# Patient Record
Sex: MALE | Race: WHITE | NOT HISPANIC OR LATINO | Employment: OTHER | URBAN - METROPOLITAN AREA
[De-identification: names, ages, dates, MRNs, and addresses within clinical notes are randomized per-mention and may not be internally consistent; named-entity substitution may affect disease eponyms.]

---

## 2017-06-22 ENCOUNTER — OFFICE VISIT (OUTPATIENT)
Dept: TRANSPLANT | Facility: CLINIC | Age: 67
End: 2017-06-22
Attending: INTERNAL MEDICINE
Payer: MEDICARE

## 2017-06-22 VITALS
SYSTOLIC BLOOD PRESSURE: 114 MMHG | OXYGEN SATURATION: 98 % | BODY MASS INDEX: 23.18 KG/M2 | HEIGHT: 66 IN | HEART RATE: 80 BPM | DIASTOLIC BLOOD PRESSURE: 72 MMHG | WEIGHT: 144.2 LBS | TEMPERATURE: 98.5 F | RESPIRATION RATE: 16 BRPM

## 2017-06-22 DIAGNOSIS — C82.99 NODULAR LYMPHOMA OF EXTRANODAL AND/OR SOLID ORGAN SITE (H): Primary | ICD-10-CM

## 2017-06-22 DIAGNOSIS — C82.80 OTHER TYPE OF FOLLICULAR LYMPHOMA: Primary | ICD-10-CM

## 2017-06-22 LAB
ALBUMIN SERPL-MCNC: 3.8 G/DL (ref 3.4–5)
ALP SERPL-CCNC: 70 U/L (ref 40–150)
ALT SERPL W P-5'-P-CCNC: 25 U/L (ref 0–70)
ANION GAP SERPL CALCULATED.3IONS-SCNC: 4 MMOL/L (ref 3–14)
AST SERPL W P-5'-P-CCNC: 20 U/L (ref 0–45)
BASOPHILS # BLD AUTO: 0 10E9/L (ref 0–0.2)
BASOPHILS NFR BLD AUTO: 0.3 %
BILIRUB SERPL-MCNC: 0.3 MG/DL (ref 0.2–1.3)
BUN SERPL-MCNC: 27 MG/DL (ref 7–30)
CALCIUM SERPL-MCNC: 8.7 MG/DL (ref 8.5–10.1)
CHLORIDE SERPL-SCNC: 106 MMOL/L (ref 94–109)
CO2 SERPL-SCNC: 30 MMOL/L (ref 20–32)
CREAT SERPL-MCNC: 0.82 MG/DL (ref 0.66–1.25)
DIFFERENTIAL METHOD BLD: ABNORMAL
EOSINOPHIL # BLD AUTO: 0.1 10E9/L (ref 0–0.7)
EOSINOPHIL NFR BLD AUTO: 1.9 %
ERYTHROCYTE [DISTWIDTH] IN BLOOD BY AUTOMATED COUNT: 12.3 % (ref 10–15)
GFR SERPL CREATININE-BSD FRML MDRD: ABNORMAL ML/MIN/1.7M2
GLUCOSE SERPL-MCNC: 93 MG/DL (ref 70–99)
HCT VFR BLD AUTO: 42.2 % (ref 40–53)
HGB BLD-MCNC: 13.5 G/DL (ref 13.3–17.7)
IMM GRANULOCYTES # BLD: 0 10E9/L (ref 0–0.4)
IMM GRANULOCYTES NFR BLD: 0.3 %
LDH SERPL L TO P-CCNC: 148 U/L (ref 85–227)
LYMPHOCYTES # BLD AUTO: 0.9 10E9/L (ref 0.8–5.3)
LYMPHOCYTES NFR BLD AUTO: 28.8 %
MCH RBC QN AUTO: 31.8 PG (ref 26.5–33)
MCHC RBC AUTO-ENTMCNC: 32 G/DL (ref 31.5–36.5)
MCV RBC AUTO: 99 FL (ref 78–100)
MONOCYTES # BLD AUTO: 0.4 10E9/L (ref 0–1.3)
MONOCYTES NFR BLD AUTO: 11.7 %
NEUTROPHILS # BLD AUTO: 1.8 10E9/L (ref 1.6–8.3)
NEUTROPHILS NFR BLD AUTO: 57 %
NRBC # BLD AUTO: 0 10*3/UL
NRBC BLD AUTO-RTO: 0 /100
PLATELET # BLD AUTO: 132 10E9/L (ref 150–450)
POTASSIUM SERPL-SCNC: 4.4 MMOL/L (ref 3.4–5.3)
PROT SERPL-MCNC: 6.7 G/DL (ref 6.8–8.8)
RBC # BLD AUTO: 4.25 10E12/L (ref 4.4–5.9)
SODIUM SERPL-SCNC: 140 MMOL/L (ref 133–144)
WBC # BLD AUTO: 3.1 10E9/L (ref 4–11)

## 2017-06-22 PROCEDURE — 85025 COMPLETE CBC W/AUTO DIFF WBC: CPT | Performed by: INTERNAL MEDICINE

## 2017-06-22 PROCEDURE — 36415 COLL VENOUS BLD VENIPUNCTURE: CPT | Performed by: INTERNAL MEDICINE

## 2017-06-22 PROCEDURE — 84165 PROTEIN E-PHORESIS SERUM: CPT | Performed by: INTERNAL MEDICINE

## 2017-06-22 PROCEDURE — 00000402 ZZHCL STATISTIC TOTAL PROTEIN: Performed by: INTERNAL MEDICINE

## 2017-06-22 PROCEDURE — 83615 LACTATE (LD) (LDH) ENZYME: CPT | Performed by: INTERNAL MEDICINE

## 2017-06-22 PROCEDURE — 99212 OFFICE O/P EST SF 10 MIN: CPT | Mod: ZF

## 2017-06-22 PROCEDURE — 80053 COMPREHEN METABOLIC PANEL: CPT | Performed by: INTERNAL MEDICINE

## 2017-06-22 RX ORDER — ERYTHROMYCIN 5 MG/G
OINTMENT OPHTHALMIC DAILY
COMMUNITY
Start: 2017-06-01 | End: 2017-06-22

## 2017-06-22 RX ORDER — ACYCLOVIR 400 MG/1
400 TABLET ORAL 2 TIMES DAILY
Qty: 60 TABLET | Refills: 3 | Status: SHIPPED | OUTPATIENT
Start: 2017-06-22 | End: 2018-07-26

## 2017-06-22 ASSESSMENT — PAIN SCALES - GENERAL: PAINLEVEL: NO PAIN (0)

## 2017-06-22 NOTE — LETTER
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  In the last year, he has for the most part been well.  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  Two issues have shown up.  He has not been known to have cold sores in the past but he has had recurrent irritated sores around his nose and sometimes with soreness in part of his face in the region.  There is no extensive or persistent rash and they have resolved.  He also had a rash on his right dorsum of his forearm/wrist which was red, not itchy but sore and developed vesicles which then resolved fully.  He did not have recurrent pain in the area.  Whether that was shingles or peculiarly a tiny patch of disseminated HSV is uncertain.      His other problem has been over the last several months episodic abdominal cramps and loose stools, sometimes 4-6 times a day.  It is not overtly associated with any specific foods and he is relatively cautious about his diet.  He does have whole milk and cereal in the morning and eats yogurt most days, but for some time, he has rarely gone more than 3 or 4 days without an episode like this.      The rest of his review of systems is unrevealing.      PHYSICAL EXAMINATION:     VITAL SIGNS:  His exam shows his weight stable over several years and his vital signs are acceptable.   LYMPH:  He " has 1 small soft right high anterior cervical/submandibular node which he says fluctuates in size from time to time, but no other cervical, supraclavicular or axillary nodes.  He has a tiny, 0.5 cm left femoral node and a small right inguinal node that, for the most part, have been for a long time without changing.  They are nontender.   HEENT:  Oropharynx is clear without mucosal lesions.   SKIN:  He has no active inflammatory skin rash, but he has many seborrheic keratoses and actinic, scaly patches scattered over his face.  In his right medial nostril, there is an irritated red spot that certainly could be an HSV cold sore.   LUNGS:  Clear without rales or wheezing.   CARDIOVASCULAR:  Heart tones are regular.   ABDOMEN:  Soft and nontender.  There are no masses or hepatosplenomegaly.   EXTREMITIES:  He has no peripheral edema.      LABORATORY:  Laboratory testing showed good blood counts and chemistries.  A protein electrophoresis shows a small stable monoclonal protein (0.2g/dl) which is unchanged in many years--since 2007.      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      His loose stools and cramps are not as clear.  We discussed that he could try to go 2 weeks without dairy products or try Lactaid (lactose-containing drops) to see if that made a difference in the frequency of his loose stools.  If it is persistent, he might need investigation for more significant GI problems, but we will start with these 2 simple measures and he will pursue that with his primary care physician if he chooses to.      There are no clinical signs of recurrent lymphoma and he knows to call if additional issues arise.  He will return in 1 year's time.      Elroy Holman MD   Professor of Medicine       Results for GILES DONNELL (MRN [id number]) as of 6/23/2017 16:33   Ref.  Range 6/2/2016 14:24 6/22/2017 14:51   Sodium Latest Ref Range: 133 - 144 mmol/L 139 140   Potassium Latest Ref Range: 3.4 - 5.3 mmol/L 4.4 4.4   Chloride Latest Ref Range: 94 - 109 mmol/L 106 106   Carbon Dioxide Latest Ref Range: 20 - 32 mmol/L 28 30   Urea Nitrogen Latest Ref Range: 7 - 30 mg/dL 26 27   Creatinine Latest Ref Range: 0.66 - 1.25 mg/dL 0.91 0.82   GFR Estimate Latest Ref Range: >60 mL/min/1.7m2 83 >90...   GFR Estimate If Black Latest Ref Range: >60 mL/min/1.7m2 >90... >90...   Calcium Latest Ref Range: 8.5 - 10.1 mg/dL 9.0 8.7   Anion Gap Latest Ref Range: 3 - 14 mmol/L 4 4   Albumin Latest Ref Range: 3.4 - 5.0 g/dL 3.8 3.8   Protein Total Latest Ref Range: 6.8 - 8.8 g/dL 6.6 (L) 6.7 (L)   Bilirubin Total Latest Ref Range: 0.2 - 1.3 mg/dL 0.4 0.3   Alkaline Phosphatase Latest Ref Range: 40 - 150 U/L 60 70   ALT Latest Ref Range: 0 - 70 U/L 24 25   AST Latest Ref Range: 0 - 45 U/L 23 20   Lactate Dehydrogenase Latest Ref Range: 85 - 227 U/L  148   Glucose Latest Ref Range: 70 - 99 mg/dL 83 93   WBC Latest Ref Range: 4.0 - 11.0 10e9/L 3.2 (L) 3.1 (L)   Hemoglobin Latest Ref Range: 13.3 - 17.7 g/dL 12.7 (L) 13.5   Hematocrit Latest Ref Range: 40.0 - 53.0 % 38.7 (L) 42.2   Platelet Count Latest Ref Range: 150 - 450 10e9/L 136 (L) 132 (L)   RBC Count Latest Ref Range: 4.4 - 5.9 10e12/L 3.94 (L) 4.25 (L)   MCV Latest Ref Range: 78 - 100 fl 98 99   MCH Latest Ref Range: 26.5 - 33.0 pg 32.2 31.8   MCHC Latest Ref Range: 31.5 - 36.5 g/dL 32.8 32.0   RDW Latest Ref Range: 10.0 - 15.0 % 12.3 12.3   Diff Method Unknown Automated Method Automated Method   % Neutrophils Latest Units: % 56.7 57.0   % Lymphocytes Latest Units: % 29.4 28.8   % Monocytes Latest Units: % 11.4 11.7   % Eosinophils Latest Units: % 1.9 1.9   % Basophils Latest Units: % 0.3 0.3   % Immature Granulocytes Latest Units: % 0.3 0.3   Nucleated RBCs Latest Ref Range: 0 /100 0 0   Absolute Neutrophil Latest Ref Range: 1.6 - 8.3 10e9/L 1.8 1.8    Absolute Lymphocytes Latest Ref Range: 0.8 - 5.3 10e9/L 0.9 0.9   Absolute Monocytes Latest Ref Range: 0.0 - 1.3 10e9/L 0.4 0.4   Absolute Eosinophils Latest Ref Range: 0.0 - 0.7 10e9/L 0.1 0.1   Absolute Basophils Latest Ref Range: 0.0 - 0.2 10e9/L 0.0 0.0   Abs Immature Granulocytes Latest Ref Range: 0 - 0.4 10e9/L 0.0 0.0   Absolute Nucleated RBC Unknown 0.0 0.0   Albumin Fraction Latest Ref Range: 3.7 - 5.1 g/dL 4.2 4.3   Alpha 1 Fraction Latest Ref Range: 0.2 - 0.4 g/dL 0.2 0.3   Alpha 2 Fraction Latest Ref Range: 0.5 - 0.9 g/dL 0.5 0.6   Beta Fraction Latest Ref Range: 0.6 - 1.0 g/dL 0.5 (L) 0.5 (L)   ELP Interpretation: Unknown Small monoclonal ... Small monoclonal ...   Gamma Fraction Latest Ref Range: 0.7 - 1.6 g/dL 0.8 0.8   Monoclonal Peak Latest Ref Range: 0.0 g/dL 0.2 (H) 0.2 (H)       Elroy Holman MD

## 2017-06-22 NOTE — NURSING NOTE
"Oncology Rooming Note    June 22, 2017 3:12 PM   Shahid Davis is a 66 year old male who presents for:    Chief Complaint   Patient presents with     RECHECK     Nodular lymphoma of extranodal and/or solid organ site      Initial Vitals: /72 (BP Location: Right arm, Patient Position: Chair, Cuff Size: Adult Regular)  Pulse 80  Temp 98.5  F (36.9  C) (Oral)  Resp 16  Ht 1.676 m (5' 5.98\")  Wt 65.4 kg (144 lb 3.2 oz)  SpO2 98%  BMI 23.29 kg/m2 Estimated body mass index is 23.29 kg/(m^2) as calculated from the following:    Height as of this encounter: 1.676 m (5' 5.98\").    Weight as of this encounter: 65.4 kg (144 lb 3.2 oz). Body surface area is 1.74 meters squared.  No Pain (0) Comment: Data Unavailable   No LMP for male patient.  Allergies reviewed: Yes  Medications reviewed: Yes    Medications: Medication refills not needed today.  Pharmacy name entered into [id number] [place] [address]    Clinical concerns:  No new concerns  Provider was notified.    7 minutes for nursing intake (face to face time)     Agnieszka Taylor MA              "

## 2017-06-22 NOTE — MR AVS SNAPSHOT
"              After Visit Summary   6/22/2017    Shahid Davis    MRN: [id number]           Patient Information     Date Of Birth          1950        Visit Information        Provider Department      6/22/2017 3:00 PM Elroy Holman MD [facility]        Today's Diagnoses     Other type of follicular lymphoma (H)    -  1          [facility] ([facility])  [address] [place] [id number]  Phone: [phone]  Clinic Hours:   Monday-Thursday:7am to 7pm   Friday: 7am to 5pm   Weekends and holidays:    8am to noon (in general)  If your fever is 100.5  or greater,   call the clinic.  After hours call the   hospital at [phone] or   [phone]. Ask for the BMT   fellow on-call            Follow-ups after your visit        Who to contact     If you have questions or need follow up information about today's clinic visit or your schedule please contact [facility] directly at [phone].  Normal or non-critical lab and imaging results will be communicated to you by Clipcopiahart, letter or phone within 4 business days after the clinic has received the results. If you do not hear from us within 7 days, please contact the clinic through InThrMat or phone. If you have a critical or abnormal lab result, we will notify you by phone as soon as possible.  Submit refill requests through ividence or call your pharmacy and they will forward the refill request to us. Please allow 3 business days for your refill to be completed.          Additional Information About Your Visit        Clipcopiahart Information     ividence lets you send messages to your doctor, view your test results, renew your prescriptions, schedule appointments and more. To sign up, go to [email or web address] . Click on \"Log in\" on the left side of the screen, which will take you to the Welcome page. Then click on \"Sign up Now\" on the right side of the page.     You will be asked " "to enter the access code listed below, as well as some personal information. Please follow the directions to create your username and password.     Your access code is: [email or web address]  Expires: 2017  6:31 AM     Your access code will  in 90 days. If you need help or a new code, please call your [place] clinic or [phone].        Care EveryWhere ID     This is your Care EveryWhere ID. This could be used by other organizations to access your [place] medical records  [id number]        Your Vitals Were     Pulse Temperature Respirations Height Pulse Oximetry BMI (Body Mass Index)    80 98.5  F (36.9  C) (Oral) 16 1.676 m (5' 5.98\") 98% 23.29 kg/m2       Blood Pressure from Last 3 Encounters:   17 114/72   16 106/70   06/04/15 108/63    Weight from Last 3 Encounters:   17 65.4 kg (144 lb 3.2 oz)   16 67 kg (147 lb 11.2 oz)   06/04/15 66.7 kg (147 lb 1.6 oz)              Today, you had the following     No orders found for display         Today's Medication Changes          These changes are accurate as of: 17 11:59 PM.  If you have any questions, ask your nurse or doctor.               Start taking these medicines.        Dose/Directions    acyclovir 400 MG tablet   Commonly known as:  ZOVIRAX   Used for:  Other type of follicular lymphoma (H)   Started by:  Elroy Holman MD        Dose:  400 mg   Take 1 tablet (400 mg) by mouth 2 times daily For 5 days or as directed   Quantity:  60 tablet   Refills:  3         Stop taking these medicines if you haven't already. Please contact your care team if you have questions.     erythromycin ophthalmic ointment   Commonly known as:  ROMYCIN   Stopped by:  Elroy Holman MD                Where to get your medicines      Some of these will need a paper prescription and others can be bought over the counter.  Ask your nurse if you have questions.     Bring a paper prescription for each of these medications     " acyclovir 400 MG tablet                Recent Review Flowsheet Data     BMT Recent Results Latest Ref Rng & Units 2/22/2012 8/16/2012 5/23/2013 5/22/2014 6/4/2015 6/2/2016 6/22/2017    WBC 4.0 - 11.0 10e9/L - 3.5(L) 4.1 3.8(L) 3.7(L) 3.2(L) 3.1(L)    Hemoglobin 13.3 - 17.7 g/dL - 13.6 13.7 13.2(L) 13.6 12.7(L) 13.5    Platelet Count 150 - 450 10e9/L - 157 166 173 149(L) 136(L) 132(L)    Neutrophils (Absolute) 1.6 - 8.3 10e9/L - 2.1 2.5 2.3 2.2 1.8 1.8    Sodium 133 - 144 mmol/L - 140 143 140 141 139 140    Potassium 3.4 - 5.3 mmol/L - 4.4 4.5 4.6 4.2 4.4 4.4    Chloride 94 - 109 mmol/L - 103 103 102 107 106 106    Glucose 70 - 99 mg/dL 94 82 76 84 74 83 93    Urea Nitrogen 7 - 30 mg/dL - 20 20 20 21 26 27    Creatinine 0.66 - 1.25 mg/dL - 0.84 0.83 0.88 0.90 0.91 0.82    Calcium (Total) 8.5 - 10.1 mg/dL - 9.0 9.4 9.4 8.8 9.0 8.7    Protein (Total) 6.8 - 8.8 g/dL - 7.1 7.3 6.9 7.0 6.6(L) 6.7(L)    Albumin 3.4 - 5.0 g/dL - 4.1 4.1 4.3 3.8 3.8 3.8    Alkaline Phosphatase 40 - 150 U/L - 57 60 58 75 60 70    AST 0 - 45 U/L - 27 30 24 26 23 20    ALT 0 - 70 U/L - 16 33 34 29 24 25    MCV 78 - 100 fl - 97 97 97 97 98 99               Primary Care Provider Office Phone # Fax #    Dax SURAJ Cardenas [phone]       [address]  [place] [address]        Equal Access to Services     NAS TONEY : Hadii renate oneill Sobree, wafridada luqadaha, qaybta kaalmada mayra, rina gotti. So [facility] [phone].    ATENCIÓN: Si habla español, tiene a meyer disposición servicios gratuitos de asistencia lingüística. Llame al [phone].    We comply with applicable federal civil rights laws and [place] laws. We do not discriminate on the basis of race, color, national origin, age, disability sex, sexual orientation or gender identity.            Thank you!     Thank you for choosing [facility]  for your care. Our goal is always to provide you with  excellent care. Hearing back from our patients is one way we can continue to improve our services. Please take a few minutes to complete the written survey that you may receive in the mail after your visit with us. Thank you!             Your Updated Medication List - Protect others around you: Learn how to safely use, store and throw away your medicines at www.disposemymeds.org.          This list is accurate as of: 6/22/17 11:59 PM.  Always use your most recent med list.                   Brand Name Dispense Instructions for use Diagnosis    acyclovir 400 MG tablet    ZOVIRAX    60 tablet    Take 1 tablet (400 mg) by mouth 2 times daily For 5 days or as directed    Other type of follicular lymphoma (H)       ascorbic acid 500 MG tablet    VITAMIN C     Take by mouth daily        MULTIVITAL PO           ZANTAC 150 MG tablet   Generic drug:  ranitidine      Take  by mouth 2 times daily. Once daily

## 2017-06-22 NOTE — PROGRESS NOTES
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  In the last year, he has for the most part been well.  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  Two issues have shown up.  He has not been known to have cold sores in the past but he has had recurrent irritated sores around his nose and sometimes with soreness in part of his face in the region.  There is no extensive or persistent rash and they have resolved.  He also had a rash on his right dorsum of his forearm/wrist which was red, not itchy but sore and developed vesicles which then resolved fully.  He did not have recurrent pain in the area.  Whether that was shingles or peculiarly a tiny patch of disseminated HSV is uncertain.      His other problem has been over the last several months episodic abdominal cramps and loose stools, sometimes 4-6 times a day.  It is not overtly associated with any specific foods and he is relatively cautious about his diet.  He does have whole milk and cereal in the morning and eats yogurt most days, but for some time, he has rarely gone more than 3 or 4 days without an episode like this.      The rest of his review of systems is unrevealing.      PHYSICAL EXAMINATION:     VITAL SIGNS:  His exam shows his weight stable over several years and his vital signs are acceptable.   LYMPH:  He has 1 small soft right high anterior cervical/submandibular node which he says fluctuates " in size from time to time, but no other cervical, supraclavicular or axillary nodes.  He has a tiny, 0.5 cm left femoral node and a small right inguinal node that, for the most part, have been for a long time without changing.  They are nontender.   HEENT:  Oropharynx is clear without mucosal lesions.   SKIN:  He has no active inflammatory skin rash, but he has many seborrheic keratoses and actinic, scaly patches scattered over his face.  In his right medial nostril, there is an irritated red spot that certainly could be an HSV cold sore.   LUNGS:  Clear without rales or wheezing.   CARDIOVASCULAR:  Heart tones are regular.   ABDOMEN:  Soft and nontender.  There are no masses or hepatosplenomegaly.   EXTREMITIES:  He has no peripheral edema.      LABORATORY:  Laboratory testing showed good blood counts and chemistries.  A protein electrophoresis shows a small stable monoclonal protein (0.2g/dl) which is unchanged in many years--since 2007.      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      His loose stools and cramps are not as clear.  We discussed that he could try to go 2 weeks without dairy products or try Lactaid (lactose-containing drops) to see if that made a difference in the frequency of his loose stools.  If it is persistent, he might need investigation for more significant GI problems, but we will start with these 2 simple measures and he will pursue that with his primary care physician if he chooses to.      There are no clinical signs of recurrent lymphoma and he knows to call if additional issues arise.  He will return in 1 year's time.      Elroy Holman MD   Professor of Medicine       Results for GILES DONNELL (MRN [id number]) as of 6/23/2017 16:33   Ref. Range 6/2/2016 14:24 6/22/2017 14:51   Sodium Latest Ref Range: 133 - 144 mmol/L 139 140    Potassium Latest Ref Range: 3.4 - 5.3 mmol/L 4.4 4.4   Chloride Latest Ref Range: 94 - 109 mmol/L 106 106   Carbon Dioxide Latest Ref Range: 20 - 32 mmol/L 28 30   Urea Nitrogen Latest Ref Range: 7 - 30 mg/dL 26 27   Creatinine Latest Ref Range: 0.66 - 1.25 mg/dL 0.91 0.82   GFR Estimate Latest Ref Range: >60 mL/min/1.7m2 83 >90...   GFR Estimate If Black Latest Ref Range: >60 mL/min/1.7m2 >90... >90...   Calcium Latest Ref Range: 8.5 - 10.1 mg/dL 9.0 8.7   Anion Gap Latest Ref Range: 3 - 14 mmol/L 4 4   Albumin Latest Ref Range: 3.4 - 5.0 g/dL 3.8 3.8   Protein Total Latest Ref Range: 6.8 - 8.8 g/dL 6.6 (L) 6.7 (L)   Bilirubin Total Latest Ref Range: 0.2 - 1.3 mg/dL 0.4 0.3   Alkaline Phosphatase Latest Ref Range: 40 - 150 U/L 60 70   ALT Latest Ref Range: 0 - 70 U/L 24 25   AST Latest Ref Range: 0 - 45 U/L 23 20   Lactate Dehydrogenase Latest Ref Range: 85 - 227 U/L  148   Glucose Latest Ref Range: 70 - 99 mg/dL 83 93   WBC Latest Ref Range: 4.0 - 11.0 10e9/L 3.2 (L) 3.1 (L)   Hemoglobin Latest Ref Range: 13.3 - 17.7 g/dL 12.7 (L) 13.5   Hematocrit Latest Ref Range: 40.0 - 53.0 % 38.7 (L) 42.2   Platelet Count Latest Ref Range: 150 - 450 10e9/L 136 (L) 132 (L)   RBC Count Latest Ref Range: 4.4 - 5.9 10e12/L 3.94 (L) 4.25 (L)   MCV Latest Ref Range: 78 - 100 fl 98 99   MCH Latest Ref Range: 26.5 - 33.0 pg 32.2 31.8   MCHC Latest Ref Range: 31.5 - 36.5 g/dL 32.8 32.0   RDW Latest Ref Range: 10.0 - 15.0 % 12.3 12.3   Diff Method Unknown Automated Method Automated Method   % Neutrophils Latest Units: % 56.7 57.0   % Lymphocytes Latest Units: % 29.4 28.8   % Monocytes Latest Units: % 11.4 11.7   % Eosinophils Latest Units: % 1.9 1.9   % Basophils Latest Units: % 0.3 0.3   % Immature Granulocytes Latest Units: % 0.3 0.3   Nucleated RBCs Latest Ref Range: 0 /100 0 0   Absolute Neutrophil Latest Ref Range: 1.6 - 8.3 10e9/L 1.8 1.8   Absolute Lymphocytes Latest Ref Range: 0.8 - 5.3 10e9/L 0.9 0.9   Absolute Monocytes  Latest Ref Range: 0.0 - 1.3 10e9/L 0.4 0.4   Absolute Eosinophils Latest Ref Range: 0.0 - 0.7 10e9/L 0.1 0.1   Absolute Basophils Latest Ref Range: 0.0 - 0.2 10e9/L 0.0 0.0   Abs Immature Granulocytes Latest Ref Range: 0 - 0.4 10e9/L 0.0 0.0   Absolute Nucleated RBC Unknown 0.0 0.0   Albumin Fraction Latest Ref Range: 3.7 - 5.1 g/dL 4.2 4.3   Alpha 1 Fraction Latest Ref Range: 0.2 - 0.4 g/dL 0.2 0.3   Alpha 2 Fraction Latest Ref Range: 0.5 - 0.9 g/dL 0.5 0.6   Beta Fraction Latest Ref Range: 0.6 - 1.0 g/dL 0.5 (L) 0.5 (L)   ELP Interpretation: Unknown Small monoclonal ... Small monoclonal ...   Gamma Fraction Latest Ref Range: 0.7 - 1.6 g/dL 0.8 0.8   Monoclonal Peak Latest Ref Range: 0.0 g/dL 0.2 (H) 0.2 (H)

## 2017-06-23 LAB
ALBUMIN SERPL ELPH-MCNC: 4.3 G/DL (ref 3.7–5.1)
ALPHA1 GLOB SERPL ELPH-MCNC: 0.3 G/DL (ref 0.2–0.4)
ALPHA2 GLOB SERPL ELPH-MCNC: 0.6 G/DL (ref 0.5–0.9)
B-GLOBULIN SERPL ELPH-MCNC: 0.5 G/DL (ref 0.6–1)
GAMMA GLOB SERPL ELPH-MCNC: 0.8 G/DL (ref 0.7–1.6)
M PROTEIN SERPL ELPH-MCNC: 0.2 G/DL
PROT PATTERN SERPL ELPH-IMP: ABNORMAL

## 2018-06-11 DIAGNOSIS — C82.99 NODULAR LYMPHOMA OF EXTRANODAL AND/OR SOLID ORGAN SITE (H): Primary | ICD-10-CM

## 2018-07-26 ENCOUNTER — OFFICE VISIT (OUTPATIENT)
Dept: TRANSPLANT | Facility: CLINIC | Age: 68
End: 2018-07-26
Attending: INTERNAL MEDICINE
Payer: MEDICARE

## 2018-07-26 ENCOUNTER — APPOINTMENT (OUTPATIENT)
Dept: LAB | Facility: CLINIC | Age: 68
End: 2018-07-26
Attending: INTERNAL MEDICINE
Payer: MEDICARE

## 2018-07-26 VITALS
TEMPERATURE: 98.2 F | BODY MASS INDEX: 22.97 KG/M2 | HEART RATE: 94 BPM | OXYGEN SATURATION: 97 % | SYSTOLIC BLOOD PRESSURE: 98 MMHG | RESPIRATION RATE: 16 BRPM | DIASTOLIC BLOOD PRESSURE: 65 MMHG | HEIGHT: 66 IN | WEIGHT: 142.9 LBS

## 2018-07-26 DIAGNOSIS — C82.99 NODULAR LYMPHOMA OF EXTRANODAL AND/OR SOLID ORGAN SITE (H): ICD-10-CM

## 2018-07-26 LAB
ALBUMIN SERPL-MCNC: 3.7 G/DL (ref 3.4–5)
ALP SERPL-CCNC: 70 U/L (ref 40–150)
ALT SERPL W P-5'-P-CCNC: 25 U/L (ref 0–70)
ANION GAP SERPL CALCULATED.3IONS-SCNC: 6 MMOL/L (ref 3–14)
AST SERPL W P-5'-P-CCNC: 21 U/L (ref 0–45)
BASOPHILS # BLD AUTO: 0 10E9/L (ref 0–0.2)
BASOPHILS NFR BLD AUTO: 0.3 %
BILIRUB SERPL-MCNC: 0.4 MG/DL (ref 0.2–1.3)
BUN SERPL-MCNC: 26 MG/DL (ref 7–30)
CALCIUM SERPL-MCNC: 9 MG/DL (ref 8.5–10.1)
CHLORIDE SERPL-SCNC: 107 MMOL/L (ref 94–109)
CO2 SERPL-SCNC: 27 MMOL/L (ref 20–32)
CREAT SERPL-MCNC: 0.81 MG/DL (ref 0.66–1.25)
DIFFERENTIAL METHOD BLD: ABNORMAL
EOSINOPHIL # BLD AUTO: 0 10E9/L (ref 0–0.7)
EOSINOPHIL NFR BLD AUTO: 1 %
ERYTHROCYTE [DISTWIDTH] IN BLOOD BY AUTOMATED COUNT: 12.4 % (ref 10–15)
GFR SERPL CREATININE-BSD FRML MDRD: >90 ML/MIN/1.7M2
GLUCOSE SERPL-MCNC: 95 MG/DL (ref 70–99)
HCT VFR BLD AUTO: 40.1 % (ref 40–53)
HGB BLD-MCNC: 13.5 G/DL (ref 13.3–17.7)
IMM GRANULOCYTES # BLD: 0 10E9/L (ref 0–0.4)
IMM GRANULOCYTES NFR BLD: 0.3 %
LYMPHOCYTES # BLD AUTO: 0.9 10E9/L (ref 0.8–5.3)
LYMPHOCYTES NFR BLD AUTO: 28.5 %
MCH RBC QN AUTO: 32.1 PG (ref 26.5–33)
MCHC RBC AUTO-ENTMCNC: 33.7 G/DL (ref 31.5–36.5)
MCV RBC AUTO: 96 FL (ref 78–100)
MONOCYTES # BLD AUTO: 0.3 10E9/L (ref 0–1.3)
MONOCYTES NFR BLD AUTO: 11.1 %
NEUTROPHILS # BLD AUTO: 1.8 10E9/L (ref 1.6–8.3)
NEUTROPHILS NFR BLD AUTO: 58.8 %
NRBC # BLD AUTO: 0 10*3/UL
NRBC BLD AUTO-RTO: 0 /100
PLATELET # BLD AUTO: 117 10E9/L (ref 150–450)
POTASSIUM SERPL-SCNC: 4.2 MMOL/L (ref 3.4–5.3)
PROT SERPL-MCNC: 6.8 G/DL (ref 6.8–8.8)
RBC # BLD AUTO: 4.2 10E12/L (ref 4.4–5.9)
SODIUM SERPL-SCNC: 140 MMOL/L (ref 133–144)
WBC # BLD AUTO: 3 10E9/L (ref 4–11)

## 2018-07-26 PROCEDURE — 80053 COMPREHEN METABOLIC PANEL: CPT | Performed by: INTERNAL MEDICINE

## 2018-07-26 PROCEDURE — 36415 COLL VENOUS BLD VENIPUNCTURE: CPT

## 2018-07-26 PROCEDURE — 85025 COMPLETE CBC W/AUTO DIFF WBC: CPT | Performed by: INTERNAL MEDICINE

## 2018-07-26 PROCEDURE — 84165 PROTEIN E-PHORESIS SERUM: CPT | Performed by: INTERNAL MEDICINE

## 2018-07-26 PROCEDURE — 00000402 ZZHCL STATISTIC TOTAL PROTEIN: Performed by: INTERNAL MEDICINE

## 2018-07-26 ASSESSMENT — PAIN SCALES - GENERAL: PAINLEVEL: NO PAIN (0)

## 2018-07-26 NOTE — NURSING NOTE
"Oncology Rooming Note    July 26, 2018 2:09 PM   Shahid Davis is a 67 year old male who presents for:    Chief Complaint   Patient presents with     Blood Draw     labs drawn from arm by RN     RECHECK     Return: Nodular lymphoma of extranodal and/or solid organ site      Initial Vitals: BP 98/65  Pulse 94  Temp 98.2  F (36.8  C)  Resp 16  Ht 1.676 m (5' 5.98\")  Wt 64.8 kg (142 lb 14.4 oz)  SpO2 97%  BMI 23.08 kg/m2 Estimated body mass index is 23.08 kg/(m^2) as calculated from the following:    Height as of this encounter: 1.676 m (5' 5.98\").    Weight as of this encounter: 64.8 kg (142 lb 14.4 oz). Body surface area is 1.74 meters squared.  No Pain (0) Comment: Data Unavailable   No LMP for male patient.  Allergies reviewed: Yes  Medications reviewed: Yes    Medications: Medication refills not needed today.  Pharmacy name entered into [employer] [id number] [place] [address]    Clinical concerns: N/A    5 minutes for nursing intake (face to face time)     Amanda Hernandez CMA              "

## 2018-07-26 NOTE — NURSING NOTE
Chief Complaint   Patient presents with     Blood Draw     labs drawn from arm by RN     Labs drawn from left arm AC.  Patient checked in for BMT provider visit.  Teresa Yañez RN

## 2018-07-26 NOTE — PROGRESS NOTES
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 In the last year he's been relatively well, but he had 3 episodes of sustained respiratory infection. A URI for about 2 weeks in January, then later in April- May, a few weeks of persisting bronchitis that was treated with antibiotics and then later with an inhaler and a short course of corticosteroids and finally another respiratory infection in June until early July that is cleared up about 2 weeks ago. They've not been associated with notable shortness of breath or pleuritic pain, but with persistent nonproductive cough.    He had an episode of sciatica with pain down his right leg some months ago when painting over his head, but that went away after several months and is not persisting.    He has lots of skin tags and seborrheic keratoses. He said previous actinic skin lesions and we discussed the importance of follow-up with an experienced dermatologist [facility] or elsewhere.    He's had no ongoing ENT, other respiratory,  symptoms, but he has ongoing heartburn and takes ranitidine on a regular basis, which for the most part controls his symptoms. He's not had bleeding, bruising, rash, or fevers.     His exam shows his weight has been steady for years and his vital signs are acceptable. He has lots of seborrheic keratoses, skin tags, hyperpigmented, scaly lesions including one crusted scaly lesion on his mid right cheek in his beard that I told him should get reviewed. He's had lesions on his temples and many cryo-cauterized in the past and scattered parts of his body. His oropharynx is clear without " mucosal lesions. His lungs are clear without rales or wheezing. His heart tones are regular with no gallop rhythm. His abdomen is soft and nontender without palpable mass, hepatosplenomegaly or masses. He has no peripheral edema or bone tenderness. He has no cervical, supraclavicular, axillary or inguinal lymphadenopathy.    Laboratory testing showed good blood counts and chemistries. Serum ELP is unchanged He  has had a long time small monoclonal protein which has not  progressed in over 10 years.    There is no clinical evidence of recurrence or progression of his previous lymphoma for many years and I'll continue to see him once yearly or anytime if he has new signs or symptoms.    I reinforced the need for him to have the formal whole body dermatologic checkup because of his prior actinic skin lesions and his numerous cutaneous abnormalities.    It's good to see how well he is doing.    Elroy Holman M.D.    Professor of Medicine    Results for MAJOR GILES (MRN [id number]) as of 7/29/2018 11:13   Ref. Range 7/26/2018 13:51   Sodium Latest Ref Range: 133 - 144 mmol/L 140   Potassium Latest Ref Range: 3.4 - 5.3 mmol/L 4.2   Chloride Latest Ref Range: 94 - 109 mmol/L 107   Carbon Dioxide Latest Ref Range: 20 - 32 mmol/L 27   Urea Nitrogen Latest Ref Range: 7 - 30 mg/dL 26   Creatinine Latest Ref Range: 0.66 - 1.25 mg/dL 0.81   GFR Estimate Latest Ref Range: >60 mL/min/1.7m2 >90   GFR Estimate If Black Latest Ref Range: >60 mL/min/1.7m2 >90   Calcium Latest Ref Range: 8.5 - 10.1 mg/dL 9.0   Anion Gap Latest Ref Range: 3 - 14 mmol/L 6   Albumin Latest Ref Range: 3.4 - 5.0 g/dL 3.7   Protein Total Latest Ref Range: 6.8 - 8.8 g/dL 6.8   Bilirubin Total Latest Ref Range: 0.2 - 1.3 mg/dL 0.4   Alkaline Phosphatase Latest Ref Range: 40 - 150 U/L 70   ALT Latest Ref Range: 0 - 70 U/L 25   AST Latest Ref Range: 0 - 45 U/L 21   Glucose Latest Ref Range: 70 - 99 mg/dL 95   WBC Latest Ref Range: 4.0 - 11.0 10e9/L 3.0  (L)   Hemoglobin Latest Ref Range: 13.3 - 17.7 g/dL 13.5   Hematocrit Latest Ref Range: 40.0 - 53.0 % 40.1   Platelet Count Latest Ref Range: 150 - 450 10e9/L 117 (L)   RBC Count Latest Ref Range: 4.4 - 5.9 10e12/L 4.20 (L)   MCV Latest Ref Range: 78 - 100 fl 96   MCH Latest Ref Range: 26.5 - 33.0 pg 32.1   MCHC Latest Ref Range: 31.5 - 36.5 g/dL 33.7   RDW Latest Ref Range: 10.0 - 15.0 % 12.4   Diff Method Unknown Automated Method   % Neutrophils Latest Units: % 58.8   % Lymphocytes Latest Units: % 28.5   % Monocytes Latest Units: % 11.1   % Eosinophils Latest Units: % 1.0   % Basophils Latest Units: % 0.3   % Immature Granulocytes Latest Units: % 0.3   Nucleated RBCs Latest Ref Range: 0 /100 0   Absolute Neutrophil Latest Ref Range: 1.6 - 8.3 10e9/L 1.8   Absolute Lymphocytes Latest Ref Range: 0.8 - 5.3 10e9/L 0.9   Absolute Monocytes Latest Ref Range: 0.0 - 1.3 10e9/L 0.3   Absolute Eosinophils Latest Ref Range: 0.0 - 0.7 10e9/L 0.0   Absolute Basophils Latest Ref Range: 0.0 - 0.2 10e9/L 0.0   Abs Immature Granulocytes Latest Ref Range: 0 - 0.4 10e9/L 0.0   Absolute Nucleated RBC Unknown 0.0   Albumin Fraction Latest Ref Range: 3.7 - 5.1 g/dL 4.4   Alpha 1 Fraction Latest Ref Range: 0.2 - 0.4 g/dL 0.3   Alpha 2 Fraction Latest Ref Range: 0.5 - 0.9 g/dL 0.6   Beta Fraction Latest Ref Range: 0.6 - 1.0 g/dL 0.5 (L)   ELP Interpretation: Unknown Small monoclonal ...   Gamma Fraction Latest Ref Range: 0.7 - 1.6 g/dL 0.8   Monoclonal Peak Latest Ref Range: 0.0 g/dL 0.2 (H)

## 2018-07-26 NOTE — LETTER
"7/26/2018       RE: Shahid Davis   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 In the last year he's been relatively well, but he had 3 episodes of sustained respiratory infection. A URI for about 2 weeks in January, then later in April- May, a few weeks of persisting bronchitis that was treated with antibiotics and then later with an inhaler and a short course of corticosteroids and finally another respiratory infection in June until early July that is cleared up about 2 weeks ago. They've not been associated with notable shortness of breath or pleuritic pain, but with persistent nonproductive cough.    He had an episode of sciatica with pain down his right leg some months ago when painting over his head, but that went away after several months and is not persisting.    He has lots of skin tags and seborrheic keratoses. He said previous actinic skin lesions and we discussed the importance of follow-up with an experienced dermatologist here at [facility] or elsewhere.    He's had no ongoing ENT, other respiratory,  symptoms, but he has ongoing heartburn and takes ranitidine on a regular basis, which for the most part controls his symptoms. He's not had bleeding, bruising, rash, or fevers.     His exam shows his weight has been steady for years and his vital signs are acceptable. He has lots of seborrheic keratoses, skin tags, hyperpigmented, scaly lesions including one crusted scaly lesion on his mid right cheek in his beard that I told him should get reviewed. He's had lesions on his temples and many " cryo-cauterized in the past and scattered parts of his body. His oropharynx is clear without mucosal lesions. His lungs are clear without rales or wheezing. His heart tones are regular with no gallop rhythm. His abdomen is soft and nontender without palpable mass, hepatosplenomegaly or masses. He has no peripheral edema or bone tenderness. He has no cervical, supraclavicular, axillary or inguinal lymphadenopathy.    Laboratory testing showed good blood counts and chemistries. Serum ELP is unchanged He  has had a long time small monoclonal protein which has not  progressed in over 10 years.    There is no clinical evidence of recurrence or progression of his previous lymphoma for many years and I'll continue to see him once yearly or anytime if he has new signs or symptoms.    I reinforced the need for him to have the formal whole body dermatologic checkup because of his prior actinic skin lesions and his numerous cutaneous abnormalities.    It's good to see how well he is doing.    Elroy Holman M.D.    Professor of Medicine    Results for MAJOR GILES (MRN [id number]) as of 7/29/2018 11:13   Ref. Range 7/26/2018 13:51   Sodium Latest Ref Range: 133 - 144 mmol/L 140   Potassium Latest Ref Range: 3.4 - 5.3 mmol/L 4.2   Chloride Latest Ref Range: 94 - 109 mmol/L 107   Carbon Dioxide Latest Ref Range: 20 - 32 mmol/L 27   Urea Nitrogen Latest Ref Range: 7 - 30 mg/dL 26   Creatinine Latest Ref Range: 0.66 - 1.25 mg/dL 0.81   GFR Estimate Latest Ref Range: >60 mL/min/1.7m2 >90   GFR Estimate If Black Latest Ref Range: >60 mL/min/1.7m2 >90   Calcium Latest Ref Range: 8.5 - 10.1 mg/dL 9.0   Anion Gap Latest Ref Range: 3 - 14 mmol/L 6   Albumin Latest Ref Range: 3.4 - 5.0 g/dL 3.7   Protein Total Latest Ref Range: 6.8 - 8.8 g/dL 6.8   Bilirubin Total Latest Ref Range: 0.2 - 1.3 mg/dL 0.4   Alkaline Phosphatase Latest Ref Range: 40 - 150 U/L 70   ALT Latest Ref Range: 0 - 70 U/L 25   AST Latest Ref Range: 0 - 45 U/L 21    Glucose Latest Ref Range: 70 - 99 mg/dL 95   WBC Latest Ref Range: 4.0 - 11.0 10e9/L 3.0 (L)   Hemoglobin Latest Ref Range: 13.3 - 17.7 g/dL 13.5   Hematocrit Latest Ref Range: 40.0 - 53.0 % 40.1   Platelet Count Latest Ref Range: 150 - 450 10e9/L 117 (L)   RBC Count Latest Ref Range: 4.4 - 5.9 10e12/L 4.20 (L)   MCV Latest Ref Range: 78 - 100 fl 96   MCH Latest Ref Range: 26.5 - 33.0 pg 32.1   MCHC Latest Ref Range: 31.5 - 36.5 g/dL 33.7   RDW Latest Ref Range: 10.0 - 15.0 % 12.4   Diff Method Unknown Automated Method   % Neutrophils Latest Units: % 58.8   % Lymphocytes Latest Units: % 28.5   % Monocytes Latest Units: % 11.1   % Eosinophils Latest Units: % 1.0   % Basophils Latest Units: % 0.3   % Immature Granulocytes Latest Units: % 0.3   Nucleated RBCs Latest Ref Range: 0 /100 0   Absolute Neutrophil Latest Ref Range: 1.6 - 8.3 10e9/L 1.8   Absolute Lymphocytes Latest Ref Range: 0.8 - 5.3 10e9/L 0.9   Absolute Monocytes Latest Ref Range: 0.0 - 1.3 10e9/L 0.3   Absolute Eosinophils Latest Ref Range: 0.0 - 0.7 10e9/L 0.0   Absolute Basophils Latest Ref Range: 0.0 - 0.2 10e9/L 0.0   Abs Immature Granulocytes Latest Ref Range: 0 - 0.4 10e9/L 0.0   Absolute Nucleated RBC Unknown 0.0   Albumin Fraction Latest Ref Range: 3.7 - 5.1 g/dL 4.4   Alpha 1 Fraction Latest Ref Range: 0.2 - 0.4 g/dL 0.3   Alpha 2 Fraction Latest Ref Range: 0.5 - 0.9 g/dL 0.6   Beta Fraction Latest Ref Range: 0.6 - 1.0 g/dL 0.5 (L)   ELP Interpretation: Unknown Small monoclonal ...   Gamma Fraction Latest Ref Range: 0.7 - 1.6 g/dL 0.8   Monoclonal Peak Latest Ref Range: 0.0 g/dL 0.2 (H)       BMT DOM

## 2018-07-26 NOTE — MR AVS SNAPSHOT
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  After hours call the   hospital at [phone] or   [phone]. Ask for the BMT   fellow on-call            Follow-ups after your visit        Who to contact     If you have questions or need follow up information about today's clinic visit or your schedule please contact [facility] M[facility]ANSPLANT directly at [phone].  Normal or non-critical lab and imaging results will be communicated to you by Retrophint, letter or phone within 4 business days after the clinic has received the results. If you do not hear from us within 7 days, please contact the clinic through Retrophint or phone. If you have a critical or abnormal lab result, we will notify you by phone as soon as possible.  Submit refill requests through [employer] or call your pharmacy and they will forward the refill request to us. Please allow 3 business days for your refill to be completed.          Additional Information About Your Visit        [employer]ing[employer]antrahart Information     [employer] lets you send messages to your doctor, view your test results, renew your prescriptions, schedule appointments and more. To sign up, go to [email or web address] . Click on \"Log in\" on the left side of the screen, which will take you to the Welcome page. Then click on \"Sign up Now\" on the right side of the page.     You will be asked " "to enter the access code listed below, as well as some personal information. Please follow the directions to create your username and password.     Your access code is: [email or web address]  Expires: 10/10/2018  6:30 AM     Your access code will  in 90 days. If you need help or a new code, please call your [facility] clinic or [phone].        Care EveryWhere ID     This is your [facility] EveryWhere ID. This could be used by other organizations to access your [facility] medical records  [id number]        Your Vitals Were     Pulse Temperature Respirations Height Pulse Oximetry BMI (Body Mass Index)    94 98.2  F (36.8  C) 16 1.676 m (5' 5.98\") 97% 23.08 kg/m2       Blood Pressure from Last 3 Encounters:   18 98/65   17 114/72   16 106/70    Weight from Last 3 Encounters:   18 64.8 kg (142 lb 14.4 oz)   17 65.4 kg (144 lb 3.2 oz)   16 67 kg (147 lb 11.2 oz)              We Performed the Following     CBC with platelets differential     Comprehensive metabolic panel     Protein electrophoresis          Today's Medication Changes          These changes are accurate as of 18 11:59 PM.  If you have any questions, ask your nurse or doctor.               Stop taking these medicines if you haven't already. Please contact your care team if you have questions.     acyclovir 400 MG tablet   Commonly known as:  ZOVIRAX                    Recent Review Flowsheet Data     BMT Recent Results Latest Ref Rng & Units 2012    WBC 4.0 - 11.0 10e9/L 3.5(L) 4.1 3.8(L) 3.7(L) 3.2(L) 3.1(L) 3.0(L)    Hemoglobin 13.3 - 17.7 g/dL 13.6 13.7 13.2(L) 13.6 12.7(L) 13.5 13.5    Platelet Count 150 - 450 10e9/L 157 166 173 149(L) 136(L) 132(L) 117(L)    Neutrophils (Absolute) 1.6 - 8.3 10e9/L 2.1 2.5 2.3 2.2 1.8 1.8 1.8    Sodium 133 - 144 mmol/L 140 143 140 141 139 140 140    Potassium 3.4 - 5.3 mmol/L 4.4 4.5 4.6 4.2 4.4 4.4 4.2    Chloride 94 - 109 " mmol/L 103 103 102 107 106 106 107    Glucose 70 - 99 mg/dL 82 76 84 74 83 93 95    Urea Nitrogen 7 - 30 mg/dL 20 20 20 21 26 27 26    Creatinine 0.66 - 1.25 mg/dL 0.84 0.83 0.88 0.90 0.91 0.82 0.81    Calcium (Total) 8.5 - 10.1 mg/dL 9.0 9.4 9.4 8.8 9.0 8.7 9.0    Protein (Total) 6.8 - 8.8 g/dL 7.1 7.3 6.9 7.0 6.6(L) 6.7(L) 6.8    Albumin 3.4 - 5.0 g/dL 4.1 4.1 4.3 3.8 3.8 3.8 3.7    Alkaline Phosphatase 40 - 150 U/L 57 60 58 75 60 70 70    AST 0 - 45 U/L 27 30 24 26 23 20 21    ALT 0 - 70 U/L 16 33 34 29 24 25 25    MCV 78 - 100 fl 97 97 97 97 98 99 96               Primary Care Provider Office Phone # Fax #    Dax RUELAS Ronald [phone]       [facility] [address] [facility] [address]        Equal Access to Services     NAS TONEY AH: Hadii aad ku hadasho Soomaali, waaxda luqadaha, qaybta kaalmada adeegyada, rina lundbergin haymoyn luis still larogers gotti. So [facility] [phone].    ATENCIÓN: Si habla español, tiene a meyer disposición servicios gratuitos de asistencia lingüística. [facility] [phone].    We comply with applicable federal civil rights laws and [place] laws. We do not discriminate on the basis of race, color, national origin, age, disability, sex, sexual orientation, or gender identity.            Thank you!     Thank you for choosing [facility] BLOOD [facility] MARROW TRANSPLANT  for your care. Our goal is always to provide you with excellent care. Hearing back from our patients is one way we can continue to improve our services. Please take a few minutes to complete the written survey that you may receive in the mail after your visit with us. Thank you!             Your Updated Medication List - Protect others around you: Learn how to safely use, store and throw away your medicines at www.disposemymeds.org.          This list is accurate as of 7/26/18 11:59 PM.  Always use your most recent med list.                   Brand Name Dispense Instructions for use Diagnosis    ascorbic acid 500  MG tablet    VITAMIN C     Take by mouth daily        MULTIVITAL PO           ZANTAC 150 MG tablet   Generic drug:  ranitidine      Take  by mouth 2 times daily. Once daily

## 2018-07-27 LAB
ALBUMIN SERPL ELPH-MCNC: 4.4 G/DL (ref 3.7–5.1)
ALPHA1 GLOB SERPL ELPH-MCNC: 0.3 G/DL (ref 0.2–0.4)
ALPHA2 GLOB SERPL ELPH-MCNC: 0.6 G/DL (ref 0.5–0.9)
B-GLOBULIN SERPL ELPH-MCNC: 0.5 G/DL (ref 0.6–1)
GAMMA GLOB SERPL ELPH-MCNC: 0.8 G/DL (ref 0.7–1.6)
M PROTEIN SERPL ELPH-MCNC: 0.2 G/DL
PROT PATTERN SERPL ELPH-IMP: ABNORMAL

## 2019-09-05 ENCOUNTER — OFFICE VISIT (OUTPATIENT)
Dept: TRANSPLANT | Facility: CLINIC | Age: 69
End: 2019-09-05
Attending: INTERNAL MEDICINE
Payer: MEDICARE

## 2019-09-05 VITALS
HEART RATE: 80 BPM | TEMPERATURE: 98.3 F | SYSTOLIC BLOOD PRESSURE: 105 MMHG | WEIGHT: 140 LBS | OXYGEN SATURATION: 100 % | RESPIRATION RATE: 16 BRPM | BODY MASS INDEX: 22.5 KG/M2 | DIASTOLIC BLOOD PRESSURE: 69 MMHG | HEIGHT: 66 IN

## 2019-09-05 DIAGNOSIS — C82.99 NODULAR LYMPHOMA OF EXTRANODAL AND/OR SOLID ORGAN SITE (H): Primary | ICD-10-CM

## 2019-09-05 DIAGNOSIS — D46.A REFRACTORY CYTOPENIA WITH MULTILINEAGE DYSPLASIA (H): ICD-10-CM

## 2019-09-05 DIAGNOSIS — D69.6 THROMBOCYTOPENIA (H): ICD-10-CM

## 2019-09-05 LAB
ALBUMIN SERPL-MCNC: 3.9 G/DL (ref 3.4–5)
ALP SERPL-CCNC: 75 U/L (ref 40–150)
ALT SERPL W P-5'-P-CCNC: 30 U/L (ref 0–70)
ANION GAP SERPL CALCULATED.3IONS-SCNC: 5 MMOL/L (ref 3–14)
AST SERPL W P-5'-P-CCNC: 23 U/L (ref 0–45)
BASOPHILS # BLD AUTO: 0 10E9/L (ref 0–0.2)
BASOPHILS NFR BLD AUTO: 0.3 %
BILIRUB SERPL-MCNC: 0.4 MG/DL (ref 0.2–1.3)
BUN SERPL-MCNC: 31 MG/DL (ref 7–30)
CALCIUM SERPL-MCNC: 9.1 MG/DL (ref 8.5–10.1)
CHLORIDE SERPL-SCNC: 106 MMOL/L (ref 94–109)
CO2 SERPL-SCNC: 28 MMOL/L (ref 20–32)
CREAT SERPL-MCNC: 0.82 MG/DL (ref 0.66–1.25)
DIFFERENTIAL METHOD BLD: ABNORMAL
EOSINOPHIL # BLD AUTO: 0 10E9/L (ref 0–0.7)
EOSINOPHIL NFR BLD AUTO: 0.6 %
ERYTHROCYTE [DISTWIDTH] IN BLOOD BY AUTOMATED COUNT: 12.4 % (ref 10–15)
GFR SERPL CREATININE-BSD FRML MDRD: 90 ML/MIN/{1.73_M2}
GLUCOSE SERPL-MCNC: 88 MG/DL (ref 70–99)
HCT VFR BLD AUTO: 39.1 % (ref 40–53)
HGB BLD-MCNC: 13.2 G/DL (ref 13.3–17.7)
IMM GRANULOCYTES # BLD: 0 10E9/L (ref 0–0.4)
IMM GRANULOCYTES NFR BLD: 0 %
LYMPHOCYTES # BLD AUTO: 0.8 10E9/L (ref 0.8–5.3)
LYMPHOCYTES NFR BLD AUTO: 24.7 %
MCH RBC QN AUTO: 32.6 PG (ref 26.5–33)
MCHC RBC AUTO-ENTMCNC: 33.8 G/DL (ref 31.5–36.5)
MCV RBC AUTO: 97 FL (ref 78–100)
MONOCYTES # BLD AUTO: 0.3 10E9/L (ref 0–1.3)
MONOCYTES NFR BLD AUTO: 9.9 %
NEUTROPHILS # BLD AUTO: 2 10E9/L (ref 1.6–8.3)
NEUTROPHILS NFR BLD AUTO: 64.5 %
NRBC # BLD AUTO: 0 10*3/UL
NRBC BLD AUTO-RTO: 0 /100
PLATELET # BLD AUTO: 95 10E9/L (ref 150–450)
POTASSIUM SERPL-SCNC: 4.5 MMOL/L (ref 3.4–5.3)
PROT SERPL-MCNC: 6.8 G/DL (ref 6.8–8.8)
RBC # BLD AUTO: 4.05 10E12/L (ref 4.4–5.9)
SODIUM SERPL-SCNC: 140 MMOL/L (ref 133–144)
WBC # BLD AUTO: 3.1 10E9/L (ref 4–11)

## 2019-09-05 PROCEDURE — 84165 PROTEIN E-PHORESIS SERUM: CPT

## 2019-09-05 PROCEDURE — 80053 COMPREHEN METABOLIC PANEL: CPT

## 2019-09-05 PROCEDURE — 86334 IMMUNOFIX E-PHORESIS SERUM: CPT

## 2019-09-05 PROCEDURE — 36415 COLL VENOUS BLD VENIPUNCTURE: CPT

## 2019-09-05 PROCEDURE — 82784 ASSAY IGA/IGD/IGG/IGM EACH: CPT

## 2019-09-05 PROCEDURE — G0463 HOSPITAL OUTPT CLINIC VISIT: HCPCS | Mod: ZF

## 2019-09-05 PROCEDURE — 00000402 ZZHCL STATISTIC TOTAL PROTEIN

## 2019-09-05 PROCEDURE — 82232 ASSAY OF BETA-2 PROTEIN: CPT

## 2019-09-05 PROCEDURE — 85025 COMPLETE CBC W/AUTO DIFF WBC: CPT

## 2019-09-05 RX ORDER — OMEGA-3 FATTY ACIDS/FISH OIL 300-1000MG
200 CAPSULE ORAL EVERY 4 HOURS PRN
COMMUNITY
End: 2021-11-18

## 2019-09-05 ASSESSMENT — MIFFLIN-ST. JEOR: SCORE: 1342.54

## 2019-09-05 ASSESSMENT — PAIN SCALES - GENERAL: PAINLEVEL: NO PAIN (0)

## 2019-09-05 NOTE — PROGRESS NOTES
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  In the last year he says he is been well.   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   He has chronic back pain but it has not changed and his appetite and energy are steady.  He has no blood in the stools, nocturia only once or so nightly and no new rash bleeding bruising or frequent infections.  He is noted noted no masses or external lymphadenopathy.  The rest of his review of systems is unrevealing.    His exam shows his weight is down 3-1/2 kg over the last 3 years and his vital signs are acceptable.  He has no cervical supraclavicular axillary or inguinal lymphadenopathy.  His oropharynx is clear without mucosal lesions or enlarged tonsils.  His lungs are clear.  His heart tones are very quiet but there is no gallop or murmur.  His abdomen is soft and nontender without palpable masses or hepatosplenomegaly.  He has no peripheral edema. He has many seborrheic karatoses and skin tags.    Laboratory testing shows further slow fall in his platelet count that has been slowly declining since 2015;  WBC slightly below normal but similar to the past 5 years; and normal Hgb.  Chemistries are fine with the same small (0.2g/dl) monoclonal protein he's had for many years.  In the past it had been IgG lambda while his lymphoma had been kappa.    Now small IgM kappa by immunofixation and Beta 2 microglobulin is high normal range.    He has no clinical signs of recurrent or " progressive lymphoma but the falling platelet count needs explanation.  We will do BM biopsy and CT PET scan to assess whether his prior lymphoma or its therapy are explanations for his thrombocytopenia.    NOTE: possible history of IV contrast reaction; not definite but  oral methylprednisolone 32 mg 12h and 2h prior to IV contrast with scan. Prescription sent to outside pharmacy.  Patient is aware.  Also:  We discussed his general health management.  While he is gotten influenza vaccine yearly he is never had, to his knowledge or to records here a pneumococcal vaccine or a shingles vaccine, both of which are recommended for him.    He is also not ever had colorectal cancer screening and though he has no family history, a colonoscopy would be indicated.  A PSA done in 2012 here was negative.    He will continue his periodic primary care evaluation with his outside physicians.  He knows to call if additional problems arise    Elroy Holman MD    Professor of medicine    Results for MAJOR GILES (MRN [id number]) as of 9/10/2019 11:51   Ref. Range 7/26/2018 13:51 9/5/2019 13:10   Sodium Latest Ref Range: 133 - 144 mmol/L 140 140   Potassium Latest Ref Range: 3.4 - 5.3 mmol/L 4.2 4.5   Chloride Latest Ref Range: 94 - 109 mmol/L 107 106   Carbon Dioxide Latest Ref Range: 20 - 32 mmol/L 27 28   Urea Nitrogen Latest Ref Range: 7 - 30 mg/dL 26 31 (H)   Creatinine Latest Ref Range: 0.66 - 1.25 mg/dL 0.81 0.82   GFR Estimate Latest Ref Range: >60 mL/min/1.73_m2 >90 90   GFR Estimate If Black Latest Ref Range: >60 mL/min/1.73_m2 >90 >90   Calcium Latest Ref Range: 8.5 - 10.1 mg/dL 9.0 9.1   Anion Gap Latest Ref Range: 3 - 14 mmol/L 6 5   Albumin Latest Ref Range: 3.4 - 5.0 g/dL 3.7 3.9   Protein Total Latest Ref Range: 6.8 - 8.8 g/dL 6.8 6.8   Bilirubin Total Latest Ref Range: 0.2 - 1.3 mg/dL 0.4 0.4   Alkaline Phosphatase Latest Ref Range: 40 - 150 U/L 70 75   ALT Latest Ref Range: 0 - 70 U/L 25 30   AST Latest Ref  Range: 0 - 45 U/L 21 23   Beta-2-Microglobulin Latest Ref Range: <2.3 mg/L  2.2   Glucose Latest Ref Range: 70 - 99 mg/dL 95 88   WBC Latest Ref Range: 4.0 - 11.0 10e9/L 3.0 (L) 3.1 (L)   Hemoglobin Latest Ref Range: 13.3 - 17.7 g/dL 13.5 13.2 (L)   Hematocrit Latest Ref Range: 40.0 - 53.0 % 40.1 39.1 (L)   Platelet Count Latest Ref Range: 150 - 450 10e9/L 117 (L) 95 (L)   RBC Count Latest Ref Range: 4.4 - 5.9 10e12/L 4.20 (L) 4.05 (L)   MCV Latest Ref Range: 78 - 100 fl 96 97   MCH Latest Ref Range: 26.5 - 33.0 pg 32.1 32.6   MCHC Latest Ref Range: 31.5 - 36.5 g/dL 33.7 33.8   RDW Latest Ref Range: 10.0 - 15.0 % 12.4 12.4   Diff Method Unknown Automated Method Automated Method   % Neutrophils Latest Units: % 58.8 64.5   % Lymphocytes Latest Units: % 28.5 24.7   % Monocytes Latest Units: % 11.1 9.9   % Eosinophils Latest Units: % 1.0 0.6   % Basophils Latest Units: % 0.3 0.3   % Immature Granulocytes Latest Units: % 0.3 0.0   Nucleated RBCs Latest Ref Range: 0 /100 0 0   Absolute Neutrophil Latest Ref Range: 1.6 - 8.3 10e9/L 1.8 2.0   Absolute Lymphocytes Latest Ref Range: 0.8 - 5.3 10e9/L 0.9 0.8   Absolute Monocytes Latest Ref Range: 0.0 - 1.3 10e9/L 0.3 0.3   Absolute Eosinophils Latest Ref Range: 0.0 - 0.7 10e9/L 0.0 0.0   Absolute Basophils Latest Ref Range: 0.0 - 0.2 10e9/L 0.0 0.0   Abs Immature Granulocytes Latest Ref Range: 0 - 0.4 10e9/L 0.0 0.0   Absolute Nucleated RBC Unknown 0.0 0.0   Albumin Fraction Latest Ref Range: 3.7 - 5.1 g/dL 4.4 4.3   Alpha 1 Fraction Latest Ref Range: 0.2 - 0.4 g/dL 0.3 0.2   Alpha 2 Fraction Latest Ref Range: 0.5 - 0.9 g/dL 0.6 0.6   Beta Fraction Latest Ref Range: 0.6 - 1.0 g/dL 0.5 (L) 0.5 (L)   ELP Interpretation: Unknown Small monoclonal ... Small monoclonal ...   Gamma Fraction Latest Ref Range: 0.7 - 1.6 g/dL 0.8 0.8   IGA Latest Ref Range: 70 - 380 mg/dL  54 (L)   IGG Latest Ref Range: 695 - 1,620 mg/dL  486 (L)   IGM Latest Ref Range: 60 - 265 mg/dL  483 (H)    Immunofixation ELP Unknown  (Note)   Monoclonal Peak Latest Ref Range: 0.0 g/dL 0.2 (H) 0.2 (H)       [facility]  [address] [place]  [address]

## 2019-09-05 NOTE — LETTER
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  In the last year he says he is been well.   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   He has chronic back pain but it has not changed and his appetite and energy are steady.  He has no blood in the stools, nocturia only once or so nightly and no new rash bleeding bruising or frequent infections.  He is noted noted no masses or external lymphadenopathy.  The rest of his review of systems is unrevealing.    His exam shows his weight is down 3-1/2 kg over the last 3 years and his vital signs are acceptable.  He has no cervical supraclavicular axillary or inguinal lymphadenopathy.  His oropharynx is clear without mucosal lesions or enlarged tonsils.  His lungs are clear.  His heart tones are very quiet but there is no gallop or murmur.  His abdomen is soft and nontender without palpable masses or hepatosplenomegaly.  He has no peripheral edema. He has many seborrheic karatoses and skin tags.    Laboratory testing shows further slow fall in his platelet count that has been slowly declining since 2015;  WBC slightly below normal but similar to the past 5 years; and normal Hgb.  Chemistries are fine with the same small (0.2g/dl) monoclonal protein he's had for many years.  In the past it had been IgG lambda while his lymphoma had been kappa.    Now small IgM kappa by immunofixation and Beta " 2 microglobulin is high normal range.    He has no clinical signs of recurrent or progressive lymphoma but the falling platelet count needs explanation.  We will do BM biopsy and CT PET scan to assess whether his prior lymphoma or its therapy are explanations for his thrombocytopenia.    NOTE: possible history of IV contrast reaction; not definite but  oral methylprednisolone 32 mg 12h and 2h prior to IV contrast with scan. Prescription sent to outside pharmacy.  Patient is aware.  Also:  We discussed his general health management.  While he is gotten influenza vaccine yearly he is never had, to his knowledge or to records here a pneumococcal vaccine or a shingles vaccine, both of which are recommended for him.    He is also not ever had colorectal cancer screening and though he has no family history, a colonoscopy would be indicated.  A PSA done in 2012 here was negative.    He will continue his periodic primary care evaluation with his outside physicians.  He knows to call if additional problems arise    Elroy Holman MD    Professor of medicine    Results for MAJOR GILES (MRN [id number]) as of 9/10/2019 11:51   Ref. Range 7/26/2018 13:51 9/5/2019 13:10   Sodium Latest Ref Range: 133 - 144 mmol/L 140 140   Potassium Latest Ref Range: 3.4 - 5.3 mmol/L 4.2 4.5   Chloride Latest Ref Range: 94 - 109 mmol/L 107 106   Carbon Dioxide Latest Ref Range: 20 - 32 mmol/L 27 28   Urea Nitrogen Latest Ref Range: 7 - 30 mg/dL 26 31 (H)   Creatinine Latest Ref Range: 0.66 - 1.25 mg/dL 0.81 0.82   GFR Estimate Latest Ref Range: >60 mL/min/1.73_m2 >90 90   GFR Estimate If Black Latest Ref Range: >60 mL/min/1.73_m2 >90 >90   Calcium Latest Ref Range: 8.5 - 10.1 mg/dL 9.0 9.1   Anion Gap Latest Ref Range: 3 - 14 mmol/L 6 5   Albumin Latest Ref Range: 3.4 - 5.0 g/dL 3.7 3.9   Protein Total Latest Ref Range: 6.8 - 8.8 g/dL 6.8 6.8   Bilirubin Total Latest Ref Range: 0.2 - 1.3 mg/dL 0.4 0.4   Alkaline Phosphatase Latest Ref  Range: 40 - 150 U/L 70 75   ALT Latest Ref Range: 0 - 70 U/L 25 30   AST Latest Ref Range: 0 - 45 U/L 21 23   Beta-2-Microglobulin Latest Ref Range: <2.3 mg/L  2.2   Glucose Latest Ref Range: 70 - 99 mg/dL 95 88   WBC Latest Ref Range: 4.0 - 11.0 10e9/L 3.0 (L) 3.1 (L)   Hemoglobin Latest Ref Range: 13.3 - 17.7 g/dL 13.5 13.2 (L)   Hematocrit Latest Ref Range: 40.0 - 53.0 % 40.1 39.1 (L)   Platelet Count Latest Ref Range: 150 - 450 10e9/L 117 (L) 95 (L)   RBC Count Latest Ref Range: 4.4 - 5.9 10e12/L 4.20 (L) 4.05 (L)   MCV Latest Ref Range: 78 - 100 fl 96 97   MCH Latest Ref Range: 26.5 - 33.0 pg 32.1 32.6   MCHC Latest Ref Range: 31.5 - 36.5 g/dL 33.7 33.8   RDW Latest Ref Range: 10.0 - 15.0 % 12.4 12.4   Diff Method Unknown Automated Method Automated Method   % Neutrophils Latest Units: % 58.8 64.5   % Lymphocytes Latest Units: % 28.5 24.7   % Monocytes Latest Units: % 11.1 9.9   % Eosinophils Latest Units: % 1.0 0.6   % Basophils Latest Units: % 0.3 0.3   % Immature Granulocytes Latest Units: % 0.3 0.0   Nucleated RBCs Latest Ref Range: 0 /100 0 0   Absolute Neutrophil Latest Ref Range: 1.6 - 8.3 10e9/L 1.8 2.0   Absolute Lymphocytes Latest Ref Range: 0.8 - 5.3 10e9/L 0.9 0.8   Absolute Monocytes Latest Ref Range: 0.0 - 1.3 10e9/L 0.3 0.3   Absolute Eosinophils Latest Ref Range: 0.0 - 0.7 10e9/L 0.0 0.0   Absolute Basophils Latest Ref Range: 0.0 - 0.2 10e9/L 0.0 0.0   Abs Immature Granulocytes Latest Ref Range: 0 - 0.4 10e9/L 0.0 0.0   Absolute Nucleated RBC Unknown 0.0 0.0   Albumin Fraction Latest Ref Range: 3.7 - 5.1 g/dL 4.4 4.3   Alpha 1 Fraction Latest Ref Range: 0.2 - 0.4 g/dL 0.3 0.2   Alpha 2 Fraction Latest Ref Range: 0.5 - 0.9 g/dL 0.6 0.6   Beta Fraction Latest Ref Range: 0.6 - 1.0 g/dL 0.5 (L) 0.5 (L)   ELP Interpretation: Unknown Small monoclonal ... Small monoclonal ...   Gamma Fraction Latest Ref Range: 0.7 - 1.6 g/dL 0.8 0.8   IGA Latest Ref Range: 70 - 380 mg/dL  54 (L)   IGG Latest Ref Range:  695 - 1,620 mg/dL  486 (L)   IGM Latest Ref Range: 60 - 265 mg/dL  483 (H)   Immunofixation ELP Unknown  (Note)   Monoclonal Peak Latest Ref Range: 0.0 g/dL 0.2 (H) 0.2 (H)       [facility]  [address] [place]  [address]

## 2019-09-05 NOTE — NURSING NOTE
"Oncology Rooming Note    September 5, 2019 1:29 PM   Shahid Davis is a 68 year old male who presents for:    Chief Complaint   Patient presents with     Oncology Clinic Visit     Return visit related to  NHL     Blood Draw     labs drawn via vpt by rn. vs taken. No lab orders, JIC green gel, two red gel, purple sent to lab     Initial Vitals: /69 (BP Location: Right arm, Patient Position: Sitting, Cuff Size: Adult Regular)   Pulse 80   Temp 98.3  F (36.8  C) (Oral)   Resp 16   Ht 1.676 m (5' 5.98\")   Wt 63.5 kg (140 lb)   SpO2 100%   BMI 22.61 kg/m   Estimated body mass index is 22.61 kg/m  as calculated from the following:    Height as of this encounter: 1.676 m (5' 5.98\").    Weight as of this encounter: 63.5 kg (140 lb). Body surface area is 1.72 meters squared.  No Pain (0) Comment: Data Unavailable   No LMP for male patient.  Allergies reviewed: Yes  Medications reviewed: Yes    Medications: Medication refills not needed today.  Pharmacy name entered into [facility]: [id number] [place] [address]    Clinical concerns: No new concerns. Provider was notified.      Reshma Gardner LPN            "

## 2019-09-05 NOTE — NURSING NOTE
Chief Complaint   Patient presents with     Oncology Clinic Visit     Return visit related to  NHL     Blood Draw     labs drawn via vpt by rn. vs taken. No lab orders, JIC green gel, two red gel, purple sent to lab     Blood drawn via vpt by RN in lab. VS taken. Pt checked into next appointment. No lab orders. JIC green gel, 2 red gel, purple sent to lab.   Shruthi Garcia RN

## 2019-09-06 LAB
ALBUMIN SERPL ELPH-MCNC: 4.3 G/DL (ref 3.7–5.1)
ALPHA1 GLOB SERPL ELPH-MCNC: 0.2 G/DL (ref 0.2–0.4)
ALPHA2 GLOB SERPL ELPH-MCNC: 0.6 G/DL (ref 0.5–0.9)
B-GLOBULIN SERPL ELPH-MCNC: 0.5 G/DL (ref 0.6–1)
GAMMA GLOB SERPL ELPH-MCNC: 0.8 G/DL (ref 0.7–1.6)
M PROTEIN SERPL ELPH-MCNC: 0.2 G/DL
PROT PATTERN SERPL ELPH-IMP: ABNORMAL

## 2019-09-09 LAB
B2 MICROGLOB SERPL-MCNC: 2.2 MG/L
IGA SERPL-MCNC: 54 MG/DL (ref 70–380)
IGG SERPL-MCNC: 486 MG/DL (ref 695–1620)
IGM SERPL-MCNC: 483 MG/DL (ref 60–265)
PROT PATTERN SERPL IFE-IMP: ABNORMAL

## 2019-09-10 RX ORDER — METHYLPREDNISOLONE 32 MG/1
TABLET ORAL
Qty: 2 TABLET | Refills: 1 | Status: SHIPPED | OUTPATIENT
Start: 2019-09-10 | End: 2019-09-20

## 2019-09-12 ENCOUNTER — OFFICE VISIT (OUTPATIENT)
Dept: ONCOLOGY | Facility: CLINIC | Age: 69
End: 2019-09-12
Attending: INTERNAL MEDICINE
Payer: MEDICARE

## 2019-09-12 VITALS
WEIGHT: 139.2 LBS | DIASTOLIC BLOOD PRESSURE: 78 MMHG | BODY MASS INDEX: 22.48 KG/M2 | OXYGEN SATURATION: 99 % | TEMPERATURE: 98.8 F | HEART RATE: 81 BPM | SYSTOLIC BLOOD PRESSURE: 118 MMHG | RESPIRATION RATE: 18 BRPM

## 2019-09-12 DIAGNOSIS — D46.A REFRACTORY CYTOPENIA WITH MULTILINEAGE DYSPLASIA (H): ICD-10-CM

## 2019-09-12 DIAGNOSIS — C82.99 NODULAR LYMPHOMA OF EXTRANODAL AND/OR SOLID ORGAN SITE (H): ICD-10-CM

## 2019-09-12 DIAGNOSIS — D69.6 THROMBOCYTOPENIA (H): ICD-10-CM

## 2019-09-12 LAB
ALBUMIN SERPL-MCNC: 3.8 G/DL (ref 3.4–5)
ALP SERPL-CCNC: 72 U/L (ref 40–150)
ALT SERPL W P-5'-P-CCNC: 30 U/L (ref 0–70)
ANION GAP SERPL CALCULATED.3IONS-SCNC: 8 MMOL/L (ref 3–14)
AST SERPL W P-5'-P-CCNC: 17 U/L (ref 0–45)
BASOPHILS # BLD AUTO: 0 10E9/L (ref 0–0.2)
BASOPHILS NFR BLD AUTO: 0.3 %
BILIRUB SERPL-MCNC: 0.3 MG/DL (ref 0.2–1.3)
BUN SERPL-MCNC: 25 MG/DL (ref 7–30)
CALCIUM SERPL-MCNC: 9.2 MG/DL (ref 8.5–10.1)
CHLORIDE SERPL-SCNC: 105 MMOL/L (ref 94–109)
CO2 SERPL-SCNC: 28 MMOL/L (ref 20–32)
CREAT SERPL-MCNC: 0.8 MG/DL (ref 0.66–1.25)
DIFFERENTIAL METHOD BLD: ABNORMAL
EOSINOPHIL # BLD AUTO: 0 10E9/L (ref 0–0.7)
EOSINOPHIL NFR BLD AUTO: 0.9 %
ERYTHROCYTE [DISTWIDTH] IN BLOOD BY AUTOMATED COUNT: 12.2 % (ref 10–15)
GFR SERPL CREATININE-BSD FRML MDRD: >90 ML/MIN/{1.73_M2}
GLUCOSE SERPL-MCNC: 84 MG/DL (ref 70–99)
HCT VFR BLD AUTO: 37.3 % (ref 40–53)
HGB BLD-MCNC: 12.7 G/DL (ref 13.3–17.7)
IMM GRANULOCYTES # BLD: 0 10E9/L (ref 0–0.4)
IMM GRANULOCYTES NFR BLD: 0.3 %
LYMPHOCYTES # BLD AUTO: 0.8 10E9/L (ref 0.8–5.3)
LYMPHOCYTES NFR BLD AUTO: 22.8 %
MCH RBC QN AUTO: 32.6 PG (ref 26.5–33)
MCHC RBC AUTO-ENTMCNC: 34 G/DL (ref 31.5–36.5)
MCV RBC AUTO: 96 FL (ref 78–100)
MONOCYTES # BLD AUTO: 0.4 10E9/L (ref 0–1.3)
MONOCYTES NFR BLD AUTO: 10.1 %
NEUTROPHILS # BLD AUTO: 2.3 10E9/L (ref 1.6–8.3)
NEUTROPHILS NFR BLD AUTO: 65.6 %
NRBC # BLD AUTO: 0 10*3/UL
NRBC BLD AUTO-RTO: 0 /100
PLATELET # BLD AUTO: 109 10E9/L (ref 150–450)
POTASSIUM SERPL-SCNC: 4.3 MMOL/L (ref 3.4–5.3)
PROT SERPL-MCNC: 6.8 G/DL (ref 6.8–8.8)
RBC # BLD AUTO: 3.9 10E12/L (ref 4.4–5.9)
RETICS # AUTO: 56.4 10E9/L (ref 25–95)
RETICS/RBC NFR AUTO: 1.4 % (ref 0.5–2)
SODIUM SERPL-SCNC: 141 MMOL/L (ref 133–144)
URATE SERPL-MCNC: 5.6 MG/DL (ref 3.5–7.2)
WBC # BLD AUTO: 3.5 10E9/L (ref 4–11)

## 2019-09-12 PROCEDURE — 88237 TISSUE CULTURE BONE MARROW: CPT | Performed by: INTERNAL MEDICINE

## 2019-09-12 PROCEDURE — 88184 FLOWCYTOMETRY/ TC 1 MARKER: CPT | Performed by: INTERNAL MEDICINE

## 2019-09-12 PROCEDURE — 88185 FLOWCYTOMETRY/TC ADD-ON: CPT | Performed by: INTERNAL MEDICINE

## 2019-09-12 PROCEDURE — 88280 CHROMOSOME KARYOTYPE STUDY: CPT | Performed by: INTERNAL MEDICINE

## 2019-09-12 PROCEDURE — 96374 THER/PROPH/DIAG INJ IV PUSH: CPT | Mod: 59

## 2019-09-12 PROCEDURE — 40000951 ZZHCL STATISTIC BONE MARROW INTERP TC 85097: Performed by: NURSE PRACTITIONER

## 2019-09-12 PROCEDURE — 88271 CYTOGENETICS DNA PROBE: CPT | Performed by: INTERNAL MEDICINE

## 2019-09-12 PROCEDURE — 40000795 ZZHCL STATISTIC DNA PROCESS AND HOLD: Performed by: INTERNAL MEDICINE

## 2019-09-12 PROCEDURE — 40001004 ZZHCL STATISTIC FLOW INT 9-15 ABY TC 88188: Performed by: INTERNAL MEDICINE

## 2019-09-12 PROCEDURE — 00000161 ZZHCL STATISTIC H-SPHEME PROCESS B/S: Performed by: NURSE PRACTITIONER

## 2019-09-12 PROCEDURE — 85025 COMPLETE CBC W/AUTO DIFF WBC: CPT | Performed by: INTERNAL MEDICINE

## 2019-09-12 PROCEDURE — 88161 CYTOPATH SMEAR OTHER SOURCE: CPT | Performed by: NURSE PRACTITIONER

## 2019-09-12 PROCEDURE — 36592 COLLECT BLOOD FROM PICC: CPT

## 2019-09-12 PROCEDURE — 88311 DECALCIFY TISSUE: CPT | Performed by: NURSE PRACTITIONER

## 2019-09-12 PROCEDURE — 38222 DX BONE MARROW BX & ASPIR: CPT | Mod: ZF | Performed by: NURSE PRACTITIONER

## 2019-09-12 PROCEDURE — 00000058 ZZHCL STATISTIC BONE MARROW ASP PERF TC 38220: Performed by: NURSE PRACTITIONER

## 2019-09-12 PROCEDURE — 88275 CYTOGENETICS 100-300: CPT | Performed by: INTERNAL MEDICINE

## 2019-09-12 PROCEDURE — 40000611 ZZHCL STATISTIC MORPHOLOGY W/INTERP HEMEPATH TC 85060: Performed by: NURSE PRACTITIONER

## 2019-09-12 PROCEDURE — 00000402 ZZHCL STATISTIC TOTAL PROTEIN: Performed by: INTERNAL MEDICINE

## 2019-09-12 PROCEDURE — 80053 COMPREHEN METABOLIC PANEL: CPT | Performed by: INTERNAL MEDICINE

## 2019-09-12 PROCEDURE — 84165 PROTEIN E-PHORESIS SERUM: CPT | Performed by: INTERNAL MEDICINE

## 2019-09-12 PROCEDURE — 88264 CHROMOSOME ANALYSIS 20-25: CPT | Performed by: INTERNAL MEDICINE

## 2019-09-12 PROCEDURE — 88341 IMHCHEM/IMCYTCHM EA ADD ANTB: CPT | Performed by: NURSE PRACTITIONER

## 2019-09-12 PROCEDURE — 40000424 ZZHCL STATISTIC BONE MARROW CORE PERF TC 38221: Performed by: NURSE PRACTITIONER

## 2019-09-12 PROCEDURE — 88305 TISSUE EXAM BY PATHOLOGIST: CPT | Performed by: NURSE PRACTITIONER

## 2019-09-12 PROCEDURE — 84550 ASSAY OF BLOOD/URIC ACID: CPT | Performed by: INTERNAL MEDICINE

## 2019-09-12 PROCEDURE — 85045 AUTOMATED RETICULOCYTE COUNT: CPT | Performed by: INTERNAL MEDICINE

## 2019-09-12 PROCEDURE — 88342 IMHCHEM/IMCYTCHM 1ST ANTB: CPT | Performed by: NURSE PRACTITIONER

## 2019-09-12 PROCEDURE — 25000128 H RX IP 250 OP 636: Mod: ZF | Performed by: NURSE PRACTITIONER

## 2019-09-12 RX ADMIN — MIDAZOLAM HYDROCHLORIDE 0.5 MG: 1 INJECTION, SOLUTION INTRAMUSCULAR; INTRAVENOUS at 13:19

## 2019-09-12 ASSESSMENT — PAIN SCALES - GENERAL
PAINLEVEL: NO PAIN (0)
PAINLEVEL: NO PAIN (0)

## 2019-09-12 NOTE — LETTER
9/12/2019       RE: Shahid Davis   [facility] [address]     Dear Colleague,    Thank you for referring your patient, Shahid Davis, to the [facility]. Please see a copy of my visit note below.    BMT ONC Adult Bone Marrow Biopsy Procedure Note  September 12, 2019  /78 (BP Location: Left arm, Patient Position: Supine, Cuff Size: Adult Regular)   Pulse 81   Temp 98.8  F (37.1  C) (Oral)   Resp 18   Wt 63.1 kg (139 lb 3.2 oz)   SpO2 99%   BMI 22.48 kg/m        Learning needs assessment complete within 12 months? Completed    DIAGNOSIS: Plasmacytoid lymphoma    PROCEDURE: Unilateral Bone Marrow Biopsy and Unilateral Aspirate    [place] [facility] [place]  Patient s identification was positively verified by verbal identification and invasive procedure safety checklist was completed. Informed consent was obtained. Following the administration of Midazolam (0.5 mg) as pre-medication, patient was placed in the prone position and prepped and draped in a sterile manner. Approximately 15 cc of 1% Lidocaine was used over the right posterior iliac spine. Following this a 3 mm incision was made. Trephine bone marrow core(s) was (were) obtained from the [facility]. Bone marrow aspirates were obtained from the [facility]. Aspirates were sent for morphology, immunophenotyping, cytogenetics, molecular diagnostics  and research studies. A total of approximately 20 ml of marrow was aspirated. Following this procedure a sterile dressing was applied to the bone marrow biopsy site(s). The patient was placed in the supine position to maintain pressure on the biopsy site. Post-procedure wound care instructions were given.     Complications: NO    Post-procedural pain assessment: 0 out of 10 on the numeric pain rating scale.     Interventions: NO    Procedure performed by: Cathy Carter CNP    Again, thank you for allowing me to participate in the care of your patient.      Sincerely,    Cathy KAN  FARSHAD Carter CNP

## 2019-09-12 NOTE — NURSING NOTE
Pt here for BMBx. VS taken. Labs drawn via PIV by RN in clinic. Pt requesting Versed for BMBx. Cathy notified.     BMT Teaching Flowsheet   Teaching Topic: post biopsy instructions    Person(s) involved in teaching: Patient  Motivation Level  Asks Questions: Yes  Eager to Learn: Yes  Cooperative: Yes  Receptive (willing/able to accept information): Yes    Patient demonstrates understanding of the following:   - Reason for the appointment, diagnosis and treatment plan: Yes  - Knowledge of proper use of medications and conditions for which they are ordered (with special attention to potential side effects or drug interactions): Yes  - Which situations necessitate calling provider and whom to contact: Yes    Teaching concerns addressed: reviewed activity restrictions if received premeds, potential for bleeding and actions to take if develops any of the issues below    Proper use and care of (medical equipment, care aids, etc.) Yes  Pain management techniques: Yes  Patient instructed on hand hygiene: Yes  How and/when to access community resources: Yes    Infection Control:  Patient demonstrates understanding of the following:   Surgical procedure site care taught NA  Signs and symptoms of infection taught Yes  Wound care taught Yes  Central venous catheter care taught NA    Instructional Materials Used/Given: verbal, print out of post biopsy instructions.     Time spent with patient: 15 minutes.    Specific Concerns: NA        0.5mg Versed administered prior to BMBx. Pt tolerated well.     Drug Administration Record    Drug Name: Versed   Dose: 0.5mg  Route Administered: PIV  NDC#: 9180-7598-20  Amount of waste (mL): 1.5  Reason for waste: Single Dose Vial    Gini Syed, NALININ, RN

## 2019-09-12 NOTE — NURSING NOTE
Patient supine for 30 minutes following biopsy. After 30 minutes, dressing clean, dry and intact. Vital signs stable. PIV removed by RN. Pt left ambulatory, steady. Girlfriend picking patient up in lobby.     Gini Syed, NALININ, RN

## 2019-09-12 NOTE — PROGRESS NOTES
BMT ONC Adult Bone Marrow Biopsy Procedure Note  September 12, 2019  /78 (BP Location: Left arm, Patient Position: Supine, Cuff Size: Adult Regular)   Pulse 81   Temp 98.8  F (37.1  C) (Oral)   Resp 18   Wt 63.1 kg (139 lb 3.2 oz)   SpO2 99%   BMI 22.48 kg/m       Learning needs assessment complete within 12 months? Completed    DIAGNOSIS: Plasmacytoid lymphoma    PROCEDURE: Unilateral Bone Marrow Biopsy and Unilateral Aspirate    LOCATION: [facility] [place] 2nd [facility]    Patient s identification was positively verified by verbal identification and invasive procedure safety checklist was completed. Informed consent was obtained. Following the administration of Midazolam (0.5 mg) as pre-medication, patient was placed in the prone position and prepped and draped in a sterile manner. Approximately 15 cc of 1% Lidocaine was used over the right posterior iliac spine. Following this a 3 mm incision was made. Trephine bone marrow core(s) was (were) obtained from the [facility]. Bone marrow aspirates were obtained from the [facility]. Aspirates were sent for morphology, immunophenotyping, cytogenetics, molecular diagnostics  and research studies. A total of approximately 20 ml of marrow was aspirated. Following this procedure a sterile dressing was applied to the bone marrow biopsy site(s). The patient was placed in the supine position to maintain pressure on the biopsy site. Post-procedure wound care instructions were given.     Complications: NO    Post-procedural pain assessment: 0 out of 10 on the numeric pain rating scale.     Interventions: NO    Procedure performed by: Cathy Carter CNP

## 2019-09-13 LAB
ALBUMIN SERPL ELPH-MCNC: 4.4 G/DL (ref 3.7–5.1)
ALPHA1 GLOB SERPL ELPH-MCNC: 0.3 G/DL (ref 0.2–0.4)
ALPHA2 GLOB SERPL ELPH-MCNC: 0.6 G/DL (ref 0.5–0.9)
B-GLOBULIN SERPL ELPH-MCNC: 0.5 G/DL (ref 0.6–1)
COPATH REPORT: NORMAL
GAMMA GLOB SERPL ELPH-MCNC: 0.8 G/DL (ref 0.7–1.6)
M PROTEIN SERPL ELPH-MCNC: 0.2 G/DL
PROT PATTERN SERPL ELPH-IMP: ABNORMAL

## 2019-09-16 LAB
COPATH REPORT: NORMAL
COPATH REPORT: NORMAL

## 2019-09-17 ENCOUNTER — HOSPITAL ENCOUNTER (OUTPATIENT)
Dept: PET IMAGING | Facility: CLINIC | Age: 69
Discharge: HOME | End: 2019-09-17
Attending: INTERNAL MEDICINE | Admitting: INTERNAL MEDICINE
Payer: MEDICARE

## 2019-09-17 DIAGNOSIS — C82.99 NODULAR LYMPHOMA OF EXTRANODAL AND/OR SOLID ORGAN SITE (H): ICD-10-CM

## 2019-09-17 PROCEDURE — 78816 PET IMAGE W/CT FULL BODY: CPT | Mod: PS

## 2019-09-17 PROCEDURE — A9552 F18 FDG: HCPCS | Performed by: INTERNAL MEDICINE

## 2019-09-17 PROCEDURE — 71260 CT THORAX DX C+: CPT

## 2019-09-17 PROCEDURE — 34300033 ZZH RX 343: Performed by: INTERNAL MEDICINE

## 2019-09-17 PROCEDURE — 25000128 H RX IP 250 OP 636: Performed by: INTERNAL MEDICINE

## 2019-09-17 RX ORDER — IOPAMIDOL 755 MG/ML
70-130 INJECTION, SOLUTION INTRAVASCULAR ONCE
Status: COMPLETED | OUTPATIENT
Start: 2019-09-17 | End: 2019-09-17

## 2019-09-17 RX ADMIN — FLUDEOXYGLUCOSE F-18 12.6 MCI.: 500 INJECTION, SOLUTION INTRAVENOUS at 10:15

## 2019-09-17 RX ADMIN — IOPAMIDOL 85 ML: 755 INJECTION, SOLUTION INTRAVENOUS at 11:05

## 2019-09-20 DIAGNOSIS — C82.99 NODULAR LYMPHOMA OF EXTRANODAL AND/OR SOLID ORGAN SITE (H): ICD-10-CM

## 2019-09-20 RX ORDER — METHYLPREDNISOLONE 32 MG/1
TABLET ORAL
Qty: 12 TABLET | Refills: 1 | Status: ON HOLD | OUTPATIENT
Start: 2019-09-20 | End: 2022-12-15

## 2019-09-20 NOTE — PROGRESS NOTES
Discussed BM and CT PET results with robsone by phone.  BM neg for lymphoma or myeloid abnormalities to explain his slowly falling plt count over several years.  CT PET shows new splenic lesions; hypermetabolic; plus some PET avid bone lesions (only some have associated CT abnormalities).Also some sclerotic bone lesions; not necessarily in any areas he has bone symptoms.  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    With the long interval since therapy, his prior indolent NHL and the lack of any symptoms, his slightly big spleen,  due in part to the intrasplenic nodules are likely the reason for his lower plts; but the tempo of change is both uncertain and likely very slow.    We also have no easy site for current tissue biopsy to confirm pathology.    Plan is to repeat CT scan in 2-3 monhts.  Discussed with radiology that CT alone is likely sufficient as a comparator.    Questions answered and orders placed.    NOTE:  about 6 hours after the CT, despite premed with medrol 12 h and 2h prior to scan, he developed feeling warm and red face/rash.  gone over several hours.  We will add 3rd dose of medrol post scan from now on.    JUAN Holman    Patient Name: MAJOR GILES   MR#: [id number]   Specimen #: [id number]   Collected: 9/12/2019   Received: 9/12/2019   Reported: 9/16/2019 17:20   Ordering Phy(s): LEE COLLINS   Additional Phy(s): DANIEL CASTANEDASDAJ   Copy to Cytogenetics     For improved result formatting, select 'View Enhanced Report Format' under    Linked Documents section.     TEST(S):   Unilateral Bone Marrow Biopsy/Aspiration     FINAL DIAGNOSIS:   Bone marrow, posterior iliac crest, right decalcified trephine biopsy and   touch imprint; right direct aspirate   smear and concentrated aspirate smear; and peripheral blood smear:     - Normocellular marrow for age (30-40%)  "with trilineage hematopoiesis   and no increase in plasma cells     - Reactive lymphoid aggregates     - Peripheral blood showing slight pancytopenia     - Please see comment     COMMENT:   There is no definitive morphologic evidence of involvement by lymphoma or   plasma cell neoplasm. Concurrent   flow cytometry ([id number]) was performed, and the B cells were overall   polytypic; plasma cells were polytypic.    This case was reviewed in part at our Hematopathology Faculty Consensus   conference at which Russell Lagos and   Jay were present in addition to myself.     I have personally reviewed all specimens and/or slides, including the   listed special stains, and used them   with my medical judgment to determine the final diagnosis.     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    By report, the patient also has IgG   lambda MGUS; however, his most recent in-house SPEP with immunofixation   showed a monoclonal IgM kappa   immunoglobulin. His last bone marrow biopsy ([id number], 1/5/2007) was   negative for lymphoma. This biopsy is   performed for workup of pancytopenia with worsening thrombocytopenia.     REPORT:   Procedure/Gross Description   Aspirate(s) and trephine(s) procured by ALEX Carter NP     Specimen sent for Special Studies:        Flow Cytometry (right aspirate)        Cytogenetics (right aspirate)        Molecular Diagnostics (right aspirate)     Biopsy aspiration site: Right posterior iliac crest                  (Reference Range)          Amount of aspirate              2.4      mL        Fat and P.V. cell layer           trace      %     (1 - 3)        Particles "                        trace      %        Myeloid-erythroid layer          1      %     (5 - 8)          Clot Section: no     Trephine biopsy site: unilateral     Designated right (A) posterior iliac crest is 1 cylinder of gritty tissue,    labeled with the patient's name and   hospital number, obtained with 11 gauge needle and having a length of 20   mm; entirely submitted in 1 cassette;   acetic zinc formalin fixed, decalcified, processed, and stained with   hematoxylin and eosin per laboratory   protocol.     PERIPHERAL BLOOD DATA:     CLINICAL LAB RESULTS:   Battery Order No. Lab Test Code Clinical Result Ref. Range Units Result   Date   Retic [id number]  Retic % 1.4 0.5-2.0 % 9/12/2019 13:11        Retic abs 56.4 25-95 10e9/L 9/12/2019 13:11     Hemogram/Diff/PLT [id number]  WBC Count L 3.5 4.0-11.0 10e9/L 9/12/2019   13:10        RBC Count L 3.90 4.4-5.9 10e12/L 9/12/2019 13:10        Hemoglobin L 12.7 13.3-17.7 g/dL 9/12/2019 13:10        Hematocrit L 37.3 40.0-53.0 % 9/12/2019 13:10        MCV 96  fl 9/12/2019 13:10        MCH 32.6 26.5-33.0 pg 9/12/2019 13:10        MCHC 34.0 31.5-36.5 g/dL 9/12/2019 13:10        RDW 12.2 10.0-15.0 % 9/12/2019 13:10        Platelet Count L 109 150-450 10e9/L 9/12/2019 13:10         SEE TEXT   9/12/2019 13:10        Text/Comments:   Automated Method        % Neutrophils 65.6  % 9/12/2019 13:10        % Lymphocytes 22.8  % 9/12/2019 13:10        % Monocytes 10.1  % 9/12/2019 13:10        % Eosinophils 0.9  % 9/12/2019 13:10        % Basophils 0.3  % 9/12/2019 13:10        % Immature Grans 0.3  % 9/12/2019 13:10        Nucleated RBCs 0 0 /100 9/12/2019 13:10        abs Neutrophils 2.3 1.6-8.3 10e9/L 9/12/2019 13:10        abs Lymphocytes 0.8 0.8-5.3 10e9/L 9/12/2019 13:10        abs Monocytes 0.4 0.0-1.3 10e9/L 9/12/2019 13:10        abs Eosinophils 0.0 0.0-0.7 10e9/L 9/12/2019 13:10        abs Basophils 0.0 0.0-0.2 10e9/L 9/12/2019 13:10        abs Imm Granulocytes 0.0  0-0.4 10e9/L 9/12/2019 13:10        abs NRBC 0.0   9/12/2019 13:10     MICROSCOPIC DESCRIPTION:   PERIPHERAL BLOOD:   The red cells appear normochromic. Poikilocytosis is minimal.   Polychromasia is not increased. Rouleaux   formation is not increased. The morphology of the platelets is normal.     Neutrophils show varying degrees of   granulation, and some cells appear slightly hypogranular late N.     Bone marrow aspirates and touch imprints of bone biopsy are reviewed.     BONE MARROW DIFFERENTIAL: 500 cells on touch imprint (fellow differential)    / 500 cells on direct aspirate   smears (technologist differential):     Percent  Cell (reference range)   1.2 / 0.0  Blasts (0 - 1)   0.4 / 0.0  Neutrophil promyelocytes (2 - 4)   51.4 / 67.6  Neutrophils and precursors (54 - 63)   25.6 / 3.8  Erythroid precursors (18 - 24)   5.0 / 4.2  Monocytes (1 - 1.5)   4.0 / 1.8  Eosinophils (1 - 3)   0.6 / 0.0  Basophils (0 - 1)   10.4 / 22.6  Lymphocytes (8 - 12)   1.2 / 0.0  Plasma cells  (0 - 1.5)     Aspirate smears are hemodilute. Neutrophil maturation is complete.   Erythroid maturation is complete.   Megakaryocytes are present and include very rare forms with hypolobate   nuclei. There is no definitive evidence   of dysplasia.     TREPHINE SECTIONS:   The marrow cellularity is 30-40%. The cellular composition reflects the   aspirate differential. Megakaryocytes   are present. There are 3 well circumscribed interstitial lymphoid   aggregates, moderate in size and composed of   small lymphocytes.     Immunohistochemistry:   Immunohistochemical stains are performed on the paraffin-embedded right   trephine biopsy.   Stain for  highlights slightly increased marrow plasma cells   (approximately 3% of marrow cellularity)   that appear kappa-skewed per stains for cytoplasmic kappa and lambda   immunoglobulin light chains. Stains for   CD3 and CD20 show that the lymphoid aggregates are composed of   predominantly CD3  positive T cells with   occasional CD20 positive B cells and very rare CD10 positive cells, most   consistent with reactive lymphoid   aggregates.     Note:  These immunohistochemical stains are deemed medically necessary.     Some of the antigens may also be   evaluated by flow cytometry.  Concurrent evaluation by   immunohistochemistry on clot and/or trephine sections   is indicated in this case in order to correlate immunophenotype with cell   morphology and determine extent of   involvement, spatial pattern, and focality of potential disease   distribution.     A resident/fellow in an [employer] accredited training program was involved in   the initial review, preparation,   and/or interpretation of this case. I, as the senior physician, attest   that I: (i) reviewed patient clinical   records if indicated; (ii) reviewed relevant lab test results; (iii)   examined the relevant preparation(s) for   the specimen(s); and (iv) agree with the report, diagnosis(es), and   interpretation as documented by the   resident/fellow and edited/confirmed by me.     Reporting fellow: Josephine Alberto MD     Combined Report of:    PET and CT on  9/17/2019 11:28 AM :     1. PET of the neck, chest, abdomen, and pelvis.  2. PET CT Fusion for Attenuation Correction and Anatomical  Localization:    3. Diagnostic CT scan of the chest, abdomen, and pelvis with  intravenous contrast for interpretation.  3. CT of the chest, abdomen and pelvis obtained for diagnostic  interpretation.  4. 3D MIP and PET-CT fused images were processed on an independent  workstation and archived to PACS and reviewed by a radiologist.     Technique:     1. PET: The patient received 12.6 mCi of F-18-FDG; the serum glucose  was 142 mg/dL prior to administration, body weight was 63.1 kg. Images  were evaluated in the axial, sagittal, and coronal planes as well as  the rotational whole body MIP. Images were acquired from the Vertex to  the Feet.     UPTAKE WAS MEASURED AT  69 MINUTES.      BACKGROUND:  Liver SUV max= 3.2,   Aorta Blood SUV Max: 2.6.      2. CT: Volumetric acquisition for clinical interpretation of the  chest, abdomen, and pelvis acquired at 3 mm sections after the  uneventful administration of intravenous contrast. The chest, abdomen,  and pelvis were evaluated at 5 mm sections in bone, soft tissue, and  lung windows. The patient received 85 cc. Of Isovue 370 intravenously  for the examination.       3. 3D MIP and PET-CT fused images were processed on an independent  workstation and archived to PACS and reviewed by a radiologist.     INDICATION: Neoplasm: lymphoma; old history of low grade lymphoma;   now slowly falling platelet count; Nodular lymphoma of extranodal  and/or solid organ site (H)     ADDITIONAL INFORMATION OBTAINED FROM EMR: none     COMPARISON: PET/CT 2/22/2012     FINDINGS:      HEAD/NECK:  There is no  suspicious FDG uptake in the neck. The paranasal sinuses  are clear. The mastoid air cells are clear. The mucosal pharyngeal  space, the , prevertebral and carotid spaces are within  normal limits. No masses, mass effect or pathologically enlarged lymph  nodes are evident. The thyroid gland is unremarkable in appearance.     CHEST:  There is no suspicious FDG uptake in the chest.      The heart is normal in size, without pericardial effusion. No  significant coronary artery calcifications. The thoracic vessels are  normal in caliber and configuration. No central pulmonary embolus.  Small sliding-type hiatal hernia, with mildly patulous esophagus. No  enlarged or FDG avid intrathoracic lymph nodes. Mild bilateral  gynecomastia. The central tracheobronchial tree is patent. No  pneumothorax or pleural effusion. No focal airspace consolidation.  Calcified granuloma scattered throughout the lungs bilaterally. No  suspicious pulmonary nodule is identified.     ABDOMEN AND PELVIS:  Heterogeneous enlargement of the spleen, with  multifocal,  well-circumscribed hypodense lesions, the largest of which is located  in the splenic hilum measuring 3.0 x 2.5 cm, with associated FDG  uptake, with max SUV of 5.9. Multiple additional hypodense lesions  within the spleen also demonstrate FDG activity. The spleen is  enlarged, measuring 14.0 cm in greatest dimension.     No suspicious liver lesion. The hepatic parenchyma is homogenous in  attenuation. Ill-defined hypoattenuation within the right lobe of the  liver along the falciform ligament, likely focal fatty deposition. No  calcified gallstones. No intra or extrahepatic biliary dilatation. The  pancreas and adrenal glands are unremarkable in appearance, without  evidence of mass lesion. Heterogeneous appearance of the spleen, as  detailed above. Symmetric nephrographic enhancement of the kidneys,  without hydronephrosis or focal mass lesion. No abnormally dilated or  thickened loops of small or large bowel. No intraperitoneal free fluid  or free air. No pneumatosis or portal venous gas. The major abdominal  vasculature is patent, without evidence of infrarenal abdominal aortic  aneurysm.     LOWER EXTREMITIES:   No abnormal masses or hypermetabolic lesions.     BONES:   Multiple foci of FDG avidity within the axial and appendicular  skeleton, for example 2 sclerotic lesions within the right humeral  head, which demonstrate FDG avid, max SUV of 5.8. Additional focus of  FDG activity within the vertebral body of T4, without associated  lesion on CT, with max SUV of 6.4. Additional focus of hypermetabolic  activity within the right pedicle of T6, with max SUV of 5.7. These  foci of hypermetabolism in the marrow do not show corresponding  abnormality on CT.  No additional FDG avid foci are noted within the  skeleton. Previously hypermetabolic sclerotic lesion within the right  iliac bone demonstrates stable appearance of sclerotic changes,  without significant FDG activity to suggest active lesion.  Stable  appearance of avascular necrosis of the left femoral head, without  evidence of femoral head collapse.     No acute osseous abnormality. Multilevel degenerative changes of the  thoracolumbar spine.                                                                      IMPRESSION: In this patient with remote history of low-grade  non-Hodgkin's lymphoma, now presenting with pancytopenia:  1. Heterogeneous enlargement of the spleen, with multiple hypodense  areas, which demonstrate FDG avidity, concerning for involvement by  lymphoma.  2. Multifocal FDG avidity within the axial and appendicular skeleton,  with FDG avid lesions in the right humeral head, T4 vertebral body,  and right T6 pedicle. The right humeral head lesions appear most  amenable to biopsy.  3. Stable sclerosis of the right ischium and iliac bone, without  significant FDG uptake, compatible with treated metastasis.  4. 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     I have personally reviewed the examination and initial interpretation  and I agree with the findings.     SHANELL HEARN MD

## 2019-10-04 LAB — COPATH REPORT: NORMAL

## 2019-10-07 LAB — COPATH REPORT: NORMAL

## 2019-11-21 ENCOUNTER — OFFICE VISIT (OUTPATIENT)
Dept: TRANSPLANT | Facility: CLINIC | Age: 69
End: 2019-11-21
Attending: INTERNAL MEDICINE
Payer: MEDICARE

## 2019-11-21 ENCOUNTER — ANCILLARY PROCEDURE (OUTPATIENT)
Dept: CT IMAGING | Facility: CLINIC | Age: 69
End: 2019-11-21
Attending: INTERNAL MEDICINE
Payer: MEDICARE

## 2019-11-21 VITALS
WEIGHT: 142.1 LBS | DIASTOLIC BLOOD PRESSURE: 76 MMHG | SYSTOLIC BLOOD PRESSURE: 126 MMHG | BODY MASS INDEX: 22.95 KG/M2 | TEMPERATURE: 98.4 F | OXYGEN SATURATION: 97 % | HEART RATE: 99 BPM

## 2019-11-21 DIAGNOSIS — C82.99 NODULAR LYMPHOMA OF EXTRANODAL AND/OR SOLID ORGAN SITE (H): ICD-10-CM

## 2019-11-21 DIAGNOSIS — C82.99 NODULAR LYMPHOMA OF EXTRANODAL AND/OR SOLID ORGAN SITE (H): Primary | ICD-10-CM

## 2019-11-21 LAB
ALBUMIN SERPL-MCNC: 4.1 G/DL (ref 3.4–5)
ALP SERPL-CCNC: 93 U/L (ref 40–150)
ALT SERPL W P-5'-P-CCNC: 34 U/L (ref 0–70)
ANION GAP SERPL CALCULATED.3IONS-SCNC: 4 MMOL/L (ref 3–14)
AST SERPL W P-5'-P-CCNC: 23 U/L (ref 0–45)
BASOPHILS # BLD AUTO: 0 10E9/L (ref 0–0.2)
BASOPHILS NFR BLD AUTO: 0.1 %
BILIRUB SERPL-MCNC: 0.4 MG/DL (ref 0.2–1.3)
BUN SERPL-MCNC: 26 MG/DL (ref 7–30)
CALCIUM SERPL-MCNC: 9.4 MG/DL (ref 8.5–10.1)
CHLORIDE SERPL-SCNC: 105 MMOL/L (ref 94–109)
CO2 SERPL-SCNC: 27 MMOL/L (ref 20–32)
CREAT BLD-MCNC: 1 MG/DL (ref 0.66–1.25)
CREAT SERPL-MCNC: 0.85 MG/DL (ref 0.66–1.25)
DIFFERENTIAL METHOD BLD: ABNORMAL
EOSINOPHIL # BLD AUTO: 0 10E9/L (ref 0–0.7)
EOSINOPHIL NFR BLD AUTO: 0 %
ERYTHROCYTE [DISTWIDTH] IN BLOOD BY AUTOMATED COUNT: 12.1 % (ref 10–15)
GFR SERPL CREATININE-BSD FRML MDRD: 74 ML/MIN/{1.73_M2}
GFR SERPL CREATININE-BSD FRML MDRD: 88 ML/MIN/{1.73_M2}
GLUCOSE SERPL-MCNC: 147 MG/DL (ref 70–99)
HCT VFR BLD AUTO: 42.2 % (ref 40–53)
HGB BLD-MCNC: 14.3 G/DL (ref 13.3–17.7)
IMM GRANULOCYTES # BLD: 0 10E9/L (ref 0–0.4)
IMM GRANULOCYTES NFR BLD: 0.2 %
LDH SERPL L TO P-CCNC: 164 U/L (ref 85–227)
LYMPHOCYTES # BLD AUTO: 0.5 10E9/L (ref 0.8–5.3)
LYMPHOCYTES NFR BLD AUTO: 4.9 %
MCH RBC QN AUTO: 32.4 PG (ref 26.5–33)
MCHC RBC AUTO-ENTMCNC: 33.9 G/DL (ref 31.5–36.5)
MCV RBC AUTO: 96 FL (ref 78–100)
MONOCYTES # BLD AUTO: 0.4 10E9/L (ref 0–1.3)
MONOCYTES NFR BLD AUTO: 4.5 %
NEUTROPHILS # BLD AUTO: 8.9 10E9/L (ref 1.6–8.3)
NEUTROPHILS NFR BLD AUTO: 90.3 %
NRBC # BLD AUTO: 0 10*3/UL
NRBC BLD AUTO-RTO: 0 /100
PLATELET # BLD AUTO: 130 10E9/L (ref 150–450)
POTASSIUM SERPL-SCNC: 4.3 MMOL/L (ref 3.4–5.3)
PROT SERPL-MCNC: 7.4 G/DL (ref 6.8–8.8)
RBC # BLD AUTO: 4.41 10E12/L (ref 4.4–5.9)
SODIUM SERPL-SCNC: 137 MMOL/L (ref 133–144)
WBC # BLD AUTO: 9.9 10E9/L (ref 4–11)

## 2019-11-21 PROCEDURE — 83615 LACTATE (LD) (LDH) ENZYME: CPT | Performed by: INTERNAL MEDICINE

## 2019-11-21 PROCEDURE — 00000402 ZZHCL STATISTIC TOTAL PROTEIN: Performed by: INTERNAL MEDICINE

## 2019-11-21 PROCEDURE — 80053 COMPREHEN METABOLIC PANEL: CPT | Performed by: INTERNAL MEDICINE

## 2019-11-21 PROCEDURE — 36415 COLL VENOUS BLD VENIPUNCTURE: CPT | Performed by: INTERNAL MEDICINE

## 2019-11-21 PROCEDURE — 84165 PROTEIN E-PHORESIS SERUM: CPT | Performed by: INTERNAL MEDICINE

## 2019-11-21 PROCEDURE — G0463 HOSPITAL OUTPT CLINIC VISIT: HCPCS | Mod: ZF

## 2019-11-21 PROCEDURE — 85025 COMPLETE CBC W/AUTO DIFF WBC: CPT | Performed by: INTERNAL MEDICINE

## 2019-11-21 PROCEDURE — 82565 ASSAY OF CREATININE: CPT | Mod: 91

## 2019-11-21 RX ORDER — OMEPRAZOLE 40 MG/1
40 CAPSULE, DELAYED RELEASE ORAL DAILY
Qty: 90 CAPSULE | Refills: 3 | Status: SHIPPED | OUTPATIENT
Start: 2019-11-21 | End: 2020-11-12

## 2019-11-21 RX ORDER — IOPAMIDOL 755 MG/ML
85 INJECTION, SOLUTION INTRAVASCULAR ONCE
Status: COMPLETED | OUTPATIENT
Start: 2019-11-21 | End: 2019-11-21

## 2019-11-21 RX ADMIN — IOPAMIDOL 85 ML: 755 INJECTION, SOLUTION INTRAVASCULAR at 11:50

## 2019-11-21 ASSESSMENT — PAIN SCALES - GENERAL: PAINLEVEL: NO PAIN (0)

## 2019-11-21 NOTE — LETTER
11/21/2019      RE: Shahid Davis   [facility] [id number]       /76   Pulse 99   Temp 98.4  F (36.9  C) (Oral)   Wt 64.5 kg (142 lb 1.6 oz)   SpO2 97%   BMI 22.95 kg/m     Wt Readings from Last 4 Encounters:   11/21/19 64.5 kg (142 lb 1.6 oz)   09/12/19 63.1 kg (139 lb 3.2 oz)   09/05/19 63.5 kg (140 lb)   07/26/18 64.8 kg (142 lb 14.4 oz)     Shahid returns to follow-up his indolent lymphoma; untreated for many years.  In the last 2 to 3 months he has had no fever chills rash bleeding or bruising.  He stopped ranitidine because it was pulled from the market and developed worsened heartburn but he restarted with Prilosec (omeprazole) and his symptoms have returned to baseline with only occasional, relatively minimal heartburn.  There is no change in that GI pattern.  He has his chronic aching in his bones, more in the right hip than the left but no other new systemic symptoms.  He notes no lymphadenopathy or new skin rash.    His physical exam shows no cervical supraclavicular axillary or inguinal lymphadenopathy.  His oropharynx is clear.  He has chronic seborrheic changes and some scaly patches over his face but nothing striking.  He has no focal bone tenderness.  His lungs are clear his heart tones are regular.  His abdomen is soft and nontender.  Today I possibly can feel his spleen 1-1/2 to 2 cm below the costal margin with deep inspiration but it is uncertain.  There is no dullness.  There is no perisplenic rub and no tenderness.  His liver is not enlarged and has no other abdominal masses.  There is no lower extremity edema.    Laboratory testing shows platelet count back to his long-term baseline of 130,000 and his hemoglobin and white count are normal.  CT scan reading shows his spleen slightly more enlarged than it was in September.  Protein electrophoresis is unchanged with 0.2 g/dl monoclonal protein.    He has had a slightly high IgM for many years which might or might  not have been associated with his lymphoma bulk.  Overall it seems like he still has indolent lymphoma progressing very slowly if at all and he is asymptomatic.  Since his clinical presentation is asymptomatic, his counts are stable and his bone marrow in September was negative, we  will continue close observation.    He is heading for warm weather in January so I will see him on his return in May with just a clinical evaluation and only do scanning if clinically indicated.  His questions were answered in full and he knows he can call with any questions.      He will continue on the omeprazole 40 mg daily without interruption.    Elroy Holman MD    Professor of medicine    Results for MAJOR GILES (MRN [id number]) as of 11/22/2019 16:24   Ref. Range 11/21/2019 11:26 11/21/2019 11:50 11/21/2019 12:00   Sodium Latest Ref Range: 133 - 144 mmol/L 137     Potassium Latest Ref Range: 3.4 - 5.3 mmol/L 4.3     Chloride Latest Ref Range: 94 - 109 mmol/L 105     Carbon Dioxide Latest Ref Range: 20 - 32 mmol/L 27     Urea Nitrogen Latest Ref Range: 7 - 30 mg/dL 26     Creatinine Latest Ref Range: 0.66 - 1.25 mg/dL 0.85 1.0    GFR Estimate Latest Ref Range: >60 mL/min/1.73_m2 88 74    GFR Estimate If Black Latest Ref Range: >60 mL/min/1.73_m2 >90 89    Calcium Latest Ref Range: 8.5 - 10.1 mg/dL 9.4     Anion Gap Latest Ref Range: 3 - 14 mmol/L 4     Albumin Latest Ref Range: 3.4 - 5.0 g/dL 4.1     Protein Total Latest Ref Range: 6.8 - 8.8 g/dL 7.4     Bilirubin Total Latest Ref Range: 0.2 - 1.3 mg/dL 0.4     Alkaline Phosphatase Latest Ref Range: 40 - 150 U/L 93     ALT Latest Ref Range: 0 - 70 U/L 34     AST Latest Ref Range: 0 - 45 U/L 23     Lactate Dehydrogenase Latest Ref Range: 85 - 227 U/L 164     Glucose Latest Ref Range: 70 - 99 mg/dL 147 (H)     WBC Latest Ref Range: 4.0 - 11.0 10e9/L 9.9     Hemoglobin Latest Ref Range: 13.3 - 17.7 g/dL 14.3     Hematocrit Latest Ref Range: 40.0 - 53.0 % 42.2     Platelet  Count Latest Ref Range: 150 - 450 10e9/L 130 (L)     RBC Count Latest Ref Range: 4.4 - 5.9 10e12/L 4.41     MCV Latest Ref Range: 78 - 100 fl 96     MCH Latest Ref Range: 26.5 - 33.0 pg 32.4     MCHC Latest Ref Range: 31.5 - 36.5 g/dL 33.9     RDW Latest Ref Range: 10.0 - 15.0 % 12.1     Diff Method Unknown Automated Method     % Neutrophils Latest Units: % 90.3     % Lymphocytes Latest Units: % 4.9     % Monocytes Latest Units: % 4.5     % Eosinophils Latest Units: % 0.0     % Basophils Latest Units: % 0.1     % Immature Granulocytes Latest Units: % 0.2     Nucleated RBCs Latest Ref Range: 0 /100 0     Absolute Neutrophil Latest Ref Range: 1.6 - 8.3 10e9/L 8.9 (H)     Absolute Lymphocytes Latest Ref Range: 0.8 - 5.3 10e9/L 0.5 (L)     Absolute Monocytes Latest Ref Range: 0.0 - 1.3 10e9/L 0.4     Absolute Eosinophils Latest Ref Range: 0.0 - 0.7 10e9/L 0.0     Absolute Basophils Latest Ref Range: 0.0 - 0.2 10e9/L 0.0     Abs Immature Granulocytes Latest Ref Range: 0 - 0.4 10e9/L 0.0     Absolute Nucleated RBC Unknown 0.0     Albumin Fraction Latest Ref Range: 3.7 - 5.1 g/dL 4.7     Alpha 1 Fraction Latest Ref Range: 0.2 - 0.4 g/dL 0.3     Alpha 2 Fraction Latest Ref Range: 0.5 - 0.9 g/dL 0.6     Beta Fraction Latest Ref Range: 0.6 - 1.0 g/dL 0.6     ELP Interpretation: Unknown Small monoclonal ...     Gamma Fraction Latest Ref Range: 0.7 - 1.6 g/dL 0.9     Monoclonal Peak Latest Ref Range: 0.0 g/dL 0.2 (H)     CT CHEST/ABDOMEN/PELVIS W CONTRAST Unknown   Rpt   Results for MAJOR GILES (MRN [id number]) as of 11/22/2019 16:24   Ref. Range 3/10/2005 12:25 9/8/2005 14:30 9/29/2005 13:49 4/6/2006 13:03 12/21/2006 13:51 1/5/2007 11:44 1/5/2007 13:00 2/22/2007 13:08 11/15/2007 12:47 6/12/2008 14:37 1/22/2009 13:17 1/28/2010 13:33 7/22/2010 13:23 1/20/2011 12:48 8/11/2011 13:36 2/16/2012 13:05 8/16/2012 12:55 5/23/2013 13:57 5/22/2014 13:15 6/4/2015 12:29 6/2/2016 14:24 6/22/2017 14:51 7/26/2018 13:51 9/5/2019 13:10  9/12/2019 13:00 9/12/2019 13:53 11/21/2019 11:26   Monoclonal Peak Latest Ref Range: 0.0 g/dL   <0.1 (H) <0.1 (H) 0.1 (H)   <0.1 (H) 0.1 (H) 0.1 (H) 0.1 (H) 0.2 (H) 0.2 (H) 0.2 (H) 0.2 (H) 0.2 (H) 0.2 (H) 0.2 (H) 0.2 (H) 0.2 (H) 0.2 (H) 0.2 (H) 0.2 (H) 0.2 (H) 0.2 (H)  0.2 (H)       CT CHEST/ABDOMEN/PELVIS WITH CONTRAST   11/21/2019 12:00 PM      HISTORY: Lymphoma follow-up.     TECHNIQUE: Isovue 370 85 mL IV were administered. After contrast  administration, volumetric helical sections were acquired from the  thoracic inlet to the ischial tuberosities. Coronal images were also  reconstructed. Radiation dose for this scan was reduced using  automated exposure control, adjustment of the mA and/or kV according  to patient size, or iterative reconstruction technique.     COMPARISON: PET/CT performed 9/17/2019.     FINDINGS:    Chest: There are scattered small calcified granulomas in both lungs.  Mild scattered scarring or atelectasis at both lung bases. The lungs  are otherwise clear. No pleural or pericardial effusions. No enlarged  lymph nodes are identified in the chest. Mild dilatation of the  esophagus has increased since the previous exam.      Abdomen and Pelvis: The stomach is mildly distended with gas and  fluid. Mild splenomegaly has increased slightly since the previous  exam. The spleen measures up to 15.1 cm in length (previously 13.6  cm). The liver, gallbladder, adrenal glands, pancreas, and kidneys are  unremarkable. No hydronephrosis. No bowel obstruction. No free fluid  in the pelvis. There is mild prostatic enlargement no enlarged lymph  nodes are identified in the abdomen or pelvis. Scattered sclerotic  lesions throughout the visualized bones are not significantly changed.  For example, the largest of these bony sclerotic lesions is in the  right iliac bone along the acetabulum (series 5 image 55), measuring  5.3 x 2.9 cm. 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                                                                        IMPRESSION:   1. Mild splenomegaly has increased slightly since the previous exam.  2. Mild dilatation of the esophagus and mild gastric distention are  new since the previous exam.  3. Scattered sclerotic lesions throughout the visualized bones are not  significantly changed, and are again suspicious for metastatic  disease.     STARLA AGRAWAL MD

## 2019-11-21 NOTE — NURSING NOTE
"Oncology Rooming Note    November 21, 2019 2:20 PM   Shahid Davis is a 69 year old male who presents for:    Chief Complaint   Patient presents with     Oncology Clinic Visit     Return: Nodular lymphoma of extranodal and/or solid organ site     Initial Vitals: There were no vitals taken for this visit. Estimated body mass index is 22.48 kg/m  as calculated from the following:    Height as of 9/5/19: 1.676 m (5' 5.98\").    Weight as of 9/12/19: 63.1 kg (139 lb 3.2 oz). There is no height or weight on file to calculate BSA.  Data Unavailable Comment: Data Unavailable   No LMP for male patient.  Allergies reviewed: Yes  Medications reviewed: Yes    Medications: Medication refills not needed today.  Pharmacy name entered into [facility]:    [id number] [place] [address] [id number] [address] AT [facility]    Clinical concerns: None       Candida Hurst CMA              "

## 2019-11-21 NOTE — PROGRESS NOTES
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  In the last 2 to 3 months he has had no fever chills rash bleeding or bruising.  He stopped ranitidine because it was pulled from the market and developed worsened heartburn but he restarted with Prilosec (omeprazole) and his symptoms have returned to baseline with only occasional, relatively minimal heartburn.  There is no change in that GI pattern.  He has his chronic aching in his bones, more in the right hip than the left but no other new systemic symptoms.  He notes no lymphadenopathy or new skin rash.    His physical exam shows no cervical supraclavicular axillary or inguinal lymphadenopathy.  His oropharynx is clear.  He has chronic seborrheic changes and some scaly patches over his face but nothing striking.  He has no focal bone tenderness.  His lungs are clear his heart tones are regular.  His abdomen is soft and nontender.  Today I possibly can feel his spleen 1-1/2 to 2 cm below the costal margin with deep inspiration but it is uncertain.  There is no dullness.  There is no perisplenic rub and no tenderness.  His liver is not enlarged and has no other abdominal masses.  There is no lower extremity edema.    Laboratory testing shows platelet count back to his long-term baseline of 130,000 and his hemoglobin and white count are normal.  CT scan reading shows his spleen slightly more enlarged than it was in September.  Protein electrophoresis is unchanged with 0.2 g/dl monoclonal protein.    He has had a slightly high IgM for many years which might or might not have been associated with his lymphoma bulk.  Overall it seems like he still has  indolent lymphoma progressing very slowly if at all and he is asymptomatic.  Since his clinical presentation is asymptomatic, his counts are stable and his bone marrow in September was negative, we  will continue close observation.    He is heading for warm weather in January so I will see him on his return in May with just a clinical evaluation and only do scanning if clinically indicated.  His questions were answered in full and he knows he can call with any questions.      He will continue on the omeprazole 40 mg daily without interruption.    Elroy Holman MD    Professor of medicine    Results for MAJOR GILES (MRN [id number]) as of 11/22/2019 16:24   Ref. Range 11/21/2019 11:26 11/21/2019 11:50 11/21/2019 12:00   Sodium Latest Ref Range: 133 - 144 mmol/L 137     Potassium Latest Ref Range: 3.4 - 5.3 mmol/L 4.3     Chloride Latest Ref Range: 94 - 109 mmol/L 105     Carbon Dioxide Latest Ref Range: 20 - 32 mmol/L 27     Urea Nitrogen Latest Ref Range: 7 - 30 mg/dL 26     Creatinine Latest Ref Range: 0.66 - 1.25 mg/dL 0.85 1.0    GFR Estimate Latest Ref Range: >60 mL/min/1.73_m2 88 74    GFR Estimate If Black Latest Ref Range: >60 mL/min/1.73_m2 >90 89    Calcium Latest Ref Range: 8.5 - 10.1 mg/dL 9.4     Anion Gap Latest Ref Range: 3 - 14 mmol/L 4     Albumin Latest Ref Range: 3.4 - 5.0 g/dL 4.1     Protein Total Latest Ref Range: 6.8 - 8.8 g/dL 7.4     Bilirubin Total Latest Ref Range: 0.2 - 1.3 mg/dL 0.4     Alkaline Phosphatase Latest Ref Range: 40 - 150 U/L 93     ALT Latest Ref Range: 0 - 70 U/L 34     AST Latest Ref Range: 0 - 45 U/L 23     Lactate Dehydrogenase Latest Ref Range: 85 - 227 U/L 164     Glucose Latest Ref Range: 70 - 99 mg/dL 147 (H)     WBC Latest Ref Range: 4.0 - 11.0 10e9/L 9.9     Hemoglobin Latest Ref Range: 13.3 - 17.7 g/dL 14.3     Hematocrit Latest Ref Range: 40.0 - 53.0 % 42.2     Platelet Count Latest Ref Range: 150 - 450 10e9/L 130 (L)     RBC Count Latest Ref Range: 4.4 -  5.9 10e12/L 4.41     MCV Latest Ref Range: 78 - 100 fl 96     MCH Latest Ref Range: 26.5 - 33.0 pg 32.4     MCHC Latest Ref Range: 31.5 - 36.5 g/dL 33.9     RDW Latest Ref Range: 10.0 - 15.0 % 12.1     Diff Method Unknown Automated Method     % Neutrophils Latest Units: % 90.3     % Lymphocytes Latest Units: % 4.9     % Monocytes Latest Units: % 4.5     % Eosinophils Latest Units: % 0.0     % Basophils Latest Units: % 0.1     % Immature Granulocytes Latest Units: % 0.2     Nucleated RBCs Latest Ref Range: 0 /100 0     Absolute Neutrophil Latest Ref Range: 1.6 - 8.3 10e9/L 8.9 (H)     Absolute Lymphocytes Latest Ref Range: 0.8 - 5.3 10e9/L 0.5 (L)     Absolute Monocytes Latest Ref Range: 0.0 - 1.3 10e9/L 0.4     Absolute Eosinophils Latest Ref Range: 0.0 - 0.7 10e9/L 0.0     Absolute Basophils Latest Ref Range: 0.0 - 0.2 10e9/L 0.0     Abs Immature Granulocytes Latest Ref Range: 0 - 0.4 10e9/L 0.0     Absolute Nucleated RBC Unknown 0.0     Albumin Fraction Latest Ref Range: 3.7 - 5.1 g/dL 4.7     Alpha 1 Fraction Latest Ref Range: 0.2 - 0.4 g/dL 0.3     Alpha 2 Fraction Latest Ref Range: 0.5 - 0.9 g/dL 0.6     Beta Fraction Latest Ref Range: 0.6 - 1.0 g/dL 0.6     ELP Interpretation: Unknown Small monoclonal ...     Gamma Fraction Latest Ref Range: 0.7 - 1.6 g/dL 0.9     Monoclonal Peak Latest Ref Range: 0.0 g/dL 0.2 (H)     CT CHEST/ABDOMEN/PELVIS W CONTRAST Unknown   Rpt   Results for MAJOR GILES (MRN [id number]) as of 11/22/2019 16:24   Ref. Range 3/10/2005 12:25 9/8/2005 14:30 9/29/2005 13:49 4/6/2006 13:03 12/21/2006 13:51 1/5/2007 11:44 1/5/2007 13:00 2/22/2007 13:08 11/15/2007 12:47 6/12/2008 14:37 1/22/2009 13:17 1/28/2010 13:33 7/22/2010 13:23 1/20/2011 12:48 8/11/2011 13:36 2/16/2012 13:05 8/16/2012 12:55 5/23/2013 13:57 5/22/2014 13:15 6/4/2015 12:29 6/2/2016 14:24 6/22/2017 14:51 7/26/2018 13:51 9/5/2019 13:10 9/12/2019 13:00 9/12/2019 13:53 11/21/2019 11:26   Monoclonal Peak Latest Ref Range: 0.0  g/dL   <0.1 (H) <0.1 (H) 0.1 (H)   <0.1 (H) 0.1 (H) 0.1 (H) 0.1 (H) 0.2 (H) 0.2 (H) 0.2 (H) 0.2 (H) 0.2 (H) 0.2 (H) 0.2 (H) 0.2 (H) 0.2 (H) 0.2 (H) 0.2 (H) 0.2 (H) 0.2 (H) 0.2 (H)  0.2 (H)       CT CHEST/ABDOMEN/PELVIS WITH CONTRAST   11/21/2019 12:00 PM      HISTORY: Lymphoma follow-up.     TECHNIQUE: Isovue 370 85 mL IV were administered. After contrast  administration, volumetric helical sections were acquired from the  thoracic inlet to the ischial tuberosities. Coronal images were also  reconstructed. Radiation dose for this scan was reduced using  automated exposure control, adjustment of the mA and/or kV according  to patient size, or iterative reconstruction technique.     COMPARISON: PET/CT performed 9/17/2019.     FINDINGS:    Chest: There are scattered small calcified granulomas in both lungs.  Mild scattered scarring or atelectasis at both lung bases. The lungs  are otherwise clear. No pleural or pericardial effusions. No enlarged  lymph nodes are identified in the chest. Mild dilatation of the  esophagus has increased since the previous exam.      Abdomen and Pelvis: The stomach is mildly distended with gas and  fluid. Mild splenomegaly has increased slightly since the previous  exam. The spleen measures up to 15.1 cm in length (previously 13.6  cm). The liver, gallbladder, adrenal glands, pancreas, and kidneys are  unremarkable. No hydronephrosis. No bowel obstruction. No free fluid  in the pelvis. There is mild prostatic enlargement no enlarged lymph  nodes are identified in the abdomen or pelvis. Scattered sclerotic  lesions throughout the visualized bones are not significantly changed.  For example, the largest of these bony sclerotic lesions is in the  right iliac bone along the acetabulum (series 5 image 55), measuring  5.3 x 2.9 cm. 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                                                                       IMPRESSION:   1. Mild  splenomegaly has increased slightly since the previous exam.  2. Mild dilatation of the esophagus and mild gastric distention are  new since the previous exam.  3. Scattered sclerotic lesions throughout the visualized bones are not  significantly changed, and are again suspicious for metastatic  disease.     STARLA AGRAWAL MD

## 2019-11-22 LAB
ALBUMIN SERPL ELPH-MCNC: 4.7 G/DL (ref 3.7–5.1)
ALPHA1 GLOB SERPL ELPH-MCNC: 0.3 G/DL (ref 0.2–0.4)
ALPHA2 GLOB SERPL ELPH-MCNC: 0.6 G/DL (ref 0.5–0.9)
B-GLOBULIN SERPL ELPH-MCNC: 0.6 G/DL (ref 0.6–1)
GAMMA GLOB SERPL ELPH-MCNC: 0.9 G/DL (ref 0.7–1.6)
M PROTEIN SERPL ELPH-MCNC: 0.2 G/DL
PROT PATTERN SERPL ELPH-IMP: ABNORMAL

## 2020-02-28 ENCOUNTER — TRANSFERRED RECORDS (OUTPATIENT)
Dept: HEALTH INFORMATION MANAGEMENT | Facility: CLINIC | Age: 70
End: 2020-02-28

## 2020-02-29 ENCOUNTER — TRANSFERRED RECORDS (OUTPATIENT)
Dept: HEALTH INFORMATION MANAGEMENT | Facility: CLINIC | Age: 70
End: 2020-02-29

## 2020-03-02 ENCOUNTER — TRANSFERRED RECORDS (OUTPATIENT)
Dept: HEALTH INFORMATION MANAGEMENT | Facility: CLINIC | Age: 70
End: 2020-03-02

## 2020-03-03 ENCOUNTER — TRANSFERRED RECORDS (OUTPATIENT)
Dept: HEALTH INFORMATION MANAGEMENT | Facility: CLINIC | Age: 70
End: 2020-03-03

## 2020-03-04 ENCOUNTER — TRANSFERRED RECORDS (OUTPATIENT)
Dept: HEALTH INFORMATION MANAGEMENT | Facility: CLINIC | Age: 70
End: 2020-03-04

## 2020-03-06 ENCOUNTER — TRANSFERRED RECORDS (OUTPATIENT)
Dept: HEALTH INFORMATION MANAGEMENT | Facility: CLINIC | Age: 70
End: 2020-03-06

## 2020-03-07 ENCOUNTER — TRANSFERRED RECORDS (OUTPATIENT)
Dept: HEALTH INFORMATION MANAGEMENT | Facility: CLINIC | Age: 70
End: 2020-03-07

## 2020-03-09 ENCOUNTER — TRANSFERRED RECORDS (OUTPATIENT)
Dept: HEALTH INFORMATION MANAGEMENT | Facility: CLINIC | Age: 70
End: 2020-03-09

## 2020-06-25 ENCOUNTER — OFFICE VISIT (OUTPATIENT)
Dept: TRANSPLANT | Facility: CLINIC | Age: 70
End: 2020-06-25
Attending: INTERNAL MEDICINE
Payer: MEDICARE

## 2020-06-25 VITALS
OXYGEN SATURATION: 99 % | HEART RATE: 84 BPM | BODY MASS INDEX: 22.96 KG/M2 | WEIGHT: 142.2 LBS | SYSTOLIC BLOOD PRESSURE: 124 MMHG | DIASTOLIC BLOOD PRESSURE: 86 MMHG | TEMPERATURE: 98.6 F | RESPIRATION RATE: 16 BRPM

## 2020-06-25 DIAGNOSIS — C82.99 NODULAR LYMPHOMA OF EXTRANODAL AND/OR SOLID ORGAN SITE (H): ICD-10-CM

## 2020-06-25 DIAGNOSIS — D69.6 THROMBOCYTOPENIA (H): ICD-10-CM

## 2020-06-25 DIAGNOSIS — C82.99 NODULAR LYMPHOMA OF EXTRANODAL AND/OR SOLID ORGAN SITE (H): Primary | ICD-10-CM

## 2020-06-25 LAB
ALBUMIN SERPL-MCNC: 3.9 G/DL (ref 3.4–5)
ALP SERPL-CCNC: 100 U/L (ref 40–150)
ALT SERPL W P-5'-P-CCNC: 36 U/L (ref 0–70)
ANION GAP SERPL CALCULATED.3IONS-SCNC: 4 MMOL/L (ref 3–14)
AST SERPL W P-5'-P-CCNC: 24 U/L (ref 0–45)
BASOPHILS # BLD AUTO: 0 10E9/L (ref 0–0.2)
BASOPHILS NFR BLD AUTO: 0.3 %
BILIRUB SERPL-MCNC: 0.4 MG/DL (ref 0.2–1.3)
BUN SERPL-MCNC: 30 MG/DL (ref 7–30)
CALCIUM SERPL-MCNC: 8.8 MG/DL (ref 8.5–10.1)
CHLORIDE SERPL-SCNC: 107 MMOL/L (ref 94–109)
CO2 SERPL-SCNC: 29 MMOL/L (ref 20–32)
CREAT SERPL-MCNC: 0.88 MG/DL (ref 0.66–1.25)
DIFFERENTIAL METHOD BLD: ABNORMAL
EOSINOPHIL # BLD AUTO: 0 10E9/L (ref 0–0.7)
EOSINOPHIL NFR BLD AUTO: 1.3 %
ERYTHROCYTE [DISTWIDTH] IN BLOOD BY AUTOMATED COUNT: 12.7 % (ref 10–15)
GFR SERPL CREATININE-BSD FRML MDRD: 87 ML/MIN/{1.73_M2}
GLUCOSE SERPL-MCNC: 68 MG/DL (ref 70–99)
HCT VFR BLD AUTO: 39.6 % (ref 40–53)
HGB BLD-MCNC: 12.9 G/DL (ref 13.3–17.7)
IMM GRANULOCYTES # BLD: 0 10E9/L (ref 0–0.4)
IMM GRANULOCYTES NFR BLD: 0.3 %
LDH SERPL L TO P-CCNC: 308 U/L (ref 85–227)
LYMPHOCYTES # BLD AUTO: 0.9 10E9/L (ref 0.8–5.3)
LYMPHOCYTES NFR BLD AUTO: 28.4 %
MCH RBC QN AUTO: 30.9 PG (ref 26.5–33)
MCHC RBC AUTO-ENTMCNC: 32.6 G/DL (ref 31.5–36.5)
MCV RBC AUTO: 95 FL (ref 78–100)
MONOCYTES # BLD AUTO: 0.4 10E9/L (ref 0–1.3)
MONOCYTES NFR BLD AUTO: 12.8 %
NEUTROPHILS # BLD AUTO: 1.8 10E9/L (ref 1.6–8.3)
NEUTROPHILS NFR BLD AUTO: 56.9 %
NRBC # BLD AUTO: 0 10*3/UL
NRBC BLD AUTO-RTO: 0 /100
PLATELET # BLD AUTO: 97 10E9/L (ref 150–450)
POTASSIUM SERPL-SCNC: 4.4 MMOL/L (ref 3.4–5.3)
PROT SERPL-MCNC: 6.9 G/DL (ref 6.8–8.8)
RBC # BLD AUTO: 4.18 10E12/L (ref 4.4–5.9)
SODIUM SERPL-SCNC: 141 MMOL/L (ref 133–144)
URATE SERPL-MCNC: 6.2 MG/DL (ref 3.5–7.2)
WBC # BLD AUTO: 3.1 10E9/L (ref 4–11)

## 2020-06-25 PROCEDURE — 85025 COMPLETE CBC W/AUTO DIFF WBC: CPT | Performed by: INTERNAL MEDICINE

## 2020-06-25 PROCEDURE — 00000402 ZZHCL STATISTIC TOTAL PROTEIN: Performed by: INTERNAL MEDICINE

## 2020-06-25 PROCEDURE — G0463 HOSPITAL OUTPT CLINIC VISIT: HCPCS

## 2020-06-25 PROCEDURE — 83615 LACTATE (LD) (LDH) ENZYME: CPT | Performed by: INTERNAL MEDICINE

## 2020-06-25 PROCEDURE — 80053 COMPREHEN METABOLIC PANEL: CPT | Performed by: INTERNAL MEDICINE

## 2020-06-25 PROCEDURE — 36415 COLL VENOUS BLD VENIPUNCTURE: CPT | Performed by: INTERNAL MEDICINE

## 2020-06-25 PROCEDURE — 84165 PROTEIN E-PHORESIS SERUM: CPT | Performed by: INTERNAL MEDICINE

## 2020-06-25 PROCEDURE — 84550 ASSAY OF BLOOD/URIC ACID: CPT | Performed by: INTERNAL MEDICINE

## 2020-06-25 ASSESSMENT — PAIN SCALES - GENERAL: PAINLEVEL: NO PAIN (0)

## 2020-06-25 NOTE — PROGRESS NOTES
/86   Pulse 84   Temp 98.6  F (37  C) (Oral)   Resp 16   Wt 64.5 kg (142 lb 3.2 oz)   SpO2 99%   BMI 22.96 kg/m    Wt Readings from Last 4 Encounters:   06/25/20 64.5 kg (142 lb 3.2 oz)   11/21/19 64.5 kg (142 lb 1.6 oz)   09/12/19 63.1 kg (139 lb 3.2 oz)   09/05/19 63.5 kg (140 lb)     Shahid returns for follow-up many years after the diagnosis of his lymphoplasmacytic lymphoma (originally benjamín, marrow and parspinal disease, with an IgMK) treated with paraspinal radiation and fludarabine.  He has been off therapy for a long time.  He had some drop attacks in February this year; and was diagnosed with a heart block, but no heart attack and a pacemaker was placed while he was in [place].  It is healing and though he can feel periodically the pacemaker is required to maintain his heart rate, he has had no fainting spells since.    He has had no recent fever chills or notable infection.  He has no rash bleeding or bruising. He has no new bone pain though he has chronic back aching.  His heartburn is controlled with omeprazole and there is no other interim findings on review of systems.    Last fall his spleen was slightly enlarged, and was palpable 1-1/2 to 2 cm below the costal margin in November, but he had no other systemic symptoms or mass lesions at that point.  A PET CT in September had shown mild splenomegaly, and bone lesions but no other findings.   No notable change was apparent on a followup CT in November, though his spleen grew a bit.    Today his exam shows his weight roughly stable from last fall and his vital signs are acceptable.  He has no cervical supraclavicular or axillary lymphadenopathy.  He has small, 1/2 to 1 cm nodes in both inguinal regions.  These have been present for a long time, and are now smaller than most visits in the past.  He has no skin rash or peripheral edema.  His lungs are clear; his heart tones are regular with an occasional extrasystole but they are quite rare.   His heart rate is clearly above his paced rate of 60.  His abdomen is soft and nontender.  The spleen tip is palpable probably at the costal margin or at most 1 cm down; and is nontender; there is no perisplenic rub.  He has no palpable hepatomegaly or other masses.    Laboratory testing showed good blood counts but his platelets are 97,000.  His chemistries are normal but his LDH is slightly above normal at 308 (normal up to 227).  A protein electrophoresis is fine with M protein 0.1 g/dl.  He has had a stable M protein of 0.2 g/dL IgM kappa for the last 10 years.    Overall there is no findings showing that he has progressive  or threatening lymphoma and no need for intervention at this point.    He has no cardiologist here in [place] so since he is having a pacemaker check remotely from the [facility] in [place] next week, if it is acceptable, then will arrange cardiology consultation here in November when I see him again.  If problems are apparent with his pacemaker,  wewill arrange for the cardiology evaluation to be sooner.    Overall is good to see he is doing well    Elroy Holman MD    Professor of medicine    Results for MAJOR GILES (MRN [id number]) as of 6/26/2020 16:03   Ref. Range 11/21/2019 11:26 11/21/2019 11:50 11/21/2019 12:00 6/25/2020 13:28   Sodium Latest Ref Range: 133 - 144 mmol/L 137   141   Potassium Latest Ref Range: 3.4 - 5.3 mmol/L 4.3   4.4   Chloride Latest Ref Range: 94 - 109 mmol/L 105   107   Carbon Dioxide Latest Ref Range: 20 - 32 mmol/L 27   29   Urea Nitrogen Latest Ref Range: 7 - 30 mg/dL 26   30   Creatinine Latest Ref Range: 0.66 - 1.25 mg/dL 0.85 1.0  0.88   GFR Estimate Latest Ref Range: >60 mL/min/1.73_m2 88 74  87   GFR Estimate If Black Latest Ref Range: >60 mL/min/1.73_m2 >90 89  >90   Calcium Latest Ref Range: 8.5 - 10.1 mg/dL 9.4   8.8   Anion Gap Latest Ref Range: 3 - 14 mmol/L 4   4   Albumin Latest Ref Range: 3.4 - 5.0 g/dL 4.1   3.9   Protein Total Latest Ref  Range: 6.8 - 8.8 g/dL 7.4   6.9   Bilirubin Total Latest Ref Range: 0.2 - 1.3 mg/dL 0.4   0.4   Alkaline Phosphatase Latest Ref Range: 40 - 150 U/L 93   100   ALT Latest Ref Range: 0 - 70 U/L 34   36   AST Latest Ref Range: 0 - 45 U/L 23   24   Lactate Dehydrogenase Latest Ref Range: 85 - 227 U/L 164   308 (H)   Uric Acid Latest Ref Range: 3.5 - 7.2 mg/dL    6.2   Glucose Latest Ref Range: 70 - 99 mg/dL 147 (H)   68 (L)   WBC Latest Ref Range: 4.0 - 11.0 10e9/L 9.9   3.1 (L)   Hemoglobin Latest Ref Range: 13.3 - 17.7 g/dL 14.3   12.9 (L)   Hematocrit Latest Ref Range: 40.0 - 53.0 % 42.2   39.6 (L)   Platelet Count Latest Ref Range: 150 - 450 10e9/L 130 (L)   97 (L)   RBC Count Latest Ref Range: 4.4 - 5.9 10e12/L 4.41   4.18 (L)   MCV Latest Ref Range: 78 - 100 fl 96   95   MCH Latest Ref Range: 26.5 - 33.0 pg 32.4   30.9   MCHC Latest Ref Range: 31.5 - 36.5 g/dL 33.9   32.6   RDW Latest Ref Range: 10.0 - 15.0 % 12.1   12.7   Diff Method Unknown Automated Method   Automated Method   % Neutrophils Latest Units: % 90.3   56.9   % Lymphocytes Latest Units: % 4.9   28.4   % Monocytes Latest Units: % 4.5   12.8   % Eosinophils Latest Units: % 0.0   1.3   % Basophils Latest Units: % 0.1   0.3   % Immature Granulocytes Latest Units: % 0.2   0.3   Nucleated RBCs Latest Ref Range: 0 /100 0   0   Absolute Neutrophil Latest Ref Range: 1.6 - 8.3 10e9/L 8.9 (H)   1.8   Absolute Lymphocytes Latest Ref Range: 0.8 - 5.3 10e9/L 0.5 (L)   0.9   Absolute Monocytes Latest Ref Range: 0.0 - 1.3 10e9/L 0.4   0.4   Absolute Eosinophils Latest Ref Range: 0.0 - 0.7 10e9/L 0.0   0.0   Absolute Basophils Latest Ref Range: 0.0 - 0.2 10e9/L 0.0   0.0   Abs Immature Granulocytes Latest Ref Range: 0 - 0.4 10e9/L 0.0   0.0   Absolute Nucleated RBC Unknown 0.0   0.0   Albumin Fraction Latest Ref Range: 3.7 - 5.1 g/dL 4.7   4.3   Alpha 1 Fraction Latest Ref Range: 0.2 - 0.4 g/dL 0.3   0.3   Alpha 2 Fraction Latest Ref Range: 0.5 - 0.9 g/dL 0.6   0.6    Beta Fraction Latest Ref Range: 0.6 - 1.0 g/dL 0.6   0.6   ELP Interpretation: Unknown Small monoclonal ...   Small monoclonal ...   Gamma Fraction Latest Ref Range: 0.7 - 1.6 g/dL 0.9   0.7   Monoclonal Peak Latest Ref Range: 0.0 g/dL 0.2 (H)   0.1 (H)   CT CHEST/ABDOMEN/PELVIS W CONTRAST Unknown   Rpt

## 2020-06-25 NOTE — LETTER
6/25/2020         RE: Shahid Davis   [facility] [id number]      /86   Pulse 84   Temp 98.6  F (37  C) (Oral)   Resp 16   Wt 64.5 kg (142 lb 3.2 oz)   SpO2 99%   BMI 22.96 kg/m    Wt Readings from Last 4 Encounters:   06/25/20 64.5 kg (142 lb 3.2 oz)   11/21/19 64.5 kg (142 lb 1.6 oz)   09/12/19 63.1 kg (139 lb 3.2 oz)   09/05/19 63.5 kg (140 lb)     Shahid returns for follow-up many years after the diagnosis of his lymphoplasmacytic lymphoma (originally benjamín, marrow and parspinal disease, with an IgMK) treated with paraspinal radiation and fludarabine.  He has been off therapy for a long time.  He had some drop attacks in February this year; and was diagnosed with a heart block, but no heart attack and a pacemaker was placed while he was in [place].  It is healing and though he can feel periodically the pacemaker is required to maintain his heart rate, he has had no fainting spells since.    He has had no recent fever chills or notable infection.  He has no rash bleeding or bruising. He has no new bone pain though he has chronic back aching.  His heartburn is controlled with omeprazole and there is no other interim findings on review of systems.    Last fall his spleen was slightly enlarged, and was palpable 1-1/2 to 2 cm below the costal margin in November, but he had no other systemic symptoms or mass lesions at that point.  A PET CT in September had shown mild splenomegaly, and bone lesions but no other findings.   No notable change was apparent on a followup CT in November, though his spleen grew a bit.    Today his exam shows his weight roughly stable from last fall and his vital signs are acceptable.  He has no cervical supraclavicular or axillary lymphadenopathy.  He has small, 1/2 to 1 cm nodes in both inguinal regions.  These have been present for a long time, and are now smaller than most visits in the past.  He has no skin rash or peripheral edema.  His lungs are  clear; his heart tones are regular with an occasional extrasystole but they are quite rare.  His heart rate is clearly above his paced rate of 60.  His abdomen is soft and nontender.  The spleen tip is palpable probably at the costal margin or at most 1 cm down; and is nontender; there is no perisplenic rub.  He has no palpable hepatomegaly or other masses.    Laboratory testing showed good blood counts but his platelets are 97,000.  His chemistries are normal but his LDH is slightly above normal at 308 (normal up to 227).  A protein electrophoresis is fine with M protein 0.1 g/dl.  He has had a stable M protein of 0.2 g/dL IgM kappa for the last 10 years.    Overall there is no findings showing that he has progressive  or threatening lymphoma and no need for intervention at this point.    He has no cardiologist here in [place] so since he is having a pacemaker check remotely from the [facility] in [place] next week, if it is acceptable, then will arrange cardiology consultation here in November when I see him again.  If problems are apparent with his pacemaker,  domingowill arrange for the cardiology evaluation to be sooner.    Overall is good to see he is doing well    Elroy Holman MD    Professor of medicine    Results for MAJOR GILES (MRN [id number]) as of 6/26/2020 16:03   Ref. Range 11/21/2019 11:26 11/21/2019 11:50 11/21/2019 12:00 6/25/2020 13:28   Sodium Latest Ref Range: 133 - 144 mmol/L 137   141   Potassium Latest Ref Range: 3.4 - 5.3 mmol/L 4.3   4.4   Chloride Latest Ref Range: 94 - 109 mmol/L 105   107   Carbon Dioxide Latest Ref Range: 20 - 32 mmol/L 27   29   Urea Nitrogen Latest Ref Range: 7 - 30 mg/dL 26   30   Creatinine Latest Ref Range: 0.66 - 1.25 mg/dL 0.85 1.0  0.88   GFR Estimate Latest Ref Range: >60 mL/min/1.73_m2 88 74  87   GFR Estimate If Black Latest Ref Range: >60 mL/min/1.73_m2 >90 89  >90   Calcium Latest Ref Range: 8.5 - 10.1 mg/dL 9.4   8.8   Anion Gap Latest Ref Range: 3 - 14  mmol/L 4   4   Albumin Latest Ref Range: 3.4 - 5.0 g/dL 4.1   3.9   Protein Total Latest Ref Range: 6.8 - 8.8 g/dL 7.4   6.9   Bilirubin Total Latest Ref Range: 0.2 - 1.3 mg/dL 0.4   0.4   Alkaline Phosphatase Latest Ref Range: 40 - 150 U/L 93   100   ALT Latest Ref Range: 0 - 70 U/L 34   36   AST Latest Ref Range: 0 - 45 U/L 23   24   Lactate Dehydrogenase Latest Ref Range: 85 - 227 U/L 164   308 (H)   Uric Acid Latest Ref Range: 3.5 - 7.2 mg/dL    6.2   Glucose Latest Ref Range: 70 - 99 mg/dL 147 (H)   68 (L)   WBC Latest Ref Range: 4.0 - 11.0 10e9/L 9.9   3.1 (L)   Hemoglobin Latest Ref Range: 13.3 - 17.7 g/dL 14.3   12.9 (L)   Hematocrit Latest Ref Range: 40.0 - 53.0 % 42.2   39.6 (L)   Platelet Count Latest Ref Range: 150 - 450 10e9/L 130 (L)   97 (L)   RBC Count Latest Ref Range: 4.4 - 5.9 10e12/L 4.41   4.18 (L)   MCV Latest Ref Range: 78 - 100 fl 96   95   MCH Latest Ref Range: 26.5 - 33.0 pg 32.4   30.9   MCHC Latest Ref Range: 31.5 - 36.5 g/dL 33.9   32.6   RDW Latest Ref Range: 10.0 - 15.0 % 12.1   12.7   Diff Method Unknown Automated Method   Automated Method   % Neutrophils Latest Units: % 90.3   56.9   % Lymphocytes Latest Units: % 4.9   28.4   % Monocytes Latest Units: % 4.5   12.8   % Eosinophils Latest Units: % 0.0   1.3   % Basophils Latest Units: % 0.1   0.3   % Immature Granulocytes Latest Units: % 0.2   0.3   Nucleated RBCs Latest Ref Range: 0 /100 0   0   Absolute Neutrophil Latest Ref Range: 1.6 - 8.3 10e9/L 8.9 (H)   1.8   Absolute Lymphocytes Latest Ref Range: 0.8 - 5.3 10e9/L 0.5 (L)   0.9   Absolute Monocytes Latest Ref Range: 0.0 - 1.3 10e9/L 0.4   0.4   Absolute Eosinophils Latest Ref Range: 0.0 - 0.7 10e9/L 0.0   0.0   Absolute Basophils Latest Ref Range: 0.0 - 0.2 10e9/L 0.0   0.0   Abs Immature Granulocytes Latest Ref Range: 0 - 0.4 10e9/L 0.0   0.0   Absolute Nucleated RBC Unknown 0.0   0.0   Albumin Fraction Latest Ref Range: 3.7 - 5.1 g/dL 4.7   4.3   Alpha 1 Fraction Latest Ref  Range: 0.2 - 0.4 g/dL 0.3   0.3   Alpha 2 Fraction Latest Ref Range: 0.5 - 0.9 g/dL 0.6   0.6   Beta Fraction Latest Ref Range: 0.6 - 1.0 g/dL 0.6   0.6   ELP Interpretation: Unknown Small monoclonal ...   Small monoclonal ...   Gamma Fraction Latest Ref Range: 0.7 - 1.6 g/dL 0.9   0.7   Monoclonal Peak Latest Ref Range: 0.0 g/dL 0.2 (H)   0.1 (H)   CT CHEST/ABDOMEN/PELVIS W CONTRAST Unknown   Rpt          Elroy Holman MD

## 2020-06-25 NOTE — LETTER
6/25/2020         RE: Shahid Davis   [facility] [id number]        Dear Colleague,    Thank you for referring your patient, Shahid Davis, to [facility]. Please see a copy of my visit note below.    /86   Pulse 84   Temp 98.6  F (37  C) (Oral)   Resp 16   Wt 64.5 kg (142 lb 3.2 oz)   SpO2 99%   BMI 22.96 kg/m    Wt Readings from Last 4 Encounters:   06/25/20 64.5 kg (142 lb 3.2 oz)   11/21/19 64.5 kg (142 lb 1.6 oz)   09/12/19 63.1 kg (139 lb 3.2 oz)   09/05/19 63.5 kg (140 lb)     Shahid returns for follow-up many years after the diagnosis of his lymphoplasmacytic lymphoma (originally benjamín, marrow and parspinal disease, with an IgMK) treated with paraspinal radiation and fludarabine.  He has been off therapy for a long time.  He had some drop attacks in February this year; and was diagnosed with a heart block, but no heart attack and a pacemaker was placed while he was in [place].  It is healing and though he can feel periodically the pacemaker is required to maintain his heart rate, he has had no fainting spells since.    He has had no recent fever chills or notable infection.  He has no rash bleeding or bruising. He has no new bone pain though he has chronic back aching.  His heartburn is controlled with omeprazole and there is no other interim findings on review of systems.    Last fall his spleen was slightly enlarged, and was palpable 1-1/2 to 2 cm below the costal margin in November, but he had no other systemic symptoms or mass lesions at that point.  A PET CT in September had shown mild splenomegaly, and bone lesions but no other findings.   No notable change was apparent on a followup CT in November, though his spleen grew a bit.    Today his exam shows his weight roughly stable from last fall and his vital signs are acceptable.  He has no cervical supraclavicular or axillary lymphadenopathy.  He has small, 1/2 to 1 cm nodes in both  inguinal regions.  These have been present for a long time, and are now smaller than most visits in the past.  He has no skin rash or peripheral edema.  His lungs are clear; his heart tones are regular with an occasional extrasystole but they are quite rare.  His heart rate is clearly above his paced rate of 60.  His abdomen is soft and nontender.  The spleen tip is palpable probably at the costal margin or at most 1 cm down; and is nontender; there is no perisplenic rub.  He has no palpable hepatomegaly or other masses.    Laboratory testing showed good blood counts but his platelets are 97,000.  His chemistries are normal but his LDH is slightly above normal at 308 (normal up to 227).  A protein electrophoresis is fine with M protein 0.1 g/dl.  He has had a stable M protein of 0.2 g/dL IgM kappa for the last 10 years.    Overall there is no findings showing that he has progressive  or threatening lymphoma and no need for intervention at this point.    He has no cardiologist here in [place] so since he is having a pacemaker check remotely from the [facility] in [place] next week, if it is acceptable, then will arrange cardiology consultation here in November when I see him again.  If problems are apparent with his pacemaker,  wewill arrange for the cardiology evaluation to be sooner.    Overall is good to see he is doing well    Elroy Holman MD    Professor of medicine    Results for MAJOR GILES (MRN [id number]) as of 6/26/2020 16:03   Ref. Range 11/21/2019 11:26 11/21/2019 11:50 11/21/2019 12:00 6/25/2020 13:28   Sodium Latest Ref Range: 133 - 144 mmol/L 137   141   Potassium Latest Ref Range: 3.4 - 5.3 mmol/L 4.3   4.4   Chloride Latest Ref Range: 94 - 109 mmol/L 105   107   Carbon Dioxide Latest Ref Range: 20 - 32 mmol/L 27   29   Urea Nitrogen Latest Ref Range: 7 - 30 mg/dL 26   30   Creatinine Latest Ref Range: 0.66 - 1.25 mg/dL 0.85 1.0  0.88   GFR Estimate Latest Ref Range: >60 mL/min/1.73_m2 88 74  87    GFR Estimate If Black Latest Ref Range: >60 mL/min/1.73_m2 >90 89  >90   Calcium Latest Ref Range: 8.5 - 10.1 mg/dL 9.4   8.8   Anion Gap Latest Ref Range: 3 - 14 mmol/L 4   4   Albumin Latest Ref Range: 3.4 - 5.0 g/dL 4.1   3.9   Protein Total Latest Ref Range: 6.8 - 8.8 g/dL 7.4   6.9   Bilirubin Total Latest Ref Range: 0.2 - 1.3 mg/dL 0.4   0.4   Alkaline Phosphatase Latest Ref Range: 40 - 150 U/L 93   100   ALT Latest Ref Range: 0 - 70 U/L 34   36   AST Latest Ref Range: 0 - 45 U/L 23   24   Lactate Dehydrogenase Latest Ref Range: 85 - 227 U/L 164   308 (H)   Uric Acid Latest Ref Range: 3.5 - 7.2 mg/dL    6.2   Glucose Latest Ref Range: 70 - 99 mg/dL 147 (H)   68 (L)   WBC Latest Ref Range: 4.0 - 11.0 10e9/L 9.9   3.1 (L)   Hemoglobin Latest Ref Range: 13.3 - 17.7 g/dL 14.3   12.9 (L)   Hematocrit Latest Ref Range: 40.0 - 53.0 % 42.2   39.6 (L)   Platelet Count Latest Ref Range: 150 - 450 10e9/L 130 (L)   97 (L)   RBC Count Latest Ref Range: 4.4 - 5.9 10e12/L 4.41   4.18 (L)   MCV Latest Ref Range: 78 - 100 fl 96   95   MCH Latest Ref Range: 26.5 - 33.0 pg 32.4   30.9   MCHC Latest Ref Range: 31.5 - 36.5 g/dL 33.9   32.6   RDW Latest Ref Range: 10.0 - 15.0 % 12.1   12.7   Diff Method Unknown Automated Method   Automated Method   % Neutrophils Latest Units: % 90.3   56.9   % Lymphocytes Latest Units: % 4.9   28.4   % Monocytes Latest Units: % 4.5   12.8   % Eosinophils Latest Units: % 0.0   1.3   % Basophils Latest Units: % 0.1   0.3   % Immature Granulocytes Latest Units: % 0.2   0.3   Nucleated RBCs Latest Ref Range: 0 /100 0   0   Absolute Neutrophil Latest Ref Range: 1.6 - 8.3 10e9/L 8.9 (H)   1.8   Absolute Lymphocytes Latest Ref Range: 0.8 - 5.3 10e9/L 0.5 (L)   0.9   Absolute Monocytes Latest Ref Range: 0.0 - 1.3 10e9/L 0.4   0.4   Absolute Eosinophils Latest Ref Range: 0.0 - 0.7 10e9/L 0.0   0.0   Absolute Basophils Latest Ref Range: 0.0 - 0.2 10e9/L 0.0   0.0   Abs Immature Granulocytes Latest Ref  Range: 0 - 0.4 10e9/L 0.0   0.0   Absolute Nucleated RBC Unknown 0.0   0.0   Albumin Fraction Latest Ref Range: 3.7 - 5.1 g/dL 4.7   4.3   Alpha 1 Fraction Latest Ref Range: 0.2 - 0.4 g/dL 0.3   0.3   Alpha 2 Fraction Latest Ref Range: 0.5 - 0.9 g/dL 0.6   0.6   Beta Fraction Latest Ref Range: 0.6 - 1.0 g/dL 0.6   0.6   ELP Interpretation: Unknown Small monoclonal ...   Small monoclonal ...   Gamma Fraction Latest Ref Range: 0.7 - 1.6 g/dL 0.9   0.7   Monoclonal Peak Latest Ref Range: 0.0 g/dL 0.2 (H)   0.1 (H)   CT CHEST/ABDOMEN/PELVIS W CONTRAST Unknown   Rpt        Again, thank you for allowing me to participate in the care of your patient.        Sincerely,        Elroy Holman MD

## 2020-06-25 NOTE — NURSING NOTE
"Oncology Rooming Note    June 25, 2020 1:51 PM   Shahid Davis is a 69 year old male who presents for:    Chief Complaint   Patient presents with     Oncology Clinic Visit     Pt is here for a rtn for Nodular Lymphoma      Initial Vitals: Blood Pressure 124/86   Pulse 84   Temperature 98.6  F (37  C) (Oral)   Respiration 16   Weight 64.5 kg (142 lb 3.2 oz)   Oxygen Saturation 99%   Body Mass Index 22.96 kg/m   Estimated body mass index is 22.96 kg/m  as calculated from the following:    Height as of 9/5/19: 1.676 m (5' 5.98\").    Weight as of this encounter: 64.5 kg (142 lb 3.2 oz). Body surface area is 1.73 meters squared.  No Pain (0) Comment: Data Unavailable   No LMP for male patient.  Allergies reviewed: Yes  Medications reviewed: Yes    Medications: Medication refills not needed today.  Pharmacy name entered into [employer]:    [id number] [place] [address] [id number] [address] AT [facility] [address]    Clinical concerns: none       Shannon Cannon MA            "

## 2020-06-26 LAB
ALBUMIN SERPL ELPH-MCNC: 4.3 G/DL (ref 3.7–5.1)
ALPHA1 GLOB SERPL ELPH-MCNC: 0.3 G/DL (ref 0.2–0.4)
ALPHA2 GLOB SERPL ELPH-MCNC: 0.6 G/DL (ref 0.5–0.9)
B-GLOBULIN SERPL ELPH-MCNC: 0.6 G/DL (ref 0.6–1)
GAMMA GLOB SERPL ELPH-MCNC: 0.7 G/DL (ref 0.7–1.6)
M PROTEIN SERPL ELPH-MCNC: 0.1 G/DL
PROT PATTERN SERPL ELPH-IMP: ABNORMAL

## 2020-08-20 NOTE — TELEPHONE ENCOUNTER
RECORDS RECEIVED FROM: Internal/External   DATE RECEIVED: 11-12   NOTES STATUS DETAILS   OFFICE NOTE from referring provider    Internal BMT   OFFICE NOTE from other cardiologist    N/A    DISCHARGE SUMMARY from hospital    N/A    DISCHARGE REPORT from the ER   N/A    OPERATIVE REPORT    N/A    MEDICATION LIST   Internal    LABS     BMP   N/A    CBC   Internal 6-25-20   CMP   Internal 6-25-20   Lipids   N/A    TSH   N/A    DIAGNOSTIC PROCEDURES     EKG   N/A    Monitor Reports   N/A    IMAGING (DISC & REPORT)      Echo   N/A    Stress Tests   N/A    Cath   N/A    MRI/MRA   N/A    CT/CTA   Internal 11-21-19

## 2020-09-11 ENCOUNTER — DOCUMENTATION ONLY (OUTPATIENT)
Dept: CARE COORDINATION | Facility: CLINIC | Age: 70
End: 2020-09-11

## 2020-11-12 ENCOUNTER — OFFICE VISIT (OUTPATIENT)
Dept: CARDIOLOGY | Facility: CLINIC | Age: 70
End: 2020-11-12
Attending: INTERNAL MEDICINE
Payer: MEDICARE

## 2020-11-12 ENCOUNTER — OFFICE VISIT (OUTPATIENT)
Dept: TRANSPLANT | Facility: CLINIC | Age: 70
End: 2020-11-12
Attending: INTERNAL MEDICINE
Payer: MEDICARE

## 2020-11-12 ENCOUNTER — APPOINTMENT (OUTPATIENT)
Dept: LAB | Facility: CLINIC | Age: 70
End: 2020-11-12
Attending: INTERNAL MEDICINE
Payer: MEDICARE

## 2020-11-12 ENCOUNTER — PRE VISIT (OUTPATIENT)
Dept: CARDIOLOGY | Facility: CLINIC | Age: 70
End: 2020-11-12

## 2020-11-12 VITALS
RESPIRATION RATE: 16 BRPM | DIASTOLIC BLOOD PRESSURE: 71 MMHG | TEMPERATURE: 97.9 F | OXYGEN SATURATION: 100 % | BODY MASS INDEX: 22.98 KG/M2 | WEIGHT: 142.3 LBS | SYSTOLIC BLOOD PRESSURE: 116 MMHG | HEART RATE: 72 BPM

## 2020-11-12 VITALS
DIASTOLIC BLOOD PRESSURE: 75 MMHG | HEIGHT: 67 IN | OXYGEN SATURATION: 99 % | WEIGHT: 142.3 LBS | HEART RATE: 87 BPM | SYSTOLIC BLOOD PRESSURE: 123 MMHG | BODY MASS INDEX: 22.34 KG/M2

## 2020-11-12 DIAGNOSIS — C82.99 NODULAR LYMPHOMA OF EXTRANODAL AND/OR SOLID ORGAN SITE (H): ICD-10-CM

## 2020-11-12 DIAGNOSIS — Z95.0 STATUS POST PLACEMENT OF CARDIAC PACEMAKER: ICD-10-CM

## 2020-11-12 DIAGNOSIS — I44.2 COMPLETE HEART BLOCK (H): Primary | ICD-10-CM

## 2020-11-12 DIAGNOSIS — D69.6 THROMBOCYTOPENIA (H): ICD-10-CM

## 2020-11-12 LAB
ALBUMIN SERPL-MCNC: 3.9 G/DL (ref 3.4–5)
ALP SERPL-CCNC: 102 U/L (ref 40–150)
ALT SERPL W P-5'-P-CCNC: 36 U/L (ref 0–70)
ANION GAP SERPL CALCULATED.3IONS-SCNC: 4 MMOL/L (ref 3–14)
AST SERPL W P-5'-P-CCNC: 25 U/L (ref 0–45)
BASOPHILS # BLD AUTO: 0 10E9/L (ref 0–0.2)
BASOPHILS NFR BLD AUTO: 0.3 %
BILIRUB SERPL-MCNC: 0.4 MG/DL (ref 0.2–1.3)
BUN SERPL-MCNC: 27 MG/DL (ref 7–30)
CALCIUM SERPL-MCNC: 9.2 MG/DL (ref 8.5–10.1)
CHLORIDE SERPL-SCNC: 107 MMOL/L (ref 94–109)
CO2 SERPL-SCNC: 27 MMOL/L (ref 20–32)
CREAT SERPL-MCNC: 0.88 MG/DL (ref 0.66–1.25)
DIFFERENTIAL METHOD BLD: ABNORMAL
EOSINOPHIL # BLD AUTO: 0 10E9/L (ref 0–0.7)
EOSINOPHIL NFR BLD AUTO: 0.6 %
ERYTHROCYTE [DISTWIDTH] IN BLOOD BY AUTOMATED COUNT: 12.7 % (ref 10–15)
GFR SERPL CREATININE-BSD FRML MDRD: 86 ML/MIN/{1.73_M2}
GLUCOSE SERPL-MCNC: 86 MG/DL (ref 70–99)
HCT VFR BLD AUTO: 41.2 % (ref 40–53)
HGB BLD-MCNC: 13.7 G/DL (ref 13.3–17.7)
IMM GRANULOCYTES # BLD: 0 10E9/L (ref 0–0.4)
IMM GRANULOCYTES NFR BLD: 0.3 %
LDH SERPL L TO P-CCNC: 246 U/L (ref 85–227)
LYMPHOCYTES # BLD AUTO: 0.9 10E9/L (ref 0.8–5.3)
LYMPHOCYTES NFR BLD AUTO: 25.2 %
MCH RBC QN AUTO: 31.2 PG (ref 26.5–33)
MCHC RBC AUTO-ENTMCNC: 33.3 G/DL (ref 31.5–36.5)
MCV RBC AUTO: 94 FL (ref 78–100)
MONOCYTES # BLD AUTO: 0.4 10E9/L (ref 0–1.3)
MONOCYTES NFR BLD AUTO: 10.9 %
NEUTROPHILS # BLD AUTO: 2.1 10E9/L (ref 1.6–8.3)
NEUTROPHILS NFR BLD AUTO: 62.7 %
NRBC # BLD AUTO: 0 10*3/UL
NRBC BLD AUTO-RTO: 0 /100
PLATELET # BLD AUTO: 104 10E9/L (ref 150–450)
POTASSIUM SERPL-SCNC: 4.6 MMOL/L (ref 3.4–5.3)
PROT SERPL-MCNC: 7.1 G/DL (ref 6.8–8.8)
RBC # BLD AUTO: 4.39 10E12/L (ref 4.4–5.9)
SODIUM SERPL-SCNC: 138 MMOL/L (ref 133–144)
WBC # BLD AUTO: 3.4 10E9/L (ref 4–11)

## 2020-11-12 PROCEDURE — 99214 OFFICE O/P EST MOD 30 MIN: CPT | Mod: GC | Performed by: INTERNAL MEDICINE

## 2020-11-12 PROCEDURE — 999N001036 HC STATISTIC TOTAL PROTEIN: Performed by: INTERNAL MEDICINE

## 2020-11-12 PROCEDURE — 80053 COMPREHEN METABOLIC PANEL: CPT | Performed by: INTERNAL MEDICINE

## 2020-11-12 PROCEDURE — 85025 COMPLETE CBC W/AUTO DIFF WBC: CPT | Performed by: INTERNAL MEDICINE

## 2020-11-12 PROCEDURE — 86334 IMMUNOFIX E-PHORESIS SERUM: CPT | Mod: 26 | Performed by: PATHOLOGY

## 2020-11-12 PROCEDURE — 84165 PROTEIN E-PHORESIS SERUM: CPT | Mod: TC | Performed by: INTERNAL MEDICINE

## 2020-11-12 PROCEDURE — 82784 ASSAY IGA/IGD/IGG/IGM EACH: CPT | Performed by: INTERNAL MEDICINE

## 2020-11-12 PROCEDURE — 36415 COLL VENOUS BLD VENIPUNCTURE: CPT

## 2020-11-12 PROCEDURE — 93005 ELECTROCARDIOGRAM TRACING: CPT

## 2020-11-12 PROCEDURE — 86334 IMMUNOFIX E-PHORESIS SERUM: CPT | Mod: TC | Performed by: INTERNAL MEDICINE

## 2020-11-12 PROCEDURE — 99204 OFFICE O/P NEW MOD 45 MIN: CPT | Mod: 25 | Performed by: INTERNAL MEDICINE

## 2020-11-12 PROCEDURE — 83615 LACTATE (LD) (LDH) ENZYME: CPT | Performed by: INTERNAL MEDICINE

## 2020-11-12 PROCEDURE — G0463 HOSPITAL OUTPT CLINIC VISIT: HCPCS | Mod: 25

## 2020-11-12 PROCEDURE — 84165 PROTEIN E-PHORESIS SERUM: CPT | Mod: 26 | Performed by: PATHOLOGY

## 2020-11-12 RX ORDER — OMEPRAZOLE 40 MG/1
40 CAPSULE, DELAYED RELEASE ORAL DAILY
Qty: 90 CAPSULE | Refills: 3 | Status: SHIPPED | OUTPATIENT
Start: 2020-11-12 | End: 2021-11-18

## 2020-11-12 ASSESSMENT — PAIN SCALES - GENERAL
PAINLEVEL: NO PAIN (0)
PAINLEVEL: NO PAIN (0)

## 2020-11-12 ASSESSMENT — MIFFLIN-ST. JEOR: SCORE: 1359.1

## 2020-11-12 NOTE — NURSING NOTE
Chief Complaint   Patient presents with     Blood Draw     Labs drawn via  by RN. VS taken     Labs drawn with vpt by rn.  Pt tolerated well.  VS taken.      Garcia Luther RN

## 2020-11-12 NOTE — PROGRESS NOTES
HPI:     I ha d the privilege to evaluate and examine Mr Shahid Davis, who is a 70 yr male patient, with a Hx  for card  70 year old man with history of Lymphoplasmacytic lymphoma.   He has been doing well on observation with his disease remaining quiescent for approximately the past 15 year.  In 2020, he developed fainting spells in February this year.   He was evaluated in [facility] [place] and they found to have complete heart block while in [place] ([facility]). [[employer] 2272 dual chamber permanent pacemaker Device type].  He developed a small L pneumothorax due to procedure, resolved after 3 days in hospital  Since his PPM was placed,  lightheadedness with exertion. Reports that he needs to stop and catch his breath, feels these episodes before they come. No nausea or vomiting. No associated chest pain. No blurry vision or other visual changes.      Lipids in 2020 (per documents from [facility] chart):  Total Cholesterol: 165 mg/dL  T mg/dL  HDL-C: 53 mg/dL  LDL-C: 101 mg/dL  TC:HDL-C Ratio: 3.1    PAST MEDICAL HISTORY:  Past Medical History:   Diagnosis Date     Lymphoma (H)      Squamous cell carcinoma        CURRENT MEDICATIONS:  Current Outpatient Medications   Medication Sig Dispense Refill     ascorbic acid (VITAMIN C) 500 MG tablet Take by mouth daily       ibuprofen (ADVIL/MOTRIN) 200 MG capsule Take 200 mg by mouth every 4 hours as needed for fever       methylPREDNISolone (MEDROL) 32 MG tablet take 1 tablet 12 hours and 2 hours prior to CT scan; 3rd dose 4 hours post scan. 12 tablet 1     Multiple Vitamins-Minerals (MULTIVITAL PO)        omeprazole (PRILOSEC) 40 MG DR capsule Take 1 capsule (40 mg) by mouth daily 90 capsule 3       PAST SURGICAL HISTORY:  Past Surgical History:   Procedure Laterality Date     MOHS MICROGRAPHIC PROCEDURE       NO HISTORY OF SURGERY         ALLERGIES     Allergies   Allergen Reactions     Ampicillin [Ampicillin  Sodium]      Bactrim [Sulfamethoxazole W/Trimethoprim]      Contrast Dye      CT contrast (IV) allergy - need oral Methylpred as premed for scans     Ampicillin Rash       FAMILY HISTORY:  Family History   Problem Relation Age of Onset     Cancer Mother         Blood     Cancer Father         Lung     Cancer Maternal Grandmother         Breast     Cancer Paternal Grandfather         Hodgkins       SOCIAL HISTORY:  Social History     Socioeconomic History     Marital status: Single     Spouse name: None     Number of children: None     Years of education: None     Highest education level: None   Occupational History     None   Social Needs     Financial resource strain: None     Food insecurity     Worry: None     Inability: None     Transportation needs     Medical: None     Non-medical: None   Tobacco Use     Smoking status: Former Smoker     Smokeless tobacco: Never Used     Tobacco comment: Quit at age 19   Substance and Sexual Activity     Alcohol use: Yes     Alcohol/week: 11.7 standard drinks     Types: 14 drink(s) per week     Drug use: None     Sexual activity: None   Lifestyle     Physical activity     Days per week: None     Minutes per session: None     Stress: None   Relationships     Social connections     Talks on phone: None     Gets together: None     Attends Evangelical service: None     Active member of club or organization: None     Attends meetings of clubs or organizations: None     Relationship status: None     Intimate partner violence     Fear of current or ex partner: None     Emotionally abused: None     Physically abused: None     Forced sexual activity: None   Other Topics Concern     Parent/sibling w/ CABG, MI or angioplasty before 65F 55M? Not Asked   Social History Narrative     None       ROS:   Constitutional: No fever, chills, or sweats. No weight gain/loss   ENT: No visual disturbance, ear ache, epistaxis, sore throat  Allergies/Immunologic: Negative.   Respiratory: No cough,  "hemoptysia  Cardiovascular: As per HPI  GI: No nausea, vomiting, hematemesis, melena, or hematochezia  : No urinary frequency, dysuria, or hematuria  Integument: Negative  Psychiatric: Negative  Neuro: Negative  Endocrinology: Negative   Musculoskeletal: Negative    EXAM:  /75 (BP Location: Right arm, Patient Position: Sitting, Cuff Size: Adult Regular)   Pulse 87   Ht 1.702 m (5' 7\")   Wt 64.5 kg (142 lb 4.8 oz)   SpO2 99%   BMI 22.29 kg/m    In general, the patient is a pleasant male in no apparent distress.    HEENT: NC/AT.  PERRLA.  EOMI.  Sclerae white, not injected.  Nares clear.  Pharynx without erythema or exudate.  Dentition intact.    Neck: No adenopathy.  No thyromegaly. Carotids +4/4 bilaterally without bruits.  No jugular venous distension.   Heart: RRR. Normal S1, S2 splits physiologically. No murmur, rub, click, or gallop. The PMI is in the 5th ICS in the midclavicular line. There is no heave.    Lungs: CTA.  No ronchi, wheezes, rales.  No dullness to percussion.   Abdomen: Soft, nontender, nondistended. No organomegaly.  No bruits.   Extremities: No clubbing, cyanosis, or edema.  The pulses are +4/4 at the radial, brachial, femoral, popliteal, DP, and PT sites bilaterally.  No bruits are noted.  Neurologic: Alert and oriented to person/place/time, normal speech, gait and affect  Skin: No petechiae, purpura or rash.    Labs:       Ref. Range 11/12/2020 13:46   Sodium Latest Ref Range: 133 - 144 mmol/L 138   Potassium Latest Ref Range: 3.4 - 5.3 mmol/L 4.6   Chloride Latest Ref Range: 94 - 109 mmol/L 107   Carbon Dioxide Latest Ref Range: 20 - 32 mmol/L 27   Urea Nitrogen Latest Ref Range: 7 - 30 mg/dL 27   Creatinine Latest Ref Range: 0.66 - 1.25 mg/dL 0.88   GFR Estimate Latest Ref Range: >60 mL/min/1.73_m2 86   GFR Estimate If Black Latest Ref Range: >60 mL/min/1.73_m2 >90   Calcium Latest Ref Range: 8.5 - 10.1 mg/dL 9.2   Anion Gap Latest Ref Range: 3 - 14 mmol/L 4   Albumin Latest Ref " Range: 3.4 - 5.0 g/dL 3.9   Protein Total Latest Ref Range: 6.8 - 8.8 g/dL 7.1   Bilirubin Total Latest Ref Range: 0.2 - 1.3 mg/dL 0.4   Alkaline Phosphatase Latest Ref Range: 40 - 150 U/L 102   ALT Latest Ref Range: 0 - 70 U/L 36   AST Latest Ref Range: 0 - 45 U/L 25   Lactate Dehydrogenase Latest Ref Range: 85 - 227 U/L 246 (H)   Glucose Latest Ref Range: 70 - 99 mg/dL 86   WBC Latest Ref Range: 4.0 - 11.0 10e9/L 3.4 (L)   Hemoglobin Latest Ref Range: 13.3 - 17.7 g/dL 13.7   Hematocrit Latest Ref Range: 40.0 - 53.0 % 41.2   Platelet Count Latest Ref Range: 150 - 450 10e9/L 104 (L)   RBC Count Latest Ref Range: 4.4 - 5.9 10e12/L 4.39 (L)   MCV Latest Ref Range: 78 - 100 fl 94   MCH Latest Ref Range: 26.5 - 33.0 pg 31.2   MCHC Latest Ref Range: 31.5 - 36.5 g/dL 33.3   RDW Latest Ref Range: 10.0 - 15.0 % 12.7   Diff Method Unknown Automated Method   % Neutrophils Latest Units: % 62.7   % Lymphocytes Latest Units: % 25.2   % Monocytes Latest Units: % 10.9   % Eosinophils Latest Units: % 0.6   % Basophils Latest Units: % 0.3   % Immature Granulocytes Latest Units: % 0.3   Nucleated RBCs Latest Ref Range: 0 /100 0   Absolute Neutrophil Latest Ref Range: 1.6 - 8.3 10e9/L 2.1   Absolute Lymphocytes Latest Ref Range: 0.8 - 5.3 10e9/L 0.9   Absolute Monocytes Latest Ref Range: 0.0 - 1.3 10e9/L 0.4   Absolute Eosinophils Latest Ref Range: 0.0 - 0.7 10e9/L 0.0   Absolute Basophils Latest Ref Range: 0.0 - 0.2 10e9/L 0.0   Abs Immature Granulocytes Latest Ref Range: 0 - 0.4 10e9/L 0.0   Absolute Nucleated RBC Unknown 0.0   Albumin Fraction Latest Ref Range: 3.7 - 5.1 g/dL 4.4   Alpha 1 Fraction Latest Ref Range: 0.2 - 0.4 g/dL 0.3   Alpha 2 Fraction Latest Ref Range: 0.5 - 0.9 g/dL 0.6   Beta Fraction Latest Ref Range: 0.6 - 1.0 g/dL 0.6   ELP Interpretation: Unknown Small monoclonal ...   Gamma Fraction Latest Ref Range: 0.7 - 1.6 g/dL 0.8   IGA Latest Ref Range: 84 - 499 mg/dL 63 (L)   IGG Latest Ref Range: 610 - 1,616 mg/dL  501 (L)   IGM Latest Ref Range: 35 - 242 mg/dL 509 (H)   Immunofixation ELP Unknown (Note)   Monoclonal Peak Latest Ref Range: 0.0 g/dL 0.2 (H)           Procedures:    EKG: paced rythm      Assessment and Plan:     We discussed the results with patient.  We re-emphasized the importance of a heart healthy iet and lifesyle.  Patient will be followed in [place] and [place] for his PM.  Because of his chemotherapy in the past for lymphoma and elevated LDL-chol, we ordered an echocardiogram and CAC score to evaluate CAD to personalize his CV preventive therapy.   We explained also that he needs regular PM control follow-up twice a year either in [place] where lives or [place] where he stays during the [place] season where they place his PM      Gilbert Boyle MD, MSc  Cardiovascular Disease Fellow  [facility]      Patient was evaluated  and examined with CV fellow. The history and physical findings are accurate as recorded. My additional findings, ihave been incorporated into the body of the note. All labs, imaging studies, ECG and telemetry data from [facility]  have been reviewed personally. The assessment and recommendations outlined reflect our joint decision making.    Elroy Fox MD, PhD  Professor of Medicine  Division of Cardiology            CC  Patient Care Team:  System, Provider Not In as PCP - General (Clinic)  Elroy Holman MD as MD (Internal Medicine)  Izzy Estevez, RN as Specialty Care Coordinator (Hematology & Oncology)  Elroy Holman MD as Assigned Cancer Care Provider  ELROY FOX

## 2020-11-12 NOTE — LETTER
11/12/2020         RE: Shahid Davis   [facility] [address]        Dear Colleague,    Thank you for referring your patient, Shahid Davis, to the [facility] BLOOD AND MARROW TRANSPLANT PROGRAM [place]. Please see a copy of my visit note below.    [facility]  Outpatient Progress Note  Last clinic visit: 6/25/20    Hem/onc History:     Mr. Davis is a 71 yo M with a hx of lymphoplasmacytic lymphoma (originally benjamín, marrow and parspinal disease, with an IgMK) treated with paraspinal radiation and fludarabine and has been off therapy since ~2005. Interestingly, his interval SPEPs over the years have found small IgG lambda monoclonal proteins (2), as well as the occasional small IgM kappa. Imaging and clinical exam have been notable for mild splenomegaly.    Interval history:     Mr. Davis presented unaccompanied having completed a lengthy visit with cardiology. He reports he'll need to return for follow up for additional testing to assess his pacemaker and to determine the etiology of his heart block.    He does reports some stable bloating at night. He also reports that his PPI has helped some of his GI symptoms and plans to continue using omeprazole.    Overall he has been doing well without concerns with respect to his lymphoma.    On ROS in addition to the above  Denies  fevers, chills, night sweats, dysphagia/odynophagia, change in weight, early satiety, swelling, rashes, lymphadenopathy    Comprehensive ROS otherwise negative.    Past Medical History:   Reviewed in EPIC    Past Medical History:   Diagnosis Date     Lymphoma (H)      Squamous cell carcinoma       Past Surgical History:   Reviewed in EPIC    Past Surgical History:   Procedure Laterality Date     MOHS MICROGRAPHIC PROCEDURE       NO HISTORY OF SURGERY        Medications:   Reviewed in [facility]    Current Outpatient Medications   Medication Sig     omeprazole (PRILOSEC) 40 MG DR capsule Take 1  capsule (40 mg) by mouth daily     ascorbic acid (VITAMIN C) 500 MG tablet Take by mouth daily     ibuprofen (ADVIL/MOTRIN) 200 MG capsule Take 200 mg by mouth every 4 hours as needed for fever     methylPREDNISolone (MEDROL) 32 MG tablet take 1 tablet 12 hours and 2 hours prior to CT scan; 3rd dose 4 hours post scan.     Multiple Vitamins-Minerals (MULTIVITAL PO)      No current facility-administered medications for this visit.       Physical Exam (Resident / Clinician):   Blood pressure 116/71, pulse 72, temperature 97.9  F (36.6  C), temperature source Oral, resp. rate 16, weight 64.5 kg (142 lb 4.8 oz), SpO2 100 %.    ECOG PS: 0-1  Constitutional: WDWN male in NAD, pleasant and appropriate  HEENT:  NC/AT, no icterus,  Skin: No jaundice nor ecchymoses  Lungs: CTAB, no w/r/r, nonlabored breathing  Cardiovascular: RRR, S1, S2, no m/r/g  GI/Abdomen: soft, nontender, nondistended, no hepatomegaly; spleen palpable just above the costal margin, descends approx 2-3 cm below costal margin at mid axillary line with inspiration  LN: no cervical, supraclavicular, axillary, nor inguinal lymphadenopathy  Neurologic: alert, answering questions appropriately, moving all extremities spontaneously  Psych: appropriate affect  Data:   Reviewed in EPIC and notable for:    Results for GILES DONNELL (MRN [id number]) as of 11/13/2020 03:06   Ref. Range 11/12/2020 13:46   Sodium Latest Ref Range: 133 - 144 mmol/L 138   Potassium Latest Ref Range: 3.4 - 5.3 mmol/L 4.6   Chloride Latest Ref Range: 94 - 109 mmol/L 107   Carbon Dioxide Latest Ref Range: 20 - 32 mmol/L 27   Urea Nitrogen Latest Ref Range: 7 - 30 mg/dL 27   Creatinine Latest Ref Range: 0.66 - 1.25 mg/dL 0.88   GFR Estimate Latest Ref Range: >60 mL/min/1.73_m2 86   GFR Estimate If Black Latest Ref Range: >60 mL/min/1.73_m2 >90   Calcium Latest Ref Range: 8.5 - 10.1 mg/dL 9.2   Anion Gap Latest Ref Range: 3 - 14 mmol/L 4   Albumin Latest Ref Range: 3.4 - 5.0 g/dL 3.9    Protein Total Latest Ref Range: 6.8 - 8.8 g/dL 7.1   Bilirubin Total Latest Ref Range: 0.2 - 1.3 mg/dL 0.4   Alkaline Phosphatase Latest Ref Range: 40 - 150 U/L 102   ALT Latest Ref Range: 0 - 70 U/L 36   AST Latest Ref Range: 0 - 45 U/L 25   Lactate Dehydrogenase Latest Ref Range: 85 - 227 U/L 246 (H)   Glucose Latest Ref Range: 70 - 99 mg/dL 86   WBC Latest Ref Range: 4.0 - 11.0 10e9/L 3.4 (L)   Hemoglobin Latest Ref Range: 13.3 - 17.7 g/dL 13.7   Hematocrit Latest Ref Range: 40.0 - 53.0 % 41.2   Platelet Count Latest Ref Range: 150 - 450 10e9/L 104 (L)   RBC Count Latest Ref Range: 4.4 - 5.9 10e12/L 4.39 (L)   MCV Latest Ref Range: 78 - 100 fl 94   MCH Latest Ref Range: 26.5 - 33.0 pg 31.2   MCHC Latest Ref Range: 31.5 - 36.5 g/dL 33.3   RDW Latest Ref Range: 10.0 - 15.0 % 12.7   Diff Method Unknown Automated Method   % Neutrophils Latest Units: % 62.7   % Lymphocytes Latest Units: % 25.2   % Monocytes Latest Units: % 10.9   % Eosinophils Latest Units: % 0.6   % Basophils Latest Units: % 0.3   % Immature Granulocytes Latest Units: % 0.3   Nucleated RBCs Latest Ref Range: 0 /100 0   Absolute Neutrophil Latest Ref Range: 1.6 - 8.3 10e9/L 2.1   Absolute Lymphocytes Latest Ref Range: 0.8 - 5.3 10e9/L 0.9   Absolute Monocytes Latest Ref Range: 0.0 - 1.3 10e9/L 0.4   Absolute Eosinophils Latest Ref Range: 0.0 - 0.7 10e9/L 0.0   Absolute Basophils Latest Ref Range: 0.0 - 0.2 10e9/L 0.0   Abs Immature Granulocytes Latest Ref Range: 0 - 0.4 10e9/L 0.0   Absolute Nucleated RBC Unknown 0.0     SPEP pending    Assessment and Plan:     # Lymphoplasmacytic lymphoma    He has been doing well on observation with his disease remaining quiescent for approximately the past 15 years. Today, he is asymptomatic, and counts reveal an ongoing minimal thrombocytopenia of unknown etiology, which had slowly drifted down from normal range since 2015, and has remained stable in the low 100's to upper 90's since ~2018. He also has an  overall minimal leukopenia but with a normal differential. Hgb has remained relatively normal. All these counts have remained stable over at least the past year.    We will continue observation with q6 month clinic visits with history and exam along with CBC, CMP and SPEP. Imaging will be pursued if clinically indicated (last CT c/a/p 11/21/2019).    Labs and treatment plan reviewed with patient. All questions answered.    In this 15 minutes visit, > 50% spent in counseling and coordinating care.    Patient discussed with Dr. Holman.    Angel Cormier MD ([place]), PhD   Hem/Onc Fellow    Patient has been seen and evaluated by me. I have reviewed today's vital signs, medications, labs and imaging results. I have discussed the plan with the team, edited and agree with the findings and plan in this note.      Modest splenomegaly continues but not changed much over 1 year.   Counts mildly low, but stable; No indication for new diagnostics or new therapy as hypersplenism is a likely contributor to his low counts.  Plans as above.    PS; late results  Note slowly rising total IgM over time but minimal change over the last year;    Also,  IgMK and possible small free Lambda light chain present on immunofixation; He's had oligoclonal lambda in the past.  This is of unclear significance but we'll followup at next visit; perhaps sooner than above noted 6 months.  Will discuss with patient by phone/  Elroy Holman MD    Results for MAJOR GILES (MRN [id number]) as of 11/16/2020 14:17   Ref. Range 11/12/2020 13:46   Albumin Fraction Latest Ref Range: 3.7 - 5.1 g/dL 4.4   Alpha 1 Fraction Latest Ref Range: 0.2 - 0.4 g/dL 0.3   Alpha 2 Fraction Latest Ref Range: 0.5 - 0.9 g/dL 0.6   Beta Fraction Latest Ref Range: 0.6 - 1.0 g/dL 0.6   ELP Interpretation: Unknown Small monoclonal ...   Gamma Fraction Latest Ref Range: 0.7 - 1.6 g/dL 0.8   IGA Latest Ref Range: 84 - 499 mg/dL 63 (L)   IGG Latest Ref Range: 610 -  1,616 mg/dL 501 (L)   IGM Latest Ref Range: 35 - 242 mg/dL 509 (H)   Immunofixation ELP Unknown (Note)   Monoclonal Peak Latest Ref Range: 0.0 g/dL 0.2 (H)     11/12/20 1346    Result status: Final   Resulting lab: [facility]   Value: (Note)   Comment: Monoclonal IgM immunoglobulin of kappa light chain type.   Possible very small monoclonal free immunoglobulin light chain of   lambda chain type.   Pathological significance requires clinical correlation.   BETH Wyatt M.D., Ph.D.([phone])        Results for MAJOR GILES (MRN [id number]) as of 11/16/2020 14:17   Ref. Range 3/10/2005 12:25 9/8/2005 14:30 9/29/2005 13:49 4/6/2006 13:03 12/21/2006 13:51 1/5/2007 11:44 1/5/2007 13:00 2/22/2007 13:08 11/15/2007 12:47 6/12/2008 14:37 1/22/2009 13:17 1/28/2010 13:33 7/22/2010 13:23 1/20/2011 12:48 8/11/2011 13:36 2/16/2012 13:05 8/16/2012 12:55 5/23/2013 13:57 5/22/2014 13:15 6/4/2015 12:29 6/2/2016 14:24 6/22/2017 14:51 7/26/2018 13:51 9/5/2019 13:10 9/12/2019 13:00 9/12/2019 13:53 11/21/2019 11:26 6/25/2020 13:28 11/12/2020 13:46   IGA Latest Ref Range: 84 - 499 mg/dL     104                   54 (L)     63 (L)   IGG Latest Ref Range: 610 - 1,616 mg/dL     985                   486 (L)     501 (L)   IGM Latest Ref Range: 35 - 242 mg/dL     395 (H)                   483 (H)     509 (H)       Again, thank you for allowing me to participate in the care of your patient.        Sincerely,        Elroy Holman MD

## 2020-11-12 NOTE — PATIENT INSTRUCTIONS
Patient Instructions:  It was a pleasure to see you in the cardiology clinic today.      If you have any questions, you can reach my nurse, Nadia PINEDA LPN, at [phone].  Press Option #1 for the [facility], and then press Option #4 for nursing.    We are encouraging the use of [employer] to communicate with your HealthCare Provider    Medication Changes: None.    Recommendations: A device check.    Studies Ordered:  - CT Coronary Calcium Scan  - Echocardiogram    The results from today include: None.    Please follow up: With Dr. Fox based on results of testing.    Sincerely,    Elroy Fox MD     If you have an urgent need after hours (8:00 am to 4:30 pm) please call [phone] and ask for the cardiology fellow on call.

## 2020-11-12 NOTE — LETTER
2020      RE: Shahid Davis   [facility] [address]       Dear Colleague,    Thank you for the opportunity to participate in the care of your patient, Shahid Davis, at the [facility] HEART [facility] at [facility]. Please see a copy of my visit note below.    HPI:     I ha d the privilege to evaluate and examine Mr Shahid Davis, who is a 70 yr male patient, with a Hx  for card  70 year old man with history of Lymphoplasmacytic lymphoma.   He has been doing well on observation with his disease remaining quiescent for approximately the past 15 year.  In 2020, he developed fainting spells in February this year.   He was evaluated in [facility] and they found to have complete heart block while in [place] ([facility]). [[employer] 2272 dual chamber permanent pacemaker Device type].  He developed a small L pneumothorax due to procedure, resolved after 3 days in hospital  Since his PPM was placed,  lightheadedness with exertion. Reports that he needs to stop and catch his breath, feels these episodes before they come. No nausea or vomiting. No associated chest pain. No blurry vision or other visual changes.      Lipids in 2020 ([facility]  Total Cholesterol: 165 mg/dL  T mg/dL  HDL-C: 53 mg/dL  LDL-C: 101 mg/dL  TC:HDL-C Ratio: 3.1    PAST MEDICAL HISTORY:  Past Medical History:   Diagnosis Date     Lymphoma (H)      Squamous cell carcinoma        CURRENT MEDICATIONS:  Current Outpatient Medications   Medication Sig Dispense Refill     ascorbic acid (VITAMIN C) 500 MG tablet Take by mouth daily       ibuprofen (ADVIL/MOTRIN) 200 MG capsule Take 200 mg by mouth every 4 hours as needed for fever       methylPREDNISolone (MEDROL) 32 MG tablet take 1 tablet 12 hours and 2 hours prior to CT scan; 3rd dose 4 hours post scan. 12 tablet 1     Multiple  Kami Tim presents today for   Chief Complaint   Patient presents with    Hypertension     3 month follow up              Depression Screening:  3 most recent PHQ Screens 5/13/2019   Little interest or pleasure in doing things Not at all   Feeling down, depressed, irritable, or hopeless Not at all   Total Score PHQ 2 0       Learning Assessment:  Learning Assessment 6/11/2014   PRIMARY LEARNER Patient   HIGHEST LEVEL OF EDUCATION - PRIMARY LEARNER  SOME COLLEGE   BARRIERS PRIMARY LEARNER Siobhan Smith 34 CAREGIVER No   PRIMARY LANGUAGE ENGLISH   LEARNER PREFERENCE PRIMARY DEMONSTRATION   ANSWERED BY patient   RELATIONSHIP SELF       Abuse Screening:  Abuse Screening Questionnaire 4/9/2019   Do you ever feel afraid of your partner? N   Are you in a relationship with someone who physically or mentally threatens you? N   Is it safe for you to go home? Y       Fall Risk  Fall Risk Assessment, last 12 mths 5/13/2019   Able to walk? Yes   Fall in past 12 months? No           Coordination of Care:  1. Have you been to the ER, urgent care clinic since your last visit? Hospitalized since your last visit? no    2. Have you seen or consulted any other health care providers outside of the [address] [place] [address] since your last visit? Include any pap smears or colon screening.  no Vitamins-Minerals (MULTIVITAL PO)        omeprazole (PRILOSEC) 40 MG DR capsule Take 1 capsule (40 mg) by mouth daily 90 capsule 3       PAST SURGICAL HISTORY:  Past Surgical History:   Procedure Laterality Date     MOHS MICROGRAPHIC PROCEDURE       NO HISTORY OF SURGERY         ALLERGIES     Allergies   Allergen Reactions     Ampicillin [Ampicillin Sodium]      Bactrim [Sulfamethoxazole W/Trimethoprim]      Contrast Dye      CT contrast (IV) allergy - need oral Methylpred as premed for scans     Ampicillin Rash       FAMILY HISTORY:  Family History   Problem Relation Age of Onset     Cancer Mother         Blood     Cancer Father         Lung     Cancer Maternal Grandmother         Breast     Cancer Paternal Grandfather         Hodgkins       SOCIAL HISTORY:  Social History     Socioeconomic History     Marital status: Single     Spouse name: None     Number of children: None     Years of education: None     Highest education level: None   Occupational History     None   Social Needs     Financial resource strain: None     Food insecurity     Worry: None     Inability: None     Transportation needs     Medical: None     Non-medical: None   Tobacco Use     Smoking status: Former Smoker     Smokeless tobacco: Never Used     Tobacco comment: Quit at age 19   Substance and Sexual Activity     Alcohol use: Yes     Alcohol/week: 11.7 standard drinks     Types: 14 drink(s) per week     Drug use: None     Sexual activity: None   Lifestyle     Physical activity     Days per week: None     Minutes per session: None     Stress: None   Relationships     Social connections     Talks on phone: None     Gets together: None     Attends [employer] service: None     Active member of club or organization: None     Attends meetings of clubs or organizations: None     Relationship status: None     Intimate partner violence     Fear of current or ex partner: None     Emotionally abused: None     Physically abused: None     Forced sexual  "activity: None   Other Topics Concern     Parent/sibling w/ CABG, MI or angioplasty before 65F 55M? Not Asked   Social History Narrative     None       ROS:   Constitutional: No fever, chills, or sweats. No weight gain/loss   ENT: No visual disturbance, ear ache, epistaxis, sore throat  Allergies/Immunologic: Negative.   Respiratory: No cough, hemoptysia  Cardiovascular: As per HPI  GI: No nausea, vomiting, hematemesis, melena, or hematochezia  : No urinary frequency, dysuria, or hematuria  Integument: Negative  Psychiatric: Negative  Neuro: Negative  Endocrinology: Negative   Musculoskeletal: Negative    EXAM:  /75 (BP Location: Right arm, Patient Position: Sitting, Cuff Size: Adult Regular)   Pulse 87   Ht 1.702 m (5' 7\")   Wt 64.5 kg (142 lb 4.8 oz)   SpO2 99%   BMI 22.29 kg/m    In general, the patient is a pleasant male in no apparent distress.    HEENT: NC/AT.  PERRLA.  EOMI.  Sclerae white, not injected.  Nares clear.  Pharynx without erythema or exudate.  Dentition intact.    Neck: No adenopathy.  No thyromegaly. Carotids +4/4 bilaterally without bruits.  No jugular venous distension.   Heart: RRR. Normal S1, S2 splits physiologically. No murmur, rub, click, or gallop. The PMI is in the 5th ICS in the midclavicular line. There is no heave.    Lungs: CTA.  No ronchi, wheezes, rales.  No dullness to percussion.   Abdomen: Soft, nontender, nondistended. No organomegaly.  No bruits.   Extremities: No clubbing, cyanosis, or edema.  The pulses are +4/4 at the radial, brachial, femoral, popliteal, DP, and PT sites bilaterally.  No bruits are noted.  Neurologic: Alert and oriented to person/place/time, normal speech, gait and affect  Skin: No petechiae, purpura or rash.    Labs:       Ref. Range 11/12/2020 13:46   Sodium Latest Ref Range: 133 - 144 mmol/L 138   Potassium Latest Ref Range: 3.4 - 5.3 mmol/L 4.6   Chloride Latest Ref Range: 94 - 109 mmol/L 107   Carbon Dioxide Latest Ref Range: 20 - 32 " mmol/L 27   Urea Nitrogen Latest Ref Range: 7 - 30 mg/dL 27   Creatinine Latest Ref Range: 0.66 - 1.25 mg/dL 0.88   GFR Estimate Latest Ref Range: >60 mL/min/1.73_m2 86   GFR Estimate If Black Latest Ref Range: >60 mL/min/1.73_m2 >90   Calcium Latest Ref Range: 8.5 - 10.1 mg/dL 9.2   Anion Gap Latest Ref Range: 3 - 14 mmol/L 4   Albumin Latest Ref Range: 3.4 - 5.0 g/dL 3.9   Protein Total Latest Ref Range: 6.8 - 8.8 g/dL 7.1   Bilirubin Total Latest Ref Range: 0.2 - 1.3 mg/dL 0.4   Alkaline Phosphatase Latest Ref Range: 40 - 150 U/L 102   ALT Latest Ref Range: 0 - 70 U/L 36   AST Latest Ref Range: 0 - 45 U/L 25   Lactate Dehydrogenase Latest Ref Range: 85 - 227 U/L 246 (H)   Glucose Latest Ref Range: 70 - 99 mg/dL 86   WBC Latest Ref Range: 4.0 - 11.0 10e9/L 3.4 (L)   Hemoglobin Latest Ref Range: 13.3 - 17.7 g/dL 13.7   Hematocrit Latest Ref Range: 40.0 - 53.0 % 41.2   Platelet Count Latest Ref Range: 150 - 450 10e9/L 104 (L)   RBC Count Latest Ref Range: 4.4 - 5.9 10e12/L 4.39 (L)   MCV Latest Ref Range: 78 - 100 fl 94   MCH Latest Ref Range: 26.5 - 33.0 pg 31.2   MCHC Latest Ref Range: 31.5 - 36.5 g/dL 33.3   RDW Latest Ref Range: 10.0 - 15.0 % 12.7   Diff Method Unknown Automated Method   % Neutrophils Latest Units: % 62.7   % Lymphocytes Latest Units: % 25.2   % Monocytes Latest Units: % 10.9   % Eosinophils Latest Units: % 0.6   % Basophils Latest Units: % 0.3   % Immature Granulocytes Latest Units: % 0.3   Nucleated RBCs Latest Ref Range: 0 /100 0   Absolute Neutrophil Latest Ref Range: 1.6 - 8.3 10e9/L 2.1   Absolute Lymphocytes Latest Ref Range: 0.8 - 5.3 10e9/L 0.9   Absolute Monocytes Latest Ref Range: 0.0 - 1.3 10e9/L 0.4   Absolute Eosinophils Latest Ref Range: 0.0 - 0.7 10e9/L 0.0   Absolute Basophils Latest Ref Range: 0.0 - 0.2 10e9/L 0.0   Abs Immature Granulocytes Latest Ref Range: 0 - 0.4 10e9/L 0.0   Absolute Nucleated RBC Unknown 0.0   Albumin Fraction Latest Ref Range: 3.7 - 5.1 g/dL 4.4   Alpha  1 Fraction Latest Ref Range: 0.2 - 0.4 g/dL 0.3   Alpha 2 Fraction Latest Ref Range: 0.5 - 0.9 g/dL 0.6   Beta Fraction Latest Ref Range: 0.6 - 1.0 g/dL 0.6   ELP Interpretation: Unknown Small monoclonal ...   Gamma Fraction Latest Ref Range: 0.7 - 1.6 g/dL 0.8   IGA Latest Ref Range: 84 - 499 mg/dL 63 (L)   IGG Latest Ref Range: 610 - 1,616 mg/dL 501 (L)   IGM Latest Ref Range: 35 - 242 mg/dL 509 (H)   Immunofixation ELP Unknown (Note)   Monoclonal Peak Latest Ref Range: 0.0 g/dL 0.2 (H)           Procedures:    EKG: paced rythm      Assessment and Plan:     We discussed the results with patient.  We re-emphasized the importance of a heart healthy iet and lifesyle.  Patient will be followed in [place] and [place] for his PM.  Because of his chemotherapy in the past for lymphoma and elevated LDL-chol, we ordered an echocardiogram and CAC score to evaluate CAD to personalize his CV preventive therapy.   We explained also that he needs regular PM control follow-up twice a year either in [place] where lives or [place] where he stays during the winter season where they place his PM      Gilbert Boyle MD, MSc  Cardiovascular Disease Fellow  [facility]      Patient was evaluated  and examined with CV fellow. The history and physical findings are accurate as recorded. My additional findings, ihave been incorporated into the body of the note. All labs, imaging studies, ECG and telemetry data from [facility]  have been reviewed personally. The assessment and recommendations outlined reflect our joint decision making.    Elroy Fox MD, PhD  Professor of Medicine  Division of Cardiology            CC  Patient Care Team:  System, Provider Not In as PCP - General (Clinic)  Elroy Holman MD as MD (Internal Medicine)  Izzy Estevez, RN as Specialty Care Coordinator (Hematology & Oncology)  Elroy Holman MD as Assigned Cancer Care Provider  ELROY FOX      Please do not hesitate to  contact me if you have any questions/concerns.     Sincerely,     Elroy Fox MD

## 2020-11-12 NOTE — PROGRESS NOTES
[facility]  Outpatient Progress Note  Last clinic visit: 6/25/20    Hem/onc History:     Mr. Davis is a 71 yo M with a hx of lymphoplasmacytic lymphoma (originally benjamín, marrow and parspinal disease, with an IgMK) treated with paraspinal radiation and fludarabine and has been off therapy since ~2005. Interestingly, his interval SPEPs over the years have found small IgG lambda monoclonal proteins (2), as well as the occasional small IgM kappa. Imaging and clinical exam have been notable for mild splenomegaly.    Interval history:     Mr. Davis presented unaccompanied having completed a lengthy visit with cardiology. He reports he'll need to return for follow up for additional testing to assess his pacemaker and to determine the etiology of his heart block.    He does reports some stable bloating at night. He also reports that his PPI has helped some of his GI symptoms and plans to continue using omeprazole.    Overall he has been doing well without concerns with respect to his lymphoma.    On ROS in addition to the above  Denies  fevers, chills, night sweats, dysphagia/odynophagia, change in weight, early satiety, swelling, rashes, lymphadenopathy    Comprehensive ROS otherwise negative.    Past Medical History:   Reviewed in EPIC    Past Medical History:   Diagnosis Date     Lymphoma (H)      Squamous cell carcinoma       Past Surgical History:   Reviewed in EPIC    Past Surgical History:   Procedure Laterality Date     MOHS MICROGRAPHIC PROCEDURE       NO HISTORY OF SURGERY        Medications:   Reviewed in [facility]    Current Outpatient Medications   Medication Sig     omeprazole (PRILOSEC) 40 MG DR capsule Take 1 capsule (40 mg) by mouth daily     ascorbic acid (VITAMIN C) 500 MG tablet Take by mouth daily     ibuprofen (ADVIL/MOTRIN) 200 MG capsule Take 200 mg by mouth every 4 hours as needed for fever     methylPREDNISolone (MEDROL) 32 MG tablet take 1 tablet 12 hours and 2 hours prior to CT scan;  3rd dose 4 hours post scan.     Multiple Vitamins-Minerals (MULTIVITAL PO)      No current facility-administered medications for this visit.       Physical Exam (Resident / Clinician):   Blood pressure 116/71, pulse 72, temperature 97.9  F (36.6  C), temperature source Oral, resp. rate 16, weight 64.5 kg (142 lb 4.8 oz), SpO2 100 %.    ECOG PS: 0-1  Constitutional: WDWN male in NAD, pleasant and appropriate  HEENT:  NC/AT, no icterus,  Skin: No jaundice nor ecchymoses  Lungs: CTAB, no w/r/r, nonlabored breathing  Cardiovascular: RRR, S1, S2, no m/r/g  GI/Abdomen: soft, nontender, nondistended, no hepatomegaly; spleen palpable just above the costal margin, descends approx 2-3 cm below costal margin at mid axillary line with inspiration  LN: no cervical, supraclavicular, axillary, nor inguinal lymphadenopathy  Neurologic: alert, answering questions appropriately, moving all extremities spontaneously  Psych: appropriate affect  Data:   Reviewed in EPIC and notable for:    Results for MAJOR GILES (MRN [id number]) as of 11/13/2020 03:06   Ref. Range 11/12/2020 13:46   Sodium Latest Ref Range: 133 - 144 mmol/L 138   Potassium Latest Ref Range: 3.4 - 5.3 mmol/L 4.6   Chloride Latest Ref Range: 94 - 109 mmol/L 107   Carbon Dioxide Latest Ref Range: 20 - 32 mmol/L 27   Urea Nitrogen Latest Ref Range: 7 - 30 mg/dL 27   Creatinine Latest Ref Range: 0.66 - 1.25 mg/dL 0.88   GFR Estimate Latest Ref Range: >60 mL/min/1.73_m2 86   GFR Estimate If Black Latest Ref Range: >60 mL/min/1.73_m2 >90   Calcium Latest Ref Range: 8.5 - 10.1 mg/dL 9.2   Anion Gap Latest Ref Range: 3 - 14 mmol/L 4   Albumin Latest Ref Range: 3.4 - 5.0 g/dL 3.9   Protein Total Latest Ref Range: 6.8 - 8.8 g/dL 7.1   Bilirubin Total Latest Ref Range: 0.2 - 1.3 mg/dL 0.4   Alkaline Phosphatase Latest Ref Range: 40 - 150 U/L 102   ALT Latest Ref Range: 0 - 70 U/L 36   AST Latest Ref Range: 0 - 45 U/L 25   Lactate Dehydrogenase Latest Ref Range: 85 - 227 U/L  246 (H)   Glucose Latest Ref Range: 70 - 99 mg/dL 86   WBC Latest Ref Range: 4.0 - 11.0 10e9/L 3.4 (L)   Hemoglobin Latest Ref Range: 13.3 - 17.7 g/dL 13.7   Hematocrit Latest Ref Range: 40.0 - 53.0 % 41.2   Platelet Count Latest Ref Range: 150 - 450 10e9/L 104 (L)   RBC Count Latest Ref Range: 4.4 - 5.9 10e12/L 4.39 (L)   MCV Latest Ref Range: 78 - 100 fl 94   MCH Latest Ref Range: 26.5 - 33.0 pg 31.2   MCHC Latest Ref Range: 31.5 - 36.5 g/dL 33.3   RDW Latest Ref Range: 10.0 - 15.0 % 12.7   Diff Method Unknown Automated Method   % Neutrophils Latest Units: % 62.7   % Lymphocytes Latest Units: % 25.2   % Monocytes Latest Units: % 10.9   % Eosinophils Latest Units: % 0.6   % Basophils Latest Units: % 0.3   % Immature Granulocytes Latest Units: % 0.3   Nucleated RBCs Latest Ref Range: 0 /100 0   Absolute Neutrophil Latest Ref Range: 1.6 - 8.3 10e9/L 2.1   Absolute Lymphocytes Latest Ref Range: 0.8 - 5.3 10e9/L 0.9   Absolute Monocytes Latest Ref Range: 0.0 - 1.3 10e9/L 0.4   Absolute Eosinophils Latest Ref Range: 0.0 - 0.7 10e9/L 0.0   Absolute Basophils Latest Ref Range: 0.0 - 0.2 10e9/L 0.0   Abs Immature Granulocytes Latest Ref Range: 0 - 0.4 10e9/L 0.0   Absolute Nucleated RBC Unknown 0.0     SPEP pending    Assessment and Plan:     # Lymphoplasmacytic lymphoma    He has been doing well on observation with his disease remaining quiescent for approximately the past 15 years. Today, he is asymptomatic, and counts reveal an ongoing minimal thrombocytopenia of unknown etiology, which had slowly drifted down from normal range since 2015, and has remained stable in the low 100's to upper 90's since ~2018. He also has an overall minimal leukopenia but with a normal differential. Hgb has remained relatively normal. All these counts have remained stable over at least the past year.    We will continue observation with q6 month clinic visits with history and exam along with CBC, CMP and SPEP. Imaging will be pursued if  clinically indicated (last CT c/a/p 11/21/2019).    Labs and treatment plan reviewed with patient. All questions answered.    In this 15 minutes visit, > 50% spent in counseling and coordinating care.    Patient discussed with Dr. Holman.    Angel HwangScottyeulogio Cormier MD, PhD   Hem/Onc Fellow    Patient has been seen and evaluated by me. I have reviewed today's vital signs, medications, labs and imaging results. I have discussed the plan with the team, edited and agree with the findings and plan in this note.      Modest splenomegaly continues but not changed much over 1 year.   Counts mildly low, but stable; No indication for new diagnostics or new therapy as hypersplenism is a likely contributor to his low counts.  Plans as above.    PS; late results  Note slowly rising total IgM over time but minimal change over the last year;    Also,  IgMK and possible small free Lambda light chain present on immunofixation; He's had oligoclonal lambda in the past.  This is of unclear significance but we'll followup at next visit; perhaps sooner than above noted 6 months.  Will discuss with patient by phone/  Elroy Holman MD    Results for MAJOR GILES (MRN [id number]) as of 11/16/2020 14:17   Ref. Range 11/12/2020 13:46   Albumin Fraction Latest Ref Range: 3.7 - 5.1 g/dL 4.4   Alpha 1 Fraction Latest Ref Range: 0.2 - 0.4 g/dL 0.3   Alpha 2 Fraction Latest Ref Range: 0.5 - 0.9 g/dL 0.6   Beta Fraction Latest Ref Range: 0.6 - 1.0 g/dL 0.6   ELP Interpretation: Unknown Small monoclonal ...   Gamma Fraction Latest Ref Range: 0.7 - 1.6 g/dL 0.8   IGA Latest Ref Range: 84 - 499 mg/dL 63 (L)   IGG Latest Ref Range: 610 - 1,616 mg/dL 501 (L)   IGM Latest Ref Range: 35 - 242 mg/dL 509 (H)   Immunofixation ELP Unknown (Note)   Monoclonal Peak Latest Ref Range: 0.0 g/dL 0.2 (H)     11/12/20 1346    Result status: Final   Resulting lab: [facility]   Value: (Note)   Comment: Monoclonal IgM  immunoglobulin of kappa light chain type.   Possible very small monoclonal free immunoglobulin light chain of   lambda chain type.   Pathological significance requires clinical correlation.   BETH Wyatt M.D., Ph.D.([phone])        Results for MAJOR GILES (MRN [id number]) as of 11/16/2020 14:17   Ref. Range 3/10/2005 12:25 9/8/2005 14:30 9/29/2005 13:49 4/6/2006 13:03 12/21/2006 13:51 1/5/2007 11:44 1/5/2007 13:00 2/22/2007 13:08 11/15/2007 12:47 6/12/2008 14:37 1/22/2009 13:17 1/28/2010 13:33 7/22/2010 13:23 1/20/2011 12:48 8/11/2011 13:36 2/16/2012 13:05 8/16/2012 12:55 5/23/2013 13:57 5/22/2014 13:15 6/4/2015 12:29 6/2/2016 14:24 6/22/2017 14:51 7/26/2018 13:51 9/5/2019 13:10 9/12/2019 13:00 9/12/2019 13:53 11/21/2019 11:26 6/25/2020 13:28 11/12/2020 13:46   IGA Latest Ref Range: 84 - 499 mg/dL     104                   54 (L)     63 (L)   IGG Latest Ref Range: 610 - 1,616 mg/dL     985                   486 (L)     501 (L)   IGM Latest Ref Range: 35 - 242 mg/dL     395 (H)                   483 (H)     509 (H)

## 2020-11-12 NOTE — LETTER
11/12/2020         RE: Shahid Davis   [facility] [id number]      [facility]  Outpatient Progress Note  Last clinic visit: 6/25/20    Hem/onc History:     Mr. Davis is a 69 yo M with a hx of lymphoplasmacytic lymphoma (originally benjamín, marrow and parspinal disease, with an IgMK) treated with paraspinal radiation and fludarabine and has been off therapy since ~2005. Interestingly, his interval SPEPs over the years have found small IgG lambda monoclonal proteins (2), as well as the occasional small IgM kappa. Imaging and clinical exam have been notable for mild splenomegaly.    Interval history:     Mr. Davis presented unaccompanied having completed a lengthy visit with cardiology. He reports he'll need to return for follow up for additional testing to assess his pacemaker and to determine the etiology of his heart block.    He does reports some stable bloating at night. He also reports that his PPI has helped some of his GI symptoms and plans to continue using omeprazole.    Overall he has been doing well without concerns with respect to his lymphoma.    On ROS in addition to the above  Denies  fevers, chills, night sweats, dysphagia/odynophagia, change in weight, early satiety, swelling, rashes, lymphadenopathy    Comprehensive ROS otherwise negative.    Past Medical History:   Reviewed in EPIC    Past Medical History:   Diagnosis Date     Lymphoma (H)      Squamous cell carcinoma       Past Surgical History:   Reviewed in EPIC    Past Surgical History:   Procedure Laterality Date     MOHS MICROGRAPHIC PROCEDURE       NO HISTORY OF SURGERY        Medications:   Reviewed in [facility]    Current Outpatient Medications   Medication Sig     omeprazole (PRILOSEC) 40 MG DR capsule Take 1 capsule (40 mg) by mouth daily     ascorbic acid (VITAMIN C) 500 MG tablet Take by mouth daily     ibuprofen (ADVIL/MOTRIN) 200 MG capsule Take 200 mg by mouth every 4 hours as needed for fever      methylPREDNISolone (MEDROL) 32 MG tablet take 1 tablet 12 hours and 2 hours prior to CT scan; 3rd dose 4 hours post scan.     Multiple Vitamins-Minerals (MULTIVITAL PO)      No current facility-administered medications for this visit.       Physical Exam (Resident / Clinician):   Blood pressure 116/71, pulse 72, temperature 97.9  F (36.6  C), temperature source Oral, resp. rate 16, weight 64.5 kg (142 lb 4.8 oz), SpO2 100 %.    ECOG PS: 0-1  Constitutional: WDWN male in NAD, pleasant and appropriate  HEENT:  NC/AT, no icterus,  Skin: No jaundice nor ecchymoses  Lungs: CTAB, no w/r/r, nonlabored breathing  Cardiovascular: RRR, S1, S2, no m/r/g  GI/Abdomen: soft, nontender, nondistended, no hepatomegaly; spleen palpable just above the costal margin, descends approx 2-3 cm below costal margin at mid axillary line with inspiration  LN: no cervical, supraclavicular, axillary, nor inguinal lymphadenopathy  Neurologic: alert, answering questions appropriately, moving all extremities spontaneously  Psych: appropriate affect  Data:   Reviewed in EPIC and notable for:    Results for MAJOR GILES (MRN [id number]) as of 11/13/2020 03:06   Ref. Range 11/12/2020 13:46   Sodium Latest Ref Range: 133 - 144 mmol/L 138   Potassium Latest Ref Range: 3.4 - 5.3 mmol/L 4.6   Chloride Latest Ref Range: 94 - 109 mmol/L 107   Carbon Dioxide Latest Ref Range: 20 - 32 mmol/L 27   Urea Nitrogen Latest Ref Range: 7 - 30 mg/dL 27   Creatinine Latest Ref Range: 0.66 - 1.25 mg/dL 0.88   GFR Estimate Latest Ref Range: >60 mL/min/1.73_m2 86   GFR Estimate If Black Latest Ref Range: >60 mL/min/1.73_m2 >90   Calcium Latest Ref Range: 8.5 - 10.1 mg/dL 9.2   Anion Gap Latest Ref Range: 3 - 14 mmol/L 4   Albumin Latest Ref Range: 3.4 - 5.0 g/dL 3.9   Protein Total Latest Ref Range: 6.8 - 8.8 g/dL 7.1   Bilirubin Total Latest Ref Range: 0.2 - 1.3 mg/dL 0.4   Alkaline Phosphatase Latest Ref Range: 40 - 150 U/L 102   ALT Latest Ref Range: 0 - 70 U/L  36   AST Latest Ref Range: 0 - 45 U/L 25   Lactate Dehydrogenase Latest Ref Range: 85 - 227 U/L 246 (H)   Glucose Latest Ref Range: 70 - 99 mg/dL 86   WBC Latest Ref Range: 4.0 - 11.0 10e9/L 3.4 (L)   Hemoglobin Latest Ref Range: 13.3 - 17.7 g/dL 13.7   Hematocrit Latest Ref Range: 40.0 - 53.0 % 41.2   Platelet Count Latest Ref Range: 150 - 450 10e9/L 104 (L)   RBC Count Latest Ref Range: 4.4 - 5.9 10e12/L 4.39 (L)   MCV Latest Ref Range: 78 - 100 fl 94   MCH Latest Ref Range: 26.5 - 33.0 pg 31.2   MCHC Latest Ref Range: 31.5 - 36.5 g/dL 33.3   RDW Latest Ref Range: 10.0 - 15.0 % 12.7   Diff Method Unknown Automated Method   % Neutrophils Latest Units: % 62.7   % Lymphocytes Latest Units: % 25.2   % Monocytes Latest Units: % 10.9   % Eosinophils Latest Units: % 0.6   % Basophils Latest Units: % 0.3   % Immature Granulocytes Latest Units: % 0.3   Nucleated RBCs Latest Ref Range: 0 /100 0   Absolute Neutrophil Latest Ref Range: 1.6 - 8.3 10e9/L 2.1   Absolute Lymphocytes Latest Ref Range: 0.8 - 5.3 10e9/L 0.9   Absolute Monocytes Latest Ref Range: 0.0 - 1.3 10e9/L 0.4   Absolute Eosinophils Latest Ref Range: 0.0 - 0.7 10e9/L 0.0   Absolute Basophils Latest Ref Range: 0.0 - 0.2 10e9/L 0.0   Abs Immature Granulocytes Latest Ref Range: 0 - 0.4 10e9/L 0.0   Absolute Nucleated RBC Unknown 0.0     SPEP pending    Assessment and Plan:     # Lymphoplasmacytic lymphoma    He has been doing well on observation with his disease remaining quiescent for approximately the past 15 years. Today, he is asymptomatic, and counts reveal an ongoing minimal thrombocytopenia of unknown etiology, which had slowly drifted down from normal range since 2015, and has remained stable in the low 100's to upper 90's since ~2018. He also has an overall minimal leukopenia but with a normal differential. Hgb has remained relatively normal. All these counts have remained stable over at least the past year.    We will continue observation with q6 month  clinic visits with history and exam along with CBC, CMP and SPEP. Imaging will be pursued if clinically indicated (last CT c/a/p 11/21/2019).    Labs and treatment plan reviewed with patient. All questions answered.    In this 15 minutes visit, > 50% spent in counseling and coordinating care.    Patient discussed with Dr. Holman.    Angel Cormier MD ([place]), PhD   Hem/Onc Fellow    Patient has been seen and evaluated by me. I have reviewed today's vital signs, medications, labs and imaging results. I have discussed the plan with the team, edited and agree with the findings and plan in this note.      Modest splenomegaly continues but not changed much over 1 year.   Counts mildly low, but stable; No indication for new diagnostics or new therapy as hypersplenism is a likely contributor to his low counts.  Plans as above.    PS; late results  Note slowly rising total IgM over time but minimal change over the last year;    Also,  IgMK and possible small free Lambda light chain present on immunofixation; He's had oligoclonal lambda in the past.  This is of unclear significance but we'll followup at next visit; perhaps sooner than above noted 6 months.  Will discuss with patient by phone/  Elroy Holman MD    Results for MAJOR GILES (MRN [id number]) as of 11/16/2020 14:17   Ref. Range 11/12/2020 13:46   Albumin Fraction Latest Ref Range: 3.7 - 5.1 g/dL 4.4   Alpha 1 Fraction Latest Ref Range: 0.2 - 0.4 g/dL 0.3   Alpha 2 Fraction Latest Ref Range: 0.5 - 0.9 g/dL 0.6   Beta Fraction Latest Ref Range: 0.6 - 1.0 g/dL 0.6   ELP Interpretation: Unknown Small monoclonal ...   Gamma Fraction Latest Ref Range: 0.7 - 1.6 g/dL 0.8   IGA Latest Ref Range: 84 - 499 mg/dL 63 (L)   IGG Latest Ref Range: 610 - 1,616 mg/dL 501 (L)   IGM Latest Ref Range: 35 - 242 mg/dL 509 (H)   Immunofixation ELP Unknown (Note)   Monoclonal Peak Latest Ref Range: 0.0 g/dL 0.2 (H)     11/12/20 1346    Result status: Final   Resulting  lab: [facility]   Value: (Note)   Comment: Monoclonal IgM immunoglobulin of kappa light chain type.   Possible very small monoclonal free immunoglobulin light chain of   lambda chain type.   Pathological significance requires clinical correlation.   BETH Wyatt M.D., Ph.D.([phone])        Results for MAJOR GILES (MRN [id number]) as of 11/16/2020 14:17   Ref. Range 3/10/2005 12:25 9/8/2005 14:30 9/29/2005 13:49 4/6/2006 13:03 12/21/2006 13:51 1/5/2007 11:44 1/5/2007 13:00 2/22/2007 13:08 11/15/2007 12:47 6/12/2008 14:37 1/22/2009 13:17 1/28/2010 13:33 7/22/2010 13:23 1/20/2011 12:48 8/11/2011 13:36 2/16/2012 13:05 8/16/2012 12:55 5/23/2013 13:57 5/22/2014 13:15 6/4/2015 12:29 6/2/2016 14:24 6/22/2017 14:51 7/26/2018 13:51 9/5/2019 13:10 9/12/2019 13:00 9/12/2019 13:53 11/21/2019 11:26 6/25/2020 13:28 11/12/2020 13:46   IGA Latest Ref Range: 84 - 499 mg/dL     104                   54 (L)     63 (L)   IGG Latest Ref Range: 610 - 1,616 mg/dL     985                   486 (L)     501 (L)   IGM Latest Ref Range: 35 - 242 mg/dL     395 (H)                   483 (H)     509 (H)         Elroy Holman MD

## 2020-11-13 LAB
ALBUMIN SERPL ELPH-MCNC: 4.4 G/DL (ref 3.7–5.1)
ALPHA1 GLOB SERPL ELPH-MCNC: 0.3 G/DL (ref 0.2–0.4)
ALPHA2 GLOB SERPL ELPH-MCNC: 0.6 G/DL (ref 0.5–0.9)
B-GLOBULIN SERPL ELPH-MCNC: 0.6 G/DL (ref 0.6–1)
GAMMA GLOB SERPL ELPH-MCNC: 0.8 G/DL (ref 0.7–1.6)
INTERPRETATION ECG - MUSE: NORMAL
M PROTEIN SERPL ELPH-MCNC: 0.2 G/DL
PROT PATTERN SERPL ELPH-IMP: ABNORMAL

## 2020-11-16 LAB
IGA SERPL-MCNC: 63 MG/DL (ref 84–499)
IGG SERPL-MCNC: 501 MG/DL (ref 610–1616)
IGM SERPL-MCNC: 509 MG/DL (ref 35–242)
PROT PATTERN SERPL IFE-IMP: ABNORMAL

## 2020-11-26 DIAGNOSIS — C82.99 NODULAR LYMPHOMA OF EXTRANODAL AND/OR SOLID ORGAN SITE (H): ICD-10-CM

## 2020-11-27 RX ORDER — OMEPRAZOLE 40 MG/1
CAPSULE, DELAYED RELEASE ORAL
Qty: 90 CAPSULE | Refills: 3 | OUTPATIENT
Start: 2020-11-27

## 2021-05-13 ENCOUNTER — OFFICE VISIT (OUTPATIENT)
Dept: TRANSPLANT | Facility: CLINIC | Age: 71
End: 2021-05-13
Attending: INTERNAL MEDICINE
Payer: MEDICARE

## 2021-05-13 ENCOUNTER — APPOINTMENT (OUTPATIENT)
Dept: LAB | Facility: CLINIC | Age: 71
End: 2021-05-13
Attending: INTERNAL MEDICINE
Payer: MEDICARE

## 2021-05-13 VITALS
WEIGHT: 143.9 LBS | OXYGEN SATURATION: 99 % | SYSTOLIC BLOOD PRESSURE: 121 MMHG | DIASTOLIC BLOOD PRESSURE: 73 MMHG | RESPIRATION RATE: 16 BRPM | TEMPERATURE: 97.9 F | BODY MASS INDEX: 22.54 KG/M2 | HEART RATE: 80 BPM

## 2021-05-13 DIAGNOSIS — C82.99 NODULAR LYMPHOMA OF EXTRANODAL AND/OR SOLID ORGAN SITE (H): ICD-10-CM

## 2021-05-13 DIAGNOSIS — D69.6 THROMBOCYTOPENIA (H): ICD-10-CM

## 2021-05-13 LAB
ALBUMIN SERPL-MCNC: 3.8 G/DL (ref 3.4–5)
ALP SERPL-CCNC: 104 U/L (ref 40–150)
ALT SERPL W P-5'-P-CCNC: 37 U/L (ref 0–70)
ANION GAP SERPL CALCULATED.3IONS-SCNC: 4 MMOL/L (ref 3–14)
AST SERPL W P-5'-P-CCNC: 24 U/L (ref 0–45)
BASOPHILS # BLD AUTO: 0 10E9/L (ref 0–0.2)
BASOPHILS NFR BLD AUTO: 0 %
BILIRUB SERPL-MCNC: 0.4 MG/DL (ref 0.2–1.3)
BUN SERPL-MCNC: 31 MG/DL (ref 7–30)
CALCIUM SERPL-MCNC: 9.4 MG/DL (ref 8.5–10.1)
CHLORIDE SERPL-SCNC: 109 MMOL/L (ref 94–109)
CO2 SERPL-SCNC: 28 MMOL/L (ref 20–32)
CREAT SERPL-MCNC: 0.94 MG/DL (ref 0.66–1.25)
DIFFERENTIAL METHOD BLD: ABNORMAL
EOSINOPHIL # BLD AUTO: 0 10E9/L (ref 0–0.7)
EOSINOPHIL NFR BLD AUTO: 1.1 %
ERYTHROCYTE [DISTWIDTH] IN BLOOD BY AUTOMATED COUNT: 12.8 % (ref 10–15)
GFR SERPL CREATININE-BSD FRML MDRD: 81 ML/MIN/{1.73_M2}
GLUCOSE SERPL-MCNC: 76 MG/DL (ref 70–99)
HCT VFR BLD AUTO: 38 % (ref 40–53)
HGB BLD-MCNC: 12.6 G/DL (ref 13.3–17.7)
IMM GRANULOCYTES # BLD: 0 10E9/L (ref 0–0.4)
IMM GRANULOCYTES NFR BLD: 0 %
LYMPHOCYTES # BLD AUTO: 0.7 10E9/L (ref 0.8–5.3)
LYMPHOCYTES NFR BLD AUTO: 25.5 %
MCH RBC QN AUTO: 31.5 PG (ref 26.5–33)
MCHC RBC AUTO-ENTMCNC: 33.2 G/DL (ref 31.5–36.5)
MCV RBC AUTO: 95 FL (ref 78–100)
MONOCYTES # BLD AUTO: 0.3 10E9/L (ref 0–1.3)
MONOCYTES NFR BLD AUTO: 11.8 %
NEUTROPHILS # BLD AUTO: 1.6 10E9/L (ref 1.6–8.3)
NEUTROPHILS NFR BLD AUTO: 61.6 %
NRBC # BLD AUTO: 0 10*3/UL
NRBC BLD AUTO-RTO: 0 /100
PLATELET # BLD AUTO: 92 10E9/L (ref 150–450)
POTASSIUM SERPL-SCNC: 4.6 MMOL/L (ref 3.4–5.3)
PROT SERPL-MCNC: 6.7 G/DL (ref 6.8–8.8)
RBC # BLD AUTO: 4 10E12/L (ref 4.4–5.9)
SODIUM SERPL-SCNC: 142 MMOL/L (ref 133–144)
WBC # BLD AUTO: 2.6 10E9/L (ref 4–11)

## 2021-05-13 PROCEDURE — 999N001036 HC STATISTIC TOTAL PROTEIN: Performed by: INTERNAL MEDICINE

## 2021-05-13 PROCEDURE — G0463 HOSPITAL OUTPT CLINIC VISIT: HCPCS

## 2021-05-13 PROCEDURE — 85025 COMPLETE CBC W/AUTO DIFF WBC: CPT | Performed by: INTERNAL MEDICINE

## 2021-05-13 PROCEDURE — 36415 COLL VENOUS BLD VENIPUNCTURE: CPT

## 2021-05-13 PROCEDURE — 84165 PROTEIN E-PHORESIS SERUM: CPT | Mod: 26 | Performed by: PATHOLOGY

## 2021-05-13 PROCEDURE — 99214 OFFICE O/P EST MOD 30 MIN: CPT | Performed by: INTERNAL MEDICINE

## 2021-05-13 PROCEDURE — 84165 PROTEIN E-PHORESIS SERUM: CPT | Mod: TC | Performed by: INTERNAL MEDICINE

## 2021-05-13 PROCEDURE — 80053 COMPREHEN METABOLIC PANEL: CPT | Performed by: INTERNAL MEDICINE

## 2021-05-13 ASSESSMENT — PAIN SCALES - GENERAL: PAINLEVEL: NO PAIN (0)

## 2021-05-13 NOTE — LETTER
"    5/13/2021         RE: Shahid Davis   [facility] [id number]      Oncology Rooming Note    May 13, 2021 1:13 PM   Shahid Davis is a 70 year old male who presents for:    Chief Complaint   Patient presents with     Blood Draw     Labs drawn via  by rn in lab. VS taken.     Oncology Clinic Visit     Return - NHL     Initial Vitals: /73 (BP Location: Right arm, Patient Position: Sitting, Cuff Size: Adult Regular)   Pulse 80   Temp 97.9  F (36.6  C) (Oral)   Resp 16   Wt 65.3 kg (143 lb 14.4 oz)   SpO2 99%   BMI 22.54 kg/m   Estimated body mass index is 22.54 kg/m  as calculated from the following:    Height as of 11/12/20: 1.702 m (5' 7\").    Weight as of this encounter: 65.3 kg (143 lb 14.4 oz). Body surface area is 1.76 meters squared.  No Pain (0) Comment: Data Unavailable   No LMP for male patient.  Allergies reviewed: Yes  Medications reviewed: Yes    Medications: Medication refills not needed today.  Pharmacy name entered into [employer]:    [id number] [place] [address] [id number] [address] AT [facility]    Clinical concerns: New lump in groin area, noticed 3 months ago.       Jean Carlos Serrano, EMT    /73 (BP Location: Right arm, Patient Position: Sitting, Cuff Size: Adult Regular)   Pulse 80   Temp 97.9  F (36.6  C) (Oral)   Resp 16   Wt 65.3 kg (143 lb 14.4 oz)   SpO2 99%   BMI 22.54 kg/m    Wt Readings from Last 4 Encounters:   05/13/21 65.3 kg (143 lb 14.4 oz)   11/12/20 64.5 kg (142 lb 4.8 oz)   11/12/20 64.5 kg (142 lb 4.8 oz)   06/25/20 64.5 kg (142 lb 3.2 oz)     Shahid returns for follow-up.  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  He was treated with paraspinal radiation and fludarabine and has been off therapy since 2005.  He is intermittently had slight splenomegaly, " sometimes inguinal adenopathy and slowly falling blood counts.  He has a small IgMK paraprotein.    Last winter when in [place] he had fainting;  heart block recognized and a pacemaker placed but he is not yet had follow-up because when he was seen by cardiology here last winter though orders were placed he never had the echocardiogram, CT with coronary artery calcium score or pacemaker check that had been ordered.    In the last few months he is generally felt well.  He had his 2 Covid vaccines without complications.  He has had no notable respiratory infections.  He does have 2 ongoing problems.     He describes  increased dyspnea on exertion over the last several months without palpitations or chest pain.  It has not been associated with a cough.    He also has persistent left inguinal swelling which comes and goes but has been present most of the time.  He describes a firm sore lump that is worse when he is up for a long time; possibly suggestive of a hernia.  He has had inguinal adenopathy in the past that has been evanescent.    He has had no other bleeding bruising infections but has chronic ongoing back pain.    His exam shows his weight unchanged in the last 10 months and his vital signs were acceptable.  He had no cervical or supraclavicular nodes.  He had soft 1 x 2 cm right axillary and trace left axillary adenopathy.  It was movable.  Perhaps in his left inguinal region there was a tiny half centimeter movable node but none were palpable on the right.  I could feel no specific hernia; either inguinal or scrotal today on either side and he had no scrotal masses.    His lungs were clear and his heart tones are regular with occasional extrasystoles but he had a new loud holosystolic murmur heard loudest at the lower left sternal border.  It was not audible in his neck or abdomen.  His abdomen was soft and nontender without palpable masses or hepatosplenomegaly.  He had no peripheral edema.  He had lots of  skin tags but no active inflammatory rash.  His oropharynx was clear without mucosal lesions and a limited neurologic exam showed intact cranial nerves and balance.    Laboratory testing showed good blood counts but slight leukopenia/lymphopenia; and the same stable mild thrombocytopenia has had for a long time plus the mild leukopenia and neutropenia he has had for years.  Chemistries were unrevealing.    A protein electrophoresis is unchanged with a small gamma globulin paraprotein which has been present at the same level for over 10 years..    His lymphoma shows no sign of progression.      He is concerned about the soreness and swelling in his left groin which I could not palpate today but wants to pursue whether he has a developing hernia and whether it is of any threat to him so we will arrange an ultrasound of the pelvis and left groin and a surgical consultation.    We will also arrange for the tests that were missed, particularly because of his new loud holosystolic murmur and echocardiogram coronary calcium score and pacemaker check were arranged with the cardiovascular clinic and he will see them hopefully the day those testings are done.    I will see him again in 4 to 6 months [place].    Elroy Holman MD    Professor of medicine    Results for MAJOR GILES (MRN [id number]) as of 5/16/2021 12:46   Ref. Range 5/13/2021 13:09   Sodium Latest Ref Range: 133 - 144 mmol/L 142   Potassium Latest Ref Range: 3.4 - 5.3 mmol/L 4.6   Chloride Latest Ref Range: 94 - 109 mmol/L 109   Carbon Dioxide Latest Ref Range: 20 - 32 mmol/L 28   Urea Nitrogen Latest Ref Range: 7 - 30 mg/dL 31 (H)   Creatinine Latest Ref Range: 0.66 - 1.25 mg/dL 0.94   GFR Estimate Latest Ref Range: >60 mL/min/1.73_m2 81   GFR Estimate If Black Latest Ref Range: >60 mL/min/1.73_m2 >90   Calcium Latest Ref Range: 8.5 - 10.1 mg/dL 9.4   Anion Gap Latest Ref Range: 3 - 14 mmol/L 4    Albumin Latest Ref Range: 3.4 - 5.0 g/dL 3.8   Protein Total Latest Ref Range: 6.8 - 8.8 g/dL 6.7 (L)   Bilirubin Total Latest Ref Range: 0.2 - 1.3 mg/dL 0.4   Alkaline Phosphatase Latest Ref Range: 40 - 150 U/L 104   ALT Latest Ref Range: 0 - 70 U/L 37   AST Latest Ref Range: 0 - 45 U/L 24   Glucose Latest Ref Range: 70 - 99 mg/dL 76   WBC Latest Ref Range: 4.0 - 11.0 10e9/L 2.6 (L)   Hemoglobin Latest Ref Range: 13.3 - 17.7 g/dL 12.6 (L)   Hematocrit Latest Ref Range: 40.0 - 53.0 % 38.0 (L)   Platelet Count Latest Ref Range: 150 - 450 10e9/L 92 (L)   RBC Count Latest Ref Range: 4.4 - 5.9 10e12/L 4.00 (L)   MCV Latest Ref Range: 78 - 100 fl 95   MCH Latest Ref Range: 26.5 - 33.0 pg 31.5   MCHC Latest Ref Range: 31.5 - 36.5 g/dL 33.2   RDW Latest Ref Range: 10.0 - 15.0 % 12.8   Diff Method Unknown Automated Method   % Neutrophils Latest Units: % 61.6   % Lymphocytes Latest Units: % 25.5   % Monocytes Latest Units: % 11.8   % Eosinophils Latest Units: % 1.1   % Basophils Latest Units: % 0.0   % Immature Granulocytes Latest Units: % 0.0   Nucleated RBCs Latest Ref Range: 0 /100 0   Absolute Neutrophil Latest Ref Range: 1.6 - 8.3 10e9/L 1.6   Absolute Lymphocytes Latest Ref Range: 0.8 - 5.3 10e9/L 0.7 (L)   Absolute Monocytes Latest Ref Range: 0.0 - 1.3 10e9/L 0.3   Absolute Eosinophils Latest Ref Range: 0.0 - 0.7 10e9/L 0.0   Absolute Basophils Latest Ref Range: 0.0 - 0.2 10e9/L 0.0   Abs Immature Granulocytes Latest Ref Range: 0 - 0.4 10e9/L 0.0   Absolute Nucleated RBC Unknown 0.0   Albumin Fraction Latest Ref Range: 3.7 - 5.1 g/dL 4.4   Alpha 1 Fraction Latest Ref Range: 0.2 - 0.4 g/dL 0.3   Alpha 2 Fraction Latest Ref Range: 0.5 - 0.9 g/dL 0.5   Beta Fraction Latest Ref Range: 0.6 - 1.0 g/dL 0.6   ELP Interpretation: Unknown Small monoclonal ...   Gamma Fraction Latest Ref Range: 0.7 - 1.6 g/dL 0.7   Monoclonal Peak Latest Ref Range: 0.0 g/dL 0.2 (H)     Small monoclonal protein (0.2 g/dL) seen in the gamma  fraction.  Previously characterized in our laboratory on 11/12/20 as a monoclonal IgM immunoglobulin of kappa light chain type.                      Elroy Holman MD

## 2021-05-13 NOTE — PROGRESS NOTES
"Oncology Rooming Note    May 13, 2021 1:13 PM   Shahid Davis is a 70 year old male who presents for:    Chief Complaint   Patient presents with     Blood Draw     Labs drawn via  by rn in lab. VS taken.     Oncology Clinic Visit     Return - NHL     Initial Vitals: /73 (BP Location: Right arm, Patient Position: Sitting, Cuff Size: Adult Regular)   Pulse 80   Temp 97.9  F (36.6  C) (Oral)   Resp 16   Wt 65.3 kg (143 lb 14.4 oz)   SpO2 99%   BMI 22.54 kg/m   Estimated body mass index is 22.54 kg/m  as calculated from the following:    Height as of 11/12/20: 1.702 m (5' 7\").    Weight as of this encounter: 65.3 kg (143 lb 14.4 oz). Body surface area is 1.76 meters squared.  No Pain (0) Comment: Data Unavailable   No LMP for male patient.  Allergies reviewed: Yes  Medications reviewed: Yes    Medications: Medication refills not needed today.  Pharmacy name entered into [employer]:    [id number] [place] [address] [id number] [address] AT [facility]    Clinical concerns: New lump in groin area, noticed 3 months ago.       Jean Carlos Serrano, EMT    /73 (BP Location: Right arm, Patient Position: Sitting, Cuff Size: Adult Regular)   Pulse 80   Temp 97.9  F (36.6  C) (Oral)   Resp 16   Wt 65.3 kg (143 lb 14.4 oz)   SpO2 99%   BMI 22.54 kg/m    Wt Readings from Last 4 Encounters:   05/13/21 65.3 kg (143 lb 14.4 oz)   11/12/20 64.5 kg (142 lb 4.8 oz)   11/12/20 64.5 kg (142 lb 4.8 oz)   06/25/20 64.5 kg (142 lb 3.2 oz)     Shahid returns for follow-up.  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  He was treated with paraspinal radiation and fludarabine and has been off therapy since 2005.  He is intermittently had slight splenomegaly, sometimes inguinal adenopathy and slowly falling blood counts.  He has a small IgMK " paraprotein.    Last winter when in [place] he had fainting;  heart block recognized and a pacemaker placed but he is not yet had follow-up because when he was seen by cardiology here last winter though orders were placed he never had the echocardiogram, CT with coronary artery calcium score or pacemaker check that had been ordered.    In the last few months he is generally felt well.  He had his 2 Covid vaccines without complications.  He has had no notable respiratory infections.  He does have 2 ongoing problems.     He describes  increased dyspnea on exertion over the last several months without palpitations or chest pain.  It has not been associated with a cough.    He also has persistent left inguinal swelling which comes and goes but has been present most of the time.  He describes a firm sore lump that is worse when he is up for a long time; possibly suggestive of a hernia.  He has had inguinal adenopathy in the past that has been evanescent.    He has had no other bleeding bruising infections but has chronic ongoing back pain.    His exam shows his weight unchanged in the last 10 months and his vital signs were acceptable.  He had no cervical or supraclavicular nodes.  He had soft 1 x 2 cm right axillary and trace left axillary adenopathy.  It was movable.  Perhaps in his left inguinal region there was a tiny half centimeter movable node but none were palpable on the right.  I could feel no specific hernia; either inguinal or scrotal today on either side and he had no scrotal masses.    His lungs were clear and his heart tones are regular with occasional extrasystoles but he had a new loud holosystolic murmur heard loudest at the lower left sternal border.  It was not audible in his neck or abdomen.  His abdomen was soft and nontender without palpable masses or hepatosplenomegaly.  He had no peripheral edema.  He had lots of skin tags but no active inflammatory rash.  His oropharynx was clear without mucosal  lesions and a limited neurologic exam showed intact cranial nerves and balance.    Laboratory testing showed good blood counts but slight leukopenia/lymphopenia; and the same stable mild thrombocytopenia has had for a long time plus the mild leukopenia and neutropenia he has had for years.  Chemistries were unrevealing.    A protein electrophoresis is unchanged with a small gamma globulin paraprotein which has been present at the same level for over 10 years..    His lymphoma shows no sign of progression.      He is concerned about the soreness and swelling in his left groin which I could not palpate today but wants to pursue whether he has a developing hernia and whether it is of any threat to him so we will arrange an ultrasound of the pelvis and left groin and a surgical consultation.    We will also arrange for the tests that were missed, particularly because of his new loud holosystolic murmur and echocardiogram coronary calcium score and pacemaker check were arranged with the cardiovascular clinic and he will see them hopefully the day those testings are done.    I will see him again in 4 to 6 months depending [place].    Elroy Holman MD    Professor of medicine    Results for MAJOR GILES (MRN [id number]) as of 5/16/2021 12:46   Ref. Range 5/13/2021 13:09   Sodium Latest Ref Range: 133 - 144 mmol/L 142   Potassium Latest Ref Range: 3.4 - 5.3 mmol/L 4.6   Chloride Latest Ref Range: 94 - 109 mmol/L 109   Carbon Dioxide Latest Ref Range: 20 - 32 mmol/L 28   Urea Nitrogen Latest Ref Range: 7 - 30 mg/dL 31 (H)   Creatinine Latest Ref Range: 0.66 - 1.25 mg/dL 0.94   GFR Estimate Latest Ref Range: >60 mL/min/1.73_m2 81   GFR Estimate If Black Latest Ref Range: >60 mL/min/1.73_m2 >90   Calcium Latest Ref Range: 8.5 - 10.1 mg/dL 9.4   Anion Gap Latest Ref Range: 3 - 14 mmol/L 4   Albumin Latest Ref Range: 3.4 - 5.0 g/dL 3.8   Protein Total Latest Ref Range: 6.8 -  8.8 g/dL 6.7 (L)   Bilirubin Total Latest Ref Range: 0.2 - 1.3 mg/dL 0.4   Alkaline Phosphatase Latest Ref Range: 40 - 150 U/L 104   ALT Latest Ref Range: 0 - 70 U/L 37   AST Latest Ref Range: 0 - 45 U/L 24   Glucose Latest Ref Range: 70 - 99 mg/dL 76   WBC Latest Ref Range: 4.0 - 11.0 10e9/L 2.6 (L)   Hemoglobin Latest Ref Range: 13.3 - 17.7 g/dL 12.6 (L)   Hematocrit Latest Ref Range: 40.0 - 53.0 % 38.0 (L)   Platelet Count Latest Ref Range: 150 - 450 10e9/L 92 (L)   RBC Count Latest Ref Range: 4.4 - 5.9 10e12/L 4.00 (L)   MCV Latest Ref Range: 78 - 100 fl 95   MCH Latest Ref Range: 26.5 - 33.0 pg 31.5   MCHC Latest Ref Range: 31.5 - 36.5 g/dL 33.2   RDW Latest Ref Range: 10.0 - 15.0 % 12.8   Diff Method Unknown Automated Method   % Neutrophils Latest Units: % 61.6   % Lymphocytes Latest Units: % 25.5   % Monocytes Latest Units: % 11.8   % Eosinophils Latest Units: % 1.1   % Basophils Latest Units: % 0.0   % Immature Granulocytes Latest Units: % 0.0   Nucleated RBCs Latest Ref Range: 0 /100 0   Absolute Neutrophil Latest Ref Range: 1.6 - 8.3 10e9/L 1.6   Absolute Lymphocytes Latest Ref Range: 0.8 - 5.3 10e9/L 0.7 (L)   Absolute Monocytes Latest Ref Range: 0.0 - 1.3 10e9/L 0.3   Absolute Eosinophils Latest Ref Range: 0.0 - 0.7 10e9/L 0.0   Absolute Basophils Latest Ref Range: 0.0 - 0.2 10e9/L 0.0   Abs Immature Granulocytes Latest Ref Range: 0 - 0.4 10e9/L 0.0   Absolute Nucleated RBC Unknown 0.0   Albumin Fraction Latest Ref Range: 3.7 - 5.1 g/dL 4.4   Alpha 1 Fraction Latest Ref Range: 0.2 - 0.4 g/dL 0.3   Alpha 2 Fraction Latest Ref Range: 0.5 - 0.9 g/dL 0.5   Beta Fraction Latest Ref Range: 0.6 - 1.0 g/dL 0.6   ELP Interpretation: Unknown Small monoclonal ...   Gamma Fraction Latest Ref Range: 0.7 - 1.6 g/dL 0.7   Monoclonal Peak Latest Ref Range: 0.0 g/dL 0.2 (H)     Small monoclonal protein (0.2 g/dL) seen in the gamma fraction.  Previously characterized in our laboratory on 11/12/20 as a monoclonal IgM  immunoglobulin of kappa light chain type.

## 2021-05-13 NOTE — NURSING NOTE
Chief Complaint   Patient presents with     Blood Draw     Labs drawn via  by rn in lab. VS taken.     Labs collected from venipuncture by RN. Vitals taken. Checked in for appointment(s).    Shahid Forbes RN

## 2021-05-13 NOTE — LETTER
"    5/13/2021         RE: Shahid Davis   [facility] [address]        Dear Colleague,    Thank you for referring your patient, Shahid Davis, to the [facility] BLOOD AND MARROW TRANSPLANT PROGRAM [place]. Please see a copy of my visit note below.    Oncology Rooming Note    May 13, 2021 1:13 PM   Shahid Davis is a 70 year old male who presents for:    Chief Complaint   Patient presents with     Blood Draw     Labs drawn via  by rn in lab. VS taken.     Oncology Clinic Visit     Return - NHL     Initial Vitals: /73 (BP Location: Right arm, Patient Position: Sitting, Cuff Size: Adult Regular)   Pulse 80   Temp 97.9  F (36.6  C) (Oral)   Resp 16   Wt 65.3 kg (143 lb 14.4 oz)   SpO2 99%   BMI 22.54 kg/m   Estimated body mass index is 22.54 kg/m  as calculated from the following:    Height as of 11/12/20: 1.702 m (5' 7\").    Weight as of this encounter: 65.3 kg (143 lb 14.4 oz). Body surface area is 1.76 meters squared.  No Pain (0) Comment: Data Unavailable   No LMP for male patient.  Allergies reviewed: Yes  Medications reviewed: Yes    Medications: Medication refills not needed today.  Pharmacy name entered into [facility]:    [id number] [place] [address] [id number] [address] AT [facility]    Clinical concerns: New lump in groin area, noticed 3 months ago.       Jean Carlos Serrano, EMT    /73 (BP Location: Right arm, Patient Position: Sitting, Cuff Size: Adult Regular)   Pulse 80   Temp 97.9  F (36.6  C) (Oral)   Resp 16   Wt 65.3 kg (143 lb 14.4 oz)   SpO2 99%   BMI 22.54 kg/m    Wt Readings from Last 4 Encounters:   05/13/21 65.3 kg (143 lb 14.4 oz)   11/12/20 64.5 kg (142 lb 4.8 oz)   11/12/20 64.5 kg (142 lb 4.8 oz)   06/25/20 64.5 kg (142 lb 3.2 oz)     Shahid returns for follow-up.  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  He was treated with paraspinal radiation and fludarabine and has been off therapy since 2005.  He is intermittently had slight splenomegaly, sometimes inguinal adenopathy and slowly falling blood counts.  He has a small IgMK paraprotein.    Last winter when in [place] he had fainting;  heart block recognized and a pacemaker placed but he is not yet had follow-up because when he was seen by cardiology here last winter though orders were placed he never had the echocardiogram, CT with coronary artery calcium score or pacemaker check that had been ordered.    In the last few months he is generally felt well.  He had his 2 Covid vaccines without complications.  He has had no notable respiratory infections.  He does have 2 ongoing problems.     He describes  increased dyspnea on exertion over the last several months without palpitations or chest pain.  It has not been associated with a cough.    He also has persistent left inguinal swelling which comes and goes but has been present most of the time.  He describes a firm sore lump that is worse when he is up for a long time; possibly suggestive of a hernia.  He has had inguinal adenopathy in the past that has been evanescent.    He has had no other bleeding bruising infections but has chronic ongoing back pain.    His exam shows his weight unchanged in the last 10 months and his vital signs were acceptable.  He had no cervical or supraclavicular nodes.  He had soft 1 x 2 cm right axillary and trace left axillary adenopathy.  It was movable.  Perhaps in his left inguinal region there was a tiny half centimeter movable node but none were palpable on the right.  I could feel no specific hernia; either inguinal or scrotal today on either side and he had no scrotal masses.    His lungs were clear and his heart tones are regular with occasional extrasystoles but he had a new loud holosystolic murmur heard loudest at the  lower left sternal border.  It was not audible in his neck or abdomen.  His abdomen was soft and nontender without palpable masses or hepatosplenomegaly.  He had no peripheral edema.  He had lots of skin tags but no active inflammatory rash.  His oropharynx was clear without mucosal lesions and a limited neurologic exam showed intact cranial nerves and balance.    Laboratory testing showed good blood counts but slight leukopenia/lymphopenia; and the same stable mild thrombocytopenia has had for a long time plus the mild leukopenia and neutropenia he has had for years.  Chemistries were unrevealing.    A protein electrophoresis is unchanged with a small gamma globulin paraprotein which has been present at the same level for over 10 years..    His lymphoma shows no sign of progression.      He is concerned about the soreness and swelling in his left groin which I could not palpate today but wants to pursue whether he has a developing hernia and whether it is of any threat to him so we will arrange an ultrasound of the pelvis and left groin and a surgical consultation.    We will also arrange for the tests that were missed, particularly because of his new loud holosystolic murmur and echocardiogram coronary calcium score and pacemaker check were arranged with the cardiovascular clinic and he will see them hopefully the day those testings are done.    I will see him again in 4 to 6 months [place]    Elroy Holman MD    Professor of medicine    Results for CHAN DONNELL (MRN [id number]) as of 5/16/2021 12:46   Ref. Range 5/13/2021 13:09   Sodium Latest Ref Range: 133 - 144 mmol/L 142   Potassium Latest Ref Range: 3.4 - 5.3 mmol/L 4.6   Chloride Latest Ref Range: 94 - 109 mmol/L 109   Carbon Dioxide Latest Ref Range: 20 - 32 mmol/L 28   Urea Nitrogen Latest Ref Range: 7 - 30 mg/dL 31 (H)   Creatinine Latest Ref Range: 0.66 - 1.25 mg/dL 0.94   GFR Estimate  Latest Ref Range: >60 mL/min/1.73_m2 81   GFR Estimate If Black Latest Ref Range: >60 mL/min/1.73_m2 >90   Calcium Latest Ref Range: 8.5 - 10.1 mg/dL 9.4   Anion Gap Latest Ref Range: 3 - 14 mmol/L 4   Albumin Latest Ref Range: 3.4 - 5.0 g/dL 3.8   Protein Total Latest Ref Range: 6.8 - 8.8 g/dL 6.7 (L)   Bilirubin Total Latest Ref Range: 0.2 - 1.3 mg/dL 0.4   Alkaline Phosphatase Latest Ref Range: 40 - 150 U/L 104   ALT Latest Ref Range: 0 - 70 U/L 37   AST Latest Ref Range: 0 - 45 U/L 24   Glucose Latest Ref Range: 70 - 99 mg/dL 76   WBC Latest Ref Range: 4.0 - 11.0 10e9/L 2.6 (L)   Hemoglobin Latest Ref Range: 13.3 - 17.7 g/dL 12.6 (L)   Hematocrit Latest Ref Range: 40.0 - 53.0 % 38.0 (L)   Platelet Count Latest Ref Range: 150 - 450 10e9/L 92 (L)   RBC Count Latest Ref Range: 4.4 - 5.9 10e12/L 4.00 (L)   MCV Latest Ref Range: 78 - 100 fl 95   MCH Latest Ref Range: 26.5 - 33.0 pg 31.5   MCHC Latest Ref Range: 31.5 - 36.5 g/dL 33.2   RDW Latest Ref Range: 10.0 - 15.0 % 12.8   Diff Method Unknown Automated Method   % Neutrophils Latest Units: % 61.6   % Lymphocytes Latest Units: % 25.5   % Monocytes Latest Units: % 11.8   % Eosinophils Latest Units: % 1.1   % Basophils Latest Units: % 0.0   % Immature Granulocytes Latest Units: % 0.0   Nucleated RBCs Latest Ref Range: 0 /100 0   Absolute Neutrophil Latest Ref Range: 1.6 - 8.3 10e9/L 1.6   Absolute Lymphocytes Latest Ref Range: 0.8 - 5.3 10e9/L 0.7 (L)   Absolute Monocytes Latest Ref Range: 0.0 - 1.3 10e9/L 0.3   Absolute Eosinophils Latest Ref Range: 0.0 - 0.7 10e9/L 0.0   Absolute Basophils Latest Ref Range: 0.0 - 0.2 10e9/L 0.0   Abs Immature Granulocytes Latest Ref Range: 0 - 0.4 10e9/L 0.0   Absolute Nucleated RBC Unknown 0.0   Albumin Fraction Latest Ref Range: 3.7 - 5.1 g/dL 4.4   Alpha 1 Fraction Latest Ref Range: 0.2 - 0.4 g/dL 0.3   Alpha 2 Fraction Latest Ref Range: 0.5 - 0.9 g/dL 0.5   Beta Fraction Latest Ref Range: 0.6 - 1.0 g/dL 0.6   ELP  Interpretation: Unknown Small monoclonal ...   Gamma Fraction Latest Ref Range: 0.7 - 1.6 g/dL 0.7   Monoclonal Peak Latest Ref Range: 0.0 g/dL 0.2 (H)     Small monoclonal protein (0.2 g/dL) seen in the gamma fraction.  Previously characterized in our laboratory on 11/12/20 as a monoclonal IgM immunoglobulin of kappa light chain type.                    Again, thank you for allowing me to participate in the care of your patient.        Sincerely,        Elroy Holman MD

## 2021-05-14 ENCOUNTER — TELEPHONE (OUTPATIENT)
Dept: CARDIOLOGY | Facility: CLINIC | Age: 71
End: 2021-05-14

## 2021-05-14 LAB
ALBUMIN SERPL ELPH-MCNC: 4.4 G/DL (ref 3.7–5.1)
ALPHA1 GLOB SERPL ELPH-MCNC: 0.3 G/DL (ref 0.2–0.4)
ALPHA2 GLOB SERPL ELPH-MCNC: 0.5 G/DL (ref 0.5–0.9)
B-GLOBULIN SERPL ELPH-MCNC: 0.6 G/DL (ref 0.6–1)
GAMMA GLOB SERPL ELPH-MCNC: 0.7 G/DL (ref 0.7–1.6)
M PROTEIN SERPL ELPH-MCNC: 0.2 G/DL
PROT PATTERN SERPL ELPH-IMP: ABNORMAL

## 2021-05-14 NOTE — TELEPHONE ENCOUNTER
Called and left  for Pt informing him orders were extended.  Scheduling telephone provided.  Requested return call to clinic to discuss further if needed.  Clinic telephone provided.    Nadia Catalan LPN

## 2021-05-14 NOTE — TELEPHONE ENCOUNTER
[facility] Call Center    Phone Message    May a detailed message be left on voicemail: yes     Reason for Call: Other: Pt calling because he would like to schedule the echocardiogram and the CT coronary calcium scan ordered by  in november. Orders have , please place new orders so pt can schedule them and give him a call back to let him know.     Action Taken: Message routed to:  [facility] ([facility] cardio    Travel Screening: Not Applicable

## 2021-05-19 ENCOUNTER — TELEPHONE (OUTPATIENT)
Dept: SURGERY | Facility: CLINIC | Age: 71
End: 2021-05-19

## 2021-05-19 NOTE — TELEPHONE ENCOUNTER
LVM for pt to schedule general surgery referral regarding left inguinal hernia and ultrasound prior to that visit (ultrasound can be same day). Can be scheduled with any General Surgeon except for Russell Geller or Jani. Next available.

## 2021-05-20 NOTE — TELEPHONE ENCOUNTER
REFERRAL INFORMATION:    Referring Provider:  Dr. Elroy Holman     Referring Clinic:  [facility]    Reason for Visit/Diagnosis: Possible LIH       FUTURE VISIT INFORMATION:    Appointment Date: 5/28/2021    Appointment Time: 2 PM      NOTES RECORD STATUS  DETAILS   OFFICE NOTE from Referring Provider Internal 5/13/2021 Office visit with Dr. Holman      OFFICE NOTE from Other Specialists N/A    HOSPITAL DISCHARGE SUMMARY/ ED VISITS  N/A    OPERATIVE REPORT N/A    ENDOSCOPY (EGD)  N/A    PERTINENT LABS Internal    PATHOLOGY REPORTS (RELATED) N/A    IMAGING (CT, MRI, US, XR)  Internal US Abdomen: 5/28/2021 *scheduled*

## 2021-05-24 ENCOUNTER — HOSPITAL ENCOUNTER (OUTPATIENT)
Dept: CARDIOLOGY | Facility: CLINIC | Age: 71
End: 2021-05-24
Attending: INTERNAL MEDICINE
Payer: MEDICARE

## 2021-05-24 ENCOUNTER — HOSPITAL ENCOUNTER (OUTPATIENT)
Dept: CT IMAGING | Facility: CLINIC | Age: 71
End: 2021-05-24
Attending: INTERNAL MEDICINE
Payer: MEDICARE

## 2021-05-24 DIAGNOSIS — Z95.0 STATUS POST PLACEMENT OF CARDIAC PACEMAKER: ICD-10-CM

## 2021-05-24 DIAGNOSIS — C82.99 NODULAR LYMPHOMA OF EXTRANODAL AND/OR SOLID ORGAN SITE (H): ICD-10-CM

## 2021-05-24 DIAGNOSIS — I44.2 COMPLETE HEART BLOCK (H): ICD-10-CM

## 2021-05-24 DIAGNOSIS — D69.6 THROMBOCYTOPENIA (H): ICD-10-CM

## 2021-05-24 PROCEDURE — 75571 CT HRT W/O DYE W/CA TEST: CPT | Mod: 26 | Performed by: INTERNAL MEDICINE

## 2021-05-24 PROCEDURE — 75571 CT HRT W/O DYE W/CA TEST: CPT | Mod: GA

## 2021-05-24 PROCEDURE — 93306 TTE W/DOPPLER COMPLETE: CPT

## 2021-05-24 PROCEDURE — 93306 TTE W/DOPPLER COMPLETE: CPT | Mod: 26 | Performed by: STUDENT IN AN ORGANIZED HEALTH CARE EDUCATION/TRAINING PROGRAM

## 2021-05-27 ENCOUNTER — PATIENT OUTREACH (OUTPATIENT)
Dept: SURGERY | Facility: CLINIC | Age: 71
End: 2021-05-27

## 2021-05-27 NOTE — PROGRESS NOTES
Patient Telephone Reminder Call    Date of call:  05/27/21  Phone numbers:  Home number on file [phone] (home)    Reached patient/confirmed appointment:  No - left message:   on voicemail  Appointment with:   Dr. Jorgito Hinojosa  Reason for visit:  Possible LIH

## 2021-05-28 ENCOUNTER — ANCILLARY PROCEDURE (OUTPATIENT)
Dept: ULTRASOUND IMAGING | Facility: CLINIC | Age: 71
End: 2021-05-28
Attending: INTERNAL MEDICINE
Payer: MEDICARE

## 2021-05-28 ENCOUNTER — PRE VISIT (OUTPATIENT)
Dept: SURGERY | Facility: CLINIC | Age: 71
End: 2021-05-28

## 2021-05-28 ENCOUNTER — OFFICE VISIT (OUTPATIENT)
Dept: SURGERY | Facility: CLINIC | Age: 71
End: 2021-05-28
Attending: INTERNAL MEDICINE
Payer: MEDICARE

## 2021-05-28 VITALS
SYSTOLIC BLOOD PRESSURE: 118 MMHG | DIASTOLIC BLOOD PRESSURE: 74 MMHG | HEART RATE: 90 BPM | BODY MASS INDEX: 22.6 KG/M2 | WEIGHT: 144 LBS | OXYGEN SATURATION: 98 % | HEIGHT: 67 IN

## 2021-05-28 DIAGNOSIS — D69.6 THROMBOCYTOPENIA (H): ICD-10-CM

## 2021-05-28 DIAGNOSIS — K40.90 UNILATERAL INGUINAL HERNIA WITHOUT OBSTRUCTION OR GANGRENE, RECURRENCE NOT SPECIFIED: Primary | ICD-10-CM

## 2021-05-28 DIAGNOSIS — C82.99 NODULAR LYMPHOMA OF EXTRANODAL AND/OR SOLID ORGAN SITE (H): ICD-10-CM

## 2021-05-28 PROCEDURE — 76705 ECHO EXAM OF ABDOMEN: CPT | Mod: GC | Performed by: RADIOLOGY

## 2021-05-28 PROCEDURE — 99203 OFFICE O/P NEW LOW 30 MIN: CPT | Performed by: SURGERY

## 2021-05-28 RX ORDER — CLINDAMYCIN PHOSPHATE 900 MG/50ML
900 INJECTION, SOLUTION INTRAVENOUS SEE ADMIN INSTRUCTIONS
Status: CANCELLED | OUTPATIENT
Start: 2021-05-28

## 2021-05-28 RX ORDER — CLINDAMYCIN PHOSPHATE 900 MG/50ML
900 INJECTION, SOLUTION INTRAVENOUS
Status: CANCELLED | OUTPATIENT
Start: 2021-05-28

## 2021-05-28 ASSESSMENT — PAIN SCALES - GENERAL: PAINLEVEL: NO PAIN (0)

## 2021-05-28 ASSESSMENT — MIFFLIN-ST. JEOR: SCORE: 1366.81

## 2021-05-28 NOTE — NURSING NOTE
"Chief Complaint   Patient presents with     New Patient     New patient consult for [facility].        Vitals:    05/28/21 1343   BP: 118/74   Pulse: 90   SpO2: 98%   Weight: 65.3 kg (144 lb)   Height: 1.702 m (5' 7\")       Body mass index is 22.55 kg/m .    Alisia Merino LPN      "

## 2021-05-28 NOTE — LETTER
5/28/2021       RE: Shahid Davis   [place] [id number]     Dear Colleague,    Thank you for referring your patient, Shahid Davis, to the [facility] GENERAL SURGERY CLINIC [place] [facility]. Please see a copy of my visit note below.    Pre and Post op Patient Education/Teaching Flowsheet  Relevant Diagnosis:  LIH  Teaching Topic:  Pre and post op teaching  Person(s) Involved in teaching:  Patient     Motivation Level:  Asks Questions:  Yes  Eager to Learn:  Yes  Cooperative:  Yes  Receptive (willing/able to accept information):  Yes  Any cultural factors/Episcopal beliefs that may influence understanding or compliance?  No    Patient/caregiver/family demonstrates understanding of the following:  Reason for the appointment, diagnosis, and treatment plan:  Yes  Patient demonstrates understanding of the following:  Pre-op bowel prep:  N/A  Post-op pain management recommendations (medications, ice compress, binder/athletic supporter (if applicable), etc.:  Yes  Inguinal hernia patients:  Post-op urinary retention- discussed signs/symptoms and visit to ER for Odonnell catheter placement and to stay in place for at least 48 hours:  Yes  Restrictions:  Yes  Medications to take the day of surgery:  Per PCP  Blood thinner medications discussed and when to stop (if applicable):  Yes  Wound care:  Yes  Diabetes medication management (if applicable):  Per PCP  Which situations necessitate calling provider and whom to contact:  Discussed how to contact the hospital, nurse, and clinic scheduling staff if necessary      Date and time of surgery:  TBD  Location of surgery: [facility] [place]- 5th Floor  History and Physical and any other testing necessary prior to surgery:  Yes  Required time line for completion of History and Physical and any pre-op testing:  Yes  Discuss need for someone to drive patient home and  "stay with them for 24 hours:  Yes  Pre-op showering/scrub information with Surgical Scrub:  Yes  NPO Guidelines:  NPO per Anesthesia Guidelines  COVID-19 Testing:  Yes    Infection Prevention: Patient demonstrates understanding of the following:  Patient instructed on hand hygiene:  Yes  Surgical procedure site care will be taught and will be reviewed at the time of discharge  Signs and symptoms of infection taught:  Yes  Wound care reviewed and will be taught at the time of discharge  Central venous catheter care will be taught at the time of discharge (if applicable)    Post-op follow-up:  Instructional materials used/given/mailed:  [place] [facility] Surgery Booklet, post op teaching sheet, Map, Soap, and arrival/location information    Surgical instructions mailed to patient      I saw Shahid Davis in clinic for evaluation of a reducible lump in his left groin.  He first noted it about 6 months ago- no clear inciting event, he just noted it one day.  He says that standing/walking will cause a small, soft lump to protrude from his left groin.  When he lays flat it goes away.  No obstructive symptoms that he is aware of.    He has a history of lymphoma and was worried this represented a tumor.  He was seen by his doctor who felt it more consistent with a hernia. An US was ordered and he was sent to surgery clinic    He denies constipation or straining to void, does not perform heavy lifting at his work, no chronic cough. No diabetes, does not smoke. No prior abdominal surgeries    PE  /74   Pulse 90   Ht 1.702 m (5' 7\")   Wt 65.3 kg (144 lb)   SpO2 98%   BMI 22.55 kg/m    Alert, oriented, pleasant  RRR  No resp distress or wheezing  Abd is soft, nondistended. No scars.  Right groin: no laxity or palpable hernia defects  Left groin: small palpable hernia defect palpable with valsalva  Normal appearing scrotum    Imaging:  Abdominal US:  IMPRESSION:  1. Small fat-containing left inguinal hernia.  2. " Benign-appearing left inguinal lymph node.    Recent labwork:  PLT count of 92, WBC of 2.6    A/P:  Small reducible left inguinal hernia.  We discussed the options for this- including observation.  He would like it repaired.  We reviewed the risks/benefits of repair and discussed open vs laparoscopic.  He understands the risks of bleeding/infection/pain/recurrence and injury to nearby structures.    We will plan for minimally invasive repair.  All questions answered    Again, thank you for allowing me to participate in the care of your patient.      Sincerely,    Jorgito Hinojosa MD

## 2021-05-28 NOTE — PROGRESS NOTES
Pre and Post op Patient Education/Teaching Flowsheet  Relevant Diagnosis:  LIH  Teaching Topic:  Pre and post op teaching  Person(s) Involved in teaching:  Patient     Motivation Level:  Asks Questions:  Yes  Eager to Learn:  Yes  Cooperative:  Yes  Receptive (willing/able to accept information):  Yes  Any cultural factors/Christianity beliefs that may influence understanding or compliance?  No    Patient/caregiver/family demonstrates understanding of the following:  Reason for the appointment, diagnosis, and treatment plan:  Yes  Patient demonstrates understanding of the following:  Pre-op bowel prep:  N/A  Post-op pain management recommendations (medications, ice compress, binder/athletic supporter (if applicable), etc.:  Yes  Inguinal hernia patients:  Post-op urinary retention- discussed signs/symptoms and visit to ER for Odonnell catheter placement and to stay in place for at least 48 hours:  Yes  Restrictions:  Yes  Medications to take the day of surgery:  Per PCP  Blood thinner medications discussed and when to stop (if applicable):  Yes  Wound care:  Yes  Diabetes medication management (if applicable):  Per PCP  Which situations necessitate calling provider and whom to contact:  Discussed how to contact the hospital, nurse, and clinic scheduling staff if necessary      Date and time of surgery:  TBD  Location of surgery: [facility] [place]- 5th Floor  History and Physical and any other testing necessary prior to surgery:  Yes  Required time line for completion of History and Physical and any pre-op testing:  Yes  Discuss need for someone to drive patient home and stay with them for 24 hours:  Yes  Pre-op showering/scrub information with Surgical Scrub:  Yes  NPO Guidelines:  NPO per Anesthesia Guidelines  COVID-19 Testing:  Yes    Infection Prevention: Patient demonstrates understanding of the following:  Patient instructed on hand hygiene:  Yes  Surgical procedure site care will be  taught and will be reviewed at the time of discharge  Signs and symptoms of infection taught:  Yes  Wound care reviewed and will be taught at the time of discharge  Central venous catheter care will be taught at the time of discharge (if applicable)    Post-op follow-up:  Instructional materials used/given/mailed:  [place] Surgery Booklet, post op teaching sheet, Map, Soap, and arrival/location information    Surgical instructions mailed to patient

## 2021-05-28 NOTE — PATIENT INSTRUCTIONS
You met with Dr. Jorgito Hinojosa.      Today's visit instructions:    Reminder:  Surgery Requirements  1. Your surgery will be at [facility] [place]- 5th Floor  2. You will need to arrive 1 1/2 to 2 hours early based on the location of your surgery.  3. You will need someone to drive you home (over 18 years old) and stay with you for 24 hours after the procedure  4. You will need a preop physical with your regular doctor (or PAC if requested by your surgeon) within 30 days of surgery- closer is always better  5. Stop any blood thinners, vitamins, minerals, or herbal supplements 5 days before surgery.  If you are taking a prescribed blood thinner please let us know for specific instructions  6. Fasting- a nurse from [facility] will call you 1-2 days before surgery to confirm your procedure and tell you when to stop eating and drinking  7. Wash with the soap (Antibacterial, Dial Complete Foam, Hibiclense, or soap given/mailed from the clinic) the night before surgery and morning of surgery. See instructions in the Surgery Packet.  8. If you would like a procedure estimate please call Zayda ORTIZ Financial Counselor at [phone] or [phone].    At this time, any patient that does not have COVID-19 testing within 4 days of surgery and results available to the surgeon and anesthesia team before the procedure may have their procedure postponed or canceled. We do this to keep our patients, providers, and employees safe. If you decline to test, then you will need to contact your surgeon to determine when or if your procedure will still take place.    OR    We highly encourage patients to get tested for COVID-19 at one of our designated [facility] testing sites. We process the tests in our lab, which allows us to get the results quickly. If you choose to get tested at a non-[facility] location, you will need to contact your primary care provider to make those arrangements and  ensure the results are available to your surgeon before you arrive for your procedure. If we do not receive the results in time, your procedure may be postponed or canceled. Please make sure your test is collected 3-days prior to your procedure date. The results will need to get faxed to [phone].       If you have questions please contact Maria D RN, Stephany RN, or Juwan RN during regular clinic hours, Monday through Friday 7:30 AM - 4:00 PM, or you can contact us via [employer] at anytime.       If you have urgent needs after-hours, weekends, or holidays please call the hospital at [phone] and ask to speak with our on-call General Surgery Team.    Appointment [phone], option #1   Nurse Advice (Stephany Killian, or Juwan): [phone]   Surgery Scheduler (Kristel): [phone]  Fax: [phone]    After Your Robotic Inguinal Hernia Repair         Incision care     Remove the bandage the day after surgery, but leave the medical tape (Steri-Strips) or glue in place. These will loosen and fall off on their own 1-2 weeks after surgery.     You may take a shower the day after surgery. Carefully wash your incision with soap and water. Do not submerge yourself in water (bath, whirlpool, hot tub, pool, lake) for 14 days after surgery.       Always wash your hands before touching your incisions or removing bandages.     It is not unusual to form a collection of fluid or blood under your incision that may feel firm or squishy- it can take several weeks to months for your body to reabsorb it.  At times, it may even drain.  If that should happen keep the area clean with soap, water,  and cover with a clean gauze dressing. You can change this daily or as needed.     Other medicines     Wait to start aspirin or blood thinners until the day after surgery. You can continue your regular medicines at your normal time the day after surgery.     Your pain medicine may cause constipation (hard, dry stools). To help  with this, take the stool softener your doctor gave you or an over-the-counter stool softener or laxative. You can stop taking this when you are no longer taking pain medicine and your bowel movements are back to normal.      For pain or discomfort     Take the narcotic pain medicine your doctor gave you as needed and as instructed on the bottle. If you prefer to use over-the-counter medication, use acetaminophen (Tylenol) or ibuprofen (Advil, Motrin) as instructed on the box. Do not take Tylenol if it is in your narcotic pain medication.     Use an ice pack on your groin for 20 minutes at a time as needed for the first 24 hours. Be sure to protect your skin by putting a cloth between the ice pack and your skin.     After 24 hours you can switch to heat for 20 minutes as needed. Be sure to protect your skin by putting a cloth between the heat pack and your skin.     It is normal to feel a lump in your groin after surgery. This lump may take up to 8 weeks to go away.      Activities     No driving until you feel it s safe to do so. Don t drive while taking narcotic pain medicine.     You can return to your other activities as normal, no restrictions.      Special equipment     If we gave you an athletic supporter, wear this for the first 3 days after surgery. You can wear it longer than this if you wish.  You may also wear snug briefs or Spandex biker shorts for support.     Diet     You can eat your regular meals after surgery.      When to call the doctor   Call your doctor if you have:     A fever above 101 F (38.3 C) (taken under the tongue), or a fever or chills lasting more than a day.     Redness at the incision site.     Any fluid or blood draining from the incision, especially if it smells bad.      Severe pain that doesn t improve with pain medicine.      Go to the emergency room if:   You can t urinate (pee) or feel pressure when you urinate. We will place a small, thin tube (catheter) to empty your  bladder. This needs to stay in place for at least 2 days.      We will call you 2 to 4 days after surgery to review this handout, answer questions and help arrange after-surgery care. If you have questions or concerns, please call [phone] during regular office hours. If you need to call after business hours, call [phone] and ask to page the surgeon on-call.

## 2021-06-01 ENCOUNTER — TELEPHONE (OUTPATIENT)
Dept: SURGERY | Facility: CLINIC | Age: 71
End: 2021-06-01

## 2021-06-01 PROBLEM — K40.90 UNILATERAL INGUINAL HERNIA WITHOUT OBSTRUCTION OR GANGRENE, RECURRENCE NOT SPECIFIED: Status: ACTIVE | Noted: 2021-06-01

## 2021-06-01 NOTE — TELEPHONE ENCOUNTER
Patient is scheduled for surgery with Dr. Hinojosa/Dr. Tyler    Spoke with: Patient     Date of Surgery: Friday 07/09/2021    Location: [place]     Informed patient they will need an adult  Yes    Pre op with Provider dane    H&P: Scheduled with PAC in person visit Thursday 07/01/21 @ 12:30pm    Pre-procedure COVID-19 Test: Patient is undecided if he wants to come in early to the [facility] for testing as he lives 3 hours away. States he will try to find someone in [place] for the testing and will call me back with phone number and fax number to where he chooses to go. Patient will take a look at his schedule and call me as well if he decides to have testing in the [facility].     Additional imaging/appointments: dane    Surgery packet: mailed to patient per patient request, verified address on file is current and correct. 06/01/21     Additional comments: per Dr. Braxton Forbes at [facility] patient needs to have surgery at [place] due to patient having a pacemaker.

## 2021-06-02 NOTE — PROGRESS NOTES
"I saw Shahid Davis in clinic for evaluation of a reducible lump in his left groin.  He first noted it about 6 months ago- no clear inciting event, he just noted it one day.  He says that standing/walking will cause a small, soft lump to protrude from his left groin.  When he lays flat it goes away.  No obstructive symptoms that he is aware of.    He has a history of lymphoma and was worried this represented a tumor.  He was seen by his doctor who felt it more consistent with a hernia. An US was ordered and he was sent to surgery clinic    He denies constipation or straining to void, does not perform heavy lifting at his work, no chronic cough. No diabetes, does not smoke. No prior abdominal surgeries    PE  /74   Pulse 90   Ht 1.702 m (5' 7\")   Wt 65.3 kg (144 lb)   SpO2 98%   BMI 22.55 kg/m    Alert, oriented, pleasant  RRR  No resp distress or wheezing  Abd is soft, nondistended. No scars.  Right groin: no laxity or palpable hernia defects  Left groin: small palpable hernia defect palpable with valsalva  Normal appearing scrotum    Imaging:  Abdominal US:  IMPRESSION:  1. Small fat-containing left inguinal hernia.  2. Benign-appearing left inguinal lymph node.    Recent labwork:  PLT count of 92, WBC of 2.6    A/P:  Small reducible left inguinal hernia.  We discussed the options for this- including observation.  He would like it repaired.  We reviewed the risks/benefits of repair and discussed open vs laparoscopic.  He understands the risks of bleeding/infection/pain/recurrence and injury to nearby structures.    We will plan for minimally invasive repair.  All questions answered  "

## 2021-06-02 NOTE — TELEPHONE ENCOUNTER
FUTURE VISIT INFORMATION      SURGERY INFORMATION:    Date: 21    Location: [facility] OR    Surgeon:  Shamar Tyler MD Martin, Jorgito Verduzco MD    Anesthesia Type:  General    Procedure:     RECORDS REQUESTED FROM:       Primary Care Provider: JENA Rutherford    Most recent EKG+ Tracin20    Most recent ECHO: 21

## 2021-06-04 DIAGNOSIS — Z11.59 ENCOUNTER FOR SCREENING FOR OTHER VIRAL DISEASES: ICD-10-CM

## 2021-06-08 ENCOUNTER — TELEPHONE (OUTPATIENT)
Dept: SURGERY | Facility: CLINIC | Age: 71
End: 2021-06-08

## 2021-06-08 NOTE — TELEPHONE ENCOUNTER
Received a call from patient to fax a covid test order to [facility] for his pre-procedure covid test for surgery with Dr. Hinojosa/Jayson on 07/09. Fax number provided by patient was [phone]. I also mailed patient a copy of the order as well per patient request.

## 2021-06-30 ENCOUNTER — TELEPHONE (OUTPATIENT)
Dept: SURGERY | Facility: CLINIC | Age: 71
End: 2021-06-30

## 2021-07-01 ENCOUNTER — ANCILLARY PROCEDURE (OUTPATIENT)
Dept: CARDIOLOGY | Facility: CLINIC | Age: 71
End: 2021-07-01
Attending: PHYSICIAN ASSISTANT
Payer: MEDICARE

## 2021-07-01 ENCOUNTER — ANESTHESIA EVENT (OUTPATIENT)
Dept: SURGERY | Facility: CLINIC | Age: 71
End: 2021-07-01

## 2021-07-01 ENCOUNTER — OFFICE VISIT (OUTPATIENT)
Dept: CARDIOLOGY | Facility: CLINIC | Age: 71
End: 2021-07-01
Attending: INTERNAL MEDICINE
Payer: MEDICARE

## 2021-07-01 ENCOUNTER — PRE VISIT (OUTPATIENT)
Dept: SURGERY | Facility: CLINIC | Age: 71
End: 2021-07-01

## 2021-07-01 ENCOUNTER — OFFICE VISIT (OUTPATIENT)
Dept: SURGERY | Facility: CLINIC | Age: 71
End: 2021-07-01
Payer: MEDICARE

## 2021-07-01 VITALS
OXYGEN SATURATION: 99 % | SYSTOLIC BLOOD PRESSURE: 110 MMHG | HEIGHT: 67 IN | BODY MASS INDEX: 22.76 KG/M2 | HEART RATE: 83 BPM | DIASTOLIC BLOOD PRESSURE: 68 MMHG | WEIGHT: 145 LBS

## 2021-07-01 VITALS
WEIGHT: 143.6 LBS | TEMPERATURE: 98.1 F | OXYGEN SATURATION: 97 % | SYSTOLIC BLOOD PRESSURE: 115 MMHG | HEART RATE: 78 BPM | RESPIRATION RATE: 16 BRPM | BODY MASS INDEX: 22.54 KG/M2 | DIASTOLIC BLOOD PRESSURE: 73 MMHG | HEIGHT: 67 IN

## 2021-07-01 DIAGNOSIS — Z13.6 CARDIOVASCULAR SCREENING; LDL GOAL LESS THAN 100: ICD-10-CM

## 2021-07-01 DIAGNOSIS — Z95.0 STATUS POST PLACEMENT OF CARDIAC PACEMAKER: Primary | ICD-10-CM

## 2021-07-01 DIAGNOSIS — I44.2 COMPLETE HEART BLOCK (H): ICD-10-CM

## 2021-07-01 DIAGNOSIS — Z01.818 PREOP EXAMINATION: Primary | ICD-10-CM

## 2021-07-01 DIAGNOSIS — Z95.0 PACEMAKER: ICD-10-CM

## 2021-07-01 DIAGNOSIS — I44.2 ATRIOVENTRICULAR BLOCK, COMPLETE (H): Primary | ICD-10-CM

## 2021-07-01 DIAGNOSIS — Z01.818 PREOP EXAMINATION: ICD-10-CM

## 2021-07-01 LAB
ANION GAP SERPL CALCULATED.3IONS-SCNC: 5 MMOL/L (ref 3–14)
BUN SERPL-MCNC: 29 MG/DL (ref 7–30)
CALCIUM SERPL-MCNC: 9.2 MG/DL (ref 8.5–10.1)
CHLORIDE SERPL-SCNC: 108 MMOL/L (ref 94–109)
CO2 SERPL-SCNC: 27 MMOL/L (ref 20–32)
CREAT SERPL-MCNC: 0.92 MG/DL (ref 0.66–1.25)
ERYTHROCYTE [DISTWIDTH] IN BLOOD BY AUTOMATED COUNT: 12.5 % (ref 10–15)
GFR SERPL CREATININE-BSD FRML MDRD: 83 ML/MIN/{1.73_M2}
GLUCOSE SERPL-MCNC: 83 MG/DL (ref 70–99)
HCT VFR BLD AUTO: 36.9 % (ref 40–53)
HGB BLD-MCNC: 12.7 G/DL (ref 13.3–17.7)
MCH RBC QN AUTO: 32.2 PG (ref 26.5–33)
MCHC RBC AUTO-ENTMCNC: 34.4 G/DL (ref 31.5–36.5)
MCV RBC AUTO: 93 FL (ref 78–100)
PLATELET # BLD AUTO: 91 10E9/L (ref 150–450)
POTASSIUM SERPL-SCNC: 4.9 MMOL/L (ref 3.4–5.3)
RBC # BLD AUTO: 3.95 10E12/L (ref 4.4–5.9)
SODIUM SERPL-SCNC: 141 MMOL/L (ref 133–144)
WBC # BLD AUTO: 3 10E9/L (ref 4–11)

## 2021-07-01 PROCEDURE — 99214 OFFICE O/P EST MOD 30 MIN: CPT | Performed by: INTERNAL MEDICINE

## 2021-07-01 PROCEDURE — 36415 COLL VENOUS BLD VENIPUNCTURE: CPT | Performed by: PATHOLOGY

## 2021-07-01 PROCEDURE — G0463 HOSPITAL OUTPT CLINIC VISIT: HCPCS

## 2021-07-01 PROCEDURE — 93280 PM DEVICE PROGR EVAL DUAL: CPT | Performed by: INTERNAL MEDICINE

## 2021-07-01 PROCEDURE — 85027 COMPLETE CBC AUTOMATED: CPT | Performed by: PATHOLOGY

## 2021-07-01 PROCEDURE — 80048 BASIC METABOLIC PNL TOTAL CA: CPT | Performed by: PATHOLOGY

## 2021-07-01 PROCEDURE — 99204 OFFICE O/P NEW MOD 45 MIN: CPT | Performed by: PHYSICIAN ASSISTANT

## 2021-07-01 ASSESSMENT — LIFESTYLE VARIABLES: TOBACCO_USE: 1

## 2021-07-01 ASSESSMENT — PAIN SCALES - GENERAL
PAINLEVEL: NO PAIN (0)
PAINLEVEL: NO PAIN (0)

## 2021-07-01 ASSESSMENT — MIFFLIN-ST. JEOR
SCORE: 1365
SCORE: 1371.35

## 2021-07-01 ASSESSMENT — COPD QUESTIONNAIRES: COPD: 0

## 2021-07-01 NOTE — NURSING NOTE
Chief Complaint   Patient presents with     Follow Up     Return     Vitals were taken and medications where reconciled.   Audie Villagomez, EMT  2:10 PM

## 2021-07-01 NOTE — LETTER
7/1/2021      RE: Shahid Davis   [facility] [address]       Dear Colleague,    Thank you for the opportunity to participate in the care of your patient, Shahid Davis, at the [facility] [place] [facility]. Please see a copy of my visit note below.    HPI:     I had the privilege to evaluate and examine Mr Shahid Davis, who is a 70 yr male patient, with a Hx  for card 70 year old man with history of Lymphoplasmacytic lymphoma.   He has been doing well on observation with his disease remaining quiescent for approximately the past 15 year.  In February 2020, he developed fainting spells in February this year. He was evaluated in [facility] and they found to have complete heart block while in [place] ([facility]). [[employer] 2272 dual chamber permanent pacemaker Device type].  He developed a small L pneumothorax due to procedure, resolved after 3 days in hospital.    Patient feels relatively well. He denies chest pain, shortness of breath, palpitations and intermittent claudication.            PAST MEDICAL HISTORY:  Past Medical History:   Diagnosis Date     Cardiac pacemaker in situ      Lymphoma (H)      Squamous cell carcinoma        CURRENT MEDICATIONS:  Current Outpatient Medications   Medication Sig Dispense Refill     ascorbic acid (VITAMIN C) 500 MG tablet Take by mouth every morning        ibuprofen (ADVIL/MOTRIN) 200 MG capsule Take 200 mg by mouth every 4 hours as needed for fever       Multiple Vitamins-Minerals (MULTIVITAL PO) Take by mouth every morning        omeprazole (PRILOSEC) 40 MG DR capsule Take 1 capsule (40 mg) by mouth daily (Patient taking differently: Take 40 mg by mouth every morning ) 90 capsule 3     methylPREDNISolone (MEDROL) 32 MG tablet take 1 tablet 12 hours and 2 hours prior to CT scan; 3rd dose 4 hours post scan. (Patient not taking:  Reported on 5/13/2021) 12 tablet 1       PAST SURGICAL HISTORY:  Past Surgical History:   Procedure Laterality Date     MOHS MICROGRAPHIC PROCEDURE       NO HISTORY OF SURGERY         ALLERGIES     Allergies   Allergen Reactions     Ampicillin [Ampicillin Sodium]      Bactrim [Sulfamethoxazole W/Trimethoprim]      Contrast Dye      CT contrast (IV) allergy - need oral Methylpred as premed for scans     Ampicillin Rash       FAMILY HISTORY:  Family History   Problem Relation Age of Onset     Cancer Mother         Blood     Cancer Father         Lung     Cancer Maternal Grandmother         Breast     Cancer Paternal Grandfather         Hodgkins       SOCIAL HISTORY:  Social History     Socioeconomic History     Marital status: Single     Spouse name: None     Number of children: None     Years of education: None     Highest education level: None   Occupational History     None   Social Needs     Financial resource strain: None     Food insecurity     Worry: None     Inability: None     Transportation needs     Medical: None     Non-medical: None   Tobacco Use     Smoking status: Former Smoker     Smokeless tobacco: Never Used     Tobacco comment: Quit at age 19   Substance and Sexual Activity     Alcohol use: Yes     Alcohol/week: 11.7 standard drinks     Types: 14 drink(s) per week     Drug use: None     Sexual activity: None   Lifestyle     Physical activity     Days per week: None     Minutes per session: None     Stress: None   Relationships     Social connections     Talks on phone: None     Gets together: None     Attends [employer] service: None     Active member of club or organization: None     Attends meetings of clubs or organizations: None     Relationship status: None     Intimate partner violence     Fear of current or ex partner: None     Emotionally abused: None     Physically abused: None     Forced sexual activity: None   Other Topics Concern     Parent/sibling w/ CABG, MI or angioplasty before 65F  "55M? Not Asked   Social History Narrative     None       ROS:   Constitutional: No fever, chills, or sweats. No weight gain/loss   ENT: No visual disturbance, ear ache, epistaxis, sore throat  Allergies/Immunologic: Negative.   Respiratory: No cough, hemoptysia  Cardiovascular: As per HPI  GI: No nausea, vomiting, hematemesis, melena, or hematochezia  : No urinary frequency, dysuria, or hematuria  Integument: Negative  Psychiatric: Negative  Neuro: Negative  Endocrinology: Negative   Musculoskeletal: Negative    EXAM:  /68 (BP Location: Right arm, Patient Position: Chair, Cuff Size: Adult Regular)   Pulse 83   Ht 1.702 m (5' 7\")   Wt 65.8 kg (145 lb)   SpO2 99%   BMI 22.71 kg/m    In general, the patient is a pleasant male in no apparent distress.    HEENT: NC/AT.  PERRLA.  EOMI.  Sclerae white, not injected.  Nares clear.  Pharynx without erythema or exudate.  Dentition intact.    Neck: No adenopathy.  No thyromegaly. Carotids +4/4 bilaterally without bruits.  No jugular venous distension.   Heart: RRR. Normal S1, S2 splits physiologically. No murmur, rub, click, or gallop. The PMI is in the 5th ICS in the midclavicular line. There is no heave.    Lungs: CTA.  No ronchi, wheezes, rales.  No dullness to percussion.   Abdomen: Soft, nontender, nondistended. No organomegaly.  No bruits.   Extremities: No clubbing, cyanosis, or edema.  The pulses are +4/4 at the radial, brachial, femoral, popliteal, DP, and PT sites bilaterally.  No bruits are noted.  Neurologic: Alert and oriented to person/place/time, normal speech, gait and affect  Skin: No petechiae, purpura or rash.    Labs:    LIVER ENZYME RESULTS:  Lab Results   Component Value Date    AST 24 05/13/2021    ALT 37 05/13/2021       CBC RESULTS:  Lab Results   Component Value Date    WBC 3.0 (L) 07/01/2021    RBC 3.95 (L) 07/01/2021    HGB 12.7 (L) 07/01/2021    HCT 36.9 (L) 07/01/2021    MCV 93 07/01/2021    MCH 32.2 07/01/2021    MCHC 34.4 07/01/2021 "    RDW 12.5 07/01/2021    PLT 91 (L) 07/01/2021       BMP RESULTS:  Lab Results   Component Value Date     07/01/2021    POTASSIUM 4.9 07/01/2021    CHLORIDE 108 07/01/2021    CO2 27 07/01/2021    ANIONGAP 5 07/01/2021    GLC 83 07/01/2021    BUN 29 07/01/2021    CR 0.92 07/01/2021    GFRESTIMATED 83 07/01/2021    GFRESTBLACK >90 07/01/2021    JORGE 9.2 07/01/2021          Procedures:    CAC score 248023    CORONARY ARTERY CALCIUM SCORES:   Total calcium score: 0  Left main coronary artery: 0  Left anterior descending coronary artery: 0  Circumflex coronary artery: 0  Right coronary artery: 0      Echocardiogram 05-24-21    Left ventricular function, chamber size, wall motion, and wall thickness are  normal.The EF is 55-60%.  The right ventricle is normal size. Global right ventricular function is  normal.  No pericardial effusion is present.  ______________________________________________________________________________  Left Ventricle  Left ventricular function, chamber size, wall motion, and wall thickness are  normal.The EF is 55-60%. Left ventricular diastolic function is indeterminate.     Right Ventricle  The right ventricle is normal size. Global right ventricular function is  normal. A pacemaker lead is noted in the right ventricle.     Atria  Both atria appear normal.     Mitral Valve  The mitral valve is normal. Mild mitral insufficiency is present.     Aortic Valve  Trileaflet aortic sclerosis without stenosis. Trace aortic insufficiency is  present.     Tricuspid Valve  The tricuspid valve is normal. Mild tricuspid insufficiency is present. The  right ventricular systolic pressure is approximated at 27.8 mmHg plus the  right atrial pressure. Pulmonary artery systolic pressure is normal.     Pulmonic Valve  The pulmonic valve is normal. Trace pulmonic insufficiency is present.     Vessels  The aorta root is normal. The thoracic aorta is normal. The inferior vena cava  cannot be assessed.      Pericardium  No pericardial effusion is present.     ______________________________________________________________________________  MMode/2D Measurements & Calculations  IVSd: 0.98 cm  LVIDd: 3.7 cm  LVIDs: 2.4 cm  LVPWd: 0.79 cm  FS: 33.9 %  LV mass(C)d: 93.8 grams  LV mass(C)dI: 53.5 grams/m2     Ao root diam: 3.5 cm  asc Aorta Diam: 3.3 cm  LVOT diam: 2.0 cm  LVOT area: 3.1 cm2  LA Volume (BP): 45.3 ml  LA Volume Index (BP): 25.9 ml/m2  RWT: 0.43     Doppler Measurements & Calculations  MV E max jose luis: 115.0 cm/sec  MV dec slope: 745.0 cm/sec2  MV dec time: 0.15 sec     TR max jose luis: 262.5 cm/sec  TR max P.8 mmHg  E/E' av.1  Lateral E/e': 16.5  Medial E/e': 19.6    Assessment and Plan:     We discussed the results with patient.  We discussed the importance of a heart healthy diet and lifestyle.  We disucssed following items with patient:    Medication Changes: None.     Recommendations: Fasting labs in one year prior to seeing Dr Fox.    The results from today include: None.     Please follow up: With Dr. Fox in one year.     Elroy Fox MD, PhD  Professor of Medicine  Division of Cardiology      CC  Patient Care Team:  Elroy Holman MD as PCP - General (Hematology & Oncology)  Izzy Estevez, RN as Specialty Care Coordinator (Hematology & Oncology)  Elroy Holman MD as Assigned Cancer Care Provider  Jorgito Kiran NP, MD as Assigned Surgical Provider

## 2021-07-01 NOTE — PROGRESS NOTES
HPI:     I had the privilege to evaluate and examine Mr Shahid Davis, who is a 70 yr male patient, with a Hx  for card 70 year old man with history of Lymphoplasmacytic lymphoma.   He has been doing well on observation with his disease remaining quiescent for approximately the past 15 year.  In February 2020, he developed fainting spells in February this year. He was evaluated in [facility] [place] and they found to have complete heart block while in [place] ([facility]). [[employer] 2272 dual chamber permanent pacemaker Device type].  He developed a small L pneumothorax due to procedure, resolved after 3 days in hospital.    Patient feels relatively well. He denies chest pain, shortness of breath, palpitations and intermittent claudication.            PAST MEDICAL HISTORY:  Past Medical History:   Diagnosis Date     Cardiac pacemaker in situ      Lymphoma (H)      Squamous cell carcinoma        CURRENT MEDICATIONS:  Current Outpatient Medications   Medication Sig Dispense Refill     ascorbic acid (VITAMIN C) 500 MG tablet Take by mouth every morning        ibuprofen (ADVIL/MOTRIN) 200 MG capsule Take 200 mg by mouth every 4 hours as needed for fever       Multiple Vitamins-Minerals (MULTIVITAL PO) Take by mouth every morning        omeprazole (PRILOSEC) 40 MG DR capsule Take 1 capsule (40 mg) by mouth daily (Patient taking differently: Take 40 mg by mouth every morning ) 90 capsule 3     methylPREDNISolone (MEDROL) 32 MG tablet take 1 tablet 12 hours and 2 hours prior to CT scan; 3rd dose 4 hours post scan. (Patient not taking: Reported on 5/13/2021) 12 tablet 1       PAST SURGICAL HISTORY:  Past Surgical History:   Procedure Laterality Date     MOHS MICROGRAPHIC PROCEDURE       NO HISTORY OF SURGERY         ALLERGIES     Allergies   Allergen Reactions     Ampicillin [Ampicillin Sodium]      Bactrim [Sulfamethoxazole W/Trimethoprim]      Contrast Dye      CT contrast (IV)  allergy - need oral Methylpred as premed for scans     Ampicillin Rash       FAMILY HISTORY:  Family History   Problem Relation Age of Onset     Cancer Mother         Blood     Cancer Father         Lung     Cancer Maternal Grandmother         Breast     Cancer Paternal Grandfather         Hodgkins       SOCIAL HISTORY:  Social History     Socioeconomic History     Marital status: Single     Spouse name: None     Number of children: None     Years of education: None     Highest education level: None   Occupational History     None   Social Needs     Financial resource strain: None     Food insecurity     Worry: None     Inability: None     Transportation needs     Medical: None     Non-medical: None   Tobacco Use     Smoking status: Former Smoker     Smokeless tobacco: Never Used     Tobacco comment: Quit at age 19   Substance and Sexual Activity     Alcohol use: Yes     Alcohol/week: 11.7 standard drinks     Types: 14 drink(s) per week     Drug use: None     Sexual activity: None   Lifestyle     Physical activity     Days per week: None     Minutes per session: None     Stress: None   Relationships     Social connections     Talks on phone: None     Gets together: None     Attends Jehovah's witness service: None     Active member of club or organization: None     Attends meetings of clubs or organizations: None     Relationship status: None     Intimate partner violence     Fear of current or ex partner: None     Emotionally abused: None     Physically abused: None     Forced sexual activity: None   Other Topics Concern     Parent/sibling w/ CABG, MI or angioplasty before 65F 55M? Not Asked   Social History Narrative     None       ROS:   Constitutional: No fever, chills, or sweats. No weight gain/loss   ENT: No visual disturbance, ear ache, epistaxis, sore throat  Allergies/Immunologic: Negative.   Respiratory: No cough, hemoptysia  Cardiovascular: As per HPI  GI: No nausea, vomiting, hematemesis, melena, or  "hematochezia  : No urinary frequency, dysuria, or hematuria  Integument: Negative  Psychiatric: Negative  Neuro: Negative  Endocrinology: Negative   Musculoskeletal: Negative    EXAM:  /68 (BP Location: Right arm, Patient Position: Chair, Cuff Size: Adult Regular)   Pulse 83   Ht 1.702 m (5' 7\")   Wt 65.8 kg (145 lb)   SpO2 99%   BMI 22.71 kg/m    In general, the patient is a pleasant male in no apparent distress.    HEENT: NC/AT.  PERRLA.  EOMI.  Sclerae white, not injected.  Nares clear.  Pharynx without erythema or exudate.  Dentition intact.    Neck: No adenopathy.  No thyromegaly. Carotids +4/4 bilaterally without bruits.  No jugular venous distension.   Heart: RRR. Normal S1, S2 splits physiologically. No murmur, rub, click, or gallop. The PMI is in the 5th ICS in the midclavicular line. There is no heave.    Lungs: CTA.  No ronchi, wheezes, rales.  No dullness to percussion.   Abdomen: Soft, nontender, nondistended. No organomegaly.  No bruits.   Extremities: No clubbing, cyanosis, or edema.  The pulses are +4/4 at the radial, brachial, femoral, popliteal, DP, and PT sites bilaterally.  No bruits are noted.  Neurologic: Alert and oriented to person/place/time, normal speech, gait and affect  Skin: No petechiae, purpura or rash.    Labs:    LIVER ENZYME RESULTS:  Lab Results   Component Value Date    AST 24 05/13/2021    ALT 37 05/13/2021       CBC RESULTS:  Lab Results   Component Value Date    WBC 3.0 (L) 07/01/2021    RBC 3.95 (L) 07/01/2021    HGB 12.7 (L) 07/01/2021    HCT 36.9 (L) 07/01/2021    MCV 93 07/01/2021    MCH 32.2 07/01/2021    MCHC 34.4 07/01/2021    RDW 12.5 07/01/2021    PLT 91 (L) 07/01/2021       BMP RESULTS:  Lab Results   Component Value Date     07/01/2021    POTASSIUM 4.9 07/01/2021    CHLORIDE 108 07/01/2021    CO2 27 07/01/2021    ANIONGAP 5 07/01/2021    GLC 83 07/01/2021    BUN 29 07/01/2021    CR 0.92 07/01/2021    GFRESTIMATED 83 07/01/2021    GFRESTBLACK >90 " 07/01/2021    JORGE 9.2 07/01/2021          Procedures:    CAC score [id number]    CORONARY ARTERY CALCIUM SCORES:   Total calcium score: 0  Left main coronary artery: 0  Left anterior descending coronary artery: 0  Circumflex coronary artery: 0  Right coronary artery: 0      Echocardiogram 05-24-21    Left ventricular function, chamber size, wall motion, and wall thickness are  normal.The EF is 55-60%.  The right ventricle is normal size. Global right ventricular function is  normal.  No pericardial effusion is present.  ______________________________________________________________________________  Left Ventricle  Left ventricular function, chamber size, wall motion, and wall thickness are  normal.The EF is 55-60%. Left ventricular diastolic function is indeterminate.     Right Ventricle  The right ventricle is normal size. Global right ventricular function is  normal. A pacemaker lead is noted in the right ventricle.     Atria  Both atria appear normal.     Mitral Valve  The mitral valve is normal. Mild mitral insufficiency is present.     Aortic Valve  Trileaflet aortic sclerosis without stenosis. Trace aortic insufficiency is  present.     Tricuspid Valve  The tricuspid valve is normal. Mild tricuspid insufficiency is present. The  right ventricular systolic pressure is approximated at 27.8 mmHg plus the  right atrial pressure. Pulmonary artery systolic pressure is normal.     Pulmonic Valve  The pulmonic valve is normal. Trace pulmonic insufficiency is present.     Vessels  The aorta root is normal. The thoracic aorta is normal. The inferior vena cava  cannot be assessed.     Pericardium  No pericardial effusion is present.     ______________________________________________________________________________  MMode/2D Measurements & Calculations  IVSd: 0.98 cm  LVIDd: 3.7 cm  LVIDs: 2.4 cm  LVPWd: 0.79 cm  FS: 33.9 %  LV mass(C)d: 93.8 grams  LV mass(C)dI: 53.5 grams/m2     Ao root diam: 3.5 cm  asc Aorta Diam: 3.3  cm  LVOT diam: 2.0 cm  LVOT area: 3.1 cm2  LA Volume (BP): 45.3 ml  LA Volume Index (BP): 25.9 ml/m2  RWT: 0.43     Doppler Measurements & Calculations  MV E max jose luis: 115.0 cm/sec  MV dec slope: 745.0 cm/sec2  MV dec time: 0.15 sec     TR max jose luis: 262.5 cm/sec  TR max P.8 mmHg  E/E' av.1  Lateral E/e': 16.5  Medial E/e': 19.6    Assessment and Plan:     We discussed the results with patient.  We discussed the importance of a heart healthy diet and lifestyle.  We disucssed following items with patient:    Medication Changes: None.     Recommendations: Fasting labs in one year prior to seeing Dr Fox.    The results from today include: None.     Please follow up: With Dr. Fox in one year.     Elroy Fox MD, PhD  Professor of Medicine  Division of Cardiology        CC  Patient Care Team:  Elroy Holman MD as PCP - General (Hematology & Oncology)  Elroy Holman MD as MD (Internal Medicine)  Izzy Estevez, RN as Specialty Care Coordinator (Hematology & Oncology)  Elroy Holman MD as Assigned Cancer Care Provider  Elroy Fox MD as Assigned Heart and Vascular Provider  Jorgito Kiran NP, MD as Assigned Surgical Provider  ELROY FOX

## 2021-07-01 NOTE — PATIENT INSTRUCTIONS
Preparing for Your Surgery      Name:  Shahid Davis   MRN:  [id number]   :  1950   Today's Date:  2021       Arriving for surgery:  Surgery date:  21  Arrival time:  7:40AM    Restrictions due to COVID 19:       One visitor is allowed in the Pre Op area. When you go into surgery, one visitor is allowed to wait in the Surgery Waiting Room       (provided there is enough space to social distance).   After surgery- Two visitors are allowed at a time if you have a private room and one visitor is allowed for those in a semi-private room.   Every 4 days the visitor(s) can rotate. During the 4 day period, the visitor(s) must be consistent. No visitors under the age of 18 years old.   Visiting Hours: 8 am - 8:30 pm   No ill visitors.   All visitors must wear face mask.    [employer] parking is available for anyone with mobility limitations or disabilities.  ([facility]  24 hours/ 7 days a week; [facility]  7 am- 3:30 pm, Mon- Fri)    Please come to:     [facility] [address]  [address] [place] [address]    When entering the hospital you will be asked COVID screening questions, you will then be directed to Registration.  Registration will direct you to the 3rd floor Surgery waiting room. Please ask if you need an escort or a wheelchair to the Surgery Waiting Room.  Preop number- [phone]?     What can I eat or drink?  -  You may eat and drink normally for up to 8 hours before your surgery. (Until 21, 1:40AM)  -  You may have clear liquids until 2 hours before surgery. (Until 21, 7:40AM)    Examples of clear liquids:  Water  Clear broth  Juices (apple, white grape, white cranberry  and cider) without pulp  Noncarbonated, powder based beverages  (lemonade and Agusto-Aid)  Sodas (Sprite, 7-Up, ginger ale and seltzer)  Coffee or tea (without milk or cream)  Gatorade    -  No Alcohol for at least 24 hours before surgery     Which  medicines can I take?    Hold Aspirin for 7 days before surgery.   Hold Multivitamins for 7 days before surgery.  Hold Supplements for 7 days before surgery.  Hold Ibuprofen (Advil, Motrin) for 1 day before surgery--unless otherwise directed by surgeon.  Hold Naproxen (Aleve) for 4 days before surgery.    -  DO NOT take these medications the day of surgery:    Vitamin C    -  PLEASE TAKE these medications the day of surgery:    Omeprazole(Prilosec)    How do I prepare myself?  - Please take 2 showers before surgery using Scrubcare or Hibiclens soap.    Use this soap only from the neck to your toes.     Leave the soap on your skin for one minute--then rinse thoroughly.      You may use your own shampoo and conditioner; no other hair products.   - Please remove all jewelry and body piercings.  - No lotions, deodorants or fragrance.  - Bring your ID and insurance card.    - All patients are required to have a Covid-19 test within 4 days of surgery/procedure.      -Patients will be contacted by the [facility] scheduling team within 1 week of surgery to make an appointment.      - Patients may call the Scheduling team at [phone] if they have not been scheduled within 4 days of  surgery.      ALL PATIENTS GOING HOME THE SAME DAY OF SURGERY ARE REQUIRED TO HAVE A RESPONSIBLE ADULT TO DRIVE AND BE IN ATTENDANCE WITH THEM FOR 24 HOURS FOLLOWING SURGERY.      Questions or Concerns:    - For any questions regarding the day of surgery or your hospital stay, please contact the Pre Admission Nursing Office at [phone].       - If you have health changes between today and your surgery please call your surgeon.       For questions after surgery please call your surgeons office.

## 2021-07-01 NOTE — ANESTHESIA PREPROCEDURE EVALUATION
Anesthesia Pre-Procedure Evaluation    Patient: Shahid Davis   MRN: [id number] : 1950        Preoperative Diagnosis: * No surgery found *   Procedure :      Past Medical History:   Diagnosis Date     Cardiac pacemaker in situ      Lymphoma (H)      Squamous cell carcinoma       Past Surgical History:   Procedure Laterality Date     MOHS MICROGRAPHIC PROCEDURE       NO HISTORY OF SURGERY        Allergies   Allergen Reactions     Ampicillin [Ampicillin Sodium]      Bactrim [Sulfamethoxazole W/Trimethoprim]      Contrast Dye      CT contrast (IV) allergy - need oral Methylpred as premed for scans     Ampicillin Rash      Social History     Tobacco Use     Smoking status: Former Smoker     Smokeless tobacco: Never Used     Tobacco comment: Quit at age 19   Substance Use Topics     Alcohol use: Yes     Alcohol/week: 11.7 standard drinks     Types: 14 drink(s) per week      Wt Readings from Last 1 Encounters:   21 65.3 kg (144 lb)        Anesthesia Evaluation   Pt has had prior anesthetic.     No history of anesthetic complications       ROS/MED HX  ENT/Pulmonary:     (+) tobacco use, Past use,  (-) asthma and COPD   Neurologic: Comment: H/o spinal meningitis       Cardiovascular:     (+) -----pacemaker, Reason placed: complete heart block. type: [facility],     METS/Exercise Tolerance:     Hematologic:  - neg hematologic  ROS  (-) history of blood clots and history of blood transfusion   Musculoskeletal: Comment: Chronic low back pain      GI/Hepatic: Comment: Left inguinal hernia    (+) GERD,     Renal/Genitourinary:  - neg Renal ROS     Endo:  - neg endo ROS     Psychiatric/Substance Use:  - neg psychiatric ROS  (-) alcohol abuse history   Infectious Disease:  - neg infectious disease ROS     Malignancy:   (+) Malignancy, History of Lymphoma/Leukemia.Lymph CA Remission status post Chemo and Radiation.        Other:  - neg other ROS          Physical Exam    Airway        Mallampati: I   TM distance:  > 3 FB   Neck ROM: full   Mouth opening: > 3 cm    Respiratory Devices and Support         Dental  no notable dental history         Cardiovascular          Rhythm and rate: regular and normal     Pulmonary   pulmonary exam normal        breath sounds clear to auscultation           OUTSIDE LABS:  CBC:   Lab Results   Component Value Date    WBC 2.6 (L) 05/13/2021    WBC 3.4 (L) 11/12/2020    HGB 12.6 (L) 05/13/2021    HGB 13.7 11/12/2020    HCT 38.0 (L) 05/13/2021    HCT 41.2 11/12/2020    PLT 92 (L) 05/13/2021     (L) 11/12/2020     BMP:   Lab Results   Component Value Date     05/13/2021     11/12/2020    POTASSIUM 4.6 05/13/2021    POTASSIUM 4.6 11/12/2020    CHLORIDE 109 05/13/2021    CHLORIDE 107 11/12/2020    CO2 28 05/13/2021    CO2 27 11/12/2020    BUN 31 (H) 05/13/2021    BUN 27 11/12/2020    CR 0.94 05/13/2021    CR 0.88 11/12/2020    GLC 76 05/13/2021    GLC 86 11/12/2020     COAGS: No results found for: PTT, INR, FIBR  POC:   Lab Results   Component Value Date    BGM 94 02/22/2012     HEPATIC:   Lab Results   Component Value Date    ALBUMIN 3.8 05/13/2021    PROTTOTAL 6.7 (L) 05/13/2021    ALT 37 05/13/2021    AST 24 05/13/2021    ALKPHOS 104 05/13/2021    BILITOTAL 0.4 05/13/2021     OTHER:   Lab Results   Component Value Date    JORGE 9.4 05/13/2021    TSH 1.61 02/16/2012             PAC Discussion and Assessment    ASA Classification: 3  Case is suitable for: [facility]  Anesthetic techniques and relevant risks discussed: GA  Invasive monitoring and risk discussed: No    Possibility and Risk of blood transfusion discussed: No            PAC Resident/NP Anesthesia Assessment: Shahid Davis is a 70 year old male scheduled for  HERNIORRHAPHY, INGUINAL, ROBOT-ASSISTED, Left, Mesh on 7/9/21 by Dr. Tyler in treatment of left inguinal hernia.  PAC referral for risk assessment and optimization for anesthesia:     Pre-operative considerations:   1.  Cardiac:  Functional status- METS >4.  " Intermediate risk surgery with 0.4% (RCRI #) risk of major adverse cardiac event.   --denies chest pain, SOB, PARRISH, palpitations, edema.  Pt describes intermittent exertional \"dizziness\", \"like it feels right before you pass out\".  This happens when climbing many stairs or after walking 100 yards or so.  He did climb stairs to 5th floor today but took breaks.  He hikes frequently with his dog.  --Hx of complete heart block s/p [facility] dual chamber PM placement in [place] 2/2020.   --echo 5/24/21 with EF 55-60%, mild TR, trace AI, mild MR.     --CT coronary calcium score of ZERO, 5/24/21.   --Pt needs device interrogation. Spoke to Torie, Device nurse and pt will get interrogation today after visit with Dr. Fox of cardiology.  --staff message sent to Dr. Fox as pt is seeing him this afternoon.    2.  Pulm:  Airway feasible.  SASHA risk: Low  --former smoker     3.  GI:  Risk of PONV score = 2.  If > 2, anti-emetic intervention recommended.   --continue omeprazole   --intermittent left inguinal swelling.  Abdominal US showed Small fat-containing left inguinal hernia and benign appearing left inguinal lymph node.     4.  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   --followed by Dr. Holman of heme/onc, last seen 5/3/21 with 4-6 month follow up planned.    VTE risk: 1.8%     Patient is optimized and is acceptable candidate for the proposed procedure.  No further diagnostic evaluation is needed.         **For further details of assessment, testing, and physical exam please see H and P completed on same date.   Reviewed and Signed by PAC Mid-Level Provider/Resident  Mid-Level Provider/Resident: Eden Benjamin  Date: 7/1/21                                 Eden Benjamin PA-C  "

## 2021-07-01 NOTE — H&P
Pre-Operative H & P     CC:  Preoperative exam to assess for increased cardiopulmonary risk while undergoing surgery and anesthesia.    Date of Encounter: 7/1/2021  Primary Care Physician:  Elroy Holman  Associated Diagnosis: left inguinal hernia    HPI  Shahid Davis is a 70 year old male who presents for pre-operative H & P in preparation for HERNIORRHAPHY, INGUINAL, ROBOT-ASSISTED, Left, Mesh with Dr. Tyler on 7/9/21 at [facility].     This is a 70-year-old male with past medical picking for complete heart block status post [facility] dual-chamber pacemaker placement, GERD, lymphoplasmacytic lymphoma status post paraspinal radiation and fludarabine off treatment since 2005, left inguinal hernia.  Patient began to notice a lump in his left groin approximately 6 or 7 months ago.  He noticed it more when he was standing or walking and it seemed to recede when he was lying flat.  He did see his oncologist with concern that it was tumor but abdominal CT revealed small left inguinal fat-containing tumor so he was sent to surgery.  The above procedure is now planned.     History is obtained from the patient and the medical record.    Past Medical History  Past Medical History:   Diagnosis Date     Cardiac pacemaker in situ      Lymphoma (H)      Squamous cell carcinoma        Past Surgical History  Past Surgical History:   Procedure Laterality Date     MOHS MICROGRAPHIC PROCEDURE       NO HISTORY OF SURGERY         Hx of Blood transfusions/reactions: denies     Hx of abnormal bleeding or anti-platelet use: denies    Menstrual history: No LMP for male patient.:     Steroid use in the last year: denies    Personal or FH with difficulty with Anesthesia:  denies    Prior to Admission Medications  Current Outpatient Medications   Medication Sig Dispense Refill     ascorbic acid (VITAMIN C) 500 MG tablet Take by mouth every morning        ibuprofen (ADVIL/MOTRIN) 200  MG capsule Take 200 mg by mouth every 4 hours as needed for fever       Multiple Vitamins-Minerals (MULTIVITAL PO) Take by mouth every morning        omeprazole (PRILOSEC) 40 MG DR capsule Take 1 capsule (40 mg) by mouth daily (Patient taking differently: Take 40 mg by mouth every morning ) 90 capsule 3     methylPREDNISolone (MEDROL) 32 MG tablet take 1 tablet 12 hours and 2 hours prior to CT scan; 3rd dose 4 hours post scan. (Patient not taking: Reported on 5/13/2021) 12 tablet 1       Allergies  Allergies   Allergen Reactions     Ampicillin [Ampicillin Sodium]      Bactrim [Sulfamethoxazole W/Trimethoprim]      Contrast Dye      CT contrast (IV) allergy - need oral Methylpred as premed for scans     Ampicillin Rash       Social History  Social History     Socioeconomic History     Marital status: Single     Spouse name: Not on file     Number of children: Not on file     Years of education: Not on file     Highest education level: Not on file   Occupational History     Not on file   Social Needs     Financial resource strain: Not on file     Food insecurity     Worry: Not on file     Inability: Not on file     Transportation needs     Medical: Not on file     Non-medical: Not on file   Tobacco Use     Smoking status: Former Smoker     Smokeless tobacco: Never Used     Tobacco comment: Quit at age 19   Substance and Sexual Activity     Alcohol use: Yes     Alcohol/week: 11.7 standard drinks     Types: 14 drink(s) per week     Drug use: Not on file     Sexual activity: Not on file   Lifestyle     Physical activity     Days per week: Not on file     Minutes per session: Not on file     Stress: Not on file   Relationships     Social connections     Talks on phone: Not on file     Gets together: Not on file     Attends Gnosticism service: Not on file     Active member of club or organization: Not on file     Attends meetings of clubs or organizations: Not on file     Relationship status: Not on file     Intimate  "partner violence     Fear of current or ex partner: Not on file     Emotionally abused: Not on file     Physically abused: Not on file     Forced sexual activity: Not on file   Other Topics Concern     Parent/sibling w/ CABG, MI or angioplasty before 65F 55M? Not Asked   Social History Narrative     Not on file       Family History  Family History   Problem Relation Age of Onset     Cancer Mother         Blood     Cancer Father         Lung     Cancer Maternal Grandmother         Breast     Cancer Paternal Grandfather         Hodgkins           Anesthesia Evaluation   Pt has had prior anesthetic.     No history of anesthetic complications       ROS/MED HX  ENT/Pulmonary:     (+) tobacco use, Past use,  (-) asthma and COPD   Neurologic: Comment: H/o spinal meningitis 1975      Cardiovascular:     (+) -----pacemaker, Reason placed: complete heart block. type: [facility],     METS/Exercise Tolerance:     Hematologic:  - neg hematologic  ROS  (-) history of blood clots and history of blood transfusion   Musculoskeletal: Comment: Chronic low back pain      GI/Hepatic: Comment: Left inguinal hernia    (+) GERD,     Renal/Genitourinary:  - neg Renal ROS     Endo:  - neg endo ROS     Psychiatric/Substance Use:  - neg psychiatric ROS  (-) alcohol abuse history   Infectious Disease:  - neg infectious disease ROS     Malignancy:   (+) Malignancy, History of Lymphoma/Leukemia.Lymph CA Remission status post Chemo and Radiation.        Other:  - neg other ROS          The complete review of systems is negative other than noted in the HPI or here.   Temp: 98.1  F (36.7  C) Temp src: Oral BP: 115/73 Pulse: 78   Resp: 16 SpO2: 97 %         143 lbs 9.6 oz  5' 7\"   Body mass index is 22.49 kg/m .       Physical Exam  Constitutional: Awake, alert, cooperative, no apparent distress, and appears stated age.  Eyes: Pupils equal, round and reactive to light, extra ocular muscles intact, sclera clear, conjunctiva normal.  HENT: Normocephalic, " oral pharynx with moist mucus membranes, good dentition. No goiter appreciated.   Respiratory: Clear to auscultation bilaterally, no crackles or wheezing.  Cardiovascular: Regular rate and rhythm, normal S1 and S2, and no murmur noted.  Carotids +2, no bruits. No edema. Palpable pulses to radial  DP and PT arteries.   GI: Normal bowel sounds  Lymph/Hematologic: No cervical lymphadenopathy and no supraclavicular lymphadenopathy.  Genitourinary:  deferred  Skin: Warm and dry.    Musculoskeletal: Full ROM of neck. There is no redness, warmth, or swelling of the joints. Gross motor strength is normal.    Neurologic: Awake, alert, oriented to name, place and time. Cranial nerves II-XII are grossly intact. Gait is normal.   Neuropsychiatric: Calm, cooperative. Normal affect.     PRIOR LABS/DIAGNOSTIC STUDIES:  All labs and imaging personally reviewed    Component      Latest Ref Rng & Units 7/1/2021   WBC      4.0 - 11.0 10e9/L 3.0 (L)   RBC Count      4.4 - 5.9 10e12/L 3.95 (L)   Hemoglobin      13.3 - 17.7 g/dL 12.7 (L)   Hematocrit      40.0 - 53.0 % 36.9 (L)   MCV      78 - 100 fl 93   MCH      26.5 - 33.0 pg 32.2   MCHC      31.5 - 36.5 g/dL 34.4   RDW      10.0 - 15.0 % 12.5   Platelet Count      150 - 450 10e9/L 91 (L)     Component      Latest Ref Rng & Units 7/1/2021   Sodium      133 - 144 mmol/L 141   Potassium      3.4 - 5.3 mmol/L 4.9   Chloride      94 - 109 mmol/L 108   Carbon Dioxide      20 - 32 mmol/L 27   Anion Gap      3 - 14 mmol/L 5   Glucose      70 - 99 mg/dL 83   Urea Nitrogen      7 - 30 mg/dL 29   Creatinine      0.66 - 1.25 mg/dL 0.92   GFR Estimate      >60 mL/min/1.73:m2 83   GFR Estimate If Black      >60 mL/min/1.73:m2 >90   Calcium      8.5 - 10.1 mg/dL 9.2       Cardiac echo: 5/24/21  Interpretation Summary  Left ventricular function, chamber size, wall motion, and wall thickness are  normal.The EF is 55-60%.  The right ventricle is normal size. Global right ventricular function  "is  normal.  No pericardial effusion is present.    Procedure: CT CALCIUM SCREENING   Examination Date: 5/24/2021  IMPRESSIONS:  1.  No coronary calcifications.  2.  The total Agatston calcium score is 0 placing the patient in the  lowest percentile when compared to age and gender matched control  group.  3.  Please review Radiology report for incidental noncardiac findings  that will follow separately.         Outside records reviewed from: care everywhere    ASSESSMENT and PLAN  Shahid Davis is a 70 year old male scheduled for HERNIORRHAPHY, INGUINAL, ROBOT-ASSISTED, Left, Mesh on 7/9/21 by Dr. Tyler in treatment of left inguinal hernia.  PAC referral for risk assessment and optimization for anesthesia:     Pre-operative considerations:   1.  Cardiac:  Functional status- METS >4.  Intermediate risk surgery with 0.4% (RCRI #) risk of major adverse cardiac event.   --denies chest pain, SOB, PARRISH, palpitations, edema.  Pt describes intermittent exertional \"dizziness\", \"like it feels right before you pass out\".  This happens when climbing many stairs or after walking 100 yards or so.  He did climb stairs to 5th floor today but took breaks.  He hikes frequently with his dog.  --Hx of complete heart block s/p [facility] dual chamber PM placement in [place] 2/2020.   --echo 5/24/21 with EF 55-60%, mild TR, trace AI, mild MR.     --CT coronary calcium score of ZERO, 5/24/21.   --Pt needs device interrogation. Spoke to Torei, Device nurse and pt will get interrogation today after visit with Dr. Fox of cardiology.  --staff message sent to Dr. Fox as pt is seeing him this afternoon.    2.  Pulm:  Airway feasible.  SASHA risk: Low  --former smoker     3.  GI:  Risk of PONV score = 2.  If > 2, anti-emetic intervention recommended.   --continue omeprazole   --intermittent left inguinal swelling.  Abdominal US showed Small fat-containing left inguinal hernia and benign appearing left inguinal lymph node.     4.  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   --followed by Dr. Holman of heme/onc, last seen 5/3/21 with 4-6 month follow up planned.    VTE risk: 1.8%     Patient is optimized and is acceptable candidate for the proposed procedure.  No further diagnostic evaluation is needed.       Eden Benjamin PA-C  Preoperative Assessment Center  [facility]  Phone: [phone]  Fax: [phone]

## 2021-07-01 NOTE — PATIENT INSTRUCTIONS
Patient Instructions:  It was a pleasure to see you in the cardiology clinic today.      If you have any questions, you can reach my nurse, Nadia PINEDA LPN, at [phone].  Press Option #1 for the [facility], and then press Option #4 for nursing.    We are encouraging the use of [employer] to communicate with your HealthCare Provider    Medication Changes: None.    Recommendations: Fasting labs in one year prior to seeing Dr Fox.    Studies Ordered: Echocardiogram in one year prior to seeing Dr Fox.    The results from today include: None.    Please follow up: With Dr. Fox in one year.    Sincerely,    Elroy Fox MD     If you have an urgent need after hours (8:00 am to 4:30 pm) please call [phone] and ask for the cardiology fellow on call.

## 2021-07-01 NOTE — PATIENT INSTRUCTIONS
It was a pleasure to see you in clinic today. Please do not hesitate to call with any questions or concerns. You are scheduled for a remote Pacemaker transmission in 3 months. This will happen automatically in the night. We will call in 1-2 business days to discuss the results with you. We look forward to seeing you in clinic at your next device check in 6-12 months    Torie Godwin, RN  Electrophysiology Nurse Clinician  [facility]  During business hours call:  [phone]  After business hours please call: [phone]- select option #4 and ask for job code [id number].

## 2021-07-09 ENCOUNTER — ANESTHESIA (OUTPATIENT)
Dept: SURGERY | Facility: CLINIC | Age: 71
End: 2021-07-09
Payer: MEDICARE

## 2021-07-09 ENCOUNTER — ANESTHESIA EVENT (OUTPATIENT)
Dept: SURGERY | Facility: CLINIC | Age: 71
End: 2021-07-09
Payer: MEDICARE

## 2021-07-09 ENCOUNTER — HOSPITAL ENCOUNTER (OUTPATIENT)
Facility: CLINIC | Age: 71
Discharge: HOME | End: 2021-07-09
Attending: SURGERY | Admitting: SURGERY
Payer: MEDICARE

## 2021-07-09 VITALS
HEIGHT: 67 IN | SYSTOLIC BLOOD PRESSURE: 134 MMHG | OXYGEN SATURATION: 96 % | HEART RATE: 88 BPM | DIASTOLIC BLOOD PRESSURE: 76 MMHG | WEIGHT: 142.2 LBS | TEMPERATURE: 99.1 F | BODY MASS INDEX: 22.32 KG/M2 | RESPIRATION RATE: 18 BRPM

## 2021-07-09 DIAGNOSIS — K40.90 UNILATERAL INGUINAL HERNIA WITHOUT OBSTRUCTION OR GANGRENE, RECURRENCE NOT SPECIFIED: ICD-10-CM

## 2021-07-09 PROCEDURE — 250N000009 HC RX 250: Performed by: SURGERY

## 2021-07-09 PROCEDURE — C1781 MESH (IMPLANTABLE): HCPCS | Performed by: SURGERY

## 2021-07-09 PROCEDURE — 710N000012 HC RECOVERY PHASE 2, PER MINUTE: Performed by: SURGERY

## 2021-07-09 PROCEDURE — 250N000011 HC RX IP 250 OP 636: Performed by: SURGERY

## 2021-07-09 PROCEDURE — 250N000025 HC SEVOFLURANE, PER MIN: Performed by: SURGERY

## 2021-07-09 PROCEDURE — 710N000010 HC RECOVERY PHASE 1, LEVEL 2, PER MIN: Performed by: SURGERY

## 2021-07-09 PROCEDURE — 49650 LAP ING HERNIA REPAIR INIT: CPT | Performed by: SURGERY

## 2021-07-09 PROCEDURE — 360N000080 HC SURGERY LEVEL 7, PER MIN: Performed by: SURGERY

## 2021-07-09 PROCEDURE — 250N000011 HC RX IP 250 OP 636: Performed by: NURSE ANESTHETIST, CERTIFIED REGISTERED

## 2021-07-09 PROCEDURE — 250N000024 HC ISOFLURANE, PER MIN: Performed by: SURGERY

## 2021-07-09 PROCEDURE — 370N000017 HC ANESTHESIA TECHNICAL FEE, PER MIN: Performed by: SURGERY

## 2021-07-09 PROCEDURE — 272N000001 HC OR GENERAL SUPPLY STERILE: Performed by: SURGERY

## 2021-07-09 PROCEDURE — 250N000009 HC RX 250: Performed by: NURSE ANESTHETIST, CERTIFIED REGISTERED

## 2021-07-09 PROCEDURE — 999N000141 HC STATISTIC PRE-PROCEDURE NURSING ASSESSMENT: Performed by: SURGERY

## 2021-07-09 PROCEDURE — 258N000003 HC RX IP 258 OP 636: Performed by: NURSE ANESTHETIST, CERTIFIED REGISTERED

## 2021-07-09 DEVICE — MESH PROGRIP LAPAROSCOPIC 5.9X3.9" PARIETEX SELF-FIX LPG1510: Type: IMPLANTABLE DEVICE | Site: ABDOMEN | Status: FUNCTIONAL

## 2021-07-09 RX ORDER — HYDROMORPHONE HYDROCHLORIDE 1 MG/ML
.3-.5 INJECTION, SOLUTION INTRAMUSCULAR; INTRAVENOUS; SUBCUTANEOUS EVERY 10 MIN PRN
Status: DISCONTINUED | OUTPATIENT
Start: 2021-07-09 | End: 2021-07-09 | Stop reason: HOSPADM

## 2021-07-09 RX ORDER — OXYCODONE HYDROCHLORIDE 5 MG/1
5 TABLET ORAL EVERY 6 HOURS PRN
Qty: 12 TABLET | Refills: 0 | Status: SHIPPED | OUTPATIENT
Start: 2021-07-09 | End: 2021-07-12

## 2021-07-09 RX ORDER — NALOXONE HYDROCHLORIDE 0.4 MG/ML
0.4 INJECTION, SOLUTION INTRAMUSCULAR; INTRAVENOUS; SUBCUTANEOUS
Status: DISCONTINUED | OUTPATIENT
Start: 2021-07-09 | End: 2021-07-09 | Stop reason: HOSPADM

## 2021-07-09 RX ORDER — LIDOCAINE HYDROCHLORIDE 20 MG/ML
INJECTION, SOLUTION INFILTRATION; PERINEURAL PRN
Status: DISCONTINUED | OUTPATIENT
Start: 2021-07-09 | End: 2021-07-09

## 2021-07-09 RX ORDER — CLINDAMYCIN PHOSPHATE 900 MG/50ML
900 INJECTION, SOLUTION INTRAVENOUS SEE ADMIN INSTRUCTIONS
Status: DISCONTINUED | OUTPATIENT
Start: 2021-07-09 | End: 2021-07-09 | Stop reason: HOSPADM

## 2021-07-09 RX ORDER — ONDANSETRON 4 MG/1
4 TABLET, ORALLY DISINTEGRATING ORAL EVERY 30 MIN PRN
Status: DISCONTINUED | OUTPATIENT
Start: 2021-07-09 | End: 2021-07-09 | Stop reason: HOSPADM

## 2021-07-09 RX ORDER — MEPERIDINE HYDROCHLORIDE 25 MG/ML
12.5 INJECTION INTRAMUSCULAR; INTRAVENOUS; SUBCUTANEOUS
Status: DISCONTINUED | OUTPATIENT
Start: 2021-07-09 | End: 2021-07-09 | Stop reason: HOSPADM

## 2021-07-09 RX ORDER — PROPOFOL 10 MG/ML
INJECTION, EMULSION INTRAVENOUS PRN
Status: DISCONTINUED | OUTPATIENT
Start: 2021-07-09 | End: 2021-07-09

## 2021-07-09 RX ORDER — ONDANSETRON 2 MG/ML
4 INJECTION INTRAMUSCULAR; INTRAVENOUS EVERY 30 MIN PRN
Status: DISCONTINUED | OUTPATIENT
Start: 2021-07-09 | End: 2021-07-09 | Stop reason: HOSPADM

## 2021-07-09 RX ORDER — NALOXONE HYDROCHLORIDE 0.4 MG/ML
0.2 INJECTION, SOLUTION INTRAMUSCULAR; INTRAVENOUS; SUBCUTANEOUS
Status: DISCONTINUED | OUTPATIENT
Start: 2021-07-09 | End: 2021-07-09 | Stop reason: HOSPADM

## 2021-07-09 RX ORDER — ONDANSETRON 2 MG/ML
INJECTION INTRAMUSCULAR; INTRAVENOUS PRN
Status: DISCONTINUED | OUTPATIENT
Start: 2021-07-09 | End: 2021-07-09

## 2021-07-09 RX ORDER — FENTANYL CITRATE 50 UG/ML
INJECTION, SOLUTION INTRAMUSCULAR; INTRAVENOUS PRN
Status: DISCONTINUED | OUTPATIENT
Start: 2021-07-09 | End: 2021-07-09

## 2021-07-09 RX ORDER — SODIUM CHLORIDE, SODIUM LACTATE, POTASSIUM CHLORIDE, CALCIUM CHLORIDE 600; 310; 30; 20 MG/100ML; MG/100ML; MG/100ML; MG/100ML
INJECTION, SOLUTION INTRAVENOUS CONTINUOUS PRN
Status: DISCONTINUED | OUTPATIENT
Start: 2021-07-09 | End: 2021-07-09

## 2021-07-09 RX ORDER — FENTANYL CITRATE 50 UG/ML
25-50 INJECTION, SOLUTION INTRAMUSCULAR; INTRAVENOUS
Status: DISCONTINUED | OUTPATIENT
Start: 2021-07-09 | End: 2021-07-09 | Stop reason: HOSPADM

## 2021-07-09 RX ORDER — CLINDAMYCIN PHOSPHATE 900 MG/50ML
900 INJECTION, SOLUTION INTRAVENOUS
Status: COMPLETED | OUTPATIENT
Start: 2021-07-09 | End: 2021-07-09

## 2021-07-09 RX ORDER — DEXAMETHASONE SODIUM PHOSPHATE 4 MG/ML
INJECTION, SOLUTION INTRA-ARTICULAR; INTRALESIONAL; INTRAMUSCULAR; INTRAVENOUS; SOFT TISSUE PRN
Status: DISCONTINUED | OUTPATIENT
Start: 2021-07-09 | End: 2021-07-09

## 2021-07-09 RX ORDER — SODIUM CHLORIDE, SODIUM LACTATE, POTASSIUM CHLORIDE, CALCIUM CHLORIDE 600; 310; 30; 20 MG/100ML; MG/100ML; MG/100ML; MG/100ML
INJECTION, SOLUTION INTRAVENOUS CONTINUOUS
Status: DISCONTINUED | OUTPATIENT
Start: 2021-07-09 | End: 2021-07-09 | Stop reason: HOSPADM

## 2021-07-09 RX ORDER — BUPIVACAINE HYDROCHLORIDE 5 MG/ML
INJECTION, SOLUTION EPIDURAL; INTRACAUDAL PRN
Status: DISCONTINUED | OUTPATIENT
Start: 2021-07-09 | End: 2021-07-09 | Stop reason: HOSPADM

## 2021-07-09 RX ADMIN — DEXAMETHASONE SODIUM PHOSPHATE 4 MG: 4 INJECTION, SOLUTION INTRA-ARTICULAR; INTRALESIONAL; INTRAMUSCULAR; INTRAVENOUS; SOFT TISSUE at 12:53

## 2021-07-09 RX ADMIN — CLINDAMYCIN PHOSPHATE 900 MG: 900 INJECTION, SOLUTION INTRAVENOUS at 12:30

## 2021-07-09 RX ADMIN — LIDOCAINE HYDROCHLORIDE 60 MG: 20 INJECTION, SOLUTION INFILTRATION; PERINEURAL at 12:23

## 2021-07-09 RX ADMIN — PHENYLEPHRINE HYDROCHLORIDE 100 MCG: 10 INJECTION INTRAVENOUS at 12:46

## 2021-07-09 RX ADMIN — SODIUM CHLORIDE, POTASSIUM CHLORIDE, SODIUM LACTATE AND CALCIUM CHLORIDE: 600; 310; 30; 20 INJECTION, SOLUTION INTRAVENOUS at 12:15

## 2021-07-09 RX ADMIN — ONDANSETRON 4 MG: 2 INJECTION INTRAMUSCULAR; INTRAVENOUS at 13:45

## 2021-07-09 RX ADMIN — PROPOFOL 160 MG: 10 INJECTION, EMULSION INTRAVENOUS at 12:23

## 2021-07-09 RX ADMIN — ROCURONIUM BROMIDE 10 MG: 10 INJECTION INTRAVENOUS at 13:13

## 2021-07-09 RX ADMIN — ROCURONIUM BROMIDE 50 MG: 10 INJECTION INTRAVENOUS at 12:23

## 2021-07-09 RX ADMIN — FENTANYL CITRATE 100 MCG: 50 INJECTION, SOLUTION INTRAMUSCULAR; INTRAVENOUS at 12:23

## 2021-07-09 RX ADMIN — PHENYLEPHRINE HYDROCHLORIDE 100 MCG: 10 INJECTION INTRAVENOUS at 13:05

## 2021-07-09 RX ADMIN — FENTANYL CITRATE 50 MCG: 50 INJECTION, SOLUTION INTRAMUSCULAR; INTRAVENOUS at 13:38

## 2021-07-09 RX ADMIN — SUGAMMADEX 200 MG: 100 INJECTION, SOLUTION INTRAVENOUS at 13:45

## 2021-07-09 RX ADMIN — PHENYLEPHRINE HYDROCHLORIDE 100 MCG: 10 INJECTION INTRAVENOUS at 12:32

## 2021-07-09 RX ADMIN — FENTANYL CITRATE 50 MCG: 50 INJECTION, SOLUTION INTRAMUSCULAR; INTRAVENOUS at 13:47

## 2021-07-09 ASSESSMENT — COPD QUESTIONNAIRES: COPD: 0

## 2021-07-09 ASSESSMENT — MIFFLIN-ST. JEOR: SCORE: 1358.75

## 2021-07-09 ASSESSMENT — LIFESTYLE VARIABLES: TOBACCO_USE: 1

## 2021-07-09 NOTE — OR NURSING
Assessment complete..    no arthritis/no muscle cramps/no arthralgia/no joint swelling/no arm pain L/no arm pain R/no leg pain R/no muscle weakness/no myalgia/no stiffness/no neck pain/no leg pain L

## 2021-07-09 NOTE — ANESTHESIA PREPROCEDURE EVALUATION
Anesthesia Pre-Procedure Evaluation    Patient: Shahid Davis   MRN: [id number] : 1950        Preoperative Diagnosis: Unilateral inguinal hernia without obstruction or gangrene, recurrence not specified [K40.90]   Procedure : Procedure(s):  HERNIORRHAPHY, INGUINAL, ROBOT-ASSISTED, Left, Mesh     Past Medical History:   Diagnosis Date     Cardiac pacemaker in situ      Lymphoma (H)      Squamous cell carcinoma       Past Surgical History:   Procedure Laterality Date     MOHS MICROGRAPHIC PROCEDURE       NO HISTORY OF SURGERY        Allergies   Allergen Reactions     Ampicillin [Ampicillin Sodium]      Bactrim [Sulfamethoxazole W/Trimethoprim]      Contrast Dye      CT contrast (IV) allergy - need oral Methylpred as premed for scans     Ampicillin Rash      Social History     Tobacco Use     Smoking status: Former Smoker     Smokeless tobacco: Never Used     Tobacco comment: Quit at age 19   Substance Use Topics     Alcohol use: Yes     Alcohol/week: 11.7 standard drinks     Types: 14 drink(s) per week      Wt Readings from Last 1 Encounters:   21 64.5 kg (142 lb 3.2 oz)        Anesthesia Evaluation   Pt has had prior anesthetic.     No history of anesthetic complications       ROS/MED HX  ENT/Pulmonary:     (+) tobacco use, Past use,  (-) asthma and COPD   Neurologic: Comment: H/o spinal meningitis       Cardiovascular:     (+) -----pacemaker, Reason placed: complete heart block. type: [facility],     METS/Exercise Tolerance:     Hematologic:  - neg hematologic  ROS  (-) history of blood clots and history of blood transfusion   Musculoskeletal: Comment: Chronic low back pain      GI/Hepatic: Comment: Left inguinal hernia    (+) GERD,     Renal/Genitourinary:  - neg Renal ROS     Endo:  - neg endo ROS     Psychiatric/Substance Use:  - neg psychiatric ROS  (-) alcohol abuse history   Infectious Disease:  - neg infectious disease ROS     Malignancy:   (+) Malignancy, History of Lymphoma/Leukemia.Lymph  CA Remission status post Chemo and Radiation.        Other:  - neg other ROS          Physical Exam    Airway        Mallampati: II   TM distance: > 3 FB   Neck ROM: full   Mouth opening: > 3 cm    Respiratory Devices and Support         Dental  no notable dental history         Cardiovascular   cardiovascular exam normal       Rhythm and rate: regular and normal     Pulmonary   pulmonary exam normal        breath sounds clear to auscultation           OUTSIDE LABS:  CBC:   Lab Results   Component Value Date    WBC 3.0 (L) 07/01/2021    WBC 2.6 (L) 05/13/2021    HGB 12.7 (L) 07/01/2021    HGB 12.6 (L) 05/13/2021    HCT 36.9 (L) 07/01/2021    HCT 38.0 (L) 05/13/2021    PLT 91 (L) 07/01/2021    PLT 92 (L) 05/13/2021     BMP:   Lab Results   Component Value Date     07/01/2021     05/13/2021    POTASSIUM 4.9 07/01/2021    POTASSIUM 4.6 05/13/2021    CHLORIDE 108 07/01/2021    CHLORIDE 109 05/13/2021    CO2 27 07/01/2021    CO2 28 05/13/2021    BUN 29 07/01/2021    BUN 31 (H) 05/13/2021    CR 0.92 07/01/2021    CR 0.94 05/13/2021    GLC 83 07/01/2021    GLC 76 05/13/2021     COAGS: No results found for: PTT, INR, FIBR  POC:   Lab Results   Component Value Date    BGM 94 02/22/2012     HEPATIC:   Lab Results   Component Value Date    ALBUMIN 3.8 05/13/2021    PROTTOTAL 6.7 (L) 05/13/2021    ALT 37 05/13/2021    AST 24 05/13/2021    ALKPHOS 104 05/13/2021    BILITOTAL 0.4 05/13/2021     OTHER:   Lab Results   Component Value Date    JORGE 9.2 07/01/2021    TSH 1.61 02/16/2012       Anesthesia Plan    ASA Status:  3      Anesthesia Type: General.     - Airway: ETT   Induction: Intravenous.   Maintenance: Balanced.        Consents    Anesthesia Plan(s) and associated risks, benefits, and realistic alternatives discussed. Questions answered and patient/representative(s) expressed understanding.     - Discussed with:  Patient      - Extended Intubation/Ventilatory Support Discussed: No.      - Patient is DNR/DNI  Status: No    Use of blood products discussed: No .     Postoperative Care    Pain management: IV analgesics.   PONV prophylaxis: Ondansetron (or other 5HT-3), Dexamethasone or Solumedrol     Comments:                Willie Thompson DO

## 2021-07-09 NOTE — DISCHARGE INSTRUCTIONS
After Laparoscopic Hernia Repair  You had a procedure called laparoscopic hernia repair. A hernia is a defect in the tough tissue covering the musculature of the abdominal wall (fascia). During laparoscopic hernia surgery, a surgeon inserts a telescope attached to a camera as well as surgical instruments through tiny incisions in your abdomen. The surgeon repairs the hernia with a mesh, which patches the tear or weakness in the fascia.  Home care    You may have sharp pain that radiates to your shoulder. This is referred pain from the gases they used to inflate your belly. It's common and usually lasts a short time. You may also have numbness around the incision area.    Keep doing the coughing and deep breathing exercises that you learned in the hospital. These will help to prevent lung infection.    Prevent constipation so you don t strain when going to the bathroom. Eat fruits, vegetables, and whole grains. Drink 6 to 8 glasses of water a day, unless otherwise directed. Use a laxative or a mild stool softener if your healthcare provider says it s OK.    Wash your incision with mild soap and water. Pat it dry. Don t use oil, powder, or lotion on your incision.  Shower tomorrow is ok.   Activity    Ask others to help with chores and errands while you recover.    Don t lift anything heavier than 20 pounds for 3-4 weeks    Don t mow the lawn, use a vacuum , or do other strenuous activities until your healthcare provider says it's OK.    Climb stairs slowly and pause after every few steps.    Walk as often as you feel able.    Ask your healthcare provider when you can drive again. This may be when you stop taking pain medicine and can move comfortably from side to side. Don t drive if you are still taking opioid pain medicine.    When to call your healthcare provider   Call your healthcare provider right away if you have any of the following:    Pain, bleeding, redness, or fluid at the incision site that gets  worse    Fever of 100.4 F (38 C) or higher or as directed by your healthcare provider    Chills    Vomiting or nausea that doesn t go away    Inability to urinate    No bowel movement after 3 days    Swelling in abdomen or groin that gets worse    Pain that s not relieved by medicine  Mark last reviewed this educational content on 2019-2021 [employer]. All rights reserved. This information is not intended as a substitute for professional medical care. Always follow your healthcare professional's instructions.    [facility], [place]  Same-Day Surgery   Adult Discharge Orders & Instructions     For 24 hours after surgery    1. Get plenty of rest.  A responsible adult must stay with you for at least 24 hours after you leave the hospital.   2. Do not drive or use heavy equipment.  If you have weakness or tingling, don't drive or use heavy equipment until this feeling goes away.  3. Do not drink alcohol.  4. Avoid strenuous or risky activities.  Ask for help when climbing stairs.   5. You may feel lightheaded.  IF so, sit for a few minutes before standing.  Have someone help you get up.   6. If you have nausea (feel sick to your stomach): Drink only clear liquids such as apple juice, ginger ale, broth or 7-Up.  Rest may also help.  Be sure to drink enough fluids.  Move to a regular diet as you feel able.  7. You may have a slight fever. Call the doctor if your fever is over 100 F (37.7 C) (taken under the tongue) or lasts longer than 24 hours.  8. You may have a dry mouth, a sore throat, muscle aches or trouble sleeping.  These should go away after 24 hours.  9. Do not make important or legal decisions.   Call your doctor for any of the followin.  Signs of infection (fever, growing tenderness at the surgery site, a large amount of drainage or bleeding, severe pain, foul-smelling drainage, redness, swelling).    2. It has been over 8 to 10 hours since  surgery and you are still not able to urinate (pass water).    3.  Headache for over 24 hours.    To contact a doctor, call Dr. [facility] clinic @ [phone] or:        [phone] and ask for the resident on call for   General Surgery (answered 24 hours a day)      Emergency Department:    [facility]: [phone]       (TTY for hearing impaired: [phone])

## 2021-07-09 NOTE — OR NURSING
"PACU19 Ryan. ANEUDY, does pt need any orders in Pacu besides d/c order? thank you. Yadi PINEDA RN *[phone]          @1526 Device nurse paged \"PACU 19 Davis. please call regarding pacemaker post-op. thank you! *[phone]\"    @1534 Device Nurse called back. Pacemaker is good to go.           @1539 Dr. Hinojosa paged \"PACU#19 ANEUDY Davis would like to talk to a surgeon. Please come see pt. thank you! Yadi MARTINEZ, [phone] or  *[phone]\"    @1541 Dr. Hinojosa called back, spoke to pt & answer questions. No pacu orders besides discharge order  "

## 2021-07-09 NOTE — ANESTHESIA CARE TRANSFER NOTE
Patient: Shahid Davis    Procedure(s):  HERNIORRHAPHY, INGUINAL, ROBOT-ASSISTED, Left, Mesh    Diagnosis: Unilateral inguinal hernia without obstruction or gangrene, recurrence not specified [K40.90]  Diagnosis Additional Information: No value filed.    Anesthesia Type:   General     Note:    Oropharynx: oropharynx clear of all foreign objects  Level of Consciousness: awake  Oxygen Supplementation: face mask  Level of Supplemental Oxygen (L/min / FiO2): 4  Independent Airway: airway patency satisfactory and stable  Dentition: dentition unchanged  Vital Signs Stable: post-procedure vital signs reviewed and stable  Report to RN Given: handoff report given  Patient transferred to: PACU    Handoff Report: Identifed the Patient, Identified the Reponsible Provider, Reviewed the pertinent medical history, Discussed the surgical course, Reviewed Intra-OP anesthesia mangement and issues during anesthesia, Set expectations for post-procedure period and Allowed opportunity for questions and acknowledgement of understanding      Vitals: (Last set prior to Anesthesia Care Transfer)  CRNA VITALS  7/9/2021 1326 - 7/9/2021 1409      7/9/2021             Resp Rate (observed):  (!) 1        Electronically Signed By: FARSHAD LENZ CRNA  July 9, 2021  2:09 PM

## 2021-07-09 NOTE — BRIEF OP NOTE
[facility] Brief Operative Note    Pre-operative diagnosis: Unilateral inguinal hernia without obstruction or gangrene, recurrence not specified [K40.90]   Post-operative diagnosis same   Procedure: Procedure(s):  HERNIORRHAPHY, INGUINAL, ROBOT-ASSISTED, Left, Mesh   Surgeon(s): Surgeon(s) and Role:     * Shamar [place] MD - Primary     * Jorgito Hinojosa MD - Assisting   Estimated blood loss: 1 mL    Specimens: * No specimens in log *   Findings: Left indirect inguinal hernia with cord lipoma

## 2021-07-09 NOTE — ANESTHESIA POSTPROCEDURE EVALUATION
Patient: Shahid Davis    Procedure(s):  HERNIORRHAPHY, INGUINAL, ROBOT-ASSISTED, Left, Mesh    Diagnosis:Unilateral inguinal hernia without obstruction or gangrene, recurrence not specified [K40.90]  Diagnosis Additional Information: No value filed.    Anesthesia Type:  General    Note:  Disposition: Outpatient   Postop Pain Control: Uneventful            Sign Out: Well controlled pain   PONV: No   Neuro/Psych: Uneventful            Sign Out: Acceptable/Baseline neuro status   Airway/Respiratory: Uneventful            Sign Out: Acceptable/Baseline resp. status   CV/Hemodynamics: Uneventful            Sign Out: Acceptable CV status; No obvious hypovolemia; No obvious fluid overload   Other NRE: NONE   DID A NON-ROUTINE EVENT OCCUR? No           Last vitals:  Vitals:    07/09/21 1515 07/09/21 1530 07/09/21 1545   BP: (!) 119/100 128/87 126/75   Pulse: 87 87 87   Resp: 13 9 12   Temp:      SpO2: 97% 96% 94%       Last vitals prior to Anesthesia Care Transfer:  CRNA VITALS  7/9/2021 1326 - 7/9/2021 1426      7/9/2021             Resp Rate (observed):  (!) 1          Electronically Signed By: Willie Thompson DO  July 9, 2021  4:28 PM

## 2021-07-10 NOTE — OP NOTE
[facility] Operative Note    Pre-operative diagnosis: Unilateral inguinal hernia without obstruction or gangrene, recurrence not specified [K40.90]   Post-operative diagnosis same   Procedure: Procedure(s):  HERNIORRHAPHY, INGUINAL, ROBOT-ASSISTED, Left, Mesh   Surgeon(s): Surgeon(s) and Role:     * Jorgito Hinojosa MD, MD - Primary     * Shamar [place] MD  - Assisting   Estimated blood loss: 1 mL    Specimens: * No specimens in log *   Findings: Indirect left inguinal hernia with cord lipoma       Description of procedure:    ASSISTANT: Please note the assistant surgeon for this procedure was Dr. [facility]; the assistant was scrubbed because there was no qualified surgical resident available to assist.    Patient was brought to the OR and placed supine on the operating table.  He was sedated and intubated without issue.  ABX were given and his abdomen was prepped and draped in sterile fashion.  The table was flexed.  A time out was performed.    A transverse incision above the umbilicus was made and carried down to fascia with cautery. The right anterior rectus sheath was opened and the rectus pulled laterally.  A Veress needle was then placed and the abdomen insufflated.  A nacho robotic port was then placed at the same site and the laparoscope introduced.  Two additional robotic ports were placed in the right and left abdomen.  There were some adhesions along the right abdomen which were avoided during trocar placement.  We placed a veress in the midline 3 finger breadths above the pubis to guide our dissection.  We noted a left indirect inguinal hernia and no hernia on the right.    Using sharp and cautery dissection we made our peritoneal flap.  Starting at the site of our veress needle and working laterally we dissected the peritoneal lining off of the left anterior abdominal wall.  This was carried down both lateral and medial to the indirect inguinal hernia, exposing the pubic bone past the  midline.  After dissecting out lateral and medial to the hernia we were able to reduce the hernia using traction and careful cautery dissection.  The vas deferens was seen and protected.  A cord lipoma was reduced into the abdominal cavity.  After the hernia sac was re-peritonealized we placed our pre-trimmed [id number] progrip mesh over the defect.  It was noted to lay flat, covering over the midline and below the pubic bone.  We then closed our peritoneal flap with 2.0 locking suture.  The mesh was seen to lay flat at the end of the case.    Our ports were then removed and the abdomen desufflated.  We closed the anterior rectus sheath with 0 vicryl suture.  Skin was closed with 4.0 monocryl and steri strips applied.  Shahid Davis was then extubated and brought to the PACU in stable condition.    I performed the procedure

## 2021-07-12 ENCOUNTER — PATIENT OUTREACH (OUTPATIENT)
Dept: SURGERY | Facility: CLINIC | Age: 71
End: 2021-07-12

## 2021-07-12 NOTE — PROGRESS NOTES
RN Post-Op/Post-Discharge Care Coordination Note    Mr. Shahid Davis is a 70 year old male who underwent davinci left inguinal hernia repair on 7/9 with  Dr. Jorgito Hinojosa.  Spoke with Patient.    Support  Patient able to care for self independently     Health Status  Nausea/Vomiting: Patient reports a lack of appetite.  Eating/drinking: Patient is able to eat and drink without any complaints. He feels he isn't as hungry because he hasn't been as active. He is drinking plenty of fluid daily.  Bowel habits: Patient reports having a normal bowel movement.  Drains (MILY): N/A  Fevers/chills: Patient denies any fever or chills.  Incisions: Patient denies any signs and symptoms of infection..  Wound closure:  Steri-strips  Pain: patient states he has pain with moving. He only used 1 Oxycodone and has been taking Ibuprofen at bedtime to help him sleep. He is also using a heating pad to his abdomen prn.  New Medications:  Oxycodone    Activity/Restrictions  Return to normal activites as tolerated    Pathology reviewed with patient:  N/A    Forms/Letters  No    All of his questions were answered including reviewing restrictions, and wound care.  He will call this office if he has any further questions and/or concerns.      In lieu of a post-op clinic patient that patient would like to be contacted in approximately 7-10 days via telephone.    A copy of this note was routed to the primary surgeon.      Whom and When to Call  Patient acknowledges understanding of how to manage any medication changes and   when to seek medical care.     Patient advised that if after hour medical concerns arise to please call [phone] and choose option 4 to speak to the physician on call.

## 2021-07-14 LAB
MDC_IDC_LEAD_IMPLANT_DT: NORMAL
MDC_IDC_LEAD_IMPLANT_DT: NORMAL
MDC_IDC_LEAD_LOCATION: NORMAL
MDC_IDC_LEAD_LOCATION: NORMAL
MDC_IDC_LEAD_LOCATION_DETAIL_1: NORMAL
MDC_IDC_LEAD_LOCATION_DETAIL_1: NORMAL
MDC_IDC_LEAD_MFG: NORMAL
MDC_IDC_LEAD_MFG: NORMAL
MDC_IDC_LEAD_MODEL: NORMAL
MDC_IDC_LEAD_MODEL: NORMAL
MDC_IDC_LEAD_POLARITY_TYPE: NORMAL
MDC_IDC_LEAD_POLARITY_TYPE: NORMAL
MDC_IDC_LEAD_SERIAL: NORMAL
MDC_IDC_LEAD_SERIAL: NORMAL
MDC_IDC_MSMT_BATTERY_DTM: NORMAL
MDC_IDC_MSMT_BATTERY_REMAINING_LONGEVITY: 108 MO
MDC_IDC_MSMT_BATTERY_STATUS: NORMAL
MDC_IDC_MSMT_BATTERY_VOLTAGE: 3.01 V
MDC_IDC_MSMT_LEADCHNL_RA_IMPEDANCE_VALUE: 412.5 OHM
MDC_IDC_MSMT_LEADCHNL_RA_PACING_THRESHOLD_AMPLITUDE: 0.75 V
MDC_IDC_MSMT_LEADCHNL_RA_PACING_THRESHOLD_PULSEWIDTH: 0.4 MS
MDC_IDC_MSMT_LEADCHNL_RA_SENSING_INTR_AMPL: 3.6 MV
MDC_IDC_MSMT_LEADCHNL_RV_IMPEDANCE_VALUE: 475 OHM
MDC_IDC_MSMT_LEADCHNL_RV_PACING_THRESHOLD_AMPLITUDE: 0.88 V
MDC_IDC_MSMT_LEADCHNL_RV_PACING_THRESHOLD_PULSEWIDTH: 0.4 MS
MDC_IDC_MSMT_LEADCHNL_RV_SENSING_INTR_AMPL: 12 MV
MDC_IDC_PG_IMPLANT_DTM: NORMAL
MDC_IDC_PG_MFG: NORMAL
MDC_IDC_PG_MODEL: NORMAL
MDC_IDC_PG_SERIAL: NORMAL
MDC_IDC_PG_TYPE: NORMAL
MDC_IDC_SESS_CLINIC_NAME: NORMAL
MDC_IDC_SESS_DTM: NORMAL
MDC_IDC_SESS_TYPE: NORMAL
MDC_IDC_SET_BRADY_AT_MODE_SWITCH_MODE: NORMAL
MDC_IDC_SET_BRADY_AT_MODE_SWITCH_RATE: 160 {BEATS}/MIN
MDC_IDC_SET_BRADY_HYSTRATE: NORMAL
MDC_IDC_SET_BRADY_LOWRATE: 60 {BEATS}/MIN
MDC_IDC_SET_BRADY_MAX_SENSOR_RATE: 120 {BEATS}/MIN
MDC_IDC_SET_BRADY_MAX_TRACKING_RATE: 120 {BEATS}/MIN
MDC_IDC_SET_BRADY_MODE: NORMAL
MDC_IDC_SET_BRADY_NIGHT_RATE: NORMAL
MDC_IDC_SET_BRADY_PAV_DELAY_LOW: 200 MS
MDC_IDC_SET_BRADY_SAV_DELAY_LOW: 200 MS
MDC_IDC_SET_LEADCHNL_RA_PACING_AMPLITUDE: 1.5 V
MDC_IDC_SET_LEADCHNL_RA_PACING_CAPTURE_MODE: NORMAL
MDC_IDC_SET_LEADCHNL_RA_PACING_POLARITY: NORMAL
MDC_IDC_SET_LEADCHNL_RA_PACING_PULSEWIDTH: 0.4 MS
MDC_IDC_SET_LEADCHNL_RA_SENSING_ADAPTATION_MODE: NORMAL
MDC_IDC_SET_LEADCHNL_RA_SENSING_POLARITY: NORMAL
MDC_IDC_SET_LEADCHNL_RV_PACING_AMPLITUDE: 1.12
MDC_IDC_SET_LEADCHNL_RV_PACING_CAPTURE_MODE: NORMAL
MDC_IDC_SET_LEADCHNL_RV_PACING_POLARITY: NORMAL
MDC_IDC_SET_LEADCHNL_RV_PACING_PULSEWIDTH: 0.4 MS
MDC_IDC_SET_LEADCHNL_RV_SENSING_POLARITY: NORMAL
MDC_IDC_SET_LEADCHNL_RV_SENSING_SENSITIVITY: 2.5 MV
MDC_IDC_STAT_AT_MODE_SW_COUNT: 0
MDC_IDC_STAT_BRADY_DTM_END: NORMAL
MDC_IDC_STAT_BRADY_DTM_START: NORMAL
MDC_IDC_STAT_BRADY_RA_PERCENT_PACED: 1 %
MDC_IDC_STAT_BRADY_RV_PERCENT_PACED: 99.87 %

## 2021-07-20 ENCOUNTER — PATIENT OUTREACH (OUTPATIENT)
Dept: SURGERY | Facility: CLINIC | Age: 71
End: 2021-07-20

## 2021-07-20 NOTE — PROGRESS NOTES
Shahid Davis is a patient of Dr. Jorgito Hinojosa that recently underwent a surgical procedure.  The patient was contacted via telephone approximately 7-10 days ago for a status update and post-op teaching.  In lieu of a clinic visit, the patient requested to be contacted at a later date by an RN for an assessment.    Attempted to contact patient via telephone for a status update.  LM on VM to call office.

## 2021-07-20 NOTE — PROGRESS NOTES
Shahid Davis was contacted several days post procedure via telephone for a status update and elected to have a telephone follow -up call approximately 7-10 days after original contact in lieu of a clinic visit with Dr. Shamar [place].  He continues to do well and the steri-strips  Have not peeled off- he may remove on Sunday, if he desires.  The patients wounds are healed and the area is C/D/I.  He is afebrile, pain free (little soreness at the end of the day that resolves), and anthony po; the patient is slowly resuming his normal activity.   Discussed restrictions including N/A.    Pathology was reviewed with the patient: N/A    All of Shahid Davis question's were answered and  he would like to return to the clinic on a PRN basis.  The patient is aware that  he may schedule a post op appointment at any time.    A copy of this note was routed to the patients surgeon.

## 2021-09-29 ENCOUNTER — ANCILLARY PROCEDURE (OUTPATIENT)
Dept: CARDIOLOGY | Facility: CLINIC | Age: 71
End: 2021-09-29
Attending: INTERNAL MEDICINE
Payer: MEDICARE

## 2021-09-29 DIAGNOSIS — Z95.0 PACEMAKER: ICD-10-CM

## 2021-09-29 DIAGNOSIS — I44.2 ATRIOVENTRICULAR BLOCK, COMPLETE (H): ICD-10-CM

## 2021-09-29 DIAGNOSIS — Z01.818 PREOP EXAMINATION: ICD-10-CM

## 2021-09-29 PROCEDURE — 93296 REM INTERROG EVL PM/IDS: CPT

## 2021-09-29 PROCEDURE — 93294 REM INTERROG EVL PM/LDLS PM: CPT | Performed by: INTERNAL MEDICINE

## 2021-10-02 LAB
MDC_IDC_EPISODE_DTM: NORMAL
MDC_IDC_EPISODE_ID: NORMAL
MDC_IDC_EPISODE_TYPE: NORMAL
MDC_IDC_LEAD_IMPLANT_DT: NORMAL
MDC_IDC_LEAD_IMPLANT_DT: NORMAL
MDC_IDC_LEAD_LOCATION: NORMAL
MDC_IDC_LEAD_LOCATION: NORMAL
MDC_IDC_LEAD_LOCATION_DETAIL_1: NORMAL
MDC_IDC_LEAD_LOCATION_DETAIL_1: NORMAL
MDC_IDC_LEAD_MFG: NORMAL
MDC_IDC_LEAD_MFG: NORMAL
MDC_IDC_LEAD_MODEL: NORMAL
MDC_IDC_LEAD_MODEL: NORMAL
MDC_IDC_LEAD_POLARITY_TYPE: NORMAL
MDC_IDC_LEAD_POLARITY_TYPE: NORMAL
MDC_IDC_LEAD_SERIAL: NORMAL
MDC_IDC_LEAD_SERIAL: NORMAL
MDC_IDC_MSMT_BATTERY_DTM: NORMAL
MDC_IDC_MSMT_BATTERY_REMAINING_LONGEVITY: 112 MO
MDC_IDC_MSMT_BATTERY_REMAINING_PERCENTAGE: 95.5 %
MDC_IDC_MSMT_BATTERY_RRT_TRIGGER: NORMAL
MDC_IDC_MSMT_BATTERY_STATUS: NORMAL
MDC_IDC_MSMT_BATTERY_VOLTAGE: 2.99 V
MDC_IDC_MSMT_LEADCHNL_RA_IMPEDANCE_VALUE: 390 OHM
MDC_IDC_MSMT_LEADCHNL_RA_LEAD_CHANNEL_STATUS: NORMAL
MDC_IDC_MSMT_LEADCHNL_RA_PACING_THRESHOLD_AMPLITUDE: 0.75 V
MDC_IDC_MSMT_LEADCHNL_RA_PACING_THRESHOLD_PULSEWIDTH: 0.4 MS
MDC_IDC_MSMT_LEADCHNL_RA_SENSING_INTR_AMPL: 3.2 MV
MDC_IDC_MSMT_LEADCHNL_RV_IMPEDANCE_VALUE: 450 OHM
MDC_IDC_MSMT_LEADCHNL_RV_LEAD_CHANNEL_STATUS: NORMAL
MDC_IDC_MSMT_LEADCHNL_RV_PACING_THRESHOLD_AMPLITUDE: 1 V
MDC_IDC_MSMT_LEADCHNL_RV_PACING_THRESHOLD_PULSEWIDTH: 0.4 MS
MDC_IDC_MSMT_LEADCHNL_RV_SENSING_INTR_AMPL: 12 MV
MDC_IDC_PG_IMPLANT_DTM: NORMAL
MDC_IDC_PG_MFG: NORMAL
MDC_IDC_PG_MODEL: NORMAL
MDC_IDC_PG_SERIAL: NORMAL
MDC_IDC_PG_TYPE: NORMAL
MDC_IDC_SESS_CLINIC_NAME: NORMAL
MDC_IDC_SESS_DTM: NORMAL
MDC_IDC_SESS_REPROGRAMMED: NO
MDC_IDC_SESS_TYPE: NORMAL
MDC_IDC_SET_BRADY_AT_MODE_SWITCH_MODE: NORMAL
MDC_IDC_SET_BRADY_AT_MODE_SWITCH_RATE: 160 {BEATS}/MIN
MDC_IDC_SET_BRADY_LOWRATE: 60 {BEATS}/MIN
MDC_IDC_SET_BRADY_MAX_SENSOR_RATE: 120 {BEATS}/MIN
MDC_IDC_SET_BRADY_MAX_TRACKING_RATE: 120 {BEATS}/MIN
MDC_IDC_SET_BRADY_MODE: NORMAL
MDC_IDC_SET_BRADY_PAV_DELAY_LOW: 200 MS
MDC_IDC_SET_BRADY_SAV_DELAY_LOW: 200 MS
MDC_IDC_SET_LEADCHNL_RA_PACING_AMPLITUDE: 1.5 V
MDC_IDC_SET_LEADCHNL_RA_PACING_ANODE_ELECTRODE_1: NORMAL
MDC_IDC_SET_LEADCHNL_RA_PACING_ANODE_LOCATION_1: NORMAL
MDC_IDC_SET_LEADCHNL_RA_PACING_CAPTURE_MODE: NORMAL
MDC_IDC_SET_LEADCHNL_RA_PACING_CATHODE_ELECTRODE_1: NORMAL
MDC_IDC_SET_LEADCHNL_RA_PACING_CATHODE_LOCATION_1: NORMAL
MDC_IDC_SET_LEADCHNL_RA_PACING_POLARITY: NORMAL
MDC_IDC_SET_LEADCHNL_RA_PACING_PULSEWIDTH: 0.4 MS
MDC_IDC_SET_LEADCHNL_RA_SENSING_ADAPTATION_MODE: NORMAL
MDC_IDC_SET_LEADCHNL_RA_SENSING_ANODE_ELECTRODE_1: NORMAL
MDC_IDC_SET_LEADCHNL_RA_SENSING_ANODE_LOCATION_1: NORMAL
MDC_IDC_SET_LEADCHNL_RA_SENSING_CATHODE_ELECTRODE_1: NORMAL
MDC_IDC_SET_LEADCHNL_RA_SENSING_CATHODE_LOCATION_1: NORMAL
MDC_IDC_SET_LEADCHNL_RA_SENSING_POLARITY: NORMAL
MDC_IDC_SET_LEADCHNL_RA_SENSING_SENSITIVITY: 0.75 MV
MDC_IDC_SET_LEADCHNL_RV_PACING_AMPLITUDE: 1.25 V
MDC_IDC_SET_LEADCHNL_RV_PACING_ANODE_ELECTRODE_1: NORMAL
MDC_IDC_SET_LEADCHNL_RV_PACING_ANODE_LOCATION_1: NORMAL
MDC_IDC_SET_LEADCHNL_RV_PACING_CAPTURE_MODE: NORMAL
MDC_IDC_SET_LEADCHNL_RV_PACING_CATHODE_ELECTRODE_1: NORMAL
MDC_IDC_SET_LEADCHNL_RV_PACING_CATHODE_LOCATION_1: NORMAL
MDC_IDC_SET_LEADCHNL_RV_PACING_POLARITY: NORMAL
MDC_IDC_SET_LEADCHNL_RV_PACING_PULSEWIDTH: 0.4 MS
MDC_IDC_SET_LEADCHNL_RV_SENSING_ADAPTATION_MODE: NORMAL
MDC_IDC_SET_LEADCHNL_RV_SENSING_ANODE_ELECTRODE_1: NORMAL
MDC_IDC_SET_LEADCHNL_RV_SENSING_ANODE_LOCATION_1: NORMAL
MDC_IDC_SET_LEADCHNL_RV_SENSING_CATHODE_ELECTRODE_1: NORMAL
MDC_IDC_SET_LEADCHNL_RV_SENSING_CATHODE_LOCATION_1: NORMAL
MDC_IDC_SET_LEADCHNL_RV_SENSING_POLARITY: NORMAL
MDC_IDC_SET_LEADCHNL_RV_SENSING_SENSITIVITY: 2.5 MV
MDC_IDC_STAT_AT_BURDEN_PERCENT: 0 %
MDC_IDC_STAT_AT_DTM_END: NORMAL
MDC_IDC_STAT_AT_DTM_START: NORMAL
MDC_IDC_STAT_AT_MODE_SW_COUNT: 2
MDC_IDC_STAT_AT_MODE_SW_COUNT_PER_DAY: 0
MDC_IDC_STAT_AT_MODE_SW_MAX_DURATION: 4 S
MDC_IDC_STAT_AT_MODE_SW_PERCENT_TIME: 1 %
MDC_IDC_STAT_BRADY_AP_VP_PERCENT: 1.4 %
MDC_IDC_STAT_BRADY_AP_VS_PERCENT: 1 %
MDC_IDC_STAT_BRADY_AS_VP_PERCENT: 99 %
MDC_IDC_STAT_BRADY_AS_VS_PERCENT: 1 %
MDC_IDC_STAT_BRADY_DTM_END: NORMAL
MDC_IDC_STAT_BRADY_DTM_START: NORMAL
MDC_IDC_STAT_BRADY_RA_PERCENT_PACED: 1.4 %
MDC_IDC_STAT_BRADY_RV_PERCENT_PACED: 99 %
MDC_IDC_STAT_CRT_DTM_END: NORMAL
MDC_IDC_STAT_CRT_DTM_START: NORMAL
MDC_IDC_STAT_HEART_RATE_ATRIAL_MAX: 280 {BEATS}/MIN
MDC_IDC_STAT_HEART_RATE_ATRIAL_MEAN: 85 {BEATS}/MIN
MDC_IDC_STAT_HEART_RATE_ATRIAL_MIN: 50 {BEATS}/MIN
MDC_IDC_STAT_HEART_RATE_DTM_END: NORMAL
MDC_IDC_STAT_HEART_RATE_DTM_START: NORMAL
MDC_IDC_STAT_HEART_RATE_VENTRICULAR_MAX: 240 {BEATS}/MIN
MDC_IDC_STAT_HEART_RATE_VENTRICULAR_MEAN: 85 {BEATS}/MIN
MDC_IDC_STAT_HEART_RATE_VENTRICULAR_MIN: 40 {BEATS}/MIN

## 2021-10-27 ENCOUNTER — TELEPHONE (OUTPATIENT)
Dept: CARDIOLOGY | Facility: CLINIC | Age: 71
End: 2021-10-27

## 2021-10-27 NOTE — TELEPHONE ENCOUNTER
----- Message from Stephanie Solo sent at 10/27/2021  1:36 PM CDT -----  Regarding: Patient has questions regarding 9/29 Remote  Patient was camping for a about a month just got back, someone left a vm regarding transmission and has questions. He also mention that he plugged his monitor, is still showing as disconnected, I will check tomorrow to see if it changes. Please call [phone].

## 2021-10-27 NOTE — TELEPHONE ENCOUNTER
"Spoke with patient by phone.  He has just arrive home from traveling for an extended period of time. He has just plugged his home monitor back in.  He wanted to make sure that it was working. We will monitor to make sure that monitor connects to system. Patient states that all the lights lit up and \"moved\" like they usually do.    A remote transmission was sent on 9/29/21 for an alert for HVR episode. Voicemail message was left for patient.  Patient reports that he was asymptomatic with the event.    "

## 2021-11-18 ENCOUNTER — OFFICE VISIT (OUTPATIENT)
Dept: TRANSPLANT | Facility: CLINIC | Age: 71
End: 2021-11-18
Attending: INTERNAL MEDICINE
Payer: MEDICARE

## 2021-11-18 ENCOUNTER — APPOINTMENT (OUTPATIENT)
Dept: LAB | Facility: CLINIC | Age: 71
End: 2021-11-18
Attending: INTERNAL MEDICINE
Payer: MEDICARE

## 2021-11-18 VITALS
HEART RATE: 86 BPM | TEMPERATURE: 97.2 F | DIASTOLIC BLOOD PRESSURE: 80 MMHG | WEIGHT: 139.3 LBS | OXYGEN SATURATION: 99 % | SYSTOLIC BLOOD PRESSURE: 131 MMHG | BODY MASS INDEX: 21.81 KG/M2 | RESPIRATION RATE: 16 BRPM

## 2021-11-18 DIAGNOSIS — C82.99 NODULAR LYMPHOMA OF EXTRANODAL AND/OR SOLID ORGAN SITE (H): ICD-10-CM

## 2021-11-18 LAB
ALBUMIN SERPL-MCNC: 3.8 G/DL (ref 3.4–5)
ALP SERPL-CCNC: 135 U/L (ref 40–150)
ALT SERPL W P-5'-P-CCNC: 42 U/L (ref 0–70)
ANION GAP SERPL CALCULATED.3IONS-SCNC: 3 MMOL/L (ref 3–14)
AST SERPL W P-5'-P-CCNC: 37 U/L (ref 0–45)
BASOPHILS # BLD AUTO: 0 10E3/UL (ref 0–0.2)
BASOPHILS NFR BLD AUTO: 0 %
BILIRUB SERPL-MCNC: 1.2 MG/DL (ref 0.2–1.3)
BUN SERPL-MCNC: 25 MG/DL (ref 7–30)
CALCIUM SERPL-MCNC: 9.6 MG/DL (ref 8.5–10.1)
CHLORIDE BLD-SCNC: 106 MMOL/L (ref 94–109)
CO2 SERPL-SCNC: 28 MMOL/L (ref 20–32)
CREAT SERPL-MCNC: 0.9 MG/DL (ref 0.66–1.25)
EOSINOPHIL # BLD AUTO: 0 10E3/UL (ref 0–0.7)
EOSINOPHIL NFR BLD AUTO: 1 %
ERYTHROCYTE [DISTWIDTH] IN BLOOD BY AUTOMATED COUNT: 12.9 % (ref 10–15)
GFR SERPL CREATININE-BSD FRML MDRD: 85 ML/MIN/1.73M2
GLUCOSE BLD-MCNC: 95 MG/DL (ref 70–99)
HCT VFR BLD AUTO: 40.6 % (ref 40–53)
HGB BLD-MCNC: 13.2 G/DL (ref 13.3–17.7)
IMM GRANULOCYTES # BLD: 0 10E3/UL
IMM GRANULOCYTES NFR BLD: 0 %
LDH SERPL L TO P-CCNC: 333 U/L (ref 85–227)
LYMPHOCYTES # BLD AUTO: 0.8 10E3/UL (ref 0.8–5.3)
LYMPHOCYTES NFR BLD AUTO: 25 %
MCH RBC QN AUTO: 31.1 PG (ref 26.5–33)
MCHC RBC AUTO-ENTMCNC: 32.5 G/DL (ref 31.5–36.5)
MCV RBC AUTO: 96 FL (ref 78–100)
MONOCYTES # BLD AUTO: 0.3 10E3/UL (ref 0–1.3)
MONOCYTES NFR BLD AUTO: 8 %
NEUTROPHILS # BLD AUTO: 2.1 10E3/UL (ref 1.6–8.3)
NEUTROPHILS NFR BLD AUTO: 66 %
NRBC # BLD AUTO: 0 10E3/UL
NRBC BLD AUTO-RTO: 0 /100
PLATELET # BLD AUTO: 99 10E3/UL (ref 150–450)
POTASSIUM BLD-SCNC: 4.5 MMOL/L (ref 3.4–5.3)
PROT SERPL-MCNC: 7.3 G/DL (ref 6.8–8.8)
RBC # BLD AUTO: 4.24 10E6/UL (ref 4.4–5.9)
SODIUM SERPL-SCNC: 137 MMOL/L (ref 133–144)
TOTAL PROTEIN SERUM FOR ELP: 7 G/DL (ref 6.8–8.8)
WBC # BLD AUTO: 3.2 10E3/UL (ref 4–11)

## 2021-11-18 PROCEDURE — 85025 COMPLETE CBC W/AUTO DIFF WBC: CPT | Performed by: INTERNAL MEDICINE

## 2021-11-18 PROCEDURE — 36415 COLL VENOUS BLD VENIPUNCTURE: CPT | Performed by: INTERNAL MEDICINE

## 2021-11-18 PROCEDURE — 99214 OFFICE O/P EST MOD 30 MIN: CPT | Performed by: INTERNAL MEDICINE

## 2021-11-18 PROCEDURE — 83615 LACTATE (LD) (LDH) ENZYME: CPT | Performed by: INTERNAL MEDICINE

## 2021-11-18 PROCEDURE — G0463 HOSPITAL OUTPT CLINIC VISIT: HCPCS

## 2021-11-18 PROCEDURE — 84165 PROTEIN E-PHORESIS SERUM: CPT | Mod: TC | Performed by: PATHOLOGY

## 2021-11-18 PROCEDURE — 84155 ASSAY OF PROTEIN SERUM: CPT | Mod: 91 | Performed by: INTERNAL MEDICINE

## 2021-11-18 PROCEDURE — 80053 COMPREHEN METABOLIC PANEL: CPT | Performed by: INTERNAL MEDICINE

## 2021-11-18 RX ORDER — OMEPRAZOLE 40 MG/1
40 CAPSULE, DELAYED RELEASE ORAL DAILY
Qty: 90 CAPSULE | Refills: 4 | Status: SHIPPED | OUTPATIENT
Start: 2021-11-18 | End: 2022-12-31

## 2021-11-18 ASSESSMENT — PAIN SCALES - GENERAL: PAINLEVEL: NO PAIN (0)

## 2021-11-18 NOTE — NURSING NOTE
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    Weight as of this encounter: 63.2 kg (139 lb 4.8 oz). Body surface area is 1.73 meters squared.  No Pain (0) Comment: Data Unavailable   No LMP for male patient.  Allergies reviewed: Yes  Medications reviewed: Yes    Medications: Medication refills not needed today.  Pharmacy name entered into [employer]:    [id number] [place] [address] [id number] access hospital [address] AT [facility] & [place]    Clinical concerns: None       Mary Jo Singh LPN            "

## 2021-11-18 NOTE — LETTER
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  He has had no particular new problems in the last few months.  He had his left inguinal hernia repaired with mesh placement in July and has recovered well.  He has had no recent fever chills rash bruising or bleeding.  He follows with a dermatologist for a variety of actinic lesions that have been addressed but he needs some added follow-up for some still on his face.    He gets winded with notable exertion and his pacemaker rate which is now driving 99% of his heartbeats was increased to 90/min.  He has had no chest pain or palpitations associated but sometimes gets tired and a bit lightheaded.  He has not fainted or passed out.  He has no ongoing GI  respiratory or ENT symptoms.    His exam shows his weight down 3 pounds from July and his vital signs are acceptable.  He has a 1 cm movable soft left axillary and perhaps 1-1/2 to 2 cm right axillary also soft lymph node but no cervical supraclavicular or inguinal nodes.  His oropharynx is clear without mucosal lesions.  He has lots of seborrheic keratoses and actinic lesions in many places.  His lungs are clear without rales or wheezing.  His heart tones are regular.  His murmur is not audible anymore and he has no extrasystoles.  His abdomen is soft and nontender without palpable masses or hepatosplenomegaly.  There is no peripheral edema.    Laboratory testing showed stable blood counts with mild leukopenia and mild  thrombocytopenia but not much change in a long period of time.  Chemistries are unrevealing and his long time small monoclonal protein is unchanged at 0.1g/dl.--roughly unchanged in 14 years.  LDH is slightly elevated as it has been for 2 years.    He needs no interventions for his longstanding lymphoma and had no other questions about his other status.  He seems to be doing well untreated and I will see him again in 6 months time.    Elroy Holman MD    Professor of medicine    Results for MAJOR GILES (MRN [id number]) as of 11/19/2021 15:54   Ref. Range 11/18/2021 15:21   Sodium Latest Ref Range: 133 - 144 mmol/L 137   Potassium Latest Ref Range: 3.4 - 5.3 mmol/L 4.5   Chloride Latest Ref Range: 94 - 109 mmol/L 106   Carbon Dioxide Latest Ref Range: 20 - 32 mmol/L 28   Urea Nitrogen Latest Ref Range: 7 - 30 mg/dL 25   Creatinine Latest Ref Range: 0.66 - 1.25 mg/dL 0.90   GFR Estimate Latest Ref Range: >60 mL/min/1.73m2 85   Calcium Latest Ref Range: 8.5 - 10.1 mg/dL 9.6   Anion Gap Latest Ref Range: 3 - 14 mmol/L 3   Albumin Latest Ref Range: 3.4 - 5.0 g/dL 3.8   Protein Total Latest Ref Range: 6.8 - 8.8 g/dL 7.3   Bilirubin Total Latest Ref Range: 0.2 - 1.3 mg/dL 1.2   Alkaline Phosphatase Latest Ref Range: 40 - 150 U/L 135   ALT Latest Ref Range: 0 - 70 U/L 42   AST Latest Ref Range: 0 - 45 U/L 37   Lactate Dehydrogenase Latest Ref Range: 85 - 227 U/L 333 (H)   Glucose Latest Ref Range: 70 - 99 mg/dL 95   WBC Latest Ref Range: 4.0 - 11.0 10e3/uL 3.2 (L)   Hemoglobin Latest Ref Range: 13.3 - 17.7 g/dL 13.2 (L)   Hematocrit Latest Ref Range: 40.0 - 53.0 % 40.6   Platelet Count Latest Ref Range: 150 - 450 10e3/uL 99 (L)   RBC Count Latest Ref Range: 4.40 - 5.90 10e6/uL 4.24 (L)   MCV Latest Ref Range: 78 - 100 fL 96   MCH Latest Ref Range: 26.5 - 33.0 pg 31.1   MCHC Latest Ref Range: 31.5 - 36.5 g/dL 32.5   RDW Latest Ref Range: 10.0 - 15.0 % 12.9   % Neutrophils Latest Units: % 66   % Lymphocytes Latest  Units: % 25   % Monocytes Latest Units: % 8   % Eosinophils Latest Units: % 1   % Basophils Latest Units: % 0   Absolute Basophils Latest Ref Range: 0.0 - 0.2 10e3/uL 0.0   Absolute Eosinophils Latest Ref Range: 0.0 - 0.7 10e3/uL 0.0   Absolute Immature Granulocytes Latest Ref Range: <=0.0 10e3/uL 0.0   Absolute Lymphocytes Latest Ref Range: 0.8 - 5.3 10e3/uL 0.8   Absolute Monocytes Latest Ref Range: 0.0 - 1.3 10e3/uL 0.3   % Immature Granulocytes Latest Units: % 0   Absolute Neutrophils Latest Ref Range: 1.6 - 8.3 10e3/uL 2.1   Absolute NRBCs Latest Units: 10e3/uL 0.0   NRBCs per 100 WBC Latest Ref Range: <1 /100 0   Albumin Fraction Latest Ref Range: 3.7 - 5.1 g/dL 4.5   Alpha 1 Fraction Latest Ref Range: 0.2 - 0.4 g/dL 0.3   Alpha 2 Fraction Latest Ref Range: 0.5 - 0.9 g/dL 0.7   Beta Fraction Latest Ref Range: 0.6 - 1.0 g/dL 0.6   ELP Interpretation: Unknown Very small monocl...   Gamma Fraction Latest Ref Range: 0.7 - 1.6 g/dL 0.8   Monoclonal Peak Latest Ref Range: <=0.0 g/dL 0.1 (H)   Total Protein Serum for ELP Latest Ref Range: 6.8 - 8.8 g/dL 7.0         Elroy Holman MD

## 2021-11-18 NOTE — PROGRESS NOTES
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  He has had no particular new problems in the last few months.  He had his left inguinal hernia repaired with mesh placement in July and has recovered well.  He has had no recent fever chills rash bruising or bleeding.  He follows with a dermatologist for a variety of actinic lesions that have been addressed but he needs some added follow-up for some still on his face.    He gets winded with notable exertion and his pacemaker rate which is now driving 99% of his heartbeats was increased to 90/min.  He has had no chest pain or palpitations associated but sometimes gets tired and a bit lightheaded.  He has not fainted or passed out.  He has no ongoing GI  respiratory or ENT symptoms.    His exam shows his weight down 3 pounds from July and his vital signs are acceptable.  He has a 1 cm movable soft left axillary and perhaps 1-1/2 to 2 cm right axillary also soft lymph node but no cervical supraclavicular or inguinal nodes.  His oropharynx is clear without mucosal lesions.  He has lots of seborrheic keratoses and actinic lesions in many places.  His lungs are clear without rales or wheezing.  His heart tones are regular.  His murmur is not audible anymore and he has no extrasystoles.  His abdomen is soft and nontender without palpable masses or hepatosplenomegaly.  There is no peripheral edema.    Laboratory testing showed stable blood counts with mild leukopenia and mild thrombocytopenia but not much change in a long period of time.  Chemistries are unrevealing and  his long time small monoclonal protein is unchanged at 0.1g/dl.--roughly unchanged in 14 years.  LDH is slightly elevated as it has been for 2 years.    He needs no interventions for his longstanding lymphoma and had no other questions about his other status.  He seems to be doing well untreated and I will see him again in 6 months time.    Elroy Holman MD    Professor of medicine    Results for MAJOR GILES (MRN [id number]) as of 11/19/2021 15:54   Ref. Range 11/18/2021 15:21   Sodium Latest Ref Range: 133 - 144 mmol/L 137   Potassium Latest Ref Range: 3.4 - 5.3 mmol/L 4.5   Chloride Latest Ref Range: 94 - 109 mmol/L 106   Carbon Dioxide Latest Ref Range: 20 - 32 mmol/L 28   Urea Nitrogen Latest Ref Range: 7 - 30 mg/dL 25   Creatinine Latest Ref Range: 0.66 - 1.25 mg/dL 0.90   GFR Estimate Latest Ref Range: >60 mL/min/1.73m2 85   Calcium Latest Ref Range: 8.5 - 10.1 mg/dL 9.6   Anion Gap Latest Ref Range: 3 - 14 mmol/L 3   Albumin Latest Ref Range: 3.4 - 5.0 g/dL 3.8   Protein Total Latest Ref Range: 6.8 - 8.8 g/dL 7.3   Bilirubin Total Latest Ref Range: 0.2 - 1.3 mg/dL 1.2   Alkaline Phosphatase Latest Ref Range: 40 - 150 U/L 135   ALT Latest Ref Range: 0 - 70 U/L 42   AST Latest Ref Range: 0 - 45 U/L 37   Lactate Dehydrogenase Latest Ref Range: 85 - 227 U/L 333 (H)   Glucose Latest Ref Range: 70 - 99 mg/dL 95   WBC Latest Ref Range: 4.0 - 11.0 10e3/uL 3.2 (L)   Hemoglobin Latest Ref Range: 13.3 - 17.7 g/dL 13.2 (L)   Hematocrit Latest Ref Range: 40.0 - 53.0 % 40.6   Platelet Count Latest Ref Range: 150 - 450 10e3/uL 99 (L)   RBC Count Latest Ref Range: 4.40 - 5.90 10e6/uL 4.24 (L)   MCV Latest Ref Range: 78 - 100 fL 96   MCH Latest Ref Range: 26.5 - 33.0 pg 31.1   MCHC Latest Ref Range: 31.5 - 36.5 g/dL 32.5   RDW Latest Ref Range: 10.0 - 15.0 % 12.9   % Neutrophils Latest Units: % 66   % Lymphocytes Latest Units: % 25   % Monocytes Latest Units: % 8   % Eosinophils Latest Units: % 1   % Basophils Latest  Units: % 0   Absolute Basophils Latest Ref Range: 0.0 - 0.2 10e3/uL 0.0   Absolute Eosinophils Latest Ref Range: 0.0 - 0.7 10e3/uL 0.0   Absolute Immature Granulocytes Latest Ref Range: <=0.0 10e3/uL 0.0   Absolute Lymphocytes Latest Ref Range: 0.8 - 5.3 10e3/uL 0.8   Absolute Monocytes Latest Ref Range: 0.0 - 1.3 10e3/uL 0.3   % Immature Granulocytes Latest Units: % 0   Absolute Neutrophils Latest Ref Range: 1.6 - 8.3 10e3/uL 2.1   Absolute NRBCs Latest Units: 10e3/uL 0.0   NRBCs per 100 WBC Latest Ref Range: <1 /100 0   Albumin Fraction Latest Ref Range: 3.7 - 5.1 g/dL 4.5   Alpha 1 Fraction Latest Ref Range: 0.2 - 0.4 g/dL 0.3   Alpha 2 Fraction Latest Ref Range: 0.5 - 0.9 g/dL 0.7   Beta Fraction Latest Ref Range: 0.6 - 1.0 g/dL 0.6   ELP Interpretation: Unknown Very small monocl...   Gamma Fraction Latest Ref Range: 0.7 - 1.6 g/dL 0.8   Monoclonal Peak Latest Ref Range: <=0.0 g/dL 0.1 (H)   Total Protein Serum for ELP Latest Ref Range: 6.8 - 8.8 g/dL 7.0

## 2021-11-18 NOTE — LETTER
11/18/2021         RE: Shahid Davis   [facility] [address]        Dear Colleague,    Thank you for referring your patient, Shahid Davis, to the [facility] [place]. Please see a copy of my visit note below.    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  He has had no particular new problems in the last few months.  He had his left inguinal hernia repaired with mesh placement in July and has recovered well.  He has had no recent fever chills rash bruising or bleeding.  He follows with a dermatologist for a variety of actinic lesions that have been addressed but he needs some added follow-up for some still on his face.    He gets winded with notable exertion and his pacemaker rate which is now driving 99% of his heartbeats was increased to 90/min.  He has had no chest pain or palpitations associated but sometimes gets tired and a bit lightheaded.  He has not fainted or passed out.  He has no ongoing GI  respiratory or ENT symptoms.    His exam shows his weight down 3 pounds from July and his vital signs are acceptable.  He has a 1 cm movable soft left axillary and perhaps 1-1/2 to 2 cm right axillary also soft lymph node but no cervical supraclavicular or inguinal nodes.  His oropharynx is clear without mucosal lesions.  He has lots of seborrheic keratoses and actinic lesions in many places.  His lungs are clear without rales or wheezing.  His heart tones are regular.  His murmur is not audible anymore and he has no extrasystoles.   His abdomen is soft and nontender without palpable masses or hepatosplenomegaly.  There is no peripheral edema.    Laboratory testing showed stable blood counts with mild leukopenia and mild thrombocytopenia but not much change in a long period of time.  Chemistries are unrevealing and his long time small monoclonal protein is unchanged at 0.1g/dl.--roughly unchanged in 14 years.  LDH is slightly elevated as it has been for 2 years.    He needs no interventions for his longstanding lymphoma and had no other questions about his other status.  He seems to be doing well untreated and I will see him again in 6 months time.    Elroy Holman MD    Professor of medicine    Results for MAJOR GILES (MRN [id number]) as of 11/19/2021 15:54   Ref. Range 11/18/2021 15:21   Sodium Latest Ref Range: 133 - 144 mmol/L 137   Potassium Latest Ref Range: 3.4 - 5.3 mmol/L 4.5   Chloride Latest Ref Range: 94 - 109 mmol/L 106   Carbon Dioxide Latest Ref Range: 20 - 32 mmol/L 28   Urea Nitrogen Latest Ref Range: 7 - 30 mg/dL 25   Creatinine Latest Ref Range: 0.66 - 1.25 mg/dL 0.90   GFR Estimate Latest Ref Range: >60 mL/min/1.73m2 85   Calcium Latest Ref Range: 8.5 - 10.1 mg/dL 9.6   Anion Gap Latest Ref Range: 3 - 14 mmol/L 3   Albumin Latest Ref Range: 3.4 - 5.0 g/dL 3.8   Protein Total Latest Ref Range: 6.8 - 8.8 g/dL 7.3   Bilirubin Total Latest Ref Range: 0.2 - 1.3 mg/dL 1.2   Alkaline Phosphatase Latest Ref Range: 40 - 150 U/L 135   ALT Latest Ref Range: 0 - 70 U/L 42   AST Latest Ref Range: 0 - 45 U/L 37   Lactate Dehydrogenase Latest Ref Range: 85 - 227 U/L 333 (H)   Glucose Latest Ref Range: 70 - 99 mg/dL 95   WBC Latest Ref Range: 4.0 - 11.0 10e3/uL 3.2 (L)   Hemoglobin Latest Ref Range: 13.3 - 17.7 g/dL 13.2 (L)   Hematocrit Latest Ref Range: 40.0 - 53.0 % 40.6   Platelet Count Latest Ref Range: 150 - 450 10e3/uL 99 (L)   RBC Count Latest Ref Range: 4.40 - 5.90 10e6/uL 4.24 (L)   MCV Latest Ref Range: 78 - 100 fL 96   MCH  Latest Ref Range: 26.5 - 33.0 pg 31.1   MCHC Latest Ref Range: 31.5 - 36.5 g/dL 32.5   RDW Latest Ref Range: 10.0 - 15.0 % 12.9   % Neutrophils Latest Units: % 66   % Lymphocytes Latest Units: % 25   % Monocytes Latest Units: % 8   % Eosinophils Latest Units: % 1   % Basophils Latest Units: % 0   Absolute Basophils Latest Ref Range: 0.0 - 0.2 10e3/uL 0.0   Absolute Eosinophils Latest Ref Range: 0.0 - 0.7 10e3/uL 0.0   Absolute Immature Granulocytes Latest Ref Range: <=0.0 10e3/uL 0.0   Absolute Lymphocytes Latest Ref Range: 0.8 - 5.3 10e3/uL 0.8   Absolute Monocytes Latest Ref Range: 0.0 - 1.3 10e3/uL 0.3   % Immature Granulocytes Latest Units: % 0   Absolute Neutrophils Latest Ref Range: 1.6 - 8.3 10e3/uL 2.1   Absolute NRBCs Latest Units: 10e3/uL 0.0   NRBCs per 100 WBC Latest Ref Range: <1 /100 0   Albumin Fraction Latest Ref Range: 3.7 - 5.1 g/dL 4.5   Alpha 1 Fraction Latest Ref Range: 0.2 - 0.4 g/dL 0.3   Alpha 2 Fraction Latest Ref Range: 0.5 - 0.9 g/dL 0.7   Beta Fraction Latest Ref Range: 0.6 - 1.0 g/dL 0.6   ELP Interpretation: Unknown Very small monocl...   Gamma Fraction Latest Ref Range: 0.7 - 1.6 g/dL 0.8   Monoclonal Peak Latest Ref Range: <=0.0 g/dL 0.1 (H)   Total Protein Serum for ELP Latest Ref Range: 6.8 - 8.8 g/dL 7.0         Elroy Holman MD

## 2021-11-19 LAB
ALBUMIN SERPL ELPH-MCNC: 4.5 G/DL (ref 3.7–5.1)
ALPHA1 GLOB SERPL ELPH-MCNC: 0.3 G/DL (ref 0.2–0.4)
ALPHA2 GLOB SERPL ELPH-MCNC: 0.7 G/DL (ref 0.5–0.9)
B-GLOBULIN SERPL ELPH-MCNC: 0.6 G/DL (ref 0.6–1)
GAMMA GLOB SERPL ELPH-MCNC: 0.8 G/DL (ref 0.7–1.6)
M PROTEIN SERPL ELPH-MCNC: 0.1 G/DL
PROT PATTERN SERPL ELPH-IMP: ABNORMAL

## 2021-11-19 PROCEDURE — 84165 PROTEIN E-PHORESIS SERUM: CPT | Mod: 26 | Performed by: PATHOLOGY

## 2021-12-30 ENCOUNTER — ANCILLARY PROCEDURE (OUTPATIENT)
Dept: CARDIOLOGY | Facility: CLINIC | Age: 71
End: 2021-12-30
Attending: INTERNAL MEDICINE
Payer: MEDICARE

## 2021-12-30 DIAGNOSIS — Z01.818 PREOP EXAMINATION: ICD-10-CM

## 2021-12-30 DIAGNOSIS — Z95.0 PACEMAKER: ICD-10-CM

## 2021-12-30 DIAGNOSIS — I44.2 ATRIOVENTRICULAR BLOCK, COMPLETE (H): ICD-10-CM

## 2021-12-30 PROCEDURE — 93294 REM INTERROG EVL PM/LDLS PM: CPT | Performed by: INTERNAL MEDICINE

## 2021-12-30 PROCEDURE — 93296 REM INTERROG EVL PM/IDS: CPT

## 2022-01-03 LAB
MDC_IDC_LEAD_IMPLANT_DT: NORMAL
MDC_IDC_LEAD_IMPLANT_DT: NORMAL
MDC_IDC_LEAD_LOCATION: NORMAL
MDC_IDC_LEAD_LOCATION: NORMAL
MDC_IDC_LEAD_LOCATION_DETAIL_1: NORMAL
MDC_IDC_LEAD_LOCATION_DETAIL_1: NORMAL
MDC_IDC_LEAD_MFG: NORMAL
MDC_IDC_LEAD_MFG: NORMAL
MDC_IDC_LEAD_MODEL: NORMAL
MDC_IDC_LEAD_MODEL: NORMAL
MDC_IDC_LEAD_POLARITY_TYPE: NORMAL
MDC_IDC_LEAD_POLARITY_TYPE: NORMAL
MDC_IDC_LEAD_SERIAL: NORMAL
MDC_IDC_LEAD_SERIAL: NORMAL
MDC_IDC_MSMT_BATTERY_DTM: NORMAL
MDC_IDC_MSMT_BATTERY_REMAINING_LONGEVITY: 81 MO
MDC_IDC_MSMT_BATTERY_REMAINING_PERCENTAGE: 95.5 %
MDC_IDC_MSMT_BATTERY_RRT_TRIGGER: NORMAL
MDC_IDC_MSMT_BATTERY_STATUS: NORMAL
MDC_IDC_MSMT_BATTERY_VOLTAGE: 2.99 V
MDC_IDC_MSMT_LEADCHNL_RA_IMPEDANCE_VALUE: 380 OHM
MDC_IDC_MSMT_LEADCHNL_RA_LEAD_CHANNEL_STATUS: NORMAL
MDC_IDC_MSMT_LEADCHNL_RA_PACING_THRESHOLD_AMPLITUDE: 0.75 V
MDC_IDC_MSMT_LEADCHNL_RA_PACING_THRESHOLD_PULSEWIDTH: 0.4 MS
MDC_IDC_MSMT_LEADCHNL_RA_SENSING_INTR_AMPL: 3.3 MV
MDC_IDC_MSMT_LEADCHNL_RV_IMPEDANCE_VALUE: 450 OHM
MDC_IDC_MSMT_LEADCHNL_RV_LEAD_CHANNEL_STATUS: NORMAL
MDC_IDC_MSMT_LEADCHNL_RV_PACING_THRESHOLD_AMPLITUDE: 1 V
MDC_IDC_MSMT_LEADCHNL_RV_PACING_THRESHOLD_PULSEWIDTH: 0.4 MS
MDC_IDC_MSMT_LEADCHNL_RV_SENSING_INTR_AMPL: 12 MV
MDC_IDC_PG_IMPLANT_DTM: NORMAL
MDC_IDC_PG_MFG: NORMAL
MDC_IDC_PG_MODEL: NORMAL
MDC_IDC_PG_SERIAL: NORMAL
MDC_IDC_PG_TYPE: NORMAL
MDC_IDC_SESS_CLINIC_NAME: NORMAL
MDC_IDC_SESS_DTM: NORMAL
MDC_IDC_SESS_REPROGRAMMED: NO
MDC_IDC_SESS_TYPE: NORMAL
MDC_IDC_SET_BRADY_AT_MODE_SWITCH_MODE: NORMAL
MDC_IDC_SET_BRADY_AT_MODE_SWITCH_RATE: 160 {BEATS}/MIN
MDC_IDC_SET_BRADY_LOWRATE: 60 {BEATS}/MIN
MDC_IDC_SET_BRADY_MAX_SENSOR_RATE: 120 {BEATS}/MIN
MDC_IDC_SET_BRADY_MAX_TRACKING_RATE: 120 {BEATS}/MIN
MDC_IDC_SET_BRADY_MODE: NORMAL
MDC_IDC_SET_BRADY_PAV_DELAY_LOW: 200 MS
MDC_IDC_SET_BRADY_SAV_DELAY_LOW: 200 MS
MDC_IDC_SET_LEADCHNL_RA_PACING_AMPLITUDE: 1.5 V
MDC_IDC_SET_LEADCHNL_RA_PACING_ANODE_ELECTRODE_1: NORMAL
MDC_IDC_SET_LEADCHNL_RA_PACING_ANODE_LOCATION_1: NORMAL
MDC_IDC_SET_LEADCHNL_RA_PACING_CAPTURE_MODE: NORMAL
MDC_IDC_SET_LEADCHNL_RA_PACING_CATHODE_ELECTRODE_1: NORMAL
MDC_IDC_SET_LEADCHNL_RA_PACING_CATHODE_LOCATION_1: NORMAL
MDC_IDC_SET_LEADCHNL_RA_PACING_POLARITY: NORMAL
MDC_IDC_SET_LEADCHNL_RA_PACING_PULSEWIDTH: 0.4 MS
MDC_IDC_SET_LEADCHNL_RA_SENSING_ADAPTATION_MODE: NORMAL
MDC_IDC_SET_LEADCHNL_RA_SENSING_ANODE_ELECTRODE_1: NORMAL
MDC_IDC_SET_LEADCHNL_RA_SENSING_ANODE_LOCATION_1: NORMAL
MDC_IDC_SET_LEADCHNL_RA_SENSING_CATHODE_ELECTRODE_1: NORMAL
MDC_IDC_SET_LEADCHNL_RA_SENSING_CATHODE_LOCATION_1: NORMAL
MDC_IDC_SET_LEADCHNL_RA_SENSING_POLARITY: NORMAL
MDC_IDC_SET_LEADCHNL_RA_SENSING_SENSITIVITY: 0.75 MV
MDC_IDC_SET_LEADCHNL_RV_PACING_AMPLITUDE: 5 V
MDC_IDC_SET_LEADCHNL_RV_PACING_ANODE_ELECTRODE_1: NORMAL
MDC_IDC_SET_LEADCHNL_RV_PACING_ANODE_LOCATION_1: NORMAL
MDC_IDC_SET_LEADCHNL_RV_PACING_CAPTURE_MODE: NORMAL
MDC_IDC_SET_LEADCHNL_RV_PACING_CATHODE_ELECTRODE_1: NORMAL
MDC_IDC_SET_LEADCHNL_RV_PACING_CATHODE_LOCATION_1: NORMAL
MDC_IDC_SET_LEADCHNL_RV_PACING_POLARITY: NORMAL
MDC_IDC_SET_LEADCHNL_RV_PACING_PULSEWIDTH: 0.4 MS
MDC_IDC_SET_LEADCHNL_RV_SENSING_ADAPTATION_MODE: NORMAL
MDC_IDC_SET_LEADCHNL_RV_SENSING_ANODE_ELECTRODE_1: NORMAL
MDC_IDC_SET_LEADCHNL_RV_SENSING_ANODE_LOCATION_1: NORMAL
MDC_IDC_SET_LEADCHNL_RV_SENSING_CATHODE_ELECTRODE_1: NORMAL
MDC_IDC_SET_LEADCHNL_RV_SENSING_CATHODE_LOCATION_1: NORMAL
MDC_IDC_SET_LEADCHNL_RV_SENSING_POLARITY: NORMAL
MDC_IDC_SET_LEADCHNL_RV_SENSING_SENSITIVITY: 2.5 MV
MDC_IDC_STAT_AT_BURDEN_PERCENT: 0 %
MDC_IDC_STAT_AT_DTM_END: NORMAL
MDC_IDC_STAT_AT_DTM_START: NORMAL
MDC_IDC_STAT_AT_MODE_SW_COUNT: 4
MDC_IDC_STAT_AT_MODE_SW_COUNT_PER_DAY: 0
MDC_IDC_STAT_AT_MODE_SW_MAX_DURATION: 6 S
MDC_IDC_STAT_AT_MODE_SW_PERCENT_TIME: 1 %
MDC_IDC_STAT_BRADY_AP_VP_PERCENT: 1.4 %
MDC_IDC_STAT_BRADY_AP_VS_PERCENT: 1 %
MDC_IDC_STAT_BRADY_AS_VP_PERCENT: 99 %
MDC_IDC_STAT_BRADY_AS_VS_PERCENT: 1 %
MDC_IDC_STAT_BRADY_DTM_END: NORMAL
MDC_IDC_STAT_BRADY_DTM_START: NORMAL
MDC_IDC_STAT_BRADY_RA_PERCENT_PACED: 1.5 %
MDC_IDC_STAT_BRADY_RV_PERCENT_PACED: 99 %
MDC_IDC_STAT_CRT_DTM_END: NORMAL
MDC_IDC_STAT_CRT_DTM_START: NORMAL
MDC_IDC_STAT_HEART_RATE_ATRIAL_MAX: 280 {BEATS}/MIN
MDC_IDC_STAT_HEART_RATE_ATRIAL_MEAN: 84 {BEATS}/MIN
MDC_IDC_STAT_HEART_RATE_ATRIAL_MIN: 50 {BEATS}/MIN
MDC_IDC_STAT_HEART_RATE_DTM_END: NORMAL
MDC_IDC_STAT_HEART_RATE_DTM_START: NORMAL
MDC_IDC_STAT_HEART_RATE_VENTRICULAR_MAX: 230 {BEATS}/MIN
MDC_IDC_STAT_HEART_RATE_VENTRICULAR_MEAN: 85 {BEATS}/MIN
MDC_IDC_STAT_HEART_RATE_VENTRICULAR_MIN: 40 {BEATS}/MIN

## 2022-01-10 ENCOUNTER — ANCILLARY PROCEDURE (OUTPATIENT)
Dept: CARDIOLOGY | Facility: CLINIC | Age: 72
End: 2022-01-10
Attending: INTERNAL MEDICINE
Payer: MEDICARE

## 2022-01-10 DIAGNOSIS — I44.2 ATRIOVENTRICULAR BLOCK, COMPLETE (H): ICD-10-CM

## 2022-01-10 DIAGNOSIS — Z01.818 PREOP EXAMINATION: ICD-10-CM

## 2022-01-10 DIAGNOSIS — Z95.0 PACEMAKER: ICD-10-CM

## 2022-01-10 PROCEDURE — 93280 PM DEVICE PROGR EVAL DUAL: CPT | Performed by: INTERNAL MEDICINE

## 2022-01-10 NOTE — PATIENT INSTRUCTIONS
It was a pleasure to see you in clinic today.  Please do not hesitate to call with any questions or concerns.  You are scheduled for a remote transmission on 4/6/22.  We look forward to seeing you in clinic at your next device check in 6 months.    Charlee Nela, RN, MS, CCRN  Electrophysiology Nurse Clinician  [facility]    During Business Hours Please Call:  [phone]  After Hours Please Call:  [phone] - select option #[phone] and ask for job code [phone]

## 2022-01-18 LAB
MDC_IDC_LEAD_IMPLANT_DT: NORMAL
MDC_IDC_LEAD_IMPLANT_DT: NORMAL
MDC_IDC_LEAD_LOCATION: NORMAL
MDC_IDC_LEAD_LOCATION: NORMAL
MDC_IDC_LEAD_LOCATION_DETAIL_1: NORMAL
MDC_IDC_LEAD_LOCATION_DETAIL_1: NORMAL
MDC_IDC_LEAD_MFG: NORMAL
MDC_IDC_LEAD_MFG: NORMAL
MDC_IDC_LEAD_MODEL: NORMAL
MDC_IDC_LEAD_MODEL: NORMAL
MDC_IDC_LEAD_POLARITY_TYPE: NORMAL
MDC_IDC_LEAD_POLARITY_TYPE: NORMAL
MDC_IDC_LEAD_SERIAL: NORMAL
MDC_IDC_LEAD_SERIAL: NORMAL
MDC_IDC_MSMT_BATTERY_REMAINING_LONGEVITY: 90 MO
MDC_IDC_MSMT_BATTERY_STATUS: NORMAL
MDC_IDC_MSMT_BATTERY_VOLTAGE: 2.98 V
MDC_IDC_MSMT_LEADCHNL_RA_IMPEDANCE_VALUE: 400 OHM
MDC_IDC_MSMT_LEADCHNL_RA_PACING_THRESHOLD_AMPLITUDE: 0.75 V
MDC_IDC_MSMT_LEADCHNL_RA_PACING_THRESHOLD_AMPLITUDE: 0.75 V
MDC_IDC_MSMT_LEADCHNL_RA_PACING_THRESHOLD_PULSEWIDTH: 0.4 MS
MDC_IDC_MSMT_LEADCHNL_RA_PACING_THRESHOLD_PULSEWIDTH: 0.4 MS
MDC_IDC_MSMT_LEADCHNL_RA_SENSING_INTR_AMPL: 3.2 MV
MDC_IDC_MSMT_LEADCHNL_RV_IMPEDANCE_VALUE: 462.5 OHM
MDC_IDC_MSMT_LEADCHNL_RV_PACING_THRESHOLD_AMPLITUDE: 1 V
MDC_IDC_MSMT_LEADCHNL_RV_PACING_THRESHOLD_AMPLITUDE: 1 V
MDC_IDC_MSMT_LEADCHNL_RV_PACING_THRESHOLD_PULSEWIDTH: 0.4 MS
MDC_IDC_MSMT_LEADCHNL_RV_PACING_THRESHOLD_PULSEWIDTH: 0.4 MS
MDC_IDC_PG_IMPLANT_DTM: NORMAL
MDC_IDC_PG_MFG: NORMAL
MDC_IDC_PG_MODEL: NORMAL
MDC_IDC_PG_SERIAL: NORMAL
MDC_IDC_PG_TYPE: NORMAL
MDC_IDC_SESS_CLINIC_NAME: NORMAL
MDC_IDC_SESS_DTM: NORMAL
MDC_IDC_SESS_TYPE: NORMAL
MDC_IDC_SET_BRADY_AT_MODE_SWITCH_MODE: NORMAL
MDC_IDC_SET_BRADY_AT_MODE_SWITCH_RATE: 160 {BEATS}/MIN
MDC_IDC_SET_BRADY_HYSTRATE: NORMAL
MDC_IDC_SET_BRADY_LOWRATE: 60 {BEATS}/MIN
MDC_IDC_SET_BRADY_MAX_SENSOR_RATE: 120 {BEATS}/MIN
MDC_IDC_SET_BRADY_MAX_TRACKING_RATE: 120 {BEATS}/MIN
MDC_IDC_SET_BRADY_MODE: NORMAL
MDC_IDC_SET_BRADY_NIGHT_RATE: NORMAL
MDC_IDC_SET_BRADY_PAV_DELAY_LOW: 200 MS
MDC_IDC_SET_BRADY_SAV_DELAY_LOW: 200 MS
MDC_IDC_SET_LEADCHNL_RA_PACING_AMPLITUDE: 1.5 V
MDC_IDC_SET_LEADCHNL_RA_PACING_CAPTURE_MODE: NORMAL
MDC_IDC_SET_LEADCHNL_RA_PACING_POLARITY: NORMAL
MDC_IDC_SET_LEADCHNL_RA_PACING_PULSEWIDTH: 0.4 MS
MDC_IDC_SET_LEADCHNL_RA_SENSING_ADAPTATION_MODE: NORMAL
MDC_IDC_SET_LEADCHNL_RA_SENSING_POLARITY: NORMAL
MDC_IDC_SET_LEADCHNL_RV_PACING_AMPLITUDE: 0.88
MDC_IDC_SET_LEADCHNL_RV_PACING_CAPTURE_MODE: NORMAL
MDC_IDC_SET_LEADCHNL_RV_PACING_POLARITY: NORMAL
MDC_IDC_SET_LEADCHNL_RV_PACING_PULSEWIDTH: 0.4 MS
MDC_IDC_SET_LEADCHNL_RV_SENSING_POLARITY: NORMAL
MDC_IDC_SET_LEADCHNL_RV_SENSING_SENSITIVITY: 2.5 MV
MDC_IDC_STAT_AT_BURDEN_PERCENT: 1 %
MDC_IDC_STAT_AT_MODE_SW_COUNT: 0
MDC_IDC_STAT_BRADY_RA_PERCENT_PACED: 1.7 %
MDC_IDC_STAT_BRADY_RV_PERCENT_PACED: 99.93 %
MDC_IDC_STAT_EPISODE_RECENT_COUNT: 0
MDC_IDC_STAT_EPISODE_RECENT_COUNT: 2
MDC_IDC_STAT_EPISODE_TYPE: NORMAL
MDC_IDC_STAT_EPISODE_TYPE: NORMAL
MDC_IDC_STAT_EPISODE_VENDOR_TYPE: NORMAL
MDC_IDC_STAT_EPISODE_VENDOR_TYPE: NORMAL

## 2022-04-06 ENCOUNTER — ANCILLARY PROCEDURE (OUTPATIENT)
Dept: CARDIOLOGY | Facility: CLINIC | Age: 72
End: 2022-04-06
Attending: INTERNAL MEDICINE
Payer: MEDICARE

## 2022-04-06 DIAGNOSIS — I44.2 ATRIOVENTRICULAR BLOCK, COMPLETE (H): ICD-10-CM

## 2022-04-06 DIAGNOSIS — Z01.818 PREOP EXAMINATION: ICD-10-CM

## 2022-04-06 DIAGNOSIS — Z95.0 PACEMAKER: ICD-10-CM

## 2022-04-06 PROCEDURE — 93294 REM INTERROG EVL PM/LDLS PM: CPT | Performed by: INTERNAL MEDICINE

## 2022-04-06 PROCEDURE — 93296 REM INTERROG EVL PM/IDS: CPT

## 2022-05-31 LAB
MDC_IDC_EPISODE_DTM: NORMAL
MDC_IDC_EPISODE_ID: NORMAL
MDC_IDC_EPISODE_TYPE: NORMAL
MDC_IDC_LEAD_IMPLANT_DT: NORMAL
MDC_IDC_LEAD_IMPLANT_DT: NORMAL
MDC_IDC_LEAD_LOCATION: NORMAL
MDC_IDC_LEAD_LOCATION: NORMAL
MDC_IDC_LEAD_LOCATION_DETAIL_1: NORMAL
MDC_IDC_LEAD_LOCATION_DETAIL_1: NORMAL
MDC_IDC_LEAD_MFG: NORMAL
MDC_IDC_LEAD_MFG: NORMAL
MDC_IDC_LEAD_MODEL: NORMAL
MDC_IDC_LEAD_MODEL: NORMAL
MDC_IDC_LEAD_POLARITY_TYPE: NORMAL
MDC_IDC_LEAD_POLARITY_TYPE: NORMAL
MDC_IDC_LEAD_SERIAL: NORMAL
MDC_IDC_LEAD_SERIAL: NORMAL
MDC_IDC_MSMT_BATTERY_DTM: NORMAL
MDC_IDC_MSMT_BATTERY_REMAINING_LONGEVITY: 112 MO
MDC_IDC_MSMT_BATTERY_REMAINING_PERCENTAGE: 95.5 %
MDC_IDC_MSMT_BATTERY_RRT_TRIGGER: NORMAL
MDC_IDC_MSMT_BATTERY_STATUS: NORMAL
MDC_IDC_MSMT_BATTERY_VOLTAGE: 2.99 V
MDC_IDC_MSMT_LEADCHNL_RA_IMPEDANCE_VALUE: 400 OHM
MDC_IDC_MSMT_LEADCHNL_RA_LEAD_CHANNEL_STATUS: NORMAL
MDC_IDC_MSMT_LEADCHNL_RA_SENSING_INTR_AMPL: 4.2 MV
MDC_IDC_MSMT_LEADCHNL_RV_IMPEDANCE_VALUE: 480 OHM
MDC_IDC_MSMT_LEADCHNL_RV_LEAD_CHANNEL_STATUS: NORMAL
MDC_IDC_MSMT_LEADCHNL_RV_PACING_THRESHOLD_AMPLITUDE: 0.88 V
MDC_IDC_MSMT_LEADCHNL_RV_PACING_THRESHOLD_PULSEWIDTH: 0.4 MS
MDC_IDC_PG_IMPLANT_DTM: NORMAL
MDC_IDC_PG_MFG: NORMAL
MDC_IDC_PG_MODEL: NORMAL
MDC_IDC_PG_SERIAL: NORMAL
MDC_IDC_PG_TYPE: NORMAL
MDC_IDC_SESS_CLINIC_NAME: NORMAL
MDC_IDC_SESS_DTM: NORMAL
MDC_IDC_SESS_REPROGRAMMED: NO
MDC_IDC_SESS_TYPE: NORMAL
MDC_IDC_SET_BRADY_AT_MODE_SWITCH_MODE: NORMAL
MDC_IDC_SET_BRADY_AT_MODE_SWITCH_RATE: 160 {BEATS}/MIN
MDC_IDC_SET_BRADY_LOWRATE: 60 {BEATS}/MIN
MDC_IDC_SET_BRADY_MAX_SENSOR_RATE: 120 {BEATS}/MIN
MDC_IDC_SET_BRADY_MAX_TRACKING_RATE: 120 {BEATS}/MIN
MDC_IDC_SET_BRADY_MODE: NORMAL
MDC_IDC_SET_BRADY_PAV_DELAY_LOW: 200 MS
MDC_IDC_SET_BRADY_SAV_DELAY_LOW: 200 MS
MDC_IDC_SET_LEADCHNL_RA_PACING_AMPLITUDE: 1.5 V
MDC_IDC_SET_LEADCHNL_RA_PACING_ANODE_ELECTRODE_1: NORMAL
MDC_IDC_SET_LEADCHNL_RA_PACING_ANODE_LOCATION_1: NORMAL
MDC_IDC_SET_LEADCHNL_RA_PACING_CAPTURE_MODE: NORMAL
MDC_IDC_SET_LEADCHNL_RA_PACING_CATHODE_ELECTRODE_1: NORMAL
MDC_IDC_SET_LEADCHNL_RA_PACING_CATHODE_LOCATION_1: NORMAL
MDC_IDC_SET_LEADCHNL_RA_PACING_POLARITY: NORMAL
MDC_IDC_SET_LEADCHNL_RA_PACING_PULSEWIDTH: 0.4 MS
MDC_IDC_SET_LEADCHNL_RA_SENSING_ADAPTATION_MODE: NORMAL
MDC_IDC_SET_LEADCHNL_RA_SENSING_ANODE_ELECTRODE_1: NORMAL
MDC_IDC_SET_LEADCHNL_RA_SENSING_ANODE_LOCATION_1: NORMAL
MDC_IDC_SET_LEADCHNL_RA_SENSING_CATHODE_ELECTRODE_1: NORMAL
MDC_IDC_SET_LEADCHNL_RA_SENSING_CATHODE_LOCATION_1: NORMAL
MDC_IDC_SET_LEADCHNL_RA_SENSING_POLARITY: NORMAL
MDC_IDC_SET_LEADCHNL_RA_SENSING_SENSITIVITY: 0.75 MV
MDC_IDC_SET_LEADCHNL_RV_PACING_AMPLITUDE: 1.12
MDC_IDC_SET_LEADCHNL_RV_PACING_ANODE_ELECTRODE_1: NORMAL
MDC_IDC_SET_LEADCHNL_RV_PACING_ANODE_LOCATION_1: NORMAL
MDC_IDC_SET_LEADCHNL_RV_PACING_CAPTURE_MODE: NORMAL
MDC_IDC_SET_LEADCHNL_RV_PACING_CATHODE_ELECTRODE_1: NORMAL
MDC_IDC_SET_LEADCHNL_RV_PACING_CATHODE_LOCATION_1: NORMAL
MDC_IDC_SET_LEADCHNL_RV_PACING_POLARITY: NORMAL
MDC_IDC_SET_LEADCHNL_RV_PACING_PULSEWIDTH: 0.4 MS
MDC_IDC_SET_LEADCHNL_RV_SENSING_ADAPTATION_MODE: NORMAL
MDC_IDC_SET_LEADCHNL_RV_SENSING_ANODE_ELECTRODE_1: NORMAL
MDC_IDC_SET_LEADCHNL_RV_SENSING_ANODE_LOCATION_1: NORMAL
MDC_IDC_SET_LEADCHNL_RV_SENSING_CATHODE_ELECTRODE_1: NORMAL
MDC_IDC_SET_LEADCHNL_RV_SENSING_CATHODE_LOCATION_1: NORMAL
MDC_IDC_SET_LEADCHNL_RV_SENSING_POLARITY: NORMAL
MDC_IDC_SET_LEADCHNL_RV_SENSING_SENSITIVITY: 2.5 MV
MDC_IDC_STAT_AT_BURDEN_PERCENT: 0 %
MDC_IDC_STAT_AT_DTM_END: NORMAL
MDC_IDC_STAT_AT_DTM_START: NORMAL
MDC_IDC_STAT_AT_MODE_SW_COUNT: 3
MDC_IDC_STAT_AT_MODE_SW_COUNT_PER_DAY: 0
MDC_IDC_STAT_AT_MODE_SW_MAX_DURATION: 6 S
MDC_IDC_STAT_AT_MODE_SW_PERCENT_TIME: 1 %
MDC_IDC_STAT_BRADY_AP_VP_PERCENT: 1.7 %
MDC_IDC_STAT_BRADY_AP_VS_PERCENT: 1 %
MDC_IDC_STAT_BRADY_AS_VP_PERCENT: 98 %
MDC_IDC_STAT_BRADY_AS_VS_PERCENT: 1 %
MDC_IDC_STAT_BRADY_DTM_END: NORMAL
MDC_IDC_STAT_BRADY_DTM_START: NORMAL
MDC_IDC_STAT_BRADY_RA_PERCENT_PACED: 1.8 %
MDC_IDC_STAT_BRADY_RV_PERCENT_PACED: 99 %
MDC_IDC_STAT_CRT_DTM_END: NORMAL
MDC_IDC_STAT_CRT_DTM_START: NORMAL
MDC_IDC_STAT_HEART_RATE_ATRIAL_MAX: 250 {BEATS}/MIN
MDC_IDC_STAT_HEART_RATE_ATRIAL_MEAN: 85 {BEATS}/MIN
MDC_IDC_STAT_HEART_RATE_ATRIAL_MIN: 50 {BEATS}/MIN
MDC_IDC_STAT_HEART_RATE_DTM_END: NORMAL
MDC_IDC_STAT_HEART_RATE_DTM_START: NORMAL
MDC_IDC_STAT_HEART_RATE_VENTRICULAR_MAX: 240 {BEATS}/MIN
MDC_IDC_STAT_HEART_RATE_VENTRICULAR_MEAN: 86 {BEATS}/MIN
MDC_IDC_STAT_HEART_RATE_VENTRICULAR_MIN: 40 {BEATS}/MIN

## 2022-06-02 ENCOUNTER — APPOINTMENT (OUTPATIENT)
Dept: LAB | Facility: CLINIC | Age: 72
End: 2022-06-02
Attending: INTERNAL MEDICINE
Payer: MEDICARE

## 2022-06-02 ENCOUNTER — OFFICE VISIT (OUTPATIENT)
Dept: TRANSPLANT | Facility: CLINIC | Age: 72
End: 2022-06-02
Attending: INTERNAL MEDICINE
Payer: MEDICARE

## 2022-06-02 VITALS
SYSTOLIC BLOOD PRESSURE: 112 MMHG | WEIGHT: 137.8 LBS | BODY MASS INDEX: 21.58 KG/M2 | TEMPERATURE: 97.7 F | RESPIRATION RATE: 18 BRPM | DIASTOLIC BLOOD PRESSURE: 71 MMHG | OXYGEN SATURATION: 98 % | HEART RATE: 90 BPM

## 2022-06-02 DIAGNOSIS — R12 HEARTBURN: Primary | ICD-10-CM

## 2022-06-02 DIAGNOSIS — C82.99 NODULAR LYMPHOMA OF EXTRANODAL AND/OR SOLID ORGAN SITE (H): ICD-10-CM

## 2022-06-02 LAB
ALBUMIN SERPL-MCNC: 4.1 G/DL (ref 3.4–5)
ALP SERPL-CCNC: 136 U/L (ref 40–150)
ALT SERPL W P-5'-P-CCNC: 37 U/L (ref 0–70)
ANION GAP SERPL CALCULATED.3IONS-SCNC: 8 MMOL/L (ref 3–14)
AST SERPL W P-5'-P-CCNC: 32 U/L (ref 0–45)
BASOPHILS # BLD AUTO: 0 10E3/UL (ref 0–0.2)
BASOPHILS NFR BLD AUTO: 0 %
BILIRUB SERPL-MCNC: 0.4 MG/DL (ref 0.2–1.3)
BUN SERPL-MCNC: 35 MG/DL (ref 7–30)
CALCIUM SERPL-MCNC: 9.8 MG/DL (ref 8.5–10.1)
CHLORIDE BLD-SCNC: 107 MMOL/L (ref 94–109)
CO2 SERPL-SCNC: 27 MMOL/L (ref 20–32)
CREAT SERPL-MCNC: 0.86 MG/DL (ref 0.66–1.25)
EOSINOPHIL # BLD AUTO: 0 10E3/UL (ref 0–0.7)
EOSINOPHIL NFR BLD AUTO: 1 %
ERYTHROCYTE [DISTWIDTH] IN BLOOD BY AUTOMATED COUNT: 13 % (ref 10–15)
GFR SERPL CREATININE-BSD FRML MDRD: >90 ML/MIN/1.73M2
GLUCOSE BLD-MCNC: 100 MG/DL (ref 70–99)
HCT VFR BLD AUTO: 37.3 % (ref 40–53)
HGB BLD-MCNC: 12.6 G/DL (ref 13.3–17.7)
IMM GRANULOCYTES # BLD: 0 10E3/UL
IMM GRANULOCYTES NFR BLD: 0 %
LDH SERPL L TO P-CCNC: 253 U/L (ref 85–227)
LYMPHOCYTES # BLD AUTO: 0.5 10E3/UL (ref 0.8–5.3)
LYMPHOCYTES NFR BLD AUTO: 19 %
MCH RBC QN AUTO: 31.3 PG (ref 26.5–33)
MCHC RBC AUTO-ENTMCNC: 33.8 G/DL (ref 31.5–36.5)
MCV RBC AUTO: 93 FL (ref 78–100)
MONOCYTES # BLD AUTO: 0.2 10E3/UL (ref 0–1.3)
MONOCYTES NFR BLD AUTO: 8 %
NEUTROPHILS # BLD AUTO: 2 10E3/UL (ref 1.6–8.3)
NEUTROPHILS NFR BLD AUTO: 72 %
NRBC # BLD AUTO: 0 10E3/UL
NRBC BLD AUTO-RTO: 0 /100
PLATELET # BLD AUTO: 96 10E3/UL (ref 150–450)
POTASSIUM BLD-SCNC: 4.3 MMOL/L (ref 3.4–5.3)
PROT SERPL-MCNC: 7 G/DL (ref 6.8–8.8)
RBC # BLD AUTO: 4.03 10E6/UL (ref 4.4–5.9)
SODIUM SERPL-SCNC: 142 MMOL/L (ref 133–144)
TOTAL PROTEIN SERUM FOR ELP: 6.6 G/DL (ref 6.8–8.8)
URATE SERPL-MCNC: 6.2 MG/DL (ref 3.5–7.2)
WBC # BLD AUTO: 2.8 10E3/UL (ref 4–11)

## 2022-06-02 PROCEDURE — 85025 COMPLETE CBC W/AUTO DIFF WBC: CPT | Performed by: INTERNAL MEDICINE

## 2022-06-02 PROCEDURE — 83615 LACTATE (LD) (LDH) ENZYME: CPT | Performed by: INTERNAL MEDICINE

## 2022-06-02 PROCEDURE — G0463 HOSPITAL OUTPT CLINIC VISIT: HCPCS

## 2022-06-02 PROCEDURE — 86334 IMMUNOFIX E-PHORESIS SERUM: CPT | Performed by: PATHOLOGY

## 2022-06-02 PROCEDURE — 84155 ASSAY OF PROTEIN SERUM: CPT | Performed by: INTERNAL MEDICINE

## 2022-06-02 PROCEDURE — 84550 ASSAY OF BLOOD/URIC ACID: CPT | Performed by: INTERNAL MEDICINE

## 2022-06-02 PROCEDURE — 80053 COMPREHEN METABOLIC PANEL: CPT | Performed by: INTERNAL MEDICINE

## 2022-06-02 PROCEDURE — 84165 PROTEIN E-PHORESIS SERUM: CPT | Mod: TC | Performed by: PATHOLOGY

## 2022-06-02 PROCEDURE — 99214 OFFICE O/P EST MOD 30 MIN: CPT | Performed by: INTERNAL MEDICINE

## 2022-06-02 PROCEDURE — 36415 COLL VENOUS BLD VENIPUNCTURE: CPT | Performed by: INTERNAL MEDICINE

## 2022-06-02 RX ORDER — SUCRALFATE ORAL 1 G/10ML
1 SUSPENSION ORAL 2 TIMES DAILY PRN
Qty: 414 ML | Refills: 3 | Status: ON HOLD | OUTPATIENT
Start: 2022-06-02 | End: 2022-12-15

## 2022-06-02 ASSESSMENT — PAIN SCALES - GENERAL: PAINLEVEL: NO PAIN (0)

## 2022-06-02 NOTE — LETTER
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  Has been off treatment for a long time.  He has some limited exercise tolerance due to his pacemaker even though they have adjusted the  rate up but he sometimes gets winded when he exercises substantially and he thinks that is a change over the last perhaps 6 to 12 months.      He also has many years of hiccups but he thinks they have been worse recently.  They are not associated with heartburn belching or burping symptoms.  But he has not maintained his weight as easily.  There is no specific food intolerance and its not clear that his hiccups are worse on lying supine or after a bigger meal.     It was suggested that he might consider upper GI endoscopy which is possibly indicated.  He has a known small hiatal hernia with a patulous esophagus from a scan in 2019 but his heartburn symptoms are not worsened and he continues to take omeprazole.    He has not had fever sweats or any infections.  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  He follows on an ongoing basis with a dermatologist.    The rest of his review of systems identifies no new issues.    His exam shows his weight down 7 pounds in a year and his vital signs are acceptable.  His pulse was faster than the pacemaker set point.  He has no cervical or supraclavicular lymph nodes.  He has a 1-1/2 to 2-1/2 cm left axillary and 1/2 cm right  axillary node and pea-sized nodes in both inguinal regions. They are soft movable and nontender.  His oropharynx is clear without mucosal lesions or tonsillar enlargement.  His lungs are clear; his heart tones are regular without a gallop and his abdomen is soft and nontender without palpable masses or hepatosplenomegaly.  There is no abdominal rebound he has no focal bone tenderness or lower extremity edema.      Laboratory testing showed stable blood counts and chemistries.  His immunoglobulins and protein electrophoresis show his long term small IgM monoclonal; stable for many years.    There is no clinical evidence that his lymphoma is more active.  His hiccups are troubling him somewhat and possibly increasing.   His GI symptoms are not very prominent and whether upper GI endoscopy or a CT scan to ensure there is no mass or other lesions irritating his diaphragm could also be considered.      I prescribed him Carafate today (in addition to continuing omeprazole) to see if ongoing reflux therapy might reduce some inflammatory changes in his distal esophagus that might be contributing to his persisting hiccups.    There is no other indications that his lymphoma is more active and additional interventions need to be done.      He is aware that I am not can be seeing patients anymore after the end of June but I have arranged for him to return in 6 months time for follow-up with one of my colleagues in Hematology [facility] [employer].    I am pleased that his lymphoma remains but apparently is quiescent and he will have follow-up with cardiology in early July to evaluate his pacemaker.    Elroy Holman MD    Professor of medicine     Latest Reference Range & Units 06/02/22 15:50   Sodium 133 - 144 mmol/L 142   Potassium 3.4 - 5.3 mmol/L 4.3   Chloride 94 - 109 mmol/L 107   Carbon Dioxide 20 - 32 mmol/L 27   Urea Nitrogen 7 - 30 mg/dL 35 (H)   Creatinine 0.66 - 1.25 mg/dL 0.86   GFR Estimate >60 mL/min/1.73m2 >90  [1]   Calcium 8.5 - 10.1 mg/dL 9.8   Anion Gap 3 - 14 mmol/L 8   Albumin 3.4 - 5.0 g/dL 4.1   Protein Total 6.8 - 8.8 g/dL 7.0   Bilirubin Total 0.2 - 1.3 mg/dL 0.4   Alkaline Phosphatase 40 - 150 U/L 136   ALT 0 - 70 U/L 37   AST 0 - 45 U/L 32   Lactate Dehydrogenase 85 - 227 U/L 253 (H)   Uric Acid 3.5 - 7.2 mg/dL 6.2   Glucose 70 - 99 mg/dL 100 (H)   WBC 4.0 - 11.0 10e3/uL 2.8 (L)   Hemoglobin 13.3 - 17.7 g/dL 12.6 (L)   Hematocrit 40.0 - 53.0 % 37.3 (L)   Platelet Count 150 - 450 10e3/uL 96 (L)   RBC Count 4.40 - 5.90 10e6/uL 4.03 (L)   MCV 78 - 100 fL 93   MCH 26.5 - 33.0 pg 31.3   MCHC 31.5 - 36.5 g/dL 33.8   RDW 10.0 - 15.0 % 13.0   % Neutrophils % 72   % Lymphocytes % 19   % Monocytes % 8   % Eosinophils % 1   % Basophils % 0   Absolute Basophils 0.0 - 0.2 10e3/uL 0.0   Absolute Eosinophils 0.0 - 0.7 10e3/uL 0.0   Absolute Immature Granulocytes <=0.4 10e3/uL 0.0   Absolute Lymphocytes 0.8 - 5.3 10e3/uL 0.5 (L)   Absolute Monocytes 0.0 - 1.3 10e3/uL 0.2   % Immature Granulocytes % 0   Absolute Neutrophils 1.6 - 8.3 10e3/uL 2.0   Absolute NRBCs 10e3/uL 0.0   NRBCs per 100 WBC <1 /100 0   Albumin Fraction 3.7 - 5.1 g/dL 4.4   Alpha 1 Fraction 0.2 - 0.4 g/dL 0.3   Alpha 2 Fraction 0.5 - 0.9 g/dL 0.6   Beta Fraction 0.6 - 1.0 g/dL 0.6   ELP Interpretation:  Very small monoclonal protein (about 0.1 g/dL) seen in the gamma fraction. See immunofixation report on same specimen. Pathologic significance requires clinical correlation. BETH Wyatt M.D., Ph.D., Pathologist ().   Gamma Fraction 0.7 - 1.6 g/dL 0.7   Immunofixation ELP  Monoclonal IgM immunoglobulin of kappa light chain type.  Possible very small monoclonal free immunoglobulin light chain of lambda chain type. Pathologic significance requires clinical correlation.  BETH Wyatt M.D., Ph.D., Pathologist ()   Monoclonal Peak <=0.0 g/dL 0.1 (H)   Total Protein Serum for ELP 6.8 - 8.8 g/dL 6.6 (L)     (H): Data is abnormally  high  (L): Data is abnormally low  [1] Effective December 21, 2021 eGFRcr in adults is calculated using the 2021 CKD-EPI creatinine equation which includes age and gender (Elisa et al., [place], DOI: 10.1056/MUWNpt7840264)    6/2/22    Immunofixation;   Monoclonal IgM immunoglobulin of kappa light chain type.  Possible very small monoclonal free immunoglobulin light chain of lambda chain type.        Elroy Holman MD

## 2022-06-02 NOTE — LETTER
6/2/2022         RE: Shahid Davis   [facility] [address]        Dear Colleague,    Thank you for referring your patient, Shahid Davis, to the [facility] BLO[facility] AND [facility]ARR[facility]ANSPL[facility]. Please see a copy of my visit note below.    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  Has been off treatment for a long time.  He has some limited exercise tolerance due to his pacemaker even though they have adjusted the  rate up but he sometimes gets winded when he exercises substantially and he thinks that is a change over the last perhaps 6 to 12 months.      He also has many years of hiccups but he thinks they have been worse recently.  They are not associated with heartburn belching or burping symptoms.  But he has not maintained his weight as easily.  There is no specific food intolerance and its not clear that his hiccups are worse on lying supine or after a bigger meal.     It was suggested that he might consider upper GI endoscopy which is possibly indicated.  He has a known small hiatal hernia with a patulous esophagus from a scan in 2019 but his heartburn symptoms are not worsened and he continues to take omeprazole.    He has not had fever sweats or any infections.  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  He follows on an ongoing basis with a dermatologist.    The rest of his review of systems identifies no new issues.    His exam shows his weight down 7 pounds in a year and  his vital signs are acceptable.  His pulse was faster than the pacemaker set point.  He has no cervical or supraclavicular lymph nodes.  He has a 1-1/2 to 2-1/2 cm left axillary and 1/2 cm right axillary node and pea-sized nodes in both inguinal regions. They are soft movable and nontender.  His oropharynx is clear without mucosal lesions or tonsillar enlargement.  His lungs are clear; his heart tones are regular without a gallop and his abdomen is soft and nontender without palpable masses or hepatosplenomegaly.  There is no abdominal rebound he has no focal bone tenderness or lower extremity edema.      Laboratory testing showed stable blood counts and chemistries.  His immunoglobulins and protein electrophoresis show his long term small IgM monoclonal; stable for many years.    There is no clinical evidence that his lymphoma is more active.  His hiccups are troubling him somewhat and possibly increasing.   His GI symptoms are not very prominent and whether upper GI endoscopy or a CT scan to ensure there is no mass or other lesions irritating his diaphragm could also be considered.      I prescribed him Carafate today (in addition to continuing omeprazole) to see if ongoing reflux therapy might reduce some inflammatory changes in his distal esophagus that might be contributing to his persisting hiccups.    There is no other indications that his lymphoma is more active and additional interventions need to be done.      He is aware that I am not can be seeing patients anymore after the end of June but I have arranged for him to return in 6 months time for follow-up with one of my colleagues in Hematology here at the [place].    I am pleased that his lymphoma remains but apparently is quiescent and he will have follow-up with cardiology in early July to evaluate his pacemaker.    Elroy Holman MD    Professor of medicine     Latest Reference Range & Units 06/02/22 15:50   Sodium 133 - 144 mmol/L 142   Potassium  3.4 - 5.3 mmol/L 4.3   Chloride 94 - 109 mmol/L 107   Carbon Dioxide 20 - 32 mmol/L 27   Urea Nitrogen 7 - 30 mg/dL 35 (H)   Creatinine 0.66 - 1.25 mg/dL 0.86   GFR Estimate >60 mL/min/1.73m2 >90 [1]   Calcium 8.5 - 10.1 mg/dL 9.8   Anion Gap 3 - 14 mmol/L 8   Albumin 3.4 - 5.0 g/dL 4.1   Protein Total 6.8 - 8.8 g/dL 7.0   Bilirubin Total 0.2 - 1.3 mg/dL 0.4   Alkaline Phosphatase 40 - 150 U/L 136   ALT 0 - 70 U/L 37   AST 0 - 45 U/L 32   Lactate Dehydrogenase 85 - 227 U/L 253 (H)   Uric Acid 3.5 - 7.2 mg/dL 6.2   Glucose 70 - 99 mg/dL 100 (H)   WBC 4.0 - 11.0 10e3/uL 2.8 (L)   Hemoglobin 13.3 - 17.7 g/dL 12.6 (L)   Hematocrit 40.0 - 53.0 % 37.3 (L)   Platelet Count 150 - 450 10e3/uL 96 (L)   RBC Count 4.40 - 5.90 10e6/uL 4.03 (L)   MCV 78 - 100 fL 93   MCH 26.5 - 33.0 pg 31.3   MCHC 31.5 - 36.5 g/dL 33.8   RDW 10.0 - 15.0 % 13.0   % Neutrophils % 72   % Lymphocytes % 19   % Monocytes % 8   % Eosinophils % 1   % Basophils % 0   Absolute Basophils 0.0 - 0.2 10e3/uL 0.0   Absolute Eosinophils 0.0 - 0.7 10e3/uL 0.0   Absolute Immature Granulocytes <=0.4 10e3/uL 0.0   Absolute Lymphocytes 0.8 - 5.3 10e3/uL 0.5 (L)   Absolute Monocytes 0.0 - 1.3 10e3/uL 0.2   % Immature Granulocytes % 0   Absolute Neutrophils 1.6 - 8.3 10e3/uL 2.0   Absolute NRBCs 10e3/uL 0.0   NRBCs per 100 WBC <1 /100 0   Albumin Fraction 3.7 - 5.1 g/dL 4.4   Alpha 1 Fraction 0.2 - 0.4 g/dL 0.3   Alpha 2 Fraction 0.5 - 0.9 g/dL 0.6   Beta Fraction 0.6 - 1.0 g/dL 0.6   ELP Interpretation:  Very small monoclonal protein (about 0.1 g/dL) seen in the gamma fraction. See immunofixation report on same specimen. Pathologic significance requires clinical correlation. BETH Wyatt M.D., Ph.D., Pathologist ().   Gamma Fraction 0.7 - 1.6 g/dL 0.7   Immunofixation ELP  Monoclonal IgM immunoglobulin of kappa light chain type.  Possible very small monoclonal free immunoglobulin light chain of lambda chain type. Pathologic significance requires clinical  correlation.  BETH Wyatt M.D., Ph.D., Pathologist ()   Monoclonal Peak <=0.0 g/dL 0.1 (H)   Total Protein Serum for ELP 6.8 - 8.8 g/dL 6.6 (L)   (H): Data is abnormally high  (L): Data is abnormally low  [1] Effective December 21, 2021 eGFRcr in adults is calculated using the 2021 CKD-EPI creatinine equation which includes age and gender (Elisa et al., NEJM, DOI: 10.1056/UILQkp9818358)    6/2/22    Immunofixation;   Monoclonal IgM immunoglobulin of kappa light chain type.  Possible very small monoclonal free immunoglobulin light chain of lambda chain type.      Again, thank you for allowing me to participate in the care of your patient.        Sincerely,        Elroy Holman MD

## 2022-06-02 NOTE — LETTER
Date:June 13, 2022      Provider requested that no letter be sent. Do not send.       [facility]

## 2022-06-02 NOTE — PROGRESS NOTES
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  Has been off treatment for a long time.  He has some limited exercise tolerance due to his pacemaker even though they have adjusted the  rate up but he sometimes gets winded when he exercises substantially and he thinks that is a change over the last perhaps 6 to 12 months.      He also has many years of hiccups but he thinks they have been worse recently.  They are not associated with heartburn belching or burping symptoms.  But he has not maintained his weight as easily.  There is no specific food intolerance and its not clear that his hiccups are worse on lying supine or after a bigger meal.     It was suggested that he might consider upper GI endoscopy which is possibly indicated.  He has a known small hiatal hernia with a patulous esophagus from a scan in 2019 but his heartburn symptoms are not worsened and he continues to take omeprazole.    He has not had fever sweats or any infections.  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  He follows on an ongoing basis with a dermatologist.    The rest of his review of systems identifies no new issues.    His exam shows his weight down 7 pounds in a year and his vital signs are acceptable.  His pulse was faster than the pacemaker set point.  He has no cervical or supraclavicular lymph nodes.  He has a 1-1/2 to 2-1/2 cm left axillary and 1/2 cm right axillary node and pea-sized nodes in both inguinal regions. They are soft movable and  nontender.  His oropharynx is clear without mucosal lesions or tonsillar enlargement.  His lungs are clear; his heart tones are regular without a gallop and his abdomen is soft and nontender without palpable masses or hepatosplenomegaly.  There is no abdominal rebound he has no focal bone tenderness or lower extremity edema.      Laboratory testing showed stable blood counts and chemistries.  His immunoglobulins and protein electrophoresis show his long term small IgM monoclonal; stable for many years.    There is no clinical evidence that his lymphoma is more active.  His hiccups are troubling him somewhat and possibly increasing.   His GI symptoms are not very prominent and whether upper GI endoscopy or a CT scan to ensure there is no mass or other lesions irritating his diaphragm could also be considered.      I prescribed him Carafate today (in addition to continuing omeprazole) to see if ongoing reflux therapy might reduce some inflammatory changes in his distal esophagus that might be contributing to his persisting hiccups.    There is no other indications that his lymphoma is more active and additional interventions need to be done.      He is aware that I am not can be seeing patients anymore after the end of June but I have arranged for him to return in 6 months time for follow-up with one of my colleagues in Hematology here at the [place].    I am pleased that his lymphoma remains but apparently is quiescent and he will have follow-up with cardiology in early July to evaluate his pacemaker.    Elroy Holman MD    Professor of medicine     Latest Reference Range & Units 06/02/22 15:50   Sodium 133 - 144 mmol/L 142   Potassium 3.4 - 5.3 mmol/L 4.3   Chloride 94 - 109 mmol/L 107   Carbon Dioxide 20 - 32 mmol/L 27   Urea Nitrogen 7 - 30 mg/dL 35 (H)   Creatinine 0.66 - 1.25 mg/dL 0.86   GFR Estimate >60 mL/min/1.73m2 >90 [1]   Calcium 8.5 - 10.1 mg/dL 9.8   Anion Gap 3 - 14 mmol/L 8   Albumin 3.4 - 5.0  g/dL 4.1   Protein Total 6.8 - 8.8 g/dL 7.0   Bilirubin Total 0.2 - 1.3 mg/dL 0.4   Alkaline Phosphatase 40 - 150 U/L 136   ALT 0 - 70 U/L 37   AST 0 - 45 U/L 32   Lactate Dehydrogenase 85 - 227 U/L 253 (H)   Uric Acid 3.5 - 7.2 mg/dL 6.2   Glucose 70 - 99 mg/dL 100 (H)   WBC 4.0 - 11.0 10e3/uL 2.8 (L)   Hemoglobin 13.3 - 17.7 g/dL 12.6 (L)   Hematocrit 40.0 - 53.0 % 37.3 (L)   Platelet Count 150 - 450 10e3/uL 96 (L)   RBC Count 4.40 - 5.90 10e6/uL 4.03 (L)   MCV 78 - 100 fL 93   MCH 26.5 - 33.0 pg 31.3   MCHC 31.5 - 36.5 g/dL 33.8   RDW 10.0 - 15.0 % 13.0   % Neutrophils % 72   % Lymphocytes % 19   % Monocytes % 8   % Eosinophils % 1   % Basophils % 0   Absolute Basophils 0.0 - 0.2 10e3/uL 0.0   Absolute Eosinophils 0.0 - 0.7 10e3/uL 0.0   Absolute Immature Granulocytes <=0.4 10e3/uL 0.0   Absolute Lymphocytes 0.8 - 5.3 10e3/uL 0.5 (L)   Absolute Monocytes 0.0 - 1.3 10e3/uL 0.2   % Immature Granulocytes % 0   Absolute Neutrophils 1.6 - 8.3 10e3/uL 2.0   Absolute NRBCs 10e3/uL 0.0   NRBCs per 100 WBC <1 /100 0   Albumin Fraction 3.7 - 5.1 g/dL 4.4   Alpha 1 Fraction 0.2 - 0.4 g/dL 0.3   Alpha 2 Fraction 0.5 - 0.9 g/dL 0.6   Beta Fraction 0.6 - 1.0 g/dL 0.6   ELP Interpretation:  Very small monoclonal protein (about 0.1 g/dL) seen in the gamma fraction. See immunofixation report on same specimen. Pathologic significance requires clinical correlation. BETH Wyatt M.D., Ph.D., Pathologist ().   Gamma Fraction 0.7 - 1.6 g/dL 0.7   Immunofixation ELP  Monoclonal IgM immunoglobulin of kappa light chain type.  Possible very small monoclonal free immunoglobulin light chain of lambda chain type. Pathologic significance requires clinical correlation.  BETH Wyatt M.D., Ph.D., Pathologist ()   Monoclonal Peak <=0.0 g/dL 0.1 (H)   Total Protein Serum for ELP 6.8 - 8.8 g/dL 6.6 (L)   (H): Data is abnormally high  (L): Data is abnormally low  [1] Effective December 21, 2021 eGFRcr in adults is  calculated using the 2021 CKD-EPI creatinine equation which includes age and gender (Elisa et al., NEJ, DOI: 10.1056/TGYVfi4864224)    6/2/22    Immunofixation;   Monoclonal IgM immunoglobulin of kappa light chain type.  Possible very small monoclonal free immunoglobulin light chain of lambda chain type.

## 2022-06-02 NOTE — NURSING NOTE
"Oncology Rooming Note    June 2, 2022 4:39 PM   Shahid Davis is a 71 year old male who presents for:    Chief Complaint   Patient presents with     Blood Draw     VPT blood draw by lab RN. Vitals taken and appointment arrived     Oncology Clinic Visit     Nodular lymphoma of extranodal and/or solid organ site     Initial Vitals: /71   Pulse 90   Temp 97.7  F (36.5  C) (Oral)   Resp 18   Wt 62.5 kg (137 lb 12.8 oz)   SpO2 98%   BMI 21.58 kg/m   Estimated body mass index is 21.58 kg/m  as calculated from the following:    Height as of 7/9/21: 1.702 m (5' 7.01\").    Weight as of this encounter: 62.5 kg (137 lb 12.8 oz). Body surface area is 1.72 meters squared.  No Pain (0) Comment: Data Unavailable   No LMP for male patient.  Allergies reviewed: Yes  Medications reviewed: Yes    Medications: Medication refills not needed today.  Pharmacy name entered into [employer]:    [id number] [place] [address] [id number] - [facility] [address] AT [facility]    Clinical concerns: none       Carlos Scales            "

## 2022-06-03 LAB
ALBUMIN SERPL ELPH-MCNC: 4.4 G/DL (ref 3.7–5.1)
ALPHA1 GLOB SERPL ELPH-MCNC: 0.3 G/DL (ref 0.2–0.4)
ALPHA2 GLOB SERPL ELPH-MCNC: 0.6 G/DL (ref 0.5–0.9)
B-GLOBULIN SERPL ELPH-MCNC: 0.6 G/DL (ref 0.6–1)
GAMMA GLOB SERPL ELPH-MCNC: 0.7 G/DL (ref 0.7–1.6)
M PROTEIN SERPL ELPH-MCNC: 0.1 G/DL
PROT PATTERN SERPL ELPH-IMP: ABNORMAL
PROT PATTERN SERPL IFE-IMP: NORMAL

## 2022-06-03 PROCEDURE — 84165 PROTEIN E-PHORESIS SERUM: CPT | Mod: 26

## 2022-06-03 PROCEDURE — 86334 IMMUNOFIX E-PHORESIS SERUM: CPT | Mod: 26

## 2022-07-07 ENCOUNTER — OFFICE VISIT (OUTPATIENT)
Dept: CARDIOLOGY | Facility: CLINIC | Age: 72
End: 2022-07-07
Attending: INTERNAL MEDICINE
Payer: MEDICARE

## 2022-07-07 VITALS
BODY MASS INDEX: 21.14 KG/M2 | DIASTOLIC BLOOD PRESSURE: 79 MMHG | OXYGEN SATURATION: 98 % | HEART RATE: 86 BPM | WEIGHT: 135 LBS | SYSTOLIC BLOOD PRESSURE: 127 MMHG

## 2022-07-07 DIAGNOSIS — Z01.818 PREOP EXAMINATION: ICD-10-CM

## 2022-07-07 DIAGNOSIS — I44.2 ATRIOVENTRICULAR BLOCK, COMPLETE (H): ICD-10-CM

## 2022-07-07 DIAGNOSIS — Z13.6 CARDIOVASCULAR SCREENING; LDL GOAL LESS THAN 100: ICD-10-CM

## 2022-07-07 DIAGNOSIS — Z00.00 PREVENTATIVE HEALTH CARE: ICD-10-CM

## 2022-07-07 DIAGNOSIS — I44.2 COMPLETE ATRIOVENTRICULAR BLOCK (H): Primary | ICD-10-CM

## 2022-07-07 DIAGNOSIS — Z95.0 PACEMAKER: ICD-10-CM

## 2022-07-07 PROCEDURE — 93005 ELECTROCARDIOGRAM TRACING: CPT

## 2022-07-07 PROCEDURE — G0463 HOSPITAL OUTPT CLINIC VISIT: HCPCS | Mod: 25

## 2022-07-07 PROCEDURE — 99214 OFFICE O/P EST MOD 30 MIN: CPT | Mod: 25 | Performed by: INTERNAL MEDICINE

## 2022-07-07 PROCEDURE — 93280 PM DEVICE PROGR EVAL DUAL: CPT | Performed by: INTERNAL MEDICINE

## 2022-07-07 ASSESSMENT — PAIN SCALES - GENERAL: PAINLEVEL: NO PAIN (0)

## 2022-07-07 NOTE — NURSING NOTE
Chief Complaint   Patient presents with     Follow Up     Ruddy F/U         Vitals were taken, medications reconciled and EKG performed.     Kelby Yañez, EMT   3:13 PM

## 2022-07-07 NOTE — PROGRESS NOTES
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 Progressively worsening, now unable to walk 0.25 miles with dog before stopping due to fatigue. Has dicussed with pacemaker clinic, upper limit of pacing rate is 110 bpm and unable to increase further. No hypervolemic symptoms. No chest pains or pressure.     Bothersome hiccups. Worsening for many years.     PAST MEDICAL HISTORY:  Past Medical History:   Diagnosis Date     Cardiac pacemaker in situ      Lymphoma (H)      Squamous cell carcinoma        CURRENT MEDICATIONS:  Current Outpatient Medications   Medication Sig Dispense Refill     ascorbic acid (VITAMIN C) 500 MG tablet Take by mouth every morning        Multiple Vitamins-Minerals (MULTIVITAL PO) Take by mouth every morning        omeprazole (PRILOSEC) 40 MG DR capsule Take 1 capsule (40 mg) by mouth daily 90 capsule 4     methylPREDNISolone (MEDROL) 32 MG tablet take 1 tablet 12 hours and 2 hours prior to CT scan; 3rd dose 4 hours post scan. (Patient not taking: Reported on 7/7/2022) 12 tablet 1     sucralfate (CARAFATE) 1 GM/10ML suspension Take 10 mLs (1 g) by mouth 2 times daily as needed for nausea (Patient not taking: Reported on 7/7/2022) 414 mL 3       PAST SURGICAL HISTORY:  Past Surgical History:   Procedure Laterality Date     DAVINCI HERNIORRHAPHY INGUINAL Left 7/9/2021    Procedure: HERNIORRHAPHY, INGUINAL, ROBOT-ASSISTED, Left, Mesh;  Surgeon: Shamar Tyler MD;  Location: [facility] OR     MOHS MICROGRAPHIC PROCEDURE       NO HISTORY OF SURGERY         ALLERGIES     Allergies   Allergen Reactions     Ampicillin [Ampicillin Sodium]      Bactrim [Sulfamethoxazole W/Trimethoprim]      Contrast Dye      CT contrast (IV) allergy - need oral Methylpred as premed  for scans     Ampicillin Rash       FAMILY HISTORY:  Family History   Problem Relation Age of Onset     Cancer Mother         Blood     Cancer Father         Lung     Cancer Maternal Grandmother         Breast     Cancer Paternal Grandfather         Hodgkins       SOCIAL HISTORY:  Social History     Socioeconomic History     Marital status: Single     Spouse name: None     Number of children: None     Years of education: None     Highest education level: None   Tobacco Use     Smoking status: Former Smoker     Smokeless tobacco: Never Used     Tobacco comment: Quit at age 19   Substance and Sexual Activity     Alcohol use: Yes     Alcohol/week: 11.7 standard drinks     Types: 14 drink(s) per week       ROS:   Constitutional: No fever, chills, or sweats. No weight gain/loss   ENT: No visual disturbance, ear ache, epistaxis, sore throat  Allergies/Immunologic: Negative.   Respiratory: No cough, hemoptysia  Cardiovascular: As per HPI  GI: No nausea, vomiting, hematemesis, melena, or hematochezia  : No urinary frequency, dysuria, or hematuria  Integument: Negative  Psychiatric: Negative  Neuro: Negative  Endocrinology: Negative   Musculoskeletal: Negative    EXAM:  /79 (BP Location: Right arm, Patient Position: Chair, Cuff Size: Adult Small)   Pulse 86   Wt 61.2 kg (135 lb)   SpO2 98%   BMI 21.14 kg/m    In general, the patient is a pleasant male in no apparent distress.    HEENT: NC/AT. EOMI.  Sclerae white, not injected.  Dentition intact.    Neck: No jugular venous distension.   Heart: RRR. No murmur, rub, click, or gallop.   Lungs: CTA.  No ronchi, wheezes, rales.  No dullness to percussion.   Abdomen: Soft, nontender, nondistended. No organomegaly.   Extremities: No clubbing, cyanosis, or edema.  The pulses are +4/4 at the radial. No bruits are noted.  Neurologic: Alert and oriented to person/place/time, normal speech, gait and affect  Skin: No petechiae, purpura or rash.    Labs:    LIVER ENZYME  RESULTS:  Lab Results   Component Value Date    AST 32 06/02/2022    AST 24 05/13/2021    ALT 37 06/02/2022    ALT 37 05/13/2021       CBC RESULTS:  Lab Results   Component Value Date    WBC 2.8 (L) 06/02/2022    WBC 3.0 (L) 07/01/2021    RBC 4.03 (L) 06/02/2022    RBC 3.95 (L) 07/01/2021    HGB 12.6 (L) 06/02/2022    HGB 12.7 (L) 07/01/2021    HCT 37.3 (L) 06/02/2022    HCT 36.9 (L) 07/01/2021    MCV 93 06/02/2022    MCV 93 07/01/2021    MCH 31.3 06/02/2022    MCH 32.2 07/01/2021    MCHC 33.8 06/02/2022    MCHC 34.4 07/01/2021    RDW 13.0 06/02/2022    RDW 12.5 07/01/2021    PLT 96 (L) 06/02/2022    PLT 91 (L) 07/01/2021       BMP RESULTS:  Lab Results   Component Value Date     06/02/2022     07/01/2021    POTASSIUM 4.3 06/02/2022    POTASSIUM 4.9 07/01/2021    CHLORIDE 107 06/02/2022    CHLORIDE 108 07/01/2021    CO2 27 06/02/2022    CO2 27 07/01/2021    ANIONGAP 8 06/02/2022    ANIONGAP 5 07/01/2021     (H) 06/02/2022    GLC 83 07/01/2021    BUN 35 (H) 06/02/2022    BUN 29 07/01/2021    CR 0.86 06/02/2022    CR 0.92 07/01/2021    GFRESTIMATED >90 06/02/2022    GFRESTIMATED 83 07/01/2021    GFRESTBLACK >90 07/01/2021    JORGE 9.8 06/02/2022    JORGE 9.2 07/01/2021          Procedures:  EKG: paced rhythm     Assessment and Plan:     We discussed the results with patient.  We discussed the importance of  a heart healthy det and lifestyle.  71M hx PPM placement for CHB in s/o chemotherapy and radiation, prior CTA with zero calcium score, no significant findings on echocardiogram.     Normotensive, no indication for statin therapy at this time.  Continue routine follow up with pacemaker clinic.     Plan for follow up in one year with Dr Fox with echocardiogram and fasting labs      I saw and evaluated and examined patient. I discussed results with patient and therapy. I agree with CV fellow's note and I edited the note to make it a more comprehensive note.    Elroy Fox MD, PhD  Professor of  Medicine  Division of Cardiology    CC  Patient Care Team:  Daniel Gray MD as PCP - General (Hematology & Oncology)  Daniel Gray MD as MD (Internal Medicine)  Daniel Gray MD as Assigned Cancer Care Provider  Daniel Fox MD as Assigned Heart and Vascular Provider  Jana Castellano NP, RN as Specialty Care Coordinator (Cardiology)  DANIEL GRAY

## 2022-07-07 NOTE — LETTER
7/7/2022      RE: Shahid Davis   [facility] [address]       Dear Colleague,    Thank you for the opportunity to participate in the care of your patient, Shahid Davis, at the [facility] [place] [facility]. Please see a copy of my visit note below.    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 Progressively worsening, now unable to walk 0.25 miles with dog before stopping due to fatigue. Has dicussed with pacemaker clinic, upper limit of pacing rate is 110 bpm and unable to increase further. No hypervolemic symptoms. No chest pains or pressure.     Bothersome hiccups. Worsening for many years.     PAST MEDICAL HISTORY:  Past Medical History:   Diagnosis Date     Cardiac pacemaker in situ      Lymphoma (H)      Squamous cell carcinoma        CURRENT MEDICATIONS:  Current Outpatient Medications   Medication Sig Dispense Refill     ascorbic acid (VITAMIN C) 500 MG tablet Take by mouth every morning        Multiple Vitamins-Minerals (MULTIVITAL PO) Take by mouth every morning        omeprazole (PRILOSEC) 40 MG DR capsule Take 1 capsule (40 mg) by mouth daily 90 capsule 4     methylPREDNISolone (MEDROL) 32 MG tablet take 1 tablet 12 hours and 2 hours prior to CT scan; 3rd dose 4 hours post scan. (Patient not taking: Reported on 7/7/2022) 12 tablet 1     sucralfate (CARAFATE) 1 GM/10ML suspension Take 10 mLs (1 g) by mouth 2 times daily as needed for nausea (Patient not taking: Reported on 7/7/2022) 414 mL 3       PAST SURGICAL HISTORY:  Past Surgical History:   Procedure Laterality Date     DAVINCI HERNIORRHAPHY INGUINAL Left 7/9/2021    Procedure: HERNIORRHAPHY, INGUINAL,  ROBOT-ASSISTED, Left, Mesh;  Surgeon: Shamar Tyler MD;  Location: [facility] OR     MOHS MICROGRAPHIC PROCEDURE       NO HISTORY OF SURGERY         ALLERGIES     Allergies   Allergen Reactions     Ampicillin [Ampicillin Sodium]      Bactrim [Sulfamethoxazole W/Trimethoprim]      Contrast Dye      CT contrast (IV) allergy - need oral Methylpred as premed for scans     Ampicillin Rash       FAMILY HISTORY:  Family History   Problem Relation Age of Onset     Cancer Mother         Blood     Cancer Father         Lung     Cancer Maternal Grandmother         Breast     Cancer Paternal Grandfather         Hodgkins       SOCIAL HISTORY:  Social History     Socioeconomic History     Marital status: Single     Spouse name: None     Number of children: None     Years of education: None     Highest education level: None   Tobacco Use     Smoking status: Former Smoker     Smokeless tobacco: Never Used     Tobacco comment: Quit at age 19   Substance and Sexual Activity     Alcohol use: Yes     Alcohol/week: 11.7 standard drinks     Types: 14 drink(s) per week       ROS:   Constitutional: No fever, chills, or sweats. No weight gain/loss   ENT: No visual disturbance, ear ache, epistaxis, sore throat  Allergies/Immunologic: Negative.   Respiratory: No cough, hemoptysia  Cardiovascular: As per HPI  GI: No nausea, vomiting, hematemesis, melena, or hematochezia  : No urinary frequency, dysuria, or hematuria  Integument: Negative  Psychiatric: Negative  Neuro: Negative  Endocrinology: Negative   Musculoskeletal: Negative    EXAM:  /79 (BP Location: Right arm, Patient Position: Chair, Cuff Size: Adult Small)   Pulse 86   Wt 61.2 kg (135 lb)   SpO2 98%   BMI 21.14 kg/m    In general, the patient is a pleasant male in no apparent distress.    HEENT: NC/AT. EOMI.  Sclerae white, not injected.  Dentition intact.    Neck: No jugular venous distension.   Heart: RRR. No murmur, rub, click, or gallop.   Lungs: CTA.  No ronchi,  wheezes, rales.  No dullness to percussion.   Abdomen: Soft, nontender, nondistended. No organomegaly.   Extremities: No clubbing, cyanosis, or edema.  The pulses are +4/4 at the radial. No bruits are noted.  Neurologic: Alert and oriented to person/place/time, normal speech, gait and affect  Skin: No petechiae, purpura or rash.    Labs:    LIVER ENZYME RESULTS:  Lab Results   Component Value Date    AST 32 06/02/2022    AST 24 05/13/2021    ALT 37 06/02/2022    ALT 37 05/13/2021       CBC RESULTS:  Lab Results   Component Value Date    WBC 2.8 (L) 06/02/2022    WBC 3.0 (L) 07/01/2021    RBC 4.03 (L) 06/02/2022    RBC 3.95 (L) 07/01/2021    HGB 12.6 (L) 06/02/2022    HGB 12.7 (L) 07/01/2021    HCT 37.3 (L) 06/02/2022    HCT 36.9 (L) 07/01/2021    MCV 93 06/02/2022    MCV 93 07/01/2021    MCH 31.3 06/02/2022    MCH 32.2 07/01/2021    MCHC 33.8 06/02/2022    MCHC 34.4 07/01/2021    RDW 13.0 06/02/2022    RDW 12.5 07/01/2021    PLT 96 (L) 06/02/2022    PLT 91 (L) 07/01/2021       BMP RESULTS:  Lab Results   Component Value Date     06/02/2022     07/01/2021    POTASSIUM 4.3 06/02/2022    POTASSIUM 4.9 07/01/2021    CHLORIDE 107 06/02/2022    CHLORIDE 108 07/01/2021    CO2 27 06/02/2022    CO2 27 07/01/2021    ANIONGAP 8 06/02/2022    ANIONGAP 5 07/01/2021     (H) 06/02/2022    GLC 83 07/01/2021    BUN 35 (H) 06/02/2022    BUN 29 07/01/2021    CR 0.86 06/02/2022    CR 0.92 07/01/2021    GFRESTIMATED >90 06/02/2022    GFRESTIMATED 83 07/01/2021    GFRESTBLACK >90 07/01/2021    JORGE 9.8 06/02/2022    JORGE 9.2 07/01/2021          Procedures:  EKG: paced rhythm     Assessment and Plan:     We discussed the results with patient.  We discussed the importance of  a heart healthy det and lifestyle.  71M hx PPM placement for CHB in s/o chemotherapy and radiation, prior CTA with zero calcium score, no significant findings on echocardiogram.     Normotensive, no indication for statin therapy at this  time.  Continue routine follow up with pacemaker clinic.     Plan for follow up in one year with Dr Fox with echocardiogram and fasting labs      I saw and evaluated and examined patient. I discussed results with patient and therapy. I agree with CV fellow's note and I edited the note to make it a more comprehensive note.    Elroy Fox MD, PhD  Professor of Medicine  Division of Cardiology    CC  Patient Care Team:  Elroy Holman MD as PCP - General (Hematology & Oncology)  Jana Castellano NP, RN as Specialty Care Coordinator (Cardiology)

## 2022-07-07 NOTE — NURSING NOTE
July 7, 2022    Medication Changes: None      Follow Up: In 1 year with fasting lab work and an echocardiogram      Patient verbalized understanding of all health information given and agreed to call with further questions or concerns.      Jana Solis RN

## 2022-07-07 NOTE — PATIENT INSTRUCTIONS
It was a pleasure to see you in clinic today.  Please do not hesitate to call with any questions or concerns. You will be scheduled for a remote transmission every 3 months, which will take place automatically. Your next remote transmission is scheduled for 10/7/22.  We look forward to seeing you in clinic at your next device check in 6 months (with your next cardiology appointment).    NALINI EricN, RN  Electrophysiology Nurse Clinician  [facility]    During Business Hours Please Call:  [phone]  After Hours Please Call:  [phone] - select option #4 and ask for job code [phone]

## 2022-07-11 LAB
ATRIAL RATE - MUSE: 82 BPM
DIASTOLIC BLOOD PRESSURE - MUSE: NORMAL MMHG
INTERPRETATION ECG - MUSE: NORMAL
P AXIS - MUSE: 69 DEGREES
PR INTERVAL - MUSE: 228 MS
QRS DURATION - MUSE: 162 MS
QT - MUSE: 430 MS
QTC - MUSE: 502 MS
R AXIS - MUSE: -66 DEGREES
SYSTOLIC BLOOD PRESSURE - MUSE: NORMAL MMHG
T AXIS - MUSE: 78 DEGREES
VENTRICULAR RATE- MUSE: 82 BPM

## 2022-08-26 LAB
MDC_IDC_LEAD_IMPLANT_DT: NORMAL
MDC_IDC_LEAD_IMPLANT_DT: NORMAL
MDC_IDC_LEAD_LOCATION: NORMAL
MDC_IDC_LEAD_LOCATION: NORMAL
MDC_IDC_LEAD_LOCATION_DETAIL_1: NORMAL
MDC_IDC_LEAD_LOCATION_DETAIL_1: NORMAL
MDC_IDC_LEAD_MFG: NORMAL
MDC_IDC_LEAD_MFG: NORMAL
MDC_IDC_LEAD_MODEL: NORMAL
MDC_IDC_LEAD_MODEL: NORMAL
MDC_IDC_LEAD_POLARITY_TYPE: NORMAL
MDC_IDC_LEAD_POLARITY_TYPE: NORMAL
MDC_IDC_LEAD_SERIAL: NORMAL
MDC_IDC_LEAD_SERIAL: NORMAL
MDC_IDC_MSMT_BATTERY_DTM: NORMAL
MDC_IDC_MSMT_BATTERY_REMAINING_LONGEVITY: 84 MO
MDC_IDC_MSMT_BATTERY_STATUS: NORMAL
MDC_IDC_MSMT_BATTERY_VOLTAGE: 2.98 V
MDC_IDC_MSMT_LEADCHNL_RA_IMPEDANCE_VALUE: 380 OHM
MDC_IDC_MSMT_LEADCHNL_RA_PACING_THRESHOLD_AMPLITUDE: 0.5 V
MDC_IDC_MSMT_LEADCHNL_RA_PACING_THRESHOLD_PULSEWIDTH: 0.4 MS
MDC_IDC_MSMT_LEADCHNL_RA_SENSING_INTR_AMPL: 3.8 MV
MDC_IDC_MSMT_LEADCHNL_RV_IMPEDANCE_VALUE: 460 OHM
MDC_IDC_MSMT_LEADCHNL_RV_PACING_THRESHOLD_AMPLITUDE: 0.75 V
MDC_IDC_MSMT_LEADCHNL_RV_PACING_THRESHOLD_PULSEWIDTH: 0.4 MS
MDC_IDC_PG_IMPLANT_DTM: NORMAL
MDC_IDC_PG_MFG: NORMAL
MDC_IDC_PG_MODEL: NORMAL
MDC_IDC_PG_SERIAL: NORMAL
MDC_IDC_PG_TYPE: NORMAL
MDC_IDC_SESS_CLINIC_NAME: NORMAL
MDC_IDC_SESS_DTM: NORMAL
MDC_IDC_SESS_TYPE: NORMAL
MDC_IDC_SET_BRADY_AT_MODE_SWITCH_MODE: NORMAL
MDC_IDC_SET_BRADY_AT_MODE_SWITCH_RATE: 160 {BEATS}/MIN
MDC_IDC_SET_BRADY_HYSTRATE: NORMAL
MDC_IDC_SET_BRADY_LOWRATE: 60 {BEATS}/MIN
MDC_IDC_SET_BRADY_MAX_SENSOR_RATE: 120 {BEATS}/MIN
MDC_IDC_SET_BRADY_MAX_TRACKING_RATE: 120 {BEATS}/MIN
MDC_IDC_SET_BRADY_MODE: NORMAL
MDC_IDC_SET_BRADY_NIGHT_RATE: NORMAL
MDC_IDC_SET_BRADY_PAV_DELAY_LOW: 200 MS
MDC_IDC_SET_BRADY_SAV_DELAY_LOW: 200 MS
MDC_IDC_SET_LEADCHNL_RA_PACING_AMPLITUDE: 1.5 V
MDC_IDC_SET_LEADCHNL_RA_PACING_CAPTURE_MODE: NORMAL
MDC_IDC_SET_LEADCHNL_RA_PACING_POLARITY: NORMAL
MDC_IDC_SET_LEADCHNL_RA_PACING_PULSEWIDTH: 0.4 MS
MDC_IDC_SET_LEADCHNL_RA_SENSING_ADAPTATION_MODE: NORMAL
MDC_IDC_SET_LEADCHNL_RA_SENSING_POLARITY: NORMAL
MDC_IDC_SET_LEADCHNL_RV_PACING_AMPLITUDE: 2.5 V
MDC_IDC_SET_LEADCHNL_RV_PACING_CAPTURE_MODE: NORMAL
MDC_IDC_SET_LEADCHNL_RV_PACING_POLARITY: NORMAL
MDC_IDC_SET_LEADCHNL_RV_PACING_PULSEWIDTH: 0.4 MS
MDC_IDC_SET_LEADCHNL_RV_SENSING_POLARITY: NORMAL
MDC_IDC_SET_LEADCHNL_RV_SENSING_SENSITIVITY: 2.5 MV
MDC_IDC_STAT_AT_BURDEN_PERCENT: 0 %
MDC_IDC_STAT_AT_DTM_END: NORMAL
MDC_IDC_STAT_AT_DTM_START: NORMAL
MDC_IDC_STAT_AT_MODE_SW_COUNT: 7
MDC_IDC_STAT_BRADY_DTM_END: NORMAL
MDC_IDC_STAT_BRADY_DTM_START: NORMAL
MDC_IDC_STAT_BRADY_RA_PERCENT_PACED: 2.1 %
MDC_IDC_STAT_BRADY_RV_PERCENT_PACED: 99.91 %
MDC_IDC_STAT_EPISODE_RECENT_COUNT: 0
MDC_IDC_STAT_EPISODE_RECENT_COUNT_DTM_END: NORMAL
MDC_IDC_STAT_EPISODE_RECENT_COUNT_DTM_START: NORMAL
MDC_IDC_STAT_EPISODE_TYPE: NORMAL

## 2022-10-17 ENCOUNTER — ANCILLARY PROCEDURE (OUTPATIENT)
Dept: CARDIOLOGY | Facility: CLINIC | Age: 72
End: 2022-10-17
Attending: INTERNAL MEDICINE
Payer: MEDICARE

## 2022-10-17 DIAGNOSIS — I44.2 ATRIOVENTRICULAR BLOCK, COMPLETE (H): ICD-10-CM

## 2022-10-17 DIAGNOSIS — Z01.818 PREOP EXAMINATION: ICD-10-CM

## 2022-10-17 DIAGNOSIS — Z95.0 PACEMAKER: ICD-10-CM

## 2022-10-17 PROCEDURE — 93294 REM INTERROG EVL PM/LDLS PM: CPT | Performed by: INTERNAL MEDICINE

## 2022-10-17 PROCEDURE — 93296 REM INTERROG EVL PM/IDS: CPT

## 2022-10-20 LAB
MDC_IDC_LEAD_IMPLANT_DT: NORMAL
MDC_IDC_LEAD_IMPLANT_DT: NORMAL
MDC_IDC_LEAD_LOCATION: NORMAL
MDC_IDC_LEAD_LOCATION: NORMAL
MDC_IDC_LEAD_LOCATION_DETAIL_1: NORMAL
MDC_IDC_LEAD_LOCATION_DETAIL_1: NORMAL
MDC_IDC_LEAD_MFG: NORMAL
MDC_IDC_LEAD_MFG: NORMAL
MDC_IDC_LEAD_MODEL: NORMAL
MDC_IDC_LEAD_MODEL: NORMAL
MDC_IDC_LEAD_POLARITY_TYPE: NORMAL
MDC_IDC_LEAD_POLARITY_TYPE: NORMAL
MDC_IDC_LEAD_SERIAL: NORMAL
MDC_IDC_LEAD_SERIAL: NORMAL
MDC_IDC_MSMT_BATTERY_DTM: NORMAL
MDC_IDC_MSMT_BATTERY_REMAINING_LONGEVITY: 74 MO
MDC_IDC_MSMT_BATTERY_REMAINING_PERCENTAGE: 69 %
MDC_IDC_MSMT_BATTERY_RRT_TRIGGER: NORMAL
MDC_IDC_MSMT_BATTERY_STATUS: NORMAL
MDC_IDC_MSMT_BATTERY_VOLTAGE: 2.99 V
MDC_IDC_MSMT_LEADCHNL_RA_IMPEDANCE_VALUE: 400 OHM
MDC_IDC_MSMT_LEADCHNL_RA_LEAD_CHANNEL_STATUS: NORMAL
MDC_IDC_MSMT_LEADCHNL_RA_PACING_THRESHOLD_AMPLITUDE: 0.5 V
MDC_IDC_MSMT_LEADCHNL_RA_PACING_THRESHOLD_PULSEWIDTH: 0.4 MS
MDC_IDC_MSMT_LEADCHNL_RA_SENSING_INTR_AMPL: 4.2 MV
MDC_IDC_MSMT_LEADCHNL_RV_IMPEDANCE_VALUE: 450 OHM
MDC_IDC_MSMT_LEADCHNL_RV_LEAD_CHANNEL_STATUS: NORMAL
MDC_IDC_MSMT_LEADCHNL_RV_PACING_THRESHOLD_AMPLITUDE: 0.75 V
MDC_IDC_MSMT_LEADCHNL_RV_PACING_THRESHOLD_PULSEWIDTH: 0.4 MS
MDC_IDC_MSMT_LEADCHNL_RV_SENSING_INTR_AMPL: 10 MV
MDC_IDC_PG_IMPLANT_DTM: NORMAL
MDC_IDC_PG_MFG: NORMAL
MDC_IDC_PG_MODEL: NORMAL
MDC_IDC_PG_SERIAL: NORMAL
MDC_IDC_PG_TYPE: NORMAL
MDC_IDC_SESS_CLINIC_NAME: NORMAL
MDC_IDC_SESS_DTM: NORMAL
MDC_IDC_SESS_REPROGRAMMED: NO
MDC_IDC_SESS_TYPE: NORMAL
MDC_IDC_SET_BRADY_AT_MODE_SWITCH_MODE: NORMAL
MDC_IDC_SET_BRADY_AT_MODE_SWITCH_RATE: 160 {BEATS}/MIN
MDC_IDC_SET_BRADY_LOWRATE: 60 {BEATS}/MIN
MDC_IDC_SET_BRADY_MAX_SENSOR_RATE: 120 {BEATS}/MIN
MDC_IDC_SET_BRADY_MAX_TRACKING_RATE: 120 {BEATS}/MIN
MDC_IDC_SET_BRADY_MODE: NORMAL
MDC_IDC_SET_BRADY_PAV_DELAY_LOW: 200 MS
MDC_IDC_SET_BRADY_SAV_DELAY_LOW: 200 MS
MDC_IDC_SET_LEADCHNL_RA_PACING_AMPLITUDE: 1.5 V
MDC_IDC_SET_LEADCHNL_RA_PACING_ANODE_ELECTRODE_1: NORMAL
MDC_IDC_SET_LEADCHNL_RA_PACING_ANODE_LOCATION_1: NORMAL
MDC_IDC_SET_LEADCHNL_RA_PACING_CAPTURE_MODE: NORMAL
MDC_IDC_SET_LEADCHNL_RA_PACING_CATHODE_ELECTRODE_1: NORMAL
MDC_IDC_SET_LEADCHNL_RA_PACING_CATHODE_LOCATION_1: NORMAL
MDC_IDC_SET_LEADCHNL_RA_PACING_POLARITY: NORMAL
MDC_IDC_SET_LEADCHNL_RA_PACING_PULSEWIDTH: 0.4 MS
MDC_IDC_SET_LEADCHNL_RA_SENSING_ADAPTATION_MODE: NORMAL
MDC_IDC_SET_LEADCHNL_RA_SENSING_ANODE_ELECTRODE_1: NORMAL
MDC_IDC_SET_LEADCHNL_RA_SENSING_ANODE_LOCATION_1: NORMAL
MDC_IDC_SET_LEADCHNL_RA_SENSING_CATHODE_ELECTRODE_1: NORMAL
MDC_IDC_SET_LEADCHNL_RA_SENSING_CATHODE_LOCATION_1: NORMAL
MDC_IDC_SET_LEADCHNL_RA_SENSING_POLARITY: NORMAL
MDC_IDC_SET_LEADCHNL_RA_SENSING_SENSITIVITY: 0.75 MV
MDC_IDC_SET_LEADCHNL_RV_PACING_AMPLITUDE: 2.5 V
MDC_IDC_SET_LEADCHNL_RV_PACING_ANODE_ELECTRODE_1: NORMAL
MDC_IDC_SET_LEADCHNL_RV_PACING_ANODE_LOCATION_1: NORMAL
MDC_IDC_SET_LEADCHNL_RV_PACING_CAPTURE_MODE: NORMAL
MDC_IDC_SET_LEADCHNL_RV_PACING_CATHODE_ELECTRODE_1: NORMAL
MDC_IDC_SET_LEADCHNL_RV_PACING_CATHODE_LOCATION_1: NORMAL
MDC_IDC_SET_LEADCHNL_RV_PACING_POLARITY: NORMAL
MDC_IDC_SET_LEADCHNL_RV_PACING_PULSEWIDTH: 0.4 MS
MDC_IDC_SET_LEADCHNL_RV_SENSING_ADAPTATION_MODE: NORMAL
MDC_IDC_SET_LEADCHNL_RV_SENSING_ANODE_ELECTRODE_1: NORMAL
MDC_IDC_SET_LEADCHNL_RV_SENSING_ANODE_LOCATION_1: NORMAL
MDC_IDC_SET_LEADCHNL_RV_SENSING_CATHODE_ELECTRODE_1: NORMAL
MDC_IDC_SET_LEADCHNL_RV_SENSING_CATHODE_LOCATION_1: NORMAL
MDC_IDC_SET_LEADCHNL_RV_SENSING_POLARITY: NORMAL
MDC_IDC_SET_LEADCHNL_RV_SENSING_SENSITIVITY: 2.5 MV
MDC_IDC_STAT_AT_BURDEN_PERCENT: 0 %
MDC_IDC_STAT_AT_DTM_END: NORMAL
MDC_IDC_STAT_AT_DTM_START: NORMAL
MDC_IDC_STAT_AT_MODE_SW_COUNT: 7
MDC_IDC_STAT_AT_MODE_SW_COUNT_PER_DAY: 0
MDC_IDC_STAT_AT_MODE_SW_MAX_DURATION: 10 S
MDC_IDC_STAT_AT_MODE_SW_PERCENT_TIME: 1 %
MDC_IDC_STAT_BRADY_AP_VP_PERCENT: 1.5 %
MDC_IDC_STAT_BRADY_AP_VS_PERCENT: 1 %
MDC_IDC_STAT_BRADY_AS_VP_PERCENT: 98 %
MDC_IDC_STAT_BRADY_AS_VS_PERCENT: 1 %
MDC_IDC_STAT_BRADY_DTM_END: NORMAL
MDC_IDC_STAT_BRADY_DTM_START: NORMAL
MDC_IDC_STAT_BRADY_RA_PERCENT_PACED: 1.6 %
MDC_IDC_STAT_BRADY_RV_PERCENT_PACED: 99 %
MDC_IDC_STAT_CRT_DTM_END: NORMAL
MDC_IDC_STAT_CRT_DTM_START: NORMAL
MDC_IDC_STAT_EPISODE_RECENT_COUNT: 0
MDC_IDC_STAT_EPISODE_RECENT_COUNT_DTM_END: NORMAL
MDC_IDC_STAT_EPISODE_RECENT_COUNT_DTM_START: NORMAL
MDC_IDC_STAT_EPISODE_TYPE: NORMAL
MDC_IDC_STAT_EPISODE_VENDOR_TYPE: NORMAL
MDC_IDC_STAT_HEART_RATE_ATRIAL_MAX: 310 {BEATS}/MIN
MDC_IDC_STAT_HEART_RATE_ATRIAL_MEAN: 86 {BEATS}/MIN
MDC_IDC_STAT_HEART_RATE_ATRIAL_MIN: 40 {BEATS}/MIN
MDC_IDC_STAT_HEART_RATE_DTM_END: NORMAL
MDC_IDC_STAT_HEART_RATE_DTM_START: NORMAL
MDC_IDC_STAT_HEART_RATE_VENTRICULAR_MAX: 190 {BEATS}/MIN
MDC_IDC_STAT_HEART_RATE_VENTRICULAR_MEAN: 86 {BEATS}/MIN
MDC_IDC_STAT_HEART_RATE_VENTRICULAR_MIN: 40 {BEATS}/MIN

## 2022-11-22 ENCOUNTER — ANCILLARY PROCEDURE (OUTPATIENT)
Dept: CARDIOLOGY | Facility: CLINIC | Age: 72
End: 2022-11-22
Attending: INTERNAL MEDICINE
Payer: MEDICARE

## 2022-11-22 ENCOUNTER — TELEPHONE (OUTPATIENT)
Dept: ONCOLOGY | Facility: CLINIC | Age: 72
End: 2022-11-22

## 2022-11-22 DIAGNOSIS — Z95.0 PACEMAKER: ICD-10-CM

## 2022-11-22 PROCEDURE — 99207 CARDIAC DEVICE CHECK - REMOTE: CPT | Performed by: INTERNAL MEDICINE

## 2022-11-22 NOTE — TELEPHONE ENCOUNTER
[facility]: Cancer Care                                                                                          I called the patient to follow up on his call to the triage team.     He has been evaluated by cardiology and his pacemaker was investigate. There was no indication that the pain he experienced was cardiac related. He would like to see Dr. Ruelas sooner to see if this might be related to his lymphoma, although he admits that he thinks it is unlikely.    We will move up his appointment to next Friday at 2:15 with labs prior. I have also asked that he contact his PCP to set up an appointment for evaluation. He voiced understanding as will do so.    No other questions or concern at this time.    Signature:  Alton Lainez RN

## 2022-11-22 NOTE — TELEPHONE ENCOUNTER
"Oncology Nurse Triage    Situation:   Shahid (pt) reporting the following symptoms:  -pain episodes reoccuring    Background:   Treating Provider:   Was Dr. Holman, now reassigned with Dr. Domitila Ruelas    Date of last office visit: 6/2/2022 Dr. Holman    Recent Treatments: artis  Pt did follow up with cardiology/pacemaker team who advised to contact oncology team.     Assessment:     About 1 month ago and 3  Episodes last week    Pt had episode of real sharp pain in upper right should radiating down chest and right shoulder blade, and upper arm.   \"really really sharp, like a knife stabbing in shoulder\"  Happened in middle of the night  Rated 10/10  Pain lasted a few hours and eventually went away, then pt noticed a small lump to right/center of ches sticking out farther than the left side of chestt. \"Feels hard, about the size of a oblong shaped prune.   Denies rash    Has tried IBUprofen about 600mg every 4 to 6 hours on Sunday which seemed to help alleviate the pain a little but pain did not go away.       Pt is wondering if lump/pain episodes related to lymphoma?     Denies any active  fevers, chills, dizziness, chest pain, shortness of breath, cough, abdominal pain, nausea, vomiting, changes to bowel or bladder    Recommendations:     Routed high priority to Dr. Domitila Ruelas and care team.     This Writer spent over 20 minutes on triage call, education, follow-up coordination, and documentation.           "

## 2022-11-27 LAB
MDC_IDC_LEAD_IMPLANT_DT: NORMAL
MDC_IDC_LEAD_IMPLANT_DT: NORMAL
MDC_IDC_LEAD_LOCATION: NORMAL
MDC_IDC_LEAD_LOCATION: NORMAL
MDC_IDC_LEAD_LOCATION_DETAIL_1: NORMAL
MDC_IDC_LEAD_LOCATION_DETAIL_1: NORMAL
MDC_IDC_LEAD_MFG: NORMAL
MDC_IDC_LEAD_MFG: NORMAL
MDC_IDC_LEAD_MODEL: NORMAL
MDC_IDC_LEAD_MODEL: NORMAL
MDC_IDC_LEAD_POLARITY_TYPE: NORMAL
MDC_IDC_LEAD_POLARITY_TYPE: NORMAL
MDC_IDC_LEAD_SERIAL: NORMAL
MDC_IDC_LEAD_SERIAL: NORMAL
MDC_IDC_MSMT_BATTERY_DTM: NORMAL
MDC_IDC_MSMT_BATTERY_REMAINING_LONGEVITY: 73 MO
MDC_IDC_MSMT_BATTERY_REMAINING_PERCENTAGE: 68 %
MDC_IDC_MSMT_BATTERY_RRT_TRIGGER: NORMAL
MDC_IDC_MSMT_BATTERY_STATUS: NORMAL
MDC_IDC_MSMT_BATTERY_VOLTAGE: 2.99 V
MDC_IDC_MSMT_LEADCHNL_RA_IMPEDANCE_VALUE: 350 OHM
MDC_IDC_MSMT_LEADCHNL_RA_LEAD_CHANNEL_STATUS: NORMAL
MDC_IDC_MSMT_LEADCHNL_RA_PACING_THRESHOLD_AMPLITUDE: 0.5 V
MDC_IDC_MSMT_LEADCHNL_RA_PACING_THRESHOLD_PULSEWIDTH: 0.4 MS
MDC_IDC_MSMT_LEADCHNL_RA_SENSING_INTR_AMPL: 2.9 MV
MDC_IDC_MSMT_LEADCHNL_RV_IMPEDANCE_VALUE: 450 OHM
MDC_IDC_MSMT_LEADCHNL_RV_LEAD_CHANNEL_STATUS: NORMAL
MDC_IDC_MSMT_LEADCHNL_RV_PACING_THRESHOLD_AMPLITUDE: 0.75 V
MDC_IDC_MSMT_LEADCHNL_RV_PACING_THRESHOLD_PULSEWIDTH: 0.4 MS
MDC_IDC_MSMT_LEADCHNL_RV_SENSING_INTR_AMPL: 12 MV
MDC_IDC_PG_IMPLANT_DTM: NORMAL
MDC_IDC_PG_MFG: NORMAL
MDC_IDC_PG_MODEL: NORMAL
MDC_IDC_PG_SERIAL: NORMAL
MDC_IDC_PG_TYPE: NORMAL
MDC_IDC_SESS_CLINIC_NAME: NORMAL
MDC_IDC_SESS_DTM: NORMAL
MDC_IDC_SESS_REPROGRAMMED: NO
MDC_IDC_SESS_TYPE: NORMAL
MDC_IDC_SET_BRADY_AT_MODE_SWITCH_MODE: NORMAL
MDC_IDC_SET_BRADY_AT_MODE_SWITCH_RATE: 160 {BEATS}/MIN
MDC_IDC_SET_BRADY_LOWRATE: 60 {BEATS}/MIN
MDC_IDC_SET_BRADY_MAX_SENSOR_RATE: 120 {BEATS}/MIN
MDC_IDC_SET_BRADY_MAX_TRACKING_RATE: 120 {BEATS}/MIN
MDC_IDC_SET_BRADY_MODE: NORMAL
MDC_IDC_SET_BRADY_PAV_DELAY_LOW: 200 MS
MDC_IDC_SET_BRADY_SAV_DELAY_LOW: 200 MS
MDC_IDC_SET_LEADCHNL_RA_PACING_AMPLITUDE: 1.5 V
MDC_IDC_SET_LEADCHNL_RA_PACING_ANODE_ELECTRODE_1: NORMAL
MDC_IDC_SET_LEADCHNL_RA_PACING_ANODE_LOCATION_1: NORMAL
MDC_IDC_SET_LEADCHNL_RA_PACING_CAPTURE_MODE: NORMAL
MDC_IDC_SET_LEADCHNL_RA_PACING_CATHODE_ELECTRODE_1: NORMAL
MDC_IDC_SET_LEADCHNL_RA_PACING_CATHODE_LOCATION_1: NORMAL
MDC_IDC_SET_LEADCHNL_RA_PACING_POLARITY: NORMAL
MDC_IDC_SET_LEADCHNL_RA_PACING_PULSEWIDTH: 0.4 MS
MDC_IDC_SET_LEADCHNL_RA_SENSING_ADAPTATION_MODE: NORMAL
MDC_IDC_SET_LEADCHNL_RA_SENSING_ANODE_ELECTRODE_1: NORMAL
MDC_IDC_SET_LEADCHNL_RA_SENSING_ANODE_LOCATION_1: NORMAL
MDC_IDC_SET_LEADCHNL_RA_SENSING_CATHODE_ELECTRODE_1: NORMAL
MDC_IDC_SET_LEADCHNL_RA_SENSING_CATHODE_LOCATION_1: NORMAL
MDC_IDC_SET_LEADCHNL_RA_SENSING_POLARITY: NORMAL
MDC_IDC_SET_LEADCHNL_RA_SENSING_SENSITIVITY: 0.75 MV
MDC_IDC_SET_LEADCHNL_RV_PACING_AMPLITUDE: 2.5 V
MDC_IDC_SET_LEADCHNL_RV_PACING_ANODE_ELECTRODE_1: NORMAL
MDC_IDC_SET_LEADCHNL_RV_PACING_ANODE_LOCATION_1: NORMAL
MDC_IDC_SET_LEADCHNL_RV_PACING_CAPTURE_MODE: NORMAL
MDC_IDC_SET_LEADCHNL_RV_PACING_CATHODE_ELECTRODE_1: NORMAL
MDC_IDC_SET_LEADCHNL_RV_PACING_CATHODE_LOCATION_1: NORMAL
MDC_IDC_SET_LEADCHNL_RV_PACING_POLARITY: NORMAL
MDC_IDC_SET_LEADCHNL_RV_PACING_PULSEWIDTH: 0.4 MS
MDC_IDC_SET_LEADCHNL_RV_SENSING_ADAPTATION_MODE: NORMAL
MDC_IDC_SET_LEADCHNL_RV_SENSING_ANODE_ELECTRODE_1: NORMAL
MDC_IDC_SET_LEADCHNL_RV_SENSING_ANODE_LOCATION_1: NORMAL
MDC_IDC_SET_LEADCHNL_RV_SENSING_CATHODE_ELECTRODE_1: NORMAL
MDC_IDC_SET_LEADCHNL_RV_SENSING_CATHODE_LOCATION_1: NORMAL
MDC_IDC_SET_LEADCHNL_RV_SENSING_POLARITY: NORMAL
MDC_IDC_SET_LEADCHNL_RV_SENSING_SENSITIVITY: 2.5 MV
MDC_IDC_STAT_AT_BURDEN_PERCENT: 0 %
MDC_IDC_STAT_AT_DTM_END: NORMAL
MDC_IDC_STAT_AT_DTM_START: NORMAL
MDC_IDC_STAT_AT_MODE_SW_COUNT: 0
MDC_IDC_STAT_AT_MODE_SW_COUNT_PER_DAY: 0
MDC_IDC_STAT_AT_MODE_SW_PERCENT_TIME: 0 %
MDC_IDC_STAT_BRADY_AP_VP_PERCENT: 1 %
MDC_IDC_STAT_BRADY_AP_VS_PERCENT: 1 %
MDC_IDC_STAT_BRADY_AS_VP_PERCENT: 99 %
MDC_IDC_STAT_BRADY_AS_VS_PERCENT: 1 %
MDC_IDC_STAT_BRADY_DTM_END: NORMAL
MDC_IDC_STAT_BRADY_DTM_START: NORMAL
MDC_IDC_STAT_BRADY_RA_PERCENT_PACED: 1 %
MDC_IDC_STAT_BRADY_RV_PERCENT_PACED: 99 %
MDC_IDC_STAT_CRT_DTM_END: NORMAL
MDC_IDC_STAT_CRT_DTM_START: NORMAL
MDC_IDC_STAT_EPISODE_RECENT_COUNT: 0
MDC_IDC_STAT_EPISODE_RECENT_COUNT: 0
MDC_IDC_STAT_EPISODE_RECENT_COUNT_DTM_END: NORMAL
MDC_IDC_STAT_EPISODE_RECENT_COUNT_DTM_END: NORMAL
MDC_IDC_STAT_EPISODE_TYPE: NORMAL
MDC_IDC_STAT_EPISODE_TYPE: NORMAL
MDC_IDC_STAT_EPISODE_VENDOR_TYPE: NORMAL
MDC_IDC_STAT_EPISODE_VENDOR_TYPE: NORMAL
MDC_IDC_STAT_HEART_RATE_ATRIAL_MAX: 200 {BEATS}/MIN
MDC_IDC_STAT_HEART_RATE_ATRIAL_MEAN: 85 {BEATS}/MIN
MDC_IDC_STAT_HEART_RATE_ATRIAL_MIN: 60 {BEATS}/MIN
MDC_IDC_STAT_HEART_RATE_DTM_END: NORMAL
MDC_IDC_STAT_HEART_RATE_DTM_START: NORMAL
MDC_IDC_STAT_HEART_RATE_VENTRICULAR_MAX: 200 {BEATS}/MIN
MDC_IDC_STAT_HEART_RATE_VENTRICULAR_MEAN: 85 {BEATS}/MIN
MDC_IDC_STAT_HEART_RATE_VENTRICULAR_MIN: 50 {BEATS}/MIN

## 2022-12-02 ENCOUNTER — LAB (OUTPATIENT)
Dept: LAB | Facility: CLINIC | Age: 72
End: 2022-12-02
Attending: INTERNAL MEDICINE
Payer: MEDICARE

## 2022-12-02 ENCOUNTER — ONCOLOGY VISIT (OUTPATIENT)
Dept: ONCOLOGY | Facility: CLINIC | Age: 72
End: 2022-12-02
Attending: INTERNAL MEDICINE
Payer: MEDICARE

## 2022-12-02 ENCOUNTER — PATIENT OUTREACH (OUTPATIENT)
Dept: ONCOLOGY | Facility: CLINIC | Age: 72
End: 2022-12-02

## 2022-12-02 VITALS
HEART RATE: 85 BPM | BODY MASS INDEX: 22.6 KG/M2 | OXYGEN SATURATION: 96 % | SYSTOLIC BLOOD PRESSURE: 113 MMHG | WEIGHT: 144.3 LBS | RESPIRATION RATE: 16 BRPM | TEMPERATURE: 98.3 F | DIASTOLIC BLOOD PRESSURE: 64 MMHG

## 2022-12-02 VITALS
RESPIRATION RATE: 16 BRPM | DIASTOLIC BLOOD PRESSURE: 64 MMHG | OXYGEN SATURATION: 96 % | SYSTOLIC BLOOD PRESSURE: 113 MMHG | BODY MASS INDEX: 22.55 KG/M2 | WEIGHT: 144 LBS | HEART RATE: 85 BPM | TEMPERATURE: 98.3 F

## 2022-12-02 DIAGNOSIS — C82.99 NODULAR LYMPHOMA OF EXTRANODAL AND/OR SOLID ORGAN SITE (H): ICD-10-CM

## 2022-12-02 DIAGNOSIS — R12 HEARTBURN: ICD-10-CM

## 2022-12-02 DIAGNOSIS — Z91.041 CONTRAST MEDIA ALLERGY: ICD-10-CM

## 2022-12-02 DIAGNOSIS — M89.8X8 STERNAL MASS: ICD-10-CM

## 2022-12-02 DIAGNOSIS — Z00.00 PREVENTATIVE HEALTH CARE: ICD-10-CM

## 2022-12-02 DIAGNOSIS — C85.99 NON-HODGKIN LYMPHOMA, UNSPECIFIED, EXTRANODAL AND SOLID ORGAN SITES (H): ICD-10-CM

## 2022-12-02 DIAGNOSIS — Z13.6 CARDIOVASCULAR SCREENING; LDL GOAL LESS THAN 100: ICD-10-CM

## 2022-12-02 DIAGNOSIS — C83.00 MALIGNANT LYMPHOMA, LYMPHOPLASMACYTIC (H): Primary | ICD-10-CM

## 2022-12-02 LAB
ALBUMIN SERPL BCG-MCNC: 3.9 G/DL (ref 3.5–5.2)
ALP SERPL-CCNC: 156 U/L (ref 40–129)
ALT SERPL W P-5'-P-CCNC: 27 U/L (ref 10–50)
ANION GAP SERPL CALCULATED.3IONS-SCNC: 11 MMOL/L (ref 7–15)
AST SERPL W P-5'-P-CCNC: 38 U/L (ref 10–50)
BASOPHILS # BLD AUTO: 0 10E3/UL (ref 0–0.2)
BASOPHILS NFR BLD AUTO: 0 %
BILIRUB SERPL-MCNC: 0.3 MG/DL
BUN SERPL-MCNC: 26.1 MG/DL (ref 8–23)
CALCIUM SERPL-MCNC: 9.8 MG/DL (ref 8.8–10.2)
CHLORIDE SERPL-SCNC: 106 MMOL/L (ref 98–107)
CHOLEST SERPL-MCNC: 146 MG/DL
CREAT SERPL-MCNC: 0.92 MG/DL (ref 0.67–1.17)
DEPRECATED HCO3 PLAS-SCNC: 24 MMOL/L (ref 22–29)
EOSINOPHIL # BLD AUTO: 0 10E3/UL (ref 0–0.7)
EOSINOPHIL NFR BLD AUTO: 1 %
ERYTHROCYTE [DISTWIDTH] IN BLOOD BY AUTOMATED COUNT: 13.2 % (ref 10–15)
GFR SERPL CREATININE-BSD FRML MDRD: 88 ML/MIN/1.73M2
GLUCOSE SERPL-MCNC: 86 MG/DL (ref 70–99)
HCT VFR BLD AUTO: 33 % (ref 40–53)
HDLC SERPL-MCNC: 39 MG/DL
HGB BLD-MCNC: 11 G/DL (ref 13.3–17.7)
IMM GRANULOCYTES # BLD: 0 10E3/UL
IMM GRANULOCYTES NFR BLD: 0 %
LDH SERPL L TO P-CCNC: 318 U/L (ref 0–250)
LDLC SERPL CALC-MCNC: 88 MG/DL
LDLC SERPL DIRECT ASSAY-MCNC: 88 MG/DL
LYMPHOCYTES # BLD AUTO: 0.8 10E3/UL (ref 0.8–5.3)
LYMPHOCYTES NFR BLD AUTO: 29 %
MCH RBC QN AUTO: 31.2 PG (ref 26.5–33)
MCHC RBC AUTO-ENTMCNC: 33.3 G/DL (ref 31.5–36.5)
MCV RBC AUTO: 94 FL (ref 78–100)
MONOCYTES # BLD AUTO: 0.3 10E3/UL (ref 0–1.3)
MONOCYTES NFR BLD AUTO: 11 %
NEUTROPHILS # BLD AUTO: 1.5 10E3/UL (ref 1.6–8.3)
NEUTROPHILS NFR BLD AUTO: 59 %
NONHDLC SERPL-MCNC: 107 MG/DL
NRBC # BLD AUTO: 0 10E3/UL
NRBC BLD AUTO-RTO: 0 /100
PLATELET # BLD AUTO: 108 10E3/UL (ref 150–450)
POTASSIUM SERPL-SCNC: 4.8 MMOL/L (ref 3.4–5.3)
PROT SERPL-MCNC: 6.1 G/DL (ref 6.4–8.3)
RBC # BLD AUTO: 3.53 10E6/UL (ref 4.4–5.9)
SODIUM SERPL-SCNC: 141 MMOL/L (ref 136–145)
TOTAL PROTEIN SERUM FOR ELP: 5.7 G/DL (ref 6.4–8.3)
TRIGL SERPL-MCNC: 94 MG/DL
WBC # BLD AUTO: 2.6 10E3/UL (ref 4–11)

## 2022-12-02 PROCEDURE — 84155 ASSAY OF PROTEIN SERUM: CPT

## 2022-12-02 PROCEDURE — 83721 ASSAY OF BLOOD LIPOPROTEIN: CPT | Mod: 59

## 2022-12-02 PROCEDURE — 36415 COLL VENOUS BLD VENIPUNCTURE: CPT

## 2022-12-02 PROCEDURE — 80053 COMPREHEN METABOLIC PANEL: CPT

## 2022-12-02 PROCEDURE — 80061 LIPID PANEL: CPT

## 2022-12-02 PROCEDURE — 85025 COMPLETE CBC W/AUTO DIFF WBC: CPT

## 2022-12-02 PROCEDURE — G0463 HOSPITAL OUTPT CLINIC VISIT: HCPCS

## 2022-12-02 PROCEDURE — 99215 OFFICE O/P EST HI 40 MIN: CPT | Performed by: INTERNAL MEDICINE

## 2022-12-02 PROCEDURE — 83615 LACTATE (LD) (LDH) ENZYME: CPT

## 2022-12-02 PROCEDURE — 84165 PROTEIN E-PHORESIS SERUM: CPT | Mod: TC | Performed by: PATHOLOGY

## 2022-12-02 ASSESSMENT — PAIN SCALES - GENERAL
PAINLEVEL: SEVERE PAIN (6)
PAINLEVEL: SEVERE PAIN (6)

## 2022-12-02 NOTE — LETTER
"    12/2/2022         RE: Shahid Davis   [facility] [address]        Dear Colleague,    Thank you for referring your patient, Shahid Davis, to the [facility]. Please see a copy of my visit note below.     [facility]      FOLLOW-UP VISIT NOTE                       Dec 2, 2022  Subjective    REASON FOR VISIT: F/u low-grade lymphoma    ONCOLOGIC SUMMARY:  Diagnosis:    Low-grade kappa-restricted plasmacytoid B-cell lymphoma dx 3/2004, presenting with thoracic paraspinal mass. Bone marrow hypercellular with 70-80% involvement by small kappa-restricted lymphocytes which were positive for CD10, CD19, and CD20 and negative for CD5 and CD23.     Treatment history:  1. 2005: Fludarabine-based chemo x 6 cycles and XRT to spine (details unclear)  2. 2005 to present: Observation    INTERVAL HISTORY:  Shahid returns today for follow-up and to establish care with me following Dr. Holman's group home. He has history of a low-grade plasmacytoid B-cell lymphoma for which he has just been on surveillance since 2005. Over the last month, he has had more symptoms including two episodes of severe \"knife-like\" right chest pain radiating to shoulder, occasional right flank pain, and low-grade fevers/night sweats off and on. His pacemaker was interrogated and did not show any cardiac events at the time of his severe chest pain episodes. Additionally, he notes development of a firm mass in his right sternal/chest wall area over the last 3 weeks. Otherwise has chronic numbness in his toes and some fatigue but still able to do most activities and walk his dogs. No SOB, vomiting, abdominal pain, diarrhea, or bleeding.    Of note, he states that he usually goes down to [place] with his significant other for 3 months starting at the end of January.     I have reviewed and updated the following:  Past Medical History:   Diagnosis Date     Cardiac pacemaker in situ      Lymphoma " (H)      Squamous cell carcinoma         Allergies   Allergen Reactions     Ampicillin [Ampicillin Sodium]      Bactrim [Sulfamethoxazole W/Trimethoprim]      Contrast Dye      CT contrast (IV) allergy - need oral Methylpred as premed for scans     Ampicillin Rash       Current Outpatient Medications:      ascorbic acid (VITAMIN C) 500 MG tablet, Take by mouth every morning , Disp: , Rfl:      Multiple Vitamins-Minerals (MULTIVITAL PO), Take by mouth every morning , Disp: , Rfl:      omeprazole (PRILOSEC) 40 MG DR capsule, Take 1 capsule (40 mg) by mouth daily, Disp: 90 capsule, Rfl: 4     methylPREDNISolone (MEDROL) 32 MG tablet, take 1 tablet 12 hours and 2 hours prior to CT scan; 3rd dose 4 hours post scan. (Patient not taking: Reported on 7/7/2022), Disp: 12 tablet, Rfl: 1     sucralfate (CARAFATE) 1 GM/10ML suspension, Take 10 mLs (1 g) by mouth 2 times daily as needed for nausea (Patient not taking: Reported on 7/7/2022), Disp: 414 mL, Rfl: 3    REVIEW OF SYSTEMS:  12-point ROS reviewed and negative other than that mentioned in HPI.     Objective    VITAL SIGNS:  /64 (Patient Position: Sitting)   Pulse 85   Temp 98.3  F (36.8  C) (Oral)   Resp 16   Wt 65.3 kg (144 lb)   SpO2 96%   BMI 22.55 kg/m      ECOG PS: 1     PHYSICAL EXAM:  General: Awake, alert, in no acute distress. Oriented x 3.  HEENT: Normocephalic, atraumatic. No scleral icterus. Mucous membranes moist.  Lymph: No cervical, supraclavicular, or axillary lymphadenopathy appreciated.   Chest: Firm 2-3 cm mass protruding from right sternoclavicular area.   CV: Regular rate and rhythm. No murmurs, rubs, or gallops appreciated.   Resp: Good inspiratory effort, lungs clear to auscultation bilaterally.  GI: Abdomen soft, nontender, nondistended.   Ext: No peripheral edema, cyanosis, or clubbing.   Neuro: CN II-XII grossly intact. Moves all extremities spontaneously.   Skin: Many scattered SK's and skin tags.     LABS:  Lab Results   Component  Value Date    WBC 2.6 (L) 12/02/2022    HGB 11.0 (L) 12/02/2022    HCT 33.0 (L) 12/02/2022    MCV 94 12/02/2022     (L) 12/02/2022     Lab Results   Component Value Date     12/02/2022    POTASSIUM 4.8 12/02/2022    CR 0.92 12/02/2022    BUN 26.1 (H) 12/02/2022    CHLORIDE 106 12/02/2022    JORGE 9.8 12/02/2022    GLC 86 12/02/2022      Lab Results   Component Value Date    ALT 27 12/02/2022    AST 38 12/02/2022    ALKPHOS 156 (H) 12/02/2022    BILITOTAL 0.3 12/02/2022      Lab Results   Component Value Date     (H) 12/02/2022    URIC 6.2 06/02/2022 9/17/2019 PET-CT:                 IMPRESSION: In this patient with remote history of low-grade  non-Hodgkin's lymphoma, now presenting with pancytopenia:  1. Heterogeneous enlargement of the spleen, with multiple hypodense  areas, which demonstrate FDG avidity, concerning for involvement by  lymphoma.  2. Multifocal FDG avidity within the axial and appendicular skeleton,  with FDG avid lesions in the right humeral head, T4 vertebral body,  and right T6 pedicle. The right humeral head lesions appear most  amenable to biopsy.  3. Stable sclerosis of the right ischium and iliac bone, without  significant FDG uptake, compatible with treated metastasis.  4. 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    Assessment & Plan   1. History of plasmacytoid B-cell lymphoma   2. Right sternoclavicular mass    Pleasant 72 year old male with history of a low-grade kappa-restricted plasmacytoid B-cell lymphoma dating back to 2004, for which he received fludarabine-based chemo and radiation and has since just been on observation as he has largely been asymptomatic.     He now presents with a new 2-3 cm bony mass in the right sternoclavicular region, night sweats, and worsening cytopenias suspicious for disease progression. Unclear if the chest or flank pain are related, but possibly. His M-spike is stable at 0.2 and LDH has  increased slightly. Will plan for further evaluation with a PET-CT and biopsy of the sternal mass (or other FDG-avid area) for better disease characterization.     We briefly discussed that if biopsy continues to show just low-grade lymphoma, treatment options would include traditional chemoimmunotherapy (which he is not eager about) versus newer oral targeted therapies like BTK inhibitors. However, will discuss this in more detail once we have the biopsy results.     PLAN:  - PET[facility]CT at [facility] in [facility]  - Biopsy of sternal mass  - RTC to see me once biopsy results are back     Total of 60 minutes on patient visit, reviewing records, interpreting test results, placing orders, and documentation on the day of service.    Domitila Ruelas MD  Attending Physician, [facility] [place]    Chief Complaint   Patient presents with     Blood Draw     Vitals, blood draw VIA  by RN. Pt has checked in for the next appt.          Lynne Escoto MA     Sincerely,        Domitila Ruelas MD

## 2022-12-02 NOTE — PROGRESS NOTES
[facility]: Cancer Care                                                                                        Fax sent to [facility] in [place] for PET scan orders as pt requested to have PET scan completed there as it is closer to his home.  Confirmed fax went through at [phone] 12/2/22.    Fax number: [phone]  Main number: [phone]    RADHA Domingo, RN  RN Care Coordinator  [facility]

## 2022-12-02 NOTE — NURSING NOTE
"Oncology Rooming Note    December 2, 2022 2:16 PM   Shahid Davis is a 72 year old male who presents for:    Chief Complaint   Patient presents with     Blood Draw     Vitals, blood draw VIA  by RN. Pt has checked in for the next appt.      Oncology Clinic Visit     RTN for Lymphoma     Initial Vitals: Blood Pressure 113/64 (Patient Position: Sitting)   Pulse 85   Temperature 98.3  F (36.8  C) (Oral)   Respiration 16   Weight 65.3 kg (144 lb)   Oxygen Saturation 96%   Body Mass Index 22.55 kg/m   Estimated body mass index is 22.55 kg/m  as calculated from the following:    Height as of 7/9/21: 1.702 m (5' 7.01\").    Weight as of this encounter: 65.3 kg (144 lb). Body surface area is 1.76 meters squared.  Severe Pain (6) Comment: Data Unavailable   No LMP for male patient.  Allergies reviewed: Yes  Medications reviewed: Yes    Medications: Medication refills not needed today.  Pharmacy name entered into [employer]:    [id number] [place] [address] [id number] [address] AT [facility]    Clinical concerns: Pt is here for a rtn. He has a new lump in his upper left chest area. He said it's geting bigger.      Shannondane Cannon MA            "

## 2022-12-02 NOTE — PROGRESS NOTES
Chief Complaint   Patient presents with     Blood Draw     Vitals, blood draw VIA  by RN. Pt has checked in for the next appt.          Lynne Escoto MA

## 2022-12-02 NOTE — PROGRESS NOTES
" [facility]      FOLLOW-UP VISIT NOTE                       Dec 2, 2022  Subjective   REASON FOR VISIT: F/u low-grade lymphoma    ONCOLOGIC SUMMARY:  Diagnosis:    Low-grade kappa-restricted plasmacytoid B-cell lymphoma dx 3/2004, presenting with thoracic paraspinal mass. Bone marrow hypercellular with 70-80% involvement by small kappa-restricted lymphocytes which were positive for CD10, CD19, and CD20 and negative for CD5 and CD23.     Treatment history:  1. 2005: Fludarabine-based chemo x 6 cycles and XRT to spine (details unclear)  2. 2005 to present: Observation    INTERVAL HISTORY:  Shahid returns today for follow-up and to establish care with me following Dr. Holman's residential. He has history of a low-grade plasmacytoid B-cell lymphoma for which he has just been on surveillance since 2005. Over the last month, he has had more symptoms including two episodes of severe \"knife-like\" right chest pain radiating to shoulder, occasional right flank pain, and low-grade fevers/night sweats off and on. His pacemaker was interrogated and did not show any cardiac events at the time of his severe chest pain episodes. Additionally, he notes development of a firm mass in his right sternal/chest wall area over the last 3 weeks. Otherwise has chronic numbness in his toes and some fatigue but still able to do most activities and walk his dogs. No SOB, vomiting, abdominal pain, diarrhea, or bleeding.    Of note, he states that he usually goes down to [place] with his significant other for 3 months starting at the end of January.     I have reviewed and updated the following:  Past Medical History:   Diagnosis Date     Cardiac pacemaker in situ      Lymphoma (H)      Squamous cell carcinoma         Allergies   Allergen Reactions     Ampicillin [Ampicillin Sodium]      Bactrim [Sulfamethoxazole W/Trimethoprim]      Contrast Dye      CT contrast (IV) allergy - need oral Methylpred as premed for scans     Ampicillin " Rash       Current Outpatient Medications:      ascorbic acid (VITAMIN C) 500 MG tablet, Take by mouth every morning , Disp: , Rfl:      Multiple Vitamins-Minerals (MULTIVITAL PO), Take by mouth every morning , Disp: , Rfl:      omeprazole (PRILOSEC) 40 MG DR capsule, Take 1 capsule (40 mg) by mouth daily, Disp: 90 capsule, Rfl: 4     methylPREDNISolone (MEDROL) 32 MG tablet, take 1 tablet 12 hours and 2 hours prior to CT scan; 3rd dose 4 hours post scan. (Patient not taking: Reported on 7/7/2022), Disp: 12 tablet, Rfl: 1     sucralfate (CARAFATE) 1 GM/10ML suspension, Take 10 mLs (1 g) by mouth 2 times daily as needed for nausea (Patient not taking: Reported on 7/7/2022), Disp: 414 mL, Rfl: 3    REVIEW OF SYSTEMS:  12-point ROS reviewed and negative other than that mentioned in HPI.     Objective   VITAL SIGNS:  /64 (Patient Position: Sitting)   Pulse 85   Temp 98.3  F (36.8  C) (Oral)   Resp 16   Wt 65.3 kg (144 lb)   SpO2 96%   BMI 22.55 kg/m      ECOG PS: 1     PHYSICAL EXAM:  General: Awake, alert, in no acute distress. Oriented x 3.  HEENT: Normocephalic, atraumatic. No scleral icterus. Mucous membranes moist.  Lymph: No cervical, supraclavicular, or axillary lymphadenopathy appreciated.   Chest: Firm 2-3 cm mass protruding from right sternoclavicular area.   CV: Regular rate and rhythm. No murmurs, rubs, or gallops appreciated.   Resp: Good inspiratory effort, lungs clear to auscultation bilaterally.  GI: Abdomen soft, nontender, nondistended.   Ext: No peripheral edema, cyanosis, or clubbing.   Neuro: CN II-XII grossly intact. Moves all extremities spontaneously.   Skin: Many scattered SK's and skin tags.     LABS:  Lab Results   Component Value Date    WBC 2.6 (L) 12/02/2022    HGB 11.0 (L) 12/02/2022    HCT 33.0 (L) 12/02/2022    MCV 94 12/02/2022     (L) 12/02/2022     Lab Results   Component Value Date     12/02/2022    POTASSIUM 4.8 12/02/2022    CR 0.92 12/02/2022    BUN 26.1  (H) 12/02/2022    CHLORIDE 106 12/02/2022    JORGE 9.8 12/02/2022    GLC 86 12/02/2022      Lab Results   Component Value Date    ALT 27 12/02/2022    AST 38 12/02/2022    ALKPHOS 156 (H) 12/02/2022    BILITOTAL 0.3 12/02/2022      Lab Results   Component Value Date     (H) 12/02/2022    URIC 6.2 06/02/2022 9/17/2019 PET-CT:                 IMPRESSION: In this patient with remote history of low-grade  non-Hodgkin's lymphoma, now presenting with pancytopenia:  1. Heterogeneous enlargement of the spleen, with multiple hypodense  areas, which demonstrate FDG avidity, concerning for involvement by  lymphoma.  2. Multifocal FDG avidity within the axial and appendicular skeleton,  with FDG avid lesions in the right humeral head, T4 vertebral body,  and right T6 pedicle. The right humeral head lesions appear most  amenable to biopsy.  3. Stable sclerosis of the right ischium and iliac bone, without  significant FDG uptake, compatible with treated metastasis.  4. 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    Assessment & Plan   1. History of plasmacytoid B-cell lymphoma   2. Right sternoclavicular mass    Pleasant 72 year old male with history of a low-grade kappa-restricted plasmacytoid B-cell lymphoma dating back to 2004, for which he received fludarabine-based chemo and radiation and has since just been on observation as he has largely been asymptomatic.     He now presents with a new 2-3 cm bony mass in the right sternoclavicular region, night sweats, and worsening cytopenias suspicious for disease progression. Unclear if the chest or flank pain are related, but possibly. His M-spike is stable at 0.2 and LDH has increased slightly. Will plan for further evaluation with a PET-CT and biopsy of the sternal mass (or other FDG-avid area) for better disease characterization.     We briefly discussed that if biopsy continues to show just low-grade lymphoma, treatment options  would include traditional chemoimmunotherapy (which he is not eager about) versus newer oral targeted therapies like BTK inhibitors. However, will discuss this in more detail once we have the biopsy results.     PLAN:  - PET-CT at [facility] in [place] [facility]  - Biopsy of sternal mass  - RTC to see me once biopsy results are back     Total of 60 minutes on patient visit, reviewing records, interpreting test results, placing orders, and documentation on the day of service.    Domitila Ruelas MD  Attending Physician, [facility]

## 2022-12-05 LAB
ALBUMIN SERPL ELPH-MCNC: 3.6 G/DL (ref 3.7–5.1)
ALPHA1 GLOB SERPL ELPH-MCNC: 0.4 G/DL (ref 0.2–0.4)
ALPHA2 GLOB SERPL ELPH-MCNC: 0.6 G/DL (ref 0.5–0.9)
B-GLOBULIN SERPL ELPH-MCNC: 0.6 G/DL (ref 0.6–1)
GAMMA GLOB SERPL ELPH-MCNC: 0.5 G/DL (ref 0.7–1.6)
M PROTEIN SERPL ELPH-MCNC: 0.2 G/DL
PROT PATTERN SERPL ELPH-IMP: ABNORMAL

## 2022-12-05 PROCEDURE — 84165 PROTEIN E-PHORESIS SERUM: CPT | Mod: 26

## 2022-12-06 NOTE — PROGRESS NOTES
[facility]: Cancer Care                                                                                          Called [facility] to inquire the status of scheduling the PET scan. They did receive the referral but need a referral form completed. They faxed the form back to us yesterday at ~1p.    Signature:  Alton Lainez RN

## 2022-12-07 RX ORDER — METHYLPREDNISOLONE 32 MG/1
TABLET ORAL
Qty: 4 TABLET | Refills: 0 | Status: ON HOLD | OUTPATIENT
Start: 2022-12-07 | End: 2022-12-21

## 2022-12-08 ENCOUNTER — PATIENT OUTREACH (OUTPATIENT)
Dept: ONCOLOGY | Facility: CLINIC | Age: 72
End: 2022-12-08

## 2022-12-08 NOTE — PROGRESS NOTES
[facility]: Cancer Care                                                                                          [facility]/Voicemail    Clinical Data: Care Coordinator Outreach    Outreach attempted x 1.  Left message on patient's voicemail with call back information and requested return call.    Plan: Care Coordinator will do no further outreaches at this time.    Called to let the patient know the PET scan is scheduled for tomorrow in [place] at the [facility]. Reviewed fasting instructions. Also let him know the pre-meds have been sent to his preferred [facility] pharmacy. Encouraged him to call us back with any questions or concerns.     Signature:  Alton Lainez RN

## 2022-12-08 NOTE — PROGRESS NOTES
"Shahid (pt) called back,    Wondering if should still come in tomorrow for PET SCAN even with symptoms of cold?    Pt states,\"Took COVID negative on Monday 12/5/22,     1640 Per Care Team, okay to keep PET Scan with cold symptoms as long as pt feels up for it and no fever.     Pt states, does feel like keeping PET Scan, will try to drive up, does live [place] away so may also be dependent on weather. Pt is aware to call in morning if running into weather/traffic issues and needs to reschedule.       "

## 2022-12-09 ENCOUNTER — HOSPITAL ENCOUNTER (OUTPATIENT)
Dept: PET IMAGING | Facility: HOSPITAL | Age: 72
Discharge: HOME | End: 2022-12-09
Attending: INTERNAL MEDICINE
Payer: MEDICARE

## 2022-12-09 DIAGNOSIS — C83.00 MALIGNANT LYMPHOMA, LYMPHOPLASMACYTIC (H): ICD-10-CM

## 2022-12-09 DIAGNOSIS — C85.99 NON-HODGKIN LYMPHOMA, UNSPECIFIED, EXTRANODAL AND SOLID ORGAN SITES (H): ICD-10-CM

## 2022-12-09 DIAGNOSIS — C85.99 NON-HODGKIN LYMPHOMA, UNSPECIFIED, EXTRANODAL AND SOLID ORGAN SITES (H): Primary | ICD-10-CM

## 2022-12-09 LAB — GLUCOSE BLDC GLUCOMTR-MCNC: 125 MG/DL (ref 70–99)

## 2022-12-09 PROCEDURE — 250N000011 HC RX IP 250 OP 636: Performed by: INTERNAL MEDICINE

## 2022-12-09 PROCEDURE — 343N000001 HC RX 343: Performed by: INTERNAL MEDICINE

## 2022-12-09 PROCEDURE — G1010 CDSM STANSON: HCPCS | Mod: PS

## 2022-12-09 PROCEDURE — 70491 CT SOFT TISSUE NECK W/DYE: CPT

## 2022-12-09 PROCEDURE — 74177 CT ABD & PELVIS W/CONTRAST: CPT

## 2022-12-09 PROCEDURE — 82962 GLUCOSE BLOOD TEST: CPT

## 2022-12-09 PROCEDURE — A9552 F18 FDG: HCPCS | Performed by: INTERNAL MEDICINE

## 2022-12-09 RX ORDER — IOPAMIDOL 755 MG/ML
70 INJECTION, SOLUTION INTRAVASCULAR ONCE
Status: COMPLETED | OUTPATIENT
Start: 2022-12-09 | End: 2022-12-09

## 2022-12-09 RX ADMIN — IOPAMIDOL 70 ML: 755 INJECTION, SOLUTION INTRAVENOUS at 13:13

## 2022-12-09 RX ADMIN — FLUDEOXYGLUCOSE F-18 10.16 MCI.: 500 INJECTION, SOLUTION INTRAVENOUS at 12:04

## 2022-12-12 ENCOUNTER — PATIENT OUTREACH (OUTPATIENT)
Dept: ONCOLOGY | Facility: CLINIC | Age: 72
End: 2022-12-12

## 2022-12-12 DIAGNOSIS — C82.99 NODULAR LYMPHOMA OF EXTRANODAL AND/OR SOLID ORGAN SITE (H): Primary | ICD-10-CM

## 2022-12-12 NOTE — PROGRESS NOTES
[facility]: Cancer Care Short Note                                    Discussion with Patient:                                                      I received a call from the patient with concerns about the potential timing of his biopsy. He received a call from the IR team who told him they may not be able to get him in until the end of the month or beginning of next. He notes that his presumed cancer-related symptoms have been getting progressively worse and is concerned about waiting this long for a biopsy. At the time of the call, no biopsy has been scheduled and it appears the IR team is still triaging the request per Chart Review.    The patient does endorse shortness of breath with activity, dry cough and congestion which has been ongoing/worsening over the last 10-14 days. He has also noticed the mass on his chest increasing in size in the last 2-3 weeks. He thinks he has been having low-grade fevers but has not taken his temperature. He endorses night sweats and occasional chills. Denies chest pain, lower/upper extremity swelling, dyspnea at rest.    Assessment:                                                      Assessment completed with:: Patient    Constitutional  Fatigue: Fatigue relieved by rest    Respiratory  Cough: Moderate symptoms, medical intervention indicated OR limiting instrumental ADL  Dyspnea: Shortness of breath with minimal exertion OR limiting instrumental ADL    Patient Coping  Anxiety    No assessment indicated    Intervention/Education provided during outreach:                                                       I voiced understanding of his concerns about biopsy timing. I explained that we should wait to see when the biopsy is scheduled before we escalate and request expedited appointments. He voiced understanding.    I reviewed cause for being evaluated in the ER including worsening shortness of breath, persistent fevers, any new chest pain, or other worsening symptoms.  Dr. Ruelas did call last week to review PET scan but he was not available at the time. He did listen to her voicemail about the results and understands that the cancer has worsened. I reviewed that he does have bilateral pleura effusions which are likely the cause of his respiratory symptoms rather than an infectious etiology. He voiced understanding.    I will continue to watch for the biopsy to be scheduled and will consult with Dr. Ruelas to determine next steps. Patient agreed to be seeing at local ER if symptoms worsen to a point of being unmanagable at home. I will keep in touch with him as well. He does have the Triage RN number for our clinic as needed too.    ADDENDUM 12/13/22 12:33 PM  I called to check in on the patient. Clinically, he is about the same. Did have some sharp pain under his left rib cage this morning but this has subsided.  We discussed the plan for biopsy next week and that a plan of care would be decided based on those results. We briefly reviewed the logistics of receiving systemic therapy which he is familiar with. I explained that it might be good to have a local cancer provider to help coordinate care and possibly administer his therapy. We could co-manage his care since he lives so far away. He voiced much interest in this.    I placed a call to [facility] [[phone]] - I left a message on the voicemail of Sirisha Ramsey, RN Nurse Navigator to discuss a referral.     ADDENDUM 12/13/22 3:26 PM  Sirisha called back. They do not provide chemotherapy infusion services. Recommends [facility] or [facility].    Called [facility]: referral can be sent to [phone]. First available consult in early Jan 2023    Called [facility]: referral can be sent to [phone] ([facility]). Unsure of the next available appointment - depends on referral triage.    Patient is comfortable with referral to [facility] since he has been seen there in the  past.    ADDENDUM 12/21/22 10:34 AM  Finalized plan made by inpatient team yesterday after Heme Conference. Plan to proceed with R-CHOP.  Referral sent to [facility], per prior discussion with patient. Will wait to hear back.        Signature:  Alton Lainez DNP, RN, OCN  RN Care Coordinator  [facility]

## 2022-12-12 NOTE — LETTER
2022    TO: [facility] - Cancer Care  [place] - Hematology and Oncology  [address] [place] [address]  Phone: [phone]  Fax: [phone]     Dear [facility],    We are requesting an urgent referral for Shahid Davis [: [id number]] for high-grade, non-Hodgkin's Lymphoma. It is our intention to co-manage this patient in partnership with your consulting hematologist.     Included in this referral is patient demographic information, recent imaging, and recent clinical note. We are waiting on a final diagnosis but know he has high grade B-Cell Lymphoma and have initiated R-CHOP chemotherapy while an inpatient. As such, we request an appointment to establish care so to not delay treatment subsequent cycles of therapy.    Thank you for your assistance and attention to this request.      Sincerely,    Domitila Ruelas MD  Attending Hematologist  [facility]  [phone]    CC:  Alton Lainez DNP, RN, OCN  RN Care Coordinator  [facility]  [phone]

## 2022-12-12 NOTE — PROGRESS NOTES
Patient referred to interventional radiology by:     IR Referral [[id number]]    Electronically signed by: Domitila Ruelas MD on 12/09/22 3193     Order Questions    Question Answer   What is the reason for the IR order request? Biopsy   What type of biopsy is needed? Bone   Which bone needs a biopsy? Right sternal mass (very visible/palpable)   Patients clinical information/history? History of low-grade B-cell lymphoma in 2004 s/p chemoXRT, now with new bone lesions concerning for recurrence. Please send FRESH tissue for flow cytometry and cytogenetics/FISH as well as material for permanent sections/histology.   Is this biopsy procedure for research? No   Specimen orders should be placed per site protocol Acknowledge   Is there a specific location the exam needs to be scheduled at? No specific location required   Call Back # Pager [phone]   Is the patient on aspirin, Plavix or blood thinners? No     Associated Diagnoses    Non-Hodgkin lymphoma, unspecified, extranodal and solid organ sites (H) [C85.99]  - Primary         ===  History  Copied information:  [  1. History of plasmacytoid B-cell lymphoma   2. 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      He now presents with a new 2-3 cm bony mass in the right sternoclavicular region, night sweats, and worsening cytopenias suspicious for disease progression. Unclear if the chest or flank pain are related, but possibly. His M-spike is stable at 0.2 and LDH has increased slightly. Will plan for further evaluation with a PET-CT and biopsy of the sternal mass (or other FDG-avid area) for better disease characterization.      We briefly discussed that if biopsy continues to show just low-grade lymphoma, treatment options would include  "traditional chemoimmunotherapy (which he is not eager about) versus newer oral targeted therapies like BTK inhibitors. However, will discuss this in more detail once we have the biopsy results  ]    ===  See imaging   CT 12/9/22        ===    Review of Epic entered chart data shows the patient is on no anticoagulation      Complete Blood Count:  Lab Results   Component Value Date     12/02/2022    PLT 91 07/01/2021       Coagulation:  No results found for: INR    ===  PLAN  Referring provider:  Domitila Ruelas MD    Case request and imaging reviewed with IR Dr. Isi Green.    Approved for ultrasound guided biopsy of RIGHT sternoclavicular mass.    I will forward to procedure schedulers. Pre-procedure IR clinic visit is not necessary.    I have entered the following specimen orders in the correct Epic EMR status (Signed & Held) for a pending admission for procedure on behalf of the referring provider:  [id number] - Tissue, Gross and Microscopic Examination, \"Surgical Pathology\"    Additional specimen orders desired by the referring provider should be entered directly by the referring provider [ Please send FRESH tissue for flow cytometry and cytogenetics/FISH as well as material for permanent sections/histology. ]    "

## 2022-12-14 ENCOUNTER — APPOINTMENT (OUTPATIENT)
Dept: GENERAL RADIOLOGY | Facility: CLINIC | Age: 72
DRG: 840 | End: 2022-12-14
Attending: PHYSICIAN ASSISTANT
Payer: MEDICARE

## 2022-12-14 ENCOUNTER — DOCUMENTATION ONLY (OUTPATIENT)
Dept: ONCOLOGY | Facility: CLINIC | Age: 72
End: 2022-12-14

## 2022-12-14 ENCOUNTER — HOSPITAL ENCOUNTER (INPATIENT)
Facility: CLINIC | Age: 72
LOS: 8 days | Discharge: HOME | DRG: 840 | End: 2022-12-22
Attending: INTERNAL MEDICINE | Admitting: STUDENT IN AN ORGANIZED HEALTH CARE EDUCATION/TRAINING PROGRAM
Payer: MEDICARE

## 2022-12-14 DIAGNOSIS — Z11.52 ENCOUNTER FOR SCREENING LABORATORY TESTING FOR SEVERE ACUTE RESPIRATORY SYNDROME CORONAVIRUS 2 (SARS-COV-2): ICD-10-CM

## 2022-12-14 DIAGNOSIS — C82.99 NODULAR LYMPHOMA OF EXTRANODAL AND/OR SOLID ORGAN SITE (H): ICD-10-CM

## 2022-12-14 DIAGNOSIS — R06.09 DYSPNEA ON EXERTION: ICD-10-CM

## 2022-12-14 DIAGNOSIS — F51.02 ADJUSTMENT INSOMNIA: ICD-10-CM

## 2022-12-14 DIAGNOSIS — M89.8X8 STERNAL MASS: ICD-10-CM

## 2022-12-14 DIAGNOSIS — C83.32 DIFFUSE LARGE B-CELL LYMPHOMA OF INTRATHORACIC LYMPH NODES (H): Primary | ICD-10-CM

## 2022-12-14 DIAGNOSIS — Z85.72 HISTORY OF LYMPHOMA: ICD-10-CM

## 2022-12-14 DIAGNOSIS — C85.99 NON-HODGKIN LYMPHOMA, UNSPECIFIED, EXTRANODAL AND SOLID ORGAN SITES (H): Primary | ICD-10-CM

## 2022-12-14 PROBLEM — R06.02 SOB (SHORTNESS OF BREATH): Status: ACTIVE | Noted: 2022-12-14

## 2022-12-14 LAB
ABO/RH(D): NORMAL
ALBUMIN SERPL BCG-MCNC: 4.1 G/DL (ref 3.5–5.2)
ALBUMIN SERPL BCG-MCNC: 4.2 G/DL (ref 3.5–5.2)
ALBUMIN UR-MCNC: NEGATIVE MG/DL
ALP SERPL-CCNC: 153 U/L (ref 40–129)
ALP SERPL-CCNC: 153 U/L (ref 40–129)
ALT SERPL W P-5'-P-CCNC: 22 U/L (ref 10–50)
ALT SERPL W P-5'-P-CCNC: 28 U/L (ref 10–50)
ANION GAP SERPL CALCULATED.3IONS-SCNC: 14 MMOL/L (ref 7–15)
ANION GAP SERPL CALCULATED.3IONS-SCNC: 14 MMOL/L (ref 7–15)
ANTIBODY SCREEN: NEGATIVE
APPEARANCE UR: CLEAR
APTT PPP: 26 SECONDS (ref 22–38)
AST SERPL W P-5'-P-CCNC: 40 U/L (ref 10–50)
AST SERPL W P-5'-P-CCNC: 46 U/L (ref 10–50)
BASOPHILS # BLD AUTO: 0 10E3/UL (ref 0–0.2)
BASOPHILS # BLD AUTO: 0 10E3/UL (ref 0–0.2)
BASOPHILS NFR BLD AUTO: 0 %
BASOPHILS NFR BLD AUTO: 1 %
BILIRUB SERPL-MCNC: 0.4 MG/DL
BILIRUB SERPL-MCNC: 0.4 MG/DL
BILIRUB UR QL STRIP: NEGATIVE
BUN SERPL-MCNC: 27.1 MG/DL (ref 8–23)
BUN SERPL-MCNC: 27.4 MG/DL (ref 8–23)
BURR CELLS BLD QL SMEAR: SLIGHT
CALCIUM SERPL-MCNC: 9.8 MG/DL (ref 8.8–10.2)
CALCIUM SERPL-MCNC: 9.9 MG/DL (ref 8.8–10.2)
CHLORIDE SERPL-SCNC: 105 MMOL/L (ref 98–107)
CHLORIDE SERPL-SCNC: 105 MMOL/L (ref 98–107)
COLOR UR AUTO: NORMAL
CREAT SERPL-MCNC: 0.89 MG/DL (ref 0.67–1.17)
CREAT SERPL-MCNC: 0.95 MG/DL (ref 0.67–1.17)
DEPRECATED HCO3 PLAS-SCNC: 21 MMOL/L (ref 22–29)
DEPRECATED HCO3 PLAS-SCNC: 22 MMOL/L (ref 22–29)
EOSINOPHIL # BLD AUTO: 0 10E3/UL (ref 0–0.7)
EOSINOPHIL # BLD AUTO: 0 10E3/UL (ref 0–0.7)
EOSINOPHIL NFR BLD AUTO: 1 %
EOSINOPHIL NFR BLD AUTO: 1 %
ERYTHROCYTE [DISTWIDTH] IN BLOOD BY AUTOMATED COUNT: 13.4 % (ref 10–15)
ERYTHROCYTE [DISTWIDTH] IN BLOOD BY AUTOMATED COUNT: 13.4 % (ref 10–15)
FIBRINOGEN PPP-MCNC: 207 MG/DL (ref 170–490)
GFR SERPL CREATININE-BSD FRML MDRD: 85 ML/MIN/1.73M2
GFR SERPL CREATININE-BSD FRML MDRD: 90 ML/MIN/1.73M2
GLUCOSE SERPL-MCNC: 86 MG/DL (ref 70–99)
GLUCOSE SERPL-MCNC: 86 MG/DL (ref 70–99)
GLUCOSE UR STRIP-MCNC: NEGATIVE MG/DL
HCT VFR BLD AUTO: 35.5 % (ref 40–53)
HCT VFR BLD AUTO: 37.9 % (ref 40–53)
HGB BLD-MCNC: 11.7 G/DL (ref 13.3–17.7)
HGB BLD-MCNC: 12.3 G/DL (ref 13.3–17.7)
HGB UR QL STRIP: NEGATIVE
HOLD SPECIMEN: NORMAL
IMM GRANULOCYTES # BLD: 0 10E3/UL
IMM GRANULOCYTES # BLD: 0 10E3/UL
IMM GRANULOCYTES NFR BLD: 0 %
IMM GRANULOCYTES NFR BLD: 1 %
INR PPP: 1.02 (ref 0.85–1.15)
KETONES UR STRIP-MCNC: NEGATIVE MG/DL
LDH SERPL L TO P-CCNC: 375 U/L (ref 0–250)
LEUKOCYTE ESTERASE UR QL STRIP: NEGATIVE
LIPASE SERPL-CCNC: 43 U/L (ref 13–60)
LYMPHOCYTES # BLD AUTO: 0.7 10E3/UL (ref 0.8–5.3)
LYMPHOCYTES # BLD AUTO: 0.7 10E3/UL (ref 0.8–5.3)
LYMPHOCYTES NFR BLD AUTO: 22 %
LYMPHOCYTES NFR BLD AUTO: 27 %
MAGNESIUM SERPL-MCNC: 2.1 MG/DL (ref 1.7–2.3)
MAGNESIUM SERPL-MCNC: 2.2 MG/DL (ref 1.7–2.3)
MCH RBC QN AUTO: 30.6 PG (ref 26.5–33)
MCH RBC QN AUTO: 30.9 PG (ref 26.5–33)
MCHC RBC AUTO-ENTMCNC: 32.5 G/DL (ref 31.5–36.5)
MCHC RBC AUTO-ENTMCNC: 33 G/DL (ref 31.5–36.5)
MCV RBC AUTO: 94 FL (ref 78–100)
MCV RBC AUTO: 94 FL (ref 78–100)
MONOCYTES # BLD AUTO: 0.3 10E3/UL (ref 0–1.3)
MONOCYTES # BLD AUTO: 0.3 10E3/UL (ref 0–1.3)
MONOCYTES NFR BLD AUTO: 10 %
MONOCYTES NFR BLD AUTO: 10 %
NEUTROPHILS # BLD AUTO: 1.7 10E3/UL (ref 1.6–8.3)
NEUTROPHILS # BLD AUTO: 2.1 10E3/UL (ref 1.6–8.3)
NEUTROPHILS NFR BLD AUTO: 61 %
NEUTROPHILS NFR BLD AUTO: 66 %
NITRATE UR QL: NEGATIVE
NRBC # BLD AUTO: 0 10E3/UL
NRBC # BLD AUTO: 0 10E3/UL
NRBC BLD AUTO-RTO: 0 /100
NRBC BLD AUTO-RTO: 0 /100
PH UR STRIP: 5 [PH] (ref 5–7)
PHOSPHATE SERPL-MCNC: 2.9 MG/DL (ref 2.5–4.5)
PHOSPHATE SERPL-MCNC: 3.4 MG/DL (ref 2.5–4.5)
PLAT MORPH BLD: ABNORMAL
PLATELET # BLD AUTO: 120 10E3/UL (ref 150–450)
PLATELET # BLD AUTO: 61 10E3/UL (ref 150–450)
POTASSIUM SERPL-SCNC: 4.4 MMOL/L (ref 3.4–5.3)
POTASSIUM SERPL-SCNC: 4.7 MMOL/L (ref 3.4–5.3)
PROT SERPL-MCNC: 6.3 G/DL (ref 6.4–8.3)
PROT SERPL-MCNC: 6.5 G/DL (ref 6.4–8.3)
RBC # BLD AUTO: 3.79 10E6/UL (ref 4.4–5.9)
RBC # BLD AUTO: 4.02 10E6/UL (ref 4.4–5.9)
RBC MORPH BLD: ABNORMAL
SARS-COV-2 RNA RESP QL NAA+PROBE: NEGATIVE
SODIUM SERPL-SCNC: 140 MMOL/L (ref 136–145)
SODIUM SERPL-SCNC: 141 MMOL/L (ref 136–145)
SP GR UR STRIP: 1.01 (ref 1–1.03)
SPECIMEN EXPIRATION DATE: NORMAL
URATE SERPL-MCNC: 8.6 MG/DL (ref 3.4–7)
UROBILINOGEN UR STRIP-MCNC: NORMAL MG/DL
WBC # BLD AUTO: 2.7 10E3/UL (ref 4–11)
WBC # BLD AUTO: 3.2 10E3/UL (ref 4–11)

## 2022-12-14 PROCEDURE — C9803 HOPD COVID-19 SPEC COLLECT: HCPCS | Performed by: PHYSICIAN ASSISTANT

## 2022-12-14 PROCEDURE — 71046 X-RAY EXAM CHEST 2 VIEWS: CPT

## 2022-12-14 PROCEDURE — 85025 COMPLETE CBC W/AUTO DIFF WBC: CPT | Performed by: PHYSICIAN ASSISTANT

## 2022-12-14 PROCEDURE — 86706 HEP B SURFACE ANTIBODY: CPT | Performed by: HOSPITALIST

## 2022-12-14 PROCEDURE — 84550 ASSAY OF BLOOD/URIC ACID: CPT | Performed by: HOSPITALIST

## 2022-12-14 PROCEDURE — 80053 COMPREHEN METABOLIC PANEL: CPT | Performed by: PHYSICIAN ASSISTANT

## 2022-12-14 PROCEDURE — 99285 EMERGENCY DEPT VISIT HI MDM: CPT | Performed by: PHYSICIAN ASSISTANT

## 2022-12-14 PROCEDURE — 36415 COLL VENOUS BLD VENIPUNCTURE: CPT | Performed by: HOSPITALIST

## 2022-12-14 PROCEDURE — 87340 HEPATITIS B SURFACE AG IA: CPT | Performed by: HOSPITALIST

## 2022-12-14 PROCEDURE — 99223 1ST HOSP IP/OBS HIGH 75: CPT | Mod: GC | Performed by: INTERNAL MEDICINE

## 2022-12-14 PROCEDURE — 83735 ASSAY OF MAGNESIUM: CPT | Performed by: HOSPITALIST

## 2022-12-14 PROCEDURE — 86901 BLOOD TYPING SEROLOGIC RH(D): CPT | Performed by: INTERNAL MEDICINE

## 2022-12-14 PROCEDURE — 86850 RBC ANTIBODY SCREEN: CPT | Performed by: INTERNAL MEDICINE

## 2022-12-14 PROCEDURE — 81003 URINALYSIS AUTO W/O SCOPE: CPT | Performed by: INTERNAL MEDICINE

## 2022-12-14 PROCEDURE — 83615 LACTATE (LD) (LDH) ENZYME: CPT | Performed by: HOSPITALIST

## 2022-12-14 PROCEDURE — 85730 THROMBOPLASTIN TIME PARTIAL: CPT | Performed by: HOSPITALIST

## 2022-12-14 PROCEDURE — 120N000002 HC R&B MED SURG/OB UMMC

## 2022-12-14 PROCEDURE — 82232 ASSAY OF BETA-2 PROTEIN: CPT | Performed by: HOSPITALIST

## 2022-12-14 PROCEDURE — 83690 ASSAY OF LIPASE: CPT | Performed by: PHYSICIAN ASSISTANT

## 2022-12-14 PROCEDURE — 85610 PROTHROMBIN TIME: CPT | Performed by: HOSPITALIST

## 2022-12-14 PROCEDURE — 84100 ASSAY OF PHOSPHORUS: CPT | Performed by: HOSPITALIST

## 2022-12-14 PROCEDURE — 99285 EMERGENCY DEPT VISIT HI MDM: CPT | Mod: 25 | Performed by: PHYSICIAN ASSISTANT

## 2022-12-14 PROCEDURE — 71046 X-RAY EXAM CHEST 2 VIEWS: CPT | Mod: 26 | Performed by: RADIOLOGY

## 2022-12-14 PROCEDURE — 84450 TRANSFERASE (AST) (SGOT): CPT | Performed by: HOSPITALIST

## 2022-12-14 PROCEDURE — 36415 COLL VENOUS BLD VENIPUNCTURE: CPT | Performed by: PHYSICIAN ASSISTANT

## 2022-12-14 PROCEDURE — 86803 HEPATITIS C AB TEST: CPT | Performed by: HOSPITALIST

## 2022-12-14 PROCEDURE — 85384 FIBRINOGEN ACTIVITY: CPT | Performed by: HOSPITALIST

## 2022-12-14 PROCEDURE — 84155 ASSAY OF PROTEIN SERUM: CPT | Performed by: HOSPITALIST

## 2022-12-14 PROCEDURE — U0003 INFECTIOUS AGENT DETECTION BY NUCLEIC ACID (DNA OR RNA); SEVERE ACUTE RESPIRATORY SYNDROME CORONAVIRUS 2 (SARS-COV-2) (CORONAVIRUS DISEASE [COVID-19]), AMPLIFIED PROBE TECHNIQUE, MAKING USE OF HIGH THROUGHPUT TECHNOLOGIES AS DESCRIBED BY CMS-2020-01-R: HCPCS | Performed by: PHYSICIAN ASSISTANT

## 2022-12-14 PROCEDURE — 36415 COLL VENOUS BLD VENIPUNCTURE: CPT | Performed by: INTERNAL MEDICINE

## 2022-12-14 PROCEDURE — 85025 COMPLETE CBC W/AUTO DIFF WBC: CPT | Performed by: HOSPITALIST

## 2022-12-14 PROCEDURE — 81003 URINALYSIS AUTO W/O SCOPE: CPT | Performed by: PHYSICIAN ASSISTANT

## 2022-12-14 RX ORDER — ONDANSETRON 2 MG/ML
4 INJECTION INTRAMUSCULAR; INTRAVENOUS EVERY 6 HOURS PRN
Status: DISCONTINUED | OUTPATIENT
Start: 2022-12-14 | End: 2022-12-22 | Stop reason: HOSPADM

## 2022-12-14 RX ORDER — POLYETHYLENE GLYCOL 3350 17 G/17G
17 POWDER, FOR SOLUTION ORAL DAILY
Status: DISCONTINUED | OUTPATIENT
Start: 2022-12-14 | End: 2022-12-15

## 2022-12-14 RX ORDER — ACETAMINOPHEN 325 MG/1
650 TABLET ORAL EVERY 4 HOURS PRN
Status: DISCONTINUED | OUTPATIENT
Start: 2022-12-14 | End: 2022-12-22 | Stop reason: HOSPADM

## 2022-12-14 ASSESSMENT — ACTIVITIES OF DAILY LIVING (ADL)
ADLS_ACUITY_SCORE: 33
ADLS_ACUITY_SCORE: 35

## 2022-12-14 NOTE — PROGRESS NOTES
Called patient yesterday to review his PET results in more detail and check in on symptoms. His PET shows significant progression of disease with infiltrative tumor involving the right pleura, mediastinal and pericardial regions, right anterolateral chest wall (4.6 x 7.2 x 6.1 cm, SUVmax 12.2), adenopathy above and below the diaphragm, liver, spleen and multiple sites in the skeleton. Additionally has large right and small left pleural effusions likely related to pleural disease.     He originally was scheduled to undergo outpatient biopsy of right chest wall/sternal mass next week, but now has worsening SOB and cough to the point where he has to rest after 10-15 steps. There is concern for transformation to aggressive NHL given PET findings and rapid progression of symptoms. As such, I recommend that he be admitted to [facility] for further work-up and management. He may have to go through ED if there are no beds available for direct admission.    Recommendations for inpatient team:  - IR biopsy of right sternal mass as soon as possible. Please send for:    - RPMI sample for flow cytometry    - RPMI sample for FISH/cytogenetics    - A bunch of cores (5-6) for permanent sections   - IP consult for right thoracentesis. Please send fluid for flow cytometry and cytology.   - Echocardiogram.  - Likely can start steroids once biopsy is completed.   - Further treatment TBD based on biopsy results, may need to start first cycle inpatient as he is at high risk for TLS.    Domitila Ruelas MD  Attending Physician, [facility]

## 2022-12-14 NOTE — H&P
"Malignant hematology H&P note    Date of Service 12/14/2022  Admit Date 12/14/2022   Hospital Day: 1     History of Present Illness:  Shahid Davis is a 72 year old man with a history of low-grade jose-restricted plasmacytoid B-cell lymphoma diagnosed 3/2004 currently on surveillance since, here for CP, R flank pain, B symtpoms found to have worsening radiographic e/o concern for transformation of his lymphoma.     He was seen by Dr. Ruelas on 12/2 when pt c/o R sharp CP radiating to shoulder, occasional R  Flank pain, low grade fever and night sweats. He also c/o firm mass in his R sternal, chest wall. Plan was made to obtain PET-CT, which was obtained on 12/9. This is showing substantial progression of presumably extensive lymphomatous involvement, including infiltrative tumor involving the R pleura, mediastinal and pericardial regions, R anterolateral chest wall, adenopathy above and below the diaphragm, liver, spleen and multiple sites in the skeleton. Due to this finding, pt was referred to the ED for further w/up.     Today, Mr. Davis states that he has had occasional R sharp \"knife-like\" CP in R upper chest area, that radiated to his R neck and R shoulder for the past 2 wks. His sxs are a/w SOB, cough He also c/o intermittent L UQ abd pain for the past 2 days. ROS positive for feeling intermittently hot (Tmax 100), worsening night sweats to the point he has drenching clothes (he had this for yrs but has been only occasional before, now every night), some weight loss (142 -> 137 lbs). He also states that he has had a R upper mid chest mass that he has noted for the past several wks, which has been progressively growing.     Pt states he only takes vitamins for meds.     ONCOLOGIC SUMMARY: from Dr. Ruelas's note  Diagnosis:    Low-grade kappa-restricted plasmacytoid B-cell lymphoma dx 3/2004, presenting with thoracic paraspinal mass. Bone marrow hypercellular with 70-80% involvement by small kappa-restricted " "lymphocytes which were positive for CD10, CD19, and CD20 and negative for CD5 and CD23.      Treatment history:  1. 2005: Fludarabine-based chemo x 6 cycles and XRT to spine (details unclear)  2. 2005 to present: Observation      PMH: GERD, complete heart block s/p pacemaker in 2020  PSH: none related to hematology  FamHx: cancer in mother  SocHx: former smoker at young age  Meds reviewed in Epic.  Allergies reviewed in Epic  Comprehensive review of systems performed and otherwise negative unless mentioned above.    Physical Exam  /77   Pulse 91   Temp 98.2  F (36.8  C) (Oral)   Resp 17   Ht 1.702 m (5' 7\")   Wt 65.8 kg (145 lb)   SpO2 96%   BMI 22.71 kg/m       Constitutional: Awake, alert, cooperative, in NAD.  Eyes: PERRL  ENT: Normocephalic, without obvious abnormality  Respiratory: Non-labored breathing, but coughing at times.   Skin: No concerning lesions or rash on exposed areas.  Musculoskeletal: No edema chalo LEs.  Neurologic: Awake, alert & oriented    Exam was limited to due to no ED room availability in the ER.     Recent Labs   Lab 12/14/22  1524   WBC 3.2*   HGB 12.3*   *   MCV 94   RDW 13.4     Recent Labs   Lab 12/14/22  1524      POTASSIUM 4.4   CHLORIDE 105   CO2 22   BUN 27.4*   CR 0.95   JORGE 9.9     Recent Labs   Lab 12/14/22  1524   AST 40   ALT 22   ALKPHOS 153*   ALBUMIN 4.2   PROTTOTAL 6.5   BILITOTAL 0.4      No results for input(s): IGA, IGM, IGE, ELPM, ELPINT, IEP, KAP[place]FREELT, LAMBDAFREELT, KLR in the last 168 hours.    Invalid input(s): LGG   No results for input(s): RETICABSCT, RETP, IRON, IRONSAT, DALTON, FEB, B12, FOLIC, EPOE, LDH, HAPT in the last 168 hours.  No results for input(s): MORPH in the last 168 hours.  No results for input(s): HCVAB, HCABC, HBCAB, AUSAB, HIV in the last 168 hours.  No results for input(s): INR, PTT, FIBR in the last 168 hours.       PET/CT 12/9/2022  PET/CT FINDINGS: Since 09/17/2019 substantial interval progression of widespread FDG " avid malignancy with multiple new sites of involvement, presumably progression of underlying lymphoma. This includes a large infiltrative soft tissue chest wall mass   centered in the right upper parasternal region estimated at 4.6 x 7.2 x 6.1 cm and associated with marked uptake (SUVmax 12.2). Additionally, extensive large volume FDG avid infiltrative soft tissue has developed along the right anterior pleura,   infiltrating throughout much of the pericardium and extending along portions of the subcarinal mediastinum and right inferior pleura regions. Multiple separate sites of FDG avid adenopathy have developed including bilateral axillary regions above the   diaphragm, more so in the right axilla, and retroperitoneal left periaortic, aortocaval and central mesenteric regions below the diaphragm. A representative right axillary node measures 1.5 x 2.5 cm associated with moderate uptake (SUVmax 5.6). Marked   splenomegaly with development of some large heterogeneous FDG avid masses in the spleen consistent with lymphomatous involvement, such as one in the inferior spleen estimated at 5.8 x 5.8 cm associated with moderate focal uptake (SUVmax 6.0). A small   low-attenuation FDG avid mass has developed in the right hepatic lobe (SUVmax 5.3). Additionally, some pre-existing FDG avid skeletal lesions have progressed and multiple new skeletal lesions have developed. This includes increased size and uptake   involving proximal right humerus (SUVmax 6.6, previously 5.8) as well as lesions involving the proximal left humerus, right scapular glenoid, medial right clavicle, manubrium, numerous bilateral ribs, multiple sites in the spine and numerous sites in the   bony pelvis such as in the medial left iliac bone with marked uptake (SUVmax 7.8). A few areas of sclerosis in the bony pelvis are not FDG avid suggesting sites of treated inactive disease.     CT FINDINGS: Large right and small left pleural effusions. Left chest  cardiac pacemaker. Small esophageal hiatal hernia. Mild atherosclerotic calcifications. Diffuse hepatic steatosis. Marked prostate gland enlargement. Moderate scattered degenerative   changes in the spine.                                                                  IMPRESSION:  Substantial progression of widespread malignancy, presumably extensive lymphomatous involvement, including infiltrative tumor involving the right pleura, mediastinal and pericardial regions, right anterolateral chest wall, adenopathy above and below the   diaphragm, liver, spleen and multiple sites in the skeleton. 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 A mass in the right anterior chest wall is a good option for ultrasound-guided biopsy.    Assessment and Plan:  Shahid Davis is a 72 year old man with a history of low-grade jose-restricted plasmacytoid B-cell lymphoma diagnosed 3/2004 currently on surveillance since, here for CP, R flank pain, worsening B symtpoms found to have radiographic e/o concern for transformation of his low-grade lymphoma.     #H/o low-grade kappa restricted plasmacytoid B-cell lymphoma 3/2004   #Concern for lymphoma transformation  Pt has a h/o low-grade kappa restricted plasmacytoid B-cell lymphoma 3/2004 treated with x6 cycles of fludarabine based chemo and XRT to spine, then has been on surveillance since 2005.   This time, pt p/w progressing B symptoms, abd pain, SOB, found to have extensive lymphomatous involvement to the R pleura, media  mediastinal and pericardial regions, right anterolateral chest wall, adenopathy above and below the diaphragm, liver, spleen and multiple sites in the skeleton, concerning for transformation from his low-grade lymphoma. He also has pancytopenia which seems to have progressed slowly over the past years.    -IR consult for the anterior chest mass   -NPO at [facility]    -check TLS and DIC labs    -check beta- 2 microglobulin   -check SPEP, light  chains, immunoglobulins, hep B/C serologies  When the biopsy is obtained, the below testings to be sent (per Dr. Ruelas's note):               - RPMI sample for flow cytometry               - RPMI sample for FISH/cytogenetics               - A bunch of cores (5-6) for permanent sections     #SOB  #R large and L small pleural effusion   His recent PET/CT shows a large R and small L pleural effusion. Pt has SOB but SpO2 fine on room air, will prioritize his IR bx for now. Based on his PET, the widespread malignancy seems to be involving the pericardial area, will assess with TTE.    -consider R thora if he becomes hypoxic     -f/u CXR from today    -TTE     #H/o complete heart block s/p pacemaker placement 2020  No acute issue.     The above plan was discussed with Kristian Blakely, who agrees with the assessment and plan.     Thank you for allowing me to participate in the care of this patient. Please do not hesitate to contact me if there are any concerns or questions.     Go MD Tomas  Hem/onc fellow  Division of Hematology, Oncology, and Transplantation  [facility]

## 2022-12-14 NOTE — ED TRIAGE NOTES
Pt comes in as a referral from oncology for new right sided chest mass that has grown aggressively in the last two weeks. Patient is also now having left sided abdominal pain so oncology worried about spleen. History of lymphoma.      Triage Assessment     Row Name 12/14/22 [id number]       Triage Assessment (Adult)    Airway WDL WDL       Respiratory WDL    Respiratory WDL X;all;cough    Rhythm/Pattern, Respiratory shortness of breath    Cough Frequency frequent       Skin Circulation/Temperature WDL    Skin Circulation/Temperature WDL WDL       Cardiac WDL    Cardiac WDL WDL       Peripheral/Neurovascular WDL    Peripheral Neurovascular WDL WDL       Cognitive/Neuro/Behavioral WDL    Cognitive/Neuro/Behavioral WDL WDL

## 2022-12-14 NOTE — ED PROVIDER NOTES
"    [place] EMERGENCY DEPARTMENT ([facility])    12/14/22       ED PROVIDER NOTE       History     Chief Complaint   Patient presents with     Mass     Shortness of Breath     HPI  Shahid Davis is a 72 year old male with history of non-Hodgkins lymphoma, pacemaker placement  Who presents with worsening shortness of breath and cough, decreased exertional tolerance and findings concerning for transformation to aggressive NHL given PET findings and rapid progression of symptoms. Oncology contacted patient after he had PET scan done on 12/9/22 with substantial progression of widespread malignancy. He noted having increased shortness of breath to point where he can only tolerate walking 10-15 steps before stopping. He was told to come to the Emergency Department for admission.    Patient tells me his shortness of breath has been gradual over the last 1 month or so.  He states he has been having some subjective fevers at nighttime without any measured fevers at home.  He states as well that about 2 days ago he started developing some intermittent left upper quadrant pain which seems to be worse while lying flat.  He has had decreased appetite.  He has no other concerns.        ONCOLOGY PROGRESS NOTES 12/2/22  INTERVAL HISTORY:  Shahid returns today for follow-up and to establish care with me following Dr. Holman's FDC. He has history of a low-grade plasmacytoid B-cell lymphoma for which he has just been on surveillance since 2005. Over the last month, he has had more symptoms including two episodes of severe \"knife-like\" right chest pain radiating to shoulder, occasional right flank pain, and low-grade fevers/night sweats off and on. His pacemaker was interrogated and did not show any cardiac events at the time of his severe chest pain episodes. Additionally, he notes development of a firm mass in his right sternal/chest wall area over the last 3 weeks. Otherwise has chronic numbness in his toes and " some fatigue but still able to do most activities and walk his dogs. No SOB, vomiting, abdominal pain, diarrhea, or bleeding.    PET ONCOLOGY WHOLE BODY, CT SOFT TISSUE NECK W CONTRAST, CT CHEST/ABDOMEN/PELVIS W CONTRAST 12/9/2022   IMPRESSION:  Substantial progression of widespread malignancy, presumably extensive lymphomatous involvement, including infiltrative tumor involving the right pleura, mediastinal and pericardial regions, right anterolateral chest wall, adenopathy above and below the   diaphragm, liver, spleen and multiple sites in the skeleton. 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 A mass in the right anterior chest wall is a good option for ultrasound-guided biopsy.             Past Medical History  Past Medical History:   Diagnosis Date     Cardiac pacemaker in situ      Lymphoma (H)      Squamous cell carcinoma      Past Surgical History:   Procedure Laterality Date     DAVINCI HERNIORRHAPHY INGUINAL Left 7/9/2021    Procedure: HERNIORRHAPHY, INGUINAL, ROBOT-ASSISTED, Left, Mesh;  Surgeon: Shamar Tyler MD;  Location: [facility] OR     MOHS MICROGRAPHIC PROCEDURE       NO HISTORY OF SURGERY       ascorbic acid (VITAMIN C) 500 MG tablet  methylPREDNISolone (MEDROL) 32 MG tablet  methylPREDNISolone (MEDROL) 32 MG tablet  Multiple Vitamins-Minerals (MULTIVITAL PO)  omeprazole (PRILOSEC) 40 MG DR capsule  sucralfate (CARAFATE) 1 GM/10ML suspension      Allergies   Allergen Reactions     Ampicillin [Ampicillin Sodium]      Bactrim [Sulfamethoxazole W/Trimethoprim]      Contrast Dye      CT contrast (IV) allergy - need oral Methylpred as premed for scans     Ampicillin Rash     Family History  Family History   Problem Relation Age of Onset     Cancer Mother         Blood     Cancer Father         Lung     Cancer Maternal Grandmother         Breast     Cancer Paternal Grandfather         Hodgkins     Social History   Social History     Tobacco Use     Smoking status: Former  "    Smokeless tobacco: Never     Tobacco comments:     Quit at age 19   Substance Use Topics     Alcohol use: Yes     Alcohol/week: 11.7 standard drinks     Types: 14 drink(s) per week      Past medical history, past surgical history, medications, allergies, family history, and social history were reviewed with the patient. No additional pertinent items.       Review of Systems  A complete review of systems was performed with pertinent positives and negatives noted in the HPI, and all other systems negative.    Physical Exam   BP: 130/77  Pulse: 91  Temp: 98.2  F (36.8  C)  Resp: 17  Height: 170.2 cm (5' 7\")  Weight: 65.8 kg (145 lb)  SpO2: 96 %  Physical Exam    GENERAL APPEARANCE: The patient is well developed, well appearing, and in no acute distress.  HEAD:  Normocephalic and atraumatic.   EENT: Voice normal.  NECK: Trachea is midline.  LUNGS: Breath sounds are equal and clear bilaterally. No wheezes, rhonchi, or rales.  HEART: Regular rate and normal rhythm.    ABDOMEN: Soft, flat, and benign.  Patient has no focal tenderness to palpation of the left upper quadrant on exam, no other focal tenderness noted.  EXTREMITIES: No cyanosis, clubbing, or edema.  NEUROLOGIC: No focal sensory or motor deficits are noted.  PSYCHIATRIC: The patient is awake, alert.  Appropriate mood and affect.  SKIN: Warm, dry, and well perfused. Good turgor.  Examination of the chest wall reveals large sternal mass, measuring about 3 to 4 cm x 1 cm in size.  No open skin erythema or tenderness to palpation.      ED Course           Results for orders placed or performed during the hospital encounter of 12/14/22   Chest XR,  PA & LAT     Status: None (Preliminary result)    Impression    RESIDENT PRELIMINARY INTERPRETATION  IMPRESSION:  1. Large right and small left pleural effusions with hazy perihilar  and bibasilar opacities that likely represent atelectasis. This does  not appear significantly changed from 12/9/2022 given the " differences  in modalities.  2. Focal opacity in the right apical perihilar region corresponding to  known mass seen on comparison CT.   Comprehensive metabolic panel     Status: Abnormal   Result Value Ref Range    Sodium 141 136 - 145 mmol/L    Potassium 4.4 3.4 - 5.3 mmol/L    Chloride 105 98 - 107 mmol/L    Carbon Dioxide (CO2) 22 22 - 29 mmol/L    Anion Gap 14 7 - 15 mmol/L    Urea Nitrogen 27.4 (H) 8.0 - 23.0 mg/dL    Creatinine 0.95 0.67 - 1.17 mg/dL    Calcium 9.9 8.8 - 10.2 mg/dL    Glucose 86 70 - 99 mg/dL    Alkaline Phosphatase 153 (H) 40 - 129 U/L    AST 40 10 - 50 U/L    ALT 22 10 - 50 U/L    Protein Total 6.5 6.4 - 8.3 g/dL    Albumin 4.2 3.5 - 5.2 g/dL    Bilirubin Total 0.4 <=1.2 mg/dL    GFR Estimate 85 >60 mL/min/1.73m2   [place] Draw     Status: None    Narrative    The following orders were created for panel order [place] Draw.  Procedure                               Abnormality         Status                     ---------                               -----------         ------                     Extra Blue Top Tube[[id number]]                                                         Extra Red Top Tube[[id number]]                               Final result               Extra Green Top (Lithium...[[id number]]                      Final result               Extra Purple Top Tube[[id number]]                            Final result                 Please view results for these tests on the individual orders.   Asymptomatic COVID-19 Virus (Coronavirus) by PCR Nose     Status: Normal    Specimen: Nose; Swab   Result Value Ref Range    SARS CoV2 PCR Negative Negative    Narrative    Testing was performed using the Xpert Xpress SARS-CoV-2 Assay on the Cepheid Gene-Xpert Instrument Systems. Additional information about this Emergency Use Authorization (EUA) assay can be found via the Lab Guide. This test should be ordered for the detection of SARS-CoV-2 in individuals who meet SARS-CoV-2 clinical and/or  epidemiological criteria as well as from individuals without symptoms or other reasons to suspect COVID-19. Test performance for asymptomatic patients has only been established in anterior nasal swab specimens. This test is for in vitro diagnostic use under the FDA EUA for laboratories certified under CLIA to perform high complexity testing. This test has not been FDA cleared or approved. A negative result does not rule out the presence of PCR inhibitors in the specimen or target RNA concentration below the limit of detection for the assay. The possibility of a false negative should be considered if the patient's recent exposure or clinical presentation suggests COVID-19. This test was validated by [facility]. These Laboratories are certified under the Clinical Laboratory Improvement Amendments (CLIA) as qualified to perform high complexity testing.     UA reflex to Microscopic and Culture     Status: Normal    Specimen: Urine, Midstream   Result Value Ref Range    Color Urine Light Yellow Colorless, Straw, Light Yellow, Yellow    Appearance Urine Clear Clear    Glucose Urine Negative Negative mg/dL    Bilirubin Urine Negative Negative    Ketones Urine Negative Negative mg/dL    Specific Gravity Urine 1.014 1.003 - 1.035    Blood Urine Negative Negative    pH Urine 5.0 5.0 - 7.0    Protein Albumin Urine Negative Negative mg/dL    Urobilinogen Urine Normal Normal, 2.0 mg/dL    Nitrite Urine Negative Negative    Leukocyte Esterase Urine Negative Negative    Narrative    Microscopic not indicated   CBC with platelets and differential     Status: Abnormal   Result Value Ref Range    WBC Count 3.2 (L) 4.0 - 11.0 10e3/uL    RBC Count 4.02 (L) 4.40 - 5.90 10e6/uL    Hemoglobin 12.3 (L) 13.3 - 17.7 g/dL    Hematocrit 37.9 (L) 40.0 - 53.0 %    MCV 94 78 - 100 fL    MCH 30.6 26.5 - 33.0 pg    MCHC 32.5 31.5 - 36.5 g/dL    RDW 13.4 10.0 - 15.0 %    Platelet Count 120 (L) 150 - 450 10e3/uL    % Neutrophils  66 %    % Lymphocytes 22 %    % Monocytes 10 %    % Eosinophils 1 %    % Basophils 1 %    % Immature Granulocytes 0 %    NRBCs per 100 WBC 0 <1 /100    Absolute Neutrophils 2.1 1.6 - 8.3 10e3/uL    Absolute Lymphocytes 0.7 (L) 0.8 - 5.3 10e3/uL    Absolute Monocytes 0.3 0.0 - 1.3 10e3/uL    Absolute Eosinophils 0.0 0.0 - 0.7 10e3/uL    Absolute Basophils 0.0 0.0 - 0.2 10e3/uL    Absolute Immature Granulocytes 0.0 <=0.4 10e3/uL    Absolute NRBCs 0.0 10e3/uL   Extra Red Top Tube     Status: None   Result Value Ref Range    Hold Specimen JIC    Extra Green Top (Lithium Heparin) Tube     Status: None   Result Value Ref Range    Hold Specimen JIC    Extra Purple Top Tube     Status: None   Result Value Ref Range    Hold Specimen JIC    Lipase     Status: Normal   Result Value Ref Range    Lipase 43 13 - 60 U/L   Fibrinogen activity     Status: Normal   Result Value Ref Range    Fibrinogen Activity 207 170 - 490 mg/dL   INR     Status: Normal   Result Value Ref Range    INR 1.02 0.85 - 1.15   Partial thromboplastin time     Status: Normal   Result Value Ref Range    aPTT 26 22 - 38 Seconds   Comprehensive metabolic panel     Status: Abnormal   Result Value Ref Range    Sodium 140 136 - 145 mmol/L    Potassium 4.7 3.4 - 5.3 mmol/L    Chloride 105 98 - 107 mmol/L    Carbon Dioxide (CO2) 21 (L) 22 - 29 mmol/L    Anion Gap 14 7 - 15 mmol/L    Urea Nitrogen 27.1 (H) 8.0 - 23.0 mg/dL    Creatinine 0.89 0.67 - 1.17 mg/dL    Calcium 9.8 8.8 - 10.2 mg/dL    Glucose 86 70 - 99 mg/dL    Alkaline Phosphatase 153 (H) 40 - 129 U/L    AST 46 10 - 50 U/L    ALT 28 10 - 50 U/L    Protein Total 6.3 (L) 6.4 - 8.3 g/dL    Albumin 4.1 3.5 - 5.2 g/dL    Bilirubin Total 0.4 <=1.2 mg/dL    GFR Estimate 90 >60 mL/min/1.73m2   CBC with platelets and differential     Status: Abnormal (Preliminary result)   Result Value Ref Range    WBC Count 2.7 (L) 4.0 - 11.0 10e3/uL    RBC Count 3.79 (L) 4.40 - 5.90 10e6/uL    Hemoglobin 11.7 (L) 13.3 - 17.7 g/dL     Hematocrit 35.5 (L) 40.0 - 53.0 %    MCV 94 78 - 100 fL    MCH 30.9 26.5 - 33.0 pg    MCHC 33.0 31.5 - 36.5 g/dL    RDW 13.4 10.0 - 15.0 %    Platelet Count 61 (L) 150 - 450 10e3/uL   CBC with platelets differential     Status: Abnormal    Narrative    The following orders were created for panel order CBC with platelets differential.  Procedure                               Abnormality         Status                     ---------                               -----------         ------                     CBC with platelets and d...[[id number]]  Abnormal            Final result                 Please view results for these tests on the individual orders.   ABO/RH Type and Screen  *Canceled*     Status: None ()    Narrative    The following orders were created for panel order ABO/RH Type and Screen .  Procedure                               Abnormality         Status                     ---------                               -----------         ------                       Please view results for these tests on the individual orders.   CBC with platelets differential     Status: Abnormal (In process)    Narrative    The following orders were created for panel order CBC with platelets differential.  Procedure                               Abnormality         Status                     ---------                               -----------         ------                     CBC with platelets and d...[[id number]]  Abnormal            Preliminary result           Please view results for these tests on the individual orders.   CBC with platelets differential     Status: None ()    Narrative    The following orders were created for panel order CBC with platelets differential.  Procedure                               Abnormality         Status                     ---------                               -----------         ------                     CBC with platelets and d...[[id number]]                                                    Please view results for these tests on the individual orders.   ABO/RH Type and Screen  *Canceled*     Status: None ()    Narrative    The following orders were created for panel order ABO/RH Type and Screen .  Procedure                               Abnormality         Status                     ---------                               -----------         ------                     Adult Type and Screen[[id number]]                                                         Please view results for these tests on the individual orders.     Medications   No rectal suppositories if WBC less than 1000/microliters or platelets less than 50,000/ L (has no administration in time range)   acetaminophen (TYLENOL) tablet 650 mg (has no administration in time range)   ondansetron (ZOFRAN) injection 4 mg (has no administration in time range)   polyethylene glycol (MIRALAX) Packet 17 g (17 g Oral Not Given 12/14/22 1828)        Assessments & Plan (with Medical Decision Making)   This is a 72-year-old male with known history of lymphoma presenting with concerns for gradually increasing dyspnea on exertion, rapidly progressing sternal mass, and recommendations to be admitted for expedited evaluation of his lymphoma.  Patient has stable vital signs here and presentation to the emergency department.  He is without tachycardia tachypnea or hypoxia, oxygenating well on room air.  Basic labs were repeated including CBC, metabolic panel chest x-ray.  CBC with leukopenia 2.7 down from 3.2 on the 14th of the month.  Metabolic function shows no findings to suggest JUAN PABLO or electrolyte derangement.  X-ray of the chest returned showing large right pleural effusion along with a left pleural effusion, similar to recent PET scan.    With patient's specialty team recommending admission for expedited treatment, I did discuss this case with the malignant hematology team, as well as medicine, who will be taking the patient.    I have reviewed  the nursing notes. I have reviewed the findings, diagnosis, plan and need for follow up with the patient.    New Prescriptions    No medications on file       Final diagnoses:   Sternal mass   Dyspnea on exertion   History of lymphoma       --  Maggie Dipesh, PAC  [facility] EMERGENCY DEPARTMENT  12/14/2022

## 2022-12-15 ENCOUNTER — APPOINTMENT (OUTPATIENT)
Dept: CARDIOLOGY | Facility: CLINIC | Age: 72
DRG: 840 | End: 2022-12-15
Payer: MEDICARE

## 2022-12-15 ENCOUNTER — TRANSFERRED RECORDS (OUTPATIENT)
Dept: HEALTH INFORMATION MANAGEMENT | Facility: CLINIC | Age: 72
End: 2022-12-15

## 2022-12-15 ENCOUNTER — APPOINTMENT (OUTPATIENT)
Dept: INTERVENTIONAL RADIOLOGY/VASCULAR | Facility: CLINIC | Age: 72
DRG: 840 | End: 2022-12-15
Attending: INTERNAL MEDICINE
Payer: MEDICARE

## 2022-12-15 LAB
ALBUMIN SERPL BCG-MCNC: 3.6 G/DL (ref 3.5–5.2)
ALP SERPL-CCNC: 133 U/L (ref 40–129)
ALT SERPL W P-5'-P-CCNC: 21 U/L (ref 10–50)
ANION GAP SERPL CALCULATED.3IONS-SCNC: 10 MMOL/L (ref 7–15)
AST SERPL W P-5'-P-CCNC: 35 U/L (ref 10–50)
B2 MICROGLOB TUMOR MARKER SER-MCNC: 3.6 MG/L
BASOPHILS # BLD AUTO: 0 10E3/UL (ref 0–0.2)
BASOPHILS NFR BLD AUTO: 0 %
BILIRUB SERPL-MCNC: 0.5 MG/DL
BUN SERPL-MCNC: 24.3 MG/DL (ref 8–23)
CALCIUM SERPL-MCNC: 8.8 MG/DL (ref 8.8–10.2)
CHLORIDE SERPL-SCNC: 107 MMOL/L (ref 98–107)
CREAT SERPL-MCNC: 0.91 MG/DL (ref 0.67–1.17)
DEPRECATED HCO3 PLAS-SCNC: 24 MMOL/L (ref 22–29)
EOSINOPHIL # BLD AUTO: 0 10E3/UL (ref 0–0.7)
EOSINOPHIL NFR BLD AUTO: 1 %
ERYTHROCYTE [DISTWIDTH] IN BLOOD BY AUTOMATED COUNT: 13.5 % (ref 10–15)
GFR SERPL CREATININE-BSD FRML MDRD: 89 ML/MIN/1.73M2
GLUCOSE SERPL-MCNC: 88 MG/DL (ref 70–99)
HBV SURFACE AB SERPL IA-ACNC: 0.25 M[IU]/ML
HBV SURFACE AB SERPL IA-ACNC: NONREACTIVE M[IU]/ML
HBV SURFACE AG SERPL QL IA: NONREACTIVE
HCT VFR BLD AUTO: 33.9 % (ref 40–53)
HCV AB SERPL QL IA: NONREACTIVE
HGB BLD-MCNC: 11.1 G/DL (ref 13.3–17.7)
HOLD SPECIMEN: NORMAL
IGA SERPL-MCNC: 36 MG/DL (ref 84–499)
IGG SERPL-MCNC: 310 MG/DL (ref 610–1616)
IGM SERPL-MCNC: 302 MG/DL (ref 35–242)
IMM GRANULOCYTES # BLD: 0 10E3/UL
IMM GRANULOCYTES NFR BLD: 0 %
INR PPP: 1.08 (ref 0.85–1.15)
KAPPA LC FREE SER-MCNC: 1.69 MG/DL (ref 0.33–1.94)
KAPPA LC FREE/LAMBDA FREE SER NEPH: 1.64 {RATIO} (ref 0.26–1.65)
LACTATE SERPL-SCNC: 1.7 MMOL/L (ref 0.7–2)
LAMBDA LC FREE SERPL-MCNC: 1.03 MG/DL (ref 0.57–2.63)
LVEF ECHO: NORMAL
LYMPHOCYTES # BLD AUTO: 0.7 10E3/UL (ref 0.8–5.3)
LYMPHOCYTES NFR BLD AUTO: 26 %
MAGNESIUM SERPL-MCNC: 2 MG/DL (ref 1.7–2.3)
MCH RBC QN AUTO: 31 PG (ref 26.5–33)
MCHC RBC AUTO-ENTMCNC: 32.7 G/DL (ref 31.5–36.5)
MCV RBC AUTO: 95 FL (ref 78–100)
MONOCYTES # BLD AUTO: 0.3 10E3/UL (ref 0–1.3)
MONOCYTES NFR BLD AUTO: 13 %
NEUTROPHILS # BLD AUTO: 1.5 10E3/UL (ref 1.6–8.3)
NEUTROPHILS NFR BLD AUTO: 60 %
NRBC # BLD AUTO: 0 10E3/UL
NRBC BLD AUTO-RTO: 0 /100
PHOSPHATE SERPL-MCNC: 3.1 MG/DL (ref 2.5–4.5)
PLATELET # BLD AUTO: 99 10E3/UL (ref 150–450)
POTASSIUM SERPL-SCNC: 4.2 MMOL/L (ref 3.4–5.3)
PROT SERPL-MCNC: 5.4 G/DL (ref 6.4–8.3)
RBC # BLD AUTO: 3.58 10E6/UL (ref 4.4–5.9)
SODIUM SERPL-SCNC: 141 MMOL/L (ref 136–145)
TOTAL PROTEIN SERUM FOR ELP: 5.2 G/DL (ref 6.4–8.3)
URATE SERPL-MCNC: 9.1 MG/DL (ref 3.4–7)
WBC # BLD AUTO: 2.5 10E3/UL (ref 4–11)

## 2022-12-15 PROCEDURE — 85025 COMPLETE CBC W/AUTO DIFF WBC: CPT | Performed by: PHYSICIAN ASSISTANT

## 2022-12-15 PROCEDURE — 84100 ASSAY OF PHOSPHORUS: CPT | Performed by: PHYSICIAN ASSISTANT

## 2022-12-15 PROCEDURE — 84550 ASSAY OF BLOOD/URIC ACID: CPT | Performed by: PHYSICIAN ASSISTANT

## 2022-12-15 PROCEDURE — 84155 ASSAY OF PROTEIN SERUM: CPT | Performed by: HOSPITALIST

## 2022-12-15 PROCEDURE — 88264 CHROMOSOME ANALYSIS 20-25: CPT | Performed by: PHYSICIAN ASSISTANT

## 2022-12-15 PROCEDURE — 83605 ASSAY OF LACTIC ACID: CPT | Performed by: INTERNAL MEDICINE

## 2022-12-15 PROCEDURE — 250N000011 HC RX IP 250 OP 636: Performed by: STUDENT IN AN ORGANIZED HEALTH CARE EDUCATION/TRAINING PROGRAM

## 2022-12-15 PROCEDURE — 88185 FLOWCYTOMETRY/TC ADD-ON: CPT | Performed by: PHYSICIAN ASSISTANT

## 2022-12-15 PROCEDURE — 36415 COLL VENOUS BLD VENIPUNCTURE: CPT | Performed by: INTERNAL MEDICINE

## 2022-12-15 PROCEDURE — 0JB63ZX EXCISION OF CHEST SUBCUTANEOUS TISSUE AND FASCIA, PERCUTANEOUS APPROACH, DIAGNOSTIC: ICD-10-PCS | Performed by: PHYSICIAN ASSISTANT

## 2022-12-15 PROCEDURE — 83735 ASSAY OF MAGNESIUM: CPT | Performed by: PHYSICIAN ASSISTANT

## 2022-12-15 PROCEDURE — 88189 FLOWCYTOMETRY/READ 16 & >: CPT | Mod: GC | Performed by: STUDENT IN AN ORGANIZED HEALTH CARE EDUCATION/TRAINING PROGRAM

## 2022-12-15 PROCEDURE — 82784 ASSAY IGA/IGD/IGG/IGM EACH: CPT | Performed by: HOSPITALIST

## 2022-12-15 PROCEDURE — 88275 CYTOGENETICS 100-300: CPT | Performed by: PHYSICIAN ASSISTANT

## 2022-12-15 PROCEDURE — 99152 MOD SED SAME PHYS/QHP 5/>YRS: CPT

## 2022-12-15 PROCEDURE — 99152 MOD SED SAME PHYS/QHP 5/>YRS: CPT | Performed by: PHYSICIAN ASSISTANT

## 2022-12-15 PROCEDURE — 88275 CYTOGENETICS 100-300: CPT | Performed by: INTERNAL MEDICINE

## 2022-12-15 PROCEDURE — 93306 TTE W/DOPPLER COMPLETE: CPT

## 2022-12-15 PROCEDURE — 84165 PROTEIN E-PHORESIS SERUM: CPT | Mod: TC | Performed by: STUDENT IN AN ORGANIZED HEALTH CARE EDUCATION/TRAINING PROGRAM

## 2022-12-15 PROCEDURE — 36415 COLL VENOUS BLD VENIPUNCTURE: CPT | Performed by: HOSPITALIST

## 2022-12-15 PROCEDURE — 88184 FLOWCYTOMETRY/ TC 1 MARKER: CPT | Performed by: PHYSICIAN ASSISTANT

## 2022-12-15 PROCEDURE — 120N000002 HC R&B MED SURG/OB UMMC

## 2022-12-15 PROCEDURE — 250N000011 HC RX IP 250 OP 636: Performed by: PHYSICIAN ASSISTANT

## 2022-12-15 PROCEDURE — 272N000505 IR SOFT TISSUE BIOPSY

## 2022-12-15 PROCEDURE — 93306 TTE W/DOPPLER COMPLETE: CPT | Mod: 26 | Performed by: INTERNAL MEDICINE

## 2022-12-15 PROCEDURE — 99233 SBSQ HOSP IP/OBS HIGH 50: CPT | Mod: FS | Performed by: PHYSICIAN ASSISTANT

## 2022-12-15 PROCEDURE — 88365 INSITU HYBRIDIZATION (FISH): CPT | Mod: TC | Performed by: PHYSICIAN ASSISTANT

## 2022-12-15 PROCEDURE — 83521 IG LIGHT CHAINS FREE EACH: CPT | Performed by: HOSPITALIST

## 2022-12-15 PROCEDURE — 85610 PROTHROMBIN TIME: CPT | Performed by: HOSPITALIST

## 2022-12-15 PROCEDURE — 258N000003 HC RX IP 258 OP 636: Performed by: PHYSICIAN ASSISTANT

## 2022-12-15 PROCEDURE — 80053 COMPREHEN METABOLIC PANEL: CPT | Performed by: HOSPITALIST

## 2022-12-15 PROCEDURE — 250N000009 HC RX 250: Performed by: STUDENT IN AN ORGANIZED HEALTH CARE EDUCATION/TRAINING PROGRAM

## 2022-12-15 PROCEDURE — 76942 ECHO GUIDE FOR BIOPSY: CPT | Mod: 26 | Performed by: PHYSICIAN ASSISTANT

## 2022-12-15 PROCEDURE — 20206 BIOPSY MUSCLE PERQ NEEDLE: CPT | Mod: RT | Performed by: PHYSICIAN ASSISTANT

## 2022-12-15 RX ORDER — NALOXONE HYDROCHLORIDE 0.4 MG/ML
0.4 INJECTION, SOLUTION INTRAMUSCULAR; INTRAVENOUS; SUBCUTANEOUS
Status: DISCONTINUED | OUTPATIENT
Start: 2022-12-15 | End: 2022-12-15

## 2022-12-15 RX ORDER — NALOXONE HYDROCHLORIDE 0.4 MG/ML
0.2 INJECTION, SOLUTION INTRAMUSCULAR; INTRAVENOUS; SUBCUTANEOUS
Status: DISCONTINUED | OUTPATIENT
Start: 2022-12-15 | End: 2022-12-15

## 2022-12-15 RX ORDER — PANTOPRAZOLE SODIUM 40 MG/1
40 TABLET, DELAYED RELEASE ORAL
Status: DISCONTINUED | OUTPATIENT
Start: 2022-12-15 | End: 2022-12-15

## 2022-12-15 RX ORDER — SENNOSIDES 8.6 MG
8.6 TABLET ORAL 2 TIMES DAILY PRN
Status: DISCONTINUED | OUTPATIENT
Start: 2022-12-15 | End: 2022-12-22 | Stop reason: HOSPADM

## 2022-12-15 RX ORDER — POLYETHYLENE GLYCOL 3350 17 G/17G
17 POWDER, FOR SOLUTION ORAL DAILY PRN
Status: DISCONTINUED | OUTPATIENT
Start: 2022-12-15 | End: 2022-12-22 | Stop reason: HOSPADM

## 2022-12-15 RX ORDER — FLUMAZENIL 0.1 MG/ML
0.2 INJECTION, SOLUTION INTRAVENOUS
Status: DISCONTINUED | OUTPATIENT
Start: 2022-12-15 | End: 2022-12-15

## 2022-12-15 RX ORDER — FENTANYL CITRATE 50 UG/ML
25-50 INJECTION, SOLUTION INTRAMUSCULAR; INTRAVENOUS EVERY 5 MIN PRN
Status: DISCONTINUED | OUTPATIENT
Start: 2022-12-15 | End: 2022-12-15

## 2022-12-15 RX ORDER — ENOXAPARIN SODIUM 100 MG/ML
40 INJECTION SUBCUTANEOUS EVERY 24 HOURS
Status: DISCONTINUED | OUTPATIENT
Start: 2022-12-15 | End: 2022-12-22 | Stop reason: HOSPADM

## 2022-12-15 RX ORDER — ALLOPURINOL 300 MG/1
300 TABLET ORAL DAILY
Status: DISCONTINUED | OUTPATIENT
Start: 2022-12-15 | End: 2022-12-22 | Stop reason: HOSPADM

## 2022-12-15 RX ORDER — PANTOPRAZOLE SODIUM 40 MG/1
40 TABLET, DELAYED RELEASE ORAL
Status: DISCONTINUED | OUTPATIENT
Start: 2022-12-16 | End: 2022-12-15 | Stop reason: DRUGHIGH

## 2022-12-15 RX ADMIN — FENTANYL CITRATE 50 MCG: 50 INJECTION, SOLUTION INTRAMUSCULAR; INTRAVENOUS at 10:12

## 2022-12-15 RX ADMIN — LIDOCAINE HYDROCHLORIDE 2 ML: 10 INJECTION, SOLUTION EPIDURAL; INFILTRATION; INTRACAUDAL; PERINEURAL at 10:14

## 2022-12-15 RX ADMIN — SODIUM CHLORIDE 6 MG: 9 INJECTION, SOLUTION INTRAVENOUS at 11:30

## 2022-12-15 RX ADMIN — Medication 40 MG: at 20:23

## 2022-12-15 RX ADMIN — MIDAZOLAM 1 MG: 1 INJECTION INTRAMUSCULAR; INTRAVENOUS at 10:12

## 2022-12-15 ASSESSMENT — ACTIVITIES OF DAILY LIVING (ADL)
ADLS_ACUITY_SCORE: 20
DIFFICULTY_EATING/SWALLOWING: NO
EATING/SWALLOWING: OTHER (SEE COMMENTS)
EATING: 0-->INDEPENDENT
ADLS_ACUITY_SCORE: 20
ADLS_ACUITY_SCORE: 20
DOING_ERRANDS_INDEPENDENTLY_DIFFICULTY: YES
ADLS_ACUITY_SCORE: 20
TRANSFERRING: 0-->INDEPENDENT
ADLS_ACUITY_SCORE: 20
SWALLOWING: 0-->SWALLOWS FOODS/LIQUIDS WITHOUT DIFFICULTY (DEVELOPMENTALLY APPROPRIATE)
CHANGE_IN_FUNCTIONAL_STATUS_SINCE_ONSET_OF_CURRENT_ILLNESS/INJURY: YES
ADLS_ACUITY_SCORE: 20
ADLS_ACUITY_SCORE: 20
TOILETING_ISSUES: NO
ADLS_ACUITY_SCORE: 20
EATING: 0-->INDEPENDENT
ADLS_ACUITY_SCORE: 20
VISION_MANAGEMENT: GLASSES
DRESSING/BATHING_DIFFICULTY: NO
ADLS_ACUITY_SCORE: 20
WALKING_OR_CLIMBING_STAIRS_DIFFICULTY: YES
WALKING_OR_CLIMBING_STAIRS: OTHER (SEE COMMENTS)
ADLS_ACUITY_SCORE: 20
TRANSFERRING: 0-->INDEPENDENT
ADLS_ACUITY_SCORE: 20
SWALLOWING: 0-->SWALLOWS FOODS/LIQUIDS WITHOUT DIFFICULTY
FALL_HISTORY_WITHIN_LAST_SIX_MONTHS: NO
WEAR_GLASSES_OR_BLIND: YES
CONCENTRATING,_REMEMBERING_OR_MAKING_DECISIONS_DIFFICULTY: NO

## 2022-12-15 NOTE — PROGRESS NOTES
Neuro: A//O/4. Numbness to bilateral feet  Respiratory: SOB with activity. Occasional cough.    Cardiac: VSS.   GI/: at baseline   Diet: NPO with ice chips  Pain: Denies  IV Access: PIV SL  Labs: reviewed.   Activity: SBA overnight.        New changes this shift: Admitted overnight  Plan: potential biopsy of chest wall with IR today.

## 2022-12-15 NOTE — PROCEDURES
[facility]    Procedure: IR SOFT TISSUE BIOPSY    Date/Time: 12/15/2022 10:22 AM  Performed by: Fidel Valdovinos PA-C  Authorized by: Fidel Valdovinos PA-C   IR Fellow Physician:  Radiology Resident Physician: Murtaza Noe MD        UNIVERSAL PROTOCOL   Site Marked: No  Prior Images Obtained and Reviewed:  Yes  Required items: Required blood products, implants, devices and special equipment available    Patient identity confirmed:  Verbally with patient, arm band, provided demographic data and hospital-assigned identification number  Patient was reevaluated immediately before administering moderate or deep sedation or anesthesia  Confirmation Checklist:  Patient's identity using two indicators, relevant allergies, procedure was appropriate and matched the consent or emergent situation and correct equipment/implants were available  Time out: Immediately prior to the procedure a time out was called    Universal Protocol: the Joint Commission Universal Protocol was followed    Preparation: Patient was prepped and draped in usual sterile fashion       ANESTHESIA    Anesthesia: Local infiltration  Local Anesthetic:  Lidocaine 1% without epinephrine  Anesthetic Total (mL):  2      SEDATION  Patient Sedated: Yes    Sedation Type:  Moderate (conscious) sedation  Sedation:  Fentanyl and midazolam  Vital signs: Vital signs monitored during sedation    See dictated procedure note for full details.  Findings: 50 mcg and 1 mg, 10 minutes, tolerated well    Specimens: core needle biopsy specimens sent for pathological analysis (5 in formalin, 2 in RPMI, 1 in sterile cup)    Complications: None    Condition: Stable    Plan: 1 hour monitored recovery from sedation. Biopsy results pending.      PROCEDURE  Describe Procedure: Ultrasound-guided right sternal mass biopsy. 8 cores.  Patient Tolerance:  Patient tolerated the procedure well with no immediate complications  Length of  time physician/provider present for 1:1 monitoring during sedation: 10

## 2022-12-15 NOTE — CONSULTS
"    Interventional Radiology Consult Service Note    Patient is on IR schedule 12/15 for a US guided sternoclavicular soft tissue mass biopsy.   Labs WNL for procedure. COVID neg.    Orders for NPO have been entered.  Consent will be done prior to procedure.     [place] [phone] for estimated time of procedure.     Case discussed with Dr. Angeles from IR and Camelia Pereira, JOSE MARTIN. This is a 72-year-old man with past medical history significant for NHL lymphoma, GERD, complete heart block (status post dual-chamber pacemaker placement) who presents for right sternal mass biopsy in the setting of worsening shortness of breath and concern for lymphoma transformation. IR was previously consulted for biopsy on the outpatient side and this was approved. Pt then reportedly had worsening symptoms and was admitted for expedited work-up and treatment.    Dx labs per oncology.    Expected date of discharge: TBD    Vitals:   /66 (BP Location: Left arm)   Pulse 92   Temp 97.8  F (36.6  C) (Oral)   Resp 17   Ht 1.702 m (5' 7\")   Wt 62.7 kg (138 lb 3.7 oz)   SpO2 97%   BMI 21.65 kg/m      Pertinent Labs:     Lab Results   Component Value Date    WBC 2.5 (L) 12/15/2022    WBC 2.7 (L) 12/14/2022    WBC 3.2 (L) 12/14/2022    WBC 3.0 (L) 07/01/2021    WBC 2.6 (L) 05/13/2021    WBC 3.4 (L) 11/12/2020       Lab Results   Component Value Date    HGB 11.1 12/15/2022    HGB 11.7 12/14/2022    HGB 12.3 12/14/2022    HGB 12.7 07/01/2021    HGB 12.6 05/13/2021    HGB 13.7 11/12/2020       Lab Results   Component Value Date    PLT 99 12/15/2022    PLT 61 12/14/2022     12/14/2022    PLT 91 07/01/2021    PLT 92 05/13/2021     11/12/2020       Lab Results   Component Value Date    INR 1.08 12/15/2022    PTT 26 12/14/2022       Lab Results   Component Value Date    POTASSIUM 4.2 12/15/2022    POTASSIUM 4.3 06/02/2022    POTASSIUM 4.9 07/01/2021        FARSHAD Orr CNP  Interventional " Radiology  Pager: [phone]

## 2022-12-15 NOTE — PLAN OF CARE
"Goal Outcome Evaluation:         Vital Signs: /47 (BP Location: Left arm)   Pulse 90   Temp 98.2  F (36.8  C) (Oral)   Resp 18   Ht 1.702 m (5' 7\")   Wt 61.4 kg (135 lb 6.4 oz)   SpO2 97%   BMI 21.21 kg/m      Neuro: A&Ox4. Numbness to bilateral feet--baseline per pt.   Respiratory: RA, denies SOB today.  Cardiac: WDL, denies chest pain. Paced.   GI/: Voiding adequately, no BM today  Diet: Regular diet-tolerating, denies nausea   Pain: Denies  IV Access: PIV SL  Labs: WBC=2.5. triggered sepses, lactic acid=1.7, Vital signs stable.   Activity: SBA/indep--steady on his feet  New changes this shift: had biopsy by IR today. Right chest mass-site CDI  Plan: evaluation of pericardial effusion, possible thoracentesis  "

## 2022-12-15 NOTE — UTILIZATION REVIEW
Admission Status; Secondary Review Determination       Under the authority of the Utilization Management Committee, the utilization review process indicated a secondary review on the above patient. The review outcome is based on review of the medical records, discussions with staff, and applying clinical experience noted on the date of the review.     (x) Inpatient Status Appropriate - This patient's medical care is consistent with medical management for inpatient care and reasonable inpatient medical practice.     RATIONALE FOR DETERMINATION   72 year old man with a history of low-grade jose-restricted plasmacytoid B-cell lymphoma diagnosed 3/2004 currently on surveillance since, here for CP, R flank pain, B symtpoms found to have worsening radiographic e/o concern for transformation of his lymphoma.    Patient requires inpatient admission versus short stay observation or outpatient treatment for the following reasons: Very complex high risk work-up anticipated patient is symptomatic with right large pleural effusion that required thoracentesis, he will also need biopsy of chest wall mass and evaluation of pericardial effusion  This is a conditional review for a Medicare patient, at the time of this review patient is anticipated to require at least 1 more night of hospital care; however if plan changes and discharges before 2 midnights please send for post discharge review.        This document was produced using voice recognition software       The information on this document is developed by the utilization review team in order for the business office to ensure compliance. This only denotes the appropriateness of proper admission status and does not reflect the quality of care rendered.   The definitions of Inpatient Status and Observation Status used in making the determination above are those provided in the CMS Coverage Manual, Chapter 1 and Chapter 6, section 70.4.   Sincerely,   MICKIE TSAI MD    System Medical Director   Utilization Management   [facility].

## 2022-12-15 NOTE — PROGRESS NOTES
[facility]    Hematology / Oncology Progress Note    Patient: Shahid Davis  MRN: [id number]  Admission Date: 12/14/2022  Date of Service (when I saw the patient): 12/15/2022  Hospital Day # 1     Assessment & Plan   Shahid Davis is a 72 year old man with a history of complete heart block s/p pacemaker placement and low-grade kappa-restricted plasmacytoid B-cell lymphoma diagnosed 3/2004 and most recently on surveillance. He presented with chest pain, flank pain, and worsening B-symptoms and was found to have radiographic e/o concern for transformation of his low-grade lymphoma.     Today:   - IR biopsy of sternoclavicular soft tissue mass; flow, morph, FISH, cytogenetics in process.  - Rasburicase x1 for hyperuricemia; monitor TLS labs daily (can increase with treatment).  - Hold on lymphoma-directed therapy at this time - hopeful for preliminary results 12/16.  - Echo given concern for pericardial involvement of mass with no evidence of effusion or tamponade, EF 60-65%.     HEME  # H/o low-grade kappa restricted plasmacytoid B-cell lymphoma 3/2004   # Concern for transformation  # Hypogammaglobulinemia  Follows with Dr. Ruelas. 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 He presented progressing B symptoms, abdominal pain, and SOB. PET 12/9 showed extensive lymphomatous involvement to the R pleura w/avid effusions, mediastinal and pericardial regions, right anterolateral chest wall, adenopathy above and below the diaphragm, liver, spleen and multiple sites in the skeleton, concerning for transformation from his low-grade lymphoma. He also has pancytopenia which seems to have progressed slowly over the past years.   - S/p US-guided sternoclavicular soft tissue mass biopsy with IR 12/15; pathology, flow, cytogenetics pending  -  FLC WNL. M-spike on 12/2 was 0.2. Beta-2-microglobulin elevated at 3.6. IgA 36, IgG 310, IgM 302. SPEP pending.   - Viral serologies: HepB/C-  - Echo 12/15 with LVEF 60-65% with mild aortic insufficiency    # Pancytopenia  - Monitor CBC daily  - Transfuse if Hgb <7 and plt <10k    # TLS  # Risk for DIC  Uric acid peak 9.1 on 12/15. Cr, K, Ca, and phos all WNL.   - Monitor TLS/DIC labs daily  - Allopurinol 300 mg daily  - S/p rasburicase 12/15  - Avoiding IVF given malignant pleural effusions although could consider gentle IVF if needed.    CV/PULM  # SOB  # R large and L small pleural effusion   His recent PET/CT shows FDG-avid large R and small L pleural effusion. Pt has SOB but SpO2 WNL on room air.   - Consider thoracentesis if worsening respiratory status  - Echo as above    # H/o complete heart block s/p pacemaker placement 2020  - No acute inpatient needs     GI  # GERD  Related to history of radiation to chest.   - Sub PTA omeprazole for Protonix    Clinically Significant Risk Factors Present on Admission                # Thrombocytopenia: Lowest platelets = 61 in last 2 days, will monitor for bleeding              FEN  Diet: Regular Diet Adult   IVF: Bolus PRN (judiciously given pleural effusions)  Lytes: Replete per protocol    PPX  VTE: Lovenox  Bowel: Senna/MiraLax PRN  GI/PUD: None currently indicated    MISC  Code Status: Full Code   Lines/Drains: PIV  Dispo: Pending work up of transformed lymphoma and treatment plan  Follow Up: TBD    Patient was seen and plan of care was discussed with attending physician Dr. Blakely.    I spent 60 minutes in the care of this patient today, which included time necessary for review of interval events, obtaining history and physical exam, ordering medications/tests/procedures as medically indicated, review of pertinent medical literature, counseling of the patient, coordination of care, and documentation time. Over 50% of time was spent counseling the patient and/or  coordinating care.    Jacki Jefferson PA-C ([facility])  Hematology/Oncology  #[id number]    Interval History   Overnight, no acute events. Patient was transferred to floor from ED and states he is doing well. Grateful the biopsy was done today. Continues to endorse difficulties with PARRISH, although no O2 requirements at rest. Also endorses left-sided abdominal pain which is better positionally, although has been progressive over previous week. Bowels/bladder working well. Happy to eat after biopsy. Discussed plans to await additional testing and hopefully proceed with treatment in coming days. Questions answered at bedside.     Vital Signs with Ranges  Temp:  [96.7  F (35.9  C)-99  F (37.2  C)] 98.2  F (36.8  C)  Pulse:  [90-98] 90  Resp:  [14-23] 18  BP: (110-141)/(47-84) 110/47  SpO2:  [92 %-97 %] 97 %  I/O last 3 completed shifts:  In: 30 [P.O.:30]  Out: 120 [Urine:120]    Physical Exam   General: pleasant gentleman sitting up in bed, alert, NAD. Pleasant and conversational.  Skin: Palpable mass (~4cm) of anterior chest wall. No additional concerning lesions, rash, jaundice, cyanosis, erythema, or ecchymoses on exposed surfaces.   HEENT: NCAT. Anicteric sclera. Moist mucous membranes with no lesions, erythema, or thrush.   Respiratory: Non-labored breathing on room air, good air exchange, lungs clear to auscultation bilaterally.  Cardiovascular: RRR. No murmur or rub.   Gastrointestinal: Normoactive BS. Abdomen soft, ND, NT. No palpable masses.  Extremities: No LE edema.   Neurologic: A&O x 3, speech normal, no deficits grossly.    Medications     - MEDICATION INSTRUCTIONS -         allopurinol  300 mg Oral Daily     polyethylene glycol  17 g Oral Daily     Data   Results for orders placed or performed during the hospital encounter of 12/14/22 (from the past 24 hour(s))   ABO/RH Type and Screen  *Canceled*    Narrative    The following orders were created for panel order ABO/RH Type and Screen .  Procedure                                Abnormality         Status                     ---------                               -----------         ------                       Please view results for these tests on the individual orders.   CBC with platelets differential    Narrative    The following orders were created for panel order CBC with platelets differential.  Procedure                               Abnormality         Status                     ---------                               -----------         ------                     CBC with platelets and d...[[id number]]                                                   Please view results for these tests on the individual orders.   Chest XR,  PA & LAT    Narrative    EXAM: XR CHEST 2 VIEWS  12/14/2022 5:07 PM     HISTORY:  shortness of breath       COMPARISON:  CT 12/9/2022 and 5/24/2021    TECHNIQUE: PA and lateral views the chest    FINDINGS:   Left chest wall implantable cardiac defibrillator with leads in the  right atrium and right ventricle.    The trachea is midline. The cardiomediastinal silhouette is within  normal limits. The pulmonary vasculature is distinct. No appreciable  pneumothorax. Large right pleural effusion. Small left pleural  effusion. Hazy perihilar and bibasilar opacities. Focal opacity in the  right apical perihilar region corresponding to known mass seen on  comparison CT. No acute osseous abnormality.      Impression    IMPRESSION:  1. Large right and small left pleural effusions with hazy perihilar  and bibasilar opacities that likely represent atelectasis. This does  not appear significantly changed from 12/9/2022 given the differences  in modalities.  2. Focal opacity in the right apical perihilar region corresponding to  known mass seen on comparison CT.    I have personally reviewed the examination and initial interpretation  and I agree with the findings.    AUDREY JI MD         SYSTEM ID:  [id number]   Fibrinogen activity   Result Value Ref Range     Fibrinogen Activity 207 170 - 490 mg/dL   INR   Result Value Ref Range    INR 1.02 0.85 - 1.15   Beta 2 microglobulin   Result Value Ref Range    Beta-2-Microglobulin 3.6 (H) <2.3 mg/L   CBC with platelets differential    Narrative    The following orders were created for panel order CBC with platelets differential.  Procedure                               Abnormality         Status                     ---------                               -----------         ------                     CBC with platelets and d...[[id number]]  Abnormal            Final result               RBC and Platelet Morphology[[id number]]  Abnormal            Final result                 Please view results for these tests on the individual orders.   Comprehensive metabolic panel   Result Value Ref Range    Sodium 140 136 - 145 mmol/L    Potassium 4.7 3.4 - 5.3 mmol/L    Chloride 105 98 - 107 mmol/L    Carbon Dioxide (CO2) 21 (L) 22 - 29 mmol/L    Anion Gap 14 7 - 15 mmol/L    Urea Nitrogen 27.1 (H) 8.0 - 23.0 mg/dL    Creatinine 0.89 0.67 - 1.17 mg/dL    Calcium 9.8 8.8 - 10.2 mg/dL    Glucose 86 70 - 99 mg/dL    Alkaline Phosphatase 153 (H) 40 - 129 U/L    AST 46 10 - 50 U/L    ALT 28 10 - 50 U/L    Protein Total 6.3 (L) 6.4 - 8.3 g/dL    Albumin 4.1 3.5 - 5.2 g/dL    Bilirubin Total 0.4 <=1.2 mg/dL    GFR Estimate 90 >60 mL/min/1.73m2   CBC with platelets and differential   Result Value Ref Range    WBC Count 2.7 (L) 4.0 - 11.0 10e3/uL    RBC Count 3.79 (L) 4.40 - 5.90 10e6/uL    Hemoglobin 11.7 (L) 13.3 - 17.7 g/dL    Hematocrit 35.5 (L) 40.0 - 53.0 %    MCV 94 78 - 100 fL    MCH 30.9 26.5 - 33.0 pg    MCHC 33.0 31.5 - 36.5 g/dL    RDW 13.4 10.0 - 15.0 %    Platelet Count 61 (L) 150 - 450 10e3/uL    % Neutrophils 61 %    % Lymphocytes 27 %    % Monocytes 10 %    % Eosinophils 1 %    % Basophils 0 %    % Immature Granulocytes 1 %    NRBCs per 100 WBC 0 <1 /100    Absolute Neutrophils 1.7 1.6 - 8.3 10e3/uL    Absolute Lymphocytes 0.7 (L)  0.8 - 5.3 10e3/uL    Absolute Monocytes 0.3 0.0 - 1.3 10e3/uL    Absolute Eosinophils 0.0 0.0 - 0.7 10e3/uL    Absolute Basophils 0.0 0.0 - 0.2 10e3/uL    Absolute Immature Granulocytes 0.0 <=0.4 10e3/uL    Absolute NRBCs 0.0 10e3/uL   Uric acid   Result Value Ref Range    Uric Acid 8.6 (H) 3.4 - 7.0 mg/dL   RBC and Platelet Morphology   Result Value Ref Range    Platelet Assessment  Automated Count Confirmed. Platelet morphology is normal.     Automated Count Confirmed. Platelet morphology is normal.    Natalie Cells Slight (A) None Seen    RBC Morphology Confirmed RBC Indices    Partial thromboplastin time   Result Value Ref Range    aPTT 26 22 - 38 Seconds   Phosphorus   Result Value Ref Range    Phosphorus 2.9 2.5 - 4.5 mg/dL   Magnesium   Result Value Ref Range    Magnesium 2.1 1.7 - 2.3 mg/dL   ABO/Rh type and screen    Narrative    The following orders were created for panel order ABO/Rh type and screen.  Procedure                               Abnormality         Status                     ---------                               -----------         ------                     Adult Type and Screen[[id number]]                            Final result                 Please view results for these tests on the individual orders.   Adult Type and Screen   Result Value Ref Range    ABO/RH(D) A POS     Antibody Screen Negative Negative    SPECIMEN EXPIRATION DATE [id number]    Hepatitis B surface antigen   Result Value Ref Range    Hepatitis B Surface Antigen Nonreactive Nonreactive   Hepatitis B Surface Antibody   Result Value Ref Range    Hepatitis B Surface Antibody Instrument Value 0.25 <8.00 m[IU]/mL    Hepatitis B Surface Antibody Nonreactive    Hepatitis C antibody   Result Value Ref Range    Hepatitis C Antibody Nonreactive Nonreactive    Narrative    Assay performance characteristics have not been established for newborns, infants, and children.   Protein electrophoresis    Narrative    The following orders  were created for panel order Protein electrophoresis.  Procedure                               Abnormality         Status                     ---------                               -----------         ------                     Total Protein, Serum for...[[id number]]  Abnormal            Final result               Protein Electrophoresis,...[[id number]]                      In process                   Please view results for these tests on the individual orders.   INR   Result Value Ref Range    INR 1.08 0.85 - 1.15   Kappa and lambda light chain   Result Value Ref Range    Kappa Free Light Chains 1.69 0.33 - 1.94 mg/dL    Lambda Free Light Chains 1.03 0.57 - 2.63 mg/dL    Kappa /Lambda Ratio 1.64 0.26 - 1.65   Immunoglobulins A G and M   Result Value Ref Range    Immunoglobulin G 310 (L) 610 - 1,616 mg/dL    Immunoglobulin A 36 (L) 84 - 499 mg/dL    Immunoglobulin M 302 (H) 35 - 242 mg/dL   Comprehensive metabolic panel   Result Value Ref Range    Sodium 141 136 - 145 mmol/L    Potassium 4.2 3.4 - 5.3 mmol/L    Chloride 107 98 - 107 mmol/L    Carbon Dioxide (CO2) 24 22 - 29 mmol/L    Anion Gap 10 7 - 15 mmol/L    Urea Nitrogen 24.3 (H) 8.0 - 23.0 mg/dL    Creatinine 0.91 0.67 - 1.17 mg/dL    Calcium 8.8 8.8 - 10.2 mg/dL    Glucose 88 70 - 99 mg/dL    Alkaline Phosphatase 133 (H) 40 - 129 U/L    AST 35 10 - 50 U/L    ALT 21 10 - 50 U/L    Protein Total 5.4 (L) 6.4 - 8.3 g/dL    Albumin 3.6 3.5 - 5.2 g/dL    Bilirubin Total 0.5 <=1.2 mg/dL    GFR Estimate 89 >60 mL/min/1.73m2   Total Protein, Serum for ELP   Result Value Ref Range    Total Protein Serum for ELP 5.2 (L) 6.4 - 8.3 g/dL   Extra Tube    Narrative    The following orders were created for panel order Extra Tube.  Procedure                               Abnormality         Status                     ---------                               -----------         ------                     Extra Purple Top Tube[[id number]]                            Final result                  Please view results for these tests on the individual orders.   Extra Purple Top Tube   Result Value Ref Range    Hold Specimen JIC    CBC with Platelets & Differential    Narrative    The following orders were created for panel order CBC with Platelets & Differential.  Procedure                               Abnormality         Status                     ---------                               -----------         ------                     CBC with platelets and d...[[id number]]  Abnormal            Final result                 Please view results for these tests on the individual orders.   CBC with platelets and differential   Result Value Ref Range    WBC Count 2.5 (L) 4.0 - 11.0 10e3/uL    RBC Count 3.58 (L) 4.40 - 5.90 10e6/uL    Hemoglobin 11.1 (L) 13.3 - 17.7 g/dL    Hematocrit 33.9 (L) 40.0 - 53.0 %    MCV 95 78 - 100 fL    MCH 31.0 26.5 - 33.0 pg    MCHC 32.7 31.5 - 36.5 g/dL    RDW 13.5 10.0 - 15.0 %    Platelet Count 99 (L) 150 - 450 10e3/uL    % Neutrophils 60 %    % Lymphocytes 26 %    % Monocytes 13 %    % Eosinophils 1 %    % Basophils 0 %    % Immature Granulocytes 0 %    NRBCs per 100 WBC 0 <1 /100    Absolute Neutrophils 1.5 (L) 1.6 - 8.3 10e3/uL    Absolute Lymphocytes 0.7 (L) 0.8 - 5.3 10e3/uL    Absolute Monocytes 0.3 0.0 - 1.3 10e3/uL    Absolute Eosinophils 0.0 0.0 - 0.7 10e3/uL    Absolute Basophils 0.0 0.0 - 0.2 10e3/uL    Absolute Immature Granulocytes 0.0 <=0.4 10e3/uL    Absolute NRBCs 0.0 10e3/uL   Uric acid   Result Value Ref Range    Uric Acid 9.1 (H) 3.4 - 7.0 mg/dL   Magnesium   Result Value Ref Range    Magnesium 2.0 1.7 - 2.3 mg/dL   Phosphorus   Result Value Ref Range    Phosphorus 3.1 2.5 - 4.5 mg/dL   IR SOFT TISSUE BIOPSY    Narrative    Fidel Valdovinos PA-C     12/15/2022 10:24 AM  [facility]    Procedure: IR SOFT TISSUE BIOPSY    Date/Time: 12/15/2022 10:22 AM  Performed by: Fidel Valdovinos PA-C  Authorized  by: Fidel Valdovinos PA-C   IR Fellow Physician:  Radiology Resident Physician: Murtaza Noe MD        UNIVERSAL PROTOCOL   Site Marked: No  Prior Images Obtained and Reviewed:  Yes  Required items: Required blood products, implants, devices and special   equipment available    Patient identity confirmed:  Verbally with patient, arm band, provided   demographic data and hospital-assigned identification number  Patient was reevaluated immediately before administering moderate or deep   sedation or anesthesia  Confirmation Checklist:  Patient's identity using two indicators, relevant   allergies, procedure was appropriate and matched the consent or emergent   situation and correct equipment/implants were available  Time out: Immediately prior to the procedure a time out was called    Universal Protocol: the Joint Commission Universal Protocol was followed    Preparation: Patient was prepped and draped in usual sterile fashion       ANESTHESIA    Anesthesia: Local infiltration  Local Anesthetic:  Lidocaine 1% without epinephrine  Anesthetic Total (mL):  2      SEDATION  Patient Sedated: Yes    Sedation Type:  Moderate (conscious) sedation  Sedation:  Fentanyl and midazolam  Vital signs: Vital signs monitored during sedation    See dictated procedure note for full details.  Findings: 50 mcg and 1 mg, 10 minutes, tolerated well    Specimens: core needle biopsy specimens sent for pathological analysis (5   in formalin, 2 in RPMI, 1 in sterile cup)    Complications: None    Condition: Stable    Plan: 1 hour monitored recovery from sedation. Biopsy results pending.      PROCEDURE  Describe Procedure: Ultrasound-guided right sternal mass biopsy. 8 cores.  Patient Tolerance:  Patient tolerated the procedure well with no immediate   complications  Length of time physician/provider present for 1:1 monitoring during   sedation: 10   IR Soft Tissue Biopsy    Narrative    Diagnosis: Right sternal mass    Procedure:  TEMPORARY    Indication: History of non-Hodgkin lymphoma with new right  sternoclavicular soft tissue mass. Admitted with shortness of breath  and concern for lymphoma transformation.    : Liang Valdovinos PA-C    Assist: Murtaza Noe MD    Anesthesia: Moderate sedation with local anesthesia    Medications: 1. 1 mg midazolam IV  2. 50 mcg fentanyl IV    Nursing: Patient continuously monitored by trained, independent  observer (IR RN).    Sedation time: 10 minutes face-to-face    Specimen: 5 cores in formalin, 2 in RPMI, 1 in sterile cup    Description: Supine on the gurney. Obvious right sternal mass prepped  and draped and a timeout was performed. Local anesthesia was achieved.  Color shows minimal vascularity. 8 cores take from lateral to medial  approach with Empiribox biopsy device through a skin nick without  introducer. Pressure to achieve hemostasis and dressing applied       Impression    Impression: Ultrasound-guided right sternoclavicular soft tissue mass  biopsy. 8 cores.    Plan: 1 hour monitored recovery from sedation. Biopsy results pending.    EDWIN VALDOVINOS PA-C         SYSTEM ID:  [id number]   Lactic Acid STAT   Result Value Ref Range    Lactic Acid 1.7 0.7 - 2.0 mmol/L   Echo Complete   Result Value Ref Range    LVEF  60-65%     Narrative    [id number]  [id number]  [id number]  [id number]^FABIEN^DEMIAN     [facility],[place]  Echocardiography Laboratory  [address] [place] [id number]     Name: MAJOR GILES  MRN: [id number]  : 1950  Study Date: 12/15/2022 12:58 PM  Age: 72 yrs  Gender: Male  Patient Location: [facility]  Reason For Study: Chest Pressure  Ordering Physician: DEMIAN CONN  Performed By: Kelby Singh RDCS     BSA: 1.8 m2  Height: 67 in  Weight: 145 lb  HR: 92  BP: 130/77 mmHg  ______________________________________________________________________________  Procedure  Complete Portable Echo  Adult.  ______________________________________________________________________________  Interpretation Summary  Left ventricular size, wall motion and function are normal. The ejection  fraction is 60-65%.  Global right ventricular function is normal.  Mild aortic insufficiency is present.  Pulmonary artery systolic pressure is normal.  The inferior vena cava was normal in size with preserved respiratory  variability.  No pericardial effusion is present.  This study was compared with the study from 7/7/20 . No significant changes  noted.  ______________________________________________________________________________  Left Ventricle  Left ventricular size, wall motion and function are normal. The ejection  fraction is 60-65%. Abnormal septal motion consistent with pacemaker is  present.     Right Ventricle  The right ventricle is normal size. Global right ventricular function is  normal. A pacemaker lead is noted in the right ventricle.     Atria  Both atria appear normal.     Mitral Valve  The mitral valve is normal. Trace mitral insufficiency is present.     Aortic Valve  The aortic valve is tricuspid. Mild aortic insufficiency is present.     Tricuspid Valve  The tricuspid valve is normal. Mild tricuspid insufficiency is present. The  right ventricular systolic pressure is approximated at 28.9 mmHg plus the  right atrial pressure. Pulmonary artery systolic pressure is normal.     Pulmonic Valve  The pulmonic valve is normal. Trace pulmonic insufficiency is present.     Vessels  The aorta root is normal. The thoracic aorta is normal. The inferior vena cava  was normal in size with preserved respiratory variability. Sinuses of Valsalva  2.9 cm. Ascending aorta 3.2 cm.     Pericardium  No pericardial effusion is present.     Miscellaneous  A left pleural effusion is present.     Compared to Previous Study  This study was compared with the study from 7/7/20 . No significant changes  noted.     Attestation  I have  personally viewed the imaging and agree with the interpretation and  report as documented by the fellow, Destiny Singleton, and/or edited by me.  ______________________________________________________________________________  MMode/2D Measurements & Calculations  IVSd: 1.00 cm  LVIDd: 3.5 cm  LVIDs: 2.1 cm  LVPWd: 0.98 cm  FS: 40.3 %  LV mass(C)d: 101.5 grams  LV mass(C)dI: 57.5 grams/m2  Ao root diam: 2.9 cm  asc Aorta Diam: 3.2 cm  LVOT diam: 2.1 cm  LVOT area: 3.5 cm2  RWT: 0.56     Doppler Measurements & Calculations  TR max jose luis: 269.0 cm/sec  TR max P.9 mmHg     ______________________________________________________________________________  Report approved by: Kezia Cox 12/15/2022 02:18 PM

## 2022-12-15 NOTE — IR NOTE
Patient Name: Shahid Davis  Medical Record Number: [id number]  Today's Date: 12/15/2022    Procedure: image guided percutaneous right sternoclavicular soft tissue mass biopsy  Proceduralist: Hasmukh RAY, Aruna CASILLAS  Pathology present: No - 8 total cores obtained    Procedure Start: 1011  Procedure end: 1021  Sedation medications administered: 1 mg of versed, 50 mcg of fentanyl  Sedation time:  10 minutes (9096-4050)    Report given to: Sylvie CLARK RN  : NA      Other Notes: Pt arrived to IR room 06 from 6B. Consent reviewed. Pt denies any questions or concerns regarding procedure. Pt positioned supine and monitored per protocol. Pt tolerated procedure without any noted complications. Right chest site cleansed and dressed per protocol. Specimens sent per order. Pt transferred back to .

## 2022-12-15 NOTE — PRE-PROCEDURE
GENERAL PRE-PROCEDURE:   Procedure:  Soft tissue biopsy  Date/Time:  12/15/2022 10:01 AM    Verbal consent obtained?: Yes    Written consent obtained?: Yes    Risks and benefits: Risks, benefits and alternatives were discussed    Consent given by:  Patient  Patient states understanding of procedure being performed: Yes    Patient's understanding of procedure matches consent: Yes    Procedure consent matches procedure scheduled: Yes    Expected level of sedation:  Moderate  Appropriately NPO:  Yes  ASA Class:  2  Mallampati  :  Grade 1- soft palate, uvula, tonsillar pillars, and posterior pharyngeal wall visible  Lungs:  Lungs clear with good breath sounds bilaterally  Heart:  Normal heart sounds and rate  History & Physical reviewed:  History and physical reviewed and no updates needed  Statement of review:  I have reviewed the lab findings, diagnostic data, medications, and the plan for sedation

## 2022-12-15 NOTE — PHARMACY-ADMISSION MEDICATION HISTORY
Admission Medication History Completed by Pharmacy    See [facility] Admission Navigator for allergy information, preferred outpatient pharmacy, prior to admission medications and immunization status.     Medication History Sources:     Patient, sure scripts, care everywhere    Patient very reliable historian    Changes made to PTA medication list (reason):    Added: None    Deleted: duplicate methylprednisolone, sucralfate per patient request (was using for hiccups as a trial, did not work, wants taken off med list).    Changed: Updated daily quantity for MVI and Vit C.    Additional Information:    Omeprazole: uses for NV ppx d/t past radiation in his chest - omeprazole is a priority for him.    Uses methylprednisolone for procedures requiring contrast d/t contrast allergy.    Prior to Admission medications    Medication Sig Last Dose Taking? Auth Provider Long Term End Date   ascorbic acid (VITAMIN C) 500 MG tablet Take 500 mg by mouth every morning Past Week Yes Reported, Patient     methylPREDNISolone (MEDROL) 32 MG tablet Take 1 tablet 12 hours prior to and 1 tablet 2 hours prior to procedure with IV contrast.  Yes Domitila Ruelas MD     Multiple Vitamins-Minerals (MULTIVITAL PO) Take 1 tablet by mouth every morning 12/14/2022 at 0800 Yes Reported, Patient     omeprazole (PRILOSEC) 40 MG DR capsule Take 1 capsule (40 mg) by mouth daily 12/14/2022 at 0800 Yes Elroy Holman MD         Date completed: 12/15/22    Medication history completed by: Karuna Sotelo, HannahD

## 2022-12-16 LAB
ALBUMIN SERPL BCG-MCNC: 3.5 G/DL (ref 3.5–5.2)
ALBUMIN SERPL ELPH-MCNC: 3.3 G/DL (ref 3.7–5.1)
ALP SERPL-CCNC: 127 U/L (ref 40–129)
ALPHA1 GLOB SERPL ELPH-MCNC: 0.4 G/DL (ref 0.2–0.4)
ALPHA2 GLOB SERPL ELPH-MCNC: 0.6 G/DL (ref 0.5–0.9)
ALT SERPL W P-5'-P-CCNC: 14 U/L (ref 10–50)
ANION GAP SERPL CALCULATED.3IONS-SCNC: 12 MMOL/L (ref 7–15)
APTT PPP: 27 SECONDS (ref 22–38)
AST SERPL W P-5'-P-CCNC: 32 U/L (ref 10–50)
B-GLOBULIN SERPL ELPH-MCNC: 0.5 G/DL (ref 0.6–1)
BASOPHILS # BLD AUTO: 0 10E3/UL (ref 0–0.2)
BASOPHILS NFR BLD AUTO: 0 %
BILIRUB SERPL-MCNC: 0.5 MG/DL
BUN SERPL-MCNC: 27.8 MG/DL (ref 8–23)
CALCIUM SERPL-MCNC: 8.9 MG/DL (ref 8.8–10.2)
CHLORIDE SERPL-SCNC: 106 MMOL/L (ref 98–107)
CREAT SERPL-MCNC: 0.9 MG/DL (ref 0.67–1.17)
DEPRECATED HCO3 PLAS-SCNC: 23 MMOL/L (ref 22–29)
EOSINOPHIL # BLD AUTO: 0 10E3/UL (ref 0–0.7)
EOSINOPHIL NFR BLD AUTO: 1 %
ERYTHROCYTE [DISTWIDTH] IN BLOOD BY AUTOMATED COUNT: 13.4 % (ref 10–15)
FIBRINOGEN PPP-MCNC: 291 MG/DL (ref 170–490)
GAMMA GLOB SERPL ELPH-MCNC: 0.4 G/DL (ref 0.7–1.6)
GFR SERPL CREATININE-BSD FRML MDRD: 90 ML/MIN/1.73M2
GLUCOSE SERPL-MCNC: 86 MG/DL (ref 70–99)
HCT VFR BLD AUTO: 34.4 % (ref 40–53)
HGB BLD-MCNC: 11 G/DL (ref 13.3–17.7)
IMM GRANULOCYTES # BLD: 0 10E3/UL
IMM GRANULOCYTES NFR BLD: 1 %
INR PPP: 1.06 (ref 0.85–1.15)
LDH SERPL L TO P-CCNC: 327 U/L (ref 0–250)
LYMPHOCYTES # BLD AUTO: 0.8 10E3/UL (ref 0.8–5.3)
LYMPHOCYTES NFR BLD AUTO: 27 %
M PROTEIN SERPL ELPH-MCNC: 0.2 G/DL
MCH RBC QN AUTO: 30.4 PG (ref 26.5–33)
MCHC RBC AUTO-ENTMCNC: 32 G/DL (ref 31.5–36.5)
MCV RBC AUTO: 95 FL (ref 78–100)
MONOCYTES # BLD AUTO: 0.4 10E3/UL (ref 0–1.3)
MONOCYTES NFR BLD AUTO: 15 %
NEUTROPHILS # BLD AUTO: 1.6 10E3/UL (ref 1.6–8.3)
NEUTROPHILS NFR BLD AUTO: 56 %
NRBC # BLD AUTO: 0 10E3/UL
NRBC BLD AUTO-RTO: 0 /100
PATH REPORT.COMMENTS IMP SPEC: ABNORMAL
PATH REPORT.COMMENTS IMP SPEC: YES
PATH REPORT.FINAL DX SPEC: ABNORMAL
PATH REPORT.MICROSCOPIC SPEC OTHER STN: ABNORMAL
PATH REPORT.RELEVANT HX SPEC: ABNORMAL
PHOSPHATE SERPL-MCNC: 3 MG/DL (ref 2.5–4.5)
PLATELET # BLD AUTO: 91 10E3/UL (ref 150–450)
POTASSIUM SERPL-SCNC: 4.1 MMOL/L (ref 3.4–5.3)
PROT PATTERN SERPL ELPH-IMP: ABNORMAL
PROT SERPL-MCNC: 5.2 G/DL (ref 6.4–8.3)
RBC # BLD AUTO: 3.62 10E6/UL (ref 4.4–5.9)
SODIUM SERPL-SCNC: 141 MMOL/L (ref 136–145)
URATE SERPL-MCNC: 0.7 MG/DL (ref 3.4–7)
WBC # BLD AUTO: 2.8 10E3/UL (ref 4–11)

## 2022-12-16 PROCEDURE — 85025 COMPLETE CBC W/AUTO DIFF WBC: CPT | Performed by: HOSPITALIST

## 2022-12-16 PROCEDURE — 120N000002 HC R&B MED SURG/OB UMMC

## 2022-12-16 PROCEDURE — 85384 FIBRINOGEN ACTIVITY: CPT | Performed by: PHYSICIAN ASSISTANT

## 2022-12-16 PROCEDURE — 80053 COMPREHEN METABOLIC PANEL: CPT | Performed by: HOSPITALIST

## 2022-12-16 PROCEDURE — 250N000013 HC RX MED GY IP 250 OP 250 PS 637: Performed by: PHYSICIAN ASSISTANT

## 2022-12-16 PROCEDURE — 83615 LACTATE (LD) (LDH) ENZYME: CPT | Performed by: PHYSICIAN ASSISTANT

## 2022-12-16 PROCEDURE — 84100 ASSAY OF PHOSPHORUS: CPT | Performed by: PHYSICIAN ASSISTANT

## 2022-12-16 PROCEDURE — 99233 SBSQ HOSP IP/OBS HIGH 50: CPT | Mod: FS | Performed by: PHYSICIAN ASSISTANT

## 2022-12-16 PROCEDURE — 84165 PROTEIN E-PHORESIS SERUM: CPT | Mod: 26

## 2022-12-16 PROCEDURE — 85730 THROMBOPLASTIN TIME PARTIAL: CPT | Performed by: PHYSICIAN ASSISTANT

## 2022-12-16 PROCEDURE — 36415 COLL VENOUS BLD VENIPUNCTURE: CPT | Performed by: PHYSICIAN ASSISTANT

## 2022-12-16 PROCEDURE — 250N000011 HC RX IP 250 OP 636: Performed by: PHYSICIAN ASSISTANT

## 2022-12-16 PROCEDURE — 84550 ASSAY OF BLOOD/URIC ACID: CPT | Performed by: PHYSICIAN ASSISTANT

## 2022-12-16 PROCEDURE — 85610 PROTHROMBIN TIME: CPT | Performed by: PHYSICIAN ASSISTANT

## 2022-12-16 RX ORDER — OMEPRAZOLE 40 MG/1
40 CAPSULE, DELAYED RELEASE ORAL
Status: DISCONTINUED | OUTPATIENT
Start: 2022-12-17 | End: 2022-12-22 | Stop reason: HOSPADM

## 2022-12-16 RX ADMIN — ENOXAPARIN SODIUM 40 MG: 40 INJECTION SUBCUTANEOUS at 16:34

## 2022-12-16 RX ADMIN — ALLOPURINOL 300 MG: 300 TABLET ORAL at 11:14

## 2022-12-16 RX ADMIN — Medication 40 MG: at 07:51

## 2022-12-16 ASSESSMENT — ACTIVITIES OF DAILY LIVING (ADL)
ADLS_ACUITY_SCORE: 20

## 2022-12-16 NOTE — PROGRESS NOTES
Activity: Temp: 97.3  F (36.3  C) Temp src: Oral BP: 112/68 Pulse: 99   Resp: 18 SpO2: 91 % O2 Device: None (Room air)   Neuro: Alert & oriented x4, calls appropriately.   Cardiac: WDL, denies cardiac chest pain.   Respiratory: on RA, dyspnea on excretion.   GI/: Voiding spontaneous. +BS, passing flatus but no BM.   Activity: Independent. Ambulated outside the room.   Diet/Nausea: Regular diet. Denies nausea.  Pain: Denies   Skin: palpable mass on R side of the chest.  Dressing over it.   LDA: PIV SL.   Plan: Continue POC.

## 2022-12-16 NOTE — PROGRESS NOTES
[facility]    Hematology / Oncology Progress Note    Patient: Shahid Davis  MRN: [id number]  Admission Date: 12/14/2022  Date of Service (when I saw the patient): 12/16/2022  Hospital Day # 2     Assessment & Plan   Shahid Davis is a 72 year old man with a history of complete heart block s/p pacemaker placement and low-grade kappa-restricted plasmacytoid B-cell lymphoma diagnosed 3/2004 and most recently on surveillance. He presented with chest pain, flank pain, and worsening B-symptoms and was found to have radiographic e/o concern for transformation of his low-grade lymphoma.     Today:   - IR biopsy of sternoclavicular soft tissue mass (12/15); flow with 95% CD10 positive kappa-monotypic B cells. Morph, FISH, cytogenetics in process.  - S/p rasburicase on 12/15 - uric acid 0.7 today. Continue TLS labs daily.  - Echo given concern for pericardial involvement of mass with no evidence of effusion or tamponade, EF 60-65%.     HEME  # H/o low-grade kappa restricted plasmacytoid B-cell lymphoma 3/2004   # Concern for transformation  # Hypogammaglobulinemia  Follows with Dr. Ruelas. 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 He presented progressing B symptoms, abdominal pain, and SOB. PET 12/9 showed extensive lymphomatous involvement to the R pleura w/avid effusions, mediastinal and pericardial regions, right anterolateral chest wall, adenopathy above and below the diaphragm, liver, spleen and multiple sites in the skeleton, concerning for transformation from his low-grade lymphoma. He also has pancytopenia which seems to have progressed slowly over the past years.   - S/p US-guided sternoclavicular soft tissue mass biopsy with IR 12/15; pathology, cytogenetics pending. Flow with 95% CD10 positive kappa-monotypic B cells.   - FLC  WNL. M-spike on 12/2 was 0.2. Beta-2-microglobulin elevated at 3.6. IgA 36, IgG 310, IgM 302. SPEP pending.   - Viral serologies: HepB/C non-reactive  - Echo (12/15) with LVEF 60-65% with mild aortic insufficiency, no evidence of effusion or tamponade.    # Pancytopenia  - Monitor CBC daily  - Transfuse if Hgb <7 and plt <10k    # TLS  # Risk for DIC  On 12/15, uric acid peak 9.1. Cr, K, Ca, and phos all WNL. S/p rasburicase 12/15.   - Monitor TLS/DIC labs daily  - Allopurinol 300 mg daily  - Avoiding IVF given malignant pleural effusions although could consider gentle IVF if needed.    CV/PULM  # SOB  # R large and L small pleural effusion   His recent PET/CT shows FDG-avid large R and small L pleural effusion. Pt has SOB but SpO2 WNL on room air. Echo as above. Respiratory status stable, on room air as of 12/16.   - Consider thoracentesis if worsening respiratory status    # H/o complete heart block s/p pacemaker placement 2020  - No acute inpatient needs     GI  # GERD  Related to history of radiation to chest.   - Continue PTA omeprazole.    Clinically Significant Risk Factors                # Thrombocytopenia: Lowest platelets = 61 in last 2 days, will monitor for bleeding                FEN  Diet: Regular Diet Adult   IVF: Bolus PRN (judiciously given pleural effusions)  Lytes: Replete per protocol    PPX  VTE: Lovenox  Bowel: Senna/MiraLax PRN  GI/PUD: None currently indicated    MISC  Code Status: Full Code   Lines/Drains: PIV  Dispo: Pending work up of transformed lymphoma and treatment plan  Follow Up: TBD    Patient was seen and plan of care was discussed with attending physician Dr. Blakely.    I spent 60 minutes in the care of this patient today, which included time necessary for review of interval events, obtaining history and physical exam, ordering medications/tests/procedures as medically indicated, review of pertinent medical literature, counseling of the patient, coordination of care, and  documentation time. Over 50% of time was spent counseling the patient and/or coordinating care.    Jacki Jefferson PA-C ([facility])  Hematology/Oncology  #[id number]    Interval History   Overnight, no acute events. Patient seen ambulating around unit frequently today and states he is overall feeling well. Abdominal pain comes and goes, better today. Multiple questions regarding further treatment and he walks through his past journey with lymphoma. No shortness of breath today and ambulating well. Does continue to endorse an occasional cough, specifically with sitting up more. Eating/drinking well. Discussed biopsy did indeed result as a lymphoma, however we will know more when additional tests result. Questions answered at bedside.     Vital Signs with Ranges  Temp:  [97.3  F (36.3  C)-99.6  F (37.6  C)] 97.3  F (36.3  C)  Pulse:  [89-99] 92  Resp:  [16-18] 16  BP: (104-136)/(60-69) 127/69  SpO2:  [88 %-94 %] 94 %  I/O last 3 completed shifts:  In: 250 [P.O.:250]  Out: 300 [Urine:300]    Physical Exam   General: pleasant gentleman sitting up in bed, alert, NAD. Pleasant and conversational.  Skin: Palpable mass (~4cm) of anterior chest wall; covered in band aid after biopsy. No additional concerning lesions, rash, jaundice, cyanosis, erythema, or ecchymoses on exposed surfaces.   HEENT: NCAT. Anicteric sclera. Moist mucous membranes with no lesions, erythema, or thrush.   Respiratory: Non-labored breathing on room air, good air exchange, lungs clear to auscultation bilaterally.  Cardiovascular: RRR. No murmur or rub.   Gastrointestinal: Normoactive BS. Abdomen soft, ND, NT. No palpable masses.  Extremities: No LE edema.   Neurologic: A&O x 3, speech normal, no deficits grossly.    Medications     - MEDICATION INSTRUCTIONS -         allopurinol  300 mg Oral Daily     enoxaparin ANTICOAGULANT  40 mg Subcutaneous Q24H     [START ON 12/17/2022] omeprazole  40 mg Oral QAM AC     Data   Results for orders placed or performed  during the hospital encounter of 12/14/22 (from the past 24 hour(s))   CBC with platelets differential    Narrative    The following orders were created for panel order CBC with platelets differential.  Procedure                               Abnormality         Status                     ---------                               -----------         ------                     CBC with platelets and d...[[id number]]  Abnormal            Final result                 Please view results for these tests on the individual orders.   Comprehensive metabolic panel   Result Value Ref Range    Sodium 141 136 - 145 mmol/L    Potassium 4.1 3.4 - 5.3 mmol/L    Chloride 106 98 - 107 mmol/L    Carbon Dioxide (CO2) 23 22 - 29 mmol/L    Anion Gap 12 7 - 15 mmol/L    Urea Nitrogen 27.8 (H) 8.0 - 23.0 mg/dL    Creatinine 0.90 0.67 - 1.17 mg/dL    Calcium 8.9 8.8 - 10.2 mg/dL    Glucose 86 70 - 99 mg/dL    Alkaline Phosphatase 127 40 - 129 U/L    AST 32 10 - 50 U/L    ALT 14 10 - 50 U/L    Protein Total 5.2 (L) 6.4 - 8.3 g/dL    Albumin 3.5 3.5 - 5.2 g/dL    Bilirubin Total 0.5 <=1.2 mg/dL    GFR Estimate 90 >60 mL/min/1.73m2   Uric acid   Result Value Ref Range    Uric Acid 0.7 (L) 3.4 - 7.0 mg/dL   Phosphorus   Result Value Ref Range    Phosphorus 3.0 2.5 - 4.5 mg/dL   Lactate Dehydrogenase   Result Value Ref Range    Lactate Dehydrogenase 327 (H) 0 - 250 U/L   INR   Result Value Ref Range    INR 1.06 0.85 - 1.15   Partial thromboplastin time   Result Value Ref Range    aPTT 27 22 - 38 Seconds   Fibrinogen activity   Result Value Ref Range    Fibrinogen Activity 291 170 - 490 mg/dL   CBC with platelets and differential   Result Value Ref Range    WBC Count 2.8 (L) 4.0 - 11.0 10e3/uL    RBC Count 3.62 (L) 4.40 - 5.90 10e6/uL    Hemoglobin 11.0 (L) 13.3 - 17.7 g/dL    Hematocrit 34.4 (L) 40.0 - 53.0 %    MCV 95 78 - 100 fL    MCH 30.4 26.5 - 33.0 pg    MCHC 32.0 31.5 - 36.5 g/dL    RDW 13.4 10.0 - 15.0 %    Platelet Count 91 (L) 150 - 450  10e3/uL    % Neutrophils 56 %    % Lymphocytes 27 %    % Monocytes 15 %    % Eosinophils 1 %    % Basophils 0 %    % Immature Granulocytes 1 %    NRBCs per 100 WBC 0 <1 /100    Absolute Neutrophils 1.6 1.6 - 8.3 10e3/uL    Absolute Lymphocytes 0.8 0.8 - 5.3 10e3/uL    Absolute Monocytes 0.4 0.0 - 1.3 10e3/uL    Absolute Eosinophils 0.0 0.0 - 0.7 10e3/uL    Absolute Basophils 0.0 0.0 - 0.2 10e3/uL    Absolute Immature Granulocytes 0.0 <=0.4 10e3/uL    Absolute NRBCs 0.0 10e3/uL

## 2022-12-16 NOTE — PLAN OF CARE
"[id number]    /65 (BP Location: Left arm)   Pulse 89   Temp 98.6  F (37  C) (Oral)   Resp 16   Ht 1.702 m (5' 7\")   Wt 61.4 kg (135 lb 6.4 oz)   SpO2 93%   BMI 21.21 kg/m        Neuro: A&Ox4. Calm and cooperative   Respiratory: RA, unlabored   Cardiac: WDL, denies chest pain.   GI/: Voiding adequately, no BM today  Diet: Regular diet-tolerating, denies nausea   Pain: Denies  IV Access: PIV SL  Labs: reviewed   Activity: SBA/indep--steady on his feet  New changes this shift: right chest bx site CDI   Plan: continue POC     "

## 2022-12-16 NOTE — PLAN OF CARE
Vitals:    12/15/22 2214 12/15/22 2224 22 0306 22 0754   BP: 122/62 104/60 120/65 136/62   BP Location: Left arm Left arm Left arm Left arm   Patient Position:       Cuff Size:       Pulse: 98 91 89 91   Resp: 16 17 16 16   Temp: 99.6  F (37.6  C) 99.6  F (37.6  C) 98.6  F (37  C) 97.3  F (36.3  C)   TempSrc: Oral Oral Oral Oral   SpO2: (!) 89% (!) 88% 93% 92%   Weight:       Height:           Neuro: alert and oriented     VS:afebrile vitally stable, sating 92% on room air, non labor breathing, lungs clear.    B    Cardiac: 91    Pain/Nausea: denies any pain or nausea     Lines/Drains: PIV SL     Incision/Wound: right chest mass biopsy site dressing intact.    GI/: voiding adequate, audible BS     Diet/Appetite: tolerating regular diet     Activity: up independently ambulated,     Plan:  continue with plan of care.

## 2022-12-16 NOTE — PROGRESS NOTES
"CLINICAL NUTRITION SERVICES - ASSESSMENT NOTE     Nutrition Prescription    RECOMMENDATIONS FOR MDs/PROVIDERS TO ORDER:  None at present    Malnutrition Status:    Severe malnutrition in the context of acute illness on chronic illness    Recommendations already ordered by Registered Dietitian (RD):  - Pt declined ONS for now but aware they are available when needed in the future.     Future/Additional Recommendations:  - see for NFPE as able.   - drop off list of hospital supplement options and order supplements PRN per pt request.      REASON FOR ASSESSMENT  Shahid Davis is a/an 72 year old male assessed by the dietitian for Malnutrition Screening Tool 2-13 lb wt loss and decreased po intake.     Chart reviewed.  Per chart: history of low-grade jose-restricted plasmacytoid B-cell lymphoma diagnosed 3/2004 currently on surveillance since, here for CP, R flank pain, worsening B symtpoms found to have radiographic e/o concern for transformation of his low-grade lymphoma.     NUTRITION HISTORY  Pt notes decreased appetite for over a month--somewhat vague about timeline. Continued to eat meals as usual but smaller portions.     CURRENT NUTRITION ORDERS  Diet: Regular  Intake/Tolerance: per RN charting, ate 100% 3 meals recorded.  Pt states he's eating much better since coming to the hospital but knows it will likely decrease again with start of chemo. Knows prednisone will likely also help appetite now.    LABS  Labs reviewed.  BUN 42.7 trending up.  Cr stable. Electrolytes stable.    MEDICATIONS  Medications reviewed  predisone 100 mg started 12/17.     GI last BM 12/17.  No issues with n/v/d or constipation.     ANTHROPOMETRICS  Height: 170.2 cm (5' 7\")  Most Recent Weight: 61.4 kg (135 lb 6.4 oz)    IBW: 67.3 kg (91% IBW)  BMI: Normal BMI  Weight History: 9% wt loss in <1 month. 12/2 wt somewhat higher than prior weights. Suspect some difference with wt with clothes vs gown. Rapid wt loss with increased " metabolism  Wt Readings from Last 10 Encounters:   12/18/22 59.6 kg (131 lb 4.8 oz) standing   12/17/22 60.9 kg (134 lb 4.8 oz) standing   12/15/22 62.7 kg (138 lb 3.7 oz) standing   12/02/22 65.3 kg (144 lb)   12/02/22 65.5 kg (144 lb 4.8 oz)   07/07/22 61.2 kg (135 lb)   06/02/22 62.5 kg (137 lb 12.8 oz)   11/18/21 63.2 kg (139 lb 4.8 oz)   07/09/21 64.5 kg (142 lb 3.2 oz)   07/01/21 65.8 kg (145 lb)   07/01/21 65.1 kg (143 lb 9.6 oz)   05/28/21 65.3 kg (144 lb)     Dosing Weight: 60 kg (based on 12/18 wt)    ASSESSED NUTRITION NEEDS  Estimated Energy Needs: 1800 - 2100 kcals/day (30 - 35 kcals/kg )  Justification: Underweight  Estimated Protein Needs: 72-90 grams protein/day (1.2-1/5 grams of pro/kg)  Justification: repletion, hypermetabolism w Ca  Estimated Fluid Needs: ~1800 mL/day (1 mL/kcal)   Justification: Maintenance and Per provider pending fluid status    PHYSICAL FINDINGS  See malnutrition section below.  Unable to see pt.     MALNUTRITION  % Intake: </=75% for >/= 1 month (severe)  % Weight Loss: > 5% in 1 month (severe)  Subcutaneous Fat Loss: Unable to assess  Muscle Loss: Unable to assess  Fluid Accumulation/Edema: None noted  Malnutrition Diagnosis: Severe malnutrition in the context of acute illness on chronic illness    NUTRITION DIAGNOSIS  Unintended weight loss related to decreased appetite and increased metabolic rate w/ new Ca as evidenced by 9% wt loss in < 1 month, reported decreased po intake for > 1 month.      INTERVENTIONS  Implementation  Nutrition Education: Discussed ONS options available when po intake decreases again with start of chemo.     Goals  Patient to consume % of nutritionally adequate meal trays TID, or the equivalent with supplements/snacks.     Monitoring/Evaluation  Progress toward goals will be monitored and evaluated per protocol.    Lorenza Lion RD, LD   7B (M-F) Pager: [phone]  RD Weekend/Holiday Pager: [phone]

## 2022-12-16 NOTE — PHARMACY
"Paged provider Dr. Casper Gallardo that patient's home medication omeprazole is not in original container. Notified provider to call back if benefits of using do not outweigh risks. Able to identify medication using Micromedex and double checked by second pharmacist. Medication will be placed in an francisco javier bottle and labeled per the \"Use of patient supplied medications procedure.\"    Connie Menard, PharmD    "

## 2022-12-17 LAB
ABO/RH(D): NORMAL
ALBUMIN SERPL BCG-MCNC: 3.5 G/DL (ref 3.5–5.2)
ALP SERPL-CCNC: 129 U/L (ref 40–129)
ALT SERPL W P-5'-P-CCNC: 18 U/L (ref 10–50)
ANION GAP SERPL CALCULATED.3IONS-SCNC: 10 MMOL/L (ref 7–15)
ANTIBODY SCREEN: NEGATIVE
APTT PPP: 29 SECONDS (ref 22–38)
AST SERPL W P-5'-P-CCNC: 36 U/L (ref 10–50)
BASOPHILS # BLD AUTO: 0 10E3/UL (ref 0–0.2)
BASOPHILS NFR BLD AUTO: 0 %
BILIRUB SERPL-MCNC: 0.5 MG/DL
BUN SERPL-MCNC: 32.5 MG/DL (ref 8–23)
CALCIUM SERPL-MCNC: 9.4 MG/DL (ref 8.8–10.2)
CHLORIDE SERPL-SCNC: 107 MMOL/L (ref 98–107)
CREAT SERPL-MCNC: 0.92 MG/DL (ref 0.67–1.17)
DEPRECATED HCO3 PLAS-SCNC: 26 MMOL/L (ref 22–29)
EOSINOPHIL # BLD AUTO: 0 10E3/UL (ref 0–0.7)
EOSINOPHIL NFR BLD AUTO: 1 %
ERYTHROCYTE [DISTWIDTH] IN BLOOD BY AUTOMATED COUNT: 13.4 % (ref 10–15)
FIBRINOGEN PPP-MCNC: 333 MG/DL (ref 170–490)
GFR SERPL CREATININE-BSD FRML MDRD: 88 ML/MIN/1.73M2
GLUCOSE SERPL-MCNC: 88 MG/DL (ref 70–99)
HCT VFR BLD AUTO: 36.5 % (ref 40–53)
HGB BLD-MCNC: 11.7 G/DL (ref 13.3–17.7)
IMM GRANULOCYTES # BLD: 0 10E3/UL
IMM GRANULOCYTES NFR BLD: 0 %
INR PPP: 1.08 (ref 0.85–1.15)
LDH SERPL L TO P-CCNC: 351 U/L (ref 0–250)
LYMPHOCYTES # BLD AUTO: 0.8 10E3/UL (ref 0.8–5.3)
LYMPHOCYTES NFR BLD AUTO: 28 %
MCH RBC QN AUTO: 31 PG (ref 26.5–33)
MCHC RBC AUTO-ENTMCNC: 32.1 G/DL (ref 31.5–36.5)
MCV RBC AUTO: 97 FL (ref 78–100)
MONOCYTES # BLD AUTO: 0.4 10E3/UL (ref 0–1.3)
MONOCYTES NFR BLD AUTO: 14 %
NEUTROPHILS # BLD AUTO: 1.6 10E3/UL (ref 1.6–8.3)
NEUTROPHILS NFR BLD AUTO: 57 %
NRBC # BLD AUTO: 0 10E3/UL
NRBC BLD AUTO-RTO: 0 /100
PHOSPHATE SERPL-MCNC: 3.1 MG/DL (ref 2.5–4.5)
PLATELET # BLD AUTO: 104 10E3/UL (ref 150–450)
POTASSIUM SERPL-SCNC: 4.5 MMOL/L (ref 3.4–5.3)
PROT SERPL-MCNC: 5.5 G/DL (ref 6.4–8.3)
RBC # BLD AUTO: 3.77 10E6/UL (ref 4.4–5.9)
SODIUM SERPL-SCNC: 143 MMOL/L (ref 136–145)
SPECIMEN EXPIRATION DATE: NORMAL
URATE SERPL-MCNC: 1 MG/DL (ref 3.4–7)
WBC # BLD AUTO: 2.8 10E3/UL (ref 4–11)

## 2022-12-17 PROCEDURE — 85025 COMPLETE CBC W/AUTO DIFF WBC: CPT | Performed by: HOSPITALIST

## 2022-12-17 PROCEDURE — 36415 COLL VENOUS BLD VENIPUNCTURE: CPT | Performed by: HOSPITALIST

## 2022-12-17 PROCEDURE — 85384 FIBRINOGEN ACTIVITY: CPT | Performed by: PHYSICIAN ASSISTANT

## 2022-12-17 PROCEDURE — 86850 RBC ANTIBODY SCREEN: CPT | Performed by: HOSPITALIST

## 2022-12-17 PROCEDURE — 84100 ASSAY OF PHOSPHORUS: CPT | Performed by: PHYSICIAN ASSISTANT

## 2022-12-17 PROCEDURE — 86901 BLOOD TYPING SEROLOGIC RH(D): CPT | Performed by: HOSPITALIST

## 2022-12-17 PROCEDURE — 85730 THROMBOPLASTIN TIME PARTIAL: CPT | Performed by: PHYSICIAN ASSISTANT

## 2022-12-17 PROCEDURE — 84550 ASSAY OF BLOOD/URIC ACID: CPT | Performed by: PHYSICIAN ASSISTANT

## 2022-12-17 PROCEDURE — 120N000002 HC R&B MED SURG/OB UMMC

## 2022-12-17 PROCEDURE — 85610 PROTHROMBIN TIME: CPT | Performed by: PHYSICIAN ASSISTANT

## 2022-12-17 PROCEDURE — 80053 COMPREHEN METABOLIC PANEL: CPT | Performed by: HOSPITALIST

## 2022-12-17 PROCEDURE — 99233 SBSQ HOSP IP/OBS HIGH 50: CPT | Mod: FS | Performed by: PHYSICIAN ASSISTANT

## 2022-12-17 PROCEDURE — 250N000011 HC RX IP 250 OP 636: Performed by: PHYSICIAN ASSISTANT

## 2022-12-17 PROCEDURE — 250N000012 HC RX MED GY IP 250 OP 636 PS 637: Performed by: PHYSICIAN ASSISTANT

## 2022-12-17 PROCEDURE — 83615 LACTATE (LD) (LDH) ENZYME: CPT | Performed by: PHYSICIAN ASSISTANT

## 2022-12-17 PROCEDURE — 250N000013 HC RX MED GY IP 250 OP 250 PS 637: Performed by: PHYSICIAN ASSISTANT

## 2022-12-17 RX ORDER — LANOLIN ALCOHOL/MO/W.PET/CERES
3 CREAM (GRAM) TOPICAL
Status: DISCONTINUED | OUTPATIENT
Start: 2022-12-17 | End: 2022-12-18

## 2022-12-17 RX ORDER — PREDNISONE 50 MG/1
100 TABLET ORAL DAILY
Status: DISCONTINUED | OUTPATIENT
Start: 2022-12-17 | End: 2022-12-19

## 2022-12-17 RX ADMIN — ALLOPURINOL 300 MG: 300 TABLET ORAL at 08:41

## 2022-12-17 RX ADMIN — OMEPRAZOLE 40 MG: 40 CAPSULE, DELAYED RELEASE ORAL at 08:41

## 2022-12-17 RX ADMIN — ENOXAPARIN SODIUM 40 MG: 40 INJECTION SUBCUTANEOUS at 16:23

## 2022-12-17 RX ADMIN — PREDNISONE 100 MG: 50 TABLET ORAL at 10:12

## 2022-12-17 ASSESSMENT — ACTIVITIES OF DAILY LIVING (ADL)
ADLS_ACUITY_SCORE: 20

## 2022-12-17 NOTE — PROGRESS NOTES
[facility]    Hematology / Oncology Progress Note    Patient: Shahid Davis  MRN: [id number]  Admission Date: 12/14/2022  Date of Service (when I saw the patient): 12/17/2022  Hospital Day # 3     Assessment & Plan   Shahid Davis is a 72 year old man with a history of complete heart block s/p pacemaker placement and low-grade kappa-restricted plasmacytoid B-cell lymphoma diagnosed 3/2004 and most recently on surveillance. He presented with chest pain, flank pain, and worsening B-symptoms and was found to have radiographic e/o concern for transformation of his low-grade lymphoma.     Today:   - s/p IR biopsy of sternoclavicular soft tissue mass (12/15); flow with 95% CD10 positive kappa-monotypic B cells. Morph, FISH, cytogenetics in process.  - Start prednisone 100 mg daily (12/17) in anticipation of likely R-CHOP in coming days. Continue TLS labs daily.     HEME  # H/o low-grade kappa restricted plasmacytoid B-cell lymphoma 3/2004   # Concern for transformation  # Hypogammaglobulinemia  Follows with Dr. Ruelas. 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 He presented progressing B symptoms, abdominal pain, and SOB. PET 12/9 showed extensive lymphomatous involvement to the R pleura w/avid effusions, mediastinal and pericardial regions, right anterolateral chest wall, adenopathy above and below the diaphragm, liver, spleen and multiple sites in the skeleton, concerning for transformation from his low-grade lymphoma. He also has pancytopenia which seems to have progressed slowly over the past years. Of note, reports maternal history of waldenstrom's. Due to worsening shortness of breath and delays of biopsy, patient presented to ED on 12/14 and was subsequently admitted for expedited workup and treatment. Given highest SUV and  easily accessible sternoclavicular soft tissue mass, plan for biopsy of this lesion and okay to hold on BMBx given would not change treatment plan.   - S/p US-guided sternoclavicular soft tissue mass biopsy with IR 12/15; morphology, cytogenetics pending. Flow with 95% CD10 positive kappa-monotypic B cells.   - FLC WNL. M-spike on 12/2 was 0.2. Beta-2-microglobulin elevated at 3.6. IgA 36, IgG 310, IgM 302. SPEP pending.   - Viral serologies: HepB/C non-reactive  - Echo (12/15) with LVEF 60-65% with mild aortic insufficiency, no evidence of effusion or tamponade.  - Continue to hold on BMBx at this time as it would not  (known involvement on PET/CT), although if inadequate tissue from biopsy on 12/15 or need for further diagnostic tissue, could consider early week of 12/19.   - Started prednisone 100 mg daily (12/17) in anticipation of likely R-CHOP in coming days. Continue TLS labs daily.    # Pancytopenia  Likely secondary to prior treatment and lymphomatous involvement.  - Monitor CBC daily  - Transfuse if Hgb <7 and plt <10k    # TLS monitoring  On 12/15, uric acid peak 9.1. Cr, K, Ca, and phos all WNL. S/p rasburicase 12/15.   - Monitor TLS/DIC labs daily  - Allopurinol 300 mg daily  - Avoiding IVF given malignant pleural effusions although could consider gentle IVF if needed.    CV/PULM  # Shortness of breath  # R large and L small pleural effusion   PET/CT (12/9) with FDG-avid large R and small L pleural effusion. CXR on admission (12/14/22) with stable pleural effusions from 12/9 with likely atelectasis as well. O2 transiently down to 89% overnight but remains >90% on room air. Echo as above. Consideration given to thoracentesis, but in efforts to minimize procedures and with stable respiratory status, will continue to monitor.   - Consider thoracentesis if worsening respiratory status    # H/o complete heart block s/p pacemaker placement 2020  - No acute inpatient needs     GI  #  GERD  Related to history of radiation to chest.   - Continue PTA omeprazole.    Clinically Significant Risk Factors                # Thrombocytopenia: Lowest platelets = 91 in last 2 days, will monitor for bleeding                FEN  Diet: Regular Diet Adult   IVF: Bolus PRN (judiciously given pleural effusions)  Lytes: Replete per protocol    PPX  VTE: Lovenox  Bowel: Senna/MiraLax PRN  GI/PUD: Continue PTA omeprazole.    MISC  Code Status: Full Code   Lines/Drains: PIV  Dispo: Pending work up of transformed lymphoma and treatment plan  Follow Up: TBD    Patient was seen and plan of care was discussed with attending physician Dr. Blakely.    I spent 60 minutes in the care of this patient today, which included time necessary for review of interval events, obtaining history and physical exam, ordering medications/tests/procedures as medically indicated, review of pertinent medical literature, counseling of the patient, coordination of care, and documentation time. Over 50% of time was spent counseling the patient and/or coordinating care.    Jacki Jefferson PA-C ([facility])  Hematology/Oncology  #[id number]    Interval History   Overnight, no acute events. Patient seen ambulating around unit frequently today and ambulates with provider for entirety of exam. Denies any new or changing symptoms. Occasionally short of breath while walking and talking but recovers well. Grateful to have started on steroids today and hopeful for ongoing improvement as he notices his mass growing each day. Questions answered.     Vital Signs with Ranges  Temp:  [96  F (35.6  C)-97.4  F (36.3  C)] 96.6  F (35.9  C)  Pulse:  [80-97] 92  Resp:  [14-18] 16  BP: (109-134)/(59-72) 121/71  SpO2:  [89 %-95 %] 91 %  I/O last 3 completed shifts:  In: 440 [P.O.:440]  Out: -     Physical Exam   General: pleasant gentleman ambulating around unit. Alert, NAD. Pleasant and conversational.  Skin: Palpable mass (~5cm) of anterior chest wall; covered in dressing  after biopsy. No additional concerning lesions, rash, jaundice, cyanosis, erythema, or ecchymoses on exposed surfaces.   HEENT: NCAT. Anicteric sclera. Moist mucous membranes with no lesions, erythema, or thrush.   Respiratory: Non-labored breathing on room air, good air exchange, decreased breath sounds bilaterally (R>L)   Cardiovascular: RRR. No murmur or rub.   Gastrointestinal: Normoactive BS. Abdomen soft, ND, NT. No palpable masses.  Extremities: No LE edema.   Neurologic: A&O x 3, speech normal, no deficits grossly. Ambulating without difficulties    Medications     - MEDICATION INSTRUCTIONS -         allopurinol  300 mg Oral Daily     enoxaparin ANTICOAGULANT  40 mg Subcutaneous Q24H     omeprazole  40 mg Oral QAM AC     predniSONE  100 mg Oral Daily     Data   Results for orders placed or performed during the hospital encounter of 12/14/22 (from the past 24 hour(s))   CBC with platelets differential    Narrative    The following orders were created for panel order CBC with platelets differential.  Procedure                               Abnormality         Status                     ---------                               -----------         ------                     CBC with platelets and d...[[id number]]  Abnormal            Final result                 Please view results for these tests on the individual orders.   ABO/Rh type and screen    Narrative    The following orders were created for panel order ABO/Rh type and screen.  Procedure                               Abnormality         Status                     ---------                               -----------         ------                     Adult Type and Screen[[id number]]                            Final result                 Please view results for these tests on the individual orders.   Comprehensive metabolic panel   Result Value Ref Range    Sodium 143 136 - 145 mmol/L    Potassium 4.5 3.4 - 5.3 mmol/L    Chloride 107 98 - 107 mmol/L     Carbon Dioxide (CO2) 26 22 - 29 mmol/L    Anion Gap 10 7 - 15 mmol/L    Urea Nitrogen 32.5 (H) 8.0 - 23.0 mg/dL    Creatinine 0.92 0.67 - 1.17 mg/dL    Calcium 9.4 8.8 - 10.2 mg/dL    Glucose 88 70 - 99 mg/dL    Alkaline Phosphatase 129 40 - 129 U/L    AST 36 10 - 50 U/L    ALT 18 10 - 50 U/L    Protein Total 5.5 (L) 6.4 - 8.3 g/dL    Albumin 3.5 3.5 - 5.2 g/dL    Bilirubin Total 0.5 <=1.2 mg/dL    GFR Estimate 88 >60 mL/min/1.73m2   Uric acid   Result Value Ref Range    Uric Acid 1.0 (L) 3.4 - 7.0 mg/dL   Phosphorus   Result Value Ref Range    Phosphorus 3.1 2.5 - 4.5 mg/dL   Lactate Dehydrogenase   Result Value Ref Range    Lactate Dehydrogenase 351 (H) 0 - 250 U/L   INR   Result Value Ref Range    INR 1.08 0.85 - 1.15   Partial thromboplastin time   Result Value Ref Range    aPTT 29 22 - 38 Seconds   Fibrinogen activity   Result Value Ref Range    Fibrinogen Activity 333 170 - 490 mg/dL   CBC with platelets and differential   Result Value Ref Range    WBC Count 2.8 (L) 4.0 - 11.0 10e3/uL    RBC Count 3.77 (L) 4.40 - 5.90 10e6/uL    Hemoglobin 11.7 (L) 13.3 - 17.7 g/dL    Hematocrit 36.5 (L) 40.0 - 53.0 %    MCV 97 78 - 100 fL    MCH 31.0 26.5 - 33.0 pg    MCHC 32.1 31.5 - 36.5 g/dL    RDW 13.4 10.0 - 15.0 %    Platelet Count 104 (L) 150 - 450 10e3/uL    % Neutrophils 57 %    % Lymphocytes 28 %    % Monocytes 14 %    % Eosinophils 1 %    % Basophils 0 %    % Immature Granulocytes 0 %    NRBCs per 100 WBC 0 <1 /100    Absolute Neutrophils 1.6 1.6 - 8.3 10e3/uL    Absolute Lymphocytes 0.8 0.8 - 5.3 10e3/uL    Absolute Monocytes 0.4 0.0 - 1.3 10e3/uL    Absolute Eosinophils 0.0 0.0 - 0.7 10e3/uL    Absolute Basophils 0.0 0.0 - 0.2 10e3/uL    Absolute Immature Granulocytes 0.0 <=0.4 10e3/uL    Absolute NRBCs 0.0 10e3/uL   Adult Type and Screen   Result Value Ref Range    ABO/RH(D) A POS     Antibody Screen Negative Negative    SPECIMEN EXPIRATION DATE [id number]

## 2022-12-17 NOTE — PLAN OF CARE
Time: 2524-7714  Temp: (!) 96.3  F (35.7  C) Temp src: Oral BP: 109/64 Pulse: 90   Resp: 14 SpO2: (!) 89 % O2 Device: None (Room air) (patient refused supplemental oxygen)      Activity: Independent.  Diet: Regular.   Pain: Denies.   Neuro: Alert, oriented x4. CMS intact.   Cardiac: WDL.   Respiratory: Lung sounds diminished in lowers. Room air. Patient reports dyspnea at times. Room air, sating low around 90.   Skin: Right chest biopsy site.    GI/: Voiding spontaneously. LBM 12/16.  Lines/inf: PIV saline locked.

## 2022-12-17 NOTE — PLAN OF CARE
Vitals:    22 0100 22 0424 22 0734 22 0816   BP:  109/64 109/59 112/60   BP Location:  Left arm  Left arm   Patient Position:  Supine  Sitting   Cuff Size:    Adult Regular   Pulse:  90 87 80   Resp:  14 14 16   Temp:  (!) 96.3  F (35.7  C) (!) 96  F (35.6  C) 97  F (36.1  C)   TempSrc:  Oral Oral Oral   SpO2:  (!) 89% 92% 93%   Weight: 60.9 kg (134 lb 4.8 oz)      Height:           Neuro:  alert oriented and pleasant      VS: afebrile vitally stable, sating 93% on room air, non labor breathing.    B    Cardiac: 80    Pain/Nausea: denies any nausea, denies any pain     Lines/Drains: PIV SL    Incision/Wound: right  mass biopsy site dressing intact, palpable mass,     GI/: voiding adequate not saved,  +BS passing gas     Diet/Appetite: tolerating regular intake ,good appetite,    Activity: up independently ambulated     Plan:  pt started 100 mg steroid today. Awaiting on chemo plan,     Report given to 7D  nurse, pt transferred to 7D,

## 2022-12-18 LAB
ALBUMIN SERPL BCG-MCNC: 3.7 G/DL (ref 3.5–5.2)
ALP SERPL-CCNC: 127 U/L (ref 40–129)
ALT SERPL W P-5'-P-CCNC: 23 U/L (ref 10–50)
ANION GAP SERPL CALCULATED.3IONS-SCNC: 9 MMOL/L (ref 7–15)
APTT PPP: 27 SECONDS (ref 22–38)
AST SERPL W P-5'-P-CCNC: 35 U/L (ref 10–50)
BASOPHILS # BLD AUTO: 0 10E3/UL (ref 0–0.2)
BASOPHILS NFR BLD AUTO: 0 %
BILIRUB SERPL-MCNC: 0.3 MG/DL
BUN SERPL-MCNC: 42.7 MG/DL (ref 8–23)
CALCIUM SERPL-MCNC: 9.5 MG/DL (ref 8.8–10.2)
CHLORIDE SERPL-SCNC: 105 MMOL/L (ref 98–107)
CREAT SERPL-MCNC: 0.85 MG/DL (ref 0.67–1.17)
DEPRECATED HCO3 PLAS-SCNC: 23 MMOL/L (ref 22–29)
EOSINOPHIL # BLD AUTO: 0 10E3/UL (ref 0–0.7)
EOSINOPHIL NFR BLD AUTO: 0 %
ERYTHROCYTE [DISTWIDTH] IN BLOOD BY AUTOMATED COUNT: 13.3 % (ref 10–15)
FIBRINOGEN PPP-MCNC: 320 MG/DL (ref 170–490)
GFR SERPL CREATININE-BSD FRML MDRD: >90 ML/MIN/1.73M2
GLUCOSE SERPL-MCNC: 113 MG/DL (ref 70–99)
HCT VFR BLD AUTO: 35.6 % (ref 40–53)
HGB BLD-MCNC: 11.6 G/DL (ref 13.3–17.7)
IMM GRANULOCYTES # BLD: 0 10E3/UL
IMM GRANULOCYTES NFR BLD: 0 %
INR PPP: 1.06 (ref 0.85–1.15)
LDH SERPL L TO P-CCNC: 380 U/L (ref 0–250)
LYMPHOCYTES # BLD AUTO: 1 10E3/UL (ref 0.8–5.3)
LYMPHOCYTES NFR BLD AUTO: 18 %
MCH RBC QN AUTO: 30.6 PG (ref 26.5–33)
MCHC RBC AUTO-ENTMCNC: 32.6 G/DL (ref 31.5–36.5)
MCV RBC AUTO: 94 FL (ref 78–100)
MONOCYTES # BLD AUTO: 0.5 10E3/UL (ref 0–1.3)
MONOCYTES NFR BLD AUTO: 9 %
NEUTROPHILS # BLD AUTO: 4.1 10E3/UL (ref 1.6–8.3)
NEUTROPHILS NFR BLD AUTO: 73 %
NRBC # BLD AUTO: 0 10E3/UL
NRBC BLD AUTO-RTO: 0 /100
PHOSPHATE SERPL-MCNC: 3.6 MG/DL (ref 2.5–4.5)
PLATELET # BLD AUTO: 114 10E3/UL (ref 150–450)
POTASSIUM SERPL-SCNC: 4 MMOL/L (ref 3.4–5.3)
PROT SERPL-MCNC: 5.7 G/DL (ref 6.4–8.3)
RBC # BLD AUTO: 3.79 10E6/UL (ref 4.4–5.9)
SODIUM SERPL-SCNC: 137 MMOL/L (ref 136–145)
URATE SERPL-MCNC: 2.2 MG/DL (ref 3.4–7)
WBC # BLD AUTO: 5.6 10E3/UL (ref 4–11)

## 2022-12-18 PROCEDURE — 85730 THROMBOPLASTIN TIME PARTIAL: CPT | Performed by: PHYSICIAN ASSISTANT

## 2022-12-18 PROCEDURE — 80053 COMPREHEN METABOLIC PANEL: CPT | Performed by: HOSPITALIST

## 2022-12-18 PROCEDURE — 36415 COLL VENOUS BLD VENIPUNCTURE: CPT | Performed by: HOSPITALIST

## 2022-12-18 PROCEDURE — 84100 ASSAY OF PHOSPHORUS: CPT | Performed by: PHYSICIAN ASSISTANT

## 2022-12-18 PROCEDURE — 250N000012 HC RX MED GY IP 250 OP 636 PS 637: Performed by: PHYSICIAN ASSISTANT

## 2022-12-18 PROCEDURE — 120N000002 HC R&B MED SURG/OB UMMC

## 2022-12-18 PROCEDURE — 83615 LACTATE (LD) (LDH) ENZYME: CPT | Performed by: PHYSICIAN ASSISTANT

## 2022-12-18 PROCEDURE — 250N000013 HC RX MED GY IP 250 OP 250 PS 637: Performed by: PHYSICIAN ASSISTANT

## 2022-12-18 PROCEDURE — 85025 COMPLETE CBC W/AUTO DIFF WBC: CPT | Performed by: HOSPITALIST

## 2022-12-18 PROCEDURE — 250N000011 HC RX IP 250 OP 636: Performed by: PHYSICIAN ASSISTANT

## 2022-12-18 PROCEDURE — 84550 ASSAY OF BLOOD/URIC ACID: CPT | Performed by: PHYSICIAN ASSISTANT

## 2022-12-18 PROCEDURE — 85610 PROTHROMBIN TIME: CPT | Performed by: PHYSICIAN ASSISTANT

## 2022-12-18 PROCEDURE — 85384 FIBRINOGEN ACTIVITY: CPT | Performed by: PHYSICIAN ASSISTANT

## 2022-12-18 PROCEDURE — 250N000013 HC RX MED GY IP 250 OP 250 PS 637: Performed by: HOSPITALIST

## 2022-12-18 PROCEDURE — 99233 SBSQ HOSP IP/OBS HIGH 50: CPT | Mod: FS | Performed by: PHYSICIAN ASSISTANT

## 2022-12-18 RX ORDER — TRAZODONE HYDROCHLORIDE 50 MG/1
50 TABLET, FILM COATED ORAL AT BEDTIME
Status: DISCONTINUED | OUTPATIENT
Start: 2022-12-18 | End: 2022-12-19

## 2022-12-18 RX ADMIN — ALLOPURINOL 300 MG: 300 TABLET ORAL at 08:14

## 2022-12-18 RX ADMIN — Medication 3 MG: at 00:11

## 2022-12-18 RX ADMIN — ENOXAPARIN SODIUM 40 MG: 40 INJECTION SUBCUTANEOUS at 17:18

## 2022-12-18 RX ADMIN — TRAZODONE HYDROCHLORIDE 50 MG: 50 TABLET ORAL at 22:06

## 2022-12-18 RX ADMIN — PREDNISONE 100 MG: 50 TABLET ORAL at 08:14

## 2022-12-18 RX ADMIN — OMEPRAZOLE 40 MG: 40 CAPSULE, DELAYED RELEASE ORAL at 08:13

## 2022-12-18 ASSESSMENT — ACTIVITIES OF DAILY LIVING (ADL)
ADLS_ACUITY_SCORE: 20

## 2022-12-18 NOTE — PLAN OF CARE
"Goal Outcome Evaluation:    0700 - 1530:   /66 (BP Location: Left arm, Cuff Size: Adult Regular)   Pulse 64   Temp 97.8  F (36.6  C) (Oral)   Resp 16   Ht 1.702 m (5' 7\")   Wt 59.6 kg (131 lb 4.8 oz)   SpO2 94%   BMI 20.56 kg/m      A&O x 4, AVSS, denies pain ex mild tenderness at chest biopsy site, dressing c/d/i.  PIV on SL.  On trail for trazodone tonight for sleep since melatonin was not effective & please cluster care so pt can get sleep.  Up independent, ambulate in hallway multiple times, voiding spontaneously, last bm 12/17.  Waiting for cytogenetic results...  Continue with poc..                        "

## 2022-12-18 NOTE — PLAN OF CARE
"[id number]    /67 (BP Location: Left arm)   Pulse 107   Temp 97.6  F (36.4  C) (Oral)   Resp 16   Ht 1.702 m (5' 7\")   Wt 60.9 kg (134 lb 4.8 oz)   SpO2 91%   BMI 21.03 kg/m      Reason for admission: worsening B-cell lymphoma symptoms  Activity: UAL, ambulates frequently  Pain: Denies this shift  Neuro: A&Ox4, no neuro deficits  Cardiac: Pacemaker in place. Afebrile, denies internal chest pain, has pain where chest lump was biopsied. Denies dizziness.  Respiratory: Room air, dyspnea heard with exertion. Lung sounds diminished in lower lobes.  GI/: Voiding spontaneously, last BM 12/17. Denies nausea this shift.  Diet: Regular  Lines: PIV SL  Wounds: R chest lump biopsied, dressing CDI  Labs/imaging: Reviewed, see chart.TLS labs daily.      New changes this shift: Received PRN melatonin, sleeping between cares. Shahid is anxious about upcoming chemo. Had night sweats, but reports this is not new.     Plan: Possible R-CHOP in coming days, chemo plan pending      Continue to follow POC     "

## 2022-12-18 NOTE — PROGRESS NOTES
[facility]    Hematology / Oncology Progress Note    Patient: Shahid Davis  MRN: [id number]  Admission Date: 12/14/2022  Date of Service (when I saw the patient): 12/18/2022  Hospital Day # 4     Assessment & Plan   Shahid Davis is a 72 year old man with a history of complete heart block s/p pacemaker placement and low-grade kappa-restricted plasmacytoid B-cell lymphoma diagnosed 3/2004 and most recently on surveillance. He presented with chest pain, flank pain, and worsening B-symptoms and was found to have radiographic e/o concern for transformation of his low-grade lymphoma.     Today:   - Continue prednisone 100 mg daily (x12/17) in anticipation of likely R-CHOP in coming days pending final morphology. Continue TLS labs daily.  - s/p IR biopsy of sternoclavicular soft tissue mass (12/15); flow with 95% CD10 positive kappa-monotypic B cells. Preliminary morphology with evidence of high-grade lymphoma, hopeful for more formal report on 12/19. FISH, cytogenetics in process.    HEME  # H/o low-grade kappa restricted plasmacytoid B-cell lymphoma 3/2004   # Concern for transformation  # Hypogammaglobulinemia  Follows with Dr. Ruelas. 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 He presented progressing B symptoms, abdominal pain, and SOB. PET 12/9 showed extensive lymphomatous involvement to the R pleura w/avid effusions, mediastinal and pericardial regions, right anterolateral chest wall, adenopathy above and below the diaphragm, liver, spleen and multiple sites in the skeleton, concerning for transformation from his low-grade lymphoma. He also has pancytopenia which seems to have progressed slowly over the past years. Of note, reports maternal history of waldenstrom's. Due to worsening shortness of breath and delays of biopsy,  patient presented to ED on 12/14 and was subsequently admitted for expedited workup and treatment. Given highest SUV and easily accessible sternoclavicular soft tissue mass, plan for biopsy of this lesion and okay to hold on BMBx given would not change treatment plan.   - S/p US-guided sternoclavicular soft tissue mass biopsy with IR 12/15; morphology, cytogenetics pending. Flow with 95% CD10 positive kappa-monotypic B cells.   - FLC WNL. M-spike on 12/2 was 0.2. Beta-2-microglobulin elevated at 3.6. IgA 36, IgG 310, IgM 302. SPEP pending.   - Viral serologies: HepB/C non-reactive  - Echo (12/15) with LVEF 60-65% with mild aortic insufficiency, no evidence of effusion or tamponade.  - Continue to hold on BMBx at this time as it would not  (known involvement on PET/CT), although if inadequate tissue from biopsy on 12/15 or need for further diagnostic tissue, could consider early week of 12/19.   - Received prednisone 100 mg daily (12/17 - 12/18) with anticipation of likely R-CHOP in coming days. Continue TLS labs daily.    # Pancytopenia  Likely secondary to prior treatment and lymphomatous involvement.  - Monitor CBC daily  - Transfuse if Hgb <7 and plt <10k    # TLS monitoring  On 12/15, uric acid peak 9.1. Cr, K, Ca, and phos all WNL. S/p rasburicase 12/15 with resolution of hyperuricemia. TLS labs WNL as of 12/18.   - Monitor TLS/DIC labs daily  - Allopurinol 300 mg daily  - Avoiding IVF given malignant pleural effusions although could consider gentle IVF if needed.    CV/PULM  # Shortness of breath, improved  # R large and L small pleural effusion   PET/CT (12/9) with FDG-avid large R and small L pleural effusion. CXR on admission (12/14/22) with stable pleural effusions from 12/9 with likely atelectasis as well. O2 transiently down to 89% overnight but remains >90% on room air. Echo as above. Consideration given to thoracentesis, but in efforts to minimize procedures and with stable respiratory  status, will continue to monitor.   - Consider thoracentesis if worsening respiratory status    # H/o complete heart block s/p pacemaker placement 2020  - No acute inpatient needs     GI  # GERD  Related to history of radiation to chest.   - Continue PTA omeprazole.    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 Took uncertain medication for this in the past (~2004), however cannot remember. Melatonin ineffective on 12/17.  - Trial of trazodone 25 - 50 mg this evening.    Clinically Significant Risk Factors                # Thrombocytopenia: Lowest platelets = 104 in last 2 days, will monitor for bleeding           # Severe Malnutrition: based on nutrition assessment, PRESENT ON ADMISSION     FEN  Diet: Regular Diet Adult   IVF: Bolus PRN (judiciously given pleural effusions)  Lytes: Replete per protocol    PPX  VTE: Lovenox  Bowel: Senna/MiraLax PRN  GI/PUD: Continue PTA omeprazole.    MISC  Code Status: Full Code   Lines/Drains: PIV  Dispo: Pending work up of transformed lymphoma and treatment plan  Follow Up: Follows with Dr. Ruelas; will request pending final treatment plan.    Patient was seen and plan of care was discussed with attending physician Dr. Blakely.    I spent 60 minutes in the care of this patient today, which included time necessary for review of interval events, obtaining history and physical exam, ordering medications/tests/procedures as medically indicated, review of pertinent medical literature, counseling of the patient, coordination of care, and documentation time. Over 50% of time was spent counseling the patient and/or coordinating care.    Jacki Jefferson PA-C ([facility])  Hematology/Oncology  #[id number]    Interval History   Overnight, no acute events. Patient seen ambulating around unit frequently. He states he had difficulty sleeping last night after receiving steroids and notes that this happened back in 2004 with treatment. Spends a significant amount of time trying to recall  which medication was effective without success, however will trial trazodone this PM. Melatonin not effective. Denies any new or changing symptoms. Mass feels about the same. Did not notice a cough when he woke up this AM like he has prior. Anxious to proceed with chemotherapy but understanding about awaiting final biopsy. He denies nausea, abdominal pain, headaches, new or worsening symptoms. Questions answered.     Vital Signs with Ranges  Temp:  [96.4  F (35.8  C)-98.3  F (36.8  C)] 98.3  F (36.8  C)  Pulse:  [] 94  Resp:  [16] 16  BP: (109-149)/(59-72) 140/67  SpO2:  [91 %-95 %] 95 %  I/O last 3 completed shifts:  In: 2007 [P.O.:2007]  Out: 400 [Urine:400]    Physical Exam   General: pleasant gentleman ambulating around unit. Alert, NAD. Pleasant and conversational.  Skin: Large palpable mass (~4cm) of anterior chest wall; covered in dressing after biopsy. No additional concerning lesions, rash, jaundice, cyanosis, erythema, or ecchymoses on exposed surfaces.   HEENT: NCAT. Anicteric sclera. Moist mucous membranes with no lesions, erythema, or thrush.   Respiratory: Non-labored breathing on room air, good air exchange, decreased breath sounds bilaterally (R>L)   Cardiovascular: RRR. No murmur or rub.   Gastrointestinal: Normoactive BS. Abdomen soft, ND, NT. No palpable masses.  Extremities: No LE edema.   Neurologic: A&O x 3, speech normal, no deficits grossly. Ambulating without difficulties    Medications     - MEDICATION INSTRUCTIONS -         allopurinol  300 mg Oral Daily     enoxaparin ANTICOAGULANT  40 mg Subcutaneous Q24H     omeprazole  40 mg Oral QAM AC     predniSONE  100 mg Oral Daily     Data   Results for orders placed or performed during the hospital encounter of 12/14/22 (from the past 24 hour(s))   CBC with platelets differential    Narrative    The following orders were created for panel order CBC with platelets differential.  Procedure                               Abnormality          Status                     ---------                               -----------         ------                     CBC with platelets and d...[[id number]]  Abnormal            Final result                 Please view results for these tests on the individual orders.   Comprehensive metabolic panel   Result Value Ref Range    Sodium 137 136 - 145 mmol/L    Potassium 4.0 3.4 - 5.3 mmol/L    Chloride 105 98 - 107 mmol/L    Carbon Dioxide (CO2) 23 22 - 29 mmol/L    Anion Gap 9 7 - 15 mmol/L    Urea Nitrogen 42.7 (H) 8.0 - 23.0 mg/dL    Creatinine 0.85 0.67 - 1.17 mg/dL    Calcium 9.5 8.8 - 10.2 mg/dL    Glucose 113 (H) 70 - 99 mg/dL    Alkaline Phosphatase 127 40 - 129 U/L    AST 35 10 - 50 U/L    ALT 23 10 - 50 U/L    Protein Total 5.7 (L) 6.4 - 8.3 g/dL    Albumin 3.7 3.5 - 5.2 g/dL    Bilirubin Total 0.3 <=1.2 mg/dL    GFR Estimate >90 >60 mL/min/1.73m2   Uric acid   Result Value Ref Range    Uric Acid 2.2 (L) 3.4 - 7.0 mg/dL   Phosphorus   Result Value Ref Range    Phosphorus 3.6 2.5 - 4.5 mg/dL   Lactate Dehydrogenase   Result Value Ref Range    Lactate Dehydrogenase 380 (H) 0 - 250 U/L   INR   Result Value Ref Range    INR 1.06 0.85 - 1.15   Partial thromboplastin time   Result Value Ref Range    aPTT 27 22 - 38 Seconds   Fibrinogen activity   Result Value Ref Range    Fibrinogen Activity 320 170 - 490 mg/dL   CBC with platelets and differential   Result Value Ref Range    WBC Count 5.6 4.0 - 11.0 10e3/uL    RBC Count 3.79 (L) 4.40 - 5.90 10e6/uL    Hemoglobin 11.6 (L) 13.3 - 17.7 g/dL    Hematocrit 35.6 (L) 40.0 - 53.0 %    MCV 94 78 - 100 fL    MCH 30.6 26.5 - 33.0 pg    MCHC 32.6 31.5 - 36.5 g/dL    RDW 13.3 10.0 - 15.0 %    Platelet Count 114 (L) 150 - 450 10e3/uL    % Neutrophils 73 %    % Lymphocytes 18 %    % Monocytes 9 %    % Eosinophils 0 %    % Basophils 0 %    % Immature Granulocytes 0 %    NRBCs per 100 WBC 0 <1 /100    Absolute Neutrophils 4.1 1.6 - 8.3 10e3/uL    Absolute Lymphocytes 1.0 0.8 - 5.3  10e3/uL    Absolute Monocytes 0.5 0.0 - 1.3 10e3/uL    Absolute Eosinophils 0.0 0.0 - 0.7 10e3/uL    Absolute Basophils 0.0 0.0 - 0.2 10e3/uL    Absolute Immature Granulocytes 0.0 <=0.4 10e3/uL    Absolute NRBCs 0.0 10e3/uL

## 2022-12-19 ENCOUNTER — APPOINTMENT (OUTPATIENT)
Dept: GENERAL RADIOLOGY | Facility: CLINIC | Age: 72
DRG: 840 | End: 2022-12-19
Attending: PHYSICIAN ASSISTANT
Payer: MEDICARE

## 2022-12-19 LAB
ALBUMIN SERPL BCG-MCNC: 3.6 G/DL (ref 3.5–5.2)
ALP SERPL-CCNC: 112 U/L (ref 40–129)
ALT SERPL W P-5'-P-CCNC: 25 U/L (ref 10–50)
ANION GAP SERPL CALCULATED.3IONS-SCNC: 10 MMOL/L (ref 7–15)
APTT PPP: 25 SECONDS (ref 22–38)
AST SERPL W P-5'-P-CCNC: 38 U/L (ref 10–50)
BASOPHILS # BLD AUTO: 0 10E3/UL (ref 0–0.2)
BASOPHILS NFR BLD AUTO: 0 %
BILIRUB SERPL-MCNC: 0.2 MG/DL
BUN SERPL-MCNC: 46 MG/DL (ref 8–23)
CALCIUM SERPL-MCNC: 9.3 MG/DL (ref 8.8–10.2)
CHLORIDE SERPL-SCNC: 110 MMOL/L (ref 98–107)
CREAT SERPL-MCNC: 0.94 MG/DL (ref 0.67–1.17)
DEPRECATED HCO3 PLAS-SCNC: 24 MMOL/L (ref 22–29)
EOSINOPHIL # BLD AUTO: 0 10E3/UL (ref 0–0.7)
EOSINOPHIL NFR BLD AUTO: 0 %
ERYTHROCYTE [DISTWIDTH] IN BLOOD BY AUTOMATED COUNT: 13.5 % (ref 10–15)
FIBRINOGEN PPP-MCNC: 277 MG/DL (ref 170–490)
GFR SERPL CREATININE-BSD FRML MDRD: 86 ML/MIN/1.73M2
GLUCOSE SERPL-MCNC: 98 MG/DL (ref 70–99)
HCT VFR BLD AUTO: 32.8 % (ref 40–53)
HGB BLD-MCNC: 10.4 G/DL (ref 13.3–17.7)
HOLD SPECIMEN: NORMAL
IMM GRANULOCYTES # BLD: 0 10E3/UL
IMM GRANULOCYTES NFR BLD: 1 %
INR PPP: 1.07 (ref 0.85–1.15)
LDH SERPL L TO P-CCNC: 372 U/L (ref 0–250)
LYMPHOCYTES # BLD AUTO: 1.4 10E3/UL (ref 0.8–5.3)
LYMPHOCYTES NFR BLD AUTO: 24 %
MAGNESIUM SERPL-MCNC: 2.3 MG/DL (ref 1.7–2.3)
MCH RBC QN AUTO: 30.3 PG (ref 26.5–33)
MCHC RBC AUTO-ENTMCNC: 31.7 G/DL (ref 31.5–36.5)
MCV RBC AUTO: 96 FL (ref 78–100)
MONOCYTES # BLD AUTO: 0.3 10E3/UL (ref 0–1.3)
MONOCYTES NFR BLD AUTO: 6 %
NEUTROPHILS # BLD AUTO: 4.1 10E3/UL (ref 1.6–8.3)
NEUTROPHILS NFR BLD AUTO: 69 %
NRBC # BLD AUTO: 0 10E3/UL
NRBC BLD AUTO-RTO: 0 /100
PHOSPHATE SERPL-MCNC: 3.5 MG/DL (ref 2.5–4.5)
PLATELET # BLD AUTO: 105 10E3/UL (ref 150–450)
POTASSIUM SERPL-SCNC: 4.1 MMOL/L (ref 3.4–5.3)
PROT SERPL-MCNC: 5.4 G/DL (ref 6.4–8.3)
RBC # BLD AUTO: 3.43 10E6/UL (ref 4.4–5.9)
SODIUM SERPL-SCNC: 144 MMOL/L (ref 136–145)
URATE SERPL-MCNC: 2.7 MG/DL (ref 3.4–7)
WBC # BLD AUTO: 5.8 10E3/UL (ref 4–11)

## 2022-12-19 PROCEDURE — 80053 COMPREHEN METABOLIC PANEL: CPT | Performed by: HOSPITALIST

## 2022-12-19 PROCEDURE — 120N000002 HC R&B MED SURG/OB UMMC

## 2022-12-19 PROCEDURE — 94642 AEROSOL INHALATION TREATMENT: CPT

## 2022-12-19 PROCEDURE — 36415 COLL VENOUS BLD VENIPUNCTURE: CPT | Performed by: HOSPITALIST

## 2022-12-19 PROCEDURE — 250N000011 HC RX IP 250 OP 636: Performed by: PHYSICIAN ASSISTANT

## 2022-12-19 PROCEDURE — 85730 THROMBOPLASTIN TIME PARTIAL: CPT | Performed by: PHYSICIAN ASSISTANT

## 2022-12-19 PROCEDURE — 85004 AUTOMATED DIFF WBC COUNT: CPT | Performed by: HOSPITALIST

## 2022-12-19 PROCEDURE — 85610 PROTHROMBIN TIME: CPT | Performed by: PHYSICIAN ASSISTANT

## 2022-12-19 PROCEDURE — 250N000013 HC RX MED GY IP 250 OP 250 PS 637: Performed by: PHYSICIAN ASSISTANT

## 2022-12-19 PROCEDURE — 250N000012 HC RX MED GY IP 250 OP 636 PS 637: Performed by: PHYSICIAN ASSISTANT

## 2022-12-19 PROCEDURE — 85384 FIBRINOGEN ACTIVITY: CPT | Performed by: PHYSICIAN ASSISTANT

## 2022-12-19 PROCEDURE — 84100 ASSAY OF PHOSPHORUS: CPT | Performed by: PHYSICIAN ASSISTANT

## 2022-12-19 PROCEDURE — 99233 SBSQ HOSP IP/OBS HIGH 50: CPT | Mod: FS | Performed by: PHYSICIAN ASSISTANT

## 2022-12-19 PROCEDURE — 83735 ASSAY OF MAGNESIUM: CPT | Performed by: PHYSICIAN ASSISTANT

## 2022-12-19 PROCEDURE — 71046 X-RAY EXAM CHEST 2 VIEWS: CPT

## 2022-12-19 PROCEDURE — 36415 COLL VENOUS BLD VENIPUNCTURE: CPT | Performed by: PHYSICIAN ASSISTANT

## 2022-12-19 PROCEDURE — 94640 AIRWAY INHALATION TREATMENT: CPT

## 2022-12-19 PROCEDURE — 999N000157 HC STATISTIC RCP TIME EA 10 MIN

## 2022-12-19 PROCEDURE — 87529 HSV DNA AMP PROBE: CPT | Performed by: PHYSICIAN ASSISTANT

## 2022-12-19 PROCEDURE — 250N000009 HC RX 250: Performed by: PHYSICIAN ASSISTANT

## 2022-12-19 PROCEDURE — 83615 LACTATE (LD) (LDH) ENZYME: CPT | Performed by: PHYSICIAN ASSISTANT

## 2022-12-19 PROCEDURE — 84550 ASSAY OF BLOOD/URIC ACID: CPT | Performed by: PHYSICIAN ASSISTANT

## 2022-12-19 PROCEDURE — 71046 X-RAY EXAM CHEST 2 VIEWS: CPT | Mod: 26 | Performed by: RADIOLOGY

## 2022-12-19 RX ORDER — LORAZEPAM 0.5 MG/1
0.25 TABLET ORAL EVERY 4 HOURS PRN
Status: DISCONTINUED | OUTPATIENT
Start: 2022-12-19 | End: 2022-12-19

## 2022-12-19 RX ORDER — GUAIFENESIN 600 MG/1
600 TABLET, EXTENDED RELEASE ORAL 2 TIMES DAILY PRN
Status: DISCONTINUED | OUTPATIENT
Start: 2022-12-19 | End: 2022-12-22 | Stop reason: HOSPADM

## 2022-12-19 RX ORDER — BENZONATATE 100 MG/1
100 CAPSULE ORAL 3 TIMES DAILY PRN
Status: DISCONTINUED | OUTPATIENT
Start: 2022-12-19 | End: 2022-12-22 | Stop reason: HOSPADM

## 2022-12-19 RX ORDER — PENTAMIDINE ISETHIONATE 300 MG/300MG
300 INHALANT RESPIRATORY (INHALATION)
Status: COMPLETED | OUTPATIENT
Start: 2022-12-19 | End: 2022-12-19

## 2022-12-19 RX ORDER — PROCHLORPERAZINE 25 MG
12.5 SUPPOSITORY, RECTAL RECTAL EVERY 12 HOURS PRN
Status: DISCONTINUED | OUTPATIENT
Start: 2022-12-19 | End: 2022-12-22 | Stop reason: HOSPADM

## 2022-12-19 RX ORDER — PREDNISONE 50 MG/1
100 TABLET ORAL DAILY
Status: DISCONTINUED | OUTPATIENT
Start: 2022-12-19 | End: 2022-12-22 | Stop reason: HOSPADM

## 2022-12-19 RX ORDER — ACYCLOVIR 400 MG/1
400 TABLET ORAL 2 TIMES DAILY
Status: DISCONTINUED | OUTPATIENT
Start: 2022-12-19 | End: 2022-12-22 | Stop reason: HOSPADM

## 2022-12-19 RX ORDER — ALBUTEROL SULFATE 0.83 MG/ML
2.5 SOLUTION RESPIRATORY (INHALATION)
Status: COMPLETED | OUTPATIENT
Start: 2022-12-19 | End: 2022-12-19

## 2022-12-19 RX ORDER — CODEINE PHOSPHATE AND GUAIFENESIN 10; 100 MG/5ML; MG/5ML
5 SOLUTION ORAL EVERY 4 HOURS PRN
Status: DISCONTINUED | OUTPATIENT
Start: 2022-12-19 | End: 2022-12-22 | Stop reason: HOSPADM

## 2022-12-19 RX ORDER — PROCHLORPERAZINE MALEATE 5 MG
5 TABLET ORAL EVERY 6 HOURS PRN
Status: DISCONTINUED | OUTPATIENT
Start: 2022-12-19 | End: 2022-12-22 | Stop reason: HOSPADM

## 2022-12-19 RX ORDER — TEMAZEPAM 7.5 MG/1
7.5 CAPSULE ORAL
Status: DISCONTINUED | OUTPATIENT
Start: 2022-12-19 | End: 2022-12-22 | Stop reason: HOSPADM

## 2022-12-19 RX ORDER — FUROSEMIDE 20 MG
20 TABLET ORAL ONCE
Status: COMPLETED | OUTPATIENT
Start: 2022-12-19 | End: 2022-12-19

## 2022-12-19 RX ADMIN — ALLOPURINOL 300 MG: 300 TABLET ORAL at 08:46

## 2022-12-19 RX ADMIN — ALBUTEROL SULFATE 2.5 MG: 2.5 SOLUTION RESPIRATORY (INHALATION) at 14:33

## 2022-12-19 RX ADMIN — ACYCLOVIR 400 MG: 400 TABLET ORAL at 08:46

## 2022-12-19 RX ADMIN — OMEPRAZOLE 40 MG: 40 CAPSULE, DELAYED RELEASE ORAL at 08:47

## 2022-12-19 RX ADMIN — FUROSEMIDE 20 MG: 20 TABLET ORAL at 12:22

## 2022-12-19 RX ADMIN — PREDNISONE 100 MG: 50 TABLET ORAL at 10:42

## 2022-12-19 RX ADMIN — ENOXAPARIN SODIUM 40 MG: 40 INJECTION SUBCUTANEOUS at 16:46

## 2022-12-19 RX ADMIN — GUAIFENESIN 600 MG: 600 TABLET ORAL at 12:22

## 2022-12-19 RX ADMIN — ACYCLOVIR 400 MG: 400 TABLET ORAL at 21:45

## 2022-12-19 RX ADMIN — TEMAZEPAM 7.5 MG: 7.5 CAPSULE ORAL at 21:44

## 2022-12-19 RX ADMIN — BENZONATATE 100 MG: 100 CAPSULE ORAL at 21:45

## 2022-12-19 RX ADMIN — PENTAMIDINE ISETHIONATE 300 MG: 300 INHALANT RESPIRATORY (INHALATION) at 14:37

## 2022-12-19 ASSESSMENT — ACTIVITIES OF DAILY LIVING (ADL)
ADLS_ACUITY_SCORE: 20

## 2022-12-19 NOTE — PLAN OF CARE
"Goal Outcome Evaluation:    /72 (BP Location: Right arm)   Pulse 94   Temp 98  F (36.7  C) (Oral)   Resp 20   Ht 1.702 m (5' 7\")   Wt 59.6 kg (131 lb 6.4 oz)   SpO2 94%   BMI 20.58 kg/m      A&O x 4, AVSS, denies pain/nausea/sob on RA.   Gave one time lasix 20 mg for worsening cough/fluids in R lungs.   Pt was educated on using incentive spirometer & pt been doing at bedside.  Leida BAUGH ordered restoril for sleep & discontinued trazodone/melatonin & ativan.  UAL, voiding spontaneously, last bm 12/17.  Planning to start Rituxan possibly tomorrow.   Continue with poc...                        "

## 2022-12-19 NOTE — PROGRESS NOTES
[facility]    Hematology / Oncology Progress Note    Patient: Shahid Davis  MRN: [id number]  Admission Date: 12/14/2022  Date of Service (when I saw the patient): 12/19/2022  Hospital Day # 5     Assessment & Plan   Shahid Davis is a 72 year old man with a history of complete heart block s/p pacemaker placement and low-grade kappa-restricted plasmacytoid B-cell lymphoma diagnosed 3/2004 and most recently on surveillance. He presented with chest pain, flank pain, and worsening B-symptoms and was found to have radiographic e/o concern for transformation of his low-grade lymphoma.       TODAY:   - Continue prednisone 100 mg daily (x12/17) in anticipation of likely R-CHOP vs DA-R-EPOCH in coming days pending final morphology and FISH.  - Repeat CXR with ongoing cough showed persistent moderate R pleural effusion. PRN Tessalon and Robitussin available. Trial PO Lasix 20 mg once.       HEME  # H/o low-grade kappa restricted plasmacytoid B-cell lymphoma 3/2004   # Concern for transformation  # Hypogammaglobulinemia  Follows with Dr. Ruelas. 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 He presented progressing B symptoms, abdominal pain, and SOB. PET 12/9 showed extensive lymphomatous involvement to the R pleura w/avid effusions, mediastinal and pericardial regions, right anterolateral chest wall, adenopathy above and below the diaphragm, liver, spleen and multiple sites in the skeleton, concerning for transformation from his low-grade lymphoma. He also has pancytopenia which seems to have progressed slowly over the past years. Of note, reports maternal history of waldenstrom's. Due to worsening shortness of breath and delays of biopsy, patient presented to ED on 12/14 and was subsequently admitted for expedited workup and treatment.  Given highest SUV and easily accessible sternoclavicular soft tissue mass, plan for biopsy of this lesion and okay to hold on BMBx given would not change treatment plan.   - S/p US-guided sternoclavicular soft tissue mass biopsy with IR 12/15; morphology, cytogenetics pending. Flow with 95% CD10 positive kappa-monotypic B cells.   - FLC WNL. M-spike on 12/2 was 0.2. Beta-2-microglobulin elevated at 3.6. IgA 36, IgG 310, IgM 302. SPEP pending.   - Echo (12/15) with LVEF 60-65% with mild aortic insufficiency, no evidence of effusion or tamponade.  - Continue to hold on BMBx at this time as it would not  (known involvement on PET/CT), although if inadequate tissue from biopsy on 12/15 or need for further diagnostic tissue, could consider early week of 12/19.   - Cont prednisone 100 mg daily (12/17 - x) with anticipation of likely R-CHOP in coming days. Continue TLS labs daily.    # Pancytopenia  Likely secondary to prior treatment and lymphomatous involvement.  - Monitor CBC daily  - Transfuse if Hgb <7 and plt <10k    # TLS monitoring  On 12/15, uric acid peak 9.1. Cr, K, Ca, and phos all WNL. S/p rasburicase 12/15 with resolution of hyperuricemia. TLS labs WNL as of 12/18.   - Monitor TLS/DIC labs daily  - Allopurinol 300 mg daily  - Avoiding IVF given malignant pleural effusions although could consider gentle IVF if needed.    ID  - Viral serologies: HepB/C non-reactive. HSV pending.   - ppx  mg BID  - Pentamidine neb 12/19/22, will need monthly outpatient     CV/PULM  # Shortness of breath, improved  # R large and L small pleural effusion   PET/CT (12/9) with FDG-avid large R and small L pleural effusion. CXR on admission (12/14/22) with stable pleural effusions from 12/9 with likely atelectasis as well. O2 transiently down to 89% overnight but remains >90% on room air. Echo as above. Consideration given to thoracentesis, but in efforts to minimize procedures and with stable respiratory  status, will continue to monitor.   - Consider thoracentesis if worsening respiratory status.  - PO Lasix 20 mg once x12/19     # H/o complete heart block s/p pacemaker placement 2020  - No acute inpatient needs     GI  # GERD  Related to history of radiation to chest.   - Continue PTA omeprazole.    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 Took uncertain medication for this in the past (~2004), however cannot remember. Melatonin and Trazadone foung to be ineffective.   - Trial Restoril PRN     Clinically Significant Risk Factors                # Thrombocytopenia: Lowest platelets = 105 in last 2 days, will monitor for bleeding           # Severe Malnutrition: based on nutrition assessment      FEN  Diet: Regular Diet Adult   IVF: Bolus PRN (judiciously given pleural effusions)  Lytes: Replete per protocol    PPX  VTE: Lovenox  Bowel: Senna/MiraLax PRN  GI/PUD: Continue PTA omeprazole.    MISC  Code Status: Full Code   Lines/Drains: PIV  Dispo: Pending work up of transformed lymphoma and treatment plan  Follow Up: Follows with Dr. Ruelas; will request pending final treatment plan. Local twice weekly labs requested at [facility] (FAX sent to [phone])     Patient was seen and plan of care was discussed with attending physician Dr. Blakely.    I spent 60 minutes in the care of this patient today, which included time necessary for review of interval events, obtaining history and physical exam, ordering medications/tests/procedures as medically indicated, review of pertinent medical literature, counseling of the patient, coordination of care, and documentation time. Over 50% of time was spent counseling the patient and/or coordinating care.    Leida Barton PA-C ([facility])    Hematology/Oncology   Pager: [phone]     Interval History   Overnight, no acute events. Patient seen ambulating around unit frequently. He states he had difficulty sleeping last night after receiving steroids and  notes that this happened back in 2004 with treatment. Trazadone slightly helpful. Did not notice a cough when he woke up this AM like he has prior, non productive and no dyspnea. Encouraged to trial cough medications available. Anxious to proceed with chemotherapy but understanding about awaiting final biopsy. He denies nausea, abdominal pain, headaches, new or worsening symptoms. Questions answered.     Vital Signs with Ranges  Temp:  [97.8  F (36.6  C)-98.1  F (36.7  C)] 97.8  F (36.6  C)  Pulse:  [] 86  Resp:  [16-20] 20  BP: (111-126)/(65-72) 116/68  SpO2:  [91 %-94 %] 94 %  No intake/output data recorded.    Physical Exam   General: pleasant gentleman ambulating around unit. Alert, NAD. Pleasant and conversational.  Skin: Large palpable mass (~4cm) of anterior chest wall; covered in dressing after biopsy. No additional concerning lesions, rash, jaundice, cyanosis, erythema, or ecchymoses on exposed surfaces.   HEENT: NCAT. Anicteric sclera. Moist mucous membranes with no lesions, erythema, or thrush.   Respiratory: Non-labored breathing on room air, good air exchange, decreased breath sounds bilaterally (R>L)   Cardiovascular: RRR. No murmur or rub.   Gastrointestinal: Normoactive BS. Abdomen soft, ND, NT. No palpable masses.  Extremities: No LE edema.   Neurologic: A&O x 3, speech normal, no deficits grossly. Ambulating without difficulties    Medications     - MEDICATION INSTRUCTIONS -         acyclovir  400 mg Oral BID     albuterol  2.5 mg Nebulization Once    Followed by     pentamidine  300 mg Inhalation Once     allopurinol  300 mg Oral Daily     enoxaparin ANTICOAGULANT  40 mg Subcutaneous Q24H     omeprazole  40 mg Oral QAM AC     predniSONE  100 mg Oral Daily     traZODone  25 mg Oral At Bedtime    Or     traZODone  50 mg Oral At Bedtime     Data   Results for orders placed or performed during the hospital encounter of 12/14/22 (from the past 24 hour(s))   CBC with platelets differential     Narrative    The following orders were created for panel order CBC with platelets differential.  Procedure                               Abnormality         Status                     ---------                               -----------         ------                     CBC with platelets and d...[[id number]]  Abnormal            Final result                 Please view results for these tests on the individual orders.   Comprehensive metabolic panel   Result Value Ref Range    Sodium 144 136 - 145 mmol/L    Potassium 4.1 3.4 - 5.3 mmol/L    Chloride 110 (H) 98 - 107 mmol/L    Carbon Dioxide (CO2) 24 22 - 29 mmol/L    Anion Gap 10 7 - 15 mmol/L    Urea Nitrogen 46.0 (H) 8.0 - 23.0 mg/dL    Creatinine 0.94 0.67 - 1.17 mg/dL    Calcium 9.3 8.8 - 10.2 mg/dL    Glucose 98 70 - 99 mg/dL    Alkaline Phosphatase 112 40 - 129 U/L    AST 38 10 - 50 U/L    ALT 25 10 - 50 U/L    Protein Total 5.4 (L) 6.4 - 8.3 g/dL    Albumin 3.6 3.5 - 5.2 g/dL    Bilirubin Total 0.2 <=1.2 mg/dL    GFR Estimate 86 >60 mL/min/1.73m2   Uric acid   Result Value Ref Range    Uric Acid 2.7 (L) 3.4 - 7.0 mg/dL   Phosphorus   Result Value Ref Range    Phosphorus 3.5 2.5 - 4.5 mg/dL   Lactate Dehydrogenase   Result Value Ref Range    Lactate Dehydrogenase 372 (H) 0 - 250 U/L   INR   Result Value Ref Range    INR 1.07 0.85 - 1.15   Partial thromboplastin time   Result Value Ref Range    aPTT 25 22 - 38 Seconds   Fibrinogen activity   Result Value Ref Range    Fibrinogen Activity 277 170 - 490 mg/dL   CBC with platelets and differential   Result Value Ref Range    WBC Count 5.8 4.0 - 11.0 10e3/uL    RBC Count 3.43 (L) 4.40 - 5.90 10e6/uL    Hemoglobin 10.4 (L) 13.3 - 17.7 g/dL    Hematocrit 32.8 (L) 40.0 - 53.0 %    MCV 96 78 - 100 fL    MCH 30.3 26.5 - 33.0 pg    MCHC 31.7 31.5 - 36.5 g/dL    RDW 13.5 10.0 - 15.0 %    Platelet Count 105 (L) 150 - 450 10e3/uL    % Neutrophils 69 %    % Lymphocytes 24 %    % Monocytes 6 %    % Eosinophils 0 %    %  Basophils 0 %    % Immature Granulocytes 1 %    NRBCs per 100 WBC 0 <1 /100    Absolute Neutrophils 4.1 1.6 - 8.3 10e3/uL    Absolute Lymphocytes 1.4 0.8 - 5.3 10e3/uL    Absolute Monocytes 0.3 0.0 - 1.3 10e3/uL    Absolute Eosinophils 0.0 0.0 - 0.7 10e3/uL    Absolute Basophils 0.0 0.0 - 0.2 10e3/uL    Absolute Immature Granulocytes 0.0 <=0.4 10e3/uL    Absolute NRBCs 0.0 10e3/uL   Magnesium   Result Value Ref Range    Magnesium 2.3 1.7 - 2.3 mg/dL   Extra Tube    Narrative    The following orders were created for panel order Extra Tube.  Procedure                               Abnormality         Status                     ---------                               -----------         ------                     Extra Green Top (Lithium...[[id number]]                      Final result                 Please view results for these tests on the individual orders.   Extra Green Top (Lithium Heparin) Tube   Result Value Ref Range    Hold Specimen JIC    XR Chest 2 Views    Narrative    Chest 2 views    INDICATION: Cough, new lymphoma, history of effusions    COMPARISON: 12/14/2022    Findings: Blunting of the right costophrenic angle and overall right  lower lung prominent opacity again noted. Minimal blunting of left  costophrenic angle with hyperinflated left lung. Bipolar pacemaker  again from a left subclavian transvenous approach.      Impression    IMPRESSION: Continue right larger than left pleural effusions with  hyperinflated underlying appearance of the lungs. Pacemaker.    PRINCESS BOO MD         SYSTEM ID:  [id number]

## 2022-12-19 NOTE — PLAN OF CARE
"Goal Outcome Evaluation:  /66 (BP Location: Left arm, Cuff Size: Adult Regular)   Pulse 64   Temp 97.8  F (36.6  C) (Oral)   Resp 16   Ht 1.702 m (5' 7\")   Wt 59.6 kg (131 lb 4.8 oz)   SpO2 94%   BMI 20.56 kg/m      Alert and oriented x4. Calm and pleasant. Denies pain. Cough intermittently. Denies SOB or chest pain.  PIV SL. Reg diet good appitiet. Voiding adequately. No bm this shift. Continue with POC.                         "

## 2022-12-19 NOTE — PROGRESS NOTES
Labs and Transfusion orders:  Date: 2022    Patient: Shahid Davis  : 1950    [facility] [place], [place] -- Labs starting 22.     LABS:  [ x ] Check CBC with differential and CMP twice a week (fax labs to [facility] at [phone])  [ x ] Type and cross PRN    TRANSFUSION PARAMETERS:   [ x ] Please transfuse 2 (two) units PRBCs if Hgb is less than or equal to 8.  [ x ] Please transfuse 1 (one) unit platelets if plt count less than or equal to 10,000.   [ x ] If patient experiences transfusion reaction during transfusion:   [ x ] 25-50 mg benadryl IV x once PRN   [ x ] Tylenol 1000 mg PO x once PRN   [ x ] Hydrocortisone 100 mg IV x once PRN   [ x ] Zantac 150 mg IV x once PRN   [ x ] Notify provider covering infusion clinic per protocol      Please call the [facility] at [phone] for any questions, and ask to speak with the care coordinator for Dr Domitila Ruelas (patient's primary oncologist).    Thank you,         Leida Barton PA-C     [facility]  Department of Hematology/Oncology  [address] [place] [address]  Pager: [phone]

## 2022-12-19 NOTE — PLAN OF CARE
[id number]    VSS- RA, Patient complained not able to sleep at night. Another chest xray was done on RLL/ fluid present. Productive coughing, but libby SOB. Patient is independent. Patient complained of no pain. Voiding spontaneously no BM this shift. Patient good appetite. Continue to manage sleep and cough.

## 2022-12-19 NOTE — PLAN OF CARE
"[id number]    /65 (BP Location: Left arm)   Pulse 103   Temp 98.1  F (36.7  C) (Oral)   Resp 16   Ht 1.702 m (5' 7\")   Wt 59.6 kg (131 lb 4.8 oz)   SpO2 92%   BMI 20.56 kg/m      Reason for admission: worsening B-cell lymphoma symptoms  Activity: UAL, ambulates frequently  Pain: mild at biopsy site, otherwise denies  Neuro: A&Ox4, no neuro deficits  Cardiac: Pacemaker in place. Afebrile, denies internal chest pain, has mild pain where chest lump was biopsied, declines pain intervention. Denies dizziness.  Respiratory: Room air, dyspnea heard with exertion  GI/: Voiding spontaneously, last BM 12/17. Denies nausea this shift.  Diet: Regular  Lines: PIV SL  Wounds: R chest lump biopsied, dressing CDI  Labs/imaging: Reviewed, see chart.TLS labs daily      New changes this shift: Received scheduled 50mg Trazodone for sleep at bedtime, states this helped him fall asleep but did not keep him asleep for long. Cares clustered. Reports decrease in night sweats this morning. Expresses interest in trying Ensure/other dietitian recommendations, concerned about recent weight loss.     Plan: Chemo plan pending final biopsy results      Continue to follow POC   "

## 2022-12-20 LAB
ABO/RH(D): NORMAL
ALBUMIN SERPL BCG-MCNC: 3.8 G/DL (ref 3.5–5.2)
ALBUMIN SERPL BCG-MCNC: 3.8 G/DL (ref 3.5–5.2)
ALP SERPL-CCNC: 115 U/L (ref 40–129)
ALP SERPL-CCNC: 119 U/L (ref 40–129)
ALT SERPL W P-5'-P-CCNC: 25 U/L (ref 10–50)
ALT SERPL W P-5'-P-CCNC: 28 U/L (ref 10–50)
ANION GAP SERPL CALCULATED.3IONS-SCNC: 11 MMOL/L (ref 7–15)
ANION GAP SERPL CALCULATED.3IONS-SCNC: 12 MMOL/L (ref 7–15)
ANTIBODY SCREEN: NEGATIVE
APTT PPP: 25 SECONDS (ref 22–38)
AST SERPL W P-5'-P-CCNC: 31 U/L (ref 10–50)
AST SERPL W P-5'-P-CCNC: 34 U/L (ref 10–50)
BASOPHILS # BLD AUTO: 0 10E3/UL (ref 0–0.2)
BASOPHILS # BLD AUTO: 0 10E3/UL (ref 0–0.2)
BASOPHILS NFR BLD AUTO: 0 %
BASOPHILS NFR BLD AUTO: 0 %
BILIRUB SERPL-MCNC: 0.3 MG/DL
BILIRUB SERPL-MCNC: 0.3 MG/DL
BUN SERPL-MCNC: 44.2 MG/DL (ref 8–23)
BUN SERPL-MCNC: 45.4 MG/DL (ref 8–23)
CALCIUM SERPL-MCNC: 9.3 MG/DL (ref 8.8–10.2)
CALCIUM SERPL-MCNC: 9.4 MG/DL (ref 8.8–10.2)
CHLORIDE SERPL-SCNC: 107 MMOL/L (ref 98–107)
CHLORIDE SERPL-SCNC: 108 MMOL/L (ref 98–107)
CREAT SERPL-MCNC: 0.84 MG/DL (ref 0.67–1.17)
CREAT SERPL-MCNC: 0.86 MG/DL (ref 0.67–1.17)
DEPRECATED HCO3 PLAS-SCNC: 21 MMOL/L (ref 22–29)
DEPRECATED HCO3 PLAS-SCNC: 24 MMOL/L (ref 22–29)
EOSINOPHIL # BLD AUTO: 0 10E3/UL (ref 0–0.7)
EOSINOPHIL # BLD AUTO: 0 10E3/UL (ref 0–0.7)
EOSINOPHIL NFR BLD AUTO: 0 %
EOSINOPHIL NFR BLD AUTO: 0 %
ERYTHROCYTE [DISTWIDTH] IN BLOOD BY AUTOMATED COUNT: 13.3 % (ref 10–15)
ERYTHROCYTE [DISTWIDTH] IN BLOOD BY AUTOMATED COUNT: 13.3 % (ref 10–15)
FIBRINOGEN PPP-MCNC: 274 MG/DL (ref 170–490)
GFR SERPL CREATININE-BSD FRML MDRD: >90 ML/MIN/1.73M2
GFR SERPL CREATININE-BSD FRML MDRD: >90 ML/MIN/1.73M2
GLUCOSE SERPL-MCNC: 109 MG/DL (ref 70–99)
GLUCOSE SERPL-MCNC: 144 MG/DL (ref 70–99)
HCT VFR BLD AUTO: 34 % (ref 40–53)
HCT VFR BLD AUTO: 35.9 % (ref 40–53)
HGB BLD-MCNC: 11 G/DL (ref 13.3–17.7)
HGB BLD-MCNC: 11.6 G/DL (ref 13.3–17.7)
IMM GRANULOCYTES # BLD: 0 10E3/UL
IMM GRANULOCYTES # BLD: 0 10E3/UL
IMM GRANULOCYTES NFR BLD: 0 %
IMM GRANULOCYTES NFR BLD: 1 %
INR PPP: 1.04 (ref 0.85–1.15)
LDH SERPL L TO P-CCNC: 323 U/L (ref 0–250)
LDH SERPL L TO P-CCNC: 352 U/L (ref 0–250)
LYMPHOCYTES # BLD AUTO: 0.3 10E3/UL (ref 0.8–5.3)
LYMPHOCYTES # BLD AUTO: 1.1 10E3/UL (ref 0.8–5.3)
LYMPHOCYTES NFR BLD AUTO: 10 %
LYMPHOCYTES NFR BLD AUTO: 21 %
MCH RBC QN AUTO: 31 PG (ref 26.5–33)
MCH RBC QN AUTO: 31.1 PG (ref 26.5–33)
MCHC RBC AUTO-ENTMCNC: 32.3 G/DL (ref 31.5–36.5)
MCHC RBC AUTO-ENTMCNC: 32.4 G/DL (ref 31.5–36.5)
MCV RBC AUTO: 96 FL (ref 78–100)
MCV RBC AUTO: 96 FL (ref 78–100)
MONOCYTES # BLD AUTO: 0.1 10E3/UL (ref 0–1.3)
MONOCYTES # BLD AUTO: 0.3 10E3/UL (ref 0–1.3)
MONOCYTES NFR BLD AUTO: 2 %
MONOCYTES NFR BLD AUTO: 5 %
NEUTROPHILS # BLD AUTO: 2.8 10E3/UL (ref 1.6–8.3)
NEUTROPHILS # BLD AUTO: 3.8 10E3/UL (ref 1.6–8.3)
NEUTROPHILS NFR BLD AUTO: 74 %
NEUTROPHILS NFR BLD AUTO: 87 %
NRBC # BLD AUTO: 0 10E3/UL
NRBC # BLD AUTO: 0 10E3/UL
NRBC BLD AUTO-RTO: 0 /100
NRBC BLD AUTO-RTO: 0 /100
PHOSPHATE SERPL-MCNC: 3.6 MG/DL (ref 2.5–4.5)
PHOSPHATE SERPL-MCNC: 3.7 MG/DL (ref 2.5–4.5)
PLATELET # BLD AUTO: 112 10E3/UL (ref 150–450)
PLATELET # BLD AUTO: 116 10E3/UL (ref 150–450)
POTASSIUM SERPL-SCNC: 4 MMOL/L (ref 3.4–5.3)
POTASSIUM SERPL-SCNC: 4.3 MMOL/L (ref 3.4–5.3)
PROT SERPL-MCNC: 5.7 G/DL (ref 6.4–8.3)
PROT SERPL-MCNC: 5.9 G/DL (ref 6.4–8.3)
RBC # BLD AUTO: 3.54 10E6/UL (ref 4.4–5.9)
RBC # BLD AUTO: 3.74 10E6/UL (ref 4.4–5.9)
SODIUM SERPL-SCNC: 140 MMOL/L (ref 136–145)
SODIUM SERPL-SCNC: 143 MMOL/L (ref 136–145)
SPECIMEN EXPIRATION DATE: NORMAL
URATE SERPL-MCNC: 2.6 MG/DL (ref 3.4–7)
URATE SERPL-MCNC: 3 MG/DL (ref 3.4–7)
WBC # BLD AUTO: 3.2 10E3/UL (ref 4–11)
WBC # BLD AUTO: 5.2 10E3/UL (ref 4–11)

## 2022-12-20 PROCEDURE — 83615 LACTATE (LD) (LDH) ENZYME: CPT | Performed by: PHYSICIAN ASSISTANT

## 2022-12-20 PROCEDURE — 250N000012 HC RX MED GY IP 250 OP 636 PS 637: Performed by: PHYSICIAN ASSISTANT

## 2022-12-20 PROCEDURE — 85610 PROTHROMBIN TIME: CPT | Performed by: PHYSICIAN ASSISTANT

## 2022-12-20 PROCEDURE — 86901 BLOOD TYPING SEROLOGIC RH(D): CPT | Performed by: HOSPITALIST

## 2022-12-20 PROCEDURE — 250N000011 HC RX IP 250 OP 636: Performed by: PHYSICIAN ASSISTANT

## 2022-12-20 PROCEDURE — 120N000002 HC R&B MED SURG/OB UMMC

## 2022-12-20 PROCEDURE — 36415 COLL VENOUS BLD VENIPUNCTURE: CPT | Performed by: HOSPITALIST

## 2022-12-20 PROCEDURE — 258N000003 HC RX IP 258 OP 636: Performed by: INTERNAL MEDICINE

## 2022-12-20 PROCEDURE — 250N000013 HC RX MED GY IP 250 OP 250 PS 637: Performed by: INTERNAL MEDICINE

## 2022-12-20 PROCEDURE — 3E03305 INTRODUCTION OF OTHER ANTINEOPLASTIC INTO PERIPHERAL VEIN, PERCUTANEOUS APPROACH: ICD-10-PCS | Performed by: INTERNAL MEDICINE

## 2022-12-20 PROCEDURE — 88341 IMHCHEM/IMCYTCHM EA ADD ANTB: CPT | Mod: 26 | Performed by: PATHOLOGY

## 2022-12-20 PROCEDURE — 88342 IMHCHEM/IMCYTCHM 1ST ANTB: CPT | Mod: 26 | Performed by: PATHOLOGY

## 2022-12-20 PROCEDURE — 84100 ASSAY OF PHOSPHORUS: CPT | Performed by: PHYSICIAN ASSISTANT

## 2022-12-20 PROCEDURE — 80053 COMPREHEN METABOLIC PANEL: CPT | Performed by: HOSPITALIST

## 2022-12-20 PROCEDURE — 99233 SBSQ HOSP IP/OBS HIGH 50: CPT | Mod: FS | Performed by: PHYSICIAN ASSISTANT

## 2022-12-20 PROCEDURE — 88365 INSITU HYBRIDIZATION (FISH): CPT | Mod: 26 | Performed by: PATHOLOGY

## 2022-12-20 PROCEDURE — 85025 COMPLETE CBC W/AUTO DIFF WBC: CPT | Performed by: HOSPITALIST

## 2022-12-20 PROCEDURE — 84550 ASSAY OF BLOOD/URIC ACID: CPT | Performed by: INTERNAL MEDICINE

## 2022-12-20 PROCEDURE — 84450 TRANSFERASE (AST) (SGOT): CPT | Performed by: INTERNAL MEDICINE

## 2022-12-20 PROCEDURE — 250N000013 HC RX MED GY IP 250 OP 250 PS 637: Performed by: PHYSICIAN ASSISTANT

## 2022-12-20 PROCEDURE — 85730 THROMBOPLASTIN TIME PARTIAL: CPT | Performed by: PHYSICIAN ASSISTANT

## 2022-12-20 PROCEDURE — 88307 TISSUE EXAM BY PATHOLOGIST: CPT | Mod: 26 | Performed by: PATHOLOGY

## 2022-12-20 PROCEDURE — 88341 IMHCHEM/IMCYTCHM EA ADD ANTB: CPT | Performed by: PATHOLOGY

## 2022-12-20 PROCEDURE — 85025 COMPLETE CBC W/AUTO DIFF WBC: CPT | Performed by: INTERNAL MEDICINE

## 2022-12-20 PROCEDURE — 36415 COLL VENOUS BLD VENIPUNCTURE: CPT | Performed by: INTERNAL MEDICINE

## 2022-12-20 PROCEDURE — 83615 LACTATE (LD) (LDH) ENZYME: CPT | Performed by: INTERNAL MEDICINE

## 2022-12-20 PROCEDURE — 85384 FIBRINOGEN ACTIVITY: CPT | Performed by: PHYSICIAN ASSISTANT

## 2022-12-20 PROCEDURE — 250N000011 HC RX IP 250 OP 636: Performed by: INTERNAL MEDICINE

## 2022-12-20 PROCEDURE — 86850 RBC ANTIBODY SCREEN: CPT | Performed by: HOSPITALIST

## 2022-12-20 PROCEDURE — 84100 ASSAY OF PHOSPHORUS: CPT | Performed by: INTERNAL MEDICINE

## 2022-12-20 PROCEDURE — 84550 ASSAY OF BLOOD/URIC ACID: CPT | Performed by: PHYSICIAN ASSISTANT

## 2022-12-20 PROCEDURE — 84155 ASSAY OF PROTEIN SERUM: CPT | Performed by: INTERNAL MEDICINE

## 2022-12-20 PROCEDURE — 999N000128 HC STATISTIC PERIPHERAL IV START W/O US GUIDANCE

## 2022-12-20 PROCEDURE — 88360 TUMOR IMMUNOHISTOCHEM/MANUAL: CPT | Mod: 26 | Performed by: PATHOLOGY

## 2022-12-20 RX ORDER — EPINEPHRINE 1 MG/ML
0.3 INJECTION, SOLUTION, CONCENTRATE INTRAVENOUS EVERY 5 MIN PRN
Status: DISCONTINUED | OUTPATIENT
Start: 2022-12-20 | End: 2022-12-22 | Stop reason: HOSPADM

## 2022-12-20 RX ORDER — ACETAMINOPHEN 325 MG/1
650 TABLET ORAL ONCE
Status: COMPLETED | OUTPATIENT
Start: 2022-12-20 | End: 2022-12-20

## 2022-12-20 RX ORDER — ALLOPURINOL 300 MG/1
300 TABLET ORAL DAILY
Status: DISCONTINUED | OUTPATIENT
Start: 2022-12-20 | End: 2022-12-20

## 2022-12-20 RX ORDER — NALOXONE HYDROCHLORIDE 0.4 MG/ML
0.4 INJECTION, SOLUTION INTRAMUSCULAR; INTRAVENOUS; SUBCUTANEOUS
Status: DISCONTINUED | OUTPATIENT
Start: 2022-12-20 | End: 2022-12-22 | Stop reason: HOSPADM

## 2022-12-20 RX ORDER — PREDNISONE 50 MG/1
100 TABLET ORAL DAILY
Status: DISCONTINUED | OUTPATIENT
Start: 2022-12-20 | End: 2022-12-20

## 2022-12-20 RX ORDER — DIPHENHYDRAMINE HYDROCHLORIDE 50 MG/ML
50 INJECTION INTRAMUSCULAR; INTRAVENOUS
Status: DISCONTINUED | OUTPATIENT
Start: 2022-12-20 | End: 2022-12-22 | Stop reason: HOSPADM

## 2022-12-20 RX ORDER — DIPHENHYDRAMINE HCL 50 MG
50 CAPSULE ORAL ONCE
Status: COMPLETED | OUTPATIENT
Start: 2022-12-20 | End: 2022-12-20

## 2022-12-20 RX ORDER — LORAZEPAM 0.5 MG/1
.5-1 TABLET ORAL EVERY 6 HOURS PRN
Status: DISCONTINUED | OUTPATIENT
Start: 2022-12-20 | End: 2022-12-22 | Stop reason: HOSPADM

## 2022-12-20 RX ORDER — DEXTROSE MONOHYDRATE 50 MG/ML
10-20 INJECTION, SOLUTION INTRAVENOUS
Status: DISCONTINUED | OUTPATIENT
Start: 2022-12-21 | End: 2022-12-20

## 2022-12-20 RX ORDER — ONDANSETRON 8 MG/1
16 TABLET, FILM COATED ORAL ONCE
Status: COMPLETED | OUTPATIENT
Start: 2022-12-20 | End: 2022-12-20

## 2022-12-20 RX ORDER — NALOXONE HYDROCHLORIDE 0.4 MG/ML
0.2 INJECTION, SOLUTION INTRAMUSCULAR; INTRAVENOUS; SUBCUTANEOUS
Status: DISCONTINUED | OUTPATIENT
Start: 2022-12-20 | End: 2022-12-22 | Stop reason: HOSPADM

## 2022-12-20 RX ORDER — LORAZEPAM 2 MG/ML
.5-1 INJECTION INTRAMUSCULAR EVERY 6 HOURS PRN
Status: DISCONTINUED | OUTPATIENT
Start: 2022-12-20 | End: 2022-12-22 | Stop reason: HOSPADM

## 2022-12-20 RX ORDER — ALBUTEROL SULFATE 0.83 MG/ML
2.5 SOLUTION RESPIRATORY (INHALATION)
Status: DISCONTINUED | OUTPATIENT
Start: 2022-12-20 | End: 2022-12-22 | Stop reason: HOSPADM

## 2022-12-20 RX ORDER — ALBUTEROL SULFATE 90 UG/1
1-2 AEROSOL, METERED RESPIRATORY (INHALATION)
Status: DISCONTINUED | OUTPATIENT
Start: 2022-12-20 | End: 2022-12-22 | Stop reason: HOSPADM

## 2022-12-20 RX ORDER — PROCHLORPERAZINE MALEATE 5 MG
5-10 TABLET ORAL EVERY 6 HOURS PRN
Status: DISCONTINUED | OUTPATIENT
Start: 2022-12-20 | End: 2022-12-20

## 2022-12-20 RX ORDER — MEPERIDINE HYDROCHLORIDE 25 MG/ML
25 INJECTION INTRAMUSCULAR; INTRAVENOUS; SUBCUTANEOUS EVERY 30 MIN PRN
Status: DISCONTINUED | OUTPATIENT
Start: 2022-12-20 | End: 2022-12-22 | Stop reason: HOSPADM

## 2022-12-20 RX ORDER — ACETAMINOPHEN 325 MG/1
650 TABLET ORAL ONCE
Status: DISCONTINUED | OUTPATIENT
Start: 2022-12-20 | End: 2022-12-20

## 2022-12-20 RX ORDER — METHYLPREDNISOLONE SODIUM SUCCINATE 125 MG/2ML
125 INJECTION, POWDER, LYOPHILIZED, FOR SOLUTION INTRAMUSCULAR; INTRAVENOUS
Status: DISCONTINUED | OUTPATIENT
Start: 2022-12-20 | End: 2022-12-22 | Stop reason: HOSPADM

## 2022-12-20 RX ADMIN — ENOXAPARIN SODIUM 40 MG: 40 INJECTION SUBCUTANEOUS at 16:51

## 2022-12-20 RX ADMIN — GUAIFENESIN 600 MG: 600 TABLET ORAL at 06:26

## 2022-12-20 RX ADMIN — ACYCLOVIR 400 MG: 400 TABLET ORAL at 19:02

## 2022-12-20 RX ADMIN — FOSAPREPITANT 150 MG: 150 INJECTION, POWDER, LYOPHILIZED, FOR SOLUTION INTRAVENOUS at 21:55

## 2022-12-20 RX ADMIN — ALLOPURINOL 300 MG: 300 TABLET ORAL at 08:36

## 2022-12-20 RX ADMIN — SODIUM CHLORIDE 80 MG: 9 INJECTION, SOLUTION INTRAVENOUS at 22:39

## 2022-12-20 RX ADMIN — ACYCLOVIR 400 MG: 400 TABLET ORAL at 08:36

## 2022-12-20 RX ADMIN — DIPHENHYDRAMINE HYDROCHLORIDE 50 MG: 50 CAPSULE ORAL at 16:55

## 2022-12-20 RX ADMIN — ACETAMINOPHEN 650 MG: 325 TABLET, FILM COATED ORAL at 16:52

## 2022-12-20 RX ADMIN — RITUXIMAB-ABBS 600 MG: 10 INJECTION, SOLUTION INTRAVENOUS at 17:31

## 2022-12-20 RX ADMIN — OMEPRAZOLE 40 MG: 40 CAPSULE, DELAYED RELEASE ORAL at 08:37

## 2022-12-20 RX ADMIN — VINCRISTINE SULFATE 2 MG: 1 INJECTION, SOLUTION INTRAVENOUS at 23:29

## 2022-12-20 RX ADMIN — PREDNISONE 100 MG: 50 TABLET ORAL at 11:12

## 2022-12-20 RX ADMIN — ONDANSETRON HYDROCHLORIDE 16 MG: 8 TABLET, FILM COATED ORAL at 21:55

## 2022-12-20 ASSESSMENT — ACTIVITIES OF DAILY LIVING (ADL)
ADLS_ACUITY_SCORE: 20

## 2022-12-20 NOTE — PROGRESS NOTES
[facility]    Hematology / Oncology Progress Note    Patient: Shahid Davis  MRN: [id number]  Admission Date: 12/14/2022  Date of Service (when I saw the patient): 12/20/2022  Hospital Day # 6     Assessment & Plan   Shahid Davis is a 72 year old man with a history of complete heart block s/p pacemaker placement and low-grade kappa-restricted plasmacytoid B-cell lymphoma diagnosed 3/2004 and most recently on surveillance. He presented with chest pain, flank pain, and worsening B-symptoms and was found to have radiographic e/o concern for transformation of his low-grade lymphoma.       TODAY:   - Continue prednisone 100 mg daily (12/17-x) and start RCHOP today after discussion with heme conference.   - TLS Labs BID. Risk for Rituxan reaction given disease burden. See hypersensitivity reaction management as outlined in springboard.   - PRN Tessalon and Robitussin available for cough. Best supportive cares.       HEME  # H/o low-grade kappa restricted plasmacytoid B-cell lymphoma 3/2004   # Concern for transformation  # Hypogammaglobulinemia  Follows with Dr. Ruelas. 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 He presented progressing B symptoms, abdominal pain, and SOB. PET 12/9 showed extensive lymphomatous involvement to the R pleura w/avid effusions, mediastinal and pericardial regions, right anterolateral chest wall, adenopathy above and below the diaphragm, liver, spleen and multiple sites in the skeleton, concerning for transformation from his low-grade lymphoma. He also has pancytopenia which seems to have progressed slowly over the past years. Of note, reports maternal history of waldenstrom's. Due to worsening shortness of breath and delays of biopsy, patient presented to ED on 12/14 and was subsequently admitted for  expedited workup and treatment. Given highest SUV and easily accessible sternoclavicular soft tissue mass, plan for biopsy of this lesion and okay to hold on BMBx given would not change treatment plan.   - S/p US-guided sternoclavicular soft tissue mass biopsy with IR 12/15; morphology, cytogenetics pending. Flow with 95% CD10 positive kappa-monotypic B cells.   - FLC WNL. M-spike on 12/2 was 0.2. Beta-2-microglobulin elevated at 3.6. IgA 36, IgG 310, IgM 302. SPEP pending.   - Echo (12/15) with LVEF 60-65% with mild aortic insufficiency, no evidence of effusion or tamponade.  - Continue to hold on BMBx at this time as it would not  (known involvement on PET/CT), although if inadequate tissue from biopsy on 12/15 or need for further diagnostic tissue, could consider early week of 12/19.   - Started on prednisone 100 mg daily (12/17 - x)   - RCHOP ordered following discussion at weekly heme malignancy conference 12/20.     Treatment Plan: RCHOP (C1D1 = 12/20/22)   - Rituximab 375 mg/m2 D1  - Cytoxan 750 mg/m2 D1   - Vincristine 1.4 mg/m2 D1  - Doxorubicin 50 mg/m2 D1  - Prednisone 100 mg D1-5   - Neulasta requested outpatient at [facility] ~12/23     # Pancytopenia  Likely secondary to prior treatment and lymphomatous involvement.  - Monitor CBC daily  - Transfuse if Hgb <7 and plt <10k    # TLS monitoring  On 12/15, uric acid peak 9.1. Cr, K, Ca, and phos all WNL. S/p rasburicase 12/15 with resolution of hyperuricemia. TLS labs WNL as of 12/18.   - Monitor TLS/DIC labs daily  - Allopurinol 300 mg daily  - Avoiding IVF given malignant pleural effusions although could consider gentle IVF if needed.    ID  - Viral serologies: Hep B/C non-reactive. HSV - pending.   - ppx  mg BID  - Pentamidine neb given inpatient 12/19/22, next due outpatient 1/16/23.     CV/PULM  # Shortness of breath, improved  # R large and L small pleural effusion   PET/CT (12/9) with FDG-avid large R and small L pleural effusion.  CXR on admission (12/14/22) with stable pleural effusions from 12/9 with likely atelectasis as well. O2 transiently down to 89% overnight but remains >90% on room air. Echo as above. Consideration given to thoracentesis, but in efforts to minimize procedures and with stable respiratory status, will continue to monitor.   - Consider thoracentesis if worsening respiratory status.  - PO Lasix 20 mg once x12/19 with improvement     # H/o complete heart block s/p pacemaker placement 2020  - No acute inpatient needs     GI  # GERD  Related to history of radiation to chest.   - Continue PTA omeprazole.    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 Took uncertain medication for this in the past (~2004), however cannot remember. Melatonin and Trazadone foung to be ineffective.   - Trial Restoril PRN     Clinically Significant Risk Factors                # Thrombocytopenia: Lowest platelets = 105 in last 2 days, will monitor for bleeding           # Severe Malnutrition: based on nutrition assessment      FEN  Diet: Regular Diet Adult   IVF: Bolus PRN (judiciously given pleural effusions)  Lytes: Replete per protocol    PPX  VTE: Lovenox  Bowel: Senna/MiraLax PRN  GI/PUD: Continue PTA omeprazole.    MISC  Code Status: Full Code   Lines/Drains: PIV  Dispo: Pending work up of transformed lymphoma and treatment plan  Follow Up: Follows with Dr. Ruelas; will request pending final treatment plan. Local twice weekly labs requested at [facility] (FAX sent to [phone])     Patient was seen and plan of care was discussed with attending physician Dr. Blakely.    I spent 60 minutes in the care of this patient today, which included time necessary for review of interval events, obtaining history and physical exam, ordering medications/tests/procedures as medically indicated, review of pertinent medical literature, counseling of the patient, coordination of care, and documentation time. Over 50% of time was spent  counseling the patient and/or coordinating care.    Leida Barton PA-C ([place])    Hematology/Oncology   Pager: [phone]     Interval History   Overnight, no acute events. Patient seen ambulating around unit frequently. Improved sleep with Restoril. Cough better with Mucinex and Tessalon. Anxious to proceed with chemotherapy but understanding about awaiting final biopsy. Discussed plan to proceed with Rituxan today. He denies nausea, abdominal pain, headaches, new or worsening symptoms. Questions answered.     Vital Signs with Ranges  Temp:  [97.4  F (36.3  C)-98.2  F (36.8  C)] 97.9  F (36.6  C)  Pulse:  [86-99] 86  Resp:  [20] 20  BP: (114-130)/(72-74) 130/74  SpO2:  [94 %] 94 %  I/O last 3 completed shifts:  In: 1080 [P.O.:1080]  Out: 450 [Urine:450]    Physical Exam   General: pleasant gentleman ambulating around unit. Alert, NAD. Pleasant and conversational.  Skin: Large palpable mass (~4cm) of anterior chest wall; non tender and non erythematous. No additional concerning lesions, rash, jaundice, cyanosis, erythema, or ecchymoses on exposed surfaces.   HEENT: NCAT. Anicteric sclera. Moist mucous membranes with no lesions, erythema, or thrush.   Respiratory: Non-labored breathing on room air, good air exchange, decreased breath sounds bilaterally (R>L)   Cardiovascular: RRR. No murmur or rub.   Gastrointestinal: Normoactive BS. Abdomen soft, ND, NT. No palpable masses.  Extremities: No LE edema.   Neurologic: A&O x 3, speech normal, no deficits grossly. Ambulating without difficulties    Medications     - MEDICATION INSTRUCTIONS -         acetaminophen  650 mg Oral Once     acyclovir  400 mg Oral BID     allopurinol  300 mg Oral Daily     diphenhydrAMINE  50 mg Oral Once     enoxaparin ANTICOAGULANT  40 mg Subcutaneous Q24H     omeprazole  40 mg Oral QAM AC     predniSONE  100 mg Oral Daily     riTUXimab-abbs  375 mg/m2 (Treatment Plan Recorded) Intravenous Once     sodium chloride (PF)  3 mL  Intracatheter Q8H     Data   Results for orders placed or performed during the hospital encounter of 12/14/22 (from the past 24 hour(s))   CBC with platelets differential    Narrative    The following orders were created for panel order CBC with platelets differential.  Procedure                               Abnormality         Status                     ---------                               -----------         ------                     CBC with platelets and d...[[id number]]  Abnormal            Final result                 Please view results for these tests on the individual orders.   ABO/Rh type and screen    Narrative    The following orders were created for panel order ABO/Rh type and screen.  Procedure                               Abnormality         Status                     ---------                               -----------         ------                     Adult Type and Screen[[id number]]                            Final result                 Please view results for these tests on the individual orders.   Comprehensive metabolic panel   Result Value Ref Range    Sodium 143 136 - 145 mmol/L    Potassium 4.0 3.4 - 5.3 mmol/L    Chloride 108 (H) 98 - 107 mmol/L    Carbon Dioxide (CO2) 24 22 - 29 mmol/L    Anion Gap 11 7 - 15 mmol/L    Urea Nitrogen 44.2 (H) 8.0 - 23.0 mg/dL    Creatinine 0.86 0.67 - 1.17 mg/dL    Calcium 9.4 8.8 - 10.2 mg/dL    Glucose 109 (H) 70 - 99 mg/dL    Alkaline Phosphatase 115 40 - 129 U/L    AST 31 10 - 50 U/L    ALT 25 10 - 50 U/L    Protein Total 5.7 (L) 6.4 - 8.3 g/dL    Albumin 3.8 3.5 - 5.2 g/dL    Bilirubin Total 0.3 <=1.2 mg/dL    GFR Estimate >90 >60 mL/min/1.73m2   Uric acid   Result Value Ref Range    Uric Acid 3.0 (L) 3.4 - 7.0 mg/dL   Phosphorus   Result Value Ref Range    Phosphorus 3.7 2.5 - 4.5 mg/dL   Lactate Dehydrogenase   Result Value Ref Range    Lactate Dehydrogenase 323 (H) 0 - 250 U/L   INR   Result Value Ref Range    INR 1.04 0.85 - 1.15   Partial  thromboplastin time   Result Value Ref Range    aPTT 25 22 - 38 Seconds   Fibrinogen activity   Result Value Ref Range    Fibrinogen Activity 274 170 - 490 mg/dL   CBC with platelets and differential   Result Value Ref Range    WBC Count 5.2 4.0 - 11.0 10e3/uL    RBC Count 3.54 (L) 4.40 - 5.90 10e6/uL    Hemoglobin 11.0 (L) 13.3 - 17.7 g/dL    Hematocrit 34.0 (L) 40.0 - 53.0 %    MCV 96 78 - 100 fL    MCH 31.1 26.5 - 33.0 pg    MCHC 32.4 31.5 - 36.5 g/dL    RDW 13.3 10.0 - 15.0 %    Platelet Count 116 (L) 150 - 450 10e3/uL    % Neutrophils 74 %    % Lymphocytes 21 %    % Monocytes 5 %    % Eosinophils 0 %    % Basophils 0 %    % Immature Granulocytes 0 %    NRBCs per 100 WBC 0 <1 /100    Absolute Neutrophils 3.8 1.6 - 8.3 10e3/uL    Absolute Lymphocytes 1.1 0.8 - 5.3 10e3/uL    Absolute Monocytes 0.3 0.0 - 1.3 10e3/uL    Absolute Eosinophils 0.0 0.0 - 0.7 10e3/uL    Absolute Basophils 0.0 0.0 - 0.2 10e3/uL    Absolute Immature Granulocytes 0.0 <=0.4 10e3/uL    Absolute NRBCs 0.0 10e3/uL   Adult Type and Screen   Result Value Ref Range    ABO/RH(D) A POS     Antibody Screen Negative Negative    SPECIMEN EXPIRATION DATE [id number]

## 2022-12-20 NOTE — PLAN OF CARE
"[id number]    /74 (BP Location: Left arm, Cuff Size: Adult Regular)   Pulse 86   Temp 97.9  F (36.6  C) (Oral)   Resp 20   Ht 1.702 m (5' 7\")   Wt 59.6 kg (131 lb 6.4 oz)   SpO2 94%   BMI 20.58 kg/m       Reason for admission: worsening B-cell lymphoma symptoms  Activity: UAL, ambulates frequently  Pain: mild at biopsy site, otherwise denies  Neuro: A&Ox4, no neuro deficits  Cardiac: Pacemaker in place. Afebrile, denies internal chest pain, denies dizziness. Slightly hypertensive, within parameters  Respiratory: Room air, denies SOB. Has infrequent, nonproductive cough. Incentive spirometer at bedside.  GI/: Voiding spontaneously, last BM 12/17. Denies nausea this shift.  Diet: Regular  Lines: PIV SL  Wounds: R chest lump biopsied, dressing CDI  Labs/imaging: Reviewed, see chart.TLS labs daily      New changes this shift: Politely declined 2am vitals if asleep, pt has been struggling with insomnia. Room/patient safety check completed at that time, 6am VSS. States he slept just over 5 hours overnight. PRN Mucinex given x1 for cough.     Plan: Possibly starting Rituxan today 12/20      Continue to follow POC           "

## 2022-12-20 NOTE — PROGRESS NOTES
SPIRITUAL HEALTH SERVICES  [facility] ([place]) 7D  REFERRAL SOURCE: Length of stay     Introduced spiritual health services. Pt was pleasant, engaged in brief conversation, adding that he had no spiritual health needs at this time.     PLAN: No follow.     Rev. Shanell Lockhart MDiv, [facility]  Staff    Pager [phone]  * [facility] remains available 24/7 for emergent requests/referrals, either by having the switchboard page the on-call  or by entering an ASAP/STAT consult in Epic (this will also page the on-call ).*

## 2022-12-20 NOTE — PLAN OF CARE
Goal Outcome Evaluation:  Shahid reports he can tell there is fluid in his lungs because whne he lays down now he feels he has to cough.  LS diminished in bases.  BX site right chest CDI and open to air.  Pt said he feels like mass has decreased since he started taking prednisone.  Tessalon worked well for his cough last night.  Eating well, showered and UAL in room.  Plan to start Rituxan this afternoon and final path/ treatment to be determined via tumor conference this afternoon.  Teaching done and written material provided with Rituxan

## 2022-12-21 DIAGNOSIS — T45.1X5S ADVERSE EFFECT OF ANTINEOPLASTIC AND IMMUNOSUPPRESSIVE DRUGS, SEQUELA: ICD-10-CM

## 2022-12-21 LAB
ALBUMIN SERPL BCG-MCNC: 3.5 G/DL (ref 3.5–5.2)
ALP SERPL-CCNC: 103 U/L (ref 40–129)
ALT SERPL W P-5'-P-CCNC: 24 U/L (ref 10–50)
ANION GAP SERPL CALCULATED.3IONS-SCNC: 10 MMOL/L (ref 7–15)
ANION GAP SERPL CALCULATED.3IONS-SCNC: 16 MMOL/L (ref 7–15)
APTT PPP: 24 SECONDS (ref 22–38)
AST SERPL W P-5'-P-CCNC: 29 U/L (ref 10–50)
BASOPHILS # BLD AUTO: 0 10E3/UL (ref 0–0.2)
BASOPHILS NFR BLD AUTO: 0 %
BILIRUB SERPL-MCNC: 0.2 MG/DL
BUN SERPL-MCNC: 35 MG/DL (ref 8–23)
BUN SERPL-MCNC: 40.7 MG/DL (ref 8–23)
CALCIUM SERPL-MCNC: 8.7 MG/DL (ref 8.8–10.2)
CALCIUM SERPL-MCNC: 9.2 MG/DL (ref 8.8–10.2)
CHLORIDE SERPL-SCNC: 108 MMOL/L (ref 98–107)
CHLORIDE SERPL-SCNC: 110 MMOL/L (ref 98–107)
CREAT SERPL-MCNC: 0.8 MG/DL (ref 0.67–1.17)
CREAT SERPL-MCNC: 0.82 MG/DL (ref 0.67–1.17)
DEPRECATED HCO3 PLAS-SCNC: 17 MMOL/L (ref 22–29)
DEPRECATED HCO3 PLAS-SCNC: 22 MMOL/L (ref 22–29)
EOSINOPHIL # BLD AUTO: 0 10E3/UL (ref 0–0.7)
EOSINOPHIL NFR BLD AUTO: 0 %
ERYTHROCYTE [DISTWIDTH] IN BLOOD BY AUTOMATED COUNT: 13.4 % (ref 10–15)
FIBRINOGEN PPP-MCNC: 268 MG/DL (ref 170–490)
GFR SERPL CREATININE-BSD FRML MDRD: >90 ML/MIN/1.73M2
GFR SERPL CREATININE-BSD FRML MDRD: >90 ML/MIN/1.73M2
GLUCOSE SERPL-MCNC: 102 MG/DL (ref 70–99)
GLUCOSE SERPL-MCNC: 209 MG/DL (ref 70–99)
HCT VFR BLD AUTO: 32.3 % (ref 40–53)
HGB BLD-MCNC: 10.3 G/DL (ref 13.3–17.7)
HSV1 DNA SPEC QL NAA+PROBE: NEGATIVE
HSV2 DNA SPEC QL NAA+PROBE: NEGATIVE
IMM GRANULOCYTES # BLD: 0 10E3/UL
IMM GRANULOCYTES NFR BLD: 0 %
INR PPP: 1.09 (ref 0.85–1.15)
LDH SERPL L TO P-CCNC: 324 U/L (ref 0–250)
LYMPHOCYTES # BLD AUTO: 0.8 10E3/UL (ref 0.8–5.3)
LYMPHOCYTES NFR BLD AUTO: 16 %
MCH RBC QN AUTO: 30.5 PG (ref 26.5–33)
MCHC RBC AUTO-ENTMCNC: 31.9 G/DL (ref 31.5–36.5)
MCV RBC AUTO: 96 FL (ref 78–100)
MONOCYTES # BLD AUTO: 0.2 10E3/UL (ref 0–1.3)
MONOCYTES NFR BLD AUTO: 4 %
NEUTROPHILS # BLD AUTO: 3.6 10E3/UL (ref 1.6–8.3)
NEUTROPHILS NFR BLD AUTO: 80 %
NRBC # BLD AUTO: 0 10E3/UL
NRBC BLD AUTO-RTO: 0 /100
PHOSPHATE SERPL-MCNC: 3.4 MG/DL (ref 2.5–4.5)
PLATELET # BLD AUTO: 99 10E3/UL (ref 150–450)
POTASSIUM SERPL-SCNC: 4.2 MMOL/L (ref 3.4–5.3)
POTASSIUM SERPL-SCNC: 4.3 MMOL/L (ref 3.4–5.3)
PROT SERPL-MCNC: 5.2 G/DL (ref 6.4–8.3)
RBC # BLD AUTO: 3.38 10E6/UL (ref 4.4–5.9)
SODIUM SERPL-SCNC: 141 MMOL/L (ref 136–145)
SODIUM SERPL-SCNC: 142 MMOL/L (ref 136–145)
URATE SERPL-MCNC: 2.7 MG/DL (ref 3.4–7)
WBC # BLD AUTO: 4.6 10E3/UL (ref 4–11)

## 2022-12-21 PROCEDURE — 99233 SBSQ HOSP IP/OBS HIGH 50: CPT | Mod: AI | Performed by: PHYSICIAN ASSISTANT

## 2022-12-21 PROCEDURE — 250N000011 HC RX IP 250 OP 636: Performed by: PHYSICIAN ASSISTANT

## 2022-12-21 PROCEDURE — 84550 ASSAY OF BLOOD/URIC ACID: CPT | Performed by: PHYSICIAN ASSISTANT

## 2022-12-21 PROCEDURE — 258N000003 HC RX IP 258 OP 636: Performed by: INTERNAL MEDICINE

## 2022-12-21 PROCEDURE — 84100 ASSAY OF PHOSPHORUS: CPT | Performed by: PHYSICIAN ASSISTANT

## 2022-12-21 PROCEDURE — 85384 FIBRINOGEN ACTIVITY: CPT | Performed by: PHYSICIAN ASSISTANT

## 2022-12-21 PROCEDURE — 250N000012 HC RX MED GY IP 250 OP 636 PS 637: Performed by: PHYSICIAN ASSISTANT

## 2022-12-21 PROCEDURE — 83615 LACTATE (LD) (LDH) ENZYME: CPT | Performed by: PHYSICIAN ASSISTANT

## 2022-12-21 PROCEDURE — 120N000002 HC R&B MED SURG/OB UMMC

## 2022-12-21 PROCEDURE — 85730 THROMBOPLASTIN TIME PARTIAL: CPT | Performed by: PHYSICIAN ASSISTANT

## 2022-12-21 PROCEDURE — 250N000011 HC RX IP 250 OP 636: Performed by: INTERNAL MEDICINE

## 2022-12-21 PROCEDURE — 85025 COMPLETE CBC W/AUTO DIFF WBC: CPT | Performed by: HOSPITALIST

## 2022-12-21 PROCEDURE — 80053 COMPREHEN METABOLIC PANEL: CPT | Performed by: HOSPITALIST

## 2022-12-21 PROCEDURE — 36415 COLL VENOUS BLD VENIPUNCTURE: CPT | Performed by: HOSPITALIST

## 2022-12-21 PROCEDURE — 36415 COLL VENOUS BLD VENIPUNCTURE: CPT | Performed by: PHYSICIAN ASSISTANT

## 2022-12-21 PROCEDURE — 250N000013 HC RX MED GY IP 250 OP 250 PS 637: Performed by: PHYSICIAN ASSISTANT

## 2022-12-21 PROCEDURE — 85610 PROTHROMBIN TIME: CPT | Performed by: PHYSICIAN ASSISTANT

## 2022-12-21 RX ORDER — BENZONATATE 100 MG/1
100 CAPSULE ORAL 3 TIMES DAILY PRN
Qty: 30 CAPSULE | Refills: 0 | Status: SHIPPED | OUTPATIENT
Start: 2022-12-21 | End: 2022-12-22

## 2022-12-21 RX ORDER — TEMAZEPAM 7.5 MG/1
7.5 CAPSULE ORAL
Qty: 20 CAPSULE | Refills: 0 | Status: SHIPPED | OUTPATIENT
Start: 2022-12-21 | End: 2022-12-22

## 2022-12-21 RX ORDER — ACYCLOVIR 400 MG/1
400 TABLET ORAL 2 TIMES DAILY
Qty: 60 TABLET | Refills: 0 | Status: SHIPPED | OUTPATIENT
Start: 2022-12-21 | End: 2022-12-22

## 2022-12-21 RX ORDER — PREDNISONE 50 MG/1
100 TABLET ORAL DAILY
Qty: 4 TABLET | Refills: 0 | Status: SHIPPED | OUTPATIENT
Start: 2022-12-23 | End: 2022-12-25

## 2022-12-21 RX ORDER — ALLOPURINOL 300 MG/1
300 TABLET ORAL DAILY
Qty: 60 TABLET | Refills: 0 | Status: SHIPPED | OUTPATIENT
Start: 2022-12-22 | End: 2022-12-22

## 2022-12-21 RX ADMIN — BENZONATATE 100 MG: 100 CAPSULE ORAL at 21:48

## 2022-12-21 RX ADMIN — ACYCLOVIR 400 MG: 400 TABLET ORAL at 09:47

## 2022-12-21 RX ADMIN — ALLOPURINOL 300 MG: 300 TABLET ORAL at 09:47

## 2022-12-21 RX ADMIN — ACYCLOVIR 400 MG: 400 TABLET ORAL at 21:12

## 2022-12-21 RX ADMIN — OMEPRAZOLE 40 MG: 40 CAPSULE, DELAYED RELEASE ORAL at 09:47

## 2022-12-21 RX ADMIN — PREDNISONE 100 MG: 50 TABLET ORAL at 09:46

## 2022-12-21 RX ADMIN — BENZONATATE 100 MG: 100 CAPSULE ORAL at 01:19

## 2022-12-21 RX ADMIN — TEMAZEPAM 7.5 MG: 7.5 CAPSULE ORAL at 01:19

## 2022-12-21 RX ADMIN — TEMAZEPAM 7.5 MG: 7.5 CAPSULE ORAL at 21:49

## 2022-12-21 RX ADMIN — CYCLOPHOSPHAMIDE 1260 MG: 2 INJECTION, POWDER, FOR SOLUTION INTRAVENOUS; ORAL at 00:15

## 2022-12-21 RX ADMIN — ENOXAPARIN SODIUM 40 MG: 40 INJECTION SUBCUTANEOUS at 17:34

## 2022-12-21 ASSESSMENT — ACTIVITIES OF DAILY LIVING (ADL)
ADLS_ACUITY_SCORE: 20

## 2022-12-21 NOTE — PLAN OF CARE
Goal Outcome Evaluation:  Shahid is C1 D2 R-CHOP and doing well.  No s/s of TLS.  Denies nausea and eating ok.  He is very happy he tolerated the Rituxan well.  UAL and walking halls. Plan to discharge tomorrow then Neulasta in clinic on Fri - Pt is aware of this appt.

## 2022-12-21 NOTE — PLAN OF CARE
[id number]    VSS on RA, denies pain or nausea. Endorses PARRISH related to pleural effusions. Rituxan ramp up completed w/o complication. PA called to notify writer of plan to start R-CHOP this evening upon completion of Rituxan. Doxorubicin infused w/brisk blood return from newly placed PIV. Will get vincristine and cytoxan overnight. Emend and zofran given as pre meds, pt tolerated well. Voiding adequately, hoping to get a restful night's sleep.     [id number]    VSS on RA, denies pain or nausea. Vincristine and cytoxan infused via newly placed PIV w/brisk blood return noted. Given temazepam for sleep and tessalon for cough. Sleeping between cares.

## 2022-12-21 NOTE — PROGRESS NOTES
[facility]    Hematology / Oncology Progress Note    Patient: Shahid Davis  MRN: [id number]  Admission Date: 12/14/2022  Date of Service (when I saw the patient): 12/21/2022  Hospital Day # 7     Assessment & Plan   Shahid Davis is a 72 year old man with a history of complete heart block s/p pacemaker placement and low-grade kappa-restricted plasmacytoid B-cell lymphoma diagnosed 3/2004 and most recently on surveillance. He presented with chest pain, flank pain, and worsening B-symptoms and was found to have radiographic e/o concern for transformation of his low-grade lymphoma. Initiated on R-CHOP (L5L7=6212/20/22) which was well tolerated.       TODAY:   - Continue prednisone 100 mg daily through D5 (12/24)   - Tentative plan to discharge home tomorrow AM and stay with local friend during snow storm. Outpatient Neulasta scheduling requested in clinic 12/23. Self administration not covered per pharmacy liaison.   - PRN Tessalon and Robitussin available for cough. Best supportive cares.       HEME  # H/o low-grade kappa restricted plasmacytoid B-cell lymphoma 3/2004   # Concern for transformation  # Hypogammaglobulinemia  Follows with Dr. Ruelas. 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 He presented progressing B symptoms, abdominal pain, and SOB. PET 12/9 showed extensive lymphomatous involvement to the R pleura w/avid effusions, mediastinal and pericardial regions, right anterolateral chest wall, adenopathy above and below the diaphragm, liver, spleen and multiple sites in the skeleton, concerning for transformation from his low-grade lymphoma. He also has pancytopenia which seems to have progressed slowly over the past years. Of note, reports maternal history of waldenstrom's. Due to worsening shortness of breath and delays of  biopsy, patient presented to ED on 12/14 and was subsequently admitted for expedited workup and treatment. Given highest SUV and easily accessible sternoclavicular soft tissue mass, plan for biopsy of this lesion and okay to hold on BMBx given would not change treatment plan.   - S/p US-guided sternoclavicular soft tissue mass biopsy with IR 12/15; morphology, cytogenetics pending. Flow with 95% CD10 positive kappa-monotypic B cells.   - FLC WNL. M-spike on 12/2 was 0.2. Beta-2-microglobulin elevated at 3.6. IgA 36, IgG 310, IgM 302. SPEP pending.   - Echo (12/15) with LVEF 60-65% with mild aortic insufficiency, no evidence of effusion or tamponade.  - Continue to hold on BMBx at this time as it would not  (known involvement on PET/CT), although if inadequate tissue from biopsy on 12/15 or need for further diagnostic tissue, could consider early week of 12/19.   - Prednisone 100 mg daily (12/17 - 12/24)   - RCHOP ordered following discussion at weekly heme malignancy conference 12/20.     Treatment Plan: RCHOP (C1D1 = 12/20/22)   - Rituximab 375 mg/m2 D1  - Cytoxan 750 mg/m2 D1   - Vincristine 1.4 mg/m2 D1  - Doxorubicin 50 mg/m2 D1  - Prednisone 100 mg D1-5   - [place] [facility] [place]     # Pancytopenia  Likely secondary to prior treatment and lymphomatous involvement.  - Monitor CBC daily  - Transfuse if Hgb <7 and plt <10k    # TLS monitoring  On 12/15, uric acid peak 9.1. Cr, K, Ca, and phos all WNL. S/p rasburicase 12/15 with resolution of hyperuricemia. TLS labs WNL as of 12/18.   - Monitor TLS/DIC labs daily  - Allopurinol 300 mg daily  - Avoiding IVF given malignant pleural effusions although could consider gentle IVF if needed.    ID  - Viral serologies: Hep B/C non-reactive. HSV - pending.   - ppx  mg BID  - Pentamidine neb given inpatient 12/19/22, next due outpatient 1/16/23.     CV/PULM  # Shortness of breath, improved  # R large and L small pleural effusion    PET/CT (12/9) with FDG-avid large R and small L pleural effusion. CXR on admission (12/14/22) with stable pleural effusions from 12/9 with likely atelectasis as well. O2 transiently down to 89% overnight but remains >90% on room air. Echo as above. Consideration given to thoracentesis, but in efforts to minimize procedures and with stable respiratory status, will continue to monitor.   - Consider thoracentesis if worsening respiratory status.  - PO Lasix 20 mg once x12/19 with improvement     # H/o complete heart block s/p pacemaker placement 2020  - No acute inpatient needs     GI  # GERD  Related to history of radiation to chest.   - Continue PTA omeprazole.    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 Took uncertain medication for this in the past (~2004), however cannot remember. Melatonin and Trazadone foung to be ineffective.   - Trial Restoril PRN     Clinically Significant Risk Factors                # Thrombocytopenia: Lowest platelets = 99 in last 2 days, will monitor for bleeding           # Severe Malnutrition: based on nutrition assessment      FEN  Diet: Regular Diet Adult   IVF: Bolus PRN (judiciously given pleural effusions)  Lytes: Replete per protocol    PPX  VTE: Lovenox  Bowel: Senna/MiraLax PRN  GI/PUD: Continue PTA omeprazole.    MISC  Code Status: Full Code   Lines/Drains: PIV  Dispo: Pending work up of transformed lymphoma and treatment plan  Follow Up: Follows with Dr. Ruelas; will request pending final treatment plan. Local twice weekly labs requested at [facility] (FAX sent to [phone])     Patient was seen and plan of care was discussed with attending physician Dr. Ruelas.    I spent 60 minutes in the care of this patient today, which included time necessary for review of interval events, obtaining history and physical exam, ordering medications/tests/procedures as medically indicated, review of pertinent medical literature, counseling of the patient, coordination  of care, and documentation time. Over 50% of time was spent counseling the patient and/or coordinating care.    Leida Barton PA-C    Hematology/Oncology   Pager: [phone]     Interval History   Overnight, no acute events. Afebrile and HD stable. Denies side effects from RCHOP overnight but poor sleep as did not finish until late at night. Improved sleep with Restoril, requesting at discharge. Cough better with Mucinex and Tessalon. Discussed plan to proceed with Rituxan today. He denies nausea, abdominal pain, headaches, new or worsening symptoms. Questions answered.     Vital Signs with Ranges  Temp:  [97.5  F (36.4  C)-98  F (36.7  C)] 97.8  F (36.6  C)  Pulse:  [] 84  Resp:  [14-18] 18  BP: (104-132)/(62-77) 132/77  SpO2:  [93 %-97 %] 93 %  I/O last 3 completed shifts:  In: 1490 [P.O.:1480; I.V.:10]  Out: 1550 [Urine:1550]    Physical Exam   General: pleasant gentleman ambulating around unit. Alert, NAD. Pleasant and conversational.  Skin: Large palpable mass (~4cm) of anterior chest wall; non tender and non erythematous. No additional concerning lesions, rash, jaundice, cyanosis, erythema, or ecchymoses on exposed surfaces.   HEENT: NCAT. Anicteric sclera. Moist mucous membranes with no lesions, erythema, or thrush.   Respiratory: Non-labored breathing on room air, good air exchange, decreased breath sounds bilaterally (R>L)   Cardiovascular: RRR. No murmur or rub.   Gastrointestinal: Normoactive BS. Abdomen soft, ND, NT. No palpable masses.  Extremities: No LE edema.   Neurologic: A&O x 3, speech normal, no deficits grossly. Ambulating without difficulties    Medications     - MEDICATION INSTRUCTIONS -         acyclovir  400 mg Oral BID     allopurinol  300 mg Oral Daily     enoxaparin ANTICOAGULANT  40 mg Subcutaneous Q24H     omeprazole  40 mg Oral QAM AC     predniSONE  100 mg Oral Daily     sodium chloride (PF)  3 mL Intracatheter Q8H     Data   Results for orders placed or performed during the  hospital encounter of 12/14/22 (from the past 24 hour(s))   CBC with platelets differential    Narrative    The following orders were created for panel order CBC with platelets differential.  Procedure                               Abnormality         Status                     ---------                               -----------         ------                     CBC with platelets and d...[[id number]]  Abnormal            Final result                 Please view results for these tests on the individual orders.   Comprehensive metabolic panel   Result Value Ref Range    Sodium 140 136 - 145 mmol/L    Potassium 4.3 3.4 - 5.3 mmol/L    Chloride 107 98 - 107 mmol/L    Carbon Dioxide (CO2) 21 (L) 22 - 29 mmol/L    Anion Gap 12 7 - 15 mmol/L    Urea Nitrogen 45.4 (H) 8.0 - 23.0 mg/dL    Creatinine 0.84 0.67 - 1.17 mg/dL    Calcium 9.3 8.8 - 10.2 mg/dL    Glucose 144 (H) 70 - 99 mg/dL    Alkaline Phosphatase 119 40 - 129 U/L    AST 34 10 - 50 U/L    ALT 28 10 - 50 U/L    Protein Total 5.9 (L) 6.4 - 8.3 g/dL    Albumin 3.8 3.5 - 5.2 g/dL    Bilirubin Total 0.3 <=1.2 mg/dL    GFR Estimate >90 >60 mL/min/1.73m2   Phosphorus   Result Value Ref Range    Phosphorus 3.6 2.5 - 4.5 mg/dL   Uric acid   Result Value Ref Range    Uric Acid 2.6 (L) 3.4 - 7.0 mg/dL   Lactate Dehydrogenase   Result Value Ref Range    Lactate Dehydrogenase 352 (H) 0 - 250 U/L   CBC with platelets and differential   Result Value Ref Range    WBC Count 3.2 (L) 4.0 - 11.0 10e3/uL    RBC Count 3.74 (L) 4.40 - 5.90 10e6/uL    Hemoglobin 11.6 (L) 13.3 - 17.7 g/dL    Hematocrit 35.9 (L) 40.0 - 53.0 %    MCV 96 78 - 100 fL    MCH 31.0 26.5 - 33.0 pg    MCHC 32.3 31.5 - 36.5 g/dL    RDW 13.3 10.0 - 15.0 %    Platelet Count 112 (L) 150 - 450 10e3/uL    % Neutrophils 87 %    % Lymphocytes 10 %    % Monocytes 2 %    % Eosinophils 0 %    % Basophils 0 %    % Immature Granulocytes 1 %    NRBCs per 100 WBC 0 <1 /100    Absolute Neutrophils 2.8 1.6 - 8.3 10e3/uL     Absolute Lymphocytes 0.3 (L) 0.8 - 5.3 10e3/uL    Absolute Monocytes 0.1 0.0 - 1.3 10e3/uL    Absolute Eosinophils 0.0 0.0 - 0.7 10e3/uL    Absolute Basophils 0.0 0.0 - 0.2 10e3/uL    Absolute Immature Granulocytes 0.0 <=0.4 10e3/uL    Absolute NRBCs 0.0 10e3/uL   CBC with platelets differential    Narrative    The following orders were created for panel order CBC with platelets differential.  Procedure                               Abnormality         Status                     ---------                               -----------         ------                     CBC with platelets and d...[[id number]]  Abnormal            Final result                 Please view results for these tests on the individual orders.   Comprehensive metabolic panel   Result Value Ref Range    Sodium 142 136 - 145 mmol/L    Potassium 4.2 3.4 - 5.3 mmol/L    Chloride 110 (H) 98 - 107 mmol/L    Carbon Dioxide (CO2) 22 22 - 29 mmol/L    Anion Gap 10 7 - 15 mmol/L    Urea Nitrogen 40.7 (H) 8.0 - 23.0 mg/dL    Creatinine 0.82 0.67 - 1.17 mg/dL    Calcium 8.7 (L) 8.8 - 10.2 mg/dL    Glucose 102 (H) 70 - 99 mg/dL    Alkaline Phosphatase 103 40 - 129 U/L    AST 29 10 - 50 U/L    ALT 24 10 - 50 U/L    Protein Total 5.2 (L) 6.4 - 8.3 g/dL    Albumin 3.5 3.5 - 5.2 g/dL    Bilirubin Total 0.2 <=1.2 mg/dL    GFR Estimate >90 >60 mL/min/1.73m2   Uric acid   Result Value Ref Range    Uric Acid 2.7 (L) 3.4 - 7.0 mg/dL   Phosphorus   Result Value Ref Range    Phosphorus 3.4 2.5 - 4.5 mg/dL   Lactate Dehydrogenase   Result Value Ref Range    Lactate Dehydrogenase 324 (H) 0 - 250 U/L   INR   Result Value Ref Range    INR 1.09 0.85 - 1.15   Partial thromboplastin time   Result Value Ref Range    aPTT 24 22 - 38 Seconds   Fibrinogen activity   Result Value Ref Range    Fibrinogen Activity 268 170 - 490 mg/dL   CBC with platelets and differential   Result Value Ref Range    WBC Count 4.6 4.0 - 11.0 10e3/uL    RBC Count 3.38 (L) 4.40 - 5.90 10e6/uL     Hemoglobin 10.3 (L) 13.3 - 17.7 g/dL    Hematocrit 32.3 (L) 40.0 - 53.0 %    MCV 96 78 - 100 fL    MCH 30.5 26.5 - 33.0 pg    MCHC 31.9 31.5 - 36.5 g/dL    RDW 13.4 10.0 - 15.0 %    Platelet Count 99 (L) 150 - 450 10e3/uL    % Neutrophils 80 %    % Lymphocytes 16 %    % Monocytes 4 %    % Eosinophils 0 %    % Basophils 0 %    % Immature Granulocytes 0 %    NRBCs per 100 WBC 0 <1 /100    Absolute Neutrophils 3.6 1.6 - 8.3 10e3/uL    Absolute Lymphocytes 0.8 0.8 - 5.3 10e3/uL    Absolute Monocytes 0.2 0.0 - 1.3 10e3/uL    Absolute Eosinophils 0.0 0.0 - 0.7 10e3/uL    Absolute Basophils 0.0 0.0 - 0.2 10e3/uL    Absolute Immature Granulocytes 0.0 <=0.4 10e3/uL    Absolute NRBCs 0.0 10e3/uL

## 2022-12-22 VITALS
DIASTOLIC BLOOD PRESSURE: 72 MMHG | WEIGHT: 134.1 LBS | TEMPERATURE: 98.7 F | RESPIRATION RATE: 16 BRPM | SYSTOLIC BLOOD PRESSURE: 139 MMHG | OXYGEN SATURATION: 93 % | HEIGHT: 67 IN | BODY MASS INDEX: 21.05 KG/M2 | HEART RATE: 98 BPM

## 2022-12-22 LAB
ALBUMIN SERPL BCG-MCNC: 3.6 G/DL (ref 3.5–5.2)
ALP SERPL-CCNC: 109 U/L (ref 40–129)
ALT SERPL W P-5'-P-CCNC: 20 U/L (ref 10–50)
ANION GAP SERPL CALCULATED.3IONS-SCNC: 12 MMOL/L (ref 7–15)
APTT PPP: 23 SECONDS (ref 22–38)
AST SERPL W P-5'-P-CCNC: 30 U/L (ref 10–50)
BASOPHILS # BLD AUTO: 0 10E3/UL (ref 0–0.2)
BASOPHILS NFR BLD AUTO: 0 %
BILIRUB SERPL-MCNC: 0.3 MG/DL
BUN SERPL-MCNC: 33.8 MG/DL (ref 8–23)
CALCIUM SERPL-MCNC: 9.3 MG/DL (ref 8.8–10.2)
CHLORIDE SERPL-SCNC: 110 MMOL/L (ref 98–107)
CREAT SERPL-MCNC: 0.89 MG/DL (ref 0.67–1.17)
DEPRECATED HCO3 PLAS-SCNC: 22 MMOL/L (ref 22–29)
EOSINOPHIL # BLD AUTO: 0 10E3/UL (ref 0–0.7)
EOSINOPHIL NFR BLD AUTO: 0 %
ERYTHROCYTE [DISTWIDTH] IN BLOOD BY AUTOMATED COUNT: 13.2 % (ref 10–15)
FIBRINOGEN PPP-MCNC: 302 MG/DL (ref 170–490)
GFR SERPL CREATININE-BSD FRML MDRD: 90 ML/MIN/1.73M2
GLUCOSE SERPL-MCNC: 93 MG/DL (ref 70–99)
HCT VFR BLD AUTO: 35.4 % (ref 40–53)
HGB BLD-MCNC: 11.6 G/DL (ref 13.3–17.7)
IMM GRANULOCYTES # BLD: 0 10E3/UL
IMM GRANULOCYTES NFR BLD: 0 %
INR PPP: 1.07 (ref 0.85–1.15)
LDH SERPL L TO P-CCNC: 320 U/L (ref 0–250)
LYMPHOCYTES # BLD AUTO: 1.1 10E3/UL (ref 0.8–5.3)
LYMPHOCYTES NFR BLD AUTO: 24 %
MCH RBC QN AUTO: 31.2 PG (ref 26.5–33)
MCHC RBC AUTO-ENTMCNC: 32.8 G/DL (ref 31.5–36.5)
MCV RBC AUTO: 95 FL (ref 78–100)
MONOCYTES # BLD AUTO: 0.2 10E3/UL (ref 0–1.3)
MONOCYTES NFR BLD AUTO: 4 %
NEUTROPHILS # BLD AUTO: 3.3 10E3/UL (ref 1.6–8.3)
NEUTROPHILS NFR BLD AUTO: 72 %
NRBC # BLD AUTO: 0 10E3/UL
NRBC BLD AUTO-RTO: 0 /100
PHOSPHATE SERPL-MCNC: 3.2 MG/DL (ref 2.5–4.5)
PLATELET # BLD AUTO: 108 10E3/UL (ref 150–450)
POTASSIUM SERPL-SCNC: 4.4 MMOL/L (ref 3.4–5.3)
PROT SERPL-MCNC: 5.6 G/DL (ref 6.4–8.3)
RBC # BLD AUTO: 3.72 10E6/UL (ref 4.4–5.9)
SODIUM SERPL-SCNC: 144 MMOL/L (ref 136–145)
URATE SERPL-MCNC: 3.2 MG/DL (ref 3.4–7)
WBC # BLD AUTO: 4.6 10E3/UL (ref 4–11)

## 2022-12-22 PROCEDURE — 250N000013 HC RX MED GY IP 250 OP 250 PS 637: Performed by: PHYSICIAN ASSISTANT

## 2022-12-22 PROCEDURE — 250N000012 HC RX MED GY IP 250 OP 636 PS 637: Performed by: PHYSICIAN ASSISTANT

## 2022-12-22 PROCEDURE — 85025 COMPLETE CBC W/AUTO DIFF WBC: CPT | Performed by: HOSPITALIST

## 2022-12-22 PROCEDURE — 85610 PROTHROMBIN TIME: CPT | Performed by: PHYSICIAN ASSISTANT

## 2022-12-22 PROCEDURE — 85384 FIBRINOGEN ACTIVITY: CPT | Performed by: PHYSICIAN ASSISTANT

## 2022-12-22 PROCEDURE — 36415 COLL VENOUS BLD VENIPUNCTURE: CPT | Performed by: PHYSICIAN ASSISTANT

## 2022-12-22 PROCEDURE — 84100 ASSAY OF PHOSPHORUS: CPT | Performed by: PHYSICIAN ASSISTANT

## 2022-12-22 PROCEDURE — 84550 ASSAY OF BLOOD/URIC ACID: CPT | Performed by: PHYSICIAN ASSISTANT

## 2022-12-22 PROCEDURE — 80053 COMPREHEN METABOLIC PANEL: CPT | Performed by: HOSPITALIST

## 2022-12-22 PROCEDURE — 85730 THROMBOPLASTIN TIME PARTIAL: CPT | Performed by: PHYSICIAN ASSISTANT

## 2022-12-22 PROCEDURE — 83615 LACTATE (LD) (LDH) ENZYME: CPT | Performed by: PHYSICIAN ASSISTANT

## 2022-12-22 PROCEDURE — 99239 HOSP IP/OBS DSCHRG MGMT >30: CPT | Mod: FS | Performed by: PHYSICIAN ASSISTANT

## 2022-12-22 RX ORDER — ACYCLOVIR 400 MG/1
400 TABLET ORAL EVERY 12 HOURS
Qty: 120 TABLET | Refills: 3 | Status: SHIPPED | OUTPATIENT
Start: 2022-12-22 | End: 2023-02-23

## 2022-12-22 RX ORDER — LEVOFLOXACIN 250 MG/1
250 TABLET, FILM COATED ORAL DAILY PRN
Qty: 30 TABLET | Refills: 0 | Status: SHIPPED | OUTPATIENT
Start: 2022-12-22 | End: 2022-12-26

## 2022-12-22 RX ORDER — ONDANSETRON 8 MG/1
8 TABLET, FILM COATED ORAL EVERY 8 HOURS PRN
Qty: 60 TABLET | Refills: 0 | Status: SHIPPED | OUTPATIENT
Start: 2022-12-22 | End: 2022-12-27

## 2022-12-22 RX ORDER — ALLOPURINOL 300 MG/1
300 TABLET ORAL DAILY
Qty: 5 TABLET | Refills: 0 | Status: SHIPPED | OUTPATIENT
Start: 2022-12-22 | End: 2022-12-26

## 2022-12-22 RX ORDER — TEMAZEPAM 7.5 MG/1
7.5 CAPSULE ORAL
Qty: 20 CAPSULE | Refills: 0 | Status: SHIPPED | OUTPATIENT
Start: 2022-12-22 | End: 2022-12-22

## 2022-12-22 RX ORDER — TEMAZEPAM 7.5 MG/1
7.5 CAPSULE ORAL
Qty: 20 CAPSULE | Refills: 0 | Status: ON HOLD | OUTPATIENT
Start: 2022-12-22 | End: 2023-01-13

## 2022-12-22 RX ORDER — ACYCLOVIR 400 MG/1
400 TABLET ORAL 2 TIMES DAILY
Qty: 10 TABLET | Refills: 0 | Status: SHIPPED | OUTPATIENT
Start: 2022-12-22 | End: 2022-12-26

## 2022-12-22 RX ORDER — LEVOFLOXACIN 250 MG/1
250 TABLET, FILM COATED ORAL DAILY PRN
Qty: 30 TABLET | Refills: 0 | Status: SHIPPED | OUTPATIENT
Start: 2022-12-22 | End: 2022-12-27

## 2022-12-22 RX ORDER — FLUCONAZOLE 200 MG/1
200 TABLET ORAL DAILY
Qty: 30 TABLET | Refills: 0 | Status: SHIPPED | OUTPATIENT
Start: 2022-12-22 | End: 2022-12-26

## 2022-12-22 RX ORDER — FLUCONAZOLE 200 MG/1
200 TABLET ORAL DAILY PRN
Qty: 30 TABLET | Refills: 0 | Status: ON HOLD | OUTPATIENT
Start: 2022-12-22 | End: 2023-01-16

## 2022-12-22 RX ORDER — ALLOPURINOL 300 MG/1
300 TABLET ORAL DAILY
Qty: 60 TABLET | Refills: 3 | Status: SHIPPED | OUTPATIENT
Start: 2022-12-22 | End: 2023-04-28

## 2022-12-22 RX ORDER — ONDANSETRON 8 MG/1
8 TABLET, FILM COATED ORAL EVERY 8 HOURS PRN
Qty: 60 TABLET | Refills: 3 | Status: SHIPPED | OUTPATIENT
Start: 2022-12-22 | End: 2022-12-26

## 2022-12-22 RX ADMIN — PREDNISONE 100 MG: 50 TABLET ORAL at 08:23

## 2022-12-22 RX ADMIN — ACYCLOVIR 400 MG: 400 TABLET ORAL at 08:23

## 2022-12-22 RX ADMIN — OMEPRAZOLE 40 MG: 40 CAPSULE, DELAYED RELEASE ORAL at 08:24

## 2022-12-22 RX ADMIN — ALLOPURINOL 300 MG: 300 TABLET ORAL at 08:23

## 2022-12-22 ASSESSMENT — ACTIVITIES OF DAILY LIVING (ADL)
ADLS_ACUITY_SCORE: 20

## 2022-12-22 NOTE — DISCHARGE SUMMARY
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 He presented with chest pain, flank pain, and worsening B-symptoms and was found to have radiographic e/o concern for transformation of his low-grade lymphoma. Initiated on R-CHOP (H9A5=3612/20/22) which was well tolerated.      We reviewed signs & symptoms of concern that should prompt a call to clinic triage or a visit to the ED, and patient voiced understanding. All questions answered. On the day of discharge, patient was quite well-appearing, hemodynamically stable, and felt safe and comfortable with the plans for discharge and follow-up.       Follow up:    Neulasta injection at [facility] 12/23     Virtual JOSE MARTIN 12/26    IR consult for port placement     Outpatient Provider to follow:     Pending FISH to determine final diagnosis as High grade B cell lymphoma NOS vs Double or Triple HIT DLBCL. Pending results, may need LP evaluation.     Recommend IVIG outpatient if infectious concerns     Medication Highlights:     Started on Allopurinol, PRN Zofran, ACV, PRN Restoril, PRN Tessalon    Prednisone 100 mg 12/23-12/24     Start Levaquin and Fluconazole if ANC <1k     Hospital Course   Shahid Davis was admitted on  12/14/2022.  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 Ruelas. 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 He presented progressing B symptoms, abdominal pain, and SOB. PET 12/9 showed extensive lymphomatous involvement to the R pleura w/avid effusions, mediastinal and pericardial regions, right anterolateral chest wall, adenopathy above and below the diaphragm, liver, spleen and multiple sites in the skeleton, concerning for transformation from his low-grade lymphoma. He also has pancytopenia which seems to have progressed slowly over the past years. Of note, reports maternal history of waldenstrom's. Due to worsening shortness of breath and delays of biopsy, patient presented to ED on 12/14 and was subsequently admitted for expedited workup and treatment. Given highest SUV and easily accessible sternoclavicular soft tissue mass, plan for biopsy of this lesion and okay to hold on BMBx given would not change treatment plan.   - S/p US-guided sternoclavicular soft tissue mass biopsy with IR 12/15; morphology, cytogenetics pending. Flow with 95% CD10 positive kappa-monotypic B cells.   - FLC WNL. M-spike on 12/2 was 0.2. Beta-2-microglobulin elevated at 3.6. IgA 36, IgG 310, IgM 302  - Echo (12/15) with LVEF 60-65% with mild aortic insufficiency, no evidence of effusion or tamponade.  - Continue to hold on BMBx at this time as it would not  (known involvement on PET/CT), although if inadequate tissue from biopsy on 12/15 or need for further diagnostic tissue, could consider early week of 12/19.   - Prednisone 100 mg daily (12/17 - 12/24)   - RCHOP ordered following discussion at  weekly heme malignancy conference 12/20.      Treatment Plan: RCHOP (C1D1 = 12/20/22)   - Rituximab 375 mg/m2 D1  - Cytoxan 750 mg/m2 D1   - Vincristine 1.4 mg/m2 D1  - Doxorubicin 50 mg/m2 D1  - Prednisone 100 mg D1-5   - Neulasta requested outpatient at [facility] ~12/23      # Pancytopenia  Likely secondary to prior treatment and lymphomatous involvement.  - Monitor CBC daily  - Transfuse if Hgb <7 and plt <10k     # TLS monitoring  On 12/15, uric acid peak 9.1. Cr, K, Ca, and phos all WNL. S/p rasburicase 12/15 with resolution of hyperuricemia. TLS labs WNL as of 12/18.   - Monitor TLS/DIC labs daily  - Allopurinol 300 mg daily  - Avoiding IVF given malignant pleural effusions although could consider gentle IVF if needed.     ID  # Hypogammaglobulinemia   # PPx  - Viral serologies: Hep B/C non-reactive. HSV-   - ppx  mg BID. Discharged on Levaquin and Fluconazole to start when ANC <1k   - Pentamidine neb given inpatient 12/19/22, next due outpatient 1/16/23.   -  this admission. Consider IVIG outpatient.     CV/PULM  # Shortness of breath, improved  # R large and L small pleural effusion   PET/CT (12/9) with FDG-avid large R and small L pleural effusion. CXR on admission (12/14/22) with stable pleural effusions from 12/9 with likely atelectasis as well. O2 transiently down to 89% overnight but remains >90% on room air. Echo as above. Consideration given to thoracentesis, but in efforts to minimize procedures and with stable respiratory status, will continue to monitor.   - Consider thoracentesis if worsening respiratory status.  - PO Lasix 20 mg once x12/19 with improvement     # H/o complete heart block s/p pacemaker placement 2020  - No acute inpatient needs      GI  # GERD  Related to history of radiation to chest.   - Continue PTA omeprazole.     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 Took uncertain medication for this in the past (~2004), however cannot remember. Melatonin and  Trazadone foung to be ineffective.   - Trial Restoril PRN      Clinically Significant Risk Factors                # Thrombocytopenia: Lowest platelets = 99 in last 2 days, will monitor for bleeding          # Severe Malnutrition: based on nutrition assessment      FEN  Diet: Regular Diet Adult   IVF: Bolus PRN (judiciously given pleural effusions)  Lytes: Replete per protocol     PPX  VTE: Lovenox  Bowel: Senna/MiraLax PRN  GI/PUD: Continue PTA omeprazole.     MISC  Code Status: Full Code   Lines/Drains: PIV  Dispo: Pending work up of transformed lymphoma and treatment plan  Follow Up: Follows with Dr. Ruelas; will request pending final treatment plan. Local twice weekly labs requested at [facility] (FAX sent to [phone])      Patient was seen and plan of care was discussed with attending physician Dr. Ruelas.     I spent 60 minutes in the care of this patient today, which included time necessary for review of interval events, obtaining history and physical exam, ordering medications/tests/procedures as medically indicated, review of pertinent medical literature, counseling of the patient, coordination of care, and documentation time. Over 50% of time was spent counseling the patient and/or coordinating care.      Leida Barton PA-C    Hematology/Oncology   Pager: [phone]     Significant Results and Procedures   Results for orders placed or performed during the hospital encounter of 12/14/22   Chest XR,  PA & LAT    Narrative    EXAM: XR CHEST 2 VIEWS  12/14/2022 5:07 PM     HISTORY:  shortness of breath       COMPARISON:  CT 12/9/2022 and 5/24/2021    TECHNIQUE: PA and lateral views the chest    FINDINGS:   Left chest wall implantable cardiac defibrillator with leads in the  right atrium and right ventricle.    The trachea is midline. The cardiomediastinal silhouette is within  normal limits. The pulmonary vasculature is distinct. No appreciable  pneumothorax. Large right pleural effusion. Small left  pleural  effusion. Hazy perihilar and bibasilar opacities. Focal opacity in the  right apical perihilar region corresponding to known mass seen on  comparison CT. No acute osseous abnormality.      Impression    IMPRESSION:  1. Large right and small left pleural effusions with hazy perihilar  and bibasilar opacities that likely represent atelectasis. This does  not appear significantly changed from 12/9/2022 given the differences  in modalities.  2. Focal opacity in the right apical perihilar region corresponding to  known mass seen on comparison CT.    I have personally reviewed the examination and initial interpretation  and I agree with the findings.    AUDREY JI MD         SYSTEM ID:  [id number]   IR Soft Tissue Biopsy    Narrative    Diagnosis: Right sternal mass    Procedure: TEMPORARY    Indication: History of non-Hodgkin lymphoma with new right  sternoclavicular soft tissue mass. Admitted with shortness of breath  and concern for lymphoma transformation.    : Liang Valdovinos PA-C    Assist: Murtaza Noe MD    Anesthesia: Moderate sedation with local anesthesia    Medications: 1. 1 mg midazolam IV  2. 50 mcg fentanyl IV    Nursing: Patient continuously monitored by trained, independent  observer (IR RN).    Sedation time: 10 minutes face-to-face    Specimen: 5 cores in formalin, 2 in RPMI, 1 in sterile cup    Description: Supine on the gurney. Obvious right sternal mass prepped  and draped and a timeout was performed. Local anesthesia was achieved.  Color shows minimal vascularity. 8 cores take from lateral to medial  approach with [place] biopsy device through a skin nick without  introducer. Pressure to achieve hemostasis and dressing applied       Impression    Impression: Ultrasound-guided right sternoclavicular soft tissue mass  biopsy. 8 cores.    Plan: 1 hour monitored recovery from sedation. Biopsy results pending.    EDWIN VALDOVINOS PA-C         SYSTEM ID:  [id number]   XR Chest 2  Views    Narrative    Chest 2 views    INDICATION: Cough, new lymphoma, history of effusions    COMPARISON: 2022    Findings: Blunting of the right costophrenic angle and overall right  lower lung prominent opacity again noted. Minimal blunting of left  costophrenic angle with hyperinflated left lung. Bipolar pacemaker  again from a left subclavian transvenous approach.      Impression    IMPRESSION: Continue right larger than left pleural effusions with  hyperinflated underlying appearance of the lungs. Pacemaker.    PRINCESS BOO MD         SYSTEM ID:  [id number]   Echo Complete     Value    LVEF  60-65%    Narrative    [id number]  [id number]  [id number]  [id number]^FABIEN^DEMIAN     [facility]  Echocardiography Laboratory  [address] [place] [address]     Name: MAJOR GILES  MRN: [id number]  : 1950  Study Date: 12/15/2022 12:58 PM  Age: 72 yrs  Gender: Male  Patient Location: [facility]  Reason For Study: Chest Pressure  Ordering Physician: DEMIAN CONN  Performed By: Kelby Singh RDCS     BSA: 1.8 m2  Height: 67 in  Weight: 145 lb  HR: 92  BP: 130/77 mmHg  ______________________________________________________________________________  Procedure  Complete Portable Echo Adult.  ______________________________________________________________________________  Interpretation Summary  Left ventricular size, wall motion and function are normal. The ejection  fraction is 60-65%.  Global right ventricular function is normal.  Mild aortic insufficiency is present.  Pulmonary artery systolic pressure is normal.  The inferior vena cava was normal in size with preserved respiratory  variability.  No pericardial effusion is present.  This study was compared with the study from 20 . No significant changes  noted.  ______________________________________________________________________________  Left Ventricle  Left ventricular size, wall motion and function are normal. The  ejection  fraction is 60-65%. Abnormal septal motion consistent with pacemaker is  present.     Right Ventricle  The right ventricle is normal size. Global right ventricular function is  normal. A pacemaker lead is noted in the right ventricle.     Atria  Both atria appear normal.     Mitral Valve  The mitral valve is normal. Trace mitral insufficiency is present.     Aortic Valve  The aortic valve is tricuspid. Mild aortic insufficiency is present.     Tricuspid Valve  The tricuspid valve is normal. Mild tricuspid insufficiency is present. The  right ventricular systolic pressure is approximated at 28.9 mmHg plus the  right atrial pressure. Pulmonary artery systolic pressure is normal.     Pulmonic Valve  The pulmonic valve is normal. Trace pulmonic insufficiency is present.     Vessels  The aorta root is normal. The thoracic aorta is normal. The inferior vena cava  was normal in size with preserved respiratory variability. Sinuses of Valsalva  2.9 cm. Ascending aorta 3.2 cm.     Pericardium  No pericardial effusion is present.     Miscellaneous  A left pleural effusion is present.     Compared to Previous Study  This study was compared with the study from 20 . No significant changes  noted.     Attestation  I have personally viewed the imaging and agree with the interpretation and  report as documented by the fellow, Destiny Singleton, and/or edited by me.  ______________________________________________________________________________  MMode/2D Measurements & Calculations  IVSd: 1.00 cm  LVIDd: 3.5 cm  LVIDs: 2.1 cm  LVPWd: 0.98 cm  FS: 40.3 %  LV mass(C)d: 101.5 grams  LV mass(C)dI: 57.5 grams/m2  Ao root diam: 2.9 cm  asc Aorta Diam: 3.2 cm  LVOT diam: 2.1 cm  LVOT area: 3.5 cm2  RWT: 0.56     Doppler Measurements & Calculations  TR max jose luis: 269.0 cm/sec  TR max P.9 mmHg     ______________________________________________________________________________  Report approved by: Kezia Cox 12/15/2022  02:18 PM             Pending Results   These results will be followed up by JOSE MARTIN  Unresulted Labs Ordered in the Past 30 Days of this Admission     Date and Time Order Name Status Description    12/15/2022  7:52 AM CHROMOSOME ANALYSIS, MALIGNANT TISSUE With Professional Interpretation In process     12/15/2022  7:52 AM FISH With Professional Interpretation In process           Code Status   Full Code    Primary Care Physician   Elroy Holman    Physical Exam   Temp: 97.9  F (36.6  C) Temp src: Oral BP: 110/67 Pulse: 79   Resp: 16 SpO2: 95 % O2 Device: None (Room air)    Vitals:    12/20/22 1049 12/21/22 0958 12/22/22 0841   Weight: 59.9 kg (132 lb) 61 kg (134 lb 8 oz) 60.8 kg (134 lb 1.6 oz)     Vital Signs with Ranges  Temp:  [97.4  F (36.3  C)-97.9  F (36.6  C)] 97.9  F (36.6  C)  Pulse:  [79-97] 79  Resp:  [16-18] 16  BP: (110-137)/(67-79) 110/67  SpO2:  [93 %-95 %] 95 %  I/O last 3 completed shifts:  In: -   Out: 400 [Urine:400]    General: pleasant gentleman ambulating around unit. Alert, NAD. Pleasant and conversational.  Skin: Large palpable mass (~4cm) of anterior chest wall; non tender and non erythematous. No additional concerning lesions, rash, jaundice, cyanosis, erythema, or ecchymoses on exposed surfaces.   HEENT: NCAT. Anicteric sclera. Moist mucous membranes with no lesions, erythema, or thrush.   Respiratory: Non-labored breathing on room air, good air exchange, decreased breath sounds bilaterally (R>L)   Cardiovascular: RRR. No murmur or rub.   Gastrointestinal: Normoactive BS. Abdomen soft, ND, NT. No palpable masses.  Extremities: No LE edema.   Neurologic: A&O x 3, speech normal, no deficits grossly. Ambulating without difficulties     Time Spent on this Encounter   Leida RUELAS PA-C, personally saw the patient today and spent greater than 30 minutes discharging this patient.    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  Central Line          Will this be a management, placement or removal of a central line?  Placement          What type of Central Line is needed for your procedure?  Port          Patients clinical information/history?  new DLBCL on chemo needs port          Call Back #  [phone]          Is the patient on aspirin, Plavix or blood thinners?  No       Referral order converted to procedure order by IR AMAURI Joseph, 12/22/2022 at 7:44 AM.       Reason for your hospital stay    New lymphoma     Activity    Your activity upon discharge: activity as tolerated     When to contact your care team    [facility] cancer clinic triage line (open daily 8am-4pm) at [phone] for temp greater than or equal to 100.4, uncontrolled nausea/vomiting/diarrhea/constipation, unrelieved pain, bleeding not relieved with pressure, dizziness, chest pain, shortness of breath, loss of consciousness, and any new or concerning symptoms. If symptoms occur after triage hours please report to local Emergency Department for evaluation.     Discharge Instructions    - neulasta injection in clinic 12/23   - Virtual follow up Monday 12/26   - Next RCHOP chemo requested for 1/10/2023  - port placement requested outpatient, they will call you to schedule     Follow Up and recommended labs and tests    Follow up as scheduled below     Full Code     Diet    Follow this diet upon discharge: Orders Placed This Encounter      Regular Diet Adult     Check Out Appointment Request    - JOSE MARTIN and labs the week of  12/26     MODIFIED Check Out Appointment Request    - Neulasta injection on 12/23 or 12/24     MODIFIED Check Out Appointment Request    - JOSE MARTIN or Dr Ruelas, labs and RCHOP infusion 1/10/23     Discharge Medications   Current Discharge Medication List      START taking these medications    Details   acyclovir (ZOVIRAX) 400 MG tablet Take 1 tablet (400 mg) by mouth 2 times daily  Qty: 60 tablet, Refills: 0    Associated Diagnoses: 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)      allopurinol (ZYLOPRIM) 300 MG tablet Take 1 tablet (300 mg) by mouth daily  Qty: 60 tablet, Refills: 0    Associated Diagnoses: 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)      benzonatate (TESSALON) 100 MG capsule Take 1 capsule (100 mg) by mouth 3 times daily as needed for cough  Qty: 30 capsule, Refills: 0    Associated Diagnoses: 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)      predniSONE (DELTASONE) 50 MG tablet Take 2 tablets (100 mg) by mouth daily for 2 days  Qty: 4 tablet, Refills: 0    Associated Diagnoses: 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)      temazepam (RESTORIL) 7.5 MG capsule Take 1 capsule (7.5 mg) by mouth nightly as needed for sleep  Qty: 20 capsule, Refills: 0    Associated Diagnoses: 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)         CONTINUE these medications which have NOT CHANGED    Details   ascorbic acid (VITAMIN C) 500 MG tablet Take 500 mg by mouth every morning      Multiple Vitamins-Minerals (MULTIVITAL PO) Take 1 tablet by mouth every morning      omeprazole (PRILOSEC) 40 MG DR capsule Take 1 capsule (40 mg) by mouth daily  Qty: 90 capsule, Refills: 4    Associated Diagnoses: Nodular lymphoma of extranodal and/or solid organ site (H)         STOP taking these medications       methylPREDNISolone (MEDROL) 32 MG tablet Comments:   Reason for Stopping:             Allergies   Allergies   Allergen Reactions      Ampicillin [Ampicillin Sodium]      Bactrim [Sulfamethoxazole W/Trimethoprim]      Contrast Dye      CT contrast (IV) allergy - need oral Methylpred as premed for scans     Ampicillin Rash     Data   Most Recent 3 CBC's:Recent Labs   Lab Test 12/22/22  0707 12/21/22  0535 12/20/22  1856   WBC 4.6 4.6 3.2*   HGB 11.6* 10.3* 11.6*   MCV 95 96 96   * 99* 112*      Most Recent 3 BMP's:  Recent Labs   Lab Test 12/22/22  0707 12/21/22  2030 12/21/22  0535    141 142   POTASSIUM 4.4 4.3 4.2   CHLORIDE 110* 108* 110*   CO2 22 17* 22   BUN 33.8* 35.0* 40.7*   CR 0.89 0.80 0.82   ANIONGAP 12 16* 10   JORGE 9.3 9.2 8.7*   GLC 93 209* 102*     Most Recent 2 LFT's:  Recent Labs   Lab Test 12/22/22  0707 12/21/22  0535   AST 30 29   ALT 20 24   ALKPHOS 109 103   BILITOTAL 0.3 0.2     Most Recent INR's and Anticoagulation Dosing History:  Anticoagulation Dose History     Recent Dosing and Labs Latest Ref Rng & Units 12/16/2022 12/17/2022 12/18/2022 12/19/2022 12/20/2022 12/21/2022 12/22/2022    INR 0.85 - 1.15 1.06 1.08 1.06 1.07 1.04 1.09 1.07        Most Recent 3 Troponin's:No lab results found.  Most Recent Cholesterol Panel:  Recent Labs   Lab Test 12/02/22  1405   CHOL 146   LDL 88  88   HDL 39*   TRIG 94     Most Recent 6 Bacteria Isolates From Any Culture (See EPIC Reports for Culture Details):No lab results found.  Most Recent TSH, T4 and A1c Labs:No lab results found.

## 2022-12-22 NOTE — PLAN OF CARE
Goal Outcome Evaluation:  Pt is alert and oriented x 4, vitally stable on room air. No s/s of TLS.  Denies pain/nausea. Calm, pleasant, and cooperative with cares. Up independently in room. Sleeping in between  cares this shift.   Plan is to discharge today then get Neulasta in the clinic on Friday 12/23.

## 2022-12-22 NOTE — PROGRESS NOTES
Discharge  D: Orders for discharge and outpatient medications written.  I: Home medications and return to clinic schedule reviewed with patient. Discharge instructions and parameters for calling Health Care Provider reviewed. Patient left at 1100am  accompanied by self down to lobby and his friend picking up. PIV removed, picking up discharge med's down at discharge pharmacy, collected his deposit med's and belongings from security.   A: Patient/family verbalized understanding and was ready for discharge.   P: Patient instructed to  medications in Pharmacy. Follow up as scheduled per discharge note, phone call/video call 12/26.

## 2022-12-22 NOTE — PLAN OF CARE
Goal Outcome Evaluation: [id number]   C1 D2 R-CHOP and doing well.  No s/s of TLS.  Denies nausea, denies chest pain or SOB. Independent, walks in halls. Will continue with plan of care.

## 2022-12-22 NOTE — PROGRESS NOTES
"/70 (BP Location: Right arm)   Pulse 91   Temp 97.5  F (36.4  C) (Oral)   Resp 18   Ht 1.702 m (5' 7\")   Wt 61 kg (134 lb 8 oz)   SpO2 94%   BMI 21.07 kg/m      Vitally stable, waiting to be discharged     "

## 2022-12-22 NOTE — PLAN OF CARE
Goal Outcome Evaluation:    Nursing Focus: Discharge    D: Patient discharged to home at 1045. Patient and accompanied by friend.    I: Discharge prescriptions sent to discharge pharmacy to be filled. All discharge medications and instructions reviewed with patient. Patient instructed to call clinic triage nurse if experiences a fever >100.4, uncontrolled nausea, vomiting, diarrhea, or pain; or experiences any signs or symptoms of bleeding. Other phone numbers to call with questions or concerns after discharge reviewed. PIV's removed. Education completed.    A: Patient verbalized understanding of discharge medications and instructions. Patient will  medications at discharge pharmacy.     P: Patient to follow-up in clinic on Friday December 23 with cancer clinic.    .Lisa Boone RN on 12/22/2022 at 10:41 AM

## 2022-12-23 ENCOUNTER — INFUSION THERAPY VISIT (OUTPATIENT)
Dept: ONCOLOGY | Facility: CLINIC | Age: 72
End: 2022-12-23
Payer: MEDICARE

## 2022-12-23 VITALS
HEART RATE: 94 BPM | OXYGEN SATURATION: 96 % | TEMPERATURE: 97.7 F | DIASTOLIC BLOOD PRESSURE: 81 MMHG | SYSTOLIC BLOOD PRESSURE: 165 MMHG | RESPIRATION RATE: 18 BRPM

## 2022-12-23 DIAGNOSIS — T45.1X5S ADVERSE EFFECT OF ANTINEOPLASTIC AND IMMUNOSUPPRESSIVE DRUGS, SEQUELA: Primary | ICD-10-CM

## 2022-12-23 DIAGNOSIS — C82.99 NODULAR LYMPHOMA OF EXTRANODAL AND/OR SOLID ORGAN SITE (H): ICD-10-CM

## 2022-12-23 LAB — INTERPRETATION: NORMAL

## 2022-12-23 PROCEDURE — 96372 THER/PROPH/DIAG INJ SC/IM: CPT | Performed by: INTERNAL MEDICINE

## 2022-12-23 PROCEDURE — 250N000011 HC RX IP 250 OP 636: Performed by: INTERNAL MEDICINE

## 2022-12-23 PROCEDURE — 88368 INSITU HYBRIDIZATION MANUAL: CPT | Mod: 26 | Performed by: MEDICAL GENETICS

## 2022-12-23 PROCEDURE — 88369 M/PHMTRC ALYSISHQUANT/SEMIQ: CPT | Mod: 26 | Performed by: MEDICAL GENETICS

## 2022-12-23 RX ADMIN — PEGFILGRASTIM 6 MG: 6 INJECTION SUBCUTANEOUS at 09:46

## 2022-12-23 ASSESSMENT — PAIN SCALES - GENERAL: PAINLEVEL: NO PAIN (0)

## 2022-12-23 NOTE — PROGRESS NOTES
Infusion Nursing Note:  Shahid Davis presents today for neulasta.    Patient seen by provider today: No   present during visit today: Not Applicable.    Note:   Patient reports some nausea this morning relieved with prn medications. He hasn't been sleeping much with the prednisone. He denies any other concerns today.    Education on neulasta reviewed with patient, questions answered.    Intravenous Access:  No Intravenous access/labs at this visit.    Treatment Conditions:  Not Applicable.    Post Infusion Assessment:  Patient tolerated injection into left arm without incident.     Discharge Plan:   Patient declined prescription refills.  Discharge instructions reviewed with: Patient.  Patient and/or family verbalized understanding of discharge instructions and all questions answered.  AVS to patient via [employer].  Patient will return 12/26 for next provider appointment.   Patient discharged in stable condition accompanied by: self.  Departure Mode: Ambulatory.      Lydia Thomas RN

## 2022-12-23 NOTE — PATIENT INSTRUCTIONS
[facility] Triage and after hours / weekends / holidays:  [phone]    Please call the triage or after hours line if you experience a temperature greater than or equal to 100.5, shaking chills, have uncontrolled nausea, vomiting and/or diarrhea, dizziness, shortness of breath, chest pain, bleeding, unexplained bruising, or if you have any other new/concerning symptoms, questions or concerns.      If you are having any concerning symptoms or wish to speak to a provider before your next infusion visit, please call your care coordinator or triage to notify them so we can adequately serve you.     If you need a refill on a narcotic prescription or other medication, please call before your infusion appointment.

## 2022-12-25 NOTE — PROGRESS NOTES
Telephone call duration: 26 mins          [facility]  Date of visit: Dec 26, 2022      Reason for Visit: follow up diagnosis B cell lymphoma      Oncology HPI:   Follows with Dr. Ruelas. 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 He presented progressing B symptoms, abdominal pain, and SOB. PET 12/9 showed extensive lymphomatous involvement to the R pleura w/avid effusions, mediastinal and pericardial regions, right anterolateral chest wall, adenopathy above and below the diaphragm, liver, spleen and multiple sites in the skeleton, concerning for transformation from his low-grade lymphoma. He also has pancytopenia which seems to have progressed slowly over the past years. Of note, reports maternal history of waldenstrom's. Due to worsening shortness of breath and delays of biopsy, patient presented to ED on 12/14 and was subsequently admitted for expedited workup and treatment. Given highest SUV and easily accessible sternoclavicular soft tissue mass, plan for biopsy of this lesion and okay to hold on BMBx given would not change treatment plan.   - S/p US-guided sternoclavicular soft tissue mass biopsy with IR 12/15- Flow with 95% CD10 positive kappa-monotypic B cells. FISH confirming triple Hit (BCL6, BCL2, MYC rearrangements)  - FLC WNL. M-spike on 12/2 was 0.2. Beta-2-microglobulin elevated at 3.6. IgA 36, IgG 310, IgM 302  - Echo (12/15) with LVEF 60-65% with mild aortic insufficiency, no evidence of effusion or tamponade.  - Continue to hold on BMBx at this time as it would not  (known involvement on PET/CT), although if inadequate tissue from biopsy on 12/15 or need for further diagnostic tissue, could consider early week of 12/19.   - Prednisone 100 mg daily (12/17 - 12/24)   - RCHOP ordered following discussion at  weekly heme malignancy conference 12/20, dosed C1D1 = 12/20/22         Interval history:   Shahid is here today for post hospital discharge.   Wiped out since hospital stay  Stopped prednisone yesterday, slept last night for first night in a while  Partner has illness currently-- [place] near [place], [place]  Cough persisting for past 3 weeks-- caused by the fluid on his lungs. He is not coughing up plegm. No congestion or sinus pain/ pressure. No headaches or dizziness.   No fevers since getting home-- has not checked but hasn't felt like it  Eating pretty well, weight feels stable (lost weight initially in the hospital)  No nausea or stomach issues, bowels are moving regularly  ROS otherwise neg          Current Outpatient Medications   Medication Sig Dispense Refill     acyclovir (ZOVIRAX) 400 MG tablet Take 1 tablet (400 mg) by mouth 2 times daily for 5 days 10 tablet 0     acyclovir (ZOVIRAX) 400 MG tablet Take 1 tablet (400 mg) by mouth every 12 hours for 60 days 120 tablet 3     allopurinol (ZYLOPRIM) 300 MG tablet Take 1 tablet (300 mg) by mouth daily for 5 days 5 tablet 0     allopurinol (ZYLOPRIM) 300 MG tablet Take 1 tablet (300 mg) by mouth daily 60 tablet 3     ascorbic acid (VITAMIN C) 500 MG tablet Take 500 mg by mouth every morning       fluconazole (DIFLUCAN) 200 MG tablet Take 1 tablet (200 mg) by mouth daily for 30 days 30 tablet 0     fluconazole (DIFLUCAN) 200 MG tablet Take 1 tablet (200 mg) by mouth daily as needed 30 tablet 0     levofloxacin (LEVAQUIN) 250 MG tablet Take 1 tablet (250 mg) by mouth daily as needed 30 tablet 0     levofloxacin (LEVAQUIN) 250 MG tablet Take 1 tablet (250 mg) by mouth daily as needed 30 tablet 0     Multiple Vitamins-Minerals (MULTIVITAL PO) Take 1 tablet by mouth every morning       omeprazole (PRILOSEC) 40 MG DR capsule Take 1 capsule (40 mg) by mouth daily 90 capsule 4     ondansetron (ZOFRAN) 8 MG tablet Take 1 tablet (8 mg) by mouth  every 8 hours as needed for nausea 60 tablet 0     ondansetron (ZOFRAN) 8 MG tablet Take 1 tablet (8 mg) by mouth every 8 hours as needed for nausea 60 tablet 3     predniSONE (DELTASONE) 50 MG tablet Take 2 tablets (100 mg) by mouth daily for 2 days 4 tablet 0     temazepam (RESTORIL) 7.5 MG capsule Take 1 capsule (7.5 mg) by mouth nightly as needed for sleep 20 capsule 0       Allergies   Allergen Reactions     Ampicillin [Ampicillin Sodium]      Bactrim [Sulfamethoxazole W/Trimethoprim]      Contrast Dye      CT contrast (IV) allergy - need oral Methylpred as premed for scans     Ampicillin Rash         Voice is clear and strong. No audible stridor, wheezing, or respiratory distress. The remainder of PE was deferred due to PHE.           Labs:     I personally reviewed the following labs:    Most Recent 3 CBC's:Recent Labs   Lab Test 12/22/22  0707 12/21/22  0535 12/20/22  1856   WBC 4.6 4.6 3.2*   HGB 11.6* 10.3* 11.6*   MCV 95 96 96   * 99* 112*     Most Recent 3 BMP's:  Recent Labs   Lab Test 12/22/22  0707 12/21/22  2030 12/21/22  0535    141 142   POTASSIUM 4.4 4.3 4.2   CHLORIDE 110* 108* 110*   CO2 22 17* 22   BUN 33.8* 35.0* 40.7*   CR 0.89 0.80 0.82   ANIONGAP 12 16* 10   JORGE 9.3 9.2 8.7*   GLC 93 209* 102*     Most Recent 2 LFT's:  Recent Labs   Lab Test 12/22/22  0707 12/21/22  0535   AST 30 29   ALT 20 24   ALKPHOS 109 103   BILITOTAL 0.3 0.2           Imaging: n/a      Impression/plan:     # H/o 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 Ruelas. 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 He presented progressing B symptoms, abdominal pain, and SOB. PET 12/9 showed extensive lymphomatous involvement to the R pleura w/avid effusions, mediastinal and  pericardial regions, right anterolateral chest wall, adenopathy above and below the diaphragm, liver, spleen and multiple sites in the skeleton, concerning for transformation from his low-grade lymphoma. He also has pancytopenia which seems to have progressed slowly over the past years. Of note, reports maternal history of waldenstrom's. Due to worsening shortness of breath and delays of biopsy, patient presented to ED on 12/14 and was subsequently admitted for expedited workup and treatment. Given highest SUV and easily accessible sternoclavicular soft tissue mass, plan for biopsy of this lesion and okay to hold on BMBx given would not change treatment plan.   - S/p US-guided sternoclavicular soft tissue mass biopsy with IR 12/15; morphology, cytogenetics pending. Flow with 95% CD10 positive kappa-monotypic B cells.   - FLC WNL. M-spike on 12/2 was 0.2. Beta-2-microglobulin elevated at 3.6. IgA 36, IgG 310, IgM 302  - Echo (12/15) with LVEF 60-65% with mild aortic insufficiency, no evidence of effusion or tamponade.  - Continue to hold on BMBx at this time as it would not  (known involvement on PET/CT), although if inadequate tissue from biopsy on 12/15 or need for further diagnostic tissue, could consider early week of 12/19.   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  - Monitor CBC weekly for now-- will increase to twice a week if needed  - Transfuse if Hgb <7 and plt <10k     # PPx  -  mg BID  - Levaquin and Fluconazole to start when ANC <1k   -  Pentamidine neb given inpatient 12/19/22, next due outpatient 1/16/23.     # Hypogammaglobulinemia   -  this admission. Consider IVIG outpatient.      # Shortness of breath, improved  # Cough  # R large and L small pleural effusion   PET/CT (12/9) with FDG-avid large R and small L pleural effusion.   - Cough persists, however feels breathing overall improved    # GERD  Related to history of radiation to chest.   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 Took uncertain medication for this in the past (~2004), however cannot remember. Melatonin and Trazadone foung to be ineffective.   - Restoril on pred nights was helpful, however his senior insurance won't approve this given sleep aide in elderly. 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

## 2022-12-26 ENCOUNTER — VIRTUAL VISIT (OUTPATIENT)
Dept: ONCOLOGY | Facility: CLINIC | Age: 72
End: 2022-12-26
Attending: NURSE PRACTITIONER
Payer: MEDICARE

## 2022-12-26 ENCOUNTER — PATIENT OUTREACH (OUTPATIENT)
Dept: ONCOLOGY | Facility: CLINIC | Age: 72
End: 2022-12-26

## 2022-12-26 DIAGNOSIS — C83.32 DIFFUSE LARGE B-CELL LYMPHOMA OF INTRATHORACIC LYMPH NODES (H): Primary | ICD-10-CM

## 2022-12-26 DIAGNOSIS — F51.02 ADJUSTMENT INSOMNIA: ICD-10-CM

## 2022-12-26 PROCEDURE — 99215 OFFICE O/P EST HI 40 MIN: CPT | Mod: 95 | Performed by: NURSE PRACTITIONER

## 2022-12-26 RX ORDER — AMOXICILLIN 250 MG
1 CAPSULE ORAL 2 TIMES DAILY PRN
Qty: 60 TABLET | Refills: 4 | Status: SHIPPED | OUTPATIENT
Start: 2022-12-26

## 2022-12-26 RX ORDER — POLYETHYLENE GLYCOL 3350 17 G/17G
17 POWDER, FOR SOLUTION ORAL DAILY PRN
Qty: 510 G | Refills: 4 | Status: SHIPPED | OUTPATIENT
Start: 2022-12-26
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
## 2022-12-26 NOTE — LETTER
12/26/2022         RE: Shahid Davis   [facility] [id number]        Dear Colleague,    Thank you for referring your patient, Shahid Davis, to the [facility]. Please see a copy of my visit note below.    Telephone call duration: 26 mins          [facility]  Date of visit: Dec 26, 2022      Reason for Visit: follow up diagnosis B cell lymphoma      Oncology HPI:   Follows with Dr. Ruelas. 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 He presented progressing B symptoms, abdominal pain, and SOB. PET 12/9 showed extensive lymphomatous involvement to the R pleura w/avid effusions, mediastinal and pericardial regions, right anterolateral chest wall, adenopathy above and below the diaphragm, liver, spleen and multiple sites in the skeleton, concerning for transformation from his low-grade lymphoma. He also has pancytopenia which seems to have progressed slowly over the past years. Of note, reports maternal history of waldenstrom's. Due to worsening shortness of breath and delays of biopsy, patient presented to ED on 12/14 and was subsequently admitted for expedited workup and treatment. Given highest SUV and easily accessible sternoclavicular soft tissue mass, plan for biopsy of this lesion and okay to hold on BMBx given would not change treatment plan.   - S/p US-guided sternoclavicular soft tissue mass biopsy with IR 12/15- Flow with 95% CD10 positive kappa-monotypic B cells. FISH confirming triple Hit (BCL6, BCL2, MYC rearrangements)  - FLC WNL. M-spike on 12/2 was 0.2. Beta-2-microglobulin elevated at 3.6. IgA 36, IgG 310, IgM 302  - Echo (12/15) with LVEF 60-65% with mild aortic insufficiency, no evidence of effusion or tamponade.  - Continue to hold on BMBx at this time as it would not   (known involvement on PET/CT), although if inadequate tissue from biopsy on 12/15 or need for further diagnostic tissue, could consider early week of 12/19.   - Prednisone 100 mg daily (12/17 - 12/24)   - RCHOP ordered following discussion at weekly heme malignancy conference 12/20, dosed C1D1 = 12/20/22         Interval history:   Shahid is here today for post hospital discharge.   Wiped out since hospital stay  Stopped prednisone yesterday, slept last night for first night in a while  Partner has illness currently-- [place] the [place] near [place], [place]  Cough persisting for past 3 weeks-- caused by the fluid on his lungs. He is not coughing up plegm. No congestion or sinus pain/ pressure. No headaches or dizziness.   No fevers since getting home-- has not checked but hasn't felt like it  Eating pretty well, weight feels stable (lost weight initially in the hospital)  No nausea or stomach issues, bowels are moving regularly  ROS otherwise neg          Current Outpatient Medications   Medication Sig Dispense Refill     acyclovir (ZOVIRAX) 400 MG tablet Take 1 tablet (400 mg) by mouth 2 times daily for 5 days 10 tablet 0     acyclovir (ZOVIRAX) 400 MG tablet Take 1 tablet (400 mg) by mouth every 12 hours for 60 days 120 tablet 3     allopurinol (ZYLOPRIM) 300 MG tablet Take 1 tablet (300 mg) by mouth daily for 5 days 5 tablet 0     allopurinol (ZYLOPRIM) 300 MG tablet Take 1 tablet (300 mg) by mouth daily 60 tablet 3     ascorbic acid (VITAMIN C) 500 MG tablet Take 500 mg by mouth every morning       fluconazole (DIFLUCAN) 200 MG tablet Take 1 tablet (200 mg) by mouth daily for 30 days 30 tablet 0     fluconazole (DIFLUCAN) 200 MG tablet Take 1 tablet (200 mg) by mouth daily as needed 30 tablet 0     levofloxacin (LEVAQUIN) 250 MG tablet Take 1 tablet (250 mg) by mouth daily as needed 30 tablet 0     levofloxacin (LEVAQUIN) 250 MG tablet Take 1 tablet (250 mg) by mouth daily as needed  30 tablet 0     Multiple Vitamins-Minerals (MULTIVITAL PO) Take 1 tablet by mouth every morning       omeprazole (PRILOSEC) 40 MG DR capsule Take 1 capsule (40 mg) by mouth daily 90 capsule 4     ondansetron (ZOFRAN) 8 MG tablet Take 1 tablet (8 mg) by mouth every 8 hours as needed for nausea 60 tablet 0     ondansetron (ZOFRAN) 8 MG tablet Take 1 tablet (8 mg) by mouth every 8 hours as needed for nausea 60 tablet 3     predniSONE (DELTASONE) 50 MG tablet Take 2 tablets (100 mg) by mouth daily for 2 days 4 tablet 0     temazepam (RESTORIL) 7.5 MG capsule Take 1 capsule (7.5 mg) by mouth nightly as needed for sleep 20 capsule 0       Allergies   Allergen Reactions     Ampicillin [Ampicillin Sodium]      Bactrim [Sulfamethoxazole W/Trimethoprim]      Contrast Dye      CT contrast (IV) allergy - need oral Methylpred as premed for scans     Ampicillin Rash         Voice is clear and strong. No audible stridor, wheezing, or respiratory distress. The remainder of PE was deferred due to PHE.           Labs:     I personally reviewed the following labs:    Most Recent 3 CBC's:Recent Labs   Lab Test 12/22/22  0707 12/21/22  0535 12/20/22  1856   WBC 4.6 4.6 3.2*   HGB 11.6* 10.3* 11.6*   MCV 95 96 96   * 99* 112*     Most Recent 3 BMP's:  Recent Labs   Lab Test 12/22/22  0707 12/21/22  2030 12/21/22  0535    141 142   POTASSIUM 4.4 4.3 4.2   CHLORIDE 110* 108* 110*   CO2 22 17* 22   BUN 33.8* 35.0* 40.7*   CR 0.89 0.80 0.82   ANIONGAP 12 16* 10   JORGE 9.3 9.2 8.7*   GLC 93 209* 102*     Most Recent 2 LFT's:  Recent Labs   Lab Test 12/22/22  0707 12/21/22  0535   AST 30 29   ALT 20 24   ALKPHOS 109 103   BILITOTAL 0.3 0.2           Imaging: n/a      Impression/plan:     # H/o 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 Ruelas. 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 He presented progressing B symptoms, abdominal pain, and SOB. PET 12/9 showed extensive lymphomatous involvement to the R pleura w/avid effusions, mediastinal and pericardial regions, right anterolateral chest wall, adenopathy above and below the diaphragm, liver, spleen and multiple sites in the skeleton, concerning for transformation from his low-grade lymphoma. He also has pancytopenia which seems to have progressed slowly over the past years. Of note, reports maternal history of waldenstrom's. Due to worsening shortness of breath and delays of biopsy, patient presented to ED on 12/14 and was subsequently admitted for expedited workup and treatment. Given highest SUV and easily accessible sternoclavicular soft tissue mass, plan for biopsy of this lesion and okay to hold on BMBx given would not change treatment plan.   - S/p US-guided sternoclavicular soft tissue mass biopsy with IR 12/15; morphology, cytogenetics pending. Flow with 95% CD10 positive kappa-monotypic B cells.   - FLC WNL. M-spike on 12/2 was 0.2. Beta-2-microglobulin elevated at 3.6. IgA 36, IgG 310, IgM 302  - Echo (12/15) with LVEF 60-65% with mild aortic insufficiency, no evidence of effusion or tamponade.  - Continue to hold on BMBx at this time as it would not  (known involvement on PET/CT), although if inadequate tissue from biopsy on 12/15 or need for further diagnostic tissue, could consider early week of 12/19.   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  - Monitor CBC weekly for now-- will increase to twice a week if needed  - Transfuse if Hgb <7 and plt <10k     # PPx  -  mg BID  - Levaquin and Fluconazole to start when ANC <1k   - Pentamidine neb given inpatient 12/19/22, next due outpatient 1/16/23.     # Hypogammaglobulinemia   -  this admission. Consider IVIG outpatient.      # Shortness of breath, improved  # Cough  # R large and L small pleural effusion   PET/CT (12/9) with FDG-avid large R and small L pleural effusion.   - Cough persists, however feels breathing overall improved    # GERD  Related to history of radiation to chest.   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 Took uncertain medication for this in the past (~2004), however cannot remember. Melatonin and Trazadone foung to be ineffective.   - Restoril on pred nights was helpful, however his senior insurance won't approve this given sleep aide in elderly. 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

## 2022-12-27 ENCOUNTER — PATIENT OUTREACH (OUTPATIENT)
Dept: ONCOLOGY | Facility: CLINIC | Age: 72
End: 2022-12-27

## 2022-12-27 DIAGNOSIS — C83.32 DIFFUSE LARGE B-CELL LYMPHOMA OF INTRATHORACIC LYMPH NODES (H): Primary | ICD-10-CM

## 2022-12-27 DIAGNOSIS — C83.32 DIFFUSE LARGE B-CELL LYMPHOMA OF INTRATHORACIC LYMPH NODES (H): ICD-10-CM

## 2022-12-27 LAB — INTERPRETATION: NORMAL

## 2022-12-27 PROCEDURE — 88369 M/PHMTRC ALYSISHQUANT/SEMIQ: CPT | Mod: 26 | Performed by: MEDICAL GENETICS

## 2022-12-27 PROCEDURE — 88368 INSITU HYBRIDIZATION MANUAL: CPT | Mod: 26 | Performed by: MEDICAL GENETICS

## 2022-12-27 RX ORDER — LEVOFLOXACIN 250 MG/1
250 TABLET, FILM COATED ORAL DAILY PRN
Qty: 30 TABLET | Refills: 0 | Status: ON HOLD | OUTPATIENT
Start: 2022-12-27 | End: 2023-01-16

## 2022-12-27 RX ORDER — ONDANSETRON 8 MG/1
8 TABLET, FILM COATED ORAL EVERY 8 HOURS PRN
Qty: 60 TABLET | Refills: 0 | Status: ON HOLD | OUTPATIENT
Start: 2022-12-27 | End: 2023-01-16

## 2022-12-27 NOTE — PROGRESS NOTES
[facility]: Cancer Care                                                                                          Spoke with pt today after he left voice mail message requesting call back as he has questions about his current situation.  Discussed standard in clinic is for pt to see an JOSE MARTIN between visits with Dr. Ruelas which is what telephone visit with Melida was yesterday.    We discussed looking at scheduling him for possible out pt treatment for next cycle as easier to possibly adjust appt time rather than schedule at last minute.  Discussed Dr. Ruelas is considering a couple different treatment options given his biopsy results and she is planning to call pt to discuss situation later in the week.  At least one of the treatment options would require hospitalization.  Pt appreciated this information and stated he also wants to know more about prognosis with new information from biopsy.  We discussed pt should get labs today and that sometimes pt's only need labs once a week for time, instead of twice a week.  Stated lab results will help with determining frequency of lab checks.  Also briefly discussed continue his current medications as per instructions.    Pt appreciated conversation.  He is planning to go for labs yet today.    Signature:  Arcelia Talamantes RN, OCN

## 2022-12-27 NOTE — PROGRESS NOTES
Received message that there is confusion about pt's prescriptions but he did not find out until he had driven to pharmacy in [place], [place].    Spoke with Farzad, pharmacist.  She reported prescriber, MARIA G Barton cancelled levofloxacin and ondansetron prescriptions.  Pt picked up acyclovir, fluconazole, allopurinol, senna, Miralax.  Did not get levofloxacin or ondansetron.  Discussed issue with  Melida Hines, [facility]-BC.  She will review and sign prescriptions.    Called and spoke with Reshma as Shahid was in [facility].  Discussed prescriptions were sent to [facility].  She also stated that he was not able to get labs drawn today at [facility] in [place].  He was told they could not do infusion and so they would not draw labs or something to that effect.

## 2022-12-27 NOTE — TELEPHONE ENCOUNTER
Shahid (pt)  drove 1 hour to pharmacy and was told two prescriptions were cancelled by a Leida Barton PA-C        These two medications appear to be active in chart.    This writer sent high priority message to RNMINERVA bolivar and last provider to see pt Melida Hinse PA-C which was on 12/26/22.

## 2022-12-28 NOTE — PROGRESS NOTES
[facility]: Cancer Care                                                                                          I called the patient to follow up on the issues with the lab in [place]. It sounds like because the labs included transfusion parameters/orders they would not draw the labs. I will send new orders with just labs to be drawn. The patient will call around noon to ensure they have what they need. Results will be viewable in CareEverywhere.    Patient will call back with any issues.    ADDENDUM 8:59 AM  Standing lab orders (CBC with diff; CMP) for twice weekly collections sent to [facility] in [place], [place]        Signature:  Alton Lainez RN

## 2022-12-29 ENCOUNTER — PATIENT OUTREACH (OUTPATIENT)
Dept: ONCOLOGY | Facility: CLINIC | Age: 72
End: 2022-12-29

## 2022-12-29 ENCOUNTER — TELEPHONE (OUTPATIENT)
Dept: INTERVENTIONAL RADIOLOGY/VASCULAR | Facility: CLINIC | Age: 72
End: 2022-12-29

## 2022-12-29 NOTE — PROGRESS NOTES
[facility]/Voicemail    Clinical Data: Care Coordinator Outreach    Outreach attempted x 1.  Left message on patient's voicemail with call back information and requested return call.    Plan: Care Coordinator will try to reach patient again in 1-2 business days.    Called the patient to review results of labs from yesterday. Results are overall stable and to be expected - no particular follow up needed at this time. Reviewed with Dr. Ruelas.    Asked that he call back to answer questions and to determine the carly of lab draws for his weeks off of chemotherapy (M & Thu vs Tues & Fri, etc.)    Alton Lainez, CHANELLE, RN, OCN  RN Care Coordinator  [facility]

## 2022-12-31 ENCOUNTER — PATIENT OUTREACH (OUTPATIENT)
Dept: ONCOLOGY | Facility: CLINIC | Age: 72
End: 2022-12-31

## 2022-12-31 DIAGNOSIS — C82.99 NODULAR LYMPHOMA OF EXTRANODAL AND/OR SOLID ORGAN SITE (H): ICD-10-CM

## 2023-01-01 RX ORDER — OMEPRAZOLE 40 MG/1
40 CAPSULE, DELAYED RELEASE ORAL DAILY
Qty: 90 CAPSULE | Refills: 4 | Status: SHIPPED | OUTPATIENT
Start: 2023-01-01 | End: 2023-06-13

## 2023-01-01 NOTE — PROGRESS NOTES
[facility]                                                                                      Last date written and prescribing provider:  Elroy Holman  11/12/2021 for one year supply     Last office visit: 12./26/2022 Melida Hines  Next office visit:  1/10/2023 Melida Hines    Per last Office note: to continue omeprazole for GERD       Jenny James LPN  Hematology Care Coordination  [phone]

## 2023-01-04 NOTE — PROGRESS NOTES
Interventional Radiology - Pre-Procedure Note:  1/4/2023    Procedure Requested: Port placement  Requested by: Leida Barton MD    History and Physical Reviewed:  I have personally reviewed the patient's medical history and have updated the medical record as necessary.    Past Medical History:   Diagnosis Date     Cardiac pacemaker in situ      Cardiac pacemaker in situ     2020     Lymphoma (H)      Squamous cell carcinoma      Past Surgical History:   Procedure Laterality Date     DAVINCI HERNIORRHAPHY INGUINAL Left 07/09/2021    Procedure: HERNIORRHAPHY, INGUINAL, ROBOT-ASSISTED, Left, Mesh;  Surgeon: Shamar Tyler MD;  Location: [facility] OR     IR SOFT TISSUE BIOPSY  12/15/2022     MOHS MICROGRAPHIC PROCEDURE       NO HISTORY OF SURGERY       Brief HPI: Shahid Davis is a 72 year old male with CAD, complete heart block s/p pacemaker. New diagnosis of high-grade B-cell lymphoma mixed with lower-grade lymphoma; FISH for MYC/BCL2/BCL6. He presents today for port placement.      IMAGING:  Chest 2 views     INDICATION: Cough, new lymphoma, history of effusions     COMPARISON: 12/14/2022     Findings: Blunting of the right costophrenic angle and overall right  lower lung prominent opacity again noted. Minimal blunting of left  costophrenic angle with hyperinflated left lung. Bipolar pacemaker  again from a left subclavian transvenous approach.                                                                      IMPRESSION: Continue right larger than left pleural effusions with  hyperinflated underlying appearance of the lungs. Pacemaker.     PRINCESS BOO MD             NPO: yes  ANTICOAGULANTS: none  ANTIBIOTICS: Clindamycin 900mg IV    ALLERGIES  Allergies   Allergen Reactions     Ampicillin [Ampicillin Sodium]      Bactrim [Sulfamethoxazole W/Trimethoprim]      Contrast Dye      CT contrast (IV) allergy - need oral Methylpred as premed for scans     Ampicillin Rash         LABS:  INR   Date Value  Ref Range Status   12/22/2022 1.07 0.85 - 1.15 Final      Hemoglobin   Date Value Ref Range Status   01/05/2023 12.1 (L) 13.3 - 17.7 g/dL Preliminary   07/01/2021 12.7 (L) 13.3 - 17.7 g/dL Final   ]  Platelet Count   Date Value Ref Range Status   01/05/2023 111 (L) 150 - 450 10e3/uL Preliminary   07/01/2021 91 (L) 150 - 450 10e9/L Final     Creatinine   Date Value Ref Range Status   12/22/2022 0.89 0.67 - 1.17 mg/dL Final   07/01/2021 0.92 0.66 - 1.25 mg/dL Final     Potassium   Date Value Ref Range Status   12/22/2022 4.4 3.4 - 5.3 mmol/L Final   06/02/2022 4.3 3.4 - 5.3 mmol/L Final   07/01/2021 4.9 3.4 - 5.3 mmol/L Final         EXAM:  /68 (BP Location: Left arm)   Pulse 89   Temp 97.9  F (36.6  C) (Oral)   Resp 16   SpO2 97%   General:  Stable.  In no acute distress.    Neuro:  A&O x 3. Moves all extremities equally.  Resp:  Lungs clear to auscultation bilaterally. RA  Cardio:  S1S2 and reg, without murmur, clicks or rubs. Left side pacer in place.  Abdomen:  Soft, non-distended, non-tender, positive bowel sounds.  Skin:  Without excoriations, ecchymosis, erythema, lesions or open sores on upper chest. Left side Pacemaker in place.  MSK:  No gross motor weakness.  Sensation intact.  Proprioception intact.    Pre-Sedation Assessment:  Mallampati Airway Classification:  II - Faucial pillars and soft palate may be seen, but uvula is masked by the base of the tongue  Previous reaction to anesthesia/sedation:  No  Sedation plan based on assessment: Moderate (conscious) sedation  ASA Classification: Class 3 - SEVERE SYSTEMIC DISEASE, DEFINITE FUNCTIONAL LIMITATIONS.   Code Status: Full Code intra procedure, per discussion with patient.         ASSESSMENT/PLAN:   Shahid Davis is a 72 year old male with CAD, complete heart block s/p pacemaker. New diagnosis of high-grade B-cell lymphoma mixed with lower-grade lymphoma; FISH for MYC/BCL2/BCL6. Presents today for port placement.    He presents today for port  placement with sedation. We discussed right side placement.    Procedure, risks/benefits, details, alternatives, and sedation reviewed with patient and he verbalized understanding. All questions answered. OK to proceed with above radiology procedure.     FARSHAD CRAIG CNP  Interventional Radiology

## 2023-01-05 ENCOUNTER — LAB (OUTPATIENT)
Dept: INFUSION THERAPY | Facility: HOSPITAL | Age: 73
End: 2023-01-05
Attending: PHYSICIAN ASSISTANT
Payer: MEDICARE

## 2023-01-05 ENCOUNTER — HOSPITAL ENCOUNTER (OUTPATIENT)
Dept: INTERVENTIONAL RADIOLOGY/VASCULAR | Facility: HOSPITAL | Age: 73
Discharge: HOME | End: 2023-01-05
Attending: PHYSICIAN ASSISTANT
Payer: MEDICARE

## 2023-01-05 VITALS
DIASTOLIC BLOOD PRESSURE: 56 MMHG | SYSTOLIC BLOOD PRESSURE: 100 MMHG | OXYGEN SATURATION: 99 % | HEART RATE: 85 BPM | RESPIRATION RATE: 18 BRPM | TEMPERATURE: 97.9 F

## 2023-01-05 DIAGNOSIS — C83.32 DIFFUSE LARGE B-CELL LYMPHOMA OF INTRATHORACIC LYMPH NODES (H): ICD-10-CM

## 2023-01-05 LAB
ALBUMIN SERPL BCG-MCNC: 4.3 G/DL (ref 3.5–5.2)
ALP SERPL-CCNC: 126 U/L (ref 40–129)
ALT SERPL W P-5'-P-CCNC: 30 U/L (ref 10–50)
ANION GAP SERPL CALCULATED.3IONS-SCNC: 8 MMOL/L (ref 7–15)
AST SERPL W P-5'-P-CCNC: 34 U/L (ref 10–50)
BASOPHILS # BLD MANUAL: 0.1 10E3/UL (ref 0–0.2)
BASOPHILS NFR BLD MANUAL: 2 %
BILIRUB SERPL-MCNC: 0.2 MG/DL
BUN SERPL-MCNC: 22.6 MG/DL (ref 8–23)
CALCIUM SERPL-MCNC: 10 MG/DL (ref 8.8–10.2)
CHLORIDE SERPL-SCNC: 103 MMOL/L (ref 98–107)
CREAT SERPL-MCNC: 1.14 MG/DL (ref 0.67–1.17)
DEPRECATED HCO3 PLAS-SCNC: 30 MMOL/L (ref 22–29)
EOSINOPHIL # BLD MANUAL: 0.1 10E3/UL (ref 0–0.7)
EOSINOPHIL NFR BLD MANUAL: 1 %
ERYTHROCYTE [DISTWIDTH] IN BLOOD BY AUTOMATED COUNT: 13.5 % (ref 10–15)
GFR SERPL CREATININE-BSD FRML MDRD: 68 ML/MIN/1.73M2
GLUCOSE SERPL-MCNC: 100 MG/DL (ref 70–99)
HCT VFR BLD AUTO: 37.6 % (ref 40–53)
HGB BLD-MCNC: 12.1 G/DL (ref 13.3–17.7)
INR PPP: 0.94 (ref 0.85–1.15)
LYMPHOCYTES # BLD MANUAL: 1.4 10E3/UL (ref 0.8–5.3)
LYMPHOCYTES NFR BLD MANUAL: 22 %
MCH RBC QN AUTO: 31.3 PG (ref 26.5–33)
MCHC RBC AUTO-ENTMCNC: 32.2 G/DL (ref 31.5–36.5)
MCV RBC AUTO: 97 FL (ref 78–100)
MONOCYTES # BLD MANUAL: 0.4 10E3/UL (ref 0–1.3)
MONOCYTES NFR BLD MANUAL: 7 %
NEUTROPHILS # BLD MANUAL: 4.4 10E3/UL (ref 1.6–8.3)
NEUTROPHILS NFR BLD MANUAL: 68 %
PLAT MORPH BLD: NORMAL
PLATELET # BLD AUTO: 111 10E3/UL (ref 150–450)
POTASSIUM SERPL-SCNC: 4.7 MMOL/L (ref 3.4–5.3)
PROT SERPL-MCNC: 6.4 G/DL (ref 6.4–8.3)
RBC # BLD AUTO: 3.86 10E6/UL (ref 4.4–5.9)
RBC MORPH BLD: NORMAL
SODIUM SERPL-SCNC: 141 MMOL/L (ref 136–145)
WBC # BLD AUTO: 6.4 10E3/UL (ref 4–11)

## 2023-01-05 PROCEDURE — 272N000500 HC NEEDLE CR2

## 2023-01-05 PROCEDURE — 272N000602 HC WOUND GLUE CR1

## 2023-01-05 PROCEDURE — 80053 COMPREHEN METABOLIC PANEL: CPT

## 2023-01-05 PROCEDURE — 250N000009 HC RX 250: Performed by: RADIOLOGY

## 2023-01-05 PROCEDURE — 85610 PROTHROMBIN TIME: CPT | Performed by: NURSE PRACTITIONER

## 2023-01-05 PROCEDURE — 36415 COLL VENOUS BLD VENIPUNCTURE: CPT | Performed by: NURSE PRACTITIONER

## 2023-01-05 PROCEDURE — C1788 PORT, INDWELLING, IMP: HCPCS

## 2023-01-05 PROCEDURE — 36415 COLL VENOUS BLD VENIPUNCTURE: CPT

## 2023-01-05 PROCEDURE — 85007 BL SMEAR W/DIFF WBC COUNT: CPT

## 2023-01-05 PROCEDURE — 76937 US GUIDE VASCULAR ACCESS: CPT

## 2023-01-05 PROCEDURE — 250N000011 HC RX IP 250 OP 636: Performed by: NURSE PRACTITIONER

## 2023-01-05 PROCEDURE — 250N000011 HC RX IP 250 OP 636: Performed by: RADIOLOGY

## 2023-01-05 PROCEDURE — 85014 HEMATOCRIT: CPT

## 2023-01-05 PROCEDURE — 99152 MOD SED SAME PHYS/QHP 5/>YRS: CPT

## 2023-01-05 PROCEDURE — C1769 GUIDE WIRE: HCPCS

## 2023-01-05 RX ORDER — CLINDAMYCIN PHOSPHATE 900 MG/50ML
900 INJECTION, SOLUTION INTRAVENOUS ONCE
Status: COMPLETED | OUTPATIENT
Start: 2023-01-05 | End: 2023-01-05

## 2023-01-05 RX ORDER — FENTANYL CITRATE 50 UG/ML
25-50 INJECTION, SOLUTION INTRAMUSCULAR; INTRAVENOUS EVERY 5 MIN PRN
Status: DISCONTINUED | OUTPATIENT
Start: 2023-01-05 | End: 2023-01-06 | Stop reason: HOSPADM

## 2023-01-05 RX ORDER — SODIUM CHLORIDE 9 MG/ML
INJECTION, SOLUTION INTRAVENOUS CONTINUOUS
Status: DISCONTINUED | OUTPATIENT
Start: 2023-01-05 | End: 2023-01-06 | Stop reason: HOSPADM

## 2023-01-05 RX ORDER — HEPARIN SODIUM (PORCINE) LOCK FLUSH IV SOLN 100 UNIT/ML 100 UNIT/ML
5-10 SOLUTION INTRAVENOUS
Status: CANCELLED | OUTPATIENT
Start: 2023-01-05

## 2023-01-05 RX ORDER — HEPARIN SODIUM,PORCINE 10 UNIT/ML
5-10 VIAL (ML) INTRAVENOUS
Status: CANCELLED | OUTPATIENT
Start: 2023-01-05

## 2023-01-05 RX ORDER — NALOXONE HYDROCHLORIDE 0.4 MG/ML
0.2 INJECTION, SOLUTION INTRAMUSCULAR; INTRAVENOUS; SUBCUTANEOUS
Status: DISCONTINUED | OUTPATIENT
Start: 2023-01-05 | End: 2023-01-06 | Stop reason: HOSPADM

## 2023-01-05 RX ORDER — HEPARIN SODIUM,PORCINE 10 UNIT/ML
5-10 VIAL (ML) INTRAVENOUS EVERY 24 HOURS
Status: CANCELLED | OUTPATIENT
Start: 2023-01-05

## 2023-01-05 RX ORDER — FLUMAZENIL 0.1 MG/ML
0.2 INJECTION, SOLUTION INTRAVENOUS
Status: DISCONTINUED | OUTPATIENT
Start: 2023-01-05 | End: 2023-01-06 | Stop reason: HOSPADM

## 2023-01-05 RX ORDER — ACETAMINOPHEN 325 MG/1
650 TABLET ORAL
Status: CANCELLED | OUTPATIENT
Start: 2023-01-05

## 2023-01-05 RX ORDER — LIDOCAINE HYDROCHLORIDE AND EPINEPHRINE 10; 10 MG/ML; UG/ML
INJECTION, SOLUTION INFILTRATION; PERINEURAL PRN
Status: COMPLETED | OUTPATIENT
Start: 2023-01-05 | End: 2023-01-05

## 2023-01-05 RX ORDER — HEPARIN SODIUM (PORCINE) LOCK FLUSH IV SOLN 100 UNIT/ML 100 UNIT/ML
500 SOLUTION INTRAVENOUS ONCE
Status: COMPLETED | OUTPATIENT
Start: 2023-01-05 | End: 2023-01-05

## 2023-01-05 RX ORDER — NALOXONE HYDROCHLORIDE 0.4 MG/ML
0.4 INJECTION, SOLUTION INTRAMUSCULAR; INTRAVENOUS; SUBCUTANEOUS
Status: DISCONTINUED | OUTPATIENT
Start: 2023-01-05 | End: 2023-01-06 | Stop reason: HOSPADM

## 2023-01-05 RX ORDER — LIDOCAINE 40 MG/G
CREAM TOPICAL
Status: DISCONTINUED | OUTPATIENT
Start: 2023-01-05 | End: 2023-01-06 | Stop reason: HOSPADM

## 2023-01-05 RX ADMIN — CLINDAMYCIN IN 5 PERCENT DEXTROSE 900 MG: 18 INJECTION, SOLUTION INTRAVENOUS at 11:39

## 2023-01-05 RX ADMIN — Medication 500 UNITS: at 12:03

## 2023-01-05 RX ADMIN — MIDAZOLAM HYDROCHLORIDE 1 MG: 1 INJECTION, SOLUTION INTRAMUSCULAR; INTRAVENOUS at 11:43

## 2023-01-05 RX ADMIN — LIDOCAINE HYDROCHLORIDE AND EPINEPHRINE 5 ML: 10; 10 INJECTION, SOLUTION INFILTRATION; PERINEURAL at 11:57

## 2023-01-05 RX ADMIN — MIDAZOLAM HYDROCHLORIDE 0.5 MG: 1 INJECTION, SOLUTION INTRAMUSCULAR; INTRAVENOUS at 11:56

## 2023-01-05 RX ADMIN — FENTANYL CITRATE 50 MCG: 50 INJECTION, SOLUTION INTRAMUSCULAR; INTRAVENOUS at 11:48

## 2023-01-05 RX ADMIN — FENTANYL CITRATE 25 MCG: 50 INJECTION, SOLUTION INTRAMUSCULAR; INTRAVENOUS at 11:58

## 2023-01-05 NOTE — DISCHARGE INSTRUCTIONS
Port Placement Procedure Discharge Instructions:  You had a port placed. A port is a small medical device that is placed under the skin and is connected to a vein with a catheter (thin, flexible tube). Ports can be used to administer IV medications, fluids or blood products (including chemotherapy) or for blood lab draws. Please follow the below instructions:  Care Instructions:  - If you received sedation for your procedure, do not drive or operate heavy machinery for the rest of the day.  - You may shower beginning post procedure day #1.  Do not scrub site until well healed; pat dry.  - Avoid submerging the port site under water (tub baths, Jacuzzis, hot tubs and pools) for 10 days or until glue falls off.  - You may take over the counter pain medication for discomfort. Follow the package directions.  - Avoid heavy lifting (greater than 10 pounds) and strenuous activities for 3 days.   - If you experience significant bleeding at site, apply pressure with hands above the clavicle bone, sit upright and seek immediate medical assistance.    Call [facility] ([phone])*** if you experience the following:   - Uncontrolled bleeding from port site  - Fever (greater than 101 F (38.3C))  - Purulent (yellow/green/foul smelling) drainage from port insertion site.  - Increasing pain at port site  - Increasing redness at port site

## 2023-01-05 NOTE — PROGRESS NOTES
Pt discharged to home. Discharge criteria met.  Instructions reviewed and acknowledged.  Escorted to car via wheelchair. Significant other present.

## 2023-01-05 NOTE — IP AVS SNAPSHOT
[facility] Interventional Radiology  [address] [place] [id number]  Phone: [phone]  Fax: [phone]                                    After Visit Summary   1/5/2023    Shahid Davis   MRN: [id number]           After Visit Summary Signature Page    I have received my discharge instructions, and my questions have been answered. I have discussed any challenges I see with this plan with the nurse or doctor.    ..........................................................................................................................................  Patient/Patient Representative Signature      ..........................................................................................................................................  Patient Representative Print Name and Relationship to Patient    ..................................................               ................................................  Date                                   Time    ..........................................................................................................................................  Reviewed by Signature/Title    ...................................................              ..............................................  Date                                               Time          22EPIC Rev 08/18

## 2023-01-05 NOTE — PRE-PROCEDURE
GENERAL PRE-PROCEDURE:   Procedure:  Port placement with sedation  Date/Time:  1/5/2023 11:22 AM    Written consent obtained?: Yes    Risks and benefits: Risks, benefits and alternatives were discussed    Consent given by:  Patient  Patient states understanding of procedure being performed: Yes    Patient's understanding of procedure matches consent: Yes    Procedure consent matches procedure scheduled: Yes    Expected level of sedation:  Moderate  Appropriately NPO:  Yes  ASA Class:  3  Mallampati  :  Grade 2- soft palate, base of uvula, tonsillar pillars, and portion of posterior pharyngeal wall visible  Lungs:  Lungs clear with good breath sounds bilaterally  Heart:  Normal heart sounds and rate  History & Physical reviewed:  History and physical reviewed and no updates needed  Statement of review:  I have reviewed the lab findings, diagnostic data, medications, and the plan for sedation

## 2023-01-06 DIAGNOSIS — Z76.89 PREVENTION OF CHEMOTHERAPY-INDUCED NEUTROPENIA: ICD-10-CM

## 2023-01-06 DIAGNOSIS — C83.38 DIFFUSE LARGE B-CELL LYMPHOMA OF LYMPH NODES OF MULTIPLE REGIONS (H): Primary | ICD-10-CM

## 2023-01-06 DIAGNOSIS — Z11.52 ENCOUNTER FOR SCREENING LABORATORY TESTING FOR SEVERE ACUTE RESPIRATORY SYNDROME CORONAVIRUS 2 (SARS-COV-2): ICD-10-CM

## 2023-01-06 LAB
CULTURE HARVEST COMPLETE DATE: NORMAL
INTERPRETATION: NORMAL

## 2023-01-09 ENCOUNTER — PATIENT OUTREACH (OUTPATIENT)
Dept: ONCOLOGY | Facility: CLINIC | Age: 73
End: 2023-01-09

## 2023-01-10 ENCOUNTER — PATIENT OUTREACH (OUTPATIENT)
Dept: ONCOLOGY | Facility: CLINIC | Age: 73
End: 2023-01-10

## 2023-01-10 DIAGNOSIS — U07.1 INFECTION DUE TO 2019 NOVEL CORONAVIRUS: Primary | ICD-10-CM

## 2023-01-10 NOTE — PROGRESS NOTES
[facility]  Date of visit: Jan 11, 2023      Reason for Visit: follow up diagnosis B cell lymphoma      Oncology HPI:   Follows with Dr. uRelas. 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 He presented progressing B symptoms, abdominal pain, and SOB. PET 12/9 showed extensive lymphomatous involvement to the R pleura w/avid effusions, mediastinal and pericardial regions, right anterolateral chest wall, adenopathy above and below the diaphragm, liver, spleen and multiple sites in the skeleton, concerning for transformation from his low-grade lymphoma. He also has pancytopenia which seems to have progressed slowly over the past years. Of note, reports maternal history of waldenstrom's. Due to worsening shortness of breath and delays of biopsy, patient presented to ED on 12/14 and was subsequently admitted for expedited workup and treatment. Given highest SUV and easily accessible sternoclavicular soft tissue mass, plan for biopsy of this lesion and okay to hold on BMBx given would not change treatment plan.   - S/p US-guided sternoclavicular soft tissue mass biopsy with IR 12/15- Flow with 95% CD10 positive kappa-monotypic B cells. FISH confirming triple Hit (BCL6, BCL2, MYC rearrangements)  - FLC WNL. M-spike on 12/2 was 0.2. Beta-2-microglobulin elevated at 3.6. IgA 36, IgG 310, IgM 302  - Echo (12/15) with LVEF 60-65% with mild aortic insufficiency, no evidence of effusion or tamponade.  - Continue to hold on BMBx at this time as it would not  (known involvement on PET/CT), although if inadequate tissue from biopsy on 12/15 or need for further diagnostic tissue, could consider early week of 12/19.   - Prednisone 100 mg daily (12/17 - 12/24)   - RCHOP ordered following discussion at weekly heme malignancy conference 12/20, dosed  "C1D1 = 12/20/22   - FISH results show triple hit lymphoma - transitioning to DA-R-EPOCH for C2    Shahid is here today for hospital admission for DA-R-EPOCH    Interval history:   Was fatigued after R-CHOP, thought it was the chemo, but now he is wondering if it was COVID.  Girlfriend had a URI around that time, they never tested her for COVID.  He is feeling less fatigued now, \"getting more strength back every day.\"  No significant changes to cough, if anything it is improving from prior to his R-CHOP. SOB has resolved.  Slight constipation after R-CHOP, resolved with MiraLax and Senna as needed  States the previously visible tumor on his chest \"the size of a softball\" has completely disappeared, resolved within 5-7 days after R-CHOP.  Appetite is good, eating more than he ever has, trying to maintain/gain weight.  No fevers.  ROS otherwise neg          Current Outpatient Medications   Medication Sig Dispense Refill     acyclovir (ZOVIRAX) 400 MG tablet Take 1 tablet (400 mg) by mouth every 12 hours for 60 days 120 tablet 3     allopurinol (ZYLOPRIM) 300 MG tablet Take 1 tablet (300 mg) by mouth daily 60 tablet 3     ascorbic acid (VITAMIN C) 500 MG tablet Take 500 mg by mouth every morning       fluconazole (DIFLUCAN) 200 MG tablet Take 1 tablet (200 mg) by mouth daily as needed 30 tablet 0     levofloxacin (LEVAQUIN) 250 MG tablet Take 1 tablet (250 mg) by mouth daily as needed 30 tablet 0     Multiple Vitamins-Minerals (MULTIVITAL PO) Take 1 tablet by mouth every morning       omeprazole (PRILOSEC) 40 MG DR capsule Take 1 capsule (40 mg) by mouth daily 90 capsule 4     ondansetron (ZOFRAN) 8 MG tablet Take 1 tablet (8 mg) by mouth every 8 hours as needed for nausea 60 tablet 0     polyethylene glycol (MIRALAX) 17 GM/Dose powder Take 17 g by mouth daily as needed for constipation 510 g 4     senna-docusate (SENNA S) 8.6-50 MG tablet Take 1 tablet by mouth 2 times daily as needed for constipation 60 tablet 4     " temazepam (RESTORIL) 7.5 MG capsule Take 1 capsule (7.5 mg) by mouth nightly as needed for sleep 20 capsule 0       Allergies   Allergen Reactions     Ampicillin [Ampicillin Sodium]      Bactrim [Sulfamethoxazole W/Trimethoprim]      Contrast Dye      CT contrast (IV) allergy - need oral Methylpred as premed for scans     Ampicillin Rash         Physical Exam:  /71   Pulse 78   Temp 98.6  F (37  C) (Oral)   Resp 16   SpO2 96%   Gen: alert, pleasant and conversational, NAD  HEENT: NC/AT,EOMI w/ PERRL, anicteric sclera. OP clear. MMM.   Neck: Supple, no LAD  CV: normal S1,S2 with RRR no m/r/g  Resp: lungs CTA bilaterally with adequate air movement to bases. No wheezes or crackles  Abd: soft NTND no organomegaly or masses. BS normoactive.   Ext: warm and well perfused, no edema or cyanosis  Lymphatics: no palpable cervical, axillary, or inguinal LAD. No HSM  Skin: no concerning lesions or rashes  Neuro: A&Ox4, no lateralizing sx. Grossly nonfocal.  Psych: appropriate, reactive        Labs:     I personally reviewed the following labs:    Most Recent 3 CBC's:  Recent Labs   Lab Test 01/11/23  1057 01/05/23  1034 12/22/22  0707   WBC 5.5 6.4 4.6   HGB 11.8* 12.1* 11.6*   MCV 94 97 95    111* 108*     Most Recent 3 BMP's:  Recent Labs   Lab Test 01/05/23  1034 12/22/22  0707 12/21/22  2030    144 141   POTASSIUM 4.7 4.4 4.3   CHLORIDE 103 110* 108*   CO2 30* 22 17*   BUN 22.6 33.8* 35.0*   CR 1.14 0.89 0.80   ANIONGAP 8 12 16*   JORGE 10.0 9.3 9.2   * 93 209*     Most Recent 2 LFT's:  Recent Labs   Lab Test 01/05/23  1034 12/22/22  0707   AST 34 30   ALT 30 20   ALKPHOS 126 109   BILITOTAL 0.2 0.3           Imaging: n/a      Impression/plan:     # H/o 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 Ruelas. 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 He presented progressing B symptoms, abdominal pain, and SOB. PET 12/9 showed extensive lymphomatous involvement to the R pleura w/avid effusions, mediastinal and pericardial regions, right anterolateral chest wall, adenopathy above and below the diaphragm, liver, spleen and multiple sites in the skeleton, concerning for transformation from his low-grade lymphoma. He also has pancytopenia which seems to have progressed slowly over the past years. Of note, reports maternal history of waldenstrom's. Due to worsening shortness of breath and delays of biopsy, patient presented to ED on 12/14 and was subsequently admitted for expedited workup and treatment. Given highest SUV and easily accessible sternoclavicular soft tissue mass, plan for biopsy of this lesion and okay to hold on BMBx given would not change treatment plan.   - S/p US-guided sternoclavicular soft tissue mass biopsy with IR 12/15; morphology, cytogenetics pending. Flow with 95% CD10 positive kappa-monotypic B cells.   - FLC WNL. M-spike on 12/2 was 0.2. Beta-2-microglobulin elevated at 3.6. IgA 36, IgG 310, IgM 302  - Echo (12/15) with LVEF 60-65% with mild aortic insufficiency, no evidence of effusion or tamponade.  - Continue to hold on BMBx at this time as it would not  (known involvement on PET/CT), although if inadequate tissue from biopsy on 12/15 or need for further diagnostic tissue, could consider early week of 12/19.   - RCHOP ordered following discussion at weekly heme malignancy conference  - Initiated RCHOP C1D1 on 12/20/2022  - FISH results of triple HIT B-cell lymphoma  - Switching to R-EPOCH, admit for 1st cycle today (1/11/22)  - Allopurinol 300 mg daily - continue this given bulky disease  - Will arrange for D6 Neulasta at [facility] [place] in [place] as he has seen a provider there in the past.  Will ask RNCC to fax orders to  [phone] ATTN: [facility].  - Will repeat PET after this cycle (difficulty getting insurance coverage for CT, so PET only is fine, per Dr. Ruelas) and follow-up with Dr. Ruelas prior to next cycle.  - Patient requires methylpred prior to PET due to contrast dye allergy.  [facility]      # Pancytopenia  Likely secondary to prior treatment and lymphomatous involvement.  - Monitor CBC weekly twice weekly after R-EPOCH  - Transfuse if Hgb <7 and plt <10k     # PPx  -  mg BID  - Levaquin and Fluconazole to start when ANC <1k   - Pentamidine neb given inpatient 12/19/22, Please consider giving his next dose while inpatient as he does not stay local for outpatient follow-up    # Hypogammaglobulinemia   -  this admission. Considering IVIG in the outpatient setting, although this has not been done yet as he does not stay local in between cycles.  Will discuss logistics further with RNCC to help establish a plan.     # Shortness of breath, improved  # Cough  # R large and L small pleural effusion   PET/CT (12/9) with FDG-avid large R and small L pleural effusion.   - Cough is improving, breathing has significantly improved - no longer has PARRISH    # GERD  Related to history of radiation to chest.   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 Took uncertain medication for this in the past (~2004), however cannot remember. Melatonin and Trazadone foung to be ineffective.   - Restoril on pred nights was helpful, however his senior insurance won't approve this given sleep aide in elderly. Will consider alternate agent prior to next chemo--      # H/o complete heart block s/p pacemaker placement 2020    #COVID19 Infection  He tested positive on his pre-admission COVID test. He was very surprised, as he does not feel symptomatic.  Notes that his girlfriend had a URI around [place] and was never tested for COVID.  Will request cycle threshold on today's  swab. Okay to proceed with therapy considering he is clinically stable.    FARSHAD Proctor CNP  [facility] [id number] [place], [place] [id number] [phone]    36 minutes spent on the date of the encounter doing chart review, review of test results, patient visit and documentation

## 2023-01-10 NOTE — PROGRESS NOTES
[facility]: Cancer Care                                                                                          I received a call from the patient. He has been notified by [facility] that his COVID-19 PCR swab is positive.     The patient is surprised by this result. He does feel tired and has a cough but this has been ongoing since his recent diagnosis of DLBCL and has not gotten any worse. It is hard for him to distinguish what symptoms might be related to his lymphoma vs chemo vs COVID. However, the patient would prefer to proceed with treatment if possible. We do have him scheduled for a direct admission for chemotherapy tomorrow.     I have talked with Dr. Ruelas and she would like to have a cycle threshold study completed on the swab sample. I called and spoke to the [place] [facility] and they would have to send the sample out to [place] to complete this testing. An order for this was faxed to [facility] in [place] [[phone]].      Confirmed with inpatient team that we can proceed with chemotherapy tomorrow. Will just have to wait for isolation bed to become available.     I reviewed all of the above with the patient and JESSY Luciano.  Patient will report to appointments as scheduled. Has our contact number if needed.    Signature:  Alton Lainez, CHANELLE, RN, OCN  RN Care Coordinator  [facility]

## 2023-01-11 ENCOUNTER — LAB (OUTPATIENT)
Dept: LAB | Facility: CLINIC | Age: 73
DRG: 846 | End: 2023-01-11
Attending: PHYSICIAN ASSISTANT
Payer: MEDICARE

## 2023-01-11 ENCOUNTER — HOSPITAL ENCOUNTER (INPATIENT)
Facility: CLINIC | Age: 73
LOS: 5 days | Discharge: HOME | DRG: 846 | End: 2023-01-16
Attending: INTERNAL MEDICINE | Admitting: INTERNAL MEDICINE
Payer: MEDICARE

## 2023-01-11 ENCOUNTER — ONCOLOGY VISIT (OUTPATIENT)
Dept: ONCOLOGY | Facility: CLINIC | Age: 73
DRG: 846 | End: 2023-01-11
Attending: PHYSICIAN ASSISTANT
Payer: MEDICARE

## 2023-01-11 VITALS
TEMPERATURE: 98.6 F | RESPIRATION RATE: 16 BRPM | DIASTOLIC BLOOD PRESSURE: 71 MMHG | HEART RATE: 78 BPM | OXYGEN SATURATION: 96 % | SYSTOLIC BLOOD PRESSURE: 115 MMHG

## 2023-01-11 DIAGNOSIS — C83.38 DIFFUSE LARGE B-CELL LYMPHOMA OF LYMPH NODES OF MULTIPLE REGIONS (H): Primary | ICD-10-CM

## 2023-01-11 DIAGNOSIS — C83.32 DIFFUSE LARGE B-CELL LYMPHOMA OF INTRATHORACIC LYMPH NODES (H): ICD-10-CM

## 2023-01-11 DIAGNOSIS — T50.8X5D ALLERGIC REACTION TO CONTRAST MATERIAL, SUBSEQUENT ENCOUNTER: ICD-10-CM

## 2023-01-11 DIAGNOSIS — T45.1X5S ADVERSE EFFECT OF ANTINEOPLASTIC AND IMMUNOSUPPRESSIVE DRUGS, SEQUELA: ICD-10-CM

## 2023-01-11 DIAGNOSIS — F51.02 ADJUSTMENT INSOMNIA: ICD-10-CM

## 2023-01-11 DIAGNOSIS — Z76.89 PREVENTION OF CHEMOTHERAPY-INDUCED NEUTROPENIA: ICD-10-CM

## 2023-01-11 DIAGNOSIS — U07.1 INFECTION DUE TO 2019 NOVEL CORONAVIRUS: ICD-10-CM

## 2023-01-11 DIAGNOSIS — Z11.52 ENCOUNTER FOR SCREENING LABORATORY TESTING FOR SEVERE ACUTE RESPIRATORY SYNDROME CORONAVIRUS 2 (SARS-COV-2): ICD-10-CM

## 2023-01-11 PROBLEM — C83.30 DLBCL (DIFFUSE LARGE B CELL LYMPHOMA) (H): Status: ACTIVE | Noted: 2023-01-11

## 2023-01-11 LAB
ABO/RH(D): NORMAL
ALBUMIN SERPL BCG-MCNC: 4.3 G/DL (ref 3.5–5.2)
ALBUMIN SERPL BCG-MCNC: 4.3 G/DL (ref 3.5–5.2)
ALP SERPL-CCNC: 118 U/L (ref 40–129)
ALP SERPL-CCNC: 121 U/L (ref 40–129)
ALT SERPL W P-5'-P-CCNC: 33 U/L (ref 10–50)
ALT SERPL W P-5'-P-CCNC: 35 U/L (ref 10–50)
ANION GAP SERPL CALCULATED.3IONS-SCNC: 13 MMOL/L (ref 7–15)
ANION GAP SERPL CALCULATED.3IONS-SCNC: 9 MMOL/L (ref 7–15)
ANTIBODY SCREEN: NEGATIVE
AST SERPL W P-5'-P-CCNC: 33 U/L (ref 10–50)
AST SERPL W P-5'-P-CCNC: 35 U/L (ref 10–50)
BASOPHILS # BLD AUTO: 0 10E3/UL (ref 0–0.2)
BASOPHILS # BLD AUTO: 0.1 10E3/UL (ref 0–0.2)
BASOPHILS NFR BLD AUTO: 1 %
BASOPHILS NFR BLD AUTO: 1 %
BILIRUB SERPL-MCNC: 0.2 MG/DL
BILIRUB SERPL-MCNC: 0.2 MG/DL
BUN SERPL-MCNC: 24.1 MG/DL (ref 8–23)
BUN SERPL-MCNC: 25.2 MG/DL (ref 8–23)
CALCIUM SERPL-MCNC: 9.9 MG/DL (ref 8.8–10.2)
CALCIUM SERPL-MCNC: 9.9 MG/DL (ref 8.8–10.2)
CHLORIDE SERPL-SCNC: 102 MMOL/L (ref 98–107)
CHLORIDE SERPL-SCNC: 104 MMOL/L (ref 98–107)
CREAT SERPL-MCNC: 1.06 MG/DL (ref 0.67–1.17)
CREAT SERPL-MCNC: 1.1 MG/DL (ref 0.67–1.17)
CYCLE THRESHOLD (CT): 22.2
DEPRECATED HCO3 PLAS-SCNC: 25 MMOL/L (ref 22–29)
DEPRECATED HCO3 PLAS-SCNC: 28 MMOL/L (ref 22–29)
EOSINOPHIL # BLD AUTO: 0 10E3/UL (ref 0–0.7)
EOSINOPHIL # BLD AUTO: 0 10E3/UL (ref 0–0.7)
EOSINOPHIL NFR BLD AUTO: 0 %
EOSINOPHIL NFR BLD AUTO: 0 %
ERYTHROCYTE [DISTWIDTH] IN BLOOD BY AUTOMATED COUNT: 14.1 % (ref 10–15)
ERYTHROCYTE [DISTWIDTH] IN BLOOD BY AUTOMATED COUNT: 14.3 % (ref 10–15)
FIBRINOGEN PPP-MCNC: 329 MG/DL (ref 170–490)
GFR SERPL CREATININE-BSD FRML MDRD: 71 ML/MIN/1.73M2
GFR SERPL CREATININE-BSD FRML MDRD: 74 ML/MIN/1.73M2
GLUCOSE SERPL-MCNC: 94 MG/DL (ref 70–99)
GLUCOSE SERPL-MCNC: 97 MG/DL (ref 70–99)
HCT VFR BLD AUTO: 36 % (ref 40–53)
HCT VFR BLD AUTO: 37 % (ref 40–53)
HGB BLD-MCNC: 11.8 G/DL (ref 13.3–17.7)
HGB BLD-MCNC: 11.9 G/DL (ref 13.3–17.7)
HOLD SPECIMEN: NORMAL
IMM GRANULOCYTES # BLD: 0.1 10E3/UL
IMM GRANULOCYTES # BLD: 0.2 10E3/UL
IMM GRANULOCYTES NFR BLD: 2 %
IMM GRANULOCYTES NFR BLD: 3 %
INR PPP: 1.05 (ref 0.85–1.15)
LDH SERPL L TO P-CCNC: 293 U/L (ref 0–250)
LYMPHOCYTES # BLD AUTO: 1.1 10E3/UL (ref 0.8–5.3)
LYMPHOCYTES # BLD AUTO: 1.2 10E3/UL (ref 0.8–5.3)
LYMPHOCYTES NFR BLD AUTO: 20 %
LYMPHOCYTES NFR BLD AUTO: 22 %
MAGNESIUM SERPL-MCNC: 2.2 MG/DL (ref 1.7–2.3)
MCH RBC QN AUTO: 30.9 PG (ref 26.5–33)
MCH RBC QN AUTO: 31.4 PG (ref 26.5–33)
MCHC RBC AUTO-ENTMCNC: 32.2 G/DL (ref 31.5–36.5)
MCHC RBC AUTO-ENTMCNC: 32.8 G/DL (ref 31.5–36.5)
MCV RBC AUTO: 94 FL (ref 78–100)
MCV RBC AUTO: 98 FL (ref 78–100)
MONOCYTES # BLD AUTO: 0.5 10E3/UL (ref 0–1.3)
MONOCYTES # BLD AUTO: 0.5 10E3/UL (ref 0–1.3)
MONOCYTES NFR BLD AUTO: 9 %
MONOCYTES NFR BLD AUTO: 9 %
NEUTROPHILS # BLD AUTO: 3.7 10E3/UL (ref 1.6–8.3)
NEUTROPHILS # BLD AUTO: 3.8 10E3/UL (ref 1.6–8.3)
NEUTROPHILS NFR BLD AUTO: 66 %
NEUTROPHILS NFR BLD AUTO: 67 %
NRBC # BLD AUTO: 0 10E3/UL
NRBC # BLD AUTO: 0 10E3/UL
NRBC BLD AUTO-RTO: 0 /100
NRBC BLD AUTO-RTO: 0 /100
PHOSPHATE SERPL-MCNC: 3.6 MG/DL (ref 2.5–4.5)
PLATELET # BLD AUTO: 153 10E3/UL (ref 150–450)
PLATELET # BLD AUTO: 162 10E3/UL (ref 150–450)
POTASSIUM SERPL-SCNC: 4.6 MMOL/L (ref 3.4–5.3)
POTASSIUM SERPL-SCNC: 5.1 MMOL/L (ref 3.4–5.3)
PROT SERPL-MCNC: 6.3 G/DL (ref 6.4–8.3)
PROT SERPL-MCNC: 6.3 G/DL (ref 6.4–8.3)
RBC # BLD AUTO: 3.79 10E6/UL (ref 4.4–5.9)
RBC # BLD AUTO: 3.82 10E6/UL (ref 4.4–5.9)
SARS-COV-2 RNA RESP QL NAA+PROBE: POSITIVE
SODIUM SERPL-SCNC: 139 MMOL/L (ref 136–145)
SODIUM SERPL-SCNC: 142 MMOL/L (ref 136–145)
SPECIMEN EXPIRATION DATE: NORMAL
URATE SERPL-MCNC: 3.4 MG/DL (ref 3.4–7)
WBC # BLD AUTO: 5.5 10E3/UL (ref 4–11)
WBC # BLD AUTO: 5.7 10E3/UL (ref 4–11)

## 2023-01-11 PROCEDURE — 85610 PROTHROMBIN TIME: CPT | Performed by: STUDENT IN AN ORGANIZED HEALTH CARE EDUCATION/TRAINING PROGRAM

## 2023-01-11 PROCEDURE — 80053 COMPREHEN METABOLIC PANEL: CPT | Performed by: REGISTERED NURSE

## 2023-01-11 PROCEDURE — 258N000003 HC RX IP 258 OP 636: Performed by: PHYSICIAN ASSISTANT

## 2023-01-11 PROCEDURE — 36415 COLL VENOUS BLD VENIPUNCTURE: CPT | Performed by: REGISTERED NURSE

## 2023-01-11 PROCEDURE — 36415 COLL VENOUS BLD VENIPUNCTURE: CPT | Performed by: PHYSICIAN ASSISTANT

## 2023-01-11 PROCEDURE — 258N000003 HC RX IP 258 OP 636: Performed by: INTERNAL MEDICINE

## 2023-01-11 PROCEDURE — 85384 FIBRINOGEN ACTIVITY: CPT | Performed by: STUDENT IN AN ORGANIZED HEALTH CARE EDUCATION/TRAINING PROGRAM

## 2023-01-11 PROCEDURE — U0003 INFECTIOUS AGENT DETECTION BY NUCLEIC ACID (DNA OR RNA); SEVERE ACUTE RESPIRATORY SYNDROME CORONAVIRUS 2 (SARS-COV-2) (CORONAVIRUS DISEASE [COVID-19]), AMPLIFIED PROBE TECHNIQUE, MAKING USE OF HIGH THROUGHPUT TECHNOLOGIES AS DESCRIBED BY CMS-2020-01-R: HCPCS | Performed by: REGISTERED NURSE

## 2023-01-11 PROCEDURE — XW033E5 INTRODUCTION OF REMDESIVIR ANTI-INFECTIVE INTO PERIPHERAL VEIN, PERCUTANEOUS APPROACH, NEW TECHNOLOGY GROUP 5: ICD-10-PCS | Performed by: PHYSICIAN ASSISTANT

## 2023-01-11 PROCEDURE — 99223 1ST HOSP IP/OBS HIGH 75: CPT | Mod: AI | Performed by: PHYSICIAN ASSISTANT

## 2023-01-11 PROCEDURE — 85025 COMPLETE CBC W/AUTO DIFF WBC: CPT | Performed by: PHYSICIAN ASSISTANT

## 2023-01-11 PROCEDURE — 30233S1 TRANSFUSION OF NONAUTOLOGOUS GLOBULIN INTO PERIPHERAL VEIN, PERCUTANEOUS APPROACH: ICD-10-PCS | Performed by: PHYSICIAN ASSISTANT

## 2023-01-11 PROCEDURE — 83735 ASSAY OF MAGNESIUM: CPT | Performed by: PHYSICIAN ASSISTANT

## 2023-01-11 PROCEDURE — 250N000012 HC RX MED GY IP 250 OP 636 PS 637: Performed by: INTERNAL MEDICINE

## 2023-01-11 PROCEDURE — 99214 OFFICE O/P EST MOD 30 MIN: CPT | Performed by: REGISTERED NURSE

## 2023-01-11 PROCEDURE — 83615 LACTATE (LD) (LDH) ENZYME: CPT | Performed by: PHYSICIAN ASSISTANT

## 2023-01-11 PROCEDURE — 86901 BLOOD TYPING SEROLOGIC RH(D): CPT | Performed by: PHYSICIAN ASSISTANT

## 2023-01-11 PROCEDURE — 84100 ASSAY OF PHOSPHORUS: CPT | Performed by: PHYSICIAN ASSISTANT

## 2023-01-11 PROCEDURE — 250N000013 HC RX MED GY IP 250 OP 250 PS 637: Performed by: INTERNAL MEDICINE

## 2023-01-11 PROCEDURE — 84550 ASSAY OF BLOOD/URIC ACID: CPT | Performed by: PHYSICIAN ASSISTANT

## 2023-01-11 PROCEDURE — G0463 HOSPITAL OUTPT CLINIC VISIT: HCPCS

## 2023-01-11 PROCEDURE — 84155 ASSAY OF PROTEIN SERUM: CPT | Performed by: PHYSICIAN ASSISTANT

## 2023-01-11 PROCEDURE — 250N000013 HC RX MED GY IP 250 OP 250 PS 637: Performed by: PHYSICIAN ASSISTANT

## 2023-01-11 PROCEDURE — 120N000002 HC R&B MED SURG/OB UMMC

## 2023-01-11 PROCEDURE — 85025 COMPLETE CBC W/AUTO DIFF WBC: CPT | Performed by: REGISTERED NURSE

## 2023-01-11 PROCEDURE — 250N000011 HC RX IP 250 OP 636: Performed by: INTERNAL MEDICINE

## 2023-01-11 PROCEDURE — 250N000011 HC RX IP 250 OP 636: Performed by: PHYSICIAN ASSISTANT

## 2023-01-11 RX ORDER — DIPHENHYDRAMINE HCL 50 MG
50 CAPSULE ORAL
Status: DISCONTINUED | OUTPATIENT
Start: 2023-01-11 | End: 2023-01-11

## 2023-01-11 RX ORDER — ACETAMINOPHEN 325 MG/1
650 TABLET ORAL EVERY 4 HOURS PRN
Status: DISCONTINUED | OUTPATIENT
Start: 2023-01-11 | End: 2023-01-16 | Stop reason: HOSPADM

## 2023-01-11 RX ORDER — LORAZEPAM 0.5 MG/1
.5-1 TABLET ORAL EVERY 6 HOURS PRN
Status: DISCONTINUED | OUTPATIENT
Start: 2023-01-11 | End: 2023-01-16 | Stop reason: HOSPADM

## 2023-01-11 RX ORDER — ONDANSETRON 2 MG/ML
8 INJECTION INTRAMUSCULAR; INTRAVENOUS EVERY 8 HOURS PRN
Status: DISCONTINUED | OUTPATIENT
Start: 2023-01-11 | End: 2023-01-16 | Stop reason: HOSPADM

## 2023-01-11 RX ORDER — MEPERIDINE HYDROCHLORIDE 25 MG/ML
25 INJECTION INTRAMUSCULAR; INTRAVENOUS; SUBCUTANEOUS EVERY 30 MIN PRN
Status: DISCONTINUED | OUTPATIENT
Start: 2023-01-11 | End: 2023-01-16

## 2023-01-11 RX ORDER — ACETAMINOPHEN 325 MG/1
650 TABLET ORAL ONCE
Status: DISCONTINUED | OUTPATIENT
Start: 2023-01-11 | End: 2023-01-11

## 2023-01-11 RX ORDER — HEPARIN SODIUM,PORCINE 10 UNIT/ML
5-10 VIAL (ML) INTRAVENOUS EVERY 24 HOURS
Status: DISCONTINUED | OUTPATIENT
Start: 2023-01-11 | End: 2023-01-16 | Stop reason: HOSPADM

## 2023-01-11 RX ORDER — POLYETHYLENE GLYCOL 3350 17 G/17G
17 POWDER, FOR SOLUTION ORAL DAILY
Status: DISCONTINUED | OUTPATIENT
Start: 2023-01-11 | End: 2023-01-16 | Stop reason: HOSPADM

## 2023-01-11 RX ORDER — ACYCLOVIR 400 MG/1
400 TABLET ORAL EVERY 12 HOURS
Status: DISCONTINUED | OUTPATIENT
Start: 2023-01-11 | End: 2023-01-16 | Stop reason: HOSPADM

## 2023-01-11 RX ORDER — ALLOPURINOL 300 MG/1
300 TABLET ORAL DAILY
Status: DISCONTINUED | OUTPATIENT
Start: 2023-01-11 | End: 2023-01-16 | Stop reason: HOSPADM

## 2023-01-11 RX ORDER — ACETAMINOPHEN 325 MG/1
650 TABLET ORAL ONCE
Status: COMPLETED | OUTPATIENT
Start: 2023-01-11 | End: 2023-01-11

## 2023-01-11 RX ORDER — ASCORBIC ACID 500 MG
500 TABLET ORAL EVERY MORNING
Status: DISCONTINUED | OUTPATIENT
Start: 2023-01-12 | End: 2023-01-16 | Stop reason: HOSPADM

## 2023-01-11 RX ORDER — LORAZEPAM 2 MG/ML
.5-1 INJECTION INTRAMUSCULAR EVERY 6 HOURS PRN
Status: DISCONTINUED | OUTPATIENT
Start: 2023-01-11 | End: 2023-01-16 | Stop reason: HOSPADM

## 2023-01-11 RX ORDER — ALBUTEROL SULFATE 90 UG/1
1-2 AEROSOL, METERED RESPIRATORY (INHALATION)
Status: DISCONTINUED | OUTPATIENT
Start: 2023-01-11 | End: 2023-01-16

## 2023-01-11 RX ORDER — ONDANSETRON 8 MG/1
8 TABLET, FILM COATED ORAL EVERY 8 HOURS PRN
Status: DISCONTINUED | OUTPATIENT
Start: 2023-01-11 | End: 2023-01-16 | Stop reason: HOSPADM

## 2023-01-11 RX ORDER — METHYLPREDNISOLONE SODIUM SUCCINATE 125 MG/2ML
125 INJECTION, POWDER, LYOPHILIZED, FOR SOLUTION INTRAMUSCULAR; INTRAVENOUS
Status: DISCONTINUED | OUTPATIENT
Start: 2023-01-11 | End: 2023-01-11

## 2023-01-11 RX ORDER — PREDNISONE 50 MG/1
60 TABLET ORAL 2 TIMES DAILY
Status: COMPLETED | OUTPATIENT
Start: 2023-01-11 | End: 2023-01-16

## 2023-01-11 RX ORDER — DIPHENHYDRAMINE HCL 50 MG
50 CAPSULE ORAL ONCE
Status: DISCONTINUED | OUTPATIENT
Start: 2023-01-11 | End: 2023-01-11

## 2023-01-11 RX ORDER — METHYLPREDNISOLONE SODIUM SUCCINATE 125 MG/2ML
125 INJECTION, POWDER, LYOPHILIZED, FOR SOLUTION INTRAMUSCULAR; INTRAVENOUS
Status: DISCONTINUED | OUTPATIENT
Start: 2023-01-11 | End: 2023-01-16

## 2023-01-11 RX ORDER — DIPHENHYDRAMINE HYDROCHLORIDE 50 MG/ML
50 INJECTION INTRAMUSCULAR; INTRAVENOUS ONCE
Status: DISCONTINUED | OUTPATIENT
Start: 2023-01-11 | End: 2023-01-11

## 2023-01-11 RX ORDER — AMOXICILLIN 250 MG
1 CAPSULE ORAL DAILY
Status: DISCONTINUED | OUTPATIENT
Start: 2023-01-11 | End: 2023-01-11

## 2023-01-11 RX ORDER — ACETAMINOPHEN 325 MG/1
650 TABLET ORAL
Status: DISCONTINUED | OUTPATIENT
Start: 2023-01-11 | End: 2023-01-11

## 2023-01-11 RX ORDER — DIPHENHYDRAMINE HCL 50 MG
50 CAPSULE ORAL ONCE
Status: COMPLETED | OUTPATIENT
Start: 2023-01-11 | End: 2023-01-11

## 2023-01-11 RX ORDER — DEXAMETHASONE 4 MG/1
8 TABLET ORAL DAILY
Status: DISCONTINUED | OUTPATIENT
Start: 2023-01-17 | End: 2023-01-16 | Stop reason: HOSPADM

## 2023-01-11 RX ORDER — ALLOPURINOL 300 MG/1
300 TABLET ORAL DAILY
Status: DISCONTINUED | OUTPATIENT
Start: 2023-01-11 | End: 2023-01-11

## 2023-01-11 RX ORDER — AMOXICILLIN 250 MG
2 CAPSULE ORAL 2 TIMES DAILY
Status: DISCONTINUED | OUTPATIENT
Start: 2023-01-11 | End: 2023-01-16 | Stop reason: HOSPADM

## 2023-01-11 RX ORDER — DEXTROSE MONOHYDRATE 50 MG/ML
10-20 INJECTION, SOLUTION INTRAVENOUS
Status: DISCONTINUED | OUTPATIENT
Start: 2023-01-16 | End: 2023-01-16 | Stop reason: HOSPADM

## 2023-01-11 RX ORDER — ONDANSETRON 8 MG/1
16 TABLET, FILM COATED ORAL
Status: COMPLETED | OUTPATIENT
Start: 2023-01-11 | End: 2023-01-15

## 2023-01-11 RX ORDER — EPINEPHRINE 1 MG/ML
0.3 INJECTION, SOLUTION, CONCENTRATE INTRAVENOUS EVERY 5 MIN PRN
Status: DISCONTINUED | OUTPATIENT
Start: 2023-01-11 | End: 2023-01-16

## 2023-01-11 RX ORDER — METHYLPREDNISOLONE 32 MG/1
32 TABLET ORAL DAILY
Qty: 2 TABLET | Refills: 0 | Status: SHIPPED | OUTPATIENT
Start: 2023-01-11 | End: 2023-01-24

## 2023-01-11 RX ORDER — DIPHENHYDRAMINE HYDROCHLORIDE 50 MG/ML
50 INJECTION INTRAMUSCULAR; INTRAVENOUS
Status: DISCONTINUED | OUTPATIENT
Start: 2023-01-11 | End: 2023-01-16

## 2023-01-11 RX ORDER — AMOXICILLIN 250 MG
1 CAPSULE ORAL 2 TIMES DAILY PRN
Status: DISCONTINUED | OUTPATIENT
Start: 2023-01-11 | End: 2023-01-16 | Stop reason: HOSPADM

## 2023-01-11 RX ORDER — TEMAZEPAM 7.5 MG/1
7.5 CAPSULE ORAL
Status: DISCONTINUED | OUTPATIENT
Start: 2023-01-11 | End: 2023-01-16 | Stop reason: HOSPADM

## 2023-01-11 RX ORDER — HEPARIN SODIUM (PORCINE) LOCK FLUSH IV SOLN 100 UNIT/ML 100 UNIT/ML
5-10 SOLUTION INTRAVENOUS
Status: DISCONTINUED | OUTPATIENT
Start: 2023-01-11 | End: 2023-01-16 | Stop reason: HOSPADM

## 2023-01-11 RX ORDER — ALBUTEROL SULFATE 0.83 MG/ML
2.5 SOLUTION RESPIRATORY (INHALATION)
Status: DISCONTINUED | OUTPATIENT
Start: 2023-01-11 | End: 2023-01-16

## 2023-01-11 RX ORDER — HEPARIN SODIUM,PORCINE 10 UNIT/ML
5-10 VIAL (ML) INTRAVENOUS
Status: DISCONTINUED | OUTPATIENT
Start: 2023-01-11 | End: 2023-01-16 | Stop reason: HOSPADM

## 2023-01-11 RX ORDER — POLYETHYLENE GLYCOL 3350 17 G/17G
17 POWDER, FOR SOLUTION ORAL DAILY PRN
Status: DISCONTINUED | OUTPATIENT
Start: 2023-01-11 | End: 2023-01-16 | Stop reason: HOSPADM

## 2023-01-11 RX ORDER — DIPHENHYDRAMINE HYDROCHLORIDE 50 MG/ML
50 INJECTION INTRAMUSCULAR; INTRAVENOUS
Status: DISCONTINUED | OUTPATIENT
Start: 2023-01-11 | End: 2023-01-11

## 2023-01-11 RX ORDER — PROCHLORPERAZINE MALEATE 5 MG
5-10 TABLET ORAL EVERY 6 HOURS PRN
Status: DISCONTINUED | OUTPATIENT
Start: 2023-01-11 | End: 2023-01-16 | Stop reason: HOSPADM

## 2023-01-11 RX ORDER — ONDANSETRON 8 MG/1
8 TABLET, ORALLY DISINTEGRATING ORAL EVERY 8 HOURS PRN
Status: DISCONTINUED | OUTPATIENT
Start: 2023-01-11 | End: 2023-01-16 | Stop reason: HOSPADM

## 2023-01-11 RX ADMIN — DIPHENHYDRAMINE HYDROCHLORIDE 50 MG: 50 CAPSULE ORAL at 20:57

## 2023-01-11 RX ADMIN — SENNOSIDES AND DOCUSATE SODIUM 2 TABLET: 50; 8.6 TABLET ORAL at 21:00

## 2023-01-11 RX ADMIN — RITUXIMAB-ABBS 600 MG: 10 INJECTION, SOLUTION INTRAVENOUS at 21:23

## 2023-01-11 RX ADMIN — ONDANSETRON HYDROCHLORIDE 16 MG: 8 TABLET, FILM COATED ORAL at 20:57

## 2023-01-11 RX ADMIN — PREDNISONE 100 MG: 50 TABLET ORAL at 20:56

## 2023-01-11 RX ADMIN — ACYCLOVIR 400 MG: 400 TABLET ORAL at 21:00

## 2023-01-11 RX ADMIN — VINCRISTINE SULFATE 0.7 MG: 1 INJECTION, SOLUTION INTRAVENOUS at 23:41

## 2023-01-11 RX ADMIN — ETOPOSIDE 85 MG: 20 INJECTION, SOLUTION, CONCENTRATE INTRAVENOUS at 23:41

## 2023-01-11 RX ADMIN — ACETAMINOPHEN 650 MG: 325 TABLET, FILM COATED ORAL at 20:57

## 2023-01-11 RX ADMIN — REMDESIVIR 200 MG: 100 INJECTION, POWDER, LYOPHILIZED, FOR SOLUTION INTRAVENOUS at 18:02

## 2023-01-11 RX ADMIN — SODIUM CHLORIDE 50 ML: 9 INJECTION, SOLUTION INTRAVENOUS at 18:02

## 2023-01-11 ASSESSMENT — ACTIVITIES OF DAILY LIVING (ADL)
ADLS_ACUITY_SCORE: 35
ADLS_ACUITY_SCORE: 20
ADLS_ACUITY_SCORE: 20
ADLS_ACUITY_SCORE: 35
ADLS_ACUITY_SCORE: 35

## 2023-01-11 ASSESSMENT — PAIN SCALES - GENERAL: PAINLEVEL: NO PAIN (0)

## 2023-01-11 NOTE — H&P
[facility]    History and Physical  Hematology / Oncology     Date of Admission:  01/11/23   Date of Service (when I saw the patient): 01/11/23    Assessment & Plan   Shahid Davis is a 72 year old man with a history of complete heart block s/p pacemaker placement and low-grade kappa-restricted plasmacytoid B-cell lymphoma diagnosed 3/2004 and recently diagnosed triple HIT DLBCL. Initiated on R-CHOP (S8Z9=8012/20/22) which was well tolerated. Now admitted for DA-R-EPOCH (C1D1=1/11/23).       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 Ruelas. 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 He presented progressing B symptoms, abdominal pain, and SOB. PET 12/9 showed extensive lymphomatous involvement to the R pleura w/avid effusions, mediastinal and pericardial regions, right anterolateral chest wall, adenopathy above and below the diaphragm, liver, spleen and multiple sites in the skeleton, concerning for transformation from his low-grade lymphoma. He also has pancytopenia which seems to have progressed slowly over the past years. Of note, reports maternal history of waldenstrom's. Due to worsening shortness of breath and delays of biopsy, patient presented to ED on 12/14 and was subsequently admitted for expedited workup and treatment. Given highest SUV and easily accessible sternoclavicular soft tissue mass, plan for biopsy of this lesion and okay to hold on BMBx given would not change treatment plan. S/p US-guided sternoclavicular soft tissue mass biopsy with IR 12/15; morphology, cytogenetics pending. Flow with 95% CD10 positive kappa-monotypic B cells. Baseline Echo (12/15) with LVEF 60-65% with mild aortic insufficiency, no evidence of effusion or tamponade.  Initiated on RCHOP (C1D1 = 12/20/22) which was well tolerated (started prior to FISH results).   - FISH results show triple hit lymphoma - transitioning to DA-R-EPOCH (C1D1=1/11/23).   - Labs WNL to proceed with chemo as below.  - High IPI (4), thus will obtain diagnostic and therapeutic LP with IT chemo this admission. CAPs team consulted.      Treatment Plan: DA-R-EPOCH (C1D1 = 1/11/23)   - Rituximab 375 mg/m2 D1  - Etoposide, Vincristine, Doxorubicin daily D1-4  - Cytoxan 750 mg/m2 D5  - Pred 60 mg/m2 BID D1-5 then Dex 8 mg D6-7 (will need prescribed at discharge)   - Pre Meds: Tylenol 650 mg, Benadryl 50 mg, Zofran 16 mg D1-5, Emend D1 and D5   - Neulasta D6 -- will need to arrange locally      # Anemia   # Thrombocytopenia   Secondary to chemotherapy and lymphoma.   - Monitor CBC daily  - Transfuse if Hgb <7 and plt <10k     # Risk for TLS  On 12/15, uric acid peak 9.1. Cr, K, Ca, and phos all WNL. S/p rasburicase 12/15 with resolution of hyperuricemia.  - Monitor TLS/DIC labs daily  - Allopurinol 300 mg daily  - Avoiding IVF given malignant pleural effusions although could consider gentle IVF if needed.     ID  # COVID positive   COVID positive on admission, asymptomatic but high risk with active malignancy. Fully vaccinated. Cycle Threshold 22 on admission, thus still contagious.    - Remdesivir x3 days inpatient (1/11-1/13)   - COVID isolation precautions     # Hypogammaglobulinemia   -  Dec 2022.   - IVIG ordered x1/11 given active COVID infection     # PPX  - Viral serologies: Hep B/C non-reactive. HSV 1/2+  - ppx  mg BID   - Pentamidine neb given inpatient 12/19/22, next due outpatient 1/16/23.      MISC  # H/o complete heart block s/p pacemaker placement 2020 - No acute inpatient needs   # GERD - Continue PTA omeprazole.  # Insomnia - PTA Restoril PRN sleep while on steroids       FEN  Diet: Regular Diet Adult   IVF: Bolus PRN (judiciously given pleural effusions)  Lytes: Replete per  protocol      PPX  VTE: Lovenox (start after LP on 1/12)   Bowel: Senna/MiraLax PRN     Code Status: Full Code   Lines/Drains: Port recently placed 1/5/23   Dispo: Remain inpatient 5-7 days for scheduled chemo.     Follow Up: Follows with Dr. Ruelas; will request pending final treatment plan. Local twice weekly labs will need to be requested at [facility] (FAX sent to [phone]).    Neulasta will need to be arranged at [facility] in [place] as he has seen a provider there in the past. Orders need to be faxed to [phone] ATTN: [facility]. CC to arrange.        Patient was seen and plan of care was discussed with attending physician Dr. Ruelas.     I spent 60 minutes in the care of this patient today, which included time necessary for review of interval events, obtaining history and physical exam, ordering medications/tests/procedures as medically indicated, review of pertinent medical literature, counseling of the patient, coordination of care, and documentation time. Over 50% of time was spent counseling the patient and/or coordinating care.     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 Initiated on R-CHOP (T3C5=1812/20/22) which was well tolerated. Now admitted for DA-R-EPOCH (C1D1=1/11/23).     On admission Shahid reports recent wt loss despite trying to eat more at home. Exercising regularly by walking his dog daily though the woods. No recent fevers, chills, cough or dyspnea and was very surprised to hear that he is COVID+ as he is vaccinated and  masking at all times. No issues with bowels or bladder. Mild pain at port site from placement last week.     Past Medical History    I have reviewed this patient's medical history and updated it with pertinent information if needed.   Past Medical History:   Diagnosis Date     Cardiac pacemaker in situ      Cardiac pacemaker in situ     2020     Lymphoma (H)      Squamous cell carcinoma        Past Surgical History   I have reviewed this patient's surgical history and updated it with pertinent information if needed.  Past Surgical History:   Procedure Laterality Date     DAVINCI HERNIORRHAPHY INGUINAL Left 07/09/2021    Procedure: HERNIORRHAPHY, INGUINAL, ROBOT-ASSISTED, Left, Mesh;  Surgeon: Shamar Tyler MD;  Location: [facility] OR     IR CHEST PORT PLACEMENT > 5 YRS OF AGE  1/5/2023     IR SOFT TISSUE BIOPSY  12/15/2022     MOHS MICROGRAPHIC PROCEDURE       NO HISTORY OF SURGERY         Prior to Admission Medications   Cannot display prior to admission medications because the patient has not been admitted in this contact.     Allergies   Allergies   Allergen Reactions     Ampicillin [Ampicillin Sodium]      Bactrim [Sulfamethoxazole W/Trimethoprim]      Contrast Dye      CT contrast (IV) allergy - need oral Methylpred as premed for scans     Ampicillin Rash       Social History   I have reviewed this patient's social history and updated it with pertinent information if needed. Shahid Davis  reports that he has quit smoking. He has never used smokeless tobacco. He reports current alcohol use of about 11.7 standard drinks per week.    Family History   I have reviewed this patient's family history and updated it with pertinent information if needed.   Family History   Problem Relation Age of Onset     Cancer Mother         Blood     Cancer Father         Lung     Cancer Maternal Grandmother         Breast     Cancer Paternal Grandfather         Hodgkins       Review of Systems   The 10 point Review of Systems  "is negative other than noted in the HPI or here.     Physical Exam   /61 (BP Location: Right arm)   Pulse 93   Temp 98.3  F (36.8  C) (Oral)   Resp 18   Ht 1.702 m (5' 7\")   Wt 58.4 kg (128 lb 12.8 oz)   SpO2 97%   BMI 20.17 kg/m      Constitutional: Pleasant male seen laying in bed. No apparent distress, and appears stated age.  Eyes: Lids and lashes normal, sclera clear, conjunctiva normal.   ENT: Normocephalic, without obvious abnormality, atraumatic, sinuses nontender on palpation, oral pharynx with moist mucus membranes, tonsils without erythema or exudates, gums normal and good dentition.   Respiratory: No increased work of breathing, good air exchange, clear to auscultation bilaterally, no crackles or wheezing.   Cardiovascular: Normal apical impulse, regular rate and rhythm, normal S1 and S2, and no murmur noted. Pacemaker in place.   GI: No masses or scars. +BS. Soft. No tenderness on palpation.  Lymph/Hematologic: No cervical lymphadenopathy and no supraclavicular lymphadenopathy.  Skin: No bruising or bleeding, normal skin color, texture, turgor, no redness, warmth, or swelling, no rashes, no lesions, no jaundice.   Extremities: There is no redness, warmth, or swelling of the joints. No lower extremity edema. No cyanosis.   Neurologic: Awake, alert, oriented to name, place and time.    Vascular access: port c/d/i     Data   No results found for this or any previous visit (from the past 24 hour(s)).    "

## 2023-01-11 NOTE — NURSING NOTE
Chief Complaint   Patient presents with     Labs Only     Labs drawn by Rn via .     Labs collected from venipuncture by RN.     Becca Koch RN

## 2023-01-11 NOTE — LETTER
1/11/2023         RE: Shahid Davis   [facility] [id number]        Dear Colleague,    Thank you for referring your patient, Shahid Davis, to the [facility]. Please see a copy of my visit note below.    [facility]  Date of visit: Jan 11, 2023      Reason for Visit: follow up diagnosis B cell lymphoma      Oncology HPI:   Follows with Dr. Ruelas. 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 He presented progressing B symptoms, abdominal pain, and SOB. PET 12/9 showed extensive lymphomatous involvement to the R pleura w/avid effusions, mediastinal and pericardial regions, right anterolateral chest wall, adenopathy above and below the diaphragm, liver, spleen and multiple sites in the skeleton, concerning for transformation from his low-grade lymphoma. He also has pancytopenia which seems to have progressed slowly over the past years. Of note, reports maternal history of waldenstrom's. Due to worsening shortness of breath and delays of biopsy, patient presented to ED on 12/14 and was subsequently admitted for expedited workup and treatment. Given highest SUV and easily accessible sternoclavicular soft tissue mass, plan for biopsy of this lesion and okay to hold on BMBx given would not change treatment plan.   - S/p US-guided sternoclavicular soft tissue mass biopsy with IR 12/15- Flow with 95% CD10 positive kappa-monotypic B cells. FISH confirming triple Hit (BCL6, BCL2, MYC rearrangements)  - FLC WNL. M-spike on 12/2 was 0.2. Beta-2-microglobulin elevated at 3.6. IgA 36, IgG 310, IgM 302  - Echo (12/15) with LVEF 60-65% with mild aortic insufficiency, no evidence of effusion or tamponade.  - Continue to hold on BMBx at this time as it would not  (known involvement on  "PET/CT), although if inadequate tissue from biopsy on 12/15 or need for further diagnostic tissue, could consider early week of 12/19.   - Prednisone 100 mg daily (12/17 - 12/24)   - RCHOP ordered following discussion at weekly heme malignancy conference 12/20, dosed C1D1 = 12/20/22   - FISH results show triple hit lymphoma - transitioning to DA-R-EPOCH for C2    Shahid is here today for hospital admission for DA-R-EPOCH    Interval history:   Was fatigued after R-CHOP, thought it was the chemo, but now he is wondering if it was COVID.  Girlfriend had a URI around that time, they never tested her for COVID.  He is feeling less fatigued now, \"getting more strength back every day.\"  No significant changes to cough, if anything it is improving from prior to his R-CHOP. SOB has resolved.  Slight constipation after R-CHOP, resolved with MiraLax and Senna as needed  States the previously visible tumor on his chest \"the size of a softball\" has completely disappeared, resolved within 5-7 days after R-CHOP.  Appetite is good, eating more than he ever has, trying to maintain/gain weight.  No fevers.  ROS otherwise neg          Current Outpatient Medications   Medication Sig Dispense Refill     acyclovir (ZOVIRAX) 400 MG tablet Take 1 tablet (400 mg) by mouth every 12 hours for 60 days 120 tablet 3     allopurinol (ZYLOPRIM) 300 MG tablet Take 1 tablet (300 mg) by mouth daily 60 tablet 3     ascorbic acid (VITAMIN C) 500 MG tablet Take 500 mg by mouth every morning       fluconazole (DIFLUCAN) 200 MG tablet Take 1 tablet (200 mg) by mouth daily as needed 30 tablet 0     levofloxacin (LEVAQUIN) 250 MG tablet Take 1 tablet (250 mg) by mouth daily as needed 30 tablet 0     Multiple Vitamins-Minerals (MULTIVITAL PO) Take 1 tablet by mouth every morning       omeprazole (PRILOSEC) 40 MG DR capsule Take 1 capsule (40 mg) by mouth daily 90 capsule 4     ondansetron (ZOFRAN) 8 MG tablet Take 1 tablet (8 mg) by mouth every 8 hours as " needed for nausea 60 tablet 0     polyethylene glycol (MIRALAX) 17 GM/Dose powder Take 17 g by mouth daily as needed for constipation 510 g 4     senna-docusate (SENNA S) 8.6-50 MG tablet Take 1 tablet by mouth 2 times daily as needed for constipation 60 tablet 4     temazepam (RESTORIL) 7.5 MG capsule Take 1 capsule (7.5 mg) by mouth nightly as needed for sleep 20 capsule 0       Allergies   Allergen Reactions     Ampicillin [Ampicillin Sodium]      Bactrim [Sulfamethoxazole W/Trimethoprim]      Contrast Dye      CT contrast (IV) allergy - need oral Methylpred as premed for scans     Ampicillin Rash         Physical Exam:  /71   Pulse 78   Temp 98.6  F (37  C) (Oral)   Resp 16   SpO2 96%   Gen: alert, pleasant and conversational, NAD  HEENT: NC/AT,EOMI w/ PERRL, anicteric sclera. OP clear. MMM.   Neck: Supple, no LAD  CV: normal S1,S2 with RRR no m/r/g  Resp: lungs CTA bilaterally with adequate air movement to bases. No wheezes or crackles  Abd: soft NTND no organomegaly or masses. BS normoactive.   Ext: warm and well perfused, no edema or cyanosis  Lymphatics: no palpable cervical, axillary, or inguinal LAD. No HSM  Skin: no concerning lesions or rashes  Neuro: A&Ox4, no lateralizing sx. Grossly nonfocal.  Psych: appropriate, reactive        Labs:     I personally reviewed the following labs:    Most Recent 3 CBC's:  Recent Labs   Lab Test 01/11/23  1057 01/05/23  1034 12/22/22  0707   WBC 5.5 6.4 4.6   HGB 11.8* 12.1* 11.6*   MCV 94 97 95    111* 108*     Most Recent 3 BMP's:  Recent Labs   Lab Test 01/05/23  1034 12/22/22  0707 12/21/22  2030    144 141   POTASSIUM 4.7 4.4 4.3   CHLORIDE 103 110* 108*   CO2 30* 22 17*   BUN 22.6 33.8* 35.0*   CR 1.14 0.89 0.80   ANIONGAP 8 12 16*   JORGE 10.0 9.3 9.2   * 93 209*     Most Recent 2 LFT's:  Recent Labs   Lab Test 01/05/23  1034 12/22/22  0707   AST 34 30   ALT 30 20   ALKPHOS 126 109   BILITOTAL 0.2 0.3           Imaging:  n/a      Impression/plan:     # H/o 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 Ruelas. 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 He presented progressing B symptoms, abdominal pain, and SOB. PET 12/9 showed extensive lymphomatous involvement to the R pleura w/avid effusions, mediastinal and pericardial regions, right anterolateral chest wall, adenopathy above and below the diaphragm, liver, spleen and multiple sites in the skeleton, concerning for transformation from his low-grade lymphoma. He also has pancytopenia which seems to have progressed slowly over the past years. Of note, reports maternal history of waldenstrom's. Due to worsening shortness of breath and delays of biopsy, patient presented to ED on 12/14 and was subsequently admitted for expedited workup and treatment. Given highest SUV and easily accessible sternoclavicular soft tissue mass, plan for biopsy of this lesion and okay to hold on BMBx given would not change treatment plan.   - S/p US-guided sternoclavicular soft tissue mass biopsy with IR 12/15; morphology, cytogenetics pending. Flow with 95% CD10 positive kappa-monotypic B cells.   - FLC WNL. M-spike on 12/2 was 0.2. Beta-2-microglobulin elevated at 3.6. IgA 36, IgG 310, IgM 302  - Echo (12/15) with LVEF 60-65% with mild aortic insufficiency, no evidence of effusion or tamponade.  - Continue to hold on BMBx at this time as it would not  (known involvement on PET/CT), although if inadequate tissue from biopsy on 12/15 or need for further diagnostic tissue, could consider early week of 12/19.   - RCHOP ordered following discussion at weekly heme malignancy conference  - Initiated RCHOP C1D1 on 12/20/2022  - FISH results of triple HIT B-cell  lymphoma  - Switching to R-EPOCH, admit for 1st cycle today (1/11/22)  - Allopurinol 300 mg daily - continue this given bulky disease  - Will arrange for ZACHERY Neulasta at [facility] in [place] as he has seen a provider there in the past.  Will ask RNCC to fax orders to [phone] ATTN: [facility].  - Will repeat PET after this cycle (difficulty getting insurance coverage for CT, so PET only is fine, per Dr. Ruelas) and follow-up with Dr. Ruelas prior to next cycle.  - Patient requires methylpred prior to PET due to contrast dye allergy.  [facility].      # Pancytopenia  Likely secondary to prior treatment and lymphomatous involvement.  - Monitor CBC weekly twice weekly after R-EPOCH  - Transfuse if Hgb <7 and plt <10k     # PPx  -  mg BID  - Levaquin and Fluconazole to start when ANC <1k   - Pentamidine neb given inpatient 12/19/22, Please consider giving his next dose while inpatient as he does not stay local for outpatient follow-up    # Hypogammaglobulinemia   -  this admission. Considering IVIG in the outpatient setting, although this has not been done yet as he does not stay local in between cycles.  Will discuss logistics further with RNCC to help establish a plan.     # Shortness of breath, improved  # Cough  # R large and L small pleural effusion   PET/CT (12/9) with FDG-avid large R and small L pleural effusion.   - Cough is improving, breathing has significantly improved - no longer has PARRISH    # GERD  Related to history of radiation to chest.   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 Took uncertain medication for this in the past (~2004), however cannot remember. Melatonin and Trazadone foung to be ineffective.   - Restoril on pred nights was helpful, however his senior insurance won't approve this given sleep aide in elderly. Will consider alternate agent prior to next chemo--      # H/o complete heart block s/p  pacemaker placement 2020    #COVID19 Infection  He tested positive on his pre-admission COVID test. He was very surprised, as he does not feel symptomatic.  Notes that his girlfriend had a URI around [place] and was never tested for COVID.  Will request cycle threshold on today's swab. Okay to proceed with therapy considering he is clinically stable.    FARSHAD Proctor CNP  [facility] [id number] [place] [address]  [phone]    36 minutes spent on the date of the encounter doing chart review, review of test results, patient visit and documentation

## 2023-01-11 NOTE — NURSING NOTE
Vitals taken prior to patient leaving. Pt vitals are within range and was able to go home    /71   Pulse 78   Temp 98.6  F (37  C) (Oral)   Resp 16   SpO2 96%    Claudine Hopper CMA on 1/11/2023 at 11:39 AM

## 2023-01-12 LAB
ALBUMIN SERPL BCG-MCNC: 3.6 G/DL (ref 3.5–5.2)
ALP SERPL-CCNC: 102 U/L (ref 40–129)
ALT SERPL W P-5'-P-CCNC: 26 U/L (ref 10–50)
ANION GAP SERPL CALCULATED.3IONS-SCNC: 11 MMOL/L (ref 7–15)
APPEARANCE CSF: CLEAR
AST SERPL W P-5'-P-CCNC: 28 U/L (ref 10–50)
BASOPHILS # BLD AUTO: 0 10E3/UL (ref 0–0.2)
BASOPHILS NFR BLD AUTO: 0 %
BILIRUB SERPL-MCNC: 0.2 MG/DL
BUN SERPL-MCNC: 25.2 MG/DL (ref 8–23)
CALCIUM SERPL-MCNC: 8.9 MG/DL (ref 8.8–10.2)
CHLORIDE SERPL-SCNC: 108 MMOL/L (ref 98–107)
COLOR CSF: COLORLESS
CREAT SERPL-MCNC: 1 MG/DL (ref 0.67–1.17)
DEPRECATED HCO3 PLAS-SCNC: 22 MMOL/L (ref 22–29)
EOSINOPHIL # BLD AUTO: 0 10E3/UL (ref 0–0.7)
EOSINOPHIL NFR BLD AUTO: 0 %
ERYTHROCYTE [DISTWIDTH] IN BLOOD BY AUTOMATED COUNT: 14.1 % (ref 10–15)
GFR SERPL CREATININE-BSD FRML MDRD: 79 ML/MIN/1.73M2
GLUCOSE CSF-MCNC: 75 MG/DL (ref 40–70)
GLUCOSE SERPL-MCNC: 141 MG/DL (ref 70–99)
HCT VFR BLD AUTO: 33.1 % (ref 40–53)
HGB BLD-MCNC: 10.7 G/DL (ref 13.3–17.7)
IMM GRANULOCYTES # BLD: 0.2 10E3/UL
IMM GRANULOCYTES NFR BLD: 3 %
INR PPP: 1.06 (ref 0.85–1.15)
LYMPHOCYTES # BLD AUTO: 0.5 10E3/UL (ref 0.8–5.3)
LYMPHOCYTES NFR BLD AUTO: 10 %
MAGNESIUM SERPL-MCNC: 2.2 MG/DL (ref 1.7–2.3)
MCH RBC QN AUTO: 31 PG (ref 26.5–33)
MCHC RBC AUTO-ENTMCNC: 32.3 G/DL (ref 31.5–36.5)
MCV RBC AUTO: 96 FL (ref 78–100)
MONOCYTES # BLD AUTO: 0.1 10E3/UL (ref 0–1.3)
MONOCYTES NFR BLD AUTO: 2 %
NEUTROPHILS # BLD AUTO: 4.6 10E3/UL (ref 1.6–8.3)
NEUTROPHILS NFR BLD AUTO: 85 %
NRBC # BLD AUTO: 0 10E3/UL
NRBC BLD AUTO-RTO: 0 /100
PATH REPORT.COMMENTS IMP SPEC: NORMAL
PATH REPORT.FINAL DX SPEC: NORMAL
PATH REPORT.MICROSCOPIC SPEC OTHER STN: NORMAL
PATH REPORT.RELEVANT HX SPEC: NORMAL
PATH REV: ABNORMAL
PHOSPHATE SERPL-MCNC: 3.2 MG/DL (ref 2.5–4.5)
PLATELET # BLD AUTO: 114 10E3/UL (ref 150–450)
POTASSIUM SERPL-SCNC: 4.5 MMOL/L (ref 3.4–5.3)
PROT CSF-MCNC: 71.8 MG/DL (ref 15–45)
PROT SERPL-MCNC: 5.3 G/DL (ref 6.4–8.3)
RBC # BLD AUTO: 3.45 10E6/UL (ref 4.4–5.9)
RBC # CSF MANUAL: 3 /UL (ref 0–2)
SODIUM SERPL-SCNC: 141 MMOL/L (ref 136–145)
TUBE # CSF: 4
WBC # BLD AUTO: 5.4 10E3/UL (ref 4–11)
WBC # CSF MANUAL: 0 /UL (ref 0–5)

## 2023-01-12 PROCEDURE — 258N000003 HC RX IP 258 OP 636: Performed by: PHYSICIAN ASSISTANT

## 2023-01-12 PROCEDURE — 3E0R305 INTRODUCTION OF OTHER ANTINEOPLASTIC INTO SPINAL CANAL, PERCUTANEOUS APPROACH: ICD-10-PCS | Performed by: PHYSICIAN ASSISTANT

## 2023-01-12 PROCEDURE — 82945 GLUCOSE OTHER FLUID: CPT | Performed by: PHYSICIAN ASSISTANT

## 2023-01-12 PROCEDURE — 250N000013 HC RX MED GY IP 250 OP 250 PS 637: Performed by: INTERNAL MEDICINE

## 2023-01-12 PROCEDURE — 250N000011 HC RX IP 250 OP 636: Performed by: INTERNAL MEDICINE

## 2023-01-12 PROCEDURE — 83735 ASSAY OF MAGNESIUM: CPT | Performed by: PHYSICIAN ASSISTANT

## 2023-01-12 PROCEDURE — 89050 BODY FLUID CELL COUNT: CPT | Performed by: PHYSICIAN ASSISTANT

## 2023-01-12 PROCEDURE — 62270 DX LMBR SPI PNXR: CPT | Performed by: STUDENT IN AN ORGANIZED HEALTH CARE EDUCATION/TRAINING PROGRAM

## 2023-01-12 PROCEDURE — 80053 COMPREHEN METABOLIC PANEL: CPT | Performed by: PHYSICIAN ASSISTANT

## 2023-01-12 PROCEDURE — 84157 ASSAY OF PROTEIN OTHER: CPT | Performed by: PHYSICIAN ASSISTANT

## 2023-01-12 PROCEDURE — 87205 SMEAR GRAM STAIN: CPT | Performed by: INTERNAL MEDICINE

## 2023-01-12 PROCEDURE — 120N000002 HC R&B MED SURG/OB UMMC

## 2023-01-12 PROCEDURE — 88188 FLOWCYTOMETRY/READ 9-15: CPT | Mod: GC | Performed by: PATHOLOGY

## 2023-01-12 PROCEDURE — 99233 SBSQ HOSP IP/OBS HIGH 50: CPT | Mod: FS | Performed by: PHYSICIAN ASSISTANT

## 2023-01-12 PROCEDURE — 250N000013 HC RX MED GY IP 250 OP 250 PS 637: Performed by: PHYSICIAN ASSISTANT

## 2023-01-12 PROCEDURE — 85025 COMPLETE CBC W/AUTO DIFF WBC: CPT | Performed by: PHYSICIAN ASSISTANT

## 2023-01-12 PROCEDURE — 85610 PROTHROMBIN TIME: CPT | Performed by: PHYSICIAN ASSISTANT

## 2023-01-12 PROCEDURE — 009U3ZX DRAINAGE OF SPINAL CANAL, PERCUTANEOUS APPROACH, DIAGNOSTIC: ICD-10-PCS | Performed by: STUDENT IN AN ORGANIZED HEALTH CARE EDUCATION/TRAINING PROGRAM

## 2023-01-12 PROCEDURE — 250N000012 HC RX MED GY IP 250 OP 636 PS 637: Performed by: INTERNAL MEDICINE

## 2023-01-12 PROCEDURE — 84100 ASSAY OF PHOSPHORUS: CPT | Performed by: PHYSICIAN ASSISTANT

## 2023-01-12 PROCEDURE — 250N000011 HC RX IP 250 OP 636: Performed by: PHYSICIAN ASSISTANT

## 2023-01-12 PROCEDURE — 99207 PR APP CREDIT; MD BILLING SHARED VISIT: CPT | Performed by: PHYSICIAN ASSISTANT

## 2023-01-12 PROCEDURE — 999N000128 HC STATISTIC PERIPHERAL IV START W/O US GUIDANCE

## 2023-01-12 PROCEDURE — 88184 FLOWCYTOMETRY/ TC 1 MARKER: CPT | Performed by: PATHOLOGY

## 2023-01-12 PROCEDURE — 36591 DRAW BLOOD OFF VENOUS DEVICE: CPT | Performed by: PHYSICIAN ASSISTANT

## 2023-01-12 PROCEDURE — 88185 FLOWCYTOMETRY/TC ADD-ON: CPT | Performed by: PHYSICIAN ASSISTANT

## 2023-01-12 PROCEDURE — 258N000003 HC RX IP 258 OP 636: Performed by: INTERNAL MEDICINE

## 2023-01-12 PROCEDURE — 96450 CHEMOTHERAPY INTO CNS: CPT | Performed by: PHYSICIAN ASSISTANT

## 2023-01-12 RX ORDER — ALBUTEROL SULFATE 0.83 MG/ML
2.5 SOLUTION RESPIRATORY (INHALATION)
Status: COMPLETED | OUTPATIENT
Start: 2023-01-15 | End: 2023-01-15

## 2023-01-12 RX ORDER — PENTAMIDINE ISETHIONATE 300 MG/300MG
300 INHALANT RESPIRATORY (INHALATION)
Status: COMPLETED | OUTPATIENT
Start: 2023-01-15 | End: 2023-01-15

## 2023-01-12 RX ORDER — ENOXAPARIN SODIUM 100 MG/ML
40 INJECTION SUBCUTANEOUS EVERY 24 HOURS
Status: DISCONTINUED | OUTPATIENT
Start: 2023-01-13 | End: 2023-01-16 | Stop reason: HOSPADM

## 2023-01-12 RX ADMIN — PREDNISONE 100 MG: 50 TABLET ORAL at 08:18

## 2023-01-12 RX ADMIN — POLYETHYLENE GLYCOL 3350 17 G: 17 POWDER, FOR SOLUTION ORAL at 08:18

## 2023-01-12 RX ADMIN — ONDANSETRON HYDROCHLORIDE 8 MG: 8 TABLET, FILM COATED ORAL at 09:59

## 2023-01-12 RX ADMIN — ETOPOSIDE 85 MG: 20 INJECTION, SOLUTION, CONCENTRATE INTRAVENOUS at 21:40

## 2023-01-12 RX ADMIN — SENNOSIDES AND DOCUSATE SODIUM 2 TABLET: 50; 8.6 TABLET ORAL at 20:00

## 2023-01-12 RX ADMIN — OMEPRAZOLE 40 MG: 20 CAPSULE, DELAYED RELEASE ORAL at 08:21

## 2023-01-12 RX ADMIN — TEMAZEPAM 7.5 MG: 7.5 CAPSULE ORAL at 23:10

## 2023-01-12 RX ADMIN — ACYCLOVIR 400 MG: 400 TABLET ORAL at 08:18

## 2023-01-12 RX ADMIN — PREDNISONE 100 MG: 50 TABLET ORAL at 15:46

## 2023-01-12 RX ADMIN — OXYCODONE HYDROCHLORIDE AND ACETAMINOPHEN 500 MG: 500 TABLET ORAL at 08:18

## 2023-01-12 RX ADMIN — CYTARABINE: 100 INJECTION INTRATHECAL; INTRAVENOUS; SUBCUTANEOUS at 10:48

## 2023-01-12 RX ADMIN — ALLOPURINOL 300 MG: 300 TABLET ORAL at 08:18

## 2023-01-12 RX ADMIN — ACYCLOVIR 400 MG: 400 TABLET ORAL at 19:59

## 2023-01-12 RX ADMIN — VINCRISTINE SULFATE 0.7 MG: 1 INJECTION, SOLUTION INTRAVENOUS at 21:39

## 2023-01-12 RX ADMIN — SODIUM CHLORIDE 50 ML: 9 INJECTION, SOLUTION INTRAVENOUS at 16:58

## 2023-01-12 RX ADMIN — REMDESIVIR 100 MG: 100 INJECTION, POWDER, LYOPHILIZED, FOR SOLUTION INTRAVENOUS at 16:52

## 2023-01-12 RX ADMIN — ONDANSETRON HYDROCHLORIDE 16 MG: 8 TABLET, FILM COATED ORAL at 19:59

## 2023-01-12 ASSESSMENT — ACTIVITIES OF DAILY LIVING (ADL)
ADLS_ACUITY_SCORE: 20

## 2023-01-12 NOTE — PLAN OF CARE
LP w/ IT chemo done; site c/d/intact.   Pt A&Ox4, VSS on RA. Denies pain/N/V. See chemo notes. Port infusing CIVI chemo. Good UOP with urinal, no BM this shift. UAL in room. Special precautions maintained. Continue to monitor and with POC.

## 2023-01-12 NOTE — PROGRESS NOTES
Labs and Transfusion orders:  Date: 2023    Patient: Shahid Davis  : 1950    To be arranged at [facility] in [place] as he has seen a provider there in the past. Orders need to be faxed to [phone] ATTN: [facility] to arrange.      Medication administration:  [X] Neulasta (pegfilgastim) injection 6 mg x1 subcutaneous on 2023    LABS:  [X] Check CBC with differential and CMP twice a week (fax labs to [facility] at [phone])  [X] Type and cross PRN    TRANSFUSION PARAMETERS:   [X] Please transfuse 2 (two) units PRBCs if Hgb is less than or equal to 8.  [X] Please transfuse 1 (one) unit platelets if plt count less than or equal to 10,000.   [X] If patient experiences transfusion reaction during transfusion:   [X] 25-50 mg benadryl IV x once PRN   [X] Tylenol 1000 mg PO x once PRN   [X] Hydrocortisone 100 mg IV x once PRN   [X] Zantac 150 mg IV x once PRN   [X] Notify provider covering infusion clinic per protocol      Please call the [facility] at [phone] for any questions, and ask to speak with the care coordinator for Dr Domitila Ruelas (patient's primary oncologist).    Thank you,         Leida Barton PA-C    [facility]  Department of Hematology/Oncology  [address] [place] [address]  Pager: [phone]

## 2023-01-12 NOTE — PROGRESS NOTES
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 Initiated on R-CHOP (S0Y3=3112/20/22) which was well tolerated. Now admitted for DA-R-EPOCH (C1D1=1/11/23).         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 Ruelas. 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 He presented progressing B symptoms, abdominal pain, and SOB. PET 12/9 showed extensive lymphomatous involvement to the R pleura w/avid effusions, mediastinal and pericardial regions, right anterolateral chest wall, adenopathy above and below the diaphragm, liver, spleen and multiple sites in the skeleton, concerning for transformation from his low-grade lymphoma. He also has pancytopenia which seems to have progressed slowly over the past years. Of note, reports maternal history of waldenstrom's. Due to worsening shortness of breath and delays of biopsy, patient presented to ED on 12/14 and was subsequently admitted for expedited workup and treatment. Given highest SUV and easily accessible sternoclavicular soft tissue mass, plan for biopsy of this lesion and okay to hold on BMBx given would not change treatment plan. S/p US-guided sternoclavicular soft tissue mass biopsy with IR 12/15; morphology, cytogenetics pending. Flow with 95% CD10 positive kappa-monotypic B cells. Baseline Echo (12/15) with LVEF 60-65% with mild aortic insufficiency, no evidence of effusion or tamponade. Initiated on RCHOP (C1D1 = 12/20/22)  which was well tolerated (started prior to FISH results).   - FISH results show triple hit lymphoma - transitioning to DA-R-EPOCH (C1D1=1/11/23).   - Labs WNL to proceed with chemo as below.  - High IPI (4), thus will obtain diagnostic and therapeutic LP with IT chemo this admission. CAPs team consulted. S/p LP with IT triple chemo x1/12. CSF flow - pending.   - Plan for PET/CT after C1 outpatient (ordered), patient will need to  Methylpred pre medication in preparation given hx of contrast allergy.      Treatment Plan: DA-R-EPOCH (C1D1 = 1/11/23)   - Rituximab 375 mg/m2 D1  - Etoposide, Vincristine, Doxorubicin daily D1-4  - Cytoxan 750 mg/m2 D5  - Pred 60 mg/m2 BID D1-5 then Dex 8 mg D6-7 (will need prescribed at discharge)   - Pre Meds: Tylenol 650 mg, Benadryl 50 mg, Zofran 16 mg D1-5, Emend D1 and D5   - Neulasta D6 -- will need to arrange locally ~1/17     # Anemia   # Thrombocytopenia   Secondary to chemotherapy and lymphoma.   - Monitor CBC daily  - Transfuse if Hgb <7 and plt <10k     # Risk for TLS  On 12/15, uric acid peak 9.1. Cr, K, Ca, and phos all WNL. S/p rasburicase 12/15 with resolution of hyperuricemia.  - Monitor TLS/DIC labs daily  - Allopurinol 300 mg daily  - Avoiding IVF given malignant pleural effusions although could consider gentle IVF if needed.     ID  # COVID positive   COVID positive on admission, asymptomatic but high risk with active malignancy. Fully vaccinated. Cycle Threshold 22 on admission, thus still contagious.    - Remdesivir x3 days inpatient (1/11-1/13)   - COVID isolation precautions      # Hypogammaglobulinemia   -  Dec 2022.   - IVIG to be ordered later this admission given active COVID infection. Of note, cannot give IVIG and Rituxan same day.      # PPX  - Viral serologies: Hep B/C non-reactive. HSV 1/2+  - ppx  mg BID   - Pentamidine neb given inpatient 12/19/22, ordered inpatient 1/15/23.      MISC  # H/o complete heart block s/p pacemaker  placement 2020 - No acute inpatient needs   # GERD - Continue PTA omeprazole.  # Insomnia - PTA Restoril PRN sleep while on steroids       FEN  Diet: Regular Diet Adult   IVF: Bolus PRN (judiciously given pleural effusions)  Lytes: Replete per protocol      PPX  VTE: Lovenox (start after LP on 1/12)   Bowel: Senna/MiraLax PRN     Code Status: Full Code   Lines/Drains: Port recently placed 1/5/23   Dispo: Remain inpatient 5-7 days for scheduled chemo.      Follow Up: Follows with Dr. Ruelas; will request pending final treatment plan. Local twice weekly labs will need to be requested at [facility] (FAX sent to [phone]).     Neulasta will need to be arranged at [facility] in [place] as he has seen a provider there in the past. Orders need to be faxed to [phone] ATTN: Am[facility].  to arrange.         Patient was seen and plan of care was discussed with attending physician Dr. Ruelas.     I spent 40 minutes in the care of this patient today, which included time necessary for review of interval events, obtaining history and physical exam, ordering medications/tests/procedures as medically indicated, review of pertinent medical literature, counseling of the patient, coordination of care, and documentation time. Over 50% of time was spent counseling the patient and/or coordinating care.     Leida Barton PA-C    Hematology/Oncology   Pager: [phone]        Interval History   Overnight afebrile and VSS. Tolerated Rituxan without issues. No cough, pain or changes in bowels or bladder. Tolerated LP at bedside today with only mild pain. Hair starting to fall out, asking for knitted cap.     Physical Exam   Temp: 97.5  F (36.4  C) Temp src: Oral BP: 103/74 Pulse: 99   Resp: 16 SpO2: 99 % O2 Device: None (Room air)    Vitals:    01/11/23 1422 01/12/23 0816   Weight: 58.4 kg (128 lb 12.8 oz) 58.2 kg (128 lb 3.2 oz)     Vital Signs with Ranges  Temp:  [97.5  F (36.4  C)-98.6  F (37  C)] 97.5  F (36.4   C)  Pulse:  [78-99] 99  Resp:  [16-18] 16  BP: (103-124)/(59-76) 103/74  SpO2:  [95 %-100 %] 99 %  No intake/output data recorded.    Constitutional: Pleasant male seen laying in bed. No apparent distress, and appears stated age.  Eyes: Lids and lashes normal, sclera clear, conjunctiva normal.   ENT: Normocephalic, without obvious abnormality, atraumatic, sinuses nontender on palpation, oral pharynx with moist mucus membranes, tonsils without erythema or exudates, gums normal and good dentition.   Respiratory: No increased work of breathing, good air exchange, clear to auscultation bilaterally, no crackles or wheezing.   Cardiovascular: Normal apical impulse, regular rate and rhythm, normal S1 and S2, and no murmur noted. Pacemaker in place.   GI: No masses or scars. +BS. Soft. No tenderness on palpation.  Lymph/Hematologic: No cervical lymphadenopathy and no supraclavicular lymphadenopathy.  Skin: No bruising or bleeding, normal skin color, texture, turgor, no redness, warmth, or swelling, no rashes, no lesions, no jaundice.   Extremities: There is no redness, warmth, or swelling of the joints. No lower extremity edema. No cyanosis.   Neurologic: Awake, alert, oriented to name, place and time.    Vascular access: port c/d/i      Medications     - MEDICATION INSTRUCTIONS -         remdesivir  100 mg Intravenous Q24H    And     sodium chloride 0.9%  50 mL Intravenous Q24H     acyclovir  400 mg Oral Q12H     [START ON 1/15/2023] albuterol  2.5 mg Nebulization Once    Followed by     [START ON 1/15/2023] pentamidine  300 mg Inhalation Once     allopurinol  300 mg Oral Daily     Chemotherapy Infusing-Continuous Infusion   Does not apply Q8H     [START ON 1/15/2023] cyclophosphamide (CYTOXAN) infusion  750 mg/m2 (Treatment Plan Recorded) Intravenous Once     [START ON 1/17/2023] dexamethasone  8 mg Oral Daily     [START ON 1/16/2023] dextrose 5% water  10-20 mL Intracatheter Daily at 8 pm     [START ON 1/16/2023]  dextrose 5% water  10-20 mL Intracatheter Daily at 8 pm     [START ON 1/13/2023] enoxaparin ANTICOAGULANT  40 mg Subcutaneous Q24H     etoposide  50 mg/m2 (Treatment Plan Recorded) Intravenous Q22H     [START ON 1/16/2023] filgrastim-aafi  5 mcg/kg (Treatment Plan Recorded) Intravenous Daily at 8 pm     [START ON 1/15/2023] fosaprepitant (EMEND) intermittent infusion ( mL)  150 mg Intravenous Once     heparin  5-10 mL Intracatheter Q28 Days     heparin lock flush  5-10 mL Intracatheter Q24H     omeprazole  40 mg Oral Daily     ondansetron  16 mg Oral Q22H     polyethylene glycol  17 g Oral Daily     predniSONE  60 mg/m2 (Treatment Plan Recorded) Oral BID     senna-docusate  2 tablet Oral BID     sodium chloride (PF)  10-20 mL Intracatheter Q28 Days     vinCRIStine/DOXOrubicin (ONCOVIN/ADRIAMYCIN) infusion  0.4 mg/m2 (Treatment Plan Recorded) Intravenous Q22H     vitamin C  500 mg Oral QAM       Data   Results for orders placed or performed during the hospital encounter of 01/11/23 (from the past 24 hour(s))   CBC with platelets differential    Narrative    The following orders were created for panel order CBC with platelets differential.  Procedure                               Abnormality         Status                     ---------                               -----------         ------                     CBC with platelets and d...[[id number]]  Abnormal            Final result                 Please view results for these tests on the individual orders.   Comprehensive metabolic panel   Result Value Ref Range    Sodium 142 136 - 145 mmol/L    Potassium 4.6 3.4 - 5.3 mmol/L    Chloride 104 98 - 107 mmol/L    Carbon Dioxide (CO2) 25 22 - 29 mmol/L    Anion Gap 13 7 - 15 mmol/L    Urea Nitrogen 25.2 (H) 8.0 - 23.0 mg/dL    Creatinine 1.10 0.67 - 1.17 mg/dL    Calcium 9.9 8.8 - 10.2 mg/dL    Glucose 97 70 - 99 mg/dL    Alkaline Phosphatase 118 40 - 129 U/L    AST 35 10 - 50 U/L    ALT 33 10 - 50 U/L    Protein  Total 6.3 (L) 6.4 - 8.3 g/dL    Albumin 4.3 3.5 - 5.2 g/dL    Bilirubin Total 0.2 <=1.2 mg/dL    GFR Estimate 71 >60 mL/min/1.73m2   Magnesium   Result Value Ref Range    Magnesium 2.2 1.7 - 2.3 mg/dL   Phosphorus   Result Value Ref Range    Phosphorus 3.6 2.5 - 4.5 mg/dL   ABO/RH Type and Screen     Narrative    The following orders were created for panel order ABO/RH Type and Screen .  Procedure                               Abnormality         Status                     ---------                               -----------         ------                     Adult Type and Screen[[id number]]                            Final result                 Please view results for these tests on the individual orders.   Uric acid   Result Value Ref Range    Uric Acid 3.4 3.4 - 7.0 mg/dL   Lactate Dehydrogenase   Result Value Ref Range    Lactate Dehydrogenase 293 (H) 0 - 250 U/L   INR   Result Value Ref Range    INR 1.05 0.85 - 1.15   Fibrinogen activity   Result Value Ref Range    Fibrinogen Activity 329 170 - 490 mg/dL   CBC with platelets and differential   Result Value Ref Range    WBC Count 5.7 4.0 - 11.0 10e3/uL    RBC Count 3.79 (L) 4.40 - 5.90 10e6/uL    Hemoglobin 11.9 (L) 13.3 - 17.7 g/dL    Hematocrit 37.0 (L) 40.0 - 53.0 %    MCV 98 78 - 100 fL    MCH 31.4 26.5 - 33.0 pg    MCHC 32.2 31.5 - 36.5 g/dL    RDW 14.3 10.0 - 15.0 %    Platelet Count 162 150 - 450 10e3/uL    % Neutrophils 66 %    % Lymphocytes 22 %    % Monocytes 9 %    % Eosinophils 0 %    % Basophils 1 %    % Immature Granulocytes 2 %    NRBCs per 100 WBC 0 <1 /100    Absolute Neutrophils 3.8 1.6 - 8.3 10e3/uL    Absolute Lymphocytes 1.2 0.8 - 5.3 10e3/uL    Absolute Monocytes 0.5 0.0 - 1.3 10e3/uL    Absolute Eosinophils 0.0 0.0 - 0.7 10e3/uL    Absolute Basophils 0.0 0.0 - 0.2 10e3/uL    Absolute Immature Granulocytes 0.1 <=0.4 10e3/uL    Absolute NRBCs 0.0 10e3/uL   Adult Type and Screen   Result Value Ref Range    ABO/RH(D) A POS     Antibody Screen  Negative Negative    SPECIMEN EXPIRATION DATE [id number]    CBC with platelets differential    Narrative    The following orders were created for panel order CBC with platelets differential.  Procedure                               Abnormality         Status                     ---------                               -----------         ------                     CBC with platelets and d...[[id number]]  Abnormal            Final result                 Please view results for these tests on the individual orders.   Comprehensive metabolic panel   Result Value Ref Range    Sodium 141 136 - 145 mmol/L    Potassium 4.5 3.4 - 5.3 mmol/L    Chloride 108 (H) 98 - 107 mmol/L    Carbon Dioxide (CO2) 22 22 - 29 mmol/L    Anion Gap 11 7 - 15 mmol/L    Urea Nitrogen 25.2 (H) 8.0 - 23.0 mg/dL    Creatinine 1.00 0.67 - 1.17 mg/dL    Calcium 8.9 8.8 - 10.2 mg/dL    Glucose 141 (H) 70 - 99 mg/dL    Alkaline Phosphatase 102 40 - 129 U/L    AST 28 10 - 50 U/L    ALT 26 10 - 50 U/L    Protein Total 5.3 (L) 6.4 - 8.3 g/dL    Albumin 3.6 3.5 - 5.2 g/dL    Bilirubin Total 0.2 <=1.2 mg/dL    GFR Estimate 79 >60 mL/min/1.73m2   INR   Result Value Ref Range    INR 1.06 0.85 - 1.15   Magnesium   Result Value Ref Range    Magnesium 2.2 1.7 - 2.3 mg/dL   Phosphorus   Result Value Ref Range    Phosphorus 3.2 2.5 - 4.5 mg/dL   CBC with platelets and differential   Result Value Ref Range    WBC Count 5.4 4.0 - 11.0 10e3/uL    RBC Count 3.45 (L) 4.40 - 5.90 10e6/uL    Hemoglobin 10.7 (L) 13.3 - 17.7 g/dL    Hematocrit 33.1 (L) 40.0 - 53.0 %    MCV 96 78 - 100 fL    MCH 31.0 26.5 - 33.0 pg    MCHC 32.3 31.5 - 36.5 g/dL    RDW 14.1 10.0 - 15.0 %    Platelet Count 114 (L) 150 - 450 10e3/uL    % Neutrophils 85 %    % Lymphocytes 10 %    % Monocytes 2 %    % Eosinophils 0 %    % Basophils 0 %    % Immature Granulocytes 3 %    NRBCs per 100 WBC 0 <1 /100    Absolute Neutrophils 4.6 1.6 - 8.3 10e3/uL    Absolute Lymphocytes 0.5 (L) 0.8 - 5.3 10e3/uL     Absolute Monocytes 0.1 0.0 - 1.3 10e3/uL    Absolute Eosinophils 0.0 0.0 - 0.7 10e3/uL    Absolute Basophils 0.0 0.0 - 0.2 10e3/uL    Absolute Immature Granulocytes 0.2 <=0.4 10e3/uL    Absolute NRBCs 0.0 10e3/uL   Glucose CSF:   Result Value Ref Range    Glucose CSF 75 (H) 40 - 70 mg/dL    Narrative    CSF glucose concentrations are about 60 percent of normal plasma glucose.   CSF Cell Count with Differential:    Narrative    The following orders were created for panel order CSF Cell Count with Differential:.  Procedure                               Abnormality         Status                     ---------                               -----------         ------                     Cell Count CSF[[id number]]                                   In process                   Please view results for these tests on the individual orders.

## 2023-01-12 NOTE — PROCEDURES
[facility]  CAPS PROCEDURE NOTE  Date of Admission:  1/11/2023  Consult Requested by: Heme/onc  Reason for Consult: lumbar puncture for IT chemo    Indication/HPI: Leukemia    Pre-Procedure Diagnosis: Leukemia    Post-Procedure Diagnosis: Leukemia    Risk Assessment: Average risk, Technically straightforward; patient's anticoagulation has been held according to guidelines based on the agent and platelets and coags are within guidelines    [facility]    Lumbar puncture    Date/Time: 1/12/2023 4:56 PM  Performed by: Bryce Hurst MD  Authorized by: Bryce Hurst MD   Preparation: Patient was prepped and draped in the usual sterile fashion.      UNIVERSAL PROTOCOL   Site Marked: Yes  Prior Images Obtained and Reviewed:  Yes  Required items: Required blood products, implants, devices and special equipment available    Patient identity confirmed:  Verbally with patient  Patient was reevaluated immediately before administering moderate or deep sedation or anesthesia  Confirmation Checklist:  Patient's identity using two indicators, relevant allergies, procedure was appropriate and matched the consent or emergent situation and correct equipment/implants were available  Time out: Immediately prior to the procedure a time out was called    Universal Protocol: the Joint Commission Universal Protocol was followed    Preparation: Patient was prepped and draped in usual sterile fashion       ANESTHESIA    Local Anesthetic: Lidocaine 1% without epinephrine  Anesthetic Total (mL):  2      SEDATION    Patient Sedated: No      PROCEDURE DETAILS  Lumbar space: L4-L5 interspace  Patient's position: left lateral decubitus  Needle gauge: 26.  Needle type: spinal needle - Quincke tip  Needle length: 3.5 in  Number of attempts: 1  Fluid appearance: clear  Tubes of fluid: 4  Total volume: 12 ml  Post-procedure: adhesive bandage  applied      PROCEDURE  Describe Procedure: 6 ml of IT chemotherapy administered after 12 ml of clear csf removed   Patient Tolerance:  Patient tolerated the procedure well with no immediate complications  Length of time physician/provider present for 1:1 monitoring during sedation: 0        No results found for this or any previous visit from the past 1 day.           Bryce Hurst MD  [facility]  Securely message with [employer] (more info)  Text page via [facility] Paging/Directory   See signed in provider for up to date coverage information

## 2023-01-12 NOTE — PROCEDURES
[facility]   Hematology / Oncology  Procedure Note     Patient: Shahid Davis  MRN: [id number]  Date of Procedure: 01/12/2023    Procedure: Intrathecal chemo administration   Diagnosis: Triple hit DLBCL  Location: Patient's room on 7D   Indication: DLBCL with high IPI, diagnostic and therapeutic    Lumbar puncture performed and CSF samples collected by the CAPS team.     Chemotherapy was double-checked by this writer and Chava Yañez RN. This writer then administered cytarabine (PF) (CYTOSAR) 40 mg, hydrocortisone sodium succinate PF (solu-CORTEF) 50 mg, methotrexate (PF) 12 mg in sodium chloride (PF) 0.9% PF 6 mL intrathecal inj (PF) without apparent complication.    Complications: None immediately.     Chemo instillation performed by: SONJA Cardenas PA-C  Desk phone: [phone]  Pager: [phone]

## 2023-01-12 NOTE — PLAN OF CARE
[id number]  Goal Outcome Evaluation:  Pt A&Ox4, VSS on RA. Denies pain/N/V. See chemo notes. Port infusing CIVI chemo. Good UOP with urinal, no BM this shift. UAL in room. Sleep promoted overnight with clustered cares. Special precautions maintained. Continue to monitor and with POC.    Nursing Focus: Chemotherapy  D: Positive brisk bright red blood return via Port. Insertion site is clean/dry/intact, dressing intact with no complaints of pain.  Urine output is recorded in intake Doc Flowsheet.    I: Zofran, tylenol, prednisone, and benadryl premedications given per order (see electronic medical administration record). Dose #1 of rapid rituximab started to infuse over 100 mL over 30 minutes, last 400mL over 1 hr. Reviewed pt teaching on chemotherapy side effects.  Pt denies need for further teaching. Chemotherapy double checked per protocol by two chemotherapy competent RN's.   A: Tolerating chemo well. Denies nausea.   P: Continue to monitor urine output and symptoms of nausea. Screen for symptoms of toxicity.

## 2023-01-12 NOTE — PLAN OF CARE
"1501-3658: /71 (BP Location: Right arm)   Pulse 86   Temp 97.9  F (36.6  C) (Oral)   Resp 16   Ht 1.702 m (5' 7\")   Wt 58.4 kg (128 lb 12.8 oz)   SpO2 98%   BMI 20.17 kg/m    Pt denies nausea, dyspnea or pain. Experiencing a dry cough. Reporting some numbness in toes that he has had since the fall. Education provided on vincristine and neuropathy. Remdisivir given. Shahid is sad about Covid isolation and normally enjoys walking the hallway. Would like PT consult for some exercises in room and possible foot pedals Plan to start DA-R-EPOCH tonight. Port accessed. Having good appetite. Continue POC.   "

## 2023-01-12 NOTE — PHARMACY-ADMISSION MEDICATION HISTORY
Admission Medication History Completed by Pharmacy    See [facility] Admission Navigator for allergy information, preferred outpatient pharmacy, prior to admission medications and immunization status.     Medication History Sources:     Patient, fill history    Changes made to PTA medication list (reason):    Added: None    Deleted: None    Changed: None    Additional Information:    Seldom takes temazepam. Last dose PTA was 3-4 weeks ago, and usually takes when patient experiences insomnia from prednisone.    Prior to Admission medications    Medication Sig Last Dose Taking? Auth Provider Long Term End Date   acyclovir (ZOVIRAX) 400 MG tablet Take 1 tablet (400 mg) by mouth every 12 hours for 60 days 1/11/2023 at AM; 1 dose/2 Yes Leida Barton PA-C Yes 2/20/23   allopurinol (ZYLOPRIM) 300 MG tablet Take 1 tablet (300 mg) by mouth daily 1/11/2023 at After breakfast Yes Leida Barton PA-C     ascorbic acid (VITAMIN C) 500 MG tablet Take 500 mg by mouth every morning 1/11/2023 at AM Yes Reported, Patient     fluconazole (DIFLUCAN) 200 MG tablet Take 1 tablet (200 mg) by mouth daily as needed 1/11/2023 at AM Yes Leida Barton PA-C  1/21/23   levofloxacin (LEVAQUIN) 250 MG tablet Take 1 tablet (250 mg) by mouth daily as needed 1/11/2023 at After breakfast Yes Melida Hines APRN CNP     Multiple Vitamins-Minerals (MULTIVITAL PO) Take 1 tablet by mouth every morning 1/11/2023 at AM Yes Reported, Patient     omeprazole (PRILOSEC) 40 MG DR capsule Take 1 capsule (40 mg) by mouth daily 1/11/2023 at AM Yes Melida Hines APRN CNP     ondansetron (ZOFRAN) 8 MG tablet Take 1 tablet (8 mg) by mouth every 8 hours as needed for nausea  at PRN Yes Melida Hines APRN CNP     polyethylene glycol (MIRALAX) 17 GM/Dose powder Take 17 g by mouth daily as needed for constipation  at PRN Yes Melida Hines APRN CNP     senna-docusate (SENNA S) 8.6-50 MG tablet Take 1 tablet by mouth 2 times daily as  needed for constipation  at PRN Yes Melida Hines, APRN CNP     temazepam (RESTORIL) 7.5 MG capsule Take 1 capsule (7.5 mg) by mouth nightly as needed for sleep Past Month at PRN Yes Sonam Evangelista PA-C No        Date completed: 01/12/23    Medication history completed by: Lakshmi Francois

## 2023-01-13 ENCOUNTER — APPOINTMENT (OUTPATIENT)
Dept: PHYSICAL THERAPY | Facility: CLINIC | Age: 73
DRG: 846 | End: 2023-01-13
Attending: PHYSICIAN ASSISTANT
Payer: MEDICARE

## 2023-01-13 LAB
ALBUMIN SERPL BCG-MCNC: 3.4 G/DL (ref 3.5–5.2)
ALP SERPL-CCNC: 88 U/L (ref 40–129)
ALT SERPL W P-5'-P-CCNC: 33 U/L (ref 10–50)
ANION GAP SERPL CALCULATED.3IONS-SCNC: 11 MMOL/L (ref 7–15)
AST SERPL W P-5'-P-CCNC: 27 U/L (ref 10–50)
BASOPHILS # BLD AUTO: 0 10E3/UL (ref 0–0.2)
BASOPHILS NFR BLD AUTO: 0 %
BILIRUB SERPL-MCNC: <0.2 MG/DL
BUN SERPL-MCNC: 26.9 MG/DL (ref 8–23)
CALCIUM SERPL-MCNC: 8.9 MG/DL (ref 8.8–10.2)
CHLORIDE SERPL-SCNC: 110 MMOL/L (ref 98–107)
CREAT SERPL-MCNC: 0.85 MG/DL (ref 0.67–1.17)
DEPRECATED HCO3 PLAS-SCNC: 22 MMOL/L (ref 22–29)
EOSINOPHIL # BLD AUTO: 0 10E3/UL (ref 0–0.7)
EOSINOPHIL NFR BLD AUTO: 0 %
ERYTHROCYTE [DISTWIDTH] IN BLOOD BY AUTOMATED COUNT: 14.2 % (ref 10–15)
GFR SERPL CREATININE-BSD FRML MDRD: >90 ML/MIN/1.73M2
GLUCOSE SERPL-MCNC: 123 MG/DL (ref 70–99)
HCT VFR BLD AUTO: 28.9 % (ref 40–53)
HGB BLD-MCNC: 9.4 G/DL (ref 13.3–17.7)
IMM GRANULOCYTES # BLD: 0.2 10E3/UL
IMM GRANULOCYTES NFR BLD: 2 %
INR PPP: 1.27 (ref 0.85–1.15)
LYMPHOCYTES # BLD AUTO: 0.5 10E3/UL (ref 0.8–5.3)
LYMPHOCYTES NFR BLD AUTO: 6 %
MCH RBC QN AUTO: 31.3 PG (ref 26.5–33)
MCHC RBC AUTO-ENTMCNC: 32.5 G/DL (ref 31.5–36.5)
MCV RBC AUTO: 96 FL (ref 78–100)
MONOCYTES # BLD AUTO: 0.3 10E3/UL (ref 0–1.3)
MONOCYTES NFR BLD AUTO: 4 %
NEUTROPHILS # BLD AUTO: 7.7 10E3/UL (ref 1.6–8.3)
NEUTROPHILS NFR BLD AUTO: 88 %
NRBC # BLD AUTO: 0 10E3/UL
NRBC BLD AUTO-RTO: 0 /100
PLATELET # BLD AUTO: 102 10E3/UL (ref 150–450)
POTASSIUM SERPL-SCNC: 3.9 MMOL/L (ref 3.4–5.3)
PROT SERPL-MCNC: 4.9 G/DL (ref 6.4–8.3)
RBC # BLD AUTO: 3 10E6/UL (ref 4.4–5.9)
SODIUM SERPL-SCNC: 143 MMOL/L (ref 136–145)
WBC # BLD AUTO: 8.7 10E3/UL (ref 4–11)

## 2023-01-13 PROCEDURE — 120N000002 HC R&B MED SURG/OB UMMC

## 2023-01-13 PROCEDURE — 36591 DRAW BLOOD OFF VENOUS DEVICE: CPT | Performed by: PHYSICIAN ASSISTANT

## 2023-01-13 PROCEDURE — 250N000011 HC RX IP 250 OP 636: Performed by: PHYSICIAN ASSISTANT

## 2023-01-13 PROCEDURE — 250N000011 HC RX IP 250 OP 636: Performed by: INTERNAL MEDICINE

## 2023-01-13 PROCEDURE — 250N000013 HC RX MED GY IP 250 OP 250 PS 637: Performed by: PHYSICIAN ASSISTANT

## 2023-01-13 PROCEDURE — 250N000012 HC RX MED GY IP 250 OP 636 PS 637: Performed by: INTERNAL MEDICINE

## 2023-01-13 PROCEDURE — 258N000003 HC RX IP 258 OP 636: Performed by: PHYSICIAN ASSISTANT

## 2023-01-13 PROCEDURE — 250N000013 HC RX MED GY IP 250 OP 250 PS 637: Performed by: INTERNAL MEDICINE

## 2023-01-13 PROCEDURE — 258N000003 HC RX IP 258 OP 636: Performed by: INTERNAL MEDICINE

## 2023-01-13 PROCEDURE — 99233 SBSQ HOSP IP/OBS HIGH 50: CPT | Mod: FS | Performed by: PHYSICIAN ASSISTANT

## 2023-01-13 PROCEDURE — 85610 PROTHROMBIN TIME: CPT | Performed by: PHYSICIAN ASSISTANT

## 2023-01-13 PROCEDURE — 80053 COMPREHEN METABOLIC PANEL: CPT | Performed by: PHYSICIAN ASSISTANT

## 2023-01-13 PROCEDURE — 999N000147 HC STATISTIC PT IP EVAL DEFER

## 2023-01-13 PROCEDURE — 85025 COMPLETE CBC W/AUTO DIFF WBC: CPT | Performed by: PHYSICIAN ASSISTANT

## 2023-01-13 RX ORDER — DEXAMETHASONE 4 MG/1
8 TABLET ORAL DAILY
Qty: 4 TABLET | Refills: 0 | Status: ON HOLD | OUTPATIENT
Start: 2023-01-17 | End: 2023-02-02

## 2023-01-13 RX ORDER — TEMAZEPAM 7.5 MG/1
7.5 CAPSULE ORAL
Qty: 5 CAPSULE | Refills: 0 | Status: ON HOLD | OUTPATIENT
Start: 2023-01-13 | End: 2023-02-07

## 2023-01-13 RX ADMIN — ACYCLOVIR 400 MG: 400 TABLET ORAL at 09:12

## 2023-01-13 RX ADMIN — PREDNISONE 100 MG: 50 TABLET ORAL at 16:28

## 2023-01-13 RX ADMIN — REMDESIVIR 100 MG: 100 INJECTION, POWDER, LYOPHILIZED, FOR SOLUTION INTRAVENOUS at 16:32

## 2023-01-13 RX ADMIN — SENNOSIDES AND DOCUSATE SODIUM 2 TABLET: 50; 8.6 TABLET ORAL at 19:35

## 2023-01-13 RX ADMIN — ACYCLOVIR 400 MG: 400 TABLET ORAL at 19:35

## 2023-01-13 RX ADMIN — ENOXAPARIN SODIUM 40 MG: 40 INJECTION SUBCUTANEOUS at 09:12

## 2023-01-13 RX ADMIN — PREDNISONE 100 MG: 50 TABLET ORAL at 09:12

## 2023-01-13 RX ADMIN — VINCRISTINE SULFATE 0.7 MG: 1 INJECTION, SOLUTION INTRAVENOUS at 21:06

## 2023-01-13 RX ADMIN — ALLOPURINOL 300 MG: 300 TABLET ORAL at 09:12

## 2023-01-13 RX ADMIN — TEMAZEPAM 7.5 MG: 7.5 CAPSULE ORAL at 21:25

## 2023-01-13 RX ADMIN — ONDANSETRON HYDROCHLORIDE 16 MG: 8 TABLET, FILM COATED ORAL at 18:31

## 2023-01-13 RX ADMIN — ETOPOSIDE 85 MG: 20 INJECTION, SOLUTION, CONCENTRATE INTRAVENOUS at 19:02

## 2023-01-13 RX ADMIN — POLYETHYLENE GLYCOL 3350 17 G: 17 POWDER, FOR SOLUTION ORAL at 09:11

## 2023-01-13 RX ADMIN — ONDANSETRON HYDROCHLORIDE 8 MG: 8 TABLET, FILM COATED ORAL at 09:16

## 2023-01-13 RX ADMIN — SODIUM CHLORIDE 50 ML: 9 INJECTION, SOLUTION INTRAVENOUS at 16:34

## 2023-01-13 RX ADMIN — OXYCODONE HYDROCHLORIDE AND ACETAMINOPHEN 500 MG: 500 TABLET ORAL at 09:12

## 2023-01-13 RX ADMIN — OMEPRAZOLE 40 MG: 20 CAPSULE, DELAYED RELEASE ORAL at 09:12

## 2023-01-13 ASSESSMENT — ACTIVITIES OF DAILY LIVING (ADL)
ADLS_ACUITY_SCORE: 20

## 2023-01-13 NOTE — PLAN OF CARE
Physical Therapy: Orders received. Chart reviewed and discussed with care team.? Physical Therapy not indicated due to pt being up IND and self reports that he has the necessary education to stay active.? Defer discharge recommendations to care team.? Will complete orders.

## 2023-01-13 NOTE — PLAN OF CARE
Problem: Oral Intake Altered (Oncology Care)  Goal: Optimal Oral Intake  Outcome: Progressing   Goal Outcome Evaluation:  Pt's diet change to high calorie/high protein to allow pt to order extra portions at meals to ensure optimal intakes for wt maintenance.

## 2023-01-13 NOTE — PLAN OF CARE
[id number]    Goal Outcome Evaluation:    VSS on RA, denies pain nausea or shortness of breath. Dose 2 of EPOCH hung and infusing via port w/brisk blood return. Remdesivir infused via PIV. Temazapam given for sleep. Good appetite, voiding adequately.

## 2023-01-13 NOTE — DISCHARGE INSTRUCTIONS
Your provider has ordered labs twice weekly and a Neulasta injection.   The following appointments have been set up for you at [facility].     Tuesday January 17 @ 1:30 - Neulasta Injection and labs  Friday January 20 @ 10:30 labs.    [facility]   [address] [place] [address]  Phone: [phone]    You will need to make the appointmentsfor labs twice weekly after the first two that have been scheduled for you.

## 2023-01-13 NOTE — PLAN OF CARE
Goal Outcome Evaluation:    [id number]  Status: 72 year old male with Hodgkin's Lymphoma began complaining of SOB and abdominal pain. Patient has been diagnosed with Covid but is asymptomatic but is high risk with active malignancy.  Vitals:  Blood pressures have been running soft. On room air. Blood pressure stay within the parameters of primary's orders.  Neuros: AOx4. logical in speech and is cooperative with care.  IV:  Lower left PIV and right chest wall port A cath single lumen.  Resp/trach: Lung sounds are diminished . Patient coughs intermittently . Denies SOB upon rest or activity.   Diet:  Regular diet.   Bowel status: Patient reports voiding regularly and no reports of diarrhea or constipation. Last BM according to him was moderate in size and happened 01/12/23  : Voiding well with good output.  Skin: Pale, dry with many moles/ scabs covering front of body.   Pain: Denies pain throughout the night.  Activity: Able to ambulate to bathroom and move independently in bed.    Plan: Patient has been ordered to  complete 3-day course of remdesivir.. Consider IVIG prior to discharge

## 2023-01-13 NOTE — PLAN OF CARE
Pt A&Ox4, VSS on RA. Denies pain/N/V. See chemo notes. Port infusing Day 2 CIVI chemo. Good UOP with urinal, no BM this shift. UAL in room. Special precautions maintained. Continue to monitor and with POC.

## 2023-01-13 NOTE — PROGRESS NOTES
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 Initiated on R-CHOP (W3A8=5312/20/22) which was well tolerated. Now admitted for DA-R-EPOCH (C1D1=1/11/23).         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 Ruelas. 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 He presented progressing B symptoms, abdominal pain, and SOB. PET 12/9 showed extensive lymphomatous involvement to the R pleura w/avid effusions, mediastinal and pericardial regions, right anterolateral chest wall, adenopathy above and below the diaphragm, liver, spleen and multiple sites in the skeleton, concerning for transformation from his low-grade lymphoma. He also has pancytopenia which seems to have progressed slowly over the past years. Of note, reports maternal history of waldenstrom's. Due to worsening shortness of breath and delays of biopsy, patient presented to ED on 12/14 and was subsequently admitted for expedited workup and treatment. Given highest SUV and easily accessible sternoclavicular soft tissue mass, plan for biopsy of this lesion and okay to hold on BMBx given would not change treatment plan. S/p US-guided sternoclavicular soft tissue mass biopsy with IR 12/15; morphology, cytogenetics pending. Flow with 95% CD10 positive kappa-monotypic B cells. Baseline Echo (12/15) with LVEF 60-65% with mild aortic insufficiency, no evidence of effusion or tamponade. Initiated on RCHOP (C1D1 = 12/20/22)  which was well tolerated (started prior to FISH results).   - FISH results show triple hit lymphoma - transitioning to DA-R-EPOCH (C1D1=1/11/23).   - Labs WNL to proceed with chemo as below.  - High IPI (4), thus will obtain diagnostic and therapeutic LP with IT chemo this admission. CAPs team consulted. S/p LP with IT triple chemo x1/12. CSF flow - negative.   - Plan for PET/CT after C1 outpatient (ordered), patient will need to  Methylpred pre medication in preparation given hx of contrast allergy.      Treatment Plan: DA-R-EPOCH (C1D1 = 1/11/23)   - Rituximab 375 mg/m2 D1  - Etoposide, Vincristine, Doxorubicin daily D1-4  - Cytoxan 750 mg/m2 D5  - Pred 60 mg/m2 BID D1-5 then Dex 8 mg D6-7 (will need prescribed to [facility] pharmacy at discharge)   - Pre Meds: Tylenol 650 mg, Benadryl 50 mg, Zofran 16 mg D1-5, Emend D1 and D5   - Neulasta D6 -- scheduled locally on 1/17 at [facility]      # Anemia   # Thrombocytopenia   Secondary to chemotherapy and lymphoma.   - Monitor CBC daily  - Transfuse if Hgb <7 and plt <10k     # Risk for TLS  On 12/15, uric acid peak 9.1. Cr, K, Ca, and phos all WNL. S/p rasburicase 12/15 with resolution of hyperuricemia.  - Monitor TLS/DIC labs daily  - Allopurinol 300 mg daily  - Avoiding IVF given malignant pleural effusions although could consider gentle IVF if needed.     ID  # COVID positive   COVID positive on admission, asymptomatic but high risk with active malignancy. Fully vaccinated. Cycle Threshold 22 on admission, thus still contagious.    - s/p Remdesivir x3 days inpatient (1/11-1/13)   - COVID isolation precautions      # Hypogammaglobulinemia   -  Dec 2022.   - IVIG to be ordered later this admission given active COVID infection. Of note, cannot give IVIG and Rituxan same day. Plan to give IVIG prior to discharge this admission.      # PPX  - Viral serologies: Hep B/C non-reactive. HSV 1/2+  - ppx  mg BID   - Pentamidine neb given inpatient  12/19/22, ordered inpatient for 1/15/23.      MISC  # H/o complete heart block s/p pacemaker placement 2020 - No acute inpatient needs   # GERD - Continue PTA omeprazole.  # Insomnia - PTA Restoril PRN sleep while on steroids ? pt would like 5 tabs prescribed to [facility] pharmacy at discharge as cheaper here than local pharmacy       FEN  Diet: Regular Diet Adult   IVF: Bolus PRN (judiciously given pleural effusions)  Lytes: Replete per protocol      PPX  VTE: Lovenox (start after LP on 1/12)   Bowel: Senna/MiraLax PRN     Code Status: Full Code   Lines/Drains: Port recently placed 1/5/23   Dispo: Remain inpatient 5-7 days for scheduled chemo.      Follow Up: Follows with Dr. Ruelas; will request pending final treatment plan. Local twice weekly labs will need to be requested at [facility] (FAX sent to [phone]).     *Neulasta arranged at [facility] in [place] as he has seen a provider there in the past. Orders  faxed to [phone] ATTN: Amos/[facility].         Patient was seen and plan of care was discussed with attending physician Dr. Ruelas.     I spent 40 minutes in the care of this patient today, which included time necessary for review of interval events, obtaining history and physical exam, ordering medications/tests/procedures as medically indicated, review of pertinent medical literature, counseling of the patient, coordination of care, and documentation time. Over 50% of time was spent counseling the patient and/or coordinating care.     Leida Barton PA-C    Hematology/Oncology    Pager: [phone]     Interval History   Overnight afebrile and VSS. Tolerated chemo without issues. No cough, pain or changes in bowels or bladder. Tolerated LP at bedside today with only mild pain, happy to hear that CSF flow negative. Hair starting to fall out, got knitted cap from RN. No nausea. Reviewed chemo and discharge plan at length. Confirmed neulasta scheduled locally.     Physical Exam   Temp: 98  F (36.7   C) Temp src: Oral BP: 107/60 Pulse: 84   Resp: 18 SpO2: 95 % O2 Device: None (Room air)    Vitals:    01/11/23 1422 01/12/23 0816   Weight: 58.4 kg (128 lb 12.8 oz) 58.2 kg (128 lb 3.2 oz)     Vital Signs with Ranges  Temp:  [97.4  F (36.3  C)-98.2  F (36.8  C)] 98  F (36.7  C)  Pulse:  [] 84  Resp:  [14-18] 18  BP: ()/(55-70) 107/60  SpO2:  [94 %-98 %] 95 %  I/O last 3 completed shifts:  In: -   Out: 425 [Urine:425]    Constitutional: Pleasant male seen laying in bed. No apparent distress, and appears stated age.  Eyes: Lids and lashes normal, sclera clear, conjunctiva normal.   ENT: Normocephalic, without obvious abnormality, atraumatic, sinuses nontender on palpation, oral pharynx with moist mucus membranes, tonsils without erythema or exudates, gums normal and good dentition.   Respiratory: No increased work of breathing, good air exchange, clear to auscultation bilaterally, no crackles or wheezing.   Cardiovascular: Normal apical impulse, regular rate and rhythm, normal S1 and S2, and no murmur noted. Pacemaker in place.   GI: No masses or scars. +BS. Soft. No tenderness on palpation.  Lymph/Hematologic: No cervical lymphadenopathy and no supraclavicular lymphadenopathy.  Skin: No bruising or bleeding, normal skin color, texture, turgor, no redness, warmth, or swelling, no rashes, no lesions, no jaundice.   Extremities: There is no redness, warmth, or swelling of the joints. No lower extremity edema. No cyanosis.   Neurologic: Awake, alert, oriented to name, place and time.    Vascular access: port c/d/i      Medications     - MEDICATION INSTRUCTIONS -         remdesivir  100 mg Intravenous Q24H    And     sodium chloride 0.9%  50 mL Intravenous Q24H     acyclovir  400 mg Oral Q12H     [START ON 1/15/2023] albuterol  2.5 mg Nebulization Once    Followed by     [START ON 1/15/2023] pentamidine  300 mg Inhalation Once     allopurinol  300 mg Oral Daily     Chemotherapy Infusing-Continuous Infusion    Does not apply Q8H     [START ON 1/15/2023] cyclophosphamide (CYTOXAN) infusion  750 mg/m2 (Treatment Plan Recorded) Intravenous Once     [START ON 1/17/2023] dexamethasone  8 mg Oral Daily     [START ON 1/16/2023] dextrose 5% water  10-20 mL Intracatheter Daily at 8 pm     [START ON 1/16/2023] dextrose 5% water  10-20 mL Intracatheter Daily at 8 pm     enoxaparin ANTICOAGULANT  40 mg Subcutaneous Q24H     etoposide  50 mg/m2 (Treatment Plan Recorded) Intravenous Q22H     [START ON 1/16/2023] filgrastim-aafi  5 mcg/kg (Treatment Plan Recorded) Intravenous Daily at 8 pm     [START ON 1/15/2023] fosaprepitant (EMEND) intermittent infusion ( mL)  150 mg Intravenous Once     heparin  5-10 mL Intracatheter Q28 Days     heparin lock flush  5-10 mL Intracatheter Q24H     omeprazole  40 mg Oral Daily     ondansetron  16 mg Oral Q22H     polyethylene glycol  17 g Oral Daily     predniSONE  60 mg/m2 (Treatment Plan Recorded) Oral BID     senna-docusate  2 tablet Oral BID     sodium chloride (PF)  10-20 mL Intracatheter Q28 Days     vinCRIStine/DOXOrubicin (ONCOVIN/ADRIAMYCIN) infusion  0.4 mg/m2 (Treatment Plan Recorded) Intravenous Q22H     vitamin C  500 mg Oral QAM       Data   Results for orders placed or performed during the hospital encounter of 01/11/23 (from the past 24 hour(s))   Lumbar puncture    Narrative    Bryce Hurst MD     1/12/2023  4:59 PM  [facility]    Lumbar puncture    Date/Time: 1/12/2023 4:56 PM  Performed by: Bryce Hurst MD  Authorized by: Bryce Hurst MD   Preparation: Patient was prepped and draped in the usual sterile fashion.      UNIVERSAL PROTOCOL   Site Marked: Yes  Prior Images Obtained and Reviewed:  Yes  Required items: Required blood products, implants, devices and special   equipment available    Patient identity confirmed:  Verbally with patient  Patient was reevaluated immediately before administering moderate or deep   sedation or  anesthesia  Confirmation Checklist:  Patient's identity using two indicators, relevant   allergies, procedure was appropriate and matched the consent or emergent   situation and correct equipment/implants were available  Time out: Immediately prior to the procedure a time out was called    Universal Protocol: the Joint Commission Universal Protocol was followed    Preparation: Patient was prepped and draped in usual sterile fashion       ANESTHESIA    Local Anesthetic: Lidocaine 1% without epinephrine  Anesthetic Total (mL):  2      SEDATION    Patient Sedated: No      PROCEDURE DETAILS  Lumbar space: L4-L5 interspace  Patient's position: left lateral decubitus  Needle gauge: 26.  Needle type: spinal needle - Quincke tip  Needle length: 3.5 in  Number of attempts: 1  Fluid appearance: clear  Tubes of fluid: 4  Total volume: 12 ml  Post-procedure: adhesive bandage applied      PROCEDURE  Describe Procedure: 6 ml of IT chemotherapy administered after 12 ml of   clear csf removed   Patient Tolerance:  Patient tolerated the procedure well with no immediate   complications  Length of time physician/provider present for 1:1 monitoring during   sedation: 0   CBC with platelets differential    Narrative    The following orders were created for panel order CBC with platelets differential.  Procedure                               Abnormality         Status                     ---------                               -----------         ------                     CBC with platelets and d...[[id number]]  Abnormal            Final result                 Please view results for these tests on the individual orders.   Comprehensive metabolic panel   Result Value Ref Range    Sodium 143 136 - 145 mmol/L    Potassium 3.9 3.4 - 5.3 mmol/L    Chloride 110 (H) 98 - 107 mmol/L    Carbon Dioxide (CO2) 22 22 - 29 mmol/L    Anion Gap 11 7 - 15 mmol/L    Urea Nitrogen 26.9 (H) 8.0 - 23.0 mg/dL    Creatinine 0.85 0.67 - 1.17 mg/dL    Calcium  8.9 8.8 - 10.2 mg/dL    Glucose 123 (H) 70 - 99 mg/dL    Alkaline Phosphatase 88 40 - 129 U/L    AST 27 10 - 50 U/L    ALT 33 10 - 50 U/L    Protein Total 4.9 (L) 6.4 - 8.3 g/dL    Albumin 3.4 (L) 3.5 - 5.2 g/dL    Bilirubin Total <0.2 <=1.2 mg/dL    GFR Estimate >90 >60 mL/min/1.73m2   INR   Result Value Ref Range    INR 1.27 (H) 0.85 - 1.15   CBC with platelets and differential   Result Value Ref Range    WBC Count 8.7 4.0 - 11.0 10e3/uL    RBC Count 3.00 (L) 4.40 - 5.90 10e6/uL    Hemoglobin 9.4 (L) 13.3 - 17.7 g/dL    Hematocrit 28.9 (L) 40.0 - 53.0 %    MCV 96 78 - 100 fL    MCH 31.3 26.5 - 33.0 pg    MCHC 32.5 31.5 - 36.5 g/dL    RDW 14.2 10.0 - 15.0 %    Platelet Count 102 (L) 150 - 450 10e3/uL    % Neutrophils 88 %    % Lymphocytes 6 %    % Monocytes 4 %    % Eosinophils 0 %    % Basophils 0 %    % Immature Granulocytes 2 %    NRBCs per 100 WBC 0 <1 /100    Absolute Neutrophils 7.7 1.6 - 8.3 10e3/uL    Absolute Lymphocytes 0.5 (L) 0.8 - 5.3 10e3/uL    Absolute Monocytes 0.3 0.0 - 1.3 10e3/uL    Absolute Eosinophils 0.0 0.0 - 0.7 10e3/uL    Absolute Basophils 0.0 0.0 - 0.2 10e3/uL    Absolute Immature Granulocytes 0.2 <=0.4 10e3/uL    Absolute NRBCs 0.0 10e3/uL

## 2023-01-13 NOTE — PROGRESS NOTES
CLINICAL NUTRITION SERVICES - ASSESSMENT NOTE     Nutrition Prescription    RECOMMENDATIONS FOR MDs/PROVIDERS TO ORDER:  - If pt remains hospitalized >72 hrs, recommend obtaining calorie counts to help quantify po adequacy  - recommend trial of appetite stimulant.    Malnutrition Status:    - Severe malnutrition in the context of chronic illness    Recommendations already ordered by Registered Dietitian (RD):  - Changed diet to High Calorie/High Protein diet to allow pt extra portions  - Continue order for Ensure Enlive @ 2 pm and HS    Future/Additional Recommendations:  - Monitor po progress with increase in diet allowance  - Monitor wt trends     REASON FOR ASSESSMENT  Shahid Davis is a/an 72 year old male assessed by the dietitian for positive Admission Nutrition Risk Screen for wt loss of 14 - 23 pounds, but no c/o's eating poorly because of a decreased appetite.    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 Initiated on R-CHOP (F1Q3=3212/20/22) which was well tolerated. Now admitted for DA-R-EPOCH (C1D1=1/11/23).   - COVID positive on admission    NUTRITION HISTORY  Pt known to Nutrition Services from previous hospitalization (12/15 thru 12/22). He reports losing wt despite trying to eat more PTA. Reports he typically consumes 2 meals per day (breakfast and dinner. Commented his breakfast is spread out over 3 hrs to eat d/t amount of food he consumes over that time frame (yogurt, fruit, cereal, eggs and toast). Reports he then eats dinner betw 6:30 - 7:00 pm    CURRENT NUTRITION ORDERS  Diet: Regular with Ensure Enlive betw meals @ 2 pm and HS  Intake/Tolerance: Eating well since admit. Commented that he would like to be able to order extra food at meals, but told his current diet limits the amount of food he can order.    LABS  BUN 26.9 (high); question  "d/t volume depletion   K+ WNL today  Mg++ and PO4 WNL yesterday    MEDICATIONS  Receiving IV bolus flush of 50 ml every 24 hrs yesterday, as well as today  Zofran every 22 hrs  Prednisone BID  Vitamin C daily    ANTHROPOMETRICS  Height: 170.2 cm (5' 7\")  Most Recent Weight: 58.2 kg (128 lb 3.2 oz) - 1/12   IBW: 67 kg (87% of IBW)  BMI: Normal BMI  Weight History: Per chart review, noted pt weighed 60.8 kg (134 lbs) on 12/22 (discharge wt from previous admission). Since discharging on 12/22, pt has lost 6 lbs (>4% loss) x past 3 week. Per review of EMR,   Over past 6 weeks (12/2/22), pt has lost a total of 16 lbs (11% loss)   Wt Readings from Last 10 Encounters:   01/12/23 58.2 kg (128 lb 3.2 oz)   12/22/22 60.8 kg (134 lb 1.6 oz)   12/02/22 65.3 kg (144 lb)   12/02/22 65.5 kg (144 lb 4.8 oz)   07/07/22 61.2 kg (135 lb)   06/02/22 62.5 kg (137 lb 12.8 oz)   11/18/21 63.2 kg (139 lb 4.8 oz)   07/09/21 64.5 kg (142 lb 3.2 oz)   07/01/21 65.8 kg (145 lb)   07/01/21 65.1 kg (143 lb 9.6 oz)       Dosing Weight: 58 kg (based on lowest wt of 58.2 kg on 1/12)    ASSESSED NUTRITION NEEDS  Estimated Energy Needs: 6916-9743 kcals/day (30 - 35 kcals/kg )  Justification: Increased needs d/t wt loss   Estimated Protein Needs:75-87 grams protein/day (1.3 - 1.5 grams of pro/kg)  Justification: Increased needs with chemo and Repletion  Estimated Fluid Needs: (1 mL/kcal)   Justification: Maintenance    PHYSICAL FINDINGS  See malnutrition section below.  Poor skin turgor   Pale    MALNUTRITION  % Intake: No decreased intake noted  % Weight Loss: > 5% in 1 month (severe)  Subcutaneous Fat Loss: Upper arm, Lower arm and Thoracic/intercostal: all Severe  Muscle Loss: Temporal: Moderate, Scapular bone, Thoracic region (clavicle, acromium bone, deltoid, trapezius, pectoral), Upper arm (bicep, tricep), Lower arm  (forearm), and Dorsal hand: all Severe  - Suspect Upper leg (quadricep, hamstring) and Posterior calf: Severe as well, but pt " "wearing pajamas bottoms.   Fluid Accumulation/Edema: None noted  Malnutrition Diagnosis: Severe malnutrition in the context of chronic illness    NUTRITION DIAGNOSIS  Unintended weight loss related to suspect hypercatabolism with illness  as evidenced by wt loss of 16 lbs (11% loss) x past 6 weeks and underweight @ 87% of IBW, despite pt report of eating well.        INTERVENTIONS  Implementation  Nutrition Education: Provided education on \"Tips for Increasing Calories in diet\", Tips for Increasing Protein in your diet\", \"Coping with Poor Appetite\" and suggestions for 100-calorie Snacks.   Medical food supplement therapy     Goals  1. Wt to remain > 58 kg  2. Patient to consume % of nutritionally adequate meal trays TID, or the equivalent with supplements/snacks.     Monitoring/Evaluation  Progress toward goals will be monitored and evaluated per protocol.    Mónica Ryan RD,LD  7D page [phone]  "

## 2023-01-13 NOTE — PROGRESS NOTES
Care Management Follow Up    Per provider, delvinr spoke with Pepito at [facility] to set up injection and labs.  Pepito confirmed that their facility is able to do the PRN infusions as order.   set up the following appointments with [facility]:    Tuesday January 17 @ 1:30 - Neulasta Injection and labs  Friday January 20 @ 10:30 labs.    Writer spoke with patient who stated [facility] in [place], [place] is unable to do the PRN transfusions, and is okay with driving to [facility] for his injection and labs.  Writer provided patient appointment information, and informed patient he will need to continue to make lab appointments twice weekly.     Writer included appointment information on discharge instructions.      [address] [address]  Phone: [phone]  Fax: [phone]    Margaret Templeton RN, BSN  Care Coordinator 7D  Office: [phone]  Pager: [phone]    To contact the weekend RNCC  [place] ([phone]) Saturday and Sunday    Units: 4A, 4C, 4E, 5A and 5B- Pager 1: [phone]    Units: 6A, 6B, 6C, 6D- Pager 2: [phone]    Units: 7A, 7B, 7C, 7D, and 5C-Pager 3: [phone]

## 2023-01-14 LAB
ABO/RH(D): NORMAL
ALBUMIN SERPL BCG-MCNC: 3.5 G/DL (ref 3.5–5.2)
ALP SERPL-CCNC: 86 U/L (ref 40–129)
ALT SERPL W P-5'-P-CCNC: 39 U/L (ref 10–50)
ANION GAP SERPL CALCULATED.3IONS-SCNC: 11 MMOL/L (ref 7–15)
ANTIBODY SCREEN: NEGATIVE
AST SERPL W P-5'-P-CCNC: 30 U/L (ref 10–50)
BASOPHILS # BLD AUTO: 0 10E3/UL (ref 0–0.2)
BASOPHILS NFR BLD AUTO: 0 %
BILIRUB SERPL-MCNC: 0.2 MG/DL
BUN SERPL-MCNC: 26.4 MG/DL (ref 8–23)
CALCIUM SERPL-MCNC: 8.7 MG/DL (ref 8.8–10.2)
CHLORIDE SERPL-SCNC: 109 MMOL/L (ref 98–107)
CREAT SERPL-MCNC: 0.77 MG/DL (ref 0.67–1.17)
DEPRECATED HCO3 PLAS-SCNC: 21 MMOL/L (ref 22–29)
EOSINOPHIL # BLD AUTO: 0 10E3/UL (ref 0–0.7)
EOSINOPHIL NFR BLD AUTO: 0 %
ERYTHROCYTE [DISTWIDTH] IN BLOOD BY AUTOMATED COUNT: 14.4 % (ref 10–15)
GFR SERPL CREATININE-BSD FRML MDRD: >90 ML/MIN/1.73M2
GLUCOSE SERPL-MCNC: 105 MG/DL (ref 70–99)
HCT VFR BLD AUTO: 30.3 % (ref 40–53)
HGB BLD-MCNC: 9.6 G/DL (ref 13.3–17.7)
IMM GRANULOCYTES # BLD: 0.1 10E3/UL
IMM GRANULOCYTES NFR BLD: 2 %
INR PPP: 1.13 (ref 0.85–1.15)
LYMPHOCYTES # BLD AUTO: 0.7 10E3/UL (ref 0.8–5.3)
LYMPHOCYTES NFR BLD AUTO: 10 %
MCH RBC QN AUTO: 30.8 PG (ref 26.5–33)
MCHC RBC AUTO-ENTMCNC: 31.7 G/DL (ref 31.5–36.5)
MCV RBC AUTO: 97 FL (ref 78–100)
MONOCYTES # BLD AUTO: 0.2 10E3/UL (ref 0–1.3)
MONOCYTES NFR BLD AUTO: 3 %
NEUTROPHILS # BLD AUTO: 5.8 10E3/UL (ref 1.6–8.3)
NEUTROPHILS NFR BLD AUTO: 85 %
NRBC # BLD AUTO: 0 10E3/UL
NRBC BLD AUTO-RTO: 0 /100
PLATELET # BLD AUTO: 106 10E3/UL (ref 150–450)
POTASSIUM SERPL-SCNC: 3.9 MMOL/L (ref 3.4–5.3)
PROT SERPL-MCNC: 5 G/DL (ref 6.4–8.3)
RBC # BLD AUTO: 3.12 10E6/UL (ref 4.4–5.9)
SODIUM SERPL-SCNC: 141 MMOL/L (ref 136–145)
SPECIMEN EXPIRATION DATE: NORMAL
WBC # BLD AUTO: 6.8 10E3/UL (ref 4–11)

## 2023-01-14 PROCEDURE — 250N000011 HC RX IP 250 OP 636: Performed by: INTERNAL MEDICINE

## 2023-01-14 PROCEDURE — 250N000011 HC RX IP 250 OP 636: Performed by: PHYSICIAN ASSISTANT

## 2023-01-14 PROCEDURE — 85610 PROTHROMBIN TIME: CPT | Performed by: PHYSICIAN ASSISTANT

## 2023-01-14 PROCEDURE — 258N000003 HC RX IP 258 OP 636: Performed by: INTERNAL MEDICINE

## 2023-01-14 PROCEDURE — 250N000013 HC RX MED GY IP 250 OP 250 PS 637: Performed by: INTERNAL MEDICINE

## 2023-01-14 PROCEDURE — 120N000002 HC R&B MED SURG/OB UMMC

## 2023-01-14 PROCEDURE — 86901 BLOOD TYPING SEROLOGIC RH(D): CPT | Performed by: PHYSICIAN ASSISTANT

## 2023-01-14 PROCEDURE — 85025 COMPLETE CBC W/AUTO DIFF WBC: CPT | Performed by: PHYSICIAN ASSISTANT

## 2023-01-14 PROCEDURE — 80053 COMPREHEN METABOLIC PANEL: CPT | Performed by: PHYSICIAN ASSISTANT

## 2023-01-14 PROCEDURE — 36591 DRAW BLOOD OFF VENOUS DEVICE: CPT | Performed by: PHYSICIAN ASSISTANT

## 2023-01-14 PROCEDURE — 250N000012 HC RX MED GY IP 250 OP 636 PS 637: Performed by: INTERNAL MEDICINE

## 2023-01-14 PROCEDURE — 99233 SBSQ HOSP IP/OBS HIGH 50: CPT | Mod: GC | Performed by: INTERNAL MEDICINE

## 2023-01-14 PROCEDURE — 250N000013 HC RX MED GY IP 250 OP 250 PS 637: Performed by: PHYSICIAN ASSISTANT

## 2023-01-14 RX ADMIN — ENOXAPARIN SODIUM 40 MG: 40 INJECTION SUBCUTANEOUS at 09:50

## 2023-01-14 RX ADMIN — ACYCLOVIR 400 MG: 400 TABLET ORAL at 09:50

## 2023-01-14 RX ADMIN — VINCRISTINE SULFATE 0.7 MG: 1 INJECTION, SOLUTION INTRAVENOUS at 18:39

## 2023-01-14 RX ADMIN — SENNOSIDES AND DOCUSATE SODIUM 2 TABLET: 50; 8.6 TABLET ORAL at 19:01

## 2023-01-14 RX ADMIN — ACYCLOVIR 400 MG: 400 TABLET ORAL at 19:01

## 2023-01-14 RX ADMIN — TEMAZEPAM 7.5 MG: 7.5 CAPSULE ORAL at 22:21

## 2023-01-14 RX ADMIN — PROCHLORPERAZINE MALEATE 5 MG: 5 TABLET ORAL at 22:21

## 2023-01-14 RX ADMIN — PREDNISONE 100 MG: 50 TABLET ORAL at 16:19

## 2023-01-14 RX ADMIN — ONDANSETRON HYDROCHLORIDE 16 MG: 8 TABLET, FILM COATED ORAL at 16:15

## 2023-01-14 RX ADMIN — ETOPOSIDE 85 MG: 20 INJECTION, SOLUTION, CONCENTRATE INTRAVENOUS at 17:15

## 2023-01-14 RX ADMIN — OMEPRAZOLE 40 MG: 20 CAPSULE, DELAYED RELEASE ORAL at 09:50

## 2023-01-14 RX ADMIN — ONDANSETRON HYDROCHLORIDE 8 MG: 8 TABLET, FILM COATED ORAL at 09:53

## 2023-01-14 RX ADMIN — OXYCODONE HYDROCHLORIDE AND ACETAMINOPHEN 500 MG: 500 TABLET ORAL at 09:50

## 2023-01-14 RX ADMIN — ALLOPURINOL 300 MG: 300 TABLET ORAL at 09:50

## 2023-01-14 RX ADMIN — POLYETHYLENE GLYCOL 3350 17 G: 17 POWDER, FOR SOLUTION ORAL at 10:01

## 2023-01-14 RX ADMIN — PREDNISONE 100 MG: 50 TABLET ORAL at 09:50

## 2023-01-14 ASSESSMENT — ACTIVITIES OF DAILY LIVING (ADL)
ADLS_ACUITY_SCORE: 20

## 2023-01-14 NOTE — PLAN OF CARE
Goal Outcome Evaluation:    3490-1939    A/Ox4. Afebrile. OVSS on RA. Denies pain, SOB and N/V. Fair appetite. Pt voiding spontaneously with adequate UOP. Bandaid CDI on LP site. R-Port infusing CIVI chemo (see notes below). UAL.    Nursing Focus: D3 Etoposide Chemotherapy  D: Positive blood return via Port. Insertion site is clean/dry/intact, dressing intact with no complaints of pain.  Urine output is recorded in intake in Doc Flowsheet.    I: Premedications given per order (see electronic medical administration record). Dose #3 of Etoposide started to infuse over 22 hours. Reviewed pt teaching on chemotherapy side effects.  Pt denies need for further teaching. Chemotherapy double checked per protocol by two chemotherapy competent RN's.   A: Tolerating procedure well. Denies nausea and or pain.   P: Continue to monitor urine output and symptoms of nausea. Screen for symptoms of toxicity.     Nursing Focus: D3 Vincristine/Doxorubicin Chemotherapy  D: Positive blood return via Port. Insertion site is clean/dry/intact, dressing intact with no complaints of pain.  Urine output is recorded in intake in Doc Flowsheet.    I: Premedications given per order (see electronic medical administration record). Dose #3 of Vincristine/Doxorubicin started to infuse over 22 hours. Reviewed pt teaching on chemotherapy side effects.  Pt denies need for further teaching. Chemotherapy double checked per protocol by two chemotherapy competent RN's.   A: Tolerating procedure well. Denies nausea and or pain.   P: Continue to monitor urine output and symptoms of nausea. Screen for symptoms of toxicity.

## 2023-01-14 NOTE — PROGRESS NOTES
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 Initiated on R-CHOP (N4Y2=1012/20/22) which was well tolerated. Now admitted for DA-R-EPOCH (C1D1=1/11/23). He is positive for COVID but asymptomatic.        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 Ruelas. 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 He presented progressing B symptoms, abdominal pain, and SOB. PET 12/9 showed extensive lymphomatous involvement to the R pleura w/avid effusions, mediastinal and pericardial regions, right anterolateral chest wall, adenopathy above and below the diaphragm, liver, spleen and multiple sites in the skeleton, concerning for transformation from his low-grade lymphoma. He also has pancytopenia which seems to have progressed slowly over the past years. Of note, reports maternal history of waldenstrom's. Due to worsening shortness of breath and delays of biopsy, patient presented to ED on 12/14 and was subsequently admitted for expedited workup and treatment. Given highest SUV and easily accessible sternoclavicular soft tissue mass, plan for biopsy of this lesion and okay to hold on BMBx given would not change treatment plan. S/p US-guided sternoclavicular soft tissue mass biopsy with IR 12/15; morphology, cytogenetics pending. Flow with 95% CD10 positive kappa-monotypic B cells. Baseline Echo (12/15) with LVEF 60-65% with mild aortic insufficiency, no evidence of effusion or tamponade.  Initiated on RCHOP (C1D1 = 12/20/22) which was well tolerated (started prior to FISH results).   - FISH results show triple hit lymphoma - transitioning to DA-R-EPOCH (C1D1=1/11/23).   - Labs WNL to proceed with chemo as below.  - High IPI (4), thus will obtain diagnostic and therapeutic LP with IT chemo this admission. CAPs team consulted. S/p LP with IT triple chemo x1/12. CSF flow - negative.   - Plan for PET/CT after C1 outpatient (ordered), patient will need to  Methylpred pre medication in preparation given hx of contrast allergy.      Treatment Plan: DA-R-EPOCH (C1D1 = 1/11/23)   - Rituximab 375 mg/m2 D1  - Etoposide, Vincristine, Doxorubicin daily D1-4  - Cytoxan 750 mg/m2 D5  - Pred 60 mg/m2 BID D1-5 then Dex 8 mg D6-7 (will need prescribed to [facility] pharmacy at discharge)   - Pre Meds: Tylenol 650 mg, Benadryl 50 mg, Zofran 16 mg D1-5, Emend D1 and D5   - Neulasta D6 -- scheduled locally on 1/17 at [facility]      # Anemia   # Thrombocytopenia   Secondary to chemotherapy and lymphoma.   - Monitor CBC daily  - Transfuse if Hgb <7 and plt <10k     # Risk for TLS  On 12/15, uric acid peak 9.1. Cr, K, Ca, and phos all WNL. S/p rasburicase 12/15 with resolution of hyperuricemia.  - Monitor TLS/DIC labs daily  - Allopurinol 300 mg daily  - Avoiding IVF given malignant pleural effusions although could consider gentle IVF if needed.     ID  # COVID positive   COVID positive on admission, asymptomatic but high risk with active malignancy. Fully vaccinated. Cycle Threshold 22 on admission, thus still contagious.    - s/p Remdesivir x3 days inpatient (1/11-1/13)   - COVID isolation precautions      # Hypogammaglobulinemia   -  Dec 2022.   - IVIG to be ordered later this admission given active COVID infection. Of note, cannot give IVIG and Rituxan same day. Plan to give IVIG prior to discharge this admission.      # PPX  - Viral serologies: Hep B/C non-reactive. HSV 1/2+  - ppx  mg BID   -  Pentamidine neb given inpatient 12/19/22, ordered inpatient for 1/15/23.      MISC  # H/o complete heart block s/p pacemaker placement 2020 - No acute inpatient needs   # GERD - Continue PTA omeprazole.  # Insomnia - PTA Restoril PRN sleep while on steroids ? pt would like 5 tabs prescribed to [facility] pharmacy at discharge as cheaper here than local pharmacy       FEN  Diet: Regular Diet Adult   IVF: Bolus PRN (judiciously given pleural effusions)  Lytes: Replete per protocol      PPX  VTE: Lovenox (start after LP on 1/12)   Bowel: Senna/MiraLax PRN     Code Status: Full Code   Lines/Drains: Port recently placed 1/5/23   Dispo: Remain inpatient 5-7 days for scheduled chemo.      Follow Up: Follows with Dr. Ruelas; will request pending final treatment plan. Local twice weekly labs will need to be requested at [facility] (FAX sent to [phone]).     *Neulasta arranged at [facility] in [place] as he has seen a provider there in the past. Orders  faxed to [phone] ATTN: Amos/[facility].         Patient was seen and plan of care was discussed with attending physician Dr. Ruelas.  Catherine Pitts MD  Hem-Onc Fellow  Pager: [phone]  Plan was discussed with attending    Interval History   Overnight afebrile and VSS. Tolerated chemo without issues. No cough, pain or changes in bowels or bladder. Tolerated LP at bedside today with only mild pain, happy to hear that CSF flow negative. Hair starting to fall out, got knitted cap from RN. No nausea. Reviewed chemo and discharge plan at length. Confirmed neulasta scheduled locally.     Physical Exam   Temp: 98.2  F (36.8  C) Temp src: Oral BP: 110/64 Pulse: 76   Resp: 20 SpO2: 96 % O2 Device: None (Room air)    Vitals:    01/11/23 1422 01/12/23 0816   Weight: 58.4 kg (128 lb 12.8 oz) 58.2 kg (128 lb 3.2 oz)     Vital Signs with Ranges  Temp:  [97.6  F (36.4  C)-98.4  F (36.9  C)] 98.2  F (36.8  C)  Pulse:  [76-98] 76  Resp:  [18-20] 20  BP: (103-118)/(60-71) 110/64  Cuff  Mean (mmHg):  [90] 90  SpO2:  [95 %-97 %] 96 %  I/O last 3 completed shifts:  In: 1720.8 [P.O.:740; IV Piggyback:980.8]  Out: 1500 [Urine:1500]    Constitutional: Pleasant male seen laying in bed. No apparent distress, and appears stated age.  Eyes: Lids and lashes normal, sclera clear, conjunctiva normal.   ENT: Normocephalic, without obvious abnormality, atraumatic, sinuses nontender on palpation, oral pharynx with moist mucus membranes, tonsils without erythema or exudates, gums normal and good dentition.   Respiratory: No increased work of breathing, good air exchange, clear to auscultation bilaterally, no crackles or wheezing.   Cardiovascular: Normal apical impulse, regular rate and rhythm, normal S1 and S2, and no murmur noted. Pacemaker in place.   GI: No masses or scars. +BS. Soft. No tenderness on palpation.  Lymph/Hematologic: No cervical lymphadenopathy and no supraclavicular lymphadenopathy.  Skin: No bruising or bleeding, normal skin color, texture, turgor, no redness, warmth, or swelling, no rashes, no lesions, no jaundice.   Extremities: There is no redness, warmth, or swelling of the joints. No lower extremity edema. No cyanosis.   Neurologic: Awake, alert, oriented to name, place and time.    Vascular access: port c/d/i      Medications     - MEDICATION INSTRUCTIONS -         acyclovir  400 mg Oral Q12H     [START ON 1/15/2023] albuterol  2.5 mg Nebulization Once    Followed by     [START ON 1/15/2023] pentamidine  300 mg Inhalation Once     allopurinol  300 mg Oral Daily     Chemotherapy Infusing-Continuous Infusion   Does not apply Q8H     [START ON 1/15/2023] cyclophosphamide (CYTOXAN) infusion  750 mg/m2 (Treatment Plan Recorded) Intravenous Once     [START ON 1/17/2023] dexamethasone  8 mg Oral Daily     [START ON 1/16/2023] dextrose 5% water  10-20 mL Intracatheter Daily at 8 pm     [START ON 1/16/2023] dextrose 5% water  10-20 mL Intracatheter Daily at 8 pm     enoxaparin ANTICOAGULANT  40  mg Subcutaneous Q24H     etoposide  50 mg/m2 (Treatment Plan Recorded) Intravenous Q22H     [START ON 1/16/2023] filgrastim-aafi  5 mcg/kg (Treatment Plan Recorded) Intravenous Daily at 8 pm     [START ON 1/15/2023] fosaprepitant (EMEND) intermittent infusion ( mL)  150 mg Intravenous Once     heparin  5-10 mL Intracatheter Q28 Days     heparin lock flush  5-10 mL Intracatheter Q24H     omeprazole  40 mg Oral Daily     ondansetron  16 mg Oral Q22H     polyethylene glycol  17 g Oral Daily     predniSONE  60 mg/m2 (Treatment Plan Recorded) Oral BID     senna-docusate  2 tablet Oral BID     sodium chloride (PF)  10-20 mL Intracatheter Q28 Days     vinCRIStine/DOXOrubicin (ONCOVIN/ADRIAMYCIN) infusion  0.4 mg/m2 (Treatment Plan Recorded) Intravenous Q22H     vitamin C  500 mg Oral QAM       Data   Results for orders placed or performed during the hospital encounter of 01/11/23 (from the past 24 hour(s))   CBC with platelets differential    Narrative    The following orders were created for panel order CBC with platelets differential.  Procedure                               Abnormality         Status                     ---------                               -----------         ------                     CBC with platelets and d...[[id number]]  Abnormal            Final result                 Please view results for these tests on the individual orders.   ABO/Rh type and screen    Narrative    The following orders were created for panel order ABO/Rh type and screen.  Procedure                               Abnormality         Status                     ---------                               -----------         ------                     Adult Type and Screen[[id number]]                            Final result                 Please view results for these tests on the individual orders.   Comprehensive metabolic panel   Result Value Ref Range    Sodium 141 136 - 145 mmol/L    Potassium 3.9 3.4 - 5.3 mmol/L     Chloride 109 (H) 98 - 107 mmol/L    Carbon Dioxide (CO2) 21 (L) 22 - 29 mmol/L    Anion Gap 11 7 - 15 mmol/L    Urea Nitrogen 26.4 (H) 8.0 - 23.0 mg/dL    Creatinine 0.77 0.67 - 1.17 mg/dL    Calcium 8.7 (L) 8.8 - 10.2 mg/dL    Glucose 105 (H) 70 - 99 mg/dL    Alkaline Phosphatase 86 40 - 129 U/L    AST 30 10 - 50 U/L    ALT 39 10 - 50 U/L    Protein Total 5.0 (L) 6.4 - 8.3 g/dL    Albumin 3.5 3.5 - 5.2 g/dL    Bilirubin Total 0.2 <=1.2 mg/dL    GFR Estimate >90 >60 mL/min/1.73m2   INR   Result Value Ref Range    INR 1.13 0.85 - 1.15   CBC with platelets and differential   Result Value Ref Range    WBC Count 6.8 4.0 - 11.0 10e3/uL    RBC Count 3.12 (L) 4.40 - 5.90 10e6/uL    Hemoglobin 9.6 (L) 13.3 - 17.7 g/dL    Hematocrit 30.3 (L) 40.0 - 53.0 %    MCV 97 78 - 100 fL    MCH 30.8 26.5 - 33.0 pg    MCHC 31.7 31.5 - 36.5 g/dL    RDW 14.4 10.0 - 15.0 %    Platelet Count 106 (L) 150 - 450 10e3/uL    % Neutrophils 85 %    % Lymphocytes 10 %    % Monocytes 3 %    % Eosinophils 0 %    % Basophils 0 %    % Immature Granulocytes 2 %    NRBCs per 100 WBC 0 <1 /100    Absolute Neutrophils 5.8 1.6 - 8.3 10e3/uL    Absolute Lymphocytes 0.7 (L) 0.8 - 5.3 10e3/uL    Absolute Monocytes 0.2 0.0 - 1.3 10e3/uL    Absolute Eosinophils 0.0 0.0 - 0.7 10e3/uL    Absolute Basophils 0.0 0.0 - 0.2 10e3/uL    Absolute Immature Granulocytes 0.1 <=0.4 10e3/uL    Absolute NRBCs 0.0 10e3/uL   Adult Type and Screen   Result Value Ref Range    ABO/RH(D) A POS     Antibody Screen Negative Negative    SPECIMEN EXPIRATION DATE [id number]

## 2023-01-14 NOTE — PLAN OF CARE
Goal Outcome Evaluation:  [id number]    VSS on RA. Denies pain, nausea, SOB. Has infrequent, dry cough. D3 CIVI chemo infusing via port. Voiding spontaneously. LBM 1/13. No acute events.

## 2023-01-14 NOTE — PLAN OF CARE
VSS on RA. Denies pain, nausea, SOB. Has infrequent, dry cough. D3 CIVI chemo infusing via port. Voiding spontaneously. LBM 1/13. No acute events.

## 2023-01-15 LAB
ALBUMIN SERPL BCG-MCNC: 3.1 G/DL (ref 3.5–5.2)
ALP SERPL-CCNC: 70 U/L (ref 40–129)
ALT SERPL W P-5'-P-CCNC: 38 U/L (ref 10–50)
ANION GAP SERPL CALCULATED.3IONS-SCNC: 9 MMOL/L (ref 7–15)
AST SERPL W P-5'-P-CCNC: 30 U/L (ref 10–50)
BASOPHILS # BLD AUTO: 0 10E3/UL (ref 0–0.2)
BASOPHILS NFR BLD AUTO: 0 %
BILIRUB SERPL-MCNC: 0.2 MG/DL
BUN SERPL-MCNC: 29 MG/DL (ref 8–23)
CALCIUM SERPL-MCNC: 8.1 MG/DL (ref 8.8–10.2)
CHLORIDE SERPL-SCNC: 109 MMOL/L (ref 98–107)
CREAT SERPL-MCNC: 0.81 MG/DL (ref 0.67–1.17)
CYCLE THRESHOLD (CT): 19.6
DEPRECATED HCO3 PLAS-SCNC: 22 MMOL/L (ref 22–29)
EOSINOPHIL # BLD AUTO: 0 10E3/UL (ref 0–0.7)
EOSINOPHIL NFR BLD AUTO: 0 %
ERYTHROCYTE [DISTWIDTH] IN BLOOD BY AUTOMATED COUNT: 14.2 % (ref 10–15)
GFR SERPL CREATININE-BSD FRML MDRD: >90 ML/MIN/1.73M2
GLUCOSE SERPL-MCNC: 106 MG/DL (ref 70–99)
HCT VFR BLD AUTO: 26.5 % (ref 40–53)
HGB BLD-MCNC: 8.7 G/DL (ref 13.3–17.7)
IMM GRANULOCYTES # BLD: 0 10E3/UL
IMM GRANULOCYTES NFR BLD: 1 %
INR PPP: 1.2 (ref 0.85–1.15)
LYMPHOCYTES # BLD AUTO: 0.6 10E3/UL (ref 0.8–5.3)
LYMPHOCYTES NFR BLD AUTO: 17 %
MCH RBC QN AUTO: 31 PG (ref 26.5–33)
MCHC RBC AUTO-ENTMCNC: 32.8 G/DL (ref 31.5–36.5)
MCV RBC AUTO: 94 FL (ref 78–100)
MONOCYTES # BLD AUTO: 0.1 10E3/UL (ref 0–1.3)
MONOCYTES NFR BLD AUTO: 2 %
NEUTROPHILS # BLD AUTO: 2.7 10E3/UL (ref 1.6–8.3)
NEUTROPHILS NFR BLD AUTO: 80 %
NRBC # BLD AUTO: 0 10E3/UL
NRBC BLD AUTO-RTO: 0 /100
PLATELET # BLD AUTO: 72 10E3/UL (ref 150–450)
POTASSIUM SERPL-SCNC: 3.7 MMOL/L (ref 3.4–5.3)
PROT SERPL-MCNC: 4.4 G/DL (ref 6.4–8.3)
RBC # BLD AUTO: 2.81 10E6/UL (ref 4.4–5.9)
SARS-COV-2 RNA RESP QL NAA+PROBE: POSITIVE
SODIUM SERPL-SCNC: 140 MMOL/L (ref 136–145)
WBC # BLD AUTO: 3.4 10E3/UL (ref 4–11)

## 2023-01-15 PROCEDURE — 250N000011 HC RX IP 250 OP 636: Performed by: INTERNAL MEDICINE

## 2023-01-15 PROCEDURE — 94642 AEROSOL INHALATION TREATMENT: CPT

## 2023-01-15 PROCEDURE — 36591 DRAW BLOOD OFF VENOUS DEVICE: CPT | Performed by: PHYSICIAN ASSISTANT

## 2023-01-15 PROCEDURE — 999N000157 HC STATISTIC RCP TIME EA 10 MIN

## 2023-01-15 PROCEDURE — 85610 PROTHROMBIN TIME: CPT | Performed by: PHYSICIAN ASSISTANT

## 2023-01-15 PROCEDURE — 85025 COMPLETE CBC W/AUTO DIFF WBC: CPT | Performed by: PHYSICIAN ASSISTANT

## 2023-01-15 PROCEDURE — 250N000011 HC RX IP 250 OP 636: Performed by: PHYSICIAN ASSISTANT

## 2023-01-15 PROCEDURE — 120N000002 HC R&B MED SURG/OB UMMC

## 2023-01-15 PROCEDURE — U0005 INFEC AGEN DETEC AMPLI PROBE: HCPCS | Performed by: INTERNAL MEDICINE

## 2023-01-15 PROCEDURE — 250N000009 HC RX 250: Performed by: PHYSICIAN ASSISTANT

## 2023-01-15 PROCEDURE — 250N000013 HC RX MED GY IP 250 OP 250 PS 637: Performed by: PHYSICIAN ASSISTANT

## 2023-01-15 PROCEDURE — 94640 AIRWAY INHALATION TREATMENT: CPT

## 2023-01-15 PROCEDURE — 250N000012 HC RX MED GY IP 250 OP 636 PS 637: Performed by: INTERNAL MEDICINE

## 2023-01-15 PROCEDURE — 99233 SBSQ HOSP IP/OBS HIGH 50: CPT | Mod: FS | Performed by: PHYSICIAN ASSISTANT

## 2023-01-15 PROCEDURE — 80053 COMPREHEN METABOLIC PANEL: CPT | Performed by: PHYSICIAN ASSISTANT

## 2023-01-15 PROCEDURE — 258N000003 HC RX IP 258 OP 636: Performed by: INTERNAL MEDICINE

## 2023-01-15 PROCEDURE — 250N000013 HC RX MED GY IP 250 OP 250 PS 637: Performed by: INTERNAL MEDICINE

## 2023-01-15 RX ORDER — DIPHENHYDRAMINE HYDROCHLORIDE 50 MG/ML
50 INJECTION INTRAMUSCULAR; INTRAVENOUS
Status: DISCONTINUED | OUTPATIENT
Start: 2023-01-15 | End: 2023-01-16

## 2023-01-15 RX ORDER — DIPHENHYDRAMINE HCL 50 MG
50 CAPSULE ORAL ONCE
Status: COMPLETED | OUTPATIENT
Start: 2023-01-15 | End: 2023-01-15

## 2023-01-15 RX ORDER — DIPHENHYDRAMINE HCL 50 MG
50 CAPSULE ORAL
Status: DISCONTINUED | OUTPATIENT
Start: 2023-01-15 | End: 2023-01-16

## 2023-01-15 RX ORDER — METHYLPREDNISOLONE SODIUM SUCCINATE 125 MG/2ML
125 INJECTION, POWDER, LYOPHILIZED, FOR SOLUTION INTRAMUSCULAR; INTRAVENOUS
Status: DISCONTINUED | OUTPATIENT
Start: 2023-01-15 | End: 2023-01-16

## 2023-01-15 RX ORDER — ACETAMINOPHEN 325 MG/1
650 TABLET ORAL
Status: DISCONTINUED | OUTPATIENT
Start: 2023-01-15 | End: 2023-01-16 | Stop reason: HOSPADM

## 2023-01-15 RX ORDER — ACETAMINOPHEN 325 MG/1
650 TABLET ORAL ONCE
Status: COMPLETED | OUTPATIENT
Start: 2023-01-15 | End: 2023-01-15

## 2023-01-15 RX ORDER — DIPHENHYDRAMINE HYDROCHLORIDE 50 MG/ML
50 INJECTION INTRAMUSCULAR; INTRAVENOUS ONCE
Status: COMPLETED | OUTPATIENT
Start: 2023-01-15 | End: 2023-01-15

## 2023-01-15 RX ADMIN — FOSAPREPITANT 150 MG: 150 INJECTION, POWDER, LYOPHILIZED, FOR SOLUTION INTRAVENOUS at 15:21

## 2023-01-15 RX ADMIN — PREDNISONE 100 MG: 50 TABLET ORAL at 09:02

## 2023-01-15 RX ADMIN — OXYCODONE HYDROCHLORIDE AND ACETAMINOPHEN 500 MG: 500 TABLET ORAL at 09:02

## 2023-01-15 RX ADMIN — ACYCLOVIR 400 MG: 400 TABLET ORAL at 09:01

## 2023-01-15 RX ADMIN — ACYCLOVIR 400 MG: 400 TABLET ORAL at 19:43

## 2023-01-15 RX ADMIN — OMEPRAZOLE 40 MG: 20 CAPSULE, DELAYED RELEASE ORAL at 09:01

## 2023-01-15 RX ADMIN — SENNOSIDES AND DOCUSATE SODIUM 2 TABLET: 50; 8.6 TABLET ORAL at 19:43

## 2023-01-15 RX ADMIN — PENTAMIDINE ISETHIONATE 300 MG: 300 INHALANT RESPIRATORY (INHALATION) at 12:11

## 2023-01-15 RX ADMIN — ENOXAPARIN SODIUM 40 MG: 40 INJECTION SUBCUTANEOUS at 09:02

## 2023-01-15 RX ADMIN — PREDNISONE 100 MG: 50 TABLET ORAL at 16:08

## 2023-01-15 RX ADMIN — ACETAMINOPHEN 650 MG: 325 TABLET, FILM COATED ORAL at 18:37

## 2023-01-15 RX ADMIN — ALBUTEROL SULFATE 2.5 MG: 2.5 SOLUTION RESPIRATORY (INHALATION) at 12:06

## 2023-01-15 RX ADMIN — ALLOPURINOL 300 MG: 300 TABLET ORAL at 09:02

## 2023-01-15 RX ADMIN — ONDANSETRON HYDROCHLORIDE 16 MG: 8 TABLET, FILM COATED ORAL at 15:15

## 2023-01-15 RX ADMIN — POLYETHYLENE GLYCOL 3350 17 G: 17 POWDER, FOR SOLUTION ORAL at 09:02

## 2023-01-15 RX ADMIN — Medication 5 ML: at 21:08

## 2023-01-15 RX ADMIN — TEMAZEPAM 7.5 MG: 7.5 CAPSULE ORAL at 23:12

## 2023-01-15 RX ADMIN — DIPHENHYDRAMINE HYDROCHLORIDE 50 MG: 50 CAPSULE ORAL at 18:37

## 2023-01-15 RX ADMIN — HUMAN IMMUNOGLOBULIN G 15 G: 10 LIQUID INTRAVENOUS at 19:04

## 2023-01-15 RX ADMIN — ONDANSETRON HYDROCHLORIDE 8 MG: 8 TABLET, FILM COATED ORAL at 09:01

## 2023-01-15 RX ADMIN — CYCLOPHOSPHAMIDE 1275 MG: 2 INJECTION, POWDER, FOR SOLUTION INTRAVENOUS; ORAL at 16:17

## 2023-01-15 ASSESSMENT — ACTIVITIES OF DAILY LIVING (ADL)
ADLS_ACUITY_SCORE: 20

## 2023-01-15 NOTE — PROGRESS NOTES
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 Initiated on R-CHOP (R5D1=5112/20/22) which was well tolerated. Now admitted for DA-R-EPOCH (C1D1=1/11/23). He is positive for COVID but asymptomatic.     Today:  - R-EPOCH C1D5  - Given persistently low cycle threshold, hypogammaglobulinemia, and risk for further immunosuppression with chemo, ordered IVIG.  - Pentamidine neb given.   - Completing chemo this evening. Plan for discharge tomorrow morning before 11.     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 Ruelas. 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 He presented progressing B symptoms, abdominal pain, and SOB. PET 12/9 showed extensive lymphomatous involvement to the R pleura w/avid effusions, mediastinal and pericardial regions, right anterolateral chest wall, adenopathy above and below the diaphragm, liver, spleen and multiple sites in the skeleton, concerning for transformation from his low-grade lymphoma. He also has pancytopenia which seems to have progressed slowly over the past years. Of note, reports maternal history of waldenstrom's. Due to worsening shortness of breath and delays of biopsy, patient presented to ED on 12/14 and was subsequently admitted for expedited workup and treatment. Given highest SUV and easily accessible sternoclavicular soft tissue mass, plan for biopsy of this lesion and okay to hold on BMBx given would not change treatment plan.  S/p US-guided sternoclavicular soft tissue mass biopsy with IR 12/15; morphology, cytogenetics pending. Flow with 95% CD10 positive kappa-monotypic B cells. Baseline Echo (12/15) with LVEF 60-65% with mild aortic insufficiency, no evidence of effusion or tamponade. Initiated on RCHOP (C1D1 = 12/20/22) which was well tolerated (started prior to FISH results).   - FISH results show triple hit lymphoma - transitioning to DA-R-EPOCH (C1D1=1/11/23).   - Labs WNL to proceed with chemo as below.  - High IPI (4), thus will obtain diagnostic and therapeutic LP with IT chemo this admission. CAPs team consulted. S/p LP with IT triple chemo x1/12. CSF flow - negative.   - Plan for PET/CT after C1 outpatient (ordered), patient will need to  Methylpred pre medication in preparation given hx of contrast allergy.      Treatment Plan: DA-R-EPOCH (C1D1 = 1/11/23)   - Rituximab 375 mg/m2 D1  - Etoposide, Vincristine, Doxorubicin daily D1-4  - Cytoxan 750 mg/m2 D5  - Pred 60 mg/m2 BID D1-5 then Dex 8 mg D6-7 (will need prescribed to [facility] pharmacy at discharge)   - Pre Meds: Tylenol 650 mg, Benadryl 50 mg, Zofran 16 mg D1-5, Emend D1 and D5   - Neulasta D6 -- scheduled locally on 1/17 at [facility]      # Anemia   # Thrombocytopenia   Secondary to chemotherapy and lymphoma.   - Monitor CBC daily  - Transfuse if Hgb <7 and plt <10k     # Risk for TLS  On 12/15, uric acid peak 9.1. Cr, K, Ca, and phos all WNL. S/p rasburicase 12/15 with resolution of hyperuricemia.  - Monitor TLS/DIC labs daily  - Allopurinol 300 mg daily  - Avoiding IVF given malignant pleural effusions although could consider gentle IVF if needed.     ID  # COVID positive   COVID positive on admission, asymptomatic but high risk with active malignancy. Fully vaccinated. Cycle Threshold 22 on admission, thus still contagious.    - s/p Remdesivir x3 days inpatient (1/11-1/13)   - COVID isolation precautions      # Hypogammaglobulinemia   -  Dec 2022  -  Given persistently low cycle threshold, hypogammaglobulinemia, and risk for further immunosuppression with chemo, gave IVIG on 1/15     # PPX  - Viral serologies: Hep B/C non-reactive. HSV 1/2+  - ppx  mg BID   - Pentamidine neb given inpatient 1/15/23     MISC  # H/o complete heart block s/p pacemaker placement 2020 - No acute inpatient needs   # GERD - Continue PTA omeprazole.  # Insomnia - PTA Restoril PRN sleep while on steroids ? pt would like 5 tabs prescribed to [facility] pharmacy at discharge as cheaper here than local pharmacy       FEN  Diet: Regular Diet Adult   IVF: Bolus PRN (judiciously given pleural effusions)  Lytes: Replete per protocol      PPX  VTE: Lovenox   Bowel: Senna/MiraLax PRN     Code Status: Full Code   Lines/Drains: Port recently placed 1/5/23   Dispo: Discharge tomorrow     Follow Up: Follows with Dr. Ruelas; will request pending final treatment plan. Local twice weekly labs will need to be requested at [place] clinic (FAX sent to [phone]).     *Neulasta arranged at [facility] in [place] as he has seen a provider there in the past. Orders  faxed to [phone] ATTN: Amos/[facility].      Patient was seen and plan of care was discussed with attending physician Dr. Ruelas.    Annelise Flores PA-C    Interval History   Overnight afebrile and VSS. Feeling well. He has twinges of nausea for which he takes Zofran. He slept better last night. Eating well. Walking laps around room. No issues with bowel or bladder.     Physical Exam   Temp: 98  F (36.7  C) Temp src: Oral BP: 97/53 Pulse: 94   Resp: 18 SpO2: 96 % O2 Device: None (Room air)    Vitals:    01/11/23 1422 01/12/23 0816 01/15/23 1035   Weight: 58.4 kg (128 lb 12.8 oz) 58.2 kg (128 lb 3.2 oz) 63.9 kg (140 lb 12.8 oz)     Vital Signs with Ranges  Temp:  [97.4  F (36.3  C)-98.1  F (36.7  C)] 98  F (36.7  C)  Pulse:  [71-96] 94  Resp:  [16-20] 18  BP: ()/(53-80) 97/53  SpO2:  [95 %-98 %] 96 %  I/O last 3 completed  shifts:  In: 1620.8 [P.O.:640; IV Piggyback:980.8]  Out: 1125 [Urine:1125]    Constitutional: Pleasant male seen laying in bed. No apparent distress, and appears stated age.  Eyes: Lids and lashes normal, sclera clear, conjunctiva normal.   ENT: Normocephalic, without obvious abnormality, atraumatic, oral pharynx with moist mucus membranes  Respiratory: No increased work of breathing, good air exchange, clear to auscultation bilaterally, no crackles or wheezing.   Cardiovascular: Regular rate and rhythm, normal S1 and S2, and no murmur noted. Pacemaker in place.   GI: No masses or scars. +BS. Soft. No tenderness on palpation.  Skin: No bruising or bleeding, no rashes, no lesions, no jaundice.   Neurologic: Awake, alert, oriented to name, place and time.    Vascular access: port c/d/i      Medications     - MEDICATION INSTRUCTIONS -         acyclovir  400 mg Oral Q12H     allopurinol  300 mg Oral Daily     cyclophosphamide (CYTOXAN) infusion  750 mg/m2 (Treatment Plan Recorded) Intravenous Once     [START ON 1/17/2023] dexamethasone  8 mg Oral Daily     [START ON 1/16/2023] dextrose 5% water  10-20 mL Intracatheter Daily at 8 pm     [START ON 1/16/2023] dextrose 5% water  10-20 mL Intracatheter Daily at 8 pm     enoxaparin ANTICOAGULANT  40 mg Subcutaneous Q24H     [START ON 1/16/2023] filgrastim-aafi  5 mcg/kg (Treatment Plan Recorded) Intravenous Daily at 8 pm     heparin  5-10 mL Intracatheter Q28 Days     heparin lock flush  5-10 mL Intracatheter Q24H     omeprazole  40 mg Oral Daily     polyethylene glycol  17 g Oral Daily     predniSONE  60 mg/m2 (Treatment Plan Recorded) Oral BID     senna-docusate  2 tablet Oral BID     sodium chloride (PF)  10-20 mL Intracatheter Q28 Days     vitamin C  500 mg Oral QAM       Data   Results for orders placed or performed during the hospital encounter of 01/11/23 (from the past 24 hour(s))   CBC with platelets differential    Narrative    The following orders were created for  panel order CBC with platelets differential.  Procedure                               Abnormality         Status                     ---------                               -----------         ------                     CBC with platelets and d...[[id number]]  Abnormal            Final result                 Please view results for these tests on the individual orders.   Comprehensive metabolic panel   Result Value Ref Range    Sodium 140 136 - 145 mmol/L    Potassium 3.7 3.4 - 5.3 mmol/L    Chloride 109 (H) 98 - 107 mmol/L    Carbon Dioxide (CO2) 22 22 - 29 mmol/L    Anion Gap 9 7 - 15 mmol/L    Urea Nitrogen 29.0 (H) 8.0 - 23.0 mg/dL    Creatinine 0.81 0.67 - 1.17 mg/dL    Calcium 8.1 (L) 8.8 - 10.2 mg/dL    Glucose 106 (H) 70 - 99 mg/dL    Alkaline Phosphatase 70 40 - 129 U/L    AST 30 10 - 50 U/L    ALT 38 10 - 50 U/L    Protein Total 4.4 (L) 6.4 - 8.3 g/dL    Albumin 3.1 (L) 3.5 - 5.2 g/dL    Bilirubin Total 0.2 <=1.2 mg/dL    GFR Estimate >90 >60 mL/min/1.73m2   INR   Result Value Ref Range    INR 1.20 (H) 0.85 - 1.15   CBC with platelets and differential   Result Value Ref Range    WBC Count 3.4 (L) 4.0 - 11.0 10e3/uL    RBC Count 2.81 (L) 4.40 - 5.90 10e6/uL    Hemoglobin 8.7 (L) 13.3 - 17.7 g/dL    Hematocrit 26.5 (L) 40.0 - 53.0 %    MCV 94 78 - 100 fL    MCH 31.0 26.5 - 33.0 pg    MCHC 32.8 31.5 - 36.5 g/dL    RDW 14.2 10.0 - 15.0 %    Platelet Count 72 (L) 150 - 450 10e3/uL    % Neutrophils 80 %    % Lymphocytes 17 %    % Monocytes 2 %    % Eosinophils 0 %    % Basophils 0 %    % Immature Granulocytes 1 %    NRBCs per 100 WBC 0 <1 /100    Absolute Neutrophils 2.7 1.6 - 8.3 10e3/uL    Absolute Lymphocytes 0.6 (L) 0.8 - 5.3 10e3/uL    Absolute Monocytes 0.1 0.0 - 1.3 10e3/uL    Absolute Eosinophils 0.0 0.0 - 0.7 10e3/uL    Absolute Basophils 0.0 0.0 - 0.2 10e3/uL    Absolute Immature Granulocytes 0.0 <=0.4 10e3/uL    Absolute NRBCs 0.0 10e3/uL   Asymptomatic COVID-19 Virus (Coronavirus) by PCR  Nasopharyngeal    Specimen: Nasopharyngeal; Swab   Result Value Ref Range    SARS CoV2 PCR Positive (A) Negative    Cycle threshold 19.6     Narrative    Testing was performed using the Xpert Xpress SARS-CoV-2 Assay on the Cepheid Gene-Xpert Instrument Systems. Additional information about this Emergency Use Authorization (EUA) assay can be found via the Lab Guide. This test should be ordered for the detection of SARS-CoV-2 in individuals who meet SARS-CoV-2 clinical and/or epidemiological criteria as well as from individuals without symptoms or other reasons to suspect COVID-19. Test performance for asymptomatic patients has only been established in anterior nasal swab specimens. This test is for in vitro diagnostic use under the FDA EUA for laboratories certified under CLIA to perform high complexity testing. This test has not been FDA cleared or approved. A negative result does not rule out the presence of PCR inhibitors in the specimen or target RNA concentration below the limit of detection for the assay. The possibility of a false negative should be considered if the patient's recent exposure or clinical presentation suggests COVID-19. This test was validated by [facility]. These Laboratories are certified under the Clinical Laboratory Improvement Amendments (CLIA) as qualified to perform high complexity testing.

## 2023-01-15 NOTE — PLAN OF CARE
Goal Outcome Evaluation:     [id number]     A/Ox4. Afebrile. OVSS on RA. Denies pain and SOB. Nausea managed with scheduled zofran and compazine x1 with moderate to full relief. Fair appetite. Pt voiding spontaneously with adequate UOP. Temazepam given x1 to aid with sleep. R-Port infusing CIVI chemo (see notes below). UAL.     Nursing Focus: D4 Etoposide Chemotherapy  D: Positive blood return via Port. Insertion site is clean/dry/intact, dressing intact with no complaints of pain.  Urine output is recorded in intake in Doc Flowsheet.    I: Premedications given per order (see electronic medical administration record). Dose #3 of Etoposide started to infuse over 22 hours. Reviewed pt teaching on chemotherapy side effects.  Pt denies need for further teaching. Chemotherapy double checked per protocol by two chemotherapy competent RN's.   A: Tolerating procedure well. Denies nausea and or pain.   P: Continue to monitor urine output and symptoms of nausea. Screen for symptoms of toxicity.      Nursing Focus: D4 Vincristine/Doxorubicin Chemotherapy  D: Positive blood return via Port. Insertion site is clean/dry/intact, dressing intact with no complaints of pain.  Urine output is recorded in intake in Doc Flowsheet.    I: Premedications given per order (see electronic medical administration record). Dose #3 of Vincristine/Doxorubicin started to infuse over 22 hours. Reviewed pt teaching on chemotherapy side effects.  Pt denies need for further teaching. Chemotherapy double checked per protocol by two chemotherapy competent RN's.   A: Tolerating procedure well. Denies nausea and or pain.   P: Continue to monitor urine output and symptoms of nausea. Screen for symptoms of toxicity.

## 2023-01-15 NOTE — PLAN OF CARE
"AVSS on RA, no c/o pain. Zofran for nausea x1. Day 4 etoposide and dox/vincristine finished infusing; writer took down and hung donnie, vinod shift to hang cytoxan. Good blood return. Pt reports feeling more tired, says he thinks he is \"starting to feel the effects of the chemo\" also got a neb treatment that he feels caused him to feel shaky and perhaps contributed to feeling more weak; has been independent in the room, passed on in report to vinod shift to assess if he should be SBA. Plan to discharge tomorrow; team waiting on a lab result to decide if pt will get IVIG. COVID swab sent to lab.  "

## 2023-01-16 ENCOUNTER — NURSE TRIAGE (OUTPATIENT)
Dept: NURSING | Facility: CLINIC | Age: 73
End: 2023-01-16

## 2023-01-16 VITALS
HEART RATE: 75 BPM | SYSTOLIC BLOOD PRESSURE: 115 MMHG | RESPIRATION RATE: 20 BRPM | WEIGHT: 140.8 LBS | BODY MASS INDEX: 22.1 KG/M2 | TEMPERATURE: 97.7 F | OXYGEN SATURATION: 97 % | HEIGHT: 67 IN | DIASTOLIC BLOOD PRESSURE: 64 MMHG

## 2023-01-16 LAB
ALBUMIN SERPL BCG-MCNC: 3.1 G/DL (ref 3.5–5.2)
ALP SERPL-CCNC: 64 U/L (ref 40–129)
ALT SERPL W P-5'-P-CCNC: 40 U/L (ref 10–50)
ANION GAP SERPL CALCULATED.3IONS-SCNC: 12 MMOL/L (ref 7–15)
AST SERPL W P-5'-P-CCNC: 26 U/L (ref 10–50)
BASOPHILS # BLD AUTO: 0 10E3/UL (ref 0–0.2)
BASOPHILS NFR BLD AUTO: 0 %
BILIRUB SERPL-MCNC: 0.3 MG/DL
BUN SERPL-MCNC: 29.4 MG/DL (ref 8–23)
CALCIUM SERPL-MCNC: 8.1 MG/DL (ref 8.8–10.2)
CHLORIDE SERPL-SCNC: 107 MMOL/L (ref 98–107)
CREAT SERPL-MCNC: 0.76 MG/DL (ref 0.67–1.17)
DEPRECATED HCO3 PLAS-SCNC: 20 MMOL/L (ref 22–29)
EOSINOPHIL # BLD AUTO: 0 10E3/UL (ref 0–0.7)
EOSINOPHIL NFR BLD AUTO: 0 %
ERYTHROCYTE [DISTWIDTH] IN BLOOD BY AUTOMATED COUNT: 14.3 % (ref 10–15)
GFR SERPL CREATININE-BSD FRML MDRD: >90 ML/MIN/1.73M2
GLUCOSE SERPL-MCNC: 120 MG/DL (ref 70–99)
HCT VFR BLD AUTO: 24.8 % (ref 40–53)
HGB BLD-MCNC: 8.1 G/DL (ref 13.3–17.7)
IMM GRANULOCYTES # BLD: 0 10E3/UL
IMM GRANULOCYTES NFR BLD: 2 %
INR PPP: 1.23 (ref 0.85–1.15)
LYMPHOCYTES # BLD AUTO: 0.3 10E3/UL (ref 0.8–5.3)
LYMPHOCYTES NFR BLD AUTO: 14 %
MAGNESIUM SERPL-MCNC: 2.4 MG/DL (ref 1.7–2.3)
MCH RBC QN AUTO: 30.8 PG (ref 26.5–33)
MCHC RBC AUTO-ENTMCNC: 32.7 G/DL (ref 31.5–36.5)
MCV RBC AUTO: 94 FL (ref 78–100)
MONOCYTES # BLD AUTO: 0 10E3/UL (ref 0–1.3)
MONOCYTES NFR BLD AUTO: 1 %
NEUTROPHILS # BLD AUTO: 2.1 10E3/UL (ref 1.6–8.3)
NEUTROPHILS NFR BLD AUTO: 83 %
NRBC # BLD AUTO: 0 10E3/UL
NRBC BLD AUTO-RTO: 0 /100
PHOSPHATE SERPL-MCNC: 3.3 MG/DL (ref 2.5–4.5)
PLATELET # BLD AUTO: 64 10E3/UL (ref 150–450)
POTASSIUM SERPL-SCNC: 3.9 MMOL/L (ref 3.4–5.3)
PROT SERPL-MCNC: 4.5 G/DL (ref 6.4–8.3)
RBC # BLD AUTO: 2.63 10E6/UL (ref 4.4–5.9)
SODIUM SERPL-SCNC: 139 MMOL/L (ref 136–145)
URATE SERPL-MCNC: 3.3 MG/DL (ref 3.4–7)
WBC # BLD AUTO: 2.5 10E3/UL (ref 4–11)

## 2023-01-16 PROCEDURE — 250N000011 HC RX IP 250 OP 636: Performed by: PHYSICIAN ASSISTANT

## 2023-01-16 PROCEDURE — 99239 HOSP IP/OBS DSCHRG MGMT >30: CPT | Mod: FS | Performed by: PHYSICIAN ASSISTANT

## 2023-01-16 PROCEDURE — 84550 ASSAY OF BLOOD/URIC ACID: CPT | Performed by: PHYSICIAN ASSISTANT

## 2023-01-16 PROCEDURE — 85610 PROTHROMBIN TIME: CPT | Performed by: PHYSICIAN ASSISTANT

## 2023-01-16 PROCEDURE — 250N000012 HC RX MED GY IP 250 OP 636 PS 637: Performed by: INTERNAL MEDICINE

## 2023-01-16 PROCEDURE — 250N000013 HC RX MED GY IP 250 OP 250 PS 637: Performed by: INTERNAL MEDICINE

## 2023-01-16 PROCEDURE — 84100 ASSAY OF PHOSPHORUS: CPT | Performed by: PHYSICIAN ASSISTANT

## 2023-01-16 PROCEDURE — 36591 DRAW BLOOD OFF VENOUS DEVICE: CPT | Performed by: PHYSICIAN ASSISTANT

## 2023-01-16 PROCEDURE — 250N000013 HC RX MED GY IP 250 OP 250 PS 637: Performed by: PHYSICIAN ASSISTANT

## 2023-01-16 PROCEDURE — 80053 COMPREHEN METABOLIC PANEL: CPT | Performed by: PHYSICIAN ASSISTANT

## 2023-01-16 PROCEDURE — 83735 ASSAY OF MAGNESIUM: CPT | Performed by: PHYSICIAN ASSISTANT

## 2023-01-16 PROCEDURE — 85004 AUTOMATED DIFF WBC COUNT: CPT | Performed by: PHYSICIAN ASSISTANT

## 2023-01-16 RX ORDER — FLUCONAZOLE 200 MG/1
200 TABLET ORAL DAILY
Qty: 30 TABLET | Refills: 3 | Status: SHIPPED | OUTPATIENT
Start: 2023-01-16 | End: 2023-01-16

## 2023-01-16 RX ORDER — LEVOFLOXACIN 250 MG/1
250 TABLET, FILM COATED ORAL DAILY
Qty: 30 TABLET | Refills: 3 | Status: SHIPPED | OUTPATIENT
Start: 2023-01-16 | End: 2023-01-16

## 2023-01-16 RX ORDER — FLUCONAZOLE 200 MG/1
200 TABLET ORAL DAILY
Qty: 30 TABLET | Refills: 3 | Status: ON HOLD | OUTPATIENT
Start: 2023-01-16 | End: 2023-02-07

## 2023-01-16 RX ORDER — LEVOFLOXACIN 250 MG/1
250 TABLET, FILM COATED ORAL DAILY
Qty: 30 TABLET | Refills: 3 | Status: ON HOLD | OUTPATIENT
Start: 2023-01-16 | End: 2023-02-07

## 2023-01-16 RX ORDER — ONDANSETRON 8 MG/1
8 TABLET, FILM COATED ORAL EVERY 8 HOURS PRN
Qty: 60 TABLET | Refills: 3 | Status: SHIPPED | OUTPATIENT
Start: 2023-01-16 | End: 2023-01-16

## 2023-01-16 RX ORDER — ONDANSETRON 8 MG/1
8 TABLET, FILM COATED ORAL EVERY 8 HOURS PRN
Qty: 60 TABLET | Refills: 3 | Status: SHIPPED | OUTPATIENT
Start: 2023-01-16

## 2023-01-16 RX ORDER — PROCHLORPERAZINE MALEATE 10 MG
10 TABLET ORAL EVERY 6 HOURS PRN
Qty: 10 TABLET | Refills: 0 | Status: SHIPPED | OUTPATIENT
Start: 2023-01-16 | End: 2023-02-14

## 2023-01-16 RX ADMIN — PREDNISONE 100 MG: 50 TABLET ORAL at 08:18

## 2023-01-16 RX ADMIN — Medication 5 ML: at 05:05

## 2023-01-16 RX ADMIN — PROCHLORPERAZINE MALEATE 10 MG: 5 TABLET ORAL at 11:22

## 2023-01-16 RX ADMIN — ALLOPURINOL 300 MG: 300 TABLET ORAL at 08:18

## 2023-01-16 RX ADMIN — Medication 5 ML: at 08:18

## 2023-01-16 RX ADMIN — ACYCLOVIR 400 MG: 400 TABLET ORAL at 08:18

## 2023-01-16 RX ADMIN — OMEPRAZOLE 40 MG: 20 CAPSULE, DELAYED RELEASE ORAL at 08:17

## 2023-01-16 RX ADMIN — ONDANSETRON 8 MG: 8 TABLET, ORALLY DISINTEGRATING ORAL at 08:28

## 2023-01-16 RX ADMIN — POLYETHYLENE GLYCOL 3350 17 G: 17 POWDER, FOR SOLUTION ORAL at 08:18

## 2023-01-16 RX ADMIN — OXYCODONE HYDROCHLORIDE AND ACETAMINOPHEN 500 MG: 500 TABLET ORAL at 08:18

## 2023-01-16 RX ADMIN — ENOXAPARIN SODIUM 40 MG: 40 INJECTION SUBCUTANEOUS at 08:17

## 2023-01-16 ASSESSMENT — ACTIVITIES OF DAILY LIVING (ADL)
ADLS_ACUITY_SCORE: 20

## 2023-01-16 NOTE — PLAN OF CARE
Goal Outcome Evaluation:    00:00-07:00 am  AF with stable VS on RA. A&Ox4  Completed 5 days of C1 DA-R-EPOCH yesterday and also received IVIG without incident.  Tolerating chemo well thus far.  Denied nausea, pain, SOB, and chest pain.  UAL  Voiding spontaneously well, good UOP.  LBM 1/15   Pt endorses feeling well and no new acute events overnight.  Plan to discharge today at 11:00 am.  Continue w/POC

## 2023-01-16 NOTE — DISCHARGE SUMMARY
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 Initiated on R-CHOP (E2W4=0412/20/22) which was well tolerated. Now admitted for DA-R-EPOCH (C1D1=1/11/23). He is positive for COVID but asymptomatic, s/p 3d course of Remdesevir and one dose of IVIG this admission for COVID and hypogammaglobulinemia. Tolerated chemo without issue and close follow up requested as below.     We reviewed signs & symptoms of concern that should prompt a call to clinic triage or a visit to the ED, and patient voiced understanding. All questions answered. On the day of discharge, patient was quite well-appearing, hemodynamically stable, and felt safe and comfortable with the plans for discharge and follow-up.       Follow up:    Neulasta at local [facility] clinic     Twice weekly labs and PRN transfusions as [facility]     JOSE MARTIN the week of 1/23 (virtual appt requested)     PET scan, labs and Dr Ruelas follow up 2/2 prior to admission for C2 (requested)     Medication Highlights:     Dex 8 mg x2 days    PRN Restoril for insomnia while on Dex    Methylpred premedication for outpatient PET scan given contrast allergy     Started on Levaquin and Fluconazole on admission with anticipated cytopenias in coming days       Hospital Course   Shahid Davis was admitted on 1/11/2023.  The following problems were addressed during his hospitalization:    HEME  # Triple HIT  DLBCL  # H/o low-grade kappa restricted plasmacytoid B-cell lymphoma (2004)  Follows with Dr. Ruelas. 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 He presented progressing B symptoms, abdominal pain, and SOB. PET 12/9 showed extensive lymphomatous involvement to the R pleura w/avid effusions, mediastinal and pericardial regions, right anterolateral chest wall, adenopathy above and below the diaphragm, liver, spleen and multiple sites in the skeleton, concerning for transformation from his low-grade lymphoma. He also has pancytopenia which seems to have progressed slowly over the past years. Of note, reports maternal history of waldenstrom's. Due to worsening shortness of breath and delays of biopsy, patient presented to ED on 12/14 and was subsequently admitted for expedited workup and treatment. Given highest SUV and easily accessible sternoclavicular soft tissue mass, plan for biopsy of this lesion and okay to hold on BMBx given would not change treatment plan. S/p US-guided sternoclavicular soft tissue mass biopsy with IR 12/15; morphology, cytogenetics pending. Flow with 95% CD10 positive kappa-monotypic B cells. Baseline Echo (12/15) with LVEF 60-65% with mild aortic insufficiency, no evidence of effusion or tamponade. Initiated on RCHOP (C1D1 = 12/20/22) which was well tolerated (started prior to FISH results).   - FISH results show triple hit lymphoma - transitioning to DA-R-EPOCH (C1D1=1/11/23).   - Labs WNL to proceed with chemo as below.  - High IPI (4), thus will obtain diagnostic and therapeutic LP with IT chemo this admission. CAPs team consulted. S/p LP with IT triple chemo x1/12. CSF flow - negative.   - Plan for PET/CT after C1 outpatient (ordered), patient will need to  Methylpred pre medication in preparation given hx of contrast allergy.      Treatment  Plan: DA-R-EPOCH (C1D1 = 1/11/23)   - Rituximab 375 mg/m2 D1  - Etoposide, Vincristine, Doxorubicin daily D1-4  - Cytoxan 750 mg/m2 D5  - Pred 60 mg/m2 BID D1-5 then Dex 8 mg D6-7   - Pre Meds: Tylenol 650 mg, Benadryl 50 mg, Zofran 16 mg D1-5, Emend D1 and D5   - Neulasta D6 -- scheduled locally on 1/17 at [facility]      # Anemia   # Thrombocytopenia   Secondary to chemotherapy and lymphoma.   - Monitor CBC daily  - Transfuse if Hgb <7 and plt <10k     # Risk for TLS  On 12/15, uric acid peak 9.1. Cr, K, Ca, and phos all WNL. S/p rasburicase 12/15 with resolution of hyperuricemia.  - Monitor TLS/DIC labs daily  - Allopurinol 300 mg daily  - Avoiding IVF given malignant pleural effusions although could consider gentle IVF if needed.     ID  # COVID positive   COVID positive on admission, asymptomatic but high risk with active malignancy. Fully vaccinated. Cycle Threshold 22 on admission, thus still contagious.    - s/p Remdesivir x3 days inpatient (1/11-1/13)   - COVID isolation precautions      # Hypogammaglobulinemia   -  Dec 2022  - Given persistently low cycle threshold, hypogammaglobulinemia, and risk for further immunosuppression with chemo, gave IVIG on 1/15     # PPX  - Viral serologies: Hep B/C non-reactive. HSV 1/2+  - ppx  mg BID   - Pentamidine neb given inpatient 1/15/23     MISC  # H/o complete heart block s/p pacemaker placement 2020 - No acute inpatient needs   # GERD - Continue PTA omeprazole.  # Insomnia - PTA Restoril PRN sleep while on steroids ? 5 tabs prescribed to [facility] pharmacy at discharge as cheaper here than local pharmacy per patient request      FEN  Diet: Regular Diet Adult   IVF: Bolus PRN (judiciously given pleural effusions)  Lytes: Replete per protocol      PPX  VTE: Lovenox   Bowel: Senna/MiraLax PRN     Code Status: Full Code   Lines/Drains: Port recently placed 1/5/23      Patient was seen and plan of care was discussed with attending physician Dr. Ruelas.      Leida Barton PA-C    Hematology/Oncology   Pager: [phone]      Significant Results and Procedures   Results for orders placed or performed during the hospital encounter of 01/05/23   IR Chest Port Placement > 5 Yrs of Age    Narrative    LOCATION: [facility]  DATE: 1/5/2023    PROCEDURE: IMPLANTABLE VENOUS PORT PLACEMENT  1.  Implantable venous access port placement.  2.  Ultrasound guidance for vascular access. A permanent image was stored.  3.  Fluoroscopic guidance for central venous access device placement.    INTERVENTIONAL RADIOLOGIST: Sara Crawford MD    INDICATION: Tunneled port placement for administration of chemotherapy    CONSENT: The risks, benefits and alternatives of the procedure were discussed with the patient or representative in detail. All questions were answered. Informed consent was given to proceed with the procedure.    MODERATE SEDATION: Versed 1.5 mg IV; Fentanyl 75 mcg IV. During the time out, immediately prior to the administration of medications, the patient was reassessed for adequacy to receive conscious sedation.  Under physician supervision, Versed and fentanyl   were administered for moderate sedation. Pulse oximetry, heart rate and blood pressure were continuously monitored by an independent trained observer. The physician spent 30 minutes of face-to-face sedation time with the patient.    FLUOROSCOPIC TIME: 0.3 minutes.  RADIATION DOSE: Air Kerma: 3 mGy.    COMPLICATIONS: No immediate complications.    UNIVERSAL PROTOCOL: The operative site was marked and any prior imaging was reviewed. Required items including blood products, implants, devices and special equipment was made available. Patient identity was confirmed either verbally, with demographic   information, hospital assigned identification or other identification markers. A timeout was performed immediately prior to the procedure.    STERILE BARRIER TECHNIQUE: Maximum sterile barrier  technique was used. Cutaneous antisepsis was performed at the operative site with application of 2% chlorhexidine and large sterile drape. Prior to the procedure, the  and assistant performed   hand hygiene and wore hat, mask, sterile gown, and sterile gloves during the entire procedure.    PROCEDURE:    Using real-time ultrasound guidance the right internal jugular vein was accessed. A subcutaneous pocket was created and irrigated with sterile normal saline. The catheter tubing was tunneled in an antegrade fashion from the port pocket to the dermatotomy   site. Over a guidewire, a peel-away sheath was advanced with fluoroscopic monitoring. Through the peel-away sheath, the catheter was advanced until the tip was at the cavoatrial junction. Positioning of the distal tip was confirmed with a radiograph   from the in room fluoroscopic unit. The catheter was cut to length and attached firmly to the port. The port pocket incision was closed with layered absorbable suture and surgical glue. The dermatotomy site was closed with surgical glue.     FINDINGS:  Ultrasound shows an anechoic and compressible jugular vein. At the completion of the study, the port tip lies at the cavoatrial junction.      Impression    IMPRESSION:    Successful power-injectable venous port placement.         Pending Results   These results will be followed up by JOSE MARTIN   Unresulted Labs Ordered in the Past 30 Days of this Admission     Date and Time Order Name Status Description    1/12/2023 11:17 AM Cerebrospinal fluid Aerobic Bacterial Culture Routine with Gram Stain Preliminary           Code Status   Full Code    Primary Care Physician   Provider Not In System    Physical Exam   Temp: 97.7  F (36.5  C) Temp src: Oral BP: 115/64 Pulse: 75   Resp: 20 SpO2: 97 % O2 Device: None (Room air)    Vitals:    01/11/23 1422 01/12/23 0816 01/15/23 1035   Weight: 58.4 kg (128 lb 12.8 oz) 58.2 kg (128 lb 3.2 oz) 63.9 kg (140 lb 12.8 oz)     Vital  Signs with Ranges  Temp:  [97.5  F (36.4  C)-98.2  F (36.8  C)] 97.7  F (36.5  C)  Pulse:  [62-94] 75  Resp:  [18-20] 20  BP: ()/(50-67) 115/64  SpO2:  [93 %-97 %] 97 %  I/O last 3 completed shifts:  In: 240 [P.O.:240]  Out: 1325 [Urine:1325]    Constitutional: Pleasant male seen laying in bed. No apparent distress, and appears stated age.  Eyes: Lids and lashes normal, sclera clear, conjunctiva normal.   ENT: Normocephalic, without obvious abnormality, atraumatic, oral pharynx with moist mucus membranes  Respiratory: No increased work of breathing, good air exchange, clear to auscultation bilaterally, no crackles or wheezing.   Cardiovascular: Regular rate and rhythm, normal S1 and S2, and no murmur noted. Pacemaker in place.   GI: No masses or scars. +BS. Soft. No tenderness on palpation.  Skin: No bruising or bleeding, no rashes, no lesions, no jaundice.   Neurologic: Awake, alert, oriented to name, place and time.    Vascular access: port c/d/i       Time Spent on this Encounter   I, Leida Barton PA-C, personally saw the patient today and spent greater than 30 minutes discharging this patient.    Discharge Disposition   Discharged to home  Condition at discharge: Stable    Consultations This Hospital Stay   INTERNAL MEDICINE PROCEDURE TEAM ADULT IP CONSULT [facility] - LUMBAR PUNCTURE  PHYSICAL THERAPY ADULT IP CONSULT  NURSING TO CONSULT FOR VASCULAR ACCESS CARE IP CONSULT    Discharge Orders      Reason for your hospital stay    Scheduled chemotherapy for lymphoma     Activity    Your activity upon discharge: activity as tolerated     Follow Up and recommended labs and tests    - Twice weekly labs and as needed transfusions at local clinic   - Neualsta injection to boost blood counts Tuesday at [facility]   - PET scan requested in coming weeks prior to next cycle of chemo     When to contact your care team    [facility] cancer clinic triage line (open daily 8am-4pm) at [phone] for temp  greater than or equal to 100.4, uncontrolled nausea/vomiting/diarrhea/constipation, unrelieved pain, bleeding not relieved with pressure, dizziness, chest pain, shortness of breath, loss of consciousness, and any new or concerning symptoms. If symptoms occur after triage hours please report to local Emergency Department for evaluation.     IV access    **Ordering Provider MUST call/page Care Coordinator/ to discuss arranging this service**    You are going home with the following vascular access device: Port-a-Cath.     Discharge Instructions    - Start Levaquin and Fluconazole to prevent infections   - Take Dexamethasone Tues 1/17 and Wed 1/18   - Refills sent to your local pharmacy and sleep medication sent to pharmacy here  - Next admission for cycle 2 chemo will by ~2/8/23. [facility] will call you once scheduled.     Diet    Follow this diet upon discharge: Orders Placed This Encounter      Snacks/Supplements Adult: Ensure Enlive; Between Meals      Combination Diet High Kcal/High Protein Diet, ADULT     Infusion Appointment Request     Check Out Appointment Request    - PET scan in 2-3 weeks   - Virtual JOSE MARTIN visit the week of 1/23  - Cycle 2 JOSE MARTIN and labs prior to admission to unit 7D ~ 2/8/23     *please call and notify patient once scheduled     Discharge Medications   Current Discharge Medication List      START taking these medications    Details   dexamethasone (DECADRON) 4 MG tablet Take 2 tablets (8 mg) by mouth daily for 2 days  Qty: 4 tablet, Refills: 0    Associated Diagnoses: 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); Prevention of chemotherapy-induced neutropenia; Adverse effect of antineoplastic and immunosuppressive drugs, sequela         CONTINUE these medications which have CHANGED    Details   fluconazole (DIFLUCAN) 200 MG tablet Take 1 tablet (200 mg) by mouth daily for 30 days  Qty: 30 tablet, Refills: 3    Associated Diagnoses: Diffuse large B-cell lymphoma  of intrathoracic lymph nodes (H)      levofloxacin (LEVAQUIN) 250 MG tablet Take 1 tablet (250 mg) by mouth daily  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)      ondansetron (ZOFRAN) 8 MG tablet Take 1 tablet (8 mg) by mouth every 8 hours as needed for nausea  Qty: 60 tablet, Refills: 3    Associated Diagnoses: 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)      temazepam (RESTORIL) 7.5 MG capsule Take 1 capsule (7.5 mg) by mouth nightly as needed for sleep  Qty: 5 capsule, Refills: 0    Associated Diagnoses: 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)         CONTINUE these medications which have NOT CHANGED    Details   acyclovir (ZOVIRAX) 400 MG tablet Take 1 tablet (400 mg) by mouth every 12 hours for 60 days  Qty: 120 tablet, Refills: 3    Associated Diagnoses: 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)      allopurinol (ZYLOPRIM) 300 MG tablet Take 1 tablet (300 mg) by mouth daily  Qty: 60 tablet, Refills: 3    Associated Diagnoses: 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)      ascorbic acid (VITAMIN C) 500 MG tablet Take 500 mg by mouth every morning      methylPREDNISolone (MEDROL) 32 MG tablet Take 1 tablet (32 mg) by mouth daily 12 hours prior and 1 tab 2 hours prior to the procedure with IV contrast  Qty: 2 tablet, Refills: 0    Associated Diagnoses: 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); Allergic reaction to contrast material, subsequent encounter      Multiple Vitamins-Minerals (MULTIVITAL PO) Take 1 tablet by mouth every morning      omeprazole (PRILOSEC) 40 MG DR capsule Take 1 capsule (40 mg) by mouth daily  Qty: 90 capsule, Refills: 4    Associated Diagnoses: Nodular lymphoma of extranodal and/or solid organ site (H)      polyethylene glycol (MIRALAX) 17 GM/Dose powder Take 17 g by mouth daily as needed for  constipation  Qty: 510 g, Refills: 4    Associated Diagnoses: 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)      senna-docusate (SENNA S) 8.6-50 MG tablet Take 1 tablet by mouth 2 times daily as needed for constipation  Qty: 60 tablet, Refills: 4    Associated Diagnoses: 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)           Allergies   Allergies   Allergen Reactions     Ampicillin [Ampicillin Sodium]      Bactrim [Sulfamethoxazole W/Trimethoprim]      Contrast Dye      CT contrast (IV) allergy - need oral Methylpred as premed for scans     Ampicillin Rash     Data   Most Recent 3 CBC's:Recent Labs   Lab Test 01/16/23  0513 01/15/23  0841 01/14/23  0716   WBC 2.5* 3.4* 6.8   HGB 8.1* 8.7* 9.6*   MCV 94 94 97   PLT 64* 72* 106*      Most Recent 3 BMP's:  Recent Labs   Lab Test 01/16/23  0513 01/15/23  0841 01/14/23  0716    140 141   POTASSIUM 3.9 3.7 3.9   CHLORIDE 107 109* 109*   CO2 20* 22 21*   BUN 29.4* 29.0* 26.4*   CR 0.76 0.81 0.77   ANIONGAP 12 9 11   JORGE 8.1* 8.1* 8.7*   * 106* 105*     Most Recent 2 LFT's:  Recent Labs   Lab Test 01/16/23  0513 01/15/23  0841   AST 26 30   ALT 40 38   ALKPHOS 64 70   BILITOTAL 0.3 0.2     Most Recent INR's and Anticoagulation Dosing History:  Anticoagulation Dose History     Recent Dosing and Labs Latest Ref Rng & Units 1/5/2023 1/11/2023 1/12/2023 1/13/2023 1/14/2023 1/15/2023 1/16/2023    INR 0.85 - 1.15 0.94 1.05 1.06 1.27(H) 1.13 1.20(H) 1.23(H)        Most Recent 3 Troponin's:No lab results found.  Most Recent Cholesterol Panel:  Recent Labs   Lab Test 12/02/22  1405   CHOL 146   LDL 88  88   HDL 39*   TRIG 94     Most Recent 6 Bacteria Isolates From Any Culture (See EPIC Reports for Culture Details):No lab results found.  Most Recent TSH, T4 and A1c Labs:No lab results found.

## 2023-01-16 NOTE — PLAN OF CARE
Goal Outcome Evaluation:      Plan of Care Reviewed With: patient    Overall Patient Progress: improvingOverall Patient Progress: improving    Discharge to Home:  D: Orders for discharge and outpatient medications written. Pt verbalized readiness to discharge to home this morning.  I: Port heparin locked and de-accessed per protocol. Home medications and return to clinic schedule reviewed with patient. Discharge instructions and parameters for calling Health Care Provider reviewed. Patient left via wheelchair at 11 AM accompanied by hospital transport personnel.   A: Patient verbalized understanding of all discharge medications and discharge paperwork and was ready for discharge.   P: Patient instructed to  medications at the discharge pharmacy prior to leaving the hospital as well as primary (home) pharmacy after leaving the hospital. Follow up as scheduled/documented on discharge paperwork.

## 2023-01-16 NOTE — TELEPHONE ENCOUNTER
Shahid calls and says that he just got home from the hospital , after 5 days of chemotherapy. Pt. Says that his legs, feet, ankles, and penis are swollen. Pt. Says that he did not notice the swelling when he was in the hospital. Denies difficulty breathing. Pt. Is not sure if he will go back to the hospital and does not know if he will see a Dr. Ledesma. Because pt. Does not have a JEFFREY/HE  , no DrRicardo Was paged. COVID 19 Nurse Triage Plan/Patient Instructions    Please be aware that novel coronavirus (COVID-19) may be circulating in the community. If you develop symptoms such as fever, cough, or SOB or if you have concerns about the presence of another infection including coronavirus (COVID-19), please contact your health care provider or visit [email or web address].     Disposition/Instructions    In-Person Visit with provider recommended. Reference Visit Selection Guide.    Thank you for taking steps to prevent the spread of this virus.  o Limit your contact with others.  o Wear a simple mask to cover your cough.  o Wash your hands well and often.    Resources    [facility]: About COVID-19: [email or web address]    CDC: What to Do If You're Sick: www.cdc.gov/coronavirus/2019-ncov/about/steps-when-sick.html    CDC: Ending Home Isolation: www.cdc.gov/coronavirus/2019-ncov/hcp/disposition-in-home-patients.html     CDC: Caring for Someone: www.cdc.gov/coronavirus/2019-ncov/if-you-are-sick/care-for-someone.html     [facility]: Interim Guidance for Hospital Discharge to Home: www.health.[facility].mn.us/diseases/coronavirus/hcp/hospdischarge.pdf    [facility] clinical trials (COVID-19 research studies): clinicalaffairs.[facility]/umn-clinical-trials     Below are the COVID-19 hotlines at the [place] Department of Health ([facility]). Interpreters are available.   o For health questions: Call 640-629-4721 or 1-544.494.4893 (7 a.m. to 7 p.m.)  o For questions about schools and childcare: Call [phone]  or 1-482.812.1267 (7 a.m. to 7 p.m.)                     Reason for Disposition    SEVERE leg swelling (e.g., swelling extends above knee, entire leg is swollen, weeping fluid)    Additional Information    Negative: SEVERE difficulty breathing (e.g., struggling for each breath, speaks in single words)    Negative: Looks like a broken bone or dislocated joint (e.g., crooked or deformed)    Negative: Sounds like a life-threatening emergency to the triager    Negative: Chest pain    Negative: Followed a leg injury    Negative: [1] Small area of swelling AND [2] followed an insect bite to the area    Negative: Swelling of one ankle joint    Negative: Swelling of knee is main symptom    Negative: Pregnant    Negative: Postpartum (from 0 to 6 weeks after delivery)    Negative: [1] Heart failure suspected (e.g., ankle swelling, shortness of breath, weight gain) AND [2] recently in hospital for heart failure    Negative: Difficulty breathing at rest    Negative: Entire foot is cool or blue in comparison to other side    Negative: [1] Can't walk or can barely walk AND [2] new-onset    Negative: [1] Difficulty breathing with exertion (e.g., walking) AND [2] new-onset or worsening    Negative: [1] Red area or streak AND [2] fever    Negative: [1] Swelling is painful to touch AND [2] fever    Negative: [1] Cast on leg or ankle AND [2] now increased pain    Negative: Patient sounds very sick or weak to the triager    Protocols used: LEG SWELLING AND EDEMA-A-AH

## 2023-01-16 NOTE — PLAN OF CARE
Goal Outcome Evaluation:    [id number]    A/Ox4. Afebrile. OVSS on RA. Denies pain, SOB and N/V. Good appetite. Pt voiding spontaneously with adequate UOP. Mepilex placed on coccyx/sacrum for blanchable redness. Temazepam given x1 to aid with sleep. Port HL. UAL. Plan for discharge tomorrow AM.    Nursing Focus: Cytoxan Chemotherapy  D: Positive blood return via Port. Insertion site is clean/dry/intact, dressing intact with no complaints of pain. Urine output is recorded in intake in Doc Flowsheet.    I: Premedications given per order (see electronic medical administration record). Dose #1 of Cytoxan started to infuse over 1 hour. Reviewed pt teaching on chemotherapy side effects.  Pt denies need for further teaching. Chemotherapy double checked per protocol by two chemotherapy competent RN's.   A: Tolerating procedure well. Denies nausea and or pain.   P: Continue to monitor urine output and symptoms of nausea. Screen for symptoms of toxicity.

## 2023-01-17 ENCOUNTER — PATIENT OUTREACH (OUTPATIENT)
Dept: CARE COORDINATION | Facility: CLINIC | Age: 73
End: 2023-01-17
Payer: MEDICARE

## 2023-01-17 ENCOUNTER — PATIENT OUTREACH (OUTPATIENT)
Dept: ONCOLOGY | Facility: CLINIC | Age: 73
End: 2023-01-17
Payer: MEDICARE

## 2023-01-17 LAB
BACTERIA CSF CULT: NO GROWTH
GRAM STAIN RESULT: NORMAL
GRAM STAIN RESULT: NORMAL

## 2023-01-17 NOTE — PROGRESS NOTES
Patient discharged on 1/16/23, please follow up per TCM workflow.    Jose Alejandro Melo on 1/17/2023 at 7:13 AM

## 2023-01-17 NOTE — PROGRESS NOTES
[facility]/Voicemail    Clinical Data: Care Coordinator Outreach    Outreach attempted x 1.  Left message on patient's voicemail with call back information and requested return call.    Plan: Care Coordinator will try to reach patient again in 1-2 business days.    Called for follow up on hospitalization and plan of care. Encouraged him to call me back to discuss.    ADDENDUM 01/17/23 1:10 PM  [facility]: Post-Discharge Note  SITUATION                                                      Admission:    Admission Date: 01/11/23   Reason for Admission: da-R-EPOCH  Discharge:   Discharge Date: 01/16/23  Discharge Diagnosis: da-R-EPOCH    BACKGROUND                                                      Per hospital discharge summary and inpatient provider notes.    ASSESSMENT      Discharge Assessment  How are you doing now that you are home?: Feeling well. Does have some edema (likely Grade 1/2)  How are your symptoms? (Red Flag symptoms escalate to triage hotline per guidelines): Improved  Do you feel your condition is stable enough to be safe at home until your provider visit?: Yes  Does the patient have their discharge instructions? : Yes  Does the patient have questions regarding their discharge instructions? : No  Were you started on any new medications or were there changes to any of your previous medications? : No  Does the patient have all of their medications?: Yes  Do you have questions regarding any of your medications? : No  Do you have all of your needed medical supplies or equipment (DME)?  (i.e. oxygen tank, CPAP, cane, etc.): Yes  Discharge follow-up appointment scheduled within 14 calendar days? : No (Scheduling in process; labs being drawn twice weekly locally)  Is patient agreeable to assistance with scheduling? : Yes    PLAN                                                      Outpatient Plan  The patient has been having some BLE edema likely from IVF received in the hospital with little activity.  Discussed conservative management including elevation, movement, and compression.    Will be receiving Neulasta today at [facility] with labs. Has labs scheduled on 1/20, 1/23, 1/26, and 1/30.    Future Appointments **TO BE UPDATED**   Date Time Provider Department Center   2/28/2023 12:00 AM UC ICD REMOTE [facility]     For any urgent concerns, please contact our 24 hour clinic line:   [place]: [phone]     Alton Lainez DNP, RN, OCN  RN Care Coordinator  [facility]                Alton Lainez DNP, RN, OCN  RN Care Coordinator  [facility]

## 2023-01-17 NOTE — PROGRESS NOTES
[facility]    Background: Transitional Care Management program identified per system criteria and reviewed by [facility] team for possible outreach.    Assessment: Upon chart review, CCR Team member will not proceed with patient outreach related to this episode of Transitional Care Management program due to reason below:    Patient had a communication with a nurse (NurseKatlin), provider or care team for reason of post-hospital follow up plan.  Outreach call by CCRC team not indicated to minimize duplicative efforts.     Plan: Transitional Care Management episode addressed appropriately per reason noted above.          RAFAEL Akhtar  [facility] [place], [facility]    *Connected Care Resource Team does NOT follow patient ongoing. Referrals are identified based on internal discharge reports and the outreach is to ensure patient has an understanding of their discharge instructions.

## 2023-01-23 NOTE — PROGRESS NOTES
Addendum     Add to Discharge Diagnoses:  # Severe malnutrition in the context of chronic illness    Leida Barton PA-C  #[id number]

## 2023-01-24 ENCOUNTER — PATIENT OUTREACH (OUTPATIENT)
Dept: ONCOLOGY | Facility: CLINIC | Age: 73
End: 2023-01-24
Payer: MEDICARE

## 2023-01-24 DIAGNOSIS — T50.8X5D ALLERGIC REACTION TO CONTRAST MATERIAL, SUBSEQUENT ENCOUNTER: ICD-10-CM

## 2023-01-24 DIAGNOSIS — C83.38 DIFFUSE LARGE B-CELL LYMPHOMA OF LYMPH NODES OF MULTIPLE REGIONS (H): ICD-10-CM

## 2023-01-24 RX ORDER — METHYLPREDNISOLONE 32 MG/1
32 TABLET ORAL DAILY
Qty: 2 TABLET | Refills: 0 | Status: ON HOLD | OUTPATIENT
Start: 2023-01-24 | End: 2023-02-02

## 2023-01-24 NOTE — PROGRESS NOTES
[facility]: Cancer Care                                                                                          Called the patient to review his labs and his upcoming appointments. Notably, his plts are 37 - reviewed bleeding precautions. No concerns from his stand point. Otherwise, his labs look stable.    Reviewed next week's appointments in detail. He will need methylpred for PET scan - will send message to Dr. Ruelas.    No other questions or concerns at this time. I will continue to monitor blood counts.    Signature:  Alton Lainez RN

## 2023-01-27 ENCOUNTER — PATIENT OUTREACH (OUTPATIENT)
Dept: ONCOLOGY | Facility: CLINIC | Age: 73
End: 2023-01-27
Payer: MEDICARE

## 2023-01-27 NOTE — PROGRESS NOTES
[facility]/Voicemail    Clinical Data: Care Coordinator Outreach    Outreach attempted x 1.  Left message on patient's voicemail with call back information and requested return call.    Plan: Care Coordinator will do no further outreaches at this time.    I called the patient to let him know that his labs from yesterday are stable. No follow up needed at this time. His differential appears to be sent to [facility] in [place] for clarification - prelim is 5.29. Will watch for any change.  Encouraged the patient to call me back with any questions. Will have labs drawn again on 1/30 and will see Dr. Ruelas on 2/2 for his next cycle of therapy.    Alton Lainez, DNP, RN, OCN  RN Care Coordinator  [facility]

## 2023-01-31 NOTE — H&P
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 Received C1 of R-CHOP (K7Y3=5312/20/22) prior to change in therapy to DA-R-EPOCH once cytogenetic results were available. He was admitted on 2/2/23 for planned C2 R-DA-EPOCH.     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 Ruelas. Pt has a h/o low-grade kappa restricted plasmacytoid B-cell lymphoma (diagnosed in 3/2004) treated with x6 cycles of fludarabine based chemo and XRT to spine, then has been on surveillance since 2005. He presented with progressive B-symptoms, abdominal pain, and SOB. PET 12/9 showed extensive lymphomatous involvement to the right pleura w/ FDG-avid effusions, mediastinal and pericardial regions, right anterolateral chest wall, adenopathy above and below the diaphragm, liver, spleen and multiple sites in the skeleton, concerning for transformation from his low-grade lymphoma. He also has pancytopenia which seems to have progressed slowly over the past years. Of note, reports maternal history of Waldenstrom's. Due to worsening shortness of breath and delays of biopsy, patient presented to ED on 12/14 and was subsequently admitted for expedited workup and treatment. As SUV of the sternoclavicular mass was the highest, patient underwent US-guided sternoclavicular soft tissue mass biopsy with IR 12/15, pathology from which confirmed a diagnosis of high-grade B-cell lymphoma. Baseline ECHO (12/15) with LVEF 60-65%, mild aortic insufficiency, no evidence of effusion or tamponade. Received 1 cycle  of R-CHOP (C1D1 = 12/20/22) which was well-tolerated. Cytogenetics then returned, revealed rearrangements in MYC, BCL2, and BCL6, consistent with triple hit lymphoma. He was then changed to DA-R-EPOCH and proceeded with C1 on 1/11/23 (C2 total of therapy). He was re-admitted on 2/2/23 for planned C2 of DA-R-EPOCH. His ANC ricci was 1.9 and plt ricci was 37K on 1/23. However, patient does not feel he can tolerate a higher dose, will plan to keep patient at same dose for cycle 3 per outpatient notes  - CNS IPI was 4 (1 point each for age, LDH, stage IV disease, and extranodal involvement); as such, CNS ppx with IT chemotherapy was recommended. CSF flow negative (1/12) with C1. Will plan to repeat again with C2 (and subsequent cycles).   - PET/CT (2/2) - Completed today, prior to admission. Initial imaging reviewed in clinic, final read pending.                             Treatment Plan: DA-R- EPOCH. C2D1=2/2/23                          - Rituximab 375 mg/m2 IV x1 dose -- Day 1    - Etoposide 50 mg/m2 IV q22h x4 doses -- Days 1-4    - Vincristine 0.4 mg/m2 IV q22h x4 doses --  Days 1-4    - Doxorubicin 10 mg/m2 IV q22h x4 doses -- Days 1-4                          - Cyclophosphamide 750 mg/m2 IV x1 dose -- Day 5                          - Prednisone 60 mg/m2 PO BID x10 doses -- Days 1-5    - Dexamethasone 8 mg daily x2 doses -- Days 6-7                          - Neulasta 6 mg subQ x1 dose -- Day 6; will need to be arranged at [facility] in [place] on ~2/8/23     - Predmeds: Tylenol, Benadryl, Zofran, Emend     # Anemia   Secondary to chemotherapy and underlying lymphoma.   - Monitor CBC daily  - Transfuse if Hgb <7 and plt <10k     # Risk for TLS  On 12/15, uric acid peak 9.1. Cr, K, Ca, and phos all WNL. S/p rasburicase 12/15 with resolution of hyperuricemia.  - Monitor TLS/DIC labs daily  - Allopurinol 300 mg daily  - Minimize IVF given malignant pleural effusions although could consider gentle IVF if  needed.     ID  # Asymptomatic COVID-19 infection  Noted to be COVID+ on PCR done 1/9/23. Admitted for planned chemotherapy, as above, and treated with IV remdesivir x3 days (1/11-1/13). Patient was asymptomatic with his infection and satting okay on room air. Repeat COVID testing on 2/2 continues to be positive, cycle threshold pending (but reportedly <35).  - Continue COVID-19 precautions for now  - May discontinue COVID precautions if repeat COVID negative OR cycle threshold >35, per IP guidance      # Hypogammaglobulinemia   IgG 310 (12/2022). Status-post IVIG on 1/15/23.  - Would repeat IgG monthly; give IVIG for IgG <400     # PPX  -  mg BID   - Nebulized pentamidine given 1/15/23; next due ~2/12  - Hold levofloxacin/fluconazole given ANC >1000     CV  # History of complete heart block s/p pacemaker placement (2020)  Followed by Dr. Fox; last seen 7/7/22. Pacemaker last interrogated in 11/2022.  - No acute inpatient management needs  - Continue cardiology follow-up and device checks as recommended    MISC  # GERD - Continue PTA omeprazole.  # Insomnia - PTA on temazepam 7.5 mg HS PRN for sleep; last prescribed #5 tablets on 1/13/23     FEN  Diet: Regular Diet Adult   IVF: Bolus PRN (judiciously given pleural effusions)  Lytes: Replete PRN per protocol      PPX  VTE: Lovenox   Bowel: Senna/MiraLax PRN     Code Status: Full Code  Lines/Drains: Port recently placed 1/5/23   Dispo: Inpatient admission to [facility] for C2 R-DA-EPOCH. Anticipate discharge to home following completion of the chemotherapy cycle, likely ~2/7/23.     Follow Up: Follows with Dr. Ruelas; will request pending final treatment plan. Local twice weekly labs will need to be requested at [facility] (FAX to be sent to [phone]). Neulasta can be arranged at [facility] in [place] as he has seen a provider there in the past. Orders to be faxed to [phone] ATTN: [facility].      Clinically Significant  "Risk Factors Present on Admission           # Hypercalcemia: Highest Ca = 10.2 mg/dL in last 2 days, will monitor as appropriate                   Disposition: Inpatient admission to [facility] for planned C2 of R-DA-EPOCH. Anticipate discharge to home pending completion of the chemotherapy regimen, likely 2/7/23    Leo George MD  Internal Medicine and Pediatrics Hospitalist    I spent 60 minutes in the care of this patient today, which included time necessary for review of interval events, obtaining history and physical exam, ordering medication(s)/test(s) as medically indicated, discussion with interdisciplinary/consult team(s), and documentation time. Over 50% of time was spent face-to-face and/or coordinating care.    Code Status : Full Code    Primary Care Physician   Provider Not In System    History of Present Illness   History obtained from chart and discussed with the patient.    Shahid Davis is a 72 year old man with a history of complete heart block s/p pacemaker placement and low-grade kappa-restricted plasmacytoid B-cell lymphoma diagnosed 3/2004, now with transformation to 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 Received C1 of R-CHOP (H2V3=4312/20/22) prior to change in therapy to DA-R-EPOCH once cytogenetic results were available. He was admitted on 2/2/23 for planned C2 R-DA-EPOCH.    On admission, patient reports that he is generally feeling well.  Initially \"wiped out\" after last round of chemo, but has rebounded well over the past week.  Appetite has been voracious, and he notes minimal nausea, but is still concerned about weight loss (1 lbs in last two weeks).  Denies abdominal pain diarrhea.  He has had a very mild, nagging, non-productive cough since December, along with increased rhinorrhea which has been stable. No fevers/sweats/chills, no chest pain.  Denies bleeding, bruising, skin changes, or issues with port site.  Feels he is otherwise doing quite well, all things considered.     Past Medical " History    Past Medical History:   Diagnosis Date     Cardiac pacemaker in situ      Cardiac pacemaker in situ     2020     Lymphoma (H)      Squamous cell carcinoma        Past Surgical History   Past Surgical History:   Procedure Laterality Date     DAVINCI HERNIORRHAPHY INGUINAL Left 07/09/2021    Procedure: HERNIORRHAPHY, INGUINAL, ROBOT-ASSISTED, Left, Mesh;  Surgeon: Shamar Tyler MD;  Location: [facility] OR     IR CHEST PORT PLACEMENT > 5 YRS OF AGE  1/5/2023     IR SOFT TISSUE BIOPSY  12/15/2022     MOHS MICROGRAPHIC PROCEDURE       NO HISTORY OF SURGERY         Prior to Admission Medications   Prior to Admission Medications   Prescriptions Last Dose Informant Patient Reported? Taking?   Multiple Vitamins-Minerals (MULTIVITAL PO)   Yes No   Sig: Take 1 tablet by mouth every morning   acyclovir (ZOVIRAX) 400 MG tablet   No No   Sig: Take 1 tablet (400 mg) by mouth every 12 hours for 60 days   allopurinol (ZYLOPRIM) 300 MG tablet   No No   Sig: Take 1 tablet (300 mg) by mouth daily   ascorbic acid (VITAMIN C) 500 MG tablet   Yes No   Sig: Take 500 mg by mouth every morning   dexamethasone (DECADRON) 4 MG tablet   No No   Sig: Take 2 tablets (8 mg) by mouth daily for 2 days   fluconazole (DIFLUCAN) 200 MG tablet   No No   Sig: Take 1 tablet (200 mg) by mouth daily   levofloxacin (LEVAQUIN) 250 MG tablet   No No   Sig: Take 1 tablet (250 mg) by mouth daily   methylPREDNISolone (MEDROL) 32 MG tablet   No No   Sig: Take 1 tablet (32 mg) by mouth daily 12 hours prior and 1 tab 2 hours prior to the procedure with IV contrast   Patient not taking: Reported on 2/2/2023   omeprazole (PRILOSEC) 40 MG DR capsule   No No   Sig: Take 1 capsule (40 mg) by mouth daily   ondansetron (ZOFRAN) 8 MG tablet   No No   Sig: Take 1 tablet (8 mg) by mouth every 8 hours as needed for nausea   polyethylene glycol (MIRALAX) 17 GM/Dose powder   No No   Sig: Take 17 g by mouth daily as needed for constipation   prochlorperazine  (COMPAZINE) 10 MG tablet   No No   Sig: Take 1 tablet (10 mg) by mouth every 6 hours as needed for nausea or vomiting   Patient not taking: Reported on 2/2/2023   senna-docusate (SENNA S) 8.6-50 MG tablet   No No   Sig: Take 1 tablet by mouth 2 times daily as needed for constipation   temazepam (RESTORIL) 7.5 MG capsule   No No   Sig: Take 1 capsule (7.5 mg) by mouth nightly as needed for sleep   Patient not taking: Reported on 2/2/2023      Facility-Administered Medications: None     Allergies   Allergies   Allergen Reactions     Ampicillin [Ampicillin Sodium]      Bactrim [Sulfamethoxazole W/Trimethoprim]      Contrast Dye      CT contrast (IV) allergy - need oral Methylpred as premed for scans     Ampicillin Rash       Social History   Social History     Socioeconomic History     Marital status: Single     Spouse name: Not on file     Number of children: Not on file     Years of education: Not on file     Highest education level: Not on file   Occupational History     Not on file   Tobacco Use     Smoking status: Former     Smokeless tobacco: Never     Tobacco comments:     Quit at age 19   Substance and Sexual Activity     Alcohol use: Yes     Alcohol/week: 11.7 standard drinks     Types: 14 drink(s) per week     Drug use: Not on file     Sexual activity: Not on file   Other Topics Concern     Parent/sibling w/ CABG, MI or angioplasty before 65F 55M? Not Asked   Social History Narrative     Not on file     Social Determinants of Health     Financial Resource Strain: Not on file   Food Insecurity: Not on file   Transportation Needs: Not on file   Physical Activity: Not on file   Stress: Not on file   Social Connections: Not on file   Intimate Partner Violence: Not on file   Housing Stability: Not on file       Family History   Family History   Problem Relation Age of Onset     Cancer Mother         Blood     Cancer Father         Lung     Cancer Maternal Grandmother         Breast     Cancer Paternal Grandfather          Hodgkins       Review of Systems   A 14-point ROS is negative unless otherwise noted above in the HPI.    Physical Exam   Vital Signs with Ranges  Temp:  [97.6  F (36.4  C)-98  F (36.7  C)] 98  F (36.7  C)  Pulse:  [] 94  Resp:  [16] 16  BP: (111-126)/(67-72) 126/67  SpO2:  [97 %-98 %] 97 %  126 lbs 9.6 oz    Constitutional: Pleasant and cooperative male. Awake, alert, NAD.  HEENT: NC/AT, EOMI, sclera clear, conjunctiva normal, OP with MMM  Respiratory: No increased work of breathing, lung sounds somewhat diminished on right but CTAB, no crackles or wheezing.   Cardiovascular: RRR, no murmur noted. No peripheral edema.  GI: Normal bowel sounds, soft, non-distended and non-tender.  MSK: No large joint effusions or gross deformities.  Skin: Warm, dry, well-perfused. No bruising, bleeding, rashes, or lesions on limited exam.  Neurologic: A&O. Answers questions appropriately. Moves all extremities spontaneously.  Psych: Calm, appropriate affect  Vascular access:  Port on chest CDI, non-tender, no surrounding erythema, not yet accessed.       Recent Labs  CBC   Recent Labs   Lab 02/02/23  1731 02/02/23  1309   WBC 11.5* 9.1   RBC 3.62* 3.58*   HGB 11.5* 11.2*   HCT 35.2* 34.2*   MCV 97 96   MCH 31.8 31.3   MCHC 32.7 32.7   RDW 17.0* 16.6*    220       CMP   Recent Labs   Lab 02/02/23  1731 02/02/23  1309    140   POTASSIUM 4.6 4.4   CHLORIDE 103 104   CO2 23 22   ANIONGAP 13 14   * 185*   BUN 37.6* 32.2*   CR 0.90 0.87   GFRESTIMATED 90 >90   JORGE 10.2 9.9   MAG 2.1  --    PHOS 3.3  --    PROTTOTAL 6.8 6.4   ALBUMIN 4.6 4.3   BILITOTAL 0.2 0.2   ALKPHOS 122 116   AST 27 24   ALT 35 36       LFTs:   Recent Labs   Lab 02/02/23  1731   PROTTOTAL 6.8   ALBUMIN 4.6   BILITOTAL 0.2   ALKPHOS 122   AST 27   ALT 35       Coagulation Studies:   Recent Labs   Lab 02/02/23  1731   INR 0.99   PTT 47*

## 2023-02-01 NOTE — PROGRESS NOTES
[facility]  Date of visit: Feb 2, 2023      Reason for Visit: Follow up triple-hit DLBCL      ONCOLOGIC SUMMARY:  Diagnosis:    Low-grade kappa-restricted plasmacytoid B-cell lymphoma dx 3/2004, presenting with thoracic paraspinal mass. Bone marrow hypercellular with 70-80% involvement by small kappa-restricted lymphocytes which were positive for CD10, CD19, and CD20 and negative for CD5 and CD23.     Transformation to high-grade B-cell lymphoma 12/2022 (versus new primary), presenting with B symptoms, sternal mass, and SOB/chest pain. Biopsy of sternal mass showed large B-cell lymphoma with aggressive features (GCB-subtype), Ki67 %, FISH positive for MYC (non-IgH partner), BCL2, and BCL6 rearrangements. Stage IV with extensive lymphomatous involvement to the R pleura w/avid effusions, mediastinal and pericardial regions, right anterolateral chest wall, adenopathy above and below the diaphragm, liver, spleen and multiple sites in the skeleton. CSF flow negative     Treatment history:  For low-grade lymphoma:  1. 2005: Fludarabine-based chemo x 6 cycles and XRT to spine (details unclear)    For high-grade lymphoma:  1. 12/20/22: Cycle 1 R-CHOP with Neulasta support (with a few days of steroid prephase prior)  2. 1/11/23: Cycle 2 Switched to DA-R-EPOCH with triple IT chemo for CNS ppx after FISH returned showing triple-hit disease. PET after 2 cycles shows very good SC.       Interval history:   Mr. Davis is here today with his partner Reshma. He did note worse fatigue overall with R-EPOCH than R-CHOP. Today, he states that his energy has improved; he is taking his dog on walks around their field about 3 times daily. No shortness of breath, but still has a lingering cough that has been ongoing for the last 8 weeks. Sometimes productive of yellow sputum. No fevers, chills, body aches. No new pain. His appetite is excellent. He does take a Zofran in the morning even if not  nauseous so that he is able to eat. He has occasionally had some nausea in the afternoon, and will take a Compazine at that time. Bowels are regular on his regimen of miralax in the morning and senna at night.  Happy to report that his night sweats have completely gone; he noted they had stopped after the first cycle of chemotherapy.       Current Outpatient Medications   Medication Sig Dispense Refill     acyclovir (ZOVIRAX) 400 MG tablet Take 1 tablet (400 mg) by mouth every 12 hours for 60 days 120 tablet 3     allopurinol (ZYLOPRIM) 300 MG tablet Take 1 tablet (300 mg) by mouth daily 60 tablet 3     ascorbic acid (VITAMIN C) 500 MG tablet Take 500 mg by mouth every morning       dexamethasone (DECADRON) 4 MG tablet Take 2 tablets (8 mg) by mouth daily for 2 days 4 tablet 0     fluconazole (DIFLUCAN) 200 MG tablet Take 1 tablet (200 mg) by mouth daily 30 tablet 3     levofloxacin (LEVAQUIN) 250 MG tablet Take 1 tablet (250 mg) by mouth daily 30 tablet 3     methylPREDNISolone (MEDROL) 32 MG tablet Take 1 tablet (32 mg) by mouth daily 12 hours prior and 1 tab 2 hours prior to the procedure with IV contrast 2 tablet 0     Multiple Vitamins-Minerals (MULTIVITAL PO) Take 1 tablet by mouth every morning       omeprazole (PRILOSEC) 40 MG DR capsule Take 1 capsule (40 mg) by mouth daily 90 capsule 4     ondansetron (ZOFRAN) 8 MG tablet Take 1 tablet (8 mg) by mouth every 8 hours as needed for nausea 60 tablet 3     polyethylene glycol (MIRALAX) 17 GM/Dose powder Take 17 g by mouth daily as needed for constipation 510 g 4     prochlorperazine (COMPAZINE) 10 MG tablet Take 1 tablet (10 mg) by mouth every 6 hours as needed for nausea or vomiting 10 tablet 0     senna-docusate (SENNA S) 8.6-50 MG tablet Take 1 tablet by mouth 2 times daily as needed for constipation 60 tablet 4     temazepam (RESTORIL) 7.5 MG capsule Take 1 capsule (7.5 mg) by mouth nightly as needed for sleep 5 capsule 0       Allergies   Allergen  Reactions     Ampicillin [Ampicillin Sodium]      Bactrim [Sulfamethoxazole W/Trimethoprim]      Contrast Dye      CT contrast (IV) allergy - need oral Methylpred as premed for scans     Ampicillin Rash         Physical Exam:  /72 (BP Location: Right arm, Patient Position: Sitting, Cuff Size: Adult Regular)   Pulse 104   Temp 97.6  F (36.4  C) (Oral)   Resp 16   SpO2 98%   Gen: alert, pleasant and conversational, NAD  HEENT: NC/AT,EOMI w/ PERRL, anicteric sclera.   Neck: Supple, no LAD  CV: normal S1,S2 with RRR no m/r/g  Resp: occasional non-productive cough, lungs CTA bilaterally with adequate air movement to bases. No wheezes or crackles  Abd: soft NTND no organomegaly or masses. BS normoactive.   Ext: warm and well perfused, no edema or cyanosis  Lymphatics: no palpable cervical, axillary, or inguinal LAD. No HSM  Skin: no concerning lesions or rashes  Neuro: A&Ox4, no lateralizing sx. Grossly nonfocal.  Psych: appropriate, reactive      Labs:   I personally reviewed the following labs:    Most Recent 3 CBC's:  Recent Labs   Lab Test 23  0513 01/15/23  0841 23  0716   WBC 2.5* 3.4* 6.8   HGB 8.1* 8.7* 9.6*   MCV 94 94 97   PLT 64* 72* 106*     Most Recent 3 BMP's:  Recent Labs   Lab Test 23  0513 01/15/23  0841 23  0716    140 141   POTASSIUM 3.9 3.7 3.9   CHLORIDE 107 109* 109*   CO2 20* 22 21*   BUN 29.4* 29.0* 26.4*   CR 0.76 0.81 0.77   ANIONGAP 12 9 11   JORGE 8.1* 8.1* 8.7*   * 106* 105*     Most Recent 2 LFT's:  Recent Labs   Lab Test 23  0513 01/15/23  0841   AST 26 30   ALT 40 38   ALKPHOS 64 70   BILITOTAL 0.3 0.2         Imagin/2/23 PET-CT:  In this patient with diffuse large B cell lymphoma: there is marked  partial response by Lugano criteria.  1. Near or near complete resolution of previously seen hypermetabolism  in the thorax. Small area of right paramediastinal pleural thickening  with persistent hypermetabolism and SUV max of 5. This has  not  changed, recently SUV in this area was  4 with similar soft tissue thickening. Artifact from the pacer wires  versus residual disease.  2. Mild persistent FDG uptake in the medial left clavicle and left  acetabulum.  3. All other areas of abnormal hypermetabolism including but not  limited to the liver, spleen, and right humerus has completely  resolved.      Impression/plan:     # H/o low-grade kappa restricted plasmacytoid B-cell lymphoma 3/2004   # Transformation to new DLBCL   H/o low-grade kappa restricted plasmacytoid B-cell lymphoma 3/2004 treated with x6 cycles of fludarabine based chemo and XRT to spine, then has been on surveillance since 2005. He presented in 11/2022 with progressing B symptoms, sternal mass, and SOB/CP. PET 12/9 showed extensive lymphomatous involvement to the R pleura w/avid effusions, mediastinal and pericardial regions, right anterolateral chest wall, adenopathy above and below the diaphragm, liver, spleen and multiple sites in the skeleton, concerning for transformation from his low-grade lymphoma. 12/15 biopsy of sternal mass showed large B-cell lymphoma with aggressive features (GCB-subtype), Ki67 %. 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  - S/p C1 R-CHOP in the hospital 12/20/22. Tolerated well overall.  - Switched to DA-R-EPOCH starting Cycle 2 (1/11/23) after FISH returned showing triple-hit disease. Staging LP negative, will plan to give triple IT chemo once per cycle for CNS ppx.   - PET after 2 cycles shows significant resolution of most FDG activity/disease other than small area of right paramediastinal pleural thickening (SUVmax 5) and resolution of right pleural effusion. Shared the images and good news with Fredrick today!   - Proceed with Cycle 3 R-EPOCH today. Will keep at same dose level due to platelet ricci of 37 and patient does not think he could tolerate any higher doses than last cycle.   - Will  arrange for D6 Neulasta at [facility] in [place] as he has seen a provider there in the past. Will ask RNCC to fax orders to [phone] ATTN: Amos/[facility].  - Discussed with Shahid the recent approval of Clonoseq for DLBCL and he is interested in doing the Clonality ID on his original specimen and checking for MRD today to correlate with PET results. Fresh blood sample sent.   - Follow up with JOSE MARTIN prior to Cycle 4, repeat PET after Cycle 4 (no CT/contrast needed).     # Pancytopenia  Likely secondary to prior treatment and lymphomatous involvement.  - Monitor CBC weekly twice weekly after R-EPOCH  - Transfuse if Hgb <7 and plt <10k     # PPx  - TLS: Continue allopurinol 300 mg daily.  - ID:  mg BID. Levaquin and Fluconazole to start when ANC <1k. Pentamidine neb last given inpatient 1/15/23.     # Shortness of breath, improved  # Cough  # R large and L small pleural effusion   PET/CT (12/9) with FDG-avid large R and small L pleural effusion.   - Cough is improving, breathing has significantly improved - no longer has PARRISH.  - Pleural effusions resolved on today's PET as well.    # Hypogammaglobulinemia    Dec 2022  - Given persistently low COVID cycle threshold, hypogammaglobulinemia, and risk for further immunosuppression with chemo, gave IVIG on 1/15.    # COVID19 Infection  He tested positive on his Cycle 2 pre-admission COVID test. He was very surprised, as he does not feel symptomatic.  Notes that his girlfriend had a URI around [place] and was never tested for COVID. Cycle threshold was around 20 indicating active infection.  - S/p remdesivir x 3 days 1/11-1/13/23.  - No new or worsening respiratory sx since discharge.     # GERD  Related to history of radiation to chest.   - Continue omeprazole.    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 Took uncertain medication for this in the past (~2004), however cannot remember. Melatonin and Trazadone foung to be ineffective.   -  Restoril on days he gets steroids is helpful.      PLAN:  - Admit for Cycle 3 R-EPOCH (same dose level)  - JOSE MARTIN visit and Cycle 4 in 3 weeks    Patient was seen in conjunction with radiation oncology resident Dr. Valdez.    Total of 45 minutes on patient visit, reviewing records, interpreting test results, placing orders, and documentation on the day of service.    Domitila Ruelas MD  Attending Physician, [facility]

## 2023-02-02 ENCOUNTER — HOSPITAL ENCOUNTER (INPATIENT)
Facility: CLINIC | Age: 73
LOS: 5 days | Discharge: HOME | DRG: 846 | End: 2023-02-07
Attending: STUDENT IN AN ORGANIZED HEALTH CARE EDUCATION/TRAINING PROGRAM | Admitting: STUDENT IN AN ORGANIZED HEALTH CARE EDUCATION/TRAINING PROGRAM
Payer: MEDICARE

## 2023-02-02 ENCOUNTER — APPOINTMENT (OUTPATIENT)
Dept: LAB | Facility: CLINIC | Age: 73
DRG: 846 | End: 2023-02-02
Attending: INTERNAL MEDICINE
Payer: MEDICARE

## 2023-02-02 ENCOUNTER — HOSPITAL ENCOUNTER (OUTPATIENT)
Dept: PET IMAGING | Facility: CLINIC | Age: 73
Setting detail: NUCLEAR MEDICINE
Discharge: HOME | DRG: 846 | End: 2023-02-02
Attending: REGISTERED NURSE | Admitting: REGISTERED NURSE
Payer: MEDICARE

## 2023-02-02 ENCOUNTER — PATIENT OUTREACH (OUTPATIENT)
Dept: ONCOLOGY | Facility: CLINIC | Age: 73
End: 2023-02-02

## 2023-02-02 ENCOUNTER — ONCOLOGY VISIT (OUTPATIENT)
Dept: ONCOLOGY | Facility: CLINIC | Age: 73
DRG: 846 | End: 2023-02-02
Attending: INTERNAL MEDICINE
Payer: MEDICARE

## 2023-02-02 VITALS
HEART RATE: 104 BPM | TEMPERATURE: 97.6 F | DIASTOLIC BLOOD PRESSURE: 72 MMHG | OXYGEN SATURATION: 98 % | RESPIRATION RATE: 16 BRPM | SYSTOLIC BLOOD PRESSURE: 111 MMHG

## 2023-02-02 DIAGNOSIS — T45.1X5S ADVERSE EFFECT OF ANTINEOPLASTIC AND IMMUNOSUPPRESSIVE DRUGS, SEQUELA: ICD-10-CM

## 2023-02-02 DIAGNOSIS — U07.1 INFECTION DUE TO 2019 NOVEL CORONAVIRUS: ICD-10-CM

## 2023-02-02 DIAGNOSIS — C83.38 DIFFUSE LARGE B-CELL LYMPHOMA OF LYMPH NODES OF MULTIPLE REGIONS (H): ICD-10-CM

## 2023-02-02 DIAGNOSIS — Z76.89 PREVENTION OF CHEMOTHERAPY-INDUCED NEUTROPENIA: ICD-10-CM

## 2023-02-02 DIAGNOSIS — C83.32 DIFFUSE LARGE B-CELL LYMPHOMA OF INTRATHORACIC LYMPH NODES (H): Primary | ICD-10-CM

## 2023-02-02 DIAGNOSIS — C83.38 DIFFUSE LARGE B-CELL LYMPHOMA OF LYMPH NODES OF MULTIPLE REGIONS (H): Primary | ICD-10-CM

## 2023-02-02 DIAGNOSIS — C83.32 DIFFUSE LARGE B-CELL LYMPHOMA OF INTRATHORACIC LYMPH NODES (H): ICD-10-CM

## 2023-02-02 PROBLEM — C85.10 HIGH GRADE B-CELL LYMPHOMA (H): Status: ACTIVE | Noted: 2023-02-02

## 2023-02-02 LAB
ABO/RH(D): NORMAL
ALBUMIN SERPL BCG-MCNC: 4.3 G/DL (ref 3.5–5.2)
ALBUMIN SERPL BCG-MCNC: 4.6 G/DL (ref 3.5–5.2)
ALP SERPL-CCNC: 116 U/L (ref 40–129)
ALP SERPL-CCNC: 122 U/L (ref 40–129)
ALT SERPL W P-5'-P-CCNC: 35 U/L (ref 10–50)
ALT SERPL W P-5'-P-CCNC: 36 U/L (ref 10–50)
ANION GAP SERPL CALCULATED.3IONS-SCNC: 13 MMOL/L (ref 7–15)
ANION GAP SERPL CALCULATED.3IONS-SCNC: 14 MMOL/L (ref 7–15)
ANTIBODY SCREEN: NEGATIVE
APTT PPP: 47 SECONDS (ref 22–38)
AST SERPL W P-5'-P-CCNC: 24 U/L (ref 10–50)
AST SERPL W P-5'-P-CCNC: 27 U/L (ref 10–50)
BASOPHILS # BLD AUTO: 0 10E3/UL (ref 0–0.2)
BASOPHILS # BLD AUTO: 0 10E3/UL (ref 0–0.2)
BASOPHILS NFR BLD AUTO: 0 %
BASOPHILS NFR BLD AUTO: 0 %
BILIRUB SERPL-MCNC: 0.2 MG/DL
BILIRUB SERPL-MCNC: 0.2 MG/DL
BUN SERPL-MCNC: 32.2 MG/DL (ref 8–23)
BUN SERPL-MCNC: 37.6 MG/DL (ref 8–23)
CALCIUM SERPL-MCNC: 10.2 MG/DL (ref 8.8–10.2)
CALCIUM SERPL-MCNC: 9.9 MG/DL (ref 8.8–10.2)
CHLORIDE SERPL-SCNC: 103 MMOL/L (ref 98–107)
CHLORIDE SERPL-SCNC: 104 MMOL/L (ref 98–107)
CREAT SERPL-MCNC: 0.87 MG/DL (ref 0.67–1.17)
CREAT SERPL-MCNC: 0.9 MG/DL (ref 0.67–1.17)
DEPRECATED HCO3 PLAS-SCNC: 22 MMOL/L (ref 22–29)
DEPRECATED HCO3 PLAS-SCNC: 23 MMOL/L (ref 22–29)
EOSINOPHIL # BLD AUTO: 0 10E3/UL (ref 0–0.7)
EOSINOPHIL # BLD AUTO: 0 10E3/UL (ref 0–0.7)
EOSINOPHIL NFR BLD AUTO: 0 %
EOSINOPHIL NFR BLD AUTO: 0 %
ERYTHROCYTE [DISTWIDTH] IN BLOOD BY AUTOMATED COUNT: 16.6 % (ref 10–15)
ERYTHROCYTE [DISTWIDTH] IN BLOOD BY AUTOMATED COUNT: 17 % (ref 10–15)
FIBRINOGEN PPP-MCNC: 357 MG/DL (ref 170–490)
GFR SERPL CREATININE-BSD FRML MDRD: 90 ML/MIN/1.73M2
GFR SERPL CREATININE-BSD FRML MDRD: >90 ML/MIN/1.73M2
GLUCOSE SERPL-MCNC: 145 MG/DL (ref 70–99)
GLUCOSE SERPL-MCNC: 185 MG/DL (ref 70–99)
HCT VFR BLD AUTO: 34.2 % (ref 40–53)
HCT VFR BLD AUTO: 35.2 % (ref 40–53)
HGB BLD-MCNC: 11.2 G/DL (ref 13.3–17.7)
HGB BLD-MCNC: 11.5 G/DL (ref 13.3–17.7)
IMM GRANULOCYTES # BLD: 0.1 10E3/UL
IMM GRANULOCYTES # BLD: 0.2 10E3/UL
IMM GRANULOCYTES NFR BLD: 1 %
IMM GRANULOCYTES NFR BLD: 1 %
INR PPP: 0.99 (ref 0.85–1.15)
LDH SERPL L TO P-CCNC: 283 U/L (ref 0–250)
LYMPHOCYTES # BLD AUTO: 0.5 10E3/UL (ref 0.8–5.3)
LYMPHOCYTES # BLD AUTO: 0.7 10E3/UL (ref 0.8–5.3)
LYMPHOCYTES NFR BLD AUTO: 5 %
LYMPHOCYTES NFR BLD AUTO: 6 %
MAGNESIUM SERPL-MCNC: 2.1 MG/DL (ref 1.7–2.3)
MCH RBC QN AUTO: 31.3 PG (ref 26.5–33)
MCH RBC QN AUTO: 31.8 PG (ref 26.5–33)
MCHC RBC AUTO-ENTMCNC: 32.7 G/DL (ref 31.5–36.5)
MCHC RBC AUTO-ENTMCNC: 32.7 G/DL (ref 31.5–36.5)
MCV RBC AUTO: 96 FL (ref 78–100)
MCV RBC AUTO: 97 FL (ref 78–100)
MONOCYTES # BLD AUTO: 0 10E3/UL (ref 0–1.3)
MONOCYTES # BLD AUTO: 0.1 10E3/UL (ref 0–1.3)
MONOCYTES NFR BLD AUTO: 0 %
MONOCYTES NFR BLD AUTO: 1 %
NEUTROPHILS # BLD AUTO: 10.5 10E3/UL (ref 1.6–8.3)
NEUTROPHILS # BLD AUTO: 8.5 10E3/UL (ref 1.6–8.3)
NEUTROPHILS NFR BLD AUTO: 92 %
NEUTROPHILS NFR BLD AUTO: 94 %
NRBC # BLD AUTO: 0 10E3/UL
NRBC # BLD AUTO: 0 10E3/UL
NRBC BLD AUTO-RTO: 0 /100
NRBC BLD AUTO-RTO: 0 /100
PHOSPHATE SERPL-MCNC: 3.3 MG/DL (ref 2.5–4.5)
PLATELET # BLD AUTO: 220 10E3/UL (ref 150–450)
PLATELET # BLD AUTO: 248 10E3/UL (ref 150–450)
POTASSIUM SERPL-SCNC: 4.4 MMOL/L (ref 3.4–5.3)
POTASSIUM SERPL-SCNC: 4.6 MMOL/L (ref 3.4–5.3)
PROT SERPL-MCNC: 6.4 G/DL (ref 6.4–8.3)
PROT SERPL-MCNC: 6.8 G/DL (ref 6.4–8.3)
RBC # BLD AUTO: 3.58 10E6/UL (ref 4.4–5.9)
RBC # BLD AUTO: 3.62 10E6/UL (ref 4.4–5.9)
SODIUM SERPL-SCNC: 139 MMOL/L (ref 136–145)
SODIUM SERPL-SCNC: 140 MMOL/L (ref 136–145)
SPECIMEN EXPIRATION DATE: NORMAL
URATE SERPL-MCNC: 3.5 MG/DL (ref 3.4–7)
WBC # BLD AUTO: 11.5 10E3/UL (ref 4–11)
WBC # BLD AUTO: 9.1 10E3/UL (ref 4–11)

## 2023-02-02 PROCEDURE — 82947 ASSAY GLUCOSE BLOOD QUANT: CPT | Performed by: INTERNAL MEDICINE

## 2023-02-02 PROCEDURE — U0003 INFECTIOUS AGENT DETECTION BY NUCLEIC ACID (DNA OR RNA); SEVERE ACUTE RESPIRATORY SYNDROME CORONAVIRUS 2 (SARS-COV-2) (CORONAVIRUS DISEASE [COVID-19]), AMPLIFIED PROBE TECHNIQUE, MAKING USE OF HIGH THROUGHPUT TECHNOLOGIES AS DESCRIBED BY CMS-2020-01-R: HCPCS | Performed by: PHYSICIAN ASSISTANT

## 2023-02-02 PROCEDURE — 83615 LACTATE (LD) (LDH) ENZYME: CPT | Performed by: PHYSICIAN ASSISTANT

## 2023-02-02 PROCEDURE — 36592 COLLECT BLOOD FROM PICC: CPT | Performed by: PHYSICIAN ASSISTANT

## 2023-02-02 PROCEDURE — 85025 COMPLETE CBC W/AUTO DIFF WBC: CPT | Performed by: INTERNAL MEDICINE

## 2023-02-02 PROCEDURE — 80053 COMPREHEN METABOLIC PANEL: CPT | Performed by: INTERNAL MEDICINE

## 2023-02-02 PROCEDURE — 85610 PROTHROMBIN TIME: CPT | Performed by: PHYSICIAN ASSISTANT

## 2023-02-02 PROCEDURE — 250N000011 HC RX IP 250 OP 636: Performed by: INTERNAL MEDICINE

## 2023-02-02 PROCEDURE — 84550 ASSAY OF BLOOD/URIC ACID: CPT | Performed by: PHYSICIAN ASSISTANT

## 2023-02-02 PROCEDURE — 250N000013 HC RX MED GY IP 250 OP 250 PS 637: Performed by: PHYSICIAN ASSISTANT

## 2023-02-02 PROCEDURE — 250N000013 HC RX MED GY IP 250 OP 250 PS 637: Performed by: INTERNAL MEDICINE

## 2023-02-02 PROCEDURE — 85004 AUTOMATED DIFF WBC COUNT: CPT | Performed by: INTERNAL MEDICINE

## 2023-02-02 PROCEDURE — 99222 1ST HOSP IP/OBS MODERATE 55: CPT | Mod: AI | Performed by: STUDENT IN AN ORGANIZED HEALTH CARE EDUCATION/TRAINING PROGRAM

## 2023-02-02 PROCEDURE — 83735 ASSAY OF MAGNESIUM: CPT | Performed by: PHYSICIAN ASSISTANT

## 2023-02-02 PROCEDURE — G1010 CDSM STANSON: HCPCS | Mod: GC | Performed by: RADIOLOGY

## 2023-02-02 PROCEDURE — 78816 PET IMAGE W/CT FULL BODY: CPT | Mod: MG,PS

## 2023-02-02 PROCEDURE — 3E04305 INTRODUCTION OF OTHER ANTINEOPLASTIC INTO CENTRAL VEIN, PERCUTANEOUS APPROACH: ICD-10-PCS | Performed by: STUDENT IN AN ORGANIZED HEALTH CARE EDUCATION/TRAINING PROGRAM

## 2023-02-02 PROCEDURE — G0463 HOSPITAL OUTPT CLINIC VISIT: HCPCS | Performed by: INTERNAL MEDICINE

## 2023-02-02 PROCEDURE — 86901 BLOOD TYPING SEROLOGIC RH(D): CPT | Performed by: PHYSICIAN ASSISTANT

## 2023-02-02 PROCEDURE — 85384 FIBRINOGEN ACTIVITY: CPT | Performed by: PHYSICIAN ASSISTANT

## 2023-02-02 PROCEDURE — 258N000003 HC RX IP 258 OP 636: Performed by: INTERNAL MEDICINE

## 2023-02-02 PROCEDURE — 84100 ASSAY OF PHOSPHORUS: CPT | Performed by: PHYSICIAN ASSISTANT

## 2023-02-02 PROCEDURE — A9552 F18 FDG: HCPCS | Performed by: REGISTERED NURSE

## 2023-02-02 PROCEDURE — 250N000012 HC RX MED GY IP 250 OP 636 PS 637: Performed by: INTERNAL MEDICINE

## 2023-02-02 PROCEDURE — 36591 DRAW BLOOD OFF VENOUS DEVICE: CPT | Performed by: INTERNAL MEDICINE

## 2023-02-02 PROCEDURE — 85730 THROMBOPLASTIN TIME PARTIAL: CPT | Performed by: PHYSICIAN ASSISTANT

## 2023-02-02 PROCEDURE — 78816 PET IMAGE W/CT FULL BODY: CPT | Mod: 26 | Performed by: RADIOLOGY

## 2023-02-02 PROCEDURE — G0463 HOSPITAL OUTPT CLINIC VISIT: HCPCS

## 2023-02-02 PROCEDURE — 343N000001 HC RX 343: Performed by: REGISTERED NURSE

## 2023-02-02 PROCEDURE — 99215 OFFICE O/P EST HI 40 MIN: CPT | Performed by: INTERNAL MEDICINE

## 2023-02-02 PROCEDURE — 120N000002 HC R&B MED SURG/OB UMMC

## 2023-02-02 RX ORDER — MEPERIDINE HYDROCHLORIDE 25 MG/ML
25 INJECTION INTRAMUSCULAR; INTRAVENOUS; SUBCUTANEOUS EVERY 30 MIN PRN
Status: CANCELLED | OUTPATIENT
Start: 2023-02-07

## 2023-02-02 RX ORDER — LORAZEPAM 2 MG/ML
.5-1 INJECTION INTRAMUSCULAR EVERY 6 HOURS PRN
Status: DISCONTINUED | OUTPATIENT
Start: 2023-02-02 | End: 2023-02-07 | Stop reason: HOSPADM

## 2023-02-02 RX ORDER — PANTOPRAZOLE SODIUM 40 MG/1
40 TABLET, DELAYED RELEASE ORAL
Status: DISCONTINUED | OUTPATIENT
Start: 2023-02-03 | End: 2023-02-07 | Stop reason: HOSPADM

## 2023-02-02 RX ORDER — ALBUTEROL SULFATE 0.83 MG/ML
2.5 SOLUTION RESPIRATORY (INHALATION)
Status: DISCONTINUED | OUTPATIENT
Start: 2023-02-02 | End: 2023-02-07 | Stop reason: HOSPADM

## 2023-02-02 RX ORDER — ONDANSETRON 8 MG/1
8 TABLET, FILM COATED ORAL EVERY 8 HOURS PRN
Status: DISCONTINUED | OUTPATIENT
Start: 2023-02-02 | End: 2023-02-07 | Stop reason: HOSPADM

## 2023-02-02 RX ORDER — PROCHLORPERAZINE MALEATE 5 MG
5-10 TABLET ORAL EVERY 6 HOURS PRN
Status: CANCELLED
Start: 2023-02-07

## 2023-02-02 RX ORDER — HEPARIN SODIUM,PORCINE 10 UNIT/ML
5-10 VIAL (ML) INTRAVENOUS
Status: DISCONTINUED | OUTPATIENT
Start: 2023-02-02 | End: 2023-02-07 | Stop reason: HOSPADM

## 2023-02-02 RX ORDER — DEXTROSE MONOHYDRATE 50 MG/ML
10-20 INJECTION, SOLUTION INTRAVENOUS
Status: DISCONTINUED | OUTPATIENT
Start: 2023-02-07 | End: 2023-02-07 | Stop reason: HOSPADM

## 2023-02-02 RX ORDER — POLYETHYLENE GLYCOL 3350 17 G/17G
17 POWDER, FOR SOLUTION ORAL DAILY PRN
Status: DISCONTINUED | OUTPATIENT
Start: 2023-02-02 | End: 2023-02-07 | Stop reason: HOSPADM

## 2023-02-02 RX ORDER — AMOXICILLIN 250 MG
2 CAPSULE ORAL 2 TIMES DAILY
Status: CANCELLED | OUTPATIENT
Start: 2023-02-07

## 2023-02-02 RX ORDER — EPINEPHRINE 1 MG/ML
0.3 INJECTION, SOLUTION INTRAMUSCULAR; SUBCUTANEOUS EVERY 5 MIN PRN
Status: CANCELLED | OUTPATIENT
Start: 2023-02-07

## 2023-02-02 RX ORDER — HEPARIN SODIUM (PORCINE) LOCK FLUSH IV SOLN 100 UNIT/ML 100 UNIT/ML
5 SOLUTION INTRAVENOUS
Status: COMPLETED | OUTPATIENT
Start: 2023-02-02 | End: 2023-02-02

## 2023-02-02 RX ORDER — DEXTROSE MONOHYDRATE 50 MG/ML
10-20 INJECTION, SOLUTION INTRAVENOUS
Status: CANCELLED | OUTPATIENT
Start: 2023-02-13

## 2023-02-02 RX ORDER — ACETAMINOPHEN 325 MG/1
650 TABLET ORAL ONCE
Status: CANCELLED
Start: 2023-02-07

## 2023-02-02 RX ORDER — ALLOPURINOL 300 MG/1
300 TABLET ORAL DAILY
Status: DISCONTINUED | OUTPATIENT
Start: 2023-02-03 | End: 2023-02-07 | Stop reason: HOSPADM

## 2023-02-02 RX ORDER — AMOXICILLIN 250 MG
1 CAPSULE ORAL 2 TIMES DAILY PRN
Status: DISCONTINUED | OUTPATIENT
Start: 2023-02-02 | End: 2023-02-07 | Stop reason: HOSPADM

## 2023-02-02 RX ORDER — TEMAZEPAM 7.5 MG/1
7.5 CAPSULE ORAL
Status: DISCONTINUED | OUTPATIENT
Start: 2023-02-02 | End: 2023-02-07 | Stop reason: HOSPADM

## 2023-02-02 RX ORDER — MULTIPLE VITAMINS W/ MINERALS TAB 9MG-400MCG
1 TAB ORAL DAILY
Status: DISCONTINUED | OUTPATIENT
Start: 2023-02-03 | End: 2023-02-07 | Stop reason: HOSPADM

## 2023-02-02 RX ORDER — METHYLPREDNISOLONE SODIUM SUCCINATE 125 MG/2ML
125 INJECTION, POWDER, LYOPHILIZED, FOR SOLUTION INTRAMUSCULAR; INTRAVENOUS
Status: DISCONTINUED | OUTPATIENT
Start: 2023-02-02 | End: 2023-02-07 | Stop reason: HOSPADM

## 2023-02-02 RX ORDER — ACETAMINOPHEN 325 MG/1
650 TABLET ORAL EVERY 4 HOURS PRN
Status: DISCONTINUED | OUTPATIENT
Start: 2023-02-02 | End: 2023-02-07 | Stop reason: HOSPADM

## 2023-02-02 RX ORDER — DIPHENHYDRAMINE HCL 25 MG
50 CAPSULE ORAL ONCE
Status: CANCELLED
Start: 2023-02-07

## 2023-02-02 RX ORDER — ALBUTEROL SULFATE 0.83 MG/ML
2.5 SOLUTION RESPIRATORY (INHALATION)
Status: CANCELLED | OUTPATIENT
Start: 2023-02-07

## 2023-02-02 RX ORDER — PROCHLORPERAZINE MALEATE 5 MG
5 TABLET ORAL EVERY 6 HOURS PRN
Status: DISCONTINUED | OUTPATIENT
Start: 2023-02-02 | End: 2023-02-02

## 2023-02-02 RX ORDER — EPINEPHRINE 1 MG/ML
0.3 INJECTION, SOLUTION, CONCENTRATE INTRAVENOUS EVERY 5 MIN PRN
Status: DISCONTINUED | OUTPATIENT
Start: 2023-02-02 | End: 2023-02-07 | Stop reason: HOSPADM

## 2023-02-02 RX ORDER — LORAZEPAM 2 MG/ML
.5-1 INJECTION INTRAMUSCULAR EVERY 6 HOURS PRN
Status: CANCELLED | OUTPATIENT
Start: 2023-02-07

## 2023-02-02 RX ORDER — DIPHENHYDRAMINE HYDROCHLORIDE 50 MG/ML
50 INJECTION INTRAMUSCULAR; INTRAVENOUS
Status: CANCELLED
Start: 2023-02-07

## 2023-02-02 RX ORDER — DEXAMETHASONE 4 MG/1
8 TABLET ORAL DAILY
Status: DISCONTINUED | OUTPATIENT
Start: 2023-02-08 | End: 2023-02-07 | Stop reason: HOSPADM

## 2023-02-02 RX ORDER — METHYLPREDNISOLONE SODIUM SUCCINATE 125 MG/2ML
125 INJECTION, POWDER, LYOPHILIZED, FOR SOLUTION INTRAMUSCULAR; INTRAVENOUS
Status: CANCELLED
Start: 2023-02-07

## 2023-02-02 RX ORDER — MEPERIDINE HYDROCHLORIDE 25 MG/ML
25 INJECTION INTRAMUSCULAR; INTRAVENOUS; SUBCUTANEOUS EVERY 30 MIN PRN
Status: DISCONTINUED | OUTPATIENT
Start: 2023-02-02 | End: 2023-02-07 | Stop reason: HOSPADM

## 2023-02-02 RX ORDER — DEXAMETHASONE 4 MG/1
8 TABLET ORAL DAILY
Status: CANCELLED
Start: 2023-02-13

## 2023-02-02 RX ORDER — ASCORBIC ACID 500 MG
500 TABLET ORAL EVERY MORNING
Status: DISCONTINUED | OUTPATIENT
Start: 2023-02-03 | End: 2023-02-07 | Stop reason: HOSPADM

## 2023-02-02 RX ORDER — PROCHLORPERAZINE MALEATE 5 MG
5-10 TABLET ORAL EVERY 6 HOURS PRN
Status: DISCONTINUED | OUTPATIENT
Start: 2023-02-02 | End: 2023-02-07 | Stop reason: HOSPADM

## 2023-02-02 RX ORDER — HEPARIN SODIUM (PORCINE) LOCK FLUSH IV SOLN 100 UNIT/ML 100 UNIT/ML
5-10 SOLUTION INTRAVENOUS
Status: DISCONTINUED | OUTPATIENT
Start: 2023-02-02 | End: 2023-02-07 | Stop reason: HOSPADM

## 2023-02-02 RX ORDER — ONDANSETRON 8 MG/1
8 TABLET, ORALLY DISINTEGRATING ORAL EVERY 8 HOURS PRN
Status: DISCONTINUED | OUTPATIENT
Start: 2023-02-02 | End: 2023-02-07 | Stop reason: HOSPADM

## 2023-02-02 RX ORDER — AMOXICILLIN 250 MG
2 CAPSULE ORAL 2 TIMES DAILY
Status: DISCONTINUED | OUTPATIENT
Start: 2023-02-02 | End: 2023-02-07 | Stop reason: HOSPADM

## 2023-02-02 RX ORDER — ACETAMINOPHEN 325 MG/1
650 TABLET ORAL ONCE
Status: COMPLETED | OUTPATIENT
Start: 2023-02-02 | End: 2023-02-02

## 2023-02-02 RX ORDER — DIPHENHYDRAMINE HCL 50 MG
50 CAPSULE ORAL ONCE
Status: COMPLETED | OUTPATIENT
Start: 2023-02-02 | End: 2023-02-02

## 2023-02-02 RX ORDER — LORAZEPAM 0.5 MG/1
.5-1 TABLET ORAL EVERY 6 HOURS PRN
Status: DISCONTINUED | OUTPATIENT
Start: 2023-02-02 | End: 2023-02-07 | Stop reason: HOSPADM

## 2023-02-02 RX ORDER — ONDANSETRON 2 MG/ML
8 INJECTION INTRAMUSCULAR; INTRAVENOUS EVERY 8 HOURS PRN
Status: DISCONTINUED | OUTPATIENT
Start: 2023-02-02 | End: 2023-02-07 | Stop reason: HOSPADM

## 2023-02-02 RX ORDER — DIPHENHYDRAMINE HYDROCHLORIDE 50 MG/ML
50 INJECTION INTRAMUSCULAR; INTRAVENOUS
Status: DISCONTINUED | OUTPATIENT
Start: 2023-02-02 | End: 2023-02-07 | Stop reason: HOSPADM

## 2023-02-02 RX ORDER — DEXAMETHASONE 4 MG/1
8 TABLET ORAL DAILY
Status: DISCONTINUED | OUTPATIENT
Start: 2023-02-07 | End: 2023-02-02

## 2023-02-02 RX ORDER — ALLOPURINOL 300 MG/1
300 TABLET ORAL DAILY
Status: DISCONTINUED | OUTPATIENT
Start: 2023-02-02 | End: 2023-02-02

## 2023-02-02 RX ORDER — ONDANSETRON 8 MG/1
16 TABLET, FILM COATED ORAL
Status: COMPLETED | OUTPATIENT
Start: 2023-02-02 | End: 2023-02-06

## 2023-02-02 RX ORDER — ALBUTEROL SULFATE 90 UG/1
1-2 AEROSOL, METERED RESPIRATORY (INHALATION)
Status: CANCELLED
Start: 2023-02-07

## 2023-02-02 RX ORDER — ONDANSETRON 8 MG/1
16 TABLET, FILM COATED ORAL
Status: CANCELLED
Start: 2023-02-07

## 2023-02-02 RX ORDER — LORAZEPAM 0.5 MG/1
.5-1 TABLET ORAL EVERY 6 HOURS PRN
Status: CANCELLED
Start: 2023-02-07

## 2023-02-02 RX ORDER — HEPARIN SODIUM,PORCINE 10 UNIT/ML
5-10 VIAL (ML) INTRAVENOUS EVERY 24 HOURS
Status: DISCONTINUED | OUTPATIENT
Start: 2023-02-02 | End: 2023-02-07 | Stop reason: HOSPADM

## 2023-02-02 RX ORDER — ALBUTEROL SULFATE 90 UG/1
1-2 AEROSOL, METERED RESPIRATORY (INHALATION)
Status: DISCONTINUED | OUTPATIENT
Start: 2023-02-02 | End: 2023-02-07 | Stop reason: HOSPADM

## 2023-02-02 RX ORDER — ACYCLOVIR 400 MG/1
400 TABLET ORAL EVERY 12 HOURS
Status: DISCONTINUED | OUTPATIENT
Start: 2023-02-02 | End: 2023-02-07 | Stop reason: HOSPADM

## 2023-02-02 RX ORDER — ALLOPURINOL 300 MG/1
300 TABLET ORAL DAILY
Status: CANCELLED | OUTPATIENT
Start: 2023-02-07

## 2023-02-02 RX ADMIN — ACYCLOVIR 400 MG: 400 TABLET ORAL at 19:33

## 2023-02-02 RX ADMIN — RITUXIMAB-ABBS 600 MG: 10 INJECTION, SOLUTION INTRAVENOUS at 20:16

## 2023-02-02 RX ADMIN — SENNOSIDES AND DOCUSATE SODIUM 2 TABLET: 50; 8.6 TABLET ORAL at 22:07

## 2023-02-02 RX ADMIN — DIPHENHYDRAMINE HYDROCHLORIDE 50 MG: 50 CAPSULE ORAL at 19:34

## 2023-02-02 RX ADMIN — ONDANSETRON HYDROCHLORIDE 16 MG: 8 TABLET, FILM COATED ORAL at 21:42

## 2023-02-02 RX ADMIN — FLUDEOXYGLUCOSE F-18 10.27 MCI.: 500 INJECTION, SOLUTION INTRAVENOUS at 09:56

## 2023-02-02 RX ADMIN — TEMAZEPAM 7.5 MG: 7.5 CAPSULE ORAL at 22:07

## 2023-02-02 RX ADMIN — ACETAMINOPHEN 650 MG: 325 TABLET ORAL at 19:33

## 2023-02-02 RX ADMIN — ETOPOSIDE 85 MG: 20 INJECTION, SOLUTION, CONCENTRATE INTRAVENOUS at 22:07

## 2023-02-02 RX ADMIN — VINCRISTINE SULFATE 0.7 MG: 1 INJECTION, SOLUTION INTRAVENOUS at 22:08

## 2023-02-02 RX ADMIN — Medication 5 ML: at 13:02

## 2023-02-02 RX ADMIN — PREDNISONE 102 MG: 1 TABLET ORAL at 19:34

## 2023-02-02 ASSESSMENT — ACTIVITIES OF DAILY LIVING (ADL)
ADLS_ACUITY_SCORE: 20
ADLS_ACUITY_SCORE: 35

## 2023-02-02 ASSESSMENT — PAIN SCALES - GENERAL: PAINLEVEL: NO PAIN (0)

## 2023-02-02 NOTE — NURSING NOTE
"Chief Complaint   Patient presents with     Port Draw     Labs drawn from port by rn.  VS taken.     Port accessed with 20 gauge 3/4\" Power needle and labs drawn by rn.  Port flushed with NS and heparin.  Pt tolerated well.  VS taken.  Pt checked in for next appt.    Lazara Velez RN      "

## 2023-02-02 NOTE — NURSING NOTE
"Oncology Rooming Note    February 2, 2023 1:47 PM   Shahid Davis is a 72 year old male who presents for:    Chief Complaint   Patient presents with     Port Draw     Labs drawn from port by rn.  VS taken.     Oncology Clinic Visit     DLBCL     Initial Vitals: /72 (BP Location: Right arm, Patient Position: Sitting, Cuff Size: Adult Regular)   Pulse 104   Temp 97.6  F (36.4  C) (Oral)   Resp 16   SpO2 98%  Estimated body mass index is 22.05 kg/m  as calculated from the following:    Height as of 1/11/23: 1.702 m (5' 7\").    Weight as of 1/15/23: 63.9 kg (140 lb 12.8 oz). There is no height or weight on file to calculate BSA.  No Pain (0) Comment: Data Unavailable   No LMP for male patient.  Allergies reviewed: Yes  Medications reviewed: Yes    Medications: Temazepam needs refill, he pays out of pocket and will need them for after he leave the hospital. Asking for #15 pills    Pharmacy name entered into [employer]:    [id number] [place] [address] [id number] [address] AT [facility]    Clinical concerns: states that his imaging was done withOUT contrast and he's concerned and wondering why.      Marti [facility]            "

## 2023-02-02 NOTE — LETTER
2/2/2023         RE: Shahid Davis   [facility] [address]        Dear Colleague,    Thank you for referring your patient, Shahid Davis, to the [facility]. Please see a copy of my visit note below.    [facility]  Date of visit: Feb 2, 2023      Reason for Visit: Follow up triple-hit DLBCL      ONCOLOGIC SUMMARY:  Diagnosis:    Low-grade kappa-restricted plasmacytoid B-cell lymphoma dx 3/2004, presenting with thoracic paraspinal mass. Bone marrow hypercellular with 70-80% involvement by small kappa-restricted lymphocytes which were positive for CD10, CD19, and CD20 and negative for CD5 and CD23.     Transformation to high-grade B-cell lymphoma 12/2022 (versus new primary), presenting with B symptoms, sternal mass, and SOB/chest pain. Biopsy of sternal mass showed large B-cell lymphoma with aggressive features (GCB-subtype), Ki67 %, FISH positive for MYC (non-IgH partner), BCL2, and BCL6 rearrangements. Stage IV with extensive lymphomatous involvement to the R pleura w/avid effusions, mediastinal and pericardial regions, right anterolateral chest wall, adenopathy above and below the diaphragm, liver, spleen and multiple sites in the skeleton. CSF flow negative     Treatment history:  For low-grade lymphoma:  1. 2005: Fludarabine-based chemo x 6 cycles and XRT to spine (details unclear)    For high-grade lymphoma:  1. 12/20/22: Cycle 1 R-CHOP with Neulasta support (with a few days of steroid prephase prior)  2. 1/11/23: Cycle 2 Switched to DA-R-EPOCH with triple IT chemo for CNS ppx after FISH returned showing triple-hit disease.       Interval history:   Mr. Davis is here today with his partner Reshma. He did note worse fatigue overall with R-EPOCH than R-CHOP. Today, he states that his energy has improved; he is taking his dog on walks around their field about 3 times daily. No shortness of breath, but still has a  lingering cough that has been ongoing for the last 8 weeks. Sometimes productive of yellow sputum. No fevers, chills, body aches. No new pain. His appetite is excellent. He does take a Zofran in the morning even if not nauseous so that he is able to eat. He has occasionally had some nausea in the afternoon, and will take a Compazine at that time. Bowels are regular on his regimen of miralax in the morning and senna at night.  Happy to report that his night sweats have completely gone; he noted they had stopped after the first cycle of chemotherapy.       Current Outpatient Medications   Medication Sig Dispense Refill     acyclovir (ZOVIRAX) 400 MG tablet Take 1 tablet (400 mg) by mouth every 12 hours for 60 days 120 tablet 3     allopurinol (ZYLOPRIM) 300 MG tablet Take 1 tablet (300 mg) by mouth daily 60 tablet 3     ascorbic acid (VITAMIN C) 500 MG tablet Take 500 mg by mouth every morning       dexamethasone (DECADRON) 4 MG tablet Take 2 tablets (8 mg) by mouth daily for 2 days 4 tablet 0     fluconazole (DIFLUCAN) 200 MG tablet Take 1 tablet (200 mg) by mouth daily 30 tablet 3     levofloxacin (LEVAQUIN) 250 MG tablet Take 1 tablet (250 mg) by mouth daily 30 tablet 3     methylPREDNISolone (MEDROL) 32 MG tablet Take 1 tablet (32 mg) by mouth daily 12 hours prior and 1 tab 2 hours prior to the procedure with IV contrast 2 tablet 0     Multiple Vitamins-Minerals (MULTIVITAL PO) Take 1 tablet by mouth every morning       omeprazole (PRILOSEC) 40 MG DR capsule Take 1 capsule (40 mg) by mouth daily 90 capsule 4     ondansetron (ZOFRAN) 8 MG tablet Take 1 tablet (8 mg) by mouth every 8 hours as needed for nausea 60 tablet 3     polyethylene glycol (MIRALAX) 17 GM/Dose powder Take 17 g by mouth daily as needed for constipation 510 g 4     prochlorperazine (COMPAZINE) 10 MG tablet Take 1 tablet (10 mg) by mouth every 6 hours as needed for nausea or vomiting 10 tablet 0     senna-docusate (SENNA S) 8.6-50 MG tablet Take  1 tablet by mouth 2 times daily as needed for constipation 60 tablet 4     temazepam (RESTORIL) 7.5 MG capsule Take 1 capsule (7.5 mg) by mouth nightly as needed for sleep 5 capsule 0       Allergies   Allergen Reactions     Ampicillin [Ampicillin Sodium]      Bactrim [Sulfamethoxazole W/Trimethoprim]      Contrast Dye      CT contrast (IV) allergy - need oral Methylpred as premed for scans     Ampicillin Rash         Physical Exam:  /72 (BP Location: Right arm, Patient Position: Sitting, Cuff Size: Adult Regular)   Pulse 104   Temp 97.6  F (36.4  C) (Oral)   Resp 16   SpO2 98%   Gen: alert, pleasant and conversational, NAD  HEENT: NC/AT,EOMI w/ PERRL, anicteric sclera.   Neck: Supple, no LAD  CV: normal S1,S2 with RRR no m/r/g  Resp: occasional non-productive cough, lungs CTA bilaterally with adequate air movement to bases. No wheezes or crackles  Abd: soft NTND no organomegaly or masses. BS normoactive.   Ext: warm and well perfused, no edema or cyanosis  Lymphatics: no palpable cervical, axillary, or inguinal LAD. No HSM  Skin: no concerning lesions or rashes  Neuro: A&Ox4, no lateralizing sx. Grossly nonfocal.  Psych: appropriate, reactive      Labs:   I personally reviewed the following labs:    Most Recent 3 CBC's:  Recent Labs   Lab Test 01/16/23  0513 01/15/23  0841 01/14/23  0716   WBC 2.5* 3.4* 6.8   HGB 8.1* 8.7* 9.6*   MCV 94 94 97   PLT 64* 72* 106*     Most Recent 3 BMP's:  Recent Labs   Lab Test 01/16/23  0513 01/15/23  0841 01/14/23  0716    140 141   POTASSIUM 3.9 3.7 3.9   CHLORIDE 107 109* 109*   CO2 20* 22 21*   BUN 29.4* 29.0* 26.4*   CR 0.76 0.81 0.77   ANIONGAP 12 9 11   JORGE 8.1* 8.1* 8.7*   * 106* 105*     Most Recent 2 LFT's:  Recent Labs   Lab Test 01/16/23  0513 01/15/23  0841   AST 26 30   ALT 40 38   ALKPHOS 64 70   BILITOTAL 0.3 0.2         Imaging:   PET-CT read from earlier today pending      Impression/plan:     # H/o low-grade kappa restricted plasmacytoid  B-cell lymphoma 3/2004   # Transformation to new DLBCL   H/o low-grade kappa restricted plasmacytoid B-cell lymphoma 3/2004 treated with x6 cycles of fludarabine based chemo and XRT to spine, then has been on surveillance since 2005. He presented in 11/2022 with progressing B symptoms, sternal mass, and SOB/CP. PET 12/9 showed extensive lymphomatous involvement to the R pleura w/avid effusions, mediastinal and pericardial regions, right anterolateral chest wall, adenopathy above and below the diaphragm, liver, spleen and multiple sites in the skeleton, concerning for transformation from his low-grade lymphoma. 12/15 biopsy of sternal mass showed large B-cell lymphoma with aggressive features (GCB-subtype), Ki67 %. 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  - S/p C1 R-CHOP in the hospital 12/20/22. Tolerated well overall.  - Switched to DA-R-EPOCH starting Cycle 2 (1/11/23) after FISH returned showing triple-hit disease. Staging LP negative, will plan to give triple IT chemo once per cycle for CNS ppx.   - Final read of post cycle 2 PET is pending but per my read, shows significant resolution of most if not all prior FDG activity and resolution of right pleural effusion - possibly CR? Shared the images and good news with Fredrick today!   - Proceed with Cycle 3 R-EPOCH today. Will keep at same dose level due to platelet ricci of 37 and patient does not think he could tolerate any higher doses than last cycle.   - Will arrange for D6 Neulasta at [facility] in [place] as he has seen a provider there in the past. Will ask RNCC to fax orders to [phone] ATTN: Amos/[facility].  - Discussed with Shahid the recent approval of Clonoseq for DLBCL and he is interested in doing the Clonality ID on his original specimen and checking for MRD today to correlate with PET results. Fresh blood sample sent.   - Plan for next restaging at [facility] after Cycle 6 (unless final  read from today comes back as SC only, then next PET will be after Cycle 4).    # Pancytopenia  Likely secondary to prior treatment and lymphomatous involvement.  - Monitor CBC weekly twice weekly after R-EPOCH  - Transfuse if Hgb <7 and plt <10k     # PPx  - TLS: Continue allopurinol 300 mg daily.  - ID:  mg BID. Levaquin and Fluconazole to start when ANC <1k. Pentamidine neb last given inpatient 1/15/23.     # Shortness of breath, improved  # Cough  # R large and L small pleural effusion   PET/CT (12/9) with FDG-avid large R and small L pleural effusion.   - Cough is improving, breathing has significantly improved - no longer has PARRISH.  - Pleural effusions resolved on today's PET as well.    # Hypogammaglobulinemia    Dec 2022  - Given persistently low COVID cycle threshold, hypogammaglobulinemia, and risk for further immunosuppression with chemo, gave IVIG on 1/15.    # COVID19 Infection  He tested positive on his Cycle 2 pre-admission COVID test. He was very surprised, as he does not feel symptomatic.  Notes that his girlfriend had a URI around [place] and was never tested for COVID. Cycle threshold was around 20 indicating active infection.  - S/p remdesivir x 3 days 1/11-1/13/23.  - No new or worsening respiratory sx since discharge.     # GERD  Related to history of radiation to chest.   - Continue omeprazole.    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 Took uncertain medication for this in the past (~2004), however cannot remember. Melatonin and Trazadone foung to be ineffective.   - Restoril on days he gets steroids is helpful.      PLAN:  - Admit for Cycle 3 R-EPOCH (same dose level)  - JOSE MARTIN visit and Cycle 4 in 3 weeks    Patient was seen in conjunction with radiation oncology resident Dr. Valdez.    Total of 45 minutes on patient visit, reviewing records, interpreting test results, placing orders, and documentation on the day of service.    Domitila Ruelas MD  Attending Physician,  [facility]      Again, thank you for allowing me to participate in the care of your patient.        Sincerely,        Domitila Ruelas MD

## 2023-02-03 LAB
ABO/RH(D): NORMAL
ALBUMIN SERPL BCG-MCNC: 3.8 G/DL (ref 3.5–5.2)
ALP SERPL-CCNC: 95 U/L (ref 40–129)
ALT SERPL W P-5'-P-CCNC: 28 U/L (ref 10–50)
ANION GAP SERPL CALCULATED.3IONS-SCNC: 10 MMOL/L (ref 7–15)
ANTIBODY SCREEN: NEGATIVE
AST SERPL W P-5'-P-CCNC: 22 U/L (ref 10–50)
BASOPHILS # BLD AUTO: 0 10E3/UL (ref 0–0.2)
BASOPHILS NFR BLD AUTO: 0 %
BILIRUB SERPL-MCNC: 0.2 MG/DL
BUN SERPL-MCNC: 30.3 MG/DL (ref 8–23)
CALCIUM SERPL-MCNC: 9.5 MG/DL (ref 8.8–10.2)
CHLORIDE SERPL-SCNC: 105 MMOL/L (ref 98–107)
CREAT SERPL-MCNC: 0.78 MG/DL (ref 0.67–1.17)
CYCLE THRESHOLD (CT): 28.8
DEPRECATED HCO3 PLAS-SCNC: 24 MMOL/L (ref 22–29)
EOSINOPHIL # BLD AUTO: 0 10E3/UL (ref 0–0.7)
EOSINOPHIL NFR BLD AUTO: 0 %
ERYTHROCYTE [DISTWIDTH] IN BLOOD BY AUTOMATED COUNT: 16.8 % (ref 10–15)
FIBRINOGEN PPP-MCNC: 272 MG/DL (ref 170–490)
GFR SERPL CREATININE-BSD FRML MDRD: >90 ML/MIN/1.73M2
GLUCOSE SERPL-MCNC: 142 MG/DL (ref 70–99)
HCT VFR BLD AUTO: 29.1 % (ref 40–53)
HGB BLD-MCNC: 9.3 G/DL (ref 13.3–17.7)
IMM GRANULOCYTES # BLD: 0.2 10E3/UL
IMM GRANULOCYTES NFR BLD: 2 %
INR PPP: 1.09 (ref 0.85–1.15)
LDH SERPL L TO P-CCNC: 231 U/L (ref 0–250)
LYMPHOCYTES # BLD AUTO: 0.5 10E3/UL (ref 0.8–5.3)
LYMPHOCYTES NFR BLD AUTO: 5 %
MAGNESIUM SERPL-MCNC: 2 MG/DL (ref 1.7–2.3)
MCH RBC QN AUTO: 31.2 PG (ref 26.5–33)
MCHC RBC AUTO-ENTMCNC: 32 G/DL (ref 31.5–36.5)
MCV RBC AUTO: 98 FL (ref 78–100)
MONOCYTES # BLD AUTO: 0.1 10E3/UL (ref 0–1.3)
MONOCYTES NFR BLD AUTO: 1 %
NEUTROPHILS # BLD AUTO: 9.3 10E3/UL (ref 1.6–8.3)
NEUTROPHILS NFR BLD AUTO: 92 %
NRBC # BLD AUTO: 0 10E3/UL
NRBC BLD AUTO-RTO: 0 /100
PHOSPHATE SERPL-MCNC: 3.6 MG/DL (ref 2.5–4.5)
PLATELET # BLD AUTO: 151 10E3/UL (ref 150–450)
POTASSIUM SERPL-SCNC: 4.1 MMOL/L (ref 3.4–5.3)
PROT SERPL-MCNC: 5.5 G/DL (ref 6.4–8.3)
RBC # BLD AUTO: 2.98 10E6/UL (ref 4.4–5.9)
SARS-COV-2 RNA RESP QL NAA+PROBE: POSITIVE
SODIUM SERPL-SCNC: 139 MMOL/L (ref 136–145)
SPECIMEN EXPIRATION DATE: NORMAL
URATE SERPL-MCNC: 3.4 MG/DL (ref 3.4–7)
WBC # BLD AUTO: 10.1 10E3/UL (ref 4–11)

## 2023-02-03 PROCEDURE — 86850 RBC ANTIBODY SCREEN: CPT | Performed by: PHYSICIAN ASSISTANT

## 2023-02-03 PROCEDURE — 250N000013 HC RX MED GY IP 250 OP 250 PS 637: Performed by: STUDENT IN AN ORGANIZED HEALTH CARE EDUCATION/TRAINING PROGRAM

## 2023-02-03 PROCEDURE — 250N000013 HC RX MED GY IP 250 OP 250 PS 637: Performed by: PHYSICIAN ASSISTANT

## 2023-02-03 PROCEDURE — 258N000003 HC RX IP 258 OP 636: Performed by: INTERNAL MEDICINE

## 2023-02-03 PROCEDURE — 36591 DRAW BLOOD OFF VENOUS DEVICE: CPT | Performed by: PHYSICIAN ASSISTANT

## 2023-02-03 PROCEDURE — 84550 ASSAY OF BLOOD/URIC ACID: CPT | Performed by: PHYSICIAN ASSISTANT

## 2023-02-03 PROCEDURE — 84155 ASSAY OF PROTEIN SERUM: CPT | Performed by: PHYSICIAN ASSISTANT

## 2023-02-03 PROCEDURE — 250N000011 HC RX IP 250 OP 636: Performed by: INTERNAL MEDICINE

## 2023-02-03 PROCEDURE — 83735 ASSAY OF MAGNESIUM: CPT | Performed by: PHYSICIAN ASSISTANT

## 2023-02-03 PROCEDURE — 85384 FIBRINOGEN ACTIVITY: CPT | Performed by: PHYSICIAN ASSISTANT

## 2023-02-03 PROCEDURE — 85025 COMPLETE CBC W/AUTO DIFF WBC: CPT | Performed by: PHYSICIAN ASSISTANT

## 2023-02-03 PROCEDURE — 250N000012 HC RX MED GY IP 250 OP 636 PS 637: Performed by: INTERNAL MEDICINE

## 2023-02-03 PROCEDURE — 99221 1ST HOSP IP/OBS SF/LOW 40: CPT | Mod: AI | Performed by: STUDENT IN AN ORGANIZED HEALTH CARE EDUCATION/TRAINING PROGRAM

## 2023-02-03 PROCEDURE — 86901 BLOOD TYPING SEROLOGIC RH(D): CPT | Performed by: PHYSICIAN ASSISTANT

## 2023-02-03 PROCEDURE — 84100 ASSAY OF PHOSPHORUS: CPT | Performed by: PHYSICIAN ASSISTANT

## 2023-02-03 PROCEDURE — 120N000002 HC R&B MED SURG/OB UMMC

## 2023-02-03 PROCEDURE — 250N000013 HC RX MED GY IP 250 OP 250 PS 637: Performed by: INTERNAL MEDICINE

## 2023-02-03 PROCEDURE — 80051 ELECTROLYTE PANEL: CPT | Performed by: PHYSICIAN ASSISTANT

## 2023-02-03 PROCEDURE — 85610 PROTHROMBIN TIME: CPT | Performed by: PHYSICIAN ASSISTANT

## 2023-02-03 PROCEDURE — 83615 LACTATE (LD) (LDH) ENZYME: CPT | Performed by: PHYSICIAN ASSISTANT

## 2023-02-03 RX ADMIN — PROCHLORPERAZINE MALEATE 5 MG: 5 TABLET ORAL at 08:12

## 2023-02-03 RX ADMIN — PANTOPRAZOLE SODIUM 40 MG: 40 TABLET, DELAYED RELEASE ORAL at 16:17

## 2023-02-03 RX ADMIN — ETOPOSIDE 85 MG: 20 INJECTION, SOLUTION, CONCENTRATE INTRAVENOUS at 20:36

## 2023-02-03 RX ADMIN — ALLOPURINOL 300 MG: 300 TABLET ORAL at 08:12

## 2023-02-03 RX ADMIN — TEMAZEPAM 7.5 MG: 7.5 CAPSULE ORAL at 22:20

## 2023-02-03 RX ADMIN — POLYETHYLENE GLYCOL 3350 17 G: 17 POWDER, FOR SOLUTION ORAL at 08:12

## 2023-02-03 RX ADMIN — PANTOPRAZOLE SODIUM 40 MG: 40 TABLET, DELAYED RELEASE ORAL at 08:12

## 2023-02-03 RX ADMIN — PREDNISONE 102 MG: 1 TABLET ORAL at 08:12

## 2023-02-03 RX ADMIN — ONDANSETRON HYDROCHLORIDE 16 MG: 8 TABLET, FILM COATED ORAL at 20:13

## 2023-02-03 RX ADMIN — VINCRISTINE SULFATE 0.7 MG: 1 INJECTION, SOLUTION INTRAVENOUS at 20:35

## 2023-02-03 RX ADMIN — Medication 1 TABLET: at 08:12

## 2023-02-03 RX ADMIN — PREDNISONE 102 MG: 1 TABLET ORAL at 16:16

## 2023-02-03 RX ADMIN — ACYCLOVIR 400 MG: 400 TABLET ORAL at 20:13

## 2023-02-03 RX ADMIN — ACYCLOVIR 400 MG: 400 TABLET ORAL at 08:12

## 2023-02-03 RX ADMIN — SENNOSIDES AND DOCUSATE SODIUM 2 TABLET: 50; 8.6 TABLET ORAL at 20:14

## 2023-02-03 RX ADMIN — OXYCODONE HYDROCHLORIDE AND ACETAMINOPHEN 500 MG: 500 TABLET ORAL at 08:12

## 2023-02-03 ASSESSMENT — ACTIVITIES OF DAILY LIVING (ADL)
ADLS_ACUITY_SCORE: 20

## 2023-02-03 NOTE — PHARMACY-ADMISSION MEDICATION HISTORY
Admission Medication History Completed by Pharmacy    See [facility] Admission Navigator for allergy information, preferred outpatient pharmacy, prior to admission medications and immunization status.     Medication History Sources:     Patient    Dispense history    Changes made to PTA medication list (reason):    Added: None    Deleted:  o Methylprednisolone before and after procedure  o Dexamethasone    Changed: None    Additional Information:    Has been taking ondansetron every morning and has been keeping nausea under control    Typically only needs temazepam while taking steroids    Prior to Admission medications    Medication Sig Last Dose Taking? Auth Provider Long Term End Date   acyclovir (ZOVIRAX) 400 MG tablet Take 1 tablet (400 mg) by mouth every 12 hours for 60 days 2/2/2023 at AM Yes Lieda Barton PA-C Yes 2/20/23   allopurinol (ZYLOPRIM) 300 MG tablet Take 1 tablet (300 mg) by mouth daily 2/2/2023 Yes Leida Barton PA-C     ascorbic acid (VITAMIN C) 500 MG tablet Take 500 mg by mouth every morning 2/2/2023 Yes Reported, Patient     fluconazole (DIFLUCAN) 200 MG tablet Take 1 tablet (200 mg) by mouth daily 2/2/2023 Yes Leida Barton PA-C     levofloxacin (LEVAQUIN) 250 MG tablet Take 1 tablet (250 mg) by mouth daily 2/1/2023 Yes Leida Barton PA-C     Multiple Vitamins-Minerals (MULTIVITAL PO) Take 1 tablet by mouth every morning 2/2/2023 Yes Reported, Patient     omeprazole (PRILOSEC) 40 MG DR capsule Take 1 capsule (40 mg) by mouth daily 2/2/2023 Yes Melida Hines APRN CNP     ondansetron (ZOFRAN) 8 MG tablet Take 1 tablet (8 mg) by mouth every 8 hours as needed for nausea 2/2/2023 at AM Yes Leida Barton PA-C     polyethylene glycol (MIRALAX) 17 GM/Dose powder Take 17 g by mouth daily as needed for constipation 2/2/2023 at AM Yes Melida Hines APRN CNP     prochlorperazine (COMPAZINE) 10 MG tablet Take 1 tablet (10 mg) by mouth every 6 hours as needed for  nausea or vomiting  Yes Leida Barton PA-C     senna-docusate (SENNA S) 8.6-50 MG tablet Take 1 tablet by mouth 2 times daily as needed for constipation 2/1/2023 at PM Yes Melida Hines APRN CNP     temazepam (RESTORIL) 7.5 MG capsule Take 1 capsule (7.5 mg) by mouth nightly as needed for sleep Past Month Yes Leida Barton PA-C No        Date completed: 02/02/23    Medication history completed by:  Mary Saeed, PharmD  PGY2 Oncology Pharmacy Resident  February 2, 2023

## 2023-02-03 NOTE — PROGRESS NOTES
"CLINICAL NUTRITION SERVICES - ASSESSMENT NOTE     Nutrition Prescription    Malnutrition Status:    Severe malnutrition in the context of chronic    Recommendations already ordered by Registered Dietitian (RD):  1. Ensure Enlive BID     Future/Additional Recommendations:  -- monitor oral intake  -- update NFPE/nutrition hx as able     REASON FOR ASSESSMENT  Shahid Davis is a/an 72 year old male assessed by the dietitian for Admission Nutrition Risk Screen for positive    NUTRITION HISTORY  Patient was busy with other disciplines when this writer attempted to visit. Patient was last seen by RD (1/13) at that time he reported losing weight despite trying to eat more. He was receiving ensure during last admission     CURRENT NUTRITION ORDERS  Diet: High Kcal/High Protein  Intake/Tolerance: 100% oral intake of meals since admission.     LABS  Labs reviewed  (2/3): BUN 30.3 mg/dL (H), Cr 0.78 mg/dL     MEDICATIONS  Medications reviewed  Thera-vit-m  zofran  Vitamin C     ANTHROPOMETRICS  Height: 170.2 cm (5' 7\")  Most Recent Weight: 56.3 kg (124 lb 1.6 oz)    IBW: 67.3 kg  BMI: Normal BMI  Weight History:   Wt Readings from Last 10 Encounters:   02/03/23 56.3 kg (124 lb 1.6 oz)   01/15/23 63.9 kg (140 lb 12.8 oz)   12/22/22 60.8 kg (134 lb 1.6 oz)   12/02/22 65.3 kg (144 lb)   12/02/22 65.5 kg (144 lb 4.8 oz)   07/07/22 61.2 kg (135 lb)   06/02/22 62.5 kg (137 lb 12.8 oz)   11/18/21 63.2 kg (139 lb 4.8 oz)   07/09/21 64.5 kg (142 lb 3.2 oz)   07/01/21 65.8 kg (145 lb)   12% weight loss in 1 month   Dosing Weight: 56 kg (admit weight)     ASSESSED NUTRITION NEEDS  Estimated Energy Needs: 5357-4635+ kcals/day (30 - 35+ kcals/kg )  Justification: Increased needs  Estimated Protein Needs: 67-84+ grams protein/day (1.2 - 1.5+ grams of pro/kg)  Justification: Increased needs  Estimated Fluid Needs: (1 mL/kcal)   Justification: Maintenance    PHYSICAL FINDINGS  See malnutrition section below.    MALNUTRITION  % Intake: No " decreased intake noted  % Weight Loss: > 5% in 1 month (severe)  Subcutaneous Fat Loss: Upper arm, Lower arm and Thoracic/intercostal: all Severe - per last RD note (1/13/23)  Muscle Loss: Temporal: Moderate, Scapular bone, Thoracic region (clavicle, acromium bone, deltoid, trapezius, pectoral), Upper arm (bicep, tricep), Lower arm  (forearm), and Dorsal hand: all Severe - per previous RD note (1/13/23)  Fluid Accumulation/Edema: None noted  Malnutrition Diagnosis: Severe malnutrition in the context of chronic    NUTRITION DIAGNOSIS  Unintended weight loss related to suspected hypercatabolism with illness as evidenced by with additional weight loss of 16 pounds since (1/15/23) despite eating well      INTERVENTIONS  Implementation  Medical food supplement therapy     Goals  Patient to consume % of nutritionally adequate meal trays TID, or the equivalent with supplements/snacks.     Monitoring/Evaluation  Progress toward goals will be monitored and evaluated per protocol.    Jennie Benitez, MS/RD/LD  6A/7D RD pager: [phone]  Weekend/Holiday RD pager: [phone]

## 2023-02-03 NOTE — PROVIDER NOTIFICATION
"[facility] messaging to SONJA Ware      \"7D - 7284 - JTENNILLE.  Pt is asking for a high Kcal/protein diet. Great appetite, concerned for losing weight with chemo in past. Is this appropriate?  Zara, [id number]\"    Outcome: diet order updated  "
Dr George notified of pt's arrival to unit.  Ready to be seen.  
What Type Of Note Output Would You Prefer (Optional)?: Standard Output
How Severe Is Your Skin Lesion?: mild
Has Your Skin Lesion Been Treated?: not been treated
Is This A New Presentation, Or A Follow-Up?: Skin Lesions

## 2023-02-03 NOTE — PROGRESS NOTES
Nursing Focus: Chemotherapy  D: Positive blood return via R chest port. Insertion site is clean/dry/intact, dressing intact with no complaints of pain. Urine output is recorded in intake in Doc Flowsheet.    I: Premedications given per order (see electronic medical administration record). Dose #1 of Rituxan started to infuse over 90 minutes per rapid infusion orders. Dose #1 of Etoposide started to infuse over 22 hours. Dose #1 of Doxorubicin/Vincristine started to infuse over 22 hours. Reviewed pt teaching on chemotherapy side effects. Pt denies need for further teaching. Chemotherapy double checked per protocol by two chemotherapy competent RN's.   A: Tolerating procedure well. Denies nausea and or pain.   P: Continue to monitor urine output and symptoms of nausea. Screen for symptoms of toxicity.    Pt tolerated rapid infusion Rituxan without s/s of reaction.

## 2023-02-03 NOTE — PROGRESS NOTES
Nursing Focus: Admission    D: Patient admitted/transferred from [facility] via self for C2 R-DA-EPOCH.      I: Upon arrival to the unit patient was oriented to room, unit, and call light. Patient s height, weight, and vital signs were obtained. Allergies reviewed and allergy band applied. MD notified of patient s arrival on the unit. Adult AVS completed. Head to toe assessment completed. Education assessment completed. Care plan initiated.     A: Vital signs stable upon admission. Patient denies pain. Two RN skin assessment completed: Yes. Second RN was Shanell KAN No significant skin findings. [facility] Nurse Consult Ordered: No. Bed Algorithm can be found in PCS flow sheets (Support Surface Algorithm) and on IP [facility] NURSE RESOURCE TAB, was this used during this assessment? Yes. Was a pulsate mattress ordered? No.     P: Continue to monitor and intervene as needed. Continue with plan of care. Notify MD with any concerns or changes in patient status.

## 2023-02-03 NOTE — PROGRESS NOTES
[facility]: Cancer Care                                                                                          Orders for clonality and MRD tracking sent to [employer] for clonoSEQ testing.   Tissue for clonality testing to be requested by [employer].  Peripheral blood taken during clinic appointment today and submitted in clonoSEQ MRD testing kit and shipped via [employer] shipping label/package provided in testing kit.        Signature:  Alton Lainez DNP, RN, OCN  RN Care Coordinator  [facility]

## 2023-02-03 NOTE — PLAN OF CARE
"Goal Outcome Evaluation:    5972-4199    /67 (BP Location: Right arm)   Pulse 94   Temp 98  F (36.7  C) (Oral)   Resp 16   Ht 1.702 m (5' 7\")   Wt 57.4 kg (126 lb 9.6 oz)   SpO2 97%   BMI 19.83 kg/m      Reason for admission: Admitted on 2/2/23 for planned C2 R-DA-EPOCH.  Activity: UAL  Pain: Denies  Neuro: AxOx4. Neuros intact.  Cardiac: WDL  Respiratory: NLB on RA. O2 sats WDL.   GI/: Voiding not saving, reports WDL. LBM today 2/2 PTA.   Diet: Regular diet, good appetite.   Lines: R chest port intact, infusing CIVI chemotherapy. Site WDL.   Wounds: No noted deficits.    Labs/imaging: Reviewed. See chart.       New changes this shift: Pt tolerated rapid infusion Rituxan without s/s of reaction. D1 Dox/Vincristine, Etoposide started to infuse at 2200.     Continue to monitor and follow POC      "

## 2023-02-03 NOTE — PROGRESS NOTES
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 Received C1 of R-CHOP (J4T1=8812/20/22) prior to change in therapy to DA-R-EPOCH once cytogenetic results were available. He was admitted on 2/2/23 for planned C2 R-DA-EPOCH.     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 Ruelas. Pt has a h/o low-grade kappa restricted plasmacytoid B-cell lymphoma (diagnosed in 3/2004) treated with x6 cycles of fludarabine based chemo and XRT to spine, then has been on surveillance since 2005. He presented with progressive B-symptoms, abdominal pain, and SOB. PET 12/9 showed extensive lymphomatous involvement to the right pleura w/ FDG-avid effusions, mediastinal and pericardial regions, right anterolateral chest wall, adenopathy above and below the diaphragm, liver, spleen and multiple sites in the skeleton, concerning for transformation from his low-grade lymphoma. He also has pancytopenia which seems to have progressed slowly over the past years. Of note, reports maternal history of Waldenstrom's. Due to worsening shortness of breath and delays of biopsy, patient presented to ED on 12/14 and was subsequently admitted for expedited workup and treatment. As SUV of the sternoclavicular mass was the highest, patient underwent US-guided sternoclavicular soft tissue mass biopsy with IR 12/15, pathology from which confirmed a diagnosis of high-grade B-cell lymphoma. Baseline ECHO (12/15) with LVEF 60-65%, mild aortic insufficiency, no evidence of effusion or tamponade.  Received 1 cycle of R-CHOP (C1D1 = 12/20/22) which was well-tolerated. Cytogenetics then returned, revealed rearrangements in MYC, BCL2, and BCL6, consistent with triple hit lymphoma. He was then changed to DA-R-EPOCH and proceeded with C1 on 1/11/23 (C2 total of therapy). He was re-admitted on 2/2/23 for planned C2 of DA-R-EPOCH. His ANC ricci was 1.9 and plt ricci was 37K on 1/23. However, patient does not feel he can tolerate a higher dose, will plan to keep patient at same dose for cycle 3 per outpatient notes  - CNS IPI was 4 (1 point each for age, LDH, stage IV disease, and extranodal involvement); as such, CNS ppx with IT chemotherapy was recommended. CSF flow negative (1/12) with C1. Will plan to repeat again with C2 (and subsequent cycles). Patient requests LP to be done in XR. Scheduled for Monday 2/6 at 1300.  - PET/CT (2/2) - Completed today, prior to admission. Initial imaging reviewed in clinic, final read pending.                              Treatment Plan: DA-R- EPOCH. C2D1=2/2/23                          - Rituximab 375 mg/m2 IV x1 dose -- Day 1                            - Etoposide 50 mg/m2 IV q22h x4 doses -- Days 1-4                            - Vincristine 0.4 mg/m2 IV q22h x4 doses --  Days 1-4                            - Doxorubicin 10 mg/m2 IV q22h x4 doses -- Days 1-4                          - Cyclophosphamide 750 mg/m2 IV x1 dose -- Day 5                          - Prednisone 60 mg/m2 PO BID x10 doses -- Days 1-5                            - Dexamethasone 8 mg daily x2 doses -- Days 6-7                          - Neulasta 6 mg subQ x1 dose -- Day 6; will need to be arranged at [facility] in [place] on ~2/8/23                             - Predmeds: Tylenol, Benadryl, Zofran, Emend     # Anemia   Secondary to chemotherapy and underlying lymphoma.   - Monitor CBC daily  - Transfuse if Hgb <7 and plt <10k     # Risk for TLS  On 12/15, uric acid peak 9.1. Cr, K, Ca, and phos all  WNL. S/p rasburicase 12/15 with resolution of hyperuricemia.  - Monitor TLS/DIC labs daily  - Allopurinol 300 mg daily  - Minimize IVF given malignant pleural effusions although could consider gentle IVF if needed.     ID  # Asymptomatic COVID-19 infection  Noted to be COVID+ on PCR done 1/9/23. Admitted for planned chemotherapy, as above, and treated with IV remdesivir x3 days (1/11-1/13). Patient was asymptomatic with his infection and satting okay on room air. Repeat COVID testing on 2/2 continues to be positive, cycle threshold pending (but reportedly <35).  - Continue COVID-19 precautions for now - repeat test on 2/2 is positive, will run cycle threshold and if >35 can discontinue precautions      # Hypogammaglobulinemia   IgG 310 (12/2022). Status-post IVIG on 1/15/23.  - Would repeat IgG monthly; give IVIG for IgG <400     # PPX  -  mg BID   - Nebulized pentamidine given 1/15/23; next due ~2/12  - Hold levofloxacin/fluconazole given ANC >1000     CV  # History of complete heart block s/p pacemaker placement (2020)  Followed by Dr. Fox; last seen 7/7/22. Pacemaker last interrogated in 11/2022.  - No acute inpatient management needs  - Continue cardiology follow-up and device checks as recommended     MISC  # GERD - Continue PTA omeprazole.  # Insomnia - PTA on temazepam 7.5 mg HS PRN for sleep; last prescribed #5 tablets on 1/13/23     FEN  Diet: Regular Diet Adult   IVF: Bolus PRN (judiciously given pleural effusions)  Lytes: Replete PRN per protocol      PPX  VTE: Lovenox   Bowel: Senna/MiraLax PRN     Code Status: Full Code  Lines/Drains: Port recently placed 1/5/23   Dispo: Inpatient admission to Heme Malignancy service for C2 R-DA-EPOCH. Anticipate discharge to home following completion of the chemotherapy cycle, likely ~2/7/23.     Follow Up: Follows with Dr. Ruelas; will request pending final treatment plan. Local twice weekly labs will need to be requested at [facility] (FAX to be sent to  [phone]). Neulasta can be arranged at [facility] in [place] as he has seen a provider there in the past. Orders to be faxed to [phone] ATTN: [facility]Hospital.        Clinically Significant Risk Factors Present on Admission           # Hypercalcemia: Highest Ca = 10.2 mg/dL in last 2 days, will monitor as appropriate                     This plan of care has been discussed with the staff physician, Dr. Rodriguez.    Jeanie Omalley PA-C  Hematology/Oncology  Pager: #[phone]    Interval History   Shahid is doing well this morning. He feels really good and denies any side effects from chemotherapy thus far. He is really hungry and asks to be on a high Kcal/protein diet so he can order more food. He is very pleased with his PET/CT results from yesterday.    A comprehensive review of symptoms was performed and was negative except as detailed in the interval history above.    Physical Exam   Vital Signs with Ranges  Temp:  [97.6  F (36.4  C)-98  F (36.7  C)] 97.8  F (36.6  C)  Pulse:  [] 91  Resp:  [16-18] 18  BP: (101-126)/(62-72) 116/69  SpO2:  [95 %-98 %] 98 %    I/O last 3 completed shifts:  In: 673.3 [P.O.:600; IV Piggyback:73.3]  Out: 250 [Urine:250]    Vitals:    02/02/23 1637 02/03/23 0928   Weight: 57.4 kg (126 lb 9.6 oz) 56.3 kg (124 lb 1.6 oz)       Constitutional: Pleasant and cooperative male. Awake, alert, NAD. Thin.  HEENT: NC/AT, EOMI, sclera clear, conjunctiva normal, OP with MMM  Respiratory: No increased work of breathing, CTAB, no crackles or wheezing.  Cardiovascular: RRR, no murmur noted. No peripheral edema.  GI: Normal bowel sounds, soft, non-distended and non-tender.  Skin: Warm, dry, well-perfused. No bruising, bleeding, rashes, or lesions on limited exam.  Neurologic: A&O. Answers questions appropriately. Moves all extremities spontaneously.  Psych: Calm, appropriate affect  Vascular access:  Port-a-cath upper chest without erythema or edema.    Medications     - MEDICATION  INSTRUCTIONS -           acyclovir  400 mg Oral Q12H     allopurinol  300 mg Oral Daily     Chemotherapy Infusing-Continuous Infusion   Does not apply Q8H     [START ON 2/6/2023] cyclophosphamide (CYTOXAN) infusion  750 mg/m2 (Treatment Plan Recorded) Intravenous Once     [START ON 2/6/2023] INTRATHECAL - Cytarabine and/or methotrexate and/or Hydrocortisone   Intrathecal Once     [START ON 2/8/2023] dexamethasone  8 mg Oral Daily     [START ON 2/7/2023] dextrose 5% water  10-20 mL Intracatheter Daily at 8 pm     [START ON 2/7/2023] dextrose 5% water  10-20 mL Intracatheter Daily at 8 pm     etoposide  50 mg/m2 (Treatment Plan Recorded) Intravenous Q22H     [START ON 2/7/2023] filgrastim-aafi  5 mcg/kg (Treatment Plan Recorded) Intravenous Daily at 8 pm     [START ON 2/6/2023] fosaprepitant (EMEND) intermittent infusion ( mL)  150 mg Intravenous Once     heparin  5-10 mL Intracatheter Q28 Days     heparin lock flush  5-10 mL Intracatheter Q24H     multivitamin w/minerals  1 tablet Oral Daily     ondansetron  16 mg Oral Q22H     pantoprazole  40 mg Oral BID AC     predniSONE  102 mg Oral BID     senna-docusate  2 tablet Oral BID     sodium chloride (PF)  10-20 mL Intracatheter Q28 Days     vinCRIStine/DOXOrubicin (ONCOVIN/ADRIAMYCIN) infusion  0.4 mg/m2 (Treatment Plan Recorded) Intravenous Q22H     vitamin C  500 mg Oral QAM       Antiinfectives  Anti-infectives (From now, onward)    Start     Dose/Rate Route Frequency Ordered Stop    02/02/23 2000  acyclovir (ZOVIRAX) tablet 400 mg        Note to Pharmacy: PTA Sig:Take 1 tablet (400 mg) by mouth every 12 hours for 60 days      400 mg Oral EVERY 12 HOURS 02/02/23 1634            Data   CBC   Recent Labs   Lab 02/03/23  0618 02/02/23  1731 02/02/23  1309   WBC 10.1 11.5* 9.1   RBC 2.98* 3.62* 3.58*   HGB 9.3* 11.5* 11.2*   HCT 29.1* 35.2* 34.2*   MCV 98 97 96   MCH 31.2 31.8 31.3   MCHC 32.0 32.7 32.7   RDW 16.8* 17.0* 16.6*    248 220       CMP   Recent  Labs   Lab 02/03/23  0618 02/02/23  1731 02/02/23  1309    139 140   POTASSIUM 4.1 4.6 4.4   CHLORIDE 105 103 104   CO2 24 23 22   ANIONGAP 10 13 14   * 145* 185*   BUN 30.3* 37.6* 32.2*   CR 0.78 0.90 0.87   GFRESTIMATED >90 90 >90   JORGE 9.5 10.2 9.9   MAG 2.0 2.1  --    PHOS 3.6 3.3  --    PROTTOTAL 5.5* 6.8 6.4   ALBUMIN 3.8 4.6 4.3   BILITOTAL 0.2 0.2 0.2   ALKPHOS 95 122 116   AST 22 27 24   ALT 28 35 36       LFTs   Recent Labs   Lab 02/03/23 0618   PROTTOTAL 5.5*   ALBUMIN 3.8   BILITOTAL 0.2   ALKPHOS 95   AST 22   ALT 28       Coagulation Studies   Recent Labs   Lab 02/03/23  0618 02/02/23  1731   INR 1.09 0.99   PTT  --  47*

## 2023-02-04 LAB
ALBUMIN SERPL BCG-MCNC: 3.3 G/DL (ref 3.5–5.2)
ALP SERPL-CCNC: 77 U/L (ref 40–129)
ALT SERPL W P-5'-P-CCNC: 24 U/L (ref 10–50)
ANION GAP SERPL CALCULATED.3IONS-SCNC: 7 MMOL/L (ref 7–15)
AST SERPL W P-5'-P-CCNC: 19 U/L (ref 10–50)
BASOPHILS # BLD AUTO: 0 10E3/UL (ref 0–0.2)
BASOPHILS NFR BLD AUTO: 0 %
BILIRUB SERPL-MCNC: 0.2 MG/DL
BUN SERPL-MCNC: 28.9 MG/DL (ref 8–23)
CALCIUM SERPL-MCNC: 8.5 MG/DL (ref 8.8–10.2)
CHLORIDE SERPL-SCNC: 109 MMOL/L (ref 98–107)
CREAT SERPL-MCNC: 0.75 MG/DL (ref 0.67–1.17)
DEPRECATED HCO3 PLAS-SCNC: 23 MMOL/L (ref 22–29)
EOSINOPHIL # BLD AUTO: 0 10E3/UL (ref 0–0.7)
EOSINOPHIL NFR BLD AUTO: 0 %
ERYTHROCYTE [DISTWIDTH] IN BLOOD BY AUTOMATED COUNT: 16.9 % (ref 10–15)
FIBRINOGEN PPP-MCNC: 218 MG/DL (ref 170–490)
GFR SERPL CREATININE-BSD FRML MDRD: >90 ML/MIN/1.73M2
GLUCOSE SERPL-MCNC: 111 MG/DL (ref 70–99)
HCT VFR BLD AUTO: 26.4 % (ref 40–53)
HGB BLD-MCNC: 8.4 G/DL (ref 13.3–17.7)
IMM GRANULOCYTES # BLD: 0.1 10E3/UL
IMM GRANULOCYTES NFR BLD: 1 %
INR PPP: 1.13 (ref 0.85–1.15)
LDH SERPL L TO P-CCNC: 195 U/L (ref 0–250)
LYMPHOCYTES # BLD AUTO: 0.5 10E3/UL (ref 0.8–5.3)
LYMPHOCYTES NFR BLD AUTO: 6 %
MAGNESIUM SERPL-MCNC: 2.1 MG/DL (ref 1.7–2.3)
MCH RBC QN AUTO: 31.6 PG (ref 26.5–33)
MCHC RBC AUTO-ENTMCNC: 31.8 G/DL (ref 31.5–36.5)
MCV RBC AUTO: 99 FL (ref 78–100)
MONOCYTES # BLD AUTO: 0.3 10E3/UL (ref 0–1.3)
MONOCYTES NFR BLD AUTO: 3 %
NEUTROPHILS # BLD AUTO: 8.1 10E3/UL (ref 1.6–8.3)
NEUTROPHILS NFR BLD AUTO: 90 %
NRBC # BLD AUTO: 0 10E3/UL
NRBC BLD AUTO-RTO: 0 /100
PHOSPHATE SERPL-MCNC: 3.1 MG/DL (ref 2.5–4.5)
PLATELET # BLD AUTO: 131 10E3/UL (ref 150–450)
POTASSIUM SERPL-SCNC: 3.9 MMOL/L (ref 3.4–5.3)
PROT SERPL-MCNC: 4.8 G/DL (ref 6.4–8.3)
RBC # BLD AUTO: 2.66 10E6/UL (ref 4.4–5.9)
SODIUM SERPL-SCNC: 139 MMOL/L (ref 136–145)
URATE SERPL-MCNC: 3 MG/DL (ref 3.4–7)
WBC # BLD AUTO: 9.1 10E3/UL (ref 4–11)

## 2023-02-04 PROCEDURE — 250N000013 HC RX MED GY IP 250 OP 250 PS 637: Performed by: INTERNAL MEDICINE

## 2023-02-04 PROCEDURE — 120N000002 HC R&B MED SURG/OB UMMC

## 2023-02-04 PROCEDURE — 84550 ASSAY OF BLOOD/URIC ACID: CPT | Performed by: PHYSICIAN ASSISTANT

## 2023-02-04 PROCEDURE — 85025 COMPLETE CBC W/AUTO DIFF WBC: CPT | Performed by: PHYSICIAN ASSISTANT

## 2023-02-04 PROCEDURE — 83615 LACTATE (LD) (LDH) ENZYME: CPT | Performed by: PHYSICIAN ASSISTANT

## 2023-02-04 PROCEDURE — 36591 DRAW BLOOD OFF VENOUS DEVICE: CPT | Performed by: PHYSICIAN ASSISTANT

## 2023-02-04 PROCEDURE — 83735 ASSAY OF MAGNESIUM: CPT | Performed by: PHYSICIAN ASSISTANT

## 2023-02-04 PROCEDURE — 85610 PROTHROMBIN TIME: CPT | Performed by: PHYSICIAN ASSISTANT

## 2023-02-04 PROCEDURE — 85384 FIBRINOGEN ACTIVITY: CPT | Performed by: PHYSICIAN ASSISTANT

## 2023-02-04 PROCEDURE — 250N000013 HC RX MED GY IP 250 OP 250 PS 637: Performed by: STUDENT IN AN ORGANIZED HEALTH CARE EDUCATION/TRAINING PROGRAM

## 2023-02-04 PROCEDURE — 258N000003 HC RX IP 258 OP 636: Performed by: INTERNAL MEDICINE

## 2023-02-04 PROCEDURE — 84100 ASSAY OF PHOSPHORUS: CPT | Performed by: PHYSICIAN ASSISTANT

## 2023-02-04 PROCEDURE — 80053 COMPREHEN METABOLIC PANEL: CPT | Performed by: PHYSICIAN ASSISTANT

## 2023-02-04 PROCEDURE — 250N000013 HC RX MED GY IP 250 OP 250 PS 637: Performed by: PHYSICIAN ASSISTANT

## 2023-02-04 PROCEDURE — 99231 SBSQ HOSP IP/OBS SF/LOW 25: CPT | Mod: FS | Performed by: PHYSICIAN ASSISTANT

## 2023-02-04 PROCEDURE — 250N000012 HC RX MED GY IP 250 OP 636 PS 637: Performed by: INTERNAL MEDICINE

## 2023-02-04 PROCEDURE — 250N000011 HC RX IP 250 OP 636: Performed by: INTERNAL MEDICINE

## 2023-02-04 RX ADMIN — PANTOPRAZOLE SODIUM 40 MG: 40 TABLET, DELAYED RELEASE ORAL at 08:20

## 2023-02-04 RX ADMIN — TEMAZEPAM 7.5 MG: 7.5 CAPSULE ORAL at 22:05

## 2023-02-04 RX ADMIN — ETOPOSIDE 85 MG: 20 INJECTION, SOLUTION, CONCENTRATE INTRAVENOUS at 18:12

## 2023-02-04 RX ADMIN — PREDNISONE 102 MG: 1 TABLET ORAL at 16:32

## 2023-02-04 RX ADMIN — ONDANSETRON HYDROCHLORIDE 16 MG: 8 TABLET, FILM COATED ORAL at 18:04

## 2023-02-04 RX ADMIN — PROCHLORPERAZINE MALEATE 10 MG: 5 TABLET ORAL at 08:31

## 2023-02-04 RX ADMIN — ALLOPURINOL 300 MG: 300 TABLET ORAL at 08:20

## 2023-02-04 RX ADMIN — PANTOPRAZOLE SODIUM 40 MG: 40 TABLET, DELAYED RELEASE ORAL at 16:32

## 2023-02-04 RX ADMIN — ACYCLOVIR 400 MG: 400 TABLET ORAL at 08:20

## 2023-02-04 RX ADMIN — PREDNISONE 102 MG: 1 TABLET ORAL at 08:20

## 2023-02-04 RX ADMIN — POLYETHYLENE GLYCOL 3350 17 G: 17 POWDER, FOR SOLUTION ORAL at 08:31

## 2023-02-04 RX ADMIN — SENNOSIDES AND DOCUSATE SODIUM 2 TABLET: 50; 8.6 TABLET ORAL at 20:12

## 2023-02-04 RX ADMIN — Medication 1 TABLET: at 08:18

## 2023-02-04 RX ADMIN — VINCRISTINE SULFATE 0.7 MG: 1 INJECTION, SOLUTION INTRAVENOUS at 18:12

## 2023-02-04 RX ADMIN — ACYCLOVIR 400 MG: 400 TABLET ORAL at 20:12

## 2023-02-04 RX ADMIN — OXYCODONE HYDROCHLORIDE AND ACETAMINOPHEN 500 MG: 500 TABLET ORAL at 08:20

## 2023-02-04 ASSESSMENT — ACTIVITIES OF DAILY LIVING (ADL)
ADLS_ACUITY_SCORE: 22
ADLS_ACUITY_SCORE: 20

## 2023-02-04 NOTE — PROGRESS NOTES
Nursing Focus: Chemotherapy    D: Positive blood return via Port. Insertion site is clean/dry/intact, dressing intact with no complaints of pain. Urine output is recorded in intake in Doc Flowsheet.      I: Premedications given per order (see electronic medical administration record). Dose #2 of Etoposide started to infuse over 22 hours. Reviewed pt teaching on chemotherapy side effects. Pt denies need for further teaching. Chemotherapy double checked per protocol by two chemotherapy competent RN's.     A: Tolerating procedure well. Denies nausea and or pain.     P: Continue to monitor urine output and symptoms of nausea. Screen for symptoms of toxicity.

## 2023-02-04 NOTE — PROGRESS NOTES
Labs and Transfusion Orders:  Date: 2023    Patient: Shahid Davis  : 1950    Diagnosis: High-grade B-cell lymphoma    Medication administration:  [  ] Neulasta (pegfilgrastim) injection 6 mg x1 subcutaneous on 23.    Please call the [facility] at [phone] for any questions, and ask to speak with the care coordinator for Dr. Domitila Ruelas (patient's primary oncologist).    Thank you,         Liyah Padgett PA-C    [facility]  Department of Hematology/Oncology  [address] [place] [address]  Pager: [phone]    Fax to:   [facility]  [phone]  Attn: Amos/Mando

## 2023-02-04 NOTE — PLAN OF CARE
"Time: 7054-7890    No changes this shift except regular diet was changed to high Kcal and high protein diet. Afebrile with VSS on RA. Pleasant and in good spirits, lots of social support over the phone. Interested in daily crossword puzzle. Port infusing CIVI chemo, positive blood return noted. UAL, walked \"100 times!\" in room today.  Great appetite. Denies pain/nausea/SOB. Voiding and saving in urinal. Had a BM today, prefers to take miralax with no senna in AM and then 2 senna in evening. Continue to monitor.   "

## 2023-02-04 NOTE — PLAN OF CARE
2864-6748    A&Ox4. VSS on RA. Denies pain, nausea, and SOB. CIVI chemo infusing with good blood return, tolerating well. Good PO intake. Adequate urine output. Last BM 2/3. Special precautions continued, cycle threshold pending. Up independently, ambulating in room. Pt slept between cares. Continue with plan of care.

## 2023-02-04 NOTE — PROGRESS NOTES
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 Received C1 of R-CHOP (P8C0=5712/20/22) prior to change in therapy to DA-R-EPOCH once cytogenetic results were available. He was admitted on 2/2/23 for planned C2 R-DA-EPOCH.    Today:  - Continue chemotherapy; today is C2D3 of R-EPOCH.  - Requested local labs/transfusions and Neulasta; orders faxed.  - LP with ppx IT chemotherapy on 2/6 at 1300. Hold enoxaparin on 2/5 PM in preparation.  - Anticipate discharge to home likely 2/7 AM, pending timing of chemotherapy completion.     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 Ruelas. Pt has a h/o low-grade kappa restricted plasmacytoid B-cell lymphoma (diagnosed in 3/2004) treated with x6 cycles of fludarabine based chemo and XRT to spine, then has been on surveillance since 2005. He presented with progressive B-symptoms, abdominal pain, and SOB. PET 12/9 showed extensive lymphomatous involvement to the right pleura w/ FDG-avid effusions, mediastinal and pericardial regions, right anterolateral chest wall, adenopathy above and below the diaphragm, liver, spleen and multiple sites in the skeleton, concerning for transformation from his low-grade lymphoma. He also has pancytopenia which seems to have progressed slowly over the past years. Of note, reports maternal history of Waldenstrom's. Due to worsening shortness of breath and delays of biopsy, patient presented to ED on 12/14 and was subsequently admitted for expedited workup and treatment. As SUV of  the sternoclavicular mass was the highest, patient underwent US-guided sternoclavicular soft tissue mass biopsy with IR 12/15, pathology from which confirmed a diagnosis of high-grade B-cell lymphoma. Baseline ECHO (12/15) with LVEF 60-65%, mild aortic insufficiency, no evidence of effusion or tamponade. Received 1 cycle of R-CHOP (C1D1 = 12/20/22) which was well-tolerated. Cytogenetics then returned, revealed rearrangements in MYC, BCL2, and BCL6, consistent with triple hit lymphoma. He was then changed to DA-R-EPOCH and proceeded with C1 on 1/11/23 (C2 total of therapy). He was re-admitted on 2/2/23 for planned C2 of DA-R-EPOCH. His ANC ricci was 1.9 and plt ricci was 37K on 1/23. However, patient does not feel he can tolerate a higher dose, will plan to keep patient at same dose for cycle 3 per outpatient notes  - CNS IPI was 4 (1 point each for age, LDH, stage IV disease, and extranodal involvement); as such, CNS ppx with IT chemotherapy was recommended. CSF flow negative (1/12) with C1. Will plan to repeat again with C2 (and subsequent cycles). Difficult bedside access with C1; requesting future LPs done in XR. Scheduled for Monday, 2/6 at 1300.  - PET/CT (2/2) with excellent partial response: Near or near complete resolution of previously seen hypermetabolism in the thorax. Small area of right paramediastinal pleural thickening with persistent hypermetabolism. Mild persistent FDG uptake in the medial left clavicle and left acetabulum. All other areas of hypermetabolism have resolved.                             Treatment Plan: DA-R- EPOCH. C2D1=2/2/23                           - Rituximab 375 mg/m2 IV x1 dose -- Day 1                           - Etoposide 50 mg/m2 IV q22h x4 doses -- Days 1-4                           - Vincristine 0.4 mg/m2 IV q22h x4 doses --  Days 1-4                           - Doxorubicin 10 mg/m2 IV q22h x4 doses -- Days 1-4                           - Cyclophosphamide 750 mg/m2 IV x1  dose -- Day 5                           - Prednisone 60 mg/m2 PO BID x10 doses -- Days 1-5                           - Dexamethasone 8 mg daily x2 doses -- Days 6-7                           - Neulasta 6 mg subQ x1 dose -- Day 6; will need to be arranged at [facility] in [place] on ~2/8/23                           - Predmeds: Tylenol, Benadryl, Zofran, Emend     # Anemia   Secondary to chemotherapy and underlying lymphoma.   - Monitor CBC daily  - Transfuse if Hgb <7 and plt <10k     # Risk for TLS  On 12/15, uric acid peak 9.1. Cr, K, Ca, and phos all WNL. S/p rasburicase 12/15 with resolution of hyperuricemia.  - Monitor TLS/DIC labs daily  - Allopurinol 300 mg daily  - Minimize IVF given malignant pleural effusions although could consider gentle IVF if needed.     ID  # Asymptomatic COVID-19 infection  Noted to be COVID+ on PCR done 1/9/23. Admitted for planned chemotherapy, as above, and treated with IV remdesivir x3 days (1/11-1/13). Patient was asymptomatic with his infection and satting okay on room air. Repeat COVID testing on 2/2 continues to be positive, cycle threshold pending (but reportedly <35).  - Continue COVID-19 precautions for now - repeat test on 2/2 is positive, will run cycle threshold and if >35 can discontinue precautions      # Hypogammaglobulinemia   IgG 310 (12/2022). Status-post IVIG on 1/15/23.  - Would repeat IgG monthly; give IVIG for IgG <400     # PPX  -  mg BID   - Nebulized pentamidine given 1/15/23; next due ~2/12  - Hold levofloxacin/fluconazole given ANC >1000     CV  # History of complete heart block s/p pacemaker placement (2020)  Followed by Dr. Fox; last seen 7/7/22. Pacemaker last interrogated in 11/2022.  - No acute inpatient management needs  - Continue cardiology follow-up and device checks as recommended     MISC  # GERD - Continue PTA omeprazole.  # Insomnia - PTA on temazepam 7.5 mg HS PRN for sleep; last prescribed #5 tablets on  1/13/23     FEN  Diet: Regular Diet Adult   IVF: Bolus PRN (judiciously given pleural effusions)  Lytes: Replete PRN per protocol      PPX  VTE: Lovenox   Bowel: Senna/MiraLax PRN     Code Status: Full Code  Lines/Drains: Port recently placed 1/5/23   Dispo: Inpatient admission to [facility] service for C2 R-DA-EPOCH. Anticipate discharge to home following completion of the chemotherapy cycle, likely ~2/7/23.     Follow-up: Follows with Dr. Ruelas; will request pending final treatment plan. Local twice weekly labs will need to be requested at [facility] (FAX to be sent to [phone]). Neulasta can be arranged at [facility] in [place] as he has seen a provider there in the past. Orders to be faxed to [phone] ATTN: [facility]/Hospital.      Clinically Significant Risk Factors           # Hypercalcemia: Highest Ca = 10.2 mg/dL in last 2 days, will monitor as appropriate    # Hypoalbuminemia: Lowest albumin = 3.3 g/dL at 2/4/2023  6:00 AM, will monitor as appropriate             # Severe Malnutrition: based on nutrition assessment, PRESENT ON ADMISSION     Staffed with Dr. Rodriguez.    Liyah Padgett PAJevonC  Hematology/Oncology  Pager: #[phone]    I spent 50 minutes in the care of this patient today, which included time necessary for review of interval events, obtaining history and physical exam, ordering medication(s)/test(s) as medically indicated, discussion with interdisciplinary/consult team(s), and documentation time. Over 50% of time was spent face-to-face and/or coordinating care.    Interval History   No acute overnight events. Tolerating chemotherapy well. Endorses good energy and appetite. Ambulating in the room. No nausea, vomiting, diarrhea, edema. Weight up a couple of pounds today, but not feeling puffy. No cough or dyspnea. All questions/concerns addressed at bedside.    A comprehensive review of symptoms was performed and was negative except as detailed in the interval history  above.    Physical Exam   Vital Signs with Ranges  Temp:  [97.7  F (36.5  C)-98.2  F (36.8  C)] 98.2  F (36.8  C)  Pulse:  [77-98] 79  Resp:  [16-18] 16  BP: (101-129)/(56-71) 106/58  SpO2:  [94 %-98 %] 97 %    I/O last 3 completed shifts:  In: 2049.6 [P.O.:1160; I.V.:10; IV Piggyback:879.6]  Out: 1825 [Urine:1825]    Vitals:    02/02/23 1637 02/03/23 0928 02/04/23 0939   Weight: 57.4 kg (126 lb 9.6 oz) 56.3 kg (124 lb 1.6 oz) 59.9 kg (132 lb)     Constitutional: Pleasant and cooperative male. Awake, alert, NAD. Thin and chronically ill appearing.  HEENT: NC/AT, EOMI, sclera clear, conjunctiva normal, OP with MMM  Respiratory: No increased work of breathing, CTAB, no crackles or wheezing.  Cardiovascular: RRR, no murmur noted. No peripheral edema.  GI: Normal bowel sounds, soft, non-distended and non-tender.  MSK: Very thin extremities, no gross deformities.  Skin: Warm, dry, well-perfused. No bruising, bleeding, rashes, or lesions on limited exam.  Neurologic: A&O. Answers questions appropriately. Moves all extremities spontaneously.  Psych: Calm, appropriate affect  Vascular access:  Port-a-cath upper chest without erythema or edema.    Medications     - MEDICATION INSTRUCTIONS -           acyclovir  400 mg Oral Q12H     allopurinol  300 mg Oral Daily     Chemotherapy Infusing-Continuous Infusion   Does not apply Q8H     [START ON 2/6/2023] cyclophosphamide (CYTOXAN) infusion  750 mg/m2 (Treatment Plan Recorded) Intravenous Once     [START ON 2/6/2023] INTRATHECAL - Cytarabine and/or methotrexate and/or Hydrocortisone   Intrathecal Once     [START ON 2/8/2023] dexamethasone  8 mg Oral Daily     [START ON 2/7/2023] dextrose 5% water  10-20 mL Intracatheter Daily at 8 pm     [START ON 2/7/2023] dextrose 5% water  10-20 mL Intracatheter Daily at 8 pm     etoposide  50 mg/m2 (Treatment Plan Recorded) Intravenous Q22H     [START ON 2/7/2023] filgrastim-aafi  5 mcg/kg (Treatment Plan Recorded) Intravenous Daily at 8 pm      [START ON 2/6/2023] fosaprepitant (EMEND) intermittent infusion ( mL)  150 mg Intravenous Once     heparin  5-10 mL Intracatheter Q28 Days     heparin lock flush  5-10 mL Intracatheter Q24H     multivitamin w/minerals  1 tablet Oral Daily     ondansetron  16 mg Oral Q22H     pantoprazole  40 mg Oral BID AC     predniSONE  102 mg Oral BID     senna-docusate  2 tablet Oral BID     sodium chloride (PF)  10-20 mL Intracatheter Q28 Days     vinCRIStine/DOXOrubicin (ONCOVIN/ADRIAMYCIN) infusion  0.4 mg/m2 (Treatment Plan Recorded) Intravenous Q22H     vitamin C  500 mg Oral QAM       Antiinfectives  Anti-infectives (From now, onward)    Start     Dose/Rate Route Frequency Ordered Stop    02/02/23 2000  acyclovir (ZOVIRAX) tablet 400 mg        Note to Pharmacy: JUANIS Sig:Take 1 tablet (400 mg) by mouth every 12 hours for 60 days      400 mg Oral EVERY 12 HOURS 02/02/23 1634            Data   CBC   Recent Labs   Lab 02/04/23  0600 02/03/23  0618 02/02/23  1731 02/02/23  1309   WBC 9.1 10.1 11.5* 9.1   RBC 2.66* 2.98* 3.62* 3.58*   HGB 8.4* 9.3* 11.5* 11.2*   HCT 26.4* 29.1* 35.2* 34.2*   MCV 99 98 97 96   MCH 31.6 31.2 31.8 31.3   MCHC 31.8 32.0 32.7 32.7   RDW 16.9* 16.8* 17.0* 16.6*   * 151 248 220       CMP   Recent Labs   Lab 02/04/23  0600 02/03/23  0618 02/02/23  1731 02/02/23  1309    139 139 140   POTASSIUM 3.9 4.1 4.6 4.4   CHLORIDE 109* 105 103 104   CO2 23 24 23 22   ANIONGAP 7 10 13 14   * 142* 145* 185*   BUN 28.9* 30.3* 37.6* 32.2*   CR 0.75 0.78 0.90 0.87   GFRESTIMATED >90 >90 90 >90   JORGE 8.5* 9.5 10.2 9.9   MAG 2.1 2.0 2.1  --    PHOS 3.1 3.6 3.3  --    PROTTOTAL 4.8* 5.5* 6.8 6.4   ALBUMIN 3.3* 3.8 4.6 4.3   BILITOTAL 0.2 0.2 0.2 0.2   ALKPHOS 77 95 122 116   AST 19 22 27 24   ALT 24 28 35 36       LFTs   Recent Labs   Lab 02/04/23  0600   PROTTOTAL 4.8*   ALBUMIN 3.3*   BILITOTAL 0.2   ALKPHOS 77   AST 19   ALT 24       Coagulation Studies   Recent Labs   Lab 02/04/23  0600  02/03/23  0618 02/02/23  1731   INR 1.13   < > 0.99   PTT  --   --  47*    < > = values in this interval not displayed.

## 2023-02-04 NOTE — PROGRESS NOTES
Labs and Transfusion Orders:  Date: 2023    Patient: Shahid Davis  : 1950    Diagnosis: High-grade B-cell lymphoma    LABS:  [  ] Check CBC with differential and CMP twice a week (fax labs to [facility] at [phone])  [  ] Type and cross PRN    TRANSFUSION PARAMETERS:   [  ] Please transfuse 1 (one) units pRBCs if Hgb is less than or equal to 7.  [  ] Please transfuse 1 (one) unit platelets if plt count less than or equal to 10K.   [  ] If patient experiences transfusion reaction during transfusion:   [  ] 25-50 mg Benadryl IV x once PRN   [  ] Tylenol 1000 mg PO x once PRN   [  ] Hydrocortisone 100 mg IV x once PRN   [  ] Pepcid 40 mg IV x once PRN   [  ] Notify provider covering infusion clinic per protocol    Please call the [facility] at [phone] for any questions, and ask to speak with the care coordinator for Dr. Domitila Ruelas (patient's primary oncologist).    Thank you,         Liyah Padgett PA-C    [facility]  Department of Hematology/Oncology  [address] [place] [address]  Pager: [phone]    Fax to:  [facility]  [phone]

## 2023-02-04 NOTE — PROGRESS NOTES
Nursing Focus: Chemotherapy    D: Positive blood return via Port. Insertion site is clean/dry/intact, dressing intact with no complaints of pain. Urine output is recorded in intake in Doc Flowsheet.      I: Premedications given per order (see electronic medical administration record). Dose #2 of Doxorubicin/Vincristine started to infuse over 22 hours. Reviewed pt teaching on chemotherapy side effects. Pt denies need for further teaching. Chemotherapy double checked per protocol by two chemotherapy competent RN's.     A: Tolerating procedure well. Denies nausea and or pain.     P: Continue to monitor urine output and symptoms of nausea. Screen for symptoms of toxicity.

## 2023-02-05 LAB
ALBUMIN SERPL BCG-MCNC: 3.3 G/DL (ref 3.5–5.2)
ALP SERPL-CCNC: 74 U/L (ref 40–129)
ALT SERPL W P-5'-P-CCNC: 22 U/L (ref 10–50)
ANION GAP SERPL CALCULATED.3IONS-SCNC: 8 MMOL/L (ref 7–15)
AST SERPL W P-5'-P-CCNC: 21 U/L (ref 10–50)
BASOPHILS # BLD AUTO: 0 10E3/UL (ref 0–0.2)
BASOPHILS NFR BLD AUTO: 0 %
BILIRUB SERPL-MCNC: 0.2 MG/DL
BUN SERPL-MCNC: 27.1 MG/DL (ref 8–23)
CALCIUM SERPL-MCNC: 8.4 MG/DL (ref 8.8–10.2)
CHLORIDE SERPL-SCNC: 109 MMOL/L (ref 98–107)
CREAT SERPL-MCNC: 0.75 MG/DL (ref 0.67–1.17)
DEPRECATED HCO3 PLAS-SCNC: 23 MMOL/L (ref 22–29)
EOSINOPHIL # BLD AUTO: 0 10E3/UL (ref 0–0.7)
EOSINOPHIL NFR BLD AUTO: 0 %
ERYTHROCYTE [DISTWIDTH] IN BLOOD BY AUTOMATED COUNT: 16.8 % (ref 10–15)
FIBRINOGEN PPP-MCNC: 196 MG/DL (ref 170–490)
GFR SERPL CREATININE-BSD FRML MDRD: >90 ML/MIN/1.73M2
GLUCOSE SERPL-MCNC: 111 MG/DL (ref 70–99)
HCT VFR BLD AUTO: 26.5 % (ref 40–53)
HGB BLD-MCNC: 8.6 G/DL (ref 13.3–17.7)
IMM GRANULOCYTES # BLD: 0.1 10E3/UL
IMM GRANULOCYTES NFR BLD: 1 %
INR PPP: 1.12 (ref 0.85–1.15)
LDH SERPL L TO P-CCNC: 199 U/L (ref 0–250)
LYMPHOCYTES # BLD AUTO: 0.5 10E3/UL (ref 0.8–5.3)
LYMPHOCYTES NFR BLD AUTO: 8 %
MAGNESIUM SERPL-MCNC: 2.1 MG/DL (ref 1.7–2.3)
MCH RBC QN AUTO: 31.9 PG (ref 26.5–33)
MCHC RBC AUTO-ENTMCNC: 32.5 G/DL (ref 31.5–36.5)
MCV RBC AUTO: 98 FL (ref 78–100)
MONOCYTES # BLD AUTO: 0.3 10E3/UL (ref 0–1.3)
MONOCYTES NFR BLD AUTO: 4 %
NEUTROPHILS # BLD AUTO: 5.6 10E3/UL (ref 1.6–8.3)
NEUTROPHILS NFR BLD AUTO: 87 %
NRBC # BLD AUTO: 0 10E3/UL
NRBC BLD AUTO-RTO: 0 /100
PHOSPHATE SERPL-MCNC: 3.1 MG/DL (ref 2.5–4.5)
PLATELET # BLD AUTO: 118 10E3/UL (ref 150–450)
POTASSIUM SERPL-SCNC: 3.9 MMOL/L (ref 3.4–5.3)
PROT SERPL-MCNC: 4.7 G/DL (ref 6.4–8.3)
RBC # BLD AUTO: 2.7 10E6/UL (ref 4.4–5.9)
SODIUM SERPL-SCNC: 140 MMOL/L (ref 136–145)
URATE SERPL-MCNC: 3.2 MG/DL (ref 3.4–7)
WBC # BLD AUTO: 6.5 10E3/UL (ref 4–11)

## 2023-02-05 PROCEDURE — 99231 SBSQ HOSP IP/OBS SF/LOW 25: CPT | Mod: FS | Performed by: PHYSICIAN ASSISTANT

## 2023-02-05 PROCEDURE — 83735 ASSAY OF MAGNESIUM: CPT | Performed by: PHYSICIAN ASSISTANT

## 2023-02-05 PROCEDURE — 250N000013 HC RX MED GY IP 250 OP 250 PS 637: Performed by: PHYSICIAN ASSISTANT

## 2023-02-05 PROCEDURE — 83615 LACTATE (LD) (LDH) ENZYME: CPT | Performed by: PHYSICIAN ASSISTANT

## 2023-02-05 PROCEDURE — 36591 DRAW BLOOD OFF VENOUS DEVICE: CPT | Performed by: PHYSICIAN ASSISTANT

## 2023-02-05 PROCEDURE — 85004 AUTOMATED DIFF WBC COUNT: CPT | Performed by: PHYSICIAN ASSISTANT

## 2023-02-05 PROCEDURE — 250N000013 HC RX MED GY IP 250 OP 250 PS 637: Performed by: INTERNAL MEDICINE

## 2023-02-05 PROCEDURE — 84550 ASSAY OF BLOOD/URIC ACID: CPT | Performed by: PHYSICIAN ASSISTANT

## 2023-02-05 PROCEDURE — 250N000011 HC RX IP 250 OP 636: Performed by: INTERNAL MEDICINE

## 2023-02-05 PROCEDURE — 120N000002 HC R&B MED SURG/OB UMMC

## 2023-02-05 PROCEDURE — 85384 FIBRINOGEN ACTIVITY: CPT | Performed by: PHYSICIAN ASSISTANT

## 2023-02-05 PROCEDURE — 250N000013 HC RX MED GY IP 250 OP 250 PS 637: Performed by: STUDENT IN AN ORGANIZED HEALTH CARE EDUCATION/TRAINING PROGRAM

## 2023-02-05 PROCEDURE — 80053 COMPREHEN METABOLIC PANEL: CPT | Performed by: PHYSICIAN ASSISTANT

## 2023-02-05 PROCEDURE — 85610 PROTHROMBIN TIME: CPT | Performed by: PHYSICIAN ASSISTANT

## 2023-02-05 PROCEDURE — 250N000012 HC RX MED GY IP 250 OP 636 PS 637: Performed by: INTERNAL MEDICINE

## 2023-02-05 PROCEDURE — 258N000003 HC RX IP 258 OP 636: Performed by: INTERNAL MEDICINE

## 2023-02-05 PROCEDURE — 84100 ASSAY OF PHOSPHORUS: CPT | Performed by: PHYSICIAN ASSISTANT

## 2023-02-05 RX ORDER — LANOLIN ALCOHOL/MO/W.PET/CERES
3 CREAM (GRAM) TOPICAL
Status: DISCONTINUED | OUTPATIENT
Start: 2023-02-05 | End: 2023-02-07 | Stop reason: HOSPADM

## 2023-02-05 RX ADMIN — ETOPOSIDE 85 MG: 20 INJECTION, SOLUTION, CONCENTRATE INTRAVENOUS at 15:55

## 2023-02-05 RX ADMIN — SENNOSIDES AND DOCUSATE SODIUM 2 TABLET: 50; 8.6 TABLET ORAL at 19:43

## 2023-02-05 RX ADMIN — PROCHLORPERAZINE MALEATE 10 MG: 5 TABLET ORAL at 08:48

## 2023-02-05 RX ADMIN — VINCRISTINE SULFATE 0.7 MG: 1 INJECTION, SOLUTION INTRAVENOUS at 15:56

## 2023-02-05 RX ADMIN — PANTOPRAZOLE SODIUM 40 MG: 40 TABLET, DELAYED RELEASE ORAL at 15:30

## 2023-02-05 RX ADMIN — Medication 1 TABLET: at 08:48

## 2023-02-05 RX ADMIN — TEMAZEPAM 7.5 MG: 7.5 CAPSULE ORAL at 22:13

## 2023-02-05 RX ADMIN — OXYCODONE HYDROCHLORIDE AND ACETAMINOPHEN 500 MG: 500 TABLET ORAL at 08:48

## 2023-02-05 RX ADMIN — ALLOPURINOL 300 MG: 300 TABLET ORAL at 08:48

## 2023-02-05 RX ADMIN — POLYETHYLENE GLYCOL 3350 17 G: 17 POWDER, FOR SOLUTION ORAL at 08:47

## 2023-02-05 RX ADMIN — ACYCLOVIR 400 MG: 400 TABLET ORAL at 08:48

## 2023-02-05 RX ADMIN — ACYCLOVIR 400 MG: 400 TABLET ORAL at 19:43

## 2023-02-05 RX ADMIN — PREDNISONE 102 MG: 1 TABLET ORAL at 15:25

## 2023-02-05 RX ADMIN — ONDANSETRON HYDROCHLORIDE 16 MG: 8 TABLET, FILM COATED ORAL at 15:26

## 2023-02-05 RX ADMIN — PREDNISONE 102 MG: 1 TABLET ORAL at 08:48

## 2023-02-05 RX ADMIN — PANTOPRAZOLE SODIUM 40 MG: 40 TABLET, DELAYED RELEASE ORAL at 08:48

## 2023-02-05 ASSESSMENT — ACTIVITIES OF DAILY LIVING (ADL)
ADLS_ACUITY_SCORE: 22

## 2023-02-05 NOTE — PLAN OF CARE
Time: 1997-4693    Afebrile with VSS on RA. Denies pain/nausea/SOB. Better energy than yesterday. Good appetite, eating 100% of meals. Reports regular bowel movements without constipation. Taking Miralax in AM instead of senna. Took a CHG shower. Ambulating in room. Mild edema in legs noted. Denies neuropathy. Talked with writer about more hair loss, but wishes to not shave it off. Last dose of etoposide and oncovin hung, positive blood return noted. Continue to monitor.

## 2023-02-05 NOTE — PLAN OF CARE
[id number]    A&Ox4. VSS on RA. Denies pain, nausea, and SOB. Reports feeling more fatigued this shift. CIVI Etoposide and Vincristine infusing with good blood return, tolerating well. Good PO intake. Adequate urine output. Ambulating independently in the room. No acute events this shift. Continue with plan of care.

## 2023-02-05 NOTE — PROGRESS NOTES
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 Received C1 of R-CHOP (E7U7=1412/20/22) prior to change in therapy to DA-R-EPOCH once cytogenetic results were available. He was admitted on 2/2/23 for planned C2 R-DA-EPOCH.    Today:  - Continue chemotherapy; today is C2D4 of R-EPOCH.  - Requested local labs/transfusions and Neulasta; orders faxed. Will need to follow-up on Monday to ensure receipt and scheduling.  - LP with ppx IT chemotherapy on 2/6 at 1300. Patient is very anxious for this; ordered Ativan as pre-med.  - Anticipate discharge to home likely 2/7 AM, pending timing of chemotherapy completion.     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 Ruelas. Pt has a h/o low-grade kappa restricted plasmacytoid B-cell lymphoma (diagnosed in 3/2004) treated with x6 cycles of fludarabine based chemo and XRT to spine, then has been on surveillance since 2005. He presented with progressive B-symptoms, abdominal pain, and SOB. PET 12/9 showed extensive lymphomatous involvement to the right pleura w/ FDG-avid effusions, mediastinal and pericardial regions, right anterolateral chest wall, adenopathy above and below the diaphragm, liver, spleen and multiple sites in the skeleton, concerning for transformation from his low-grade lymphoma. He also has pancytopenia which seems to have progressed slowly over the past years. Of note, reports maternal history of Waldenstrom's. Due to worsening shortness of breath and delays of biopsy, patient presented to ED  on 12/14 and was subsequently admitted for expedited workup and treatment. As SUV of the sternoclavicular mass was the highest, patient underwent US-guided sternoclavicular soft tissue mass biopsy with IR 12/15, pathology from which confirmed a diagnosis of high-grade B-cell lymphoma. Baseline ECHO (12/15) with LVEF 60-65%, mild aortic insufficiency, no evidence of effusion or tamponade. Received 1 cycle of R-CHOP (C1D1 = 12/20/22) which was well-tolerated. Cytogenetics then returned, revealed rearrangements in MYC, BCL2, and BCL6, consistent with triple hit lymphoma. He was then changed to DA-R-EPOCH and proceeded with C1 on 1/11/23 (C2 total of therapy). He was re-admitted on 2/2/23 for planned C2 of DA-R-EPOCH. His ANC ricci was 1.9 and plt ricci was 37K on 1/23. However, patient does not feel he can tolerate a higher dose, will plan to keep patient at same dose for cycle 3 per outpatient notes  - CNS IPI was 4 (1 point each for age, LDH, stage IV disease, and extranodal involvement); as such, CNS ppx with IT chemotherapy was recommended. CSF flow negative (1/12) with C1. Will plan to repeat again with C2 (and subsequent cycles). Difficult bedside access with C1; requesting future LPs done in XR. Scheduled for Monday, 2/6 at 1300.  - PET/CT (2/2) with excellent partial response: Near or near complete resolution of previously seen hypermetabolism in the thorax. Small area of right paramediastinal pleural thickening with persistent hypermetabolism. Mild persistent FDG uptake in the medial left clavicle and left acetabulum. All other areas of hypermetabolism have resolved.                             Treatment Plan: DA-R- EPOCH. C2D1=2/2/23                           - Rituximab 375 mg/m2 IV x1 dose -- Day 1                           - Etoposide 50 mg/m2 IV q22h x4 doses -- Days 1-4                           - Vincristine 0.4 mg/m2 IV q22h x4 doses --  Days 1-4                           - Doxorubicin 10 mg/m2 IV q22h  x4 doses -- Days 1-4                           - Cyclophosphamide 750 mg/m2 IV x1 dose -- Day 5                           - Prednisone 60 mg/m2 PO BID x10 doses -- Days 1-5                           - Dexamethasone 8 mg daily x2 doses -- Days 6-7                           - Neulasta 6 mg subQ x1 dose -- Day 6; will need to be arranged at [facility] in [place] on ~2/8/23                           - Predmeds: Tylenol, Benadryl, Zofran, Emend     # Anemia   Secondary to chemotherapy and underlying lymphoma.   - Monitor CBC daily  - Transfuse if Hgb <7 and plt <10k     # Risk for TLS  On 12/15, uric acid peak 9.1. Cr, K, Ca, and phos all WNL. S/p rasburicase 12/15 with resolution of hyperuricemia.  - Monitor TLS/DIC labs daily  - Allopurinol 300 mg daily  - Minimize IVF given malignant pleural effusions although could consider gentle IVF if needed.     ID  # Asymptomatic COVID-19 infection  Noted to be COVID+ on PCR done 1/9/23. Admitted for planned chemotherapy, as above, and treated with IV remdesivir x3 days (1/11-1/13). Patient was asymptomatic with his infection and satting okay on room air.   - Continue COVID-19 precautions for now - repeat test on 2/2 is positive, cycle threshold 28.8      # Hypogammaglobulinemia   IgG 310 (12/2022). Status-post IVIG on 1/15/23.  - Would repeat IgG monthly; give IVIG for IgG <400     # PPX  -  mg BID   - Nebulized pentamidine given 1/15/23; next due ~2/12  - Hold levofloxacin/fluconazole given ANC >1000     CV  # History of complete heart block s/p pacemaker placement (2020)  Followed by Dr. Fox; last seen 7/7/22. Pacemaker last interrogated in 11/2022.  - No acute inpatient management needs  - Continue cardiology follow-up and device checks as recommended    # Weight gain  Weight up 8 lbs from admission on 2/5, likely mostly due to hypervolemia. However, no clinical evidence of peripheral/pulmonary edema on exam. Will defer diuresis for now; consider in the  future as needed pending weight trend and symptoms.  - Follow I/Os, daily weights  - Monitor clinically     MISC  # GERD - Continue PTA omeprazole.  # Insomnia - PTA on temazepam 7.5 mg HS PRN for sleep; last prescribed #5 tablets on 1/13/23     FEN  Diet: Regular Diet Adult   IVF: Bolus PRN (judiciously given pleural effusions)  Lytes: Replete PRN per protocol      PPX  VTE: Lovenox   Bowel: Senna/MiraLax PRN     Code Status: Full Code  Lines/Drains: Port recently placed 1/5/23   Dispo: Inpatient admission to [facility] service for C2 R-DA-EPOCH. Anticipate discharge to home following completion of the chemotherapy cycle, likely ~2/7/23.     Follow-up: Follows with Dr. Ruelas; will request pending final treatment plan. Local twice weekly labs will need to be requested at [facility] (FAX to be sent to [phone]). Neulasta can be arranged at [facility] in [place] as he has seen a provider there in the past. Orders to be faxed to [phone] ATTN: [facility].      Clinically Significant Risk Factors          # Hypocalcemia: Lowest Ca = 8.4 mg/dL in last 2 days, will monitor and replace as appropriate     # Hypoalbuminemia: Lowest albumin = 3.3 g/dL at 2/5/2023  6:08 AM, will monitor as appropriate   # Thrombocytopenia: Lowest platelets = 118 in last 2 days, will monitor for bleeding           # Severe Malnutrition: based on nutrition assessment, PRESENT ON ADMISSION     Staffed with Dr. Rodriguez.    Liyah Padgett PA-C  Hematology/Oncology  Pager: #[phone]    I spent 70 minutes in the care of this patient today, which included time necessary for review of interval events, obtaining history and physical exam, ordering medication(s)/test(s) as medically indicated, discussion with interdisciplinary/consult team(s), and documentation time. Over 50% of time was spent face-to-face and/or coordinating care.    Interval History   No acute overnight events. Tolerating chemotherapy well. Endorses good  energy and appetite - ordered a very large breakfast. A little bit more tired yesterday, took a nap in the afternoon. Had a hard time sleeping last night due to steroids. No other new symptoms or issues. Talked for a long time about his upcoming LP; lots of reassurance provided. Also found out we both have a [place] connection, so spent a long time talking about that. All questions/concerns addressed at bedside.    A comprehensive review of symptoms was performed and was negative except as detailed in the interval history above.    Physical Exam   Vital Signs with Ranges  Temp:  [97.5  F (36.4  C)-98.2  F (36.8  C)] 97.5  F (36.4  C)  Pulse:  [72-90] 90  Resp:  [14-16] 16  BP: (106-133)/(56-72) 120/60  SpO2:  [95 %-99 %] 97 %    I/O last 3 completed shifts:  In: 1009.6 [P.O.:120; I.V.:10; IV Piggyback:879.6]  Out: 1450 [Urine:1450]    Vitals:    02/03/23 0928 02/04/23 0939 02/05/23 0914   Weight: 56.3 kg (124 lb 1.6 oz) 59.9 kg (132 lb) 61.1 kg (134 lb 11.2 oz)     Constitutional: Pleasant and cooperative male. Awake, alert, NAD. Thin and chronically ill appearing.  HEENT: NC/AT, EOMI, sclera clear, conjunctiva normal, OP with MMM  Respiratory: No increased work of breathing, CTAB, no crackles or wheezing.  Cardiovascular: RRR, no murmur noted. No peripheral edema.  GI: Normal bowel sounds, soft, non-distended and non-tender.  MSK: Very thin extremities, no gross deformities.  Skin: Warm, dry, well-perfused. No bruising, bleeding, rashes, or lesions on limited exam.  Neurologic: A&O. Answers questions appropriately. Moves all extremities spontaneously.  Psych: Calm, appropriate affect  Vascular access:  Port-a-cath upper chest without erythema or edema.    Medications     - MEDICATION INSTRUCTIONS -           acyclovir  400 mg Oral Q12H     allopurinol  300 mg Oral Daily     Chemotherapy Infusing-Continuous Infusion   Does not apply Q8H     [START ON 2/6/2023] cyclophosphamide (CYTOXAN) infusion  750 mg/m2 (Treatment  Plan Recorded) Intravenous Once     [START ON 2/6/2023] INTRATHECAL - Cytarabine and/or methotrexate and/or Hydrocortisone   Intrathecal Once     [START ON 2/8/2023] dexamethasone  8 mg Oral Daily     [START ON 2/7/2023] dextrose 5% water  10-20 mL Intracatheter Daily at 8 pm     [START ON 2/7/2023] dextrose 5% water  10-20 mL Intracatheter Daily at 8 pm     etoposide  50 mg/m2 (Treatment Plan Recorded) Intravenous Q22H     [START ON 2/7/2023] filgrastim-aafi  5 mcg/kg (Treatment Plan Recorded) Intravenous Daily at 8 pm     [START ON 2/6/2023] fosaprepitant (EMEND) intermittent infusion ( mL)  150 mg Intravenous Once     heparin  5-10 mL Intracatheter Q28 Days     heparin lock flush  5-10 mL Intracatheter Q24H     multivitamin w/minerals  1 tablet Oral Daily     ondansetron  16 mg Oral Q22H     pantoprazole  40 mg Oral BID AC     predniSONE  102 mg Oral BID     senna-docusate  2 tablet Oral BID     sodium chloride (PF)  10-20 mL Intracatheter Q28 Days     vinCRIStine/DOXOrubicin (ONCOVIN/ADRIAMYCIN) infusion  0.4 mg/m2 (Treatment Plan Recorded) Intravenous Q22H     vitamin C  500 mg Oral QAM       Antiinfectives  Anti-infectives (From now, onward)    Start     Dose/Rate Route Frequency Ordered Stop    02/02/23 2000  acyclovir (ZOVIRAX) tablet 400 mg        Note to Pharmacy: [facility] Sig:Take 1 tablet (400 mg) by mouth every 12 hours for 60 days      400 mg Oral EVERY 12 HOURS 02/02/23 1634            Data   CBC   Recent Labs   Lab 02/05/23  0608 02/04/23  0600 02/03/23  0618 02/02/23  1731   WBC 6.5 9.1 10.1 11.5*   RBC 2.70* 2.66* 2.98* 3.62*   HGB 8.6* 8.4* 9.3* 11.5*   HCT 26.5* 26.4* 29.1* 35.2*   MCV 98 99 98 97   MCH 31.9 31.6 31.2 31.8   MCHC 32.5 31.8 32.0 32.7   RDW 16.8* 16.9* 16.8* 17.0*   * 131* 151 248       CMP   Recent Labs   Lab 02/05/23  0608 02/04/23  0600 02/03/23  0618 02/02/23  1731    139 139 139   POTASSIUM 3.9 3.9 4.1 4.6   CHLORIDE 109* 109* 105 103   CO2 23 23 24 23   ANIONGAP  8 7 10 13   * 111* 142* 145*   BUN 27.1* 28.9* 30.3* 37.6*   CR 0.75 0.75 0.78 0.90   GFRESTIMATED >90 >90 >90 90   JORGE 8.4* 8.5* 9.5 10.2   MAG 2.1 2.1 2.0 2.1   PHOS 3.1 3.1 3.6 3.3   PROTTOTAL 4.7* 4.8* 5.5* 6.8   ALBUMIN 3.3* 3.3* 3.8 4.6   BILITOTAL 0.2 0.2 0.2 0.2   ALKPHOS 74 77 95 122   AST 21 19 22 27   ALT 22 24 28 35       LFTs   Recent Labs   Lab 02/05/23  0608   PROTTOTAL 4.7*   ALBUMIN 3.3*   BILITOTAL 0.2   ALKPHOS 74   AST 21   ALT 22       Coagulation Studies   Recent Labs   Lab 02/05/23  0608 02/03/23  0618 02/02/23  1731   INR 1.12   < > 0.99   PTT  --   --  47*    < > = values in this interval not displayed.

## 2023-02-05 NOTE — PLAN OF CARE
"Time: 3408-7386    Afebrile with VSS on RA. Less energy and slightly more nausea today. Dozed off during the day. Walked \"only 100 times instead of 300 times\" in room today. Took full dose of compazine this AM. Regular BM today, taking miralax in AM. Voiding well. Dose #3 of chemo given, positive blood return noted from port. Chemo plan written on whiteboard and pt verbalized understanding and denied further questions. High Kcal/protein diet, eating 100% of meals. Special precautions continued. Continue to monitor.   "

## 2023-02-06 ENCOUNTER — APPOINTMENT (OUTPATIENT)
Dept: INTERVENTIONAL RADIOLOGY/VASCULAR | Facility: CLINIC | Age: 73
DRG: 846 | End: 2023-02-06
Attending: PHYSICIAN ASSISTANT
Payer: MEDICARE

## 2023-02-06 LAB
ABO/RH(D): NORMAL
ALBUMIN SERPL BCG-MCNC: 3.1 G/DL (ref 3.5–5.2)
ALP SERPL-CCNC: 66 U/L (ref 40–129)
ALT SERPL W P-5'-P-CCNC: 25 U/L (ref 10–50)
ANION GAP SERPL CALCULATED.3IONS-SCNC: 7 MMOL/L (ref 7–15)
ANTIBODY SCREEN: NEGATIVE
AST SERPL W P-5'-P-CCNC: 20 U/L (ref 10–50)
BASOPHILS # BLD AUTO: 0 10E3/UL (ref 0–0.2)
BASOPHILS NFR BLD AUTO: 0 %
BILIRUB SERPL-MCNC: 0.3 MG/DL
BUN SERPL-MCNC: 23.9 MG/DL (ref 8–23)
CALCIUM SERPL-MCNC: 8.5 MG/DL (ref 8.8–10.2)
CHLORIDE SERPL-SCNC: 110 MMOL/L (ref 98–107)
CREAT SERPL-MCNC: 0.81 MG/DL (ref 0.67–1.17)
DEPRECATED HCO3 PLAS-SCNC: 25 MMOL/L (ref 22–29)
EOSINOPHIL # BLD AUTO: 0 10E3/UL (ref 0–0.7)
EOSINOPHIL NFR BLD AUTO: 0 %
ERYTHROCYTE [DISTWIDTH] IN BLOOD BY AUTOMATED COUNT: 16.4 % (ref 10–15)
FIBRINOGEN PPP-MCNC: 174 MG/DL (ref 170–490)
GFR SERPL CREATININE-BSD FRML MDRD: >90 ML/MIN/1.73M2
GLUCOSE CSF-MCNC: 77 MG/DL (ref 40–70)
GLUCOSE SERPL-MCNC: 99 MG/DL (ref 70–99)
HCT VFR BLD AUTO: 25.7 % (ref 40–53)
HGB BLD-MCNC: 8.4 G/DL (ref 13.3–17.7)
IMM GRANULOCYTES # BLD: 0.1 10E3/UL
IMM GRANULOCYTES NFR BLD: 1 %
INR PPP: 1.13 (ref 0.85–1.15)
LDH SERPL L TO P-CCNC: 182 U/L (ref 0–250)
LYMPHOCYTES # BLD AUTO: 0.5 10E3/UL (ref 0.8–5.3)
LYMPHOCYTES NFR BLD AUTO: 12 %
MAGNESIUM SERPL-MCNC: 2.2 MG/DL (ref 1.7–2.3)
MCH RBC QN AUTO: 31.6 PG (ref 26.5–33)
MCHC RBC AUTO-ENTMCNC: 32.7 G/DL (ref 31.5–36.5)
MCV RBC AUTO: 97 FL (ref 78–100)
MONOCYTES # BLD AUTO: 0.1 10E3/UL (ref 0–1.3)
MONOCYTES NFR BLD AUTO: 3 %
NEUTROPHILS # BLD AUTO: 3.7 10E3/UL (ref 1.6–8.3)
NEUTROPHILS NFR BLD AUTO: 84 %
NRBC # BLD AUTO: 0 10E3/UL
NRBC BLD AUTO-RTO: 0 /100
PHOSPHATE SERPL-MCNC: 3.4 MG/DL (ref 2.5–4.5)
PLATELET # BLD AUTO: 107 10E3/UL (ref 150–450)
POTASSIUM SERPL-SCNC: 3.6 MMOL/L (ref 3.4–5.3)
PROT CSF-MCNC: 61.4 MG/DL (ref 15–45)
PROT SERPL-MCNC: 4.5 G/DL (ref 6.4–8.3)
RBC # BLD AUTO: 2.66 10E6/UL (ref 4.4–5.9)
SODIUM SERPL-SCNC: 142 MMOL/L (ref 136–145)
SPECIMEN EXPIRATION DATE: NORMAL
URATE SERPL-MCNC: 3.5 MG/DL (ref 3.4–7)
WBC # BLD AUTO: 4.4 10E3/UL (ref 4–11)

## 2023-02-06 PROCEDURE — 36591 DRAW BLOOD OFF VENOUS DEVICE: CPT | Performed by: PHYSICIAN ASSISTANT

## 2023-02-06 PROCEDURE — 87070 CULTURE OTHR SPECIMN AEROBIC: CPT | Performed by: STUDENT IN AN ORGANIZED HEALTH CARE EDUCATION/TRAINING PROGRAM

## 2023-02-06 PROCEDURE — 88184 FLOWCYTOMETRY/ TC 1 MARKER: CPT | Performed by: PHYSICIAN ASSISTANT

## 2023-02-06 PROCEDURE — 258N000003 HC RX IP 258 OP 636: Performed by: INTERNAL MEDICINE

## 2023-02-06 PROCEDURE — 85610 PROTHROMBIN TIME: CPT | Performed by: PHYSICIAN ASSISTANT

## 2023-02-06 PROCEDURE — 83735 ASSAY OF MAGNESIUM: CPT | Performed by: PHYSICIAN ASSISTANT

## 2023-02-06 PROCEDURE — 250N000011 HC RX IP 250 OP 636: Performed by: INTERNAL MEDICINE

## 2023-02-06 PROCEDURE — 96450 CHEMOTHERAPY INTO CNS: CPT | Mod: GC | Performed by: RADIOLOGY

## 2023-02-06 PROCEDURE — 84100 ASSAY OF PHOSPHORUS: CPT | Performed by: PHYSICIAN ASSISTANT

## 2023-02-06 PROCEDURE — 80053 COMPREHEN METABOLIC PANEL: CPT | Performed by: PHYSICIAN ASSISTANT

## 2023-02-06 PROCEDURE — 250N000013 HC RX MED GY IP 250 OP 250 PS 637: Performed by: STUDENT IN AN ORGANIZED HEALTH CARE EDUCATION/TRAINING PROGRAM

## 2023-02-06 PROCEDURE — 84550 ASSAY OF BLOOD/URIC ACID: CPT | Performed by: PHYSICIAN ASSISTANT

## 2023-02-06 PROCEDURE — 250N000013 HC RX MED GY IP 250 OP 250 PS 637: Performed by: INTERNAL MEDICINE

## 2023-02-06 PROCEDURE — 250N000012 HC RX MED GY IP 250 OP 636 PS 637: Performed by: INTERNAL MEDICINE

## 2023-02-06 PROCEDURE — 85384 FIBRINOGEN ACTIVITY: CPT | Performed by: PHYSICIAN ASSISTANT

## 2023-02-06 PROCEDURE — 250N000011 HC RX IP 250 OP 636: Performed by: PHYSICIAN ASSISTANT

## 2023-02-06 PROCEDURE — 82945 GLUCOSE OTHER FLUID: CPT | Performed by: PHYSICIAN ASSISTANT

## 2023-02-06 PROCEDURE — 84157 ASSAY OF PROTEIN OTHER: CPT | Performed by: PHYSICIAN ASSISTANT

## 2023-02-06 PROCEDURE — 120N000002 HC R&B MED SURG/OB UMMC

## 2023-02-06 PROCEDURE — 83615 LACTATE (LD) (LDH) ENZYME: CPT | Performed by: PHYSICIAN ASSISTANT

## 2023-02-06 PROCEDURE — 86901 BLOOD TYPING SEROLOGIC RH(D): CPT | Performed by: PHYSICIAN ASSISTANT

## 2023-02-06 PROCEDURE — 250N000009 HC RX 250: Performed by: RADIOLOGY

## 2023-02-06 PROCEDURE — 86850 RBC ANTIBODY SCREEN: CPT | Performed by: PHYSICIAN ASSISTANT

## 2023-02-06 PROCEDURE — 3E0R305 INTRODUCTION OF OTHER ANTINEOPLASTIC INTO SPINAL CANAL, PERCUTANEOUS APPROACH: ICD-10-PCS

## 2023-02-06 PROCEDURE — 96450 CHEMOTHERAPY INTO CNS: CPT

## 2023-02-06 PROCEDURE — 88187 FLOWCYTOMETRY/READ 2-8: CPT | Performed by: PATHOLOGY

## 2023-02-06 PROCEDURE — 99231 SBSQ HOSP IP/OBS SF/LOW 25: CPT | Mod: FS | Performed by: PHYSICIAN ASSISTANT

## 2023-02-06 PROCEDURE — 88185 FLOWCYTOMETRY/TC ADD-ON: CPT | Performed by: PHYSICIAN ASSISTANT

## 2023-02-06 PROCEDURE — 77003 FLUOROGUIDE FOR SPINE INJECT: CPT | Mod: 26 | Performed by: RADIOLOGY

## 2023-02-06 PROCEDURE — 85004 AUTOMATED DIFF WBC COUNT: CPT | Performed by: PHYSICIAN ASSISTANT

## 2023-02-06 PROCEDURE — 250N000013 HC RX MED GY IP 250 OP 250 PS 637: Performed by: PHYSICIAN ASSISTANT

## 2023-02-06 PROCEDURE — 009U3ZX DRAINAGE OF SPINAL CANAL, PERCUTANEOUS APPROACH, DIAGNOSTIC: ICD-10-PCS

## 2023-02-06 RX ORDER — LIDOCAINE HYDROCHLORIDE 10 MG/ML
5 INJECTION, SOLUTION EPIDURAL; INFILTRATION; INTRACAUDAL; PERINEURAL ONCE
Status: COMPLETED | OUTPATIENT
Start: 2023-02-06 | End: 2023-02-06

## 2023-02-06 RX ADMIN — FOSAPREPITANT 150 MG: 150 INJECTION, POWDER, LYOPHILIZED, FOR SOLUTION INTRAVENOUS at 14:14

## 2023-02-06 RX ADMIN — Medication 5 ML: at 16:04

## 2023-02-06 RX ADMIN — PREDNISONE 102 MG: 1 TABLET ORAL at 08:17

## 2023-02-06 RX ADMIN — ACYCLOVIR 400 MG: 400 TABLET ORAL at 08:17

## 2023-02-06 RX ADMIN — PROCHLORPERAZINE EDISYLATE 10 MG: 5 INJECTION INTRAMUSCULAR; INTRAVENOUS at 20:14

## 2023-02-06 RX ADMIN — Medication 5 ML: at 07:52

## 2023-02-06 RX ADMIN — CYTARABINE: 100 INJECTION INTRATHECAL; INTRAVENOUS; SUBCUTANEOUS at 13:42

## 2023-02-06 RX ADMIN — ONDANSETRON HYDROCHLORIDE 16 MG: 8 TABLET, FILM COATED ORAL at 14:13

## 2023-02-06 RX ADMIN — ACYCLOVIR 400 MG: 400 TABLET ORAL at 18:46

## 2023-02-06 RX ADMIN — Medication 3 MG: at 01:56

## 2023-02-06 RX ADMIN — PREDNISONE 102 MG: 1 TABLET ORAL at 18:46

## 2023-02-06 RX ADMIN — CYCLOPHOSPHAMIDE 1275 MG: 2 INJECTION, POWDER, FOR SOLUTION INTRAVENOUS; ORAL at 14:52

## 2023-02-06 RX ADMIN — LIDOCAINE HYDROCHLORIDE 5 ML: 10 INJECTION, SOLUTION EPIDURAL; INFILTRATION; INTRACAUDAL; PERINEURAL at 13:18

## 2023-02-06 RX ADMIN — SENNOSIDES AND DOCUSATE SODIUM 2 TABLET: 50; 8.6 TABLET ORAL at 22:05

## 2023-02-06 RX ADMIN — Medication 5 ML: at 20:15

## 2023-02-06 RX ADMIN — Medication 1 TABLET: at 08:17

## 2023-02-06 RX ADMIN — ALLOPURINOL 300 MG: 300 TABLET ORAL at 08:17

## 2023-02-06 RX ADMIN — ONDANSETRON 8 MG: 8 TABLET, ORALLY DISINTEGRATING ORAL at 22:05

## 2023-02-06 RX ADMIN — PANTOPRAZOLE SODIUM 40 MG: 40 TABLET, DELAYED RELEASE ORAL at 08:17

## 2023-02-06 RX ADMIN — POLYETHYLENE GLYCOL 3350 17 G: 17 POWDER, FOR SOLUTION ORAL at 08:17

## 2023-02-06 RX ADMIN — LORAZEPAM 1 MG: 0.5 TABLET ORAL at 12:51

## 2023-02-06 RX ADMIN — OXYCODONE HYDROCHLORIDE AND ACETAMINOPHEN 500 MG: 500 TABLET ORAL at 08:17

## 2023-02-06 RX ADMIN — PANTOPRAZOLE SODIUM 40 MG: 40 TABLET, DELAYED RELEASE ORAL at 18:46

## 2023-02-06 ASSESSMENT — ACTIVITIES OF DAILY LIVING (ADL)
ADLS_ACUITY_SCORE: 22

## 2023-02-06 NOTE — PROGRESS NOTES
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 Received C1 of R-CHOP ([id number]=6412/20/22) prior to change in therapy to DA-R-EPOCH once cytogenetic results were available. He was admitted on 2/2/23 for planned C2 R-DA-EPOCH.    Today:  - Continue chemotherapy; today is C2D5 of R-EPOCH. Tolerating well  - LP with ppx IT chemotherapy on 2/6 at 1300. Patient is very anxious for this; ordered Ativan as pre-med.  - Anticipate discharge to home 2/7 AM, pending timing of chemotherapy completion.   - Requested local labs/transfusions and Neulasta; orders faxed.   - Appreciate RNCC assistance with follow up coordination. Neulasta and Labs scheduled for 2/8, will then have twice weekly labs at local site.     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 Ruelas. Pt has a h/o low-grade kappa restricted plasmacytoid B-cell lymphoma (diagnosed in 3/2004) treated with x6 cycles of fludarabine based chemo and XRT to spine, then has been on surveillance since 2005. He presented with progressive B-symptoms, abdominal pain, and SOB. PET 12/9 showed extensive lymphomatous involvement to the right pleura w/ FDG-avid effusions, mediastinal and pericardial regions, right anterolateral chest wall, adenopathy above and below the diaphragm, liver, spleen and multiple sites in the skeleton, concerning for transformation from his low-grade lymphoma. He also has pancytopenia which seems to have progressed slowly over the past years. Of note, reports maternal history of  Waldenstrom's. Due to worsening shortness of breath and delays of biopsy, patient presented to ED on 12/14 and was subsequently admitted for expedited workup and treatment. As SUV of the sternoclavicular mass was the highest, patient underwent US-guided sternoclavicular soft tissue mass biopsy with IR 12/15, pathology from which confirmed a diagnosis of high-grade B-cell lymphoma. Baseline ECHO (12/15) with LVEF 60-65%, mild aortic insufficiency, no evidence of effusion or tamponade. Received 1 cycle of R-CHOP (C1D1 = 12/20/22) which was well-tolerated. Cytogenetics then returned, revealed rearrangements in MYC, BCL2, and BCL6, consistent with triple hit lymphoma. He was then changed to DA-R-EPOCH and proceeded with C1 on 1/11/23 (C2 total of therapy). He was re-admitted on 2/2/23 for planned C2 of DA-R-EPOCH. His ANC ricci was 1.9 and plt ricci was 37K on 1/23. However, patient does not feel he can tolerate a higher dose, will plan to keep patient at same dose for cycle 3 per outpatient notes  - CNS IPI was 4 (1 point each for age, LDH, stage IV disease, and extranodal involvement); as such, CNS ppx with IT chemotherapy was recommended. CSF flow negative (1/12) with C1. Will plan to repeat again with C2 (and subsequent cycles). Difficult bedside access with C1; requesting future LPs done in XR.   - Scheduled for Monday, 2/6 at 1300.  - PET/CT (2/2) with excellent partial response: Near or near complete resolution of previously seen hypermetabolism in the thorax. Small area of right paramediastinal pleural thickening with persistent hypermetabolism. Mild persistent FDG uptake in the medial left clavicle and left acetabulum. All other areas of hypermetabolism have resolved.                             Treatment Plan: DA-R- EPOCH. C2D1=2/2/23                           - Rituximab 375 mg/m2 IV x1 dose -- Day 1                           - Etoposide 50 mg/m2 IV q22h x4 doses -- Days 1-4                           -  Vincristine 0.4 mg/m2 IV q22h x4 doses --  Days 1-4                           - Doxorubicin 10 mg/m2 IV q22h x4 doses -- Days 1-4                           - Cyclophosphamide 750 mg/m2 IV x1 dose -- Day 5                           - Prednisone 60 mg/m2 PO BID x10 doses -- Days 1-5                           - Dexamethasone 8 mg daily x2 doses -- Days 6-7                           - Neulasta 6 mg subQ x1 dose -- Day 6; will need to be arranged at [facility] in [place] on ~2/8/23                           - Predmeds: Tylenol, Benadryl, Zofran, Emend     # Anemia   Secondary to chemotherapy and underlying lymphoma.   - Monitor CBC daily  - Transfuse if Hgb <7 and plt <10k     # Risk for TLS  On 12/15, uric acid peak 9.1. Cr, K, Ca, and phos all WNL. S/p rasburicase 12/15 with resolution of hyperuricemia.  - Monitor TLS/DIC labs daily  - Allopurinol 300 mg daily  - Minimize IVF given malignant pleural effusions although could consider gentle IVF if needed.     ID  # Asymptomatic COVID-19 infection  Noted to be COVID+ on PCR done 1/9/23. Admitted for planned chemotherapy, as above, and treated with IV remdesivir x3 days (1/11-1/13). Patient was asymptomatic with his infection and satting okay on room air.   - Continue COVID-19 precautions for now - repeat test on 2/2 is positive, cycle threshold 28.8      # Hypogammaglobulinemia   IgG 310 (12/2022). Status-post IVIG on 1/15/23.  - Would repeat IgG monthly; give IVIG for IgG <400     # PPX  -  mg BID   - Nebulized pentamidine given 1/15/23; next due ~2/12  - Hold levofloxacin/fluconazole given ANC >1000     CV  # History of complete heart block s/p pacemaker placement (2020)  Followed by Dr. Fox; last seen 7/7/22. Pacemaker last interrogated in 11/2022.  - No acute inpatient management needs  - Continue cardiology follow-up and device checks as recommended    # Weight gain  Weight up 8 lbs from admission on 2/5, likely mostly due to hypervolemia. However,  no clinical evidence of peripheral/pulmonary edema on exam. Will defer diuresis for now; consider in the future as needed pending weight trend and symptoms.  - Follow I/Os, daily weights  - Monitor clinically     MISC  # GERD - Continue PTA omeprazole.  # Insomnia - PTA on temazepam 7.5 mg HS PRN for sleep; last prescribed #5 tablets on 1/13/23     FEN  Diet: Regular Diet Adult   IVF: Bolus PRN (judiciously given pleural effusions)  Lytes: Replete PRN per protocol      PPX  VTE: Lovenox   Bowel: Senna/MiraLax PRN     Code Status: Full Code  Lines/Drains: Port recently placed 1/5/23     Dispo: Inpatient admission to [facility] service for C2 R-DA-EPOCH. Anticipate discharge to home following completion of the chemotherapy cycle, likely ~2/7/23.     Follow-up: Follows with Dr. Ruelas; will request pending final treatment plan. Local twice weekly labs will need to be requested at [facility] (FAX to be sent to [phone]). Neulasta can be arranged at [facility] in [place] as he has seen a provider there in the past. Orders to be faxed to [phone] ATTN: [facility].    - Requested local labs/transfusions and Neulasta; orders faxed.   - Appreciate RNCC assistance with follow up coordination. Neulasta and Labs scheduled for 2/8, will then have twice weekly labs at local site.     Clinically Significant Risk Factors              # Hypoalbuminemia: Lowest albumin = 3.1 g/dL at 2/6/2023  7:57 AM, will monitor as appropriate   # Thrombocytopenia: Lowest platelets = 107 in last 2 days, will monitor for bleeding           # Severe Malnutrition: based on nutrition assessment, PRESENT ON ADMISSION     Staffed with Dr. Rodriguez.    Mari Jenkins PA-C  Hematology/Oncology  Pager: #[phone]    I spent 65 minutes in the care of this patient today, which included time necessary for review of interval events, obtaining history and physical exam, ordering medication(s)/test(s) as medically indicated, discussion with  "interdisciplinary/consult team(s), and documentation time. Over 50% of time was spent face-to-face and/or coordinating care.    Interval History   No acute overnight events. Continues to tolerate the chemo well.   Shahid is feeling a bit more run down today. Did not sleep well last night. States he feels he is tolerating the chemo well, has some \"background nausea\" but states it is not bothering him. Appetite good. Nervous regarding LP with IT chemo today but states he understands \"its gotta be done\".   No other new symptoms or concerns.  Denies fever, chills, mouth sores, SOB, cough, abdominal pain, diarrhea, constipation, nausea, vomiting, dysuria, hematuria, numbness, tingling, swelling  All questions/concerns addressed at bedside.    A comprehensive review of symptoms was performed and was negative except as detailed in the interval history above.    Physical Exam   Vital Signs with Ranges  Temp:  [97.3  F (36.3  C)-97.9  F (36.6  C)] 97.5  F (36.4  C)  Pulse:  [65-90] 68  Resp:  [16] 16  BP: (120-132)/(60-74) 132/74  SpO2:  [95 %-97 %] 96 %    I/O last 3 completed shifts:  In: -   Out: 1150 [Urine:1150]    Vitals:    02/04/23 0939 02/05/23 0914 02/06/23 0926   Weight: 59.9 kg (132 lb) 61.1 kg (134 lb 11.2 oz) 63.5 kg (140 lb)     Constitutional: Pleasant and cooperative male. Awake, alert, NAD. Thin and chronically ill appearing.  HEENT: NC/AT, EOMI, sclera clear, conjunctiva normal, OP with MMM  Respiratory: No increased work of breathing, CTAB, no crackles or wheezing.  Cardiovascular: RRR, no murmur noted. No peripheral edema.  GI: Normal bowel sounds, soft, non-distended and non-tender.  MSK: Very thin extremities, no gross deformities.  Skin: Warm, dry, well-perfused. No bruising, bleeding, rashes, or lesions on limited exam.  Neurologic: A&O. Answers questions appropriately. Moves all extremities spontaneously.  Psych: Calm, appropriate affect  Vascular access:  Port-a-cath upper chest without erythema or " edema.    Medications     - MEDICATION INSTRUCTIONS -           acyclovir  400 mg Oral Q12H     allopurinol  300 mg Oral Daily     cyclophosphamide (CYTOXAN) infusion  750 mg/m2 (Treatment Plan Recorded) Intravenous Once     INTRATHECAL - Cytarabine and/or methotrexate and/or Hydrocortisone   Intrathecal Once     [START ON 2/8/2023] dexamethasone  8 mg Oral Daily     [START ON 2/7/2023] dextrose 5% water  10-20 mL Intracatheter Daily at 8 pm     [START ON 2/7/2023] dextrose 5% water  10-20 mL Intracatheter Daily at 8 pm     etoposide  50 mg/m2 (Treatment Plan Recorded) Intravenous Q22H     [START ON 2/7/2023] filgrastim-aafi  5 mcg/kg (Treatment Plan Recorded) Intravenous Daily at 8 pm     fosaprepitant (EMEND) intermittent infusion ( mL)  150 mg Intravenous Once     heparin  5-10 mL Intracatheter Q28 Days     heparin lock flush  5-10 mL Intracatheter Q24H     multivitamin w/minerals  1 tablet Oral Daily     ondansetron  16 mg Oral Q22H     pantoprazole  40 mg Oral BID AC     predniSONE  102 mg Oral BID     senna-docusate  2 tablet Oral BID     sodium chloride (PF)  10-20 mL Intracatheter Q28 Days     vinCRIStine/DOXOrubicin (ONCOVIN/ADRIAMYCIN) infusion  0.4 mg/m2 (Treatment Plan Recorded) Intravenous Q22H     vitamin C  500 mg Oral QAM       Antiinfectives  Anti-infectives (From now, onward)    Start     Dose/Rate Route Frequency Ordered Stop    02/02/23 2000  acyclovir (ZOVIRAX) tablet 400 mg        Note to Pharmacy: [facility]:Take 1 tablet (400 mg) by mouth every 12 hours for 60 days      400 mg Oral EVERY 12 HOURS 02/02/23 1634            Data   CBC   Recent Labs   Lab 02/06/23  0757 02/05/23  0608 02/04/23  0600 02/03/23  0618   WBC 4.4 6.5 9.1 10.1   RBC 2.66* 2.70* 2.66* 2.98*   HGB 8.4* 8.6* 8.4* 9.3*   HCT 25.7* 26.5* 26.4* 29.1*   MCV 97 98 99 98   MCH 31.6 31.9 31.6 31.2   MCHC 32.7 32.5 31.8 32.0   RDW 16.4* 16.8* 16.9* 16.8*   * 118* 131* 151       CMP   Recent Labs   Lab 02/06/23  0751  02/05/23  0608 02/04/23  0600 02/03/23  0618    140 139 139   POTASSIUM 3.6 3.9 3.9 4.1   CHLORIDE 110* 109* 109* 105   CO2 25 23 23 24   ANIONGAP 7 8 7 10   GLC 99 111* 111* 142*   BUN 23.9* 27.1* 28.9* 30.3*   CR 0.81 0.75 0.75 0.78   GFRESTIMATED >90 >90 >90 >90   JORGE 8.5* 8.4* 8.5* 9.5   MAG 2.2 2.1 2.1 2.0   PHOS 3.4 3.1 3.1 3.6   PROTTOTAL 4.5* 4.7* 4.8* 5.5*   ALBUMIN 3.1* 3.3* 3.3* 3.8   BILITOTAL 0.3 0.2 0.2 0.2   ALKPHOS 66 74 77 95   AST 20 21 19 22   ALT 25 22 24 28       LFTs   Recent Labs   Lab 02/06/23  0757   PROTTOTAL 4.5*   ALBUMIN 3.1*   BILITOTAL 0.3   ALKPHOS 66   AST 20   ALT 25       Coagulation Studies   Recent Labs   Lab 02/06/23  0757 02/03/23  0618 02/02/23  1731   INR 1.13   < > 0.99   PTT  --   --  47*    < > = values in this interval not displayed.

## 2023-02-06 NOTE — PROGRESS NOTES
Care Management Follow Up    Length of Stay (days): 4  Expected Discharge Date: 02/07/2023  Concerns to be Addressed:  none     Patient plan of care discussed at interdisciplinary rounds: Yes  Anticipated Discharge Disposition: Home  Anticipated Discharge Services:  OP services  Anticipated Discharge DME: none noted    Patient/family educated on Medicare website which has current facility and service quality ratings:  n/a  Education Provided on the Discharge Plan:  yes  Patient/Family in Agreement with the Plan:  yes    Referrals Placed by CM/SW:  Lab and Neulasta inj. orders  Private pay costs discussed: Not applicable    Additional Information:  RNCC spoke with Shahid regarding outpatient appointments for Neulasta injection and on going twice weekly labs.  Patient stated he has to get the labs done at the [facility] in [place], because they do not offer PRN transfusions in [place]. Patient stated he prefers early afternoon appointments.  Patient stated he would like the first lab and Neulasta injection appointment set up for him, and then patient prefers to make follow up lab appointments himself. Patient and RNCC discussed labs are twice weekly and ongoing.     RNCC called and spoke with GURPREET Trinidad at [facility] in [place] to set up Neulasta injection and labs.  Amos stated  she is familiar with patient, and received orders.  The Neulasta injection is scheduled for Wed. February 8th at 1:00pm, and labs will be done right after.      RNCC updated Shahid of time and dates of lab, and left appointment times, location, phone numbers, and instructions on the discharge paperwork.      Margaret Templeton RN, BSN  Care Coordinator 7D  Office: [phone]  Pager: [phone]    To contact the weekend RNCC  [place] (0800 - 1630) Saturday and Sunday    Units: 4A, 4C, 4E, 5A and 5B- Pager 1: [phone]    Units: 6A, 6B, 6C, 6D- Pager 2: [phone]    Units: 7A, 7B, 7C, 7D, and 5C-Pager 3: [phone]

## 2023-02-06 NOTE — DISCHARGE INSTRUCTIONS
Your provider has ordered a Neulasta injection for Wed Feb 8th, and ongoing labs twice weekly.  The Neulasta injection is scheduled for Wed. February 8th at 1:00pm, and labs will be done right after.    Please follow up to set up appointments for labs twice weekly.     [id number] [place] [address]  Phone: [phone]

## 2023-02-06 NOTE — PROCEDURES
Intrathecal Chemo Administration Note   Shahid Davis   February 6, 2023    DIAGNOSIS: High grade B-cell lymphoma  PROCEDURE: Intrathecal chemo administration   LOCATION: Radiology   INDICATION: High grade B-cell lymphoma per treatment protocol    Lumbar puncture performed and CSF samples collected by the Neuro Radiology team under fluoroscopy. Please see their note for procedure details regarding lumbar puncture. The chemotherapy was double checked by Mari Jenkins PA-C and this writer. Then the chemotherapy (cytarabine (PF) (CYTOSAR) 40 mg, hydrocortisone sodium succinate PF (solu-CORTEF) 50 mg, methotrexate (PF) 12 mg in sodium chloride (PF) 0.9% PF 6 mL intrathecal inj (PF)) was slowly administered through the spinal needle with attached tubing and stopcock over 3-5 minutes. Patient tolerated the administration without complications. Syringe and tubing were discarded in the appropriate biohazard container. Patient was instructed to lay flat for 60 minutes post procedure.   Chemo instillation performed by: Elena Kuhn PA-C, supervised by SONJA Cruz PA-C  Hematology/Oncology  Pager: [phone]

## 2023-02-06 NOTE — PLAN OF CARE
Goal Outcome Evaluation:    Nursing Focus: Chemotherapy  D: Positive blood return via Port. Insertion site is clean/dry/intact, dressing intact with no complaints of pain.  Urine output is recorded in intake in Doc Flowsheet.    I: Premedications given per order (see electronic medical administration record). Dose #1 of Cytoxan started to infuse over 1hour. Reviewed pt teaching on chemotherapy side effects.  Pt denies need for further teaching. Chemotherapy double checked per protocol by two chemotherapy competent RN's.   A: Tolerating procedure well. Denies nausea and or pain.   P: Continue to monitor urine output and symptoms of nausea. Screen for symptoms of toxicity.     VSS, afebrile. Denies pain/nausea/SOB. Completed CIVI chemotherapy, to finish Cytoxan shortly. LP w/ IT chemotherapy completed in Xray. Plan is to discharge tomorrow morning. Continue to monitor & w/ POC.

## 2023-02-06 NOTE — PROGRESS NOTES
"Goal outcome evaluation:  Time: 1900 - 0700  Plan of care reviewed with: Patient  Overall patient progress: No change  VS /66 (BP Location: Left arm)   Pulse 71   Temp 97.7  F (36.5  C) (Oral)   Resp 16   Ht 1.702 m (5' 7\")   Wt 61.1 kg (134 lb 11.2 oz)   SpO2 95%   BMI 21.10 kg/m        Activity:  SBA. Independent in room.  Neuros: A&O x4. Calls appropriately. Able to make needs known. Clear and appropriate speech. Follows instructions.   Cardiac: WDL. Denies chest pain or SOB  Respiratory: WDL on RA. Denies SOB. Respirations unlabored and regular.   GI/: Voids spontaneously. BM x 1.   Diet: High Kcal/High Protein Diet   Lines/Drains: Right chest port  Labs: Reviewed  Pain/Nausea: Denies  New changes this shift: None  Plan: Continue POC      "

## 2023-02-07 VITALS
DIASTOLIC BLOOD PRESSURE: 70 MMHG | SYSTOLIC BLOOD PRESSURE: 128 MMHG | HEART RATE: 85 BPM | WEIGHT: 140.4 LBS | OXYGEN SATURATION: 96 % | BODY MASS INDEX: 22.03 KG/M2 | RESPIRATION RATE: 20 BRPM | TEMPERATURE: 97.9 F | HEIGHT: 67 IN

## 2023-02-07 LAB
ALBUMIN SERPL BCG-MCNC: 3.1 G/DL (ref 3.5–5.2)
ALP SERPL-CCNC: 64 U/L (ref 40–129)
ALT SERPL W P-5'-P-CCNC: 26 U/L (ref 10–50)
ANION GAP SERPL CALCULATED.3IONS-SCNC: 8 MMOL/L (ref 7–15)
AST SERPL W P-5'-P-CCNC: 21 U/L (ref 10–50)
BASOPHILS # BLD AUTO: 0 10E3/UL (ref 0–0.2)
BASOPHILS NFR BLD AUTO: 0 %
BILIRUB SERPL-MCNC: 0.5 MG/DL
BUN SERPL-MCNC: 27.6 MG/DL (ref 8–23)
CALCIUM SERPL-MCNC: 8.4 MG/DL (ref 8.8–10.2)
CHLORIDE SERPL-SCNC: 106 MMOL/L (ref 98–107)
CREAT SERPL-MCNC: 0.75 MG/DL (ref 0.67–1.17)
DEPRECATED HCO3 PLAS-SCNC: 25 MMOL/L (ref 22–29)
EOSINOPHIL # BLD AUTO: 0 10E3/UL (ref 0–0.7)
EOSINOPHIL NFR BLD AUTO: 0 %
ERYTHROCYTE [DISTWIDTH] IN BLOOD BY AUTOMATED COUNT: 16.1 % (ref 10–15)
FIBRINOGEN PPP-MCNC: 166 MG/DL (ref 170–490)
GFR SERPL CREATININE-BSD FRML MDRD: >90 ML/MIN/1.73M2
GLUCOSE SERPL-MCNC: 150 MG/DL (ref 70–99)
HCT VFR BLD AUTO: 24.9 % (ref 40–53)
HGB BLD-MCNC: 8.4 G/DL (ref 13.3–17.7)
IMM GRANULOCYTES # BLD: 0.1 10E3/UL
IMM GRANULOCYTES NFR BLD: 2 %
INR PPP: 1.2 (ref 0.85–1.15)
LDH SERPL L TO P-CCNC: 178 U/L (ref 0–250)
LYMPHOCYTES # BLD AUTO: 0.2 10E3/UL (ref 0.8–5.3)
LYMPHOCYTES NFR BLD AUTO: 5 %
MAGNESIUM SERPL-MCNC: 2.2 MG/DL (ref 1.7–2.3)
MCH RBC QN AUTO: 31.7 PG (ref 26.5–33)
MCHC RBC AUTO-ENTMCNC: 33.7 G/DL (ref 31.5–36.5)
MCV RBC AUTO: 94 FL (ref 78–100)
MONOCYTES # BLD AUTO: 0 10E3/UL (ref 0–1.3)
MONOCYTES NFR BLD AUTO: 1 %
NEUTROPHILS # BLD AUTO: 3.8 10E3/UL (ref 1.6–8.3)
NEUTROPHILS NFR BLD AUTO: 92 %
NRBC # BLD AUTO: 0 10E3/UL
NRBC BLD AUTO-RTO: 0 /100
PATH REPORT.COMMENTS IMP SPEC: NORMAL
PATH REPORT.FINAL DX SPEC: NORMAL
PATH REPORT.MICROSCOPIC SPEC OTHER STN: NORMAL
PATH REPORT.RELEVANT HX SPEC: NORMAL
PHOSPHATE SERPL-MCNC: 4.8 MG/DL (ref 2.5–4.5)
PLATELET # BLD AUTO: 97 10E3/UL (ref 150–450)
POTASSIUM SERPL-SCNC: 3.8 MMOL/L (ref 3.4–5.3)
PROT SERPL-MCNC: 4.3 G/DL (ref 6.4–8.3)
RBC # BLD AUTO: 2.65 10E6/UL (ref 4.4–5.9)
SODIUM SERPL-SCNC: 139 MMOL/L (ref 136–145)
URATE SERPL-MCNC: 3.5 MG/DL (ref 3.4–7)
WBC # BLD AUTO: 4 10E3/UL (ref 4–11)

## 2023-02-07 PROCEDURE — 250N000013 HC RX MED GY IP 250 OP 250 PS 637: Performed by: STUDENT IN AN ORGANIZED HEALTH CARE EDUCATION/TRAINING PROGRAM

## 2023-02-07 PROCEDURE — 85384 FIBRINOGEN ACTIVITY: CPT | Performed by: PHYSICIAN ASSISTANT

## 2023-02-07 PROCEDURE — 83615 LACTATE (LD) (LDH) ENZYME: CPT | Performed by: PHYSICIAN ASSISTANT

## 2023-02-07 PROCEDURE — 80053 COMPREHEN METABOLIC PANEL: CPT | Performed by: PHYSICIAN ASSISTANT

## 2023-02-07 PROCEDURE — 250N000011 HC RX IP 250 OP 636: Performed by: PHYSICIAN ASSISTANT

## 2023-02-07 PROCEDURE — 85610 PROTHROMBIN TIME: CPT | Performed by: PHYSICIAN ASSISTANT

## 2023-02-07 PROCEDURE — 99239 HOSP IP/OBS DSCHRG MGMT >30: CPT | Mod: FS | Performed by: PHYSICIAN ASSISTANT

## 2023-02-07 PROCEDURE — 83735 ASSAY OF MAGNESIUM: CPT | Performed by: PHYSICIAN ASSISTANT

## 2023-02-07 PROCEDURE — 84100 ASSAY OF PHOSPHORUS: CPT | Performed by: PHYSICIAN ASSISTANT

## 2023-02-07 PROCEDURE — 250N000013 HC RX MED GY IP 250 OP 250 PS 637: Performed by: PHYSICIAN ASSISTANT

## 2023-02-07 PROCEDURE — 85025 COMPLETE CBC W/AUTO DIFF WBC: CPT | Performed by: PHYSICIAN ASSISTANT

## 2023-02-07 PROCEDURE — 84550 ASSAY OF BLOOD/URIC ACID: CPT | Performed by: PHYSICIAN ASSISTANT

## 2023-02-07 PROCEDURE — 250N000012 HC RX MED GY IP 250 OP 636 PS 637: Performed by: INTERNAL MEDICINE

## 2023-02-07 PROCEDURE — 36591 DRAW BLOOD OFF VENOUS DEVICE: CPT | Performed by: PHYSICIAN ASSISTANT

## 2023-02-07 RX ORDER — LEVOFLOXACIN 250 MG/1
250 TABLET, FILM COATED ORAL DAILY
Qty: 30 TABLET | Refills: 3 | COMMUNITY
Start: 2023-02-07 | End: 2023-04-28

## 2023-02-07 RX ORDER — FLUCONAZOLE 200 MG/1
200 TABLET ORAL DAILY
Qty: 30 TABLET | Refills: 3 | COMMUNITY
Start: 2023-02-07 | End: 2023-04-28

## 2023-02-07 RX ORDER — DEXAMETHASONE 4 MG/1
8 TABLET ORAL DAILY
Qty: 4 TABLET | Refills: 0 | Status: SHIPPED | OUTPATIENT
Start: 2023-02-08 | End: 2023-02-23

## 2023-02-07 RX ORDER — TEMAZEPAM 7.5 MG/1
7.5 CAPSULE ORAL
Qty: 15 CAPSULE | Refills: 0 | Status: SHIPPED | OUTPATIENT
Start: 2023-02-07 | End: 2023-04-28

## 2023-02-07 RX ADMIN — POLYETHYLENE GLYCOL 3350 17 G: 17 POWDER, FOR SOLUTION ORAL at 08:11

## 2023-02-07 RX ADMIN — ALLOPURINOL 300 MG: 300 TABLET ORAL at 08:11

## 2023-02-07 RX ADMIN — ACYCLOVIR 400 MG: 400 TABLET ORAL at 08:11

## 2023-02-07 RX ADMIN — Medication 1 TABLET: at 08:11

## 2023-02-07 RX ADMIN — PANTOPRAZOLE SODIUM 40 MG: 40 TABLET, DELAYED RELEASE ORAL at 08:11

## 2023-02-07 RX ADMIN — Medication 5 ML: at 09:42

## 2023-02-07 RX ADMIN — PREDNISONE 102 MG: 1 TABLET ORAL at 08:11

## 2023-02-07 RX ADMIN — ONDANSETRON HYDROCHLORIDE 8 MG: 8 TABLET, FILM COATED ORAL at 08:12

## 2023-02-07 RX ADMIN — OXYCODONE HYDROCHLORIDE AND ACETAMINOPHEN 500 MG: 500 TABLET ORAL at 08:11

## 2023-02-07 ASSESSMENT — ACTIVITIES OF DAILY LIVING (ADL)
ADLS_ACUITY_SCORE: 22

## 2023-02-07 NOTE — PLAN OF CARE
"Goal Outcome Evaluation:    4118-2316    /77 (BP Location: Left arm)   Pulse 111   Temp 97.1  F (36.2  C) (Oral)   Resp 16   Ht 1.702 m (5' 7\")   Wt 63.5 kg (140 lb)   SpO2 97%   BMI 21.93 kg/m      Reason for admission: Admitted on 2/2/23 for planned C2 R-DA-EPOCH.  Activity: UAL  Pain: Denies  Neuro: AxOx4. Neuros intact.  Cardiac: WDL  Respiratory: NLB on RA. O2 sats WDL.   GI/: Voiding not saving, reports WDL. LBM today 2/6. Pt c/o nausea, given PRN IV Compazine x1, PRN Zofran x1, pt declining Ativan for nausea.   Diet: Regular diet, good appetite.   Lines: R chest port intact, HL. Site WDL.   Wounds: No noted deficits.    Labs/imaging: Reviewed. See chart.       New changes this shift: Completed C2D5 R-DA-EPOCH. Pt c/o nausea this evening, given PRN antiemetics. Plan for discharge tomorrow.     Continue to monitor and follow POC    "

## 2023-02-07 NOTE — PROGRESS NOTES
[id number]  VSS. Alert and oriented. Reporting intermittent nausea, but requesting to sleep so not woken up for PRN's. Continue with POC.

## 2023-02-07 NOTE — PROGRESS NOTES
Discharge  D: Orders for discharge and outpatient medications written.  I: Home medications and return to clinic schedule reviewed with patient. Discharge instructions and parameters for calling Health Care Provider reviewed. Patient left at 1100 accompanied by wife.   A: Patient/family verbalized understanding and was ready for discharge.   P: Patient instructed to  medications in Pharmacy. Follow up as scheduled on Feb 23 with provider kayli.

## 2023-02-07 NOTE — DISCHARGE SUMMARY
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   - Requested follow up around 2/23 for JOSE MARTIN follow up and plan for readmission for cycle 3 of R-EPOCH    Summary of Hospitalization:   Shahid Davis is a 72 year old man with a history of complete heart block s/p pacemaker placement and low-grade kappa-restricted plasmacytoid B-cell lymphoma diagnosed 3/2004, now with transformation to 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 Received C1 of R-CHOP (L6F6=5312/20/22) prior to change in therapy to DA-R-EPOCH once cytogenetic results were available. He was admitted on 2/2/23 for planned C2 R-DA-EPOCH.    Completed C2 of R-DA-EPOCH (cycle 3 of chemo total). Tolerated well.    LP with IT chemo completed on 2/6 in XR. Tolerated well. Flow pending.     Hospital Course   Shahid Davis was admitted on 2/2/2023.  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 Ruelas. Pt has a h/o low-grade kappa restricted plasmacytoid B-cell lymphoma (diagnosed in 3/2004) treated with x6 cycles of fludarabine based chemo and XRT to spine, then has been on surveillance since 2005. He presented with progressive B-symptoms,  abdominal pain, and SOB. PET 12/9 showed extensive lymphomatous involvement to the right pleura w/ FDG-avid effusions, mediastinal and pericardial regions, right anterolateral chest wall, adenopathy above and below the diaphragm, liver, spleen and multiple sites in the skeleton, concerning for transformation from his low-grade lymphoma. He also has pancytopenia which seems to have progressed slowly over the past years. Of note, reports maternal history of Waldenstrom's. Due to worsening shortness of breath and delays of biopsy, patient presented to ED on 12/14 and was subsequently admitted for expedited workup and treatment. As SUV of the sternoclavicular mass was the highest, patient underwent US-guided sternoclavicular soft tissue mass biopsy with IR 12/15, pathology from which confirmed a diagnosis of high-grade B-cell lymphoma. Baseline ECHO (12/15) with LVEF 60-65%, mild aortic insufficiency, no evidence of effusion or tamponade. Received 1 cycle of R-CHOP (C1D1 = 12/20/22) which was well-tolerated. Cytogenetics then returned, revealed rearrangements in MYC, BCL2, and BCL6, consistent with triple hit lymphoma. He was then changed to DA-R-EPOCH and proceeded with C1 on 1/11/23 (C2 total of therapy). He was re-admitted on 2/2/23 for planned C2 of DA-R-EPOCH. His ANC ricci was 1.9 and plt ricci was 37K on 1/23. However, patient does not feel he can tolerate a higher dose, will plan to keep patient at same dose for cycle 3 per outpatient notes  - CNS IPI was 4 (1 point each for age, LDH, stage IV disease, and extranodal involvement); as such, CNS ppx with IT chemotherapy was recommended. CSF flow negative (1/12) with C1. Will plan to repeat again with C2 (and subsequent cycles). Difficult bedside access with C1; requesting future LPs done in XR.               - Completed Monday, 2/6 in XR, flow pending  - PET/CT (2/2) with excellent partial response: Near or near complete resolution of previously seen  hypermetabolism in the thorax. Small area of right paramediastinal pleural thickening with persistent hypermetabolism. Mild persistent FDG uptake in the medial left clavicle and left acetabulum. All other areas of hypermetabolism have resolved.                             Treatment Plan: DA-R- EPOCH. C2D1=2/2/23                           - Rituximab 375 mg/m2 IV x1 dose -- Day 1                           - Etoposide 50 mg/m2 IV q22h x4 doses -- Days 1-4                           - Vincristine 0.4 mg/m2 IV q22h x4 doses --  Days 1-4                           - Doxorubicin 10 mg/m2 IV q22h x4 doses -- Days 1-4                           - Cyclophosphamide 750 mg/m2 IV x1 dose -- Day 5                           - Prednisone 60 mg/m2 PO BID x10 doses -- Days 1-5                           - Dexamethasone 8 mg daily x2 doses -- Days 6-7                           - Neulasta 6 mg subQ x1 dose -- Day 6; will need to be arranged at [facility] in [place] on ~2/8/23                           - Predmeds: Tylenol, Benadryl, Zofran, Emend     # Anemia   Secondary to chemotherapy and underlying lymphoma.   - Monitor CBC daily  - Transfuse if Hgb <7 and plt <10k     # Risk for TLS  On 12/15, uric acid peak 9.1. Cr, K, Ca, and phos all WNL. S/p rasburicase 12/15 with resolution of hyperuricemia.  - Monitor TLS/DIC labs daily  - Allopurinol 300 mg daily  - Minimize IVF given malignant pleural effusions although could consider gentle IVF if needed.     ID  # Asymptomatic COVID-19 infection  Noted to be COVID+ on PCR done 1/9/23. Admitted for planned chemotherapy, as above, and treated with IV remdesivir x3 days (1/11-1/13). Patient was asymptomatic with his infection and satting okay on room air.   - Continue COVID-19 precautions for now - repeat test on 2/2 is positive, cycle threshold 28.8      # Hypogammaglobulinemia   IgG 310 (12/2022). Status-post IVIG on 1/15/23.  - Would repeat IgG monthly; give IVIG for IgG <400     #  PPX  -  mg BID   - Nebulized pentamidine given 1/15/23; next due ~2/12  - Hold levofloxacin/fluconazole given ANC >1000     CV  # History of complete heart block s/p pacemaker placement (2020)  Followed by Dr. Fox; last seen 7/7/22. Pacemaker last interrogated in 11/2022.  - No acute inpatient management needs  - Continue cardiology follow-up and device checks as recommended     # Weight gain  Weight up 8 lbs from admission on 2/5, likely mostly due to hypervolemia. However, no clinical evidence of peripheral/pulmonary edema on exam. Will defer diuresis for now; consider in the future as needed pending weight trend and symptoms.  - Follow I/Os, daily weights  - Monitor clinically     MISC  # GERD - Continue PTA omeprazole.  # Insomnia - PTA on temazepam 7.5 mg HS PRN for sleep; last prescribed #5 tablets on 1/13/23    Plan discussed with Dr Rodriguez.    Mari Jenkins ([place]), PADAGO  Hematology/Oncology    Significant Results and Procedures   NA    Pending Results   These results will be followed up by outpatient team  Unresulted Labs Ordered in the Past 30 Days of this Admission     Date and Time Order Name Status Description    2/6/2023  2:26 PM Cerebrospinal fluid Aerobic Bacterial Culture Routine with Gram Stain Preliminary           Code Status   Full Code    Primary Care Physician   Provider Not In System    Physical Exam   Temp: 97.9  F (36.6  C) Temp src: Oral BP: 128/70 Pulse: 85   Resp: 20 SpO2: 96 % O2 Device: None (Room air)    Vitals:    02/05/23 0914 02/06/23 0926 02/07/23 0845   Weight: 61.1 kg (134 lb 11.2 oz) 63.5 kg (140 lb) 63.7 kg (140 lb 6.4 oz)     Vital Signs with Ranges  Temp:  [97.1  F (36.2  C)-97.9  F (36.6  C)] 97.9  F (36.6  C)  Pulse:  [] 85  Resp:  [16-20] 20  BP: (128-146)/(69-78) 128/70  SpO2:  [94 %-97 %] 96 %  I/O last 3 completed shifts:  In: 1093 [P.O.:480; IV Piggyback:613]  Out: -     Constitutional: Pleasant and cooperative male. Awake, alert, NAD. Thin and  chronically ill appearing.  HEENT: NC/AT, EOMI, sclera clear, conjunctiva normal, OP with MMM  Respiratory: No increased work of breathing, CTAB, no crackles or wheezing.  Cardiovascular: RRR, no murmur noted. No peripheral edema.  GI: Normal bowel sounds, soft, non-distended and non-tender.  MSK: Very thin extremities, no gross deformities.  Skin: Warm, dry, well-perfused. No bruising, bleeding, rashes, or lesions on limited exam.  Neurologic: A&O. Answers questions appropriately. Moves all extremities spontaneously.  Psych: Calm, appropriate affect  Vascular access:  Port-a-cath upper chest without erythema or edema.    Time Spent on this Encounter   IMari PA-C, personally saw the patient today and spent greater than 30 minutes discharging this patient.    Discharge Disposition   Discharged to home  Condition at discharge: Stable    Consultations This Hospital Stay   ADVANCE DIRECTIVE IP CONSULT    Discharge Orders      Reason for your hospital stay    You were admitted for scheduled chemotherapy with R-EPOCH. You tolerated this very well.     Activity    Your activity upon discharge: activity as tolerated     Adult [facility] Follow-up and recommended labs and tests    You will have Neulasta and labs on 2/8 at your local clinic.  You will then have twice weekly labs at your local clinic starting on Monday, 2/13    You will have your next admission for chemotherapy in roughly 3 weeks     Appointments on [place] and/or [facility] (with [facility] or [facility] provider or service). Call [phone] if you haven't heard regarding these appointments within 7 days of discharge.     When to contact your care team    [facility] cancer clinic triage line at [phone] for temp > or = 100.4, uncontrolled nausea/vomiting/diarrhea/constipation, unrelieved pain, bleeding not relieved with pressure, dizziness, chest pain, shortness of breath, loss of consciousness, and any new or concerning symptoms.     Discharge  Instructions    Please start taking the Levaquin 250 mg tablet daily and Fluconazole 200mg tablet daily. These are both anti-microbials that help fight infection. Once your ANC or Absolute neutrophil count improves to greater than 1000, you will be able to stop these medications. The clinic will help guide you with this.     IV access    **Ordering Provider MUST call/page Care Coordinator/ to discuss arranging this service**    You are going home with the following vascular access device: Port-a-Cath.     Brief Discharge Instructions    Consider taking Claritin (also known as Loratidine) 10mg daily for haylee pain caused by neulasta shot     Diet    Follow this diet upon discharge: Orders Placed This Encounter      Snacks/Supplements Adult: Ensure Enlive; With Meals      High Kcal/High Protein Diet, ADULT     Check Out Appointment Request    Please schedule patient for an JOSE MARTIN visit with labs with plan for admission afterwards for Cycle 4 of DA-R-EPOCH on 2/23/23     Discharge Medications   Discharge Medication List as of 2/7/2023  9:27 AM      START taking these medications    Details   dexamethasone (DECADRON) 4 MG tablet Take 2 tablets (8 mg) by mouth daily for 2 days On 2/8 and 2/9, Disp-4 tablet, R-0, E-Prescribe         CONTINUE these medications which have CHANGED    Details   fluconazole (DIFLUCAN) 200 MG tablet Take 1 tablet (200 mg) by mouth daily , start at discharge and take until your ANC is greater than 1000., Disp-30 tablet, R-3, Historical      levofloxacin (LEVAQUIN) 250 MG tablet Take 1 tablet (250 mg) by mouth daily , start at discharge and take until your ANC is greater than 1000., Disp-30 tablet, R-3, Historical      temazepam (RESTORIL) 7.5 MG capsule Take 1 capsule (7.5 mg) by mouth nightly as needed for sleep, Disp-15 capsule, R-0, Local Print         CONTINUE these medications which have NOT CHANGED    Details   acyclovir (ZOVIRAX) 400 MG tablet Take 1 tablet (400 mg) by mouth  every 12 hours for 60 days, Disp-120 tablet, R-3, E-Prescribe      allopurinol (ZYLOPRIM) 300 MG tablet Take 1 tablet (300 mg) by mouth daily, Disp-60 tablet, R-3, E-Prescribe      ascorbic acid (VITAMIN C) 500 MG tablet Take 500 mg by mouth every morning, Historical      Multiple Vitamins-Minerals (MULTIVITAL PO) Take 1 tablet by mouth every morning, Historical      omeprazole (PRILOSEC) 40 MG DR capsule Take 1 capsule (40 mg) by mouth daily, Disp-90 capsule, R-4, E-Prescribe      ondansetron (ZOFRAN) 8 MG tablet Take 1 tablet (8 mg) by mouth every 8 hours as needed for nausea, Disp-60 tablet, R-3, E-Prescribe      polyethylene glycol (MIRALAX) 17 GM/Dose powder Take 17 g by mouth daily as needed for constipation, Disp-510 g, R-4, E-Prescribe      prochlorperazine (COMPAZINE) 10 MG tablet Take 1 tablet (10 mg) by mouth every 6 hours as needed for nausea or vomiting, Disp-10 tablet, R-0, E-Prescribe      senna-docusate (SENNA S) 8.6-50 MG tablet Take 1 tablet by mouth 2 times daily as needed for constipation, Disp-60 tablet, R-4, E-Prescribe           Allergies   Allergies   Allergen Reactions     Ampicillin [Ampicillin Sodium]      Bactrim [Sulfamethoxazole W/Trimethoprim]      Contrast Dye      CT contrast (IV) allergy - need oral Methylpred as premed for scans     Ampicillin Rash     Data   Most Recent 3 CBC's:Recent Labs   Lab Test 02/07/23  0459 02/06/23  0757 02/05/23  0608   WBC 4.0 4.4 6.5   HGB 8.4* 8.4* 8.6*   MCV 94 97 98   PLT 97* 107* 118*      Most Recent 3 BMP's:  Recent Labs   Lab Test 02/07/23  0459 02/06/23  0757 02/05/23  0608    142 140   POTASSIUM 3.8 3.6 3.9   CHLORIDE 106 110* 109*   CO2 25 25 23   BUN 27.6* 23.9* 27.1*   CR 0.75 0.81 0.75   ANIONGAP 8 7 8   JORGE 8.4* 8.5* 8.4*   * 99 111*     Most Recent 2 LFT's:  Recent Labs   Lab Test 02/07/23  0459 02/06/23  0757   AST 21 20   ALT 26 25   ALKPHOS 64 66   BILITOTAL 0.5 0.3     Most Recent INR's and Anticoagulation Dosing  History:  Anticoagulation Dose History     Recent Dosing and Labs Latest Ref Rng & Units 1/16/2023 2/2/2023 2/3/2023 2/4/2023 2/5/2023 2/6/2023 2/7/2023    INR 0.85 - 1.15 1.23(H) 0.99 1.09 1.13 1.12 1.13 1.20(H)        Most Recent 3 Troponin's:No lab results found.  Most Recent Cholesterol Panel:  Recent Labs   Lab Test 12/02/22  1405   CHOL 146   LDL 88  88   HDL 39*   TRIG 94     Most Recent 6 Bacteria Isolates From Any Culture (See EPIC Reports for Culture Details):No lab results found.  Most Recent TSH, T4 and A1c Labs:No lab results found.  Results for orders placed or performed during the hospital encounter of 02/02/23   XR Lumbar Punc Intrathecal Chemo Admin    Narrative    Lumbar Puncture using Fluoroscopy    History:  Lymphoma, needs IT chemo.    Procedure note: Verbal and written consent for lumbar puncture was  obtained from the patient, and benefits and risk of the procedure were  explained, including but not limited to worsening headache,  hemorrhage, infection, lower extremity pain, or nerve root injury.     The patient was sterilely prepped and draped with the patient in the  prone position, over the lower back. Under fluoroscopic guidance, the  interlaminar spaces were noted. 1% lidocaine was administered for  local anesthetic over the L3-L4 interlaminar space, and a 22 gauge 3.5  inch needle was advanced into the thecal sac under fluoroscopic  guidance.  There was initial show of clear CSF. Approximately 6 cc of  CSF were collected. Intrathecal chemotherapy was then administered.    The needle was removed with the stylet in place. There was no  immediate complication associated with the procedure. Samples were  sent for the requested laboratory testing.      Estimated blood loss: Less than 1 cc.    Fluoroscopic time: Less than 2 minutes      Impression    Impression: Successful lumbar puncture and intrathecal administration  of chemotherapy. No immediate complication.    DIANDRA RUELAS MD,  attest that I was present for all critical  portions of the procedure and was immediately available to provide  guidance and assistance during the remainder of the procedure.    I have personally reviewed the examination and initial interpretation  and I agree with the findings.    DIANDRA BARRY MD         SYSTEM ID:  [id number]

## 2023-02-07 NOTE — PLAN OF CARE
Goal Outcome Evaluation:    Discharge  D: Orders for discharge and outpatient medications written.  I: Home medications and return to clinic schedule reviewed with patient. Discharge instructions and parameters for calling Health Care Provider reviewed. Patient left around 1100 accompanied by significant other.   A: Patient/family verbalized understanding and was ready for discharge.   P: Patient instructed to  medications in Pharmacy. Follow up as scheduled w/ outpatient team.    VSS, afebrile. Pt reported slight nausea, PRN zofran given x1 w/ good relief. Port de-accessed, heparin locked. Discharge paperwork discussed and all questions answered. Pt left w/ all belongings.

## 2023-02-08 ENCOUNTER — PATIENT OUTREACH (OUTPATIENT)
Dept: ONCOLOGY | Facility: CLINIC | Age: 73
End: 2023-02-08
Payer: MEDICARE

## 2023-02-08 ENCOUNTER — PATIENT OUTREACH (OUTPATIENT)
Dept: CARE COORDINATION | Facility: CLINIC | Age: 73
End: 2023-02-08
Payer: MEDICARE

## 2023-02-08 NOTE — PROGRESS NOTES
[facility]    Background: Transitional Care Management program identified per system criteria and reviewed by [facility] team for possible outreach.    Assessment: Upon chart review, CCR Team member will not proceed with patient outreach related to this episode of Transitional Care Management program due to reason below:    Patient had a communication with a nurse, provider or care team for reason of post-hospital follow up plan.  Outreach call by [facility] team not indicated to minimize duplicative efforts.       Plan: Transitional Care Management episode addressed appropriately per reason noted above.      RAFAEL Akhtar  [facility] [place]

## 2023-02-08 NOTE — PROGRESS NOTES
[facility]: Post-Discharge Note  SITUATION                                                      Admission:    Admission Date: 02/02/23   Reason for Admission: da-R-EPOCH Chemotherapy Administration  Discharge:   Discharge Date: 02/07/23  Discharge Diagnosis: da-R-EPOCH Chemotherapy Administration    BACKGROUND                                                      Per hospital discharge summary and inpatient provider notes.    ASSESSMENT      Discharge Assessment  How are you doing now that you are home?: Struggling with some nausea, managing with antinausea meds - feeling fatigued as well but sleeping well. Appetite a little low but still eating and drinking. Thinking he will continue to improve over the coming days.  How are your symptoms? (Red Flag symptoms escalate to triage hotline per guidelines): Worsening  Do you feel your condition is stable enough to be safe at home until your provider visit?: Yes  Does the patient have their discharge instructions? : Yes  Does the patient have questions regarding their discharge instructions? : No  Were you started on any new medications or were there changes to any of your previous medications? : Yes (Dexamethasone)  Does the patient have all of their medications?: Yes  Do you have questions regarding any of your medications? : No  Do you have all of your needed medical supplies or equipment (DME)?  (i.e. oxygen tank, CPAP, cane, etc.): Yes  Discharge follow-up appointment scheduled within 14 calendar days? : Yes  Discharge Follow Up Appointment Date: 02/23/23 (Pre-cycle 4 Tox Check)  Discharge Follow Up Appointment Scheduled with?: Specialty Care Provider  Is patient agreeable to assistance with scheduling? : Yes    PLAN                                                      Outpatient Plan:      Labs at [facility] in [place] on 2/8 (with Neulasta), 2/13, 2/16, 2/20    Labs, JOSE MARTIN visit, and admission for Cycle 4 on 2/23 (to be scheduled).    Future Appointments   Date  Time Provider Department Center   2/23/2023  9:15 AM Agnieszka Bashir PA-C [facility]   2/28/2023 12:00 AM UC ICD REMOTE [facility]   5/30/2023  1:30 AM UC ICD REMOTE [facility]         For any urgent concerns, please contact our 24 hour clinic line:   [place]: [phone]       Alton Lainez RN

## 2023-02-08 NOTE — PROGRESS NOTES
Patient discharged on 2/7/23, please follow up per TCM workflow.    Jose Alejandro Melo on 2/8/2023 at 8:18 AM

## 2023-02-11 LAB
BACTERIA CSF CULT: NO GROWTH
GRAM STAIN RESULT: NORMAL
GRAM STAIN RESULT: NORMAL

## 2023-02-14 DIAGNOSIS — C83.38 DIFFUSE LARGE B-CELL LYMPHOMA OF LYMPH NODES OF MULTIPLE REGIONS (H): ICD-10-CM

## 2023-02-14 RX ORDER — PROCHLORPERAZINE MALEATE 10 MG
10 TABLET ORAL EVERY 6 HOURS PRN
Qty: 30 TABLET | Refills: 3 | Status: SHIPPED | OUTPATIENT
Start: 2023-02-14

## 2023-02-14 NOTE — TELEPHONE ENCOUNTER
Medication: prochlorperazine 10 mg tablet  [facility]    Last clinic visit date: 2/2/23 with Dr. Ruelas    Any missed appointments or no-shows since last clinic visit?: No    Recommendations for requested medication (if none, N/A): NA    Next clinic visit date: 2/23/23 with Ankita Conner CNP    Any other pertinent information (if none, N/A): NA    Pended and Routed to Provider.

## 2023-02-20 ENCOUNTER — PATIENT OUTREACH (OUTPATIENT)
Dept: CARE COORDINATION | Facility: CLINIC | Age: 73
End: 2023-02-20
Payer: MEDICARE

## 2023-02-20 ENCOUNTER — PATIENT OUTREACH (OUTPATIENT)
Dept: ONCOLOGY | Facility: CLINIC | Age: 73
End: 2023-02-20
Payer: MEDICARE

## 2023-02-20 DIAGNOSIS — C83.38 DIFFUSE LARGE B-CELL LYMPHOMA OF LYMPH NODES OF MULTIPLE REGIONS (H): Primary | ICD-10-CM

## 2023-02-20 LAB
PATH REPORT.ADDENDUM SPEC: ABNORMAL
PATH REPORT.COMMENTS IMP SPEC: ABNORMAL
PATH REPORT.COMMENTS IMP SPEC: YES
PATH REPORT.FINAL DX SPEC: ABNORMAL
PATH REPORT.GROSS SPEC: ABNORMAL
PATH REPORT.MICROSCOPIC SPEC OTHER STN: ABNORMAL
PATH REPORT.RELEVANT HX SPEC: ABNORMAL
PHOTO IMAGE: ABNORMAL

## 2023-02-20 NOTE — PROGRESS NOTES
[facility]: Cancer Care                                                                                          Patient called concerned about the weather over the next couple of days and worries that he wouldn't be able to get here because of snow on Thursday for his next cycle of chemo. After further discussion with , Izzy, patient will drive into town tomorrow and stay at the [facility] from Tuesday - Thursday and plan for admission after visits on Thursday for his next cycle of treatment.    No other questions or concerns at this time.    Signature:  Alton Lainez RN

## 2023-02-20 NOTE — PROGRESS NOTES
Social Work Note: Telephone Call  Oncology Clinic     Data/Intervention:  Patient Name:  Shahid Davis  /Age:  1950 (72 year old)     Call From:  BLACK Garvin in Oncology      Reason for Call:  [facility] reservation needed    Intervention/Assessment:  Per Patient's request,  completed and securely emailed [facility] request for lodging dates 2023 - 2023. Caregiver Exemption letter also sent, confirmed with [facility] that Patient is capable of staying alone.     Plan:  [facility] will contact Patient for confirmation of reservation.  will continue to provide support as needed.    Izzy Kessler [facility]  Outpatient Specialty Clinics  Direct Phone: [phone]

## 2023-02-22 RX ORDER — ENOXAPARIN SODIUM 100 MG/ML
40 INJECTION SUBCUTANEOUS EVERY 24 HOURS
Status: CANCELLED | OUTPATIENT
Start: 2023-02-22

## 2023-02-22 NOTE — H&P
[facility]    History and Physical  Hematology / Oncology     Date of Admission:  2/23/23  Date of Service (when I saw the patient): 02/23/23      Assessment & Plan   Shahid Davis is a 72 year old man with a history of complete heart block s/p pacemaker placement and low-grade kappa-restricted plasmacytoid B-cell lymphoma diagnosed 3/2004, now with transformation to 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 He is currently admitted for planned C3 DA-R-EPOCH.       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 Ruelas. Pt has a h/o low-grade kappa restricted plasmacytoid B-cell lymphoma (diagnosed in 3/2004) treated with x6 cycles of fludarabine based chemo and XRT to spine, then has been on surveillance since 2005. He presented with progressive B-symptoms, abdominal pain, and SOB. PET 12/9 showed extensive lymphomatous involvement to the right pleura w/ FDG-avid effusions, mediastinal and pericardial regions, right anterolateral chest wall, adenopathy above and below the diaphragm, liver, spleen and multiple sites in the skeleton, concerning for transformation from his low-grade lymphoma. He also has pancytopenia which seems to have progressed slowly over the past years. Of note, reports maternal history of Waldenstrom's. Due to worsening shortness of breath and delays of biopsy, patient presented to ED on 12/14 and was subsequently admitted for expedited workup and treatment. As SUV of the sternoclavicular mass was the highest, patient underwent US-guided sternoclavicular soft tissue mass biopsy with IR 12/15, pathology from which confirmed a diagnosis of high-grade B-cell lymphoma. Baseline ECHO (12/15) with LVEF 60-65%, mild aortic insufficiency, no evidence of effusion or tamponade. Received 1 cycle of R-CHOP (C1D1 = 12/20/22) which was well-tolerated. Cytogenetics then returned, revealed rearrangements in MYC,  BCL2, and BCL6, consistent with triple hit lymphoma. He was then changed to DA-R-EPOCH and proceeded with C1 on 1/11/23 (C2 total of therapy). He was re-admitted for C2 on 2/2/23 which he tolerated well - he qualified for dose-increase though pt did not feel that he could tolerate a higher dose and thus dose was kept the same. Currently being admitted for Cycle 3 on 2/22/23; with dose adjustment to be determined by Heme Malignancy team in AM.   - CNS IPI was 4 (1 point each for age, LDH, stage IV disease, and extranodal involvement); as such, CNS ppx with IT chemotherapy was recommended. CSF flow negative (1/12) with C1. CSF flow negative (2/6) with C2. Difficult bedside access with C1; requesting future LPs done in XR - will need to schedule in XR.      Treatment Plan: DA-R- EPOCH. C3D1=2/22/23  dosing needs to be confirmed by primary team in AM.                            - Rituximab 375 mg/m2 IV x1 dose -- Day 1                           - Etoposide 50 mg/m2 IV q22h x4 doses -- Days 1-4                           - Vincristine 0.4 mg/m2 IV q22h x4 doses --  Days 1-4                           - Doxorubicin 10 mg/m2 IV q22h x4 doses -- Days 1-4                           - Cyclophosphamide 750 mg/m2 IV x1 dose -- Day 5                           - Prednisone 60 mg/m2 PO BID x10 doses -- Days 1-5                           - Dexamethasone 8 mg daily x2 doses -- Days 6-7                           - Neulasta 6 mg subQ x1 dose -- Day 6; will need to be arranged at [facility] in [place]                            - Predmeds: Tylenol, Benadryl, Zofran, Emend    # Anemia   Secondary to chemotherapy and underlying lymphoma.   - Monitor CBC daily  - Transfuse if Hgb <7 and plt <10k    # Risk for TLS  - Monitor TLS/DIC labs daily  - Allopurinol 300 mg daily  - Minimize IVF given malignant pleural effusions although could consider gentle IVF if needed.    ID  # Asymptomatic COVID-19 infection  Noted to be COVID+ on PCR  done 1/9/23. Admitted for planned chemotherapy, as above, and treated with IV remdesivir x3 days (1/11-1/13). Patient was asymptomatic with his infection and satting okay on room air. Had remained persistent positive with most recent test on 2/3 with cycle threshold 28. He is hopeful that his level will be low enough this admission that he can leave his room to ambulate in the hallways  - Repeat COVID test ordered; if positive, then titer may be low enough to allow him to leave his room on occasion      # Hypogammaglobulinemia   IgG 310 (12/2022). Status-post IVIG on 1/15/23.  - Would repeat IgG monthly; give IVIG for IgG <400     # PPX  -  mg BID   - Nebulized pentamidine given 1/15/23; next due ~2/12;  Primary team will confirm timing of last dose in AM  - Hold levofloxacin/fluconazole given ANC >1000    CV  # History of complete heart block s/p pacemaker placement (2020)  Followed by Dr. Fox; last seen 7/7/22. Pacemaker last interrogated in 11/2022.  - No acute inpatient management needs  - Continue cardiology follow-up and device checks as recommended    MISC  # GERD - Continue PTA omeprazole.  # Insomnia - PTA on temazepam 7.5 mg HS PRN for sleep; last prescribed #5 tablets on 1/13/23       FEN:  -IVF per chemo protcol   -PRN lyte replacement  -RDAT  --High protein diet ordered for him so he can get a substantial breakfast, which is his largest meal of the day    Prophy/Misc:  -VTE: ppx Lovenox held on admission with plan for LP   -GI/PUD: PTA Omeprazole   -Bowels: scheduled Senna and PRN Miralax     Clinically Significant Risk Factors Present on Admission                                 I spent 55 minutes face-to-face or coordinating care of Shahid Davis. Over 50% of our time on the unit was spent counseling the patient and/or coordinating care.    Jeromy Swartz MD     Code Status   Full Code    Primary Care Physician   Provider Not In System    Chief Complaint   Cycle 3 of DA-R-EPOCH     History  is obtained from the patient    History of Present Illness   Shahid Davis is a 72 year old man with a history of complete heart block s/p pacemaker placement and low-grade kappa-restricted plasmacytoid B-cell lymphoma diagnosed 3/2004, now with transformation to 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 Received C1 of R-CHOP (O6C0=5512/20/22) prior to change in therapy to DA-R-EPOCH once cytogenetic results were available. He is currently being admitted for planned C3 R-DA-EPOCH.    On admission, he is feeling well. No fevers, cough, change in bowels or appetite. No sick contacts. He's worried that the dose of his chemo may need to be increased but is willing to do whatever his inpatient oncologist recommends.  He hopes his COVID level has gotten low enough that he can ambulate in the halls.     A comprehensive review of systems was obtained and is negative other than noted here or in the HPI.      Past Medical History    I have reviewed this patient's medical history and updated it with pertinent information if needed.   Past Medical History:   Diagnosis Date     Cardiac pacemaker in situ      Cardiac pacemaker in situ     2020     Lymphoma (H)      Squamous cell carcinoma        Past Surgical History   I have reviewed this patient's surgical history and updated it with pertinent information if needed.  Past Surgical History:   Procedure Laterality Date     DAVINCI HERNIORRHAPHY INGUINAL Left 07/09/2021    Procedure: HERNIORRHAPHY, INGUINAL, ROBOT-ASSISTED, Left, Mesh;  Surgeon: Shamar Tyler MD;  Location: [facility] OR     IR CHEST PORT PLACEMENT > 5 YRS OF AGE  1/5/2023     IR SOFT TISSUE BIOPSY  12/15/2022     MOHS MICROGRAPHIC PROCEDURE       NO HISTORY OF SURGERY         Prior to Admission Medications   Cannot display prior to admission medications because the patient has not been admitted in this contact.     Allergies   Allergies   Allergen Reactions     Ampicillin [Ampicillin Sodium]      Bactrim [Sulfamethoxazole  W/Trimethoprim]      Contrast Dye      CT contrast (IV) allergy - need oral Methylpred as premed for scans     Ampicillin Rash       Social History   I have reviewed this patient's social history and updated it with pertinent information if needed. Shahid Davis  reports that he has quit smoking. He has never used smokeless tobacco. He reports current alcohol use of about 11.7 standard drinks per week.    Family History   I have reviewed this patient's family history and updated it with pertinent information if needed.   Family History   Problem Relation Age of Onset     Cancer Mother         Blood     Cancer Father         Lung     Cancer Maternal Grandmother         Breast     Cancer Paternal Grandfather         Hodgkins       Review of Systems   A comprehensive review of symptoms was obtained and negative unless noted above.    Physical Exam                      Vital Signs with Ranges  Temp:  [98  F (36.7  C)-98.9  F (37.2  C)] 98  F (36.7  C)  Pulse:  [] 110  Resp:  [16] 16  BP: ()/(64-66) 114/64  SpO2:  [98 %] 98 %  129 lbs 12.8 oz    Body mass index is 20.33 kg/m .   Gen: Well-developed, well-nourished and in no apparent distress  HEENT:  Normocephalic, atraumatic, PERRL, no scleral icterus,  external auditory canals clear, no nasal discharge Cranial nerves grossly intact.  Neck: supple, no LAD, no thyromegaly. Port side in RUChest is clean/non-erythematous  CV: regular rate and rhythm and normal S1 S2  Resp: clear to ausculation bilaterally, normal respiratory effort  Abd: bowel sounds +, soft NT/ND,  no masses or hepatosplenomegaly  Ext: WWP.  no edema.  5/5 strength  Skin: warm and dry  Psych: normal mood/affect, appropriately oriented     Data   Results for orders placed or performed during the hospital encounter of 02/23/23 (from the past 24 hour(s))   ABO/RH Type and Screen     Narrative    The following orders were created for panel order ABO/RH Type and Screen .  Procedure                                Abnormality         Status                     ---------                               -----------         ------                     Adult Type and Screen[[id number]]                            Final result                 Please view results for these tests on the individual orders.   Adult Type and Screen   Result Value Ref Range    ABO/RH(D) A POS     Antibody Screen Negative Negative    SPECIMEN EXPIRATION DATE [id number]

## 2023-02-23 ENCOUNTER — HOSPITAL ENCOUNTER (INPATIENT)
Facility: CLINIC | Age: 73
LOS: 5 days | Discharge: HOME | DRG: 846 | End: 2023-02-28
Attending: STUDENT IN AN ORGANIZED HEALTH CARE EDUCATION/TRAINING PROGRAM | Admitting: INTERNAL MEDICINE
Payer: MEDICARE

## 2023-02-23 ENCOUNTER — ONCOLOGY VISIT (OUTPATIENT)
Dept: ONCOLOGY | Facility: CLINIC | Age: 73
DRG: 846 | End: 2023-02-23
Attending: PHYSICIAN ASSISTANT
Payer: MEDICARE

## 2023-02-23 ENCOUNTER — APPOINTMENT (OUTPATIENT)
Dept: LAB | Facility: CLINIC | Age: 73
DRG: 846 | End: 2023-02-23
Attending: PHYSICIAN ASSISTANT
Payer: MEDICARE

## 2023-02-23 VITALS
SYSTOLIC BLOOD PRESSURE: 98 MMHG | RESPIRATION RATE: 16 BRPM | OXYGEN SATURATION: 98 % | BODY MASS INDEX: 20.36 KG/M2 | WEIGHT: 130 LBS | TEMPERATURE: 98.9 F | DIASTOLIC BLOOD PRESSURE: 66 MMHG | HEART RATE: 64 BPM

## 2023-02-23 DIAGNOSIS — C83.38 DIFFUSE LARGE B-CELL LYMPHOMA OF LYMPH NODES OF MULTIPLE REGIONS (H): Primary | ICD-10-CM

## 2023-02-23 DIAGNOSIS — Z76.89 PREVENTION OF CHEMOTHERAPY-INDUCED NEUTROPENIA: ICD-10-CM

## 2023-02-23 DIAGNOSIS — U07.1 INFECTION DUE TO 2019 NOVEL CORONAVIRUS: ICD-10-CM

## 2023-02-23 DIAGNOSIS — T45.1X5S ADVERSE EFFECT OF ANTINEOPLASTIC AND IMMUNOSUPPRESSIVE DRUGS, SEQUELA: ICD-10-CM

## 2023-02-23 DIAGNOSIS — C83.32 DIFFUSE LARGE B-CELL LYMPHOMA OF INTRATHORACIC LYMPH NODES (H): Primary | ICD-10-CM

## 2023-02-23 DIAGNOSIS — C83.38 DIFFUSE LARGE B-CELL LYMPHOMA OF LYMPH NODES OF MULTIPLE REGIONS (H): ICD-10-CM

## 2023-02-23 LAB
ABO/RH(D): NORMAL
ALBUMIN SERPL BCG-MCNC: 4.3 G/DL (ref 3.5–5.2)
ALP SERPL-CCNC: 130 U/L (ref 40–129)
ALT SERPL W P-5'-P-CCNC: 38 U/L (ref 10–50)
ANION GAP SERPL CALCULATED.3IONS-SCNC: 9 MMOL/L (ref 7–15)
ANTIBODY SCREEN: NEGATIVE
AST SERPL W P-5'-P-CCNC: 27 U/L (ref 10–50)
BASOPHILS # BLD AUTO: 0 10E3/UL (ref 0–0.2)
BASOPHILS NFR BLD AUTO: 1 %
BILIRUB SERPL-MCNC: 0.2 MG/DL
BUN SERPL-MCNC: 23.2 MG/DL (ref 8–23)
CALCIUM SERPL-MCNC: 9.7 MG/DL (ref 8.8–10.2)
CHLORIDE SERPL-SCNC: 105 MMOL/L (ref 98–107)
CREAT SERPL-MCNC: 0.87 MG/DL (ref 0.67–1.17)
DEPRECATED HCO3 PLAS-SCNC: 26 MMOL/L (ref 22–29)
EOSINOPHIL # BLD AUTO: 0 10E3/UL (ref 0–0.7)
EOSINOPHIL NFR BLD AUTO: 1 %
ERYTHROCYTE [DISTWIDTH] IN BLOOD BY AUTOMATED COUNT: 17.8 % (ref 10–15)
GFR SERPL CREATININE-BSD FRML MDRD: >90 ML/MIN/1.73M2
GLUCOSE SERPL-MCNC: 154 MG/DL (ref 70–99)
HCT VFR BLD AUTO: 33.1 % (ref 40–53)
HGB BLD-MCNC: 10.6 G/DL (ref 13.3–17.7)
IMM GRANULOCYTES # BLD: 0.1 10E3/UL
IMM GRANULOCYTES NFR BLD: 2 %
LYMPHOCYTES # BLD AUTO: 1.1 10E3/UL (ref 0.8–5.3)
LYMPHOCYTES NFR BLD AUTO: 12 %
MCH RBC QN AUTO: 31.9 PG (ref 26.5–33)
MCHC RBC AUTO-ENTMCNC: 32 G/DL (ref 31.5–36.5)
MCV RBC AUTO: 100 FL (ref 78–100)
MONOCYTES # BLD AUTO: 0.6 10E3/UL (ref 0–1.3)
MONOCYTES NFR BLD AUTO: 7 %
NEUTROPHILS # BLD AUTO: 6.7 10E3/UL (ref 1.6–8.3)
NEUTROPHILS NFR BLD AUTO: 77 %
NRBC # BLD AUTO: 0 10E3/UL
NRBC BLD AUTO-RTO: 0 /100
PLATELET # BLD AUTO: 231 10E3/UL (ref 150–450)
POTASSIUM SERPL-SCNC: 4.3 MMOL/L (ref 3.4–5.3)
PROT SERPL-MCNC: 6.5 G/DL (ref 6.4–8.3)
RBC # BLD AUTO: 3.32 10E6/UL (ref 4.4–5.9)
SODIUM SERPL-SCNC: 140 MMOL/L (ref 136–145)
SPECIMEN EXPIRATION DATE: NORMAL
WBC # BLD AUTO: 8.6 10E3/UL (ref 4–11)

## 2023-02-23 PROCEDURE — 36591 DRAW BLOOD OFF VENOUS DEVICE: CPT | Performed by: REGISTERED NURSE

## 2023-02-23 PROCEDURE — 250N000011 HC RX IP 250 OP 636: Performed by: REGISTERED NURSE

## 2023-02-23 PROCEDURE — 86850 RBC ANTIBODY SCREEN: CPT | Performed by: PHYSICIAN ASSISTANT

## 2023-02-23 PROCEDURE — 85025 COMPLETE CBC W/AUTO DIFF WBC: CPT | Performed by: REGISTERED NURSE

## 2023-02-23 PROCEDURE — G0463 HOSPITAL OUTPT CLINIC VISIT: HCPCS | Mod: 25 | Performed by: REGISTERED NURSE

## 2023-02-23 PROCEDURE — 258N000003 HC RX IP 258 OP 636: Performed by: REGISTERED NURSE

## 2023-02-23 PROCEDURE — 250N000013 HC RX MED GY IP 250 OP 250 PS 637: Performed by: REGISTERED NURSE

## 2023-02-23 PROCEDURE — 120N000002 HC R&B MED SURG/OB UMMC

## 2023-02-23 PROCEDURE — 250N000012 HC RX MED GY IP 250 OP 636 PS 637: Performed by: REGISTERED NURSE

## 2023-02-23 PROCEDURE — U0003 INFECTIOUS AGENT DETECTION BY NUCLEIC ACID (DNA OR RNA); SEVERE ACUTE RESPIRATORY SYNDROME CORONAVIRUS 2 (SARS-COV-2) (CORONAVIRUS DISEASE [COVID-19]), AMPLIFIED PROBE TECHNIQUE, MAKING USE OF HIGH THROUGHPUT TECHNOLOGIES AS DESCRIBED BY CMS-2020-01-R: HCPCS | Performed by: INTERNAL MEDICINE

## 2023-02-23 PROCEDURE — 250N000013 HC RX MED GY IP 250 OP 250 PS 637: Performed by: PHYSICIAN ASSISTANT

## 2023-02-23 PROCEDURE — 80053 COMPREHEN METABOLIC PANEL: CPT | Performed by: REGISTERED NURSE

## 2023-02-23 PROCEDURE — 99215 OFFICE O/P EST HI 40 MIN: CPT | Performed by: REGISTERED NURSE

## 2023-02-23 PROCEDURE — 36592 COLLECT BLOOD FROM PICC: CPT | Performed by: PHYSICIAN ASSISTANT

## 2023-02-23 PROCEDURE — 99222 1ST HOSP IP/OBS MODERATE 55: CPT | Mod: AI | Performed by: INTERNAL MEDICINE

## 2023-02-23 PROCEDURE — 86901 BLOOD TYPING SEROLOGIC RH(D): CPT | Performed by: PHYSICIAN ASSISTANT

## 2023-02-23 RX ORDER — ACYCLOVIR 400 MG/1
400 TABLET ORAL EVERY 12 HOURS
COMMUNITY
End: 2023-03-02

## 2023-02-23 RX ORDER — DIPHENHYDRAMINE HYDROCHLORIDE 50 MG/ML
50 INJECTION INTRAMUSCULAR; INTRAVENOUS
Status: CANCELLED
Start: 2023-02-23

## 2023-02-23 RX ORDER — DIPHENHYDRAMINE HCL 50 MG
50 CAPSULE ORAL ONCE
Status: COMPLETED | OUTPATIENT
Start: 2023-02-23 | End: 2023-02-23

## 2023-02-23 RX ORDER — PROCHLORPERAZINE MALEATE 5 MG
5-10 TABLET ORAL EVERY 6 HOURS PRN
Status: DISCONTINUED | OUTPATIENT
Start: 2023-02-23 | End: 2023-02-23

## 2023-02-23 RX ORDER — HEPARIN SODIUM,PORCINE 10 UNIT/ML
5-10 VIAL (ML) INTRAVENOUS
Status: DISCONTINUED | OUTPATIENT
Start: 2023-02-23 | End: 2023-02-28 | Stop reason: HOSPADM

## 2023-02-23 RX ORDER — DEXTROSE MONOHYDRATE 50 MG/ML
10-20 INJECTION, SOLUTION INTRAVENOUS
Status: CANCELLED | OUTPATIENT
Start: 2023-03-01

## 2023-02-23 RX ORDER — LORAZEPAM 2 MG/ML
.5-1 INJECTION INTRAMUSCULAR EVERY 6 HOURS PRN
Status: CANCELLED | OUTPATIENT
Start: 2023-02-23

## 2023-02-23 RX ORDER — AMOXICILLIN 250 MG
2 CAPSULE ORAL 2 TIMES DAILY
Status: CANCELLED | OUTPATIENT
Start: 2023-02-23

## 2023-02-23 RX ORDER — ACYCLOVIR 400 MG/1
400 TABLET ORAL EVERY 12 HOURS
Status: DISCONTINUED | OUTPATIENT
Start: 2023-02-23 | End: 2023-02-28 | Stop reason: HOSPADM

## 2023-02-23 RX ORDER — DIPHENHYDRAMINE HCL 25 MG
50 CAPSULE ORAL ONCE
Status: CANCELLED
Start: 2023-02-23

## 2023-02-23 RX ORDER — ASCORBIC ACID 500 MG
500 TABLET ORAL EVERY MORNING
Status: DISCONTINUED | OUTPATIENT
Start: 2023-02-24 | End: 2023-02-28 | Stop reason: HOSPADM

## 2023-02-23 RX ORDER — TEMAZEPAM 7.5 MG/1
7.5 CAPSULE ORAL
Status: DISCONTINUED | OUTPATIENT
Start: 2023-02-23 | End: 2023-02-28 | Stop reason: HOSPADM

## 2023-02-23 RX ORDER — AMOXICILLIN 250 MG
1 CAPSULE ORAL 2 TIMES DAILY PRN
Status: DISCONTINUED | OUTPATIENT
Start: 2023-02-23 | End: 2023-02-28 | Stop reason: HOSPADM

## 2023-02-23 RX ORDER — PROCHLORPERAZINE MALEATE 5 MG
5-10 TABLET ORAL EVERY 6 HOURS PRN
Status: CANCELLED
Start: 2023-02-23

## 2023-02-23 RX ORDER — ALLOPURINOL 300 MG/1
300 TABLET ORAL DAILY
Status: CANCELLED | OUTPATIENT
Start: 2023-02-23

## 2023-02-23 RX ORDER — ALLOPURINOL 300 MG/1
300 TABLET ORAL DAILY
Status: DISCONTINUED | OUTPATIENT
Start: 2023-02-23 | End: 2023-02-23

## 2023-02-23 RX ORDER — AMOXICILLIN 250 MG
2 CAPSULE ORAL 2 TIMES DAILY PRN
Status: DISCONTINUED | OUTPATIENT
Start: 2023-02-23 | End: 2023-02-28 | Stop reason: HOSPADM

## 2023-02-23 RX ORDER — ONDANSETRON 2 MG/ML
4-8 INJECTION INTRAMUSCULAR; INTRAVENOUS EVERY 8 HOURS PRN
Status: DISCONTINUED | OUTPATIENT
Start: 2023-02-23 | End: 2023-02-28 | Stop reason: HOSPADM

## 2023-02-23 RX ORDER — METHYLPREDNISOLONE SODIUM SUCCINATE 125 MG/2ML
125 INJECTION, POWDER, LYOPHILIZED, FOR SOLUTION INTRAMUSCULAR; INTRAVENOUS
Status: DISCONTINUED | OUTPATIENT
Start: 2023-02-23 | End: 2023-02-28 | Stop reason: HOSPADM

## 2023-02-23 RX ORDER — DEXTROSE MONOHYDRATE 50 MG/ML
10-20 INJECTION, SOLUTION INTRAVENOUS
Status: DISCONTINUED | OUTPATIENT
Start: 2023-02-28 | End: 2023-02-28 | Stop reason: HOSPADM

## 2023-02-23 RX ORDER — PROCHLORPERAZINE MALEATE 5 MG
5 TABLET ORAL EVERY 6 HOURS PRN
Status: DISCONTINUED | OUTPATIENT
Start: 2023-02-23 | End: 2023-02-28 | Stop reason: HOSPADM

## 2023-02-23 RX ORDER — MEPERIDINE HYDROCHLORIDE 25 MG/ML
25 INJECTION INTRAMUSCULAR; INTRAVENOUS; SUBCUTANEOUS EVERY 30 MIN PRN
Status: DISCONTINUED | OUTPATIENT
Start: 2023-02-23 | End: 2023-02-28 | Stop reason: HOSPADM

## 2023-02-23 RX ORDER — ALBUTEROL SULFATE 90 UG/1
1-2 AEROSOL, METERED RESPIRATORY (INHALATION)
Status: DISCONTINUED | OUTPATIENT
Start: 2023-02-23 | End: 2023-02-28 | Stop reason: HOSPADM

## 2023-02-23 RX ORDER — ONDANSETRON 8 MG/1
16 TABLET, FILM COATED ORAL
Status: COMPLETED | OUTPATIENT
Start: 2023-02-23 | End: 2023-02-27

## 2023-02-23 RX ORDER — ONDANSETRON 4 MG/1
4-8 TABLET, ORALLY DISINTEGRATING ORAL EVERY 8 HOURS PRN
Status: DISCONTINUED | OUTPATIENT
Start: 2023-02-23 | End: 2023-02-28 | Stop reason: HOSPADM

## 2023-02-23 RX ORDER — LORAZEPAM 2 MG/ML
.5-1 INJECTION INTRAMUSCULAR EVERY 6 HOURS PRN
Status: DISCONTINUED | OUTPATIENT
Start: 2023-02-23 | End: 2023-02-28 | Stop reason: HOSPADM

## 2023-02-23 RX ORDER — LORAZEPAM 0.5 MG/1
.5-1 TABLET ORAL EVERY 6 HOURS PRN
Status: DISCONTINUED | OUTPATIENT
Start: 2023-02-23 | End: 2023-02-28 | Stop reason: HOSPADM

## 2023-02-23 RX ORDER — ALBUTEROL SULFATE 90 UG/1
1-2 AEROSOL, METERED RESPIRATORY (INHALATION)
Status: CANCELLED
Start: 2023-02-23

## 2023-02-23 RX ORDER — DEXAMETHASONE 4 MG/1
8 TABLET ORAL DAILY
Status: DISCONTINUED | OUTPATIENT
Start: 2023-03-01 | End: 2023-02-28 | Stop reason: HOSPADM

## 2023-02-23 RX ORDER — HEPARIN SODIUM (PORCINE) LOCK FLUSH IV SOLN 100 UNIT/ML 100 UNIT/ML
5 SOLUTION INTRAVENOUS EVERY 8 HOURS
Status: DISCONTINUED | OUTPATIENT
Start: 2023-02-23 | End: 2023-02-23 | Stop reason: HOSPADM

## 2023-02-23 RX ORDER — ALBUTEROL SULFATE 0.83 MG/ML
2.5 SOLUTION RESPIRATORY (INHALATION)
Status: CANCELLED | OUTPATIENT
Start: 2023-02-23

## 2023-02-23 RX ORDER — AMOXICILLIN 250 MG
2 CAPSULE ORAL 2 TIMES DAILY
Status: DISCONTINUED | OUTPATIENT
Start: 2023-02-23 | End: 2023-02-28 | Stop reason: HOSPADM

## 2023-02-23 RX ORDER — ALLOPURINOL 300 MG/1
300 TABLET ORAL DAILY
Status: DISCONTINUED | OUTPATIENT
Start: 2023-02-24 | End: 2023-02-28 | Stop reason: HOSPADM

## 2023-02-23 RX ORDER — ONDANSETRON 4 MG/1
4-8 TABLET, FILM COATED ORAL EVERY 8 HOURS PRN
Status: DISCONTINUED | OUTPATIENT
Start: 2023-02-23 | End: 2023-02-28 | Stop reason: HOSPADM

## 2023-02-23 RX ORDER — LORAZEPAM 0.5 MG/1
.5-1 TABLET ORAL EVERY 6 HOURS PRN
Status: CANCELLED
Start: 2023-02-23

## 2023-02-23 RX ORDER — EPINEPHRINE 1 MG/ML
0.3 INJECTION, SOLUTION, CONCENTRATE INTRAVENOUS EVERY 5 MIN PRN
Status: DISCONTINUED | OUTPATIENT
Start: 2023-02-23 | End: 2023-02-28 | Stop reason: HOSPADM

## 2023-02-23 RX ORDER — HEPARIN SODIUM (PORCINE) LOCK FLUSH IV SOLN 100 UNIT/ML 100 UNIT/ML
5-10 SOLUTION INTRAVENOUS
Status: DISCONTINUED | OUTPATIENT
Start: 2023-02-23 | End: 2023-02-28 | Stop reason: HOSPADM

## 2023-02-23 RX ORDER — POLYETHYLENE GLYCOL 3350 17 G/17G
17 POWDER, FOR SOLUTION ORAL DAILY PRN
Status: DISCONTINUED | OUTPATIENT
Start: 2023-02-23 | End: 2023-02-28 | Stop reason: HOSPADM

## 2023-02-23 RX ORDER — ALBUTEROL SULFATE 0.83 MG/ML
2.5 SOLUTION RESPIRATORY (INHALATION)
Status: DISCONTINUED | OUTPATIENT
Start: 2023-02-23 | End: 2023-02-28 | Stop reason: HOSPADM

## 2023-02-23 RX ORDER — METHYLPREDNISOLONE SODIUM SUCCINATE 125 MG/2ML
125 INJECTION, POWDER, LYOPHILIZED, FOR SOLUTION INTRAMUSCULAR; INTRAVENOUS
Status: CANCELLED
Start: 2023-02-23

## 2023-02-23 RX ORDER — ACETAMINOPHEN 325 MG/1
650 TABLET ORAL EVERY 4 HOURS PRN
Status: DISCONTINUED | OUTPATIENT
Start: 2023-02-23 | End: 2023-02-28 | Stop reason: HOSPADM

## 2023-02-23 RX ORDER — ONDANSETRON 8 MG/1
16 TABLET, FILM COATED ORAL
Status: CANCELLED
Start: 2023-02-23

## 2023-02-23 RX ORDER — ACETAMINOPHEN 325 MG/1
650 TABLET ORAL ONCE
Status: COMPLETED | OUTPATIENT
Start: 2023-02-23 | End: 2023-02-23

## 2023-02-23 RX ORDER — ACETAMINOPHEN 325 MG/1
650 TABLET ORAL ONCE
Status: CANCELLED
Start: 2023-02-23

## 2023-02-23 RX ORDER — MEPERIDINE HYDROCHLORIDE 25 MG/ML
25 INJECTION INTRAMUSCULAR; INTRAVENOUS; SUBCUTANEOUS EVERY 30 MIN PRN
Status: CANCELLED | OUTPATIENT
Start: 2023-02-23

## 2023-02-23 RX ORDER — DEXAMETHASONE 4 MG/1
8 TABLET ORAL DAILY
Status: CANCELLED
Start: 2023-03-01

## 2023-02-23 RX ORDER — DIPHENHYDRAMINE HYDROCHLORIDE 50 MG/ML
50 INJECTION INTRAMUSCULAR; INTRAVENOUS
Status: DISCONTINUED | OUTPATIENT
Start: 2023-02-23 | End: 2023-02-28 | Stop reason: HOSPADM

## 2023-02-23 RX ORDER — HEPARIN SODIUM,PORCINE 10 UNIT/ML
5-10 VIAL (ML) INTRAVENOUS EVERY 24 HOURS
Status: DISCONTINUED | OUTPATIENT
Start: 2023-02-23 | End: 2023-02-28 | Stop reason: HOSPADM

## 2023-02-23 RX ORDER — PREDNISONE 50 MG/1
60 TABLET ORAL 2 TIMES DAILY
Status: COMPLETED | OUTPATIENT
Start: 2023-02-23 | End: 2023-02-28

## 2023-02-23 RX ORDER — EPINEPHRINE 1 MG/ML
0.3 INJECTION, SOLUTION INTRAMUSCULAR; SUBCUTANEOUS EVERY 5 MIN PRN
Status: CANCELLED | OUTPATIENT
Start: 2023-02-23

## 2023-02-23 RX ORDER — LANOLIN ALCOHOL/MO/W.PET/CERES
3-6 CREAM (GRAM) TOPICAL
Status: DISCONTINUED | OUTPATIENT
Start: 2023-02-23 | End: 2023-02-28 | Stop reason: HOSPADM

## 2023-02-23 RX ADMIN — RITUXIMAB-ABBS 600 MG: 10 INJECTION, SOLUTION INTRAVENOUS at 19:43

## 2023-02-23 RX ADMIN — ETOPOSIDE 85 MG: 20 INJECTION, SOLUTION, CONCENTRATE INTRAVENOUS at 21:37

## 2023-02-23 RX ADMIN — DIPHENHYDRAMINE HYDROCHLORIDE 50 MG: 50 CAPSULE ORAL at 18:40

## 2023-02-23 RX ADMIN — PREDNISONE 100 MG: 50 TABLET ORAL at 18:40

## 2023-02-23 RX ADMIN — Medication 5 ML: at 13:03

## 2023-02-23 RX ADMIN — VINCRISTINE SULFATE 0.7 MG: 1 INJECTION, SOLUTION INTRAVENOUS at 21:27

## 2023-02-23 RX ADMIN — ACYCLOVIR 400 MG: 400 TABLET ORAL at 20:32

## 2023-02-23 RX ADMIN — ACETAMINOPHEN 650 MG: 325 TABLET, FILM COATED ORAL at 18:40

## 2023-02-23 RX ADMIN — SENNOSIDES AND DOCUSATE SODIUM 2 TABLET: 50; 8.6 TABLET ORAL at 20:32

## 2023-02-23 RX ADMIN — ONDANSETRON HYDROCHLORIDE 16 MG: 8 TABLET, FILM COATED ORAL at 20:32

## 2023-02-23 ASSESSMENT — ACTIVITIES OF DAILY LIVING (ADL)
ADLS_ACUITY_SCORE: 20
ADLS_ACUITY_SCORE: 20
ADLS_ACUITY_SCORE: 35
ADLS_ACUITY_SCORE: 35

## 2023-02-23 ASSESSMENT — PAIN SCALES - GENERAL: PAINLEVEL: NO PAIN (0)

## 2023-02-23 NOTE — NURSING NOTE
"Oncology Rooming Note    February 23, 2023 12:56 PM   Shahid Davis is a 72 year old male who presents for:    Chief Complaint   Patient presents with     Oncology Clinic Visit     Nodular Lymphoma     Initial Vitals: BP 98/66 (BP Location: Right arm, Patient Position: Sitting, Cuff Size: Adult Large)   Pulse 64   Temp 98.9  F (37.2  C) (Oral)   Resp 16   Wt 59 kg (130 lb)   SpO2 98%   BMI 20.36 kg/m   Estimated body mass index is 20.36 kg/m  as calculated from the following:    Height as of 2/2/23: 1.702 m (5' 7\").    Weight as of this encounter: 59 kg (130 lb). Body surface area is 1.67 meters squared.  No Pain (0) Comment: Data Unavailable   No LMP for male patient.  Allergies reviewed: Yes  Medications reviewed: Yes    Medications: Medication refills not needed today.  Pharmacy name entered into [employer]:    [id number] [place] [address] [id number] [address] AT [facility] & [place]    Clinical concerns: Pt presents today for f/u,       Mónica Lewis LPN  2/23/2023              "

## 2023-02-23 NOTE — PROGRESS NOTES
[facility]  Date of visit: Feb 23, 2023      Reason for Visit: Follow up triple-hit DLBCL      ONCOLOGIC SUMMARY:  Diagnosis:    Low-grade kappa-restricted plasmacytoid B-cell lymphoma dx 3/2004, presenting with thoracic paraspinal mass. Bone marrow hypercellular with 70-80% involvement by small kappa-restricted lymphocytes which were positive for CD10, CD19, and CD20 and negative for CD5 and CD23.     Transformation to high-grade B-cell lymphoma 12/2022 (versus new primary), presenting with B symptoms, sternal mass, and SOB/chest pain. Biopsy of sternal mass showed large B-cell lymphoma with aggressive features (GCB-subtype), Ki67 %, FISH positive for MYC (non-IgH partner), BCL2, and BCL6 rearrangements. Stage IV with extensive lymphomatous involvement to the R pleura w/avid effusions, mediastinal and pericardial regions, right anterolateral chest wall, adenopathy above and below the diaphragm, liver, spleen and multiple sites in the skeleton. CSF flow negative     Treatment history:  For low-grade lymphoma:  1. 2005: Fludarabine-based chemo x 6 cycles and XRT to spine (details unclear)    For high-grade lymphoma:  1. 12/20/22: Cycle 1 R-CHOP with Neulasta support (with a few days of steroid prephase prior)  2. 1/11/23: Cycle 2 Switched to DA-R-EPOCH with triple IT chemo for CNS ppx after FISH returned showing triple-hit disease. PET after 2 cycles shows very good CA.   3. 2/2/23: Cycle 3 DA-R EPOCH with triple IT chemo for CNS ppx      Interval history:   Mr. Davis is here today prior to admission for C4 R-EPOCH.  C3 went well overall.  Feels weak and tired for about 5-7 days after hospital dismissal, then states he starts to feel like is back to his baseline. Using antiemetics effectively to manage his nausea. Senna and MiraLax for constipation. Appetite has been good the past 2 weeks, he is eating as much as he can.  Notes he has gained 3 lb but is still 12 lb below  his baseline. Continues to walk his dogs daily. Slight lingering cough that is unchanged over the past several months.  No fevers, chills, or SOB.       ROS: 10 point ROS neg other than the symptoms noted above in the HPI.          Current Outpatient Medications   Medication Sig Dispense Refill     acyclovir (ZOVIRAX) 400 MG tablet Take 400 mg by mouth every 12 hours       allopurinol (ZYLOPRIM) 300 MG tablet Take 1 tablet (300 mg) by mouth daily 60 tablet 3     ascorbic acid (VITAMIN C) 500 MG tablet Take 500 mg by mouth every morning       fluconazole (DIFLUCAN) 200 MG tablet Take 1 tablet (200 mg) by mouth daily , start at discharge and take until your ANC is greater than 1000. 30 tablet 3     levofloxacin (LEVAQUIN) 250 MG tablet Take 1 tablet (250 mg) by mouth daily , start at discharge and take until your ANC is greater than 1000. 30 tablet 3     Multiple Vitamins-Minerals (MULTIVITAL PO) Take 1 tablet by mouth every morning       omeprazole (PRILOSEC) 40 MG DR capsule Take 1 capsule (40 mg) by mouth daily 90 capsule 4     ondansetron (ZOFRAN) 8 MG tablet Take 1 tablet (8 mg) by mouth every 8 hours as needed for nausea 60 tablet 3     polyethylene glycol (MIRALAX) 17 GM/Dose powder Take 17 g by mouth daily as needed for constipation 510 g 4     senna-docusate (SENNA S) 8.6-50 MG tablet Take 1 tablet by mouth 2 times daily as needed for constipation 60 tablet 4     temazepam (RESTORIL) 7.5 MG capsule Take 1 capsule (7.5 mg) by mouth nightly as needed for sleep 15 capsule 0     prochlorperazine (COMPAZINE) 10 MG tablet Take 1 tablet (10 mg) by mouth every 6 hours as needed for nausea or vomiting 30 tablet 3       Allergies   Allergen Reactions     Ampicillin [Ampicillin Sodium]      Bactrim [Sulfamethoxazole W/Trimethoprim]      Contrast Dye      CT contrast (IV) allergy - need oral Methylpred as premed for scans     Ampicillin Rash         Physical Exam:  BP 98/66 (BP Location: Right arm, Patient Position:  Sitting, Cuff Size: Adult Large)   Pulse 64   Temp 98.9  F (37.2  C) (Oral)   Resp 16   Wt 59 kg (130 lb)   SpO2 98%   BMI 20.36 kg/m    Gen: alert, pleasant and conversational, NAD  HEENT: NC/AT,EOMI w/ PERRL, anicteric sclera.   Neck: Supple, no LAD  CV: normal S1,S2 with RRR no m/r/g  Resp: lungs CTA bilaterally with adequate air movement to bases. No wheezes or crackles  Abd: soft NTND no organomegaly or masses. BS normoactive.   Ext: warm and well perfused, no edema or cyanosis  Lymphatics: no palpable cervical, axillary, or inguinal LAD. No HSM  Skin: no concerning lesions or rashes  Neuro: A&Ox4, no lateralizing sx. Grossly nonfocal.  Psych: appropriate, reactive      Labs:   I personally reviewed the following labs:    Most Recent 3 CBC's:  Recent Labs   Lab Test 02/23/23  1303 02/07/23  0459 02/06/23  0757   WBC 8.6 4.0 4.4   HGB 10.6* 8.4* 8.4*    94 97    97* 107*     Most Recent 3 BMP's:  Recent Labs   Lab Test 02/08/23  1335 02/07/23  0459 02/06/23  0757 02/05/23  0608   NA  --  139 142 140   POTASSIUM  --  3.8 3.6 3.9   CHLORIDE 106 106 110* 109*   CO2  --  25 25 23   BUN  --  27.6* 23.9* 27.1*   CR  --  0.75 0.81 0.75   ANIONGAP  --  8 7 8   JORGE  --  8.4* 8.5* 8.4*   GLC  --  150* 99 111*     Most Recent 2 LFT's:  Recent Labs   Lab Test 02/07/23  0459 02/06/23  0757   AST 21 20   ALT 26 25   ALKPHOS 64 66   BILITOTAL 0.5 0.3         Imaging:   No new imaging to review      Impression/plan:     # H/o low-grade kappa restricted plasmacytoid B-cell lymphoma 3/2004   # Transformation to new DLBCL   H/o low-grade kappa restricted plasmacytoid B-cell lymphoma 3/2004 treated with x6 cycles of fludarabine based chemo and XRT to spine, then has been on surveillance since 2005. He presented in 11/2022 with progressing B symptoms, sternal mass, and SOB/CP. PET 12/9 showed extensive lymphomatous involvement to the R pleura w/avid effusions, mediastinal and pericardial regions, right  anterolateral chest wall, adenopathy above and below the diaphragm, liver, spleen and multiple sites in the skeleton, concerning for transformation from his low-grade lymphoma. 12/15 biopsy of sternal mass showed large B-cell lymphoma with aggressive features (GCB-subtype), Ki67 %. 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  - S/p C1 R-CHOP in the hospital 12/20/22. Tolerated well overall.  - Switched to DA-R-EPOCH starting Cycle 2 (1/11/23) after FISH returned showing triple-hit disease. Staging LP negative, will plan to give triple IT chemo once per cycle for CNS ppx.   - PET after 2 cycles shows significant resolution of most FDG activity/disease other than small area of right paramediastinal pleural thickening (SUVmax 5) and resolution of right pleural effusion. Shared the images and good news with Fredrick today!   - Proceed with Cycle 4 R-EPOCH today (2/23). Will keep at same dose level due to platelet ricci of 40 and patient is concerned about being able to tolerate higher doses.  Discussed with Dr. Ruelas.  - Will arrange for D6 Neulasta at [facility] in [place] as he has seen a provider there in the past. Will ask [facility] to fax orders to [phone] ATTN: [facility].  - Clonoseq was negative 2/2/23  - PET and follow-up with Dr. Ruelas prior to C5 (no CT/contrast needed).     # Pancytopenia  Likely secondary to prior treatment and lymphomatous involvement.  - Monitor CBC weekly twice weekly after R-EPOCH  - Transfuse if Hgb <7 and plt <10k     # PPx  - TLS: Continue allopurinol 300 mg daily.  - ID:  mg BID. Levaquin and Fluconazole to start when ANC <1k. Pentamidine neb last given inpatient 1/15/23, he states he felt very shaky afterward and did not tolerate it well.  Discussed giving IV pentamidine during this admission and he was agreeable to trying that.     # Shortness of breath, improved  # Cough  # R large and L small pleural effusion    PET/CT (12/9) with FDG-avid large R and small L pleural effusion.   - Cough is improving, breathing has significantly improved - no longer has PARRISH.  - Pleural effusions resolved on PET 2/2/23    # Hypogammaglobulinemia    Dec 2022  - Given persistently low COVID cycle threshold, hypogammaglobulinemia, and risk for further immunosuppression with chemo, gave IVIG on 1/15.    # COVID19 Infection  He tested positive on his Cycle 2 pre-admission COVID test. He was very surprised, as he does not feel symptomatic.  Notes that his girlfriend had a URI around [place] and was never tested for COVID. Cycle threshold was around 20 indicating active infection.  - S/p remdesivir x 3 days 1/11-1/13/23.  - No new or worsening respiratory sx since discharge.     # GERD  Related to history of radiation to chest.   - Continue omeprazole.    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 Took uncertain medication for this in the past (~2004), however cannot remember. Melatonin and Trazadone foung to be ineffective.   - Restoril on days he gets steroids is helpful.      PLAN:  - Admit for Cycle 4 R-EPOCH (same dose level, discussed with Dr. Ruelas)  - PET, Dr. Ruelas, and C5 R-EPOCH in 3 weeks  - IV pentamidine during this admission    45 minutes spent on the date of the encounter doing chart review, review of test results, patient visit and documentation     FARSHAD Proctor CNP  [facility]  [address] [place] [address] [phone]

## 2023-02-23 NOTE — LETTER
Date:February 24, 2023      Provider requested that no letter be sent. Do not send.       [facility]

## 2023-02-23 NOTE — LETTER
2/23/2023         RE: Shahid Davis   [facility] [id number]        Dear Colleague,    Thank you for referring your patient, Shahid Davis, to the [facility]. Please see a copy of my visit note below.    [facility]  Date of visit: Feb 23, 2023      Reason for Visit: Follow up triple-hit DLBCL      ONCOLOGIC SUMMARY:  Diagnosis:    Low-grade kappa-restricted plasmacytoid B-cell lymphoma dx 3/2004, presenting with thoracic paraspinal mass. Bone marrow hypercellular with 70-80% involvement by small kappa-restricted lymphocytes which were positive for CD10, CD19, and CD20 and negative for CD5 and CD23.     Transformation to high-grade B-cell lymphoma 12/2022 (versus new primary), presenting with B symptoms, sternal mass, and SOB/chest pain. Biopsy of sternal mass showed large B-cell lymphoma with aggressive features (GCB-subtype), Ki67 %, FISH positive for MYC (non-IgH partner), BCL2, and BCL6 rearrangements. Stage IV with extensive lymphomatous involvement to the R pleura w/avid effusions, mediastinal and pericardial regions, right anterolateral chest wall, adenopathy above and below the diaphragm, liver, spleen and multiple sites in the skeleton. CSF flow negative     Treatment history:  For low-grade lymphoma:  1. 2005: Fludarabine-based chemo x 6 cycles and XRT to spine (details unclear)    For high-grade lymphoma:  1. 12/20/22: Cycle 1 R-CHOP with Neulasta support (with a few days of steroid prephase prior)  2. 1/11/23: Cycle 2 Switched to DA-R-EPOCH with triple IT chemo for CNS ppx after FISH returned showing triple-hit disease. PET after 2 cycles shows very good OH.   3. 2/2/23: Cycle 3 DA-R EPOCH with triple IT chemo for CNS ppx      Interval history:   Mr. Davis is here today prior to admission for C4 R-EPOCH.  C3 went well overall.  Feels weak and tired for about 5-7 days after hospital dismissal, then  states he starts to feel like is back to his baseline. Using antiemetics effectively to manage his nausea. Senna and MiraLax for constipation. Appetite has been good the past 2 weeks, he is eating as much as he can.  Notes he has gained 3 lb but is still 12 lb below his baseline. Continues to walk his dogs daily. Slight lingering cough that is unchanged over the past several months.  No fevers, chills, or SOB.       ROS: 10 point ROS neg other than the symptoms noted above in the HPI.          Current Outpatient Medications   Medication Sig Dispense Refill     acyclovir (ZOVIRAX) 400 MG tablet Take 400 mg by mouth every 12 hours       allopurinol (ZYLOPRIM) 300 MG tablet Take 1 tablet (300 mg) by mouth daily 60 tablet 3     ascorbic acid (VITAMIN C) 500 MG tablet Take 500 mg by mouth every morning       fluconazole (DIFLUCAN) 200 MG tablet Take 1 tablet (200 mg) by mouth daily , start at discharge and take until your ANC is greater than 1000. 30 tablet 3     levofloxacin (LEVAQUIN) 250 MG tablet Take 1 tablet (250 mg) by mouth daily , start at discharge and take until your ANC is greater than 1000. 30 tablet 3     Multiple Vitamins-Minerals (MULTIVITAL PO) Take 1 tablet by mouth every morning       omeprazole (PRILOSEC) 40 MG DR capsule Take 1 capsule (40 mg) by mouth daily 90 capsule 4     ondansetron (ZOFRAN) 8 MG tablet Take 1 tablet (8 mg) by mouth every 8 hours as needed for nausea 60 tablet 3     polyethylene glycol (MIRALAX) 17 GM/Dose powder Take 17 g by mouth daily as needed for constipation 510 g 4     senna-docusate (SENNA S) 8.6-50 MG tablet Take 1 tablet by mouth 2 times daily as needed for constipation 60 tablet 4     temazepam (RESTORIL) 7.5 MG capsule Take 1 capsule (7.5 mg) by mouth nightly as needed for sleep 15 capsule 0     prochlorperazine (COMPAZINE) 10 MG tablet Take 1 tablet (10 mg) by mouth every 6 hours as needed for nausea or vomiting 30 tablet 3       Allergies   Allergen Reactions      Ampicillin [Ampicillin Sodium]      Bactrim [Sulfamethoxazole W/Trimethoprim]      Contrast Dye      CT contrast (IV) allergy - need oral Methylpred as premed for scans     Ampicillin Rash         Physical Exam:  BP 98/66 (BP Location: Right arm, Patient Position: Sitting, Cuff Size: Adult Large)   Pulse 64   Temp 98.9  F (37.2  C) (Oral)   Resp 16   Wt 59 kg (130 lb)   SpO2 98%   BMI 20.36 kg/m    Gen: alert, pleasant and conversational, NAD  HEENT: NC/AT,EOMI w/ PERRL, anicteric sclera.   Neck: Supple, no LAD  CV: normal S1,S2 with RRR no m/r/g  Resp: lungs CTA bilaterally with adequate air movement to bases. No wheezes or crackles  Abd: soft NTND no organomegaly or masses. BS normoactive.   Ext: warm and well perfused, no edema or cyanosis  Lymphatics: no palpable cervical, axillary, or inguinal LAD. No HSM  Skin: no concerning lesions or rashes  Neuro: A&Ox4, no lateralizing sx. Grossly nonfocal.  Psych: appropriate, reactive      Labs:   I personally reviewed the following labs:    Most Recent 3 CBC's:  Recent Labs   Lab Test 02/23/23  1303 02/07/23  0459 02/06/23  0757   WBC 8.6 4.0 4.4   HGB 10.6* 8.4* 8.4*    94 97    97* 107*     Most Recent 3 BMP's:  Recent Labs   Lab Test 02/08/23  1335 02/07/23  0459 02/06/23  0757 02/05/23  0608   NA  --  139 142 140   POTASSIUM  --  3.8 3.6 3.9   CHLORIDE 106 106 110* 109*   CO2  --  25 25 23   BUN  --  27.6* 23.9* 27.1*   CR  --  0.75 0.81 0.75   ANIONGAP  --  8 7 8   JORGE  --  8.4* 8.5* 8.4*   GLC  --  150* 99 111*     Most Recent 2 LFT's:  Recent Labs   Lab Test 02/07/23  0459 02/06/23  0757   AST 21 20   ALT 26 25   ALKPHOS 64 66   BILITOTAL 0.5 0.3         Imaging:   No new imaging to review      Impression/plan:     # H/o low-grade kappa restricted plasmacytoid B-cell lymphoma 3/2004   # Transformation to new DLBCL   H/o low-grade kappa restricted plasmacytoid B-cell lymphoma 3/2004 treated with x6 cycles of fludarabine based chemo and XRT to  spine, then has been on surveillance since 2005. He presented in 11/2022 with progressing B symptoms, sternal mass, and SOB/CP. PET 12/9 showed extensive lymphomatous involvement to the R pleura w/avid effusions, mediastinal and pericardial regions, right anterolateral chest wall, adenopathy above and below the diaphragm, liver, spleen and multiple sites in the skeleton, concerning for transformation from his low-grade lymphoma. 12/15 biopsy of sternal mass showed large B-cell lymphoma with aggressive features (GCB-subtype), Ki67 %. 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  - S/p C1 R-CHOP in the hospital 12/20/22. Tolerated well overall.  - Switched to DA-R-EPOCH starting Cycle 2 (1/11/23) after FISH returned showing triple-hit disease. Staging LP negative, will plan to give triple IT chemo once per cycle for CNS ppx.   - PET after 2 cycles shows significant resolution of most FDG activity/disease other than small area of right paramediastinal pleural thickening (SUVmax 5) and resolution of right pleural effusion. Shared the images and good news with Fredrick today!   - Proceed with Cycle 4 R-EPOCH today (2/23). Will keep at same dose level due to platelet ricci of 40 and patient is concerned about being able to tolerate higher doses.  Discussed with Dr. Ruelas.  - Will arrange for D6 Neulasta at [facility] in [place] as he has seen a provider there in the past. Will ask CC to fax orders to [phone] ATTN: Am[facility].  - Clonoseq was negative 2/2/23  - PET and follow-up with Dr. Ruelas prior to C5 (no CT/contrast needed).     # Pancytopenia  Likely secondary to prior treatment and lymphomatous involvement.  - Monitor CBC weekly twice weekly after R-EPOCH  - Transfuse if Hgb <7 and plt <10k     # PPx  - TLS: Continue allopurinol 300 mg daily.  - ID:  mg BID. Levaquin and Fluconazole to start when ANC <1k. Pentamidine neb last given inpatient  1/15/23, he states he felt very shaky afterward and did not tolerate it well.  Discussed giving IV pentamidine during this admission and he was agreeable to trying that.     # Shortness of breath, improved  # Cough  # R large and L small pleural effusion   PET/CT (12/9) with FDG-avid large R and small L pleural effusion.   - Cough is improving, breathing has significantly improved - no longer has PARRISH.  - Pleural effusions resolved on PET 2/2/23    # Hypogammaglobulinemia    Dec 2022  - Given persistently low COVID cycle threshold, hypogammaglobulinemia, and risk for further immunosuppression with chemo, gave IVIG on 1/15.    # COVID19 Infection  He tested positive on his Cycle 2 pre-admission COVID test. He was very surprised, as he does not feel symptomatic.  Notes that his girlfriend had a URI around [place] and was never tested for COVID. Cycle threshold was around 20 indicating active infection.  - S/p remdesivir x 3 days 1/11-1/13/23.  - No new or worsening respiratory sx since discharge.     # GERD  Related to history of radiation to chest.   - Continue omeprazole.    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 Took uncertain medication for this in the past (~2004), however cannot remember. Melatonin and Trazadone foung to be ineffective.   - Restoril on days he gets steroids is helpful.      PLAN:  - Admit for Cycle 4 R-EPOCH (same dose level, discussed with Dr. Ruelas)  - PET, Dr. Ruelas, and C5 R-EPOCH in 3 weeks  - IV pentamidine during this admission    45 minutes spent on the date of the encounter doing chart review, review of test results, patient visit and documentation     FARSHAD Proctor CNP  [facility]  [address], [place] [id number] [phone]        Again, thank you for allowing me to participate in the care of your patient.        Sincerely,        FARSHAD Rawls CNP

## 2023-02-24 ENCOUNTER — APPOINTMENT (OUTPATIENT)
Dept: GENERAL RADIOLOGY | Facility: CLINIC | Age: 73
DRG: 846 | End: 2023-02-24
Attending: PHYSICIAN ASSISTANT
Payer: MEDICARE

## 2023-02-24 LAB
ALBUMIN SERPL BCG-MCNC: 3.6 G/DL (ref 3.5–5.2)
ALP SERPL-CCNC: 105 U/L (ref 40–129)
ALT SERPL W P-5'-P-CCNC: 29 U/L (ref 10–50)
ANION GAP SERPL CALCULATED.3IONS-SCNC: 10 MMOL/L (ref 7–15)
AST SERPL W P-5'-P-CCNC: 21 U/L (ref 10–50)
BASOPHILS # BLD AUTO: 0 10E3/UL (ref 0–0.2)
BASOPHILS NFR BLD AUTO: 0 %
BILIRUB SERPL-MCNC: <0.2 MG/DL
BUN SERPL-MCNC: 23 MG/DL (ref 8–23)
CALCIUM SERPL-MCNC: 9.3 MG/DL (ref 8.8–10.2)
CHLORIDE SERPL-SCNC: 106 MMOL/L (ref 98–107)
CREAT SERPL-MCNC: 0.74 MG/DL (ref 0.67–1.17)
CYCLE THRESHOLD (CT): 30.3
DEPRECATED HCO3 PLAS-SCNC: 24 MMOL/L (ref 22–29)
EOSINOPHIL # BLD AUTO: 0 10E3/UL (ref 0–0.7)
EOSINOPHIL NFR BLD AUTO: 0 %
ERYTHROCYTE [DISTWIDTH] IN BLOOD BY AUTOMATED COUNT: 17.4 % (ref 10–15)
FIBRINOGEN PPP-MCNC: 353 MG/DL (ref 170–490)
GFR SERPL CREATININE-BSD FRML MDRD: >90 ML/MIN/1.73M2
GLUCOSE CSF-MCNC: 87 MG/DL (ref 40–70)
GLUCOSE SERPL-MCNC: 134 MG/DL (ref 70–99)
HCT VFR BLD AUTO: 28.8 % (ref 40–53)
HGB BLD-MCNC: 9.1 G/DL (ref 13.3–17.7)
HOLD SPECIMEN: NORMAL
IMM GRANULOCYTES # BLD: 0.1 10E3/UL
IMM GRANULOCYTES NFR BLD: 2 %
INR PPP: 1.06 (ref 0.85–1.15)
LYMPHOCYTES # BLD AUTO: 0.4 10E3/UL (ref 0.8–5.3)
LYMPHOCYTES NFR BLD AUTO: 9 %
MCH RBC QN AUTO: 32.4 PG (ref 26.5–33)
MCHC RBC AUTO-ENTMCNC: 31.6 G/DL (ref 31.5–36.5)
MCV RBC AUTO: 103 FL (ref 78–100)
MONOCYTES # BLD AUTO: 0 10E3/UL (ref 0–1.3)
MONOCYTES NFR BLD AUTO: 1 %
NEUTROPHILS # BLD AUTO: 4.1 10E3/UL (ref 1.6–8.3)
NEUTROPHILS NFR BLD AUTO: 88 %
NRBC # BLD AUTO: 0 10E3/UL
NRBC BLD AUTO-RTO: 0 /100
PLATELET # BLD AUTO: 165 10E3/UL (ref 150–450)
POTASSIUM SERPL-SCNC: 4.2 MMOL/L (ref 3.4–5.3)
PROT CSF-MCNC: 63.5 MG/DL (ref 15–45)
PROT SERPL-MCNC: 5.3 G/DL (ref 6.4–8.3)
RBC # BLD AUTO: 2.81 10E6/UL (ref 4.4–5.9)
SARS-COV-2 RNA RESP QL NAA+PROBE: POSITIVE
SODIUM SERPL-SCNC: 140 MMOL/L (ref 136–145)
URATE SERPL-MCNC: 3.4 MG/DL (ref 3.4–7)
WBC # BLD AUTO: 4.6 10E3/UL (ref 4–11)

## 2023-02-24 PROCEDURE — 3E0R305 INTRODUCTION OF OTHER ANTINEOPLASTIC INTO SPINAL CANAL, PERCUTANEOUS APPROACH: ICD-10-PCS | Performed by: PHYSICIAN ASSISTANT

## 2023-02-24 PROCEDURE — 120N000002 HC R&B MED SURG/OB UMMC

## 2023-02-24 PROCEDURE — 250N000013 HC RX MED GY IP 250 OP 250 PS 637: Performed by: REGISTERED NURSE

## 2023-02-24 PROCEDURE — 85384 FIBRINOGEN ACTIVITY: CPT | Performed by: PHYSICIAN ASSISTANT

## 2023-02-24 PROCEDURE — 88188 FLOWCYTOMETRY/READ 9-15: CPT | Mod: GC | Performed by: PATHOLOGY

## 2023-02-24 PROCEDURE — 258N000003 HC RX IP 258 OP 636: Performed by: REGISTERED NURSE

## 2023-02-24 PROCEDURE — 85610 PROTHROMBIN TIME: CPT | Performed by: PHYSICIAN ASSISTANT

## 2023-02-24 PROCEDURE — 82374 ASSAY BLOOD CARBON DIOXIDE: CPT | Performed by: PHYSICIAN ASSISTANT

## 2023-02-24 PROCEDURE — 99233 SBSQ HOSP IP/OBS HIGH 50: CPT | Mod: FS | Performed by: PHYSICIAN ASSISTANT

## 2023-02-24 PROCEDURE — 84157 ASSAY OF PROTEIN OTHER: CPT | Performed by: PHYSICIAN ASSISTANT

## 2023-02-24 PROCEDURE — 96450 CHEMOTHERAPY INTO CNS: CPT

## 2023-02-24 PROCEDURE — 87070 CULTURE OTHR SPECIMN AEROBIC: CPT | Performed by: PHYSICIAN ASSISTANT

## 2023-02-24 PROCEDURE — 96450 CHEMOTHERAPY INTO CNS: CPT | Performed by: PHYSICIAN ASSISTANT

## 2023-02-24 PROCEDURE — 87205 SMEAR GRAM STAIN: CPT | Performed by: PHYSICIAN ASSISTANT

## 2023-02-24 PROCEDURE — 96450 CHEMOTHERAPY INTO CNS: CPT | Mod: GC | Performed by: STUDENT IN AN ORGANIZED HEALTH CARE EDUCATION/TRAINING PROGRAM

## 2023-02-24 PROCEDURE — 36591 DRAW BLOOD OFF VENOUS DEVICE: CPT | Performed by: PHYSICIAN ASSISTANT

## 2023-02-24 PROCEDURE — 250N000009 HC RX 250: Performed by: INTERNAL MEDICINE

## 2023-02-24 PROCEDURE — 82435 ASSAY OF BLOOD CHLORIDE: CPT | Performed by: PHYSICIAN ASSISTANT

## 2023-02-24 PROCEDURE — 82945 GLUCOSE OTHER FLUID: CPT | Performed by: PHYSICIAN ASSISTANT

## 2023-02-24 PROCEDURE — 89050 BODY FLUID CELL COUNT: CPT | Performed by: PHYSICIAN ASSISTANT

## 2023-02-24 PROCEDURE — 250N000011 HC RX IP 250 OP 636: Performed by: REGISTERED NURSE

## 2023-02-24 PROCEDURE — 88184 FLOWCYTOMETRY/ TC 1 MARKER: CPT | Performed by: PHYSICIAN ASSISTANT

## 2023-02-24 PROCEDURE — 250N000012 HC RX MED GY IP 250 OP 636 PS 637: Performed by: REGISTERED NURSE

## 2023-02-24 PROCEDURE — 99207 PR NO CHARGE NURSE ONLY: CPT | Performed by: PHYSICIAN ASSISTANT

## 2023-02-24 PROCEDURE — 85025 COMPLETE CBC W/AUTO DIFF WBC: CPT | Performed by: PHYSICIAN ASSISTANT

## 2023-02-24 PROCEDURE — 84550 ASSAY OF BLOOD/URIC ACID: CPT | Performed by: PHYSICIAN ASSISTANT

## 2023-02-24 PROCEDURE — 88185 FLOWCYTOMETRY/TC ADD-ON: CPT | Performed by: PHYSICIAN ASSISTANT

## 2023-02-24 PROCEDURE — 250N000011 HC RX IP 250 OP 636: Performed by: PHYSICIAN ASSISTANT

## 2023-02-24 PROCEDURE — 250N000013 HC RX MED GY IP 250 OP 250 PS 637: Performed by: PHYSICIAN ASSISTANT

## 2023-02-24 PROCEDURE — 77003 FLUOROGUIDE FOR SPINE INJECT: CPT | Mod: 26 | Performed by: STUDENT IN AN ORGANIZED HEALTH CARE EDUCATION/TRAINING PROGRAM

## 2023-02-24 RX ORDER — ENOXAPARIN SODIUM 100 MG/ML
40 INJECTION SUBCUTANEOUS EVERY 24 HOURS
Status: DISCONTINUED | OUTPATIENT
Start: 2023-02-24 | End: 2023-02-28 | Stop reason: HOSPADM

## 2023-02-24 RX ORDER — ACETAMINOPHEN 325 MG/1
650 TABLET ORAL
Status: ACTIVE | OUTPATIENT
Start: 2023-02-24 | End: 2023-02-25

## 2023-02-24 RX ORDER — HEPARIN SODIUM,PORCINE 10 UNIT/ML
5-10 VIAL (ML) INTRAVENOUS
Status: DISCONTINUED | OUTPATIENT
Start: 2023-02-24 | End: 2023-02-28 | Stop reason: HOSPADM

## 2023-02-24 RX ORDER — HEPARIN SODIUM,PORCINE 10 UNIT/ML
5-10 VIAL (ML) INTRAVENOUS EVERY 24 HOURS
Status: DISCONTINUED | OUTPATIENT
Start: 2023-02-24 | End: 2023-02-28 | Stop reason: HOSPADM

## 2023-02-24 RX ORDER — HEPARIN SODIUM (PORCINE) LOCK FLUSH IV SOLN 100 UNIT/ML 100 UNIT/ML
5-10 SOLUTION INTRAVENOUS
Status: DISCONTINUED | OUTPATIENT
Start: 2023-02-24 | End: 2023-02-28 | Stop reason: HOSPADM

## 2023-02-24 RX ADMIN — TEMAZEPAM 7.5 MG: 7.5 CAPSULE ORAL at 22:28

## 2023-02-24 RX ADMIN — LIDOCAINE HYDROCHLORIDE 10 ML: 10 SOLUTION INTRAVENOUS at 15:42

## 2023-02-24 RX ADMIN — ONDANSETRON HYDROCHLORIDE 16 MG: 8 TABLET, FILM COATED ORAL at 18:45

## 2023-02-24 RX ADMIN — ACYCLOVIR 400 MG: 400 TABLET ORAL at 08:45

## 2023-02-24 RX ADMIN — OMEPRAZOLE 40 MG: 20 CAPSULE, DELAYED RELEASE ORAL at 08:46

## 2023-02-24 RX ADMIN — ENOXAPARIN SODIUM 40 MG: 40 INJECTION SUBCUTANEOUS at 20:23

## 2023-02-24 RX ADMIN — ACYCLOVIR 400 MG: 400 TABLET ORAL at 20:23

## 2023-02-24 RX ADMIN — PREDNISONE 100 MG: 50 TABLET ORAL at 16:35

## 2023-02-24 RX ADMIN — POLYETHYLENE GLYCOL 3350 17 G: 17 POWDER, FOR SOLUTION ORAL at 08:55

## 2023-02-24 RX ADMIN — PREDNISONE 100 MG: 50 TABLET ORAL at 08:45

## 2023-02-24 RX ADMIN — SENNOSIDES AND DOCUSATE SODIUM 2 TABLET: 50; 8.6 TABLET ORAL at 20:23

## 2023-02-24 RX ADMIN — ALLOPURINOL 300 MG: 300 TABLET ORAL at 08:45

## 2023-02-24 RX ADMIN — VINCRISTINE SULFATE 0.7 MG: 1 INJECTION, SOLUTION INTRAVENOUS at 20:24

## 2023-02-24 RX ADMIN — ETOPOSIDE 85 MG: 20 INJECTION, SOLUTION, CONCENTRATE INTRAVENOUS at 20:24

## 2023-02-24 RX ADMIN — CYTARABINE: 100 INJECTION INTRATHECAL; INTRAVENOUS; SUBCUTANEOUS at 15:34

## 2023-02-24 RX ADMIN — OXYCODONE HYDROCHLORIDE AND ACETAMINOPHEN 500 MG: 500 TABLET ORAL at 08:45

## 2023-02-24 RX ADMIN — SENNOSIDES AND DOCUSATE SODIUM 2 TABLET: 50; 8.6 TABLET ORAL at 08:45

## 2023-02-24 ASSESSMENT — ACTIVITIES OF DAILY LIVING (ADL)
ADLS_ACUITY_SCORE: 20

## 2023-02-24 NOTE — PROGRESS NOTES
[facility]    Hematology / Oncology Progress Note    Date of Service (when I saw the patient): 02/24/2023     Assessment & Plan   Shahid Davis is a 72 year old man with a history of complete heart block s/p pacemaker placement and low-grade kappa-restricted plasmacytoid B-cell lymphoma diagnosed 3/2004, now with transformation to 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 He is currently admitted for planned C3 DA-R-EPOCH.     TODAY  - D2 R-EPOCH, continue protocol-directed Prednisone 100 mg BID  - XR was able to add onto schedule today. Plan for LP with IT chemo at 3 pm. Follow-up CSF flow.   - Start ppx Lovenox post-procedurally tonight   - Admission COVID-testing remains positive, cycle threshold is 30. D/w infection prevention and unfortunately will need to remain on special precautions (until cycle threshold is 35).   - Appreciate RNCC involvement for arrangement of local follow-up. Request faxed.   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 Ruelas. Pt has a h/o low-grade kappa restricted plasmacytoid B-cell lymphoma (diagnosed in 3/2004) treated with x6 cycles of fludarabine based chemo and XRT to spine, then has been on surveillance since 2005. He presented with progressive B-symptoms, abdominal pain, and SOB. PET 12/9 showed extensive lymphomatous involvement to the right pleura w/ FDG-avid effusions, mediastinal and pericardial regions, right anterolateral chest wall, adenopathy above and below the diaphragm, liver, spleen and multiple sites in the skeleton, concerning for transformation from his low-grade lymphoma. He also has pancytopenia which seems to have progressed slowly over the past years. Of note, reports maternal history of Waldenstrom's. Due to worsening shortness of breath and delays of biopsy, patient  presented to ED on 12/14 and was subsequently admitted for expedited workup and treatment. As SUV of the sternoclavicular mass was the highest, patient underwent US-guided sternoclavicular soft tissue mass biopsy with IR 12/15, pathology from which confirmed a diagnosis of high-grade B-cell lymphoma. Baseline ECHO (12/15) with LVEF 60-65%, mild aortic insufficiency, no evidence of effusion or tamponade. Received 1 cycle of R-CHOP (C1D1 = 12/20/22) which was well-tolerated. Cytogenetics then returned, revealed rearrangements in MYC, BCL2, and BCL6, consistent with triple hit lymphoma. He was then changed to DA-R-EPOCH and proceeded with C1 on 1/11/23 (C2 total of therapy). He was re-admitted for C2 on 2/2/23 which he tolerated well - he qualified for dose-increase though pt did not feel that he could tolerate a higher dose and thus dose was kept the same. Currently being admitted for Cycle 3 on 2/22/23; with dose adjustment to be determined by Heme Malignancy team in AM.   - CNS IPI was 4 (1 point each for age, LDH, stage IV disease, and extranodal involvement); as such, CNS ppx with IT chemotherapy was recommended. CSF flow negative (1/12) with C1. CSF flow negative (2/6) with C2. Difficult bedside access with C1; requesting future LPs done in XR.   - Added to XR schedule for LP with IT chemo 2/24 at 3 pm. Orders sent - follow-up CSF flow.                              Treatment Plan: DA-R- EPOCH. C3D1=2/22/23  dosing needs to be confirmed by primary team in AM.                            - Rituximab 375 mg/m2 IV x1 dose -- Day 1                           - Etoposide 50 mg/m2 IV q22h x4 doses -- Days 1-4                           - Vincristine 0.4 mg/m2 IV q22h x4 doses --  Days 1-4                           - Doxorubicin 10 mg/m2 IV q22h x4 doses -- Days 1-4                           - Cyclophosphamide 750 mg/m2 IV x1 dose -- Day 5                           - Prednisone 60 mg/m2 PO BID x10 doses -- Days  1-5                           - Dexamethasone 8 mg daily x2 doses -- Days 6-7                           - Neulasta 6 mg subQ x1 dose -- Day 6; requested 3/1 at [facility] in [place]                            - Predmeds: Tylenol, Benadryl, Zofran, Emend     # Anemia   Secondary to chemotherapy and underlying lymphoma.   - Monitor CBC daily  - Transfuse if Hgb <7 and plt <10k     # Risk for TLS  - Monitor TLS/DIC labs daily  - Allopurinol 300 mg daily  - Minimize IVF given malignant pleural effusions although could consider gentle IVF if needed.     ID  # Asymptomatic COVID-19 infection  Noted to be COVID+ on PCR done 1/9/23. Admitted for planned chemotherapy, as above, and treated with IV remdesivir x3 days (1/11-1/13). Patient was asymptomatic with his infection and satting okay on room air. Had remained persistent positive with most recent test on 2/3 with cycle threshold 28. He is hopeful that his level will be low enough this admission that he can leave his room to ambulate in the hallways  - Repeat COVID test remains positive on admission, cycle threshold is 30. D/w infection prevention and unfortunately will need to remain on special precautions (until cycle threshold is 35).       # Hypogammaglobulinemia   IgG 310 (12/2022). Status-post IVIG on 1/15/23.  - Would repeat IgG monthly; give IVIG for IgG <400     # PPX  -  mg BID   - Nebulized pentamidine given 1/15/23; next due ~2/12;  confirm timing of last dose prior to discharge  - Hold levofloxacin/fluconazole given ANC >1000     CV  # History of complete heart block s/p pacemaker placement (2020)  Followed by Dr. Fox; last seen 7/7/22. Pacemaker last interrogated in 11/2022.  - No acute inpatient management needs  - Continue cardiology follow-up and device checks as recommended     MISC  # GERD - Continue PTA omeprazole.  # Insomnia - PTA on temazepam 7.5 mg HS PRN for sleep; last prescribed #5 tablets on 1/13/23        FEN:  -IVF per chemo  protcol   -PRN lyte replacement  -RDAT  -High protein diet ordered for him so he can get a substantial breakfast, which is his largest meal of the day     Prophy/Misc:  -VTE: ppx Lovenox held on admission with plan for LP, plan to resume 2/24 PM post-procedurally    -GI/PUD: PTA Omeprazole   -Bowels: scheduled Senna and PRN Miralax     Follow-up:   - Neulasta infusion appointment 3/1 - requested locally   - Twice weekly labs and possible tx starting 3/3 - requested locally  -  3/17: labs, PET/CT, visit with Dr. Ruelas    Coordination:   - Request for local follow-up provided to RNCC on 2/24, appreciate scheduling assistance    Patient and plan of care was discussed with attending physician Dr. Ruelas.    >60 minutes spent on the date of the encounter. Over 50% of time was spent counseling the patient and/or coordinating care.     Marti Bello PA-C  Hematology/Oncology  Pager: [phone]    Interval History   Nursing notes reviewed. Shahid arrived last evening and got started on chemo. Tolerated D1 chemo well thus far. His admission COVID test remains positive and he is really hopeful that his cycle threshold will be high enough to allow him to ambulate out of his room. He continues to endorse mild cough and runny nose ongoing for the past few months since COVID infection. Otherwise denies specific complaints this morning. States has been doing well at home with eating and ambulating. He is hopeful his LP with IT chemo can be today rather than on Monday. All questions answered.     A comprehensive review of systems was obtained and is negative other than noted here or in the HPI.     Physical Exam   Temp: 98.3  F (36.8  C) Temp src: Oral BP: 102/66 Pulse: 99   Resp: 16 SpO2: 97 % O2 Device: None (Room air)    Vitals:    02/23/23 1703 02/24/23 0832   Weight: 58.9 kg (129 lb 12.8 oz) 57.4 kg (126 lb 9.6 oz)     Vital Signs with Ranges  Temp:  [97.5  F (36.4  C)-98.9  F (37.2  C)] 98.3  F (36.8  C)  Pulse:  [] 99  Resp:  [16]  16  BP: ()/(54-66) 102/66  SpO2:  [96 %-98 %] 97 %  I/O last 3 completed shifts:  In: 500 [IV Piggyback:500]  Out: -     General: Awake and interactive. No acute distress. Pleasant male seen resting reclined in bed. Non-toxic appearing. Chronically-ill appearing.   Skin: Warm, dry, and intact without rashes or lesions. Pale.   Head: Normocephalic and atraumatic.  HEENT: PERRLA. Sclera clear. EOM intact. Oral mucosa is pink and moist with no lesions, thrush, or exudates.   Lymph: Neck supple. No significant adenopathy.   Cardiac: Regular rate and rhythm.   Respiratory: No signs of respiratory distress or accessory muscle use. Diminished breath sounds at bases. No wheezes or crackles.   Abd: Soft, symmetric, and non-tender without distension. BS present and normoactive.   Extremities: Trace bilateral LE edema, worst at ankles. Distal pulses palpable.   Neuro: A&Ox3 with normal speech. Memory and thought process preserved. No deficits grossly.  Psych: Appropriate mood and affect. Good judgment and insight. No visual/auditory hallucinations.   Vascular Access: R chest port c/d/i. Non-tender, no surrounding erythema.    Medications     - MEDICATION INSTRUCTIONS -         acyclovir  400 mg Oral Q12H     allopurinol  300 mg Oral Daily     Chemotherapy Infusing-Continuous Infusion   Does not apply Q8H     [START ON 2/27/2023] cyclophosphamide (CYTOXAN) infusion  750 mg/m2 (Treatment Plan Recorded) Intravenous Once     [START ON 2/25/2023] INTRATHECAL - Cytarabine and/or methotrexate and/or Hydrocortisone   Intrathecal Once     [START ON 3/1/2023] dexamethasone  8 mg Oral Daily     [START ON 2/28/2023] dextrose 5% water  10-20 mL Intracatheter Daily at 8 pm     [START ON 2/28/2023] dextrose 5% water  10-20 mL Intracatheter Daily at 8 pm     etoposide  50 mg/m2 (Treatment Plan Recorded) Intravenous Q22H     [START ON 2/28/2023] filgrastim-aafi  5 mcg/kg (Treatment Plan Recorded) Intravenous Daily at 8 pm     [START ON  2/27/2023] fosaprepitant (EMEND) intermittent infusion ( mL)  150 mg Intravenous Once     heparin  5-10 mL Intracatheter Q28 Days     heparin lock flush  5-10 mL Intracatheter Q24H     omeprazole  40 mg Oral Daily     ondansetron  16 mg Oral Q22H     predniSONE  60 mg/m2 (Treatment Plan Recorded) Oral BID     senna-docusate  2 tablet Oral BID     sodium chloride (PF)  10-20 mL Intracatheter Q28 Days     vinCRIStine/DOXOrubicin (ONCOVIN/ADRIAMYCIN) infusion  0.4 mg/m2 (Treatment Plan Recorded) Intravenous Q22H     vitamin C  500 mg Oral QAM     Data   Results for orders placed or performed during the hospital encounter of 02/23/23 (from the past 24 hour(s))   ABO/RH Type and Screen     Narrative    The following orders were created for panel order ABO/RH Type and Screen .  Procedure                               Abnormality         Status                     ---------                               -----------         ------                     Adult Type and Screen[[id number]]                            Final result                 Please view results for these tests on the individual orders.   Adult Type and Screen   Result Value Ref Range    ABO/RH(D) A POS     Antibody Screen Negative Negative    SPECIMEN EXPIRATION DATE [id number]    Asymptomatic COVID-19 Virus (Coronavirus) by PCR Nasopharyngeal    Specimen: Nasopharyngeal; Swab   Result Value Ref Range    SARS CoV2 PCR Positive (A) Negative    Cycle threshold 30.3     Narrative    Testing was performed using the Xpert Xpress SARS-CoV-2 Assay on the Cepheid Gene-Xpert Instrument Systems. Additional information about this Emergency Use Authorization (EUA) assay can be found via the Lab Guide. This test should be ordered for the detection of SARS-CoV-2 in individuals who meet SARS-CoV-2 clinical and/or epidemiological criteria as well as from individuals without symptoms or other reasons to suspect COVID-19. Test performance for asymptomatic patients has  only been established in anterior nasal swab specimens. This test is for in vitro diagnostic use under the FDA EUA for laboratories certified under CLIA to perform high complexity testing. This test has not been FDA cleared or approved. A negative result does not rule out the presence of PCR inhibitors in the specimen or target RNA concentration below the limit of detection for the assay. The possibility of a false negative should be considered if the patient's recent exposure or clinical presentation suggests COVID-19. This test was validated by [facility] [employer]. These Laboratories are certified under the Clinical Laboratory Improvement Amendments (CLIA) as qualified to perform high complexity testing.     CBC with platelets differential    Narrative    The following orders were created for panel order CBC with platelets differential.  Procedure                               Abnormality         Status                     ---------                               -----------         ------                     CBC with platelets and d...[[id number]]  Abnormal            Final result                 Please view results for these tests on the individual orders.   Comprehensive metabolic panel   Result Value Ref Range    Sodium 140 136 - 145 mmol/L    Potassium 4.2 3.4 - 5.3 mmol/L    Chloride 106 98 - 107 mmol/L    Carbon Dioxide (CO2) 24 22 - 29 mmol/L    Anion Gap 10 7 - 15 mmol/L    Urea Nitrogen 23.0 8.0 - 23.0 mg/dL    Creatinine 0.74 0.67 - 1.17 mg/dL    Calcium 9.3 8.8 - 10.2 mg/dL    Glucose 134 (H) 70 - 99 mg/dL    Alkaline Phosphatase 105 40 - 129 U/L    AST 21 10 - 50 U/L    ALT 29 10 - 50 U/L    Protein Total 5.3 (L) 6.4 - 8.3 g/dL    Albumin 3.6 3.5 - 5.2 g/dL    Bilirubin Total <0.2 <=1.2 mg/dL    GFR Estimate >90 >60 mL/min/1.73m2   INR   Result Value Ref Range    INR 1.06 0.85 - 1.15   Fibrinogen activity   Result Value Ref Range    Fibrinogen Activity 353 170 - 490 mg/dL   Uric acid   Result  Value Ref Range    Uric Acid 3.4 3.4 - 7.0 mg/dL   CBC with platelets and differential   Result Value Ref Range    WBC Count 4.6 4.0 - 11.0 10e3/uL    RBC Count 2.81 (L) 4.40 - 5.90 10e6/uL    Hemoglobin 9.1 (L) 13.3 - 17.7 g/dL    Hematocrit 28.8 (L) 40.0 - 53.0 %     (H) 78 - 100 fL    MCH 32.4 26.5 - 33.0 pg    MCHC 31.6 31.5 - 36.5 g/dL    RDW 17.4 (H) 10.0 - 15.0 %    Platelet Count 165 150 - 450 10e3/uL    % Neutrophils 88 %    % Lymphocytes 9 %    % Monocytes 1 %    % Eosinophils 0 %    % Basophils 0 %    % Immature Granulocytes 2 %    NRBCs per 100 WBC 0 <1 /100    Absolute Neutrophils 4.1 1.6 - 8.3 10e3/uL    Absolute Lymphocytes 0.4 (L) 0.8 - 5.3 10e3/uL    Absolute Monocytes 0.0 0.0 - 1.3 10e3/uL    Absolute Eosinophils 0.0 0.0 - 0.7 10e3/uL    Absolute Basophils 0.0 0.0 - 0.2 10e3/uL    Absolute Immature Granulocytes 0.1 <=0.4 10e3/uL    Absolute NRBCs 0.0 10e3/uL

## 2023-02-24 NOTE — PROCEDURES
DIAGNOSIS: High grade B-cell lymphoma    PROCEDURE: Intrathecal chemo administration     LOCATION: Radiology      INDICATION: High grade B-cell lymphoma per treatment protocol    Lumbar puncture performed and CSF samples collected by the General Radiology team under fluoroscopy.      Chemotherapy was double-checked by this writer and JOSE MARTIN colleague. This writer then administered cytarabine 40 mg, hydrocortisone sodium succinate 50 mg, methotrexate 12 mg in sodium chloride 0.9% 6 mL intrathecal injection over 3-5 minutes without apparent complication.    Complications: None immediately.     Chemo instillation performed by: SONJA Ellis PA-C  Hematology/Oncology  Pager: [phone]

## 2023-02-24 NOTE — PROGRESS NOTES
Nursing Focus: Admission    D: Patient admitted/transferred from home for chemotherapy.      I: Upon arrival to the unit patient was oriented to room, unit, and call light. Patient s height, weight, and vital signs were obtained. Allergies reviewed and allergy band applied. MD notified of patient s arrival on the unit. Adult AVS completed. Head to toe assessment completed. Education assessment completed. Care plan initiated.     A: Vital signs stable upon admission. Patient rates pain at zero. Two RN skin assessment completed by Sera Pedraza RN. Second RN was Stephanie Macias RN. Significant Skin Findings include none. WO Nurse Consult Ordered No. Bed Algorithm can be found in PCS flow sheets (Support Surface Algorithm) and on IP [facility] NURSE RESOURCE TAB, was this used during this assessment? No. Was a pulsate mattress ordered No.     P: Continue to monitor patient s skin and intervene as needed. Continue with plan of care. Notify MD with any concerns or changes in patient status.

## 2023-02-24 NOTE — DISCHARGE INSTRUCTIONS
Your provider has ordered a Neulasta injection and labs to be done twice weekly for 2-3 weeks, starting on Friday, March 3.  The injection and the first three lab appointments have been set up, you will need to set up the remaining appointments.    March 1, Wednesday- 11:45 labs, 1:00pm Neulasta injection  March 6, Monday - 1:00pm  March 9, Thursday - 1:00pm     [address] [address]  Phone: [phone]

## 2023-02-24 NOTE — PLAN OF CARE
"Goal Outcome Evaluation:    /66 (BP Location: Right arm)   Pulse 99   Temp 98.3  F (36.8  C) (Oral)   Resp 16   Ht 1.702 m (5' 7\")   Wt 57.4 kg (126 lb 9.6 oz)   SpO2 97%   BMI 19.83 kg/m       Neuro: A&O x4, denies N/T  Pain/Nausea: Denies pain and N/V  Mobility: UP Ud Radha  Diet: High kcal/High Protein with good appetite  Labs: Still waiting for viral threshold  Skin/incisions: No new deficit noted  Respiratory: WDL, Denies SOB  Cardiac: WDL, denies chest pain  GI/: Voiding adequately to the bathroom  Plan: Pt went for lumbar puncture. Monitor and continue with POC  "

## 2023-02-24 NOTE — PLAN OF CARE
Goal Outcome Evaluation:  Admitted for R-EPOCH chemotherapy which he has had before. Given Rituxan without problems. Chemotherapy hung per protocol without problems. Good blood return from [place]. Afebrile. Denied pain or nausea. He had a stool today. He is Covid positive and lab will do a viral threshold tomorrow. Up independently.

## 2023-02-24 NOTE — PROGRESS NOTES
Labs and Transfusion orders:  Date: 2023    Patient: Shahid Davis  : 1950  Diagnosis: High-grade B-cell lymphoma    Medication administration:  [ X ] Neulasta (pegfilgrastim) injection 6 mg x1 subcutaneous on 3/1/23.    LABS:  [ X ] Check CBC with differential and CMP twice a week for the next 2-3 weeks starting 3/3 (fax labs to [facility] at [phone])  [ X ] Type and cross PRN    TRANSFUSION PARAMETERS:   [ X ] Please transfuse 1 (one) units PRBCs if Hgb is less than or equal to 7.  [ X ] Please transfuse 1 (one) unit platelets if plt count less than or equal to 10K.   [ X ] If patient experiences transfusion reaction during transfusion:   [ X ] 25-50 mg benadryl IV x once PRN   [ X ] Tylenol 1000 mg PO x once PRN   [ X ] Hydrocortisone 100 mg IV x once PRN   [ X ] Zantac 150 mg IV x once PRN   [ X ] Notify provider covering infusion clinic per protocol      Please call the [facility] at [phone] for any questions, and ask to speak with the care coordinator for Dr. Ruelas (patient's primary oncologist).    Thank you,         Marti Bello PA-C    [facility]  Department of Hematology/Oncology  [address] [place] [address]  Pager: [phone]

## 2023-02-25 LAB
ABO/RH(D): NORMAL
ALBUMIN SERPL BCG-MCNC: 3.5 G/DL (ref 3.5–5.2)
ALP SERPL-CCNC: 93 U/L (ref 40–129)
ALT SERPL W P-5'-P-CCNC: 25 U/L (ref 10–50)
ANION GAP SERPL CALCULATED.3IONS-SCNC: 11 MMOL/L (ref 7–15)
ANTIBODY SCREEN: NEGATIVE
AST SERPL W P-5'-P-CCNC: 19 U/L (ref 10–50)
BASOPHILS # BLD AUTO: 0 10E3/UL (ref 0–0.2)
BASOPHILS NFR BLD AUTO: 0 %
BILIRUB SERPL-MCNC: <0.2 MG/DL
BUN SERPL-MCNC: 23.1 MG/DL (ref 8–23)
CALCIUM SERPL-MCNC: 9.2 MG/DL (ref 8.8–10.2)
CHLORIDE SERPL-SCNC: 109 MMOL/L (ref 98–107)
CREAT SERPL-MCNC: 0.72 MG/DL (ref 0.67–1.17)
DEPRECATED HCO3 PLAS-SCNC: 23 MMOL/L (ref 22–29)
EOSINOPHIL # BLD AUTO: 0 10E3/UL (ref 0–0.7)
EOSINOPHIL NFR BLD AUTO: 0 %
ERYTHROCYTE [DISTWIDTH] IN BLOOD BY AUTOMATED COUNT: 17.8 % (ref 10–15)
FIBRINOGEN PPP-MCNC: 293 MG/DL (ref 170–490)
GFR SERPL CREATININE-BSD FRML MDRD: >90 ML/MIN/1.73M2
GLUCOSE SERPL-MCNC: 122 MG/DL (ref 70–99)
HCT VFR BLD AUTO: 27.2 % (ref 40–53)
HGB BLD-MCNC: 8.7 G/DL (ref 13.3–17.7)
IMM GRANULOCYTES # BLD: 0.1 10E3/UL
IMM GRANULOCYTES NFR BLD: 1 %
INR PPP: 1.07 (ref 0.85–1.15)
LYMPHOCYTES # BLD AUTO: 0.5 10E3/UL (ref 0.8–5.3)
LYMPHOCYTES NFR BLD AUTO: 7 %
MAGNESIUM SERPL-MCNC: 2 MG/DL (ref 1.7–2.3)
MCH RBC QN AUTO: 32.5 PG (ref 26.5–33)
MCHC RBC AUTO-ENTMCNC: 32 G/DL (ref 31.5–36.5)
MCV RBC AUTO: 102 FL (ref 78–100)
MONOCYTES # BLD AUTO: 0.3 10E3/UL (ref 0–1.3)
MONOCYTES NFR BLD AUTO: 3 %
NEUTROPHILS # BLD AUTO: 7.1 10E3/UL (ref 1.6–8.3)
NEUTROPHILS NFR BLD AUTO: 89 %
NRBC # BLD AUTO: 0 10E3/UL
NRBC BLD AUTO-RTO: 0 /100
PHOSPHATE SERPL-MCNC: 3.3 MG/DL (ref 2.5–4.5)
PLATELET # BLD AUTO: 158 10E3/UL (ref 150–450)
POTASSIUM SERPL-SCNC: 3.8 MMOL/L (ref 3.4–5.3)
PROT SERPL-MCNC: 5.1 G/DL (ref 6.4–8.3)
RBC # BLD AUTO: 2.68 10E6/UL (ref 4.4–5.9)
SODIUM SERPL-SCNC: 143 MMOL/L (ref 136–145)
SPECIMEN EXPIRATION DATE: NORMAL
URATE SERPL-MCNC: 3.1 MG/DL (ref 3.4–7)
WBC # BLD AUTO: 8 10E3/UL (ref 4–11)

## 2023-02-25 PROCEDURE — 250N000013 HC RX MED GY IP 250 OP 250 PS 637: Performed by: REGISTERED NURSE

## 2023-02-25 PROCEDURE — 84100 ASSAY OF PHOSPHORUS: CPT | Performed by: INTERNAL MEDICINE

## 2023-02-25 PROCEDURE — 80053 COMPREHEN METABOLIC PANEL: CPT | Performed by: PHYSICIAN ASSISTANT

## 2023-02-25 PROCEDURE — 85025 COMPLETE CBC W/AUTO DIFF WBC: CPT | Performed by: PHYSICIAN ASSISTANT

## 2023-02-25 PROCEDURE — 85384 FIBRINOGEN ACTIVITY: CPT | Performed by: PHYSICIAN ASSISTANT

## 2023-02-25 PROCEDURE — 250N000011 HC RX IP 250 OP 636: Performed by: REGISTERED NURSE

## 2023-02-25 PROCEDURE — 83735 ASSAY OF MAGNESIUM: CPT | Performed by: INTERNAL MEDICINE

## 2023-02-25 PROCEDURE — 258N000003 HC RX IP 258 OP 636: Performed by: REGISTERED NURSE

## 2023-02-25 PROCEDURE — 36591 DRAW BLOOD OFF VENOUS DEVICE: CPT | Performed by: PHYSICIAN ASSISTANT

## 2023-02-25 PROCEDURE — 86850 RBC ANTIBODY SCREEN: CPT | Performed by: PHYSICIAN ASSISTANT

## 2023-02-25 PROCEDURE — 250N000011 HC RX IP 250 OP 636: Performed by: PHYSICIAN ASSISTANT

## 2023-02-25 PROCEDURE — 86901 BLOOD TYPING SEROLOGIC RH(D): CPT | Performed by: PHYSICIAN ASSISTANT

## 2023-02-25 PROCEDURE — 250N000013 HC RX MED GY IP 250 OP 250 PS 637: Performed by: PHYSICIAN ASSISTANT

## 2023-02-25 PROCEDURE — 120N000002 HC R&B MED SURG/OB UMMC

## 2023-02-25 PROCEDURE — 84550 ASSAY OF BLOOD/URIC ACID: CPT | Performed by: PHYSICIAN ASSISTANT

## 2023-02-25 PROCEDURE — 99233 SBSQ HOSP IP/OBS HIGH 50: CPT | Mod: GC | Performed by: INTERNAL MEDICINE

## 2023-02-25 PROCEDURE — 85610 PROTHROMBIN TIME: CPT | Performed by: PHYSICIAN ASSISTANT

## 2023-02-25 PROCEDURE — 250N000012 HC RX MED GY IP 250 OP 636 PS 637: Performed by: REGISTERED NURSE

## 2023-02-25 RX ADMIN — OMEPRAZOLE 40 MG: 20 CAPSULE, DELAYED RELEASE ORAL at 09:11

## 2023-02-25 RX ADMIN — POLYETHYLENE GLYCOL 3350 17 G: 17 POWDER, FOR SOLUTION ORAL at 09:16

## 2023-02-25 RX ADMIN — ENOXAPARIN SODIUM 40 MG: 40 INJECTION SUBCUTANEOUS at 20:47

## 2023-02-25 RX ADMIN — ACYCLOVIR 400 MG: 400 TABLET ORAL at 09:10

## 2023-02-25 RX ADMIN — ALLOPURINOL 300 MG: 300 TABLET ORAL at 09:11

## 2023-02-25 RX ADMIN — SENNOSIDES AND DOCUSATE SODIUM 2 TABLET: 50; 8.6 TABLET ORAL at 20:47

## 2023-02-25 RX ADMIN — PREDNISONE 100 MG: 50 TABLET ORAL at 16:52

## 2023-02-25 RX ADMIN — TEMAZEPAM 7.5 MG: 7.5 CAPSULE ORAL at 22:32

## 2023-02-25 RX ADMIN — ETOPOSIDE 85 MG: 20 INJECTION, SOLUTION, CONCENTRATE INTRAVENOUS at 18:08

## 2023-02-25 RX ADMIN — VINCRISTINE SULFATE 0.7 MG: 1 INJECTION, SOLUTION INTRAVENOUS at 18:13

## 2023-02-25 RX ADMIN — PROCHLORPERAZINE MALEATE 5 MG: 5 TABLET ORAL at 14:50

## 2023-02-25 RX ADMIN — ACYCLOVIR 400 MG: 400 TABLET ORAL at 20:48

## 2023-02-25 RX ADMIN — ONDANSETRON HYDROCHLORIDE 16 MG: 8 TABLET, FILM COATED ORAL at 16:52

## 2023-02-25 RX ADMIN — PREDNISONE 100 MG: 50 TABLET ORAL at 09:10

## 2023-02-25 RX ADMIN — OXYCODONE HYDROCHLORIDE AND ACETAMINOPHEN 500 MG: 500 TABLET ORAL at 09:11

## 2023-02-25 ASSESSMENT — ACTIVITIES OF DAILY LIVING (ADL)
ADLS_ACUITY_SCORE: 20

## 2023-02-25 NOTE — PLAN OF CARE
[id number]    Afebrile, VSS.   No acute event.       NEURO: Alert and oriented x4.      RESPIRATORY: Room Air, denies dizziness, and SOB. Lungs sound, clear, equal bilateral.     CARDIO: VSS, denies dizziness, and extremity pain.      GI/: Denies N/V, BS active, BM x , AUOP.       SKIN: Intact.      ACTIVITY: independent.      PAIN: Denies pain.    DLA: Port, infusing    BG: No      PLAN:     TODAY  - D3 R-EPOCH, continue protocol-directed Prednisone 100 mg BID  - LP with IT chemo done 2/24. Follow-up CSF flow results (Pending).   - Appreciate RNCC involvement for arrangement of local follow-up. Request faxed.   - Dispo: Tentative discharge Monday 2/27 following completion of chemotherapy     Continue monitoring.     * Italic, from provider's note.

## 2023-02-25 NOTE — PLAN OF CARE
AVSS on RA. No c/o pain. Advanced diet to high calorie as pt has eaten before shortly after lumbar puncture with no issues, which he did again this shift. LP site c/d/i. Pt up ind in room, did 150 figure 8's to help prevent leg edema. Prn restoril at HS. See chemo note; day 2 etop/dox/vincristine hung.

## 2023-02-25 NOTE — PROGRESS NOTES
[facility]    Hematology / Oncology Progress Note    Date of Service (when I saw the patient): 02/25/2023     Assessment & Plan   Shahid Davis is a 72 year old man with a history of complete heart block s/p pacemaker placement and low-grade kappa-restricted plasmacytoid B-cell lymphoma diagnosed 3/2004, now with transformation to 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 He is currently admitted for planned C3 DA-R-EPOCH.     TODAY  - D3 R-EPOCH, continue protocol-directed Prednisone 100 mg BID  - LP with IT chemo done 2/24. Follow-up CSF flow results (Pending).   - Appreciate RN[facility] involvement for arrangement of local follow-up. Request faxed.   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 Ruelas. Pt has a h/o low-grade kappa restricted plasmacytoid B-cell lymphoma (diagnosed in 3/2004) treated with x6 cycles of fludarabine based chemo and XRT to spine, then has been on surveillance since 2005. He presented with progressive B-symptoms, abdominal pain, and SOB. PET 12/9 showed extensive lymphomatous involvement to the right pleura w/ FDG-avid effusions, mediastinal and pericardial regions, right anterolateral chest wall, adenopathy above and below the diaphragm, liver, spleen and multiple sites in the skeleton, concerning for transformation from his low-grade lymphoma. He also has pancytopenia which seems to have progressed slowly over the past years. Of note, reports maternal history of Waldenstrom's. Due to worsening shortness of breath and delays of biopsy, patient presented to ED on 12/14 and was subsequently admitted for expedited workup and treatment. As SUV of the sternoclavicular mass was the highest, patient underwent US-guided sternoclavicular soft tissue mass biopsy with IR 12/15, pathology from which confirmed a diagnosis  of high-grade B-cell lymphoma. Baseline ECHO (12/15) with LVEF 60-65%, mild aortic insufficiency, no evidence of effusion or tamponade. Received 1 cycle of R-CHOP (C1D1 = 12/20/22) which was well-tolerated. Cytogenetics then returned, revealed rearrangements in MYC, BCL2, and BCL6, consistent with triple hit lymphoma. He was then changed to DA-R-EPOCH and proceeded with C1 on 1/11/23 (C2 total of therapy). He was re-admitted for C2 on 2/2/23 which he tolerated well - he qualified for dose-increase though pt did not feel that he could tolerate a higher dose and thus dose was kept the same. Currently being admitted for Cycle 3 on 2/22/23; with dose adjustment to be determined by Heme Malignancy team in AM.   - CNS IPI was 4 (1 point each for age, LDH, stage IV disease, and extranodal involvement); as such, CNS ppx with IT chemotherapy was recommended. CSF flow negative (1/12) with C1. CSF flow negative (2/6) with C2. Difficult bedside access with C1; requesting future LPs done in XR.   - LP with IT chemo done 2/24. Orders sent - follow-up CSF flow. (Pending)                             Treatment Plan: DA-R- EPOCH. C3D1=2/22/23  dosing needs to be confirmed by primary team in AM.                            - Rituximab 375 mg/m2 IV x1 dose -- Day 1                           - Etoposide 50 mg/m2 IV q22h x4 doses -- Days 1-4                           - Vincristine 0.4 mg/m2 IV q22h x4 doses --  Days 1-4                           - Doxorubicin 10 mg/m2 IV q22h x4 doses -- Days 1-4                           - Cyclophosphamide 750 mg/m2 IV x1 dose -- Day 5                           - Prednisone 60 mg/m2 PO BID x10 doses -- Days 1-5                           - Dexamethasone 8 mg daily x2 doses -- Days 6-7                           - Neulasta 6 mg subQ x1 dose -- Day 6; requested 3/1 at [facility] in [place]                            - Predmeds: Tylenol, Benadryl, Zofran, Emend     # Anemia   Secondary to chemotherapy  and underlying lymphoma.   - Monitor CBC daily  - Transfuse if Hgb <7 and plt <10k     # Risk for TLS  - Monitor TLS/DIC labs daily  - Allopurinol 300 mg daily  - Minimize IVF given malignant pleural effusions although could consider gentle IVF if needed.     ID  # Asymptomatic COVID-19 infection  Noted to be COVID+ on PCR done 1/9/23. Admitted for planned chemotherapy, as above, and treated with IV remdesivir x3 days (1/11-1/13). Patient was asymptomatic with his infection and satting okay on room air. Had remained persistent positive with most recent test on 2/3 with cycle threshold 28. He is hopeful that his level will be low enough this admission that he can leave his room to ambulate in the hallways  - Repeat COVID test remains positive on admission, cycle threshold is 30. D/w infection prevention and unfortunately will need to remain on special precautions (until cycle threshold is 35).       # Hypogammaglobulinemia   IgG 310 (12/2022). Status-post IVIG on 1/15/23.  - Would repeat IgG monthly; give IVIG for IgG <400     # PPX  -  mg BID   - Nebulized pentamidine given 1/15/23; next due ~2/12;  confirm timing of last dose prior to discharge  - Hold levofloxacin/fluconazole given ANC >1000     CV  # History of complete heart block s/p pacemaker placement (2020)  Followed by Dr. Fox; last seen 7/7/22. Pacemaker last interrogated in 11/2022.  - No acute inpatient management needs  - Continue cardiology follow-up and device checks as recommended     MISC  # GERD - Continue PTA omeprazole.  # Insomnia - PTA on temazepam 7.5 mg HS PRN for sleep; last prescribed #5 tablets on 1/13/23        FEN:  -IVF per chemo protcol   -PRN lyte replacement  -RDAT  -High protein diet ordered for him so he can get a substantial breakfast, which is his largest meal of the day     Prophy/Misc:  -VTE: ppx Lovenox held on admission with plan for LP, plan to resume 2/24 PM post-procedurally    -GI/PUD: PTA Omeprazole   -Bowels:  scheduled Senna and PRN Miralax     Follow-up:   - Neulasta infusion appointment 3/1 - requested locally   - Twice weekly labs and possible tx starting 3/3 - requested locally  -  3/17: labs, PET/CT, visit with Dr. Ruelas    Coordination:   - Request for local follow-up provided to RN[facility] on 2/24, appreciate scheduling assistance    Patient and plan of care was discussed with attending physician Dr. Ruelas.    Lisa Corcoran MD, MPH  Hematology/Oncology Fellow  Pager: [phone]    Interval History   Nursing notes reviewed. Shahid reports doing well and denies specific complaints this morning. States has been doing well with the chemo and is eating and ambulating OK. Denies Nausea, Vomiting, diarrhea. States that his appetite is good and he is eating a lot but is surprised his weight has remained the same since his last admission. All questions answered.     A comprehensive review of systems was obtained and is negative other than noted here or in the HPI.     Physical Exam   Temp: 98  F (36.7  C) Temp src: Oral BP: 122/69 Pulse: 97   Resp: 16 SpO2: 98 % O2 Device: None (Room air)    Vitals:    02/23/23 1703 02/24/23 0832 02/25/23 0945   Weight: 58.9 kg (129 lb 12.8 oz) 57.4 kg (126 lb 9.6 oz) 60.1 kg (132 lb 7.9 oz)     Vital Signs with Ranges  Temp:  [97.8  F (36.6  C)-98.2  F (36.8  C)] 98  F (36.7  C)  Pulse:  [] 97  Resp:  [16] 16  BP: (100-124)/(61-73) 122/69  SpO2:  [96 %-99 %] 98 %  I/O last 3 completed shifts:  In: 250 [P.O.:240; I.V.:10]  Out: -     General: Awake and interactive. No acute distress. Pleasant male seen resting reclined in bed. Non-toxic appearing. Chronically-ill appearing. Thin looking.   Skin: Warm, dry, and intact without rashes or lesions. Pale.   Head: Normocephalic and atraumatic.  HEENT:  Oral mucosa is pink .  Lymph: Neck supple. No significant adenopathy.   Cardiac: Regular rate and rhythm.   Respiratory: No signs of respiratory distress or accessory muscle use. Minimally diminished breath  sounds at bases. No wheezes or crackles.   Abd: Soft, symmetric, and non-tender without distension. BS present and normoactive.   Extremities: Trace bilateral LE edema, worst at ankles. Distal pulses palpable.   Neuro: A&Ox3 with normal speech. No deficits grossly.  Psych: Appropriate mood and affect. Good judgment and insight.   Vascular Access: R chest port c/d/i. Non-tender, no surrounding erythema.    Medications     - MEDICATION INSTRUCTIONS -         acyclovir  400 mg Oral Q12H     allopurinol  300 mg Oral Daily     Chemotherapy Infusing-Continuous Infusion   Does not apply Q8H     [START ON 2/27/2023] cyclophosphamide (CYTOXAN) infusion  750 mg/m2 (Treatment Plan Recorded) Intravenous Once     [START ON 3/1/2023] dexamethasone  8 mg Oral Daily     [START ON 2/28/2023] dextrose 5% water  10-20 mL Intracatheter Daily at 8 pm     [START ON 2/28/2023] dextrose 5% water  10-20 mL Intracatheter Daily at 8 pm     enoxaparin ANTICOAGULANT  40 mg Subcutaneous Q24H     etoposide  50 mg/m2 (Treatment Plan Recorded) Intravenous Q22H     [START ON 2/28/2023] filgrastim-aafi  5 mcg/kg (Treatment Plan Recorded) Intravenous Daily at 8 pm     [START ON 2/27/2023] fosaprepitant (EMEND) intermittent infusion ( mL)  150 mg Intravenous Once     heparin  5-10 mL Intracatheter Q28 Days     heparin  5-10 mL Intracatheter Q28 Days     heparin lock flush  5-10 mL Intracatheter Q24H     heparin lock flush  5-10 mL Intracatheter Q24H     omeprazole  40 mg Oral Daily     ondansetron  16 mg Oral Q22H     predniSONE  60 mg/m2 (Treatment Plan Recorded) Oral BID     senna-docusate  2 tablet Oral BID     sodium chloride (PF)  10-20 mL Intracatheter Q28 Days     vinCRIStine/DOXOrubicin (ONCOVIN/ADRIAMYCIN) infusion  0.4 mg/m2 (Treatment Plan Recorded) Intravenous Q22H     vitamin C  500 mg Oral QAM     Data   Results for orders placed or performed during the hospital encounter of 02/23/23 (from the past 24 hour(s))   Glucose CSF:    Result Value Ref Range    Glucose CSF 87 (H) 40 - 70 mg/dL    Narrative    CSF glucose concentrations are about 60 percent of normal plasma glucose.   Protein total CSF:   Result Value Ref Range    Protein total CSF 63.5 (H) 15.0 - 45.0 mg/dL   Cerebrospinal fluid Aerobic Bacterial Culture Routine with Gram Stain    Specimen: Spinal Cord; Cerebrospinal fluid   Result Value Ref Range    Culture No growth, less than 1 day     Gram Stain Result No organisms seen     Gram Stain Result No white blood cells seen     Narrative    Gram Stain quantification of host cells and microbiological organisms was done on a cytocentrifuged preparation.     CSF Cell Count with Differential:    Narrative    The following orders were created for panel order CSF Cell Count with Differential:.  Procedure                               Abnormality         Status                     ---------                               -----------         ------                     Cell Count CSF[[id number]]                                   Preliminary result           Please view results for these tests on the individual orders.   Cell Count CSF   Result Value Ref Range    Tube Number 4     Color Colorless Colorless    Clarity Clear Clear    Total Nucleated Cells 0 0 - 5 /uL    RBC Count 0 0 - 2 /uL    Narrative    Too few cells to do differential.   Extra Tube    Narrative    The following orders were created for panel order Extra Tube.  Procedure                               Abnormality         Status                     ---------                               -----------         ------                     Extra Body Fluid/CSF Col...[[id number]]                      Final result                 Please view results for these tests on the individual orders.   Extra Body Fluid/CSF Collection   Result Value Ref Range    Hold Specimen JIC    XR Lumbar Punc Intrathecal Chemo Admin    Narrative    PROCEDURE: Lumbar Puncture using Fluoroscopy, 2/24/2023 3:51  PM    HISTORY: Pt with DLBCL with ongoing chemo. Requesting LP with IT  chemo. Patient discharging 2/27.    COMPARISON: Lumbar puncture fluoroscopic images to 623.    STAFF NEURORADIOLOGIST: Dr. Krystyna Real    FELLOW PHYSICIAN: Dr. Hardik Lima    SEDATION: The patient was placed on continuous monitoring. Intravenous  sedation was administered. Vital signs and sedation monitored by  nursing staff under Interventional Radiologists supervision. The  patient remained stable throughout the procedure.    TECHNIQUE: Verbal and written consent for lumbar puncture was obtained  from the patient, and benefits and risk of the procedure were  explained, including but not limited to worsening headache,  hemorrhage, infection, lower extremity pain, or nerve root injury. The  patient was sterilely prepped and draped with the patient in the prone  position, over the lower back. Under fluoroscopic guidance, the  interlaminar spaces were noted. 1% lidocaine was administered for  local anesthetic over the L3-4 interlaminar space, and a 22 gauge 3.5  inch needle was advanced into the thecal sac under fluoroscopic  guidance.      There was initial show of clear CSF. Approximately 13 cc of CSF were  collected.    Hematology/Oncology then administered intrathecal chemotherapy, dose  and rate as determined by Heme/Onc.    The needle was removed with the stylet in place. There was no  immediate complication associated with the procedure. Samples were  sent for the requested laboratory testing.      FLUORO TIME: 12 seconds.    DOSE: 3.01 mGy      Impression    IMPRESSION: Successful lumbar puncture and intrathecal chemotherapy  administration without immediate complication.    PLAN: Patient discharged to patient care unit in stable condition for  monitoring. Orders placed for 1 hour of bed rest with patient laying  on the back and the head of the bed flat.    I, KRYSTYNA REAL MD, attest that I was present for all critical  portions of the  procedure and was immediately available to provide  guidance and assistance during the remainder of the procedure.    I have personally reviewed the examination and initial interpretation  and I agree with the findings.    KRYSTYNA MCCARTY MD         SYSTEM ID:  [id number]   CBC with platelets differential    Narrative    The following orders were created for panel order CBC with platelets differential.  Procedure                               Abnormality         Status                     ---------                               -----------         ------                     CBC with platelets and d...[[id number]]  Abnormal            Final result                 Please view results for these tests on the individual orders.   ABO/Rh type and screen    Narrative    The following orders were created for panel order ABO/Rh type and screen.  Procedure                               Abnormality         Status                     ---------                               -----------         ------                     Adult Type and Screen[[id number]]                            Final result                 Please view results for these tests on the individual orders.   Comprehensive metabolic panel   Result Value Ref Range    Sodium 143 136 - 145 mmol/L    Potassium 3.8 3.4 - 5.3 mmol/L    Chloride 109 (H) 98 - 107 mmol/L    Carbon Dioxide (CO2) 23 22 - 29 mmol/L    Anion Gap 11 7 - 15 mmol/L    Urea Nitrogen 23.1 (H) 8.0 - 23.0 mg/dL    Creatinine 0.72 0.67 - 1.17 mg/dL    Calcium 9.2 8.8 - 10.2 mg/dL    Glucose 122 (H) 70 - 99 mg/dL    Alkaline Phosphatase 93 40 - 129 U/L    AST 19 10 - 50 U/L    ALT 25 10 - 50 U/L    Protein Total 5.1 (L) 6.4 - 8.3 g/dL    Albumin 3.5 3.5 - 5.2 g/dL    Bilirubin Total <0.2 <=1.2 mg/dL    GFR Estimate >90 >60 mL/min/1.73m2   INR   Result Value Ref Range    INR 1.07 0.85 - 1.15   Fibrinogen activity   Result Value Ref Range    Fibrinogen Activity 293 170 - 490 mg/dL   Uric acid   Result Value Ref  Range    Uric Acid 3.1 (L) 3.4 - 7.0 mg/dL   CBC with platelets and differential   Result Value Ref Range    WBC Count 8.0 4.0 - 11.0 10e3/uL    RBC Count 2.68 (L) 4.40 - 5.90 10e6/uL    Hemoglobin 8.7 (L) 13.3 - 17.7 g/dL    Hematocrit 27.2 (L) 40.0 - 53.0 %     (H) 78 - 100 fL    MCH 32.5 26.5 - 33.0 pg    MCHC 32.0 31.5 - 36.5 g/dL    RDW 17.8 (H) 10.0 - 15.0 %    Platelet Count 158 150 - 450 10e3/uL    % Neutrophils 89 %    % Lymphocytes 7 %    % Monocytes 3 %    % Eosinophils 0 %    % Basophils 0 %    % Immature Granulocytes 1 %    NRBCs per 100 WBC 0 <1 /100    Absolute Neutrophils 7.1 1.6 - 8.3 10e3/uL    Absolute Lymphocytes 0.5 (L) 0.8 - 5.3 10e3/uL    Absolute Monocytes 0.3 0.0 - 1.3 10e3/uL    Absolute Eosinophils 0.0 0.0 - 0.7 10e3/uL    Absolute Basophils 0.0 0.0 - 0.2 10e3/uL    Absolute Immature Granulocytes 0.1 <=0.4 10e3/uL    Absolute NRBCs 0.0 10e3/uL   Adult Type and Screen   Result Value Ref Range    ABO/RH(D) A POS     Antibody Screen Negative Negative    SPECIMEN EXPIRATION DATE [id number]    Phosphorus   Result Value Ref Range    Phosphorus 3.3 2.5 - 4.5 mg/dL   Magnesium   Result Value Ref Range    Magnesium 2.0 1.7 - 2.3 mg/dL

## 2023-02-25 NOTE — PROGRESS NOTES
VS, up ad abe, A&O x4, ambulating to bathroom, voiding not saving, no BM this shift, lumbar site CDI, vincristine/etoposide infusing via right chest port, high calorie diet, no new changes this shift to report, continue with POC

## 2023-02-25 NOTE — PROGRESS NOTES
Nursing Focus: Chemotherapy  D: Positive blood return via PICC. Insertion site is clean/dry/intact, dressing intact with no complaints of pain.  Urine output is recorded in intake in Doc Flowsheet.    I: Premedications given per order (see electronic medical administration record). Dose #2 of etoposide and dose #2 of doxorubicin/vincristine both started to infuse over 22 hours. Reviewed pt teaching on chemotherapy side effects.  Pt denies need for further teaching. Chemotherapy double checked per protocol by two chemotherapy competent RN's.   A: Tolerating procedure well. Denies nausea and or pain.   P: Continue to monitor urine output and symptoms of nausea. Screen for symptoms of toxicity.

## 2023-02-26 LAB
ALBUMIN SERPL BCG-MCNC: 3.3 G/DL (ref 3.5–5.2)
ALP SERPL-CCNC: 79 U/L (ref 40–129)
ALT SERPL W P-5'-P-CCNC: 25 U/L (ref 10–50)
ANION GAP SERPL CALCULATED.3IONS-SCNC: 9 MMOL/L (ref 7–15)
AST SERPL W P-5'-P-CCNC: 20 U/L (ref 10–50)
BASOPHILS # BLD AUTO: 0 10E3/UL (ref 0–0.2)
BASOPHILS NFR BLD AUTO: 0 %
BILIRUB SERPL-MCNC: <0.2 MG/DL
BUN SERPL-MCNC: 27.4 MG/DL (ref 8–23)
CALCIUM SERPL-MCNC: 8.4 MG/DL (ref 8.8–10.2)
CHLORIDE SERPL-SCNC: 113 MMOL/L (ref 98–107)
CREAT SERPL-MCNC: 0.73 MG/DL (ref 0.67–1.17)
DEPRECATED HCO3 PLAS-SCNC: 23 MMOL/L (ref 22–29)
EOSINOPHIL # BLD AUTO: 0 10E3/UL (ref 0–0.7)
EOSINOPHIL NFR BLD AUTO: 0 %
ERYTHROCYTE [DISTWIDTH] IN BLOOD BY AUTOMATED COUNT: 17.8 % (ref 10–15)
FIBRINOGEN PPP-MCNC: 248 MG/DL (ref 170–490)
GFR SERPL CREATININE-BSD FRML MDRD: >90 ML/MIN/1.73M2
GLUCOSE SERPL-MCNC: 104 MG/DL (ref 70–99)
HCT VFR BLD AUTO: 25.6 % (ref 40–53)
HGB BLD-MCNC: 8.1 G/DL (ref 13.3–17.7)
IMM GRANULOCYTES # BLD: 0.1 10E3/UL
IMM GRANULOCYTES NFR BLD: 1 %
INR PPP: 1.09 (ref 0.85–1.15)
LYMPHOCYTES # BLD AUTO: 0.5 10E3/UL (ref 0.8–5.3)
LYMPHOCYTES NFR BLD AUTO: 9 %
MCH RBC QN AUTO: 32.5 PG (ref 26.5–33)
MCHC RBC AUTO-ENTMCNC: 31.6 G/DL (ref 31.5–36.5)
MCV RBC AUTO: 103 FL (ref 78–100)
MONOCYTES # BLD AUTO: 0.3 10E3/UL (ref 0–1.3)
MONOCYTES NFR BLD AUTO: 5 %
NEUTROPHILS # BLD AUTO: 4.5 10E3/UL (ref 1.6–8.3)
NEUTROPHILS NFR BLD AUTO: 85 %
NRBC # BLD AUTO: 0 10E3/UL
NRBC BLD AUTO-RTO: 0 /100
PLATELET # BLD AUTO: 132 10E3/UL (ref 150–450)
POTASSIUM SERPL-SCNC: 3.8 MMOL/L (ref 3.4–5.3)
PROT SERPL-MCNC: 4.5 G/DL (ref 6.4–8.3)
RBC # BLD AUTO: 2.49 10E6/UL (ref 4.4–5.9)
SODIUM SERPL-SCNC: 145 MMOL/L (ref 136–145)
URATE SERPL-MCNC: 3.2 MG/DL (ref 3.4–7)
WBC # BLD AUTO: 5.3 10E3/UL (ref 4–11)

## 2023-02-26 PROCEDURE — 250N000011 HC RX IP 250 OP 636: Performed by: REGISTERED NURSE

## 2023-02-26 PROCEDURE — 258N000003 HC RX IP 258 OP 636: Performed by: REGISTERED NURSE

## 2023-02-26 PROCEDURE — 85384 FIBRINOGEN ACTIVITY: CPT | Performed by: PHYSICIAN ASSISTANT

## 2023-02-26 PROCEDURE — 250N000013 HC RX MED GY IP 250 OP 250 PS 637: Performed by: REGISTERED NURSE

## 2023-02-26 PROCEDURE — 85025 COMPLETE CBC W/AUTO DIFF WBC: CPT | Performed by: PHYSICIAN ASSISTANT

## 2023-02-26 PROCEDURE — 99233 SBSQ HOSP IP/OBS HIGH 50: CPT | Mod: GC | Performed by: INTERNAL MEDICINE

## 2023-02-26 PROCEDURE — 120N000002 HC R&B MED SURG/OB UMMC

## 2023-02-26 PROCEDURE — 250N000013 HC RX MED GY IP 250 OP 250 PS 637: Performed by: PHYSICIAN ASSISTANT

## 2023-02-26 PROCEDURE — 36591 DRAW BLOOD OFF VENOUS DEVICE: CPT | Performed by: PHYSICIAN ASSISTANT

## 2023-02-26 PROCEDURE — 80053 COMPREHEN METABOLIC PANEL: CPT | Performed by: PHYSICIAN ASSISTANT

## 2023-02-26 PROCEDURE — 85610 PROTHROMBIN TIME: CPT | Performed by: PHYSICIAN ASSISTANT

## 2023-02-26 PROCEDURE — 250N000011 HC RX IP 250 OP 636: Performed by: PHYSICIAN ASSISTANT

## 2023-02-26 PROCEDURE — 250N000012 HC RX MED GY IP 250 OP 636 PS 637: Performed by: REGISTERED NURSE

## 2023-02-26 PROCEDURE — 84550 ASSAY OF BLOOD/URIC ACID: CPT | Performed by: PHYSICIAN ASSISTANT

## 2023-02-26 RX ADMIN — OXYCODONE HYDROCHLORIDE AND ACETAMINOPHEN 500 MG: 500 TABLET ORAL at 08:19

## 2023-02-26 RX ADMIN — PREDNISONE 100 MG: 50 TABLET ORAL at 15:29

## 2023-02-26 RX ADMIN — ETOPOSIDE 85 MG: 20 INJECTION, SOLUTION, CONCENTRATE INTRAVENOUS at 15:39

## 2023-02-26 RX ADMIN — ALLOPURINOL 300 MG: 300 TABLET ORAL at 08:19

## 2023-02-26 RX ADMIN — POLYETHYLENE GLYCOL 3350 17 G: 17 POWDER, FOR SOLUTION ORAL at 08:20

## 2023-02-26 RX ADMIN — ENOXAPARIN SODIUM 40 MG: 40 INJECTION SUBCUTANEOUS at 22:10

## 2023-02-26 RX ADMIN — OMEPRAZOLE 40 MG: 20 CAPSULE, DELAYED RELEASE ORAL at 08:20

## 2023-02-26 RX ADMIN — VINCRISTINE SULFATE 0.7 MG: 1 INJECTION, SOLUTION INTRAVENOUS at 15:39

## 2023-02-26 RX ADMIN — PREDNISONE 100 MG: 50 TABLET ORAL at 08:19

## 2023-02-26 RX ADMIN — ONDANSETRON HYDROCHLORIDE 16 MG: 8 TABLET, FILM COATED ORAL at 15:29

## 2023-02-26 RX ADMIN — TEMAZEPAM 7.5 MG: 7.5 CAPSULE ORAL at 22:10

## 2023-02-26 RX ADMIN — ACYCLOVIR 400 MG: 400 TABLET ORAL at 20:41

## 2023-02-26 RX ADMIN — SENNOSIDES AND DOCUSATE SODIUM 2 TABLET: 50; 8.6 TABLET ORAL at 20:41

## 2023-02-26 RX ADMIN — ACYCLOVIR 400 MG: 400 TABLET ORAL at 08:20

## 2023-02-26 ASSESSMENT — ACTIVITIES OF DAILY LIVING (ADL)
ADLS_ACUITY_SCORE: 20

## 2023-02-26 NOTE — PLAN OF CARE
[id number]    Afebrile, VSS.   No acute event, tolerated chemo.     NEURO: Alert and oriented x4.      RESPIRATORY: Room Air, denies dizziness, and SOB. Lungs sound, clear, equal bilateral.     CARDIO: VSS, denies dizziness, and extremity pain.      GI/: Denies N/V, BS active, BM x1, void urine spontaneously without saving. Pt reported good urine output.        SKIN: Intact.      ACTIVITY: Independent.      PAIN: Denies pain.    DLA: Port, infusing.     BG: NO      PLAN:     TODAY  - D4 R-EPOCH, continue protocol-directed Prednisone 100 mg BID  - LP with IT chemo done 2/24. Follow-up CSF flow results (Pending).   - Appreciate RNCC involvement for arrangement of local follow-up. Request faxed.   - Dispo: Tentative discharge Monday 2/27 following completion of chemotherapy     Continue monitoring.     * Italic, from provider's note.

## 2023-02-26 NOTE — PLAN OF CARE
"Goal Outcome Evaluation:       Blood pressure 108/64, pulse 82, temperature 97.8  F (36.6  C), temperature source Oral, resp. rate 18, height 1.702 m (5' 7\"), weight 60.1 kg (132 lb 7.9 oz), SpO2 96 %.       VSS. AOX4. Chemo infusing. Plan to discharge Monday. Sleeping between RN cares. Denies pain, nausea or numbness. Continue with POC.           "

## 2023-02-26 NOTE — PROGRESS NOTES
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 He is currently admitted for planned C3 DA-R-EPOCH.     TODAY  - D4 R-EPOCH, continue protocol-directed Prednisone 100 mg BID  - LP with IT chemo done 2/24. Follow-up CSF flow results (Pending).   - Appreciate RN[facility] involvement for arrangement of local follow-up. Request faxed.   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 Ruelas. Pt has a h/o low-grade kappa restricted plasmacytoid B-cell lymphoma (diagnosed in 3/2004) treated with x6 cycles of fludarabine based chemo and XRT to spine, then has been on surveillance since 2005. He presented with progressive B-symptoms, abdominal pain, and SOB. PET 12/9 showed extensive lymphomatous involvement to the right pleura w/ FDG-avid effusions, mediastinal and pericardial regions, right anterolateral chest wall, adenopathy above and below the diaphragm, liver, spleen and multiple sites in the skeleton, concerning for transformation from his low-grade lymphoma. He also has pancytopenia which seems to have progressed slowly over the past years. Of note, reports maternal history of Waldenstrom's. Due to worsening shortness of breath and delays of biopsy, patient presented to ED on 12/14 and was subsequently admitted for expedited workup and treatment. As SUV of the sternoclavicular mass was the highest, patient underwent US-guided sternoclavicular soft tissue mass biopsy with IR 12/15, pathology from which confirmed a diagnosis  of high-grade B-cell lymphoma. Baseline ECHO (12/15) with LVEF 60-65%, mild aortic insufficiency, no evidence of effusion or tamponade. Received 1 cycle of R-CHOP (C1D1 = 12/20/22) which was well-tolerated. Cytogenetics then returned, revealed rearrangements in MYC, BCL2, and BCL6, consistent with triple hit lymphoma. He was then changed to DA-R-EPOCH and proceeded with C1 on 1/11/23 (C2 total of therapy). He was re-admitted for C2 on 2/2/23 which he tolerated well - he qualified for dose-increase though pt did not feel that he could tolerate a higher dose and thus dose was kept the same. Currently being admitted for Cycle 3 on 2/22/23; with dose adjustment to be determined by Heme Malignancy team in AM.   - CNS IPI was 4 (1 point each for age, LDH, stage IV disease, and extranodal involvement); as such, CNS ppx with IT chemotherapy was recommended. CSF flow negative (1/12) with C1. CSF flow negative (2/6) with C2. Difficult bedside access with C1; requesting future LPs done in XR.   - LP with IT chemo done 2/24. Orders sent - follow-up CSF flow. (Pending)                             Treatment Plan: DA-R- EPOCH. C3D1=2/22/23  dosing needs to be confirmed by primary team in AM.                            - Rituximab 375 mg/m2 IV x1 dose -- Day 1                           - Etoposide 50 mg/m2 IV q22h x4 doses -- Days 1-4                           - Vincristine 0.4 mg/m2 IV q22h x4 doses --  Days 1-4                           - Doxorubicin 10 mg/m2 IV q22h x4 doses -- Days 1-4                           - Cyclophosphamide 750 mg/m2 IV x1 dose -- Day 5                           - Prednisone 60 mg/m2 PO BID x10 doses -- Days 1-5                           - Dexamethasone 8 mg daily x2 doses -- Days 6-7                           - Neulasta 6 mg subQ x1 dose -- Day 6; requested 3/1 at [facility] in [place]                            - Predmeds: Tylenol, Benadryl, Zofran, Emend     # Anemia   Secondary to chemotherapy  and underlying lymphoma.   - Monitor CBC daily  - Transfuse if Hgb <7 and plt <10k     # Risk for TLS  - Monitor TLS/DIC labs daily  - Allopurinol 300 mg daily  - Minimize IVF given malignant pleural effusions although could consider gentle IVF if needed.     ID  # Asymptomatic COVID-19 infection  Noted to be COVID+ on PCR done 1/9/23. Admitted for planned chemotherapy, as above, and treated with IV remdesivir x3 days (1/11-1/13). Patient was asymptomatic with his infection and satting okay on room air. Had remained persistent positive with most recent test on 2/3 with cycle threshold 28. He is hopeful that his level will be low enough this admission that he can leave his room to ambulate in the hallways  - Repeat COVID test remains positive on admission, cycle threshold is 30. D/w infection prevention and unfortunately will need to remain on special precautions (until cycle threshold is 35).       # Hypogammaglobulinemia   IgG 310 (12/2022). Status-post IVIG on 1/15/23.  - Would repeat IgG monthly; give IVIG for IgG <400     # PPX  -  mg BID   - Nebulized pentamidine given 1/15/23; next due ~2/12;  confirm timing of last dose prior to discharge  - Hold levofloxacin/fluconazole given ANC >1000     CV  # History of complete heart block s/p pacemaker placement (2020)  Followed by Dr. Fox; last seen 7/7/22. Pacemaker last interrogated in 11/2022.  - No acute inpatient management needs  - Continue cardiology follow-up and device checks as recommended     MISC  # GERD - Continue PTA omeprazole.  # Insomnia - PTA on temazepam 7.5 mg HS PRN for sleep; last prescribed #5 tablets on 1/13/23        FEN:  -IVF per chemo protcol   -PRN lyte replacement  -RDAT  -High protein diet ordered for him so he can get a substantial breakfast, which is his largest meal of the day     Prophy/Misc:  -VTE: ppx Lovenox held on admission with plan for LP, Resumed 2/24 PM post-procedurally    -GI/PUD: PTA Omeprazole   -Bowels:  scheduled Senna and PRN Miralax     Follow-up:   - Neulasta infusion appointment 3/1 - requested locally   - Twice weekly labs and possible tx starting 3/3 - requested locally  -  3/17: labs, PET/CT, visit with Dr. Ruelas    Coordination:   - Request for local follow-up provided to RNCC on 2/24, appreciate scheduling assistance    Patient and plan of care was discussed with attending physician Dr. Ruelas.    Lisa Corcoran MD, MPH  Hematology/Oncology Fellow  Pager: [phone]    Interval History   Nursing notes reviewed. Shahid reports doing well and denies specific complaints this morning.  has been doing well with the chemo and is eating and ambulating OK. Denies Nausea, Vomiting, diarrhea. Asked for some more coffee which was provided to him. All questions answered.     A comprehensive review of systems was obtained and is negative other than noted here or in the HPI.     Physical Exam   Temp: 97.2  F (36.2  C) Temp src: Axillary BP: 119/70 Pulse: 80   Resp: 20 SpO2: 96 % O2 Device: None (Room air)    Vitals:    02/23/23 1703 02/24/23 0832 02/25/23 0945   Weight: 58.9 kg (129 lb 12.8 oz) 57.4 kg (126 lb 9.6 oz) 60.1 kg (132 lb 7.9 oz)     Vital Signs with Ranges  Temp:  [97.2  F (36.2  C)-98.6  F (37  C)] 97.2  F (36.2  C)  Pulse:  [78-93] 80  Resp:  [16-20] 20  BP: (108-119)/(59-70) 119/70  SpO2:  [96 %-100 %] 96 %  I/O last 3 completed shifts:  In: 1083.5 [I.V.:20; IV Piggyback:1063.5]  Out: 750 [Urine:750]    General: Awake and interactive. No acute distress. Pleasant male seen resting in bed. Non-toxic appearing. Chronically-ill appearing. Thin looking.   Skin: Warm, dry, and intact without rashes or lesions. Pale.   Head: Normocephalic and atraumatic.  HEENT:  Oral mucosa is pink .  Lymph: Neck supple. No significant adenopathy.   Cardiac: Regular rate and rhythm.   Respiratory: No signs of respiratory distress or accessory muscle use. Minimally diminished breath sounds at bases. No wheezes or crackles.   Abd: Soft,  symmetric, and non-tender without distension. BS present and normoactive.   Extremities: Trace bilateral LE edema, worst at ankles. Distal pulses palpable.   Neuro: A&Ox3 with normal speech. No deficits grossly.  Psych: Appropriate mood and affect. Good judgment and insight.   Vascular Access: R chest port c/d/i. Non-tender, no surrounding erythema.    Medications     - MEDICATION INSTRUCTIONS -         acyclovir  400 mg Oral Q12H     allopurinol  300 mg Oral Daily     Chemotherapy Infusing-Continuous Infusion   Does not apply Q8H     [START ON 2/27/2023] cyclophosphamide (CYTOXAN) infusion  750 mg/m2 (Treatment Plan Recorded) Intravenous Once     [START ON 3/1/2023] dexamethasone  8 mg Oral Daily     [START ON 2/28/2023] dextrose 5% water  10-20 mL Intracatheter Daily at 8 pm     [START ON 2/28/2023] dextrose 5% water  10-20 mL Intracatheter Daily at 8 pm     enoxaparin ANTICOAGULANT  40 mg Subcutaneous Q24H     etoposide  50 mg/m2 (Treatment Plan Recorded) Intravenous Q22H     [START ON 2/28/2023] filgrastim-aafi  5 mcg/kg (Treatment Plan Recorded) Intravenous Daily at 8 pm     [START ON 2/27/2023] fosaprepitant (EMEND) intermittent infusion ( mL)  150 mg Intravenous Once     heparin  5-10 mL Intracatheter Q28 Days     heparin  5-10 mL Intracatheter Q28 Days     heparin lock flush  5-10 mL Intracatheter Q24H     heparin lock flush  5-10 mL Intracatheter Q24H     omeprazole  40 mg Oral Daily     ondansetron  16 mg Oral Q22H     predniSONE  60 mg/m2 (Treatment Plan Recorded) Oral BID     senna-docusate  2 tablet Oral BID     sodium chloride (PF)  10-20 mL Intracatheter Q28 Days     vinCRIStine/DOXOrubicin (ONCOVIN/ADRIAMYCIN) infusion  0.4 mg/m2 (Treatment Plan Recorded) Intravenous Q22H     vitamin C  500 mg Oral QAM     Data   Results for orders placed or performed during the hospital encounter of 02/23/23 (from the past 24 hour(s))   CBC with platelets differential    Narrative    The following orders were  created for panel order CBC with platelets differential.  Procedure                               Abnormality         Status                     ---------                               -----------         ------                     CBC with platelets and d...[[id number]]  Abnormal            Final result                 Please view results for these tests on the individual orders.   Comprehensive metabolic panel   Result Value Ref Range    Sodium 145 136 - 145 mmol/L    Potassium 3.8 3.4 - 5.3 mmol/L    Chloride 113 (H) 98 - 107 mmol/L    Carbon Dioxide (CO2) 23 22 - 29 mmol/L    Anion Gap 9 7 - 15 mmol/L    Urea Nitrogen 27.4 (H) 8.0 - 23.0 mg/dL    Creatinine 0.73 0.67 - 1.17 mg/dL    Calcium 8.4 (L) 8.8 - 10.2 mg/dL    Glucose 104 (H) 70 - 99 mg/dL    Alkaline Phosphatase 79 40 - 129 U/L    AST 20 10 - 50 U/L    ALT 25 10 - 50 U/L    Protein Total 4.5 (L) 6.4 - 8.3 g/dL    Albumin 3.3 (L) 3.5 - 5.2 g/dL    Bilirubin Total <0.2 <=1.2 mg/dL    GFR Estimate >90 >60 mL/min/1.73m2   INR   Result Value Ref Range    INR 1.09 0.85 - 1.15   Fibrinogen activity   Result Value Ref Range    Fibrinogen Activity 248 170 - 490 mg/dL   Uric acid   Result Value Ref Range    Uric Acid 3.2 (L) 3.4 - 7.0 mg/dL   CBC with platelets and differential   Result Value Ref Range    WBC Count 5.3 4.0 - 11.0 10e3/uL    RBC Count 2.49 (L) 4.40 - 5.90 10e6/uL    Hemoglobin 8.1 (L) 13.3 - 17.7 g/dL    Hematocrit 25.6 (L) 40.0 - 53.0 %     (H) 78 - 100 fL    MCH 32.5 26.5 - 33.0 pg    MCHC 31.6 31.5 - 36.5 g/dL    RDW 17.8 (H) 10.0 - 15.0 %    Platelet Count 132 (L) 150 - 450 10e3/uL    % Neutrophils 85 %    % Lymphocytes 9 %    % Monocytes 5 %    % Eosinophils 0 %    % Basophils 0 %    % Immature Granulocytes 1 %    NRBCs per 100 WBC 0 <1 /100    Absolute Neutrophils 4.5 1.6 - 8.3 10e3/uL    Absolute Lymphocytes 0.5 (L) 0.8 - 5.3 10e3/uL    Absolute Monocytes 0.3 0.0 - 1.3 10e3/uL    Absolute Eosinophils 0.0 0.0 - 0.7 10e3/uL    Absolute  Basophils 0.0 0.0 - 0.2 10e3/uL    Absolute Immature Granulocytes 0.1 <=0.4 10e3/uL    Absolute NRBCs 0.0 10e3/uL

## 2023-02-27 LAB
ALBUMIN SERPL BCG-MCNC: 3.3 G/DL (ref 3.5–5.2)
ALP SERPL-CCNC: 75 U/L (ref 40–129)
ALT SERPL W P-5'-P-CCNC: 46 U/L (ref 10–50)
ANION GAP SERPL CALCULATED.3IONS-SCNC: 7 MMOL/L (ref 7–15)
APPEARANCE CSF: CLEAR
AST SERPL W P-5'-P-CCNC: 31 U/L (ref 10–50)
BASOPHILS # BLD AUTO: 0 10E3/UL (ref 0–0.2)
BASOPHILS NFR BLD AUTO: 0 %
BILIRUB SERPL-MCNC: 0.2 MG/DL
BUN SERPL-MCNC: 29.2 MG/DL (ref 8–23)
CALCIUM SERPL-MCNC: 8.4 MG/DL (ref 8.8–10.2)
CHLORIDE SERPL-SCNC: 111 MMOL/L (ref 98–107)
COLOR CSF: COLORLESS
CREAT SERPL-MCNC: 0.72 MG/DL (ref 0.67–1.17)
DEPRECATED HCO3 PLAS-SCNC: 23 MMOL/L (ref 22–29)
EOSINOPHIL # BLD AUTO: 0 10E3/UL (ref 0–0.7)
EOSINOPHIL NFR BLD AUTO: 0 %
ERYTHROCYTE [DISTWIDTH] IN BLOOD BY AUTOMATED COUNT: 17.3 % (ref 10–15)
FIBRINOGEN PPP-MCNC: 233 MG/DL (ref 170–490)
GFR SERPL CREATININE-BSD FRML MDRD: >90 ML/MIN/1.73M2
GLUCOSE SERPL-MCNC: 105 MG/DL (ref 70–99)
HCT VFR BLD AUTO: 23.8 % (ref 40–53)
HGB BLD-MCNC: 7.7 G/DL (ref 13.3–17.7)
IMM GRANULOCYTES # BLD: 0.1 10E3/UL
IMM GRANULOCYTES NFR BLD: 2 %
INR PPP: 1.14 (ref 0.85–1.15)
LYMPHOCYTES # BLD AUTO: 0.4 10E3/UL (ref 0.8–5.3)
LYMPHOCYTES NFR BLD AUTO: 12 %
MCH RBC QN AUTO: 32.8 PG (ref 26.5–33)
MCHC RBC AUTO-ENTMCNC: 32.4 G/DL (ref 31.5–36.5)
MCV RBC AUTO: 101 FL (ref 78–100)
MONOCYTES # BLD AUTO: 0.1 10E3/UL (ref 0–1.3)
MONOCYTES NFR BLD AUTO: 3 %
NEUTROPHILS # BLD AUTO: 2.8 10E3/UL (ref 1.6–8.3)
NEUTROPHILS NFR BLD AUTO: 83 %
NRBC # BLD AUTO: 0 10E3/UL
NRBC BLD AUTO-RTO: 0 /100
PATH REPORT.COMMENTS IMP SPEC: NORMAL
PATH REPORT.FINAL DX SPEC: NORMAL
PATH REPORT.MICROSCOPIC SPEC OTHER STN: NORMAL
PATH REPORT.RELEVANT HX SPEC: NORMAL
PATH REV: NORMAL
PLATELET # BLD AUTO: 105 10E3/UL (ref 150–450)
POTASSIUM SERPL-SCNC: 3.6 MMOL/L (ref 3.4–5.3)
PROT SERPL-MCNC: 4.5 G/DL (ref 6.4–8.3)
RBC # BLD AUTO: 2.35 10E6/UL (ref 4.4–5.9)
RBC # CSF MANUAL: 0 /UL (ref 0–2)
SODIUM SERPL-SCNC: 141 MMOL/L (ref 136–145)
TUBE # CSF: 4
URATE SERPL-MCNC: 3.4 MG/DL (ref 3.4–7)
WBC # BLD AUTO: 3.4 10E3/UL (ref 4–11)
WBC # CSF MANUAL: 0 /UL (ref 0–5)

## 2023-02-27 PROCEDURE — 99233 SBSQ HOSP IP/OBS HIGH 50: CPT | Mod: FS | Performed by: PHYSICIAN ASSISTANT

## 2023-02-27 PROCEDURE — 84550 ASSAY OF BLOOD/URIC ACID: CPT | Performed by: PHYSICIAN ASSISTANT

## 2023-02-27 PROCEDURE — 250N000013 HC RX MED GY IP 250 OP 250 PS 637: Performed by: REGISTERED NURSE

## 2023-02-27 PROCEDURE — 80053 COMPREHEN METABOLIC PANEL: CPT | Performed by: PHYSICIAN ASSISTANT

## 2023-02-27 PROCEDURE — 250N000011 HC RX IP 250 OP 636: Performed by: REGISTERED NURSE

## 2023-02-27 PROCEDURE — 36591 DRAW BLOOD OFF VENOUS DEVICE: CPT | Performed by: PHYSICIAN ASSISTANT

## 2023-02-27 PROCEDURE — 250N000011 HC RX IP 250 OP 636: Performed by: PHYSICIAN ASSISTANT

## 2023-02-27 PROCEDURE — 258N000003 HC RX IP 258 OP 636: Performed by: REGISTERED NURSE

## 2023-02-27 PROCEDURE — 85610 PROTHROMBIN TIME: CPT | Performed by: PHYSICIAN ASSISTANT

## 2023-02-27 PROCEDURE — 120N000002 HC R&B MED SURG/OB UMMC

## 2023-02-27 PROCEDURE — 85384 FIBRINOGEN ACTIVITY: CPT | Performed by: PHYSICIAN ASSISTANT

## 2023-02-27 PROCEDURE — 85025 COMPLETE CBC W/AUTO DIFF WBC: CPT | Performed by: PHYSICIAN ASSISTANT

## 2023-02-27 PROCEDURE — 250N000011 HC RX IP 250 OP 636: Performed by: RADIOLOGY

## 2023-02-27 PROCEDURE — 250N000012 HC RX MED GY IP 250 OP 636 PS 637: Performed by: REGISTERED NURSE

## 2023-02-27 PROCEDURE — 250N000013 HC RX MED GY IP 250 OP 250 PS 637: Performed by: PHYSICIAN ASSISTANT

## 2023-02-27 RX ORDER — PREDNISONE 50 MG/1
100 TABLET ORAL ONCE
Qty: 2 TABLET | Refills: 0 | Status: SHIPPED | OUTPATIENT
Start: 2023-02-27 | End: 2023-02-27

## 2023-02-27 RX ORDER — FUROSEMIDE 10 MG/ML
20 INJECTION INTRAMUSCULAR; INTRAVENOUS ONCE
Status: COMPLETED | OUTPATIENT
Start: 2023-02-27 | End: 2023-02-27

## 2023-02-27 RX ORDER — FUROSEMIDE 20 MG
20 TABLET ORAL DAILY PRN
Qty: 5 TABLET | Refills: 0 | Status: ON HOLD | OUTPATIENT
Start: 2023-02-27 | End: 2023-04-10

## 2023-02-27 RX ORDER — DEXAMETHASONE 4 MG/1
8 TABLET ORAL DAILY
Qty: 4 TABLET | Refills: 0 | Status: ON HOLD | OUTPATIENT
Start: 2023-03-01 | End: 2023-03-17

## 2023-02-27 RX ADMIN — TEMAZEPAM 7.5 MG: 7.5 CAPSULE ORAL at 22:19

## 2023-02-27 RX ADMIN — ALLOPURINOL 300 MG: 300 TABLET ORAL at 09:57

## 2023-02-27 RX ADMIN — ACYCLOVIR 400 MG: 400 TABLET ORAL at 09:57

## 2023-02-27 RX ADMIN — SODIUM CHLORIDE 150 MG: 9 INJECTION, SOLUTION INTRAVENOUS at 13:09

## 2023-02-27 RX ADMIN — FUROSEMIDE 20 MG: 10 INJECTION, SOLUTION INTRAVENOUS at 11:34

## 2023-02-27 RX ADMIN — ONDANSETRON HYDROCHLORIDE 16 MG: 8 TABLET, FILM COATED ORAL at 13:09

## 2023-02-27 RX ADMIN — POLYETHYLENE GLYCOL 3350 17 G: 17 POWDER, FOR SOLUTION ORAL at 10:00

## 2023-02-27 RX ADMIN — OXYCODONE HYDROCHLORIDE AND ACETAMINOPHEN 500 MG: 500 TABLET ORAL at 09:57

## 2023-02-27 RX ADMIN — SENNOSIDES AND DOCUSATE SODIUM 2 TABLET: 50; 8.6 TABLET ORAL at 19:59

## 2023-02-27 RX ADMIN — ONDANSETRON 8 MG: 4 TABLET, ORALLY DISINTEGRATING ORAL at 20:06

## 2023-02-27 RX ADMIN — OMEPRAZOLE 40 MG: 20 CAPSULE, DELAYED RELEASE ORAL at 09:57

## 2023-02-27 RX ADMIN — CYCLOPHOSPHAMIDE 1275 MG: 2 INJECTION, POWDER, FOR SOLUTION INTRAVENOUS; ORAL at 14:05

## 2023-02-27 RX ADMIN — PREDNISONE 100 MG: 50 TABLET ORAL at 15:33

## 2023-02-27 RX ADMIN — ENOXAPARIN SODIUM 40 MG: 40 INJECTION SUBCUTANEOUS at 19:59

## 2023-02-27 RX ADMIN — ACYCLOVIR 400 MG: 400 TABLET ORAL at 20:00

## 2023-02-27 RX ADMIN — ONDANSETRON HYDROCHLORIDE 4 MG: 4 TABLET, FILM COATED ORAL at 09:58

## 2023-02-27 RX ADMIN — Medication 5 ML: at 15:33

## 2023-02-27 RX ADMIN — PREDNISONE 100 MG: 50 TABLET ORAL at 09:57

## 2023-02-27 ASSESSMENT — ACTIVITIES OF DAILY LIVING (ADL)
ADLS_ACUITY_SCORE: 20

## 2023-02-27 NOTE — PLAN OF CARE
"Goal Outcome Evaluation:     Blood pressure 122/70, pulse 74, temperature 98  F (36.7  C), temperature source Oral, resp. rate 20, height 1.702 m (5' 7\"), weight 60.1 kg (132 lb 7.9 oz), SpO2 96 %.    VSS. AOX4. Chemo infusing. Plan to discharge today afternoon. Continue with POC.               "

## 2023-02-27 NOTE — PROGRESS NOTES
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 He is currently admitted for planned C3 DA-R-EPOCH.     Today:  - D5 R-EPOCH; completing chemotherapy today.  - Unfortunately unable to discharge due to lack of transportation home d/t weather conditions. Unable to stay at [facility] or with local friends d/t covid positivity. Ride arranged for 2/28 AM.   - Local follow up arranged.     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 Ruelas. Pt has a h/o low-grade kappa restricted plasmacytoid B-cell lymphoma (diagnosed in 3/2004) treated with x6 cycles of fludarabine based chemo and XRT to spine, then has been on surveillance since 2005. He presented with progressive B-symptoms, abdominal pain, and SOB. PET 12/9 showed extensive lymphomatous involvement to the right pleura w/ FDG-avid effusions, mediastinal and pericardial regions, right anterolateral chest wall, adenopathy above and below the diaphragm, liver, spleen and multiple sites in the skeleton, concerning for transformation from his low-grade lymphoma. He also has pancytopenia which seems to have progressed slowly over the past years. Of note, reports maternal history of Waldenstrom's. Due to worsening shortness of breath and delays of biopsy, patient presented to ED on 12/14 and was subsequently admitted for expedited workup and treatment. As SUV of the sternoclavicular mass was the highest, patient underwent US-guided sternoclavicular soft tissue mass biopsy with IR 12/15, pathology from which confirmed a diagnosis of high-grade B-cell  lymphoma. Baseline ECHO (12/15) with LVEF 60-65%, mild aortic insufficiency, no evidence of effusion or tamponade. Received 1 cycle of R-CHOP (C1D1 = 12/20/22) which was well-tolerated. Cytogenetics then returned, revealed rearrangements in MYC, BCL2, and BCL6, consistent with triple hit lymphoma. He was then changed to DA-R-EPOCH and proceeded with C1 on 1/11/23 (C2 total of therapy). He was re-admitted for C2 on 2/2/23 which he tolerated well - he qualified for dose-increase though pt did not feel that he could tolerate a higher dose and thus dose was kept the same. Currently being admitted for Cycle 3 on 2/22/23; with dose adjustment to be determined by Heme Malignancy team in AM.   - CNS IPI was 4 (1 point each for age, LDH, stage IV disease, and extranodal involvement); as such, CNS ppx with IT chemotherapy was recommended. CSF flow negative (1/12) with C1. CSF flow negative (2/6) with C2. Difficult bedside access with C1; requesting future LPs done in XR.   - LP with IT chemo done 2/24; CSF in process                             Treatment Plan: DA-R- EPOCH. C3D1=2/22/23  dosing needs to be confirmed by primary team in AM.                            - Rituximab 375 mg/m2 IV x1 dose -- Day 1                           - Etoposide 50 mg/m2 IV q22h x4 doses -- Days 1-4                           - Vincristine 0.4 mg/m2 IV q22h x4 doses --  Days 1-4                           - Doxorubicin 10 mg/m2 IV q22h x4 doses -- Days 1-4                           - Cyclophosphamide 750 mg/m2 IV x1 dose -- Day 5                           - Prednisone 60 mg/m2 PO BID x10 doses -- Days 1-5                           - Dexamethasone 8 mg daily x2 doses -- Days 6-7                           - Neulasta 6 mg subQ x1 dose -- Day 6; requested 3/1 at [facility] in [place]                            - Predmeds: Tylenol, Benadryl, Zofran, Emend     # Anemia   Secondary to chemotherapy and underlying lymphoma.   - Monitor CBC daily  -  Transfuse if Hgb <7 and plt <10k     # Risk for TLS  - Monitor TLS/DIC labs daily  - Allopurinol 300 mg daily  - Minimize IVF given malignant pleural effusions although could consider gentle IVF if needed.     ID  # Asymptomatic COVID-19 infection  Noted to be COVID+ on PCR done 1/9/23. Admitted for planned chemotherapy, as above, and treated with IV remdesivir x3 days (1/11-1/13). Patient was asymptomatic with his infection and satting okay on room air. Had remained persistent positive with most recent test on 2/3 with cycle threshold 28. He is hopeful that his level will be low enough this admission that he can leave his room to ambulate in the hallways  - Repeat COVID test remains positive on admission, cycle threshold is 30. D/w infection prevention and unfortunately will need to remain on special precautions (until cycle threshold is 35).       # Hypogammaglobulinemia   IgG 310 (12/2022). Status-post IVIG on 1/15/23.  - Would repeat IgG monthly; give IVIG for IgG <400     # PPX  -  mg BID   - Nebulized pentamidine given 1/15/23; next due ~2/12;  confirm timing of last dose prior to discharge  - Hold levofloxacin/fluconazole given ANC >1000     CV  # History of complete heart block s/p pacemaker placement (2020)  Followed by Dr. Fox; last seen 7/7/22. Pacemaker last interrogated in 11/2022.  - No acute inpatient management needs  - Continue cardiology follow-up and device checks as recommended     MISC  # GERD - Continue PTA omeprazole.  # Insomnia - PTA on temazepam 7.5 mg HS PRN for sleep; last prescribed #5 tablets on 1/13/23     Clinically Significant Risk Factors          # Hypocalcemia: Lowest Ca = 8.4 mg/dL in last 2 days, will monitor and replace as appropriate     # Hypoalbuminemia: Lowest albumin = 3.3 g/dL at 2/27/2023  6:14 AM, will monitor as appropriate   # Thrombocytopenia: Lowest platelets = 105 in last 2 days, will monitor for bleeding                  FEN:  -IVF per chemo protcol    -PRN lyte replacement  -RDAT  -High protein diet ordered for him so he can get a substantial breakfast, which is his largest meal of the day     Prophy/Misc:  -VTE: ppx Lovenox held on admission with plan for LP, Resumed 2/24 PM post-procedurally    -GI/PUD: PTA Omeprazole   -Bowels: scheduled Senna and PRN Miralax     Follow-up:  - 3/1: 11:45 labs/neulasta (Zachary)  - 3/6, 3/9: Labs/possible transfusions (Zachary); scheduled for 2x weekly going forward  - 3/17: Labs/PET/Visit with Dr. Ruelas     Patient and plan of care was discussed with attending physician Dr. Ruelas.    Jacki (Randall) SONJA [facility]  Hematology/Oncology  #[id number]    Interval History   Nursing notes reviewed. Shahid reports doing well and denies specific complaints this morning. He has been doing well with chemo, eating/drinking okay. Notes fluid building up and weight uptrending. Will trial low dose of lasix today. No nausea, emesis, diarrhea. Would be able to discharge if it wasn't for weather (see logistics above). Will leave tomorrow AM. Questions answered at bedside.     A comprehensive review of systems was obtained and is negative other than noted here or in the HPI.     Physical Exam   Temp: 97.9  F (36.6  C) Temp src: Oral BP: 116/61 Pulse: 93   Resp: 18 SpO2: 96 % O2 Device: None (Room air)    Vitals:    02/24/23 0832 02/25/23 0945 02/27/23 0840   Weight: 57.4 kg (126 lb 9.6 oz) 60.1 kg (132 lb 7.9 oz) 63.8 kg (140 lb 11.2 oz)     Vital Signs with Ranges  Temp:  [97.8  F (36.6  C)-98.2  F (36.8  C)] 97.9  F (36.6  C)  Pulse:  [61-93] 93  Resp:  [18-20] 18  BP: (116-137)/(61-78) 116/61  SpO2:  [95 %-99 %] 96 %  I/O last 3 completed shifts:  In: 2425.75 [P.O.:500; I.V.:250; IV Piggyback:1675.75]  Out: 500 [Urine:500]    General: Awake and interactive. No acute distress. Pleasant male seen resting in bed. Non-toxic appearing. Chronically-ill appearing. Thin looking.   Skin: Warm, dry, and intact without rashes or lesions. Pale.   Head:  Normocephalic and atraumatic.  HEENT:  Oral mucosa is pink .  Lymph: Neck supple. No significant adenopathy.   Cardiac: Regular rate and rhythm.   Respiratory: No signs of respiratory distress or accessory muscle use. Minimally diminished breath sounds at bases. No wheezes or crackles.   Abd: Soft, symmetric, and non-tender without distension. BS present and normoactive.   Extremities: Trace bilateral LE edema, worst at ankles. Distal pulses palpable.   Neuro: A&Ox3 with normal speech. No deficits grossly.  Psych: Appropriate mood and affect. Good judgment and insight.   Vascular Access: R chest port c/d/i. Non-tender, no surrounding erythema.    Medications     - MEDICATION INSTRUCTIONS -         acyclovir  400 mg Oral Q12H     allopurinol  300 mg Oral Daily     [START ON 3/1/2023] dexamethasone  8 mg Oral Daily     [START ON 2/28/2023] dextrose 5% water  10-20 mL Intracatheter Daily at 8 pm     [START ON 2/28/2023] dextrose 5% water  10-20 mL Intracatheter Daily at 8 pm     enoxaparin ANTICOAGULANT  40 mg Subcutaneous Q24H     [START ON 2/28/2023] filgrastim-aafi  5 mcg/kg (Treatment Plan Recorded) Intravenous Daily at 8 pm     heparin  5-10 mL Intracatheter Q28 Days     heparin  5-10 mL Intracatheter Q28 Days     heparin lock flush  5-10 mL Intracatheter Q24H     heparin lock flush  5-10 mL Intracatheter Q24H     omeprazole  40 mg Oral Daily     predniSONE  60 mg/m2 (Treatment Plan Recorded) Oral BID     senna-docusate  2 tablet Oral BID     sodium chloride (PF)  10-20 mL Intracatheter Q28 Days     vitamin C  500 mg Oral QAM     Data   Results for orders placed or performed during the hospital encounter of 02/23/23 (from the past 24 hour(s))   CBC with platelets differential    Narrative    The following orders were created for panel order CBC with platelets differential.  Procedure                               Abnormality         Status                     ---------                               -----------          ------                     CBC with platelets and d...[[id number]]  Abnormal            Final result                 Please view results for these tests on the individual orders.   Comprehensive metabolic panel   Result Value Ref Range    Sodium 141 136 - 145 mmol/L    Potassium 3.6 3.4 - 5.3 mmol/L    Chloride 111 (H) 98 - 107 mmol/L    Carbon Dioxide (CO2) 23 22 - 29 mmol/L    Anion Gap 7 7 - 15 mmol/L    Urea Nitrogen 29.2 (H) 8.0 - 23.0 mg/dL    Creatinine 0.72 0.67 - 1.17 mg/dL    Calcium 8.4 (L) 8.8 - 10.2 mg/dL    Glucose 105 (H) 70 - 99 mg/dL    Alkaline Phosphatase 75 40 - 129 U/L    AST 31 10 - 50 U/L    ALT 46 10 - 50 U/L    Protein Total 4.5 (L) 6.4 - 8.3 g/dL    Albumin 3.3 (L) 3.5 - 5.2 g/dL    Bilirubin Total 0.2 <=1.2 mg/dL    GFR Estimate >90 >60 mL/min/1.73m2   INR   Result Value Ref Range    INR 1.14 0.85 - 1.15   Fibrinogen activity   Result Value Ref Range    Fibrinogen Activity 233 170 - 490 mg/dL   Uric acid   Result Value Ref Range    Uric Acid 3.4 3.4 - 7.0 mg/dL   CBC with platelets and differential   Result Value Ref Range    WBC Count 3.4 (L) 4.0 - 11.0 10e3/uL    RBC Count 2.35 (L) 4.40 - 5.90 10e6/uL    Hemoglobin 7.7 (L) 13.3 - 17.7 g/dL    Hematocrit 23.8 (L) 40.0 - 53.0 %     (H) 78 - 100 fL    MCH 32.8 26.5 - 33.0 pg    MCHC 32.4 31.5 - 36.5 g/dL    RDW 17.3 (H) 10.0 - 15.0 %    Platelet Count 105 (L) 150 - 450 10e3/uL    % Neutrophils 83 %    % Lymphocytes 12 %    % Monocytes 3 %    % Eosinophils 0 %    % Basophils 0 %    % Immature Granulocytes 2 %    NRBCs per 100 WBC 0 <1 /100    Absolute Neutrophils 2.8 1.6 - 8.3 10e3/uL    Absolute Lymphocytes 0.4 (L) 0.8 - 5.3 10e3/uL    Absolute Monocytes 0.1 0.0 - 1.3 10e3/uL    Absolute Eosinophils 0.0 0.0 - 0.7 10e3/uL    Absolute Basophils 0.0 0.0 - 0.2 10e3/uL    Absolute Immature Granulocytes 0.1 <=0.4 10e3/uL    Absolute NRBCs 0.0 10e3/uL

## 2023-02-27 NOTE — PROVIDER NOTIFICATION
Care Management Follow Up    RNCC set up Neulasta injection for Wed March 1 at 1:00pm.  RNCC called and spoke with patient to update him on appointment time.      Patient asked for lab dates and times to be changed. RNCC spoke with AMAURI Denise, and is okay with changing lab dates.    RNCC rescheduled lab appointments with [facility]. RNCC called patient again, and reviewed injection date and time, and all lab appointments.  RNCC and patient discussed that patient can make remainder of lab appointments. Patient agreed.    March 1, Wednesday- 11:45 labs, 1:00pm Neulasta injection  March 6, Monday - 1:00pm  March 9, Thursday - 1:00pm     [facility]   [id number] [place] [id number]  Phone: [phone]     Margaret Templeton RN, BSN  Care Coordinator 7D  Office: [phone]  Pager: [phone]    To contact the weekend RNCC  [place] (0800 - 1630) Saturday and Sunday    Units: 4A, 4C, 4E, 5A and 5B- Pager 1: [phone]    Units: 6A, 6B, 6C, 6D- Pager 2: [phone]    Units: 7A, 7B, 7C, 7D, and 5C-Pager 3: [phone]

## 2023-02-27 NOTE — PLAN OF CARE
Goal Outcome Evaluation:    Nursing Focus: Chemotherapy  D: Positive blood return via Port. Insertion site is clean/dry/intact, dressing intact with no complaints of pain.  Urine output is recorded in intake in Doc Flowsheet.    I: Premedications given per order (see electronic medical administration record). Dose #1 of Cytoxan started to infuse over 1hour. Reviewed pt teaching on chemotherapy side effects.  Pt denies need for further teaching. Chemotherapy double checked per protocol by two chemotherapy competent RN's.   A: Tolerating procedure well. Denies nausea and or pain.   P: Continue to monitor urine output and symptoms of nausea. Screen for symptoms of toxicity.    VSS, afebrile. Continuous chemotherapy completed. Receiving last dose of therapy currently. Plan was to discharge post, but pt may not have available lodging. Potentially staying until tomorrow morning. Denies pain/nausea/SOB. IV lasix given x1 w/ good UOP. Continue to monitor & w/ POC.

## 2023-02-27 NOTE — DISCHARGE SUMMARY
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 He was admitted for planned C3 DA-R-EPOCH which was tolerated well.    On day of discharge, patient states he is feeling well. He received 20 mg lasix IV yesterday for lower extremity edema and weight gain (14 lb since admit). He states this always worsens when he gets home and makes mobilizing difficult. Kidney function/lytes stable today and will send home with small supply PRN. He denies any new or worsening symptoms. Ongoing mild congestion/voice changes secondary to covid-infection (sx present for ~2 months) but this is getting overall better. Arranged for outpatient follow up as below. Patient is hemodynamically stable, well-appearing and in no distress. Did not discharge on 2/27 due to weather yesterday with [facility] or stay locally with family due to covid infection. Questions answered at bedside.    Recommendations for Outpatient:  - Please follow lower extremity edema/lytes/kidney function with lasix PRN.   - Please ensure no worsening of URI sx secondary to covid infection.    Follow-up:  - 3/1: 11:45 labs/neulasta (Zachary)  - 3/6, 3/9: Labs/possible transfusions (Zachary); scheduled for 2x weekly going forward  - 3/17: Labs/PET/Visit with Dr. 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 Ruelas. Pt has a h/o low-grade kappa restricted plasmacytoid B-cell lymphoma (diagnosed in 3/2004) treated with x6 cycles of fludarabine based chemo and XRT to spine, then has been on surveillance since 2005. He presented with progressive B-symptoms, abdominal pain, and SOB. PET 12/9 showed extensive lymphomatous involvement to the right pleura w/ FDG-avid effusions, mediastinal and pericardial regions, right anterolateral chest wall, adenopathy above and below the diaphragm, liver, spleen and multiple sites in the skeleton, concerning for transformation from his low-grade lymphoma. He also has pancytopenia which seems to have progressed slowly over the past years. Of note, reports maternal history of Waldenstrom's. Due to worsening shortness of breath and delays of biopsy, patient presented to ED on 12/14 and was subsequently admitted for expedited workup and treatment. As SUV of the sternoclavicular mass was the highest, patient underwent US-guided sternoclavicular soft tissue mass biopsy with IR 12/15, pathology from which confirmed a diagnosis of high-grade B-cell lymphoma. Baseline ECHO (12/15) with LVEF 60-65%, mild aortic insufficiency, no evidence of effusion or tamponade. Received 1 cycle of R-CHOP (C1D1 = 12/20/22) which was well-tolerated. Cytogenetics then returned, revealed rearrangements in MYC, BCL2, and BCL6, consistent with triple hit lymphoma. He was then changed to DA-R-EPOCH and proceeded with C1 on 1/11/23 (C2 total of therapy). He was re-admitted for C2 on 2/2/23 which he tolerated well - he qualified for dose-increase though pt did not feel that he could tolerate a higher dose and thus dose was kept the same. Currently being admitted for Cycle 3 on 2/22/23; with dose adjustment to be determined by Heme Malignancy team in  AM.   - CNS IPI was 4 (1 point each for age, LDH, stage IV disease, and extranodal involvement); as such, CNS ppx with IT chemotherapy was recommended. CSF flow negative (1/12) with C1. CSF flow negative (2/6) with C2. Difficult bedside access with C1; requesting future LPs done in XR.   - LP with IT chemo done 2/24. Orders sent - follow-up CSF flow. (Pending)                             Treatment Plan: DA-R- EPOCH. C3D1=2/22/23  dosing needs to be confirmed by primary team in AM.                            - Rituximab 375 mg/m2 IV x1 dose -- Day 1                           - Etoposide 50 mg/m2 IV q22h x4 doses -- Days 1-4                           - Vincristine 0.4 mg/m2 IV q22h x4 doses --  Days 1-4                           - Doxorubicin 10 mg/m2 IV q22h x4 doses -- Days 1-4                           - Cyclophosphamide 750 mg/m2 IV x1 dose -- Day 5                           - Prednisone 60 mg/m2 PO BID x10 doses -- Days 1-5                           - Dexamethasone 8 mg daily x2 doses -- Days 6-7                           - Neulasta 6 mg subQ x1 dose -- Day 6; requested 3/1 at [facility] in [place]                            - Predmeds: Tylenol, Benadryl, Zofran, Emend     # Anemia   Secondary to chemotherapy and underlying lymphoma.   - Monitor CBC daily  - Transfuse if Hgb <7 and plt <10k     # Risk for TLS  - Monitor TLS/DIC labs daily  - Allopurinol 300 mg daily  - Minimize IVF given malignant pleural effusions although could consider gentle IVF if needed.     ID  # Asymptomatic COVID-19 infection  Noted to be COVID+ on PCR done 1/9/23. Admitted for planned chemotherapy, as above, and treated with IV remdesivir x3 days (1/11-1/13). Patient was asymptomatic with his infection and satting okay on room air. Had remained persistent positive with most recent test on 2/3 with cycle threshold 28. He is hopeful that his level will be low enough this admission that he can leave his room to ambulate in the  [facility]  - Repeat COVID test remains positive on admission, cycle threshold is 30. D/w infection prevention and unfortunately will need to remain on special precautions (until cycle threshold is 35).       # Hypogammaglobulinemia   IgG 310 (12/2022). Status-post IVIG on 1/15/23; plan to recheck IgG in ~3 months from last dose (~4/2023) per discussion with Dr. Ruelas.     # PPX  -  mg BID   - Nebulized pentamidine given 1/15/23; next due ~2/12;  confirm timing of last dose prior to discharge  - Hold levofloxacin/fluconazole given ANC >1000     CV  # History of complete heart block s/p pacemaker placement (2020)  Followed by Dr. Fox; last seen 7/7/22. Pacemaker last interrogated in 11/2022.  - No acute inpatient management needs  - Continue cardiology follow-up and device checks as recommended     MISC  # GERD - Continue PTA omeprazole.  # Insomnia - PTA on temazepam 7.5 mg HS PRN for sleep; last prescribed #5 tablets on 1/13/23    Clinically Significant Risk Factors          # Hypocalcemia: Lowest Ca = 8.4 mg/dL in last 2 days, will monitor and replace as appropriate     # Hypoalbuminemia: Lowest albumin = 3.3 g/dL at 2/27/2023  6:14 AM, will monitor as appropriate   # Thrombocytopenia: Lowest platelets = 105 in last 2 days, will monitor for bleeding               >60 minutes spent on the date of the encounter. Over 50% of time was spent counseling the patient and/or coordinating care.     Patient seen in collaboration with attending physician, Dr. Ruelas.    Jacki Jefferson PA-C ([place])  Hematology/Oncology  #[id number]    Significant Results and Procedures   See below    Pending Results   These results will be followed up by OP team  Unresulted Labs Ordered in the Past 30 Days of this Admission     Date and Time Order Name Status Description    2/24/2023 10:21 AM Flow Cytometry Cerebrospinal fluid In process     2/24/2023 10:21 AM Cerebrospinal fluid Aerobic Bacterial Culture Routine with Gram Stain Preliminary            Code Status   Full Code    Primary Care Physician   Provider Not In System  General: Awake and interactive. No acute distress. Pleasant male seen resting in bed. Non-toxic appearing. Chronically-ill appearing. Thin looking.   Skin: Warm, dry, and intact without rashes or lesions. Pale.   HEENT: Normocephalic and atraumatic. Oral mucosa is pink .  Lymph: Neck supple. No significant adenopathy.   Cardiac: Regular rate and rhythm.   Respiratory: No signs of respiratory distress or accessory muscle use. Minimally diminished breath sounds at bases. No wheezes or crackles.   Abd: Soft, symmetric, and non-tender without distension. BS present and normoactive.   Extremities: 1+ bilateral LE edema.  Neuro: A&Ox3 with normal speech. No deficits grossly.  Psych: Appropriate mood and affect. Good judgment and insight.   Vascular Access: R chest port c/d/i. Non-tender, no surrounding erythema.    Time Spent on this Encounter   Jacki RUELAS PA-C, personally saw the patient today and spent greater than 30 minutes discharging this patient.    Discharge Disposition   Discharged to home  Condition at discharge: Stable    Consultations This Hospital Stay   ADVANCE DIRECTIVE IP CONSULT    Discharge Orders      Reason for your hospital stay    You were hospitalized for DA-R-EPOCH chemotherapy which was tolerated well without concerns. You have a small amount of lasix pills that you can take at home as needed for worsening lower extremity swelling if instructed. You are arranged for the appropriate outpatient follow up as listed below.     Follow Up and recommended labs and tests    Local follow-up arranged as below:   - Labs/Neulasta  - 2x weekly labs/possible transfusions to follow  - Labs/PET/Follow up with Dr. Ruelas - 3/17     Activity    Your activity upon discharge: activity as tolerated     When to contact your care team    [facility] triage line at [phone] for temp > or = 100.4, uncontrolled  nausea/vomiting/diarrhea/constipation, unrelieved pain, bleeding not relieved with pressure, dizziness, chest pain, shortness of breath, loss of consciousness, and any new or concerning symptoms.     Diet    Follow this diet upon discharge: regular     Discharge Medications   Current Discharge Medication List      START taking these medications    Details   dexamethasone (DECADRON) 4 MG tablet Take 2 tablets (8 mg) by mouth daily on 3/1 and 3/2 then stop.  Qty: 4 tablet, Refills: 0    Associated Diagnoses: 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); Prevention of chemotherapy-induced neutropenia; Adverse effect of antineoplastic and immunosuppressive drugs, sequela      furosemide (LASIX) 20 MG tablet Take 1 tablet (20 mg) by mouth daily as needed (lower extremity swelling)  Qty: 5 tablet, Refills: 0    Associated Diagnoses: 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)      predniSONE (DELTASONE) 50 MG tablet Take 2 tablets (100 mg) by mouth once for 1 dose on 2/28 AM.  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  Qty: 30 tablet, Refills: 3    Associated Diagnoses: Diffuse large B-cell lymphoma of intrathoracic lymph nodes  (H)      levofloxacin (LEVAQUIN) 250 MG tablet Take 1 tablet (250 mg) by mouth daily , start at discharge and take until your ANC is greater than 1000.  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)      polyethylene glycol (MIRALAX) 17 GM/Dose powder Take 17 g by mouth daily as needed for constipation  Qty: 510 g, Refills: 4    Associated Diagnoses: 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)      prochlorperazine (COMPAZINE) 10 MG tablet Take 1 tablet (10 mg) by mouth every 6 hours as needed for nausea or vomiting  Qty: 30 tablet, Refills: 3    Associated Diagnoses: 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)      senna-docusate (SENNA S) 8.6-50 MG tablet Take 1 tablet by mouth 2 times daily as needed for constipation  Qty: 60 tablet, Refills: 4    Associated Diagnoses: 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)      temazepam (RESTORIL) 7.5 MG capsule Take 1 capsule (7.5 mg) by mouth nightly as needed for sleep  Qty: 15 capsule, Refills: 0    Associated Diagnoses: 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)           Allergies   Allergies   Allergen Reactions     Ampicillin [Ampicillin Sodium]      Bactrim [Sulfamethoxazole W/Trimethoprim]      Contrast Dye      CT contrast (IV) allergy - need oral Methylpred as premed for scans     Ampicillin Rash     Data   Most  Recent 3 CBC's:Recent Labs   Lab Test 02/27/23  0614 02/26/23  0652 02/25/23  0639   WBC 3.4* 5.3 8.0   HGB 7.7* 8.1* 8.7*   * 103* 102*   * 132* 158      Most Recent 3 BMP's:  Recent Labs   Lab Test 02/27/23  0614 02/26/23  0652 02/25/23  0639    145 143   POTASSIUM 3.6 3.8 3.8   CHLORIDE 111* 113* 109*   CO2 23 23 23   BUN 29.2* 27.4* 23.1*   CR 0.72 0.73 0.72   ANIONGAP 7 9 11   JORGE 8.4* 8.4* 9.2   * 104* 122*     Most Recent 2 LFT's:  Recent Labs   Lab Test 02/27/23  0614 02/26/23  0652   AST 31 20   ALT 46 25   ALKPHOS 75 79   BILITOTAL 0.2 <0.2     Most Recent INR's and Anticoagulation Dosing History:  Anticoagulation Dose History     Recent Dosing and Labs Latest Ref Rng & Units 2/5/2023 2/6/2023 2/7/2023 2/24/2023 2/25/2023 2/26/2023 2/27/2023    INR 0.85 - 1.15 1.12 1.13 1.20(H) 1.06 1.07 1.09 1.14        Most Recent 3 Troponin's:No lab results found.  Most Recent Cholesterol Panel:  Recent Labs   Lab Test 12/02/22  1405   CHOL 146   LDL 88  88   HDL 39*   TRIG 94     Most Recent 6 Bacteria Isolates From Any Culture (See EPIC Reports for Culture Details):No lab results found.  Most Recent TSH, T4 and A1c Labs:No lab results found.  Results for orders placed or performed during the hospital encounter of 02/23/23   XR Lumbar Punc Intrathecal Chemo Admin    Narrative    PROCEDURE: Lumbar Puncture using Fluoroscopy, 2/24/2023 3:51 PM    HISTORY: Pt with DLBCL with ongoing chemo. Requesting LP with IT  chemo. Patient discharging 2/27.    COMPARISON: Lumbar puncture fluoroscopic images to 623.    STAFF NEURORADIOLOGIST: Dr. Jimbo Real    FELLOW PHYSICIAN: Dr. Hardik Lima    SEDATION: The patient was placed on continuous monitoring. Intravenous  sedation was administered. Vital signs and sedation monitored by  nursing staff under Interventional Radiologists supervision. The  patient remained stable throughout the procedure.    TECHNIQUE: Verbal and written consent for lumbar  puncture was obtained  from the patient, and benefits and risk of the procedure were  explained, including but not limited to worsening headache,  hemorrhage, infection, lower extremity pain, or nerve root injury. The  patient was sterilely prepped and draped with the patient in the prone  position, over the lower back. Under fluoroscopic guidance, the  interlaminar spaces were noted. 1% lidocaine was administered for  local anesthetic over the L3-4 interlaminar space, and a 22 gauge 3.5  inch needle was advanced into the thecal sac under fluoroscopic  guidance.      There was initial show of clear CSF. Approximately 13 cc of CSF were  collected.    Hematology/Oncology then administered intrathecal chemotherapy, dose  and rate as determined by Heme/Onc.    The needle was removed with the stylet in place. There was no  immediate complication associated with the procedure. Samples were  sent for the requested laboratory testing.      FLUORO TIME: 12 seconds.    DOSE: 3.01 mGy      Impression    IMPRESSION: Successful lumbar puncture and intrathecal chemotherapy  administration without immediate complication.    PLAN: Patient discharged to patient care unit in stable condition for  monitoring. Orders placed for 1 hour of bed rest with patient laying  on the back and the head of the bed flat.    I, KRYSTYNA MCCARTY MD, attest that I was present for all critical  portions of the procedure and was immediately available to provide  guidance and assistance during the remainder of the procedure.    I have personally reviewed the examination and initial interpretation  and I agree with the findings.    KRYSTYNA MCCARTY MD         SYSTEM ID:  [id number]

## 2023-02-28 VITALS
SYSTOLIC BLOOD PRESSURE: 123 MMHG | BODY MASS INDEX: 22.08 KG/M2 | RESPIRATION RATE: 18 BRPM | TEMPERATURE: 98.2 F | HEART RATE: 75 BPM | HEIGHT: 67 IN | WEIGHT: 140.7 LBS | DIASTOLIC BLOOD PRESSURE: 71 MMHG | OXYGEN SATURATION: 97 %

## 2023-02-28 LAB
ABO/RH(D): NORMAL
ALBUMIN SERPL BCG-MCNC: 3.2 G/DL (ref 3.5–5.2)
ALP SERPL-CCNC: 71 U/L (ref 40–129)
ALT SERPL W P-5'-P-CCNC: 45 U/L (ref 10–50)
ANION GAP SERPL CALCULATED.3IONS-SCNC: 8 MMOL/L (ref 7–15)
ANTIBODY SCREEN: NEGATIVE
AST SERPL W P-5'-P-CCNC: 28 U/L (ref 10–50)
BASOPHILS # BLD AUTO: 0 10E3/UL (ref 0–0.2)
BASOPHILS NFR BLD AUTO: 0 %
BILIRUB SERPL-MCNC: 0.3 MG/DL
BUN SERPL-MCNC: 30.2 MG/DL (ref 8–23)
CALCIUM SERPL-MCNC: 8.8 MG/DL (ref 8.8–10.2)
CHLORIDE SERPL-SCNC: 109 MMOL/L (ref 98–107)
CREAT SERPL-MCNC: 0.74 MG/DL (ref 0.67–1.17)
DEPRECATED HCO3 PLAS-SCNC: 25 MMOL/L (ref 22–29)
EOSINOPHIL # BLD AUTO: 0 10E3/UL (ref 0–0.7)
EOSINOPHIL NFR BLD AUTO: 0 %
ERYTHROCYTE [DISTWIDTH] IN BLOOD BY AUTOMATED COUNT: 16.8 % (ref 10–15)
FIBRINOGEN PPP-MCNC: 199 MG/DL (ref 170–490)
GFR SERPL CREATININE-BSD FRML MDRD: >90 ML/MIN/1.73M2
GLUCOSE SERPL-MCNC: 111 MG/DL (ref 70–99)
HCT VFR BLD AUTO: 24.1 % (ref 40–53)
HGB BLD-MCNC: 7.9 G/DL (ref 13.3–17.7)
IMM GRANULOCYTES # BLD: 0 10E3/UL
IMM GRANULOCYTES NFR BLD: 1 %
INR PPP: 1.15 (ref 0.85–1.15)
LYMPHOCYTES # BLD AUTO: 0.3 10E3/UL (ref 0.8–5.3)
LYMPHOCYTES NFR BLD AUTO: 13 %
MCH RBC QN AUTO: 32.5 PG (ref 26.5–33)
MCHC RBC AUTO-ENTMCNC: 32.8 G/DL (ref 31.5–36.5)
MCV RBC AUTO: 99 FL (ref 78–100)
MONOCYTES # BLD AUTO: 0 10E3/UL (ref 0–1.3)
MONOCYTES NFR BLD AUTO: 1 %
NEUTROPHILS # BLD AUTO: 1.8 10E3/UL (ref 1.6–8.3)
NEUTROPHILS NFR BLD AUTO: 85 %
NRBC # BLD AUTO: 0 10E3/UL
NRBC BLD AUTO-RTO: 0 /100
PLATELET # BLD AUTO: 98 10E3/UL (ref 150–450)
POTASSIUM SERPL-SCNC: 3.6 MMOL/L (ref 3.4–5.3)
PROT SERPL-MCNC: 4.5 G/DL (ref 6.4–8.3)
RBC # BLD AUTO: 2.43 10E6/UL (ref 4.4–5.9)
SODIUM SERPL-SCNC: 142 MMOL/L (ref 136–145)
SPECIMEN EXPIRATION DATE: NORMAL
URATE SERPL-MCNC: 3.7 MG/DL (ref 3.4–7)
WBC # BLD AUTO: 2.1 10E3/UL (ref 4–11)

## 2023-02-28 PROCEDURE — 36591 DRAW BLOOD OFF VENOUS DEVICE: CPT | Performed by: PHYSICIAN ASSISTANT

## 2023-02-28 PROCEDURE — 85025 COMPLETE CBC W/AUTO DIFF WBC: CPT | Performed by: PHYSICIAN ASSISTANT

## 2023-02-28 PROCEDURE — 86901 BLOOD TYPING SEROLOGIC RH(D): CPT | Performed by: PHYSICIAN ASSISTANT

## 2023-02-28 PROCEDURE — 250N000011 HC RX IP 250 OP 636: Performed by: PHYSICIAN ASSISTANT

## 2023-02-28 PROCEDURE — 80053 COMPREHEN METABOLIC PANEL: CPT | Performed by: PHYSICIAN ASSISTANT

## 2023-02-28 PROCEDURE — 250N000011 HC RX IP 250 OP 636: Performed by: INTERNAL MEDICINE

## 2023-02-28 PROCEDURE — 85384 FIBRINOGEN ACTIVITY: CPT | Performed by: PHYSICIAN ASSISTANT

## 2023-02-28 PROCEDURE — 86850 RBC ANTIBODY SCREEN: CPT | Performed by: PHYSICIAN ASSISTANT

## 2023-02-28 PROCEDURE — 99239 HOSP IP/OBS DSCHRG MGMT >30: CPT | Performed by: PHYSICIAN ASSISTANT

## 2023-02-28 PROCEDURE — 250N000012 HC RX MED GY IP 250 OP 636 PS 637: Performed by: REGISTERED NURSE

## 2023-02-28 PROCEDURE — 250N000011 HC RX IP 250 OP 636: Performed by: RADIOLOGY

## 2023-02-28 PROCEDURE — 84550 ASSAY OF BLOOD/URIC ACID: CPT | Performed by: PHYSICIAN ASSISTANT

## 2023-02-28 PROCEDURE — 85610 PROTHROMBIN TIME: CPT | Performed by: PHYSICIAN ASSISTANT

## 2023-02-28 PROCEDURE — 250N000013 HC RX MED GY IP 250 OP 250 PS 637: Performed by: PHYSICIAN ASSISTANT

## 2023-02-28 RX ADMIN — OXYCODONE HYDROCHLORIDE AND ACETAMINOPHEN 500 MG: 500 TABLET ORAL at 08:38

## 2023-02-28 RX ADMIN — ACYCLOVIR 400 MG: 400 TABLET ORAL at 08:38

## 2023-02-28 RX ADMIN — POLYETHYLENE GLYCOL 3350 17 G: 17 POWDER, FOR SOLUTION ORAL at 08:43

## 2023-02-28 RX ADMIN — ONDANSETRON 8 MG: 4 TABLET, ORALLY DISINTEGRATING ORAL at 08:47

## 2023-02-28 RX ADMIN — Medication 5 ML: at 09:19

## 2023-02-28 RX ADMIN — PREDNISONE 100 MG: 50 TABLET ORAL at 08:38

## 2023-02-28 RX ADMIN — ALLOPURINOL 300 MG: 300 TABLET ORAL at 08:38

## 2023-02-28 RX ADMIN — Medication 5 ML: at 06:59

## 2023-02-28 RX ADMIN — OMEPRAZOLE 40 MG: 20 CAPSULE, DELAYED RELEASE ORAL at 08:38

## 2023-02-28 ASSESSMENT — ACTIVITIES OF DAILY LIVING (ADL)
ADLS_ACUITY_SCORE: 20

## 2023-02-28 NOTE — PLAN OF CARE
Goal Outcome Evaluation:    Discharge  D: Orders for discharge and outpatient medications written.  I: Home medications and return to clinic schedule reviewed with patient. Discharge instructions and parameters for calling Health Care Provider reviewed. Patient left around 1000.  A: Patient/family verbalized understanding and was ready for discharge.   P: Patient instructed to  medications in Pharmacy. Follow up as scheduled w/ outpatient teams.      Pt denies complaints. Port de-accessed w/ heparin. Discharge paperwork was discussed & all questions answered. Pt left w/ all belongings.

## 2023-02-28 NOTE — PLAN OF CARE
[id number]  AVSS, alert and oriented x 4, denies pain/sob. C/o little nausea, given PRN Zofran with relief. Chemo is done this evening. Up independent, voiding adequately, not saving urine. Given temazepam for bedtime.  Discharging tomorrow at home.  Continue to monitor care.

## 2023-02-28 NOTE — PLAN OF CARE
[id number]  Goal Outcome Evaluation:  Pt A&Ox4, VSS on RA. Denies pain/N/V. Port remains heparin locked. Sleep/rest promoted overnight with clustered cares and minimal interruptions. Good UOP, not saving. Pt to discharge in AM pending ride. Continue to monitor and with POC.

## 2023-03-01 ENCOUNTER — PATIENT OUTREACH (OUTPATIENT)
Dept: ONCOLOGY | Facility: CLINIC | Age: 73
End: 2023-03-01
Payer: MEDICARE

## 2023-03-01 LAB
BACTERIA CSF CULT: NO GROWTH
GRAM STAIN RESULT: NORMAL
GRAM STAIN RESULT: NORMAL

## 2023-03-01 NOTE — PROGRESS NOTES
Patient discharged on 2/28/23, please follow up per TCM workflow.    Jose Alejandro Melo on 3/1/2023 at 9:24 AM

## 2023-03-02 ENCOUNTER — PATIENT OUTREACH (OUTPATIENT)
Dept: ONCOLOGY | Facility: CLINIC | Age: 73
End: 2023-03-02
Payer: MEDICARE

## 2023-03-02 ENCOUNTER — ANCILLARY PROCEDURE (OUTPATIENT)
Dept: CARDIOLOGY | Facility: CLINIC | Age: 73
End: 2023-03-02
Attending: INTERNAL MEDICINE
Payer: MEDICARE

## 2023-03-02 ENCOUNTER — PATIENT OUTREACH (OUTPATIENT)
Dept: CARE COORDINATION | Facility: CLINIC | Age: 73
End: 2023-03-02
Payer: MEDICARE

## 2023-03-02 DIAGNOSIS — Z95.0 PACEMAKER: ICD-10-CM

## 2023-03-02 DIAGNOSIS — I44.2 ATRIOVENTRICULAR BLOCK, COMPLETE (H): ICD-10-CM

## 2023-03-02 DIAGNOSIS — C83.38 DIFFUSE LARGE B-CELL LYMPHOMA OF LYMPH NODES OF MULTIPLE REGIONS (H): Primary | ICD-10-CM

## 2023-03-02 DIAGNOSIS — Z01.818 PREOP EXAMINATION: ICD-10-CM

## 2023-03-02 PROCEDURE — 93296 REM INTERROG EVL PM/IDS: CPT

## 2023-03-02 PROCEDURE — 93294 REM INTERROG EVL PM/LDLS PM: CPT | Performed by: INTERNAL MEDICINE

## 2023-03-02 RX ORDER — ACYCLOVIR 400 MG/1
400 TABLET ORAL EVERY 12 HOURS
Qty: 60 TABLET | Refills: 3 | Status: ON HOLD | OUTPATIENT
Start: 2023-03-02 | End: 2023-04-11

## 2023-03-02 NOTE — PROGRESS NOTES
[facility]/Voicemail    Clinical Data: Care Coordinator Outreach    Outreach attempted x 1.  Left message on patient's voicemail with call back information and requested return call.    Plan: Care Coordinator will do no further outreaches at this time.    Called the patient to follow up on recent discharge and labs drawn yesterday (in CareEverywhere). Labs are stable. We will continue to trend over the next couple of weeks. Will follow up with appointments also.    Encouraged him to call me with questions. Call back number provided.    Alton Lainez, CHANELLE, RN, OCN  RN Care Coordinator   Dr. Domitila Ruelas and Dr. Jeannette Head  [facility]

## 2023-03-02 NOTE — PROGRESS NOTES
Clinic Care Coordination Contact  [facility]/Voicemail       Clinical Data: Care Coordinator Outreach  Outreach attempted x 2.  Left message on patient's voicemail with call back information and requested return call.  Plan: Care Coordinator will do no further outreaches at this time.    RAFAEL Ewing  [phone]  [facility]

## 2023-03-02 NOTE — PROGRESS NOTES
Addendum:     # Pancytopenia - secondary to chemotherapy  - Outpatient transfusions arranged (hgb <7, plts >10k)    Jacki Jefferson PA-C ([place])  Hematology/Oncology  #[id number]

## 2023-03-11 LAB
MDC_IDC_LEAD_IMPLANT_DT: NORMAL
MDC_IDC_LEAD_IMPLANT_DT: NORMAL
MDC_IDC_LEAD_LOCATION: NORMAL
MDC_IDC_LEAD_LOCATION: NORMAL
MDC_IDC_LEAD_LOCATION_DETAIL_1: NORMAL
MDC_IDC_LEAD_LOCATION_DETAIL_1: NORMAL
MDC_IDC_LEAD_MFG: NORMAL
MDC_IDC_LEAD_MFG: NORMAL
MDC_IDC_LEAD_MODEL: NORMAL
MDC_IDC_LEAD_MODEL: NORMAL
MDC_IDC_LEAD_POLARITY_TYPE: NORMAL
MDC_IDC_LEAD_POLARITY_TYPE: NORMAL
MDC_IDC_LEAD_SERIAL: NORMAL
MDC_IDC_LEAD_SERIAL: NORMAL
MDC_IDC_MSMT_BATTERY_DTM: NORMAL
MDC_IDC_MSMT_BATTERY_REMAINING_LONGEVITY: 67 MO
MDC_IDC_MSMT_BATTERY_REMAINING_PERCENTAGE: 65 %
MDC_IDC_MSMT_BATTERY_RRT_TRIGGER: NORMAL
MDC_IDC_MSMT_BATTERY_STATUS: NORMAL
MDC_IDC_MSMT_BATTERY_VOLTAGE: 2.99 V
MDC_IDC_MSMT_LEADCHNL_RA_IMPEDANCE_VALUE: 330 OHM
MDC_IDC_MSMT_LEADCHNL_RA_LEAD_CHANNEL_STATUS: NORMAL
MDC_IDC_MSMT_LEADCHNL_RA_SENSING_INTR_AMPL: 2.8 MV
MDC_IDC_MSMT_LEADCHNL_RV_IMPEDANCE_VALUE: 400 OHM
MDC_IDC_MSMT_LEADCHNL_RV_LEAD_CHANNEL_STATUS: NORMAL
MDC_IDC_MSMT_LEADCHNL_RV_SENSING_INTR_AMPL: 12 MV
MDC_IDC_PG_IMPLANT_DTM: NORMAL
MDC_IDC_PG_MFG: NORMAL
MDC_IDC_PG_MODEL: NORMAL
MDC_IDC_PG_SERIAL: NORMAL
MDC_IDC_PG_TYPE: NORMAL
MDC_IDC_SESS_CLINIC_NAME: NORMAL
MDC_IDC_SESS_DTM: NORMAL
MDC_IDC_SESS_REPROGRAMMED: NO
MDC_IDC_SESS_TYPE: NORMAL
MDC_IDC_SET_BRADY_AT_MODE_SWITCH_MODE: NORMAL
MDC_IDC_SET_BRADY_AT_MODE_SWITCH_RATE: 160 {BEATS}/MIN
MDC_IDC_SET_BRADY_LOWRATE: 60 {BEATS}/MIN
MDC_IDC_SET_BRADY_MAX_SENSOR_RATE: 120 {BEATS}/MIN
MDC_IDC_SET_BRADY_MAX_TRACKING_RATE: 120 {BEATS}/MIN
MDC_IDC_SET_BRADY_MODE: NORMAL
MDC_IDC_SET_BRADY_PAV_DELAY_LOW: 200 MS
MDC_IDC_SET_BRADY_SAV_DELAY_LOW: 200 MS
MDC_IDC_SET_LEADCHNL_RA_PACING_AMPLITUDE: 1.5 V
MDC_IDC_SET_LEADCHNL_RA_PACING_ANODE_ELECTRODE_1: NORMAL
MDC_IDC_SET_LEADCHNL_RA_PACING_ANODE_LOCATION_1: NORMAL
MDC_IDC_SET_LEADCHNL_RA_PACING_CAPTURE_MODE: NORMAL
MDC_IDC_SET_LEADCHNL_RA_PACING_CATHODE_ELECTRODE_1: NORMAL
MDC_IDC_SET_LEADCHNL_RA_PACING_CATHODE_LOCATION_1: NORMAL
MDC_IDC_SET_LEADCHNL_RA_PACING_POLARITY: NORMAL
MDC_IDC_SET_LEADCHNL_RA_PACING_PULSEWIDTH: 0.4 MS
MDC_IDC_SET_LEADCHNL_RA_SENSING_ADAPTATION_MODE: NORMAL
MDC_IDC_SET_LEADCHNL_RA_SENSING_ANODE_ELECTRODE_1: NORMAL
MDC_IDC_SET_LEADCHNL_RA_SENSING_ANODE_LOCATION_1: NORMAL
MDC_IDC_SET_LEADCHNL_RA_SENSING_CATHODE_ELECTRODE_1: NORMAL
MDC_IDC_SET_LEADCHNL_RA_SENSING_CATHODE_LOCATION_1: NORMAL
MDC_IDC_SET_LEADCHNL_RA_SENSING_POLARITY: NORMAL
MDC_IDC_SET_LEADCHNL_RA_SENSING_SENSITIVITY: 0.75 MV
MDC_IDC_SET_LEADCHNL_RV_PACING_AMPLITUDE: 2.5 V
MDC_IDC_SET_LEADCHNL_RV_PACING_ANODE_ELECTRODE_1: NORMAL
MDC_IDC_SET_LEADCHNL_RV_PACING_ANODE_LOCATION_1: NORMAL
MDC_IDC_SET_LEADCHNL_RV_PACING_CAPTURE_MODE: NORMAL
MDC_IDC_SET_LEADCHNL_RV_PACING_CATHODE_ELECTRODE_1: NORMAL
MDC_IDC_SET_LEADCHNL_RV_PACING_CATHODE_LOCATION_1: NORMAL
MDC_IDC_SET_LEADCHNL_RV_PACING_POLARITY: NORMAL
MDC_IDC_SET_LEADCHNL_RV_PACING_PULSEWIDTH: 0.4 MS
MDC_IDC_SET_LEADCHNL_RV_SENSING_ADAPTATION_MODE: NORMAL
MDC_IDC_SET_LEADCHNL_RV_SENSING_ANODE_ELECTRODE_1: NORMAL
MDC_IDC_SET_LEADCHNL_RV_SENSING_ANODE_LOCATION_1: NORMAL
MDC_IDC_SET_LEADCHNL_RV_SENSING_CATHODE_ELECTRODE_1: NORMAL
MDC_IDC_SET_LEADCHNL_RV_SENSING_CATHODE_LOCATION_1: NORMAL
MDC_IDC_SET_LEADCHNL_RV_SENSING_POLARITY: NORMAL
MDC_IDC_SET_LEADCHNL_RV_SENSING_SENSITIVITY: 2.5 MV
MDC_IDC_STAT_AT_BURDEN_PERCENT: 0 %
MDC_IDC_STAT_AT_DTM_END: NORMAL
MDC_IDC_STAT_AT_DTM_START: NORMAL
MDC_IDC_STAT_AT_MODE_SW_COUNT: 0
MDC_IDC_STAT_AT_MODE_SW_COUNT_PER_DAY: 0
MDC_IDC_STAT_AT_MODE_SW_PERCENT_TIME: 0 %
MDC_IDC_STAT_BRADY_AP_VP_PERCENT: 2.5 %
MDC_IDC_STAT_BRADY_AP_VS_PERCENT: 1 %
MDC_IDC_STAT_BRADY_AS_VP_PERCENT: 97 %
MDC_IDC_STAT_BRADY_AS_VS_PERCENT: 1 %
MDC_IDC_STAT_BRADY_DTM_END: NORMAL
MDC_IDC_STAT_BRADY_DTM_START: NORMAL
MDC_IDC_STAT_BRADY_RA_PERCENT_PACED: 2.5 %
MDC_IDC_STAT_BRADY_RV_PERCENT_PACED: 99 %
MDC_IDC_STAT_CRT_DTM_END: NORMAL
MDC_IDC_STAT_CRT_DTM_START: NORMAL
MDC_IDC_STAT_EPISODE_RECENT_COUNT: 0
MDC_IDC_STAT_EPISODE_RECENT_COUNT_DTM_END: NORMAL
MDC_IDC_STAT_EPISODE_TYPE: NORMAL
MDC_IDC_STAT_EPISODE_VENDOR_TYPE: NORMAL
MDC_IDC_STAT_EPISODE_VENDOR_TYPE: NORMAL
MDC_IDC_STAT_HEART_RATE_ATRIAL_MAX: 250 {BEATS}/MIN
MDC_IDC_STAT_HEART_RATE_ATRIAL_MEAN: 90 {BEATS}/MIN
MDC_IDC_STAT_HEART_RATE_ATRIAL_MIN: 60 {BEATS}/MIN
MDC_IDC_STAT_HEART_RATE_DTM_END: NORMAL
MDC_IDC_STAT_HEART_RATE_DTM_START: NORMAL
MDC_IDC_STAT_HEART_RATE_VENTRICULAR_MAX: 220 {BEATS}/MIN
MDC_IDC_STAT_HEART_RATE_VENTRICULAR_MEAN: 90 {BEATS}/MIN
MDC_IDC_STAT_HEART_RATE_VENTRICULAR_MIN: 40 {BEATS}/MIN

## 2023-03-16 NOTE — H&P
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 He was admitted on 3/17/23 for C4 DA-R-EPOCH (C5 total of chemotherapy).     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 Ruelas. Pt has a h/o low-grade kappa restricted plasmacytoid B-cell lymphoma (diagnosed in 3/2004) treated with x6 cycles of fludarabine based chemo and XRT to spine, then has been on surveillance since 2005. He presented with progressive B-symptoms, abdominal pain, and SOB. PET 12/9 showed extensive lymphomatous involvement to the right pleura w/ bilateral FDG-avid effusions (large R, small L), mediastinal and pericardial regions, right anterolateral chest wall, adenopathy above and below the diaphragm, liver, spleen and multiple sites in the skeleton, concerning for transformation from his low-grade lymphoma. He was also noted to have progressive pancytopenia. Due to worsening shortness of breath and delays of biopsy, patient presented to ED on 12/14 and was subsequently admitted for expedited workup and treatment. As SUV of the sternoclavicular mass was the highest, patient underwent US-guided sternoclavicular soft tissue mass biopsy with IR 12/15, pathology from which confirmed a diagnosis of high-grade B-cell lymphoma. Baseline ECHO (12/15) with LVEF 60-65%, mild aortic insufficiency, no evidence of effusion or tamponade. Received 1 cycle of R-CHOP ([id number]=7712/20/22) which was well-tolerated. Cytogenetics then  returned, revealed rearrangements in MYC, BCL2, and BCL6, consistent with triple hit lymphoma. He was subsequently changed to DA-R-EPOCH and proceeded with C1 on 1/11/23 (C2 total of therapy). He was re-admitted for C2 on 2/2/23, C3 on 2/22/23, C4 on 3/17/23. Cycles have been overall well-tolerated, save for anticipated fatigue and cytopenias. Of note, patient has not advanced past Level 1 per personal preference/concerns for tolerance.  - CNS IPI was 4 (1 point each for age, LDH, stage IV disease, and extranodal involvement); as such, CNS ppx with IT chemotherapy was recommended. CSF flow negative (1/12) with C1. CSF flow negative (2/6) with C2. Difficult bedside access with C1; subsequently LPs done in XR.  - LP with IT chemo in XR again planned during admission with C4, scheduled in XR on 3/20 at 1 PM  - PET/CT (post-4 cycles of chemo) done 3/17/23; formal read pending  - Note: this is overall C5 of chemotherapy (1 cycle R-CHOP + 4 cycles R-EPOCH); current plan is for 6 total cycles (1 additional cycle of R-EPOCH), followed by an EOT PET/CT.                             Treatment Plan: DA-R- EPOCH. C4D1=3/17/23                           - Rituximab 375 mg/m2 IV x1 dose -- Day 1                           - Etoposide 50 mg/m2 IV q22h x4 doses -- Days 1-4                           - Vincristine 0.4 mg/m2 IV q22h x4 doses --  Days 1-4                           - Doxorubicin 10 mg/m2 IV q22h x4 doses -- Days 1-4                           - Cyclophosphamide 750 mg/m2 IV x1 dose -- Day 5                           - Prednisone 60 mg/m2 PO BID x10 doses -- Days 1-5                           - Dexamethasone 8 mg daily x2 doses -- Days 6-7                           - Neulasta 6 mg subQ x1 dose                           - Pre-meds: Tylenol, Benadryl, Zofran, Emend     # Anemia   Secondary to chemotherapy and underlying lymphoma.   - Monitor CBC daily  - Transfuse if Hgb <7 and plt <10k    CV  # History of complete heart block  "s/p pacemaker placement (2020)  Followed by Dr. Fox; last seen 7/7/22. Pacemaker last interrogated on 3/2/23.  - No acute inpatient management needs  - Continue cardiology follow-up and device checks as recommended - next planned follow-up in 07/2023    MSK  # Palpable cord to left forearm  Noted on admission. Patient reported ~3 day history of \"lump\" to left forearm, which he notes overlies a vein. Lesion is slightly tender to palpation, non-fluctuant. No history of trauma. DDx includes superficial venous thrombosis, hematoma, or small abscess.  - Venous doppler of the LUE ordered to evaluate further; if this is unrevealing, could consider dedicated soft tissue U/S to assess abscess versus hematoma    ID  # Asymptomatic COVID-19 infection, persistent  Noted to be COVID+ on PCR done 1/9/23. Admitted for planned chemotherapy, as above, and treated with IV remdesivir x3 days (1/11-1/13). Despite treatment, patient has remained PCR positive for the last 2 months since diagnosis and was positive again on admission (3/17/23). Cycle threshold 31.4. Per infection prevention, patient needs to remain in precautions until cycle threshold >35.  - Continue special precautions for now  - No current clinical s/sx of COVID-19 infection; monitor clinically with initiation of chemotherapy    # Hypogammaglobulinemia   IgG 310 (12/2022). Status-post IVIG on 1/15/23; plan to recheck IgG in ~3 months from last dose (~4/2023) per prior discussion with Dr. Ruelas.    # ID ppx  - Acyclovir 400 mg BID  - No current indication for bacterial/fungal ppx; start for ANC <1000  - Patient reportedly intolerant of nebulized pentamidine; will give IV pentamidine x1 as an alternative, per Dr. Ruelas    [facility]  # GERD - related to previous XRT to the chest, continue PTA omeprazole.  # Insomnia - PTA on temazepam 7.5 mg HS PRN for sleep; last prescribed #5 tablets on 1/13/23 (takes on steroid days)    FEN:  - No mIVF; bolus PRN  - Lyte replacement PRN per " "standing protocol  - Regular diet as tolerated    Prophy/Misc:  - GI/PUD: PTA omeprazole  - Bowels: PRN Miralax and Senna  - DVT: ppx enoxaparin; hold for platelets <50K and prior to LP  - ID: as above  - Activity: encourage ambulation    Clinically Significant Risk Factors Present on Admission                             Disposition: Inpatient admission to [facility] for C4 of R-DA-EPOCH. Anticipate discharge to home pending completion of the chemotherapy regimen, likely 3/21-3/22.    Staffed with Dr. Hernandez.    Liyah Padgett PA-C  Hematology/Oncology  Pager: #[phone]    I spent 55 minutes in the care of this patient today, which included time necessary for review of interval events, obtaining history and physical exam, ordering medication(s)/test(s) as medically indicated, discussion with interdisciplinary/consult team(s), and documentation time. Over 50% of time was spent face-to-face and/or coordinating care.    Code Status : Full Code    Primary Care Physician   Provider Not In System    History of Present Illness   History obtained from chart and discussed with the patient.    Shahid Davis is a 72 year old male who presents with a past medical history significant for complete heart block s/p pacemaker placement and low-grade kappa-restricted plasmacytoid B-cell lymphoma diagnosed 3/2004, now with transformation to 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 He was admitted on 3/17/23 for planned C4 of R-DA-EPOCH.    Shahid notes that he's overall been doing well, but states that chemo \"hit harder\" this past round, with more fatigue. However, feels he's bounced back and is ready for another cycle. Overall doing well - denies fevers, chills, cough, chest pain, SOB, nausea, vomiting, diarrhea, constipation. Notes that he has a \"lump\" to his left forearm which overlies a vein, but does not remember getting a blood draw there. Denies trauma, warmth, drainage. No other new lumps/bumps. Discussed plan of care and reviewed " chemotherapy; patient is familiar with the regimen at this point and denied the need for further teaching. All questions/concerns addressed at bedside.    Past Medical History    Past Medical History:   Diagnosis Date     Cardiac pacemaker in situ      Cardiac pacemaker in situ     2020     Lymphoma (H)      Squamous cell carcinoma        Past Surgical History   Past Surgical History:   Procedure Laterality Date     DAVINCI HERNIORRHAPHY INGUINAL Left 07/09/2021    Procedure: HERNIORRHAPHY, INGUINAL, ROBOT-ASSISTED, Left, Mesh;  Surgeon: Shamar Tyler MD;  Location: [facility] OR     IR CHEST PORT PLACEMENT > 5 YRS OF AGE  1/5/2023     IR SOFT TISSUE BIOPSY  12/15/2022     MOHS MICROGRAPHIC PROCEDURE       NO HISTORY OF SURGERY         Prior to Admission Medications   Prior to Admission Medications   Prescriptions Last Dose Informant Patient Reported? Taking?   Multiple Vitamins-Minerals (MULTIVITAL PO)   Yes No   Sig: Take 1 tablet by mouth every morning   acyclovir (ZOVIRAX) 400 MG tablet   No No   Sig: Take 1 tablet (400 mg) by mouth every 12 hours for 120 days   allopurinol (ZYLOPRIM) 300 MG tablet   No No   Sig: Take 1 tablet (300 mg) by mouth daily   ascorbic acid (VITAMIN C) 500 MG tablet   Yes No   Sig: Take 500 mg by mouth every morning   dexamethasone (DECADRON) 4 MG tablet   No No   Sig: Take 2 tablets (8 mg) by mouth daily on 3/1 and 3/2 then stop.   fluconazole (DIFLUCAN) 200 MG tablet   Yes No   Sig: Take 1 tablet (200 mg) by mouth daily , start at discharge and take until your ANC is greater than 1000.   furosemide (LASIX) 20 MG tablet   No No   Sig: Take 1 tablet (20 mg) by mouth daily as needed (lower extremity swelling)   Patient not taking: Reported on 3/17/2023   levofloxacin (LEVAQUIN) 250 MG tablet   Yes No   Sig: Take 1 tablet (250 mg) by mouth daily , start at discharge and take until your ANC is greater than 1000.   omeprazole (PRILOSEC) 40 MG DR capsule   No No   Sig: Take 1 capsule (40  mg) by mouth daily   ondansetron (ZOFRAN) 8 MG tablet   No No   Sig: Take 1 tablet (8 mg) by mouth every 8 hours as needed for nausea   polyethylene glycol (MIRALAX) 17 GM/Dose powder   No No   Sig: Take 17 g by mouth daily as needed for constipation   prochlorperazine (COMPAZINE) 10 MG tablet   No No   Sig: Take 1 tablet (10 mg) by mouth every 6 hours as needed for nausea or vomiting   senna-docusate (SENNA S) 8.6-50 MG tablet   No No   Sig: Take 1 tablet by mouth 2 times daily as needed for constipation   temazepam (RESTORIL) 7.5 MG capsule   No No   Sig: Take 1 capsule (7.5 mg) by mouth nightly as needed for sleep      Facility-Administered Medications: None     Allergies   Allergies   Allergen Reactions     Ampicillin [Ampicillin Sodium]      Bactrim [Sulfamethoxazole W/Trimethoprim]      Contrast Dye      CT contrast (IV) allergy - need oral Methylpred as premed for scans     Ampicillin Rash       Social History   Social History     Socioeconomic History     Marital status: Single     Spouse name: Not on file     Number of children: Not on file     Years of education: Not on file     Highest education level: Not on file   Occupational History     Not on file   Tobacco Use     Smoking status: Former     Smokeless tobacco: Never     Tobacco comments:     Quit at age 19   Substance and Sexual Activity     Alcohol use: Yes     Alcohol/week: 11.7 standard drinks     Types: 14 drink(s) per week     Drug use: Not on file     Sexual activity: Not on file   Other Topics Concern     Parent/sibling w/ CABG, MI or angioplasty before 65F 55M? Not Asked   Social History Narrative     Not on file     Social Determinants of Health     Financial Resource Strain: Not on file   Food Insecurity: Not on file   Transportation Needs: Not on file   Physical Activity: Not on file   Stress: Not on file   Social Connections: Not on file   Intimate Partner Violence: Not on file   Housing Stability: Not on file       Family History    Family History   Problem Relation Age of Onset     Cancer Mother         Blood     Cancer Father         Lung     Cancer Maternal Grandmother         Breast     Cancer Paternal Grandfather         Hodgkins       Review of Systems   A 14-point ROS is negative unless otherwise noted above in the HPI.    Physical Exam   Vital Signs with Ranges  Temp:  [98.4  F (36.9  C)] 98.4  F (36.9  C)  Pulse:  [101] 101  Resp:  [16] 16  BP: (113)/(66) 113/66  SpO2:  [98 %] 98 %  0 lbs 0 oz    Constitutional: Pleasant and cooperative male. Awake, alert, NAD. Appears thin and frail, but non-toxic.  HEENT: NC/AT, EOMI, sclera clear, conjunctiva normal, OP with MMM  Respiratory: No increased work of breathing, CTAB, no crackles or wheezing.  Cardiovascular: RRR, no murmur noted. No peripheral edema. Radial pulses 2+ bilaterally.  GI: Normal bowel sounds, soft, non-distended and non-tender.  MSK: Decreased muscle bulk throughout. No large joint effusions or gross deformities.  Skin: Warm, dry, well-perfused. Palpable 2x3 cm nodule to the left volar forearm near the ulnar aspect. No overlying warmth. No palpable tenderness or fluctuance. No red streaking.  Neurologic: A&O. Answers questions appropriately. Moves all extremities spontaneously.  Psych: Calm, appropriate affect  Vascular access:  Port on right chest wall CDI, non-tender, no surrounding erythema.      Recent Labs  CBC   Recent Labs   Lab 03/17/23  1321   WBC 7.0   RBC 3.16*   HGB 10.4*   HCT 31.2*   MCV 99   MCH 32.9   MCHC 33.3   RDW 16.2*          CMP   Recent Labs   Lab 03/17/23  1321      POTASSIUM 4.2   CHLORIDE 106   CO2 27   ANIONGAP 9   *   BUN 26.3*   CR 0.86   GFRESTIMATED >90   JORGE 9.8   PROTTOTAL 6.2*   ALBUMIN 4.3   BILITOTAL 0.2   ALKPHOS 103   AST 24   ALT 23       LFTs:   Recent Labs   Lab 03/17/23  1321   PROTTOTAL 6.2*   ALBUMIN 4.3   BILITOTAL 0.2   ALKPHOS 103   AST 24   ALT 23       Coagulation Studies: No lab results found in last 7  days.

## 2023-03-17 ENCOUNTER — APPOINTMENT (OUTPATIENT)
Dept: ULTRASOUND IMAGING | Facility: CLINIC | Age: 73
DRG: 846 | End: 2023-03-17
Attending: PHYSICIAN ASSISTANT
Payer: MEDICARE

## 2023-03-17 ENCOUNTER — ONCOLOGY VISIT (OUTPATIENT)
Dept: ONCOLOGY | Facility: CLINIC | Age: 73
DRG: 846 | End: 2023-03-17
Attending: INTERNAL MEDICINE
Payer: MEDICARE

## 2023-03-17 ENCOUNTER — HOSPITAL ENCOUNTER (INPATIENT)
Facility: CLINIC | Age: 73
LOS: 4 days | Discharge: NURSING, REHAB OR LONG-TERM CARE | DRG: 846 | End: 2023-03-21
Attending: INTERNAL MEDICINE | Admitting: INTERNAL MEDICINE
Payer: MEDICARE

## 2023-03-17 ENCOUNTER — APPOINTMENT (OUTPATIENT)
Dept: LAB | Facility: CLINIC | Age: 73
DRG: 846 | End: 2023-03-17
Attending: INTERNAL MEDICINE
Payer: MEDICARE

## 2023-03-17 ENCOUNTER — HOSPITAL ENCOUNTER (OUTPATIENT)
Dept: PET IMAGING | Facility: CLINIC | Age: 73
Discharge: HOME | DRG: 846 | End: 2023-03-17
Attending: REGISTERED NURSE
Payer: MEDICARE

## 2023-03-17 VITALS
BODY MASS INDEX: 20.09 KG/M2 | HEIGHT: 67 IN | OXYGEN SATURATION: 98 % | RESPIRATION RATE: 16 BRPM | SYSTOLIC BLOOD PRESSURE: 113 MMHG | HEART RATE: 101 BPM | DIASTOLIC BLOOD PRESSURE: 66 MMHG | TEMPERATURE: 98.4 F | WEIGHT: 128 LBS

## 2023-03-17 DIAGNOSIS — R06.6 HICCUPS: ICD-10-CM

## 2023-03-17 DIAGNOSIS — C83.38 DIFFUSE LARGE B-CELL LYMPHOMA OF LYMPH NODES OF MULTIPLE REGIONS (H): Primary | ICD-10-CM

## 2023-03-17 DIAGNOSIS — C83.38 DIFFUSE LARGE B-CELL LYMPHOMA OF LYMPH NODES OF MULTIPLE REGIONS (H): ICD-10-CM

## 2023-03-17 DIAGNOSIS — C83.32 DIFFUSE LARGE B-CELL LYMPHOMA OF INTRATHORACIC LYMPH NODES (H): Primary | ICD-10-CM

## 2023-03-17 DIAGNOSIS — T45.1X5S ADVERSE EFFECT OF ANTINEOPLASTIC AND IMMUNOSUPPRESSIVE DRUGS, SEQUELA: ICD-10-CM

## 2023-03-17 DIAGNOSIS — Z76.89 PREVENTION OF CHEMOTHERAPY-INDUCED NEUTROPENIA: ICD-10-CM

## 2023-03-17 DIAGNOSIS — C83.32 DIFFUSE LARGE B-CELL LYMPHOMA OF INTRATHORACIC LYMPH NODES (H): ICD-10-CM

## 2023-03-17 DIAGNOSIS — R22.32 LOCALIZED SWELLING OF LEFT FOREARM: ICD-10-CM

## 2023-03-17 LAB
ALBUMIN SERPL BCG-MCNC: 4.2 G/DL (ref 3.5–5.2)
ALBUMIN SERPL BCG-MCNC: 4.3 G/DL (ref 3.5–5.2)
ALP SERPL-CCNC: 101 U/L (ref 40–129)
ALP SERPL-CCNC: 103 U/L (ref 40–129)
ALT SERPL W P-5'-P-CCNC: 23 U/L (ref 10–50)
ALT SERPL W P-5'-P-CCNC: 24 U/L (ref 10–50)
ANION GAP SERPL CALCULATED.3IONS-SCNC: 11 MMOL/L (ref 7–15)
ANION GAP SERPL CALCULATED.3IONS-SCNC: 9 MMOL/L (ref 7–15)
AST SERPL W P-5'-P-CCNC: 24 U/L (ref 10–50)
AST SERPL W P-5'-P-CCNC: 26 U/L (ref 10–50)
BASOPHILS # BLD AUTO: 0 10E3/UL (ref 0–0.2)
BASOPHILS # BLD AUTO: 0.1 10E3/UL (ref 0–0.2)
BASOPHILS NFR BLD AUTO: 0 %
BASOPHILS NFR BLD AUTO: 1 %
BILIRUB SERPL-MCNC: 0.2 MG/DL
BILIRUB SERPL-MCNC: 0.2 MG/DL
BUN SERPL-MCNC: 26.3 MG/DL (ref 8–23)
BUN SERPL-MCNC: 31.3 MG/DL (ref 8–23)
CALCIUM SERPL-MCNC: 9.4 MG/DL (ref 8.8–10.2)
CALCIUM SERPL-MCNC: 9.8 MG/DL (ref 8.8–10.2)
CHLORIDE SERPL-SCNC: 106 MMOL/L (ref 98–107)
CHLORIDE SERPL-SCNC: 108 MMOL/L (ref 98–107)
CREAT SERPL-MCNC: 0.86 MG/DL (ref 0.67–1.17)
CREAT SERPL-MCNC: 0.96 MG/DL (ref 0.67–1.17)
CYCLE THRESHOLD (CT): 31.4
DEPRECATED HCO3 PLAS-SCNC: 25 MMOL/L (ref 22–29)
DEPRECATED HCO3 PLAS-SCNC: 27 MMOL/L (ref 22–29)
EOSINOPHIL # BLD AUTO: 0 10E3/UL (ref 0–0.7)
EOSINOPHIL # BLD AUTO: 0 10E3/UL (ref 0–0.7)
EOSINOPHIL NFR BLD AUTO: 0 %
EOSINOPHIL NFR BLD AUTO: 0 %
ERYTHROCYTE [DISTWIDTH] IN BLOOD BY AUTOMATED COUNT: 16.2 % (ref 10–15)
ERYTHROCYTE [DISTWIDTH] IN BLOOD BY AUTOMATED COUNT: 16.3 % (ref 10–15)
GFR SERPL CREATININE-BSD FRML MDRD: 83 ML/MIN/1.73M2
GFR SERPL CREATININE-BSD FRML MDRD: >90 ML/MIN/1.73M2
GLUCOSE SERPL-MCNC: 118 MG/DL (ref 70–99)
GLUCOSE SERPL-MCNC: 121 MG/DL (ref 70–99)
HCT VFR BLD AUTO: 31.2 % (ref 40–53)
HCT VFR BLD AUTO: 32.7 % (ref 40–53)
HGB BLD-MCNC: 10.4 G/DL (ref 13.3–17.7)
HGB BLD-MCNC: 10.4 G/DL (ref 13.3–17.7)
IMM GRANULOCYTES # BLD: 0 10E3/UL
IMM GRANULOCYTES # BLD: 0.1 10E3/UL
IMM GRANULOCYTES NFR BLD: 1 %
IMM GRANULOCYTES NFR BLD: 1 %
LYMPHOCYTES # BLD AUTO: 0.9 10E3/UL (ref 0.8–5.3)
LYMPHOCYTES # BLD AUTO: 1 10E3/UL (ref 0.8–5.3)
LYMPHOCYTES NFR BLD AUTO: 13 %
LYMPHOCYTES NFR BLD AUTO: 14 %
MAGNESIUM SERPL-MCNC: 2.5 MG/DL (ref 1.7–2.3)
MCH RBC QN AUTO: 32.9 PG (ref 26.5–33)
MCH RBC QN AUTO: 33 PG (ref 26.5–33)
MCHC RBC AUTO-ENTMCNC: 31.8 G/DL (ref 31.5–36.5)
MCHC RBC AUTO-ENTMCNC: 33.3 G/DL (ref 31.5–36.5)
MCV RBC AUTO: 104 FL (ref 78–100)
MCV RBC AUTO: 99 FL (ref 78–100)
MONOCYTES # BLD AUTO: 0.6 10E3/UL (ref 0–1.3)
MONOCYTES # BLD AUTO: 0.6 10E3/UL (ref 0–1.3)
MONOCYTES NFR BLD AUTO: 9 %
MONOCYTES NFR BLD AUTO: 9 %
NEUTROPHILS # BLD AUTO: 5.3 10E3/UL (ref 1.6–8.3)
NEUTROPHILS # BLD AUTO: 5.6 10E3/UL (ref 1.6–8.3)
NEUTROPHILS NFR BLD AUTO: 76 %
NEUTROPHILS NFR BLD AUTO: 76 %
NRBC # BLD AUTO: 0 10E3/UL
NRBC # BLD AUTO: 0 10E3/UL
NRBC BLD AUTO-RTO: 0 /100
NRBC BLD AUTO-RTO: 0 /100
PHOSPHATE SERPL-MCNC: 4.5 MG/DL (ref 2.5–4.5)
PLATELET # BLD AUTO: 234 10E3/UL (ref 150–450)
PLATELET # BLD AUTO: 258 10E3/UL (ref 150–450)
POTASSIUM SERPL-SCNC: 4.2 MMOL/L (ref 3.4–5.3)
POTASSIUM SERPL-SCNC: 4.5 MMOL/L (ref 3.4–5.3)
PROT SERPL-MCNC: 6.1 G/DL (ref 6.4–8.3)
PROT SERPL-MCNC: 6.2 G/DL (ref 6.4–8.3)
RBC # BLD AUTO: 3.15 10E6/UL (ref 4.4–5.9)
RBC # BLD AUTO: 3.16 10E6/UL (ref 4.4–5.9)
SARS-COV-2 RNA RESP QL NAA+PROBE: POSITIVE
SODIUM SERPL-SCNC: 142 MMOL/L (ref 136–145)
SODIUM SERPL-SCNC: 144 MMOL/L (ref 136–145)
WBC # BLD AUTO: 7 10E3/UL (ref 4–11)
WBC # BLD AUTO: 7.3 10E3/UL (ref 4–11)

## 2023-03-17 PROCEDURE — 84450 TRANSFERASE (AST) (SGOT): CPT | Performed by: INTERNAL MEDICINE

## 2023-03-17 PROCEDURE — 120N000002 HC R&B MED SURG/OB UMMC

## 2023-03-17 PROCEDURE — 83735 ASSAY OF MAGNESIUM: CPT

## 2023-03-17 PROCEDURE — A9552 F18 FDG: HCPCS | Performed by: REGISTERED NURSE

## 2023-03-17 PROCEDURE — 99223 1ST HOSP IP/OBS HIGH 75: CPT | Mod: AI | Performed by: PHYSICIAN ASSISTANT

## 2023-03-17 PROCEDURE — 36591 DRAW BLOOD OFF VENOUS DEVICE: CPT | Performed by: INTERNAL MEDICINE

## 2023-03-17 PROCEDURE — 250N000011 HC RX IP 250 OP 636: Performed by: INTERNAL MEDICINE

## 2023-03-17 PROCEDURE — 258N000003 HC RX IP 258 OP 636: Performed by: INTERNAL MEDICINE

## 2023-03-17 PROCEDURE — 93971 EXTREMITY STUDY: CPT | Mod: 26 | Performed by: RADIOLOGY

## 2023-03-17 PROCEDURE — 85025 COMPLETE CBC W/AUTO DIFF WBC: CPT | Performed by: INTERNAL MEDICINE

## 2023-03-17 PROCEDURE — 3E04005 INTRODUCTION OF OTHER ANTINEOPLASTIC INTO CENTRAL VEIN, OPEN APPROACH: ICD-10-PCS | Performed by: INTERNAL MEDICINE

## 2023-03-17 PROCEDURE — 99215 OFFICE O/P EST HI 40 MIN: CPT | Performed by: INTERNAL MEDICINE

## 2023-03-17 PROCEDURE — 93971 EXTREMITY STUDY: CPT | Mod: LT

## 2023-03-17 PROCEDURE — G0463 HOSPITAL OUTPT CLINIC VISIT: HCPCS | Mod: 25 | Performed by: INTERNAL MEDICINE

## 2023-03-17 PROCEDURE — 250N000012 HC RX MED GY IP 250 OP 636 PS 637: Performed by: INTERNAL MEDICINE

## 2023-03-17 PROCEDURE — 80053 COMPREHEN METABOLIC PANEL: CPT | Performed by: INTERNAL MEDICINE

## 2023-03-17 PROCEDURE — 343N000001 HC RX 343: Performed by: REGISTERED NURSE

## 2023-03-17 PROCEDURE — 78816 PET IMAGE W/CT FULL BODY: CPT | Mod: 26 | Performed by: RADIOLOGY

## 2023-03-17 PROCEDURE — G1010 CDSM STANSON: HCPCS | Mod: PS

## 2023-03-17 PROCEDURE — 250N000013 HC RX MED GY IP 250 OP 250 PS 637

## 2023-03-17 PROCEDURE — 84155 ASSAY OF PROTEIN SERUM: CPT | Performed by: INTERNAL MEDICINE

## 2023-03-17 PROCEDURE — 84100 ASSAY OF PHOSPHORUS: CPT

## 2023-03-17 PROCEDURE — 250N000011 HC RX IP 250 OP 636: Performed by: PHYSICIAN ASSISTANT

## 2023-03-17 PROCEDURE — 250N000013 HC RX MED GY IP 250 OP 250 PS 637: Performed by: INTERNAL MEDICINE

## 2023-03-17 PROCEDURE — U0003 INFECTIOUS AGENT DETECTION BY NUCLEIC ACID (DNA OR RNA); SEVERE ACUTE RESPIRATORY SYNDROME CORONAVIRUS 2 (SARS-COV-2) (CORONAVIRUS DISEASE [COVID-19]), AMPLIFIED PROBE TECHNIQUE, MAKING USE OF HIGH THROUGHPUT TECHNOLOGIES AS DESCRIBED BY CMS-2020-01-R: HCPCS

## 2023-03-17 RX ORDER — ALLOPURINOL 300 MG/1
300 TABLET ORAL DAILY
Status: DISCONTINUED | OUTPATIENT
Start: 2023-03-18 | End: 2023-03-21 | Stop reason: HOSPADM

## 2023-03-17 RX ORDER — AMOXICILLIN 250 MG
2 CAPSULE ORAL 2 TIMES DAILY PRN
Status: DISCONTINUED | OUTPATIENT
Start: 2023-03-17 | End: 2023-03-17

## 2023-03-17 RX ORDER — ASCORBIC ACID 500 MG
500 TABLET ORAL EVERY MORNING
Status: DISCONTINUED | OUTPATIENT
Start: 2023-03-18 | End: 2023-03-21 | Stop reason: HOSPADM

## 2023-03-17 RX ORDER — ALBUTEROL SULFATE 0.83 MG/ML
2.5 SOLUTION RESPIRATORY (INHALATION)
Status: DISCONTINUED | OUTPATIENT
Start: 2023-03-17 | End: 2023-03-21 | Stop reason: HOSPADM

## 2023-03-17 RX ORDER — ALLOPURINOL 300 MG/1
300 TABLET ORAL DAILY
Status: CANCELLED | OUTPATIENT
Start: 2023-03-17

## 2023-03-17 RX ORDER — DIPHENHYDRAMINE HCL 25 MG
50 CAPSULE ORAL ONCE
Status: CANCELLED
Start: 2023-03-17

## 2023-03-17 RX ORDER — MEPERIDINE HYDROCHLORIDE 25 MG/ML
25 INJECTION INTRAMUSCULAR; INTRAVENOUS; SUBCUTANEOUS EVERY 30 MIN PRN
Status: CANCELLED | OUTPATIENT
Start: 2023-03-17

## 2023-03-17 RX ORDER — ALBUTEROL SULFATE 90 UG/1
1-2 AEROSOL, METERED RESPIRATORY (INHALATION)
Status: DISCONTINUED | OUTPATIENT
Start: 2023-03-17 | End: 2023-03-21 | Stop reason: HOSPADM

## 2023-03-17 RX ORDER — DEXAMETHASONE 4 MG/1
8 TABLET ORAL DAILY
Status: CANCELLED
Start: 2023-03-23

## 2023-03-17 RX ORDER — PROCHLORPERAZINE MALEATE 5 MG
5-10 TABLET ORAL EVERY 6 HOURS PRN
Status: DISCONTINUED | OUTPATIENT
Start: 2023-03-17 | End: 2023-03-17

## 2023-03-17 RX ORDER — POLYETHYLENE GLYCOL 3350 17 G/17G
17 POWDER, FOR SOLUTION ORAL DAILY PRN
Status: DISCONTINUED | OUTPATIENT
Start: 2023-03-17 | End: 2023-03-21 | Stop reason: HOSPADM

## 2023-03-17 RX ORDER — ACYCLOVIR 400 MG/1
400 TABLET ORAL EVERY 12 HOURS
Status: DISCONTINUED | OUTPATIENT
Start: 2023-03-17 | End: 2023-03-21 | Stop reason: HOSPADM

## 2023-03-17 RX ORDER — ENOXAPARIN SODIUM 100 MG/ML
40 INJECTION SUBCUTANEOUS EVERY 24 HOURS
Status: COMPLETED | OUTPATIENT
Start: 2023-03-17 | End: 2023-03-18

## 2023-03-17 RX ORDER — DEXAMETHASONE 4 MG/1
8 TABLET ORAL DAILY
Status: DISCONTINUED | OUTPATIENT
Start: 2023-03-23 | End: 2023-03-21 | Stop reason: HOSPADM

## 2023-03-17 RX ORDER — ALBUTEROL SULFATE 90 UG/1
1-2 AEROSOL, METERED RESPIRATORY (INHALATION)
Status: CANCELLED
Start: 2023-03-17

## 2023-03-17 RX ORDER — AMOXICILLIN 250 MG
1 CAPSULE ORAL 2 TIMES DAILY PRN
Status: DISCONTINUED | OUTPATIENT
Start: 2023-03-17 | End: 2023-03-17

## 2023-03-17 RX ORDER — TEMAZEPAM 7.5 MG/1
7.5 CAPSULE ORAL
Status: DISCONTINUED | OUTPATIENT
Start: 2023-03-17 | End: 2023-03-21 | Stop reason: HOSPADM

## 2023-03-17 RX ORDER — ACETAMINOPHEN 325 MG/1
650 TABLET ORAL ONCE
Status: COMPLETED | OUTPATIENT
Start: 2023-03-17 | End: 2023-03-17

## 2023-03-17 RX ORDER — AMOXICILLIN 250 MG
2 CAPSULE ORAL 2 TIMES DAILY
Status: DISCONTINUED | OUTPATIENT
Start: 2023-03-17 | End: 2023-03-21 | Stop reason: HOSPADM

## 2023-03-17 RX ORDER — DIPHENHYDRAMINE HCL 50 MG
50 CAPSULE ORAL ONCE
Status: COMPLETED | OUTPATIENT
Start: 2023-03-17 | End: 2023-03-17

## 2023-03-17 RX ORDER — PROCHLORPERAZINE MALEATE 5 MG
5 TABLET ORAL EVERY 6 HOURS PRN
Status: DISCONTINUED | OUTPATIENT
Start: 2023-03-17 | End: 2023-03-21 | Stop reason: HOSPADM

## 2023-03-17 RX ORDER — PROCHLORPERAZINE MALEATE 5 MG
5-10 TABLET ORAL EVERY 6 HOURS PRN
Status: CANCELLED
Start: 2023-03-17

## 2023-03-17 RX ORDER — ACETAMINOPHEN 325 MG/1
650 TABLET ORAL EVERY 4 HOURS PRN
Status: DISCONTINUED | OUTPATIENT
Start: 2023-03-17 | End: 2023-03-21 | Stop reason: HOSPADM

## 2023-03-17 RX ORDER — ONDANSETRON 8 MG/1
8 TABLET, ORALLY DISINTEGRATING ORAL EVERY 8 HOURS PRN
Status: DISCONTINUED | OUTPATIENT
Start: 2023-03-17 | End: 2023-03-21 | Stop reason: HOSPADM

## 2023-03-17 RX ORDER — LORAZEPAM 0.5 MG/1
.5-1 TABLET ORAL EVERY 6 HOURS PRN
Status: CANCELLED
Start: 2023-03-17

## 2023-03-17 RX ORDER — METHYLPREDNISOLONE SODIUM SUCCINATE 125 MG/2ML
125 INJECTION, POWDER, LYOPHILIZED, FOR SOLUTION INTRAMUSCULAR; INTRAVENOUS
Status: DISCONTINUED | OUTPATIENT
Start: 2023-03-17 | End: 2023-03-21 | Stop reason: HOSPADM

## 2023-03-17 RX ORDER — LORAZEPAM 2 MG/ML
.5-1 INJECTION INTRAMUSCULAR EVERY 6 HOURS PRN
Status: CANCELLED | OUTPATIENT
Start: 2023-03-17

## 2023-03-17 RX ORDER — DEXTROSE MONOHYDRATE 50 MG/ML
10-20 INJECTION, SOLUTION INTRAVENOUS
Status: CANCELLED | OUTPATIENT
Start: 2023-03-23

## 2023-03-17 RX ORDER — ONDANSETRON 8 MG/1
16 TABLET, FILM COATED ORAL
Status: CANCELLED
Start: 2023-03-17

## 2023-03-17 RX ORDER — ONDANSETRON 2 MG/ML
8 INJECTION INTRAMUSCULAR; INTRAVENOUS EVERY 8 HOURS PRN
Status: DISCONTINUED | OUTPATIENT
Start: 2023-03-17 | End: 2023-03-21 | Stop reason: HOSPADM

## 2023-03-17 RX ORDER — EPINEPHRINE 1 MG/ML
0.3 INJECTION, SOLUTION INTRAMUSCULAR; SUBCUTANEOUS EVERY 5 MIN PRN
Status: CANCELLED | OUTPATIENT
Start: 2023-03-17

## 2023-03-17 RX ORDER — PREDNISONE 50 MG/1
100 TABLET ORAL 2 TIMES DAILY
Status: DISCONTINUED | OUTPATIENT
Start: 2023-03-17 | End: 2023-03-21 | Stop reason: HOSPADM

## 2023-03-17 RX ORDER — METHYLPREDNISOLONE SODIUM SUCCINATE 125 MG/2ML
125 INJECTION, POWDER, LYOPHILIZED, FOR SOLUTION INTRAMUSCULAR; INTRAVENOUS
Status: CANCELLED
Start: 2023-03-17

## 2023-03-17 RX ORDER — ONDANSETRON 8 MG/1
8 TABLET, FILM COATED ORAL EVERY 8 HOURS PRN
Status: DISCONTINUED | OUTPATIENT
Start: 2023-03-17 | End: 2023-03-21 | Stop reason: HOSPADM

## 2023-03-17 RX ORDER — ENOXAPARIN SODIUM 100 MG/ML
40 INJECTION SUBCUTANEOUS EVERY 24 HOURS
Status: DISCONTINUED | OUTPATIENT
Start: 2023-03-17 | End: 2023-03-17

## 2023-03-17 RX ORDER — LORAZEPAM 0.5 MG/1
.5-1 TABLET ORAL EVERY 6 HOURS PRN
Status: DISCONTINUED | OUTPATIENT
Start: 2023-03-17 | End: 2023-03-21 | Stop reason: HOSPADM

## 2023-03-17 RX ORDER — DIPHENHYDRAMINE HYDROCHLORIDE 50 MG/ML
50 INJECTION INTRAMUSCULAR; INTRAVENOUS
Status: DISCONTINUED | OUTPATIENT
Start: 2023-03-17 | End: 2023-03-21 | Stop reason: HOSPADM

## 2023-03-17 RX ORDER — ALLOPURINOL 300 MG/1
300 TABLET ORAL DAILY
Status: DISCONTINUED | OUTPATIENT
Start: 2023-03-17 | End: 2023-03-17

## 2023-03-17 RX ORDER — HEPARIN SODIUM (PORCINE) LOCK FLUSH IV SOLN 100 UNIT/ML 100 UNIT/ML
5 SOLUTION INTRAVENOUS ONCE
Status: COMPLETED | OUTPATIENT
Start: 2023-03-17 | End: 2023-03-17

## 2023-03-17 RX ORDER — DIPHENHYDRAMINE HYDROCHLORIDE 50 MG/ML
50 INJECTION INTRAMUSCULAR; INTRAVENOUS
Status: CANCELLED
Start: 2023-03-17

## 2023-03-17 RX ORDER — MEPERIDINE HYDROCHLORIDE 25 MG/ML
25 INJECTION INTRAMUSCULAR; INTRAVENOUS; SUBCUTANEOUS EVERY 30 MIN PRN
Status: DISCONTINUED | OUTPATIENT
Start: 2023-03-17 | End: 2023-03-21 | Stop reason: HOSPADM

## 2023-03-17 RX ORDER — DEXTROSE MONOHYDRATE 50 MG/ML
10-20 INJECTION, SOLUTION INTRAVENOUS
Status: DISCONTINUED | OUTPATIENT
Start: 2023-03-23 | End: 2023-03-21 | Stop reason: HOSPADM

## 2023-03-17 RX ORDER — EPINEPHRINE 1 MG/ML
0.3 INJECTION, SOLUTION, CONCENTRATE INTRAVENOUS EVERY 5 MIN PRN
Status: DISCONTINUED | OUTPATIENT
Start: 2023-03-17 | End: 2023-03-21 | Stop reason: HOSPADM

## 2023-03-17 RX ORDER — ALBUTEROL SULFATE 0.83 MG/ML
2.5 SOLUTION RESPIRATORY (INHALATION)
Status: CANCELLED | OUTPATIENT
Start: 2023-03-17

## 2023-03-17 RX ORDER — ACETAMINOPHEN 325 MG/1
650 TABLET ORAL ONCE
Status: CANCELLED
Start: 2023-03-17

## 2023-03-17 RX ORDER — PANTOPRAZOLE SODIUM 40 MG/1
40 TABLET, DELAYED RELEASE ORAL
Status: DISCONTINUED | OUTPATIENT
Start: 2023-03-17 | End: 2023-03-21 | Stop reason: HOSPADM

## 2023-03-17 RX ORDER — AMOXICILLIN 250 MG
2 CAPSULE ORAL 2 TIMES DAILY
Status: CANCELLED | OUTPATIENT
Start: 2023-03-17

## 2023-03-17 RX ORDER — ONDANSETRON 8 MG/1
16 TABLET, FILM COATED ORAL
Status: COMPLETED | OUTPATIENT
Start: 2023-03-17 | End: 2023-03-21

## 2023-03-17 RX ORDER — LORAZEPAM 2 MG/ML
.5-1 INJECTION INTRAMUSCULAR EVERY 6 HOURS PRN
Status: DISCONTINUED | OUTPATIENT
Start: 2023-03-17 | End: 2023-03-21 | Stop reason: HOSPADM

## 2023-03-17 RX ADMIN — ACYCLOVIR 400 MG: 400 TABLET ORAL at 20:19

## 2023-03-17 RX ADMIN — ENOXAPARIN SODIUM 40 MG: 40 INJECTION SUBCUTANEOUS at 20:19

## 2023-03-17 RX ADMIN — ETOPOSIDE 85 MG: 20 INJECTION, SOLUTION, CONCENTRATE INTRAVENOUS at 20:21

## 2023-03-17 RX ADMIN — RITUXIMAB-ABBS 600 MG: 10 INJECTION, SOLUTION INTRAVENOUS at 18:39

## 2023-03-17 RX ADMIN — Medication 5 ML: at 13:22

## 2023-03-17 RX ADMIN — PREDNISONE 100 MG: 50 TABLET ORAL at 18:08

## 2023-03-17 RX ADMIN — VINCRISTINE SULFATE 0.7 MG: 1 INJECTION, SOLUTION INTRAVENOUS at 20:21

## 2023-03-17 RX ADMIN — DIPHENHYDRAMINE HYDROCHLORIDE 50 MG: 50 CAPSULE ORAL at 18:08

## 2023-03-17 RX ADMIN — ONDANSETRON HYDROCHLORIDE 16 MG: 8 TABLET, FILM COATED ORAL at 19:40

## 2023-03-17 RX ADMIN — SENNOSIDES AND DOCUSATE SODIUM 2 TABLET: 50; 8.6 TABLET ORAL at 20:19

## 2023-03-17 RX ADMIN — FLUDEOXYGLUCOSE F-18 10.01 MILLICURIE: 500 INJECTION, SOLUTION INTRAVENOUS at 09:50

## 2023-03-17 RX ADMIN — ACETAMINOPHEN 650 MG: 325 TABLET ORAL at 18:08

## 2023-03-17 ASSESSMENT — ACTIVITIES OF DAILY LIVING (ADL)
ADLS_ACUITY_SCORE: 20

## 2023-03-17 ASSESSMENT — PAIN SCALES - GENERAL: PAINLEVEL: NO PAIN (0)

## 2023-03-17 NOTE — LETTER
Date:March 20, 2023      Provider requested that no letter be sent. Do not send.       [facility]

## 2023-03-17 NOTE — NURSING NOTE
Chief Complaint   Patient presents with     Blood Draw     Port blood draw with heparin flush by lab RN. Vitals taken and appointment arrived     Yi Holland RN

## 2023-03-17 NOTE — PROGRESS NOTES
[facility]  Date of visit: Mar 17, 2023      Reason for Visit: Follow up triple-hit DLBCL      ONCOLOGIC SUMMARY:  Diagnosis:    Low-grade kappa-restricted plasmacytoid B-cell lymphoma dx 3/2004, presenting with thoracic paraspinal mass. Bone marrow hypercellular with 70-80% involvement by small kappa-restricted lymphocytes which were positive for CD10, CD19, and CD20 and negative for CD5 and CD23.     Transformation to high-grade B-cell lymphoma 12/2022 (versus new primary), presenting with B symptoms, sternal mass, and SOB/chest pain. Biopsy of sternal mass showed large B-cell lymphoma with aggressive features (GCB-subtype), Ki67 %, FISH positive for MYC (non-IgH partner), BCL2, and BCL6 rearrangements. Stage IV with extensive lymphomatous involvement to the R pleura w/avid effusions, mediastinal and pericardial regions, right anterolateral chest wall, adenopathy above and below the diaphragm, liver, spleen and multiple sites in the skeleton. CSF flow negative     Treatment history:  For low-grade lymphoma:  1. 2005: Fludarabine-based chemo x 6 cycles and XRT to spine (details unclear)    For high-grade lymphoma:  1. 12/20/22: Cycle 1 R-CHOP with Neulasta support (with a few days of steroid prephase prior)  2. 1/11/23: Cycle 2 Switched to DA-R-EPOCH with triple IT chemo for CNS ppx after FISH returned showing triple-hit disease. PET after 2 cycles shows very good [place].   3. 2/2/23: Cycle 3 DA-R EPOCH with triple IT chemo for CNS ppx  4. 2/23/23: Cycle 4 DA-R-EPOCH  with triple IT chemo for CNS ppx      Interval history:   Shahid is here today with his partner Reshma prior to admission for C5 R-EPOCH.   More fatigued and took longer to recover during Cycle 4. Still walking frequently though.   More hiccups than previously.  Some trouble sleeping through the night.  Lasix helped a lot, was able to get rid of the fluid within 5-6 days at home instead of 10.  Mild numbness in feet  is stable.  Developed a lump on left forearm 1 week ago after a blood draw. Was more red/painful initially, now slightly improved.   Cough and runny nose since COVID is stable.   Otherwise no fevers, shortness of breath, chest pain, N/V, diarrhea, or bleeding.     ROS: 10 point ROS neg other than the symptoms noted above in the HPI.      Current Outpatient Medications   Medication Sig Dispense Refill     acyclovir (ZOVIRAX) 400 MG tablet Take 1 tablet (400 mg) by mouth every 12 hours for 120 days 60 tablet 3     allopurinol (ZYLOPRIM) 300 MG tablet Take 1 tablet (300 mg) by mouth daily 60 tablet 3     ascorbic acid (VITAMIN C) 500 MG tablet Take 500 mg by mouth every morning       dexamethasone (DECADRON) 4 MG tablet Take 2 tablets (8 mg) by mouth daily on 3/1 and 3/2 then stop. 4 tablet 0     fluconazole (DIFLUCAN) 200 MG tablet Take 1 tablet (200 mg) by mouth daily , start at discharge and take until your ANC is greater than 1000. 30 tablet 3     furosemide (LASIX) 20 MG tablet Take 1 tablet (20 mg) by mouth daily as needed (lower extremity swelling) 5 tablet 0     levofloxacin (LEVAQUIN) 250 MG tablet Take 1 tablet (250 mg) by mouth daily , start at discharge and take until your ANC is greater than 1000. 30 tablet 3     Multiple Vitamins-Minerals (MULTIVITAL PO) Take 1 tablet by mouth every morning       omeprazole (PRILOSEC) 40 MG DR capsule Take 1 capsule (40 mg) by mouth daily 90 capsule 4     ondansetron (ZOFRAN) 8 MG tablet Take 1 tablet (8 mg) by mouth every 8 hours as needed for nausea 60 tablet 3     polyethylene glycol (MIRALAX) 17 GM/Dose powder Take 17 g by mouth daily as needed for constipation 510 g 4     prochlorperazine (COMPAZINE) 10 MG tablet Take 1 tablet (10 mg) by mouth every 6 hours as needed for nausea or vomiting 30 tablet 3     senna-docusate (SENNA S) 8.6-50 MG tablet Take 1 tablet by mouth 2 times daily as needed for constipation 60 tablet 4     temazepam (RESTORIL) 7.5 MG capsule Take 1  capsule (7.5 mg) by mouth nightly as needed for sleep 15 capsule 0       Allergies   Allergen Reactions     Ampicillin [Ampicillin Sodium]      Bactrim [Sulfamethoxazole W/Trimethoprim]      Contrast Dye      CT contrast (IV) allergy - need oral Methylpred as premed for scans     Ampicillin Rash       Physical Exam:  /66   Pulse 101   Temp 98.4  F (36.9  C) (Oral)   Resp 16   Wt 58.1 kg (128 lb)   SpO2 98%   BMI 20.05 kg/m    Gen: alert, pleasant and conversational, NAD  HEENT: NC/AT, EOMI w/ PERRL, anicteric sclera.   Neck: Supple, no LAD  CV: normal S1,S2 with RRR no m/r/g  Resp: lungs CTA bilaterally with adequate air movement to bases. No wheezes or crackles  Abd: soft NTND no organomegaly or masses. BS normoactive.   Ext: warm and well perfused, no edema or cyanosis  Lymphatics: no palpable cervical and supraclavicular LAD. No HSM  Skin: quarter-sized ovoid lump on left forearm along vein, mild erythema but not warm to touch, firm and not fluctuant.   Neuro: A&Ox4, no lateralizing sx. Grossly nonfocal.  Psych: appropriate, reactive    Labs:   I personally reviewed the following labs:    Most Recent 3 CBC's:  Recent Labs   Lab Test 02/28/23  0707 02/27/23  0614 02/26/23  0652   WBC 2.1* 3.4* 5.3   HGB 7.9* 7.7* 8.1*   MCV 99 101* 103*   PLT 98* 105* 132*     Most Recent 3 BMP's:  Recent Labs   Lab Test 02/28/23  0707 02/27/23  0614 02/26/23  0652    141 145   POTASSIUM 3.6 3.6 3.8   CHLORIDE 109* 111* 113*   CO2 25 23 23   BUN 30.2* 29.2* 27.4*   CR 0.74 0.72 0.73   ANIONGAP 8 7 9   JORGE 8.8 8.4* 8.4*   * 105* 104*     Most Recent 2 LFT's:  Recent Labs   Lab Test 02/28/23  0707 02/27/23  0614   AST 28 31   ALT 45 46   ALKPHOS 71 75   BILITOTAL 0.3 0.2     Imaging:   PET images reviewed, read still pending      Impression/plan:     # H/o low-grade kappa restricted plasmacytoid B-cell lymphoma 3/2004   # Transformation to new DLBCL 12/2022  H/o low-grade kappa restricted plasmacytoid B-cell  lymphoma 3/2004 treated with x6 cycles of fludarabine based chemo and XRT to spine, then has been on surveillance since 2005. He presented in 11/2022 with progressing B symptoms, sternal mass, and SOB/CP. PET 12/9 showed extensive lymphomatous involvement to the R pleura w/avid effusions, mediastinal and pericardial regions, right anterolateral chest wall, adenopathy above and below the diaphragm, liver, spleen and multiple sites in the skeleton, concerning for transformation from his low-grade lymphoma. 12/15/22 biopsy of sternal mass showed large B-cell lymphoma with aggressive features (GCB-subtype), Ki67 %. 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  - S/p C1 R-CHOP in the hospital 12/20/22. Tolerated well overall.  - Switched to DA-R-EPOCH starting Cycle 2 (1/11/23) after FISH returned showing triple-hit disease. Staging LP negative, will plan to give triple IT chemo once per cycle for CNS ppx for a total of 4 doses   - PET after 2 cycles showed significant resolution of most FDG activity/disease other than small area of right paramediastinal pleural thickening (SUVmax 5) and resolution of right pleural effusion. Concurrent Clonoseq was negative.  - PET after 4 cycles from earlier today final read still pending but per discussion with radiology, looks stable/improved from PET2; right paramediastinal pleura SUV down to 3.3, also could potentially be artifact since it is near pacer wires and Clonoseq was negative.  - Proceed with Cycle 5 R-EPOCH today. This will also be 4th/final IT chemo dose. Will keep at same dose level due to platelet ricci of 47 and patient is starting to experience cumulative toxicity.   - Arrange D6 Neulasta at [facility] in [place] as he has seen a provider there in the past. Will ask RNCC to fax orders to [phone] ATTN: Amos/[facility].  - JOSE MARTIN visit prior to Cycle 6 4/7, EOT PET-CT (with contrast this time) and follow-up with  me 4/27 or 4/28 (pt planning to go on vacation first week of May)    # Pancytopenia  Likely secondary to prior treatment and lymphomatous involvement.  - Monitor CBC weekly twice weekly after R-EPOCH  - Transfuse if Hgb <7 and plt <10k     # PPx  - TLS: Continue allopurinol 300 mg daily.  - ID:  mg BID. Levaquin and Fluconazole to start when ANC <1k. Pentamidine neb last given inpatient 1/15/23, he states he felt very shaky afterward and did not tolerate it well.  Discussed giving IV pentamidine during this admission and he was agreeable to trying that.     # Shortness of breath, improved  # Cough  # R large and L small pleural effusion   PET/CT (12/9) with FDG-avid large R and small L pleural effusion.   - Cough is improving, breathing has significantly improved - no longer has PARRISH.  - Pleural effusions resolved on PET 2/2/23    # Hypogammaglobulinemia    Dec 2022  - Given persistently low COVID cycle threshold, hypogammaglobulinemia, and risk for further immunosuppression with chemo, gave IVIG on 1/15.    # COVID19 Infection  He tested positive on his Cycle 2 pre-admission COVID test. He was very surprised, as he does not feel symptomatic.  Notes that his girlfriend had a URI around [place] and was never tested for COVID. Cycle threshold was around 20 indicating active infection.  - S/p remdesivir x 3 days 1/11-1/13/23.  - No new or worsening respiratory sx since discharge.     # GERD  Related to history of radiation to chest.   - Continue omeprazole.    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 Took uncertain medication for this in the past (~2004), however cannot remember. Melatonin and Trazadone foung to be ineffective.   - Restoril on days he gets steroids is helpful.    # Left forearm lump  Developed about a week ago following a blood draw, does look to be along a vein. Improving with less erythema and pain per pt.  - Would consider ultrasound inpatient to r/o  SVT/thrombophlebitis  - Does not look overtly infected but will monitor  - Otherwise supportive measures including warm compresses      PLAN:  - Admit for Cycle 5 R-EPOCH (dose level 1) + last IT chemo    Total of 45 minutes on patient visit, reviewing records, interpreting test results, placing orders, and documentation on the day of service.    Domitila Ruelas MD  Attending Physician, [facility]

## 2023-03-17 NOTE — LETTER
3/17/2023         RE: Shahid Davis   [facility] [id number]        Dear Colleague,    Thank you for referring your patient, Shahid Davis, to the [facility]. Please see a copy of my visit note below.    [facility]  Date of visit: Mar 17, 2023      Reason for Visit: Follow up triple-hit DLBCL      ONCOLOGIC SUMMARY:  Diagnosis:    Low-grade kappa-restricted plasmacytoid B-cell lymphoma dx 3/2004, presenting with thoracic paraspinal mass. Bone marrow hypercellular with 70-80% involvement by small kappa-restricted lymphocytes which were positive for CD10, CD19, and CD20 and negative for CD5 and CD23.     Transformation to high-grade B-cell lymphoma 12/2022 (versus new primary), presenting with B symptoms, sternal mass, and SOB/chest pain. Biopsy of sternal mass showed large B-cell lymphoma with aggressive features (GCB-subtype), Ki67 %, FISH positive for MYC (non-IgH partner), BCL2, and BCL6 rearrangements. Stage IV with extensive lymphomatous involvement to the R pleura w/avid effusions, mediastinal and pericardial regions, right anterolateral chest wall, adenopathy above and below the diaphragm, liver, spleen and multiple sites in the skeleton. CSF flow negative     Treatment history:  For low-grade lymphoma:  1. 2005: Fludarabine-based chemo x 6 cycles and XRT to spine (details unclear)    For high-grade lymphoma:  1. 12/20/22: Cycle 1 R-CHOP with Neulasta support (with a few days of steroid prephase prior)  2. 1/11/23: Cycle 2 Switched to DA-R-EPOCH with triple IT chemo for CNS ppx after FISH returned showing triple-hit disease. PET after 2 cycles shows very good [place].   3. 2/2/23: Cycle 3 DA-R EPOCH with triple IT chemo for CNS ppx  4. 2/23/23: Cycle 4 DA-R-EPOCH  with triple IT chemo for CNS ppx      Interval history:   Shahid is here today with his partner Reshma prior to admission for C5 R-EPOCH.   More fatigued and  took longer to recover during Cycle 4. Still walking frequently though.   More hiccups than previously.  Some trouble sleeping through the night.  Lasix helped a lot, was able to get rid of the fluid within 5-6 days at home instead of 10.  Mild numbness in feet is stable.  Developed a lump on left forearm 1 week ago after a blood draw. Was more red/painful initially, now slightly improved.   Cough and runny nose since COVID is stable.   Otherwise no fevers, shortness of breath, chest pain, N/V, diarrhea, or bleeding.     ROS: 10 point ROS neg other than the symptoms noted above in the HPI.      Current Outpatient Medications   Medication Sig Dispense Refill     acyclovir (ZOVIRAX) 400 MG tablet Take 1 tablet (400 mg) by mouth every 12 hours for 120 days 60 tablet 3     allopurinol (ZYLOPRIM) 300 MG tablet Take 1 tablet (300 mg) by mouth daily 60 tablet 3     ascorbic acid (VITAMIN C) 500 MG tablet Take 500 mg by mouth every morning       dexamethasone (DECADRON) 4 MG tablet Take 2 tablets (8 mg) by mouth daily on 3/1 and 3/2 then stop. 4 tablet 0     fluconazole (DIFLUCAN) 200 MG tablet Take 1 tablet (200 mg) by mouth daily , start at discharge and take until your ANC is greater than 1000. 30 tablet 3     furosemide (LASIX) 20 MG tablet Take 1 tablet (20 mg) by mouth daily as needed (lower extremity swelling) 5 tablet 0     levofloxacin (LEVAQUIN) 250 MG tablet Take 1 tablet (250 mg) by mouth daily , start at discharge and take until your ANC is greater than 1000. 30 tablet 3     Multiple Vitamins-Minerals (MULTIVITAL PO) Take 1 tablet by mouth every morning       omeprazole (PRILOSEC) 40 MG DR capsule Take 1 capsule (40 mg) by mouth daily 90 capsule 4     ondansetron (ZOFRAN) 8 MG tablet Take 1 tablet (8 mg) by mouth every 8 hours as needed for nausea 60 tablet 3     polyethylene glycol (MIRALAX) 17 GM/Dose powder Take 17 g by mouth daily as needed for constipation 510 g 4     prochlorperazine (COMPAZINE) 10 MG  tablet Take 1 tablet (10 mg) by mouth every 6 hours as needed for nausea or vomiting 30 tablet 3     senna-docusate (SENNA S) 8.6-50 MG tablet Take 1 tablet by mouth 2 times daily as needed for constipation 60 tablet 4     temazepam (RESTORIL) 7.5 MG capsule Take 1 capsule (7.5 mg) by mouth nightly as needed for sleep 15 capsule 0       Allergies   Allergen Reactions     Ampicillin [Ampicillin Sodium]      Bactrim [Sulfamethoxazole W/Trimethoprim]      Contrast Dye      CT contrast (IV) allergy - need oral Methylpred as premed for scans     Ampicillin Rash       Physical Exam:  /66   Pulse 101   Temp 98.4  F (36.9  C) (Oral)   Resp 16   Wt 58.1 kg (128 lb)   SpO2 98%   BMI 20.05 kg/m    Gen: alert, pleasant and conversational, NAD  HEENT: NC/AT, EOMI w/ PERRL, anicteric sclera.   Neck: Supple, no LAD  CV: normal S1,S2 with RRR no m/r/g  Resp: lungs CTA bilaterally with adequate air movement to bases. No wheezes or crackles  Abd: soft NTND no organomegaly or masses. BS normoactive.   Ext: warm and well perfused, no edema or cyanosis  Lymphatics: no palpable cervical and supraclavicular LAD. No HSM  Skin: quarter-sized ovoid lump on left forearm along vein, mild erythema but not warm to touch, firm and not fluctuant.   Neuro: A&Ox4, no lateralizing sx. Grossly nonfocal.  Psych: appropriate, reactive    Labs:   I personally reviewed the following labs:    Most Recent 3 CBC's:  Recent Labs   Lab Test 02/28/23  0707 02/27/23  0614 02/26/23  0652   WBC 2.1* 3.4* 5.3   HGB 7.9* 7.7* 8.1*   MCV 99 101* 103*   PLT 98* 105* 132*     Most Recent 3 BMP's:  Recent Labs   Lab Test 02/28/23  0707 02/27/23  0614 02/26/23  0652    141 145   POTASSIUM 3.6 3.6 3.8   CHLORIDE 109* 111* 113*   CO2 25 23 23   BUN 30.2* 29.2* 27.4*   CR 0.74 0.72 0.73   ANIONGAP 8 7 9   JORGE 8.8 8.4* 8.4*   * 105* 104*     Most Recent 2 LFT's:  Recent Labs   Lab Test 02/28/23  0707 02/27/23  0614   AST 28 31   ALT 45 46   ALKPHOS  71 75   BILITOTAL 0.3 0.2     Imaging:   PET images reviewed, read still pending      Impression/plan:     # H/o low-grade kappa restricted plasmacytoid B-cell lymphoma 3/2004   # Transformation to new DLBCL 12/2022  H/o low-grade kappa restricted plasmacytoid B-cell lymphoma 3/2004 treated with x6 cycles of fludarabine based chemo and XRT to spine, then has been on surveillance since 2005. He presented in 11/2022 with progressing B symptoms, sternal mass, and SOB/CP. PET 12/9 showed extensive lymphomatous involvement to the R pleura w/avid effusions, mediastinal and pericardial regions, right anterolateral chest wall, adenopathy above and below the diaphragm, liver, spleen and multiple sites in the skeleton, concerning for transformation from his low-grade lymphoma. 12/15/22 biopsy of sternal mass showed large B-cell lymphoma with aggressive features (GCB-subtype), Ki67 %. 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  - S/p C1 R-CHOP in the hospital 12/20/22. Tolerated well overall.  - Switched to DA-R-EPOCH starting Cycle 2 (1/11/23) after FISH returned showing triple-hit disease. Staging LP negative, will plan to give triple IT chemo once per cycle for CNS ppx for a total of 4 doses   - PET after 2 cycles showed significant resolution of most FDG activity/disease other than small area of right paramediastinal pleural thickening (SUVmax 5) and resolution of right pleural effusion. Concurrent Clonoseq was negative.  - PET after 4 cycles from earlier today final read still pending but per discussion with radiology, looks stable/improved from PET2; right paramediastinal pleura SUV down to 3.3, also could potentially be artifact since it is near pacer wires and Clonoseq was negative.  - Proceed with Cycle 5 R-EPOCH today. This will also be 4th/final IT chemo dose. Will keep at same dose level due to platelet ricci of 47 and patient is starting to experience cumulative  toxicity.   - Arrange D6 Neulasta at [facility] in [place] as he has seen a provider there in the past. Will ask RNCC to fax orders to [phone] ATTN: Amos/[facility].  - JOSE MARTIN visit prior to Cycle 6 4/7, EOT PET-CT (with contrast this time) and follow-up with me 4/27 or 4/28 (pt planning to go on vacation first week of May)    # Pancytopenia  Likely secondary to prior treatment and lymphomatous involvement.  - Monitor CBC weekly twice weekly after R-EPOCH  - Transfuse if Hgb <7 and plt <10k     # PPx  - TLS: Continue allopurinol 300 mg daily.  - ID:  mg BID. Levaquin and Fluconazole to start when ANC <1k. Pentamidine neb last given inpatient 1/15/23, he states he felt very shaky afterward and did not tolerate it well.  Discussed giving IV pentamidine during this admission and he was agreeable to trying that.     # Shortness of breath, improved  # Cough  # R large and L small pleural effusion   PET/CT (12/9) with FDG-avid large R and small L pleural effusion.   - Cough is improving, breathing has significantly improved - no longer has PARRISH.  - Pleural effusions resolved on PET 2/2/23    # Hypogammaglobulinemia    Dec 2022  - Given persistently low COVID cycle threshold, hypogammaglobulinemia, and risk for further immunosuppression with chemo, gave IVIG on 1/15.    # COVID19 Infection  He tested positive on his Cycle 2 pre-admission COVID test. He was very surprised, as he does not feel symptomatic.  Notes that his girlfriend had a URI around [place] and was never tested for COVID. Cycle threshold was around 20 indicating active infection.  - S/p remdesivir x 3 days 1/11-1/13/23.  - No new or worsening respiratory sx since discharge.     # GERD  Related to history of radiation to chest.   - Continue omeprazole.    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 Took uncertain medication for this in the past (~2004), however cannot remember. Melatonin and Trazadone foung to be  ineffective.   - Restoril on days he gets steroids is helpful.    # Left forearm lump  Developed about a week ago following a blood draw, does look to be along a vein. Improving with less erythema and pain per pt.  - Would consider ultrasound inpatient to r/o SVT/thrombophlebitis  - Does not look overtly infected but will monitor  - Otherwise supportive measures including warm compresses      PLAN:  - Admit for Cycle 5 R-EPOCH (dose level 1) + last IT chemo    Total of 45 minutes on patient visit, reviewing records, interpreting test results, placing orders, and documentation on the day of service.    Doimtila Ruelas MD  Attending Physician, [facility]      Again, thank you for allowing me to participate in the care of your patient.        Sincerely,        Domitila Ruelas MD

## 2023-03-17 NOTE — NURSING NOTE
"Oncology Rooming Note    March 17, 2023 1:35 PM   Shahid Davis is a 72 year old male who presents for:    Chief Complaint   Patient presents with     Blood Draw     Port blood draw with heparin flush by lab RN. Vitals taken and appointment arrived     Oncology Clinic Visit     [facility]URN - [facility]     Initial Vitals: /66   Pulse 101   Temp 98.4  F (36.9  C) (Oral)   Resp 16   Ht 1.702 m (5' 7.01\")   Wt 58.1 kg (128 lb)   SpO2 98%   BMI 20.04 kg/m   Estimated body mass index is 20.04 kg/m  as calculated from the following:    Height as of this encounter: 1.702 m (5' 7.01\").    Weight as of this encounter: 58.1 kg (128 lb). Body surface area is 1.66 meters squared.  No Pain (0) Comment: Data Unavailable   No LMP for male patient.  Allergies reviewed: Yes  Medications reviewed: Yes    Medications: Medication refills not needed today.  Pharmacy name entered into [employer] (deja mi):    [id number] [id number] [id number] - [facility] [address] AT [facility] [address]    Tyrel Arango LPN            "

## 2023-03-18 LAB
ALBUMIN SERPL BCG-MCNC: 3.5 G/DL (ref 3.5–5.2)
ALP SERPL-CCNC: 87 U/L (ref 40–129)
ALT SERPL W P-5'-P-CCNC: 20 U/L (ref 10–50)
ANION GAP SERPL CALCULATED.3IONS-SCNC: 10 MMOL/L (ref 7–15)
AST SERPL W P-5'-P-CCNC: 21 U/L (ref 10–50)
BASOPHILS # BLD AUTO: 0 10E3/UL (ref 0–0.2)
BASOPHILS NFR BLD AUTO: 0 %
BILIRUB SERPL-MCNC: 0.2 MG/DL
BUN SERPL-MCNC: 28.3 MG/DL (ref 8–23)
CALCIUM SERPL-MCNC: 9.1 MG/DL (ref 8.8–10.2)
CHLORIDE SERPL-SCNC: 109 MMOL/L (ref 98–107)
CREAT SERPL-MCNC: 0.71 MG/DL (ref 0.67–1.17)
DEPRECATED HCO3 PLAS-SCNC: 24 MMOL/L (ref 22–29)
EOSINOPHIL # BLD AUTO: 0 10E3/UL (ref 0–0.7)
EOSINOPHIL NFR BLD AUTO: 0 %
ERYTHROCYTE [DISTWIDTH] IN BLOOD BY AUTOMATED COUNT: 16 % (ref 10–15)
FIBRINOGEN PPP-MCNC: 357 MG/DL (ref 170–490)
GFR SERPL CREATININE-BSD FRML MDRD: >90 ML/MIN/1.73M2
GLUCOSE SERPL-MCNC: 124 MG/DL (ref 70–99)
HCT VFR BLD AUTO: 28.5 % (ref 40–53)
HGB BLD-MCNC: 8.9 G/DL (ref 13.3–17.7)
IMM GRANULOCYTES # BLD: 0.1 10E3/UL
IMM GRANULOCYTES NFR BLD: 1 %
INR PPP: 1.04 (ref 0.85–1.15)
LYMPHOCYTES # BLD AUTO: 0.5 10E3/UL (ref 0.8–5.3)
LYMPHOCYTES NFR BLD AUTO: 8 %
MAGNESIUM SERPL-MCNC: 1.9 MG/DL (ref 1.7–2.3)
MCH RBC QN AUTO: 32.2 PG (ref 26.5–33)
MCHC RBC AUTO-ENTMCNC: 31.2 G/DL (ref 31.5–36.5)
MCV RBC AUTO: 103 FL (ref 78–100)
MONOCYTES # BLD AUTO: 0.1 10E3/UL (ref 0–1.3)
MONOCYTES NFR BLD AUTO: 2 %
NEUTROPHILS # BLD AUTO: 5.5 10E3/UL (ref 1.6–8.3)
NEUTROPHILS NFR BLD AUTO: 89 %
NRBC # BLD AUTO: 0 10E3/UL
NRBC BLD AUTO-RTO: 0 /100
PHOSPHATE SERPL-MCNC: 3.4 MG/DL (ref 2.5–4.5)
PLATELET # BLD AUTO: 184 10E3/UL (ref 150–450)
POTASSIUM SERPL-SCNC: 4.3 MMOL/L (ref 3.4–5.3)
PROT SERPL-MCNC: 5.2 G/DL (ref 6.4–8.3)
RBC # BLD AUTO: 2.76 10E6/UL (ref 4.4–5.9)
SODIUM SERPL-SCNC: 143 MMOL/L (ref 136–145)
URATE SERPL-MCNC: 3.4 MG/DL (ref 3.4–7)
WBC # BLD AUTO: 6.1 10E3/UL (ref 4–11)

## 2023-03-18 PROCEDURE — 250N000013 HC RX MED GY IP 250 OP 250 PS 637: Performed by: INTERNAL MEDICINE

## 2023-03-18 PROCEDURE — 250N000011 HC RX IP 250 OP 636: Performed by: INTERNAL MEDICINE

## 2023-03-18 PROCEDURE — 258N000003 HC RX IP 258 OP 636: Performed by: PHYSICIAN ASSISTANT

## 2023-03-18 PROCEDURE — 85025 COMPLETE CBC W/AUTO DIFF WBC: CPT

## 2023-03-18 PROCEDURE — 250N000012 HC RX MED GY IP 250 OP 636 PS 637: Performed by: INTERNAL MEDICINE

## 2023-03-18 PROCEDURE — 85384 FIBRINOGEN ACTIVITY: CPT

## 2023-03-18 PROCEDURE — 250N000009 HC RX 250: Performed by: PHYSICIAN ASSISTANT

## 2023-03-18 PROCEDURE — 83735 ASSAY OF MAGNESIUM: CPT

## 2023-03-18 PROCEDURE — 99418 PROLNG IP/OBS E/M EA 15 MIN: CPT | Performed by: PHYSICIAN ASSISTANT

## 2023-03-18 PROCEDURE — 258N000003 HC RX IP 258 OP 636: Performed by: INTERNAL MEDICINE

## 2023-03-18 PROCEDURE — 250N000011 HC RX IP 250 OP 636

## 2023-03-18 PROCEDURE — 250N000011 HC RX IP 250 OP 636: Performed by: PHYSICIAN ASSISTANT

## 2023-03-18 PROCEDURE — 80053 COMPREHEN METABOLIC PANEL: CPT

## 2023-03-18 PROCEDURE — 84100 ASSAY OF PHOSPHORUS: CPT

## 2023-03-18 PROCEDURE — 99233 SBSQ HOSP IP/OBS HIGH 50: CPT | Mod: FS | Performed by: PHYSICIAN ASSISTANT

## 2023-03-18 PROCEDURE — 85610 PROTHROMBIN TIME: CPT

## 2023-03-18 PROCEDURE — 250N000013 HC RX MED GY IP 250 OP 250 PS 637

## 2023-03-18 PROCEDURE — 36591 DRAW BLOOD OFF VENOUS DEVICE: CPT

## 2023-03-18 PROCEDURE — 250N000013 HC RX MED GY IP 250 OP 250 PS 637: Performed by: PHYSICIAN ASSISTANT

## 2023-03-18 PROCEDURE — 120N000002 HC R&B MED SURG/OB UMMC

## 2023-03-18 PROCEDURE — 84550 ASSAY OF BLOOD/URIC ACID: CPT

## 2023-03-18 RX ORDER — DEXTROSE MONOHYDRATE 50 MG/ML
10-20 INJECTION, SOLUTION INTRAVENOUS ONCE
Status: COMPLETED | OUTPATIENT
Start: 2023-03-18 | End: 2023-03-18

## 2023-03-18 RX ADMIN — ENOXAPARIN SODIUM 40 MG: 40 INJECTION SUBCUTANEOUS at 20:48

## 2023-03-18 RX ADMIN — POLYETHYLENE GLYCOL 3350 17 G: 17 POWDER, FOR SOLUTION ORAL at 10:16

## 2023-03-18 RX ADMIN — OXYCODONE HYDROCHLORIDE AND ACETAMINOPHEN 500 MG: 500 TABLET ORAL at 08:41

## 2023-03-18 RX ADMIN — CARBAMIDE PEROXIDE 6.5% 4 DROP: 6.5 LIQUID AURICULAR (OTIC) at 20:48

## 2023-03-18 RX ADMIN — ALLOPURINOL 300 MG: 300 TABLET ORAL at 08:40

## 2023-03-18 RX ADMIN — PANTOPRAZOLE SODIUM 40 MG: 40 TABLET, DELAYED RELEASE ORAL at 08:40

## 2023-03-18 RX ADMIN — ONDANSETRON HYDROCHLORIDE 16 MG: 8 TABLET, FILM COATED ORAL at 16:06

## 2023-03-18 RX ADMIN — ACYCLOVIR 400 MG: 400 TABLET ORAL at 20:48

## 2023-03-18 RX ADMIN — ETOPOSIDE 85 MG: 20 INJECTION, SOLUTION, CONCENTRATE INTRAVENOUS at 17:55

## 2023-03-18 RX ADMIN — ONDANSETRON HYDROCHLORIDE 8 MG: 8 TABLET, FILM COATED ORAL at 10:16

## 2023-03-18 RX ADMIN — DEXTROSE MONOHYDRATE 20 ML: 50 INJECTION, SOLUTION INTRAVENOUS at 16:10

## 2023-03-18 RX ADMIN — CARBAMIDE PEROXIDE 6.5% 4 DROP: 6.5 LIQUID AURICULAR (OTIC) at 16:07

## 2023-03-18 RX ADMIN — PREDNISONE 100 MG: 50 TABLET ORAL at 08:41

## 2023-03-18 RX ADMIN — TEMAZEPAM 7.5 MG: 7.5 CAPSULE ORAL at 21:58

## 2023-03-18 RX ADMIN — PANTOPRAZOLE SODIUM 40 MG: 40 TABLET, DELAYED RELEASE ORAL at 16:06

## 2023-03-18 RX ADMIN — ACYCLOVIR 400 MG: 400 TABLET ORAL at 08:41

## 2023-03-18 RX ADMIN — VINCRISTINE SULFATE 0.7 MG: 1 INJECTION, SOLUTION INTRAVENOUS at 17:55

## 2023-03-18 RX ADMIN — PREDNISONE 100 MG: 50 TABLET ORAL at 17:29

## 2023-03-18 RX ADMIN — SENNOSIDES AND DOCUSATE SODIUM 2 TABLET: 50; 8.6 TABLET ORAL at 20:48

## 2023-03-18 RX ADMIN — PENTAMIDINE ISETHIONATE 230 MG: 300 INJECTION, POWDER, LYOPHILIZED, FOR SOLUTION INTRAMUSCULAR; INTRAVENOUS at 16:23

## 2023-03-18 RX ADMIN — DEXTROSE MONOHYDRATE 20 ML: 50 INJECTION, SOLUTION INTRAVENOUS at 16:28

## 2023-03-18 ASSESSMENT — ACTIVITIES OF DAILY LIVING (ADL)
ADLS_ACUITY_SCORE: 20

## 2023-03-18 NOTE — PLAN OF CARE
Alert and oriented X 4. AVSS. Denies SOB or cough. Denies pain, nausea or abdominal discomfort. Denies numbness or tingling in the extremities. Doses #1 of Vincristine, Doxorubicin and Etoposide infusing. Tolerating infusion well. Brisk blood return via port. Sleeping in between cares.

## 2023-03-18 NOTE — PLAN OF CARE
[id number]: /65 (BP Location: Right arm)   Pulse 87   Temp 97.7  F (36.5  C) (Oral)   Resp 16   SpO2 95%     Shahid was admitted for C4 DA-R-EPOCH today at 1430. Admission process began. Palpable cord node noted on LUE, US completed. Superficial clot noted, spoke with cross cover MD Gallardo and will continue with prophylactic Lovenox. Covid-19 sample sent and continues to be positive, cycled threshold 31.4. Will continue on special precautions. Shahid is up independently, walks frequently in room. Having good appetite and PO intake. Denies nausea. LBM this AM. Home medications sent to pharmacy. Tolerated rapid rituxan infusion well. CIVI Chemo infusing via port. Continue to monitor patient.     Rituximab  D: Baseline vital signs and temperature were /65 (BP Location: Right arm)   Pulse 87   Temp 97.7  F (36.5  C) (Oral)   Resp 16   SpO2 95% . Blood return was brisk per port.    I: Rituximab infusion started at 100/mL. Rate adjusted according to protocol and patient tolerance. Pre-medications included Benadryl/Tylenol.  A: Maximum rate of rituximab infusion tolerated was 400 mL/hr for the rapid infusion.  P: Continue POC      Nursing Focus: Chemotherapy    D: Positive blood return via port. Insertion site is clean/dry/intact, dressing intact with no complaints of pain.  Urine output is recorded in intake in Doc Flowsheet.      I: Premedications given per order (see electronic medical administration record). Dose #1 of Etoposide and Vincristine/Doxorubicin started to infuse over 22 hours. Reviewed pt teaching on chemotherapy side effects.  Pt denies need for further teaching. Chemotherapy double checked per protocol by two chemotherapy competent RN's.     A: Tolerating infusion well. Denies nausea and or pain.     P: Continue to monitor urine output and symptoms of nausea. Screen for symptoms of toxicity.

## 2023-03-18 NOTE — PROGRESS NOTES
Labs and Transfusion Orders:  Date: 23     Patient: Shahid Davis  : 1950     Diagnosis: High-grade B-cell lymphoma    Medication administration:  [  ] Neulasta (pegfilgrastim) injection 6 mg x1 subcutaneous on 3/22/23.     LABS:  [  ] Check CBC with differential and CMP twice a week (fax labs to [facility] at [phone])  [  ] Type and cross PRN     TRANSFUSION PARAMETERS:   [  ] Please transfuse 1 (one) units pRBCs if Hgb is less than or equal to 7.  [  ] Please transfuse 1 (one) unit platelets if plt count less than or equal to 10K.   [  ] If patient experiences transfusion reaction during transfusion:              [  ] 25-50 mg Benadryl IV x once PRN              [  ] Tylenol 1000 mg PO x once PRN              [  ] Hydrocortisone 100 mg IV x once PRN              [  ] Pepcid 40 mg IV x once PRN              [  ] Notify provider covering infusion clinic per protocol     Please call the [facility] at [phone] for any questions, and ask to speak with the care coordinator for Dr. Domitila Ruelas (patient's primary oncologist).     Thank you,              Liyah Padgett PA-C     [facility]  Department of Hematology/Oncology  [address] [place] [address]  Pager: [phone]     Fax to:  [facility]   [address] [place] [address]  Phone: [phone] -   Infusion department [id number]  Fax: [phone]

## 2023-03-18 NOTE — PROGRESS NOTES
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 He was admitted on 3/17/23 for C4 DA-R-EPOCH (C5 total of chemotherapy).     Today:  - Continue chemotherapy as ordered; today is Day 2.  - Diminished hearing to the right ear reported on 3/18. Otoscopy revealed a cerumen impaction; TM not visualized. Start Debrox drops BID, nursing to irrigate ear PRN for cerumen removal.  - Scheduled for LP with IT chemo in XR on 3/20 at 1 PM. Patient aware.  - Give IV pentamidine, per outpatient recommendations for PJP ppx, x1 today.  - Anticipate discharge to home on 3/21/23, pending completion of the chemotherapy regimen.     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 Ruelas. Pt has a h/o low-grade kappa restricted plasmacytoid B-cell lymphoma (diagnosed in 3/2004) treated with x6 cycles of fludarabine based chemo and XRT to spine, then has been on surveillance since 2005. He presented with progressive B-symptoms, abdominal pain, and SOB. PET 12/9 showed extensive lymphomatous involvement to the right pleura w/ bilateral FDG-avid effusions (large R, small L), mediastinal and pericardial regions, right anterolateral chest wall, adenopathy above and below the diaphragm, liver, spleen and multiple sites in the skeleton, concerning for transformation from his low-grade lymphoma. He was also noted to have progressive pancytopenia. Due to worsening shortness of breath and delays of  biopsy, patient presented to ED on 12/14 and was subsequently admitted for expedited workup and treatment. As SUV of the sternoclavicular mass was the highest, patient underwent US-guided sternoclavicular soft tissue mass biopsy with IR 12/15, pathology from which confirmed a diagnosis of high-grade B-cell lymphoma. Baseline ECHO (12/15) with LVEF 60-65%, mild aortic insufficiency, no evidence of effusion or tamponade. Received 1 cycle of R-CHOP (J2S3=3412/20/22) which was well-tolerated. Cytogenetics then returned, revealed rearrangements in MYC, BCL2, and BCL6, consistent with triple hit lymphoma. He was subsequently changed to DA-R-EPOCH and proceeded with C1 on 1/11/23 (C2 total of therapy). He was re-admitted for C2 on 2/2/23, C3 on 2/22/23, C4 on 3/17/23. Cycles have been overall well-tolerated, save for anticipated fatigue and cytopenias. Of note, patient has not advanced past Level 1 per personal preference/concerns for tolerance.  - CNS IPI was 4 (1 point each for age, LDH, stage IV disease, and extranodal involvement); as such, CNS ppx with IT chemotherapy was recommended. CSF flow negative (1/12) with C1. CSF flow negative (2/6) with C2. Difficult bedside access with C1; subsequently LPs done in XR.  - LP with IT chemo in XR again planned during admission with C4, scheduled in XR on 3/20 at 1 PM  - PET/CT (post-4 cycles of chemo) done 3/17/23; formal read still pending  - Note: this is overall C5 of chemotherapy (1 cycle R-CHOP + 4 cycles R-EPOCH); current plan is for 6 total cycles (1 additional cycle of R-EPOCH), followed by an EOT PET/CT.                             Treatment Plan: DA-R- EPOCH. C4D1=3/17/23                           - Rituximab 375 mg/m2 IV x1 dose -- Day 1                           - Etoposide 50 mg/m2 IV q22h x4 doses -- Days 1-4                           - Vincristine 0.4 mg/m2 IV q22h x4 doses --  Days 1-4                           - Doxorubicin 10 mg/m2 IV q22h x4 doses -- Days  "1-4                           - Cyclophosphamide 750 mg/m2 IV x1 dose -- Day 5                           - Prednisone 60 mg/m2 PO BID x10 doses -- Days 1-5                           - Dexamethasone 8 mg daily x2 doses -- Days 6-7                           - Neulasta 6 mg subQ x1 dose                           - Pre-meds: Tylenol, Benadryl, Zofran, Emend     # Anemia   Secondary to chemotherapy and underlying lymphoma.   - Monitor CBC daily  - Transfuse if Hgb <7 and plt <10k     CV  # History of complete heart block s/p pacemaker placement (2020)  Followed by Dr. Fox; last seen 7/7/22. Pacemaker last interrogated on 3/2/23.  - No acute inpatient management needs  - Continue cardiology follow-up and device checks as recommended - next planned follow-up in 07/2023    # Superficial thrombosis of the left basilic and cephalic veins  Noted on admission. Patient reported ~3 day history of \"lump\" to left forearm, which he notes overlies a vein. Lesion is slightly tender to palpation, non-fluctuant. No history of trauma. DDx includes superficial venous thrombosis, hematoma, or small abscess.  - Venous doppler of the LUE (3/17/23) revealed occlusive superficial thrombus of the left basilic and cephalic vein at the level of the mid to upper forearm; no evidence of DVT.  - Warm compresses, APAP PRN     ID  # Asymptomatic COVID-19 infection, persistent  Noted to be COVID+ on PCR done 1/9/23. Admitted for planned chemotherapy, as above, and treated with IV remdesivir x3 days (1/11-1/13). Despite treatment, patient has remained PCR positive for the last 2 months since diagnosis and was positive again on admission (3/17/23). Cycle threshold 31.4. Per infection prevention, patient needs to remain in precautions until cycle threshold >35.  - Continue special precautions for now  - No current clinical s/sx of COVID-19 infection; monitor clinically with initiation of chemotherapy     # Hypogammaglobulinemia   IgG 310 (12/2022). " Status-post IVIG on 1/15/23; plan to recheck IgG in ~3 months from last dose (~4/2023) per prior discussion with Dr. Ruelas.     # ID ppx  - Acyclovir 400 mg BID  - No current indication for bacterial/fungal ppx; start for ANC <1000  - Patient reportedly intolerant of nebulized pentamidine; will give IV pentamidine x1 as an alternative, per Dr. Ruelas     MISC  # GERD - related to previous XRT to the chest, continue PTA omeprazole.  # Insomnia - PTA on temazepam 7.5 mg HS PRN for sleep; last prescribed #5 tablets on 1/13/23 (takes on steroid days)  # Cerumen impaction of the right EAC - noted via otoscopy on 3/18; Debrox drops BID and ear irrigation by nursing PRN     FEN:  - No mIVF; bolus PRN  - Lyte replacement PRN per standing protocol  - Regular diet as tolerated     Prophy/Misc:  - GI/PUD: PTA omeprazole  - Bowels: PRN Miralax and Senna  - DVT: ppx enoxaparin; hold for platelets <50K and prior to LP  - ID: as above  - Activity: encourage ambulation    Clinically Significant Risk Factors Present on Admission                             Code Status: FULL    Disposition: Inpatient admission to [facility] for C4 of R-DA-EPOCH. Anticipate discharge to home pending completion of the chemotherapy regimen, likely 3/21.    Follow up: TBD. Requested Neulasta and 2x weekly labs at local clinic (orders faxed x3/18); weekday team to follow-up on Monday, 3/20, to ensure these are scheduled.    Staffed with Dr. Hernandez.    Liyah Padgett PA-C  Hematology/Oncology  Pager: #[phone]    I spent 60 minutes in the care of this patient today, which included time necessary for review of interval events, obtaining history and physical exam, ordering medication(s)/test(s) as medically indicated, discussion with interdisciplinary/consult team(s), and documentation time. Over 50% of time was spent face-to-face and/or coordinating care.    Interval History   No acute overnight events. Shahid is feeling well overall. Got a full 8 hours of  sleep last night, which he is pleased about. Worried about his right ear this AM - states it feels clogged, like it wants to pop but can't. Notes his hearing has decreased. Denies ear pain, sinus congestion, other concerns. No fevers. Otherwise, no N/V/D, no mucositis, no neuropathy reported. No tinnitus. Reviewed interval lab results and plan of care for the day. All questions and concerns addressed at bedside.     A comprehensive review of symptoms was performed and was negative except as detailed in the interval history above.    Physical Exam   Vital Signs with Ranges  Temp:  [97.7  F (36.5  C)-98.4  F (36.9  C)] 97.9  F (36.6  C)  Pulse:  [] 97  Resp:  [16-18] 18  BP: ()/(60-72) 124/72  SpO2:  [95 %-98 %] 97 %    I/O last 3 completed shifts:  In: 585.6 [IV Piggyback:585.6]  Out: 210 [Urine:210]    Vitals:    03/18/23 0906   Weight: 58.3 kg (128 lb 9.6 oz)     Constitutional: Pleasant and cooperative male. Awake, alert, NAD. Thin and frail appearing, but non-toxic.  HEENT: NC/AT, EOMI, sclera clear, conjunctiva normal, OP with MMM. Right external auditory canal completely occluded with cerumen, TM not visualized. Left TM pearly gray and intact, no erythema or retraction, minimal cerumen in the left EAC.  Respiratory: No increased work of breathing, CTAB, no crackles or wheezing.  Cardiovascular: RRR, no murmur noted. No peripheral edema. Palpable cord to the left forearm without overlying warmth or fluctuance, no significant TTP, no red streaking.  GI: Normal bowel sounds, soft, non-distended and non-tender.  Skin: Warm, dry, well-perfused. No bruising, bleeding, rashes, or lesions on limited exam.  Neurologic: A&O. Answers questions appropriately. Moves all extremities spontaneously.  Psych: Calm, appropriate affect  Vascular access:  Port on right chest wall CDI, non-tender, no surrounding erythema.    Medications     - MEDICATION INSTRUCTIONS -           acyclovir  400 mg Oral Q12H     allopurinol   300 mg Oral Daily     carbamide peroxide  3-5 drop Right Ear BID     Chemotherapy Infusing-Continuous Infusion   Does not apply Q8H     [START ON 3/21/2023] cyclophosphamide (CYTOXAN) infusion  750 mg/m2 (Treatment Plan Recorded) Intravenous Once     [START ON 3/20/2023] INTRATHECAL - Cytarabine and/or methotrexate and/or Hydrocortisone   Intrathecal Once     [START ON 3/23/2023] dexamethasone  8 mg Oral Daily     [START ON 3/23/2023] dextrose 5% water  10-20 mL Intracatheter Daily at 8 pm     [START ON 3/23/2023] dextrose 5% water  10-20 mL Intracatheter Daily at 8 pm     enoxaparin ANTICOAGULANT  40 mg Subcutaneous Q24H     etoposide  50 mg/m2 (Treatment Plan Recorded) Intravenous Q22H     [START ON 3/23/2023] filgrastim-aafi  5 mcg/kg (Treatment Plan Recorded) Intravenous Daily at 8 pm     [START ON 3/21/2023] fosaprepitant (EMEND) intermittent infusion ( mL)  150 mg Intravenous Once     ondansetron  16 mg Oral Q22H     pantoprazole  40 mg Oral BID AC     predniSONE  100 mg Oral BID     senna-docusate  2 tablet Oral BID     sodium chloride (PF)  3 mL Intracatheter Q8H     vinCRIStine/DOXOrubicin (ONCOVIN/ADRIAMYCIN) infusion  0.4 mg/m2 (Treatment Plan Recorded) Intravenous Q22H     vitamin C  500 mg Oral QAM       Antiinfectives  Anti-infectives (From now, onward)    Start     Dose/Rate Route Frequency Ordered Stop    03/17/23 2000  acyclovir (ZOVIRAX) tablet 400 mg        Note to Pharmacy: PTA Sig:Take 1 tablet (400 mg) by mouth every 12 hours for 120 days      400 mg Oral EVERY 12 HOURS 03/17/23 1435            Data   CBC   Recent Labs   Lab 03/18/23  0642 03/17/23  1540 03/17/23  1321   WBC 6.1 7.3 7.0   RBC 2.76* 3.15* 3.16*   HGB 8.9* 10.4* 10.4*   HCT 28.5* 32.7* 31.2*   * 104* 99   MCH 32.2 33.0 32.9   MCHC 31.2* 31.8 33.3   RDW 16.0* 16.3* 16.2*    258 234       CMP   Recent Labs   Lab 03/18/23  0642 03/17/23  1540 03/17/23  1321    144 142   POTASSIUM 4.3 4.5 4.2   CHLORIDE  109* 108* 106   CO2 24 25 27   ANIONGAP 10 11 9   * 121* 118*   BUN 28.3* 31.3* 26.3*   CR 0.71 0.96 0.86   GFRESTIMATED >90 83 >90   JORGE 9.1 9.4 9.8   MAG 1.9 2.5*  --    PHOS 3.4 4.5  --    PROTTOTAL 5.2* 6.1* 6.2*   ALBUMIN 3.5 4.2 4.3   BILITOTAL 0.2 0.2 0.2   ALKPHOS 87 101 103   AST 21 26 24   ALT 20 24 23       LFTs   Recent Labs   Lab 03/18/23  0642   PROTTOTAL 5.2*   ALBUMIN 3.5   BILITOTAL 0.2   ALKPHOS 87   AST 21   ALT 20       Coagulation Studies   Recent Labs   Lab 03/18/23  0642   INR 1.04

## 2023-03-18 NOTE — PROGRESS NOTES
Nursing Focus: Chemotherapy  D: Positive blood return via port. Insertion site is clean/dry/intact, dressing intact with no complaints of pain.  Urine output is recorded in intake in Doc Flowsheet.    I: Premedications given per order (see electronic medical administration record). Dose #2 of etoposide and Dose #2 of vincristine and doxorubicin started to infuse over 22hours.   Reviewed pt teaching on chemotherapy side effects.  Pt denies need for further teaching. Chemotherapy double checked per protocol by two chemotherapy competent RN's.   A: Tolerating procedure well. Denies nausea and or pain.   P: Continue to monitor urine output and symptoms of nausea. Screen for symptoms of toxicity.

## 2023-03-18 NOTE — PHARMACY-ADMISSION MEDICATION HISTORY
Admission Medication History Completed by Pharmacy    See [facility] Admission Navigator for allergy information, preferred outpatient pharmacy, prior to admission medications and immunization status.     Medication History Sources:     Reviewed medication dispense history     Patient admitted at [facility] last 2/23-2/28    Nurse documented last times of administration     Reviewed     Changes made to PTA medication list (reason):    Added: None    Deleted: Dexamethasone (completed course from last admit)     Changed: None    Additional Information:    Temazepam 7.5 mg capsules last sold 2/7/23 #15 for 15 day supply per  search     Prior to Admission medications    Medication Sig Last Dose Taking? Auth Provider Long Term End Date   acyclovir (ZOVIRAX) 400 MG tablet Take 1 tablet (400 mg) by mouth every 12 hours for 120 days 3/17/2023 Yes Domitila Ruelas MD Yes 6/30/23   allopurinol (ZYLOPRIM) 300 MG tablet Take 1 tablet (300 mg) by mouth daily 3/17/2023 Yes Leida Barton PA-C     ascorbic acid (VITAMIN C) 500 MG tablet Take 500 mg by mouth every morning 3/17/2023 Yes Reported, Patient     fluconazole (DIFLUCAN) 200 MG tablet Take 1 tablet (200 mg) by mouth daily , start at discharge and take until your ANC is greater than 1000. 3/17/2023 Yes Mari Jenkins PA-C     furosemide (LASIX) 20 MG tablet Take 1 tablet (20 mg) by mouth daily as needed (lower extremity swelling) 3/17/2023 Yes Jacki Pereira PA-C Yes    levofloxacin (LEVAQUIN) 250 MG tablet Take 1 tablet (250 mg) by mouth daily , start at discharge and take until your ANC is greater than 1000. 3/17/2023 Yes Mari Jenkins PA-C     Multiple Vitamins-Minerals (MULTIVITAL PO) Take 1 tablet by mouth every morning 3/17/2023 Yes Reported, Patient     omeprazole (PRILOSEC) 40 MG DR capsule Take 1 capsule (40 mg) by mouth daily 3/17/2023 Yes Melida Hines, APRN CNP     ondansetron (ZOFRAN) 8 MG tablet Take 1 tablet (8 mg) by mouth every 8 hours as needed  for nausea PRN Yes Leida Barton PA-C     polyethylene glycol (MIRALAX) 17 GM/Dose powder Take 17 g by mouth daily as needed for constipation PRN Yes Melida Hines APRN CNP     prochlorperazine (COMPAZINE) 10 MG tablet Take 1 tablet (10 mg) by mouth every 6 hours as needed for nausea or vomiting PRN Yes Domitila Ruelas MD     senna-docusate (SENNA S) 8.6-50 MG tablet Take 1 tablet by mouth 2 times daily as needed for constipation PRN Yes Melida Hines APRN CNP     temazepam (RESTORIL) 7.5 MG capsule Take 1 capsule (7.5 mg) by mouth nightly as needed for sleep Unknown Yes Mari Jenkins PA-C No        Date completed: 03/17/23    Medication history completed by: Fidel Paniagua, PharmD

## 2023-03-18 NOTE — PLAN OF CARE
[id number]    Afebrile, VSS.   No acute event.   Pt is in good mood.  Pentamidine infused via IV without any reaction.   Temazepam given at bedtime.     NEURO: Alert and oriented x4.      RESPIRATORY: Room Air, denies dizziness, and SOB. Lungs sound, clear, equal bilateral.     CARDIO: VSS, denies dizziness, and extremity pain.      GI/: Denies N/V, BS active, BM x1, AUOP.      SKIN: Intact.      ACTIVITY: Independent      PAIN: Denies pain.    DLA: Port, Chemo infusing    BG: No      PLAN:   Today:  - Continue chemotherapy as ordered; today is Day 2.  - Diminished hearing to the right ear reported on 3/18. Otoscopy revealed a cerumen impaction; TM not visualized.   -Start Debrox drops BID, nursing to irrigate ear PRN for cerumen removal.  - Scheduled for LP with IT chemo in XR on 3/20 at 1 PM. Patient aware.  - Give IV pentamidine, per outpatient recommendations for PJP ppx, x1 today.  - Anticipate discharge to home on 3/21/23, pending completion of the chemotherapy regimen.    Continue monitoring.     * Italic, from provider's note.        Goal Outcome Evaluation:      Plan of Care Reviewed With: patient

## 2023-03-18 NOTE — PLAN OF CARE
Goal Outcome Evaluation:  Shahid is on C4 D2 DA-R-EPOCH.  Chemo running CIVI janelle IVAD with + blood return.  Denies nausea and eating well.  UAL in room and said he is pacing room 100 times for exercise..  C/O right ear being clogged - ear gtts ordered along with irrigation.  Plan for IV Pent this afternoon.

## 2023-03-19 LAB
ABO/RH(D): NORMAL
ALBUMIN SERPL BCG-MCNC: 3.4 G/DL (ref 3.5–5.2)
ALP SERPL-CCNC: 78 U/L (ref 40–129)
ALT SERPL W P-5'-P-CCNC: 19 U/L (ref 10–50)
ANION GAP SERPL CALCULATED.3IONS-SCNC: 10 MMOL/L (ref 7–15)
ANTIBODY SCREEN: NEGATIVE
AST SERPL W P-5'-P-CCNC: 19 U/L (ref 10–50)
BASOPHILS # BLD AUTO: 0 10E3/UL (ref 0–0.2)
BASOPHILS NFR BLD AUTO: 0 %
BILIRUB SERPL-MCNC: <0.2 MG/DL
BUN SERPL-MCNC: 28.4 MG/DL (ref 8–23)
CALCIUM SERPL-MCNC: 9.1 MG/DL (ref 8.8–10.2)
CHLORIDE SERPL-SCNC: 110 MMOL/L (ref 98–107)
CREAT SERPL-MCNC: 0.78 MG/DL (ref 0.67–1.17)
DEPRECATED HCO3 PLAS-SCNC: 22 MMOL/L (ref 22–29)
EOSINOPHIL # BLD AUTO: 0 10E3/UL (ref 0–0.7)
EOSINOPHIL NFR BLD AUTO: 0 %
ERYTHROCYTE [DISTWIDTH] IN BLOOD BY AUTOMATED COUNT: 16 % (ref 10–15)
FIBRINOGEN PPP-MCNC: 262 MG/DL (ref 170–490)
GFR SERPL CREATININE-BSD FRML MDRD: >90 ML/MIN/1.73M2
GLUCOSE SERPL-MCNC: 133 MG/DL (ref 70–99)
HCT VFR BLD AUTO: 26.7 % (ref 40–53)
HGB BLD-MCNC: 8.5 G/DL (ref 13.3–17.7)
IMM GRANULOCYTES # BLD: 0.1 10E3/UL
IMM GRANULOCYTES NFR BLD: 2 %
INR PPP: 1.08 (ref 0.85–1.15)
LYMPHOCYTES # BLD AUTO: 0.4 10E3/UL (ref 0.8–5.3)
LYMPHOCYTES NFR BLD AUTO: 4 %
MAGNESIUM SERPL-MCNC: 2.2 MG/DL (ref 1.7–2.3)
MCH RBC QN AUTO: 32.9 PG (ref 26.5–33)
MCHC RBC AUTO-ENTMCNC: 31.8 G/DL (ref 31.5–36.5)
MCV RBC AUTO: 104 FL (ref 78–100)
MONOCYTES # BLD AUTO: 0.2 10E3/UL (ref 0–1.3)
MONOCYTES NFR BLD AUTO: 2 %
NEUTROPHILS # BLD AUTO: 7.5 10E3/UL (ref 1.6–8.3)
NEUTROPHILS NFR BLD AUTO: 92 %
NRBC # BLD AUTO: 0 10E3/UL
NRBC BLD AUTO-RTO: 0 /100
PHOSPHATE SERPL-MCNC: 3.7 MG/DL (ref 2.5–4.5)
PLATELET # BLD AUTO: 159 10E3/UL (ref 150–450)
POTASSIUM SERPL-SCNC: 4 MMOL/L (ref 3.4–5.3)
PROT SERPL-MCNC: 5 G/DL (ref 6.4–8.3)
RBC # BLD AUTO: 2.58 10E6/UL (ref 4.4–5.9)
SODIUM SERPL-SCNC: 142 MMOL/L (ref 136–145)
SPECIMEN EXPIRATION DATE: NORMAL
URATE SERPL-MCNC: 3.1 MG/DL (ref 3.4–7)
WBC # BLD AUTO: 8.2 10E3/UL (ref 4–11)

## 2023-03-19 PROCEDURE — 250N000011 HC RX IP 250 OP 636

## 2023-03-19 PROCEDURE — 80053 COMPREHEN METABOLIC PANEL: CPT

## 2023-03-19 PROCEDURE — 258N000003 HC RX IP 258 OP 636: Performed by: INTERNAL MEDICINE

## 2023-03-19 PROCEDURE — 120N000002 HC R&B MED SURG/OB UMMC

## 2023-03-19 PROCEDURE — 250N000013 HC RX MED GY IP 250 OP 250 PS 637: Performed by: INTERNAL MEDICINE

## 2023-03-19 PROCEDURE — 250N000013 HC RX MED GY IP 250 OP 250 PS 637

## 2023-03-19 PROCEDURE — 250N000011 HC RX IP 250 OP 636: Performed by: INTERNAL MEDICINE

## 2023-03-19 PROCEDURE — 36591 DRAW BLOOD OFF VENOUS DEVICE: CPT

## 2023-03-19 PROCEDURE — 85384 FIBRINOGEN ACTIVITY: CPT

## 2023-03-19 PROCEDURE — 85025 COMPLETE CBC W/AUTO DIFF WBC: CPT

## 2023-03-19 PROCEDURE — 85610 PROTHROMBIN TIME: CPT

## 2023-03-19 PROCEDURE — 99233 SBSQ HOSP IP/OBS HIGH 50: CPT | Mod: FS | Performed by: PHYSICIAN ASSISTANT

## 2023-03-19 PROCEDURE — 86901 BLOOD TYPING SEROLOGIC RH(D): CPT

## 2023-03-19 PROCEDURE — 250N000012 HC RX MED GY IP 250 OP 636 PS 637: Performed by: INTERNAL MEDICINE

## 2023-03-19 PROCEDURE — 84100 ASSAY OF PHOSPHORUS: CPT

## 2023-03-19 PROCEDURE — 84550 ASSAY OF BLOOD/URIC ACID: CPT

## 2023-03-19 PROCEDURE — 86850 RBC ANTIBODY SCREEN: CPT

## 2023-03-19 PROCEDURE — 83735 ASSAY OF MAGNESIUM: CPT

## 2023-03-19 PROCEDURE — 99418 PROLNG IP/OBS E/M EA 15 MIN: CPT | Performed by: PHYSICIAN ASSISTANT

## 2023-03-19 RX ORDER — FUROSEMIDE 10 MG/ML
20 INJECTION INTRAMUSCULAR; INTRAVENOUS
Status: DISCONTINUED | OUTPATIENT
Start: 2023-03-19 | End: 2023-03-21 | Stop reason: HOSPADM

## 2023-03-19 RX ADMIN — ONDANSETRON HYDROCHLORIDE 16 MG: 8 TABLET, FILM COATED ORAL at 14:48

## 2023-03-19 RX ADMIN — CARBAMIDE PEROXIDE 6.5% 5 DROP: 6.5 LIQUID AURICULAR (OTIC) at 08:52

## 2023-03-19 RX ADMIN — PREDNISONE 100 MG: 50 TABLET ORAL at 15:58

## 2023-03-19 RX ADMIN — SENNOSIDES AND DOCUSATE SODIUM 2 TABLET: 50; 8.6 TABLET ORAL at 20:33

## 2023-03-19 RX ADMIN — POLYETHYLENE GLYCOL 3350 17 G: 17 POWDER, FOR SOLUTION ORAL at 08:52

## 2023-03-19 RX ADMIN — ONDANSETRON HYDROCHLORIDE 8 MG: 8 TABLET, FILM COATED ORAL at 08:52

## 2023-03-19 RX ADMIN — VINCRISTINE SULFATE 0.7 MG: 1 INJECTION, SOLUTION INTRAVENOUS at 16:06

## 2023-03-19 RX ADMIN — PANTOPRAZOLE SODIUM 40 MG: 40 TABLET, DELAYED RELEASE ORAL at 08:52

## 2023-03-19 RX ADMIN — ALLOPURINOL 300 MG: 300 TABLET ORAL at 08:52

## 2023-03-19 RX ADMIN — PREDNISONE 100 MG: 50 TABLET ORAL at 08:52

## 2023-03-19 RX ADMIN — ETOPOSIDE 85 MG: 20 INJECTION, SOLUTION, CONCENTRATE INTRAVENOUS at 16:07

## 2023-03-19 RX ADMIN — PANTOPRAZOLE SODIUM 40 MG: 40 TABLET, DELAYED RELEASE ORAL at 15:58

## 2023-03-19 RX ADMIN — CARBAMIDE PEROXIDE 6.5% 4 DROP: 6.5 LIQUID AURICULAR (OTIC) at 20:33

## 2023-03-19 RX ADMIN — TEMAZEPAM 7.5 MG: 7.5 CAPSULE ORAL at 21:35

## 2023-03-19 RX ADMIN — ACYCLOVIR 400 MG: 400 TABLET ORAL at 20:33

## 2023-03-19 RX ADMIN — OXYCODONE HYDROCHLORIDE AND ACETAMINOPHEN 500 MG: 500 TABLET ORAL at 08:52

## 2023-03-19 RX ADMIN — ACYCLOVIR 400 MG: 400 TABLET ORAL at 08:52

## 2023-03-19 ASSESSMENT — ACTIVITIES OF DAILY LIVING (ADL)
ADLS_ACUITY_SCORE: 20

## 2023-03-19 NOTE — PROGRESS NOTES
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 He was admitted on 3/17/23 for C4 DA-R-EPOCH (C5 total of chemotherapy).     Today:  - Continue chemotherapy as ordered; today is Day 3.  - Continue Debrox drops and ear irrigation for cerumen impaction.  - Follow-up formal PET/CT read, still pending from 3/17.  - Scheduled for LP with IT chemo in XR on 3/20 at 1 PM.  - Anticipate discharge to home on 3/21/23, pending completion of the chemotherapy regimen.     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 Ruelas. Pt has a h/o low-grade kappa restricted plasmacytoid B-cell lymphoma (diagnosed in 3/2004) treated with x6 cycles of fludarabine based chemo and XRT to spine, then has been on surveillance since 2005. He presented with progressive B-symptoms, abdominal pain, and SOB. PET 12/9 showed extensive lymphomatous involvement to the right pleura w/ bilateral FDG-avid effusions (large R, small L), mediastinal and pericardial regions, right anterolateral chest wall, adenopathy above and below the diaphragm, liver, spleen and multiple sites in the skeleton, concerning for transformation from his low-grade lymphoma. He was also noted to have progressive pancytopenia. Due to worsening shortness of breath and delays of biopsy, patient presented to ED on 12/14 and was subsequently admitted for expedited workup and treatment. As SUV of the sternoclavicular mass was the highest,  patient underwent US-guided sternoclavicular soft tissue mass biopsy with IR 12/15, pathology from which confirmed a diagnosis of high-grade B-cell lymphoma. Baseline ECHO (12/15) with LVEF 60-65%, mild aortic insufficiency, no evidence of effusion or tamponade. Received 1 cycle of R-CHOP (W4C7=1112/20/22) which was well-tolerated. Cytogenetics then returned, revealed rearrangements in MYC, BCL2, and BCL6, consistent with triple hit lymphoma. He was subsequently changed to DA-R-EPOCH and proceeded with C1 on 1/11/23 (C2 total of therapy). He was re-admitted for C2 on 2/2/23, C3 on 2/22/23, C4 on 3/17/23. Cycles have been overall well-tolerated, save for anticipated fatigue and cytopenias. Of note, patient has not advanced past Level 1 per personal preference/concerns for tolerance.  - CNS IPI was 4 (1 point each for age, LDH, stage IV disease, and extranodal involvement); as such, CNS ppx with IT chemotherapy was recommended. CSF flow negative (1/12) with C1. CSF flow negative (2/6) with C2. Difficult bedside access with C1; subsequently LPs done in XR.  - LP with IT chemo in XR again planned during admission with C4, scheduled in XR on 3/20 at 1 PM  - PET/CT (post-4 cycles of chemo) done 3/17/23; formal read still pending  - Note: this is overall C5 of chemotherapy (1 cycle R-CHOP + 4 cycles R-EPOCH); current plan is for 6 total cycles (1 additional cycle of R-EPOCH), followed by an EOT PET/CT.                             Treatment Plan: DA-R- EPOCH. C4D1=3/17/23                           - Rituximab 375 mg/m2 IV x1 dose -- Day 1                           - Etoposide 50 mg/m2 IV q22h x4 doses -- Days 1-4                           - Vincristine 0.4 mg/m2 IV q22h x4 doses --  Days 1-4                           - Doxorubicin 10 mg/m2 IV q22h x4 doses -- Days 1-4                           - Cyclophosphamide 750 mg/m2 IV x1 dose -- Day 5                           - Prednisone 60 mg/m2 PO BID x10 doses -- Days  "1-5                           - Dexamethasone 8 mg daily x2 doses -- Days 6-7                           - Neulasta 6 mg subQ x1 dose -- requested at [facility] for 3/23, await scheduling                           - Pre-meds: Tylenol, Benadryl, Zofran, Emend     # Anemia   Secondary to chemotherapy and underlying lymphoma.   - Monitor CBC daily  - Transfuse if Hgb <7 and plt <10k     CV  # History of complete heart block s/p pacemaker placement (2020)  Followed by Dr. Fox; last seen 7/7/22. Pacemaker last interrogated on 3/2/23.  - No acute inpatient management needs  - Continue cardiology follow-up and device checks as recommended - next planned follow-up in 07/2023    # Superficial thrombosis of the left basilic and cephalic veins  Noted on admission. Patient reported ~3 day history of \"lump\" to left forearm, which he notes overlies a vein. Lesion is slightly tender to palpation, non-fluctuant. No history of trauma. DDx includes superficial venous thrombosis, hematoma, or small abscess.  - Venous doppler of the LUE (3/17/23) revealed occlusive superficial thrombus of the left basilic and cephalic vein at the level of the mid to upper forearm; no evidence of DVT.  - Warm compresses, APAP PRN  - No current indication for anticoagulation    # Weight gain, likely mild volume overload  Weight up ~7 lbs from dry weight (125 lbs), likely iatrogenic in the setting of IV volume. No clinical evidence of peripheral or pulmonary edema at this juncture.  - Follow I/Os, daily weights  - Ordered 20 mg IV Lasix x1 dose PRN - patient usually takes x1 prior to discharge, but likes to determine the timing of this himself  - Anticipate auto-diuresis post-discharge, once IV chemo is complete    ID  # Asymptomatic COVID-19 infection, persistent  Noted to be COVID+ on PCR done 1/9/23. Admitted for planned chemotherapy, as above, and treated with IV remdesivir x3 days (1/11-1/13). Despite treatment, patient has remained PCR positive for " the last 2 months since diagnosis and was positive again on admission (3/17/23). Cycle threshold 31.4. Per infection prevention, patient needs to remain in precautions until cycle threshold >35.  - Continue special precautions for now  - No current clinical s/sx of COVID-19 infection; monitor clinically with initiation of chemotherapy     # Hypogammaglobulinemia   IgG 310 (12/2022). Status-post IVIG on 1/15/23; plan to recheck IgG in ~3 months from last dose (~4/2023) per prior discussion with Dr. Ruelas.     # ID ppx  - Acyclovir 400 mg BID  - No current indication for bacterial/fungal ppx; start for ANC <1000  - S/p IV pentamidine on 3/18; next due ~4/14     MISC  # GERD - related to previous XRT to the chest, continue PTA omeprazole.  # Insomnia - PTA on temazepam 7.5 mg HS PRN for sleep; last prescribed #5 tablets on 1/13/23 (takes on steroid days)  # Cerumen impaction of the right EAC - noted via otoscopy on 3/18; Debrox drops BID and ear irrigation by nursing PRN     FEN:  - No mIVF; bolus PRN  - Lyte replacement PRN per standing protocol  - Regular diet as tolerated     Prophy/Misc:  - GI/PUD: PTA omeprazole  - Bowels: PRN Miralax and Senna  - DVT: ppx enoxaparin; hold for platelets <50K and prior to LP  - ID: as above  - Activity: encourage ambulation    Clinically Significant Risk Factors              # Hypoalbuminemia: Lowest albumin = 3.4 g/dL at 3/19/2023  6:32 AM, will monitor as appropriate                  Code Status: FULL    Disposition: Inpatient admission to [facility] for C4 of R-DA-EPOCH. Anticipate discharge to home pending completion of the chemotherapy regimen, likely 3/21.    Follow up: TBD. Requested Neulasta and 2x weekly labs at local clinic (orders faxed x3/18); weekday team to follow-up on Monday, 3/20, to ensure these are scheduled.    Staffed with Dr. Hernandez.    Liyah Padgett PAJevonC  Hematology/Oncology  Pager: #[phone]    I spent 60 minutes in the care of this patient today, which  included time necessary for review of interval events, obtaining history and physical exam, ordering medication(s)/test(s) as medically indicated, discussion with interdisciplinary/consult team(s), and documentation time. Over 50% of time was spent face-to-face and/or coordinating care.    Interval History   No acute overnight events. Shahid continues to feel well. Chatting on the phone with friends and pacing back and forth in his room to get his steps in. Denies mouth sores, nausea, vomiting. No edema, but weight is up a bit. No cough, CP, SOB. Planning irrigate ear this afternoon, have been doing Debrox drops. Reviewed interval lab results and plan of care for the day. All questions and concerns addressed at bedside.     A comprehensive review of symptoms was performed and was negative except as detailed in the interval history above.    Physical Exam   Vital Signs with Ranges  Temp:  [97.8  F (36.6  C)-98.6  F (37  C)] 97.8  F (36.6  C)  Pulse:  [] 94  Resp:  [16-18] 16  BP: (107-122)/(53-76) 107/53  SpO2:  [94 %-99 %] 96 %    I/O last 3 completed shifts:  In: 1622.8 [P.O.:450; IV Piggyback:1172.8]  Out: 2100 [Urine:2100]    Vitals:    03/18/23 0906 03/19/23 0803   Weight: 58.3 kg (128 lb 9.6 oz) 60.1 kg (132 lb 6.4 oz)     Constitutional: Pleasant and cooperative male. Awake, alert, NAD. Thin and frail appearing, but non-toxic.  HEENT: NC/AT, EOMI, sclera clear, conjunctiva normal, OP with MMM. Ears not examined today.  Respiratory: No increased work of breathing, CTAB, no crackles or wheezing.  Cardiovascular: RRR, no murmur noted. No peripheral edema. Palpable cord to the left forearm without overlying warmth or fluctuance, no significant TTP, no red streaking.  GI: Normal bowel sounds, soft, non-distended and non-tender.  Skin: Warm, dry, well-perfused. No bruising, bleeding, rashes, or lesions on limited exam.  Neurologic: A&O. Answers questions appropriately. Moves all extremities  spontaneously.  Psych: Calm, appropriate affect  Vascular access:  Port on right chest wall CDI, non-tender, no surrounding erythema.    Medications     - MEDICATION INSTRUCTIONS -           acyclovir  400 mg Oral Q12H     allopurinol  300 mg Oral Daily     carbamide peroxide  3-5 drop Right Ear BID     Chemotherapy Infusing-Continuous Infusion   Does not apply Q8H     [START ON 3/21/2023] cyclophosphamide (CYTOXAN) infusion  750 mg/m2 (Treatment Plan Recorded) Intravenous Once     [START ON 3/20/2023] INTRATHECAL - Cytarabine and/or methotrexate and/or Hydrocortisone   Intrathecal Once     [START ON 3/23/2023] dexamethasone  8 mg Oral Daily     [START ON 3/23/2023] dextrose 5% water  10-20 mL Intracatheter Daily at 8 pm     [START ON 3/23/2023] dextrose 5% water  10-20 mL Intracatheter Daily at 8 pm     etoposide  50 mg/m2 (Treatment Plan Recorded) Intravenous Q22H     [START ON 3/23/2023] filgrastim-aafi  5 mcg/kg (Treatment Plan Recorded) Intravenous Daily at 8 pm     [START ON 3/21/2023] fosaprepitant (EMEND) intermittent infusion ( mL)  150 mg Intravenous Once     ondansetron  16 mg Oral Q22H     pantoprazole  40 mg Oral BID AC     predniSONE  100 mg Oral BID     senna-docusate  2 tablet Oral BID     sodium chloride (PF)  3 mL Intracatheter Q8H     sodium chloride (PF)  3 mL Intracatheter Q8H     vinCRIStine/DOXOrubicin (ONCOVIN/ADRIAMYCIN) infusion  0.4 mg/m2 (Treatment Plan Recorded) Intravenous Q22H     vitamin C  500 mg Oral QAM       Antiinfectives  Anti-infectives (From now, onward)    Start     Dose/Rate Route Frequency Ordered Stop    03/17/23 2000  acyclovir (ZOVIRAX) tablet 400 mg        Note to Pharmacy: PTA Sig:Take 1 tablet (400 mg) by mouth every 12 hours for 120 days      400 mg Oral EVERY 12 HOURS 03/17/23 1435            Data   CBC   Recent Labs   Lab 03/19/23  0632 03/18/23  0642 03/17/23  1540 03/17/23  1321   WBC 8.2 6.1 7.3 7.0   RBC 2.58* 2.76* 3.15* 3.16*   HGB 8.5* 8.9* 10.4* 10.4*    HCT 26.7* 28.5* 32.7* 31.2*   * 103* 104* 99   MCH 32.9 32.2 33.0 32.9   MCHC 31.8 31.2* 31.8 33.3   RDW 16.0* 16.0* 16.3* 16.2*    184 258 234       CMP   Recent Labs   Lab 03/19/23  0632 03/18/23  0642 03/17/23  1540 03/17/23  1321    143 144 142   POTASSIUM 4.0 4.3 4.5 4.2   CHLORIDE 110* 109* 108* 106   CO2 22 24 25 27   ANIONGAP 10 10 11 9   * 124* 121* 118*   BUN 28.4* 28.3* 31.3* 26.3*   CR 0.78 0.71 0.96 0.86   GFRESTIMATED >90 >90 83 >90   JORGE 9.1 9.1 9.4 9.8   MAG 2.2 1.9 2.5*  --    PHOS 3.7 3.4 4.5  --    PROTTOTAL 5.0* 5.2* 6.1* 6.2*   ALBUMIN 3.4* 3.5 4.2 4.3   BILITOTAL <0.2 0.2 0.2 0.2   ALKPHOS 78 87 101 103   AST 19 21 26 24   ALT 19 20 24 23       LFTs   Recent Labs   Lab 03/19/23  0632   PROTTOTAL 5.0*   ALBUMIN 3.4*   BILITOTAL <0.2   ALKPHOS 78   AST 19   ALT 19       Coagulation Studies   Recent Labs   Lab 03/19/23  0632   INR 1.08

## 2023-03-19 NOTE — PLAN OF CARE
Goal Outcome Evaluation:  Shahid is on C4D3 Etoposde, Vincristine and Dox infusing CIVI via IVAD with + blood return.  Denies nausea and eating well.  UAL in room and in shower.  Still c/o plugged right ear.  Continues with ear gtts and right ear irrigated with NS. Did have yellow fluid and a few small wax chunks come out.  Pt also requested that left ear irrigated - clear return with that. Will continues with ear gtts.  Plan for IT chemo Monday.

## 2023-03-19 NOTE — PLAN OF CARE
Goal Outcome Evaluation:    /61 (BP Location: Right arm)   Pulse 89   Temp 98.2  F (36.8  C) (Oral)   Resp 16   Wt 58.3 kg (128 lb 9.6 oz)   SpO2 96%   BMI 20.14 kg/m        0259-7511:    Reason for admission: C4 DA-R-EPOCH (C5 total of chemotherapy)    Activity: Up independently.  Pain: Denies pain.  Neuro: A&Ox4. Mild numbness in lower extremities (feet).   Cardiac: Denies chest pain.  Respiratory: Denies shortness of breath.  GI/: Voiding spontaneously. No BM overnight. Denies nausea and vomiting.  Diet: Regular.  Lines: Port, infusing.  Wounds: Lump left forearm.(occlusive superficial thrombus).      Plan: LP with IT chemo 03/20/2023 at 1pm. Discharge to home 03/21/2023 pending completion of chemotherapy.       Continue to monitor and follow POC

## 2023-03-20 ENCOUNTER — APPOINTMENT (OUTPATIENT)
Dept: INTERVENTIONAL RADIOLOGY/VASCULAR | Facility: CLINIC | Age: 73
DRG: 846 | End: 2023-03-20
Attending: PHYSICIAN ASSISTANT
Payer: MEDICARE

## 2023-03-20 LAB
ALBUMIN SERPL BCG-MCNC: 3.3 G/DL (ref 3.5–5.2)
ALP SERPL-CCNC: 69 U/L (ref 40–129)
ALT SERPL W P-5'-P-CCNC: 20 U/L (ref 10–50)
ANION GAP SERPL CALCULATED.3IONS-SCNC: 8 MMOL/L (ref 7–15)
AST SERPL W P-5'-P-CCNC: 19 U/L (ref 10–50)
BASOPHILS # BLD AUTO: 0 10E3/UL (ref 0–0.2)
BASOPHILS NFR BLD AUTO: 0 %
BILIRUB SERPL-MCNC: 0.2 MG/DL
BUN SERPL-MCNC: 29 MG/DL (ref 8–23)
CALCIUM SERPL-MCNC: 8.8 MG/DL (ref 8.8–10.2)
CHLORIDE SERPL-SCNC: 112 MMOL/L (ref 98–107)
CREAT SERPL-MCNC: 0.77 MG/DL (ref 0.67–1.17)
DEPRECATED HCO3 PLAS-SCNC: 23 MMOL/L (ref 22–29)
EOSINOPHIL # BLD AUTO: 0 10E3/UL (ref 0–0.7)
EOSINOPHIL NFR BLD AUTO: 0 %
ERYTHROCYTE [DISTWIDTH] IN BLOOD BY AUTOMATED COUNT: 16.1 % (ref 10–15)
FIBRINOGEN PPP-MCNC: 214 MG/DL (ref 170–490)
GFR SERPL CREATININE-BSD FRML MDRD: >90 ML/MIN/1.73M2
GLUCOSE CSF-MCNC: 74 MG/DL (ref 40–70)
GLUCOSE SERPL-MCNC: 112 MG/DL (ref 70–99)
HCT VFR BLD AUTO: 25.3 % (ref 40–53)
HGB BLD-MCNC: 8 G/DL (ref 13.3–17.7)
IMM GRANULOCYTES # BLD: 0.1 10E3/UL
IMM GRANULOCYTES NFR BLD: 2 %
INR PPP: 1.12 (ref 0.85–1.15)
LYMPHOCYTES # BLD AUTO: 0.4 10E3/UL (ref 0.8–5.3)
LYMPHOCYTES NFR BLD AUTO: 6 %
MAGNESIUM SERPL-MCNC: 2.3 MG/DL (ref 1.7–2.3)
MCH RBC QN AUTO: 32.8 PG (ref 26.5–33)
MCHC RBC AUTO-ENTMCNC: 31.6 G/DL (ref 31.5–36.5)
MCV RBC AUTO: 104 FL (ref 78–100)
MONOCYTES # BLD AUTO: 0.4 10E3/UL (ref 0–1.3)
MONOCYTES NFR BLD AUTO: 6 %
NEUTROPHILS # BLD AUTO: 5.6 10E3/UL (ref 1.6–8.3)
NEUTROPHILS NFR BLD AUTO: 86 %
NRBC # BLD AUTO: 0 10E3/UL
NRBC BLD AUTO-RTO: 0 /100
PHOSPHATE SERPL-MCNC: 3.6 MG/DL (ref 2.5–4.5)
PLATELET # BLD AUTO: 133 10E3/UL (ref 150–450)
POTASSIUM SERPL-SCNC: 3.8 MMOL/L (ref 3.4–5.3)
PROT CSF-MCNC: 57.8 MG/DL (ref 15–45)
PROT SERPL-MCNC: 4.6 G/DL (ref 6.4–8.3)
RBC # BLD AUTO: 2.44 10E6/UL (ref 4.4–5.9)
SODIUM SERPL-SCNC: 143 MMOL/L (ref 136–145)
URATE SERPL-MCNC: 3.3 MG/DL (ref 3.4–7)
WBC # BLD AUTO: 6.5 10E3/UL (ref 4–11)

## 2023-03-20 PROCEDURE — 83735 ASSAY OF MAGNESIUM: CPT

## 2023-03-20 PROCEDURE — 36591 DRAW BLOOD OFF VENOUS DEVICE: CPT

## 2023-03-20 PROCEDURE — 80053 COMPREHEN METABOLIC PANEL: CPT

## 2023-03-20 PROCEDURE — 85610 PROTHROMBIN TIME: CPT

## 2023-03-20 PROCEDURE — 120N000002 HC R&B MED SURG/OB UMMC

## 2023-03-20 PROCEDURE — 88188 FLOWCYTOMETRY/READ 9-15: CPT | Performed by: PATHOLOGY

## 2023-03-20 PROCEDURE — 250N000011 HC RX IP 250 OP 636: Performed by: INTERNAL MEDICINE

## 2023-03-20 PROCEDURE — 84550 ASSAY OF BLOOD/URIC ACID: CPT

## 2023-03-20 PROCEDURE — 250N000012 HC RX MED GY IP 250 OP 636 PS 637: Performed by: INTERNAL MEDICINE

## 2023-03-20 PROCEDURE — 250N000009 HC RX 250: Performed by: RADIOLOGY

## 2023-03-20 PROCEDURE — 96450 CHEMOTHERAPY INTO CNS: CPT

## 2023-03-20 PROCEDURE — 85025 COMPLETE CBC W/AUTO DIFF WBC: CPT

## 2023-03-20 PROCEDURE — 99233 SBSQ HOSP IP/OBS HIGH 50: CPT | Mod: FS | Performed by: PHYSICIAN ASSISTANT

## 2023-03-20 PROCEDURE — 82945 GLUCOSE OTHER FLUID: CPT | Performed by: PHYSICIAN ASSISTANT

## 2023-03-20 PROCEDURE — 250N000013 HC RX MED GY IP 250 OP 250 PS 637: Performed by: INTERNAL MEDICINE

## 2023-03-20 PROCEDURE — 89050 BODY FLUID CELL COUNT: CPT | Performed by: PHYSICIAN ASSISTANT

## 2023-03-20 PROCEDURE — 84157 ASSAY OF PROTEIN OTHER: CPT | Performed by: PHYSICIAN ASSISTANT

## 2023-03-20 PROCEDURE — 85384 FIBRINOGEN ACTIVITY: CPT

## 2023-03-20 PROCEDURE — 250N000013 HC RX MED GY IP 250 OP 250 PS 637

## 2023-03-20 PROCEDURE — 87015 SPECIMEN INFECT AGNT CONCNTJ: CPT | Performed by: PHYSICIAN ASSISTANT

## 2023-03-20 PROCEDURE — 84100 ASSAY OF PHOSPHORUS: CPT

## 2023-03-20 PROCEDURE — 3E0R305 INTRODUCTION OF OTHER ANTINEOPLASTIC INTO SPINAL CANAL, PERCUTANEOUS APPROACH: ICD-10-PCS | Performed by: RADIOLOGY

## 2023-03-20 PROCEDURE — 96450 CHEMOTHERAPY INTO CNS: CPT | Mod: GC | Performed by: RADIOLOGY

## 2023-03-20 PROCEDURE — 88184 FLOWCYTOMETRY/ TC 1 MARKER: CPT | Performed by: PHYSICIAN ASSISTANT

## 2023-03-20 PROCEDURE — 258N000003 HC RX IP 258 OP 636: Performed by: INTERNAL MEDICINE

## 2023-03-20 PROCEDURE — 77003 FLUOROGUIDE FOR SPINE INJECT: CPT | Mod: 26 | Performed by: RADIOLOGY

## 2023-03-20 PROCEDURE — 99418 PROLNG IP/OBS E/M EA 15 MIN: CPT | Performed by: PHYSICIAN ASSISTANT

## 2023-03-20 RX ORDER — LIDOCAINE HYDROCHLORIDE 10 MG/ML
5 INJECTION, SOLUTION EPIDURAL; INFILTRATION; INTRACAUDAL; PERINEURAL ONCE
Status: COMPLETED | OUTPATIENT
Start: 2023-03-20 | End: 2023-03-20

## 2023-03-20 RX ADMIN — ACYCLOVIR 400 MG: 400 TABLET ORAL at 20:29

## 2023-03-20 RX ADMIN — TEMAZEPAM 7.5 MG: 7.5 CAPSULE ORAL at 21:53

## 2023-03-20 RX ADMIN — ALLOPURINOL 300 MG: 300 TABLET ORAL at 08:07

## 2023-03-20 RX ADMIN — LIDOCAINE HYDROCHLORIDE 5 ML: 10 SOLUTION INTRAVENOUS at 13:21

## 2023-03-20 RX ADMIN — POLYETHYLENE GLYCOL 3350 17 G: 17 POWDER, FOR SOLUTION ORAL at 10:06

## 2023-03-20 RX ADMIN — ONDANSETRON HYDROCHLORIDE 16 MG: 8 TABLET, FILM COATED ORAL at 14:39

## 2023-03-20 RX ADMIN — CARBAMIDE PEROXIDE 6.5% 3 DROP: 6.5 LIQUID AURICULAR (OTIC) at 20:29

## 2023-03-20 RX ADMIN — SENNOSIDES AND DOCUSATE SODIUM 2 TABLET: 50; 8.6 TABLET ORAL at 20:44

## 2023-03-20 RX ADMIN — ACYCLOVIR 400 MG: 400 TABLET ORAL at 08:07

## 2023-03-20 RX ADMIN — VINCRISTINE SULFATE 0.7 MG: 1 INJECTION, SOLUTION INTRAVENOUS at 14:55

## 2023-03-20 RX ADMIN — PREDNISONE 100 MG: 50 TABLET ORAL at 16:13

## 2023-03-20 RX ADMIN — PANTOPRAZOLE SODIUM 40 MG: 40 TABLET, DELAYED RELEASE ORAL at 08:07

## 2023-03-20 RX ADMIN — CARBAMIDE PEROXIDE 6.5% 3 DROP: 6.5 LIQUID AURICULAR (OTIC) at 08:08

## 2023-03-20 RX ADMIN — OXYCODONE HYDROCHLORIDE AND ACETAMINOPHEN 500 MG: 500 TABLET ORAL at 08:07

## 2023-03-20 RX ADMIN — ETOPOSIDE 85 MG: 20 INJECTION, SOLUTION, CONCENTRATE INTRAVENOUS at 14:55

## 2023-03-20 RX ADMIN — PREDNISONE 100 MG: 50 TABLET ORAL at 08:07

## 2023-03-20 RX ADMIN — CYTARABINE: 100 INJECTION INTRATHECAL; INTRAVENOUS; SUBCUTANEOUS at 13:50

## 2023-03-20 RX ADMIN — PANTOPRAZOLE SODIUM 40 MG: 40 TABLET, DELAYED RELEASE ORAL at 16:13

## 2023-03-20 ASSESSMENT — ACTIVITIES OF DAILY LIVING (ADL)
ADLS_ACUITY_SCORE: 20
DEPENDENT_IADLS:: INDEPENDENT
ADLS_ACUITY_SCORE: 20

## 2023-03-20 NOTE — PLAN OF CARE
1611-2077  /69 (BP Location: Left arm)   Pulse 91   Temp 97.9  F (36.6  C) (Oral)   Resp 18   Wt 60.1 kg (132 lb 6.4 oz)   SpO2 99%   BMI 20.73 kg/m      Afebrile, VSS,  A&O x4, Pleasant,   No N/V or pain.   No acute event, CIVI chemo infusing.   C/o plugged right ear, scheduled ear drop given.   2 PRN senna given.   Temazepam given at bedtime.       NEURO: Alert and oriented x4.      RESPIRATORY: Room Air, denies dizziness, and SOB. Lungs sound, clear.     CARDIO: VSS, denies dizziness, and extremity pain.      GI/: Denies N/V, BS active, BM x1, AUOP.       SKIN: Intact/ No acute change.      ACTIVITY: Independent, walked/paced in the room.      PAIN: Denies pain.    DLA: Port, infusing.     BG: No      PLAN:   Today:  - Continue chemotherapy as ordered; today is Day 3.  - Continue Debrox drops and ear irrigation for cerumen impaction.  - Follow-up formal PET/CT read, still pending from 3/17.  - Scheduled for LP with IT chemo in XR on 3/20 at 1 PM.  - Anticipate discharge to home on 3/21/23, pending completion of the chemotherapy regimen.    Continue monitoring.     * Italic, from provider's note.

## 2023-03-20 NOTE — CONSULTS
Care Management Initial Consult    General Information  Assessment completed with: Patient,    Type of CM/SW Visit: Initial Assessment    Primary Care Provider verified and updated as needed: Yes   Readmission within the last 30 days: planned readmission   Return Category: Planned Surgery  Reason for Consult: discharge planning  Advance Care Planning: Advance Care Planning Reviewed: concerns discussed, questions answered  He is going to have his ACP noterized once he finds someone to do it       Communication Assessment  Patient's communication style: spoken language (English or Bilingual)    Hearing Difficulty or Deaf: no   Wear Glasses or Blind: yes    Cognitive  Cognitive/Neuro/Behavioral: WDL                      Living Environment:   People in home: significant other     Current living Arrangements: house      Able to return to prior arrangements: yes       Family/Social Support:  Care provided by: self  Provides care for: pet(s), no one  Marital Status: Lives with Significant Other  Significant Other          Description of Support System: Supportive, Involved    Support Assessment: Adequate family and caregiver support, Adequate social supports    Current Resources:   Patient receiving home care services: No     Community Resources:  (EBT card, and Reverse-mortgage loan for his house)  Equipment currently used at home: none  Supplies currently used at home: None    Employment/Financial:  Employment Status: self-employed        Financial Concerns:     Referral to Financial Worker: No     Functional Status:  Prior to admission patient needed assistance:   Dependent ADLs:: Independent  Dependent IADLs:: Independent       Mental Health Status:  Mental Health Status: No Current Concerns       Chemical Dependency Status:  Chemical Dependency Status: No Current Concerns             Values/Beliefs:  Spiritual, Cultural Beliefs, Quaker Practices, Values that affect care: no               Additional  "Information:  \"72 year old male with a past medical history significant for complete heart block s/p pacemaker placement and low-grade kappa-restricted plasmacytoid B-cell lymphoma diagnosed 3/2004, now with transformation to 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 He was admitted on 3/17/23 for C4 DA-R-EPOCH (C5 total of chemotherapy). \" Per Liyah Padgett PA-C note on 3/17.     11:25 SW called Patient because he is Covid positive. Shahid's PCP is a NP: Haleigh Montero, at the [facility] in [place]. Shahid had questions about notarizing his Advanced Care Plan but SW informed him that we do not notarizing anymore. Shahid states that he lives with his significant other Reshma and their dog in a house on [address] acres of land in [facility] [place] (about 3 hours away). 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 He let this SW know that he is doing well mentally with his cancer coming back he states that he took a few days and then didn't want to \"stay depressed because that doesn't help\". He has had this cancer 19 years ago and describes being totally cancer free for 19 years up until this point.  He describes how optimistic he is with seeing his scans come back and seeing the chemotherapy has helped his condition. Shahid states that he doesn't have medicare but rather a [place] equivalent \"senior-care\" and his copay for medications is only $5 but he struggles at times with paying out of pocket costs when he stays in [place] for treatment, because Senior-care only can be utilized in [place].The only other resources he utilizes is his EBT card that he applied for and he gets heating assistance as well. There are no concerns with Mental Health, Financial, Spiritual, or Chemical dependence. There are no anticipated discharge needs at this time. This SW is signing off.      Adiel Ramos, SIENA, SW  Float   Covering 7C SW  Ph: " [phone] (for today)

## 2023-03-20 NOTE — PROGRESS NOTES
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 He was admitted on 3/17/23 for C4 DA-R-EPOCH (C5 total of chemotherapy).     Today:  - Continue chemotherapy as ordered; today is Day 4.  - Continue Debrox drops and ear irrigation for cerumen impaction.  - Completed LP with IT chemo in XR on 3/20 at 1 PM.  - Anticipate discharge to home on 3/21/23, pending completion of the chemotherapy regimen.  - Called [facility] [place] - confirmed OP follow up as below.     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 Ruelas. Pt has a h/o low-grade kappa restricted plasmacytoid B-cell lymphoma (diagnosed in 3/2004) treated with x6 cycles of fludarabine based chemo and XRT to spine, then has been on surveillance since 2005. He presented with progressive B-symptoms, abdominal pain, and SOB. PET 12/9 showed extensive lymphomatous involvement to the right pleura w/ bilateral FDG-avid effusions (large R, small L), mediastinal and pericardial regions, right anterolateral chest wall, adenopathy above and below the diaphragm, liver, spleen and multiple sites in the skeleton, concerning for transformation from his low-grade lymphoma. He was also noted to have progressive pancytopenia. Due to worsening shortness of breath and delays of biopsy, patient presented to ED on 12/14 and was subsequently admitted for expedited workup and treatment. As SUV of the sternoclavicular mass was the  highest, patient underwent US-guided sternoclavicular soft tissue mass biopsy with IR 12/15, pathology from which confirmed a diagnosis of high-grade B-cell lymphoma. Baseline ECHO (12/15) with LVEF 60-65%, mild aortic insufficiency, no evidence of effusion or tamponade. Received 1 cycle of R-CHOP (B8S5=3912/20/22) which was well-tolerated. Cytogenetics then returned, revealed rearrangements in MYC, BCL2, and BCL6, consistent with triple hit lymphoma. He was subsequently changed to DA-R-EPOCH and proceeded with C1 on 1/11/23 (C2 total of therapy). He was re-admitted for C2 on 2/2/23, C3 on 2/22/23, C4 on 3/17/23. Cycles have been overall well-tolerated, save for anticipated fatigue and cytopenias. Of note, patient has not advanced past Level 1 per personal preference/concerns for tolerance.  - CNS IPI was 4 (1 point each for age, LDH, stage IV disease, and extranodal involvement); as such, CNS ppx with IT chemotherapy was recommended. CSF flow negative (1/12) with C1. CSF flow negative (2/6) with C2. Difficult bedside access with C1; subsequently LPs done in XR.  - LP with IT chemo in XR again planned during admission with C4, scheduled in XR on 3/20 at 1 PM  - PET/CT (post-4 cycles of chemo) done 3/17/23 with continued CR, single area of avidity in right cardiac pleura (although deemed likely artifact vs physiologic) with recommendation for cardiac prep/ketogenic diet for 3 days prior to next PET/CT.  - Note: this is overall C5 of chemotherapy (1 cycle R-CHOP + 4 cycles R-EPOCH); current plan is for 6 total cycles (1 additional cycle of R-EPOCH), followed by an EOT PET/CT.                             Treatment Plan: DA-R- EPOCH. C4D1=3/17/23                           - Rituximab 375 mg/m2 IV x1 dose -- Day 1                           - Etoposide 50 mg/m2 IV q22h x4 doses -- Days 1-4                           - Vincristine 0.4 mg/m2 IV q22h x4 doses --  Days 1-4                           - Doxorubicin 10 mg/m2 IV q22h  "x4 doses -- Days 1-4                           - Cyclophosphamide 750 mg/m2 IV x1 dose -- Day 5                           - Prednisone 60 mg/m2 PO BID x10 doses -- Days 1-5                           - Dexamethasone 8 mg daily x2 doses -- Days 6-7                           - Neulasta 6 mg subQ x1 dose -- requested at [facility] for 3/23, await scheduling                           - Pre-meds: Tylenol, Benadryl, Zofran, Emend     # Anemia   Secondary to chemotherapy and underlying lymphoma.   - Monitor CBC daily  - Transfuse if Hgb <7 and plt <10k     CV  # History of complete heart block s/p pacemaker placement (2020)  Followed by Dr. Fox; last seen 7/7/22. Pacemaker last interrogated on 3/2/23.  - No acute inpatient management needs  - Continue cardiology follow-up and device checks as recommended - next planned follow-up in 07/2023    # Superficial thrombosis of the left basilic and cephalic veins  Noted on admission. Patient reported ~3 day history of \"lump\" to left forearm, which he notes overlies a vein. Lesion is slightly tender to palpation, non-fluctuant. No history of trauma. DDx includes superficial venous thrombosis, hematoma, or small abscess.  - Venous doppler of the LUE (3/17/23) revealed occlusive superficial thrombus of the left basilic and cephalic vein at the level of the mid to upper forearm; no evidence of DVT.  - Warm compresses, APAP PRN  - No current indication for anticoagulation    # Weight gain, likely mild volume overload  Weight up ~7 lbs from dry weight (125 lbs), likely iatrogenic in the setting of IV volume. No clinical evidence of peripheral or pulmonary edema at this juncture.  - Follow I/Os, daily weights  - Ordered 20 mg IV Lasix x1 dose PRN - patient usually takes x1 prior to discharge, but likes to determine the timing of this himself  - Anticipate auto-diuresis post-discharge, once IV chemo is complete    ID  # Asymptomatic COVID-19 infection, persistent  Noted to be COVID+ on " PCR done 1/9/23. Admitted for planned chemotherapy, as above, and treated with IV remdesivir x3 days (1/11-1/13). Despite treatment, patient has remained PCR positive for the last 2 months since diagnosis and was positive again on admission (3/17/23). Cycle threshold 31.4. Per infection prevention, patient needs to remain in precautions until cycle threshold >35.  - Continue special precautions for now  - No current clinical s/sx of COVID-19 infection; monitor clinically with initiation of chemotherapy     # Hypogammaglobulinemia   IgG 310 (12/2022). Status-post IVIG on 1/15/23; plan to recheck IgG in ~3 months from last dose (~4/2023) per prior discussion with Dr. Ruelas.     # ID ppx  - Acyclovir 400 mg BID  - No current indication for bacterial/fungal ppx; start for ANC <1000  - S/p IV pentamidine on 3/18; next due ~4/14     MISC  # GERD - related to previous XRT to the chest, continue PTA omeprazole.  # Insomnia - PTA on temazepam 7.5 mg HS PRN for sleep; last prescribed #5 tablets on 1/13/23 (takes on steroid days)  # Cerumen impaction of the right EAC - noted via otoscopy on 3/18; Debrox drops BID and ear irrigation by nursing PRN     FEN:  - No mIVF; bolus PRN  - Lyte replacement PRN per standing protocol  - Regular diet as tolerated     Prophy/Misc:  - GI/PUD: PTA omeprazole  - Bowels: PRN Miralax and Senna  - DVT: ppx enoxaparin; hold for platelets <50K and prior to LP  - ID: as above  - Activity: encourage ambulation    Clinically Significant Risk Factors              # Hypoalbuminemia: Lowest albumin = 3.3 g/dL at 3/20/2023  6:41 AM, will monitor as appropriate                  Code Status: FULL    Disposition: Inpatient admission to [facility] for C4 of R-DA-EPOCH. Anticipate discharge to home pending completion of the chemotherapy regimen, likely 3/21.    Follow up:   - Neulasta/labs - 3/23, 1p  - 2x weekly labs at local clinic (orders refaxed x3/20) - patient will be able to arrange for his preferred  timing at 3/23 visit.    Staffed with Dr. Hernandez.    Jacki Jefferson PA-C ([facility])  Hematology/Oncology  #[id number]    I spent 60 minutes in the care of this patient today, which included time necessary for review of interval events, obtaining history and physical exam, ordering medication(s)/test(s) as medically indicated, discussion with interdisciplinary/consult team(s), and documentation time. Over 50% of time was spent face-to-face and/or coordinating care.    Interval History   No acute overnight events. Shahid continues to feel well. His ear is bothering him more today, although he has continued debrox and we talked about doing another irrigation this PM after chemotherapy IT. Weight uptrending a bit but very minimal peripheral edema. We discussed the possibility of IV lasix tomorrow AM prior to discharge. Denies mouth sores, nausea, vomiting. No cough, CP, SOB. Reviewed interval lab results and plan of care for the day. All questions and concerns addressed at bedside.     A comprehensive review of symptoms was performed and was negative except as detailed in the interval history above.    Physical Exam   Vital Signs with Ranges  Temp:  [97.5  F (36.4  C)-97.9  F (36.6  C)] 97.5  F (36.4  C)  Pulse:  [79-91] 90  Resp:  [16-18] 16  BP: (107-141)/(59-72) 130/72  SpO2:  [95 %-99 %] 98 %    I/O last 3 completed shifts:  In: 1387.6 [P.O.:240; IV Piggyback:1147.6]  Out: 550 [Urine:550]    Vitals:    03/18/23 0906 03/19/23 0803 03/20/23 0826   Weight: 58.3 kg (128 lb 9.6 oz) 60.1 kg (132 lb 6.4 oz) 62.6 kg (138 lb)     Constitutional: Pleasant and cooperative male. Awake, alert, NAD. Thin and frail appearing, but non-toxic.  HEENT: NC/AT, EOMI, sclera clear, conjunctiva normal, OP with MMM. Ears not examined today.  Respiratory: No increased work of breathing, CTAB, no crackles or wheezing.  Cardiovascular: RRR, no murmur noted. No peripheral edema. Palpable cord to the left forearm without overlying warmth or fluctuance,  no significant TTP, no red streaking - not examined 3/20  GI: Normal bowel sounds, soft, non-distended and non-tender.  Skin: Warm, dry, well-perfused. No bruising, bleeding, rashes, or lesions on limited exam.  Neurologic: A&O. Answers questions appropriately. Moves all extremities spontaneously.  Psych: Calm, appropriate affect  Vascular access:  Port on right chest wall CDI, non-tender, no surrounding erythema.    Medications     - MEDICATION INSTRUCTIONS -           acyclovir  400 mg Oral Q12H     allopurinol  300 mg Oral Daily     carbamide peroxide  3-5 drop Right Ear BID     Chemotherapy Infusing-Continuous Infusion   Does not apply Q8H     [START ON 3/21/2023] cyclophosphamide (CYTOXAN) infusion  750 mg/m2 (Treatment Plan Recorded) Intravenous Once     [START ON 3/23/2023] dexamethasone  8 mg Oral Daily     [START ON 3/23/2023] dextrose 5% water  10-20 mL Intracatheter Daily at 8 pm     [START ON 3/23/2023] dextrose 5% water  10-20 mL Intracatheter Daily at 8 pm     etoposide  50 mg/m2 (Treatment Plan Recorded) Intravenous Q22H     [START ON 3/23/2023] filgrastim-aafi  5 mcg/kg (Treatment Plan Recorded) Intravenous Daily at 8 pm     [START ON 3/21/2023] fosaprepitant (EMEND) intermittent infusion ( mL)  150 mg Intravenous Once     ondansetron  16 mg Oral Q22H     pantoprazole  40 mg Oral BID AC     predniSONE  100 mg Oral BID     senna-docusate  2 tablet Oral BID     sodium chloride (PF)  3 mL Intracatheter Q8H     sodium chloride (PF)  3 mL Intracatheter Q8H     vinCRIStine/DOXOrubicin (ONCOVIN/ADRIAMYCIN) infusion  0.4 mg/m2 (Treatment Plan Recorded) Intravenous Q22H     vitamin C  500 mg Oral QAM       Antiinfectives  Anti-infectives (From now, onward)    Start     Dose/Rate Route Frequency Ordered Stop    03/17/23 2000  acyclovir (ZOVIRAX) tablet 400 mg        Note to Pharmacy: PTA Sig:Take 1 tablet (400 mg) by mouth every 12 hours for 120 days      400 mg Oral EVERY 12 HOURS 03/17/23 9085             Data   CBC   Recent Labs   Lab 03/20/23  0641 03/19/23  0632 03/18/23  0642 03/17/23  1540   WBC 6.5 8.2 6.1 7.3   RBC 2.44* 2.58* 2.76* 3.15*   HGB 8.0* 8.5* 8.9* 10.4*   HCT 25.3* 26.7* 28.5* 32.7*   * 104* 103* 104*   MCH 32.8 32.9 32.2 33.0   MCHC 31.6 31.8 31.2* 31.8   RDW 16.1* 16.0* 16.0* 16.3*   * 159 184 258       CMP   Recent Labs   Lab 03/20/23  0641 03/19/23  0632 03/18/23  0642 03/17/23  1540    142 143 144   POTASSIUM 3.8 4.0 4.3 4.5   CHLORIDE 112* 110* 109* 108*   CO2 23 22 24 25   ANIONGAP 8 10 10 11   * 133* 124* 121*   BUN 29.0* 28.4* 28.3* 31.3*   CR 0.77 0.78 0.71 0.96   GFRESTIMATED >90 >90 >90 83   JORGE 8.8 9.1 9.1 9.4   MAG 2.3 2.2 1.9 2.5*   PHOS 3.6 3.7 3.4 4.5   PROTTOTAL 4.6* 5.0* 5.2* 6.1*   ALBUMIN 3.3* 3.4* 3.5 4.2   BILITOTAL 0.2 <0.2 0.2 0.2   ALKPHOS 69 78 87 101   AST 19 19 21 26   ALT 20 19 20 24       LFTs   Recent Labs   Lab 03/20/23  0641   PROTTOTAL 4.6*   ALBUMIN 3.3*   BILITOTAL 0.2   ALKPHOS 69   AST 19   ALT 20       Coagulation Studies   Recent Labs   Lab 03/20/23  0641   INR 1.12

## 2023-03-20 NOTE — PLAN OF CARE
Goal Outcome Evaluation:    /67 (BP Location: Left arm)   Pulse 79   Temp 97.8  F (36.6  C) (Oral)   Resp 18   Wt 60.1 kg (132 lb 6.4 oz)   SpO2 98%   BMI 20.73 kg/m      [id number]:    Reason for admission: C4 DA-R-EPOCH (C5 total of chemotherapy)     Activity: Up independently.  Pain: Denies pain.  Neuro: A&Ox4. Mild numbness in lower extremities (feet).   Cardiac: Denies chest pain.  Respiratory: Denies shortness of breath.  GI/: Voiding spontaneously. No BM overnight. Denies nausea and vomiting.  Diet: Regular.  Lines: Port, infusing.  Wounds: Lump left forearm.(occlusive superficial thrombus).      Plan: LP with IT chemo 03/20/2023 at 1pm. Discharge to home 03/21/2023 pending completion of chemotherapy.       Continue to monitor and follow POC

## 2023-03-21 VITALS
SYSTOLIC BLOOD PRESSURE: 132 MMHG | DIASTOLIC BLOOD PRESSURE: 75 MMHG | HEART RATE: 68 BPM | OXYGEN SATURATION: 94 % | BODY MASS INDEX: 22.17 KG/M2 | RESPIRATION RATE: 18 BRPM | WEIGHT: 141.6 LBS | TEMPERATURE: 97.9 F

## 2023-03-21 LAB
ALBUMIN SERPL BCG-MCNC: 3.2 G/DL (ref 3.5–5.2)
ALP SERPL-CCNC: 64 U/L (ref 40–129)
ALT SERPL W P-5'-P-CCNC: 24 U/L (ref 10–50)
ANION GAP SERPL CALCULATED.3IONS-SCNC: 9 MMOL/L (ref 7–15)
APPEARANCE CSF: CLEAR
AST SERPL W P-5'-P-CCNC: 20 U/L (ref 10–50)
BASOPHILS # BLD AUTO: 0 10E3/UL (ref 0–0.2)
BASOPHILS NFR BLD AUTO: 0 %
BILIRUB SERPL-MCNC: 0.3 MG/DL
BUN SERPL-MCNC: 27.5 MG/DL (ref 8–23)
CALCIUM SERPL-MCNC: 8.7 MG/DL (ref 8.8–10.2)
CHLORIDE SERPL-SCNC: 111 MMOL/L (ref 98–107)
COLOR CSF: COLORLESS
CREAT SERPL-MCNC: 0.74 MG/DL (ref 0.67–1.17)
DEPRECATED HCO3 PLAS-SCNC: 23 MMOL/L (ref 22–29)
EOSINOPHIL # BLD AUTO: 0 10E3/UL (ref 0–0.7)
EOSINOPHIL NFR BLD AUTO: 0 %
ERYTHROCYTE [DISTWIDTH] IN BLOOD BY AUTOMATED COUNT: 15.9 % (ref 10–15)
GFR SERPL CREATININE-BSD FRML MDRD: >90 ML/MIN/1.73M2
GLUCOSE SERPL-MCNC: 99 MG/DL (ref 70–99)
HCT VFR BLD AUTO: 24.5 % (ref 40–53)
HGB BLD-MCNC: 8.2 G/DL (ref 13.3–17.7)
HOLD SPECIMEN: NORMAL
IMM GRANULOCYTES # BLD: 0 10E3/UL
IMM GRANULOCYTES NFR BLD: 1 %
LYMPHOCYTES # BLD AUTO: 0.4 10E3/UL (ref 0.8–5.3)
LYMPHOCYTES NFR BLD AUTO: 8 %
MAGNESIUM SERPL-MCNC: 2.4 MG/DL (ref 1.7–2.3)
MCH RBC QN AUTO: 33.6 PG (ref 26.5–33)
MCHC RBC AUTO-ENTMCNC: 33.5 G/DL (ref 31.5–36.5)
MCV RBC AUTO: 100 FL (ref 78–100)
MONOCYTES # BLD AUTO: 0.2 10E3/UL (ref 0–1.3)
MONOCYTES NFR BLD AUTO: 4 %
NEUTROPHILS # BLD AUTO: 4.2 10E3/UL (ref 1.6–8.3)
NEUTROPHILS NFR BLD AUTO: 87 %
NRBC # BLD AUTO: 0 10E3/UL
NRBC BLD AUTO-RTO: 0 /100
PATH REPORT.COMMENTS IMP SPEC: NORMAL
PATH REPORT.FINAL DX SPEC: NORMAL
PATH REPORT.MICROSCOPIC SPEC OTHER STN: NORMAL
PATH REPORT.RELEVANT HX SPEC: NORMAL
PATH REV: NORMAL
PHOSPHATE SERPL-MCNC: 3.8 MG/DL (ref 2.5–4.5)
PLATELET # BLD AUTO: 114 10E3/UL (ref 150–450)
POTASSIUM SERPL-SCNC: 3.6 MMOL/L (ref 3.4–5.3)
PROT SERPL-MCNC: 4.5 G/DL (ref 6.4–8.3)
RBC # BLD AUTO: 2.44 10E6/UL (ref 4.4–5.9)
RBC # CSF MANUAL: 0 /UL (ref 0–2)
SODIUM SERPL-SCNC: 143 MMOL/L (ref 136–145)
TUBE # CSF: 3
WBC # BLD AUTO: 4.8 10E3/UL (ref 4–11)
WBC # CSF MANUAL: 0 /UL (ref 0–5)

## 2023-03-21 PROCEDURE — 250N000013 HC RX MED GY IP 250 OP 250 PS 637

## 2023-03-21 PROCEDURE — 85004 AUTOMATED DIFF WBC COUNT: CPT | Performed by: PHYSICIAN ASSISTANT

## 2023-03-21 PROCEDURE — 80053 COMPREHEN METABOLIC PANEL: CPT

## 2023-03-21 PROCEDURE — 83735 ASSAY OF MAGNESIUM: CPT

## 2023-03-21 PROCEDURE — 250N000012 HC RX MED GY IP 250 OP 636 PS 637: Performed by: INTERNAL MEDICINE

## 2023-03-21 PROCEDURE — 36591 DRAW BLOOD OFF VENOUS DEVICE: CPT

## 2023-03-21 PROCEDURE — 99239 HOSP IP/OBS DSCHRG MGMT >30: CPT | Mod: FS | Performed by: PHYSICIAN ASSISTANT

## 2023-03-21 PROCEDURE — 258N000003 HC RX IP 258 OP 636: Performed by: INTERNAL MEDICINE

## 2023-03-21 PROCEDURE — 250N000011 HC RX IP 250 OP 636: Performed by: INTERNAL MEDICINE

## 2023-03-21 PROCEDURE — 250N000011 HC RX IP 250 OP 636: Performed by: PHYSICIAN ASSISTANT

## 2023-03-21 PROCEDURE — 84100 ASSAY OF PHOSPHORUS: CPT

## 2023-03-21 RX ORDER — HEPARIN SODIUM,PORCINE 10 UNIT/ML
5-10 VIAL (ML) INTRAVENOUS EVERY 24 HOURS
Status: DISCONTINUED | OUTPATIENT
Start: 2023-03-21 | End: 2023-03-21 | Stop reason: HOSPADM

## 2023-03-21 RX ORDER — HEPARIN SODIUM (PORCINE) LOCK FLUSH IV SOLN 100 UNIT/ML 100 UNIT/ML
5-10 SOLUTION INTRAVENOUS
Status: DISCONTINUED | OUTPATIENT
Start: 2023-03-21 | End: 2023-03-21 | Stop reason: HOSPADM

## 2023-03-21 RX ORDER — PREDNISONE 50 MG/1
100 TABLET ORAL 2 TIMES DAILY
Qty: 4 TABLET | Refills: 0 | Status: ON HOLD | OUTPATIENT
Start: 2023-03-21 | End: 2023-04-10

## 2023-03-21 RX ORDER — DEXAMETHASONE 4 MG/1
8 TABLET ORAL DAILY
Qty: 4 TABLET | Refills: 0 | Status: ON HOLD | OUTPATIENT
Start: 2023-03-23 | End: 2023-04-10

## 2023-03-21 RX ORDER — BACLOFEN 10 MG/1
5-10 TABLET ORAL 3 TIMES DAILY PRN
Qty: 30 TABLET | Refills: 0 | Status: ON HOLD | OUTPATIENT
Start: 2023-03-21 | End: 2023-04-10

## 2023-03-21 RX ORDER — HEPARIN SODIUM,PORCINE 10 UNIT/ML
5-10 VIAL (ML) INTRAVENOUS
Status: DISCONTINUED | OUTPATIENT
Start: 2023-03-21 | End: 2023-03-21 | Stop reason: HOSPADM

## 2023-03-21 RX ADMIN — POLYETHYLENE GLYCOL 3350 17 G: 17 POWDER, FOR SOLUTION ORAL at 09:08

## 2023-03-21 RX ADMIN — PANTOPRAZOLE SODIUM 40 MG: 40 TABLET, DELAYED RELEASE ORAL at 08:58

## 2023-03-21 RX ADMIN — ACYCLOVIR 400 MG: 400 TABLET ORAL at 08:58

## 2023-03-21 RX ADMIN — CARBAMIDE PEROXIDE 6.5% 5 DROP: 6.5 LIQUID AURICULAR (OTIC) at 08:59

## 2023-03-21 RX ADMIN — CYCLOPHOSPHAMIDE 1275 MG: 2 INJECTION, POWDER, FOR SOLUTION INTRAVENOUS; ORAL at 12:54

## 2023-03-21 RX ADMIN — ONDANSETRON HYDROCHLORIDE 16 MG: 8 TABLET, FILM COATED ORAL at 12:16

## 2023-03-21 RX ADMIN — Medication 5 ML: at 14:08

## 2023-03-21 RX ADMIN — PREDNISONE 100 MG: 50 TABLET ORAL at 08:58

## 2023-03-21 RX ADMIN — OXYCODONE HYDROCHLORIDE AND ACETAMINOPHEN 500 MG: 500 TABLET ORAL at 08:58

## 2023-03-21 RX ADMIN — ALLOPURINOL 300 MG: 300 TABLET ORAL at 08:58

## 2023-03-21 RX ADMIN — FOSAPREPITANT 150 MG: 150 INJECTION, POWDER, LYOPHILIZED, FOR SOLUTION INTRAVENOUS at 12:16

## 2023-03-21 ASSESSMENT — ACTIVITIES OF DAILY LIVING (ADL)
ADLS_ACUITY_SCORE: 20

## 2023-03-21 NOTE — PROGRESS NOTES
Nursing Focus: Chemotherapy    D: Positive blood return via port. Insertion site is clean/dry/intact, dressing intact with no complaints of pain.  Urine output is recorded in intake/output section in Doc Flowsheets.    I: Premedications given per order (see electronic medical administration record). Day # 5 Cytoxan started to infuse over 60 minutes. Reviewed pt teaching on chemotherapy side effects.  Pt denies need for further teaching. Chemotherapy double checked per protocol by two chemotherapy competent RN's.   A: Tolerating procedure well. Denies nausea and or pain.   P: Continue to monitor urine output and symptoms of nausea. Screen for symptoms of toxicity. Notify MD with any concerns and or changes in pt's condition. Continue with plan of care.

## 2023-03-21 NOTE — DISCHARGE SUMMARY
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 He was admitted on 3/17/23 for C4 DA-R-EPOCH (C5 total of chemotherapy) which was tolerated well apart from right ear cerumen impaction, resolved at time of discharge.    On day of discharge, patient states he is feeling well and ready to go home. After using debrox drops x 3 days, ears were flushed with warm water and syringe at bedside today with resultant improvement in hearing. No ear pain. Encouraged to continue using debrox drops at home. Patient otherwise denies any new or worsening concerns. Hgb improved to to 8.2 this morning and discussed possible need this cycle for outpatient transfusions, although no indication currently. He also endorses that he has gotten hiccups with his previous two cycles (and has a history of hiccups for ~19 years since his thoracic radiation), but is interested in trialing a medication, for which baclofen was prescribed. Instructed that if this does not improve, then he should call into clinic. Patient is hemodynamically stable, well appearing and ready for discharge. Questions answered at bedside.    Recommendations for Outpatient:  - Please monitor hiccups - sent rx for baclofen with instructions to call if not improved.  - Please  follow CSF flow.     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 Ruelas. Pt has a h/o low-grade kappa restricted plasmacytoid B-cell lymphoma (diagnosed in 3/2004) treated with x6 cycles of fludarabine based chemo and XRT to spine, then has been on surveillance since 2005. He presented with progressive B-symptoms, abdominal pain, and SOB. PET 12/9 showed extensive lymphomatous involvement to the right pleura w/ bilateral FDG-avid effusions (large R, small L), mediastinal and pericardial regions, right anterolateral chest wall, adenopathy above and below the diaphragm, liver, spleen and multiple sites in the skeleton, concerning for transformation from his low-grade lymphoma. He was also noted to have progressive pancytopenia. Due to worsening shortness of breath and delays of biopsy, patient presented to ED on 12/14 and was subsequently admitted for expedited workup and treatment. As SUV of the sternoclavicular mass was the highest, patient underwent US-guided sternoclavicular soft tissue mass biopsy with IR 12/15, pathology from which confirmed a diagnosis of high-grade B-cell lymphoma. Baseline ECHO (12/15) with LVEF 60-65%, mild aortic insufficiency, no evidence of effusion or tamponade. Received 1 cycle of R-CHOP (T8B5=6512/20/22) which was well-tolerated. Cytogenetics then returned, revealed rearrangements in MYC, BCL2, and BCL6, consistent with triple hit lymphoma. He was  subsequently changed to DA-R-EPOCH and proceeded with C1 on 1/11/23 (C2 total of therapy). He was re-admitted for C2 on 2/2/23, C3 on 2/22/23, C4 on 3/17/23. Cycles have been overall well-tolerated, save for anticipated fatigue and cytopenias. Of note, patient has not advanced past Level 1 per personal preference/concerns for tolerance.  - CNS IPI was 4 (1 point each for age, LDH, stage IV disease, and extranodal involvement); as such, CNS ppx with IT chemotherapy was recommended. CSF flow negative (1/12) with C1. CSF flow negative (2/6) with C2. Difficult bedside access with C1; subsequently LPs done in XR.  - LP with IT chemo in XR again planned during admission with C4, scheduled in XR on 3/20 at 1 PM  - PET/CT (post-4 cycles of chemo) done 3/17/23 with continued CR, single area of avidity in right cardiac pleura (although deemed likely artifact vs physiologic) with recommendation for cardiac prep/ketogenic diet for 3 days prior to next PET/CT.  - Note: this is overall C5 of chemotherapy (1 cycle R-CHOP + 4 cycles R-EPOCH); current plan is for 6 total cycles (1 additional cycle of R-EPOCH), followed by an EOT PET/CT.                             Treatment Plan: DA-R- EPOCH. C4D1=3/17/23                           - Rituximab 375 mg/m2 IV x1 dose -- Day 1                           - Etoposide 50 mg/m2 IV q22h x4 doses -- Days 1-4                           - Vincristine 0.4 mg/m2 IV q22h x4 doses --  Days 1-4                           - Doxorubicin 10 mg/m2 IV q22h x4 doses -- Days 1-4                           - Cyclophosphamide 750 mg/m2 IV x1 dose -- Day 5                           - Prednisone 60 mg/m2 PO BID x10 doses -- Days 1-5                           - Dexamethasone 8 mg daily x2 doses -- Days 6-7                           - Neulasta 6 mg subQ x1 dose -- requested at [facility] for 3/23, await scheduling                           - Pre-meds: Tylenol, Benadryl, Zofran, Emend     # Anemia   Secondary to  "chemotherapy and underlying lymphoma.   - Monitor CBC daily  - Transfuse if Hgb <7 and plt <10k     CV  # History of complete heart block s/p pacemaker placement (2020)  Followed by Dr. Fox; last seen 7/7/22. Pacemaker last interrogated on 3/2/23.  - No acute inpatient management needs  - Continue cardiology follow-up and device checks as recommended - next planned follow-up in 07/2023     # Superficial thrombosis of the left basilic and cephalic veins  Noted on admission. Patient reported ~3 day history of \"lump\" to left forearm, which he notes overlies a vein. Lesion is slightly tender to palpation, non-fluctuant. No history of trauma. DDx includes superficial venous thrombosis, hematoma, or small abscess.  - Venous doppler of the LUE (3/17/23) revealed occlusive superficial thrombus of the left basilic and cephalic vein at the level of the mid to upper forearm; no evidence of DVT.  - Warm compresses, APAP PRN  - No current indication for anticoagulation     # Weight gain, likely mild volume overload  Weight up ~7 lbs from dry weight (125 lbs), likely iatrogenic in the setting of IV volume. No clinical evidence of peripheral or pulmonary edema at this juncture.  - Follow I/Os, daily weights  - Ordered 20 mg IV Lasix x1 dose PRN - patient usually takes x1 prior to discharge, but likes to determine the timing of this himself  - Anticipate auto-diuresis post-discharge, once IV chemo is complete     ID  # Asymptomatic COVID-19 infection, persistent  Noted to be COVID+ on PCR done 1/9/23. Admitted for planned chemotherapy, as above, and treated with IV remdesivir x3 days (1/11-1/13). Despite treatment, patient has remained PCR positive for the last 2 months since diagnosis and was positive again on admission (3/17/23). Cycle threshold 31.4. Per infection prevention, patient needs to remain in precautions until cycle threshold >35.  - Continue special precautions for now  - No current clinical s/sx of COVID-19 " infection; monitor clinically with initiation of chemotherapy     # Hypogammaglobulinemia   IgG 310 (12/2022). Status-post IVIG on 1/15/23; plan to recheck IgG in ~3 months from last dose (~4/2023) per prior discussion with Dr. Ruelas.     # ID ppx  - Acyclovir 400 mg BID  - No current indication for bacterial/fungal ppx; start for ANC <1000  - S/p IV pentamidine on 3/18; next due ~4/14     MISC  # GERD - related to previous XRT to the chest, continue PTA omeprazole.  # Insomnia - PTA on temazepam 7.5 mg HS PRN for sleep; last prescribed #5 tablets on 1/13/23 (takes on steroid days)  # Cerumen impaction of the right EAC - noted via otoscopy on 3/18; Debrox drops BID and ear irrigation by nursing PRN. Resolved on 3/21.    Patient seen in collaboration with attending physician, Dr. Hernandez.    I spent 60 minutes in the care of this patient today, which included time necessary for review of interval events, obtaining history and physical exam, ordering medication(s)/test(s) as medically indicated, discussion with interdisciplinary/consult team(s), and documentation time. Over 50% of time was spent face-to-face and/or coordinating care.    Jacki Jefferson PA-C ([place])  Hematology/Oncology  #[id number]    Significant Results and Procedures   See below    Pending Results   These results will be followed up by OP team  Unresulted Labs Ordered in the Past 30 Days of this Admission     Date and Time Order Name Status Description    3/19/2023  6:00 PM Cerebrospinal fluid Aerobic Bacterial Culture Routine with Gram Stain Preliminary     3/19/2023 12:01 AM Flow Cytometry Cerebrospinal fluid In process           Code Status   Full Code    Primary Care Physician   Provider Not In System    Constitutional: Pleasant and cooperative male. Awake, alert, NAD. Thin and frail appearing, but non-toxic.  HEENT: NC/AT, EOMI, sclera clear, conjunctiva normal, OP with MMM. Ears not examined today.   Respiratory: No increased work of breathing, CTAB,  no crackles or wheezing.  Cardiovascular: RRR, no murmur noted. No peripheral edema. Palpable cord to the left forearm without overlying warmth or fluctuance, no significant TTP, no red streaking - not examined 3/21  GI: Normal bowel sounds, soft, non-distended and non-tender.  Skin: Warm, dry, well-perfused. No bruising, bleeding, rashes, or lesions on limited exam.  Neurologic: A&O. Answers questions appropriately. Moves all extremities spontaneously.  Psych: Calm, appropriate affect  Vascular access:  Port on right chest wall CDI, non-tender, no surrounding erythema.    Time Spent on this Encounter   I, Jacki Pereira PA-C, personally saw the patient today and spent greater than 30 minutes discharging this patient.    Discharge Disposition   Discharged to home  Condition at discharge: Stable    Consultations This Hospital Stay   SOCIAL WORK IP CONSULT  NURSING TO CONSULT FOR VASCULAR ACCESS CARE IP CONSULT    Discharge Orders   No discharge procedures on file.  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)      polyethylene glycol (MIRALAX) 17 GM/Dose powder Take 17 g by mouth daily as needed for constipation  Qty: 510 g, Refills: 4    Associated Diagnoses: 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)      prochlorperazine (COMPAZINE) 10 MG tablet Take 1 tablet (10 mg) by mouth every 6 hours as needed for nausea or vomiting  Qty: 30 tablet, Refills: 3    Associated Diagnoses: 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)      senna-docusate (SENNA S) 8.6-50 MG tablet Take 1 tablet by mouth 2 times daily as needed for constipation  Qty: 60 tablet, Refills: 4    Associated Diagnoses: 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)      temazepam (RESTORIL) 7.5 MG capsule Take 1 capsule (7.5 mg) by mouth nightly as needed for sleep  Qty: 15 capsule, Refills: 0    Associated Diagnoses: 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)           Allergies   Allergies   Allergen Reactions     Ampicillin [Ampicillin Sodium]      Bactrim [Sulfamethoxazole  W/Trimethoprim]      Contrast Dye      CT contrast (IV) allergy - need oral Methylpred as premed for scans     Ampicillin Rash     Data   Most Recent 3 CBC's:Recent Labs   Lab Test 03/21/23  0907 03/20/23  0641 03/19/23  0632   WBC 4.8 6.5 8.2   HGB 8.2* 8.0* 8.5*    104* 104*   * 133* 159      Most Recent 3 BMP's:  Recent Labs   Lab Test 03/21/23  0907 03/20/23  0641 03/19/23  0632    143 142   POTASSIUM 3.6 3.8 4.0   CHLORIDE 111* 112* 110*   CO2 23 23 22   BUN 27.5* 29.0* 28.4*   CR 0.74 0.77 0.78   ANIONGAP 9 8 10   JORGE 8.7* 8.8 9.1   GLC 99 112* 133*     Most Recent 2 LFT's:  Recent Labs   Lab Test 03/21/23  0907 03/20/23  0641   AST 20 19   ALT 24 20   ALKPHOS 64 69   BILITOTAL 0.3 0.2     Most Recent INR's and Anticoagulation Dosing History:  Anticoagulation Dose History     Recent Dosing and Labs Latest Ref Rng & Units 2/25/2023 2/26/2023 2/27/2023 2/28/2023 3/18/2023 3/19/2023 3/20/2023    INR 0.85 - 1.15 1.07 1.09 1.14 1.15 1.04 1.08 1.12        Most Recent 3 Troponin's:No lab results found.  Most Recent Cholesterol Panel:  Recent Labs   Lab Test 12/02/22  1405   CHOL 146   LDL 88  88   HDL 39*   TRIG 94     Most Recent 6 Bacteria Isolates From Any Culture (See EPIC Reports for Culture Details):No lab results found.  Most Recent TSH, T4 and A1c Labs:No lab results found.  Results for orders placed or performed during the hospital encounter of 03/17/23   XR Lumbar Punc Intrathecal Chemo Admin    Narrative    PROCEDURE: Lumbar Puncture using Fluoroscopy, 3/20/2023 2:16 PM    HISTORY:  72-year-old male with triple hit lymphoma, needs CNS ppx    COMPARISON: 2/24/2023    STAFF NEURORADIOLOGIST: Dr. Chon Tan I, CHON TAN MD,  attest that I was present for all critical portions of the procedure  and was immediately available to provide guidance and assistance  during the remainder of the procedure.    FELLOW PHYSICIAN: Dr. Aaron Farmer    MEDICATIONS:  4 cc 1% local  lidocaine    TECHNIQUE: Verbal and written consent for lumbar puncture was obtained  from the patient, and benefits and risk of the procedure were  explained, including but not limited to worsening headache,  hemorrhage, infection, lower extremity pain, or nerve root injury. The  patient was sterilely prepped and draped with the patient in the prone  position, over the lower back. Under fluoroscopic guidance, the  interlaminar spaces were noted. 1% lidocaine was administered for  local anesthetic over the L3-4 interlaminar space, and a 22 gauge 3.5  inch needle was advanced into the thecal sac under fluoroscopic  guidance.      There was initial show of clear CSF. Approximately 7 cc of CSF were  collected.    Hematology/Oncology then administered intrathecal chemotherapy, dose  and rate as determined by Heme/Onc.    The needle was removed with the stylet in place. There was no  immediate complication associated with the procedure. Samples were  sent for the requested laboratory testing.      FLUORO TIME: 33 seconds    DOSE: 16.8 uGym2      Impression    IMPRESSION: Successful lumbar puncture without immediate complication.    PLAN: Patient discharged to patient care unit in stable condition for  monitoring. Orders placed for 1 hour of bed rest, with patient laying  on the back and the head of the bed flat.    I have personally reviewed the examination and initial interpretation  and I agree with the findings.    CHON TAN MD         SYSTEM ID:  [id number]   US Upper Extremity Venous Duplex Left    Narrative    EXAMINATION: DOPPLER VENOUS ULTRASOUND OF THE LEFT UPPER EXTREMITY,  3/17/2023 5:57 PM     COMPARISON: None.    HISTORY: 72M w/ high-grade B-cell lymphoma, palpable nodule to the  left forearm, evaluate for VTE 72M w/ high-grade B-cell lymphoma,  palpable nodule to the left forearm, evaluate for VTE    TECHNIQUE:  Gray-scale evaluation with compression, spectral flow and  color Doppler assessment of the  deep venous system of the left upper  extremity.    FINDINGS:  Left: Normal blood flow and waveforms are demonstrated in the internal  jugular, innominate, subclavian, and axillary veins.     No compressibility of the left basilic and cephalic vein from the  level of the upper to mid forearm in the region of swelling.       Impression    IMPRESSION:  1.  Occlusive superficial thrombus of the left basilic and cephalic  vein at the level of the mid to upper forearm in the region of  swelling.  2.  No deep venous thrombosis of the left upper extremity.    I have personally reviewed the examination and initial interpretation  and I agree with the findings.    TEVIN BAUER MD         SYSTEM ID:  [id number]

## 2023-03-21 NOTE — PLAN OF CARE
Goal Outcome Evaluation:         Discharge to Home:  D: Orders for discharge and outpatient medications written. Pt verbalized readiness to discharge to home this afternoon.  I: Port heparin locked and de-accessed per protocol. Home medications and return to clinic schedule reviewed with patient. Discharge instructions and parameters for calling Health Care Provider reviewed. Home medications returned to pt. Patient ambulated off of the unit at 1500 accompanied by his wife.   A: Patient verbalized understanding of all discharge medications and discharge paperwork and was ready for discharge.   P: Patient instructed to  medications at the discharge pharmacy prior to leaving the hospital. Follow up as scheduled/documented on discharge paperwork.

## 2023-03-21 NOTE — PLAN OF CARE
[id number]  Goal Outcome Evaluation:  Pt A&Ox4, VSS on RA. Denies pain/N/V. Port infusing CIVI chemo (see MAR). Good appetite with dinner. Adequate UOP, no BM this shift. Sleep/rest promoted overnight with temazepam x1 and clustered cares. Makes needs known. Continue to monitor and with POC.

## 2023-03-22 ENCOUNTER — PATIENT OUTREACH (OUTPATIENT)
Dept: ONCOLOGY | Facility: CLINIC | Age: 73
End: 2023-03-22
Payer: MEDICARE

## 2023-03-22 NOTE — PROGRESS NOTES
[facility]: Cancer Care                                                                                        [facility]/Voicemail    Clinical Data: Care Coordinator Outreach    Outreach attempted x 1.  Left message on patient's voicemail with call back information and requested return call.    Plan: Care Coordinator will do no further outreaches at this time.    Called to follow up on discharge. Acknowledged his appointments tomorrow for labs and [place]uel[place]a as well as twice weekly labs per usual schedule. We will monitor in the background and call with concerns. Encouraged him to call me with any issues, concerns, or questions. Scheduling team will work on next cycle's appointments.    Signature:  Alton Lainez DNP, RN, OCN  RN Care Coordinator   Dr. Domitila Ruelas and Dr. Jeannette Head  [facility]

## 2023-03-25 LAB
BACTERIA CSF CULT: NO GROWTH
GRAM STAIN RESULT: NORMAL
GRAM STAIN RESULT: NORMAL

## 2023-03-30 ENCOUNTER — PATIENT OUTREACH (OUTPATIENT)
Dept: ONCOLOGY | Facility: CLINIC | Age: 73
End: 2023-03-30
Payer: MEDICARE

## 2023-03-30 DIAGNOSIS — Z11.52 ENCOUNTER FOR SCREENING LABORATORY TESTING FOR SEVERE ACUTE RESPIRATORY SYNDROME CORONAVIRUS 2 (SARS-COV-2): ICD-10-CM

## 2023-03-30 NOTE — PROGRESS NOTES
[facility]: Cancer Care                                                                                        [facility]/Voicemail    Clinical Data: Care Coordinator Outreach    Outreach attempted x 1.  Left message on patient's voicemail with call back information and requested return call.    Plan: Care Coordinator will try to reach patient again in 1-2 business days.    Left detailed voicemail reviewing appointments for next cycle of therapy. Also let him know he will need a COVID test completed next Monday when he has labs collected at [facility]. Asked that he call me back to confirm he received this message.    ADDENDUM 9:30 AM   Patient called back and confirmed the message above.    Signature:  Alton Lainez, CHANELLE, RN, OCN  RN Care Coordinator   Dr. Domitila Ruelas and Dr. Jeannette Head  [facility]      What Type Of Note Output Would You Prefer (Optional)?: Standard Output Has Your Skin Lesion Been Treated?: not been treated Is This A New Presentation, Or A Follow-Up?: Skin Lesion

## 2023-04-03 NOTE — PROGRESS NOTES
Care Management Follow Up    RNCC faxed orders for outpatient Neulasta injection and labs to [facility].   RNCC spoke with Demetra at the appointment desk and set up labs, however, Demetra stated the Neulasta injection order has not  been entered yet.  RNCC will follow up to get Neulasta injection scheduled.    The first three lab appointments have been set up, patient will need to set up the remaining appointments.    Labs:  Friday March 3 at 8:45am  Tuesday March 7 at 9:00 am  Friday March 10 at 9:15.     RNCC included lab appointments and times on discharge paperwork.    Update: 1546 - RNCC called [facility] again to attempt to schedule Neulasta injection. RNCC was advised that the people who schedule this are gone for the day, and to call back on Monday.    [address] [address]  Phone: [phone] -   [facility] department [phone]  Fax: [phone]    Margaret Templeton RN, BSN  Care Coordinator 7D  Office: [phone]  Pager: [phone]    To contact the weekend RNCC  [place] (0800 - 1630) Saturday and Sunday    Units: 4A, 4C, 4E, 5A and 5B- Pager 1: [phone]    Units: 6A, 6B, 6C, 6D- Pager 2: [phone]    Units: 7A, 7B, 7C, 7D, and 5C-Pager 3: [phone]           SCHEDULED MEDICATIONS GIVEN. TOLERATING WELL.

## 2023-04-05 NOTE — PROGRESS NOTES
[facility]  Date of visit: Apr 6, 2023      Reason for Visit: Follow up triple-hit DLBCL      ONCOLOGIC SUMMARY:  Diagnosis:    Low-grade kappa-restricted plasmacytoid B-cell lymphoma dx 3/2004, presenting with thoracic paraspinal mass. Bone marrow hypercellular with 70-80% involvement by small kappa-restricted lymphocytes which were positive for CD10, CD19, and CD20 and negative for CD5 and CD23.     Transformation to high-grade B-cell lymphoma 12/2022 (versus new primary), presenting with B symptoms, sternal mass, and SOB/chest pain. Biopsy of sternal mass showed large B-cell lymphoma with aggressive features (GCB-subtype), Ki67 %, FISH positive for MYC (non-IgH partner), BCL2, and BCL6 rearrangements. Stage IV with extensive lymphomatous involvement to the R pleura w/avid effusions, mediastinal and pericardial regions, right anterolateral chest wall, adenopathy above and below the diaphragm, liver, spleen and multiple sites in the skeleton. CSF flow negative     Treatment history:  For low-grade lymphoma:  1. 2005: Fludarabine-based chemo x 6 cycles and XRT to spine (details unclear)    For high-grade lymphoma:  1. 12/20/22: Cycle 1 R-CHOP with Neulasta support (with a few days of steroid prephase prior)  2. 1/11/23: Cycle 2 Switched to DA-R-EPOCH with triple IT chemo for CNS ppx after FISH returned showing triple-hit disease. PET after 2 cycles shows very good MI.   3. 2/2/23: Cycle 3 DA-R EPOCH with triple IT chemo for CNS ppx  4. 2/23/23: Cycle 4 DA-R-EPOCH  with triple IT chemo for CNS ppx  5. 3/17/23: Cycle 5 DA-R-EPOCH with triple IT chemo for CNS ppx      Interval history:   Shahid is here today prior to admission for C6 R-EPOCH. Felt like this past cycle went a little better than C4. 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  This has all improved  over the past week, feeling fairly well today. Continues to clear his throat a lot, has been happening since his COVID diagnosis 4 months ago, continues to test positive.  No fevers. Still getting hiccups occasionally but felt like Baclofen helped.  Lump on left forearm still minimally present but much improved from last visit.     ROS: 10 point ROS neg other than the symptoms noted above in the HPI.      Current Outpatient Medications   Medication Sig Dispense Refill     acyclovir (ZOVIRAX) 400 MG tablet Take 1 tablet (400 mg) by mouth every 12 hours for 120 days 60 tablet 3     allopurinol (ZYLOPRIM) 300 MG tablet Take 1 tablet (300 mg) by mouth daily 60 tablet 3     ascorbic acid (VITAMIN C) 500 MG tablet Take 500 mg by mouth every morning       baclofen (LIORESAL) 10 MG tablet Take 0.5-1 tablets (5-10 mg) by mouth 3 times daily as needed for other (hiccups) 30 tablet 0     carbamide peroxide (DEBROX) 6.5 % otic solution Place 3-5 drops into the right ear 2 times daily 15 mL 0     dexamethasone (DECADRON) 4 MG tablet Take 2 tablets (8 mg) by mouth daily On 3/23 and 3/24 4 tablet 0     fluconazole (DIFLUCAN) 200 MG tablet Take 1 tablet (200 mg) by mouth daily , start at discharge and take until your ANC is greater than 1000. 30 tablet 3     furosemide (LASIX) 20 MG tablet Take 1 tablet (20 mg) by mouth daily as needed (lower extremity swelling) 5 tablet 0     levofloxacin (LEVAQUIN) 250 MG tablet Take 1 tablet (250 mg) by mouth daily , start at discharge and take until your ANC is greater than 1000. 30 tablet 3     Multiple Vitamins-Minerals (MULTIVITAL PO) Take 1 tablet by mouth every morning       omeprazole (PRILOSEC) 40 MG DR capsule Take 1 capsule (40 mg) by mouth daily 90 capsule 4     ondansetron (ZOFRAN) 8 MG tablet Take 1 tablet (8 mg) by mouth every 8 hours as needed for nausea 60 tablet 3     polyethylene glycol (MIRALAX) 17 GM/Dose powder Take 17 g by mouth daily as needed for constipation 510 g 4      predniSONE (DELTASONE) 50 MG tablet Take 2 tablets (100 mg) by mouth 2 times daily (On 3/21 PM and 3/22 AM) 4 tablet 0     prochlorperazine (COMPAZINE) 10 MG tablet Take 1 tablet (10 mg) by mouth every 6 hours as needed for nausea or vomiting 30 tablet 3     senna-docusate (SENNA S) 8.6-50 MG tablet Take 1 tablet by mouth 2 times daily as needed for constipation 60 tablet 4     temazepam (RESTORIL) 7.5 MG capsule Take 1 capsule (7.5 mg) by mouth nightly as needed for sleep 15 capsule 0       Allergies   Allergen Reactions     Ampicillin [Ampicillin Sodium]      Bactrim [Sulfamethoxazole W/Trimethoprim]      Contrast Dye      CT contrast (IV) allergy - need oral Methylpred as premed for scans     Ampicillin Rash       Physical Exam:  /64 (BP Location: Right arm, Patient Position: Sitting, Cuff Size: Adult Regular)   Pulse 94   Temp 97.7  F (36.5  C) (Oral)   Resp 16   SpO2 99%   Gen: alert, pleasant and conversational, NAD  HEENT: NC/AT, EOMI w/ PERRL, anicteric sclera.   Neck: Supple, no LAD  CV: normal S1,S2 with RRR no m/r/g  Resp: lungs CTA bilaterally with adequate air movement to bases. No wheezes or crackles  Abd: soft NTND no organomegaly or masses. BS normoactive.   Ext: warm and well perfused, no edema or cyanosis  Lymphatics: no palpable cervical and supraclavicular LAD. No HSM  Skin: no rashes or lesions noted on exposed skin  Neuro: A&Ox4, no lateralizing sx. Grossly nonfocal.  Psych: appropriate, reactive    Labs:   I personally reviewed the following labs:    Most Recent 3 CBC's:  Recent Labs   Lab Test 04/06/23  1021 03/21/23  0907 03/20/23  0641   WBC 6.7 4.8 6.5   HGB 9.8* 8.2* 8.0*   * 100 104*    114* 133*     Most Recent 3 BMP's:  Recent Labs   Lab Test 04/06/23  1021 03/21/23  0907 03/20/23  0641    143 143   POTASSIUM 4.8 3.6 3.8   CHLORIDE 105 111* 112*   CO2 27 23 23   BUN 27.6* 27.5* 29.0*   CR 0.84 0.74 0.77   ANIONGAP 9 9 8   JORGE 9.6 8.7* 8.8   GLC 78 99 112*      Most Recent 2 LFT's:  Recent Labs   Lab Test 04/06/23  1021 03/21/23  0907   AST 25 20   ALT 37 24   ALKPHOS 127 64   BILITOTAL 0.2 0.3     Imaging:   PET images reviewed, read still pending      Impression/plan:     # H/o low-grade kappa restricted plasmacytoid B-cell lymphoma 3/2004   # Transformation to new DLBCL 12/2022  H/o low-grade kappa restricted plasmacytoid B-cell lymphoma 3/2004 treated with x6 cycles of fludarabine based chemo and XRT to spine, then has been on surveillance since 2005. He presented in 11/2022 with progressing B symptoms, sternal mass, and SOB/CP. PET 12/9 showed extensive lymphomatous involvement to the R pleura w/avid effusions, mediastinal and pericardial regions, right anterolateral chest wall, adenopathy above and below the diaphragm, liver, spleen and multiple sites in the skeleton, concerning for transformation from his low-grade lymphoma. 12/15/22 biopsy of sternal mass showed large B-cell lymphoma with aggressive features (GCB-subtype), Ki67 %. 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  - S/p C1 R-CHOP in the hospital 12/20/22. Tolerated well overall.  - Switched to DA-R-EPOCH starting Cycle 2 (1/11/23) after FISH returned showing triple-hit disease. Staging LP negative, will plan to give triple IT chemo once per cycle for CNS ppx for a total of 4 doses   - PET after 2 cycles showed significant resolution of most FDG activity/disease other than small area of right paramediastinal pleural thickening (SUVmax 5) and resolution of right pleural effusion. Concurrent Clonoseq was negative.  - PET after 4 cycles looks stable/improved from PET2; right paramediastinal pleura SUV down to 3.3, also could potentially be artifact since it is near pacer wires and Clonoseq was negative.  - Proceed with Cycle 6 R-EPOCH today. He has received 4 IT chemo doses, no further IT chemo indicated. Will keep at same dose level due to platelet ricci  of 43 and patient is starting to experience cumulative toxicity.   - Arrange D6 Neulasta at [facility] in [place] as he has seen a provider there in the past. Will ask RNCC to fax orders to [phone] ATTN: [facility]Hospital.  - EOT PET-CT (with contrast this time) and follow-up with Dr. Ruelas 4/27 or 4/28 (pt planning to go on vacation first week of May)    # Pancytopenia  Likely secondary to prior treatment and lymphomatous involvement.  - Monitor CBC weekly twice weekly after R-EPOCH  - Transfuse if Hgb <7 and plt <10k     # PPx  - TLS: Continue allopurinol 300 mg daily.  - ID:  mg BID. Levaquin and Fluconazole to start when ANC <1k. Pentamidine neb last given inpatient 1/15/23, he states he felt very shaky afterward and did not tolerate it well.  Received IV pentamidine 3/18/23     # Shortness of breath, improved  # Cough  # R large and L small pleural effusion   PET/CT (12/9) with FDG-avid large R and small L pleural effusion.   - Cough is improving, breathing has significantly improved - no longer has PARRISH.  - Pleural effusions resolved on PET 2/2/23    # Hypogammaglobulinemia    Dec 2022  - Given persistently low COVID cycle threshold, hypogammaglobulinemia, and risk for further immunosuppression with chemo, gave IVIG on 1/15.    # COVID19 Infection  He tested positive on his Cycle 2 pre-admission COVID test. He was very surprised, as he does not feel symptomatic.  Notes that his girlfriend had a URI around [place] and was never tested for COVID. Cycle threshold was around 20 indicating active infection.  - S/p remdesivir x 3 days 1/11-1/13/23.  - No new or worsening respiratory sx    # GERD  Related to history of radiation to chest.   - Continue omeprazole.    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 Took uncertain medication for this in the past (~2004), however cannot remember. Melatonin and Trazadone foung to be ineffective.   - Restoril on days he gets steroids is  helpful.      PLAN:  - Admit for Cycle 6 R-EPOCH (dose level 1); no further IT chemo  - EOT PET and Dr. Ruelas in 3 weeks    Total of 45 minutes on patient visit, reviewing records, interpreting test results, placing orders, and documentation on the day of service.    FARSHAD Proctor [facility] [address] [address]  [phone]

## 2023-04-06 ENCOUNTER — HOSPITAL ENCOUNTER (INPATIENT)
Facility: CLINIC | Age: 73
LOS: 5 days | Discharge: HOME | DRG: 846 | End: 2023-04-11
Attending: INTERNAL MEDICINE | Admitting: INTERNAL MEDICINE
Payer: MEDICARE

## 2023-04-06 ENCOUNTER — LAB (OUTPATIENT)
Dept: LAB | Facility: CLINIC | Age: 73
End: 2023-04-06
Payer: MEDICARE

## 2023-04-06 ENCOUNTER — APPOINTMENT (OUTPATIENT)
Dept: LAB | Facility: CLINIC | Age: 73
DRG: 846 | End: 2023-04-06
Attending: INTERNAL MEDICINE
Payer: MEDICARE

## 2023-04-06 ENCOUNTER — ONCOLOGY VISIT (OUTPATIENT)
Dept: ONCOLOGY | Facility: CLINIC | Age: 73
DRG: 846 | End: 2023-04-06
Attending: REGISTERED NURSE
Payer: MEDICARE

## 2023-04-06 VITALS
RESPIRATION RATE: 16 BRPM | OXYGEN SATURATION: 99 % | SYSTOLIC BLOOD PRESSURE: 100 MMHG | DIASTOLIC BLOOD PRESSURE: 64 MMHG | HEART RATE: 94 BPM | TEMPERATURE: 97.7 F

## 2023-04-06 DIAGNOSIS — Z76.89 PREVENTION OF CHEMOTHERAPY-INDUCED NEUTROPENIA: ICD-10-CM

## 2023-04-06 DIAGNOSIS — C83.38 DIFFUSE LARGE B-CELL LYMPHOMA OF LYMPH NODES OF MULTIPLE REGIONS (H): ICD-10-CM

## 2023-04-06 DIAGNOSIS — T45.1X5S ADVERSE EFFECT OF ANTINEOPLASTIC AND IMMUNOSUPPRESSIVE DRUGS, SEQUELA: ICD-10-CM

## 2023-04-06 DIAGNOSIS — C83.32 DIFFUSE LARGE B-CELL LYMPHOMA OF INTRATHORACIC LYMPH NODES (H): Primary | ICD-10-CM

## 2023-04-06 DIAGNOSIS — C83.38 DIFFUSE LARGE B-CELL LYMPHOMA OF LYMPH NODES OF MULTIPLE REGIONS (H): Primary | ICD-10-CM

## 2023-04-06 DIAGNOSIS — R06.6 HICCUPS: ICD-10-CM

## 2023-04-06 DIAGNOSIS — Z11.52 ENCOUNTER FOR SCREENING LABORATORY TESTING FOR SEVERE ACUTE RESPIRATORY SYNDROME CORONAVIRUS 2 (SARS-COV-2): ICD-10-CM

## 2023-04-06 LAB
ABO/RH(D): NORMAL
ALBUMIN SERPL BCG-MCNC: 4.2 G/DL (ref 3.5–5.2)
ALP SERPL-CCNC: 127 U/L (ref 40–129)
ALT SERPL W P-5'-P-CCNC: 37 U/L (ref 10–50)
ANION GAP SERPL CALCULATED.3IONS-SCNC: 9 MMOL/L (ref 7–15)
ANTIBODY SCREEN: NEGATIVE
AST SERPL W P-5'-P-CCNC: 25 U/L (ref 10–50)
BASOPHILS # BLD AUTO: 0.1 10E3/UL (ref 0–0.2)
BASOPHILS NFR BLD AUTO: 1 %
BILIRUB SERPL-MCNC: 0.2 MG/DL
BUN SERPL-MCNC: 27.6 MG/DL (ref 8–23)
CALCIUM SERPL-MCNC: 9.6 MG/DL (ref 8.8–10.2)
CHLORIDE SERPL-SCNC: 105 MMOL/L (ref 98–107)
CREAT SERPL-MCNC: 0.84 MG/DL (ref 0.67–1.17)
CYCLE THRESHOLD (CT): 31.4
DEPRECATED HCO3 PLAS-SCNC: 27 MMOL/L (ref 22–29)
EOSINOPHIL # BLD AUTO: 0 10E3/UL (ref 0–0.7)
EOSINOPHIL NFR BLD AUTO: 0 %
ERYTHROCYTE [DISTWIDTH] IN BLOOD BY AUTOMATED COUNT: 15.6 % (ref 10–15)
GFR SERPL CREATININE-BSD FRML MDRD: >90 ML/MIN/1.73M2
GLUCOSE SERPL-MCNC: 78 MG/DL (ref 70–99)
HCT VFR BLD AUTO: 31 % (ref 40–53)
HGB BLD-MCNC: 9.8 G/DL (ref 13.3–17.7)
IMM GRANULOCYTES # BLD: 0.1 10E3/UL
IMM GRANULOCYTES NFR BLD: 1 %
LYMPHOCYTES # BLD AUTO: 0.8 10E3/UL (ref 0.8–5.3)
LYMPHOCYTES NFR BLD AUTO: 12 %
MCH RBC QN AUTO: 32.3 PG (ref 26.5–33)
MCHC RBC AUTO-ENTMCNC: 31.6 G/DL (ref 31.5–36.5)
MCV RBC AUTO: 102 FL (ref 78–100)
MONOCYTES # BLD AUTO: 0.5 10E3/UL (ref 0–1.3)
MONOCYTES NFR BLD AUTO: 8 %
NEUTROPHILS # BLD AUTO: 5.2 10E3/UL (ref 1.6–8.3)
NEUTROPHILS NFR BLD AUTO: 78 %
NRBC # BLD AUTO: 0 10E3/UL
NRBC BLD AUTO-RTO: 0 /100
PLATELET # BLD AUTO: 198 10E3/UL (ref 150–450)
POTASSIUM SERPL-SCNC: 4.8 MMOL/L (ref 3.4–5.3)
PROT SERPL-MCNC: 6 G/DL (ref 6.4–8.3)
RBC # BLD AUTO: 3.03 10E6/UL (ref 4.4–5.9)
SARS-COV-2 RNA RESP QL NAA+PROBE: POSITIVE
SODIUM SERPL-SCNC: 141 MMOL/L (ref 136–145)
SPECIMEN EXPIRATION DATE: NORMAL
WBC # BLD AUTO: 6.7 10E3/UL (ref 4–11)

## 2023-04-06 PROCEDURE — U0003 INFECTIOUS AGENT DETECTION BY NUCLEIC ACID (DNA OR RNA); SEVERE ACUTE RESPIRATORY SYNDROME CORONAVIRUS 2 (SARS-COV-2) (CORONAVIRUS DISEASE [COVID-19]), AMPLIFIED PROBE TECHNIQUE, MAKING USE OF HIGH THROUGHPUT TECHNOLOGIES AS DESCRIBED BY CMS-2020-01-R: HCPCS | Performed by: INTERNAL MEDICINE

## 2023-04-06 PROCEDURE — 258N000003 HC RX IP 258 OP 636: Performed by: REGISTERED NURSE

## 2023-04-06 PROCEDURE — 250N000011 HC RX IP 250 OP 636: Performed by: REGISTERED NURSE

## 2023-04-06 PROCEDURE — 250N000011 HC RX IP 250 OP 636: Performed by: PHYSICIAN ASSISTANT

## 2023-04-06 PROCEDURE — 250N000013 HC RX MED GY IP 250 OP 250 PS 637: Performed by: REGISTERED NURSE

## 2023-04-06 PROCEDURE — 99207 PR CHGS TRANSFERRED TO HOSPITAL: CPT | Performed by: REGISTERED NURSE

## 2023-04-06 PROCEDURE — 250N000013 HC RX MED GY IP 250 OP 250 PS 637: Performed by: PHYSICIAN ASSISTANT

## 2023-04-06 PROCEDURE — 36591 DRAW BLOOD OFF VENOUS DEVICE: CPT | Performed by: REGISTERED NURSE

## 2023-04-06 PROCEDURE — 3E04305 INTRODUCTION OF OTHER ANTINEOPLASTIC INTO CENTRAL VEIN, PERCUTANEOUS APPROACH: ICD-10-PCS | Performed by: PHYSICIAN ASSISTANT

## 2023-04-06 PROCEDURE — 250N000012 HC RX MED GY IP 250 OP 636 PS 637: Performed by: REGISTERED NURSE

## 2023-04-06 PROCEDURE — 3E0430M INTRODUCTION OF MONOCLONAL ANTIBODY INTO CENTRAL VEIN, PERCUTANEOUS APPROACH: ICD-10-PCS | Performed by: PHYSICIAN ASSISTANT

## 2023-04-06 PROCEDURE — 85025 COMPLETE CBC W/AUTO DIFF WBC: CPT | Performed by: REGISTERED NURSE

## 2023-04-06 PROCEDURE — G0463 HOSPITAL OUTPT CLINIC VISIT: HCPCS | Mod: 25 | Performed by: REGISTERED NURSE

## 2023-04-06 PROCEDURE — 36592 COLLECT BLOOD FROM PICC: CPT | Performed by: PHYSICIAN ASSISTANT

## 2023-04-06 PROCEDURE — 82784 ASSAY IGA/IGD/IGG/IGM EACH: CPT | Performed by: PHYSICIAN ASSISTANT

## 2023-04-06 PROCEDURE — 80053 COMPREHEN METABOLIC PANEL: CPT | Performed by: REGISTERED NURSE

## 2023-04-06 PROCEDURE — 120N000002 HC R&B MED SURG/OB UMMC

## 2023-04-06 PROCEDURE — 99221 1ST HOSP IP/OBS SF/LOW 40: CPT | Performed by: PHYSICIAN ASSISTANT

## 2023-04-06 PROCEDURE — 86850 RBC ANTIBODY SCREEN: CPT | Performed by: PHYSICIAN ASSISTANT

## 2023-04-06 RX ORDER — ONDANSETRON 8 MG/1
16 TABLET, FILM COATED ORAL
Status: CANCELLED
Start: 2023-04-07

## 2023-04-06 RX ORDER — PROCHLORPERAZINE MALEATE 5 MG
5-10 TABLET ORAL EVERY 6 HOURS PRN
Status: CANCELLED
Start: 2023-04-07

## 2023-04-06 RX ORDER — ALBUTEROL SULFATE 0.83 MG/ML
2.5 SOLUTION RESPIRATORY (INHALATION)
Status: DISCONTINUED | OUTPATIENT
Start: 2023-04-06 | End: 2023-04-11 | Stop reason: HOSPADM

## 2023-04-06 RX ORDER — HEPARIN SODIUM (PORCINE) LOCK FLUSH IV SOLN 100 UNIT/ML 100 UNIT/ML
5 SOLUTION INTRAVENOUS ONCE
Status: COMPLETED | OUTPATIENT
Start: 2023-04-06 | End: 2023-04-06

## 2023-04-06 RX ORDER — ASCORBIC ACID 500 MG
500 TABLET ORAL EVERY MORNING
Status: DISCONTINUED | OUTPATIENT
Start: 2023-04-07 | End: 2023-04-11 | Stop reason: HOSPADM

## 2023-04-06 RX ORDER — AMOXICILLIN 250 MG
2 CAPSULE ORAL 2 TIMES DAILY
Status: CANCELLED | OUTPATIENT
Start: 2023-04-07

## 2023-04-06 RX ORDER — LORAZEPAM 0.5 MG/1
.5-1 TABLET ORAL EVERY 6 HOURS PRN
Status: CANCELLED
Start: 2023-04-07

## 2023-04-06 RX ORDER — LORAZEPAM 2 MG/ML
.5-1 INJECTION INTRAMUSCULAR EVERY 6 HOURS PRN
Status: DISCONTINUED | OUTPATIENT
Start: 2023-04-06 | End: 2023-04-11 | Stop reason: HOSPADM

## 2023-04-06 RX ORDER — MEPERIDINE HYDROCHLORIDE 25 MG/ML
25 INJECTION INTRAMUSCULAR; INTRAVENOUS; SUBCUTANEOUS EVERY 30 MIN PRN
Status: CANCELLED | OUTPATIENT
Start: 2023-04-07

## 2023-04-06 RX ORDER — ONDANSETRON 2 MG/ML
4-8 INJECTION INTRAMUSCULAR; INTRAVENOUS EVERY 8 HOURS PRN
Status: DISCONTINUED | OUTPATIENT
Start: 2023-04-06 | End: 2023-04-11 | Stop reason: HOSPADM

## 2023-04-06 RX ORDER — HEPARIN SODIUM,PORCINE 10 UNIT/ML
5-10 VIAL (ML) INTRAVENOUS EVERY 24 HOURS
Status: DISCONTINUED | OUTPATIENT
Start: 2023-04-06 | End: 2023-04-11 | Stop reason: HOSPADM

## 2023-04-06 RX ORDER — EPINEPHRINE 1 MG/ML
0.3 INJECTION, SOLUTION INTRAMUSCULAR; SUBCUTANEOUS EVERY 5 MIN PRN
Status: CANCELLED | OUTPATIENT
Start: 2023-04-07

## 2023-04-06 RX ORDER — METHYLPREDNISOLONE SODIUM SUCCINATE 125 MG/2ML
125 INJECTION, POWDER, LYOPHILIZED, FOR SOLUTION INTRAMUSCULAR; INTRAVENOUS
Status: DISCONTINUED | OUTPATIENT
Start: 2023-04-06 | End: 2023-04-11 | Stop reason: HOSPADM

## 2023-04-06 RX ORDER — AMOXICILLIN 250 MG
2 CAPSULE ORAL 2 TIMES DAILY
Status: DISCONTINUED | OUTPATIENT
Start: 2023-04-06 | End: 2023-04-11 | Stop reason: HOSPADM

## 2023-04-06 RX ORDER — DIPHENHYDRAMINE HCL 50 MG
50 CAPSULE ORAL ONCE
Status: COMPLETED | OUTPATIENT
Start: 2023-04-06 | End: 2023-04-06

## 2023-04-06 RX ORDER — ACYCLOVIR 400 MG/1
400 TABLET ORAL EVERY 12 HOURS
Status: DISCONTINUED | OUTPATIENT
Start: 2023-04-06 | End: 2023-04-11 | Stop reason: HOSPADM

## 2023-04-06 RX ORDER — ALBUTEROL SULFATE 90 UG/1
1-2 AEROSOL, METERED RESPIRATORY (INHALATION)
Status: DISCONTINUED | OUTPATIENT
Start: 2023-04-06 | End: 2023-04-11 | Stop reason: HOSPADM

## 2023-04-06 RX ORDER — DEXTROSE MONOHYDRATE 50 MG/ML
10-20 INJECTION, SOLUTION INTRAVENOUS
Status: DISCONTINUED | OUTPATIENT
Start: 2023-04-11 | End: 2023-04-07

## 2023-04-06 RX ORDER — AMOXICILLIN 250 MG
1 CAPSULE ORAL 2 TIMES DAILY PRN
Status: DISCONTINUED | OUTPATIENT
Start: 2023-04-06 | End: 2023-04-11 | Stop reason: HOSPADM

## 2023-04-06 RX ORDER — DIPHENHYDRAMINE HYDROCHLORIDE 50 MG/ML
50 INJECTION INTRAMUSCULAR; INTRAVENOUS
Status: CANCELLED
Start: 2023-04-07

## 2023-04-06 RX ORDER — ACETAMINOPHEN 325 MG/1
650 TABLET ORAL ONCE
Status: CANCELLED
Start: 2023-04-07

## 2023-04-06 RX ORDER — ONDANSETRON 4 MG/1
4-8 TABLET, ORALLY DISINTEGRATING ORAL EVERY 8 HOURS PRN
Status: DISCONTINUED | OUTPATIENT
Start: 2023-04-06 | End: 2023-04-11 | Stop reason: HOSPADM

## 2023-04-06 RX ORDER — DEXTROSE MONOHYDRATE 50 MG/ML
10-20 INJECTION, SOLUTION INTRAVENOUS
Status: CANCELLED | OUTPATIENT
Start: 2023-04-13

## 2023-04-06 RX ORDER — METHYLPREDNISOLONE SODIUM SUCCINATE 125 MG/2ML
125 INJECTION, POWDER, LYOPHILIZED, FOR SOLUTION INTRAMUSCULAR; INTRAVENOUS
Status: CANCELLED
Start: 2023-04-07

## 2023-04-06 RX ORDER — ENOXAPARIN SODIUM 100 MG/ML
40 INJECTION SUBCUTANEOUS EVERY 24 HOURS
Status: DISCONTINUED | OUTPATIENT
Start: 2023-04-06 | End: 2023-04-11 | Stop reason: HOSPADM

## 2023-04-06 RX ORDER — ALBUTEROL SULFATE 90 UG/1
1-2 AEROSOL, METERED RESPIRATORY (INHALATION)
Status: CANCELLED
Start: 2023-04-07

## 2023-04-06 RX ORDER — ONDANSETRON 8 MG/1
16 TABLET, FILM COATED ORAL
Status: COMPLETED | OUTPATIENT
Start: 2023-04-06 | End: 2023-04-10

## 2023-04-06 RX ORDER — ONDANSETRON 4 MG/1
4-8 TABLET, FILM COATED ORAL EVERY 8 HOURS PRN
Status: DISCONTINUED | OUTPATIENT
Start: 2023-04-06 | End: 2023-04-11 | Stop reason: HOSPADM

## 2023-04-06 RX ORDER — FUROSEMIDE 20 MG
20 TABLET ORAL DAILY PRN
Status: DISCONTINUED | OUTPATIENT
Start: 2023-04-06 | End: 2023-04-11 | Stop reason: HOSPADM

## 2023-04-06 RX ORDER — MEPERIDINE HYDROCHLORIDE 25 MG/ML
25 INJECTION INTRAMUSCULAR; INTRAVENOUS; SUBCUTANEOUS EVERY 30 MIN PRN
Status: DISCONTINUED | OUTPATIENT
Start: 2023-04-06 | End: 2023-04-11 | Stop reason: HOSPADM

## 2023-04-06 RX ORDER — AMOXICILLIN 250 MG
2 CAPSULE ORAL 2 TIMES DAILY PRN
Status: DISCONTINUED | OUTPATIENT
Start: 2023-04-06 | End: 2023-04-11 | Stop reason: HOSPADM

## 2023-04-06 RX ORDER — PROCHLORPERAZINE MALEATE 5 MG
5 TABLET ORAL EVERY 6 HOURS PRN
Status: DISCONTINUED | OUTPATIENT
Start: 2023-04-06 | End: 2023-04-11 | Stop reason: HOSPADM

## 2023-04-06 RX ORDER — ALLOPURINOL 300 MG/1
300 TABLET ORAL DAILY
Status: DISCONTINUED | OUTPATIENT
Start: 2023-04-06 | End: 2023-04-06

## 2023-04-06 RX ORDER — DIPHENHYDRAMINE HYDROCHLORIDE 50 MG/ML
50 INJECTION INTRAMUSCULAR; INTRAVENOUS
Status: DISCONTINUED | OUTPATIENT
Start: 2023-04-06 | End: 2023-04-11 | Stop reason: HOSPADM

## 2023-04-06 RX ORDER — HEPARIN SODIUM,PORCINE 10 UNIT/ML
5-10 VIAL (ML) INTRAVENOUS
Status: DISCONTINUED | OUTPATIENT
Start: 2023-04-06 | End: 2023-04-11 | Stop reason: HOSPADM

## 2023-04-06 RX ORDER — LORAZEPAM 2 MG/ML
.5-1 INJECTION INTRAMUSCULAR EVERY 6 HOURS PRN
Status: CANCELLED | OUTPATIENT
Start: 2023-04-07

## 2023-04-06 RX ORDER — POLYETHYLENE GLYCOL 3350 17 G/17G
17 POWDER, FOR SOLUTION ORAL DAILY PRN
Status: DISCONTINUED | OUTPATIENT
Start: 2023-04-06 | End: 2023-04-11 | Stop reason: HOSPADM

## 2023-04-06 RX ORDER — DEXAMETHASONE 4 MG/1
8 TABLET ORAL DAILY
Status: DISCONTINUED | OUTPATIENT
Start: 2023-04-12 | End: 2023-04-11 | Stop reason: HOSPADM

## 2023-04-06 RX ORDER — HEPARIN SODIUM (PORCINE) LOCK FLUSH IV SOLN 100 UNIT/ML 100 UNIT/ML
5-10 SOLUTION INTRAVENOUS
Status: DISCONTINUED | OUTPATIENT
Start: 2023-04-06 | End: 2023-04-11 | Stop reason: HOSPADM

## 2023-04-06 RX ORDER — PREDNISONE 50 MG/1
60 TABLET ORAL 2 TIMES DAILY
Status: COMPLETED | OUTPATIENT
Start: 2023-04-06 | End: 2023-04-11

## 2023-04-06 RX ORDER — TEMAZEPAM 7.5 MG/1
7.5 CAPSULE ORAL
Status: DISCONTINUED | OUTPATIENT
Start: 2023-04-06 | End: 2023-04-11 | Stop reason: HOSPADM

## 2023-04-06 RX ORDER — PROCHLORPERAZINE MALEATE 5 MG
5-10 TABLET ORAL EVERY 6 HOURS PRN
Status: DISCONTINUED | OUTPATIENT
Start: 2023-04-06 | End: 2023-04-06

## 2023-04-06 RX ORDER — ALLOPURINOL 300 MG/1
300 TABLET ORAL DAILY
Status: DISCONTINUED | OUTPATIENT
Start: 2023-04-07 | End: 2023-04-11 | Stop reason: HOSPADM

## 2023-04-06 RX ORDER — DIPHENHYDRAMINE HCL 25 MG
50 CAPSULE ORAL ONCE
Status: CANCELLED
Start: 2023-04-07

## 2023-04-06 RX ORDER — EPINEPHRINE 1 MG/ML
0.3 INJECTION, SOLUTION, CONCENTRATE INTRAVENOUS EVERY 5 MIN PRN
Status: DISCONTINUED | OUTPATIENT
Start: 2023-04-06 | End: 2023-04-11 | Stop reason: HOSPADM

## 2023-04-06 RX ORDER — LORAZEPAM 0.5 MG/1
.5-1 TABLET ORAL EVERY 6 HOURS PRN
Status: DISCONTINUED | OUTPATIENT
Start: 2023-04-06 | End: 2023-04-11 | Stop reason: HOSPADM

## 2023-04-06 RX ORDER — DEXAMETHASONE 4 MG/1
8 TABLET ORAL DAILY
Status: CANCELLED
Start: 2023-04-13

## 2023-04-06 RX ORDER — ALBUTEROL SULFATE 0.83 MG/ML
2.5 SOLUTION RESPIRATORY (INHALATION)
Status: CANCELLED | OUTPATIENT
Start: 2023-04-07

## 2023-04-06 RX ORDER — ACETAMINOPHEN 325 MG/1
650 TABLET ORAL ONCE
Status: COMPLETED | OUTPATIENT
Start: 2023-04-06 | End: 2023-04-06

## 2023-04-06 RX ORDER — ACETAMINOPHEN 325 MG/1
650 TABLET ORAL EVERY 4 HOURS PRN
Status: DISCONTINUED | OUTPATIENT
Start: 2023-04-06 | End: 2023-04-11 | Stop reason: HOSPADM

## 2023-04-06 RX ORDER — BACLOFEN 10 MG/1
5-10 TABLET ORAL 3 TIMES DAILY PRN
Status: DISCONTINUED | OUTPATIENT
Start: 2023-04-06 | End: 2023-04-11 | Stop reason: HOSPADM

## 2023-04-06 RX ORDER — ALLOPURINOL 300 MG/1
300 TABLET ORAL DAILY
Status: CANCELLED | OUTPATIENT
Start: 2023-04-07

## 2023-04-06 RX ADMIN — ENOXAPARIN SODIUM 40 MG: 40 INJECTION SUBCUTANEOUS at 19:46

## 2023-04-06 RX ADMIN — PREDNISONE 100 MG: 50 TABLET ORAL at 20:06

## 2023-04-06 RX ADMIN — VINCRISTINE SULFATE 0.7 MG: 1 INJECTION, SOLUTION INTRAVENOUS at 22:23

## 2023-04-06 RX ADMIN — ONDANSETRON HYDROCHLORIDE 16 MG: 8 TABLET, FILM COATED ORAL at 21:52

## 2023-04-06 RX ADMIN — ACYCLOVIR 400 MG: 400 TABLET ORAL at 19:46

## 2023-04-06 RX ADMIN — TEMAZEPAM 7.5 MG: 7.5 CAPSULE ORAL at 22:42

## 2023-04-06 RX ADMIN — ETOPOSIDE 85 MG: 20 INJECTION, SOLUTION, CONCENTRATE INTRAVENOUS at 22:23

## 2023-04-06 RX ADMIN — Medication 5 ML: at 10:25

## 2023-04-06 RX ADMIN — SENNOSIDES AND DOCUSATE SODIUM 2 TABLET: 50; 8.6 TABLET ORAL at 19:46

## 2023-04-06 RX ADMIN — ACETAMINOPHEN 650 MG: 325 TABLET ORAL at 20:06

## 2023-04-06 RX ADMIN — DIPHENHYDRAMINE HYDROCHLORIDE 50 MG: 50 CAPSULE ORAL at 20:07

## 2023-04-06 RX ADMIN — RITUXIMAB-ABBS 600 MG: 10 INJECTION, SOLUTION INTRAVENOUS at 20:45

## 2023-04-06 ASSESSMENT — PAIN SCALES - GENERAL: PAINLEVEL: NO PAIN (0)

## 2023-04-06 ASSESSMENT — ACTIVITIES OF DAILY LIVING (ADL)
ADLS_ACUITY_SCORE: 20
ADLS_ACUITY_SCORE: 20
ADLS_ACUITY_SCORE: 35

## 2023-04-06 NOTE — LETTER
4/6/2023         RE: Shahid Davis   [facility] [address]        Dear Colleague,    Thank you for referring your patient, Shahid Davis, to the [facility]. Please see a copy of my visit note below.    [facility]  Date of visit: Apr 6, 2023      Reason for Visit: Follow up triple-hit DLBCL      ONCOLOGIC SUMMARY:  Diagnosis:  Low-grade kappa-restricted plasmacytoid B-cell lymphoma dx 3/2004, presenting with thoracic paraspinal mass. Bone marrow hypercellular with 70-80% involvement by small kappa-restricted lymphocytes which were positive for CD10, CD19, and CD20 and negative for CD5 and CD23.   Transformation to high-grade B-cell lymphoma 12/2022 (versus new primary), presenting with B symptoms, sternal mass, and SOB/chest pain. Biopsy of sternal mass showed large B-cell lymphoma with aggressive features (GCB-subtype), Ki67 %, FISH positive for MYC (non-IgH partner), BCL2, and BCL6 rearrangements. Stage IV with extensive lymphomatous involvement to the R pleura w/avid effusions, mediastinal and pericardial regions, right anterolateral chest wall, adenopathy above and below the diaphragm, liver, spleen and multiple sites in the skeleton. CSF flow negative     Treatment history:  For low-grade lymphoma:  2005: Fludarabine-based chemo x 6 cycles and XRT to spine (details unclear)    For high-grade lymphoma:  12/20/22: Cycle 1 R-CHOP with Neulasta support (with a few days of steroid prephase prior)  1/11/23: Cycle 2 Switched to DA-R-EPOCH with triple IT chemo for CNS ppx after FISH returned showing triple-hit disease. PET after 2 cycles shows very good AK.   2/2/23: Cycle 3 DA-R EPOCH with triple IT chemo for CNS ppx  2/23/23: Cycle 4 DA-R-EPOCH  with triple IT chemo for CNS ppx  3/17/23: Cycle 5 DA-R-EPOCH with triple IT chemo for CNS ppx      Interval history:   Shahid is here today prior to admission for C6 R-EPOCH.  Felt like this past cycle went a little better than C4. 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  This has all improved over the past week, feeling fairly well today. Continues to clear his throat a lot, has been happening since his COVID diagnosis 4 months ago, continues to test positive.  No fevers. Still getting hiccups occasionally but felt like Baclofen helped.  Lump on left forearm still minimally present but much improved from last visit.     ROS: 10 point ROS neg other than the symptoms noted above in the HPI.      Current Outpatient Medications   Medication Sig Dispense Refill    acyclovir (ZOVIRAX) 400 MG tablet Take 1 tablet (400 mg) by mouth every 12 hours for 120 days 60 tablet 3    allopurinol (ZYLOPRIM) 300 MG tablet Take 1 tablet (300 mg) by mouth daily 60 tablet 3    ascorbic acid (VITAMIN C) 500 MG tablet Take 500 mg by mouth every morning      baclofen (LIORESAL) 10 MG tablet Take 0.5-1 tablets (5-10 mg) by mouth 3 times daily as needed for other (hiccups) 30 tablet 0    carbamide peroxide (DEBROX) 6.5 % otic solution Place 3-5 drops into the right ear 2 times daily 15 mL 0    dexamethasone (DECADRON) 4 MG tablet Take 2 tablets (8 mg) by mouth daily On 3/23 and 3/24 4 tablet 0    fluconazole (DIFLUCAN) 200 MG tablet Take 1 tablet (200 mg) by mouth daily , start at discharge and take until your ANC is greater than 1000. 30 tablet 3    furosemide (LASIX) 20 MG tablet Take 1 tablet (20 mg) by mouth daily as needed (lower extremity swelling) 5 tablet 0    levofloxacin (LEVAQUIN) 250 MG tablet Take 1 tablet (250 mg) by mouth daily , start at discharge and take until your ANC is greater than 1000. 30 tablet 3    Multiple Vitamins-Minerals (MULTIVITAL PO) Take 1 tablet by mouth every morning      omeprazole (PRILOSEC) 40 MG DR capsule Take 1 capsule (40 mg) by mouth daily 90 capsule 4     ondansetron (ZOFRAN) 8 MG tablet Take 1 tablet (8 mg) by mouth every 8 hours as needed for nausea 60 tablet 3    polyethylene glycol (MIRALAX) 17 GM/Dose powder Take 17 g by mouth daily as needed for constipation 510 g 4    predniSONE (DELTASONE) 50 MG tablet Take 2 tablets (100 mg) by mouth 2 times daily (On 3/21 PM and 3/22 AM) 4 tablet 0    prochlorperazine (COMPAZINE) 10 MG tablet Take 1 tablet (10 mg) by mouth every 6 hours as needed for nausea or vomiting 30 tablet 3    senna-docusate (SENNA S) 8.6-50 MG tablet Take 1 tablet by mouth 2 times daily as needed for constipation 60 tablet 4    temazepam (RESTORIL) 7.5 MG capsule Take 1 capsule (7.5 mg) by mouth nightly as needed for sleep 15 capsule 0       Allergies   Allergen Reactions    Ampicillin [Ampicillin Sodium]     Bactrim [Sulfamethoxazole W/Trimethoprim]     Contrast Dye      CT contrast (IV) allergy - need oral Methylpred as premed for scans    Ampicillin Rash       Physical Exam:  /64 (BP Location: Right arm, Patient Position: Sitting, Cuff Size: Adult Regular)   Pulse 94   Temp 97.7  F (36.5  C) (Oral)   Resp 16   SpO2 99%   Gen: alert, pleasant and conversational, NAD  HEENT: NC/AT, EOMI w/ PERRL, anicteric sclera.   Neck: Supple, no LAD  CV: normal S1,S2 with RRR no m/r/g  Resp: lungs CTA bilaterally with adequate air movement to bases. No wheezes or crackles  Abd: soft NTND no organomegaly or masses. BS normoactive.   Ext: warm and well perfused, no edema or cyanosis  Lymphatics: no palpable cervical and supraclavicular LAD. No HSM  Skin: no rashes or lesions noted on exposed skin  Neuro: A&Ox4, no lateralizing sx. Grossly nonfocal.  Psych: appropriate, reactive    Labs:   I personally reviewed the following labs:    Most Recent 3 CBC's:  Recent Labs   Lab Test 04/06/23  1021 03/21/23  0907 03/20/23  0641   WBC 6.7 4.8 6.5   HGB 9.8* 8.2* 8.0*   * 100 104*    114* 133*     Most Recent 3 BMP's:  Recent Labs   Lab Test  04/06/23  1021 03/21/23  0907 03/20/23  0641    143 143   POTASSIUM 4.8 3.6 3.8   CHLORIDE 105 111* 112*   CO2 27 23 23   BUN 27.6* 27.5* 29.0*   CR 0.84 0.74 0.77   ANIONGAP 9 9 8   JORGE 9.6 8.7* 8.8   GLC 78 99 112*     Most Recent 2 LFT's:  Recent Labs   Lab Test 04/06/23  1021 03/21/23  0907   AST 25 20   ALT 37 24   ALKPHOS 127 64   BILITOTAL 0.2 0.3     Imaging:   PET images reviewed, read still pending      Impression/plan:     # H/o low-grade kappa restricted plasmacytoid B-cell lymphoma 3/2004   # Transformation to new DLBCL 12/2022  H/o low-grade kappa restricted plasmacytoid B-cell lymphoma 3/2004 treated with x6 cycles of fludarabine based chemo and XRT to spine, then has been on surveillance since 2005. He presented in 11/2022 with progressing B symptoms, sternal mass, and SOB/CP. PET 12/9 showed extensive lymphomatous involvement to the R pleura w/avid effusions, mediastinal and pericardial regions, right anterolateral chest wall, adenopathy above and below the diaphragm, liver, spleen and multiple sites in the skeleton, concerning for transformation from his low-grade lymphoma. 12/15/22 biopsy of sternal mass showed large B-cell lymphoma with aggressive features (GCB-subtype), Ki67 %. 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  - S/p C1 R-CHOP in the hospital 12/20/22. Tolerated well overall.  - Switched to DA-R-EPOCH starting Cycle 2 (1/11/23) after FISH returned showing triple-hit disease. Staging LP negative, will plan to give triple IT chemo once per cycle for CNS ppx for a total of 4 doses   - PET after 2 cycles showed significant resolution of most FDG activity/disease other than small area of right paramediastinal pleural thickening (SUVmax 5) and resolution of right pleural effusion. Concurrent Clonoseq was negative.  - PET after 4 cycles looks stable/improved from PET2; right paramediastinal pleura SUV down to 3.3, also could potentially  be artifact since it is near pacer wires and Clonoseq was negative.  - Proceed with Cycle 6 R-EPOCH today. He has received 4 IT chemo doses, no further IT chemo indicated. Will keep at same dose level due to platelet ricci of 43 and patient is starting to experience cumulative toxicity.   - Arrange ZACHERY Neulasta at [facility] in [place] as he has seen a provider there in the past. Will ask RNCC to fax orders to [phone] ATTN: [facility]Hospital.  - EOT PET-CT (with contrast this time) and follow-up with Dr. Ruelas 4/27 or 4/28 (pt planning to go on vacation first week of May)    # Pancytopenia  Likely secondary to prior treatment and lymphomatous involvement.  - Monitor CBC weekly twice weekly after R-EPOCH  - Transfuse if Hgb <7 and plt <10k     # PPx  - TLS: Continue allopurinol 300 mg daily.  - ID:  mg BID. Levaquin and Fluconazole to start when ANC <1k. Pentamidine neb last given inpatient 1/15/23, he states he felt very shaky afterward and did not tolerate it well.  Received IV pentamidine 3/18/23     # Shortness of breath, improved  # Cough  # R large and L small pleural effusion   PET/CT (12/9) with FDG-avid large R and small L pleural effusion.   - Cough is improving, breathing has significantly improved - no longer has PARRISH.  - Pleural effusions resolved on PET 2/2/23    # Hypogammaglobulinemia    Dec 2022  - Given persistently low COVID cycle threshold, hypogammaglobulinemia, and risk for further immunosuppression with chemo, gave IVIG on 1/15.    # COVID19 Infection  He tested positive on his Cycle 2 pre-admission COVID test. He was very surprised, as he does not feel symptomatic.  Notes that his girlfriend had a URI around [place] and was never tested for COVID. Cycle threshold was around 20 indicating active infection.  - S/p remdesivir x 3 days 1/11-1/13/23.  - No new or worsening respiratory sx    # GERD  Related to history of radiation to chest.   - Continue omeprazole.    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 Took uncertain medication for this in the past (~2004), however cannot remember. Melatonin and Trazadone foung to be ineffective.   - Restoril on days he gets steroids is helpful.      PLAN:  - Admit for Cycle 6 R-EPOCH (dose level 1); no further IT chemo  - EOT PET and Dr. Ruelas in 3 weeks    Total of 45 minutes on patient visit, reviewing records, interpreting test results, placing orders, and documentation on the day of service.    FARSHAD Proctor [facility]  [address] [place] [id number]  [phone]

## 2023-04-06 NOTE — NURSING NOTE
"Oncology Rooming Note    April 6, 2023 10:32 AM   Shahid Davis is a 72 year old male who presents for:    Chief Complaint   Patient presents with     Port Draw     Labs drawn via port by RN. Vitals taken.     Oncology Clinic Visit     Diffuse large B-cell lymphoma of intrathoracic lymph nodes      Initial Vitals: /64 (BP Location: Right arm, Patient Position: Sitting, Cuff Size: Adult Regular)   Pulse 94   Temp 97.7  F (36.5  C) (Oral)   Resp 16   SpO2 99%  Estimated body mass index is 22.17 kg/m  as calculated from the following:    Height as of 3/17/23: 1.702 m (5' 7.01\").    Weight as of 3/21/23: 64.2 kg (141 lb 9.6 oz). There is no height or weight on file to calculate BSA.  No Pain (0) Comment: Data Unavailable   No LMP for male patient.  Allergies reviewed: Yes  Medications reviewed: Yes    Medications: Medication refills not needed today.  Pharmacy name entered into Informative:    [id number] [place] [address] [id number] [address] AT [facility] & [place]    Clinical concerns: none.       Joe Obrien"

## 2023-04-06 NOTE — NURSING NOTE
"Chief Complaint   Patient presents with     Port Draw     Labs drawn via port by RN. Vitals taken.     Labs drawn via port by RN. Port accessed with 20G 3/4\" power needle. Flushed with NS and heparin. Pt tolerated well. Vitals taken. Pt checked in for next appointment.    Candida Martin, RN  "

## 2023-04-06 NOTE — H&P
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 Admitted for C5 DA-R-EPOCH (C6 total of chemotherapy).      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 Ruelas. Pt has a h/o low-grade kappa restricted plasmacytoid B-cell lymphoma (diagnosed in 3/2004) treated with x6 cycles of fludarabine based chemo and XRT to spine, then has been on surveillance since 2005. He presented with progressive B-symptoms, abdominal pain, and SOB. PET 12/9 showed extensive lymphomatous involvement to the right pleura w/ bilateral FDG-avid effusions (large R, small L), mediastinal and pericardial regions, right anterolateral chest wall, adenopathy above and below the diaphragm, liver, spleen and multiple sites in the skeleton, concerning for transformation from his low-grade lymphoma. He was also noted to have progressive pancytopenia. Due to worsening shortness of breath and delays of biopsy, patient presented to ED on 12/14 and was subsequently admitted for expedited workup and treatment. As SUV of the sternoclavicular mass was the highest, patient underwent US-guided sternoclavicular soft tissue mass biopsy with IR 12/15, pathology from which confirmed a diagnosis of high-grade B-cell lymphoma. Baseline ECHO (12/15) with LVEF 60-65%, mild aortic insufficiency, no evidence of effusion or tamponade. Received 1 cycle of R-CHOP (L2D7=0712/20/22) which was well-tolerated. Cytogenetics then returned, revealed rearrangements  in MYC, BCL2, and BCL6, consistent with triple hit lymphoma. He was subsequently changed to DA-R-EPOCH and proceeded with C1 on 1/11/23 (C2 total of therapy). He was re-admitted for C2 on 2/2/23, C3 on 2/22/23, C4 on 3/17/23. Cycles have been overall well-tolerated, save for anticipated fatigue and cytopenias. Of note, patient has not advanced past Level 1 per personal preference/concerns for tolerance.  - CNS IPI was 4 (1 point each for age, LDH, stage IV disease, and extranodal involvement); as such, s/p CNS ppx with IT chemotherapy (1/12, 2/6, 2/24, 3/20 -- all negative flow). No need for ongoing IT chemo as completed planned 4 doses.   - PET/CT (post-4 cycles of chemo) done 3/17/23 with continued CR, single area of avidity in right cardiac pleura (although deemed likely artifact vs physiologic) with recommendation for cardiac prep/ketogenic diet for 3 days prior to next PET/CT.  - Note: this is overall C6 of chemotherapy (1 cycle R-CHOP + 5 cycles R-EPOCH); current plan is for 6 total cycles followed by an EOT PET/CT.                             Treatment Plan: DA-R- EPOCH C5D1=4/6/23                           - Rituximab 375 mg/m2 IV x1 dose -- Day 1                           - Etoposide 50 mg/m2 IV q22h x4 doses -- Days 1-4                           - Vincristine 0.4 mg/m2 IV q22h x4 doses --  Days 1-4                           - Doxorubicin 10 mg/m2 IV q22h x4 doses -- Days 1-4                           - Cyclophosphamide 750 mg/m2 IV x1 dose -- Day 5                           - Prednisone 60 mg/m2 PO BID x10 doses -- Days 1-5                           - Dexamethasone 8 mg daily x2 doses -- Days 6-7                           - Neulasta 6 mg subQ x1 dose -- will need to be requested at [facility]                           - Pre-meds: Tylenol, Benadryl, Zofran, Emend     # Anemia   Secondary to chemotherapy.   - Monitor CBC daily  - Transfuse if Hgb <7      CV  # History of complete heart block s/p pacemaker  placement (2020)  Followed by Dr. Fox; last seen 7/7/22. Pacemaker last interrogated on 3/2/23.  - No acute inpatient management needs  - Continue cardiology follow-up and device checks as recommended - next planned follow-up in 07/2023     # Hx of weight gain with prior cycles  Weight up ~7 lbs from dry weight (125 lbs) during prior cycles. Pt reports benefit from IV Lasix, but edema typically is worst immediately after discharge so has used PO at home too.   - Follow I/Os, daily weights  - PRN Lasix     ID  # Asymptomatic COVID-19 infection, persistent  Noted to be COVID+ on PCR done 1/9/23. Admitted for planned chemotherapy, as above, and treated with IV remdesivir x3 days (1/11-1/13). Despite treatment, patient has remained PCR positive for the last 2 months since diagnosis and was positive again on admission (3/17/23). Cycle threshold 31.4. Per infection prevention, patient needs to remain in precautions until cycle threshold >35.  - Continue special precautions for now as repeat COVID positive (4/6) with CT 31.  - No current clinical s/sx of COVID-19 infection; monitor clinically with initiation of chemotherapy     # Hypogammaglobulinemia   IgG 310 (12/2022). Status-post IVIG on 1/15/23; plan to recheck IgG in ~3 months from last dose (~4/2023) per prior discussion with Dr. Ruelas.   - Repeat IgG level on admission      # ID ppx  - Acyclovir 400 mg BID  - No current indication for bacterial/fungal ppx; start for ANC <1000  - S/p IV pentamidine on 3/18; next due ~4/14     MISC  # GERD - related to previous XRT to the chest, continue PTA omeprazole.  # Insomnia - PTA on temazepam 7.5 mg HS PRN for sleep; last prescribed #5 tablets on 1/13/23 (takes on steroid days)  # Cerumen impaction of the right EAC - noted via otoscopy on 3/18; Debrox drops BID and ear irrigation by nursing PRN     FEN:  - No mIVF; bolus PRN  - Lyte replacement PRN per standing protocol  - Regular diet as tolerated     Prophy/Misc:  - GI/PUD:  PTA PPI  - Bowels: PRN Miralax and Senna  - DVT: ppx enoxaparin; hold for platelets <50K and prior to LP  - ID: as above  - Activity: encourage ambulation     Clinically Significant Risk Factors Present on Admission                             Code Status: FULL     Disposition: ~5 day hospital stay for scheduled chemotherapy.      Follow up:   - Neulasta/labs will need to be scheduled locally     Staffed with Dr Ruelas.    Annelise Flores PAJevonC    Hematology/Oncology      I spent 60 minutes in the care of this patient today, which included time necessary for review of interval events, obtaining history and physical exam, ordering medication(s)/test(s) as medically indicated, discussion with interdisciplinary/consult team(s), and documentation time. Over 50% of time was spent face-to-face and/or coordinating care.     Code Status   Full Code    Primary Care Physician   Provider Not In System    Chief Complaint   Scheduled Chemo C6 DA-R-EPOCH    History is obtained from the patient and chart review     History of Present Illness   Shahid Davis is a 72 year old male with a past medical history significant for complete heart block s/p pacemaker placement and low-grade kappa-restricted plasmacytoid B-cell lymphoma diagnosed 3/2004, now with transformation to 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 Admitted for C5 DA-R-EPOCH (C6 total of chemotherapy).      On admission Shahid reports that he has been feeling well at home. He reports that although with most cycles, side effects get progressively worse, this cycle has been a little easier. He has been eating really well and remaining active, walking a lot. He occasionally has a tickle in throat as a residual COVID symptom, otherwise feels well. Neuropathy worsens after chemo but improves by next cycle. He has also had edema with prior cycles, which improves with Lasix.     Past Medical History    I have reviewed this patient's medical history and updated it with pertinent information if needed.    Past Medical History:   Diagnosis Date     Cardiac pacemaker in situ      Cardiac pacemaker in situ     2020     Lymphoma (H)      Squamous cell carcinoma        Past Surgical History   I have reviewed this patient's surgical history and updated it with pertinent information if needed.  Past Surgical History:   Procedure Laterality Date     DAVINCI HERNIORRHAPHY INGUINAL Left 07/09/2021    Procedure: HERNIORRHAPHY, INGUINAL, ROBOT-ASSISTED, Left, Mesh;  Surgeon: Shamar Tyler MD;  Location: [facility] OR     IR CHEST PORT PLACEMENT > 5 YRS OF AGE  1/5/2023     IR SOFT TISSUE BIOPSY  12/15/2022     MOHS MICROGRAPHIC PROCEDURE       NO HISTORY OF SURGERY         Prior to Admission Medications   Cannot display prior to admission medications because the patient has not been admitted in this contact.     Allergies   Allergies   Allergen Reactions     Ampicillin [Ampicillin Sodium]      Bactrim [Sulfamethoxazole W/Trimethoprim]      Contrast Dye      CT contrast (IV) allergy - need oral Methylpred as premed for scans     Ampicillin Rash       Social History   I have reviewed this patient's social history and updated it with pertinent information if needed. Shahid Davis  reports that he has quit smoking. He has never used smokeless tobacco. He reports current alcohol use of about 11.7 standard drinks of alcohol per week.    Family History   I have reviewed this patient's family history and updated it with pertinent information if needed.   Family History   Problem Relation Age of Onset     Cancer Mother         Blood     Cancer Father         Lung     Cancer Maternal Grandmother         Breast     Cancer Paternal Grandfather         Hodgkins       Review of Systems   The 10 point Review of Systems is negative other than noted in the HPI or here.     Physical Exam                      Vital Signs with Ranges  Temp:  [97.7  F (36.5  C)] 97.7  F (36.5  C)  Pulse:  [94] 94  Resp:  [16] 16  BP: (100)/(64) 100/64  SpO2:   [99 %] 99 %  0 lbs 0 oz    Constitutional: Pleasant male seen sitting in chair. No apparent distress, and appears stated age.  Eyes: Lids and lashes normal, sclera clear, conjunctiva normal.  ENT: Normocephalic, without obvious abnormality, atraumatic, oral pharynx with moist mucus membranes  Respiratory: No increased work of breathing, good air exchange, clear to auscultation bilaterally, no crackles or wheezing.  Cardiovascular: Regular rate and rhythm, normal S1 and S2, and no murmur noted.  GI: +BS. Soft. No tenderness on palpation.  Skin: No bruising or bleeding, normal skin color, texture, turgor, no redness, warmth, or swelling, no rashes, no lesions, no jaundice.  Extremities: No lower extremity edema  Neurologic: Awake, alert, oriented. No focal deficits grossly.     Data   No results found for this or any previous visit (from the past 24 hour(s)).

## 2023-04-07 LAB
ALBUMIN SERPL BCG-MCNC: 3.7 G/DL (ref 3.5–5.2)
ALP SERPL-CCNC: 108 U/L (ref 40–129)
ALT SERPL W P-5'-P-CCNC: 31 U/L (ref 10–50)
ANION GAP SERPL CALCULATED.3IONS-SCNC: 10 MMOL/L (ref 7–15)
AST SERPL W P-5'-P-CCNC: 21 U/L (ref 10–50)
BASOPHILS # BLD MANUAL: 0 10E3/UL (ref 0–0.2)
BASOPHILS NFR BLD MANUAL: 0 %
BILIRUB SERPL-MCNC: 0.2 MG/DL
BUN SERPL-MCNC: 26.7 MG/DL (ref 8–23)
CALCIUM SERPL-MCNC: 9.5 MG/DL (ref 8.8–10.2)
CHLORIDE SERPL-SCNC: 108 MMOL/L (ref 98–107)
CREAT SERPL-MCNC: 0.81 MG/DL (ref 0.67–1.17)
DEPRECATED HCO3 PLAS-SCNC: 26 MMOL/L (ref 22–29)
ELLIPTOCYTES BLD QL SMEAR: ABNORMAL
EOSINOPHIL # BLD MANUAL: 0 10E3/UL (ref 0–0.7)
EOSINOPHIL NFR BLD MANUAL: 0 %
ERYTHROCYTE [DISTWIDTH] IN BLOOD BY AUTOMATED COUNT: 15.3 % (ref 10–15)
FIBRINOGEN PPP-MCNC: 341 MG/DL (ref 170–490)
GFR SERPL CREATININE-BSD FRML MDRD: >90 ML/MIN/1.73M2
GLUCOSE SERPL-MCNC: 144 MG/DL (ref 70–99)
HCT VFR BLD AUTO: 29.4 % (ref 40–53)
HGB BLD-MCNC: 9.1 G/DL (ref 13.3–17.7)
IGG SERPL-MCNC: 316 MG/DL (ref 610–1616)
INR PPP: 1.05 (ref 0.85–1.15)
LYMPHOCYTES # BLD MANUAL: 0.2 10E3/UL (ref 0.8–5.3)
LYMPHOCYTES NFR BLD MANUAL: 4 %
MCH RBC QN AUTO: 32.6 PG (ref 26.5–33)
MCHC RBC AUTO-ENTMCNC: 31 G/DL (ref 31.5–36.5)
MCV RBC AUTO: 105 FL (ref 78–100)
MONOCYTES # BLD MANUAL: 0.1 10E3/UL (ref 0–1.3)
MONOCYTES NFR BLD MANUAL: 1 %
MYELOCYTES # BLD MANUAL: 0.1 10E3/UL
MYELOCYTES NFR BLD MANUAL: 1 %
NEUTROPHILS # BLD MANUAL: 5.6 10E3/UL (ref 1.6–8.3)
NEUTROPHILS NFR BLD MANUAL: 94 %
PLAT MORPH BLD: ABNORMAL
PLATELET # BLD AUTO: 160 10E3/UL (ref 150–450)
POTASSIUM SERPL-SCNC: 4.5 MMOL/L (ref 3.4–5.3)
PROT SERPL-MCNC: 5.4 G/DL (ref 6.4–8.3)
RBC # BLD AUTO: 2.79 10E6/UL (ref 4.4–5.9)
RBC MORPH BLD: ABNORMAL
SODIUM SERPL-SCNC: 144 MMOL/L (ref 136–145)
URATE SERPL-MCNC: 3.3 MG/DL (ref 3.4–7)
WBC # BLD AUTO: 6 10E3/UL (ref 4–11)

## 2023-04-07 PROCEDURE — 84550 ASSAY OF BLOOD/URIC ACID: CPT | Performed by: PHYSICIAN ASSISTANT

## 2023-04-07 PROCEDURE — 120N000002 HC R&B MED SURG/OB UMMC

## 2023-04-07 PROCEDURE — 250N000013 HC RX MED GY IP 250 OP 250 PS 637: Performed by: PHYSICIAN ASSISTANT

## 2023-04-07 PROCEDURE — 85384 FIBRINOGEN ACTIVITY: CPT | Performed by: PHYSICIAN ASSISTANT

## 2023-04-07 PROCEDURE — 99233 SBSQ HOSP IP/OBS HIGH 50: CPT | Mod: AI | Performed by: PHYSICIAN ASSISTANT

## 2023-04-07 PROCEDURE — 250N000012 HC RX MED GY IP 250 OP 636 PS 637: Performed by: REGISTERED NURSE

## 2023-04-07 PROCEDURE — 36591 DRAW BLOOD OFF VENOUS DEVICE: CPT | Performed by: PHYSICIAN ASSISTANT

## 2023-04-07 PROCEDURE — 85610 PROTHROMBIN TIME: CPT | Performed by: PHYSICIAN ASSISTANT

## 2023-04-07 PROCEDURE — 250N000013 HC RX MED GY IP 250 OP 250 PS 637: Performed by: REGISTERED NURSE

## 2023-04-07 PROCEDURE — 85007 BL SMEAR W/DIFF WBC COUNT: CPT | Performed by: PHYSICIAN ASSISTANT

## 2023-04-07 PROCEDURE — 250N000011 HC RX IP 250 OP 636: Performed by: PHYSICIAN ASSISTANT

## 2023-04-07 PROCEDURE — 258N000003 HC RX IP 258 OP 636: Performed by: REGISTERED NURSE

## 2023-04-07 PROCEDURE — 85027 COMPLETE CBC AUTOMATED: CPT | Performed by: PHYSICIAN ASSISTANT

## 2023-04-07 PROCEDURE — 250N000011 HC RX IP 250 OP 636: Performed by: REGISTERED NURSE

## 2023-04-07 PROCEDURE — 80053 COMPREHEN METABOLIC PANEL: CPT | Performed by: PHYSICIAN ASSISTANT

## 2023-04-07 RX ORDER — DIPHENHYDRAMINE HYDROCHLORIDE 50 MG/ML
50 INJECTION INTRAMUSCULAR; INTRAVENOUS
Status: DISCONTINUED | OUTPATIENT
Start: 2023-04-09 | End: 2023-04-09

## 2023-04-07 RX ORDER — ACETAMINOPHEN 325 MG/1
650 TABLET ORAL
Status: DISCONTINUED | OUTPATIENT
Start: 2023-04-09 | End: 2023-04-09

## 2023-04-07 RX ORDER — DIPHENHYDRAMINE HYDROCHLORIDE 50 MG/ML
50 INJECTION INTRAMUSCULAR; INTRAVENOUS
Status: ACTIVE | OUTPATIENT
Start: 2023-04-09 | End: 2023-04-09

## 2023-04-07 RX ORDER — DIPHENHYDRAMINE HCL 50 MG
50 CAPSULE ORAL
Status: DISCONTINUED | OUTPATIENT
Start: 2023-04-09 | End: 2023-04-09

## 2023-04-07 RX ORDER — ALBUTEROL SULFATE 0.83 MG/ML
2.5 SOLUTION RESPIRATORY (INHALATION)
Status: DISCONTINUED | OUTPATIENT
Start: 2023-04-10 | End: 2023-04-10

## 2023-04-07 RX ORDER — METHYLPREDNISOLONE SODIUM SUCCINATE 125 MG/2ML
125 INJECTION, POWDER, LYOPHILIZED, FOR SOLUTION INTRAMUSCULAR; INTRAVENOUS
Status: DISCONTINUED | OUTPATIENT
Start: 2023-04-09 | End: 2023-04-09

## 2023-04-07 RX ORDER — ACETAMINOPHEN 325 MG/1
650 TABLET ORAL ONCE
Status: DISCONTINUED | OUTPATIENT
Start: 2023-04-09 | End: 2023-04-09

## 2023-04-07 RX ORDER — DIPHENHYDRAMINE HCL 50 MG
50 CAPSULE ORAL ONCE
Status: DISCONTINUED | OUTPATIENT
Start: 2023-04-09 | End: 2023-04-09

## 2023-04-07 RX ORDER — DIPHENHYDRAMINE HYDROCHLORIDE 50 MG/ML
50 INJECTION INTRAMUSCULAR; INTRAVENOUS
Status: DISCONTINUED | OUTPATIENT
Start: 2023-04-07 | End: 2023-04-07

## 2023-04-07 RX ORDER — DIPHENHYDRAMINE HYDROCHLORIDE 50 MG/ML
50 INJECTION INTRAMUSCULAR; INTRAVENOUS ONCE
Status: DISCONTINUED | OUTPATIENT
Start: 2023-04-09 | End: 2023-04-09

## 2023-04-07 RX ORDER — PENTAMIDINE ISETHIONATE 300 MG/300MG
300 INHALANT RESPIRATORY (INHALATION)
Status: DISCONTINUED | OUTPATIENT
Start: 2023-04-10 | End: 2023-04-10

## 2023-04-07 RX ADMIN — ALLOPURINOL 300 MG: 300 TABLET ORAL at 08:41

## 2023-04-07 RX ADMIN — POLYETHYLENE GLYCOL 3350 17 G: 17 POWDER, FOR SOLUTION ORAL at 08:43

## 2023-04-07 RX ADMIN — PREDNISONE 100 MG: 50 TABLET ORAL at 08:41

## 2023-04-07 RX ADMIN — ETOPOSIDE 85 MG: 20 INJECTION, SOLUTION, CONCENTRATE INTRAVENOUS at 20:41

## 2023-04-07 RX ADMIN — TEMAZEPAM 7.5 MG: 7.5 CAPSULE ORAL at 21:35

## 2023-04-07 RX ADMIN — ENOXAPARIN SODIUM 40 MG: 40 INJECTION SUBCUTANEOUS at 20:46

## 2023-04-07 RX ADMIN — PREDNISONE 100 MG: 50 TABLET ORAL at 16:09

## 2023-04-07 RX ADMIN — VINCRISTINE SULFATE 0.7 MG: 1 INJECTION, SOLUTION INTRAVENOUS at 20:41

## 2023-04-07 RX ADMIN — OXYCODONE HYDROCHLORIDE AND ACETAMINOPHEN 500 MG: 500 TABLET ORAL at 08:41

## 2023-04-07 RX ADMIN — ACYCLOVIR 400 MG: 400 TABLET ORAL at 20:46

## 2023-04-07 RX ADMIN — SENNOSIDES AND DOCUSATE SODIUM 2 TABLET: 50; 8.6 TABLET ORAL at 20:45

## 2023-04-07 RX ADMIN — ACYCLOVIR 400 MG: 400 TABLET ORAL at 08:41

## 2023-04-07 RX ADMIN — OMEPRAZOLE 40 MG: 20 CAPSULE, DELAYED RELEASE ORAL at 08:41

## 2023-04-07 RX ADMIN — ONDANSETRON HYDROCHLORIDE 16 MG: 8 TABLET, FILM COATED ORAL at 20:46

## 2023-04-07 ASSESSMENT — ACTIVITIES OF DAILY LIVING (ADL)
ADLS_ACUITY_SCORE: 20

## 2023-04-07 NOTE — DISCHARGE INSTRUCTIONS
Clinic [facility]   [address] [place] [address]  Phone: [phone]      Appointments for Neulasta Injection and Labs  Neulasta on Wednesday, April 12 at 2PM    Labs on Monday, April 17 at 1PM  Labs on Thursday, April 20 at 1:15PM           IMM reviewed with patient by phone on 4/10/23 by Anni CLEANING

## 2023-04-07 NOTE — PROGRESS NOTES
Nursing Focus: Chemotherapy  D: Positive blood return via chest port. Insertion site is clean/dry/intact, dressing intact with no complaints of pain. Urine output is recorded in intake in Doc Flowsheet.    I: Premedications given per order (see electronic medical administration record). Dose #1 of Doxorubicin/Vincristine started to infuse over 22 hours. Dose #1 of Etoposide started to infuse over 22 hours. Reviewed pt teaching on chemotherapy side effects. Pt denies need for further teaching. Chemotherapy double checked per protocol by two chemotherapy competent RN's.   A: Tolerating procedure well. Denies nausea and or pain.   P: Continue to monitor urine output and symptoms of nausea. Screen for symptoms of toxicity.

## 2023-04-07 NOTE — PROGRESS NOTES
Care Management Follow Up    Length of Stay (days): 1    Expected Discharge Date: 04/10/2023     Concerns to be Addressed:  Outpatient appts for labs & Neulasta injections.     Patient plan of care discussed at interdisciplinary rounds: Yes    Anticipated Discharge Disposition:  Home     Anticipated Discharge Services:  Outpt labs & Neulasta injections.   Anticipated Discharge DME:      Additional Information:  In 7D Discharge Rounds AMAURI Smith, reported plan is discharge Mon or Tues, 04-10 or 04-11. Pt will need Neulasta injections starting Wed, 4-12 and labs starting 4-13. Labs are 2x/week, Mondays & Thursdays. Pt usually goes to the [facility] in [place].     Spoke with pt on the phone (he is Covid positive). Pt alert and appropriate in conversation. Pt reports he goes to the [facility] in [place], [place]. It is about 20 miles from his home. Informed pt I can help schedule appts for Neulasta on 4-12 and labs on 4-13. Pt said he will start labs on Monday, 4-17 and requested I make appts for 4-17 & 4-20. Declined to have me make appt for 4-13 labs. He prefers 1pm or early aftenoon appts.   Pt said his primary is Haleigh Montero NP at the [facility].   Pt does not receive any home care services. Declined any other needs at this time.     Called [facility] in [place], [place] and scheduled outpt labs for Monday, April 17th at 1pm and Thursday, April 20th at 1:15pm. They said the Neulasta injection needs to be scheduled with their Infusion Dept, extension [phone] or Outreach ext. [phone]. They thought the Infusion Center was closed today.  Called the [facility], ext [phone], no answer; left a voice message requesting a call back to set up Neulasta appt.  Called Outreach at ext [phone] and they said Amos at the [phone] ext sets up those appts.   Called [phone], extension [phone] and spoke with Pep[facility]. She is in the hospital infusion dept, but they  also do the outpt infusions. Scheduled Neulasta injection for Wed, 4-12 at 2pm.  [facility], direct number: [phone]. Pepito said this is the number to call to set up these appts.   Faxed lab & Neulasta orders to [facility] in [facility].   _____________________    Neulasta scheduled for 4-12 at 2pm.   Labs scheduled for 4-17 and 4-20.          Griselda Huizar, RN Care Coordinator  [facility]  [facility]  On 04- RNCC coverage for Unit [address]D.   Page: [phone] or call [phone].       [address] [address]  Phone: [phone], extension [phone] (infusion dept)   Direct phone to Infusion Dept: [phone]  Fax: [phone]

## 2023-04-07 NOTE — PLAN OF CARE
Tachy to 104, OVSS, on RA. No c/o nausea or pain. Prn miralax x1. Chemo infusing w good blood return. Plan for IVIG for 4/9, pentamidine 4/10.

## 2023-04-07 NOTE — PLAN OF CARE
"Goal Outcome Evaluation:    [id number]    /65 (BP Location: Left arm, Cuff Size: Adult Regular)   Pulse 94   Temp 97.8  F (36.6  C) (Oral)   Resp 16   Ht 1.702 m (5' 7\")   Wt 59 kg (130 lb 1.6 oz)   SpO2 97%   BMI 20.38 kg/m      Reason for admission: Admitted for C5 DA-R-EPOCH  Activity: UAL  Pain: Denies  Neuro: AxOx4. Neuros intact.   Cardiac: WDL  Respiratory: NLB on RA. Infrequent dry cough. O2 sats WDL.   GI/: Voiding not saving, reports WDL. LBM PTA today 4/6.   Diet: Regular diet, good appetite.   Lines: R chest port intact, infusing CIVI chemotherapy, site WDL.   Wounds: No noted deficits.   Labs/imaging: Reviewed. See chart.       Continue to monitor and follow POC      "

## 2023-04-07 NOTE — PROGRESS NOTES
Nursing Focus: Admission    D: Patient admitted from home for scheduled chemotherapy.      I: Upon arrival to the unit patient was oriented to room, unit, and call light. Patient s height, weight, and vital signs were obtained. Allergies reviewed and allergy band applied. MD notified of patient s arrival on the unit. Adult AVS completed. Head to toe assessment completed. Education assessment completed. Care plan initiated.     A: Vital signs stable upon admission. Two RN skin assessment completed: Yes. Second RN was Lakshmi Laguerre. No significant skin findings. [facility] Nurse Consult Ordered: No. Bed Algorithm can be found in PCS flow sheets (Support Surface Algorithm) and on IP [facility] NURSE RESOURCE TAB, was this used during this assessment? Yes. Was a pulsate mattress ordered: No.     P: Continue to monitor and intervene as needed. Continue with plan of care. Notify MD with any concerns or changes in patient status.

## 2023-04-07 NOTE — PROGRESS NOTES
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 Admitted for C5 DA-R-EPOCH (C6 total of chemotherapy).         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 Ruelas. Pt has a h/o low-grade kappa restricted plasmacytoid B-cell lymphoma (diagnosed in 3/2004) treated with x6 cycles of fludarabine based chemo and XRT to spine, then has been on surveillance since 2005. He presented with progressive B-symptoms, abdominal pain, and SOB. PET 12/9 showed extensive lymphomatous involvement to the right pleura w/ bilateral FDG-avid effusions (large R, small L), mediastinal and pericardial regions, right anterolateral chest wall, adenopathy above and below the diaphragm, liver, spleen and multiple sites in the skeleton, concerning for transformation from his low-grade lymphoma. He was also noted to have progressive pancytopenia. Due to worsening shortness of breath and delays of biopsy, patient presented to ED on 12/14 and was subsequently admitted for expedited workup and treatment. As SUV of the sternoclavicular mass was the highest, patient underwent US-guided sternoclavicular soft tissue mass biopsy with IR 12/15, pathology from which confirmed a diagnosis of high-grade B-cell lymphoma. Baseline ECHO (12/15) with LVEF 60-65%, mild aortic insufficiency, no evidence of effusion or tamponade. Received 1 cycle of R-CHOP (X1X9=7412/20/22) which was well-tolerated. Cytogenetics then returned, revealed rearrangements in MYC, BCL2, and  BCL6, consistent with triple hit lymphoma. He was subsequently changed to DA-R-EPOCH and proceeded with C1 on 1/11/23 (C2 total of therapy). He was re-admitted for C2 on 2/2/23, C3 on 2/22/23, C4 on 3/17/23. Cycles have been overall well-tolerated, save for anticipated fatigue and cytopenias. Of note, patient has not advanced past Level 1 per personal preference/concerns for tolerance.  - CNS IPI was 4 (1 point each for age, LDH, stage IV disease, and extranodal involvement); as such, s/p CNS ppx with IT chemotherapy (1/12, 2/6, 2/24, 3/20 -- all negative flow). No need for ongoing IT chemo as completed planned 4 doses.   - PET/CT (post-4 cycles of chemo) done 3/17/23 with continued CR, single area of avidity in right cardiac pleura (although deemed likely artifact vs physiologic) with recommendation for cardiac prep/ketogenic diet for 3 days prior to next PET/CT.  - Note: this is overall C6 of chemotherapy (1 cycle R-CHOP + 5 cycles R-EPOCH); current plan is for 6 total cycles followed by an EOT PET/CT.                             Treatment Plan: DA-R- EPOCH C5D1=4/6/23                           - Rituximab 375 mg/m2 IV x1 dose -- Day 1                           - Etoposide 50 mg/m2 IV q22h x4 doses -- Days 1-4                           - Vincristine 0.4 mg/m2 IV q22h x4 doses --  Days 1-4                           - Doxorubicin 10 mg/m2 IV q22h x4 doses -- Days 1-4                           - Cyclophosphamide 750 mg/m2 IV x1 dose -- Day 5                           - Prednisone 60 mg/m2 PO BID x10 doses -- Days 1-5                           - Dexamethasone 8 mg daily x2 doses -- Days 6-7                           - Neulasta 6 mg subQ x1 dose -- requested at [facility] 4/12                           - Pre-meds: Tylenol, Benadryl, Zofran, Emend     # Anemia   Secondary to chemotherapy.   - Monitor CBC daily  - Transfuse if Hgb <7      CV  # History of complete heart block s/p pacemaker placement (2020)  Followed by  Dr. Fox; last seen 7/7/22. Pacemaker last interrogated on 3/2/23.  - No acute inpatient management needs  - Continue cardiology follow-up and device checks as recommended - next planned follow-up in 07/2023     # Hx of weight gain with prior cycles  Weight up ~7 lbs from dry weight (125 lbs) during prior cycles. Pt reports benefit from IV Lasix, but edema typically is worst immediately after discharge so has used PO at home too.   - Follow I/Os, daily weights  - PRN Lasix     ID  # Asymptomatic COVID-19 infection, persistent  Noted to be COVID+ on PCR done 1/9/23. Admitted for planned chemotherapy, as above, and treated with IV remdesivir x3 days (1/11-1/13). Despite treatment, patient has remained PCR positive for the last 2 months since diagnosis and was positive again on admission (3/17/23). Cycle threshold 31.4. Per infection prevention, patient needs to remain in precautions until cycle threshold >35.  - Continue special precautions for now as repeat COVID positive (4/6) with CT 31.  - No current clinical s/sx of COVID-19 infection; monitor clinically with initiation of chemotherapy     # Hypogammaglobulinemia   IgG 310 (12/2022). Status-post IVIG on 1/15/23; plan to recheck IgG in ~3 months from last dose (~4/2023) per prior discussion with Dr. Ruelas.   - Repeat IgG level this admission 317. Plan to give IVIG prior to discharge (ordered for 4/9).      # ID ppx  - Acyclovir 400 mg BID  - No current indication for bacterial/fungal ppx; start for ANC <1000  - S/p IV pentamidine on 3/18; Plan to give dose prior to discharge this admission (ordered for 4/10).      MISC  # GERD - related to previous XRT to the chest, continue PTA omeprazole.  # Insomnia - PTA on temazepam 7.5 mg HS PRN for sleep; last prescribed #5 tablets on 1/13/23 (takes on steroid days)  # Cerumen impaction of the right EAC - noted via otoscopy on 3/18; Debrox drops BID and ear irrigation by nursing PRN     FEN:  - No mIVF; bolus PRN  - Lyte  replacement PRN per standing protocol  - Regular diet as tolerated     Prophy/Misc:  - GI/PUD: PTA PPI  - Bowels: PRN Miralax and Senna  - DVT: ppx enoxaparin; hold for platelets <50K and prior to LP  - ID: as above  - Activity: encourage ambulation     Clinically Significant Risk Factors Present on Admission                                Code Status: FULL     Disposition: ~5 day hospital stay for scheduled chemotherapy.      Follow up:   - Neulasta/labs requested locally; [facility] to Fax to [facility]      Staffed with Dr Ruelas.     [facility]   Hematology/Oncology       I spent 30 minutes in the care of this patient today, which included time necessary for review of interval events, obtaining history and physical exam, ordering medication(s)/test(s) as medically indicated, discussion with interdisciplinary/consult team(s), and documentation time. Over 50% of time was spent face-to-face and/or coordinating care.     Interval History   Overnight no acute events. Afebrile and HD stable. Good appetite this AM. No NV or pain overnight. Feeling very well, good energy levels. Talking on the phone with friends.     Physical Exam   Temp: 98  F (36.7  C) Temp src: Oral BP: 130/75 Pulse: 101   Resp: 18 SpO2: 96 % O2 Device: None (Room air)    Vitals:    04/06/23 1841 04/07/23 0943   Weight: 59 kg (130 lb 1.6 oz) 59.4 kg (130 lb 15.3 oz)     Vital Signs with Ranges  Temp:  [97.7  F (36.5  C)-98.3  F (36.8  C)] 98  F (36.7  C)  Pulse:  [] 101  Resp:  [16-18] 18  BP: (100-130)/(63-75) 130/75  SpO2:  [95 %-99 %] 96 %  I/O last 3 completed shifts:  In: 1014 [P.O.:480; I.V.:500; IV Piggyback:34]  Out: 200 [Urine:200]    Constitutional: Pleasant male seen sitting in chair. No apparent distress, and appears stated age.  Eyes: Lids and lashes normal, sclera clear, conjunctiva normal.  ENT: Normocephalic, without obvious abnormality, atraumatic, oral pharynx with moist mucus membranes  Respiratory: No increased work of  breathing, good air exchange, clear to auscultation bilaterally, no crackles or wheezing.  Cardiovascular: Regular rate and rhythm, normal S1 and S2, and no murmur noted.  GI: +BS. Soft. No tenderness on palpation.  Skin: No bruising or bleeding, normal skin color, texture, turgor, no redness, warmth, or swelling, no rashes, no lesions, no jaundice. Alopecia   Extremities: No lower extremity edema  Neurologic: Awake, alert, oriented. No focal deficits grossly.      Medications     - MEDICATION INSTRUCTIONS -         acyclovir  400 mg Oral Q12H     allopurinol  300 mg Oral Daily     Chemotherapy Infusing-Continuous Infusion   Does not apply Q8H     [START ON 4/10/2023] cyclophosphamide (CYTOXAN) 1,275 mg in sodium chloride 0.9 % 614 mL infusion  750 mg/m2 (Treatment Plan Recorded) Intravenous Once     [START ON 4/12/2023] dexamethasone  8 mg Oral Daily     enoxaparin ANTICOAGULANT  40 mg Subcutaneous Q24H     etoposide  50 mg/m2 (Treatment Plan Recorded) Intravenous Q22H     [START ON 4/10/2023] fosaprepitant (EMEND) 150 mg in sodium chloride 0.9 % 275 mL intermittent infusion  150 mg Intravenous Once     heparin  5-10 mL Intracatheter Q28 Days     heparin lock flush  5-10 mL Intracatheter Q24H     omeprazole  40 mg Oral Daily     ondansetron  16 mg Oral Q22H     predniSONE  60 mg/m2 (Treatment Plan Recorded) Oral BID     senna-docusate  2 tablet Oral BID     sodium chloride (PF)  10-20 mL Intracatheter Q28 Days     vinCRIStine (ONCOVIN) 0.7 mg, DOXOrubicin (ADRIAMYCIN) 17 mg in sodium chloride 0.9 % 1,059.2 mL infusion  0.4 mg/m2 (Treatment Plan Recorded) Intravenous Q22H     vitamin C  500 mg Oral QAM       Data   Results for orders placed or performed during the hospital encounter of 04/06/23 (from the past 24 hour(s))   ABO/RH Type and Screen     Narrative    The following orders were created for panel order ABO/RH Type and Screen .  Procedure                               Abnormality         Status                      ---------                               -----------         ------                     Adult Type and Screen[[id number]]                            Final result                 Please view results for these tests on the individual orders.   IgG   Result Value Ref Range    Immunoglobulin G 316 (L) 610 - 1,616 mg/dL   Adult Type and Screen   Result Value Ref Range    ABO/RH(D) A POS     Antibody Screen Negative Negative    SPECIMEN EXPIRATION DATE [id number]    Comprehensive metabolic panel   Result Value Ref Range    Sodium 144 136 - 145 mmol/L    Potassium 4.5 3.4 - 5.3 mmol/L    Chloride 108 (H) 98 - 107 mmol/L    Carbon Dioxide (CO2) 26 22 - 29 mmol/L    Anion Gap 10 7 - 15 mmol/L    Urea Nitrogen 26.7 (H) 8.0 - 23.0 mg/dL    Creatinine 0.81 0.67 - 1.17 mg/dL    Calcium 9.5 8.8 - 10.2 mg/dL    Glucose 144 (H) 70 - 99 mg/dL    Alkaline Phosphatase 108 40 - 129 U/L    AST 21 10 - 50 U/L    ALT 31 10 - 50 U/L    Protein Total 5.4 (L) 6.4 - 8.3 g/dL    Albumin 3.7 3.5 - 5.2 g/dL    Bilirubin Total 0.2 <=1.2 mg/dL    GFR Estimate >90 >60 mL/min/1.73m2   Uric acid   Result Value Ref Range    Uric Acid 3.3 (L) 3.4 - 7.0 mg/dL   CBC with platelets differential    Narrative    The following orders were created for panel order CBC with platelets differential.  Procedure                               Abnormality         Status                     ---------                               -----------         ------                     CBC with platelets and d...[[id number]]  Abnormal            Final result               Manual Differential[[id number]]          Abnormal            Final result                 Please view results for these tests on the individual orders.   INR   Result Value Ref Range    INR 1.05 0.85 - 1.15   Fibrinogen activity   Result Value Ref Range    Fibrinogen Activity 341 170 - 490 mg/dL   CBC with platelets and differential   Result Value Ref Range    WBC Count 6.0 4.0 - 11.0 10e3/uL    RBC  Count 2.79 (L) 4.40 - 5.90 10e6/uL    Hemoglobin 9.1 (L) 13.3 - 17.7 g/dL    Hematocrit 29.4 (L) 40.0 - 53.0 %     (H) 78 - 100 fL    MCH 32.6 26.5 - 33.0 pg    MCHC 31.0 (L) 31.5 - 36.5 g/dL    RDW 15.3 (H) 10.0 - 15.0 %    Platelet Count 160 150 - 450 10e3/uL   Manual Differential   Result Value Ref Range    % Neutrophils 94 %    % Lymphocytes 4 %    % Monocytes 1 %    % Eosinophils 0 %    % Basophils 0 %    % Myelocytes 1 %    Absolute Neutrophils 5.6 1.6 - 8.3 10e3/uL    Absolute Lymphocytes 0.2 (L) 0.8 - 5.3 10e3/uL    Absolute Monocytes 0.1 0.0 - 1.3 10e3/uL    Absolute Eosinophils 0.0 0.0 - 0.7 10e3/uL    Absolute Basophils 0.0 0.0 - 0.2 10e3/uL    Absolute Myelocytes 0.1 (H) <=0.0 10e3/uL    RBC Morphology Confirmed RBC Indices     Platelet Assessment  Automated Count Confirmed. Platelet morphology is normal.     Automated Count Confirmed. Platelet morphology is normal.    Elliptocytes Moderate (A) None Seen

## 2023-04-07 NOTE — PLAN OF CARE
"Goal Outcome Evaluation: Ongoing      Plan of Care Reviewed With: patient    Vitals: WDL on RA  Blood pressure 108/64, pulse 90, temperature 97.8  F (36.6  C), temperature source Oral, resp. rate 16, height 1.702 m (5' 7\"), weight 59 kg (130 lb 1.6 oz), SpO2 96 %.  Neuros: A/O x 4  IV: PORT chemo infusing  Labs/Electrolytes: Reviewed  Resp/trach: Denied shortness of breath.  Diet: High Kcal/High Protein Diet, Adult, denied nausea/vomitting.    Bowel status: +BS, No BM during the shift  : Voids enough urine output  Skin: No skin deficit noted.  Pain: Denied pain   Activity: up ad abe  Plan: Continue to monitor per POC. Pt tolerating chemotherapy.         "

## 2023-04-07 NOTE — PROGRESS NOTES
Labs and Transfusion orders:  Date: 2023    Patient: Shahid Davis  : 1950     Diagnosis: High-grade B-cell lymphoma     Medication administration:  [  ] Neulasta (pegfilgrastim) injection 6 mg x1 subcutaneous on 23.     LABS:  [  ] Check CBC with differential and CMP twice a week (fax labs to [facility] at [phone]). Patient prefers Mon/Thurs early afternoon starting 4/13/23 x3 weeks.   [  ] Type and cross PRN     TRANSFUSION PARAMETERS:   [  ] Please transfuse 1 (one) units pRBCs if Hgb is less than or equal to 7.  [  ] Please transfuse 1 (one) unit platelets if plt count less than or equal to 10K.   [  ] If patient experiences transfusion reaction during transfusion:              [  ] 25-50 mg Benadryl IV x once PRN              [  ] Tylenol 1000 mg PO x once PRN              [  ] Hydrocortisone 100 mg IV x once PRN              [  ] Pepcid 40 mg IV x once PRN              [  ] Notify provider covering infusion clinic per protocol     Please call the [facility] at [phone] for any questions, and ask to speak with the care coordinator for Dr. Domitila Ruelas (patient's primary oncologist).     Thank you,               Leida Barton PA-C      [facility]  Department of Hematology/Oncology  [address] [place] [address]     Fax to:  [facility]   [address] [place] [address]  Phone: [phone] -   Infusion department [id number]  Fax: [phone]

## 2023-04-07 NOTE — PROGRESS NOTES
Rituximab  D: Baseline vital signs and temperature were WDL. +Blood return via port.    I: Rituximab infusion administered via rapid infusion order. Rate adjusted according to protocol and patient tolerance. Pre-medications included Tylenol and Benadryl.  A: Maximum rate of rituximab infusion tolerated was 400mL/h.   P: Pt tolerated rapid infusion Rituximab without s/s of reaction.

## 2023-04-08 LAB
ALBUMIN SERPL BCG-MCNC: 3.4 G/DL (ref 3.5–5.2)
ALP SERPL-CCNC: 88 U/L (ref 40–129)
ALT SERPL W P-5'-P-CCNC: 25 U/L (ref 10–50)
ANION GAP SERPL CALCULATED.3IONS-SCNC: 9 MMOL/L (ref 7–15)
AST SERPL W P-5'-P-CCNC: 18 U/L (ref 10–50)
BASOPHILS # BLD AUTO: 0 10E3/UL (ref 0–0.2)
BASOPHILS NFR BLD AUTO: 0 %
BILIRUB SERPL-MCNC: <0.2 MG/DL
BUN SERPL-MCNC: 23.8 MG/DL (ref 8–23)
CALCIUM SERPL-MCNC: 9 MG/DL (ref 8.8–10.2)
CHLORIDE SERPL-SCNC: 107 MMOL/L (ref 98–107)
CREAT SERPL-MCNC: 0.77 MG/DL (ref 0.67–1.17)
DEPRECATED HCO3 PLAS-SCNC: 24 MMOL/L (ref 22–29)
EOSINOPHIL # BLD AUTO: 0 10E3/UL (ref 0–0.7)
EOSINOPHIL NFR BLD AUTO: 0 %
ERYTHROCYTE [DISTWIDTH] IN BLOOD BY AUTOMATED COUNT: 15.2 % (ref 10–15)
FIBRINOGEN PPP-MCNC: 245 MG/DL (ref 170–490)
GFR SERPL CREATININE-BSD FRML MDRD: >90 ML/MIN/1.73M2
GLUCOSE SERPL-MCNC: 109 MG/DL (ref 70–99)
HCT VFR BLD AUTO: 26.4 % (ref 40–53)
HGB BLD-MCNC: 8.3 G/DL (ref 13.3–17.7)
IMM GRANULOCYTES # BLD: 0.1 10E3/UL
IMM GRANULOCYTES NFR BLD: 1 %
INR PPP: 1.1 (ref 0.85–1.15)
LYMPHOCYTES # BLD AUTO: 0.4 10E3/UL (ref 0.8–5.3)
LYMPHOCYTES NFR BLD AUTO: 7 %
MAGNESIUM SERPL-MCNC: 2 MG/DL (ref 1.7–2.3)
MCH RBC QN AUTO: 32.8 PG (ref 26.5–33)
MCHC RBC AUTO-ENTMCNC: 31.4 G/DL (ref 31.5–36.5)
MCV RBC AUTO: 104 FL (ref 78–100)
MONOCYTES # BLD AUTO: 0.3 10E3/UL (ref 0–1.3)
MONOCYTES NFR BLD AUTO: 6 %
NEUTROPHILS # BLD AUTO: 5.2 10E3/UL (ref 1.6–8.3)
NEUTROPHILS NFR BLD AUTO: 86 %
NRBC # BLD AUTO: 0 10E3/UL
NRBC BLD AUTO-RTO: 0 /100
PHOSPHATE SERPL-MCNC: 3.1 MG/DL (ref 2.5–4.5)
PLATELET # BLD AUTO: 145 10E3/UL (ref 150–450)
POTASSIUM SERPL-SCNC: 3.7 MMOL/L (ref 3.4–5.3)
PROT SERPL-MCNC: 4.8 G/DL (ref 6.4–8.3)
RBC # BLD AUTO: 2.53 10E6/UL (ref 4.4–5.9)
SODIUM SERPL-SCNC: 140 MMOL/L (ref 136–145)
URATE SERPL-MCNC: 3 MG/DL (ref 3.4–7)
WBC # BLD AUTO: 6.1 10E3/UL (ref 4–11)

## 2023-04-08 PROCEDURE — 83735 ASSAY OF MAGNESIUM: CPT | Performed by: INTERNAL MEDICINE

## 2023-04-08 PROCEDURE — 250N000013 HC RX MED GY IP 250 OP 250 PS 637: Performed by: PHYSICIAN ASSISTANT

## 2023-04-08 PROCEDURE — 85610 PROTHROMBIN TIME: CPT | Performed by: PHYSICIAN ASSISTANT

## 2023-04-08 PROCEDURE — 84100 ASSAY OF PHOSPHORUS: CPT | Performed by: INTERNAL MEDICINE

## 2023-04-08 PROCEDURE — 250N000012 HC RX MED GY IP 250 OP 636 PS 637: Performed by: REGISTERED NURSE

## 2023-04-08 PROCEDURE — 85004 AUTOMATED DIFF WBC COUNT: CPT | Performed by: PHYSICIAN ASSISTANT

## 2023-04-08 PROCEDURE — 84550 ASSAY OF BLOOD/URIC ACID: CPT | Performed by: PHYSICIAN ASSISTANT

## 2023-04-08 PROCEDURE — 82310 ASSAY OF CALCIUM: CPT | Performed by: PHYSICIAN ASSISTANT

## 2023-04-08 PROCEDURE — 250N000013 HC RX MED GY IP 250 OP 250 PS 637: Performed by: REGISTERED NURSE

## 2023-04-08 PROCEDURE — 120N000002 HC R&B MED SURG/OB UMMC

## 2023-04-08 PROCEDURE — 250N000011 HC RX IP 250 OP 636: Performed by: REGISTERED NURSE

## 2023-04-08 PROCEDURE — 85384 FIBRINOGEN ACTIVITY: CPT | Performed by: PHYSICIAN ASSISTANT

## 2023-04-08 PROCEDURE — 80053 COMPREHEN METABOLIC PANEL: CPT | Performed by: PHYSICIAN ASSISTANT

## 2023-04-08 PROCEDURE — 36591 DRAW BLOOD OFF VENOUS DEVICE: CPT | Performed by: PHYSICIAN ASSISTANT

## 2023-04-08 PROCEDURE — 99233 SBSQ HOSP IP/OBS HIGH 50: CPT | Mod: FS | Performed by: PHYSICIAN ASSISTANT

## 2023-04-08 PROCEDURE — 258N000003 HC RX IP 258 OP 636: Performed by: REGISTERED NURSE

## 2023-04-08 PROCEDURE — 250N000011 HC RX IP 250 OP 636: Performed by: PHYSICIAN ASSISTANT

## 2023-04-08 RX ORDER — CALCIUM CARBONATE 500 MG/1
500 TABLET, CHEWABLE ORAL 3 TIMES DAILY PRN
Status: DISCONTINUED | OUTPATIENT
Start: 2023-04-08 | End: 2023-04-11 | Stop reason: HOSPADM

## 2023-04-08 RX ADMIN — ETOPOSIDE 85 MG: 20 INJECTION, SOLUTION, CONCENTRATE INTRAVENOUS at 18:14

## 2023-04-08 RX ADMIN — OMEPRAZOLE 40 MG: 20 CAPSULE, DELAYED RELEASE ORAL at 07:51

## 2023-04-08 RX ADMIN — ONDANSETRON HYDROCHLORIDE 16 MG: 8 TABLET, FILM COATED ORAL at 18:21

## 2023-04-08 RX ADMIN — VINCRISTINE SULFATE 0.7 MG: 1 INJECTION, SOLUTION INTRAVENOUS at 18:21

## 2023-04-08 RX ADMIN — OXYCODONE HYDROCHLORIDE AND ACETAMINOPHEN 500 MG: 500 TABLET ORAL at 07:51

## 2023-04-08 RX ADMIN — SENNOSIDES AND DOCUSATE SODIUM 2 TABLET: 50; 8.6 TABLET ORAL at 19:12

## 2023-04-08 RX ADMIN — PREDNISONE 100 MG: 50 TABLET ORAL at 07:51

## 2023-04-08 RX ADMIN — ALLOPURINOL 300 MG: 300 TABLET ORAL at 07:51

## 2023-04-08 RX ADMIN — ENOXAPARIN SODIUM 40 MG: 40 INJECTION SUBCUTANEOUS at 19:12

## 2023-04-08 RX ADMIN — PREDNISONE 100 MG: 50 TABLET ORAL at 16:03

## 2023-04-08 RX ADMIN — ACYCLOVIR 400 MG: 400 TABLET ORAL at 19:12

## 2023-04-08 RX ADMIN — TEMAZEPAM 7.5 MG: 7.5 CAPSULE ORAL at 22:51

## 2023-04-08 RX ADMIN — ACYCLOVIR 400 MG: 400 TABLET ORAL at 07:51

## 2023-04-08 RX ADMIN — POLYETHYLENE GLYCOL 3350 17 G: 17 POWDER, FOR SOLUTION ORAL at 07:51

## 2023-04-08 ASSESSMENT — ACTIVITIES OF DAILY LIVING (ADL)
ADLS_ACUITY_SCORE: 20

## 2023-04-08 NOTE — PROGRESS NOTES
[id number]    VSS. Alert and oriented. Tolerating chemo well. Denies pain and N/V. Continue with POC.

## 2023-04-08 NOTE — PLAN OF CARE
"Goal Outcome Evaluation:    1500 - 2330:   /68 (BP Location: Left arm)   Pulse 104   Temp 98.2  F (36.8  C) (Oral)   Resp 18   Ht 1.702 m (5' 7\")   Wt 59.4 kg (130 lb 15.3 oz)   SpO2 95%   BMI 20.51 kg/m        A&O x 4, AVSS, tachy in 104, denies pain/nausea/sob on RA.  CIVI Day #2 Etoposide & Vincristine/doxorubicin infusing via port with good blood return.  On scheduled q22 hr zofran 16 mg.  Gave prn temazepam for sleep per request.  UAL, voiding spontaneously, had a bm this morning, took scheduled 2 senokot.  Plan for IVIG on 4/9.  Continue with poc.....                        "

## 2023-04-08 NOTE — PROGRESS NOTES
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 Admitted 04/06/23 for C5 DA-R-EPOCH (C6 total of chemotherapy).  Continues on isolation precautions for persistently positive COVID PCR.    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 Ruelas. Pt has a h/o low-grade kappa restricted plasmacytoid B-cell lymphoma (diagnosed in 3/2004) treated with x6 cycles of fludarabine based chemo and XRT to spine, then has been on surveillance since 2005. He presented with progressive B-symptoms, abdominal pain, and SOB. PET 12/9 showed extensive lymphomatous involvement to the right pleura w/ bilateral FDG-avid effusions (large R, small L), mediastinal and pericardial regions, right anterolateral chest wall, adenopathy above and below the diaphragm, liver, spleen and multiple sites in the skeleton, concerning for transformation from his low-grade lymphoma. He was also noted to have progressive pancytopenia. Due to worsening shortness of breath and delays of biopsy, patient presented to ED on 12/14 and was subsequently admitted for expedited workup and treatment. As SUV of the sternoclavicular mass was the highest, patient underwent US-guided sternoclavicular soft tissue mass biopsy with IR 12/15, pathology from which confirmed a diagnosis of high-grade B-cell lymphoma. Baseline ECHO (12/15) with LVEF 60-65%, mild aortic insufficiency, no evidence of effusion or  tamponade. Received 1 cycle of R-CHOP (L3E7=9212/20/22) which was well-tolerated. Cytogenetics then returned, revealed rearrangements in MYC, BCL2, and BCL6, consistent with triple hit lymphoma. He was subsequently changed to DA-R-EPOCH and proceeded with C1 on 1/11/23 (C2 total of therapy). He was re-admitted for C2 on 2/2/23, C3 on 2/22/23, C4 on 3/17/23. Cycles have been overall well-tolerated, save for anticipated fatigue and cytopenias. Of note, patient has not advanced past Level 1 per personal preference/concerns for tolerance.  - CNS IPI was 4 (1 point each for age, LDH, stage IV disease, and extranodal involvement); as such, s/p CNS ppx with IT chemotherapy (1/12, 2/6, 2/24, 3/20 -- all negative flow). No need for ongoing IT chemo as completed planned 4 doses.   - PET/CT (post-4 cycles of chemo) done 3/17/23 with continued CR, single area of avidity in right cardiac pleura (although deemed likely artifact vs physiologic) with recommendation for cardiac prep/ketogenic diet for 3 days prior to next PET/CT.  - Note: this is overall C6 of chemotherapy (1 cycle R-CHOP + 5 cycles R-EPOCH); current plan is for 6 total cycles followed by an EOT PET/CT.                             Treatment Plan: DA-R- EPOCH C5D1=4/6/23                           - Rituximab 375 mg/m2 IV x1 dose -- Day 1                           - Etoposide 50 mg/m2 IV q22h x4 doses -- Days 1-4                           - Vincristine 0.4 mg/m2 IV q22h x4 doses --  Days 1-4                           - Doxorubicin 10 mg/m2 IV q22h x4 doses -- Days 1-4                           - Cyclophosphamide 750 mg/m2 IV x1 dose -- Day 5                           - Prednisone 60 mg/m2 PO BID x10 doses -- Days 1-5                           - Dexamethasone 8 mg daily x2 doses -- Days 6-7                           - Neulasta 6 mg subQ x1 dose -- requested at [facility] 4/12                           - Pre-meds: Tylenol, Benadryl, Zofran, Emend     # Anemia   Secondary  to chemotherapy.   - Monitor CBC daily  - Transfuse if Hgb <7      CV  # History of complete heart block s/p pacemaker placement (2020)  Followed by Dr. Fox; last seen 7/7/22. Pacemaker last interrogated on 3/2/23.  - No acute inpatient management needs  - Continue cardiology follow-up and device checks as recommended - next planned follow-up in 07/2023     # Hx of weight gain with prior cycles  Weight up ~7 lbs from dry weight (125 lbs) during prior cycles. Pt reports benefit from IV Lasix, but edema typically is worst immediately after discharge so has used PO at home too.   - Follow I/Os, daily weights  - PRN Lasix     ID  # Asymptomatic COVID-19 infection, persistent  Noted to be COVID+ on PCR done 1/9/23. Admitted for planned chemotherapy, as above, and treated with IV remdesivir x3 days (1/11-1/13). Despite treatment, patient has remained PCR positive for the last 2 months since diagnosis and was positive again on admission (3/17/23). Cycle threshold 31.4. Per infection prevention, patient needs to remain in precautions until cycle threshold >35.  Cycle threshold again 31.4 on 04/06/23 with Covid PCR +.  - Continue special precautions for now as repeat COVID positive (4/6) with CT 31.  - No current clinical s/sx of COVID-19 infection; monitor clinically with initiation of chemotherapy  - Anticipate giving IVIG as below     # Hypogammaglobulinemia   IgG 310 (12/2022). Status-post IVIG on 1/15/23; plan to recheck IgG in ~3 months from last dose (~4/2023) per prior discussion with Dr. Ruelas.   - Repeat IgG level this admission 317. Plan to give IVIG prior to discharge (ordered for 4/9).      # ID ppx  - Acyclovir 400 mg BID  - No current indication for bacterial/fungal ppx; start for ANC <1000  - S/p IV pentamidine on 3/18; Plan to give dose prior to discharge this admission (ordered for 4/10).      MISC  # GERD - related to previous XRT to the chest, continue PTA omeprazole.  # Insomnia - PTA on temazepam 7.5 mg  HS PRN for sleep; last prescribed #5 tablets on 1/13/23 (takes on steroid days)  # Cerumen impaction of the right EAC - noted via otoscopy on 3/18; Debrox drops BID and ear irrigation by nursing PRN     FEN:  - No mIVF; bolus PRN  - Lyte replacement PRN per standing protocol  - Regular diet as tolerated     Prophy/Misc:  - GI/PUD: PTA PPI  - Bowels: PRN Miralax and Senna  - DVT: ppx enoxaparin; hold for platelets <50K and prior to LP  - ID: as above  - Activity: encourage ambulation     Clinically Significant Risk Factors                                  Code Status: FULL     Disposition: ~5 day hospital stay for scheduled chemotherapy.      Follow up:   - Neulasta/labs requested locally; [facility] to Fax to [facility]      Staffed with Dr Ruelas.     Shanell Briggs PA-C *[phone]  Hematology/Oncology       I spent 30 minutes in the care of this patient today, which included time necessary for review of interval events, obtaining history and physical exam, ordering medication(s)/test(s) as medically indicated, discussion with interdisciplinary/consult team(s), and documentation time. Over 50% of time was spent face-to-face and/or coordinating care.     Interval History   Overnight no acute events. Afebrile and HD stable. Good appetite this AM. No NV or pain overnight. Feeling very well, good energy levels. Notes some post nasal drip and needing to cough to clear this frequently, but unchanged over the past month.  Receiving D3 chemotherapy today (Vincristine, etoposide, doxorubicin and pred)    Physical Exam   Temp: 98.3  F (36.8  C) Temp src: Oral BP: 103/62 Pulse: 89   Resp: 18 SpO2: 95 % O2 Device: None (Room air)    Vitals:    04/06/23 1841 04/07/23 0943   Weight: 59 kg (130 lb 1.6 oz) 59.4 kg (130 lb 15.3 oz)     Vital Signs with Ranges  Temp:  [97.9  F (36.6  C)-98.3  F (36.8  C)] 98.3  F (36.8  C)  Pulse:  [] 89  Resp:  [18] 18  BP: (103-130)/(57-75) 103/62  SpO2:  [95 %-96 %] 95 %  I/O last 3 completed shifts:  In: 420  [P.O.:420]  Out: 1100 [Urine:1100]    Constitutional: Pleasant male seen sitting in chair. No apparent distress, and appears stated age.  Eyes: Lids and lashes normal, sclera clear, conjunctiva normal.  ENT: Normocephalic, without obvious abnormality, atraumatic, oral pharynx with moist mucus membranes.  Some slight swelling on right side of face, but soft without appreciable mass or tenderness or erythema.   Respiratory: No increased work of breathing, good air exchange, clear to auscultation bilaterally, no crackles or wheezing.  Cardiovascular: Heart sounds fiant, but sound to be regular rate and rhythm, normal S1 and S2, and no murmur noted.  GI: +BS. Soft. No tenderness on palpation.  Skin: No bruising or bleeding, normal skin color, texture, turgor, no redness, warmth, or swelling, no rashes, no lesions, no jaundice. Alopecia   Extremities: No lower extremity edema  Neurologic: Awake, alert, oriented. No focal deficits grossly.      Medications     - MEDICATION INSTRUCTIONS -         [START ON 4/9/2023] acetaminophen  650 mg Oral Once     acyclovir  400 mg Oral Q12H     [START ON 4/10/2023] albuterol  2.5 mg Nebulization Once    And     [START ON 4/10/2023] pentamidine  300 mg Inhalation Once     allopurinol  300 mg Oral Daily     Chemotherapy Infusing-Continuous Infusion   Does not apply Q8H     [START ON 4/10/2023] cyclophosphamide (CYTOXAN) 1,275 mg in sodium chloride 0.9 % 614 mL infusion  750 mg/m2 (Treatment Plan Recorded) Intravenous Once     [START ON 4/12/2023] dexamethasone  8 mg Oral Daily     [START ON 4/9/2023] diphenhydrAMINE  50 mg Oral Once    Or     [START ON 4/9/2023] diphenhydrAMINE  50 mg Intravenous Once     enoxaparin ANTICOAGULANT  40 mg Subcutaneous Q24H     etoposide  50 mg/m2 (Treatment Plan Recorded) Intravenous Q22H     [START ON 4/10/2023] fosaprepitant (EMEND) 150 mg in sodium chloride 0.9 % 275 mL intermittent infusion  150 mg Intravenous Once     heparin  5-10 mL Intracatheter  Q28 Days     heparin lock flush  5-10 mL Intracatheter Q24H     [START ON 4/9/2023] immune globulin - sucrose free  25 g Intravenous Once     omeprazole  40 mg Oral Daily     ondansetron  16 mg Oral Q22H     predniSONE  60 mg/m2 (Treatment Plan Recorded) Oral BID     senna-docusate  2 tablet Oral BID     sodium chloride (PF)  10-20 mL Intracatheter Q28 Days     vinCRIStine (ONCOVIN) 0.7 mg, DOXOrubicin (ADRIAMYCIN) 17 mg in sodium chloride 0.9 % 1,059.2 mL infusion  0.4 mg/m2 (Treatment Plan Recorded) Intravenous Q22H     vitamin C  500 mg Oral QAM       Data   Results for orders placed or performed during the hospital encounter of 04/06/23 (from the past 24 hour(s))   CBC with platelets differential    Narrative    The following orders were created for panel order CBC with platelets differential.  Procedure                               Abnormality         Status                     ---------                               -----------         ------                     CBC with platelets and d...[[id number]]  Abnormal            Final result                 Please view results for these tests on the individual orders.   CBC with platelets and differential   Result Value Ref Range    WBC Count 6.1 4.0 - 11.0 10e3/uL    RBC Count 2.53 (L) 4.40 - 5.90 10e6/uL    Hemoglobin 8.3 (L) 13.3 - 17.7 g/dL    Hematocrit 26.4 (L) 40.0 - 53.0 %     (H) 78 - 100 fL    MCH 32.8 26.5 - 33.0 pg    MCHC 31.4 (L) 31.5 - 36.5 g/dL    RDW 15.2 (H) 10.0 - 15.0 %    Platelet Count 145 (L) 150 - 450 10e3/uL    % Neutrophils 86 %    % Lymphocytes 7 %    % Monocytes 6 %    % Eosinophils 0 %    % Basophils 0 %    % Immature Granulocytes 1 %    NRBCs per 100 WBC 0 <1 /100    Absolute Neutrophils 5.2 1.6 - 8.3 10e3/uL    Absolute Lymphocytes 0.4 (L) 0.8 - 5.3 10e3/uL    Absolute Monocytes 0.3 0.0 - 1.3 10e3/uL    Absolute Eosinophils 0.0 0.0 - 0.7 10e3/uL    Absolute Basophils 0.0 0.0 - 0.2 10e3/uL    Absolute Immature Granulocytes 0.1  <=0.4 10e3/uL    Absolute NRBCs 0.0 10e3/uL

## 2023-04-08 NOTE — PROGRESS NOTES
Nursing Focus: Chemotherapy  D: Positive brisk bright red blood return via Port. Insertion site is clean/dry/intact, dressing intact with no complaints of pain.  Urine output is recorded in intake Doc Flowsheet.    I: On scheduled premedications given per order (see electronic medical administration record). Day #2 of Etoposide & Vincristine/Doxorubicin started to infuse over 22 hours. Reviewed pt teaching on chemotherapy side effects.  Pt denies need for further teaching. Chemotherapy double checked per protocol by two chemotherapy competent RN's.   A: Tolerating chemo well. Denies nausea.   P: Continue to monitor urine output and symptoms of nausea. Screen for symptoms of toxicity.

## 2023-04-08 NOTE — PLAN OF CARE
Tachy to 104, OVSS, on RA. No c/o nausea or pain. Prn miralax x1. day 2 Chemo infusing w good blood return. Plan for IVIG for 4/9, pentamidine 4/10. No electrolyte replacments indicated; all 3 re-checks ordered for tomorrow AM 4/9.

## 2023-04-09 LAB
ABO/RH(D): NORMAL
ALBUMIN SERPL BCG-MCNC: 3.4 G/DL (ref 3.5–5.2)
ALP SERPL-CCNC: 79 U/L (ref 40–129)
ALT SERPL W P-5'-P-CCNC: 25 U/L (ref 10–50)
ANION GAP SERPL CALCULATED.3IONS-SCNC: 9 MMOL/L (ref 7–15)
ANTIBODY SCREEN: NEGATIVE
AST SERPL W P-5'-P-CCNC: 20 U/L (ref 10–50)
BASOPHILS # BLD AUTO: 0 10E3/UL (ref 0–0.2)
BASOPHILS NFR BLD AUTO: 0 %
BILIRUB SERPL-MCNC: 0.2 MG/DL
BUN SERPL-MCNC: 23.8 MG/DL (ref 8–23)
CALCIUM SERPL-MCNC: 8.8 MG/DL (ref 8.8–10.2)
CHLORIDE SERPL-SCNC: 110 MMOL/L (ref 98–107)
CREAT SERPL-MCNC: 0.76 MG/DL (ref 0.67–1.17)
DEPRECATED HCO3 PLAS-SCNC: 23 MMOL/L (ref 22–29)
EOSINOPHIL # BLD AUTO: 0 10E3/UL (ref 0–0.7)
EOSINOPHIL NFR BLD AUTO: 0 %
ERYTHROCYTE [DISTWIDTH] IN BLOOD BY AUTOMATED COUNT: 15.3 % (ref 10–15)
FIBRINOGEN PPP-MCNC: 241 MG/DL (ref 170–490)
GFR SERPL CREATININE-BSD FRML MDRD: >90 ML/MIN/1.73M2
GLUCOSE SERPL-MCNC: 100 MG/DL (ref 70–99)
HCT VFR BLD AUTO: 25.9 % (ref 40–53)
HGB BLD-MCNC: 8.2 G/DL (ref 13.3–17.7)
IMM GRANULOCYTES # BLD: 0.1 10E3/UL
IMM GRANULOCYTES NFR BLD: 1 %
INR PPP: 1.07 (ref 0.85–1.15)
LYMPHOCYTES # BLD AUTO: 0.4 10E3/UL (ref 0.8–5.3)
LYMPHOCYTES NFR BLD AUTO: 9 %
MAGNESIUM SERPL-MCNC: 2 MG/DL (ref 1.7–2.3)
MCH RBC QN AUTO: 33.1 PG (ref 26.5–33)
MCHC RBC AUTO-ENTMCNC: 31.7 G/DL (ref 31.5–36.5)
MCV RBC AUTO: 104 FL (ref 78–100)
MONOCYTES # BLD AUTO: 0.3 10E3/UL (ref 0–1.3)
MONOCYTES NFR BLD AUTO: 7 %
NEUTROPHILS # BLD AUTO: 3.8 10E3/UL (ref 1.6–8.3)
NEUTROPHILS NFR BLD AUTO: 83 %
NRBC # BLD AUTO: 0 10E3/UL
NRBC BLD AUTO-RTO: 0 /100
PHOSPHATE SERPL-MCNC: 3.2 MG/DL (ref 2.5–4.5)
PLATELET # BLD AUTO: 126 10E3/UL (ref 150–450)
POTASSIUM SERPL-SCNC: 3.6 MMOL/L (ref 3.4–5.3)
PROT SERPL-MCNC: 4.7 G/DL (ref 6.4–8.3)
RBC # BLD AUTO: 2.48 10E6/UL (ref 4.4–5.9)
SODIUM SERPL-SCNC: 142 MMOL/L (ref 136–145)
SPECIMEN EXPIRATION DATE: NORMAL
URATE SERPL-MCNC: 3.3 MG/DL (ref 3.4–7)
WBC # BLD AUTO: 4.6 10E3/UL (ref 4–11)

## 2023-04-09 PROCEDURE — 80053 COMPREHEN METABOLIC PANEL: CPT | Performed by: PHYSICIAN ASSISTANT

## 2023-04-09 PROCEDURE — 250N000013 HC RX MED GY IP 250 OP 250 PS 637: Performed by: REGISTERED NURSE

## 2023-04-09 PROCEDURE — 83735 ASSAY OF MAGNESIUM: CPT | Performed by: INTERNAL MEDICINE

## 2023-04-09 PROCEDURE — 84100 ASSAY OF PHOSPHORUS: CPT | Performed by: INTERNAL MEDICINE

## 2023-04-09 PROCEDURE — 250N000012 HC RX MED GY IP 250 OP 636 PS 637: Performed by: REGISTERED NURSE

## 2023-04-09 PROCEDURE — 85384 FIBRINOGEN ACTIVITY: CPT | Performed by: PHYSICIAN ASSISTANT

## 2023-04-09 PROCEDURE — 250N000013 HC RX MED GY IP 250 OP 250 PS 637: Performed by: PHYSICIAN ASSISTANT

## 2023-04-09 PROCEDURE — 120N000002 HC R&B MED SURG/OB UMMC

## 2023-04-09 PROCEDURE — 258N000003 HC RX IP 258 OP 636: Performed by: REGISTERED NURSE

## 2023-04-09 PROCEDURE — 86901 BLOOD TYPING SEROLOGIC RH(D): CPT | Performed by: PHYSICIAN ASSISTANT

## 2023-04-09 PROCEDURE — 85025 COMPLETE CBC W/AUTO DIFF WBC: CPT | Performed by: PHYSICIAN ASSISTANT

## 2023-04-09 PROCEDURE — 99233 SBSQ HOSP IP/OBS HIGH 50: CPT | Mod: FS | Performed by: PHYSICIAN ASSISTANT

## 2023-04-09 PROCEDURE — 85610 PROTHROMBIN TIME: CPT | Performed by: PHYSICIAN ASSISTANT

## 2023-04-09 PROCEDURE — 36591 DRAW BLOOD OFF VENOUS DEVICE: CPT | Performed by: PHYSICIAN ASSISTANT

## 2023-04-09 PROCEDURE — 250N000011 HC RX IP 250 OP 636: Performed by: PHYSICIAN ASSISTANT

## 2023-04-09 PROCEDURE — 250N000011 HC RX IP 250 OP 636: Performed by: REGISTERED NURSE

## 2023-04-09 PROCEDURE — 86850 RBC ANTIBODY SCREEN: CPT | Performed by: PHYSICIAN ASSISTANT

## 2023-04-09 PROCEDURE — 84550 ASSAY OF BLOOD/URIC ACID: CPT | Performed by: PHYSICIAN ASSISTANT

## 2023-04-09 RX ORDER — METHYLPREDNISOLONE SODIUM SUCCINATE 125 MG/2ML
125 INJECTION, POWDER, LYOPHILIZED, FOR SOLUTION INTRAMUSCULAR; INTRAVENOUS
Status: DISCONTINUED | OUTPATIENT
Start: 2023-04-10 | End: 2023-04-11 | Stop reason: HOSPADM

## 2023-04-09 RX ORDER — DIPHENHYDRAMINE HYDROCHLORIDE 50 MG/ML
50 INJECTION INTRAMUSCULAR; INTRAVENOUS ONCE
Status: COMPLETED | OUTPATIENT
Start: 2023-04-10 | End: 2023-04-10

## 2023-04-09 RX ORDER — DIPHENHYDRAMINE HCL 50 MG
50 CAPSULE ORAL ONCE
Status: COMPLETED | OUTPATIENT
Start: 2023-04-10 | End: 2023-04-10

## 2023-04-09 RX ORDER — ACETAMINOPHEN 325 MG/1
650 TABLET ORAL
Status: DISCONTINUED | OUTPATIENT
Start: 2023-04-10 | End: 2023-04-11 | Stop reason: HOSPADM

## 2023-04-09 RX ORDER — ACETAMINOPHEN 325 MG/1
650 TABLET ORAL ONCE
Status: COMPLETED | OUTPATIENT
Start: 2023-04-10 | End: 2023-04-10

## 2023-04-09 RX ORDER — DIPHENHYDRAMINE HYDROCHLORIDE 50 MG/ML
50 INJECTION INTRAMUSCULAR; INTRAVENOUS
Status: DISCONTINUED | OUTPATIENT
Start: 2023-04-10 | End: 2023-04-11 | Stop reason: HOSPADM

## 2023-04-09 RX ORDER — DIPHENHYDRAMINE HYDROCHLORIDE 50 MG/ML
50 INJECTION INTRAMUSCULAR; INTRAVENOUS
Status: DISCONTINUED | OUTPATIENT
Start: 2023-04-10 | End: 2023-04-09

## 2023-04-09 RX ORDER — DIPHENHYDRAMINE HCL 50 MG
50 CAPSULE ORAL
Status: DISCONTINUED | OUTPATIENT
Start: 2023-04-10 | End: 2023-04-11 | Stop reason: HOSPADM

## 2023-04-09 RX ADMIN — OMEPRAZOLE 40 MG: 20 CAPSULE, DELAYED RELEASE ORAL at 08:39

## 2023-04-09 RX ADMIN — ONDANSETRON HYDROCHLORIDE 16 MG: 8 TABLET, FILM COATED ORAL at 16:27

## 2023-04-09 RX ADMIN — PREDNISONE 100 MG: 50 TABLET ORAL at 08:39

## 2023-04-09 RX ADMIN — POLYETHYLENE GLYCOL 3350 17 G: 17 POWDER, FOR SOLUTION ORAL at 09:17

## 2023-04-09 RX ADMIN — TEMAZEPAM 7.5 MG: 7.5 CAPSULE ORAL at 21:36

## 2023-04-09 RX ADMIN — ACYCLOVIR 400 MG: 400 TABLET ORAL at 20:33

## 2023-04-09 RX ADMIN — VINCRISTINE SULFATE 0.7 MG: 1 INJECTION, SOLUTION INTRAVENOUS at 16:28

## 2023-04-09 RX ADMIN — ETOPOSIDE 85 MG: 20 INJECTION, SOLUTION, CONCENTRATE INTRAVENOUS at 16:28

## 2023-04-09 RX ADMIN — ACYCLOVIR 400 MG: 400 TABLET ORAL at 08:40

## 2023-04-09 RX ADMIN — PREDNISONE 100 MG: 50 TABLET ORAL at 16:27

## 2023-04-09 RX ADMIN — OXYCODONE HYDROCHLORIDE AND ACETAMINOPHEN 500 MG: 500 TABLET ORAL at 08:40

## 2023-04-09 RX ADMIN — ALLOPURINOL 300 MG: 300 TABLET ORAL at 08:40

## 2023-04-09 RX ADMIN — SENNOSIDES AND DOCUSATE SODIUM 2 TABLET: 50; 8.6 TABLET ORAL at 20:33

## 2023-04-09 RX ADMIN — ENOXAPARIN SODIUM 40 MG: 40 INJECTION SUBCUTANEOUS at 20:34

## 2023-04-09 ASSESSMENT — ACTIVITIES OF DAILY LIVING (ADL)
ADLS_ACUITY_SCORE: 20

## 2023-04-09 NOTE — PLAN OF CARE
"[id number]    /77 (BP Location: Left arm)   Pulse 84   Temp 97.7  F (36.5  C) (Oral)   Resp 16   Ht 1.702 m (5' 7\")   Wt 59.4 kg (130 lb 15.3 oz)   SpO2 96%   BMI 20.51 kg/m      VSS on RA, Afebrile. Denies pain, N/V, SOB. D4 of Etoposide and Vincristine/Doxo initiated; good blood returns and tolerating infusion well. IVIG rescheduled for tomorrow after last dose of chemo. BM today; good UOP. Continue POC.   "

## 2023-04-09 NOTE — PROGRESS NOTES
Nursing Focus: Chemotherapy  D: Positive blood return via port. Insertion site is clean/dry/intact, dressing intact with no complaints of pain.  Urine output is recorded in intake in Doc Flowsheet.    I: Premedications given per order (see electronic medical administration record). Dose #4 of Etoposide started to infuse over 22 hours. Reviewed pt teaching on chemotherapy side effects.  Pt denies need for further teaching. Chemotherapy double checked per protocol by two chemotherapy competent RN's.   A: Tolerating procedure well. Denies nausea and or pain.   P: Continue to monitor urine output and symptoms of nausea. Screen for symptoms of toxicity.      Nursing Focus: Chemotherapy  D: Positive blood return via port. Insertion site is clean/dry/intact, dressing intact with no complaints of pain.  Urine output is recorded in intake in Doc Flowsheet.    I: Premedications given per order (see electronic medical administration record). Dose #4 of Vincristine/Doxorubicin started to infuse over 22 hours. Reviewed pt teaching on chemotherapy side effects.  Pt denies need for further teaching. Chemotherapy double checked per protocol by two chemotherapy competent RN's.   A: Tolerating procedure well. Denies nausea and or pain.   P: Continue to monitor urine output and symptoms of nausea. Screen for symptoms of toxicity.

## 2023-04-09 NOTE — PLAN OF CARE
"Goal Outcome Evaluation:    /67 (BP Location: Left arm)   Pulse 117   Temp 98  F (36.7  C) (Oral)   Resp 18   Ht 1.702 m (5' 7\")   Wt 59.4 kg (130 lb 15.3 oz)   SpO2 97%   BMI 20.51 kg/m      [id number]    Patient VSS on RA, A & O x 4, denies pain, denies nausesa, up ad abe, independent, prn temazepam given x 1    .Lisa Boone RN on 4/8/2023 at 10:35 PM                            "

## 2023-04-09 NOTE — PLAN OF CARE
[id number]:  VSS on RA, afebrile. A&Ox4. Denies pain, N/V, SOB. Up independently in room, voiding not saving. CIVI chemo infusing via port, good blood return noted. Continue with POC.

## 2023-04-09 NOTE — PROGRESS NOTES
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 Admitted 04/06/23 for C5 DA-R-EPOCH (C6 total of chemotherapy).  Continues on isolation precautions for persistently positive COVID PCR.    Today:   - Continue Day 4 chemotherapy (Etoposide, Vincristine, Doxorubicin and Prednisone)  - Anticipate IVIG tomorrow after chemotherapy (Cytoxan)  - Anticipate dismissal to home Tues, 4/11, if tolerates Day 5 chemo and IVIG tomorrow.  Pt notes h/o edema with final dose chemotherapy at previous cycles manifesting 1-2 days after discharge, and would like Lasix prior to leaving.      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 Ruelas. Pt has a h/o low-grade kappa restricted plasmacytoid B-cell lymphoma (diagnosed in 3/2004) treated with x6 cycles of fludarabine based chemo and XRT to spine, then has been on surveillance since 2005. He presented with progressive B-symptoms, abdominal pain, and SOB. PET 12/9 showed extensive lymphomatous involvement to the right pleura w/ bilateral FDG-avid effusions (large R, small L), mediastinal and pericardial regions, right anterolateral chest wall, adenopathy above and below the diaphragm, liver, spleen and multiple sites in the skeleton, concerning for transformation from his low-grade lymphoma. He was also noted to have progressive pancytopenia. Due to worsening shortness of breath and delays of biopsy, patient presented to ED  on 12/14 and was subsequently admitted for expedited workup and treatment. As SUV of the sternoclavicular mass was the highest, patient underwent US-guided sternoclavicular soft tissue mass biopsy with IR 12/15, pathology from which confirmed a diagnosis of high-grade B-cell lymphoma. Baseline ECHO (12/15) with LVEF 60-65%, mild aortic insufficiency, no evidence of effusion or tamponade. Received 1 cycle of R-CHOP (H7O3=8212/20/22) which was well-tolerated. Cytogenetics then returned, revealed rearrangements in MYC, BCL2, and BCL6, consistent with triple hit lymphoma. He was subsequently changed to DA-R-EPOCH and proceeded with C1 on 1/11/23 (C2 total of therapy). He was re-admitted for C2 on 2/2/23, C3 on 2/22/23, C4 on 3/17/23. Cycles have been overall well-tolerated, save for anticipated fatigue and cytopenias. Of note, patient has not advanced past Level 1 per personal preference/concerns for tolerance.  - CNS IPI was 4 (1 point each for age, LDH, stage IV disease, and extranodal involvement); as such, s/p CNS ppx with IT chemotherapy (1/12, 2/6, 2/24, 3/20 -- all negative flow). No need for ongoing IT chemo as completed planned 4 doses.   - PET/CT (post-4 cycles of chemo) done 3/17/23 with continued CR, single area of avidity in right cardiac pleura (although deemed likely artifact vs physiologic) with recommendation for cardiac prep/ketogenic diet for 3 days prior to next PET/CT.  - Note: this is overall C6 of chemotherapy (1 cycle R-CHOP + 5 cycles R-EPOCH); current plan is for 6 total cycles followed by an EOT PET/CT.                             Treatment Plan: DA-R- EPOCH C5D1=4/6/23                           - Rituximab 375 mg/m2 IV x1 dose -- Day 1                           - Etoposide 50 mg/m2 IV q22h x4 doses -- Days 1-4                           - Vincristine 0.4 mg/m2 IV q22h x4 doses --  Days 1-4                           - Doxorubicin 10 mg/m2 IV q22h x4 doses -- Days 1-4                           -  Cyclophosphamide 750 mg/m2 IV x1 dose -- Day 5                           - Prednisone 60 mg/m2 PO BID x10 doses -- Days 1-5                           - Dexamethasone 8 mg daily x2 doses -- Days 6-7                           - Neulasta 6 mg subQ x1 dose -- requested at [facility] 4/12                           - Pre-meds: Tylenol, Benadryl, Zofran, Emend     # Anemia   Secondary to chemotherapy.   - Monitor CBC daily  - Transfuse if Hgb <7      CV  # History of complete heart block s/p pacemaker placement (2020)  Followed by Dr. Fox; last seen 7/7/22. Pacemaker last interrogated on 3/2/23.  - No acute inpatient management needs  - Continue cardiology follow-up and device checks as recommended - next planned follow-up in 07/2023     # Hx of weight gain with prior cycles  Weight up ~7 lbs from dry weight (125 lbs) during prior cycles. Pt reports benefit from IV Lasix, but edema typically is worst immediately after discharge so has used PO at home too.   - Follow I/Os, daily weights  - PRN Lasix - pt would like Lasix prior to discharge     ID  # Asymptomatic COVID-19 infection, persistent  Noted to be COVID+ on PCR done 1/9/23. Admitted for planned chemotherapy, as above, and treated with IV remdesivir x3 days (1/11-1/13). Despite treatment, patient has remained PCR positive for the last 2 months since diagnosis and was positive again on admission (3/17/23). Cycle threshold 31.4. Per infection prevention, patient needs to remain in precautions until cycle threshold >35.  Cycle threshold again 31.4 on 04/06/23 with Covid PCR +.  - Continue special precautions for now as repeat COVID positive (4/6) with CT 31.  - No current clinical s/sx of COVID-19 infection; monitor clinically with initiation of chemotherapy  - Anticipate giving IVIG as below     # Hypogammaglobulinemia   IgG 310 (12/2022). Status-post IVIG on 1/15/23; plan to recheck IgG in ~3 months from last dose (~4/2023) per prior discussion with Dr. Ruelas.   -  Repeat IgG level this admission 317. Plan to give IVIG prior to discharge (ordered for 4/10).      # ID ppx  - Acyclovir 400 mg BID  - No current indication for bacterial/fungal ppx; start for ANC <1000  - S/p IV pentamidine on 3/18; Plan to give dose prior to discharge this admission (ordered for 4/10).      MISC  # GERD - related to previous XRT to the chest, continue PTA omeprazole.  # Insomnia - PTA on temazepam 7.5 mg HS PRN for sleep; last prescribed #5 tablets on 1/13/23 (takes on steroid days)  # Cerumen impaction of the right EAC - noted via otoscopy on 3/18; Debrox drops BID and ear irrigation by nursing PRN     FEN:  - No mIVF; bolus PRN  - Lyte replacement PRN per standing protocol  - Regular diet as tolerated     Prophy/Misc:  - GI/PUD: PTA PPI  - Bowels: PRN Miralax and Senna  - DVT: ppx enoxaparin; hold for platelets <50K and prior to LP  - ID: as above  - Activity: encourage ambulation     Clinically Significant Risk Factors              # Hypoalbuminemia: Lowest albumin = 3.4 g/dL at 4/8/2023  7:17 AM, will monitor as appropriate                     Code Status: FULL     Disposition: ~5 day hospital stay for scheduled chemotherapy.      Follow up:   - Neulasta/labs requested locally; RN[facility] to Fax to [facility]      Staffed with Dr Ruelas.     LATA CoronelC *[id number]  Hematology/Oncology       I spent 30 minutes in the care of this patient today, which included time necessary for review of interval events, obtaining history and physical exam, ordering medication(s)/test(s) as medically indicated, discussion with interdisciplinary/consult team(s), and documentation time. Over 50% of time was spent face-to-face and/or coordinating care.     Interval History   Overnight no acute events. Afebrile and HD stable. Good appetite this AM. No NV or pain overnight. Feeling very well, good energy levels. Notes some post nasal drip and needing to cough to clear this frequently, but unchanged over the past month.  Continues with active COVID per PCR and on isolation.  Receiving D4 chemotherapy today (Vincristine, etoposide, doxorubicin and pred). IVIG scheduled for today, but will reschedule for tomorrow after chemotherapy.    Physical Exam   Temp: 97.9  F (36.6  C) Temp src: Oral BP: 118/67 Pulse: 78   Resp: 20 SpO2: 94 % O2 Device: None (Room air)    Vitals:    04/06/23 1841 04/07/23 0943   Weight: 59 kg (130 lb 1.6 oz) 59.4 kg (130 lb 15.3 oz)     Vital Signs with Ranges  Temp:  [97.6  F (36.4  C)-98.1  F (36.7  C)] 97.9  F (36.6  C)  Pulse:  [] 78  Resp:  [18-20] 20  BP: (113-137)/(62-75) 118/67  SpO2:  [94 %-97 %] 94 %  I/O last 3 completed shifts:  In: -   Out: 1200 [Urine:1200]    Constitutional: Pleasant male seen sitting in chair. No apparent distress, and appears stated age.  Eyes: Lids and lashes normal, sclera clear, conjunctiva normal.  ENT: Normocephalic, without obvious abnormality, atraumatic, oral pharynx with moist mucus membranes.  Some slight swelling on right side of face, but soft without appreciable mass or tenderness or erythema.   Respiratory: No increased work of breathing, good air exchange, clear to auscultation bilaterally, no crackles or wheezing.  Cardiovascular: Heart sounds fiant, but sound to be regular rate and rhythm, normal S1 and S2, and no murmur noted.  GI: +BS. Soft. No tenderness on palpation.  Skin: No bruising or bleeding, normal skin color, texture, turgor, no redness, warmth, or swelling, no rashes, no lesions, no jaundice. Alopecia   Extremities: No lower extremity edema  Neurologic: Awake, alert, oriented. No focal deficits grossly.      Medications     - MEDICATION INSTRUCTIONS -         acetaminophen  650 mg Oral Once     acyclovir  400 mg Oral Q12H     [START ON 4/10/2023] albuterol  2.5 mg Nebulization Once    And     [START ON 4/10/2023] pentamidine  300 mg Inhalation Once     allopurinol  300 mg Oral Daily     Chemotherapy Infusing-Continuous Infusion   Does not  apply Q8H     [START ON 4/10/2023] cyclophosphamide (CYTOXAN) 1,275 mg in sodium chloride 0.9 % 614 mL infusion  750 mg/m2 (Treatment Plan Recorded) Intravenous Once     [START ON 4/12/2023] dexamethasone  8 mg Oral Daily     diphenhydrAMINE  50 mg Oral Once    Or     diphenhydrAMINE  50 mg Intravenous Once     enoxaparin ANTICOAGULANT  40 mg Subcutaneous Q24H     etoposide  50 mg/m2 (Treatment Plan Recorded) Intravenous Q22H     [START ON 4/10/2023] fosaprepitant (EMEND) 150 mg in sodium chloride 0.9 % 275 mL intermittent infusion  150 mg Intravenous Once     heparin  5-10 mL Intracatheter Q28 Days     heparin lock flush  5-10 mL Intracatheter Q24H     immune globulin - sucrose free  25 g Intravenous Once     omeprazole  40 mg Oral Daily     ondansetron  16 mg Oral Q22H     predniSONE  60 mg/m2 (Treatment Plan Recorded) Oral BID     senna-docusate  2 tablet Oral BID     sodium chloride (PF)  10-20 mL Intracatheter Q28 Days     vinCRIStine (ONCOVIN) 0.7 mg, DOXOrubicin (ADRIAMYCIN) 17 mg in sodium chloride 0.9 % 1,059.2 mL infusion  0.4 mg/m2 (Treatment Plan Recorded) Intravenous Q22H     vitamin C  500 mg Oral QAM       Data   Results for orders placed or performed during the hospital encounter of 04/06/23 (from the past 24 hour(s))   CBC with platelets differential    Narrative    The following orders were created for panel order CBC with platelets differential.  Procedure                               Abnormality         Status                     ---------                               -----------         ------                     CBC with platelets and d...[[id number]]  Abnormal            Final result                 Please view results for these tests on the individual orders.   ABO/Rh type and screen    Narrative    The following orders were created for panel order ABO/Rh type and screen.  Procedure                               Abnormality         Status                     ---------                                -----------         ------                     Adult Type and Screen[[id number]]                            Final result                 Please view results for these tests on the individual orders.   Comprehensive metabolic panel   Result Value Ref Range    Sodium 142 136 - 145 mmol/L    Potassium 3.6 3.4 - 5.3 mmol/L    Chloride 110 (H) 98 - 107 mmol/L    Carbon Dioxide (CO2) 23 22 - 29 mmol/L    Anion Gap 9 7 - 15 mmol/L    Urea Nitrogen 23.8 (H) 8.0 - 23.0 mg/dL    Creatinine 0.76 0.67 - 1.17 mg/dL    Calcium 8.8 8.8 - 10.2 mg/dL    Glucose 100 (H) 70 - 99 mg/dL    Alkaline Phosphatase 79 40 - 129 U/L    AST 20 10 - 50 U/L    ALT 25 10 - 50 U/L    Protein Total 4.7 (L) 6.4 - 8.3 g/dL    Albumin 3.4 (L) 3.5 - 5.2 g/dL    Bilirubin Total 0.2 <=1.2 mg/dL    GFR Estimate >90 >60 mL/min/1.73m2   INR   Result Value Ref Range    INR 1.07 0.85 - 1.15   Fibrinogen activity   Result Value Ref Range    Fibrinogen Activity 241 170 - 490 mg/dL   Uric acid   Result Value Ref Range    Uric Acid 3.3 (L) 3.4 - 7.0 mg/dL   Magnesium   Result Value Ref Range    Magnesium 2.0 1.7 - 2.3 mg/dL   Phosphorus   Result Value Ref Range    Phosphorus 3.2 2.5 - 4.5 mg/dL   CBC with platelets and differential   Result Value Ref Range    WBC Count 4.6 4.0 - 11.0 10e3/uL    RBC Count 2.48 (L) 4.40 - 5.90 10e6/uL    Hemoglobin 8.2 (L) 13.3 - 17.7 g/dL    Hematocrit 25.9 (L) 40.0 - 53.0 %     (H) 78 - 100 fL    MCH 33.1 (H) 26.5 - 33.0 pg    MCHC 31.7 31.5 - 36.5 g/dL    RDW 15.3 (H) 10.0 - 15.0 %    Platelet Count 126 (L) 150 - 450 10e3/uL    % Neutrophils 83 %    % Lymphocytes 9 %    % Monocytes 7 %    % Eosinophils 0 %    % Basophils 0 %    % Immature Granulocytes 1 %    NRBCs per 100 WBC 0 <1 /100    Absolute Neutrophils 3.8 1.6 - 8.3 10e3/uL    Absolute Lymphocytes 0.4 (L) 0.8 - 5.3 10e3/uL    Absolute Monocytes 0.3 0.0 - 1.3 10e3/uL    Absolute Eosinophils 0.0 0.0 - 0.7 10e3/uL    Absolute Basophils 0.0 0.0 - 0.2 10e3/uL     Absolute Immature Granulocytes 0.1 <=0.4 10e3/uL    Absolute NRBCs 0.0 10e3/uL   Adult Type and Screen   Result Value Ref Range    ABO/RH(D) A POS     Antibody Screen Negative Negative    SPECIMEN EXPIRATION DATE [id number]

## 2023-04-10 LAB
ALBUMIN SERPL BCG-MCNC: 3.4 G/DL (ref 3.5–5.2)
ALP SERPL-CCNC: 75 U/L (ref 40–129)
ALT SERPL W P-5'-P-CCNC: 37 U/L (ref 10–50)
ANION GAP SERPL CALCULATED.3IONS-SCNC: 10 MMOL/L (ref 7–15)
AST SERPL W P-5'-P-CCNC: 26 U/L (ref 10–50)
BASOPHILS # BLD AUTO: 0 10E3/UL (ref 0–0.2)
BASOPHILS NFR BLD AUTO: 0 %
BILIRUB SERPL-MCNC: 0.2 MG/DL
BUN SERPL-MCNC: 26 MG/DL (ref 8–23)
CALCIUM SERPL-MCNC: 8.8 MG/DL (ref 8.8–10.2)
CHLORIDE SERPL-SCNC: 109 MMOL/L (ref 98–107)
CREAT SERPL-MCNC: 0.7 MG/DL (ref 0.67–1.17)
DEPRECATED HCO3 PLAS-SCNC: 23 MMOL/L (ref 22–29)
EOSINOPHIL # BLD AUTO: 0 10E3/UL (ref 0–0.7)
EOSINOPHIL NFR BLD AUTO: 0 %
ERYTHROCYTE [DISTWIDTH] IN BLOOD BY AUTOMATED COUNT: 14.7 % (ref 10–15)
FIBRINOGEN PPP-MCNC: 218 MG/DL (ref 170–490)
GFR SERPL CREATININE-BSD FRML MDRD: >90 ML/MIN/1.73M2
GLUCOSE SERPL-MCNC: 118 MG/DL (ref 70–99)
HCT VFR BLD AUTO: 25.6 % (ref 40–53)
HGB BLD-MCNC: 8.1 G/DL (ref 13.3–17.7)
IMM GRANULOCYTES # BLD: 0 10E3/UL
IMM GRANULOCYTES NFR BLD: 1 %
INR PPP: 1.1 (ref 0.85–1.15)
LYMPHOCYTES # BLD AUTO: 0.3 10E3/UL (ref 0.8–5.3)
LYMPHOCYTES NFR BLD AUTO: 8 %
MCH RBC QN AUTO: 32.8 PG (ref 26.5–33)
MCHC RBC AUTO-ENTMCNC: 31.6 G/DL (ref 31.5–36.5)
MCV RBC AUTO: 104 FL (ref 78–100)
MONOCYTES # BLD AUTO: 0.1 10E3/UL (ref 0–1.3)
MONOCYTES NFR BLD AUTO: 3 %
NEUTROPHILS # BLD AUTO: 3.4 10E3/UL (ref 1.6–8.3)
NEUTROPHILS NFR BLD AUTO: 88 %
NRBC # BLD AUTO: 0 10E3/UL
NRBC BLD AUTO-RTO: 0 /100
PLATELET # BLD AUTO: 114 10E3/UL (ref 150–450)
POTASSIUM SERPL-SCNC: 3.8 MMOL/L (ref 3.4–5.3)
PROT SERPL-MCNC: 4.7 G/DL (ref 6.4–8.3)
RBC # BLD AUTO: 2.47 10E6/UL (ref 4.4–5.9)
SODIUM SERPL-SCNC: 142 MMOL/L (ref 136–145)
URATE SERPL-MCNC: 3.4 MG/DL (ref 3.4–7)
WBC # BLD AUTO: 3.8 10E3/UL (ref 4–11)

## 2023-04-10 PROCEDURE — 250N000011 HC RX IP 250 OP 636: Performed by: PHYSICIAN ASSISTANT

## 2023-04-10 PROCEDURE — 80053 COMPREHEN METABOLIC PANEL: CPT | Performed by: PHYSICIAN ASSISTANT

## 2023-04-10 PROCEDURE — 250N000009 HC RX 250: Performed by: PHYSICIAN ASSISTANT

## 2023-04-10 PROCEDURE — 99233 SBSQ HOSP IP/OBS HIGH 50: CPT | Mod: FS | Performed by: PHYSICIAN ASSISTANT

## 2023-04-10 PROCEDURE — 120N000002 HC R&B MED SURG/OB UMMC

## 2023-04-10 PROCEDURE — 250N000013 HC RX MED GY IP 250 OP 250 PS 637: Performed by: PHYSICIAN ASSISTANT

## 2023-04-10 PROCEDURE — 84550 ASSAY OF BLOOD/URIC ACID: CPT | Performed by: PHYSICIAN ASSISTANT

## 2023-04-10 PROCEDURE — 30243S1 TRANSFUSION OF NONAUTOLOGOUS GLOBULIN INTO CENTRAL VEIN, PERCUTANEOUS APPROACH: ICD-10-PCS | Performed by: INTERNAL MEDICINE

## 2023-04-10 PROCEDURE — 85384 FIBRINOGEN ACTIVITY: CPT | Performed by: PHYSICIAN ASSISTANT

## 2023-04-10 PROCEDURE — 250N000011 HC RX IP 250 OP 636: Performed by: REGISTERED NURSE

## 2023-04-10 PROCEDURE — 250N000013 HC RX MED GY IP 250 OP 250 PS 637: Performed by: REGISTERED NURSE

## 2023-04-10 PROCEDURE — 36591 DRAW BLOOD OFF VENOUS DEVICE: CPT | Performed by: PHYSICIAN ASSISTANT

## 2023-04-10 PROCEDURE — 85610 PROTHROMBIN TIME: CPT | Performed by: PHYSICIAN ASSISTANT

## 2023-04-10 PROCEDURE — 85025 COMPLETE CBC W/AUTO DIFF WBC: CPT | Performed by: PHYSICIAN ASSISTANT

## 2023-04-10 PROCEDURE — 250N000012 HC RX MED GY IP 250 OP 636 PS 637: Performed by: REGISTERED NURSE

## 2023-04-10 PROCEDURE — 258N000003 HC RX IP 258 OP 636: Performed by: PHYSICIAN ASSISTANT

## 2023-04-10 PROCEDURE — 258N000003 HC RX IP 258 OP 636: Performed by: REGISTERED NURSE

## 2023-04-10 RX ORDER — DEXTROSE MONOHYDRATE 50 MG/ML
10-20 INJECTION, SOLUTION INTRAVENOUS ONCE
Status: COMPLETED | OUTPATIENT
Start: 2023-04-10 | End: 2023-04-10

## 2023-04-10 RX ORDER — FUROSEMIDE 20 MG
20 TABLET ORAL DAILY PRN
Qty: 10 TABLET | Refills: 0 | Status: SHIPPED | OUTPATIENT
Start: 2023-04-10 | End: 2023-04-28

## 2023-04-10 RX ORDER — DEXAMETHASONE 4 MG/1
8 TABLET ORAL DAILY
Qty: 4 TABLET | Refills: 0 | Status: SHIPPED | OUTPATIENT
Start: 2023-04-10 | End: 2023-04-28

## 2023-04-10 RX ORDER — FUROSEMIDE 10 MG/ML
20 INJECTION INTRAMUSCULAR; INTRAVENOUS ONCE
Status: COMPLETED | OUTPATIENT
Start: 2023-04-11 | End: 2023-04-11

## 2023-04-10 RX ORDER — BACLOFEN 10 MG/1
5-10 TABLET ORAL 3 TIMES DAILY PRN
Qty: 30 TABLET | Refills: 0 | Status: SHIPPED | OUTPATIENT
Start: 2023-04-10

## 2023-04-10 RX ADMIN — HUMAN IMMUNOGLOBULIN G 25 G: 20 LIQUID INTRAVENOUS at 18:52

## 2023-04-10 RX ADMIN — OMEPRAZOLE 40 MG: 20 CAPSULE, DELAYED RELEASE ORAL at 07:59

## 2023-04-10 RX ADMIN — PREDNISONE 100 MG: 50 TABLET ORAL at 07:59

## 2023-04-10 RX ADMIN — ACYCLOVIR 400 MG: 400 TABLET ORAL at 07:59

## 2023-04-10 RX ADMIN — POLYETHYLENE GLYCOL 3350 17 G: 17 POWDER, FOR SOLUTION ORAL at 10:23

## 2023-04-10 RX ADMIN — PENTAMIDINE ISETHIONATE 260 MG: 300 INJECTION, POWDER, LYOPHILIZED, FOR SOLUTION INTRAMUSCULAR; INTRAVENOUS at 21:52

## 2023-04-10 RX ADMIN — ACETAMINOPHEN 650 MG: 325 TABLET ORAL at 18:12

## 2023-04-10 RX ADMIN — ACYCLOVIR 400 MG: 400 TABLET ORAL at 19:27

## 2023-04-10 RX ADMIN — TEMAZEPAM 7.5 MG: 7.5 CAPSULE ORAL at 22:44

## 2023-04-10 RX ADMIN — SENNOSIDES AND DOCUSATE SODIUM 2 TABLET: 50; 8.6 TABLET ORAL at 19:27

## 2023-04-10 RX ADMIN — PROCHLORPERAZINE MALEATE 5 MG: 5 TABLET ORAL at 21:19

## 2023-04-10 RX ADMIN — ALLOPURINOL 300 MG: 300 TABLET ORAL at 07:59

## 2023-04-10 RX ADMIN — DEXTROSE MONOHYDRATE 20 ML: 50 INJECTION, SOLUTION INTRAVENOUS at 22:45

## 2023-04-10 RX ADMIN — FOSAPREPITANT 150 MG: 150 INJECTION, POWDER, LYOPHILIZED, FOR SOLUTION INTRAVENOUS at 13:31

## 2023-04-10 RX ADMIN — ONDANSETRON HYDROCHLORIDE 16 MG: 8 TABLET, FILM COATED ORAL at 13:31

## 2023-04-10 RX ADMIN — ENOXAPARIN SODIUM 40 MG: 40 INJECTION SUBCUTANEOUS at 19:27

## 2023-04-10 RX ADMIN — DIPHENHYDRAMINE HYDROCHLORIDE 50 MG: 50 CAPSULE ORAL at 18:12

## 2023-04-10 RX ADMIN — CYCLOPHOSPHAMIDE 1275 MG: 2 INJECTION, POWDER, FOR SOLUTION INTRAVENOUS; ORAL at 14:08

## 2023-04-10 RX ADMIN — DEXTROSE MONOHYDRATE 10 ML: 50 INJECTION, SOLUTION INTRAVENOUS at 21:51

## 2023-04-10 RX ADMIN — OXYCODONE HYDROCHLORIDE AND ACETAMINOPHEN 500 MG: 500 TABLET ORAL at 07:59

## 2023-04-10 RX ADMIN — PREDNISONE 100 MG: 50 TABLET ORAL at 16:47

## 2023-04-10 ASSESSMENT — ACTIVITIES OF DAILY LIVING (ADL)
ADLS_ACUITY_SCORE: 20

## 2023-04-10 NOTE — PLAN OF CARE
[id number]: VSS on RA. Denies pain, nausea, and SOB. CIVI chemo completed this shift. Received Cytoxin. Good appetite. Up independently and walking laps in room. Had a regular BM this morning. Plan for IVIG and IV Pentamidine this evening and discharge tomorrow by 11am.     _______________________________________________    Nursing Focus: Chemotherapy  D: Positive blood return via Port. Insertion site is clean/dry/intact, dressing intact with no complaints of pain.  Urine output is recorded in intake in Doc Flowsheet.    I: Premedications given per order (see electronic medical administration record). Dose #1 of Cytoxin started to infuse over 1 hour. Reviewed pt teaching on chemotherapy side effects.  Pt denies need for further teaching. Chemotherapy double checked per protocol by two chemotherapy competent RN's.   A: Tolerating procedure well. Denies nausea and or pain.   P: Continue to monitor urine output and symptoms of nausea. Screen for symptoms of toxicity.

## 2023-04-10 NOTE — PROGRESS NOTES
Care Management Follow Up    Length of Stay (days): 4  Expected Discharge Date: 04/11/2023  Concerns to be Addressed:     Discharge planning  Patient plan of care discussed at interdisciplinary rounds: Yes    Anticipated Discharge Disposition:  Home  Anticipated Discharge Services: OP  Neulasta inj and labs   Anticipated Discharge DME:      Additional Information:  Covering RNCC scheduled Neulasta on 4/12 at 2pm. Patient refused labs on 4/13. RNCC scheduled labs  for 4/17 at 1PM and 4/20 at 1:15PM. Updated Demetrice, JOSE MARTIN who is okay with these dates. Update pt with dates and times. Placed info in discharge instructions.       [facility]   [id number] [place] [address]  Phone: [phone], extension [phone] (infusion dept)   Direct phone to Infusion Dept: [phone]  Fax: [phone]         Anni Darden RN, [place]  7D Care Coordinator  Pager: [phone]

## 2023-04-10 NOTE — PROGRESS NOTES
[id number]  VSS. Alert and oriented. Denies pain. CIVI chemo infusing. Tolerating well. Continue with POC.

## 2023-04-10 NOTE — PROGRESS NOTES
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 Admitted 04/06/23 for C5 DA-R-EPOCH (C6 total of chemotherapy). Continues on isolation precautions for persistently positive COVID PCR.    Today:   - today will be Day 5. CIVI chemo to complete, then cytoxan x once  - last dose of Pred due 4/11 AM prior to discharge  - will send scripts for Dex x 2 days after discharge  - plan for IVIG today after last chemotherapy (ordered)  - Pentamidine ordered for today. Pt has had intolerance of inh albuterol+pentamidine (shakiness, feeling unwell). Last received IV Pentamidine, so will plan to switch inh to IV for this admission (ordered for tonight)  -  Pt notes h/o edema after final dose chemotherapy in previous cycles manifesting for several days after discharge. He would like Lasix prior to leaving as well as to have some Lasix tablets at home. Ordered Lasix 20mg IV x once for 7 am on 4/11. Will also sent small script for Lasix tablets with discharge meds.    - Anticipate dismissal to home Tues, 4/11, if doing well. Planning for discharge around 11 AM. His family member has an approx 3 hr drive to come pick pt up.  - discharge orders completed:   - sent scripts for protocol Dex, PRN Lasix, and PRN baclofen. Pt reports that he has other anti-infectives and PRN antiemetics at home  - local Neulasta and labs arranged.  - PET/CT scan, labs, appt with Dr. 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 Ruelas. Pt has a h/o low-grade kappa restricted plasmacytoid B-cell lymphoma (diagnosed in 3/2004) treated with x6 cycles of fludarabine based chemo and XRT to spine, then has been on surveillance since 2005. He presented with progressive B-symptoms, abdominal pain, and SOB. PET 12/9 showed extensive lymphomatous involvement to the right pleura w/ bilateral FDG-avid effusions (large R, small L), mediastinal and pericardial regions, right anterolateral chest wall, adenopathy above and below the diaphragm, liver, spleen and multiple sites in the skeleton, concerning for transformation from his low-grade lymphoma. He was also noted to have progressive pancytopenia. Due to worsening shortness of breath and delays of biopsy, patient presented to ED on 12/14 and was subsequently admitted for expedited workup and treatment. As SUV of the sternoclavicular mass was the highest, patient underwent US-guided sternoclavicular soft tissue mass biopsy with IR 12/15, pathology from which confirmed a diagnosis of high-grade B-cell lymphoma. Baseline ECHO (12/15) with LVEF 60-65%, mild aortic insufficiency, no evidence of effusion or tamponade. Received 1 cycle of R-CHOP (L3K0=9012/20/22) which was well-tolerated. Cytogenetics then returned, revealed rearrangements in MYC, BCL2, and BCL6, consistent with triple hit lymphoma. He was subsequently changed to DA-R-EPOCH and proceeded with C1 on 1/11/23 (C2 total of therapy). He was re-admitted for C2 on 2/2/23, C3 on 2/22/23, C4 on 3/17/23. Cycles have been overall well-tolerated, save for anticipated fatigue and cytopenias. Of note, patient has not advanced past Level 1 per personal preference/concerns for tolerance.  - CNS IPI was 4 (1 point each for age, LDH, stage IV disease, and extranodal involvement); as such, s/p CNS ppx with IT chemotherapy (1/12, 2/6, 2/24, 3/20 -- all negative flow). No need for ongoing IT chemo as completed  planned 4 doses.   - PET/CT (post-4 cycles of chemo) done 3/17/23 with continued CR, single area of avidity in right cardiac pleura (although deemed likely artifact vs physiologic) with recommendation for cardiac prep/ketogenic diet for 3 days prior to next PET/CT.  - Note: this is overall C6 of chemotherapy (1 cycle R-CHOP + 5 cycles R-EPOCH); current plan is for 6 total cycles followed by an EOT PET/CT.                             Treatment Plan: DA-R- EPOCH C5D1=4/6/23                           - Rituximab 375 mg/m2 IV x1 dose -- Day 1                           - Etoposide 50 mg/m2 IV q22h x4 doses -- Days 1-4                           - Vincristine 0.4 mg/m2 IV q22h x4 doses --  Days 1-4                           - Doxorubicin 10 mg/m2 IV q22h x4 doses -- Days 1-4                           - Cyclophosphamide 750 mg/m2 IV x1 dose -- Day 5   - Prednisone 60 mg/m2 PO BID x10 doses -- Days 1-5. Last dose will be 4/11 AM.   - Dexamethasone 8 mg daily x2 doses -- Days 6-7. Will send scripts for this with instructions to take on 4/12 and 4/13 (given that he will get last dose of Pred on 4/11)                           - Neulasta 6 mg subQ x1 dose -- [facility] 4/12                           - Pre-meds: Tylenol, Benadryl, Zofran, Emend     # Pancytopenia  Secondary to chemotherapy.   - Monitor CBC daily  - Transfuse if Hgb <7      CV  # History of complete heart block s/p pacemaker placement (2020)  Followed by Dr. Fox; last seen 7/7/22. Pacemaker last interrogated on 3/2/23.  - No acute inpatient management needs  - Continue cardiology follow-up and device checks as recommended - next planned follow-up in 07/2023     # Hx of weight gain with prior cycles  Weight up ~7 lbs from dry weight (125 lbs) during prior cycles. Pt reports benefit from IV Lasix, but edema typically is worst immediately after discharge so has used PO at home too.   - Follow I/Os, daily weights. His weight is up 11 lbs today but he  is not feeling fluid overloaded at present. After discussion today, he would ultimately like to have IV Lasix early tomorrow morning (after all the chemo/IV meds are done and prior to discharge). Will also give script for oral Lasix tablets with discharge meds.  - PRN Lasix - pt would like Lasix prior to discharge     ID  # Asymptomatic COVID-19 infection, persistent  Noted to be COVID+ on PCR done 1/9/23. Admitted for planned chemotherapy, as above, and treated with IV remdesivir x3 days (1/11-1/13). Despite treatment, patient has remained PCR positive for the last 2 months since diagnosis and was positive again on admission (3/17/23). Cycle threshold 31.4. Per infection prevention, patient needs to remain in precautions until cycle threshold >35.  Cycle threshold again 31.4 on 04/06/23 with Covid PCR +.  - Continue special precautions for now as repeat COVID positive (4/6) with CT 31.  - No current clinical s/sx of COVID-19 infection; monitor clinically with initiation of chemotherapy  - Plan for IVIG as below     # Hypogammaglobulinemia   IgG 310 (12/2022). Status-post IVIG on 1/15/23; plan to recheck IgG in ~3 months from last dose (~4/2023) per prior discussion with Dr. Ruelas.   - Repeat IgG level this admission 317. IVIG ordered for today (after last chemo dose completed)     # ID ppx  - Acyclovir 400 mg BID  - No current indication for bacterial/fungal ppx; start for ANC <1000  - S/p IV pentamidine on 3/18. Will repeat this admission. Ordered for 4/10.    MISC  # GERD - related to previous XRT to the chest, continue PTA omeprazole.  # Insomnia - Exacerbated by steroid. PTA on temazepam 7.5 mg HS PRN for sleep.      FEN:  - No mIVF; bolus PRN  - Lyte replacement PRN per standing protocol  - Regular diet as tolerated     Prophy/Misc:  - GI/PUD: PTA PPI  - Bowels: PRN Miralax and Senna  - DVT: ppx enoxaparin; hold for platelets <50K and prior to LP  - ID: as above  - Activity: encourage ambulation     Clinically  Significant Risk Factors              # Hypoalbuminemia: Lowest albumin = 3.4 g/dL at 4/10/2023  5:10 AM, will monitor as appropriate   # Thrombocytopenia: Lowest platelets = 114 in last 2 days, will monitor for bleeding                   Code Status: FULL     Disposition: ~5 day hospital stay for scheduled chemotherapy.      Follow up:   - Neulasta/labs requested locally; [facility] to Fax to [facility]. Arranged for 4/12. Ok to defer labs to 4/17 as d/w Dr. Ruelas and RNCC.      Staffed with Dr Ruelas.     Demetrice Hinojosa PA-C  Heme/Onc  Pager: [phone]    I spent 50 minutes in the care of this patient today, which included time necessary for review of interval events, obtaining history and physical exam, ordering medication(s)/test(s) as medically indicated, preparation of discharge orders, and documentation time. Over 50% of time was spent face-to-face and/or coordinating care.     Interval History   Overnight no acute events. Afebrile and HD stable. Reports feeling well today. No N/V. Weight obtained today demonstrates weight up 11 lbs since admission. He has h/o fluid weight gain and we discussed plan for Lasix in depth. Denies feeling fluid overloaded at present. Discussed scheduling if IVIG and route/timing of pentamidine.    Physical Exam   Temp: 97.6  F (36.4  C) Temp src: Oral BP: 125/68 Pulse: 71   Resp: 16 SpO2: 97 % O2 Device: None (Room air)    Vitals:    04/06/23 1841 04/07/23 0943 04/10/23 0833   Weight: 59 kg (130 lb 1.6 oz) 59.4 kg (130 lb 15.3 oz) 64.4 kg (141 lb 14.4 oz)     Vital Signs with Ranges  Temp:  [97.6  F (36.4  C)-98.1  F (36.7  C)] 97.6  F (36.4  C)  Pulse:  [70-95] 71  Resp:  [16-18] 16  BP: (118-133)/(63-77) 125/68  SpO2:  [94 %-97 %] 97 %  I/O last 3 completed shifts:  In: 1792.8 [P.O.:820; IV Piggyback:972.8]  Out: 1500 [Urine:1500]    Constitutional: Pleasant male seen reclined in bed. No apparent distress, and appears stated age.  HEENT: NC/AT. Sclerae anicteric. MMM  Respiratory: No increased  work of breathing, good air exchange, clear to auscultation bilaterally, no crackles or wheezing.  Cardiovascular: Pacemaker visible under skin. RRR  GI: +BS. Soft. ND.  Skin: Warm, dry, intact. No lesions or rashes on exposed skin surfaces.  Extremities: Very trace sockline edema b/l.  Neurologic: Awake, alert, oriented. No focal deficits grossly.      Medications     - MEDICATION INSTRUCTIONS -         acetaminophen  650 mg Oral Once     acyclovir  400 mg Oral Q12H     allopurinol  300 mg Oral Daily     Chemotherapy Infusing-Continuous Infusion   Does not apply Q8H     cyclophosphamide (CYTOXAN) 1,275 mg in sodium chloride 0.9 % 614 mL infusion  750 mg/m2 (Treatment Plan Recorded) Intravenous Once     [START ON 4/12/2023] dexamethasone  8 mg Oral Daily     diphenhydrAMINE  50 mg Oral Once    Or     diphenhydrAMINE  50 mg Intravenous Once     enoxaparin ANTICOAGULANT  40 mg Subcutaneous Q24H     etoposide  50 mg/m2 (Treatment Plan Recorded) Intravenous Q22H     fosaprepitant (EMEND) 150 mg in sodium chloride 0.9 % 275 mL intermittent infusion  150 mg Intravenous Once     heparin  5-10 mL Intracatheter Q28 Days     heparin lock flush  5-10 mL Intracatheter Q24H     immune globulin - sucrose free  25 g Intravenous Once     omeprazole  40 mg Oral Daily     ondansetron  16 mg Oral Q22H     predniSONE  60 mg/m2 (Treatment Plan Recorded) Oral BID     senna-docusate  2 tablet Oral BID     sodium chloride (PF)  10-20 mL Intracatheter Q28 Days     vinCRIStine (ONCOVIN) 0.7 mg, DOXOrubicin (ADRIAMYCIN) 17 mg in sodium chloride 0.9 % 1,059.2 mL infusion  0.4 mg/m2 (Treatment Plan Recorded) Intravenous Q22H     vitamin C  500 mg Oral QAM       Data   Results for orders placed or performed during the hospital encounter of 04/06/23 (from the past 24 hour(s))   CBC with platelets differential    Narrative    The following orders were created for panel order CBC with platelets differential.  Procedure                                Abnormality         Status                     ---------                               -----------         ------                     CBC with platelets and d...[[id number]]  Abnormal            Final result                 Please view results for these tests on the individual orders.   Comprehensive metabolic panel   Result Value Ref Range    Sodium 142 136 - 145 mmol/L    Potassium 3.8 3.4 - 5.3 mmol/L    Chloride 109 (H) 98 - 107 mmol/L    Carbon Dioxide (CO2) 23 22 - 29 mmol/L    Anion Gap 10 7 - 15 mmol/L    Urea Nitrogen 26.0 (H) 8.0 - 23.0 mg/dL    Creatinine 0.70 0.67 - 1.17 mg/dL    Calcium 8.8 8.8 - 10.2 mg/dL    Glucose 118 (H) 70 - 99 mg/dL    Alkaline Phosphatase 75 40 - 129 U/L    AST 26 10 - 50 U/L    ALT 37 10 - 50 U/L    Protein Total 4.7 (L) 6.4 - 8.3 g/dL    Albumin 3.4 (L) 3.5 - 5.2 g/dL    Bilirubin Total 0.2 <=1.2 mg/dL    GFR Estimate >90 >60 mL/min/1.73m2   INR   Result Value Ref Range    INR 1.10 0.85 - 1.15   Fibrinogen activity   Result Value Ref Range    Fibrinogen Activity 218 170 - 490 mg/dL   Uric acid   Result Value Ref Range    Uric Acid 3.4 3.4 - 7.0 mg/dL   CBC with platelets and differential   Result Value Ref Range    WBC Count 3.8 (L) 4.0 - 11.0 10e3/uL    RBC Count 2.47 (L) 4.40 - 5.90 10e6/uL    Hemoglobin 8.1 (L) 13.3 - 17.7 g/dL    Hematocrit 25.6 (L) 40.0 - 53.0 %     (H) 78 - 100 fL    MCH 32.8 26.5 - 33.0 pg    MCHC 31.6 31.5 - 36.5 g/dL    RDW 14.7 10.0 - 15.0 %    Platelet Count 114 (L) 150 - 450 10e3/uL    % Neutrophils 88 %    % Lymphocytes 8 %    % Monocytes 3 %    % Eosinophils 0 %    % Basophils 0 %    % Immature Granulocytes 1 %    NRBCs per 100 WBC 0 <1 /100    Absolute Neutrophils 3.4 1.6 - 8.3 10e3/uL    Absolute Lymphocytes 0.3 (L) 0.8 - 5.3 10e3/uL    Absolute Monocytes 0.1 0.0 - 1.3 10e3/uL    Absolute Eosinophils 0.0 0.0 - 0.7 10e3/uL    Absolute Basophils 0.0 0.0 - 0.2 10e3/uL    Absolute Immature Granulocytes 0.0 <=0.4 10e3/uL    Absolute NRBCs 0.0  10e3/uL

## 2023-04-11 VITALS
HEART RATE: 70 BPM | RESPIRATION RATE: 16 BRPM | TEMPERATURE: 97.6 F | DIASTOLIC BLOOD PRESSURE: 71 MMHG | BODY MASS INDEX: 22.52 KG/M2 | OXYGEN SATURATION: 100 % | SYSTOLIC BLOOD PRESSURE: 127 MMHG | HEIGHT: 67 IN | WEIGHT: 143.5 LBS

## 2023-04-11 LAB
ALBUMIN SERPL BCG-MCNC: 3.1 G/DL (ref 3.5–5.2)
ALP SERPL-CCNC: 69 U/L (ref 40–129)
ALT SERPL W P-5'-P-CCNC: 69 U/L (ref 10–50)
ANION GAP SERPL CALCULATED.3IONS-SCNC: 8 MMOL/L (ref 7–15)
AST SERPL W P-5'-P-CCNC: 39 U/L (ref 10–50)
BASOPHILS # BLD AUTO: 0 10E3/UL (ref 0–0.2)
BASOPHILS NFR BLD AUTO: 0 %
BILIRUB SERPL-MCNC: 0.3 MG/DL
BUN SERPL-MCNC: 30.5 MG/DL (ref 8–23)
CALCIUM SERPL-MCNC: 8.9 MG/DL (ref 8.8–10.2)
CHLORIDE SERPL-SCNC: 110 MMOL/L (ref 98–107)
CREAT SERPL-MCNC: 0.74 MG/DL (ref 0.67–1.17)
DEPRECATED HCO3 PLAS-SCNC: 24 MMOL/L (ref 22–29)
EOSINOPHIL # BLD AUTO: 0 10E3/UL (ref 0–0.7)
EOSINOPHIL NFR BLD AUTO: 0 %
ERYTHROCYTE [DISTWIDTH] IN BLOOD BY AUTOMATED COUNT: 14.7 % (ref 10–15)
FIBRINOGEN PPP-MCNC: 198 MG/DL (ref 170–490)
GFR SERPL CREATININE-BSD FRML MDRD: >90 ML/MIN/1.73M2
GLUCOSE SERPL-MCNC: 109 MG/DL (ref 70–99)
HCT VFR BLD AUTO: 23.5 % (ref 40–53)
HGB BLD-MCNC: 7.6 G/DL (ref 13.3–17.7)
IMM GRANULOCYTES # BLD: 0.1 10E3/UL
IMM GRANULOCYTES NFR BLD: 2 %
INR PPP: 1.08 (ref 0.85–1.15)
LYMPHOCYTES # BLD AUTO: 0.2 10E3/UL (ref 0.8–5.3)
LYMPHOCYTES NFR BLD AUTO: 6 %
MAGNESIUM SERPL-MCNC: 2.1 MG/DL (ref 1.7–2.3)
MCH RBC QN AUTO: 32.6 PG (ref 26.5–33)
MCHC RBC AUTO-ENTMCNC: 32.3 G/DL (ref 31.5–36.5)
MCV RBC AUTO: 101 FL (ref 78–100)
MONOCYTES # BLD AUTO: 0.1 10E3/UL (ref 0–1.3)
MONOCYTES NFR BLD AUTO: 2 %
NEUTROPHILS # BLD AUTO: 3.3 10E3/UL (ref 1.6–8.3)
NEUTROPHILS NFR BLD AUTO: 90 %
NRBC # BLD AUTO: 0 10E3/UL
NRBC BLD AUTO-RTO: 0 /100
PHOSPHATE SERPL-MCNC: 3.8 MG/DL (ref 2.5–4.5)
PLATELET # BLD AUTO: 97 10E3/UL (ref 150–450)
POTASSIUM SERPL-SCNC: 3.9 MMOL/L (ref 3.4–5.3)
PROT SERPL-MCNC: 4.9 G/DL (ref 6.4–8.3)
RBC # BLD AUTO: 2.33 10E6/UL (ref 4.4–5.9)
SODIUM SERPL-SCNC: 142 MMOL/L (ref 136–145)
URATE SERPL-MCNC: 3.5 MG/DL (ref 3.4–7)
WBC # BLD AUTO: 3.7 10E3/UL (ref 4–11)

## 2023-04-11 PROCEDURE — 85384 FIBRINOGEN ACTIVITY: CPT | Performed by: PHYSICIAN ASSISTANT

## 2023-04-11 PROCEDURE — 250N000012 HC RX MED GY IP 250 OP 636 PS 637: Performed by: REGISTERED NURSE

## 2023-04-11 PROCEDURE — 83735 ASSAY OF MAGNESIUM: CPT | Performed by: INTERNAL MEDICINE

## 2023-04-11 PROCEDURE — 84550 ASSAY OF BLOOD/URIC ACID: CPT | Performed by: PHYSICIAN ASSISTANT

## 2023-04-11 PROCEDURE — 250N000011 HC RX IP 250 OP 636: Performed by: PHYSICIAN ASSISTANT

## 2023-04-11 PROCEDURE — 84100 ASSAY OF PHOSPHORUS: CPT | Performed by: INTERNAL MEDICINE

## 2023-04-11 PROCEDURE — 250N000013 HC RX MED GY IP 250 OP 250 PS 637: Performed by: PHYSICIAN ASSISTANT

## 2023-04-11 PROCEDURE — 36591 DRAW BLOOD OFF VENOUS DEVICE: CPT | Performed by: PHYSICIAN ASSISTANT

## 2023-04-11 PROCEDURE — 80053 COMPREHEN METABOLIC PANEL: CPT | Performed by: PHYSICIAN ASSISTANT

## 2023-04-11 PROCEDURE — 99239 HOSP IP/OBS DSCHRG MGMT >30: CPT | Mod: FS | Performed by: PHYSICIAN ASSISTANT

## 2023-04-11 PROCEDURE — 85610 PROTHROMBIN TIME: CPT | Performed by: PHYSICIAN ASSISTANT

## 2023-04-11 PROCEDURE — 85025 COMPLETE CBC W/AUTO DIFF WBC: CPT | Performed by: PHYSICIAN ASSISTANT

## 2023-04-11 RX ORDER — ACYCLOVIR 400 MG/1
400 TABLET ORAL EVERY 12 HOURS
Qty: 60 TABLET | Refills: 0 | Status: SHIPPED | OUTPATIENT
Start: 2023-04-11 | End: 2023-07-20

## 2023-04-11 RX ADMIN — Medication 5 ML: at 10:32

## 2023-04-11 RX ADMIN — ALLOPURINOL 300 MG: 300 TABLET ORAL at 07:48

## 2023-04-11 RX ADMIN — ACYCLOVIR 400 MG: 400 TABLET ORAL at 07:48

## 2023-04-11 RX ADMIN — PREDNISONE 100 MG: 50 TABLET ORAL at 07:48

## 2023-04-11 RX ADMIN — Medication 5 ML: at 07:06

## 2023-04-11 RX ADMIN — ONDANSETRON HYDROCHLORIDE 8 MG: 4 TABLET, FILM COATED ORAL at 08:02

## 2023-04-11 RX ADMIN — OMEPRAZOLE 40 MG: 20 CAPSULE, DELAYED RELEASE ORAL at 07:48

## 2023-04-11 RX ADMIN — OXYCODONE HYDROCHLORIDE AND ACETAMINOPHEN 500 MG: 500 TABLET ORAL at 07:48

## 2023-04-11 RX ADMIN — FUROSEMIDE 20 MG: 10 INJECTION, SOLUTION INTRAVENOUS at 07:47

## 2023-04-11 RX ADMIN — PROCHLORPERAZINE MALEATE 5 MG: 5 TABLET ORAL at 10:29

## 2023-04-11 RX ADMIN — POLYETHYLENE GLYCOL 3350 17 G: 17 POWDER, FOR SOLUTION ORAL at 07:47

## 2023-04-11 ASSESSMENT — ACTIVITIES OF DAILY LIVING (ADL)
ADLS_ACUITY_SCORE: 20

## 2023-04-11 NOTE — PLAN OF CARE
"Goal Outcome Evaluation:    1500 - 2330:   /86   Pulse 77   Temp 98.2  F (36.8  C) (Oral)   Resp 16   Ht 1.702 m (5' 7\")   Wt 64.4 kg (141 lb 14.4 oz)   SpO2 97%   BMI 22.22 kg/m      A&O x 4, AVSS ex BP in 140's - 130's.  Denies pain & sob, mild nausea resolved with po compazine while IVIG going on.  Pt tolerate IVIG well & completed & IV pentamidine with no issues.  UAL, voiding spontaneously, had a second bm this evening.  Plan is to discharge pt tomorrow at 11 AM; lasix scheduled at 7 AM; pt scheduled neulasta on 4/12 at 2 PM.  Continue with poc..                        "

## 2023-04-11 NOTE — PLAN OF CARE
[id number]: VSS on RA. Denies pain and SOB. PRN compazine given for mild nausea with good effect. IV lasix given with good UOP. Had a BM this shift. Discharge orders placed, education provided, and all questions answered. Pt ambulated off the unit and ride provided by significant other.

## 2023-04-11 NOTE — PLAN OF CARE
"/67 (BP Location: Left arm, Cuff Size: Adult Regular)   Pulse 69   Temp 97.8  F (36.6  C) (Oral)   Resp 16   Ht 1.702 m (5' 7\")   Wt 64.4 kg (141 lb 14.4 oz)   SpO2 92%   BMI 22.22 kg/m    Pt's AVSS, no c/o pain or nausea all shift. Pt continue to have none productive cough and maintaining sat's on RA at 92-96%. Pt is up independent,ty in room and voiding good amount via bedside urinal. Pt to discharge today around 11am. Keep Monitoring pt as ordered and notify MD with any new changes.   Problem: Plan of Care - These are the overarching goals to be used throughout the patient stay.    Goal: Plan of Care Review  Description: The Plan of Care Review/Shift note should be completed every shift.  The Outcome Evaluation is a brief statement about your assessment that the patient is improving, declining, or no change.  This information will be displayed automatically on your shift note.  Outcome: Progressing  Goal: Patient-Specific Goal (Individualized)  Description: You can add care plan individualizations to a care plan. Examples of Individualization might be:  \"Parent requests to be called daily at 9am for status\", \"I have a hard time hearing out of my right ear\", or \"Do not touch me to wake me up as it startles me\".  Outcome: Progressing  Goal: Absence of Hospital-Acquired Illness or Injury  Outcome: Progressing  Goal: Optimal Comfort and Wellbeing  Outcome: Progressing  Goal: Readiness for Transition of Care  Outcome: Progressing     Problem: Anemia (Chemotherapy Effects)  Goal: Anemia Symptom Improvement  Outcome: Progressing     Problem: Chemotherapy Environmental Safety  Goal: Safety Maintained While Receiving Chemotherapy  Outcome: Progressing     Problem: Urinary Bleeding Risk or Actual (Chemotherapy Effects)  Goal: Absence of Hematuria  Outcome: Progressing     Problem: Nausea and Vomiting (Chemotherapy Effects)  Goal: Fluid and Electrolyte Balance  Outcome: Progressing     Problem: Neurotoxicity " (Chemotherapy Effects)  Goal: Neurotoxicity Symptom Control  Outcome: Progressing     Problem: Neutropenia (Chemotherapy Effects)  Goal: Absence of Infection  Outcome: Progressing     Problem: Oral Mucositis (Chemotherapy Effects)  Goal: Improved Oral Mucous Membrane Integrity  Outcome: Progressing     Problem: Thrombocytopenia Bleeding Risk (Chemotherapy Effects)  Goal: Absence of Bleeding  Outcome: Progressing   Goal Outcome Evaluation:

## 2023-04-11 NOTE — DISCHARGE SUMMARY
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 He was admitted on 4/6/23 for C5 DA-R-EPOCH (C6 total of chemotherapy) which was tolerated well apart from fluid overload.      On day of discharge, patient states he is feeling well and ready to go home. Received IV Lasix morning of discharge with good urine output. Some mild nausea after receiving IVIG and IV pentamidine late last night, relieved by Zofran. Patient is hemodynamically stable, well appearing and ready for discharge. Questions answered at bedside.     Recommendations for Outpatient:  - Patient will start prophy Levaquin and Fluconazole on 4/12 in anticipation of neutropenia - follow labs, ok to discontinue Levaquin and Fluconazole when no longer neutropenic      Follow-up:  - 4/12 - Neulasta @ [facility]  - Further labs/transfusion appointments (2x weekly) scheduled starting 4/17   - PET/CT scan, labs, appt with Dr. 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 Ruelas. Pt has a h/o  low-grade kappa restricted plasmacytoid B-cell lymphoma (diagnosed in 3/2004) treated with x6 cycles of fludarabine based chemo and XRT to spine, then has been on surveillance since 2005. He presented with progressive B-symptoms, abdominal pain, and SOB. PET 12/9 showed extensive lymphomatous involvement to the right pleura w/ bilateral FDG-avid effusions (large R, small L), mediastinal and pericardial regions, right anterolateral chest wall, adenopathy above and below the diaphragm, liver, spleen and multiple sites in the skeleton, concerning for transformation from his low-grade lymphoma. He was also noted to have progressive pancytopenia. Due to worsening shortness of breath and delays of biopsy, patient presented to ED on 12/14 and was subsequently admitted for expedited workup and treatment. As SUV of the sternoclavicular mass was the highest, patient underwent US-guided sternoclavicular soft tissue mass biopsy with IR 12/15, pathology from which confirmed a diagnosis of high-grade B-cell lymphoma. Baseline ECHO (12/15) with LVEF 60-65%, mild aortic insufficiency, no evidence of effusion or tamponade. Received 1 cycle of R-CHOP (B6T2=1912/20/22) which was well-tolerated. Cytogenetics then returned, revealed rearrangements in MYC, BCL2, and BCL6, consistent with triple hit lymphoma. He was subsequently changed to DA-R-EPOCH and proceeded with C1 on 1/11/23 (C2 total of therapy). He was re-admitted for C2 on 2/2/23, C3 on 2/22/23, C4 on 3/17/23. Cycles have been overall well-tolerated, save for anticipated fatigue and cytopenias. Of note, patient has not advanced past Level 1 per personal preference/concerns for tolerance.  - CNS IPI was 4 (1 point each for age, LDH, stage IV disease, and extranodal involvement); as such, CNS ppx with IT chemotherapy was recommended. CSF flow negative (1/12) with C1. CSF flow negative (2/6) with C2. Difficult bedside access with C1; subsequently LPs done in XR.  - PET/CT (post-4  cycles of chemo) done 3/17/23 with continued CR, single area of avidity in right cardiac pleura (although deemed likely artifact vs physiologic) with recommendation for cardiac prep/ketogenic diet for 3 days prior to next PET/CT.                             Treatment Plan: DA-R- EPOCH. C5D1=4/6/23                           - Rituximab 375 mg/m2 IV x1 dose -- Day 1                           - Etoposide 50 mg/m2 IV q22h x4 doses -- Days 1-4                           - Vincristine 0.4 mg/m2 IV q22h x4 doses --  Days 1-4                           - Doxorubicin 10 mg/m2 IV q22h x4 doses -- Days 1-4                           - Cyclophosphamide 750 mg/m2 IV x1 dose -- Day 5                           - Prednisone 60 mg/m2 PO BID x10 doses -- Days 1-5                           - Dexamethasone 8 mg daily x2 doses -- Days 6-7                           - Neulasta 6 mg subQ x1 dose -- scheduled at [facility] on 4/12                           - Pre-meds: Tylenol, Benadryl, Zofran, Emend     # Pancytopenia  Secondary to chemotherapy.   - Monitor CBC daily  - Transfuse if Hgb <7      CV  # History of complete heart block s/p pacemaker placement (2020)  Followed by Dr. Fox; last seen 7/7/22. Pacemaker last interrogated on 3/2/23.  - No acute inpatient management needs  - Continue cardiology follow-up and device checks as recommended - next planned follow-up in 07/2023     # Hx of weight gain with prior cycles  Weight up ~7 lbs from dry weight (125 lbs) during prior cycles. Pt reports benefit from IV Lasix, but edema typically is worst immediately after discharge so has used PO at home too.   - Follow I/Os, daily weights. Weight up 11lb day prior to discharge, given IV Lasix AM of discharge and sent home with PRN PO Lasix prescription.      ID  # Asymptomatic COVID-19 infection, persistent  Noted to be COVID+ on PCR done 1/9/23. Admitted for planned chemotherapy, as above, and treated with IV remdesivir x3 days (1/11-1/13). Despite  treatment, patient has remained PCR positive for the last 2 months since diagnosis and was positive again on admission (3/17/23). Cycle threshold 31.4. Per infection prevention, patient needs to remain in precautions until cycle threshold >35.  Cycle threshold again 31.4 on 04/06/23 with Covid PCR +.  - Continue special precautions for now as repeat COVID positive (4/6) with CT 31.  - No current clinical s/sx of COVID-19 infection; monitor clinically with initiation of chemotherapy  - IVIG given 4/10     # Hypogammaglobulinemia   IgG 310 (12/2022). Status-post IVIG on 1/15/23; plan to recheck IgG in ~3 months from last dose (~4/2023) per prior discussion with Dr. Ruelas.   - Repeat IgG level this admission 317. IVIG given 4/10     # ID ppx  - Acyclovir 400 mg BID  - No current indication for bacterial/fungal ppx; start for ANC <1000  - S/p IV pentamidine on 3/18, given inpatient 4/10     MISC  # GERD - related to previous XRT to the chest, continue PTA omeprazole.  # Insomnia - Exacerbated by steroid. PTA on temazepam 7.5 mg HS PRN for sleep.      FEN:  - No mIVF; bolus PRN  - Lyte replacement PRN per standing protocol  - Regular diet as tolerated     Prophy/Misc:  - GI/PUD: PTA PPI  - Bowels: PRN Miralax and Senna  - DVT: ppx enoxaparin; hold for platelets <50K and prior to LP  - ID: as above  - Activity: encourage ambulation        Patient seen in collaboration with attending physician, Dr. Ruelas.     I spent 45 minutes in the care of this patient today, which included time necessary for review of interval events, obtaining history and physical exam, ordering medication(s)/test(s) as medically indicated, discussion with interdisciplinary/consult team(s), and documentation time. Over 50% of time was spent face-to-face and/or coordinating care.     Jeanie Omalley PA-C  Hematology/Oncology  Pager: #[phone]    Code Status   Full Code    Primary Care Physician   Provider Not In System    Physical Exam   Vital Signs with  Ranges  Temp:  [97.6  F (36.4  C)-98.2  F (36.8  C)] 97.6  F (36.4  C)  Pulse:  [60-87] 70  Resp:  [16-17] 16  BP: (122-141)/(66-86) 127/71  Cuff Mean (mmHg):  [96] 96  SpO2:  [92 %-100 %] 100 %  143 lbs 8 oz    Constitutional: Pleasant and cooperative male. Awake, alert, no apparent distress.  HEENT: NC/AT, EOMI, sclera clear, conjunctiva normal, oral pharynx with moist mucus membranes  Respiratory: No increased work of breathing, CTAB, no crackles or wheezing  Cardiovascular: RRR, normal S1 and S2, no murmur noted and no edema  GI: Normal bowel sounds, soft, non-distended and non-tender  MSK: No joint effusions or gross deformities.  Skin: No bruising, bleeding, rashes, or lesions   Neurologic:  Answers questions appropriately. Moves all extremities spontaneously.  Psych: Calm, appropriate affect    Discharge Disposition   Discharged to home  Condition at discharge: Stable    Consultations This Hospital Stay   None    Discharge Orders      Reason for your hospital stay    You were admission for a cycle of DA-EPOCH-R.     Activity    Your activity upon discharge: activity as tolerated     Follow Up and recommended labs and tests    Follow-up as scheduled for imaging and appt with Dr. Ruelas (see appointment information as below).     For cares after discharge, follow-up at:   [address] [address]  Phone: [phone]      Infusion department, [phone], [id number]  Direct Number:  [phone] (infusion dept in the hospital)  Fax: [phone]     Neulasta injection:    Wednesday, April 12th at 2pm.      Labs:    Monday, April 17th at 1:00pm.   Thursday, April 20th at 1:15pm     When to contact your care team    [facility] triage line at [phone] for any of the following: temperature > or = 100.4, uncontrolled nausea/vomiting/diarrhea/constipation, unrelieved pain, bleeding not relieved with pressure, dizziness, chest pain, shortness of breath,  loss of consciousness, and any new or concerning symptoms.     Diet    Follow this diet upon discharge: Regular diet as tolerated     Discharge Medications   Discharge Medication List as of 4/11/2023  9:38 AM        CONTINUE these medications which have CHANGED    Details   acyclovir (ZOVIRAX) 400 MG tablet Take 1 tablet (400 mg) by mouth every 12 hours, Disp-60 tablet, R-0, E-Prescribe      baclofen (LIORESAL) 10 MG tablet Take 0.5-1 tablets (5-10 mg) by mouth 3 times daily as needed for other (hiccups), Disp-30 tablet, R-0, E-Prescribe      dexamethasone (DECADRON) 4 MG tablet Take 2 tablets (8 mg) by mouth daily On 4/12 and 4/13, Disp-4 tablet, R-0, E-Prescribe      furosemide (LASIX) 20 MG tablet Take 1 tablet (20 mg) by mouth daily as needed (lower extremity swelling), Disp-10 tablet, R-0, E-Prescribe           CONTINUE these medications which have NOT CHANGED    Details   allopurinol (ZYLOPRIM) 300 MG tablet Take 1 tablet (300 mg) by mouth daily, Disp-60 tablet, R-3, E-Prescribe      ascorbic acid (VITAMIN C) 500 MG tablet Take 500 mg by mouth every morning, Historical      fluconazole (DIFLUCAN) 200 MG tablet Take 1 tablet (200 mg) by mouth daily , start at discharge and take until your ANC is greater than 1000., Disp-30 tablet, R-3, Historical      levofloxacin (LEVAQUIN) 250 MG tablet Take 1 tablet (250 mg) by mouth daily , start at discharge and take until your ANC is greater than 1000., Disp-30 tablet, R-3, Historical      Multiple Vitamins-Minerals (MULTIVITAL PO) Take 1 tablet by mouth every morning, Historical      omeprazole (PRILOSEC) 40 MG DR capsule Take 1 capsule (40 mg) by mouth daily, Disp-90 capsule, R-4, E-Prescribe      ondansetron (ZOFRAN) 8 MG tablet Take 1 tablet (8 mg) by mouth every 8 hours as needed for nausea, Disp-60 tablet, R-3, E-Prescribe      polyethylene glycol (MIRALAX) 17 GM/Dose powder Take 17 g by mouth daily as needed for constipation, Disp-510 g, R-4, E-Prescribe       prochlorperazine (COMPAZINE) 10 MG tablet Take 1 tablet (10 mg) by mouth every 6 hours as needed for nausea or vomiting, Disp-30 tablet, R-3, E-Prescribe      senna-docusate (SENNA S) 8.6-50 MG tablet Take 1 tablet by mouth 2 times daily as needed for constipation, Disp-60 tablet, R-4, E-Prescribe      temazepam (RESTORIL) 7.5 MG capsule Take 1 capsule (7.5 mg) by mouth nightly as needed for sleep, Disp-15 capsule, R-0, Local Print           STOP taking these medications       carbamide peroxide (DEBROX) 6.5 % otic solution Comments:   Reason for Stopping:         predniSONE (DELTASONE) 50 MG tablet Comments:   Reason for Stopping:             Allergies   Allergies   Allergen Reactions    Ampicillin [Ampicillin Sodium]     Bactrim [Sulfamethoxazole W/Trimethoprim]     Contrast Dye      CT contrast (IV) allergy - need oral Methylpred as premed for scans    Ampicillin Rash       Data   Most Recent 3 CBC's:  Recent Labs   Lab Test 04/11/23  0710 04/10/23  0510 04/09/23  0750   WBC 3.7* 3.8* 4.6   HGB 7.6* 8.1* 8.2*   * 104* 104*   PLT 97* 114* 126*      Most Recent 3 BMP's:  Recent Labs   Lab Test 04/11/23  0710 04/10/23  0510 04/09/23  0750    142 142   POTASSIUM 3.9 3.8 3.6   CHLORIDE 110* 109* 110*   CO2 24 23 23   BUN 30.5* 26.0* 23.8*   CR 0.74 0.70 0.76   ANIONGAP 8 10 9   JORGE 8.9 8.8 8.8   * 118* 100*     Most Recent 2 LFT's:  Recent Labs   Lab Test 04/11/23  0710 04/10/23  0510   AST 39 26   ALT 69* 37   ALKPHOS 69 75   BILITOTAL 0.3 0.2     Most Recent INR's and Anticoagulation Dosing History:  Anticoagulation Dose History           Latest Ref Rng & Units 3/19/2023 3/20/2023 4/7/2023 4/8/2023   Recent Dosing and Labs   INR 0.85 - 1.15 1.08   1.12   1.05   1.10           4/9/2023 4/10/2023 4/11/2023   Recent Dosing and Labs   INR 1.07   1.10   1.08                Most Recent 3 Troponin's:No lab results found.  Most Recent Cholesterol Panel:  Recent Labs   Lab Test 12/02/22  1405   CHOL 146    LDL 88  88   HDL 39*   TRIG 94     Most Recent 6 Bacteria Isolates From Any Culture (See EPIC Reports for Culture Details):No lab results found.  Most Recent TSH, T4 and A1c Labs:No lab results found.  Results for orders placed or performed during the hospital encounter of 03/17/23   XR Lumbar Punc Intrathecal Chemo Admin    Narrative    PROCEDURE: Lumbar Puncture using Fluoroscopy, 3/20/2023 2:16 PM    HISTORY:  72-year-old male with triple hit lymphoma, needs CNS ppx    COMPARISON: 2/24/2023    STAFF NEURORADIOLOGIST: SURAJ Cason, CHON TAN MD,  attest that I was present for all critical portions of the procedure  and was immediately available to provide guidance and assistance  during the remainder of the procedure.    FELLOW PHYSICIAN: Dr. Aaron Farmer    MEDICATIONS:  4 cc 1% local lidocaine    TECHNIQUE: Verbal and written consent for lumbar puncture was obtained  from the patient, and benefits and risk of the procedure were  explained, including but not limited to worsening headache,  hemorrhage, infection, lower extremity pain, or nerve root injury. The  patient was sterilely prepped and draped with the patient in the prone  position, over the lower back. Under fluoroscopic guidance, the  interlaminar spaces were noted. 1% lidocaine was administered for  local anesthetic over the L3-4 interlaminar space, and a 22 gauge 3.5  inch needle was advanced into the thecal sac under fluoroscopic  guidance.      There was initial show of clear CSF. Approximately 7 cc of CSF were  collected.    Hematology/Oncology then administered intrathecal chemotherapy, dose  and rate as determined by Heme/Onc.    The needle was removed with the stylet in place. There was no  immediate complication associated with the procedure. Samples were  sent for the requested laboratory testing.      FLUORO TIME: 33 seconds    DOSE: 16.8 uGym2      Impression    IMPRESSION: Successful lumbar puncture without immediate  complication.    PLAN: Patient discharged to patient care unit in stable condition for  monitoring. Orders placed for 1 hour of bed rest, with patient laying  on the back and the head of the bed flat.    I have personally reviewed the examination and initial interpretation  and I agree with the findings.    CHON TAN MD         SYSTEM ID:  [id number]   US Upper Extremity Venous Duplex Left    Narrative    EXAMINATION: DOPPLER VENOUS ULTRASOUND OF THE LEFT UPPER EXTREMITY,  3/17/2023 5:57 PM     COMPARISON: None.    HISTORY: 72M w/ high-grade B-cell lymphoma, palpable nodule to the  left forearm, evaluate for VTE 72M w/ high-grade B-cell lymphoma,  palpable nodule to the left forearm, evaluate for VTE    TECHNIQUE:  Gray-scale evaluation with compression, spectral flow and  color Doppler assessment of the deep venous system of the left upper  extremity.    FINDINGS:  Left: Normal blood flow and waveforms are demonstrated in the internal  jugular, innominate, subclavian, and axillary veins.     No compressibility of the left basilic and cephalic vein from the  level of the upper to mid forearm in the region of swelling.       Impression    IMPRESSION:  1.  Occlusive superficial thrombus of the left basilic and cephalic  vein at the level of the mid to upper forearm in the region of  swelling.  2.  No deep venous thrombosis of the left upper extremity.    I have personally reviewed the examination and initial interpretation  and I agree with the findings.    TEVIN BAUER MD         SYSTEM ID:  [id number]

## 2023-04-13 ENCOUNTER — PATIENT OUTREACH (OUTPATIENT)
Dept: CARE COORDINATION | Facility: CLINIC | Age: 73
End: 2023-04-13
Payer: MEDICARE

## 2023-04-13 NOTE — PROGRESS NOTES
Connected Care Resource Center Contact  [facility]/Voicemail     Clinical Data: Transitional Care Management Outreach     Outreach attempted x 2.  Left message on patient's voicemail, providing [facility]'s 24/7 scheduling and nurse triage phone number [phone] ([phone]) for questions/concerns and/or to schedule an appt with an [facility] provider, if they do not have a PCP.      Plan:  [facility] will do no further outreaches at this time.       RAFAEL Huang  Connected Care Resource [place], [facility]    *Connected Care Resource Team does NOT follow patient ongoing. Referrals are identified based on internal discharge reports and the outreach is to ensure patient has an understanding of their discharge instructions.

## 2023-04-17 ENCOUNTER — TELEPHONE (OUTPATIENT)
Dept: CARDIOLOGY | Facility: CLINIC | Age: 73
End: 2023-04-17
Payer: MEDICARE

## 2023-04-17 NOTE — TELEPHONE ENCOUNTER
Left Voicemail (1st Attempt) and Sent Letter (1st Attempt) for the patient to call back and schedule the following:    Appointment type: Cardiology- Return General  Provider: Ruddy  Return date: 7/7/23  Specialty phone number: [phone]  Additional appointment(s) needed: labs, echo, and device check  Additonal Notes: none    Aleja Krishna, Visit Facilitator/MA.

## 2023-04-17 NOTE — LETTER
April 17, 2023      TO: Shahid Davis   [facility] [address]         Dear Shahid,      Our records indicate that it is time for you to schedule your visit with the [facility] in the CARDIOVASCULAR CLINIC at [facility] [place] [facility] schedule your annual return visit and additional testing in July with Dr. Fox at the [facility] at [address] [place] [address].    To make your appointment, please call: [phone], option 1. Monday - Friday, 8 A.M. - 5:00 P.M (Central Time Zone).    Please check with your insurance company about your benefits.  Some insurance plans provide different coverage levels for services at [facility].    We look forward to hearing from you.    Sincerely,  [facility]  [address] [place] [address], [place], [place], [address]  Ph: [phone] (Option 1)  Fax: [phone]       It was a pleasure to see you at your last clinic visit. Please do not hesitate to call me if you have any questions or concerns.    Sincerely,      Elroy Fox MD

## 2023-04-28 ENCOUNTER — ANCILLARY PROCEDURE (OUTPATIENT)
Dept: PET IMAGING | Facility: CLINIC | Age: 73
End: 2023-04-28
Attending: INTERNAL MEDICINE
Payer: MEDICARE

## 2023-04-28 ENCOUNTER — LAB (OUTPATIENT)
Dept: LAB | Facility: CLINIC | Age: 73
End: 2023-04-28
Attending: INTERNAL MEDICINE
Payer: MEDICARE

## 2023-04-28 ENCOUNTER — ONCOLOGY VISIT (OUTPATIENT)
Dept: ONCOLOGY | Facility: CLINIC | Age: 73
End: 2023-04-28
Attending: INTERNAL MEDICINE
Payer: MEDICARE

## 2023-04-28 ENCOUNTER — TRANSFERRED RECORDS (OUTPATIENT)
Dept: HEALTH INFORMATION MANAGEMENT | Facility: CLINIC | Age: 73
End: 2023-04-28

## 2023-04-28 ENCOUNTER — ANCILLARY ORDERS (OUTPATIENT)
Dept: ONCOLOGY | Facility: CLINIC | Age: 73
End: 2023-04-28

## 2023-04-28 VITALS
SYSTOLIC BLOOD PRESSURE: 119 MMHG | DIASTOLIC BLOOD PRESSURE: 75 MMHG | RESPIRATION RATE: 16 BRPM | TEMPERATURE: 98.5 F | OXYGEN SATURATION: 97 % | HEART RATE: 105 BPM

## 2023-04-28 DIAGNOSIS — C83.32 DIFFUSE LARGE B-CELL LYMPHOMA OF INTRATHORACIC LYMPH NODES (H): ICD-10-CM

## 2023-04-28 DIAGNOSIS — C83.38 DIFFUSE LARGE B-CELL LYMPHOMA OF LYMPH NODES OF MULTIPLE REGIONS (H): Primary | ICD-10-CM

## 2023-04-28 DIAGNOSIS — U07.1 INFECTION DUE TO 2019 NOVEL CORONAVIRUS: ICD-10-CM

## 2023-04-28 DIAGNOSIS — T45.1X5S ADVERSE EFFECT OF ANTINEOPLASTIC AND IMMUNOSUPPRESSIVE DRUGS, SEQUELA: ICD-10-CM

## 2023-04-28 DIAGNOSIS — Z08 ENCOUNTER FOR FOLLOW-UP EXAMINATION AFTER COMPLETED TREATMENT FOR MALIGNANT NEOPLASM: ICD-10-CM

## 2023-04-28 DIAGNOSIS — J01.00 ACUTE NON-RECURRENT MAXILLARY SINUSITIS: ICD-10-CM

## 2023-04-28 LAB
ALBUMIN SERPL BCG-MCNC: 4.2 G/DL (ref 3.5–5.2)
ALP SERPL-CCNC: 105 U/L (ref 40–129)
ALT SERPL W P-5'-P-CCNC: 23 U/L (ref 10–50)
ANION GAP SERPL CALCULATED.3IONS-SCNC: 10 MMOL/L (ref 7–15)
AST SERPL W P-5'-P-CCNC: 24 U/L (ref 10–50)
BASOPHILS # BLD AUTO: 0 10E3/UL (ref 0–0.2)
BASOPHILS NFR BLD AUTO: 0 %
BILIRUB SERPL-MCNC: 0.2 MG/DL
BUN SERPL-MCNC: 32.1 MG/DL (ref 8–23)
CALCIUM SERPL-MCNC: 10.2 MG/DL (ref 8.8–10.2)
CHLORIDE SERPL-SCNC: 103 MMOL/L (ref 98–107)
CREAT SERPL-MCNC: 0.78 MG/DL (ref 0.67–1.17)
DEPRECATED HCO3 PLAS-SCNC: 25 MMOL/L (ref 22–29)
EOSINOPHIL # BLD AUTO: 0 10E3/UL (ref 0–0.7)
EOSINOPHIL NFR BLD AUTO: 0 %
ERYTHROCYTE [DISTWIDTH] IN BLOOD BY AUTOMATED COUNT: 13.9 % (ref 10–15)
GFR SERPL CREATININE-BSD FRML MDRD: >90 ML/MIN/1.73M2
GLUCOSE SERPL-MCNC: 122 MG/DL (ref 70–99)
GLUCOSE SERPL-MCNC: 196 MG/DL (ref 70–99)
HCT VFR BLD AUTO: 31.8 % (ref 40–53)
HGB BLD-MCNC: 10.5 G/DL (ref 13.3–17.7)
IMM GRANULOCYTES # BLD: 0.1 10E3/UL
IMM GRANULOCYTES NFR BLD: 1 %
LYMPHOCYTES # BLD AUTO: 0.4 10E3/UL (ref 0.8–5.3)
LYMPHOCYTES NFR BLD AUTO: 6 %
MCH RBC QN AUTO: 32.8 PG (ref 26.5–33)
MCHC RBC AUTO-ENTMCNC: 33 G/DL (ref 31.5–36.5)
MCV RBC AUTO: 99 FL (ref 78–100)
MONOCYTES # BLD AUTO: 0 10E3/UL (ref 0–1.3)
MONOCYTES NFR BLD AUTO: 0 %
NEUTROPHILS # BLD AUTO: 6.7 10E3/UL (ref 1.6–8.3)
NEUTROPHILS NFR BLD AUTO: 93 %
NRBC # BLD AUTO: 0 10E3/UL
NRBC BLD AUTO-RTO: 0 /100
PLATELET # BLD AUTO: 193 10E3/UL (ref 150–450)
POTASSIUM SERPL-SCNC: 4.3 MMOL/L (ref 3.4–5.3)
PROT SERPL-MCNC: 6.5 G/DL (ref 6.4–8.3)
RBC # BLD AUTO: 3.2 10E6/UL (ref 4.4–5.9)
SODIUM SERPL-SCNC: 138 MMOL/L (ref 136–145)
WBC # BLD AUTO: 7.2 10E3/UL (ref 4–11)

## 2023-04-28 PROCEDURE — 250N000011 HC RX IP 250 OP 636: Performed by: INTERNAL MEDICINE

## 2023-04-28 PROCEDURE — 85025 COMPLETE CBC W/AUTO DIFF WBC: CPT

## 2023-04-28 PROCEDURE — U0005 INFEC AGEN DETEC AMPLI PROBE: HCPCS | Performed by: INTERNAL MEDICINE

## 2023-04-28 PROCEDURE — 36415 COLL VENOUS BLD VENIPUNCTURE: CPT | Performed by: INTERNAL MEDICINE

## 2023-04-28 PROCEDURE — 80053 COMPREHEN METABOLIC PANEL: CPT

## 2023-04-28 PROCEDURE — 99215 OFFICE O/P EST HI 40 MIN: CPT | Mod: CS | Performed by: INTERNAL MEDICINE

## 2023-04-28 PROCEDURE — G0463 HOSPITAL OUTPT CLINIC VISIT: HCPCS | Performed by: INTERNAL MEDICINE

## 2023-04-28 PROCEDURE — 36591 DRAW BLOOD OFF VENOUS DEVICE: CPT | Performed by: INTERNAL MEDICINE

## 2023-04-28 PROCEDURE — 36591 DRAW BLOOD OFF VENOUS DEVICE: CPT

## 2023-04-28 RX ORDER — IOPAMIDOL 755 MG/ML
50-125 INJECTION, SOLUTION INTRAVASCULAR ONCE
Status: COMPLETED | OUTPATIENT
Start: 2023-04-28 | End: 2023-04-28

## 2023-04-28 RX ORDER — CEFPODOXIME PROXETIL 200 MG/1
200 TABLET, FILM COATED ORAL 2 TIMES DAILY
Qty: 20 TABLET | Refills: 0 | Status: SHIPPED | OUTPATIENT
Start: 2023-04-28 | End: 2023-04-28

## 2023-04-28 RX ORDER — DOXYCYCLINE 100 MG/1
100 CAPSULE ORAL 2 TIMES DAILY
Qty: 14 CAPSULE | Refills: 0 | Status: SHIPPED | OUTPATIENT
Start: 2023-04-28 | End: 2023-05-05

## 2023-04-28 RX ORDER — HEPARIN SODIUM (PORCINE) LOCK FLUSH IV SOLN 100 UNIT/ML 100 UNIT/ML
5 SOLUTION INTRAVENOUS EVERY 8 HOURS
Status: DISCONTINUED | OUTPATIENT
Start: 2023-04-28 | End: 2023-05-01 | Stop reason: HOSPADM

## 2023-04-28 RX ORDER — HEPARIN SODIUM (PORCINE) LOCK FLUSH IV SOLN 100 UNIT/ML 100 UNIT/ML
500 SOLUTION INTRAVENOUS ONCE
Status: COMPLETED | OUTPATIENT
Start: 2023-04-28 | End: 2023-04-28

## 2023-04-28 RX ADMIN — HEPARIN SODIUM (PORCINE) LOCK FLUSH IV SOLN 100 UNIT/ML 500 UNITS: 100 SOLUTION at 13:12

## 2023-04-28 RX ADMIN — IOPAMIDOL 84 ML: 755 INJECTION, SOLUTION INTRAVASCULAR at 13:11

## 2023-04-28 RX ADMIN — Medication 5 ML: at 16:24

## 2023-04-28 ASSESSMENT — PAIN SCALES - GENERAL: PAINLEVEL: NO PAIN (0)

## 2023-04-28 NOTE — PROGRESS NOTES
[facility]  Date of visit: Apr 28, 2023    Reason for Visit: Follow up triple-hit DLBCL    ONCOLOGIC SUMMARY:  Diagnosis:    Low-grade kappa-restricted plasmacytoid B-cell lymphoma dx 3/2004, presenting with thoracic paraspinal mass. Bone marrow hypercellular with 70-80% involvement by small kappa-restricted lymphocytes which were positive for CD10, CD19, and CD20 and negative for CD5 and CD23.     Transformation to high-grade B-cell lymphoma 12/2022 (versus new primary), presenting with B symptoms, sternal mass, and SOB/chest pain. Biopsy of sternal mass showed large B-cell lymphoma with aggressive features (GCB-subtype), Ki67 %, FISH positive for MYC (non-IgH partner), BCL2, and BCL6 rearrangements. Stage IV with extensive lymphomatous involvement to the R pleura w/avid effusions, mediastinal and pericardial regions, right anterolateral chest wall, adenopathy above and below the diaphragm, liver, spleen and multiple sites in the skeleton. CSF flow negative     Treatment history:  For low-grade lymphoma:  1. 2005: Fludarabine-based chemo x 6 cycles and XRT to spine (details unclear)    For high-grade lymphoma:  1. 12/20/22: Cycle 1 R-CHOP with Neulasta support (with a few days of steroid prephase prior)  2. 1/11/23: Cycle 2 Switched to DA-R-EPOCH with triple IT chemo for CNS ppx after FISH returned showing triple-hit disease. PET after 2 cycles shows very good ID.   3. 2/2/23: Cycle 3 DA-R EPOCH with triple IT chemo for CNS ppx  4. 2/23/23: Cycle 4 DA-R-EPOCH  with triple IT chemo for CNS ppx. PET after 4 cycles shows CRu.   5. 3/17/23: Cycle 5 DA-R-EPOCH with triple IT chemo for CNS ppx  6. 4/6/23: Cycle 6 DA-R-EPOCH. EOT PET shows CR!    INTERVAL HISTORY:  Shahid is here today with his partner Reshma for end-of-treatment follow up. Felt a little more fatigued with Cycle 6 but otherwise no new issues and happy to be done with chemo. Denies any sinus pain/pressure but still has  ongoing runny nose and congestion since initial COVID dx in January. Otherwise no fever/chills, night sweats, lumps/bumps, SOB, chest pain, N/V, abdominal pain, diarrhea, bleeding, or numbness/tingling. Appetite is good. They are planning to go on a road trip starting next week.     ROS: 10 point ROS neg other than the symptoms noted above in the HPI.    Current Outpatient Medications   Medication Sig Dispense Refill     acyclovir (ZOVIRAX) 400 MG tablet Take 1 tablet (400 mg) by mouth every 12 hours 60 tablet 0     allopurinol (ZYLOPRIM) 300 MG tablet Take 1 tablet (300 mg) by mouth daily 60 tablet 3     ascorbic acid (VITAMIN C) 500 MG tablet Take 500 mg by mouth every morning       baclofen (LIORESAL) 10 MG tablet Take 0.5-1 tablets (5-10 mg) by mouth 3 times daily as needed for other (hiccups) 30 tablet 0     fluconazole (DIFLUCAN) 200 MG tablet Take 1 tablet (200 mg) by mouth daily , start at discharge and take until your ANC is greater than 1000. 30 tablet 3     Multiple Vitamins-Minerals (MULTIVITAL PO) Take 1 tablet by mouth every morning       omeprazole (PRILOSEC) 40 MG DR capsule Take 1 capsule (40 mg) by mouth daily 90 capsule 4     ondansetron (ZOFRAN) 8 MG tablet Take 1 tablet (8 mg) by mouth every 8 hours as needed for nausea 60 tablet 3     polyethylene glycol (MIRALAX) 17 GM/Dose powder Take 17 g by mouth daily as needed for constipation 510 g 4     prochlorperazine (COMPAZINE) 10 MG tablet Take 1 tablet (10 mg) by mouth every 6 hours as needed for nausea or vomiting 30 tablet 3     senna-docusate (SENNA S) 8.6-50 MG tablet Take 1 tablet by mouth 2 times daily as needed for constipation 60 tablet 4     dexamethasone (DECADRON) 4 MG tablet Take 2 tablets (8 mg) by mouth daily On 4/12 and 4/13 (Patient not taking: Reported on 4/28/2023) 4 tablet 0     furosemide (LASIX) 20 MG tablet Take 1 tablet (20 mg) by mouth daily as needed (lower extremity swelling) (Patient not taking: Reported on 4/28/2023) 10  tablet 0     levofloxacin (LEVAQUIN) 250 MG tablet Take 1 tablet (250 mg) by mouth daily , start at discharge and take until your ANC is greater than 1000. (Patient not taking: Reported on 4/28/2023) 30 tablet 3     temazepam (RESTORIL) 7.5 MG capsule Take 1 capsule (7.5 mg) by mouth nightly as needed for sleep (Patient not taking: Reported on 4/28/2023) 15 capsule 0       Allergies   Allergen Reactions     Ampicillin [Ampicillin Sodium]      Bactrim [Sulfamethoxazole W/Trimethoprim]      Contrast Dye      CT contrast (IV) allergy - need oral Methylpred as premed for scans     Ampicillin Rash       Physical Exam:  /75   Pulse 105   Temp 98.5  F (36.9  C) (Oral)   Resp 16   SpO2 97%     General: Awake, alert, in no acute distress. Oriented x 3.  HEENT: Normocephalic, atraumatic. No scleral icterus. Mucous membranes moist.  Lymph: No cervical, supraclavicular, or axillary lymphadenopathy.   CV: Regular rate and rhythm. No murmurs, rubs, or gallops appreciated.  Resp: Good inspiratory effort, lungs clear to auscultation bilaterally.  GI: Abdomen soft, nontender, nondistended.   Ext: No peripheral edema bilaterally.  Neuro: CN II-XII grossly intact. Moves all extremities spontaneously.   Skin: No rash, unusual bruising or prominent lesions.  Psych: Pleasant, normal affect.     Labs:   I personally reviewed the following labs:    Most Recent 3 CBC's:  Recent Labs   Lab Test 04/11/23  0710 04/10/23  0510 04/09/23  0750   WBC 3.7* 3.8* 4.6   HGB 7.6* 8.1* 8.2*   * 104* 104*   PLT 97* 114* 126*     Most Recent 3 BMP's:  Recent Labs   Lab Test 04/28/23  1200 04/11/23  0710 04/10/23  0510 04/09/23  0750   NA  --  142 142 142   POTASSIUM  --  3.9 3.8 3.6   CHLORIDE  --  110* 109* 110*   CO2  --  24 23 23   BUN  --  30.5* 26.0* 23.8*   CR  --  0.74 0.70 0.76   ANIONGAP  --  8 10 9   JORGE  --  8.9 8.8 8.8   * 109* 118* 100*     Most Recent 2 LFT's:  Recent Labs   Lab Test 04/11/23  0710 04/10/23  0510    AST 39 26   ALT 69* 37   ALKPHOS 69 75   BILITOTAL 0.3 0.2     4/28/23 PET-CT:  In this patient with history of B cell lymphoma, currently treated  with R-EPOCH, continued complete response by Lugano criteria:  1.  Stable right substernal trace mediastinal soft tissue thickening  without abnormal FDG uptake.  2.  Stable hypoenhancing splenic lesion without significant FDG uptake reflecting treated lesion.  3.  CT findings suggestive for acute maxillary sinusitis.      ASSESSMENT/PLAN:    #H/o low-grade kappa restricted plasmacytoid B-cell lymphoma 3/2004   #Transformation to DLBCL 12/2022  H/o low-grade kappa restricted plasmacytoid B-cell lymphoma 3/2004 treated with x6 cycles of fludarabine based chemo and XRT to spine, then has been on surveillance since 2005. He presented in 11/2022 with progressing B symptoms, sternal mass, and SOB/CP. PET 12/9 showed extensive lymphomatous involvement to the R pleura w/avid effusions, mediastinal and pericardial regions, right anterolateral chest wall, adenopathy above and below the diaphragm, liver, spleen and multiple sites in the skeleton, concerning for transformation from his low-grade lymphoma. 12/15/22 biopsy of sternal mass showed large B-cell lymphoma with aggressive features (GCB-subtype), Ki67 %. 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  - S/p C1 R-CHOP in the hospital 12/20/22. Tolerated well overall.  - Switched to DA-R-EPOCH starting Cycle 2 (1/11/23) after FISH returned showing triple-hit disease. Staging LP negative, gave triple IT chemo once per cycle for CNS ppx for a total of 4 doses.   - PET after 2 cycles showed WA with significant resolution of most FDG activity/disease other than small area of right paramediastinal pleural thickening (SUVmax 5) and resolution of right pleural effusion. Concurrent Clonoseq was negative. PET after 4 cycles showed further improvement with right paramediastinal pleura SUV  down to 3.3, also could potentially be artifact since it is near pacer wires.  - Completed Cycle 6 R-EPOCH 4/6/23. Tolerated chemo well overall, stayed at dose level 1 due to thrombocytopenia.  - EOT PET-CT (with ketogenic diet for 3 days prior to suppress cardiac activity) shows CR! Shared the good news today. Will also obtain repeat Clonoseq MRD testing.   - Will now move to surveillance with H&P/labs every 3-6 mo for 2 years, then yearly for years 3-5. Imaging every 6 months for first 2 years only.   - We did discuss his increased risk of recurrence given transformed and triple-hit disease. If he relapses, would likely consider CAR-T therapy.     #PPx  - TLS: Now done with allopurinol.   - ID: Continue ACV for 6 months post R-EPOCH. No longer needs levofloxacin or fluconazole.      #Hypogammaglobulinemia    on 12/15/22 and 316 on 4/6/23.   - Given persistently low COVID cycle threshold, hypogammaglobulinemia, and risk for further immunosuppression with chemo, gave IVIG on 1/15 and 4/10.    #COVID-19 Infection  He tested positive on his Cycle 2 pre-admission COVID test. He was very surprised, as he does not feel symptomatic.  Notes that his girlfriend had a URI around [place] and was never tested for COVID. Cycle threshold was around 20 indicating active infection.  - S/p remdesivir x 3 days 1/11-1/13/23.  - Remained positive throughout chemo with CT of 31.4 still on 4/6/23 (cycle 6). Gave another dose of IVIG as above.  - Repeat COVID/cycle threshold today.     #Sinusitis  EOT PET-CT suggestive of acute bilateral maxillary sinusitis. He has some ongoing runny nose/congestion.   - Unclear if sx due to allergies, persistent COVID, or sinusitis, but given hypogammaglobinemia and overall infection risk, will treat with doxycycline 100 mg BID x 7 days (tried for Vantin initially but required PA and was $130 OOP).     #GERD  Related to history of radiation to chest.   - Continue  omeprazole.    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 Took uncertain medication for this in the past (~2004), however cannot remember. Melatonin and Trazadone foung to be ineffective.   - Restoril on days he gets steroids is helpful.      PLAN:  - Repeat COVID PCR today to follow up on cycle threshold  - Doxycycline x 7 days for sinusitis  - Labs/port flush in 6 weeks   - RTC to see me in 3 months with labs    Total of 45 minutes on patient visit, reviewing records, interpreting test results, placing orders, and documentation on the day of service.    Domitila Ruelas MD  Attending Physician, [facility]

## 2023-04-28 NOTE — NURSING NOTE
"Oncology Rooming Note    April 28, 2023 4:12 PM   Shahid Davis is a 72 year old male who presents for:    Chief Complaint   Patient presents with     Oncology Clinic Visit     Diffuse large B-cell lymphoma of intrathoracic lymph nodes      Initial Vitals: /75   Pulse 105   Temp 98.5  F (36.9  C) (Oral)   Resp 16   SpO2 97%  Estimated body mass index is 22.48 kg/m  as calculated from the following:    Height as of 4/6/23: 1.702 m (5' 7\").    Weight as of 4/11/23: 65.1 kg (143 lb 8 oz). There is no height or weight on file to calculate BSA.  No Pain (0) Comment: Data Unavailable   No LMP for male patient.  Allergies reviewed: Yes  Medications reviewed: Yes    Medications: Medication refills not needed today.  Pharmacy name entered into [facility]:    [id number] [place] [address] [id number] [address] AT [facility] [address]    Clinical concerns: None      Milton Espitia RN              "

## 2023-04-28 NOTE — LETTER
4/28/2023         RE: Shahid Davis   [facility] [id number]        Dear Colleague,    Thank you for referring your patient, Shahid Davis, to the [facility]. Please see a copy of my visit note below.    [facility]  Date of visit: Apr 28, 2023    Reason for Visit: Follow up triple-hit DLBCL    ONCOLOGIC SUMMARY:  Diagnosis:  Low-grade kappa-restricted plasmacytoid B-cell lymphoma dx 3/2004, presenting with thoracic paraspinal mass. Bone marrow hypercellular with 70-80% involvement by small kappa-restricted lymphocytes which were positive for CD10, CD19, and CD20 and negative for CD5 and CD23.   Transformation to high-grade B-cell lymphoma 12/2022 (versus new primary), presenting with B symptoms, sternal mass, and SOB/chest pain. Biopsy of sternal mass showed large B-cell lymphoma with aggressive features (GCB-subtype), Ki67 %, FISH positive for MYC (non-IgH partner), BCL2, and BCL6 rearrangements. Stage IV with extensive lymphomatous involvement to the R pleura w/avid effusions, mediastinal and pericardial regions, right anterolateral chest wall, adenopathy above and below the diaphragm, liver, spleen and multiple sites in the skeleton. CSF flow negative     Treatment history:  For low-grade lymphoma:  2005: Fludarabine-based chemo x 6 cycles and XRT to spine (details unclear)    For high-grade lymphoma:  12/20/22: Cycle 1 R-CHOP with Neulasta support (with a few days of steroid prephase prior)  1/11/23: Cycle 2 Switched to DA-R-EPOCH with triple IT chemo for CNS ppx after FISH returned showing triple-hit disease. PET after 2 cycles shows very good KS.   2/2/23: Cycle 3 DA-R EPOCH with triple IT chemo for CNS ppx  2/23/23: Cycle 4 DA-R-EPOCH  with triple IT chemo for CNS ppx. PET after 4 cycles shows CRu.   3/17/23: Cycle 5 DA-R-EPOCH with triple IT chemo for CNS ppx  4/6/23: Cycle 6 DA-R-EPOCH. EOT PET shows  CR!    INTERVAL HISTORY:  Shahid is here today with his partner Reshma for end-of-treatment follow up. Felt a little more fatigued with Cycle 6 but otherwise no new issues and happy to be done with chemo. Denies any sinus pain/pressure but still has ongoing runny nose and congestion since initial COVID dx in January. Otherwise no fever/chills, night sweats, lumps/bumps, SOB, chest pain, N/V, abdominal pain, diarrhea, bleeding, or numbness/tingling. Appetite is good. They are planning to go on a road trip starting next week.     ROS: 10 point ROS neg other than the symptoms noted above in the HPI.    Current Outpatient Medications   Medication Sig Dispense Refill    acyclovir (ZOVIRAX) 400 MG tablet Take 1 tablet (400 mg) by mouth every 12 hours 60 tablet 0    allopurinol (ZYLOPRIM) 300 MG tablet Take 1 tablet (300 mg) by mouth daily 60 tablet 3    ascorbic acid (VITAMIN C) 500 MG tablet Take 500 mg by mouth every morning      baclofen (LIORESAL) 10 MG tablet Take 0.5-1 tablets (5-10 mg) by mouth 3 times daily as needed for other (hiccups) 30 tablet 0    fluconazole (DIFLUCAN) 200 MG tablet Take 1 tablet (200 mg) by mouth daily , start at discharge and take until your ANC is greater than 1000. 30 tablet 3    Multiple Vitamins-Minerals (MULTIVITAL PO) Take 1 tablet by mouth every morning      omeprazole (PRILOSEC) 40 MG DR capsule Take 1 capsule (40 mg) by mouth daily 90 capsule 4    ondansetron (ZOFRAN) 8 MG tablet Take 1 tablet (8 mg) by mouth every 8 hours as needed for nausea 60 tablet 3    polyethylene glycol (MIRALAX) 17 GM/Dose powder Take 17 g by mouth daily as needed for constipation 510 g 4    prochlorperazine (COMPAZINE) 10 MG tablet Take 1 tablet (10 mg) by mouth every 6 hours as needed for nausea or vomiting 30 tablet 3    senna-docusate (SENNA S) 8.6-50 MG tablet Take 1 tablet by mouth 2 times daily as needed for constipation 60 tablet 4    dexamethasone (DECADRON) 4 MG tablet Take 2 tablets (8 mg) by mouth  daily On 4/12 and 4/13 (Patient not taking: Reported on 4/28/2023) 4 tablet 0    furosemide (LASIX) 20 MG tablet Take 1 tablet (20 mg) by mouth daily as needed (lower extremity swelling) (Patient not taking: Reported on 4/28/2023) 10 tablet 0    levofloxacin (LEVAQUIN) 250 MG tablet Take 1 tablet (250 mg) by mouth daily , start at discharge and take until your ANC is greater than 1000. (Patient not taking: Reported on 4/28/2023) 30 tablet 3    temazepam (RESTORIL) 7.5 MG capsule Take 1 capsule (7.5 mg) by mouth nightly as needed for sleep (Patient not taking: Reported on 4/28/2023) 15 capsule 0       Allergies   Allergen Reactions    Ampicillin [Ampicillin Sodium]     Bactrim [Sulfamethoxazole W/Trimethoprim]     Contrast Dye      CT contrast (IV) allergy - need oral Methylpred as premed for scans    Ampicillin Rash       Physical Exam:  /75   Pulse 105   Temp 98.5  F (36.9  C) (Oral)   Resp 16   SpO2 97%     General: Awake, alert, in no acute distress. Oriented x 3.  HEENT: Normocephalic, atraumatic. No scleral icterus. Mucous membranes moist.  Lymph: No cervical, supraclavicular, or axillary lymphadenopathy.   CV: Regular rate and rhythm. No murmurs, rubs, or gallops appreciated.  Resp: Good inspiratory effort, lungs clear to auscultation bilaterally.  GI: Abdomen soft, nontender, nondistended.   Ext: No peripheral edema bilaterally.  Neuro: CN II-XII grossly intact. Moves all extremities spontaneously.   Skin: No rash, unusual bruising or prominent lesions.  Psych: Pleasant, normal affect.     Labs:   I personally reviewed the following labs:    Most Recent 3 CBC's:  Recent Labs   Lab Test 04/11/23  0710 04/10/23  0510 04/09/23  0750   WBC 3.7* 3.8* 4.6   HGB 7.6* 8.1* 8.2*   * 104* 104*   PLT 97* 114* 126*     Most Recent 3 BMP's:  Recent Labs   Lab Test 04/28/23  1200 04/11/23  0710 04/10/23  0510 04/09/23  0750   NA  --  142 142 142   POTASSIUM  --  3.9 3.8 3.6   CHLORIDE  --  110* 109* 110*    CO2  --  24 23 23   BUN  --  30.5* 26.0* 23.8*   CR  --  0.74 0.70 0.76   ANIONGAP  --  8 10 9   JORGE  --  8.9 8.8 8.8   * 109* 118* 100*     Most Recent 2 LFT's:  Recent Labs   Lab Test 04/11/23  0710 04/10/23  0510   AST 39 26   ALT 69* 37   ALKPHOS 69 75   BILITOTAL 0.3 0.2     4/28/23 PET-CT:  In this patient with history of B cell lymphoma, currently treated  with R-EPOCH, continued complete response by Lugano criteria:  1.  Stable right substernal trace mediastinal soft tissue thickening  without abnormal FDG uptake.  2.  Stable hypoenhancing splenic lesion without significant FDG uptake reflecting treated lesion.  3.  CT findings suggestive for acute maxillary sinusitis.      ASSESSMENT/PLAN:    #H/o low-grade kappa restricted plasmacytoid B-cell lymphoma 3/2004   #Transformation to DLBCL 12/2022  H/o low-grade kappa restricted plasmacytoid B-cell lymphoma 3/2004 treated with x6 cycles of fludarabine based chemo and XRT to spine, then has been on surveillance since 2005. He presented in 11/2022 with progressing B symptoms, sternal mass, and SOB/CP. PET 12/9 showed extensive lymphomatous involvement to the R pleura w/avid effusions, mediastinal and pericardial regions, right anterolateral chest wall, adenopathy above and below the diaphragm, liver, spleen and multiple sites in the skeleton, concerning for transformation from his low-grade lymphoma. 12/15/22 biopsy of sternal mass showed large B-cell lymphoma with aggressive features (GCB-subtype), Ki67 %. 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  - S/p C1 R-CHOP in the hospital 12/20/22. Tolerated well overall.  - Switched to DA-R-EPOCH starting Cycle 2 (1/11/23) after FISH returned showing triple-hit disease. Staging LP negative, gave triple IT chemo once per cycle for CNS ppx for a total of 4 doses.   - PET after 2 cycles showed PA with significant resolution of most FDG activity/disease other than  small area of right paramediastinal pleural thickening (SUVmax 5) and resolution of right pleural effusion. Concurrent Clonoseq was negative. PET after 4 cycles showed further improvement with right paramediastinal pleura SUV down to 3.3, also could potentially be artifact since it is near pacer wires.  - Completed Cycle 6 R-EPOCH 4/6/23. Tolerated chemo well overall, stayed at dose level 1 due to thrombocytopenia.  - EOT PET-CT (with ketogenic diet for 3 days prior to suppress cardiac activity) shows CR! Shared the good news today. Will also obtain repeat Clonoseq MRD testing.   - Will now move to surveillance with H&P/labs every 3-6 mo for 2 years, then yearly for years 3-5. Imaging every 6 months for first 2 years only.   - We did discuss his increased risk of recurrence given transformed and triple-hit disease. If he relapses, would likely consider CAR-T therapy.     #PPx  - TLS: Now done with allopurinol.   - ID: Continue ACV for 6 months post R-EPOCH. No longer needs levofloxacin or fluconazole.      #Hypogammaglobulinemia    on 12/15/22 and 316 on 4/6/23.   - Given persistently low COVID cycle threshold, hypogammaglobulinemia, and risk for further immunosuppression with chemo, gave IVIG on 1/15 and 4/10.    #COVID-19 Infection  He tested positive on his Cycle 2 pre-admission COVID test. He was very surprised, as he does not feel symptomatic.  Notes that his girlfriend had a URI around [place] and was never tested for COVID. Cycle threshold was around 20 indicating active infection.  - S/p remdesivir x 3 days 1/11-1/13/23.  - Remained positive throughout chemo with CT of 31.4 still on 4/6/23 (cycle 6). Gave another dose of IVIG as above.  - Repeat COVID/cycle threshold today.     #Sinusitis  EOT PET-CT suggestive of acute bilateral maxillary sinusitis. He has some ongoing runny nose/congestion.   - Unclear if sx due to allergies, persistent COVID, or sinusitis, but given hypogammaglobinemia and  overall infection risk, will treat with doxycycline 100 mg BID x 7 days (tried for Vantin initially but required PA and was $130 OOP).     #GERD  Related to history of radiation to chest.   - Continue omeprazole.    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 Took uncertain medication for this in the past (~2004), however cannot remember. Melatonin and Trazadone foung to be ineffective.   - Restoril on days he gets steroids is helpful.      PLAN:  - Repeat COVID PCR today to follow up on cycle threshold  - Doxycycline x 7 days for sinusitis  - Labs/port flush in 6 weeks   - RTC to see me in 3 months with labs    Total of 45 minutes on patient visit, reviewing records, interpreting test results, placing orders, and documentation on the day of service.    Domitila Ruelas MD  Attending Physician, [facility]

## 2023-04-28 NOTE — NURSING NOTE
Labs drawn in clinic by LPN via venipuncture in the right arm with a 21 G.    Patient tolerated well and had no issues.    Tyrel Arango LPN

## 2023-04-29 ENCOUNTER — TELEPHONE (OUTPATIENT)
Dept: NURSING | Facility: CLINIC | Age: 73
End: 2023-04-29
Payer: MEDICARE

## 2023-04-29 LAB — SARS-COV-2 RNA RESP QL NAA+PROBE: POSITIVE

## 2023-04-29 NOTE — TELEPHONE ENCOUNTER
Patient classified as COVID treatment eligible by Epic high risk algorithm:  Yes    Coronavirus (COVID-19) Notification    Reason for call  Notify of POSITIVE COVID-19 lab result, assess symptoms,  review [facility] recommendations    Lab Result   Lab test for 2019-nCoV rRt-PCR or SARS-COV-2 PCR  Oropharyngeal AND/OR nasopharyngeal swabs were POSITIVE for 2019-nCoV RNA [OR] SARS-COV-2 RNA (COVID-19) RNA     We have been unable to reach patient by phone at this time to notify of their Positive COVID-19 result.    Left voicemail message requesting a call back to [phone] [facility] for results.        A Positive COVID-19 letter will be sent via [employer] or the mail.    Danielle Aguirre

## 2023-05-08 LAB — SCANNED LAB RESULT: NORMAL

## 2023-06-07 ENCOUNTER — LAB (OUTPATIENT)
Dept: LAB | Facility: CLINIC | Age: 73
End: 2023-06-07
Attending: INTERNAL MEDICINE
Payer: MEDICARE

## 2023-06-07 DIAGNOSIS — U07.1 INFECTION DUE TO 2019 NOVEL CORONAVIRUS: ICD-10-CM

## 2023-06-07 DIAGNOSIS — C83.38 DIFFUSE LARGE B-CELL LYMPHOMA OF LYMPH NODES OF MULTIPLE REGIONS (H): Primary | ICD-10-CM

## 2023-06-07 DIAGNOSIS — C83.32 DIFFUSE LARGE B-CELL LYMPHOMA OF INTRATHORACIC LYMPH NODES (H): Primary | ICD-10-CM

## 2023-06-07 DIAGNOSIS — C83.38 DIFFUSE LARGE B-CELL LYMPHOMA OF LYMPH NODES OF MULTIPLE REGIONS (H): ICD-10-CM

## 2023-06-07 PROCEDURE — 250N000011 HC RX IP 250 OP 636: Performed by: INTERNAL MEDICINE

## 2023-06-07 PROCEDURE — 87635 SARS-COV-2 COVID-19 AMP PRB: CPT

## 2023-06-07 RX ORDER — HEPARIN SODIUM (PORCINE) LOCK FLUSH IV SOLN 100 UNIT/ML 100 UNIT/ML
500 SOLUTION INTRAVENOUS ONCE
Status: COMPLETED | OUTPATIENT
Start: 2023-06-07 | End: 2023-06-07

## 2023-06-07 RX ADMIN — Medication 500 UNITS: at 13:48

## 2023-06-07 NOTE — NURSING NOTE
Chief Complaint   Patient presents with     Port Flush     Port flushed with saline and heparin by RN then de-accessed.        Port flushed with saline and heparin by RN then de-accessed.    Rosetta Shah RN

## 2023-06-08 LAB — SARS-COV-2 RNA RESP QL NAA+PROBE: NEGATIVE

## 2023-06-13 DIAGNOSIS — C82.99 NODULAR LYMPHOMA OF EXTRANODAL AND/OR SOLID ORGAN SITE (H): ICD-10-CM

## 2023-06-13 RX ORDER — OMEPRAZOLE 40 MG/1
40 CAPSULE, DELAYED RELEASE ORAL DAILY
Qty: 90 CAPSULE | Refills: 4 | Status: SHIPPED | OUTPATIENT
Start: 2023-06-13 | End: 2024-06-19

## 2023-07-11 DIAGNOSIS — Z00.00 PREVENTATIVE HEALTH CARE: ICD-10-CM

## 2023-07-11 DIAGNOSIS — I52 OTHER HEART DISORDERS IN DISEASES CLASSIFIED ELSEWHERE: ICD-10-CM

## 2023-07-11 DIAGNOSIS — I52 OTHER HEART DISORDERS IN DISEASES CLASSIFIED ELSEWHERE: Primary | ICD-10-CM

## 2023-07-11 DIAGNOSIS — Z13.6 CARDIOVASCULAR SCREENING; LDL GOAL LESS THAN 100: Primary | ICD-10-CM

## 2023-07-13 ENCOUNTER — LAB (OUTPATIENT)
Dept: LAB | Facility: CLINIC | Age: 73
End: 2023-07-13
Payer: MEDICARE

## 2023-07-13 ENCOUNTER — ANCILLARY PROCEDURE (OUTPATIENT)
Dept: CARDIOLOGY | Facility: CLINIC | Age: 73
End: 2023-07-13
Attending: INTERNAL MEDICINE
Payer: MEDICARE

## 2023-07-13 VITALS
HEART RATE: 77 BPM | WEIGHT: 138 LBS | SYSTOLIC BLOOD PRESSURE: 120 MMHG | DIASTOLIC BLOOD PRESSURE: 70 MMHG | OXYGEN SATURATION: 97 % | BODY MASS INDEX: 21.61 KG/M2

## 2023-07-13 DIAGNOSIS — Z95.0 PACEMAKER: ICD-10-CM

## 2023-07-13 DIAGNOSIS — Z13.6 CARDIOVASCULAR SCREENING; LDL GOAL LESS THAN 100: ICD-10-CM

## 2023-07-13 DIAGNOSIS — C83.32 DIFFUSE LARGE B-CELL LYMPHOMA OF INTRATHORACIC LYMPH NODES (H): ICD-10-CM

## 2023-07-13 DIAGNOSIS — I44.2 COMPLETE ATRIOVENTRICULAR BLOCK (H): ICD-10-CM

## 2023-07-13 DIAGNOSIS — Z01.818 PREOP EXAMINATION: ICD-10-CM

## 2023-07-13 DIAGNOSIS — C82.99 NODULAR LYMPHOMA OF EXTRANODAL AND/OR SOLID ORGAN SITE (H): ICD-10-CM

## 2023-07-13 DIAGNOSIS — Z00.00 PREVENTATIVE HEALTH CARE: ICD-10-CM

## 2023-07-13 DIAGNOSIS — I52 OTHER HEART DISORDERS IN DISEASES CLASSIFIED ELSEWHERE: ICD-10-CM

## 2023-07-13 DIAGNOSIS — I44.2 ATRIOVENTRICULAR BLOCK, COMPLETE (H): ICD-10-CM

## 2023-07-13 LAB
ALBUMIN SERPL BCG-MCNC: 4.3 G/DL (ref 3.5–5.2)
ALP SERPL-CCNC: 122 U/L (ref 40–129)
ALT SERPL W P-5'-P-CCNC: 37 U/L (ref 0–70)
ANION GAP SERPL CALCULATED.3IONS-SCNC: 7 MMOL/L (ref 7–15)
AST SERPL W P-5'-P-CCNC: 28 U/L (ref 0–45)
BASOPHILS # BLD MANUAL: 0.1 10E3/UL (ref 0–0.2)
BASOPHILS NFR BLD MANUAL: 3 %
BILIRUB SERPL-MCNC: 0.2 MG/DL
BUN SERPL-MCNC: 29 MG/DL (ref 8–23)
CALCIUM SERPL-MCNC: 9.5 MG/DL (ref 8.8–10.2)
CHLORIDE SERPL-SCNC: 106 MMOL/L (ref 98–107)
CHOLEST SERPL-MCNC: 186 MG/DL
CREAT SERPL-MCNC: 0.98 MG/DL (ref 0.67–1.17)
DEPRECATED HCO3 PLAS-SCNC: 28 MMOL/L (ref 22–29)
EOSINOPHIL # BLD MANUAL: 0.1 10E3/UL (ref 0–0.7)
EOSINOPHIL NFR BLD MANUAL: 3 %
ERYTHROCYTE [DISTWIDTH] IN BLOOD BY AUTOMATED COUNT: 11.7 % (ref 10–15)
GFR SERPL CREATININE-BSD FRML MDRD: 81 ML/MIN/1.73M2
GLUCOSE SERPL-MCNC: 96 MG/DL (ref 70–99)
HCT VFR BLD AUTO: 34.2 % (ref 40–53)
HDLC SERPL-MCNC: 70 MG/DL
HGB BLD-MCNC: 11.2 G/DL (ref 13.3–17.7)
LDLC SERPL CALC-MCNC: 93 MG/DL
LDLC SERPL DIRECT ASSAY-MCNC: 103 MG/DL
LVEF ECHO: NORMAL
LYMPHOCYTES # BLD MANUAL: 0.9 10E3/UL (ref 0.8–5.3)
LYMPHOCYTES NFR BLD MANUAL: 39 %
MCH RBC QN AUTO: 30.8 PG (ref 26.5–33)
MCHC RBC AUTO-ENTMCNC: 32.7 G/DL (ref 31.5–36.5)
MCV RBC AUTO: 94 FL (ref 78–100)
MONOCYTES # BLD MANUAL: 0.4 10E3/UL (ref 0–1.3)
MONOCYTES NFR BLD MANUAL: 17 %
NEUTROPHILS # BLD MANUAL: 0.8 10E3/UL (ref 1.6–8.3)
NEUTROPHILS NFR BLD MANUAL: 38 %
NONHDLC SERPL-MCNC: 116 MG/DL
PLAT MORPH BLD: ABNORMAL
PLATELET # BLD AUTO: 148 10E3/UL (ref 150–450)
POTASSIUM SERPL-SCNC: 4.8 MMOL/L (ref 3.4–5.3)
PROT SERPL-MCNC: 6.2 G/DL (ref 6.4–8.3)
RBC # BLD AUTO: 3.64 10E6/UL (ref 4.4–5.9)
RBC MORPH BLD: ABNORMAL
SODIUM SERPL-SCNC: 141 MMOL/L (ref 136–145)
TRIGL SERPL-MCNC: 113 MG/DL
WBC # BLD AUTO: 2.2 10E3/UL (ref 4–11)

## 2023-07-13 PROCEDURE — 99000 SPECIMEN HANDLING OFFICE-LAB: CPT | Performed by: PATHOLOGY

## 2023-07-13 PROCEDURE — 85007 BL SMEAR W/DIFF WBC COUNT: CPT | Performed by: PATHOLOGY

## 2023-07-13 PROCEDURE — 80053 COMPREHEN METABOLIC PANEL: CPT | Performed by: PATHOLOGY

## 2023-07-13 PROCEDURE — 99214 OFFICE O/P EST MOD 30 MIN: CPT | Mod: 25 | Performed by: INTERNAL MEDICINE

## 2023-07-13 PROCEDURE — 83615 LACTATE (LD) (LDH) ENZYME: CPT | Performed by: PATHOLOGY

## 2023-07-13 PROCEDURE — 999N000128 HC STATISTIC PERIPHERAL IV START W/O US GUIDANCE

## 2023-07-13 PROCEDURE — 36415 COLL VENOUS BLD VENIPUNCTURE: CPT | Performed by: PATHOLOGY

## 2023-07-13 PROCEDURE — 99207 PR STATISTIC IV PUSH SINGLE INITIAL SUBSTANCE: CPT | Performed by: STUDENT IN AN ORGANIZED HEALTH CARE EDUCATION/TRAINING PROGRAM

## 2023-07-13 PROCEDURE — 93280 PM DEVICE PROGR EVAL DUAL: CPT | Performed by: INTERNAL MEDICINE

## 2023-07-13 PROCEDURE — 85027 COMPLETE CBC AUTOMATED: CPT | Performed by: PATHOLOGY

## 2023-07-13 PROCEDURE — 80061 LIPID PANEL: CPT | Performed by: PATHOLOGY

## 2023-07-13 PROCEDURE — G0463 HOSPITAL OUTPT CLINIC VISIT: HCPCS | Performed by: INTERNAL MEDICINE

## 2023-07-13 PROCEDURE — 83721 ASSAY OF BLOOD LIPOPROTEIN: CPT | Performed by: INTERNAL MEDICINE

## 2023-07-13 PROCEDURE — 93306 TTE W/DOPPLER COMPLETE: CPT | Performed by: STUDENT IN AN ORGANIZED HEALTH CARE EDUCATION/TRAINING PROGRAM

## 2023-07-13 RX ADMIN — Medication 6 ML: at 16:52

## 2023-07-13 ASSESSMENT — PAIN SCALES - GENERAL: PAINLEVEL: NO PAIN (0)

## 2023-07-13 NOTE — NURSING NOTE
July 13, 2023      Medication Changes: None      Follow Up: With Dr. Fox in 1 year with fasting labs prior to visit      Patient verbalized understanding of all health information given and agreed to call with further questions or concerns.      Jana Solis RN

## 2023-07-13 NOTE — PATIENT INSTRUCTIONS
July 13, 2023    Cardiology Provider You Saw During Your Visit: Dr. Monae      Medication Changes: None      Follow Up: With Dr. Fox in 1 year with fasting labs prior to visit      Follow the American Heart Association Diet and Lifestyle Recommendations:  -Limit saturated fat, trans fat, sodium, red meat, sweets and sugar-sweetened beverages. If you choose to eat red meat, compare labels and select the leanest cuts available.  -Aim for at least 150 minutes of moderate physical activity or 75 minutes of vigorous physical activity - or an equal combination of both - each week.      To Reach Us:  -During business hours: [phone], press option # 1 to schedule an appointment or to leave a message for your care team.     -After hours, weekends or holidays: [phone], press option #4 and ask to speak to the on-call cardiologist. Inform them you are a patient at the [facility].        **If you have a cardiac device, please make sure you schedule an in-person device check just prior to your cardiology provider appointments**        Jana Solis RN  Cardiology Care Coordinator - General Cardiology  [facility]

## 2023-07-13 NOTE — PATIENT INSTRUCTIONS
It was a pleasure to see you in clinic today. Please do not hesitate to call with any questions or concerns.    LIT Vanegas, RN  Electrophysiology Nurse Clinician  [facility]  During business hours call:  [phone]  Urgent needs after hours- please call: [phone]- select option #4 and ask for job code [id number].

## 2023-07-13 NOTE — LETTER
7/13/2023      RE: Shahid Davis   [facility] [id number]       Dear Colleague,    Thank you for the opportunity to participate in the care of your patient, Shahid Davis, at the [facility] [place] at [facility]. Please see a copy of my visit note below.    HPI:     I had the privilege to evaluate and examine Mr Shahid Davis, who is a 72 yr male patient, with a Hx  for card 70 year old man with history of Lymphoplasmacytic lymphoma.   He has been doing well on observation with his disease remaining quiescent for approximately the past 17 year.    In February 2020, he developed fainting spells in February this year. He was evaluated in [facility] and they found to have complete heart block while in [place] ([facility]). [[employer] 2272 dual chamber permanent pacemaker Device type].  He developed a small L pneumothorax due to procedure, resolved after 3 days in hospital.    Patient feels relatively well. He denies chest pain, shortness of breath, palpitations and intermittent claudication.      PAST MEDICAL HISTORY:  Past Medical History:   Diagnosis Date     Cardiac pacemaker in situ      Cardiac pacemaker in situ     2020     Lymphoma (H)      Squamous cell carcinoma        CURRENT MEDICATIONS:  Current Outpatient Medications   Medication Sig Dispense Refill     acyclovir (ZOVIRAX) 400 MG tablet Take 1 tablet (400 mg) by mouth every 12 hours 60 tablet 0     ascorbic acid (VITAMIN C) 500 MG tablet Take 500 mg by mouth every morning       baclofen (LIORESAL) 10 MG tablet Take 0.5-1 tablets (5-10 mg) by mouth 3 times daily as needed for other (hiccups) 30 tablet 0     Multiple Vitamins-Minerals (MULTIVITAL PO) Take 1 tablet by mouth every morning       omeprazole (PRILOSEC) 40 MG DR capsule Take 1 capsule (40 mg) by mouth daily 90 capsule 4     ondansetron (ZOFRAN) 8 MG tablet  Take 1 tablet (8 mg) by mouth every 8 hours as needed for nausea 60 tablet 3     polyethylene glycol (MIRALAX) 17 GM/Dose powder Take 17 g by mouth daily as needed for constipation 510 g 4     prochlorperazine (COMPAZINE) 10 MG tablet Take 1 tablet (10 mg) by mouth every 6 hours as needed for nausea or vomiting 30 tablet 3     senna-docusate (SENNA S) 8.6-50 MG tablet Take 1 tablet by mouth 2 times daily as needed for constipation 60 tablet 4       PAST SURGICAL HISTORY:  Past Surgical History:   Procedure Laterality Date     DAVINCI HERNIORRHAPHY INGUINAL Left 07/09/2021    Procedure: HERNIORRHAPHY, INGUINAL, ROBOT-ASSISTED, Left, Mesh;  Surgeon: Shamar Tyler MD;  Location: [facility] OR     IR CHEST PORT PLACEMENT > 5 YRS OF AGE  1/5/2023     IR SOFT TISSUE BIOPSY  12/15/2022     MOHS MICROGRAPHIC PROCEDURE       NO HISTORY OF SURGERY         ALLERGIES     Allergies   Allergen Reactions     Ampicillin [Ampicillin Sodium]      Bactrim [Sulfamethoxazole W/Trimethoprim]      Contrast Dye      CT contrast (IV) allergy - need oral Methylpred as premed for scans     Ampicillin Rash       FAMILY HISTORY:  Family History   Problem Relation Age of Onset     Cancer Mother         Blood     Cancer Father         Lung     Cancer Maternal Grandmother         Breast     Cancer Paternal Grandfather         Hodgkins       SOCIAL HISTORY:  Social History     Socioeconomic History     Marital status: Single     Spouse name: None     Number of children: None     Years of education: None     Highest education level: None   Tobacco Use     Smoking status: Former     Smokeless tobacco: Never     Tobacco comments:     Quit at age 19   Substance and Sexual Activity     Alcohol use: Yes     Alcohol/week: 11.7 standard drinks of alcohol     Types: 14 drink(s) per week       ROS:   Constitutional: No fever, chills, or sweats. No weight gain/loss   ENT: No visual disturbance, ear ache, epistaxis, sore throat  Allergies/Immunologic:  Negative.   Respiratory: No cough, hemoptysia  Cardiovascular: As per HPI  GI: No nausea, vomiting, hematemesis, melena, or hematochezia  : No urinary frequency, dysuria, or hematuria  Integument: Negative  Psychiatric: Negative  Neuro: Negative  Endocrinology: Negative   Musculoskeletal: Negative    EXAM:  /70 (BP Location: Right arm, Patient Position: Sitting, Cuff Size: Adult Regular)   Pulse 77   Wt 62.6 kg (138 lb)   SpO2 97%   BMI 21.61 kg/m    In general, the patient is a pleasant male in no apparent distress.    HEENT: NC/AT.  PERRLA.  EOMI.  Sclerae white, not injected.  Nares clear.  Pharynx without erythema or exudate.  Dentition intact.    Neck: No adenopathy.  No thyromegaly. Carotids +4/4 bilaterally without bruits.  No jugular venous distension.   Heart: RRR. Normal S1, S2 splits physiologically. No murmur, rub, click, or gallop. The PMI is in the 5th ICS in the midclavicular line. There is no heave.    Lungs: CTA.  No ronchi, wheezes, rales.  No dullness to percussion.   Abdomen: Soft, nontender, nondistended. No organomegaly.  No bruits.   Extremities: No clubbing, cyanosis, or edema.  The pulses are +4/4 at the radial, brachial, femoral, popliteal, DP, and PT sites bilaterally.  No bruits are noted.  Neurologic: Alert and oriented to person/place/time, normal speech, gait and affect  Skin: No petechiae, purpura or rash.    Labs:  LIPID RESULTS:  Lab Results   Component Value Date    CHOL 186 07/13/2023    HDL 70 07/13/2023    LDL 93 07/13/2023    TRIG 113 07/13/2023    NHDL 116 07/13/2023       LIVER ENZYME RESULTS:  Lab Results   Component Value Date    AST 28 07/13/2023    AST 24 05/13/2021    ALT 37 07/13/2023    ALT 37 05/13/2021       CBC RESULTS:  Lab Results   Component Value Date    WBC 2.2 (L) 07/13/2023    WBC 3.0 (L) 07/01/2021    RBC 3.64 (L) 07/13/2023    RBC 3.95 (L) 07/01/2021    HGB 11.2 (L) 07/13/2023    HGB 12.7 (L) 07/01/2021    HCT 34.2 (L) 07/13/2023    HCT 36.9 (L)  07/01/2021    MCV 94 07/13/2023    MCV 93 07/01/2021    MCH 30.8 07/13/2023    MCH 32.2 07/01/2021    MCHC 32.7 07/13/2023    MCHC 34.4 07/01/2021    RDW 11.7 07/13/2023    RDW 12.5 07/01/2021     (L) 07/13/2023    PLT 91 (L) 07/01/2021       BMP RESULTS:  Lab Results   Component Value Date     07/13/2023     07/01/2021    POTASSIUM 4.8 07/13/2023    POTASSIUM 4.3 06/02/2022    POTASSIUM 4.9 07/01/2021    CHLORIDE 106 07/13/2023    CHLORIDE 106 02/08/2023    CHLORIDE 108 07/01/2021    CO2 28 07/13/2023    CO2 27 06/02/2022    CO2 27 07/01/2021    ANIONGAP 7 07/13/2023    ANIONGAP 8 06/02/2022    ANIONGAP 5 07/01/2021    GLC 96 07/13/2023     (A) 04/28/2023    BUN 29.0 (H) 07/13/2023    BUN 35 (H) 06/02/2022    BUN 29 07/01/2021    CR 0.98 07/13/2023    CR 0.92 07/01/2021    GFRESTIMATED 81 07/13/2023    GFRESTIMATED 83 07/01/2021    GFRESTBLACK >90 07/01/2021    JORGE 9.5 07/13/2023    JORGE 9.2 07/01/2021          INR RESULTS:  Lab Results   Component Value Date    INR 1.08 04/11/2023    INR 1.10 04/10/2023       Procedures:    Echocardiogram 07-13-23    Left ventricular function is normal.The ejection fraction is 55-60%. There is  mild hyookinesis of the apical septal and apical inferior segments.  Global right ventricular function is normal. The right ventricle is normal  size.  No significant valvular abnormalities.  The estimated PA systolic pressure is 24 mmHg.  IVC diameter <2.1 cm collapsing >50% with sniff suggests a normal RA pressure  of 3 mmHg.  This study was compared with the study from 12/15/2022. The wall motion  abnormalities appear in some views from the prior study, but they are better  delineated in today's evaluation due to the use of contrast.  ______________________________________________________________________________  Left Ventricle  Left ventricular function is normal.The ejection fraction is 55-60%. Left  ventricular wall thickness is normal. Left ventricular size  is normal. Left  ventricular diastolic function is normal. There is mild hyookinesis of the  apical septal and apical inferior segments.     Right Ventricle  Global right ventricular function is normal. The right ventricle is normal  size. A pacemaker lead is noted in the right ventricle.     Atria  Both atria appear normal.     Mitral Valve  Mild mitral insufficiency is present. The calculated EROA is 0.08 cm2 and the  regurgitant volume is 11 mL.     Aortic Valve  The aortic valve is tricuspid. Mild aortic valve calcification is present.  Trace aortic insufficiency is present.     Tricuspid Valve  Mild tricuspid insufficiency is present. The right ventricular systolic  pressure is approximated at 20.8 mmHg plus the right atrial pressure.     Pulmonic Valve  The valve leaflets are not well visualized. Trace pulmonic insufficiency is  present.     Vessels  Sinuses of Valsalva 3.4 cm. Ascending aorta 3.3 cm. IVC diameter <2.1 cm  collapsing >50% with sniff suggests a normal RA pressure of 3 mmHg.     Pericardium  No pericardial effusion is present.     Compared to Previous Study  This study was compared with the study from 12/15/2022 . The wall motion  abnormalities appear in some views from the prior study, but they are better  delineated in today's evaluation due to the use of contrast.  ______________________________________________________________________________  MMode/2D Measurements & Calculations  IVSd: 0.81 cm     LVIDd: 4.2 cm  LVIDs: 2.8 cm  LVPWd: 0.83 cm  FS: 34.0 %  LV mass(C)d: 105.6 grams  LV mass(C)dI: 60.2 grams/m2  Ao root diam: 3.4 cm  asc Aorta Diam: 3.3 cm  LVOT diam: 2.0 cm  LVOT area: 3.2 cm2  LA Volume (BP): 41.5 ml  LA Volume Index (BP): 23.7 ml/m2  RWT: 0.39  TAPSE: 2.7 cm     Doppler Measurements & Calculations  MV E max jose luis: 61.3 cm/sec  MV A max jose luis: 107.5 cm/sec  MV E/A: 0.57  MV dec slope: 809.4 cm/sec2  MV dec time: 0.08 sec  Ao V2 max: 107.2 cm/sec  Ao max P.6 mmHg  Ao V2 mean:  83.0 cm/sec  Ao mean PG: 3.0 mmHg  Ao V2 VTI: 22.8 cm  JEREMIAH(I,D): 2.2 cm2  JEREMIAH(V,D): 2.4 cm2  AI P1/2t: 2359 msec  LV V1 max P.5 mmHg  LV V1 max: 79.3 cm/sec  LV V1 VTI: 15.7 cm  MR PISA: 0.79 cm2  MR ERO: 0.06 cm2  MR volume: 8.1 ml  SV(LVOT): 50.5 ml  SI(LVOT): 28.8 ml/m2  PA V2 max: 97.7 cm/sec  PA max PG: 3.8 mmHg  PA acc time: 0.11 sec  TR max leif: 228.0 cm/sec  TR max P.8 mmHg  AV Leif Ratio (DI): 0.74  JEREMIAH Index (cm2/m2): 1.3  E/E' av.2  Lateral E/e': 8.7  Medial E/e': 9.7     RV S Leif: 14.8 cm/sec    Assessment and Plan:     We discussed the results with patient.  We discussed following items:    Medication Changes: None        Follow Up: With Dr. Fox in 1 year with fasting labs prior to visit        Follow the American Heart Association Diet and Lifestyle Recommendations:  -Limit saturated fat, trans fat, sodium, red meat, sweets and sugar-sweetened beverages. If you choose to eat red meat, compare labels and select the leanest cuts available.    Elroy Fox MD, PhD  Professor of Medicine  Division of Cardiology      CC  Patient Care Team:  System, Provider Not In as PCP - General (Clinic)  Elroy Fox MD as Assigned Heart and Vascular Provider  Jana Castellano NP RN as Specialty Care Coordinator (Cardiology)  Domitila Ruelas MD as MD (Hematology & Oncology)  Alton Lainez, GURPREET as Specialty Care Coordinator (Hematology & Oncology)  Domitila Ruelas MD as Assigned Cancer Care Provider  Melida Hines APRN CNP as Assigned Surgical Provider  ELROY FOX      Please do not hesitate to contact me if you have any questions/concerns.     Sincerely,     Elroy Fox MD

## 2023-07-13 NOTE — PROGRESS NOTES
HPI:     I had the privilege to evaluate and examine Mr Shahid Davis, who is a 72 yr male patient, with a Hx  for card 70 year old man with history of Lymphoplasmacytic lymphoma.   He has been doing well on observation with his disease remaining quiescent for approximately the past 17 year.    In February 2020, he developed fainting spells in February this year. He was evaluated in [facility] [place] and they found to have complete heart block while in [place] ([facility]). [[employer] 2272 dual chamber permanent pacemaker Device type].  He developed a small L pneumothorax due to procedure, resolved after 3 days in hospital.    Patient feels relatively well. He denies chest pain, shortness of breath, palpitations and intermittent claudication.      PAST MEDICAL HISTORY:  Past Medical History:   Diagnosis Date     Cardiac pacemaker in situ      Cardiac pacemaker in situ     2020     Lymphoma (H)      Squamous cell carcinoma        CURRENT MEDICATIONS:  Current Outpatient Medications   Medication Sig Dispense Refill     acyclovir (ZOVIRAX) 400 MG tablet Take 1 tablet (400 mg) by mouth every 12 hours 60 tablet 0     ascorbic acid (VITAMIN C) 500 MG tablet Take 500 mg by mouth every morning       baclofen (LIORESAL) 10 MG tablet Take 0.5-1 tablets (5-10 mg) by mouth 3 times daily as needed for other (hiccups) 30 tablet 0     Multiple Vitamins-Minerals (MULTIVITAL PO) Take 1 tablet by mouth every morning       omeprazole (PRILOSEC) 40 MG DR capsule Take 1 capsule (40 mg) by mouth daily 90 capsule 4     ondansetron (ZOFRAN) 8 MG tablet Take 1 tablet (8 mg) by mouth every 8 hours as needed for nausea 60 tablet 3     polyethylene glycol (MIRALAX) 17 GM/Dose powder Take 17 g by mouth daily as needed for constipation 510 g 4     prochlorperazine (COMPAZINE) 10 MG tablet Take 1 tablet (10 mg) by mouth every 6 hours as needed for nausea or vomiting 30 tablet 3     senna-docusate (SENNA  S) 8.6-50 MG tablet Take 1 tablet by mouth 2 times daily as needed for constipation 60 tablet 4       PAST SURGICAL HISTORY:  Past Surgical History:   Procedure Laterality Date     DAVINCI HERNIORRHAPHY INGUINAL Left 07/09/2021    Procedure: HERNIORRHAPHY, INGUINAL, ROBOT-ASSISTED, Left, Mesh;  Surgeon: Shamar Tyler MD;  Location: [facility] OR     IR CHEST PORT PLACEMENT > 5 YRS OF AGE  1/5/2023     IR SOFT TISSUE BIOPSY  12/15/2022     MOHS MICROGRAPHIC PROCEDURE       NO HISTORY OF SURGERY         ALLERGIES     Allergies   Allergen Reactions     Ampicillin [Ampicillin Sodium]      Bactrim [Sulfamethoxazole W/Trimethoprim]      Contrast Dye      CT contrast (IV) allergy - need oral Methylpred as premed for scans     Ampicillin Rash       FAMILY HISTORY:  Family History   Problem Relation Age of Onset     Cancer Mother         Blood     Cancer Father         Lung     Cancer Maternal Grandmother         Breast     Cancer Paternal Grandfather         Hodgkins       SOCIAL HISTORY:  Social History     Socioeconomic History     Marital status: Single     Spouse name: None     Number of children: None     Years of education: None     Highest education level: None   Tobacco Use     Smoking status: Former     Smokeless tobacco: Never     Tobacco comments:     Quit at age 19   Substance and Sexual Activity     Alcohol use: Yes     Alcohol/week: 11.7 standard drinks of alcohol     Types: 14 drink(s) per week       ROS:   Constitutional: No fever, chills, or sweats. No weight gain/loss   ENT: No visual disturbance, ear ache, epistaxis, sore throat  Allergies/Immunologic: Negative.   Respiratory: No cough, hemoptysia  Cardiovascular: As per HPI  GI: No nausea, vomiting, hematemesis, melena, or hematochezia  : No urinary frequency, dysuria, or hematuria  Integument: Negative  Psychiatric: Negative  Neuro: Negative  Endocrinology: Negative   Musculoskeletal: Negative    EXAM:  /70 (BP Location: Right arm, Patient  Position: Sitting, Cuff Size: Adult Regular)   Pulse 77   Wt 62.6 kg (138 lb)   SpO2 97%   BMI 21.61 kg/m    In general, the patient is a pleasant male in no apparent distress.    HEENT: NC/AT.  PERRLA.  EOMI.  Sclerae white, not injected.  Nares clear.  Pharynx without erythema or exudate.  Dentition intact.    Neck: No adenopathy.  No thyromegaly. Carotids +4/4 bilaterally without bruits.  No jugular venous distension.   Heart: RRR. Normal S1, S2 splits physiologically. No murmur, rub, click, or gallop. The PMI is in the 5th ICS in the midclavicular line. There is no heave.    Lungs: CTA.  No ronchi, wheezes, rales.  No dullness to percussion.   Abdomen: Soft, nontender, nondistended. No organomegaly.  No bruits.   Extremities: No clubbing, cyanosis, or edema.  The pulses are +4/4 at the radial, brachial, femoral, popliteal, DP, and PT sites bilaterally.  No bruits are noted.  Neurologic: Alert and oriented to person/place/time, normal speech, gait and affect  Skin: No petechiae, purpura or rash.    Labs:  LIPID RESULTS:  Lab Results   Component Value Date    CHOL 186 07/13/2023    HDL 70 07/13/2023    LDL 93 07/13/2023    TRIG 113 07/13/2023    NHDL 116 07/13/2023       LIVER ENZYME RESULTS:  Lab Results   Component Value Date    AST 28 07/13/2023    AST 24 05/13/2021    ALT 37 07/13/2023    ALT 37 05/13/2021       CBC RESULTS:  Lab Results   Component Value Date    WBC 2.2 (L) 07/13/2023    WBC 3.0 (L) 07/01/2021    RBC 3.64 (L) 07/13/2023    RBC 3.95 (L) 07/01/2021    HGB 11.2 (L) 07/13/2023    HGB 12.7 (L) 07/01/2021    HCT 34.2 (L) 07/13/2023    HCT 36.9 (L) 07/01/2021    MCV 94 07/13/2023    MCV 93 07/01/2021    MCH 30.8 07/13/2023    MCH 32.2 07/01/2021    MCHC 32.7 07/13/2023    MCHC 34.4 07/01/2021    RDW 11.7 07/13/2023    RDW 12.5 07/01/2021     (L) 07/13/2023    PLT 91 (L) 07/01/2021       BMP RESULTS:  Lab Results   Component Value Date     07/13/2023     07/01/2021    POTASSIUM  4.8 07/13/2023    POTASSIUM 4.3 06/02/2022    POTASSIUM 4.9 07/01/2021    CHLORIDE 106 07/13/2023    CHLORIDE 106 02/08/2023    CHLORIDE 108 07/01/2021    CO2 28 07/13/2023    CO2 27 06/02/2022    CO2 27 07/01/2021    ANIONGAP 7 07/13/2023    ANIONGAP 8 06/02/2022    ANIONGAP 5 07/01/2021    GLC 96 07/13/2023     (A) 04/28/2023    BUN 29.0 (H) 07/13/2023    BUN 35 (H) 06/02/2022    BUN 29 07/01/2021    CR 0.98 07/13/2023    CR 0.92 07/01/2021    GFRESTIMATED 81 07/13/2023    GFRESTIMATED 83 07/01/2021    GFRESTBLACK >90 07/01/2021    JORGE 9.5 07/13/2023    JORGE 9.2 07/01/2021          INR RESULTS:  Lab Results   Component Value Date    INR 1.08 04/11/2023    INR 1.10 04/10/2023       Procedures:    Echocardiogram 07-13-23    Left ventricular function is normal.The ejection fraction is 55-60%. There is  mild hyookinesis of the apical septal and apical inferior segments.  Global right ventricular function is normal. The right ventricle is normal  size.  No significant valvular abnormalities.  The estimated PA systolic pressure is 24 mmHg.  IVC diameter <2.1 cm collapsing >50% with sniff suggests a normal RA pressure  of 3 mmHg.  This study was compared with the study from 12/15/2022. The wall motion  abnormalities appear in some views from the prior study, but they are better  delineated in today's evaluation due to the use of contrast.  ______________________________________________________________________________  Left Ventricle  Left ventricular function is normal.The ejection fraction is 55-60%. Left  ventricular wall thickness is normal. Left ventricular size is normal. Left  ventricular diastolic function is normal. There is mild hyookinesis of the  apical septal and apical inferior segments.     Right Ventricle  Global right ventricular function is normal. The right ventricle is normal  size. A pacemaker lead is noted in the right ventricle.     Atria  Both atria appear normal.     Mitral Valve  Mild  mitral insufficiency is present. The calculated EROA is 0.08 cm2 and the  regurgitant volume is 11 mL.     Aortic Valve  The aortic valve is tricuspid. Mild aortic valve calcification is present.  Trace aortic insufficiency is present.     Tricuspid Valve  Mild tricuspid insufficiency is present. The right ventricular systolic  pressure is approximated at 20.8 mmHg plus the right atrial pressure.     Pulmonic Valve  The valve leaflets are not well visualized. Trace pulmonic insufficiency is  present.     Vessels  Sinuses of Valsalva 3.4 cm. Ascending aorta 3.3 cm. IVC diameter <2.1 cm  collapsing >50% with sniff suggests a normal RA pressure of 3 mmHg.     Pericardium  No pericardial effusion is present.     Compared to Previous Study  This study was compared with the study from 12/15/2022 . The wall motion  abnormalities appear in some views from the prior study, but they are better  delineated in today's evaluation due to the use of contrast.  ______________________________________________________________________________  MMode/2D Measurements & Calculations  IVSd: 0.81 cm     LVIDd: 4.2 cm  LVIDs: 2.8 cm  LVPWd: 0.83 cm  FS: 34.0 %  LV mass(C)d: 105.6 grams  LV mass(C)dI: 60.2 grams/m2  Ao root diam: 3.4 cm  asc Aorta Diam: 3.3 cm  LVOT diam: 2.0 cm  LVOT area: 3.2 cm2  LA Volume (BP): 41.5 ml  LA Volume Index (BP): 23.7 ml/m2  RWT: 0.39  TAPSE: 2.7 cm     Doppler Measurements & Calculations  MV E max jose luis: 61.3 cm/sec  MV A max jose luis: 107.5 cm/sec  MV E/A: 0.57  MV dec slope: 809.4 cm/sec2  MV dec time: 0.08 sec  Ao V2 max: 107.2 cm/sec  Ao max P.6 mmHg  Ao V2 mean: 83.0 cm/sec  Ao mean PG: 3.0 mmHg  Ao V2 VTI: 22.8 cm  JEREMIAH(I,D): 2.2 cm2  JEREMIAH(V,D): 2.4 cm2  AI P1/2t: 2359 msec  LV V1 max P.5 mmHg  LV V1 max: 79.3 cm/sec  LV V1 VTI: 15.7 cm  MR PISA: 0.79 cm2  MR ERO: 0.06 cm2  MR volume: 8.1 ml  SV(LVOT): 50.5 ml  SI(LVOT): 28.8 ml/m2  PA V2 max: 97.7 cm/sec  PA max PG: 3.8 mmHg  PA acc time: 0.11 sec  TR  max leif: 228.0 cm/sec  TR max P.8 mmHg  AV Leif Ratio (DI): 0.74  JEREMIAH Index (cm2/m2): 1.3  E/E' av.2  Lateral E/e': 8.7  Medial E/e': 9.7     RV S Leif: 14.8 cm/sec    Assessment and Plan:     We discussed the results with patient.  We discussed following items:    Medication Changes: None        Follow Up: With Dr. Fox in 1 year with fasting labs prior to visit        Follow the American Heart Association Diet and Lifestyle Recommendations:  -Limit saturated fat, trans fat, sodium, red meat, sweets and sugar-sweetened beverages. If you choose to eat red meat, compare labels and select the leanest cuts available.    Elroy Fox MD, PhD  Professor of Medicine  Division of Cardiology      CC  Patient Care Team:  System, Provider Not In as PCP - General (Clinic)  Elroy Fox MD as Assigned Heart and Vascular Provider  Jana Castellano NP RN as Specialty Care Coordinator (Cardiology)  Domitila Ruelas MD as MD (Hematology & Oncology)  Alton Lainez, GURPREET as Specialty Care Coordinator (Hematology & Oncology)  Domitila Ruelas MD as Assigned Cancer Care Provider  Melida Hines APRN CNP as Assigned Surgical Provider  ELROY FOX

## 2023-07-14 DIAGNOSIS — C82.99 NODULAR LYMPHOMA OF EXTRANODAL AND/OR SOLID ORGAN SITE (H): Primary | ICD-10-CM

## 2023-07-14 LAB — LDH SERPL L TO P-CCNC: 240 U/L (ref 0–250)

## 2023-07-18 LAB
MDC_IDC_LEAD_IMPLANT_DT: NORMAL
MDC_IDC_LEAD_IMPLANT_DT: NORMAL
MDC_IDC_LEAD_LOCATION: NORMAL
MDC_IDC_LEAD_LOCATION: NORMAL
MDC_IDC_LEAD_LOCATION_DETAIL_1: NORMAL
MDC_IDC_LEAD_LOCATION_DETAIL_1: NORMAL
MDC_IDC_LEAD_MFG: NORMAL
MDC_IDC_LEAD_MFG: NORMAL
MDC_IDC_LEAD_MODEL: NORMAL
MDC_IDC_LEAD_MODEL: NORMAL
MDC_IDC_LEAD_POLARITY_TYPE: NORMAL
MDC_IDC_LEAD_POLARITY_TYPE: NORMAL
MDC_IDC_LEAD_SERIAL: NORMAL
MDC_IDC_LEAD_SERIAL: NORMAL
MDC_IDC_MSMT_BATTERY_DTM: NORMAL
MDC_IDC_MSMT_BATTERY_REMAINING_LONGEVITY: 60 MO
MDC_IDC_MSMT_BATTERY_REMAINING_PERCENTAGE: 60 %
MDC_IDC_MSMT_BATTERY_STATUS: NORMAL
MDC_IDC_MSMT_BATTERY_VOLTAGE: 2.99 V
MDC_IDC_MSMT_LEADCHNL_RA_IMPEDANCE_VALUE: 350 OHM
MDC_IDC_MSMT_LEADCHNL_RA_PACING_THRESHOLD_AMPLITUDE: 0.5 V
MDC_IDC_MSMT_LEADCHNL_RA_PACING_THRESHOLD_PULSEWIDTH: 0.4 MS
MDC_IDC_MSMT_LEADCHNL_RA_SENSING_INTR_AMPL: 3.6 MV
MDC_IDC_MSMT_LEADCHNL_RV_IMPEDANCE_VALUE: 430 OHM
MDC_IDC_MSMT_LEADCHNL_RV_PACING_THRESHOLD_AMPLITUDE: 1 V
MDC_IDC_MSMT_LEADCHNL_RV_PACING_THRESHOLD_PULSEWIDTH: 0.4 MS
MDC_IDC_PG_IMPLANT_DTM: NORMAL
MDC_IDC_PG_MFG: NORMAL
MDC_IDC_PG_MODEL: NORMAL
MDC_IDC_PG_SERIAL: NORMAL
MDC_IDC_PG_TYPE: NORMAL
MDC_IDC_SESS_CLINIC_NAME: NORMAL
MDC_IDC_SESS_DTM: NORMAL
MDC_IDC_SESS_TYPE: NORMAL
MDC_IDC_SET_BRADY_AT_MODE_SWITCH_MODE: NORMAL
MDC_IDC_SET_BRADY_AT_MODE_SWITCH_RATE: 160 {BEATS}/MIN
MDC_IDC_SET_BRADY_HYSTRATE: NORMAL
MDC_IDC_SET_BRADY_LOWRATE: 60 {BEATS}/MIN
MDC_IDC_SET_BRADY_MAX_SENSOR_RATE: 120 {BEATS}/MIN
MDC_IDC_SET_BRADY_MAX_TRACKING_RATE: 120 {BEATS}/MIN
MDC_IDC_SET_BRADY_MODE: NORMAL
MDC_IDC_SET_BRADY_NIGHT_RATE: NORMAL
MDC_IDC_SET_BRADY_PAV_DELAY_LOW: 200 MS
MDC_IDC_SET_BRADY_SAV_DELAY_LOW: 200 MS
MDC_IDC_SET_LEADCHNL_RA_PACING_AMPLITUDE: 1.5 V
MDC_IDC_SET_LEADCHNL_RA_PACING_CAPTURE_MODE: NORMAL
MDC_IDC_SET_LEADCHNL_RA_PACING_POLARITY: NORMAL
MDC_IDC_SET_LEADCHNL_RA_PACING_PULSEWIDTH: 0.4 MS
MDC_IDC_SET_LEADCHNL_RA_SENSING_ADAPTATION_MODE: NORMAL
MDC_IDC_SET_LEADCHNL_RA_SENSING_POLARITY: NORMAL
MDC_IDC_SET_LEADCHNL_RV_PACING_AMPLITUDE: 2.5 V
MDC_IDC_SET_LEADCHNL_RV_PACING_CAPTURE_MODE: NORMAL
MDC_IDC_SET_LEADCHNL_RV_PACING_POLARITY: NORMAL
MDC_IDC_SET_LEADCHNL_RV_PACING_PULSEWIDTH: 0.4 MS
MDC_IDC_SET_LEADCHNL_RV_SENSING_POLARITY: NORMAL
MDC_IDC_SET_LEADCHNL_RV_SENSING_SENSITIVITY: 2.5 MV
MDC_IDC_STAT_AT_BURDEN_PERCENT: 0 %
MDC_IDC_STAT_AT_DTM_END: NORMAL
MDC_IDC_STAT_AT_DTM_START: NORMAL
MDC_IDC_STAT_AT_MODE_SW_COUNT: 0
MDC_IDC_STAT_BRADY_DTM_END: NORMAL
MDC_IDC_STAT_BRADY_DTM_START: NORMAL
MDC_IDC_STAT_BRADY_RA_PERCENT_PACED: 2.4 %
MDC_IDC_STAT_BRADY_RV_PERCENT_PACED: 99.75 %
MDC_IDC_STAT_EPISODE_RECENT_COUNT: 0
MDC_IDC_STAT_EPISODE_RECENT_COUNT_DTM_END: NORMAL
MDC_IDC_STAT_EPISODE_RECENT_COUNT_DTM_START: NORMAL
MDC_IDC_STAT_EPISODE_TYPE: NORMAL
MDC_IDC_STAT_EPISODE_VENDOR_TYPE: NORMAL

## 2023-07-20 ENCOUNTER — ONCOLOGY VISIT (OUTPATIENT)
Dept: ONCOLOGY | Facility: CLINIC | Age: 73
End: 2023-07-20
Attending: INTERNAL MEDICINE
Payer: MEDICARE

## 2023-07-20 ENCOUNTER — APPOINTMENT (OUTPATIENT)
Dept: LAB | Facility: CLINIC | Age: 73
End: 2023-07-20
Attending: INTERNAL MEDICINE
Payer: MEDICARE

## 2023-07-20 VITALS
TEMPERATURE: 97.8 F | DIASTOLIC BLOOD PRESSURE: 69 MMHG | RESPIRATION RATE: 18 BRPM | WEIGHT: 138 LBS | HEART RATE: 82 BPM | BODY MASS INDEX: 21.61 KG/M2 | SYSTOLIC BLOOD PRESSURE: 109 MMHG | OXYGEN SATURATION: 97 %

## 2023-07-20 DIAGNOSIS — C83.38 DIFFUSE LARGE B-CELL LYMPHOMA OF LYMPH NODES OF MULTIPLE REGIONS (H): Primary | ICD-10-CM

## 2023-07-20 DIAGNOSIS — D80.1 HYPOGAMMAGLOBULINEMIA (H): ICD-10-CM

## 2023-07-20 DIAGNOSIS — C83.32 DIFFUSE LARGE B-CELL LYMPHOMA OF INTRATHORACIC LYMPH NODES (H): ICD-10-CM

## 2023-07-20 DIAGNOSIS — Z85.828 HISTORY OF SQUAMOUS CELL CARCINOMA OF SKIN: ICD-10-CM

## 2023-07-20 DIAGNOSIS — L98.9 SKIN LESION: ICD-10-CM

## 2023-07-20 LAB
BASOPHILS # BLD AUTO: 0 10E3/UL (ref 0–0.2)
BASOPHILS NFR BLD AUTO: 0 %
EOSINOPHIL # BLD AUTO: 0 10E3/UL (ref 0–0.7)
EOSINOPHIL NFR BLD AUTO: 1 %
ERYTHROCYTE [DISTWIDTH] IN BLOOD BY AUTOMATED COUNT: 11.9 % (ref 10–15)
HCT VFR BLD AUTO: 34.8 % (ref 40–53)
HGB BLD-MCNC: 11.2 G/DL (ref 13.3–17.7)
IMM GRANULOCYTES # BLD: 0 10E3/UL
IMM GRANULOCYTES NFR BLD: 0 %
LYMPHOCYTES # BLD AUTO: 0.9 10E3/UL (ref 0.8–5.3)
LYMPHOCYTES NFR BLD AUTO: 23 %
MCH RBC QN AUTO: 30 PG (ref 26.5–33)
MCHC RBC AUTO-ENTMCNC: 32.2 G/DL (ref 31.5–36.5)
MCV RBC AUTO: 93 FL (ref 78–100)
MONOCYTES # BLD AUTO: 0.4 10E3/UL (ref 0–1.3)
MONOCYTES NFR BLD AUTO: 11 %
NEUTROPHILS # BLD AUTO: 2.4 10E3/UL (ref 1.6–8.3)
NEUTROPHILS NFR BLD AUTO: 65 %
NRBC # BLD AUTO: 0 10E3/UL
NRBC BLD AUTO-RTO: 0 /100
PLATELET # BLD AUTO: 154 10E3/UL (ref 150–450)
RBC # BLD AUTO: 3.73 10E6/UL (ref 4.4–5.9)
WBC # BLD AUTO: 3.7 10E3/UL (ref 4–11)

## 2023-07-20 PROCEDURE — 99215 OFFICE O/P EST HI 40 MIN: CPT | Performed by: INTERNAL MEDICINE

## 2023-07-20 PROCEDURE — 85025 COMPLETE CBC W/AUTO DIFF WBC: CPT | Performed by: INTERNAL MEDICINE

## 2023-07-20 PROCEDURE — 36591 DRAW BLOOD OFF VENOUS DEVICE: CPT | Performed by: INTERNAL MEDICINE

## 2023-07-20 PROCEDURE — G0463 HOSPITAL OUTPT CLINIC VISIT: HCPCS | Performed by: INTERNAL MEDICINE

## 2023-07-20 PROCEDURE — 250N000011 HC RX IP 250 OP 636: Mod: JZ | Performed by: INTERNAL MEDICINE

## 2023-07-20 RX ORDER — HEPARIN SODIUM (PORCINE) LOCK FLUSH IV SOLN 100 UNIT/ML 100 UNIT/ML
5 SOLUTION INTRAVENOUS ONCE
Status: COMPLETED | OUTPATIENT
Start: 2023-07-20 | End: 2023-07-20

## 2023-07-20 RX ORDER — ACYCLOVIR 400 MG/1
400 TABLET ORAL EVERY 12 HOURS
Qty: 60 TABLET | Refills: 3 | Status: SHIPPED | OUTPATIENT
Start: 2023-07-20 | End: 2024-05-23

## 2023-07-20 RX ADMIN — Medication 5 ML: at 14:17

## 2023-07-20 NOTE — NURSING NOTE
"Chief Complaint   Patient presents with     Port Draw     Labs drawn via port by RN. Port accessed with 20G 3/4\" gripper needle then de-accessed. Flushed with saline and heparin. Pt tolerated well. Patient checked into next appointment.       Torie Eng RN    "

## 2023-07-20 NOTE — NURSING NOTE
"Oncology Rooming Note    July 20, 2023 2:40 PM   Shahid Davis is a 72 year old male who presents for:    Chief Complaint   Patient presents with     Port Draw     Labs drawn via port by RN. Port accessed with 20G 3/4\" gripper needle then de-accessed. Flushed with saline and heparin. Pt tolerated well. Patient checked into next appointment.       Oncology Clinic Visit     Non hodgkins lymphoma     Initial Vitals: /69   Pulse 82   Temp 97.8  F (36.6  C) (Oral)   Resp 18   Wt 62.6 kg (138 lb)   SpO2 97%   BMI 21.61 kg/m   Estimated body mass index is 21.61 kg/m  as calculated from the following:    Height as of 4/6/23: 1.702 m (5' 7\").    Weight as of this encounter: 62.6 kg (138 lb). Body surface area is 1.72 meters squared.  Data Unavailable Comment: pain from chemo, numbness   No LMP for male patient.  Allergies reviewed: Yes  Medications reviewed: Yes    Medications: Medication refills not needed today.  Pharmacy name entered into [facility] [employer]:    [id number] [place] [facility] [address] [id number] [address] AT [facility] & [place]    Clinical concerns:        Crystal Mccauley CMA              "

## 2023-07-20 NOTE — LETTER
7/20/2023         RE: Shahid Davis   [facility] [address]        Dear Colleague,    Thank you for referring your patient, Shahid Davis, to the [facility]. Please see a copy of my visit note below.    [facility]  Date of visit: Jul 20, 2023    Reason for Visit: Follow up triple-hit DLBCL    ONCOLOGIC SUMMARY:  Diagnosis:  Low-grade kappa-restricted plasmacytoid B-cell lymphoma dx 3/2004, presenting with thoracic paraspinal mass. Bone marrow hypercellular with 70-80% involvement by small kappa-restricted lymphocytes which were positive for CD10, CD19, and CD20 and negative for CD5 and CD23.   Transformation to high-grade B-cell lymphoma 12/2022 (versus new primary), presenting with B symptoms, sternal mass, and SOB/chest pain. Biopsy of sternal mass showed large B-cell lymphoma with aggressive features (GCB-subtype), Ki67 %, FISH positive for MYC (non-IgH partner), BCL2, and BCL6 rearrangements. Stage IV with extensive lymphomatous involvement to the R pleura w/avid effusions, mediastinal and pericardial regions, right anterolateral chest wall, adenopathy above and below the diaphragm, liver, spleen and multiple sites in the skeleton. CSF flow negative     Treatment history:  For low-grade lymphoma:  2005: Fludarabine-based chemo x 6 cycles and XRT to spine (details unclear)    For high-grade lymphoma:  12/20/22: Cycle 1 R-CHOP with Neulasta support (with a few days of steroid prephase prior)  1/11/23: Cycle 2 Switched to DA-R-EPOCH with triple IT chemo for CNS ppx after FISH returned showing triple-hit disease. PET after 2 cycles shows very good WY.   2/2/23: Cycle 3 DA-R EPOCH with triple IT chemo for CNS ppx  2/23/23: Cycle 4 DA-R-EPOCH  with triple IT chemo for CNS ppx. PET after 4 cycles shows CRu.   3/17/23: Cycle 5 DA-R-EPOCH with triple IT chemo for CNS ppx  4/6/23: Cycle 6 DA-R-EPOCH. EOT PET shows  "CR!    INTERVAL HISTORY:  Shahid is here today for follow-up. Doing fairly well overall, recently returned from road trip. Energy improving but not quite back to baseline, feels like his knees still buckle sometimes. Very rare nausea, improves with Zofran. Still has slight numbness/tingling in fingertips. He has a skin lesion on his right neck that he recently \"ripped off\" on accident, now a little tender and cracked. Otherwise no fever/chills, night sweats, new lumps/bumps, SOB, chest pain, vomiting, abdominal pain, diarrhea, or bleeding. Appetite improving, weight up 10 lb. Increasing activity, out painting for 2 hours at a time. They are going on another roadtrip ([employer], etc) in Sept for 5 weeks.     ROS: 10 point ROS neg other than the symptoms noted above in the HPI.    Current Outpatient Medications   Medication Sig Dispense Refill    acyclovir (ZOVIRAX) 400 MG tablet Take 1 tablet (400 mg) by mouth every 12 hours 60 tablet 3    ascorbic acid (VITAMIN C) 500 MG tablet Take 500 mg by mouth every morning      Multiple Vitamins-Minerals (MULTIVITAL PO) Take 1 tablet by mouth every morning      omeprazole (PRILOSEC) 40 MG DR capsule Take 1 capsule (40 mg) by mouth daily 90 capsule 4    ondansetron (ZOFRAN) 8 MG tablet Take 1 tablet (8 mg) by mouth every 8 hours as needed for nausea 60 tablet 3    polyethylene glycol (MIRALAX) 17 GM/Dose powder Take 17 g by mouth daily as needed for constipation 510 g 4    prochlorperazine (COMPAZINE) 10 MG tablet Take 1 tablet (10 mg) by mouth every 6 hours as needed for nausea or vomiting 30 tablet 3    senna-docusate (SENNA S) 8.6-50 MG tablet Take 1 tablet by mouth 2 times daily as needed for constipation 60 tablet 4    baclofen (LIORESAL) 10 MG tablet Take 0.5-1 tablets (5-10 mg) by mouth 3 times daily as needed for other (hiccups) (Patient not taking: Reported on 7/20/2023) 30 tablet 0       Allergies   Allergen Reactions    Ampicillin [Ampicillin Sodium]     Bactrim " [Sulfamethoxazole W/Trimethoprim]     Contrast Dye      CT contrast (IV) allergy - need oral Methylpred as premed for scans    Ampicillin Rash       Physical Exam:  /69   Pulse 82   Temp 97.8  F (36.6  C) (Oral)   Resp 18   Wt 62.6 kg (138 lb)   SpO2 97%   BMI 21.61 kg/m       ECOG PS: 0    General: Awake, alert, in no acute distress.   HEENT: Normocephalic, atraumatic. No scleral icterus.   Lymph: No cervical, supraclavicular, or axillary lymphadenopathy appreciated.   CV: Regular rate and rhythm. No murmurs, rubs, or gallops appreciated.  Resp: Good inspiratory effort, lungs clear to auscultation bilaterally.  GI: Abdomen soft, nontender, nondistended.   Ext: No peripheral edema bilaterally.  Neuro: CN II-XII grossly intact. No focal deficits.   Skin: Many scattered SK's on back. 2-3 cm raised lesion on right neck with cracked appearance (see photo). Healed large incision on right posterior shoulder.   Psych: Pleasant, normal affect.     Right neck lesion:        Labs:   I personally reviewed the following labs:    Most Recent 3 CBC's:  Recent Labs   Lab Test 07/20/23  1413 07/13/23  1530 04/28/23  1614   WBC 3.7* 2.2* 7.2   HGB 11.2* 11.2* 10.5*   MCV 93 94 99    148* 193     Most Recent 3 BMP's:  Recent Labs   Lab Test 07/13/23  1530 04/28/23  1614 04/28/23  1200 04/11/23  0710    138  --  142   POTASSIUM 4.8 4.3  --  3.9   CHLORIDE 106 103  --  110*   CO2 28 25  --  24   BUN 29.0* 32.1*  --  30.5*   CR 0.98 0.78  --  0.74   ANIONGAP 7 10  --  8   JORGE 9.5 10.2  --  8.9   GLC 96 196* 122* 109*     Most Recent 2 LFT's:  Recent Labs   Lab Test 07/13/23  1530 04/28/23  1614   AST 28 24   ALT 37 23   ALKPHOS 122 105   BILITOTAL 0.2 0.2     7/13/23 TTE:  Interpretation Summary  Left ventricular function is normal.The ejection fraction is 55-60%. There is  mild hyookinesis of the apical septal and apical inferior segments.  Global right ventricular function is normal. The right ventricle is  normal  size.  No significant valvular abnormalities.  The estimated PA systolic pressure is 24 mmHg.  IVC diameter <2.1 cm collapsing >50% with sniff suggests a normal RA pressure  of 3 mmHg.  This study was compared with the study from 12/15/2022. The wall motion  abnormalities appear in some views from the prior study, but they are better  delineated in today's evaluation due to the use of contrast.      ASSESSMENT/PLAN:    #H/o low-grade kappa restricted plasmacytoid B-cell lymphoma 3/2004   #Transformation to DLBCL 12/2022  H/o low-grade kappa restricted plasmacytoid B-cell lymphoma 3/2004 treated with x6 cycles of fludarabine based chemo and XRT to spine, then has been on surveillance since 2005. He presented in 11/2022 with progressing B symptoms, sternal mass, and SOB/CP. PET 12/9/22 showed extensive lymphomatous involvement to the R pleura w/avid effusions, mediastinal and pericardial regions, right anterolateral chest wall, adenopathy above and below the diaphragm, liver, spleen and multiple sites in the skeleton, concerning for transformation from his low-grade lymphoma. 12/15/22 biopsy of sternal mass showed large B-cell lymphoma with aggressive features (GCB-subtype), Ki67 %. 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  - S/p C1 R-CHOP in the hospital 12/20/22. Tolerated well overall.  - Switched to DA-R-EPOCH starting Cycle 2 (1/11/23) after FISH returned showing triple-hit disease. Staging LP negative, gave triple IT chemo once per cycle for CNS ppx for a total of 4 doses.   - PET after 2 cycles showed DC with significant resolution of most FDG activity/disease other than small area of right paramediastinal pleural thickening (SUVmax 5) and resolution of right pleural effusion. Concurrent Clonoseq was negative. PET after 4 cycles showed further improvement with right paramediastinal pleura SUV down to 3.3, also could potentially be artifact since it is  "near pacer wires.  - Completed Cycle 6 R-EPOCH 4/6/23. Tolerated chemo well overall, stayed at dose level 1 due to thrombocytopenia.  - EOT PET-CT (with ketogenic diet for 3 days prior to suppress cardiac activity) showed CR!  - Labs and exam reassuring today. Continue surveillance with H&P/labs every 3-6 mo for 2 years, then yearly for years 3-5. Imaging every 6 months for first 2 years only.   - We did discuss his increased risk of recurrence given transformed and triple-hit disease. If he relapses, would likely consider CAR-T therapy. Will plan to leave port in until next scan at least.     #PPx  - TLS: Now done with allopurinol.   - ID: Continue ACV for 6 months post R-EPOCH. No longer needs levofloxacin or fluconazole.      #Hypogammaglobulinemia    on 12/15/22 and 316 on 4/6/23.   - Given persistently low COVID cycle threshold, hypogammaglobulinemia, and risk for further immunosuppression with chemo, gave IVIG on 1/15 and 4/10.    #COVID-19 Infection  He tested positive on his Cycle 2 pre-admission COVID test. He was very surprised, as he does not feel symptomatic.  Notes that his girlfriend had a URI around [place] and was never tested for COVID. Cycle threshold was around 20 indicating active infection.  - S/p remdesivir x 3 days 1/11-1/13/23.  - Remained positive throughout chemo with CT of 31.4 still on 4/6/23 (cycle 6). Gave another dose of IVIG as above.  - Finally cleared COVID as of 6/7/23!     #Sinusitis, resolved  EOT PET-CT suggestive of acute bilateral maxillary sinusitis. He had some ongoing runny nose/congestion.   - Resolved after course of doxycycline in May 2023.     #Non-melanoma skin cancers  History of many SCC's in the past in 2011, 2014, 2022 (very large lesion on right posterior shoulder).   - Has a 2-3 cm lesion on right neck which he recently \"ripped off\" on accident, now left with a tender spot with cracked appearance that catches frequently.   - Should reconnect with " Dermatology for annual full body skin exams; referral placed.       PLAN:  - [place] [facility]  [place]atology referral  - RTC in 3 months with CT neck/CAP (will need contrast premedication)    Total of 45 minutes on patient visit, reviewing records, interpreting test results, placing orders, and documentation on the day of service.    Domitila Ruelas MD  Attending Physician, [facility]

## 2023-07-20 NOTE — PROGRESS NOTES
[facility]  Date of visit: Jul 20, 2023    Reason for Visit: Follow up triple-hit DLBCL    ONCOLOGIC SUMMARY:  Diagnosis:    Low-grade kappa-restricted plasmacytoid B-cell lymphoma dx 3/2004, presenting with thoracic paraspinal mass. Bone marrow hypercellular with 70-80% involvement by small kappa-restricted lymphocytes which were positive for CD10, CD19, and CD20 and negative for CD5 and CD23.     Transformation to high-grade B-cell lymphoma 12/2022 (versus new primary), presenting with B symptoms, sternal mass, and SOB/chest pain. Biopsy of sternal mass showed large B-cell lymphoma with aggressive features (GCB-subtype), Ki67 %, FISH positive for MYC (non-IgH partner), BCL2, and BCL6 rearrangements. Stage IV with extensive lymphomatous involvement to the R pleura w/avid effusions, mediastinal and pericardial regions, right anterolateral chest wall, adenopathy above and below the diaphragm, liver, spleen and multiple sites in the skeleton. CSF flow negative     Treatment history:  For low-grade lymphoma:  1. 2005: Fludarabine-based chemo x 6 cycles and XRT to spine (details unclear)    For high-grade lymphoma:  1. 12/20/22: Cycle 1 R-CHOP with Neulasta support (with a few days of steroid prephase prior)  2. 1/11/23: Cycle 2 Switched to DA-R-EPOCH with triple IT chemo for CNS ppx after FISH returned showing triple-hit disease. PET after 2 cycles shows very good WA.   3. 2/2/23: Cycle 3 DA-R EPOCH with triple IT chemo for CNS ppx  4. 2/23/23: Cycle 4 DA-R-EPOCH  with triple IT chemo for CNS ppx. PET after 4 cycles shows CRu.   5. 3/17/23: Cycle 5 DA-R-EPOCH with triple IT chemo for CNS ppx  6. 4/6/23: Cycle 6 DA-R-EPOCH. EOT PET shows CR!    INTERVAL HISTORY:  Shahid is here today for follow-up. Doing fairly well overall, recently returned from road trip. Energy improving but not quite back to baseline, feels like his knees still buckle sometimes. Very rare nausea, improves  "with Zofran. Still has slight numbness/tingling in fingertips. He has a skin lesion on his right neck that he recently \"ripped off\" on accident, now a little tender and cracked. Otherwise no fever/chills, night sweats, new lumps/bumps, SOB, chest pain, vomiting, abdominal pain, diarrhea, or bleeding. Appetite improving, weight up 10 lb. Increasing activity, out painting for 2 hours at a time. They are going on another roadtrip ([employer], etc) in Sept for 5 weeks.     ROS: 10 point ROS neg other than the symptoms noted above in the HPI.    Current Outpatient Medications   Medication Sig Dispense Refill     acyclovir (ZOVIRAX) 400 MG tablet Take 1 tablet (400 mg) by mouth every 12 hours 60 tablet 3     ascorbic acid (VITAMIN C) 500 MG tablet Take 500 mg by mouth every morning       Multiple Vitamins-Minerals (MULTIVITAL PO) Take 1 tablet by mouth every morning       omeprazole (PRILOSEC) 40 MG DR capsule Take 1 capsule (40 mg) by mouth daily 90 capsule 4     ondansetron (ZOFRAN) 8 MG tablet Take 1 tablet (8 mg) by mouth every 8 hours as needed for nausea 60 tablet 3     polyethylene glycol (MIRALAX) 17 GM/Dose powder Take 17 g by mouth daily as needed for constipation 510 g 4     prochlorperazine (COMPAZINE) 10 MG tablet Take 1 tablet (10 mg) by mouth every 6 hours as needed for nausea or vomiting 30 tablet 3     senna-docusate (SENNA S) 8.6-50 MG tablet Take 1 tablet by mouth 2 times daily as needed for constipation 60 tablet 4     baclofen (LIORESAL) 10 MG tablet Take 0.5-1 tablets (5-10 mg) by mouth 3 times daily as needed for other (hiccups) (Patient not taking: Reported on 7/20/2023) 30 tablet 0       Allergies   Allergen Reactions     Ampicillin [Ampicillin Sodium]      Bactrim [Sulfamethoxazole W/Trimethoprim]      Contrast Dye      CT contrast (IV) allergy - need oral Methylpred as premed for scans     Ampicillin Rash       Physical Exam:  /69   Pulse 82   Temp 97.8  F (36.6  C) (Oral)   Resp 18   Wt " 62.6 kg (138 lb)   SpO2 97%   BMI 21.61 kg/m       ECOG PS: 0    General: Awake, alert, in no acute distress.   HEENT: Normocephalic, atraumatic. No scleral icterus.   Lymph: No cervical, supraclavicular, or axillary lymphadenopathy appreciated.   CV: Regular rate and rhythm. No murmurs, rubs, or gallops appreciated.  Resp: Good inspiratory effort, lungs clear to auscultation bilaterally.  GI: Abdomen soft, nontender, nondistended.   Ext: No peripheral edema bilaterally.  Neuro: CN II-XII grossly intact. No focal deficits.   Skin: Many scattered SK's on back. 2-3 cm raised lesion on right neck with cracked appearance (see photo). Healed large incision on right posterior shoulder.   Psych: Pleasant, normal affect.     Right neck lesion:        Labs:   I personally reviewed the following labs:    Most Recent 3 CBC's:  Recent Labs   Lab Test 07/20/23  1413 07/13/23  1530 04/28/23  1614   WBC 3.7* 2.2* 7.2   HGB 11.2* 11.2* 10.5*   MCV 93 94 99    148* 193     Most Recent 3 BMP's:  Recent Labs   Lab Test 07/13/23  1530 04/28/23  1614 04/28/23  1200 04/11/23  0710    138  --  142   POTASSIUM 4.8 4.3  --  3.9   CHLORIDE 106 103  --  110*   CO2 28 25  --  24   BUN 29.0* 32.1*  --  30.5*   CR 0.98 0.78  --  0.74   ANIONGAP 7 10  --  8   JORGE 9.5 10.2  --  8.9   GLC 96 196* 122* 109*     Most Recent 2 LFT's:  Recent Labs   Lab Test 07/13/23  1530 04/28/23  1614   AST 28 24   ALT 37 23   ALKPHOS 122 105   BILITOTAL 0.2 0.2     7/13/23 TTE:  Interpretation Summary  Left ventricular function is normal.The ejection fraction is 55-60%. There is  mild hyookinesis of the apical septal and apical inferior segments.  Global right ventricular function is normal. The right ventricle is normal  size.  No significant valvular abnormalities.  The estimated PA systolic pressure is 24 mmHg.  IVC diameter <2.1 cm collapsing >50% with sniff suggests a normal RA pressure  of 3 mmHg.  This study was compared with the study from  12/15/2022. The wall motion  abnormalities appear in some views from the prior study, but they are better  delineated in today's evaluation due to the use of contrast.      ASSESSMENT/PLAN:    #H/o low-grade kappa restricted plasmacytoid B-cell lymphoma 3/2004   #Transformation to DLBCL 12/2022  H/o low-grade kappa restricted plasmacytoid B-cell lymphoma 3/2004 treated with x6 cycles of fludarabine based chemo and XRT to spine, then has been on surveillance since 2005. He presented in 11/2022 with progressing B symptoms, sternal mass, and SOB/CP. PET 12/9/22 showed extensive lymphomatous involvement to the R pleura w/avid effusions, mediastinal and pericardial regions, right anterolateral chest wall, adenopathy above and below the diaphragm, liver, spleen and multiple sites in the skeleton, concerning for transformation from his low-grade lymphoma. 12/15/22 biopsy of sternal mass showed large B-cell lymphoma with aggressive features (GCB-subtype), Ki67 %. 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  - S/p C1 R-CHOP in the hospital 12/20/22. Tolerated well overall.  - Switched to DA-R-EPOCH starting Cycle 2 (1/11/23) after FISH returned showing triple-hit disease. Staging LP negative, gave triple IT chemo once per cycle for CNS ppx for a total of 4 doses.   - PET after 2 cycles showed WY with significant resolution of most FDG activity/disease other than small area of right paramediastinal pleural thickening (SUVmax 5) and resolution of right pleural effusion. Concurrent Clonoseq was negative. PET after 4 cycles showed further improvement with right paramediastinal pleura SUV down to 3.3, also could potentially be artifact since it is near pacer wires.  - Completed Cycle 6 R-EPOCH 4/6/23. Tolerated chemo well overall, stayed at dose level 1 due to thrombocytopenia.  - EOT PET-CT (with ketogenic diet for 3 days prior to suppress cardiac activity) showed CR!  - Labs and  "exam reassuring today. Continue surveillance with H&P/labs every 3-6 mo for 2 years, then yearly for years 3-5. Imaging every 6 months for first 2 years only.   - We did discuss his increased risk of recurrence given transformed and triple-hit disease. If he relapses, would likely consider CAR-T therapy. Will plan to leave port in until next scan at least.     #PPx  - TLS: Now done with allopurinol.   - ID: Continue ACV for 6 months post R-EPOCH. No longer needs levofloxacin or fluconazole.      #Hypogammaglobulinemia    on 12/15/22 and 316 on 4/6/23.   - Given persistently low COVID cycle threshold, hypogammaglobulinemia, and risk for further immunosuppression with chemo, gave IVIG on 1/15 and 4/10.    #COVID-19 Infection  He tested positive on his Cycle 2 pre-admission COVID test. He was very surprised, as he does not feel symptomatic.  Notes that his girlfriend had a URI around [place] and was never tested for COVID. Cycle threshold was around 20 indicating active infection.  - S/p remdesivir x 3 days 1/11-1/13/23.  - Remained positive throughout chemo with CT of 31.4 still on 4/6/23 (cycle 6). Gave another dose of IVIG as above.  - Finally cleared COVID as of 6/7/23!     #Sinusitis, resolved  EOT PET-CT suggestive of acute bilateral maxillary sinusitis. He had some ongoing runny nose/congestion.   - Resolved after course of doxycycline in May 2023.     #Non-melanoma skin cancers  History of many SCC's in the past in 2011, 2014, 2022 (very large lesion on right posterior shoulder).   - Has a 2-3 cm lesion on right neck which he recently \"ripped off\" on accident, now left with a tender spot with cracked appearance that catches frequently.   - Should reconnect with Dermatology for annual full body skin exams; referral placed.       PLAN:  - [place] [facility] [place]  - Dermatology referral  - RTC in 3 months with CT neck/CAP (will need contrast premedication)    Total of 45 minutes on patient visit, " reviewing records, interpreting test results, placing orders, and documentation on the day of service.    Domitila Ruelas MD  Attending Physician, [facility]

## 2023-07-21 ENCOUNTER — PATIENT OUTREACH (OUTPATIENT)
Dept: ONCOLOGY | Facility: CLINIC | Age: 73
End: 2023-07-21
Payer: MEDICARE

## 2023-07-21 NOTE — PROGRESS NOTES
[facility]: Cancer Care                                                                                          Called pt this morning to talk about having his port flushed scheduled every 4-6 weeks. Patient would prefer to get port flushes scheduled at [facility] for now, but may switch to [facility] in the winter as that clinic is closer to home.   RNCC confirmed with [facility] that their infusion center is able to provide port flushes.      PLAN     Pt will call to schedule port flushes with dates that work for him at [facility].     Claudine Wu, RN, BSN  RN Care Coordinator   [address] [place] [id number] [phone]

## 2023-07-22 PROBLEM — Z85.828 HISTORY OF SQUAMOUS CELL CARCINOMA OF SKIN: Status: ACTIVE | Noted: 2022-01-04

## 2023-07-22 PROBLEM — D80.1 HYPOGAMMAGLOBULINEMIA (H): Status: ACTIVE | Noted: 2023-07-22

## 2023-08-08 ENCOUNTER — OFFICE VISIT (OUTPATIENT)
Dept: DERMATOLOGY | Facility: CLINIC | Age: 73
End: 2023-08-08
Attending: INTERNAL MEDICINE
Payer: MEDICARE

## 2023-08-08 DIAGNOSIS — L57.0 ACTINIC KERATOSIS: ICD-10-CM

## 2023-08-08 DIAGNOSIS — L57.8 ACTINIC SKIN DAMAGE: Primary | ICD-10-CM

## 2023-08-08 DIAGNOSIS — C83.38 DIFFUSE LARGE B-CELL LYMPHOMA OF LYMPH NODES OF MULTIPLE REGIONS (H): ICD-10-CM

## 2023-08-08 DIAGNOSIS — L82.1 SEBORRHEIC KERATOSIS: ICD-10-CM

## 2023-08-08 DIAGNOSIS — D22.9 MULTIPLE BENIGN NEVI: ICD-10-CM

## 2023-08-08 DIAGNOSIS — D18.01 CHERRY ANGIOMA: ICD-10-CM

## 2023-08-08 DIAGNOSIS — Z85.828 HISTORY OF SQUAMOUS CELL CARCINOMA OF SKIN: ICD-10-CM

## 2023-08-08 DIAGNOSIS — D49.2 NEOPLASM OF SKIN: ICD-10-CM

## 2023-08-08 DIAGNOSIS — L81.4 LENTIGINES: ICD-10-CM

## 2023-08-08 PROCEDURE — 17003 DESTRUCT PREMALG LES 2-14: CPT | Mod: XS | Performed by: DERMATOLOGY

## 2023-08-08 PROCEDURE — 88305 TISSUE EXAM BY PATHOLOGIST: CPT | Mod: TC | Performed by: DERMATOLOGY

## 2023-08-08 PROCEDURE — 11103 TANGNTL BX SKIN EA SEP/ADDL: CPT | Performed by: DERMATOLOGY

## 2023-08-08 PROCEDURE — 11102 TANGNTL BX SKIN SINGLE LES: CPT | Performed by: DERMATOLOGY

## 2023-08-08 PROCEDURE — 88305 TISSUE EXAM BY PATHOLOGIST: CPT | Mod: 26 | Performed by: DERMATOLOGY

## 2023-08-08 PROCEDURE — 99204 OFFICE O/P NEW MOD 45 MIN: CPT | Mod: 25 | Performed by: DERMATOLOGY

## 2023-08-08 PROCEDURE — 17000 DESTRUCT PREMALG LESION: CPT | Mod: XS | Performed by: DERMATOLOGY

## 2023-08-08 RX ORDER — FLUOROURACIL 50 MG/G
CREAM TOPICAL
Qty: 40 G | Refills: 0 | Status: SHIPPED | OUTPATIENT
Start: 2023-08-08

## 2023-08-08 RX ORDER — CALCIPOTRIENE 50 UG/G
CREAM TOPICAL
Qty: 60 G | Refills: 1 | Status: SHIPPED | OUTPATIENT
Start: 2023-08-08

## 2023-08-08 ASSESSMENT — PAIN SCALES - GENERAL: PAINLEVEL: NO PAIN (0)

## 2023-08-08 NOTE — PATIENT INSTRUCTIONS
For forehead, sideburns, nose, cheeks, these are low-risk precancers called actinic keratoses. We will treat them with an anti-cancer cream.  Please start Efudex and calcipotriene mixed equally together. Apply twice daily to above areas for 4-10 days or until skin gets red. Stop applying when skin gets red.  Skin will get red and crusty (like hamburger).  Please keep Efudex away from pets and children. Wash hands thoroughly after application.   See handout below for more information about Efudex.    If medications are more than ~$75, please contact me for a compounded version through [facility].  Alternatively can also use Efudex alone. If you want to use Efudex alone, apply twice daily for 3-4 weeks. Stop when significant irritation occurs.        Efudex Treatment  Today, you are being prescribed Fluorouracil (Efudex) a topical cream used for the treatment of Actinic Keratosis (AKs).  The medication is working to eliminate the unhealthy cells.  This treatment may be unattractive and somewhat uncomfortable.  You may experience some mild discomfort while being treated.  You will want to stop any other creams such as glycolic acid products, retin A, Tazorac, etc. to the area. You may use bland makeup/cover-up as long as it doesn't sting or cause you discomfort.  Apply the cream at night as your physician recommends. Use a cotton-tipped applicator, or use gloves if applying it with your fingertips. If applied with unprotected fingertips, it is important to wash your hands well after you apply this medicine.   Keep this medication away from pets.  We recommend avoiding excessive sun exposure to the treated area  You may use moisturizing creams over bothersome areas such as Vanicream or Cetaphil cream if the reaction becomes too bothersome. Please, call the clinic if this occurs.   Potential Side Effects  Your treated areas may be unsightly during therapy.  This will improve slowly following the discontinuation of  therapy.   During the first week of application, mild inflammation may occur.   During the following weeks, redness, and swelling may occur with some crusting and burning.   Lesions resolve as the skin exfoliates.   Over 1 to 2 weeks, new skin grows into the treatment area.  Keep this medication away from pets  Specific side effects that usually do not require medical attention (report to your doctor or health care professional if they continue or are bothersome) include:  Red or dark-colored skin   Mild erosion (loss of upper layer of skin)   Mild eye irritation including burning, itching, sensitivity, stinging, or watering   Increased sensitivity of the skin to sun and ultraviolet light   Pain and burning of the affected area   Dryness, scaling or swelling of the affected area   Skin rash, itching of the affected area   Tenderness   If you have severe pain, please, call the clinic immediately and indicate that you have pain.  Ask for a call from the RN.     Who should I call with questions?  [facility]: [phone]  [facility]: [phone]  For urgent needs outside of business hours call the [facility] at [phone] and ask for the dermatology resident on call

## 2023-08-08 NOTE — PROGRESS NOTES
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    # NUBs:  - R lateral neck superior, s/p shave bx 8/8/23  - R lateral neck inferior, s/p shave bx 8/8/23  - R posterior shoulder, s/p shave bx 8/8/2023     # Lesions to monitor:  - R nasal supratip, photo 8/8/23  - L shin, photo 8/8/23  ____________________________________________    Assessment & Plan:     # NUBs:  - R lateral neck superior, s/p shave bx 8/8/23  - R lateral neck inferior, s/p shave bx 8/8/23  - R posterior shoulder, s/p shave bx 8/8/2023 - occurring near excision of prior SCC, this could possible be recurrent of prior SCC  - see shave bx x3    # Lesions to monitor:  - R nasal supratip, photo 8/8/23 - AK v BCC, recheck after field therapy  - L shin, photo 8/8/23 - most concerned for NMSC but some hemorrhage and pts notes recent onset, trauma hx, low threshold to bx    # Diffuse actinic damage - forehead, sideburns, nose, cheeks.  - Start 5FU/C x 4-10 days to face    # AKs.  - 3 more hyperkeratotic papules on face treated with cryo  - Field as above    # Benign lesions - SKs, cherry angiomas, lentigenes.  - No treatment required    # Multiple benign nevi.   - Monitor for ABCDEs of melanoma   - Continue sun protection - recommend SPF 30 or higher with frequent application   - Return sooner if noticing changing or symptomatic lesions;    # History of NMSC. No evidence of recurrent disease.  - Continue photoprotection - recommend SPF 30 or higher with frequent reapplication  -  Continue yearly skin exams  - Advised to monitor for changing, non-healing, bleeding, painful, changing, or otherwise symptomatic lesions      Procedures Performed:   - Shave biopsy procedure note, location(s): see above. After discussion of benefits and risks including but not limited to bleeding, infection, scar, incomplete removal, recurrence, and non-diagnostic biopsy, written consent and photographs were obtained. The area was cleaned with isopropyl alcohol. 0.5mL of 1% lidocaine with epinephrine was injected to obtain adequate anesthesia of lesion(s). Shave biopsy at site(s) performed. Hemostasis was achieved with aluminium chloride. Petrolatum ointment and a sterile dressing were applied. The patient tolerated the procedure and no complications were noted. The patient was provided with verbal and written post care instructions.   - Cryotherapy procedure note, location(s): see above. After verbal consent and discussion of risks and benefits including, but not limited to, dyspigmentation/scar, blister, and pain, 3 lesion(s) was(were) treated with 1-2 mm freeze border for 1-2 cycles with liquid nitrogen. Post cryotherapy instructions were provided.      Follow-up: 6 weeks (before leaving [place]), sooner if concerns.     Staff:     Che Ulrich MD    Department of Dermatology  [facility]: Phone: [phone], Fax: [phone]  8/16/2023    ____________________________________________    CC: Skin Check (Here for a skin check. Spots of concern on right neck. )    HPI:  Mr. Shahid Davis is a(n) 72 year old male who presents today as a new patient for above.    Spots on mention on right   Recently had intense chemo over the winter    Patient is otherwise feeling well, without additional skin concerns.     Labs Reviewed:  N/A    Physical Exam:  Vitals: There were no vitals taken for this visit.  SKIN: Total skin excluding  the undergarment areas was performed. The exam included the head/face, neck, both arms, chest, back, abdomen, both legs, digits and/or nails.   - pink papule R lateral neck inferior   - cerebriform hyperkeratotic plaque R lateral neck superior  - well healed excision R posterior shoulder with adjacent pink papule  - many hyperkeratotic gritty papules and scaling on face  - pink nonspecific shiny macule R nasal supratip  - shiny pink patch left shin  - There are dome shaped bright red papules on the trunk and extremities .   - Multiple regular brown pigmented macules and papules are identified on the trunk and extremities. .   - Scattered brown macules on sun exposed areas.  - Waxy stuck on papules and plaques on trunk and extremities.   - No other lesions of concern on areas examined.     Medications:  Current Outpatient Medications   Medication    acyclovir (ZOVIRAX) 400 MG tablet    ascorbic acid (VITAMIN C) 500 MG tablet    baclofen (LIORESAL) 10 MG tablet    Multiple Vitamins-Minerals (MULTIVITAL PO)    omeprazole (PRILOSEC) 40 MG DR capsule    ondansetron (ZOFRAN) 8 MG tablet    polyethylene glycol (MIRALAX) 17 GM/Dose powder    prochlorperazine (COMPAZINE) 10 MG tablet    senna-docusate (SENNA S) 8.6-50 MG tablet     No current facility-administered medications for this visit.      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

## 2023-08-08 NOTE — LETTER
8/8/2023       RE: Shahid Davis   [facility]  [place] [address]     Dear Colleague,    Thank you for referring your patient, Shahid Davis, to the [facility] [place] at [facility]. Please see a copy of my visit note below.    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, treated outside clinic  - SCC, left temple, s/p outside Mohs 2014  - SCCis, left deltoid, s/p Efudex 1/2022    # NUBs:  - R lateral neck superior, s/p shave bx 8/8/23  - R lateral neck inferior, s/p shave bx 8/8/23  - R posterior shoulder, s/p shave bx 8/8/2023     # Lesions to monitor:  - R nasal supratip, photo 8/8/23  - L shin, photo 8/8/23  ____________________________________________    Assessment & Plan:     # NUBs:  - R lateral neck superior, s/p shave bx 8/8/23  - R lateral neck inferior, s/p shave bx 8/8/23  - R posterior shoulder, s/p shave bx 8/8/2023 - occurring near excision of prior SCC, this could possible be recurrent of prior SCC  - see shave bx x3    # Lesions to monitor:  - R nasal supratip, photo 8/8/23 - AK v BCC, recheck after field therapy  - L shin, photo 8/8/23 - most concerned for NMSC but some hemorrhage and pts notes recent onset, trauma hx, low threshold to bx    # Diffuse actinic damage - forehead, sideburns, nose, cheeks.  - Start 5FU/C x 4-10 days to face    # AKs.  - 3 more hyperkeratotic papules on face treated with cryo  - Field as above    # Benign lesions - SKs, cherry angiomas, lentigenes.  - No treatment required    # Multiple benign nevi.   -  Monitor for ABCDEs of melanoma   - Continue sun protection - recommend SPF 30 or higher with frequent application   - Return sooner if noticing changing or symptomatic lesions;    # History of NMSC. No evidence of recurrent disease.  - Continue photoprotection - recommend SPF 30 or higher with frequent reapplication  - Continue yearly skin exams  - Advised to monitor for changing, non-healing, bleeding, painful, changing, or otherwise symptomatic lesions      Procedures Performed:   - Shave biopsy procedure note, location(s): see above. After discussion of benefits and risks including but not limited to bleeding, infection, scar, incomplete removal, recurrence, and non-diagnostic biopsy, written consent and photographs were obtained. The area was cleaned with isopropyl alcohol. 0.5mL of 1% lidocaine with epinephrine was injected to obtain adequate anesthesia of lesion(s). Shave biopsy at site(s) performed. Hemostasis was achieved with aluminium chloride. Petrolatum ointment and a sterile dressing were applied. The patient tolerated the procedure and no complications were noted. The patient was provided with verbal and written post care instructions.   - Cryotherapy procedure note, location(s): see above. After verbal consent and discussion of risks and benefits including, but not limited to, dyspigmentation/scar, blister, and pain, 3 lesion(s) was(were) treated with 1-2 mm freeze border for 1-2 cycles with liquid nitrogen. Post cryotherapy instructions were provided.      Follow-up: 6 weeks (before leaving [place]), sooner if concerns.     Staff:     Che Ulrich MD    Department of Dermatology  [facility]: Phone: [phone], Fax: [phone]  8/16/2023    ____________________________________________    CC: Skin Check (Here for a skin check. Spots of concern on right neck. )    HPI:  Mr. Shahid Davis is a(n) 72 year  old male who presents today as a new patient for above.    Spots on mention on right   Recently had intense chemo over the winter    Patient is otherwise feeling well, without additional skin concerns.     Labs Reviewed:  N/A    Physical Exam:  Vitals: There were no vitals taken for this visit.  SKIN: Total skin excluding the undergarment areas was performed. The exam included the head/face, neck, both arms, chest, back, abdomen, both legs, digits and/or nails.   - pink papule R lateral neck inferior   - cerebriform hyperkeratotic plaque R lateral neck superior  - well healed excision R posterior shoulder with adjacent pink papule  - many hyperkeratotic gritty papules and scaling on face  - pink nonspecific shiny macule R nasal supratip  - shiny pink patch left shin  - There are dome shaped bright red papules on the trunk and extremities .   - Multiple regular brown pigmented macules and papules are identified on the trunk and extremities. .   - Scattered brown macules on sun exposed areas.  - Waxy stuck on papules and plaques on trunk and extremities.   - No other lesions of concern on areas examined.     Medications:  Current Outpatient Medications   Medication    acyclovir (ZOVIRAX) 400 MG tablet    ascorbic acid (VITAMIN C) 500 MG tablet    baclofen (LIORESAL) 10 MG tablet    Multiple Vitamins-Minerals (MULTIVITAL PO)    omeprazole (PRILOSEC) 40 MG DR capsule    ondansetron (ZOFRAN) 8 MG tablet    polyethylene glycol (MIRALAX) 17 GM/Dose powder    prochlorperazine (COMPAZINE) 10 MG tablet    senna-docusate (SENNA S) 8.6-50 MG tablet     No current facility-administered medications for this visit.      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

## 2023-08-08 NOTE — NURSING NOTE
Dermatology Rooming Note    Shahid Davis's goals for this visit include:   Chief Complaint   Patient presents with    Skin Check     Here for a skin check. Spots of concern on right neck.      Maggie Osei RN

## 2023-08-08 NOTE — NURSING NOTE
Lidocaine-epinephrine 1-1:210309 % injection   2mL once for one use, starting 8/8/2023 ending 8/8/2023,  2mL disp, R-0, injection  Injected by Maggie Osei RN

## 2023-08-09 LAB
PATH REPORT.COMMENTS IMP SPEC: ABNORMAL
PATH REPORT.COMMENTS IMP SPEC: YES
PATH REPORT.FINAL DX SPEC: ABNORMAL
PATH REPORT.GROSS SPEC: ABNORMAL
PATH REPORT.MICROSCOPIC SPEC OTHER STN: ABNORMAL
PATH REPORT.RELEVANT HX SPEC: ABNORMAL

## 2023-08-10 ENCOUNTER — TELEPHONE (OUTPATIENT)
Dept: DERMATOLOGY | Facility: CLINIC | Age: 73
End: 2023-08-10
Payer: MEDICARE

## 2023-08-10 DIAGNOSIS — D04.61 SQUAMOUS CELL CARCINOMA IN SITU (SCCIS) OF SKIN OF RIGHT SHOULDER: ICD-10-CM

## 2023-08-10 DIAGNOSIS — C44.42 SQUAMOUS CELL CARCINOMA OF NECK: Primary | ICD-10-CM

## 2023-08-10 NOTE — TELEPHONE ENCOUNTER
[facility]    Phone Message    May a detailed message be left on voicemail: yes     Reason for Call: Requesting Results     Name/type of test: skin Bx  Date of test: 08/08/23  Was test done at a location other than [facility] (Please fill in the location if not [facility])?: No    Action Taken: Message routed to:  [facility] ([facility] Derm    Travel Screening: Not Applicable

## 2023-08-10 NOTE — TELEPHONE ENCOUNTER
Called patient and relayed results, patient had a few questions about Mohs procedure and scheduling time frame. Patient is out of town for 5 weeks starting around September 20th.    Patient aware that referral is being placed and that our surgical scheduler will be reaching out to set everything up.    Izzy MERINO RN

## 2023-08-15 ENCOUNTER — TELEPHONE (OUTPATIENT)
Dept: DERMATOLOGY | Facility: CLINIC | Age: 73
End: 2023-08-15
Payer: MEDICARE

## 2023-08-15 NOTE — TELEPHONE ENCOUNTER
Called patient to schedule surgery with Dr. Harper    Date of Surgery: 11/07    Surgery type: mohs    Consult scheduled: Yes    Has patient had mohs with us before? No    Additional comments: waiting until November due to pt being out of town.       Kelsi Short on 8/15/2023 at 10:45 AM

## 2023-08-16 NOTE — TELEPHONE ENCOUNTER
FUTURE VISIT INFORMATION      FUTURE VISIT INFORMATION:  Date: 11.1.23  Time: 2:15  Location: Telephone  REFERRAL INFORMATION:  Referring provider:  Serg  Referring providers clinic:  Derm  Reason for visit/diagnosis  Right lateral neck inferior SCC and Right posterior shoulder SCCis      RECORDS REQUESTED FROM:       Clinic name Comments Records Status Imaging Status   Derm 8.8.23  Path # [id number] Epic Epic

## 2023-08-24 ENCOUNTER — LAB (OUTPATIENT)
Dept: LAB | Facility: CLINIC | Age: 73
End: 2023-08-24
Attending: INTERNAL MEDICINE
Payer: MEDICARE

## 2023-08-24 PROCEDURE — 250N000011 HC RX IP 250 OP 636: Mod: JZ | Performed by: INTERNAL MEDICINE

## 2023-08-24 RX ORDER — HEPARIN SODIUM (PORCINE) LOCK FLUSH IV SOLN 100 UNIT/ML 100 UNIT/ML
5 SOLUTION INTRAVENOUS ONCE
Status: COMPLETED | OUTPATIENT
Start: 2023-08-24 | End: 2023-08-24

## 2023-08-24 RX ADMIN — Medication 5 ML: at 14:01

## 2023-08-24 NOTE — NURSING NOTE
Chief Complaint   Patient presents with    Blood Draw     Port heparin flush by lab RN     Yi Holland, RN

## 2023-09-21 ENCOUNTER — LAB (OUTPATIENT)
Dept: LAB | Facility: CLINIC | Age: 73
End: 2023-09-21
Attending: INTERNAL MEDICINE
Payer: MEDICARE

## 2023-09-21 PROCEDURE — 250N000011 HC RX IP 250 OP 636: Mod: JZ | Performed by: INTERNAL MEDICINE

## 2023-09-21 RX ORDER — HEPARIN SODIUM (PORCINE) LOCK FLUSH IV SOLN 100 UNIT/ML 100 UNIT/ML
500 SOLUTION INTRAVENOUS ONCE
Status: COMPLETED | OUTPATIENT
Start: 2023-09-21 | End: 2023-09-21

## 2023-09-21 RX ADMIN — Medication 500 UNITS: at 13:30

## 2023-09-21 NOTE — NURSING NOTE
"Chief Complaint   Patient presents with    Port Flush     Port flushed with saline and heparin by RN then de-accessed.       Port accessed with 20 gauge 3/4\" gripper needle by RN, line flushed with saline and heparin.      Rosetta Shah RN      "

## 2023-10-17 ENCOUNTER — ANCILLARY PROCEDURE (OUTPATIENT)
Dept: CARDIOLOGY | Facility: CLINIC | Age: 73
End: 2023-10-17
Attending: INTERNAL MEDICINE
Payer: MEDICARE

## 2023-10-17 DIAGNOSIS — Z95.0 PACEMAKER: ICD-10-CM

## 2023-10-17 PROCEDURE — 93294 REM INTERROG EVL PM/LDLS PM: CPT | Performed by: INTERNAL MEDICINE

## 2023-10-17 PROCEDURE — 93296 REM INTERROG EVL PM/IDS: CPT

## 2023-10-31 DIAGNOSIS — C83.38 DIFFUSE LARGE B-CELL LYMPHOMA OF LYMPH NODES OF MULTIPLE REGIONS (H): Primary | ICD-10-CM

## 2023-11-01 ENCOUNTER — VIRTUAL VISIT (OUTPATIENT)
Dept: DERMATOLOGY | Facility: CLINIC | Age: 73
End: 2023-11-01
Payer: MEDICARE

## 2023-11-01 ENCOUNTER — PATIENT OUTREACH (OUTPATIENT)
Dept: ONCOLOGY | Facility: CLINIC | Age: 73
End: 2023-11-01

## 2023-11-01 ENCOUNTER — PRE VISIT (OUTPATIENT)
Dept: DERMATOLOGY | Facility: CLINIC | Age: 73
End: 2023-11-01

## 2023-11-01 DIAGNOSIS — T50.8X5D ALLERGIC REACTION TO CONTRAST MATERIAL, SUBSEQUENT ENCOUNTER: ICD-10-CM

## 2023-11-01 DIAGNOSIS — C44.42 SQUAMOUS CELL CARCINOMA OF NECK: Primary | ICD-10-CM

## 2023-11-01 DIAGNOSIS — C44.529 SQUAMOUS CELL CARCINOMA OF SKIN OF TRUNK: ICD-10-CM

## 2023-11-01 DIAGNOSIS — C83.38 DIFFUSE LARGE B-CELL LYMPHOMA OF LYMPH NODES OF MULTIPLE REGIONS (H): ICD-10-CM

## 2023-11-01 PROCEDURE — 99441 PR PHYSICIAN TELEPHONE EVALUATION 5-10 MIN: CPT | Mod: 95 | Performed by: DERMATOLOGY

## 2023-11-01 RX ORDER — METHYLPREDNISOLONE 32 MG/1
TABLET ORAL
Qty: 2 TABLET | Refills: 3 | Status: SHIPPED | OUTPATIENT
Start: 2023-11-01 | End: 2024-05-10

## 2023-11-01 ASSESSMENT — PAIN SCALES - GENERAL: PAINLEVEL: NO PAIN (0)

## 2023-11-01 NOTE — NURSING NOTE
Chief Complaint   Patient presents with    Derm Problem     Consultation for Mohs procedure to treat SCC on the right neck and right shoulder.      Marti Slater LPN

## 2023-11-01 NOTE — PROGRESS NOTES
[facility]: Cancer Care Follow-Up Note                                    Discussion with Patient:                                                      LM for pt to call clinic and let us know if he has premeds for his CT tomorrow per Dr Ruelas.  Please let her know if he calls in and needs meds.  Chat message to writer stated Dr Ruelas will probably send some meds in anyhow to be sure.            Goals          General     Pain Management (pt-stated)      Notes - Note created  7/12/2021  9:54 AM by Juwan Stewart RN     Nursing Diagnosis  Acute pain related to injuring agents (i.e. Surgical incision, biological, chemical, or physical injury) as evidenced by patients verbal report of pain/discomfort.    Goals  1.  Patient will wean off of narcotics and transition to OTC analgesics per package instructions.   2.  Patient will use non-pharmacological techniques to help decrease pain during period of recovery.  Patient will report pain management methods relieve pain to a satisfactory level.    3.  Patient will verbally demonstrate the use of appropriate diversional activities and relaxation skills.  4.  Patient reports ability to get enough sleep, rest, and return to normal activities.    Interventions  1.  Anticipate the need for pain management.  Early and timely intervention is key to effective pain management. It can even reduce the total amount of analgesia required.  2.  Administer prescribed analgesics/muscle relaxant as ordered by provider.  3.  Provide and teach patient/caregivers non-pharmacologic comfort measures including ambulating, repositioning, splinting, heat/cold, and use of recommended equipment (athletic supporter, abdominal binder, IS, etc).  4.  The use of OTC aides such as Tylenol, Ibuprofen, etc.                          Dates of Treatment:                                                      Infusion given in last 28 days       None            Assessment:                                                           Plan of Care Education Review:   Assessment completed with:: Patient    Plan of Care Education   Plan of Care:: Imaging (needs prremeds for contrast)    Evaluation of Learning  Patient Education Provided: Yes           Intervention/Education provided during outreach:                                                       LM for pt with clinic number.  Advised to talk to triage nurse since it is so late in the day.      Patient to follow up as scheduled at next appt  Patient to call/[employer] message with updates  Confirmed patient has clinic and triage numbers    Signature:  Soraida Sarabia RN

## 2023-11-01 NOTE — PROGRESS NOTES
[facility]  Date of visit: Nov 2, 2023    Reason for Visit: Follow up triple-hit DLBCL    ONCOLOGIC SUMMARY:  Diagnosis:  Low-grade kappa-restricted plasmacytoid B-cell lymphoma dx 3/2004, presenting with thoracic paraspinal mass. Bone marrow hypercellular with 70-80% involvement by small kappa-restricted lymphocytes which were positive for CD10, CD19, and CD20 and negative for CD5 and CD23.   Transformation to high-grade B-cell lymphoma 12/2022 (versus new primary), presenting with B symptoms, sternal mass, and SOB/chest pain. Biopsy of sternal mass showed large B-cell lymphoma with aggressive features (GCB-subtype), Ki67 %, FISH positive for MYC (non-IgH partner), BCL2, and BCL6 rearrangements. Stage IV with extensive lymphomatous involvement to the R pleura w/avid effusions, mediastinal and pericardial regions, right anterolateral chest wall, adenopathy above and below the diaphragm, liver, spleen and multiple sites in the skeleton. CSF flow negative.     Treatment history:  For low-grade lymphoma:  2005: Fludarabine-based chemo x 6 cycles and XRT to spine (details unclear)    For high-grade lymphoma:  12/20/22: Cycle 1 R-CHOP with Neulasta support (with a few days of steroid prephase prior)  1/11/23: Cycle 2 Switched to DA-R-EPOCH with triple IT chemo for CNS ppx after FISH returned showing triple-hit disease. PET after 2 cycles shows very good CO.   2/2/23: Cycle 3 DA-R EPOCH with triple IT chemo for CNS ppx  2/23/23: Cycle 4 DA-R-EPOCH with triple IT chemo for CNS ppx. PET after 4 cycles shows CRu.   3/17/23: Cycle 5 DA-R-EPOCH with triple IT chemo for CNS ppx  4/6/23: Cycle 6 DA-R-EPOCH. EOT PET shows CR!    INTERVAL HISTORY:  Shahid is here today for follow-up. Doing fairly well overall, recently returned from road trip (around [employer]). Overall doing better per wife.  Energy level good, much better than last visit. Was able to do all the tasks around moving his  trailer, setting up camp without feeling too winded. Able to tolerate miles-long hikes in the woods with his dog. Reports some right knee pain with walking. Good appetite,and po intake, weight is stable.  He has several skin lesions that was removed by Derm - SCC on path. Has a Mohs procedure coming up.  No fever/chills, night sweats, new lumps/bumps, SOB, chest pain, vomiting, abdominal pain, diarrhea, or bleeding. Appetite improving, weight up 10 lb. Increasing activity, out painting for 2 hours at a time. They are planning to road trip out again ~1/20/24 for 3-4 months.     ROS: 10 point ROS neg other than the symptoms noted above in the HPI.    Current Outpatient Medications   Medication Sig Dispense Refill    acyclovir (ZOVIRAX) 400 MG tablet Take 1 tablet (400 mg) by mouth every 12 hours 60 tablet 3    ascorbic acid (VITAMIN C) 500 MG tablet Take 500 mg by mouth every morning      baclofen (LIORESAL) 10 MG tablet Take 0.5-1 tablets (5-10 mg) by mouth 3 times daily as needed for other (hiccups) 30 tablet 0    calcipotriene (DOVONOX) 0.005 % external cream Mix efudex + calcipotriene equally. Apply BID x 4-10 days. Stop when skin gets red. 60 g 1    fluorouracil (EFUDEX) 5 % external cream Mix equally with calcipotriene cream. Apply BID x 4-10 days. Stop when skin gets red. 40 g 0    methylPREDNISolone (MEDROL) 32 MG tablet Take one tablet 12 hours prior to and one tablet 2 hours prior to the procedure with IV contrast 2 tablet 3    Multiple Vitamins-Minerals (MULTIVITAL PO) Take 1 tablet by mouth every morning      omeprazole (PRILOSEC) 40 MG DR capsule Take 1 capsule (40 mg) by mouth daily 90 capsule 4    ondansetron (ZOFRAN) 8 MG tablet Take 1 tablet (8 mg) by mouth every 8 hours as needed for nausea 60 tablet 3    polyethylene glycol (MIRALAX) 17 GM/Dose powder Take 17 g by mouth daily as needed for constipation 510 g 4    prochlorperazine (COMPAZINE) 10 MG tablet Take 1 tablet (10 mg) by mouth every 6 hours  as needed for nausea or vomiting 30 tablet 3    senna-docusate (SENNA S) 8.6-50 MG tablet Take 1 tablet by mouth 2 times daily as needed for constipation 60 tablet 4       Allergies   Allergen Reactions    Ampicillin [Ampicillin Sodium]     Bactrim [Sulfamethoxazole W/Trimethoprim]     Contrast Dye      CT contrast (IV) allergy - need oral Methylpred as premed for scans    Ampicillin Rash     Physical Exam:  /73   Pulse 107   Temp 97.9  F (36.6  C)   Resp 16   Wt 63 kg (138 lb 14.4 oz)   SpO2 98%   BMI 21.75 kg/m       ECOG PS: 0    General: Awake, alert, in no acute distress.   HEENT: Normocephalic, atraumatic. No scleral icterus.   Lymph: No cervical, supraclavicular, or axillary lymphadenopathy appreciated.   CV: Regular rate and rhythm. No murmurs, rubs, or gallops appreciated.  Resp: Good inspiratory effort, lungs clear to auscultation bilaterally.  GI: Abdomen soft, nontender, nondistended.   Ext: No peripheral edema bilaterally.  Neuro: CN II-XII grossly intact. No focal deficits.   Skin: Many scattered SK's on back. 1 cm nodule on R neck. Healed large incision on right posterior shoulder.   Psych: Pleasant, normal affect.     Labs:   I personally reviewed the following labs:    Most Recent 3 CBC's:  Recent Labs   Lab Test 07/20/23  1413 07/13/23  1530 04/28/23  1614   WBC 3.7* 2.2* 7.2   HGB 11.2* 11.2* 10.5*   MCV 93 94 99    148* 193     Most Recent 3 BMP's:  Recent Labs   Lab Test 07/13/23  1530 04/28/23  1614 04/28/23  1200 04/11/23  0710    138  --  142   POTASSIUM 4.8 4.3  --  3.9   CHLORIDE 106 103  --  110*   CO2 28 25  --  24   BUN 29.0* 32.1*  --  30.5*   CR 0.98 0.78  --  0.74   ANIONGAP 7 10  --  8   JORGE 9.5 10.2  --  8.9   GLC 96 196* 122* 109*     Most Recent 2 LFT's:  Recent Labs   Lab Test 07/13/23  1530 04/28/23  1614   AST 28 24   ALT 37 23   ALKPHOS 122 105   BILITOTAL 0.2 0.2     11/2/23 CT CAP:  IMPRESSION:  1.  Evidence of treatment response, with stable to  slightly decreased  prominence of the prevascular pleural/mediastinal soft tissue  thickening underlying the superior sternum. Eccentric thickening  involving the distal right posterior trachea is also slightly less  prominent.  2.  Splenic lesion continues to decrease in size.  3.  Stable irregular sclerotic osseous lesions, no new lesion.    CT Neck:  Impression:  1.  Continued complete response by Lugano criteria.  2.  No evident mass or adenopathy within the neck.     ASSESSMENT/PLAN:    #H/o low-grade kappa restricted plasmacytoid B-cell lymphoma 3/2004   #Transformation to DLBCL 12/2022  H/o low-grade kappa restricted plasmacytoid B-cell lymphoma 3/2004 treated with x6 cycles of fludarabine based chemo and XRT to spine, then has been on surveillance since 2005. He presented in 11/2022 with progressing B symptoms, sternal mass, and SOB/CP. PET 12/9/22 showed extensive lymphomatous involvement to the R pleura w/avid effusions, mediastinal and pericardial regions, right anterolateral chest wall, adenopathy above and below the diaphragm, liver, spleen and multiple sites in the skeleton, concerning for transformation from his low-grade lymphoma. 12/15/22 biopsy of sternal mass showed large B-cell lymphoma with aggressive features (GCB-subtype), Ki67 %. 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  - S/p C1 R-CHOP in the hospital 12/20/22. Tolerated well overall.  - Switched to DA-R-EPOCH starting Cycle 2 (1/11/23) after FISH returned showing triple-hit disease. Staging LP negative, gave triple IT chemo once per cycle for CNS ppx for a total of 4 doses.   - PET after 2 cycles showed LA with significant resolution of most FDG activity/disease other than small area of right paramediastinal pleural thickening (SUVmax 5) and resolution of right pleural effusion. Concurrent Clonoseq was negative. PET after 4 cycles showed further improvement with right paramediastinal  "pleura SUV down to 3.3, also could potentially be artifact since it is near pacer wires.  - Completed Cycle 6 R-EPOCH 4/6/23. Tolerated chemo well overall, stayed at dose level 1 due to thrombocytopenia.  - EOT PET-CT (with ketogenic diet for 3 days prior to suppress cardiac activity) showed CR!  - 6-month surveillance CT shows ongoing remission. Labs and exam reassuring also. Okay to remove port at this time. Concurrent Clonoseq pending.   - Continue surveillance with H&P/labs every 3-6 mo for 2 years, then yearly for years 3-5. Imaging every 6 months for first 2 years only.   - We did discuss his increased risk of recurrence given transformed and triple-hit disease. If he relapses, would likely consider CAR-T therapy.      #Hypogammaglobulinemia    on 12/15/22 and 316 on 4/6/23.   - Given persistently low COVID cycle threshold, hypogammaglobulinemia, and risk for further immunosuppression with chemo, gave IVIG on 1/15 and 4/10.  - Monitor for recurrent infections.    #COVID-19 Infection  He tested positive on his Cycle 2 pre-admission COVID test. He was very surprised, as he does not feel symptomatic.  Notes that his girlfriend had a URI around [place] and was never tested for COVID. Cycle threshold was around 20 indicating active infection.  - S/p remdesivir x 3 days 1/11-1/13/23.  - Remained positive throughout chemo with CT of 31.4 still on 4/6/23 (cycle 6). Gave another dose of IVIG as above.  - Finally cleared COVID as of 6/7/23!     #Sinusitis, resolved  EOT PET-CT suggestive of acute bilateral maxillary sinusitis. He had some ongoing runny nose/congestion.   - Resolved after course of doxycycline in May 2023.     #Non-melanoma skin cancers  History of many SCC's in the past in 2011, 2014, 2022 (very large lesion on right posterior shoulder).   - Developed recent lesion on right neck which he \"ripped off\" on accident, now left with a tender spot with cracked appearance that catches frequently.   - " Saw Dermatology 8/8/23, biopsy from R lateral neck and R posterior shoulder both showed SCC. Planning for Mohs on 11/8/23.     #PPx  - ID: okay to discontinue acyclovir now that he is 6 months post chemo.  - Vaccines: up to date on pneumonia vaccines. Give flu and COVID booster today. Will need Shingrix series at some point.       PLAN:  - Flu and COVID shots today  - IR port removal next available   - RTC in 2 months before they leave for the winter/spring    Patient was seen and discussed with fellow Félix Pagan.     Total of 40 minutes on patient visit, reviewing records, interpreting test results, placing orders, and documentation on the day of service.    Domitila Ruelas MD  Attending Physician, [facility]

## 2023-11-01 NOTE — LETTER
11/1/2023       RE: Shahid Davis   [facility], [place] [address]     Dear Colleague,    Thank you for referring your patient, Shahid Davis, to the [facility]MATOLOG[facility] [place] at [facility]. Please see a copy of my visit note below.    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  - meets Mohs AUC  -discussed risks benefits and alternatives to Mohs  - Likely primary closures  - Surgery scheduled    Exam:  1 cm keratotic nodule on R neck.  1 cm scaling papule on R posterior shoulder.    Hx of Skin Cancer: Yes  Hx of Mohs Surgery: Yes  Transplant: No  Immunocompromised: Yes  Current Anticoagulant(s): None  Bleeding Disorder(s): No  Stent: No  Pacemaker: Yes  Defibrillator: No  Brain/Nerve Stimulator: No  Endocarditis/Rheumatic Fever Hx: No  Vascular graft: No  Prophylactic Antibiotic Needed: No  Congenital heart defect: No  Prosthetic Heart Valve: No  Lesion on Leg/Groin: No  Prosthetic Joint : No  Diabetic: No  HIV/AIDS: No  Hepatitis: No  Pregnant: No  Prior Problem with Local Anesthesia: No  Patient wears CPAP mask: No  Currently Hypertensive: No  Photoprotection: occasionally  Sunburns: beginning of each summer  Tanning Bed Use: never  Current Tobacco Use: No  Current Alcohol Use: Yes  Extended Care Facility: No  Occupation: Self- Employed   needed?: No  Do you have mobility issues?: No  Do you use any assistive devices/DME?: Yes  What assistive divices/DME used?: Cane  Do you have any issues with lying for long periods of time?: No          Hema Harper MD

## 2023-11-02 ENCOUNTER — ANCILLARY PROCEDURE (OUTPATIENT)
Dept: CT IMAGING | Facility: CLINIC | Age: 73
End: 2023-11-02
Attending: INTERNAL MEDICINE
Payer: MEDICARE

## 2023-11-02 ENCOUNTER — ONCOLOGY VISIT (OUTPATIENT)
Dept: ONCOLOGY | Facility: CLINIC | Age: 73
End: 2023-11-02
Attending: INTERNAL MEDICINE
Payer: MEDICARE

## 2023-11-02 ENCOUNTER — APPOINTMENT (OUTPATIENT)
Dept: LAB | Facility: CLINIC | Age: 73
End: 2023-11-02
Attending: INTERNAL MEDICINE
Payer: MEDICARE

## 2023-11-02 VITALS
TEMPERATURE: 97.9 F | RESPIRATION RATE: 16 BRPM | OXYGEN SATURATION: 98 % | WEIGHT: 138.9 LBS | SYSTOLIC BLOOD PRESSURE: 113 MMHG | BODY MASS INDEX: 21.75 KG/M2 | HEART RATE: 107 BPM | DIASTOLIC BLOOD PRESSURE: 73 MMHG

## 2023-11-02 DIAGNOSIS — C83.38 DIFFUSE LARGE B-CELL LYMPHOMA OF LYMPH NODES OF MULTIPLE REGIONS (H): ICD-10-CM

## 2023-11-02 DIAGNOSIS — C83.32 DIFFUSE LARGE B-CELL LYMPHOMA OF INTRATHORACIC LYMPH NODES (H): ICD-10-CM

## 2023-11-02 DIAGNOSIS — Z23 HIGH PRIORITY FOR 2019 NOVEL CORONAVIRUS VACCINATION: Primary | ICD-10-CM

## 2023-11-02 DIAGNOSIS — D80.1 HYPOGAMMAGLOBULINEMIA (H): ICD-10-CM

## 2023-11-02 LAB
ALBUMIN SERPL BCG-MCNC: 4.6 G/DL (ref 3.5–5.2)
ALP SERPL-CCNC: 123 U/L (ref 40–129)
ALT SERPL W P-5'-P-CCNC: 37 U/L (ref 0–70)
ANION GAP SERPL CALCULATED.3IONS-SCNC: 10 MMOL/L (ref 7–15)
AST SERPL W P-5'-P-CCNC: 30 U/L (ref 0–45)
BASOPHILS # BLD AUTO: 0 10E3/UL (ref 0–0.2)
BASOPHILS NFR BLD AUTO: 0 %
BILIRUB SERPL-MCNC: 0.3 MG/DL
BUN SERPL-MCNC: 29.1 MG/DL (ref 8–23)
CALCIUM SERPL-MCNC: 9.6 MG/DL (ref 8.8–10.2)
CHLORIDE SERPL-SCNC: 104 MMOL/L (ref 98–107)
CREAT BLD-MCNC: 0.9 MG/DL (ref 0.7–1.3)
CREAT SERPL-MCNC: 0.84 MG/DL (ref 0.67–1.17)
DEPRECATED HCO3 PLAS-SCNC: 25 MMOL/L (ref 22–29)
EGFRCR SERPLBLD CKD-EPI 2021: >60 ML/MIN/1.73M2
EGFRCR SERPLBLD CKD-EPI 2021: >90 ML/MIN/1.73M2
EOSINOPHIL # BLD AUTO: 0 10E3/UL (ref 0–0.7)
EOSINOPHIL NFR BLD AUTO: 0 %
ERYTHROCYTE [DISTWIDTH] IN BLOOD BY AUTOMATED COUNT: 14.6 % (ref 10–15)
GLUCOSE SERPL-MCNC: 228 MG/DL (ref 70–99)
HCT VFR BLD AUTO: 37.2 % (ref 40–53)
HGB BLD-MCNC: 12.4 G/DL (ref 13.3–17.7)
IMM GRANULOCYTES # BLD: 0 10E3/UL
IMM GRANULOCYTES NFR BLD: 0 %
LYMPHOCYTES # BLD AUTO: 0.3 10E3/UL (ref 0.8–5.3)
LYMPHOCYTES NFR BLD AUTO: 4 %
MCH RBC QN AUTO: 30.4 PG (ref 26.5–33)
MCHC RBC AUTO-ENTMCNC: 33.3 G/DL (ref 31.5–36.5)
MCV RBC AUTO: 91 FL (ref 78–100)
MONOCYTES # BLD AUTO: 0.2 10E3/UL (ref 0–1.3)
MONOCYTES NFR BLD AUTO: 3 %
NEUTROPHILS # BLD AUTO: 8.7 10E3/UL (ref 1.6–8.3)
NEUTROPHILS NFR BLD AUTO: 93 %
NRBC # BLD AUTO: 0 10E3/UL
NRBC BLD AUTO-RTO: 0 /100
PLATELET # BLD AUTO: 142 10E3/UL (ref 150–450)
POTASSIUM SERPL-SCNC: 4.5 MMOL/L (ref 3.4–5.3)
PROT SERPL-MCNC: 6.4 G/DL (ref 6.4–8.3)
RBC # BLD AUTO: 4.08 10E6/UL (ref 4.4–5.9)
SODIUM SERPL-SCNC: 139 MMOL/L (ref 135–145)
WBC # BLD AUTO: 9.3 10E3/UL (ref 4–11)

## 2023-11-02 PROCEDURE — 85025 COMPLETE CBC W/AUTO DIFF WBC: CPT | Performed by: INTERNAL MEDICINE

## 2023-11-02 PROCEDURE — 74177 CT ABD & PELVIS W/CONTRAST: CPT | Mod: MG | Performed by: STUDENT IN AN ORGANIZED HEALTH CARE EDUCATION/TRAINING PROGRAM

## 2023-11-02 PROCEDURE — G1010 CDSM STANSON: HCPCS | Mod: GC | Performed by: STUDENT IN AN ORGANIZED HEALTH CARE EDUCATION/TRAINING PROGRAM

## 2023-11-02 PROCEDURE — G0008 ADMIN INFLUENZA VIRUS VAC: HCPCS | Performed by: INTERNAL MEDICINE

## 2023-11-02 PROCEDURE — 99215 OFFICE O/P EST HI 40 MIN: CPT | Performed by: INTERNAL MEDICINE

## 2023-11-02 PROCEDURE — 90662 IIV NO PRSV INCREASED AG IM: CPT | Performed by: INTERNAL MEDICINE

## 2023-11-02 PROCEDURE — 90480 ADMN SARSCOV2 VAC 1/ONLY CMP: CPT | Performed by: INTERNAL MEDICINE

## 2023-11-02 PROCEDURE — G0463 HOSPITAL OUTPT CLINIC VISIT: HCPCS | Mod: 25 | Performed by: INTERNAL MEDICINE

## 2023-11-02 PROCEDURE — 80053 COMPREHEN METABOLIC PANEL: CPT | Performed by: INTERNAL MEDICINE

## 2023-11-02 PROCEDURE — 250N000011 HC RX IP 250 OP 636: Mod: JZ | Performed by: INTERNAL MEDICINE

## 2023-11-02 PROCEDURE — 71260 CT THORAX DX C+: CPT | Mod: MG | Performed by: STUDENT IN AN ORGANIZED HEALTH CARE EDUCATION/TRAINING PROGRAM

## 2023-11-02 PROCEDURE — 91320 SARSCV2 VAC 30MCG TRS-SUC IM: CPT | Performed by: INTERNAL MEDICINE

## 2023-11-02 PROCEDURE — 80053 COMPREHEN METABOLIC PANEL: CPT | Performed by: PATHOLOGY

## 2023-11-02 PROCEDURE — 70491 CT SOFT TISSUE NECK W/DYE: CPT | Mod: MG | Performed by: STUDENT IN AN ORGANIZED HEALTH CARE EDUCATION/TRAINING PROGRAM

## 2023-11-02 PROCEDURE — 36591 DRAW BLOOD OFF VENOUS DEVICE: CPT | Performed by: INTERNAL MEDICINE

## 2023-11-02 RX ORDER — HEPARIN SODIUM (PORCINE) LOCK FLUSH IV SOLN 100 UNIT/ML 100 UNIT/ML
500 SOLUTION INTRAVENOUS ONCE
Status: COMPLETED | OUTPATIENT
Start: 2023-11-02 | End: 2023-11-02

## 2023-11-02 RX ORDER — DIPHENHYDRAMINE HCL 25 MG
50 CAPSULE ORAL
Status: DISCONTINUED | OUTPATIENT
Start: 2023-11-02 | End: 2023-11-22 | Stop reason: HOSPADM

## 2023-11-02 RX ORDER — IOPAMIDOL 755 MG/ML
78 INJECTION, SOLUTION INTRAVASCULAR ONCE
Status: COMPLETED | OUTPATIENT
Start: 2023-11-02 | End: 2023-11-02

## 2023-11-02 RX ORDER — DIPHENHYDRAMINE HYDROCHLORIDE 50 MG/ML
50 INJECTION INTRAMUSCULAR; INTRAVENOUS
Status: DISCONTINUED | OUTPATIENT
Start: 2023-11-02 | End: 2023-11-22 | Stop reason: HOSPADM

## 2023-11-02 RX ORDER — EPINEPHRINE 1 MG/ML
0.3 INJECTION, SOLUTION INTRAMUSCULAR; SUBCUTANEOUS EVERY 5 MIN PRN
Status: DISCONTINUED | OUTPATIENT
Start: 2023-11-02 | End: 2023-11-22 | Stop reason: HOSPADM

## 2023-11-02 RX ORDER — HEPARIN SODIUM (PORCINE) LOCK FLUSH IV SOLN 100 UNIT/ML 100 UNIT/ML
5 SOLUTION INTRAVENOUS ONCE
Status: COMPLETED | OUTPATIENT
Start: 2023-11-02 | End: 2023-11-02

## 2023-11-02 RX ADMIN — IOPAMIDOL 78 ML: 755 INJECTION, SOLUTION INTRAVASCULAR at 12:31

## 2023-11-02 RX ADMIN — INFLUENZA A VIRUS A/VICTORIA/4897/2022 IVR-238 (H1N1) ANTIGEN (FORMALDEHYDE INACTIVATED), INFLUENZA A VIRUS A/DARWIN/9/2021 SAN-010 (H3N2) ANTIGEN (FORMALDEHYDE INACTIVATED), INFLUENZA B VIRUS B/PHUKET/3073/2013 ANTIGEN (FORMALDEHYDE INACTIVATED), AND INFLUENZA B VIRUS B/MICHIGAN/01/2021 ANTIGEN (FORMALDEHYDE INACTIVATED) 0.7 ML: 60; 60; 60; 60 INJECTION, SUSPENSION INTRAMUSCULAR at 14:34

## 2023-11-02 RX ADMIN — COVID-19 VACCINE, MRNA 30 MCG: 0.05 INJECTION, SUSPENSION INTRAMUSCULAR at 14:34

## 2023-11-02 RX ADMIN — Medication 5 ML: at 11:45

## 2023-11-02 RX ADMIN — HEPARIN SODIUM (PORCINE) LOCK FLUSH IV SOLN 100 UNIT/ML 500 UNITS: 100 SOLUTION at 12:21

## 2023-11-02 ASSESSMENT — PAIN SCALES - GENERAL: PAINLEVEL: NO PAIN (0)

## 2023-11-02 NOTE — NURSING NOTE
Chief Complaint   Patient presents with    Port Draw     Port accessed with 20 GAUGE 3/4 inch flat needle by RN, labs collected, line flushed with saline and heparin.  Vitals taken. Pt checked in for appointment(s).       Becca Koch RN

## 2023-11-02 NOTE — NURSING NOTE
"Oncology Rooming Note    November 2, 2023 1:24 PM   Shahid Davis is a 73 year old male who presents for:    Chief Complaint   Patient presents with    Port Draw     Port accessed with 20 GAUGE 3/4 inch flat needle by RN, labs collected, line flushed with saline and heparin.  Vitals taken. Pt checked in for appointment(s).      Oncology Clinic Visit     DLBCL     Initial Vitals: /73   Pulse 107   Temp 97.9  F (36.6  C)   Resp 16   Wt 63 kg (138 lb 14.4 oz)   SpO2 98%   BMI 21.75 kg/m   Estimated body mass index is 21.75 kg/m  as calculated from the following:    Height as of 4/6/23: 1.702 m (5' 7\").    Weight as of this encounter: 63 kg (138 lb 14.4 oz). Body surface area is 1.73 meters squared.  No Pain (0) Comment: Data Unavailable   No LMP for male patient.  Allergies reviewed: Yes  Medications reviewed: Yes    Medications: Medication refills not needed today.  Pharmacy name entered into [facility]:    [id number] [place] [address] [id number] [address] AT [facility] & [address]    Clinical concerns:  Patient states there are no new concerns to discuss with provider.  Dr Ruelas was not notified.        Crystal Mccauley CMA              "

## 2023-11-02 NOTE — LETTER
11/2/2023         RE: Shahid Davis   [facility] [id number]        Dear Colleague,    Thank you for referring your patient, Shahid Davis, to the [facility]. Please see a copy of my visit note below.    [facility]  Date of visit: Nov 2, 2023    Reason for Visit: Follow up triple-hit DLBCL    ONCOLOGIC SUMMARY:  Diagnosis:  Low-grade kappa-restricted plasmacytoid B-cell lymphoma dx 3/2004, presenting with thoracic paraspinal mass. Bone marrow hypercellular with 70-80% involvement by small kappa-restricted lymphocytes which were positive for CD10, CD19, and CD20 and negative for CD5 and CD23.   Transformation to high-grade B-cell lymphoma 12/2022 (versus new primary), presenting with B symptoms, sternal mass, and SOB/chest pain. Biopsy of sternal mass showed large B-cell lymphoma with aggressive features (GCB-subtype), Ki67 %, FISH positive for MYC (non-IgH partner), BCL2, and BCL6 rearrangements. Stage IV with extensive lymphomatous involvement to the R pleura w/avid effusions, mediastinal and pericardial regions, right anterolateral chest wall, adenopathy above and below the diaphragm, liver, spleen and multiple sites in the skeleton. CSF flow negative     Treatment history:  For low-grade lymphoma:  2005: Fludarabine-based chemo x 6 cycles and XRT to spine (details unclear)    For high-grade lymphoma:  12/20/22: Cycle 1 R-CHOP with Neulasta support (with a few days of steroid prephase prior)  1/11/23: Cycle 2 Switched to DA-R-EPOCH with triple IT chemo for CNS ppx after FISH returned showing triple-hit disease. PET after 2 cycles shows very good NM.   2/2/23: Cycle 3 DA-R EPOCH with triple IT chemo for CNS ppx  2/23/23: Cycle 4 DA-R-EPOCH with triple IT chemo for CNS ppx. PET after 4 cycles shows CRu.   3/17/23: Cycle 5 DA-R-EPOCH with triple IT chemo for CNS ppx  4/6/23: Cycle 6 DA-R-EPOCH. EOT PET shows  CR!    INTERVAL HISTORY:  Shahid is here today for follow-up. Doing fairly well overall, recently [place] [place] [place] [facility] Overall doing better per wife.  Energy level good, much better than last visit. Was able to do all the tasks around moving his trailer, setting up camp without feeling too winded. Able to tolerate miles-long hikes in the woods with his dog. Reports some right knee pain with walking. Good appetite,and po intake, weight is stable.  He has several skin lesions that was removed by Derm - SCC on path. Has a Mohs procedure coming up.  No fever/chills, night sweats, new lumps/bumps, SOB, chest pain, vomiting, abdominal pain, diarrhea, or bleeding. Appetite improving, weight up 10 lb. Increasing activity, out painting for 2 hours at a time.     ROS: 10 point ROS neg other than the symptoms noted above in the HPI.    Current Outpatient Medications   Medication Sig Dispense Refill    acyclovir (ZOVIRAX) 400 MG tablet Take 1 tablet (400 mg) by mouth every 12 hours 60 tablet 3    ascorbic acid (VITAMIN C) 500 MG tablet Take 500 mg by mouth every morning      baclofen (LIORESAL) 10 MG tablet Take 0.5-1 tablets (5-10 mg) by mouth 3 times daily as needed for other (hiccups) 30 tablet 0    calcipotriene (DOVONOX) 0.005 % external cream Mix efudex + calcipotriene equally. Apply BID x 4-10 days. Stop when skin gets red. 60 g 1    fluorouracil (EFUDEX) 5 % external cream Mix equally with calcipotriene cream. Apply BID x 4-10 days. Stop when skin gets red. 40 g 0    methylPREDNISolone (MEDROL) 32 MG tablet Take one tablet 12 hours prior to and one tablet 2 hours prior to the procedure with IV contrast 2 tablet 3    Multiple Vitamins-Minerals (MULTIVITAL PO) Take 1 tablet by mouth every morning      omeprazole (PRILOSEC) 40 MG DR capsule Take 1 capsule (40 mg) by mouth daily 90 capsule 4    ondansetron (ZOFRAN) 8 MG tablet Take 1 tablet (8 mg) by mouth every 8 hours as needed for nausea 60 tablet 3     polyethylene glycol (MIRALAX) 17 GM/Dose powder Take 17 g by mouth daily as needed for constipation 510 g 4    prochlorperazine (COMPAZINE) 10 MG tablet Take 1 tablet (10 mg) by mouth every 6 hours as needed for nausea or vomiting 30 tablet 3    senna-docusate (SENNA S) 8.6-50 MG tablet Take 1 tablet by mouth 2 times daily as needed for constipation 60 tablet 4       Allergies   Allergen Reactions    Ampicillin [Ampicillin Sodium]     Bactrim [Sulfamethoxazole W/Trimethoprim]     Contrast Dye      CT contrast (IV) allergy - need oral Methylpred as premed for scans    Ampicillin Rash       Physical Exam:  There were no vitals taken for this visit.     ECOG PS: 0    General: Awake, alert, in no acute distress.   HEENT: Normocephalic, atraumatic. No scleral icterus.   Lymph: No cervical, supraclavicular, or axillary lymphadenopathy appreciated.   CV: Regular rate and rhythm. No murmurs, rubs, or gallops appreciated.  Resp: Good inspiratory effort, lungs clear to auscultation bilaterally.  GI: Abdomen soft, nontender, nondistended.   Ext: No peripheral edema bilaterally.  Neuro: CN II-XII grossly intact. No focal deficits.   Skin: Many scattered SK's on back. 2-3 cm raised lesion on right neck with cracked appearance (see photo). Healed large incision on right posterior shoulder.   Psych: Pleasant, normal affect.     Labs:   I personally reviewed the following labs:    Most Recent 3 CBC's:  Recent Labs   Lab Test 07/20/23  1413 07/13/23  1530 04/28/23  1614   WBC 3.7* 2.2* 7.2   HGB 11.2* 11.2* 10.5*   MCV 93 94 99    148* 193     Most Recent 3 BMP's:  Recent Labs   Lab Test 07/13/23  1530 04/28/23  1614 04/28/23  1200 04/11/23  0710    138  --  142   POTASSIUM 4.8 4.3  --  3.9   CHLORIDE 106 103  --  110*   CO2 28 25  --  24   BUN 29.0* 32.1*  --  30.5*   CR 0.98 0.78  --  0.74   ANIONGAP 7 10  --  8   JORGE 9.5 10.2  --  8.9   GLC 96 196* 122* 109*     Most Recent 2 LFT's:  Recent Labs   Lab Test  07/13/23  1530 04/28/23  1614   AST 28 24   ALT 37 23   ALKPHOS 122 105   BILITOTAL 0.2 0.2       ASSESSMENT/PLAN:    #H/o low-grade kappa restricted plasmacytoid B-cell lymphoma 3/2004   #Transformation to DLBCL 12/2022  H/o low-grade kappa restricted plasmacytoid B-cell lymphoma 3/2004 treated with x6 cycles of fludarabine based chemo and XRT to spine, then has been on surveillance since 2005. He presented in 11/2022 with progressing B symptoms, sternal mass, and SOB/CP. PET 12/9/22 showed extensive lymphomatous involvement to the R pleura w/avid effusions, mediastinal and pericardial regions, right anterolateral chest wall, adenopathy above and below the diaphragm, liver, spleen and multiple sites in the skeleton, concerning for transformation from his low-grade lymphoma. 12/15/22 biopsy of sternal mass showed large B-cell lymphoma with aggressive features (GCB-subtype), Ki67 %. 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  - S/p C1 R-CHOP in the hospital 12/20/22. Tolerated well overall.  - Switched to DA-R-EPOCH starting Cycle 2 (1/11/23) after FISH returned showing triple-hit disease. Staging LP negative, gave triple IT chemo once per cycle for CNS ppx for a total of 4 doses.   - PET after 2 cycles showed AL with significant resolution of most FDG activity/disease other than small area of right paramediastinal pleural thickening (SUVmax 5) and resolution of right pleural effusion. Concurrent Clonoseq was negative. PET after 4 cycles showed further improvement with right paramediastinal pleura SUV down to 3.3, also could potentially be artifact since it is near pacer wires.  - Completed Cycle 6 R-EPOCH 4/6/23. Tolerated chemo well overall, stayed at dose level 1 due to thrombocytopenia.  - EOT PET-CT (with ketogenic diet for 3 days prior to suppress cardiac activity) showed CR!  - Labs and exam reassuring today. Continue surveillance with H&P/labs every 3-6 mo for  "2 years, then yearly for years 3-5. Imaging every 6 months for first 2 years only.   - We did discuss his increased risk of recurrence given transformed and triple-hit disease. If he relapses, would likely consider CAR-T therapy. Will plan to leave port in until next scan at least.     #PPx  - TLS: Now done with allopurinol.   - ID: Continue ACV for 6 months post R-EPOCH. No longer needs levofloxacin or fluconazole.      #Hypogammaglobulinemia    on 12/15/22 and 316 on 4/6/23.   - Given persistently low COVID cycle threshold, hypogammaglobulinemia, and risk for further immunosuppression with chemo, gave IVIG on 1/15 and 4/10.    #COVID-19 Infection  He tested positive on his Cycle 2 pre-admission COVID test. He was very surprised, as he does not feel symptomatic.  Notes that his girlfriend had a URI around [place] and was never tested for COVID. Cycle threshold was around 20 indicating active infection.  - S/p remdesivir x 3 days 1/11-1/13/23.  - Remained positive throughout chemo with CT of 31.4 still on 4/6/23 (cycle 6). Gave another dose of IVIG as above.  - Finally cleared COVID as of 6/7/23!     #Sinusitis, resolved  EOT PET-CT suggestive of acute bilateral maxillary sinusitis. He had some ongoing runny nose/congestion.   - Resolved after course of doxycycline in May 2023.     #Non-melanoma skin cancers  History of many SCC's in the past in 2011, 2014, 2022 (very large lesion on right posterior shoulder).   - Has a 2-3 cm lesion on right neck which he recently \"ripped off\" on accident, now left with a tender spot with cracked appearance that catches frequently.   - Should reconnect with Dermatology for annual full body skin exams; referral placed.       PLAN:  - Flu and COVID shots today  - IR port removal next available   - RTC in 2 months before they leave for the winter/spring    Total of 45 minutes on patient visit, reviewing records, interpreting test results, placing orders, and documentation on " the day of service.    Domitila Ruelas MD  Attending Physician, [facility]

## 2023-11-07 ENCOUNTER — OFFICE VISIT (OUTPATIENT)
Dept: DERMATOLOGY | Facility: CLINIC | Age: 73
End: 2023-11-07
Payer: MEDICARE

## 2023-11-07 VITALS — HEART RATE: 92 BPM | DIASTOLIC BLOOD PRESSURE: 77 MMHG | SYSTOLIC BLOOD PRESSURE: 129 MMHG

## 2023-11-07 DIAGNOSIS — D04.61 SQUAMOUS CELL CARCINOMA IN SITU (SCCIS) OF SKIN OF RIGHT SHOULDER: ICD-10-CM

## 2023-11-07 DIAGNOSIS — C44.42 SQUAMOUS CELL CARCINOMA OF NECK: ICD-10-CM

## 2023-11-07 PROCEDURE — 17313 MOHS 1 STAGE T/A/L: CPT | Mod: GC | Performed by: DERMATOLOGY

## 2023-11-07 PROCEDURE — 13132 CMPLX RPR F/C/C/M/N/AX/G/H/F: CPT | Mod: GC | Performed by: DERMATOLOGY

## 2023-11-07 PROCEDURE — 12032 INTMD RPR S/A/T/EXT 2.6-7.5: CPT | Mod: GC | Performed by: DERMATOLOGY

## 2023-11-07 PROCEDURE — 17311 MOHS 1 STAGE H/N/HF/G: CPT | Mod: GC | Performed by: DERMATOLOGY

## 2023-11-07 ASSESSMENT — PAIN SCALES - GENERAL: PAINLEVEL: NO PAIN (0)

## 2023-11-07 NOTE — PROGRESS NOTES
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  - meets Mohs AUC  -discussed risks benefits and alternatives to Mohs  - Likely primary closures  - Surgery scheduled    Exam:  1 cm keratotic nodule on R neck.  1 cm scaling papule on R posterior shoulder.    Hx of Skin Cancer: Yes  Hx of Mohs Surgery: Yes  Transplant: No  Immunocompromised: Yes  Current Anticoagulant(s): None  Bleeding Disorder(s): No  Stent: No  Pacemaker: Yes  Defibrillator: No  Brain/Nerve Stimulator: No  Endocarditis/Rheumatic Fever Hx: No  Vascular graft: No  Prophylactic Antibiotic Needed: No  Congenital heart defect: No  Prosthetic Heart Valve: No  Lesion on Leg/Groin: No  Prosthetic Joint : No  Diabetic: No  HIV/AIDS: No  Hepatitis: No  Pregnant: No  Prior Problem with Local Anesthesia: No  Patient wears CPAP mask: No  Currently Hypertensive: No  Photoprotection: occasionally  Sunburns: beginning of each summer  Tanning Bed Use: never  Current Tobacco Use: No  Current Alcohol Use: Yes  Extended Care Facility: No  Occupation: Self- Employed   needed?: No  Do you have mobility issues?: No  Do you use any assistive devices/DME?: Yes  What assistive divices/DME used?: Cane  Do you have any issues with lying for long periods of time?: No          Hema Harper MD

## 2023-11-07 NOTE — LETTER
11/7/2023       RE: Shahid Davis   [facility] [id number]     Dear Colleague,    Thank you for referring your patient, Shahid Davis, to the [facility] DERMATOLOGIC SURGERY CLINIC [place] at [facility]. Please see a copy of my visit note below.    [facility] Dermatologic Surgery Clinic [place] Procedure Note      Date of Service:  Nov 7, 2023  Surgery: Mohs micrographic surgery    Case 1  Repair Type: Complex  Repair Size: 3.3 cm  Suture Material: 4-0 Monocryl / 5-0 Fast Absorbing Gut  Tumor Type: Squamous Cell Carcinoma  Location: R lateral neck inferior   Derm-Path Accession #: [id number]   PreOp Size: 0.6 cm x 0.5 cm  PostOp Size: 1.2 cm x 1.0 cm  Mohs Accession #:   Level of Defect: Fat      Procedure:  We discussed the principles of treatment and most likely complications including scarring, bleeding, infection, swelling, pain, crusting, nerve damage, large wound,  incomplete excision, wound dehiscence,  nerve damage, recurrence, and a second procedure may be recommended to obtain the best cosmetic or functional result.    Informed consent was obtained and the patient underwent the procedure as follows:  The patient was placed supine on the operating table.  The cancer was identified, outlined with a marker, and verified by the patient.  The entire surgical field was prepped with Hibiclens.  The surgical site was anesthetized using Lidocaine 1% with epi 1:100,000.      The area of clinically apparent tumor was not debulked. The layer of tissue was then surgically excised using a #15 blade and was then transferred onto a specimen sheet maintaining the orientation of the specimen. Hemostasis was obtained using electrofulguration. The wound site was then covered with a dressing while the tissue samples were processed for examination.    The excised tissue was transported to the Mohs histology laboratory maintaining  the tissue orientation.  The tissue specimen was relaxed so that the entire surgical margin was in a a single horizontal plane for sectioning and inked for precise mapping.  A precise reference map was drawn to reflect the sectioning of the specimen, colored inking of the margins, and orientation on the patient. The tissue was processed using horizontal sectioning of the base and continuous peripheral margins.  The histopathologic sections were reviewed in conjunction with the reference map.    Total blocks: 1    Total slides:  2    There were no cancer cells visualized on examination, therefore Mohs surgery was complete.      Reconstruction: Complex Linear Closure    Due to one or more of the following factors, complex linear closure was required/indicated: Extensive undermining (equal to or greater than the maximum perpendicular width of the defect), exposure of underlying structure (bone, cartilage, tendon, named neurovascular), free margin involvement (helical rim, vermillion border, nostril rim), and/or placement of retention sutures.     The patient was taken to the operative suite and placed supine on the operating room table.  The defect was identified.  Appropriate markings were made with a marking pen to plan the repair.  The area was infiltrated with Lidocaine 1% with epi 1:100,000 and prepped with Hibiclens and draped with sterile towels.     The wound was debeveled and undermined widely.  Cones were excised within relaxed skin tension lines on both sides of the defect.  Hemostasis was obtained using electrocoagulation. A fascial plication suture using 4-0 Monocryl suture material was placed to narrow the primary defect. Subcutaneous tissues were then approximated using 4-0 Monocryl buried vertical mattress sutures.  The wound edges were then approximated additional 5-0 Fast Absorbing Gut buried sutures were placed in a similar fashion where needed.  Percutaneous simple running 5-0 Fast Absorbing Gut  sutures were carefully placed for maximum eversion and meticulous approximation.    Repair Size: 3.3 cm    The wound was cleansed with saline and ointment was applied along the wound surface.     A sterile pressure dressing was applied.  Wound care instructions were given verbally and in writing.  The patient left the operating suite in stable condition.      The attending surgeon was present for entire procedure and always immediately available.      [facility] Dermatologic Surgery Clinic [place] Procedure Note      Date of Service:  Nov 7, 2023  Surgery: Mohs micrographic surgery    Case 2  Repair Type: Intermediate Repair  Repair Size: 4.2 cm  Suture Material: 4-0 Monocryl  Tumor Type: Squamous Cell Carcinoma in situ  Location: R Posterior Shoulder  Derm-Path Accession #: [id number]   PreOp Size: 1.0 cm x 0.8 cm  PostOp Size: 2.2 cm x 2.0 cm  Mohs Accession #:   Level of Defect: Fat      Procedure:  We discussed the principles of treatment and most likely complications including scarring, bleeding, infection, swelling, pain, crusting, nerve damage, large wound,  incomplete excision, wound dehiscence,  nerve damage, recurrence, and a second procedure may be recommended to obtain the best cosmetic or functional result.    Informed consent was obtained and the patient underwent the procedure as follows:  The patient was placed prone on the operating table.  The cancer was identified, outlined with a marker, and verified by the patient.  The entire surgical field was prepped with Hibiclens.  The surgical site was anesthetized using Lidocaine 1% with epi 1:100,000.      The area of clinically apparent tumor was not debulked. The layer of tissue was then surgically excised using a #15 blade and was then transferred onto a specimen sheet maintaining the orientation of the specimen. Hemostasis was obtained using electrofulguration. The wound site was then covered with a dressing while the tissue samples  were processed for examination.    The excised tissue was transported to the Mohs histology laboratory maintaining the tissue orientation.  The tissue specimen was relaxed so that the entire surgical margin was in a a single horizontal plane for sectioning and inked for precise mapping.  A precise reference map was drawn to reflect the sectioning of the specimen, colored inking of the margins, and orientation on the patient.  The tissue was processed using horizontal sectioning of the base and continuous peripheral margins.  The histopathologic sections were reviewed in conjunction with the reference map.    Total blocks: 1    Total slides:  2    There were no cancer cells visualized on examination, therefore Mohs surgery was complete.      Reconstruction: Intermediate Linear Closure      The patient was taken to the operative suite and placed supine on the operating room table.  The defect was identified.  Appropriate markings were made with a marking pen to plan the repair.  The area was infiltrated with Lidocaine 1% with epi 1:100,000 and prepped with Hibiclens and draped with sterile towels.     The wound was debeveled and undermined widely.  Cones were excised within relaxed skin tension lines on both sides of the defect.  Hemostasis was obtained using electrofulguration.  Subcutaneous tissues were then approximated using 4.0 Monocrcyl buried vertical mattress sutures.  The wound edges were then approximated additional  buried sutures were placed in a similar fashion where needed.  Percutaneous simple running 4.0 Monocrcyl sutures were carefully placed for maximum eversion and meticulous approximation.    Repair Size: 4.2 cm     The wound was cleansed with saline and ointment was applied along the wound surface.     A sterile pressure dressing was applied.  Wound care instructions were given verbally and in writing.  The patient left the operating suite in stable condition.    The attending surgeon was present  for entire procedure and always immediately available.    Scribe Disclosure:   I, MIAN COSMO, am serving as a scribe; to document services personally performed by Hema Harper MD -based on data collection and the provider's statements to me.       Attending attestation:  I was present for key elements of the procedure and immediately available for all other portions of the procedure.  I have reviewed the note and edited it as necessary.    Hema Harper M.D.  Professor  Director of Dermatologic Surgery  Department of Dermatology  [facility]    Dermatology Surgery Clinic  [facility]  [address] [place], [place] [id number]

## 2023-11-07 NOTE — NURSING NOTE
Chief Complaint   Patient presents with    mohs     Right lateral neck inferior and right posterior shoulder.      Jennifer FLYNN CMA

## 2023-11-07 NOTE — PROGRESS NOTES
[facility] Dermatologic Surgery Clinic [place] Procedure Note      Date of Service:  Nov 7, 2023  Surgery: Mohs micrographic surgery    Case 1  Repair Type: Complex  Repair Size: 3.3 cm  Suture Material: 4-0 Monocryl / 5-0 Fast Absorbing Gut  Tumor Type: Squamous Cell Carcinoma  Location: R lateral neck inferior   Derm-Path Accession #: [id number]   PreOp Size: 0.6 cm x 0.5 cm  PostOp Size: 1.2 cm x 1.0 cm  Mohs Accession #:   Level of Defect: Fat      Procedure:  We discussed the principles of treatment and most likely complications including scarring, bleeding, infection, swelling, pain, crusting, nerve damage, large wound,  incomplete excision, wound dehiscence,  nerve damage, recurrence, and a second procedure may be recommended to obtain the best cosmetic or functional result.    Informed consent was obtained and the patient underwent the procedure as follows:  The patient was placed supine on the operating table.  The cancer was identified, outlined with a marker, and verified by the patient.  The entire surgical field was prepped with Hibiclens.  The surgical site was anesthetized using Lidocaine 1% with epi 1:100,000.      The area of clinically apparent tumor was not debulked. The layer of tissue was then surgically excised using a #15 blade and was then transferred onto a specimen sheet maintaining the orientation of the specimen. Hemostasis was obtained using electrofulguration. The wound site was then covered with a dressing while the tissue samples were processed for examination.    The excised tissue was transported to the Mohs histology laboratory maintaining the tissue orientation.  The tissue specimen was relaxed so that the entire surgical margin was in a a single horizontal plane for sectioning and inked for precise mapping.  A precise reference map was drawn to reflect the sectioning of the specimen, colored inking of the margins, and orientation on the patient. The tissue was  processed using horizontal sectioning of the base and continuous peripheral margins.  The histopathologic sections were reviewed in conjunction with the reference map.    Total blocks: 1    Total slides:  2    There were no cancer cells visualized on examination, therefore Mohs surgery was complete.      Reconstruction: Complex Linear Closure    Due to one or more of the following factors, complex linear closure was required/indicated: Extensive undermining (equal to or greater than the maximum perpendicular width of the defect), exposure of underlying structure (bone, cartilage, tendon, named neurovascular), free margin involvement (helical rim, vermillion border, nostril rim), and/or placement of retention sutures.     The patient was taken to the operative suite and placed supine on the operating room table.  The defect was identified.  Appropriate markings were made with a marking pen to plan the repair.  The area was infiltrated with Lidocaine 1% with epi 1:100,000 and prepped with Hibiclens and draped with sterile towels.     The wound was debeveled and undermined widely.  Cones were excised within relaxed skin tension lines on both sides of the defect.  Hemostasis was obtained using electrocoagulation. A fascial plication suture using 4-0 Monocryl suture material was placed to narrow the primary defect. Subcutaneous tissues were then approximated using 4-0 Monocryl buried vertical mattress sutures.  The wound edges were then approximated additional 5-0 Fast Absorbing Gut buried sutures were placed in a similar fashion where needed.  Percutaneous simple running 5-0 Fast Absorbing Gut sutures were carefully placed for maximum eversion and meticulous approximation.    Repair Size: 3.3 cm    The wound was cleansed with saline and ointment was applied along the wound surface.     A sterile pressure dressing was applied.  Wound care instructions were given verbally and in writing.  The patient left the operating  suite in stable condition.      The attending surgeon was present for entire procedure and always immediately available.      [facility] Dermatologic Surgery Clinic [place] Procedure Note      Date of Service:  Nov 7, 2023  Surgery: Mohs micrographic surgery    Case 2  Repair Type: Intermediate Repair  Repair Size: 4.2 cm  Suture Material: 4-0 Monocryl  Tumor Type: Squamous Cell Carcinoma in situ  Location: R Posterior Shoulder  Derm-Path Accession #: [id number]   PreOp Size: 1.0 cm x 0.8 cm  PostOp Size: 2.2 cm x 2.0 cm  Mohs Accession #:   Level of Defect: Fat      Procedure:  We discussed the principles of treatment and most likely complications including scarring, bleeding, infection, swelling, pain, crusting, nerve damage, large wound,  incomplete excision, wound dehiscence,  nerve damage, recurrence, and a second procedure may be recommended to obtain the best cosmetic or functional result.    Informed consent was obtained and the patient underwent the procedure as follows:  The patient was placed prone on the operating table.  The cancer was identified, outlined with a marker, and verified by the patient.  The entire surgical field was prepped with Hibiclens.  The surgical site was anesthetized using Lidocaine 1% with epi 1:100,000.      The area of clinically apparent tumor was not debulked. The layer of tissue was then surgically excised using a #15 blade and was then transferred onto a specimen sheet maintaining the orientation of the specimen. Hemostasis was obtained using electrofulguration. The wound site was then covered with a dressing while the tissue samples were processed for examination.    The excised tissue was transported to the Mohs histology laboratory maintaining the tissue orientation.  The tissue specimen was relaxed so that the entire surgical margin was in a a single horizontal plane for sectioning and inked for precise mapping.  A precise reference map was drawn to  reflect the sectioning of the specimen, colored inking of the margins, and orientation on the patient.  The tissue was processed using horizontal sectioning of the base and continuous peripheral margins.  The histopathologic sections were reviewed in conjunction with the reference map.    Total blocks: 1    Total slides:  2    There were no cancer cells visualized on examination, therefore Mohs surgery was complete.      Reconstruction: Intermediate Linear Closure      The patient was taken to the operative suite and placed supine on the operating room table.  The defect was identified.  Appropriate markings were made with a marking pen to plan the repair.  The area was infiltrated with Lidocaine 1% with epi 1:100,000 and prepped with Hibiclens and draped with sterile towels.     The wound was debeveled and undermined widely.  Cones were excised within relaxed skin tension lines on both sides of the defect.  Hemostasis was obtained using electrofulguration.  Subcutaneous tissues were then approximated using 4.0 Monocrcyl buried vertical mattress sutures.  The wound edges were then approximated additional  buried sutures were placed in a similar fashion where needed.  Percutaneous simple running 4.0 Monocrcyl sutures were carefully placed for maximum eversion and meticulous approximation.    Repair Size: 4.2 cm     The wound was cleansed with saline and ointment was applied along the wound surface.     A sterile pressure dressing was applied.  Wound care instructions were given verbally and in writing.  The patient left the operating suite in stable condition.    The attending surgeon was present for entire procedure and always immediately available.    Scribe Disclosure:   I, MIAN WILBURN, am serving as a scribe; to document services personally performed by Hema Harper MD -based on data collection and the provider's statements to me.       Attending attestation:  I was present for key elements of the procedure and  immediately available for all other portions of the procedure.  I have reviewed the note and edited it as necessary.    Hema Harper M.D.  Professor  Director of Dermatologic Surgery  Department of Dermatology  [facility]    Dermatology Surgery Clinic  [place] [id number]

## 2023-11-07 NOTE — PATIENT INSTRUCTIONS
Wound care    I will experience scar, bleeding, swelling, pain, crusting and redness. I may experience incomplete removal or recurrence. Risks are bleeding, bruising, swelling, infection, nerve damage, & a large wound. A second procedure may be recommended to obtain the best cosmetic or functional result.       A three month office visit with your Surgeon is recommended for scar evaluation. Please reach out sooner if you have concerns about you surgical site/wound.    Caring for your skin after surgery    After your surgery, a pressure bandage will be placed over the area that has stitches. This is important to prevent bleeding. Please follow these instructions over the next 1 to 2 weeks. Following this regimen will help to prevent complications as your wound heals.     For the first 48 hours after your surgery:    Leave the pressure dressing on and keep it dry. If it should come loose, you may re-tape it, but do not take it off.  Relax and take it easy. Do not do any vigorous exercise or heavy lifting. This could cause the wound to bleed.  Post-Operative pain is usually mild. If you are able to take tylenol, You may take plain or extra-strength Tylenol (acetaminophen) As directed on the bottle (do not take more than 4,000mg in one day). If you are able to take ibuprofen, you can alternate the tylenol and ibuprofen.   Avoid alcohol as this may increase your tendency to bleed.   You may put an ice pack around the bandaged area for 20 minutes at a time as needed. This may help reduce swelling, bruising, and pain. Make sure the ice pack is waterproof so that the pressure bandage doesn t get wet.  If the wound is on the face try to sleep with your head elevated. Either in a recliner or propped up in bed, this will decrease swelling around the eyes.   You may see a small amount of drainage or blood on your pressure bandage. This is normal. However:  If drainage or bleeding continues or saturates the bandage, you will  "need to apply firm pressure over the bandage with a piece of gauze for 15 minutes.  If bleeding continues after applying pressure for 15 minutes, apply an ice pack to the bandaged area for 15 minutes.  If bleeding still continues, call our office or go to the nearest emergency room.    Remove pressure dressing 48 hours after surgery:    Carefully remove the pressure bandage. If it seems sticky or too difficult to get off, you may need to soak it off in the shower.Leave the transparent tape bandage on your shoulder until it falls off. Ensure each day when you get it wet that there is no water underneath the clear film, if there is take it off and do regular wound care as listed below:  After the pressure dressing is removed, you may shower and get the wound wet. However, Do Not let the forceful stream of the shower hit the wound directly.  Follow these wound care and dressing change instructions:   In the shower wash the surgical site last with its own separate wash cloth.  You may allow water to run over the site. Take a clean wash cloth wet with soapy warm water and gently pat the suture site to help remove any crust or drainage.   Do Not rub or scrub the site    After site is clean pat dry and apply a thin layer of Vaseline ointment  over the suture site with a cotton swab or clean finger.   Cover the suture site with Telfa (non-stick) dressing. You may tape a piece of gauze over the Telfa for extra protection if you wish.  Continue wound care at least once a day, twice if you are active or around a contaminated environment.  Continue daily wound care until your surgical site is completely healed.   Dissolving stitches, if you have been told your stitches are dissolving they should dissolve in one to one and a half week.      If you are able to take acetaminophen (\"Tylenol,\" etc.) and ibuprofen (\"Advil\" or \"Motrin,\" etc.), then you may STAGGER these medications by taking 400 mg of ibuprofen (usually two tabs) " every 8 hours and 1,000 mg of acetaminophen (e.g., two tabs of extra-strength Tylenol) every 8 hours.    This means, for example, that you could take the followin,000 mg of Tylenol, followed 4 hours later by 400 mg Ibuprofen, followed 4 hours later with 1,000 mg of Tylenol, followed 4 hours later by 400 mg Ibuprofen, followed 4 hours later with 1,000 mg of Tylenol, and so forth.     Essentially, you can take either 1,000 mg of Tylenol or 400 mg of ibuprofen in alternating fashion EVERY FOUR HOURS.    Do NOT exceed more than 4,000 mg of Tylenol or 3,200 mg of ibuprofen per 24 hours. If you are not able to take Tylenol or ibuprofen as above due to other health issues (or a physician has told you directly that you are not allowed to take one of them, say due to pre-existing severe liver or kidney issues), then disregard the above directions.    Scientific evidence supports that this combination/schedule of pain medications is just as effective, if not more effective, than taking a narcotic pain medicine.       Follow up will be a 3 month scar evaluation either in person or via a telephone visit with you sending in a photo via [employer]. Unless you have been told to follow up sooner or if you have concerns and would like to be see sooner. Please call or send us in a [employer] message if possible and attach a photo.        What to expect:    The first couple of days your wound may be tender and may bleed slightly when doing wound care.  There may be swelling and bruising around the wound, especially if it is near the eyes. For your comfort, you may apply ice or cold compresses to the bruises after your have removed the pressure bandage.  The area around your wound may be numb for several weeks or even months.  You may experience periodic sharp pain or mild itching around the wound as it heals.   The suture line will look dark for a while but will lighten over time.       When to call us:    You have bleeding that will  not stop after applying pressure and ice.  You have pain that is not controlled with Tylenol (acetaminophen.)  You have signs or symptoms of an infection such as:  Fever over 100 degrees Fahrenheit  Redness, warmth or foul-smelling drainage from the wound  If you have any questions, or are not sure how to take care of the wound.    Phone numbers:    During business hours (M-F 8:00-4:30 p.m.)  [facility]-  [phone] Option 1 appt. desk  [phone]  Option 3 nurse triage line  ---------------------------------------------------------  Evenings/Weekends/Holidays  Hospital - [phone]   TTY for hearing ugsbpggc-[phone]  *Ask  to page dermatologist on-call  Emergency Ivda-[phone]  TTY for hearing impaired- [phone]

## 2023-11-08 ENCOUNTER — TELEPHONE (OUTPATIENT)
Dept: DERMATOLOGY | Facility: CLINIC | Age: 73
End: 2023-11-08
Payer: MEDICARE

## 2023-11-08 NOTE — TELEPHONE ENCOUNTER
Follow up call attempted following Mohs procedure with Dr. Harper. Spoke with spouse, patient unavailable at this time but she says he is doing well. She said she would pass along the message and he will call back if needed.     Are you having pain?   Are you taking pain medication?   Are you applying ice?    Have you had any noticeable bleeding through the bandage?    Do you have any other concerns?        Please call [phone] option 3 if you have any questions or concerns.

## 2023-11-10 LAB — SCANNED LAB RESULT: NORMAL

## 2023-11-25 ENCOUNTER — ANESTHESIA EVENT (OUTPATIENT)
Dept: SURGERY | Facility: AMBULATORY SURGERY CENTER | Age: 73
End: 2023-11-25
Payer: MEDICARE

## 2023-12-01 ENCOUNTER — ANESTHESIA (OUTPATIENT)
Dept: SURGERY | Facility: AMBULATORY SURGERY CENTER | Age: 73
End: 2023-12-01
Payer: MEDICARE

## 2023-12-15 ENCOUNTER — HOSPITAL ENCOUNTER (OUTPATIENT)
Facility: AMBULATORY SURGERY CENTER | Age: 73
Discharge: HOME | End: 2023-12-15
Attending: PHYSICIAN ASSISTANT
Payer: MEDICARE

## 2023-12-15 ENCOUNTER — ANCILLARY PROCEDURE (OUTPATIENT)
Dept: RADIOLOGY | Facility: AMBULATORY SURGERY CENTER | Age: 73
End: 2023-12-15
Attending: INTERNAL MEDICINE
Payer: MEDICARE

## 2023-12-15 VITALS
WEIGHT: 137 LBS | HEART RATE: 72 BPM | TEMPERATURE: 97 F | OXYGEN SATURATION: 97 % | DIASTOLIC BLOOD PRESSURE: 65 MMHG | HEIGHT: 67 IN | SYSTOLIC BLOOD PRESSURE: 107 MMHG | RESPIRATION RATE: 16 BRPM | BODY MASS INDEX: 21.5 KG/M2

## 2023-12-15 DIAGNOSIS — C83.38 DIFFUSE LARGE B-CELL LYMPHOMA OF LYMPH NODES OF MULTIPLE REGIONS (H): ICD-10-CM

## 2023-12-15 PROCEDURE — 36590 REMOVAL TUNNELED CV CATH: CPT | Performed by: PHYSICIAN ASSISTANT

## 2023-12-15 PROCEDURE — 36590 REMOVAL TUNNELED CV CATH: CPT | Mod: RT | Performed by: PHYSICIAN ASSISTANT

## 2023-12-15 RX ORDER — OXYCODONE HYDROCHLORIDE 5 MG/1
5 TABLET ORAL
Status: DISCONTINUED | OUTPATIENT
Start: 2023-12-15 | End: 2023-12-16 | Stop reason: HOSPADM

## 2023-12-15 RX ORDER — LIDOCAINE HYDROCHLORIDE 20 MG/ML
INJECTION, SOLUTION INFILTRATION; PERINEURAL PRN
Status: DISCONTINUED | OUTPATIENT
Start: 2023-12-15 | End: 2023-12-15

## 2023-12-15 RX ORDER — SODIUM CHLORIDE, SODIUM LACTATE, POTASSIUM CHLORIDE, CALCIUM CHLORIDE 600; 310; 30; 20 MG/100ML; MG/100ML; MG/100ML; MG/100ML
INJECTION, SOLUTION INTRAVENOUS CONTINUOUS
Status: DISCONTINUED | OUTPATIENT
Start: 2023-12-15 | End: 2023-12-16 | Stop reason: HOSPADM

## 2023-12-15 RX ORDER — ACETAMINOPHEN 325 MG/1
975 TABLET ORAL ONCE
Status: COMPLETED | OUTPATIENT
Start: 2023-12-15 | End: 2023-12-15

## 2023-12-15 RX ORDER — LIDOCAINE 40 MG/G
CREAM TOPICAL
Status: DISCONTINUED | OUTPATIENT
Start: 2023-12-15 | End: 2023-12-16 | Stop reason: HOSPADM

## 2023-12-15 RX ORDER — PROPOFOL 10 MG/ML
INJECTION, EMULSION INTRAVENOUS CONTINUOUS PRN
Status: DISCONTINUED | OUTPATIENT
Start: 2023-12-15 | End: 2023-12-15

## 2023-12-15 RX ORDER — SODIUM CHLORIDE 9 MG/ML
INJECTION, SOLUTION INTRAVENOUS CONTINUOUS
Status: DISCONTINUED | OUTPATIENT
Start: 2023-12-15 | End: 2023-12-16 | Stop reason: HOSPADM

## 2023-12-15 RX ORDER — LIDOCAINE HYDROCHLORIDE 10 MG/ML
INJECTION, SOLUTION EPIDURAL; INFILTRATION; INTRACAUDAL; PERINEURAL PRN
Status: DISCONTINUED | OUTPATIENT
Start: 2023-12-15 | End: 2023-12-15 | Stop reason: HOSPADM

## 2023-12-15 RX ORDER — OXYCODONE HYDROCHLORIDE 5 MG/1
10 TABLET ORAL
Status: DISCONTINUED | OUTPATIENT
Start: 2023-12-15 | End: 2023-12-16 | Stop reason: HOSPADM

## 2023-12-15 RX ORDER — ONDANSETRON 2 MG/ML
4 INJECTION INTRAMUSCULAR; INTRAVENOUS EVERY 30 MIN PRN
Status: DISCONTINUED | OUTPATIENT
Start: 2023-12-15 | End: 2023-12-16 | Stop reason: HOSPADM

## 2023-12-15 RX ORDER — ACETAMINOPHEN 325 MG/1
975 TABLET ORAL ONCE
Status: DISCONTINUED | OUTPATIENT
Start: 2023-12-15 | End: 2023-12-16 | Stop reason: HOSPADM

## 2023-12-15 RX ORDER — ONDANSETRON 4 MG/1
4 TABLET, ORALLY DISINTEGRATING ORAL EVERY 30 MIN PRN
Status: DISCONTINUED | OUTPATIENT
Start: 2023-12-15 | End: 2023-12-16 | Stop reason: HOSPADM

## 2023-12-15 RX ORDER — FENTANYL CITRATE 50 UG/ML
50 INJECTION, SOLUTION INTRAMUSCULAR; INTRAVENOUS
Status: DISCONTINUED | OUTPATIENT
Start: 2023-12-15 | End: 2023-12-16 | Stop reason: HOSPADM

## 2023-12-15 RX ADMIN — ACETAMINOPHEN 975 MG: 325 TABLET ORAL at 11:01

## 2023-12-15 RX ADMIN — LIDOCAINE HYDROCHLORIDE 80 MG: 20 INJECTION, SOLUTION INFILTRATION; PERINEURAL at 11:22

## 2023-12-15 RX ADMIN — PROPOFOL 150 MCG/KG/MIN: 10 INJECTION, EMULSION INTRAVENOUS at 11:22

## 2023-12-15 RX ADMIN — SODIUM CHLORIDE, SODIUM LACTATE, POTASSIUM CHLORIDE, CALCIUM CHLORIDE: 600; 310; 30; 20 INJECTION, SOLUTION INTRAVENOUS at 11:19

## 2023-12-15 ASSESSMENT — LIFESTYLE VARIABLES: TOBACCO_USE: 1

## 2023-12-15 ASSESSMENT — COPD QUESTIONNAIRES: COPD: 0

## 2023-12-15 NOTE — ANESTHESIA CARE TRANSFER NOTE
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    Anesthesia Type:   MAC     Note:    Oropharynx: oropharynx clear of all foreign objects and spontaneously breathing  Level of Consciousness: awake  Oxygen Supplementation: room air    Independent Airway: airway patency satisfactory and stable  Dentition: dentition unchanged  Vital Signs Stable: post-procedure vital signs reviewed and stable  Report to RN Given: handoff report given  Patient transferred to: Phase II    Handoff Report: Identifed the Patient, Identified the Reponsible Provider, Reviewed the pertinent medical history, Discussed the surgical course, Reviewed Intra-OP anesthesia mangement and issues during anesthesia, Set expectations for post-procedure period and Allowed opportunity for questions and acknowledgement of understanding      Vitals:  Vitals Value Taken Time   BP 96/62 12/15/23 1204   Temp 36.3  C (97.4  F) 12/15/23 1204   Pulse 78 12/15/23 1204   Resp 16 12/15/23 1204   SpO2 97 % 12/15/23 1204       Electronically Signed By: FARSHAD Florian CRNA  December 15, 2023  12:05 PM

## 2023-12-15 NOTE — BRIEF OP NOTE
Interventional Radiology Brief Post Procedure Note    Procedure: Chest port removal.    Proceduralist: Matthew Harrison [facility]    Assistant: None    Time Out: Prior to the start of the procedure and with procedural staff participation, I verbally confirmed the patient s identity using two indicators, relevant allergies, that the procedure was appropriate and matched the consent or emergent situation, and that the correct equipment/implants were available. Immediately prior to starting the procedure I conducted the Time Out with the procedural staff and re-confirmed the patient s name, procedure, and site/side. (The Joint Commission universal protocol was followed.)  Yes        Sedation: Monitored Anesthesia Care (MAC) administered and documented by Anesthesia Care Provider    Findings: Existing right internal jugular 8 Fr. Single lumen central venous chest port removed intact and without difficulty.    Estimated Blood Loss: Less than 10 ml    Fluoroscopy Time:  None.    SPECIMENS: None    Complications: 1. None     Condition: Stable    Plan: Follow up per primary team. Bedrest for 1 hour post sedation. Upright for 2 hours, no strenuous activity for 3 days.    Comments: See dictated procedure note for full details.    Matthew Harrison PA-C

## 2023-12-15 NOTE — ANESTHESIA POSTPROCEDURE EVALUATION
Patient: Shahid Davis    Procedure: Procedure(s):  Remove port vascular access right       Anesthesia Type:  MAC    Note:  Disposition: Outpatient   Postop Pain Control: Uneventful            Sign Out: Well controlled pain   PONV: No   Neuro/Psych: Uneventful            Sign Out: Acceptable/Baseline neuro status   Airway/Respiratory: Uneventful            Sign Out: Acceptable/Baseline resp. status   CV/Hemodynamics: Uneventful            Sign Out: Acceptable CV status; No obvious hypovolemia; No obvious fluid overload   Other NRE: NONE   DID A NON-ROUTINE EVENT OCCUR?            Last vitals:  Vitals Value Taken Time   /65 12/15/23 1240   Temp 36.1  C (97  F) 12/15/23 1240   Pulse 72 12/15/23 1240   Resp 16 12/15/23 1240   SpO2 97 % 12/15/23 1240       Electronically Signed By: Shailesh Bell MD  December 15, 2023  3:14 PM

## 2023-12-15 NOTE — ANESTHESIA PREPROCEDURE EVALUATION
Anesthesia Pre-Procedure Evaluation    Patient: Shahid Davis   MRN: [id number] : 1950        Procedure : Procedure(s):  Remove port vascular access right          Past Medical History:   Diagnosis Date    Cardiac pacemaker in situ     Cardiac pacemaker in situ         Lymphoma (H)     Squamous cell carcinoma       Past Surgical History:   Procedure Laterality Date    DAVINCI HERNIORRHAPHY INGUINAL Left 2021    Procedure: HERNIORRHAPHY, INGUINAL, ROBOT-ASSISTED, Left, Mesh;  Surgeon: Shamar Tyler MD;  Location: [facility] OR    IR CHEST PORT PLACEMENT > 5 YRS OF AGE  2023    IR SOFT TISSUE BIOPSY  12/15/2022    MOHS MICROGRAPHIC PROCEDURE      NO HISTORY OF SURGERY        Allergies   Allergen Reactions    Ampicillin [Ampicillin Sodium]     Bactrim [Sulfamethoxazole W/Trimethoprim]     Contrast Dye      CT contrast (IV) allergy - need oral Methylpred as premed for scans    Ampicillin Rash      Social History     Tobacco Use    Smoking status: Former     Passive exposure: Past    Smokeless tobacco: Never    Tobacco comments:     Quit at age 19   Substance Use Topics    Alcohol use: Yes     Alcohol/week: 11.7 standard drinks of alcohol     Types: 14 drink(s) per week      Wt Readings from Last 1 Encounters:   12/15/23 62.1 kg (137 lb)        Anesthesia Evaluation   Pt has had prior anesthetic.     No history of anesthetic complications       ROS/MED HX  ENT/Pulmonary:     (+)                tobacco use, Past use,                   (-) asthma and COPD   Neurologic: Comment: H/o spinal meningitis       Cardiovascular:     (+)  - -   -  - -              pacemaker, Reason placed: complete heart block. type: [facility],                      METS/Exercise Tolerance:     Hematologic:  - neg hematologic  ROS  (-) history of blood clots and history of blood transfusion   Musculoskeletal: Comment: Chronic low back pain      GI/Hepatic: Comment: Left inguinal hernia    (+) GERD,                    Renal/Genitourinary:  - neg Renal ROS     Endo:  - neg endo ROS     Psychiatric/Substance Use:  - neg psychiatric ROS  (-) alcohol abuse history   Infectious Disease:  - neg infectious disease ROS     Malignancy:   (+) Malignancy, History of Lymphoma/Leukemia.Lymph CA Remission status post Chemo and Radiation.      Other:  - neg other ROS          Physical Exam    Airway        Mallampati: II       Respiratory Devices and Support         Dental       (+) Modest Abnormalities - crowns, retainers, 1 or 2 missing teeth      Cardiovascular          Rhythm and rate: regular     Pulmonary           breath sounds clear to auscultation           OUTSIDE LABS:  CBC:   Lab Results   Component Value Date    WBC 9.3 11/02/2023    WBC 3.7 (L) 07/20/2023    HGB 12.4 (L) 11/02/2023    HGB 11.2 (L) 07/20/2023    HCT 37.2 (L) 11/02/2023    HCT 34.8 (L) 07/20/2023     (L) 11/02/2023     07/20/2023     BMP:   Lab Results   Component Value Date     11/02/2023     07/13/2023    POTASSIUM 4.5 11/02/2023    POTASSIUM 4.8 07/13/2023    CHLORIDE 104 11/02/2023    CHLORIDE 106 07/13/2023    CO2 25 11/02/2023    CO2 28 07/13/2023    BUN 29.1 (H) 11/02/2023    BUN 29.0 (H) 07/13/2023    CR 0.9 11/02/2023    CR 0.84 11/02/2023     (H) 11/02/2023    GLC 96 07/13/2023     COAGS:   Lab Results   Component Value Date    PTT 47 (H) 02/02/2023    INR 1.08 04/11/2023    FIBR 198 04/11/2023     POC:   Lab Results   Component Value Date    BGM 94 02/22/2012     HEPATIC:   Lab Results   Component Value Date    ALBUMIN 4.6 11/02/2023    PROTTOTAL 6.4 11/02/2023    ALT 37 11/02/2023    AST 30 11/02/2023    ALKPHOS 123 11/02/2023    BILITOTAL 0.3 11/02/2023     OTHER:   Lab Results   Component Value Date    LACT 1.7 12/15/2022    JORGE 9.6 11/02/2023    PHOS 3.8 04/11/2023    MAG 2.1 04/11/2023    LIPASE 43 12/14/2022    TSH 1.61 02/16/2012       Anesthesia Plan    ASA Status:  3    NPO Status:  NPO Appropriate    Anesthesia  Type: MAC.     - Reason for MAC: immobility needed              Consents    Anesthesia Plan(s) and associated risks, benefits, and realistic alternatives discussed. Questions answered and patient/representative(s) expressed understanding.     - Discussed:     - Discussed with:  Patient            Postoperative Care            Comments:               Shailesh Bell MD    I have reviewed the pertinent notes and labs in the chart from the past 30 days and (re)examined the patient.  Any updates or changes from those notes are reflected in this note.

## 2024-01-11 ENCOUNTER — LAB (OUTPATIENT)
Dept: LAB | Facility: CLINIC | Age: 74
End: 2024-01-11
Attending: INTERNAL MEDICINE
Payer: MEDICARE

## 2024-01-11 ENCOUNTER — ONCOLOGY VISIT (OUTPATIENT)
Dept: ONCOLOGY | Facility: CLINIC | Age: 74
End: 2024-01-11
Attending: INTERNAL MEDICINE
Payer: MEDICARE

## 2024-01-11 VITALS
WEIGHT: 140.2 LBS | BODY MASS INDEX: 21.96 KG/M2 | DIASTOLIC BLOOD PRESSURE: 72 MMHG | SYSTOLIC BLOOD PRESSURE: 111 MMHG | TEMPERATURE: 98.2 F | HEART RATE: 95 BPM

## 2024-01-11 DIAGNOSIS — D80.1 HYPOGAMMAGLOBULINEMIA (H): ICD-10-CM

## 2024-01-11 DIAGNOSIS — C83.32 DIFFUSE LARGE B-CELL LYMPHOMA OF INTRATHORACIC LYMPH NODES (H): ICD-10-CM

## 2024-01-11 DIAGNOSIS — R11.0 NAUSEA: ICD-10-CM

## 2024-01-11 DIAGNOSIS — C83.38 DIFFUSE LARGE B-CELL LYMPHOMA OF LYMPH NODES OF MULTIPLE REGIONS (H): ICD-10-CM

## 2024-01-11 DIAGNOSIS — C83.38 DIFFUSE LARGE B-CELL LYMPHOMA OF LYMPH NODES OF MULTIPLE REGIONS (H): Primary | ICD-10-CM

## 2024-01-11 DIAGNOSIS — Z85.828 HISTORY OF SQUAMOUS CELL CARCINOMA OF SKIN: ICD-10-CM

## 2024-01-11 LAB
ALBUMIN SERPL BCG-MCNC: 4.4 G/DL (ref 3.5–5.2)
ALP SERPL-CCNC: 102 U/L (ref 40–150)
ALT SERPL W P-5'-P-CCNC: 37 U/L (ref 0–70)
ANION GAP SERPL CALCULATED.3IONS-SCNC: 9 MMOL/L (ref 7–15)
AST SERPL W P-5'-P-CCNC: 30 U/L (ref 0–45)
BASOPHILS # BLD AUTO: 0 10E3/UL (ref 0–0.2)
BASOPHILS NFR BLD AUTO: 0 %
BILIRUB SERPL-MCNC: 0.3 MG/DL
BUN SERPL-MCNC: 27.7 MG/DL (ref 8–23)
CALCIUM SERPL-MCNC: 9.2 MG/DL (ref 8.8–10.2)
CHLORIDE SERPL-SCNC: 105 MMOL/L (ref 98–107)
CREAT SERPL-MCNC: 0.95 MG/DL (ref 0.67–1.17)
DEPRECATED HCO3 PLAS-SCNC: 25 MMOL/L (ref 22–29)
EGFRCR SERPLBLD CKD-EPI 2021: 84 ML/MIN/1.73M2
EOSINOPHIL # BLD AUTO: 0 10E3/UL (ref 0–0.7)
EOSINOPHIL NFR BLD AUTO: 1 %
ERYTHROCYTE [DISTWIDTH] IN BLOOD BY AUTOMATED COUNT: 13.4 % (ref 10–15)
GLUCOSE SERPL-MCNC: 97 MG/DL (ref 70–99)
HCT VFR BLD AUTO: 37.1 % (ref 40–53)
HGB BLD-MCNC: 12.3 G/DL (ref 13.3–17.7)
IMM GRANULOCYTES # BLD: 0 10E3/UL
IMM GRANULOCYTES NFR BLD: 0 %
LDH SERPL L TO P-CCNC: 248 U/L (ref 0–250)
LYMPHOCYTES # BLD AUTO: 0.9 10E3/UL (ref 0.8–5.3)
LYMPHOCYTES NFR BLD AUTO: 22 %
MCH RBC QN AUTO: 30.9 PG (ref 26.5–33)
MCHC RBC AUTO-ENTMCNC: 33.2 G/DL (ref 31.5–36.5)
MCV RBC AUTO: 93 FL (ref 78–100)
MONOCYTES # BLD AUTO: 0.4 10E3/UL (ref 0–1.3)
MONOCYTES NFR BLD AUTO: 9 %
NEUTROPHILS # BLD AUTO: 2.6 10E3/UL (ref 1.6–8.3)
NEUTROPHILS NFR BLD AUTO: 68 %
NRBC # BLD AUTO: 0 10E3/UL
NRBC BLD AUTO-RTO: 0 /100
PLATELET # BLD AUTO: 152 10E3/UL (ref 150–450)
POTASSIUM SERPL-SCNC: 5 MMOL/L (ref 3.4–5.3)
PROT SERPL-MCNC: 6.1 G/DL (ref 6.4–8.3)
RBC # BLD AUTO: 3.98 10E6/UL (ref 4.4–5.9)
SODIUM SERPL-SCNC: 139 MMOL/L (ref 135–145)
WBC # BLD AUTO: 3.8 10E3/UL (ref 4–11)

## 2024-01-11 PROCEDURE — 85025 COMPLETE CBC W/AUTO DIFF WBC: CPT

## 2024-01-11 PROCEDURE — 99215 OFFICE O/P EST HI 40 MIN: CPT | Performed by: INTERNAL MEDICINE

## 2024-01-11 PROCEDURE — 36415 COLL VENOUS BLD VENIPUNCTURE: CPT

## 2024-01-11 PROCEDURE — 82040 ASSAY OF SERUM ALBUMIN: CPT

## 2024-01-11 PROCEDURE — 83615 LACTATE (LD) (LDH) ENZYME: CPT

## 2024-01-11 PROCEDURE — G0463 HOSPITAL OUTPT CLINIC VISIT: HCPCS | Performed by: INTERNAL MEDICINE

## 2024-01-11 ASSESSMENT — PAIN SCALES - GENERAL: PAINLEVEL: NO PAIN (0)

## 2024-01-11 NOTE — LETTER
1/11/2024         RE: Shahid Davis   [facility] [id number]        Dear Colleague,    Thank you for referring your patient, Shahid Davis, to the [facility]. Please see a copy of my visit note below.    [facility]  Date of visit: Jan 11, 2024    Reason for Visit: Follow up triple-hit DLBCL    ONCOLOGIC SUMMARY:  Diagnosis:  Low-grade kappa-restricted plasmacytoid B-cell lymphoma dx 3/2004, presenting with thoracic paraspinal mass. Bone marrow hypercellular with 70-80% involvement by small kappa-restricted lymphocytes which were positive for CD10, CD19, and CD20 and negative for CD5 and CD23.   Transformation to high-grade B-cell lymphoma 12/2022 (versus new primary), presenting with B symptoms, sternal mass, and SOB/chest pain. Biopsy of sternal mass showed large B-cell lymphoma with aggressive features (GCB-subtype), Ki67 %, FISH positive for MYC (non-IgH partner), BCL2, and BCL6 rearrangements. Stage IV with extensive lymphomatous involvement to the R pleura w/avid effusions, mediastinal and pericardial regions, right anterolateral chest wall, adenopathy above and below the diaphragm, liver, spleen and multiple sites in the skeleton. CSF flow negative.     Treatment history:  For low-grade lymphoma:  2005: Fludarabine-based chemo x 6 cycles and XRT to spine (details unclear)    For high-grade lymphoma:  12/20/22: Cycle 1 R-CHOP with Neulasta support (with a few days of steroid prephase prior)  1/11/23: Cycle 2 Switched to DA-R-EPOCH with triple IT chemo for CNS ppx after FISH returned showing triple-hit disease. PET after 2 cycles shows very good ID.   2/2/23: Cycle 3 DA-R EPOCH with triple IT chemo for CNS ppx  2/23/23: Cycle 4 DA-R-EPOCH with triple IT chemo for CNS ppx. PET after 4 cycles shows CRu.   3/17/23: Cycle 5 DA-R-EPOCH with triple IT chemo for CNS ppx  4/6/23: Cycle 6 DA-R-EPOCH. EOT PET shows  CR!    INTERVAL HISTORY:  Shahid is here today for follow-up. Doing pretty well overall.   - Has intermittent nausea but no vomiting, trying not to use anti-emetics. Doesn't think it's GERD.  - Ongoing right knee pain but still ambulating pretty well.   - Appetite is good, weight is improving.   - Energy slowly improving, going for walks daily.   - Hair is back and now wavy!  - Otherwise no fevers, night sweats, recent infections, lumps/bumps, chest pain, SOB, abd pain, diarrhea, or bleeding.   - [place], leaving in a few weeks. Coming back May 1st.     ROS: 10 point ROS neg other than the symptoms noted above in the HPI.    Current Outpatient Medications   Medication Sig Dispense Refill    omeprazole (PRILOSEC) 40 MG DR capsule Take 1 capsule (40 mg) by mouth daily 90 capsule 4    acyclovir (ZOVIRAX) 400 MG tablet Take 1 tablet (400 mg) by mouth every 12 hours (Patient not taking: Reported on 1/11/2024) 60 tablet 3    ascorbic acid (VITAMIN C) 500 MG tablet Take 500 mg by mouth every morning (Patient not taking: Reported on 1/11/2024)      baclofen (LIORESAL) 10 MG tablet Take 0.5-1 tablets (5-10 mg) by mouth 3 times daily as needed for other (hiccups) (Patient not taking: Reported on 11/2/2023) 30 tablet 0    calcipotriene (DOVONOX) 0.005 % external cream Mix efudex + calcipotriene equally. Apply BID x 4-10 days. Stop when skin gets red. (Patient not taking: Reported on 11/2/2023) 60 g 1    fluorouracil (EFUDEX) 5 % external cream Mix equally with calcipotriene cream. Apply BID x 4-10 days. Stop when skin gets red. (Patient not taking: Reported on 11/2/2023) 40 g 0    methylPREDNISolone (MEDROL) 32 MG tablet Take one tablet 12 hours prior to and one tablet 2 hours prior to the procedure with IV contrast (Patient not taking: Reported on 11/2/2023) 2 tablet 3    Multiple Vitamins-Minerals (MULTIVITAL PO) Take 1 tablet by mouth every morning (Patient not taking: Reported on  1/11/2024)      ondansetron (ZOFRAN) 8 MG tablet Take 1 tablet (8 mg) by mouth every 8 hours as needed for nausea (Patient not taking: Reported on 11/2/2023) 60 tablet 3    polyethylene glycol (MIRALAX) 17 GM/Dose powder Take 17 g by mouth daily as needed for constipation (Patient not taking: Reported on 11/2/2023) 510 g 4    prochlorperazine (COMPAZINE) 10 MG tablet Take 1 tablet (10 mg) by mouth every 6 hours as needed for nausea or vomiting (Patient not taking: Reported on 11/2/2023) 30 tablet 3    senna-docusate (SENNA S) 8.6-50 MG tablet Take 1 tablet by mouth 2 times daily as needed for constipation (Patient not taking: Reported on 11/2/2023) 60 tablet 4       Allergies   Allergen Reactions    Ampicillin [Ampicillin Sodium]     Bactrim [Sulfamethoxazole W/Trimethoprim]     Contrast Dye      CT contrast (IV) allergy - need oral Methylpred as premed for scans    Ampicillin Rash     Physical Exam:   01/11/24   /72   Temp 98.2  F (36.8  C)   Weight 63.6 kg (140 lb 3.2 oz)   Pain Score 0 (None)     ECOG PS: 0    General: Awake, alert, in no acute distress.   HEENT: Normocephalic, atraumatic. No scleral icterus.   Lymph: No cervical, supraclavicular, or axillary lymphadenopathy appreciated.   CV: Regular rate and rhythm. No murmurs, rubs, or gallops appreciated.  Resp: Good inspiratory effort, lungs clear to auscultation bilaterally.  GI: Abdomen soft, nontender, nondistended.   Ext: No peripheral edema bilaterally.  Neuro: CN II-XII grossly intact. No focal deficits.   Skin: Many scattered SK's on back. Healed large incision on right posterior shoulder.   Psych: Pleasant, normal affect.     Labs:   I personally reviewed the following labs:    Most Recent 3 CBC's:  Recent Labs   Lab Test 01/11/24  1318 11/02/23  1132 07/20/23  1413   WBC 3.8* 9.3 3.7*   HGB 12.3* 12.4* 11.2*   MCV 93 91 93    142* 154     Most Recent 3 BMP's:  Recent Labs   Lab Test 01/11/24  1318 11/02/23  1201 11/02/23  1132  07/13/23  1530     --  139 141   POTASSIUM 5.0  --  4.5 4.8   CHLORIDE 105  --  104 106   CO2 25  --  25 28   BUN 27.7*  --  29.1* 29.0*   CR 0.95 0.9 0.84 0.98   ANIONGAP 9  --  10 7   JORGE 9.2  --  9.6 9.5   GLC 97  --  228* 96     Most Recent 2 LFT's:  Recent Labs   Lab Test 01/11/24  1318 11/02/23  1132   AST 30 30   ALT 37 37   ALKPHOS 102 123   BILITOTAL 0.3 0.3       ASSESSMENT/PLAN:    #H/o low-grade kappa restricted plasmacytoid B-cell lymphoma 3/2004   #Transformation to DLBCL 12/2022  H/o low-grade kappa restricted plasmacytoid B-cell lymphoma 3/2004 treated with x6 cycles of fludarabine based chemo and XRT to spine, then has been on surveillance since 2005. He presented in 11/2022 with progressing B symptoms, sternal mass, and SOB/CP. PET 12/9/22 showed extensive lymphomatous involvement to the R pleura w/avid effusions, mediastinal and pericardial regions, right anterolateral chest wall, adenopathy above and below the diaphragm, liver, spleen and multiple sites in the skeleton, concerning for transformation from his low-grade lymphoma. 12/15/22 biopsy of sternal mass showed large B-cell lymphoma with aggressive features (GCB-subtype), Ki67 %. 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  - S/p C1 R-CHOP in the hospital 12/20/22. Tolerated well overall.  - Switched to DA-R-EPOCH starting Cycle 2 (1/11/23) after FISH returned showing triple-hit disease. Staging LP negative, gave triple IT chemo once per cycle for CNS ppx for a total of 4 doses.   - PET after 2 cycles showed FL with significant resolution of most FDG activity/disease other than small area of right paramediastinal pleural thickening (SUVmax 5) and resolution of right pleural effusion. Concurrent Clonoseq was negative. PET after 4 cycles showed further improvement with right paramediastinal pleura SUV down to 3.3, also could potentially be artifact since it is near pacer wires.  -  Completed Cycle 6 R-EPOCH 4/6/23. Tolerated chemo well overall, stayed at dose level 1 due to thrombocytopenia.  - EOT PET-CT (with ketogenic diet for 3 days prior to suppress cardiac activity) showed CR!  - 6-month surveillance CT shows ongoing remission. Concurrent Clonoseq also negative. Removed port 12/15/23.   - Labs and exam reassuring today. Continue surveillance with H&P/labs every 3-6 mo for 2 years, then yearly for years 3-5. Imaging every 6 months for first 2 years only. [place] CT [place]   - We previously discussed his increased risk of recurrence given transformed and triple-hit disease. If he relapses, would likely consider CAR-T therapy.      #Hypogammaglobulinemia   #COVID-19 Infection 1/2023, resolved  #Acute maxillary sinusitis 5/2023, resolved   on 12/15/22 and 316 on 4/6/23.   - Given persistently low COVID cycle threshold, hypogammaglobulinemia, and risk for further immunosuppression with chemo, gave IVIG on 1/15 and 4/10.  - Monitor for recurrent infections.    #Non-melanoma skin cancers  History of many SCC's in the past in 2011, 2014, 2022 (very large lesion on right posterior shoulder).   - Saw Dermatology 8/8/23, biopsy from R lateral neck and R posterior shoulder both showed SCC. S/p Mohs on 11/8/23.   - Should continue at least annual follow-up with Dermatology.     #Nausea  Intermittent but persistent. Unlikely to be related to chemo this far out.  - Continue PRN Zofran and compazine.  - If not improved by the time he returns from the [facility], would recommend EGD for further evaluation.     #PPx  - Vaccines: up to date on flu, COVID, and pneumonia vaccines. Recommend to get RSV vaccine at a local [employer] or [employer]. Will need Shingrix series at some point.       PLAN:  - RTC in mid-May with CT    Total of 45 minutes on patient visit, reviewing records, interpreting test results, placing orders, and documentation on the day of service.    Domitila Ruelas,  MD  Attending Physician, [facility]

## 2024-01-11 NOTE — NURSING NOTE
Chief Complaint   Patient presents with    Blood Draw     Labs drawn with  by RN. Vitals taken. Pt checked into next appointment.       Torie Eng RN

## 2024-01-11 NOTE — PROGRESS NOTES
[facility]  Date of visit: Jan 11, 2024    Reason for Visit: Follow up triple-hit DLBCL    ONCOLOGIC SUMMARY:  Diagnosis:  Low-grade kappa-restricted plasmacytoid B-cell lymphoma dx 3/2004, presenting with thoracic paraspinal mass. Bone marrow hypercellular with 70-80% involvement by small kappa-restricted lymphocytes which were positive for CD10, CD19, and CD20 and negative for CD5 and CD23.   Transformation to high-grade B-cell lymphoma 12/2022 (versus new primary), presenting with B symptoms, sternal mass, and SOB/chest pain. Biopsy of sternal mass showed large B-cell lymphoma with aggressive features (GCB-subtype), Ki67 %, FISH positive for MYC (non-IgH partner), BCL2, and BCL6 rearrangements. Stage IV with extensive lymphomatous involvement to the R pleura w/avid effusions, mediastinal and pericardial regions, right anterolateral chest wall, adenopathy above and below the diaphragm, liver, spleen and multiple sites in the skeleton. CSF flow negative.     Treatment history:  For low-grade lymphoma:  2005: Fludarabine-based chemo x 6 cycles and XRT to spine (details unclear)    For high-grade lymphoma:  12/20/22: Cycle 1 R-CHOP with Neulasta support (with a few days of steroid prephase prior)  1/11/23: Cycle 2 Switched to DA-R-EPOCH with triple IT chemo for CNS ppx after FISH returned showing triple-hit disease. PET after 2 cycles shows very good OR.   2/2/23: Cycle 3 DA-R EPOCH with triple IT chemo for CNS ppx  2/23/23: Cycle 4 DA-R-EPOCH with triple IT chemo for CNS ppx. PET after 4 cycles shows CRu.   3/17/23: Cycle 5 DA-R-EPOCH with triple IT chemo for CNS ppx  4/6/23: Cycle 6 DA-R-EPOCH. EOT PET shows CR!    INTERVAL HISTORY:  Shahid is here today for follow-up. Doing pretty well overall.   - Has intermittent nausea but no vomiting, trying not to use anti-emetics. Doesn't think it's GERD.  - Ongoing right knee pain but still ambulating pretty well.   - Appetite is  good, weight is improving.   - Energy slowly improving, going for walks daily.   - Hair is back and now wavy!  - Otherwise no fevers, night sweats, recent infections, lumps/bumps, chest pain, SOB, abd pain, diarrhea, or bleeding.   - [place], leaving in a few weeks. Coming back May 1st.     ROS: 10 point ROS neg other than the symptoms noted above in the HPI.    Current Outpatient Medications   Medication Sig Dispense Refill    omeprazole (PRILOSEC) 40 MG DR capsule Take 1 capsule (40 mg) by mouth daily 90 capsule 4    acyclovir (ZOVIRAX) 400 MG tablet Take 1 tablet (400 mg) by mouth every 12 hours (Patient not taking: Reported on 1/11/2024) 60 tablet 3    ascorbic acid (VITAMIN C) 500 MG tablet Take 500 mg by mouth every morning (Patient not taking: Reported on 1/11/2024)      baclofen (LIORESAL) 10 MG tablet Take 0.5-1 tablets (5-10 mg) by mouth 3 times daily as needed for other (hiccups) (Patient not taking: Reported on 11/2/2023) 30 tablet 0    calcipotriene (DOVONOX) 0.005 % external cream Mix efudex + calcipotriene equally. Apply BID x 4-10 days. Stop when skin gets red. (Patient not taking: Reported on 11/2/2023) 60 g 1    fluorouracil (EFUDEX) 5 % external cream Mix equally with calcipotriene cream. Apply BID x 4-10 days. Stop when skin gets red. (Patient not taking: Reported on 11/2/2023) 40 g 0    methylPREDNISolone (MEDROL) 32 MG tablet Take one tablet 12 hours prior to and one tablet 2 hours prior to the procedure with IV contrast (Patient not taking: Reported on 11/2/2023) 2 tablet 3    Multiple Vitamins-Minerals (MULTIVITAL PO) Take 1 tablet by mouth every morning (Patient not taking: Reported on 1/11/2024)      ondansetron (ZOFRAN) 8 MG tablet Take 1 tablet (8 mg) by mouth every 8 hours as needed for nausea (Patient not taking: Reported on 11/2/2023) 60 tablet 3    polyethylene glycol (MIRALAX) 17 GM/Dose powder Take 17 g by mouth daily as needed for  constipation (Patient not taking: Reported on 11/2/2023) 510 g 4    prochlorperazine (COMPAZINE) 10 MG tablet Take 1 tablet (10 mg) by mouth every 6 hours as needed for nausea or vomiting (Patient not taking: Reported on 11/2/2023) 30 tablet 3    senna-docusate (SENNA S) 8.6-50 MG tablet Take 1 tablet by mouth 2 times daily as needed for constipation (Patient not taking: Reported on 11/2/2023) 60 tablet 4       Allergies   Allergen Reactions    Ampicillin [Ampicillin Sodium]     Bactrim [Sulfamethoxazole W/Trimethoprim]     Contrast Dye      CT contrast (IV) allergy - need oral Methylpred as premed for scans    Ampicillin Rash     Physical Exam:   01/11/24   /72   Temp 98.2  F (36.8  C)   Weight 63.6 kg (140 lb 3.2 oz)   Pain Score 0 (None)     ECOG PS: 0    General: Awake, alert, in no acute distress.   HEENT: Normocephalic, atraumatic. No scleral icterus.   Lymph: No cervical, supraclavicular, or axillary lymphadenopathy appreciated.   CV: Regular rate and rhythm. No murmurs, rubs, or gallops appreciated.  Resp: Good inspiratory effort, lungs clear to auscultation bilaterally.  GI: Abdomen soft, nontender, nondistended.   Ext: No peripheral edema bilaterally.  Neuro: CN II-XII grossly intact. No focal deficits.   Skin: Many scattered SK's on back. Healed large incision on right posterior shoulder.   Psych: Pleasant, normal affect.     Labs:   I personally reviewed the following labs:    Most Recent 3 CBC's:  Recent Labs   Lab Test 01/11/24  1318 11/02/23  1132 07/20/23  1413   WBC 3.8* 9.3 3.7*   HGB 12.3* 12.4* 11.2*   MCV 93 91 93    142* 154     Most Recent 3 BMP's:  Recent Labs   Lab Test 01/11/24  1318 11/02/23  1201 11/02/23  1132 07/13/23  1530     --  139 141   POTASSIUM 5.0  --  4.5 4.8   CHLORIDE 105  --  104 106   CO2 25  --  25 28   BUN 27.7*  --  29.1* 29.0*   CR 0.95 0.9 0.84 0.98   ANIONGAP 9  --  10 7   JORGE 9.2  --  9.6 9.5   GLC 97  --  228* 96     Most Recent 2 LFT's:  Recent  Labs   Lab Test 01/11/24  1318 11/02/23  1132   AST 30 30   ALT 37 37   ALKPHOS 102 123   BILITOTAL 0.3 0.3       ASSESSMENT/PLAN:    #H/o low-grade kappa restricted plasmacytoid B-cell lymphoma 3/2004   #Transformation to DLBCL 12/2022  H/o low-grade kappa restricted plasmacytoid B-cell lymphoma 3/2004 treated with x6 cycles of fludarabine based chemo and XRT to spine, then has been on surveillance since 2005. He presented in 11/2022 with progressing B symptoms, sternal mass, and SOB/CP. PET 12/9/22 showed extensive lymphomatous involvement to the R pleura w/avid effusions, mediastinal and pericardial regions, right anterolateral chest wall, adenopathy above and below the diaphragm, liver, spleen and multiple sites in the skeleton, concerning for transformation from his low-grade lymphoma. 12/15/22 biopsy of sternal mass showed large B-cell lymphoma with aggressive features (GCB-subtype), Ki67 %. 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  - S/p C1 R-CHOP in the hospital 12/20/22. Tolerated well overall.  - Switched to DA-R-EPOCH starting Cycle 2 (1/11/23) after FISH returned showing triple-hit disease. Staging LP negative, gave triple IT chemo once per cycle for CNS ppx for a total of 4 doses.   - PET after 2 cycles showed NM with significant resolution of most FDG activity/disease other than small area of right paramediastinal pleural thickening (SUVmax 5) and resolution of right pleural effusion. Concurrent Clonoseq was negative. PET after 4 cycles showed further improvement with right paramediastinal pleura SUV down to 3.3, also could potentially be artifact since it is near pacer wires.  - Completed Cycle 6 R-EPOCH 4/6/23. Tolerated chemo well overall, stayed at dose level 1 due to thrombocytopenia.  - EOT PET-CT (with ketogenic diet for 3 days prior to suppress cardiac activity) showed CR!  - 6-month surveillance CT shows ongoing remission. Concurrent Clonoseq  also negative. Removed port 12/15/23.   - Labs and exam reassuring today. Continue surveillance with H&P/labs every 3-6 mo for 2 years, then yearly for years 3-5. Imaging every 6 months for first 2 years only. [place] CT [place]   - We previously discussed his increased risk of recurrence given transformed and triple-hit disease. If he relapses, would likely consider CAR-T therapy.      #Hypogammaglobulinemia   #COVID-19 Infection 1/2023, resolved  #Acute maxillary sinusitis 5/2023, resolved   on 12/15/22 and 316 on 4/6/23.   - Given persistently low COVID cycle threshold, hypogammaglobulinemia, and risk for further immunosuppression with chemo, gave IVIG on 1/15 and 4/10.  - Monitor for recurrent infections.    #Non-melanoma skin cancers  History of many SCC's in the past in 2011, 2014, 2022 (very large lesion on right posterior shoulder).   - Saw Dermatology 8/8/23, biopsy from R lateral neck and R posterior shoulder both showed SCC. S/p Mohs on 11/8/23.   - Should continue at least annual follow-up with Dermatology.     #Nausea  Intermittent but persistent. Unlikely to be related to chemo this far out.  - Continue PRN Zofran and compazine.  - If not improved by the time he returns from the [facility], would recommend EGD for further evaluation.     #PPx  - Vaccines: up to date on flu, COVID, and pneumonia vaccines. Recommend to get RSV vaccine at a local [facility] or [employer]. Will need Shingrix series at some point.       PLAN:  - RTC in mid-May with CT    Total of 45 minutes on patient visit, reviewing records, interpreting test results, placing orders, and documentation on the day of service.    Domitila Ruelas MD  Attending Physician, [facility]

## 2024-01-11 NOTE — NURSING NOTE
"Oncology Rooming Note    January 11, 2024 1:36 PM   Shahid Davis is a 73 year old male who presents for:    Chief Complaint   Patient presents with    Oncology Clinic Visit     Diffuse large B-cell lymphoma      Initial Vitals: There were no vitals taken for this visit. Estimated body mass index is 21.96 kg/m  as calculated from the following:    Height as of 12/15/23: 1.702 m (5' 7\").    Weight as of an earlier encounter on 1/11/24: 63.6 kg (140 lb 3.2 oz). There is no height or weight on file to calculate BSA.  Data Unavailable Comment: Data Unavailable   No LMP for male patient.  Allergies reviewed: Yes  Medications reviewed: Yes    Medications: Medication refills not needed today.  Pharmacy name entered into MU[facility]:    [id number] [place] [facility] [address] [id number] [address] AT [facility] [address] [place]    Frailty Screening:   Is the patient here for a new oncology consult visit in cancer care? 2. No      Clinical concerns:        Torie Art              "

## 2024-01-17 ENCOUNTER — ANCILLARY PROCEDURE (OUTPATIENT)
Dept: CARDIOLOGY | Facility: CLINIC | Age: 74
End: 2024-01-17
Attending: INTERNAL MEDICINE
Payer: MEDICARE

## 2024-01-17 DIAGNOSIS — Z95.0 PACEMAKER: ICD-10-CM

## 2024-01-17 PROCEDURE — 93294 REM INTERROG EVL PM/LDLS PM: CPT | Performed by: INTERNAL MEDICINE

## 2024-01-17 PROCEDURE — 93296 REM INTERROG EVL PM/IDS: CPT

## 2024-01-30 LAB
MDC_IDC_EPISODE_DTM: NORMAL
MDC_IDC_EPISODE_ID: NORMAL
MDC_IDC_EPISODE_TYPE: NORMAL
MDC_IDC_LEAD_CONNECTION_STATUS: NORMAL
MDC_IDC_LEAD_CONNECTION_STATUS: NORMAL
MDC_IDC_LEAD_IMPLANT_DT: NORMAL
MDC_IDC_LEAD_IMPLANT_DT: NORMAL
MDC_IDC_LEAD_LOCATION: NORMAL
MDC_IDC_LEAD_LOCATION: NORMAL
MDC_IDC_LEAD_LOCATION_DETAIL_1: NORMAL
MDC_IDC_LEAD_LOCATION_DETAIL_1: NORMAL
MDC_IDC_LEAD_MFG: NORMAL
MDC_IDC_LEAD_MFG: NORMAL
MDC_IDC_LEAD_MODEL: NORMAL
MDC_IDC_LEAD_MODEL: NORMAL
MDC_IDC_LEAD_POLARITY_TYPE: NORMAL
MDC_IDC_LEAD_POLARITY_TYPE: NORMAL
MDC_IDC_LEAD_SERIAL: NORMAL
MDC_IDC_LEAD_SERIAL: NORMAL
MDC_IDC_MSMT_BATTERY_DTM: NORMAL
MDC_IDC_MSMT_BATTERY_REMAINING_LONGEVITY: 59 MO
MDC_IDC_MSMT_BATTERY_REMAINING_PERCENTAGE: 54 %
MDC_IDC_MSMT_BATTERY_RRT_TRIGGER: NORMAL
MDC_IDC_MSMT_BATTERY_STATUS: NORMAL
MDC_IDC_MSMT_BATTERY_VOLTAGE: 2.99 V
MDC_IDC_MSMT_LEADCHNL_RA_IMPEDANCE_VALUE: 380 OHM
MDC_IDC_MSMT_LEADCHNL_RA_LEAD_CHANNEL_STATUS: NORMAL
MDC_IDC_MSMT_LEADCHNL_RA_SENSING_INTR_AMPL: 4.2 MV
MDC_IDC_MSMT_LEADCHNL_RV_IMPEDANCE_VALUE: 460 OHM
MDC_IDC_MSMT_LEADCHNL_RV_LEAD_CHANNEL_STATUS: NORMAL
MDC_IDC_MSMT_LEADCHNL_RV_SENSING_INTR_AMPL: 12 MV
MDC_IDC_PG_IMPLANT_DTM: NORMAL
MDC_IDC_PG_MFG: NORMAL
MDC_IDC_PG_MODEL: NORMAL
MDC_IDC_PG_SERIAL: NORMAL
MDC_IDC_PG_TYPE: NORMAL
MDC_IDC_SESS_CLINIC_NAME: NORMAL
MDC_IDC_SESS_DTM: NORMAL
MDC_IDC_SESS_REPROGRAMMED: NO
MDC_IDC_SESS_TYPE: NORMAL
MDC_IDC_SET_BRADY_AT_MODE_SWITCH_MODE: NORMAL
MDC_IDC_SET_BRADY_AT_MODE_SWITCH_RATE: 160 {BEATS}/MIN
MDC_IDC_SET_BRADY_LOWRATE: 60 {BEATS}/MIN
MDC_IDC_SET_BRADY_MAX_SENSOR_RATE: 120 {BEATS}/MIN
MDC_IDC_SET_BRADY_MAX_TRACKING_RATE: 120 {BEATS}/MIN
MDC_IDC_SET_BRADY_MODE: NORMAL
MDC_IDC_SET_BRADY_PAV_DELAY_LOW: 200 MS
MDC_IDC_SET_BRADY_SAV_DELAY_LOW: 200 MS
MDC_IDC_SET_LEADCHNL_RA_PACING_AMPLITUDE: 1.5 V
MDC_IDC_SET_LEADCHNL_RA_PACING_ANODE_ELECTRODE_1: NORMAL
MDC_IDC_SET_LEADCHNL_RA_PACING_ANODE_LOCATION_1: NORMAL
MDC_IDC_SET_LEADCHNL_RA_PACING_CAPTURE_MODE: NORMAL
MDC_IDC_SET_LEADCHNL_RA_PACING_CATHODE_ELECTRODE_1: NORMAL
MDC_IDC_SET_LEADCHNL_RA_PACING_CATHODE_LOCATION_1: NORMAL
MDC_IDC_SET_LEADCHNL_RA_PACING_POLARITY: NORMAL
MDC_IDC_SET_LEADCHNL_RA_PACING_PULSEWIDTH: 0.4 MS
MDC_IDC_SET_LEADCHNL_RA_SENSING_ADAPTATION_MODE: NORMAL
MDC_IDC_SET_LEADCHNL_RA_SENSING_ANODE_ELECTRODE_1: NORMAL
MDC_IDC_SET_LEADCHNL_RA_SENSING_ANODE_LOCATION_1: NORMAL
MDC_IDC_SET_LEADCHNL_RA_SENSING_CATHODE_ELECTRODE_1: NORMAL
MDC_IDC_SET_LEADCHNL_RA_SENSING_CATHODE_LOCATION_1: NORMAL
MDC_IDC_SET_LEADCHNL_RA_SENSING_POLARITY: NORMAL
MDC_IDC_SET_LEADCHNL_RA_SENSING_SENSITIVITY: 0.75 MV
MDC_IDC_SET_LEADCHNL_RV_PACING_AMPLITUDE: 2.5 V
MDC_IDC_SET_LEADCHNL_RV_PACING_ANODE_ELECTRODE_1: NORMAL
MDC_IDC_SET_LEADCHNL_RV_PACING_ANODE_LOCATION_1: NORMAL
MDC_IDC_SET_LEADCHNL_RV_PACING_CAPTURE_MODE: NORMAL
MDC_IDC_SET_LEADCHNL_RV_PACING_CATHODE_ELECTRODE_1: NORMAL
MDC_IDC_SET_LEADCHNL_RV_PACING_CATHODE_LOCATION_1: NORMAL
MDC_IDC_SET_LEADCHNL_RV_PACING_POLARITY: NORMAL
MDC_IDC_SET_LEADCHNL_RV_PACING_PULSEWIDTH: 0.4 MS
MDC_IDC_SET_LEADCHNL_RV_SENSING_ADAPTATION_MODE: NORMAL
MDC_IDC_SET_LEADCHNL_RV_SENSING_ANODE_ELECTRODE_1: NORMAL
MDC_IDC_SET_LEADCHNL_RV_SENSING_ANODE_LOCATION_1: NORMAL
MDC_IDC_SET_LEADCHNL_RV_SENSING_CATHODE_ELECTRODE_1: NORMAL
MDC_IDC_SET_LEADCHNL_RV_SENSING_CATHODE_LOCATION_1: NORMAL
MDC_IDC_SET_LEADCHNL_RV_SENSING_POLARITY: NORMAL
MDC_IDC_SET_LEADCHNL_RV_SENSING_SENSITIVITY: 2.5 MV
MDC_IDC_STAT_AT_BURDEN_PERCENT: 0 %
MDC_IDC_STAT_AT_DTM_END: NORMAL
MDC_IDC_STAT_AT_DTM_START: NORMAL
MDC_IDC_STAT_AT_MODE_SW_COUNT: 0
MDC_IDC_STAT_AT_MODE_SW_COUNT_PER_DAY: 0
MDC_IDC_STAT_AT_MODE_SW_PERCENT_TIME: 0 %
MDC_IDC_STAT_BRADY_AP_VP_PERCENT: 1.8 %
MDC_IDC_STAT_BRADY_AP_VS_PERCENT: 1 %
MDC_IDC_STAT_BRADY_AS_VP_PERCENT: 98 %
MDC_IDC_STAT_BRADY_AS_VS_PERCENT: 1 %
MDC_IDC_STAT_BRADY_DTM_END: NORMAL
MDC_IDC_STAT_BRADY_DTM_START: NORMAL
MDC_IDC_STAT_BRADY_RA_PERCENT_PACED: 1.8 %
MDC_IDC_STAT_BRADY_RV_PERCENT_PACED: 99 %
MDC_IDC_STAT_CRT_DTM_END: NORMAL
MDC_IDC_STAT_CRT_DTM_START: NORMAL
MDC_IDC_STAT_EPISODE_RECENT_COUNT: 0
MDC_IDC_STAT_EPISODE_RECENT_COUNT: 0
MDC_IDC_STAT_EPISODE_RECENT_COUNT_DTM_END: NORMAL
MDC_IDC_STAT_EPISODE_RECENT_COUNT_DTM_END: NORMAL
MDC_IDC_STAT_EPISODE_RECENT_COUNT_DTM_START: NORMAL
MDC_IDC_STAT_EPISODE_RECENT_COUNT_DTM_START: NORMAL
MDC_IDC_STAT_EPISODE_TYPE: NORMAL
MDC_IDC_STAT_EPISODE_TYPE: NORMAL
MDC_IDC_STAT_EPISODE_VENDOR_TYPE: NORMAL
MDC_IDC_STAT_EPISODE_VENDOR_TYPE: NORMAL
MDC_IDC_STAT_HEART_RATE_ATRIAL_MAX: 210 {BEATS}/MIN
MDC_IDC_STAT_HEART_RATE_ATRIAL_MEAN: 88 {BEATS}/MIN
MDC_IDC_STAT_HEART_RATE_ATRIAL_MIN: 60 {BEATS}/MIN
MDC_IDC_STAT_HEART_RATE_DTM_END: NORMAL
MDC_IDC_STAT_HEART_RATE_DTM_START: NORMAL
MDC_IDC_STAT_HEART_RATE_VENTRICULAR_MAX: 210 {BEATS}/MIN
MDC_IDC_STAT_HEART_RATE_VENTRICULAR_MEAN: 88 {BEATS}/MIN
MDC_IDC_STAT_HEART_RATE_VENTRICULAR_MIN: 40 {BEATS}/MIN

## 2024-02-01 LAB
MDC_IDC_LEAD_CONNECTION_STATUS: NORMAL
MDC_IDC_LEAD_CONNECTION_STATUS: NORMAL
MDC_IDC_LEAD_IMPLANT_DT: NORMAL
MDC_IDC_LEAD_IMPLANT_DT: NORMAL
MDC_IDC_LEAD_LOCATION: NORMAL
MDC_IDC_LEAD_LOCATION: NORMAL
MDC_IDC_LEAD_LOCATION_DETAIL_1: NORMAL
MDC_IDC_LEAD_LOCATION_DETAIL_1: NORMAL
MDC_IDC_LEAD_MFG: NORMAL
MDC_IDC_LEAD_MFG: NORMAL
MDC_IDC_LEAD_MODEL: NORMAL
MDC_IDC_LEAD_MODEL: NORMAL
MDC_IDC_LEAD_POLARITY_TYPE: NORMAL
MDC_IDC_LEAD_POLARITY_TYPE: NORMAL
MDC_IDC_LEAD_SERIAL: NORMAL
MDC_IDC_LEAD_SERIAL: NORMAL
MDC_IDC_MSMT_BATTERY_DTM: NORMAL
MDC_IDC_MSMT_BATTERY_REMAINING_LONGEVITY: 61 MO
MDC_IDC_MSMT_BATTERY_REMAINING_PERCENTAGE: 57 %
MDC_IDC_MSMT_BATTERY_RRT_TRIGGER: NORMAL
MDC_IDC_MSMT_BATTERY_STATUS: NORMAL
MDC_IDC_MSMT_BATTERY_VOLTAGE: 2.99 V
MDC_IDC_MSMT_LEADCHNL_RA_IMPEDANCE_VALUE: 350 OHM
MDC_IDC_MSMT_LEADCHNL_RA_LEAD_CHANNEL_STATUS: NORMAL
MDC_IDC_MSMT_LEADCHNL_RA_SENSING_INTR_AMPL: 3.4 MV
MDC_IDC_MSMT_LEADCHNL_RV_IMPEDANCE_VALUE: 440 OHM
MDC_IDC_MSMT_LEADCHNL_RV_LEAD_CHANNEL_STATUS: NORMAL
MDC_IDC_MSMT_LEADCHNL_RV_SENSING_INTR_AMPL: 12 MV
MDC_IDC_PG_IMPLANT_DTM: NORMAL
MDC_IDC_PG_MFG: NORMAL
MDC_IDC_PG_MODEL: NORMAL
MDC_IDC_PG_SERIAL: NORMAL
MDC_IDC_PG_TYPE: NORMAL
MDC_IDC_SESS_CLINIC_NAME: NORMAL
MDC_IDC_SESS_DTM: NORMAL
MDC_IDC_SESS_REPROGRAMMED: NO
MDC_IDC_SESS_TYPE: NORMAL
MDC_IDC_SET_BRADY_AT_MODE_SWITCH_MODE: NORMAL
MDC_IDC_SET_BRADY_AT_MODE_SWITCH_RATE: 160 {BEATS}/MIN
MDC_IDC_SET_BRADY_LOWRATE: 60 {BEATS}/MIN
MDC_IDC_SET_BRADY_MAX_SENSOR_RATE: 120 {BEATS}/MIN
MDC_IDC_SET_BRADY_MAX_TRACKING_RATE: 120 {BEATS}/MIN
MDC_IDC_SET_BRADY_MODE: NORMAL
MDC_IDC_SET_BRADY_PAV_DELAY_LOW: 200 MS
MDC_IDC_SET_BRADY_SAV_DELAY_LOW: 200 MS
MDC_IDC_SET_LEADCHNL_RA_PACING_AMPLITUDE: 1.5 V
MDC_IDC_SET_LEADCHNL_RA_PACING_ANODE_ELECTRODE_1: NORMAL
MDC_IDC_SET_LEADCHNL_RA_PACING_ANODE_LOCATION_1: NORMAL
MDC_IDC_SET_LEADCHNL_RA_PACING_CAPTURE_MODE: NORMAL
MDC_IDC_SET_LEADCHNL_RA_PACING_CATHODE_ELECTRODE_1: NORMAL
MDC_IDC_SET_LEADCHNL_RA_PACING_CATHODE_LOCATION_1: NORMAL
MDC_IDC_SET_LEADCHNL_RA_PACING_POLARITY: NORMAL
MDC_IDC_SET_LEADCHNL_RA_PACING_PULSEWIDTH: 0.4 MS
MDC_IDC_SET_LEADCHNL_RA_SENSING_ADAPTATION_MODE: NORMAL
MDC_IDC_SET_LEADCHNL_RA_SENSING_ANODE_ELECTRODE_1: NORMAL
MDC_IDC_SET_LEADCHNL_RA_SENSING_ANODE_LOCATION_1: NORMAL
MDC_IDC_SET_LEADCHNL_RA_SENSING_CATHODE_ELECTRODE_1: NORMAL
MDC_IDC_SET_LEADCHNL_RA_SENSING_CATHODE_LOCATION_1: NORMAL
MDC_IDC_SET_LEADCHNL_RA_SENSING_POLARITY: NORMAL
MDC_IDC_SET_LEADCHNL_RA_SENSING_SENSITIVITY: 0.75 MV
MDC_IDC_SET_LEADCHNL_RV_PACING_AMPLITUDE: 2.5 V
MDC_IDC_SET_LEADCHNL_RV_PACING_ANODE_ELECTRODE_1: NORMAL
MDC_IDC_SET_LEADCHNL_RV_PACING_ANODE_LOCATION_1: NORMAL
MDC_IDC_SET_LEADCHNL_RV_PACING_CAPTURE_MODE: NORMAL
MDC_IDC_SET_LEADCHNL_RV_PACING_CATHODE_ELECTRODE_1: NORMAL
MDC_IDC_SET_LEADCHNL_RV_PACING_CATHODE_LOCATION_1: NORMAL
MDC_IDC_SET_LEADCHNL_RV_PACING_POLARITY: NORMAL
MDC_IDC_SET_LEADCHNL_RV_PACING_PULSEWIDTH: 0.4 MS
MDC_IDC_SET_LEADCHNL_RV_SENSING_ADAPTATION_MODE: NORMAL
MDC_IDC_SET_LEADCHNL_RV_SENSING_ANODE_ELECTRODE_1: NORMAL
MDC_IDC_SET_LEADCHNL_RV_SENSING_ANODE_LOCATION_1: NORMAL
MDC_IDC_SET_LEADCHNL_RV_SENSING_CATHODE_ELECTRODE_1: NORMAL
MDC_IDC_SET_LEADCHNL_RV_SENSING_CATHODE_LOCATION_1: NORMAL
MDC_IDC_SET_LEADCHNL_RV_SENSING_POLARITY: NORMAL
MDC_IDC_SET_LEADCHNL_RV_SENSING_SENSITIVITY: 2.5 MV
MDC_IDC_STAT_AT_BURDEN_PERCENT: 0 %
MDC_IDC_STAT_AT_DTM_END: NORMAL
MDC_IDC_STAT_AT_DTM_START: NORMAL
MDC_IDC_STAT_AT_MODE_SW_COUNT: 0
MDC_IDC_STAT_AT_MODE_SW_COUNT_PER_DAY: 0
MDC_IDC_STAT_AT_MODE_SW_PERCENT_TIME: 0 %
MDC_IDC_STAT_BRADY_AP_VP_PERCENT: 1.7 %
MDC_IDC_STAT_BRADY_AP_VS_PERCENT: 1 %
MDC_IDC_STAT_BRADY_AS_VP_PERCENT: 98 %
MDC_IDC_STAT_BRADY_AS_VS_PERCENT: 1 %
MDC_IDC_STAT_BRADY_DTM_END: NORMAL
MDC_IDC_STAT_BRADY_DTM_START: NORMAL
MDC_IDC_STAT_BRADY_RA_PERCENT_PACED: 1.7 %
MDC_IDC_STAT_BRADY_RV_PERCENT_PACED: 99 %
MDC_IDC_STAT_CRT_DTM_END: NORMAL
MDC_IDC_STAT_CRT_DTM_START: NORMAL
MDC_IDC_STAT_HEART_RATE_ATRIAL_MAX: 210 {BEATS}/MIN
MDC_IDC_STAT_HEART_RATE_ATRIAL_MEAN: 89 {BEATS}/MIN
MDC_IDC_STAT_HEART_RATE_ATRIAL_MIN: 60 {BEATS}/MIN
MDC_IDC_STAT_HEART_RATE_DTM_END: NORMAL
MDC_IDC_STAT_HEART_RATE_DTM_START: NORMAL
MDC_IDC_STAT_HEART_RATE_VENTRICULAR_MAX: 240 {BEATS}/MIN
MDC_IDC_STAT_HEART_RATE_VENTRICULAR_MEAN: 89 {BEATS}/MIN
MDC_IDC_STAT_HEART_RATE_VENTRICULAR_MIN: 40 {BEATS}/MIN

## 2024-02-16 NOTE — NURSING NOTE
Cardiac Testing: Patient given instructions regarding  echocardiogram . Discussed purpose, preparation, procedure and when to expect results reported back to the patient. Patient demonstrated understanding of this information and agreed to call with further questions or concerns.  Med Reconcile: Reviewed and verified all current medications with the patient. The updated medication list was printed and given to the patient.  Return Appointment: Patient given instructions regarding scheduling next clinic visit. Patient demonstrated understanding of this information and agreed to call with further questions or concerns.  Patient stated he understood all health information given and agreed to call with further questions or concerns.    Nadia Catalan LPN    
Chief Complaint   Patient presents with     Follow Up     New Patient        Vitals were taken and medications were reconciled. EKG was performed.    Isabel Singh  2:03 PM    
16-Feb-2024

## 2024-04-18 ENCOUNTER — ANCILLARY PROCEDURE (OUTPATIENT)
Dept: CARDIOLOGY | Facility: CLINIC | Age: 74
End: 2024-04-18
Attending: INTERNAL MEDICINE
Payer: MEDICARE

## 2024-04-18 DIAGNOSIS — Z95.0 PACEMAKER: ICD-10-CM

## 2024-04-18 PROCEDURE — 93294 REM INTERROG EVL PM/LDLS PM: CPT | Performed by: INTERNAL MEDICINE

## 2024-04-18 PROCEDURE — 93296 REM INTERROG EVL PM/IDS: CPT

## 2024-04-30 LAB
MDC_IDC_EPISODE_DTM: NORMAL
MDC_IDC_EPISODE_DTM: NORMAL
MDC_IDC_EPISODE_ID: NORMAL
MDC_IDC_EPISODE_ID: NORMAL
MDC_IDC_EPISODE_TYPE: NORMAL
MDC_IDC_EPISODE_TYPE: NORMAL
MDC_IDC_LEAD_CONNECTION_STATUS: NORMAL
MDC_IDC_LEAD_CONNECTION_STATUS: NORMAL
MDC_IDC_LEAD_IMPLANT_DT: NORMAL
MDC_IDC_LEAD_IMPLANT_DT: NORMAL
MDC_IDC_LEAD_LOCATION: NORMAL
MDC_IDC_LEAD_LOCATION: NORMAL
MDC_IDC_LEAD_LOCATION_DETAIL_1: NORMAL
MDC_IDC_LEAD_LOCATION_DETAIL_1: NORMAL
MDC_IDC_LEAD_MFG: NORMAL
MDC_IDC_LEAD_MFG: NORMAL
MDC_IDC_LEAD_MODEL: NORMAL
MDC_IDC_LEAD_MODEL: NORMAL
MDC_IDC_LEAD_POLARITY_TYPE: NORMAL
MDC_IDC_LEAD_POLARITY_TYPE: NORMAL
MDC_IDC_LEAD_SERIAL: NORMAL
MDC_IDC_LEAD_SERIAL: NORMAL
MDC_IDC_MSMT_BATTERY_DTM: NORMAL
MDC_IDC_MSMT_BATTERY_REMAINING_LONGEVITY: 55 MO
MDC_IDC_MSMT_BATTERY_REMAINING_PERCENTAGE: 52 %
MDC_IDC_MSMT_BATTERY_RRT_TRIGGER: NORMAL
MDC_IDC_MSMT_BATTERY_STATUS: NORMAL
MDC_IDC_MSMT_BATTERY_VOLTAGE: 2.99 V
MDC_IDC_MSMT_LEADCHNL_RA_IMPEDANCE_VALUE: 380 OHM
MDC_IDC_MSMT_LEADCHNL_RA_LEAD_CHANNEL_STATUS: NORMAL
MDC_IDC_MSMT_LEADCHNL_RA_PACING_THRESHOLD_AMPLITUDE: 0.5 V
MDC_IDC_MSMT_LEADCHNL_RA_PACING_THRESHOLD_PULSEWIDTH: 0.4 MS
MDC_IDC_MSMT_LEADCHNL_RA_SENSING_INTR_AMPL: 4.2 MV
MDC_IDC_MSMT_LEADCHNL_RV_IMPEDANCE_VALUE: 450 OHM
MDC_IDC_MSMT_LEADCHNL_RV_LEAD_CHANNEL_STATUS: NORMAL
MDC_IDC_MSMT_LEADCHNL_RV_PACING_THRESHOLD_AMPLITUDE: 1 V
MDC_IDC_MSMT_LEADCHNL_RV_PACING_THRESHOLD_PULSEWIDTH: 0.4 MS
MDC_IDC_MSMT_LEADCHNL_RV_SENSING_INTR_AMPL: 8.7 MV
MDC_IDC_PG_IMPLANT_DTM: NORMAL
MDC_IDC_PG_MFG: NORMAL
MDC_IDC_PG_MODEL: NORMAL
MDC_IDC_PG_SERIAL: NORMAL
MDC_IDC_PG_TYPE: NORMAL
MDC_IDC_SESS_CLINIC_NAME: NORMAL
MDC_IDC_SESS_DTM: NORMAL
MDC_IDC_SESS_REPROGRAMMED: NO
MDC_IDC_SESS_TYPE: NORMAL
MDC_IDC_SET_BRADY_AT_MODE_SWITCH_MODE: NORMAL
MDC_IDC_SET_BRADY_AT_MODE_SWITCH_RATE: 160 {BEATS}/MIN
MDC_IDC_SET_BRADY_LOWRATE: 60 {BEATS}/MIN
MDC_IDC_SET_BRADY_MAX_SENSOR_RATE: 120 {BEATS}/MIN
MDC_IDC_SET_BRADY_MAX_TRACKING_RATE: 120 {BEATS}/MIN
MDC_IDC_SET_BRADY_MODE: NORMAL
MDC_IDC_SET_BRADY_PAV_DELAY_LOW: 200 MS
MDC_IDC_SET_BRADY_SAV_DELAY_LOW: 200 MS
MDC_IDC_SET_LEADCHNL_RA_PACING_AMPLITUDE: 1.5 V
MDC_IDC_SET_LEADCHNL_RA_PACING_ANODE_ELECTRODE_1: NORMAL
MDC_IDC_SET_LEADCHNL_RA_PACING_ANODE_LOCATION_1: NORMAL
MDC_IDC_SET_LEADCHNL_RA_PACING_CAPTURE_MODE: NORMAL
MDC_IDC_SET_LEADCHNL_RA_PACING_CATHODE_ELECTRODE_1: NORMAL
MDC_IDC_SET_LEADCHNL_RA_PACING_CATHODE_LOCATION_1: NORMAL
MDC_IDC_SET_LEADCHNL_RA_PACING_POLARITY: NORMAL
MDC_IDC_SET_LEADCHNL_RA_PACING_PULSEWIDTH: 0.4 MS
MDC_IDC_SET_LEADCHNL_RA_SENSING_ADAPTATION_MODE: NORMAL
MDC_IDC_SET_LEADCHNL_RA_SENSING_ANODE_ELECTRODE_1: NORMAL
MDC_IDC_SET_LEADCHNL_RA_SENSING_ANODE_LOCATION_1: NORMAL
MDC_IDC_SET_LEADCHNL_RA_SENSING_CATHODE_ELECTRODE_1: NORMAL
MDC_IDC_SET_LEADCHNL_RA_SENSING_CATHODE_LOCATION_1: NORMAL
MDC_IDC_SET_LEADCHNL_RA_SENSING_POLARITY: NORMAL
MDC_IDC_SET_LEADCHNL_RA_SENSING_SENSITIVITY: 0.75 MV
MDC_IDC_SET_LEADCHNL_RV_PACING_AMPLITUDE: 2.5 V
MDC_IDC_SET_LEADCHNL_RV_PACING_ANODE_ELECTRODE_1: NORMAL
MDC_IDC_SET_LEADCHNL_RV_PACING_ANODE_LOCATION_1: NORMAL
MDC_IDC_SET_LEADCHNL_RV_PACING_CAPTURE_MODE: NORMAL
MDC_IDC_SET_LEADCHNL_RV_PACING_CATHODE_ELECTRODE_1: NORMAL
MDC_IDC_SET_LEADCHNL_RV_PACING_CATHODE_LOCATION_1: NORMAL
MDC_IDC_SET_LEADCHNL_RV_PACING_POLARITY: NORMAL
MDC_IDC_SET_LEADCHNL_RV_PACING_PULSEWIDTH: 0.4 MS
MDC_IDC_SET_LEADCHNL_RV_SENSING_ADAPTATION_MODE: NORMAL
MDC_IDC_SET_LEADCHNL_RV_SENSING_ANODE_ELECTRODE_1: NORMAL
MDC_IDC_SET_LEADCHNL_RV_SENSING_ANODE_LOCATION_1: NORMAL
MDC_IDC_SET_LEADCHNL_RV_SENSING_CATHODE_ELECTRODE_1: NORMAL
MDC_IDC_SET_LEADCHNL_RV_SENSING_CATHODE_LOCATION_1: NORMAL
MDC_IDC_SET_LEADCHNL_RV_SENSING_POLARITY: NORMAL
MDC_IDC_SET_LEADCHNL_RV_SENSING_SENSITIVITY: 2.5 MV
MDC_IDC_STAT_AT_BURDEN_PERCENT: 0 %
MDC_IDC_STAT_AT_DTM_END: NORMAL
MDC_IDC_STAT_AT_DTM_START: NORMAL
MDC_IDC_STAT_AT_MODE_SW_COUNT: 0
MDC_IDC_STAT_AT_MODE_SW_COUNT_PER_DAY: 0
MDC_IDC_STAT_AT_MODE_SW_PERCENT_TIME: 0 %
MDC_IDC_STAT_BRADY_AP_VP_PERCENT: 2 %
MDC_IDC_STAT_BRADY_AP_VS_PERCENT: 1 %
MDC_IDC_STAT_BRADY_AS_VP_PERCENT: 98 %
MDC_IDC_STAT_BRADY_AS_VS_PERCENT: 1 %
MDC_IDC_STAT_BRADY_DTM_END: NORMAL
MDC_IDC_STAT_BRADY_DTM_START: NORMAL
MDC_IDC_STAT_BRADY_RA_PERCENT_PACED: 2 %
MDC_IDC_STAT_BRADY_RV_PERCENT_PACED: 99 %
MDC_IDC_STAT_CRT_DTM_END: NORMAL
MDC_IDC_STAT_CRT_DTM_START: NORMAL
MDC_IDC_STAT_HEART_RATE_ATRIAL_MAX: 330 {BEATS}/MIN
MDC_IDC_STAT_HEART_RATE_ATRIAL_MEAN: 88 {BEATS}/MIN
MDC_IDC_STAT_HEART_RATE_ATRIAL_MIN: 60 {BEATS}/MIN
MDC_IDC_STAT_HEART_RATE_DTM_END: NORMAL
MDC_IDC_STAT_HEART_RATE_DTM_START: NORMAL
MDC_IDC_STAT_HEART_RATE_VENTRICULAR_MAX: 220 {BEATS}/MIN
MDC_IDC_STAT_HEART_RATE_VENTRICULAR_MEAN: 88 {BEATS}/MIN
MDC_IDC_STAT_HEART_RATE_VENTRICULAR_MIN: 50 {BEATS}/MIN

## 2024-05-10 ENCOUNTER — PATIENT OUTREACH (OUTPATIENT)
Dept: ONCOLOGY | Facility: CLINIC | Age: 74
End: 2024-05-10
Payer: MEDICARE

## 2024-05-10 DIAGNOSIS — C83.38 DIFFUSE LARGE B-CELL LYMPHOMA OF LYMPH NODES OF MULTIPLE REGIONS (H): ICD-10-CM

## 2024-05-10 DIAGNOSIS — T50.8X5D ALLERGIC REACTION TO CONTRAST MATERIAL, SUBSEQUENT ENCOUNTER: ICD-10-CM

## 2024-05-10 RX ORDER — METHYLPREDNISOLONE 32 MG/1
TABLET ORAL
Qty: 2 TABLET | Refills: 3 | Status: SHIPPED | OUTPATIENT
Start: 2024-05-10

## 2024-05-10 NOTE — PROGRESS NOTES
[facility]: Cancer Care                                                                                          Shahid called stating he needs a methylprednisone script prior to his CT scan scheduled on 5/23/24.     Signature:  Claudine Wu RN

## 2024-05-14 ENCOUNTER — TELEPHONE (OUTPATIENT)
Dept: CARDIOLOGY | Facility: CLINIC | Age: 74
End: 2024-05-14
Payer: MEDICARE

## 2024-05-14 NOTE — TELEPHONE ENCOUNTER
Left Voicemail (1st Attempt) for the patient to call back and schedule the following:    Appointment type: rtn cardio   Provider: enrrique   Return date: 07/04/24  Specialty phone number: [phone] opt 1   Additional appointment(s) needed: device check   Additonal Notes: n/a

## 2024-05-16 NOTE — TELEPHONE ENCOUNTER
Left Voicemail (1st Attempt) for the patient to call back and schedule the following:    Appointment type: rtn cardio   Provider: enrrique   Return date: 07/04/24  Specialty phone number: [phone] opt 1   Additional appointment(s) needed: device check   Additonal Notes: n/a

## 2024-05-22 DIAGNOSIS — C83.38 DIFFUSE LARGE B-CELL LYMPHOMA OF LYMPH NODES OF MULTIPLE REGIONS (H): Primary | ICD-10-CM

## 2024-05-23 ENCOUNTER — ONCOLOGY VISIT (OUTPATIENT)
Dept: ONCOLOGY | Facility: CLINIC | Age: 74
End: 2024-05-23
Attending: INTERNAL MEDICINE
Payer: MEDICARE

## 2024-05-23 ENCOUNTER — LAB (OUTPATIENT)
Dept: LAB | Facility: CLINIC | Age: 74
End: 2024-05-23
Attending: INTERNAL MEDICINE
Payer: MEDICARE

## 2024-05-23 ENCOUNTER — ANCILLARY PROCEDURE (OUTPATIENT)
Dept: CT IMAGING | Facility: CLINIC | Age: 74
End: 2024-05-23
Attending: INTERNAL MEDICINE
Payer: MEDICARE

## 2024-05-23 VITALS
SYSTOLIC BLOOD PRESSURE: 99 MMHG | TEMPERATURE: 98.4 F | BODY MASS INDEX: 21.63 KG/M2 | HEART RATE: 105 BPM | OXYGEN SATURATION: 97 % | RESPIRATION RATE: 16 BRPM | WEIGHT: 138.1 LBS | DIASTOLIC BLOOD PRESSURE: 66 MMHG

## 2024-05-23 DIAGNOSIS — C83.38 DIFFUSE LARGE B-CELL LYMPHOMA OF LYMPH NODES OF MULTIPLE REGIONS (H): ICD-10-CM

## 2024-05-23 DIAGNOSIS — C85.10 HIGH GRADE B-CELL LYMPHOMA (H): Primary | ICD-10-CM

## 2024-05-23 DIAGNOSIS — T45.1X5S ADVERSE EFFECT OF ANTINEOPLASTIC AND IMMUNOSUPPRESSIVE DRUGS, SEQUELA: ICD-10-CM

## 2024-05-23 LAB
ALBUMIN SERPL BCG-MCNC: 4.5 G/DL (ref 3.5–5.2)
ALP SERPL-CCNC: 101 U/L (ref 40–150)
ALT SERPL W P-5'-P-CCNC: 24 U/L (ref 0–70)
ANION GAP SERPL CALCULATED.3IONS-SCNC: 11 MMOL/L (ref 7–15)
AST SERPL W P-5'-P-CCNC: 25 U/L (ref 0–45)
BASOPHILS # BLD AUTO: 0 10E3/UL (ref 0–0.2)
BASOPHILS NFR BLD AUTO: 0 %
BILIRUB SERPL-MCNC: 0.3 MG/DL
BUN SERPL-MCNC: 30.4 MG/DL (ref 8–23)
CALCIUM SERPL-MCNC: 9.5 MG/DL (ref 8.8–10.2)
CHLORIDE SERPL-SCNC: 105 MMOL/L (ref 98–107)
CREAT BLD-MCNC: 1 MG/DL (ref 0.7–1.3)
CREAT SERPL-MCNC: 0.95 MG/DL (ref 0.67–1.17)
DEPRECATED HCO3 PLAS-SCNC: 24 MMOL/L (ref 22–29)
EGFRCR SERPLBLD CKD-EPI 2021: 84 ML/MIN/1.73M2
EGFRCR SERPLBLD CKD-EPI 2021: >60 ML/MIN/1.73M2
EOSINOPHIL # BLD AUTO: 0 10E3/UL (ref 0–0.7)
EOSINOPHIL NFR BLD AUTO: 0 %
ERYTHROCYTE [DISTWIDTH] IN BLOOD BY AUTOMATED COUNT: 13.1 % (ref 10–15)
GLUCOSE SERPL-MCNC: 182 MG/DL (ref 70–99)
HCT VFR BLD AUTO: 39.1 % (ref 40–53)
HGB BLD-MCNC: 13 G/DL (ref 13.3–17.7)
IMM GRANULOCYTES # BLD: 0 10E3/UL
IMM GRANULOCYTES NFR BLD: 0 %
LDH SERPL L TO P-CCNC: 214 U/L (ref 0–250)
LYMPHOCYTES # BLD AUTO: 0.4 10E3/UL (ref 0.8–5.3)
LYMPHOCYTES NFR BLD AUTO: 5 %
MCH RBC QN AUTO: 31.3 PG (ref 26.5–33)
MCHC RBC AUTO-ENTMCNC: 33.2 G/DL (ref 31.5–36.5)
MCV RBC AUTO: 94 FL (ref 78–100)
MONOCYTES # BLD AUTO: 0.3 10E3/UL (ref 0–1.3)
MONOCYTES NFR BLD AUTO: 4 %
NEUTROPHILS # BLD AUTO: 7.4 10E3/UL (ref 1.6–8.3)
NEUTROPHILS NFR BLD AUTO: 91 %
NRBC # BLD AUTO: 0 10E3/UL
NRBC BLD AUTO-RTO: 0 /100
PLATELET # BLD AUTO: 155 10E3/UL (ref 150–450)
POTASSIUM SERPL-SCNC: 4.8 MMOL/L (ref 3.4–5.3)
PROT SERPL-MCNC: 6.1 G/DL (ref 6.4–8.3)
RBC # BLD AUTO: 4.15 10E6/UL (ref 4.4–5.9)
SODIUM SERPL-SCNC: 140 MMOL/L (ref 135–145)
WBC # BLD AUTO: 8.2 10E3/UL (ref 4–11)

## 2024-05-23 PROCEDURE — 36415 COLL VENOUS BLD VENIPUNCTURE: CPT

## 2024-05-23 PROCEDURE — 83615 LACTATE (LD) (LDH) ENZYME: CPT

## 2024-05-23 PROCEDURE — G1010 CDSM STANSON: HCPCS | Mod: GC | Performed by: RADIOLOGY

## 2024-05-23 PROCEDURE — 74177 CT ABD & PELVIS W/CONTRAST: CPT | Mod: MG | Performed by: RADIOLOGY

## 2024-05-23 PROCEDURE — 99215 OFFICE O/P EST HI 40 MIN: CPT | Performed by: INTERNAL MEDICINE

## 2024-05-23 PROCEDURE — 80053 COMPREHEN METABOLIC PANEL: CPT | Performed by: PATHOLOGY

## 2024-05-23 PROCEDURE — 71260 CT THORAX DX C+: CPT | Mod: MG | Performed by: RADIOLOGY

## 2024-05-23 PROCEDURE — 70491 CT SOFT TISSUE NECK W/DYE: CPT | Mod: MG | Performed by: RADIOLOGY

## 2024-05-23 PROCEDURE — 80053 COMPREHEN METABOLIC PANEL: CPT

## 2024-05-23 PROCEDURE — 85049 AUTOMATED PLATELET COUNT: CPT

## 2024-05-23 PROCEDURE — G0463 HOSPITAL OUTPT CLINIC VISIT: HCPCS | Performed by: INTERNAL MEDICINE

## 2024-05-23 RX ORDER — IOPAMIDOL 755 MG/ML
79 INJECTION, SOLUTION INTRAVASCULAR ONCE
Status: COMPLETED | OUTPATIENT
Start: 2024-05-23 | End: 2024-05-23

## 2024-05-23 RX ADMIN — IOPAMIDOL 79 ML: 755 INJECTION, SOLUTION INTRAVASCULAR at 11:56

## 2024-05-23 ASSESSMENT — PAIN SCALES - GENERAL: PAINLEVEL: NO PAIN (0)

## 2024-05-23 NOTE — NURSING NOTE
Chief Complaint   Patient presents with    Blood Draw     Labs drawn via PIV by RN in lab.      Labs drawn via peripheral IV. Research kit collected.   Demetrice Caro RN

## 2024-05-23 NOTE — LETTER
5/23/2024      Shahid Davis   [facility]  [place] [address]      Dear Colleague,    Thank you for referring your patient, Shahid Davis, to the [facility]. Please see a copy of my visit note below.    [facility]  Date of visit: May 23, 2024    Reason for Visit: Follow up triple-hit DLBCL    ONCOLOGIC SUMMARY:  Diagnosis:  Low-grade kappa-restricted plasmacytoid B-cell lymphoma dx 3/2004, presenting with thoracic paraspinal mass. Bone marrow hypercellular with 70-80% involvement by small kappa-restricted lymphocytes which were positive for CD10, CD19, and CD20 and negative for CD5 and CD23.   Transformation to high-grade B-cell lymphoma 12/2022 (versus new primary), presenting with B symptoms, sternal mass, and SOB/chest pain. Biopsy of sternal mass showed large B-cell lymphoma with aggressive features (GCB-subtype), Ki67 %, FISH positive for MYC (non-IgH partner), BCL2, and BCL6 rearrangements. Stage IV with extensive lymphomatous involvement to the R pleura w/avid effusions, mediastinal and pericardial regions, right anterolateral chest wall, adenopathy above and below the diaphragm, liver, spleen and multiple sites in the skeleton. CSF flow negative.     Treatment history:  For low-grade lymphoma:  2005: Fludarabine-based chemo x 6 cycles and XRT to spine (details unclear)    For high-grade lymphoma:  12/20/22: Cycle 1 R-CHOP with Neulasta support (with a few days of steroid prephase prior)  1/11/23: Cycle 2 Switched to DA-R-EPOCH with triple IT chemo for CNS ppx after FISH returned showing triple-hit disease. PET after 2 cycles shows very good SD.   2/2/23: Cycle 3 DA-R EPOCH with triple IT chemo for CNS ppx  2/23/23: Cycle 4 DA-R-EPOCH with triple IT chemo for CNS ppx. PET after 4 cycles shows CRu.   3/17/23: Cycle 5 DA-R-EPOCH with triple IT chemo for CNS ppx  4/6/23: Cycle 6 DA-R-EPOCH. EOT PET shows CR!    INTERVAL  HISTORY:  Shahid is here today for follow-up. Doing pretty well overall.   - Right shoulder pain which is resolved with swimming, left shoulder discomfort but is improving with swimming  - Still having left foot neuropathy  - Appetite is good  - Energy slowly improving, going for swimming.  - Hair is back and now wavy!  - Otherwise no fevers, night sweats, recent infections, lumps/bumps, chest pain, SOB, abd pain, diarrhea, or bleeding.     ROS: 10 point ROS neg other than the symptoms noted above in the HPI.    Current Outpatient Medications   Medication Sig Dispense Refill    acyclovir (ZOVIRAX) 400 MG tablet Take 1 tablet (400 mg) by mouth every 12 hours (Patient not taking: Reported on 1/11/2024) 60 tablet 3    ascorbic acid (VITAMIN C) 500 MG tablet Take 500 mg by mouth every morning (Patient not taking: Reported on 1/11/2024)      baclofen (LIORESAL) 10 MG tablet Take 0.5-1 tablets (5-10 mg) by mouth 3 times daily as needed for other (hiccups) (Patient not taking: Reported on 11/2/2023) 30 tablet 0    calcipotriene (DOVONOX) 0.005 % external cream Mix efudex + calcipotriene equally. Apply BID x 4-10 days. Stop when skin gets red. (Patient not taking: Reported on 11/2/2023) 60 g 1    fluorouracil (EFUDEX) 5 % external cream Mix equally with calcipotriene cream. Apply BID x 4-10 days. Stop when skin gets red. (Patient not taking: Reported on 11/2/2023) 40 g 0    methylPREDNISolone (MEDROL) 32 MG tablet Take one tablet 12 hours prior to and one tablet 2 hours prior to the procedure with IV contrast 2 tablet 3    Multiple Vitamins-Minerals (MULTIVITAL PO) Take 1 tablet by mouth every morning (Patient not taking: Reported on 1/11/2024)      omeprazole (PRILOSEC) 40 MG DR capsule Take 1 capsule (40 mg) by mouth daily 90 capsule 4    ondansetron (ZOFRAN) 8 MG tablet Take 1 tablet (8 mg) by mouth every 8 hours as needed for nausea (Patient not taking: Reported on 11/2/2023) 60 tablet 3    polyethylene glycol (MIRALAX)  17 GM/Dose powder Take 17 g by mouth daily as needed for constipation (Patient not taking: Reported on 11/2/2023) 510 g 4    prochlorperazine (COMPAZINE) 10 MG tablet Take 1 tablet (10 mg) by mouth every 6 hours as needed for nausea or vomiting (Patient not taking: Reported on 11/2/2023) 30 tablet 3    senna-docusate (SENNA S) 8.6-50 MG tablet Take 1 tablet by mouth 2 times daily as needed for constipation (Patient not taking: Reported on 11/2/2023) 60 tablet 4       Allergies   Allergen Reactions    Ampicillin [Ampicillin Sodium]     Bactrim [Sulfamethoxazole W/Trimethoprim]     Contrast Dye      CT contrast (IV) allergy - need oral Methylpred as premed for scans    Ampicillin Rash     Physical Exam:  BP 99/66 (BP Location: Right arm, Patient Position: Sitting, Cuff Size: Adult Regular)   Pulse 105   Temp 98.4  F (36.9  C) (Oral)   Resp 16   Wt 62.6 kg (138 lb 1.6 oz)   SpO2 97%   BMI 21.63 kg/m       ECOG PS: 0    General: Awake, alert, in no acute distress.   HEENT: Normocephalic, atraumatic. No scleral icterus.   Lymph: No cervical, supraclavicular, or axillary lymphadenopathy appreciated. No palpable mass / lesions at the back (tips of left shoulder blade) where Shahid was complaining about discomfort.  CV: Regular rate and rhythm. No murmurs, rubs, or gallops appreciated.  Resp: Good inspiratory effort, lungs clear to auscultation bilaterally.  GI: Abdomen soft, nontender, nondistended.   Ext: No peripheral edema bilaterally.  Neuro: CN II-XII grossly intact. No focal deficits.   Skin: Many scattered SK's on back. Healed large incision on right posterior shoulder.   Psych: Pleasant, normal affect.     Labs:   I personally reviewed the following labs:    Most Recent 3 CBC's:  Recent Labs   Lab Test 01/11/24  1318 11/02/23  1132 07/20/23  1413   WBC 3.8* 9.3 3.7*   HGB 12.3* 12.4* 11.2*   MCV 93 91 93    142* 154     Most Recent 3 BMP's:  Recent Labs   Lab Test 01/11/24  1318 11/02/23  1201  11/02/23  1132 07/13/23  1530     --  139 141   POTASSIUM 5.0  --  4.5 4.8   CHLORIDE 105  --  104 106   CO2 25  --  25 28   BUN 27.7*  --  29.1* 29.0*   CR 0.95 0.9 0.84 0.98   ANIONGAP 9  --  10 7   JORGE 9.2  --  9.6 9.5   GLC 97  --  228* 96     Most Recent 2 LFT's:  Recent Labs   Lab Test 01/11/24  1318 11/02/23  1132   AST 30 30   ALT 37 37   ALKPHOS 102 123   BILITOTAL 0.3 0.3     5/23/24 CT CAP:  IMPRESSION:  1.  No convincing evidence of recurrent lymphoma.  2.  Stable sclerotic osseous lesions.  3.  There is nodular thickening of the posterior tracheal wall just proximal to the pradip. Consider further evaluation with endoscopy.    CT Neck:  Impression:  1. Stable left level 1B lymph node. No evidence for recurrence.  2. Partially visualized fluid distended esophagus    ASSESSMENT/PLAN:    #H/o low-grade kappa restricted plasmacytoid B-cell lymphoma 3/2004   #Transformation to DLBCL 12/2022  H/o low-grade kappa restricted plasmacytoid B-cell lymphoma 3/2004 treated with x6 cycles of fludarabine based chemo and XRT to spine, then has been on surveillance since 2005. He presented in 11/2022 with progressing B symptoms, sternal mass, and SOB/CP. PET 12/9/22 showed extensive lymphomatous involvement to the R pleura w/avid effusions, mediastinal and pericardial regions, right anterolateral chest wall, adenopathy above and below the diaphragm, liver, spleen and multiple sites in the skeleton, concerning for transformation from his low-grade lymphoma. 12/15/22 biopsy of sternal mass showed large B-cell lymphoma with aggressive features (GCB-subtype), Ki67 %. 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  - S/p C1 R-CHOP in the hospital 12/20/22. Tolerated well overall.  - Switched to DA-R-EPOCH starting Cycle 2 (1/11/23) after FISH returned showing triple-hit disease. Staging LP negative, gave triple IT chemo once per cycle for CNS ppx for a total of 4 doses.   -  PET after 2 cycles showed AR with significant resolution of most FDG activity/disease other than small area of right paramediastinal pleural thickening (SUVmax 5) and resolution of right pleural effusion. Concurrent Clonoseq was negative. PET after 4 cycles showed further improvement with right paramediastinal pleura SUV down to 3.3, also could potentially be artifact since it is near pacer wires.  - Completed Cycle 6 R-EPOCH 4/6/23. Tolerated chemo well overall, stayed at dose level 1 due to thrombocytopenia.  - EOT PET-CT 4/28/23 (with ketogenic diet for 3 days prior to suppress cardiac activity) showed CR!  - 6-month surveillance CT 11/2/23 showed ongoing remission. Concurrent Clonoseq also negative. Removed port 12/15/23.   - 1 year surveillance CT 5/23/24 shows ongoing remission. Some posterior tracheal wall thickening noted - will d/w Radiology ddx and how it compares to other scans.   - Continue surveillance with H&P/labs every 3-6 mo for 2 years, then yearly for years 3-5. Imaging every 6 months for first 2 years only.   - We previously discussed his increased risk of recurrence given transformed and triple-hit disease. If he relapses, would likely consider CAR-T therapy.      #Hypogammaglobulinemia   #COVID-19 Infection 1/2023, resolved  #Acute maxillary sinusitis 5/2023, resolved   on 12/15/22 and 316 on 4/6/23.   - Given persistently low COVID cycle threshold, hypogammaglobulinemia, and risk for further immunosuppression with chemo, gave IVIG on 1/15/23 and 4/10/23.  - Monitor for recurrent infections.    #Non-melanoma skin cancers  History of many SCC's in the past in 2011, 2014, 2022 (very large lesion on right posterior shoulder).   - Saw Dermatology 8/8/23, biopsy from R lateral neck and R posterior shoulder both showed SCC. S/p Mohs on 11/8/23.   - Should continue at least annual follow-up with Dermatology.     #Nausea, improved  Intermittent. Unlikely to be related to chemo this far out.  -  Continue PRN Zofran and compazine.  - Now improved. If worsening, would recommend EGD for further evaluation.     #PPx  - Vaccines: up to date on flu, COVID, and pneumonia vaccines. Recommended Shingrix series and tried to give 1st dose today but per pharmacy, not covered by his Medicare benefits. He will look into getting it locally.       PLAN:  - RTC in 6 months with CT     Patient was seen and discussed with resident physician Cherri Faye.      Total of 40 minutes on patient visit, reviewing records, interpreting test results, placing orders, and documentation on the day of service.    Domitila Ruelas MD  Attending Physician, [facility]

## 2024-05-23 NOTE — NURSING NOTE
"Oncology Rooming Note    May 23, 2024 1:28 PM   Shahid Davis is a 73 year old male who presents for:    Chief Complaint   Patient presents with    Oncology Clinic Visit     High grade B-cell lymphoma     Initial Vitals: BP 99/66 (BP Location: Right arm, Patient Position: Sitting, Cuff Size: Adult Regular)   Pulse 105   Temp 98.4  F (36.9  C) (Oral)   Resp 16   Wt 62.6 kg (138 lb 1.6 oz)   SpO2 97%   BMI 21.63 kg/m   Estimated body mass index is 21.63 kg/m  as calculated from the following:    Height as of 12/15/23: 1.702 m (5' 7\").    Weight as of this encounter: 62.6 kg (138 lb 1.6 oz). Body surface area is 1.72 meters squared.  No Pain (0) Comment: Data Unavailable   No LMP for male patient.  Allergies reviewed: Yes  Medications reviewed: Yes    Medications: Medication refills not needed today.  Pharmacy name entered into [employer]:    [id number] [place] [facility] - [place], [place] - [address] [id number] - [place], [place] - [address] AT [facility]    Frailty Screening:   Is the patient here for a new oncology consult visit in cancer care? 2. No      Clinical concerns: none      Lourdes Clark, EMT  5/23/2024            "

## 2024-05-23 NOTE — DISCHARGE INSTRUCTIONS

## 2024-05-23 NOTE — PROGRESS NOTES
[facility]  Date of visit: May 23, 2024    Reason for Visit: Follow up triple-hit DLBCL    ONCOLOGIC SUMMARY:  Diagnosis:  Low-grade kappa-restricted plasmacytoid B-cell lymphoma dx 3/2004, presenting with thoracic paraspinal mass. Bone marrow hypercellular with 70-80% involvement by small kappa-restricted lymphocytes which were positive for CD10, CD19, and CD20 and negative for CD5 and CD23.   Transformation to high-grade B-cell lymphoma 12/2022 (versus new primary), presenting with B symptoms, sternal mass, and SOB/chest pain. Biopsy of sternal mass showed large B-cell lymphoma with aggressive features (GCB-subtype), Ki67 %, FISH positive for MYC (non-IgH partner), BCL2, and BCL6 rearrangements. Stage IV with extensive lymphomatous involvement to the R pleura w/avid effusions, mediastinal and pericardial regions, right anterolateral chest wall, adenopathy above and below the diaphragm, liver, spleen and multiple sites in the skeleton. CSF flow negative.     Treatment history:  For low-grade lymphoma:  2005: Fludarabine-based chemo x 6 cycles and XRT to spine (details unclear)    For high-grade lymphoma:  12/20/22: Cycle 1 R-CHOP with Neulasta support (with a few days of steroid prephase prior)  1/11/23: Cycle 2 Switched to DA-R-EPOCH with triple IT chemo for CNS ppx after FISH returned showing triple-hit disease. PET after 2 cycles shows very good GA.   2/2/23: Cycle 3 DA-R EPOCH with triple IT chemo for CNS ppx  2/23/23: Cycle 4 DA-R-EPOCH with triple IT chemo for CNS ppx. PET after 4 cycles shows CRu.   3/17/23: Cycle 5 DA-R-EPOCH with triple IT chemo for CNS ppx  4/6/23: Cycle 6 DA-R-EPOCH. EOT PET shows CR!    INTERVAL HISTORY:  Shahid is here today for follow-up. Doing pretty well overall.   - Right shoulder pain which is resolved with swimming, left shoulder discomfort but is improving with swimming  - Still having left foot neuropathy  - Appetite is good  - Energy  slowly improving, going for swimming.  - Hair is back and now wavy!  - Otherwise no fevers, night sweats, recent infections, lumps/bumps, chest pain, SOB, abd pain, diarrhea, or bleeding.     ROS: 10 point ROS neg other than the symptoms noted above in the HPI.    Current Outpatient Medications   Medication Sig Dispense Refill    acyclovir (ZOVIRAX) 400 MG tablet Take 1 tablet (400 mg) by mouth every 12 hours (Patient not taking: Reported on 1/11/2024) 60 tablet 3    ascorbic acid (VITAMIN C) 500 MG tablet Take 500 mg by mouth every morning (Patient not taking: Reported on 1/11/2024)      baclofen (LIORESAL) 10 MG tablet Take 0.5-1 tablets (5-10 mg) by mouth 3 times daily as needed for other (hiccups) (Patient not taking: Reported on 11/2/2023) 30 tablet 0    calcipotriene (DOVONOX) 0.005 % external cream Mix efudex + calcipotriene equally. Apply BID x 4-10 days. Stop when skin gets red. (Patient not taking: Reported on 11/2/2023) 60 g 1    fluorouracil (EFUDEX) 5 % external cream Mix equally with calcipotriene cream. Apply BID x 4-10 days. Stop when skin gets red. (Patient not taking: Reported on 11/2/2023) 40 g 0    methylPREDNISolone (MEDROL) 32 MG tablet Take one tablet 12 hours prior to and one tablet 2 hours prior to the procedure with IV contrast 2 tablet 3    Multiple Vitamins-Minerals (MULTIVITAL PO) Take 1 tablet by mouth every morning (Patient not taking: Reported on 1/11/2024)      omeprazole (PRILOSEC) 40 MG DR capsule Take 1 capsule (40 mg) by mouth daily 90 capsule 4    ondansetron (ZOFRAN) 8 MG tablet Take 1 tablet (8 mg) by mouth every 8 hours as needed for nausea (Patient not taking: Reported on 11/2/2023) 60 tablet 3    polyethylene glycol (MIRALAX) 17 GM/Dose powder Take 17 g by mouth daily as needed for constipation (Patient not taking: Reported on 11/2/2023) 510 g 4    prochlorperazine (COMPAZINE) 10 MG tablet Take 1 tablet (10 mg) by mouth every 6 hours as needed for nausea or vomiting  (Patient not taking: Reported on 11/2/2023) 30 tablet 3    senna-docusate (SENNA S) 8.6-50 MG tablet Take 1 tablet by mouth 2 times daily as needed for constipation (Patient not taking: Reported on 11/2/2023) 60 tablet 4       Allergies   Allergen Reactions    Ampicillin [Ampicillin Sodium]     Bactrim [Sulfamethoxazole W/Trimethoprim]     Contrast Dye      CT contrast (IV) allergy - need oral Methylpred as premed for scans    Ampicillin Rash     Physical Exam:  BP 99/66 (BP Location: Right arm, Patient Position: Sitting, Cuff Size: Adult Regular)   Pulse 105   Temp 98.4  F (36.9  C) (Oral)   Resp 16   Wt 62.6 kg (138 lb 1.6 oz)   SpO2 97%   BMI 21.63 kg/m       ECOG PS: 0    General: Awake, alert, in no acute distress.   HEENT: Normocephalic, atraumatic. No scleral icterus.   Lymph: No cervical, supraclavicular, or axillary lymphadenopathy appreciated. No palpable mass / lesions at the back (tips of left shoulder blade) where Shahid was complaining about discomfort.  CV: Regular rate and rhythm. No murmurs, rubs, or gallops appreciated.  Resp: Good inspiratory effort, lungs clear to auscultation bilaterally.  GI: Abdomen soft, nontender, nondistended.   Ext: No peripheral edema bilaterally.  Neuro: CN II-XII grossly intact. No focal deficits.   Skin: Many scattered SK's on back. Healed large incision on right posterior shoulder.   Psych: Pleasant, normal affect.     Labs:   I personally reviewed the following labs:    Most Recent 3 CBC's:  Recent Labs   Lab Test 01/11/24  1318 11/02/23  1132 07/20/23  1413   WBC 3.8* 9.3 3.7*   HGB 12.3* 12.4* 11.2*   MCV 93 91 93    142* 154     Most Recent 3 BMP's:  Recent Labs   Lab Test 01/11/24  1318 11/02/23  1201 11/02/23  1132 07/13/23  1530     --  139 141   POTASSIUM 5.0  --  4.5 4.8   CHLORIDE 105  --  104 106   CO2 25  --  25 28   BUN 27.7*  --  29.1* 29.0*   CR 0.95 0.9 0.84 0.98   ANIONGAP 9  --  10 7   JORGE 9.2  --  9.6 9.5   GLC 97  --  228* 96      Most Recent 2 LFT's:  Recent Labs   Lab Test 01/11/24  1318 11/02/23  1132   AST 30 30   ALT 37 37   ALKPHOS 102 123   BILITOTAL 0.3 0.3     5/23/24 CT CAP:  IMPRESSION:  1.  No convincing evidence of recurrent lymphoma.  2.  Stable sclerotic osseous lesions.  3.  There is nodular thickening of the posterior tracheal wall just proximal to the pradip. Consider further evaluation with endoscopy.    CT Neck:  Impression:  1. Stable left level 1B lymph node. No evidence for recurrence.  2. Partially visualized fluid distended esophagus    ASSESSMENT/PLAN:    #H/o low-grade kappa restricted plasmacytoid B-cell lymphoma 3/2004   #Transformation to DLBCL 12/2022  H/o low-grade kappa restricted plasmacytoid B-cell lymphoma 3/2004 treated with x6 cycles of fludarabine based chemo and XRT to spine, then has been on surveillance since 2005. He presented in 11/2022 with progressing B symptoms, sternal mass, and SOB/CP. PET 12/9/22 showed extensive lymphomatous involvement to the R pleura w/avid effusions, mediastinal and pericardial regions, right anterolateral chest wall, adenopathy above and below the diaphragm, liver, spleen and multiple sites in the skeleton, concerning for transformation from his low-grade lymphoma. 12/15/22 biopsy of sternal mass showed large B-cell lymphoma with aggressive features (GCB-subtype), Ki67 %. 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  - S/p C1 R-CHOP in the hospital 12/20/22. Tolerated well overall.  - Switched to DA-R-EPOCH starting Cycle 2 (1/11/23) after FISH returned showing triple-hit disease. Staging LP negative, gave triple IT chemo once per cycle for CNS ppx for a total of 4 doses.   - PET after 2 cycles showed MO with significant resolution of most FDG activity/disease other than small area of right paramediastinal pleural thickening (SUVmax 5) and resolution of right pleural effusion. Concurrent Clonoseq was negative. PET after  4 cycles showed further improvement with right paramediastinal pleura SUV down to 3.3, also could potentially be artifact since it is near pacer wires.  - Completed Cycle 6 R-EPOCH 4/6/23. Tolerated chemo well overall, stayed at dose level 1 due to thrombocytopenia.  - EOT PET-CT 4/28/23 (with ketogenic diet for 3 days prior to suppress cardiac activity) showed CR!  - 6-month surveillance CT 11/2/23 showed ongoing remission. Concurrent Clonoseq also negative. Removed port 12/15/23.   - 1 year surveillance CT 5/23/24 shows ongoing remission. Some posterior tracheal wall thickening noted - will d/w Radiology ddx and how it compares to other scans.   - Continue surveillance with H&P/labs every 3-6 mo for 2 years, then yearly for years 3-5. Imaging every 6 months for first 2 years only.   - We previously discussed his increased risk of recurrence given transformed and triple-hit disease. If he relapses, would likely consider CAR-T therapy.      #Hypogammaglobulinemia   #COVID-19 Infection 1/2023, resolved  #Acute maxillary sinusitis 5/2023, resolved   on 12/15/22 and 316 on 4/6/23.   - Given persistently low COVID cycle threshold, hypogammaglobulinemia, and risk for further immunosuppression with chemo, gave IVIG on 1/15/23 and 4/10/23.  - Monitor for recurrent infections.    #Non-melanoma skin cancers  History of many SCC's in the past in 2011, 2014, 2022 (very large lesion on right posterior shoulder).   - Saw Dermatology 8/8/23, biopsy from R lateral neck and R posterior shoulder both showed SCC. S/p Mohs on 11/8/23.   - Should continue at least annual follow-up with Dermatology.     #Nausea, improved  Intermittent. Unlikely to be related to chemo this far out.  - Continue PRN Zofran and compazine.  - Now improved. If worsening, would recommend EGD for further evaluation.     #PPx  - Vaccines: up to date on flu, COVID, and pneumonia vaccines. Recommended Shingrix series and tried to give 1st dose today but per  pharmacy, not covered by his Medicare benefits. He will look into getting it locally.       PLAN:  - RTC in 6 months with CT     Patient was seen and discussed with resident physician Cherri Faye.      Total of 40 minutes on patient visit, reviewing records, interpreting test results, placing orders, and documentation on the day of service.    Domitila Ruelas MD  Attending Physician, [facility]

## 2024-05-23 NOTE — DISCHARGE INSTRUCTIONS

## 2024-06-02 LAB
ABC CLONOSEQ B-CELL TRACKING (MRD) RESULT: NORMAL
ABC DOMINANT SEQUENCES (B-CELL): 1
ABC RESIDUAL CLONAL CELLS/ MILL NUCLEATED CELLS BCELL: 0

## 2024-06-19 ENCOUNTER — PATIENT OUTREACH (OUTPATIENT)
Dept: ONCOLOGY | Facility: CLINIC | Age: 74
End: 2024-06-19
Payer: MEDICARE

## 2024-06-19 DIAGNOSIS — C82.99 NODULAR LYMPHOMA OF EXTRANODAL AND/OR SOLID ORGAN SITE (H): ICD-10-CM

## 2024-06-19 RX ORDER — OMEPRAZOLE 40 MG/1
40 CAPSULE, DELAYED RELEASE ORAL DAILY
Qty: 90 CAPSULE | Refills: 4 | Status: SHIPPED | OUTPATIENT
Start: 2024-06-19

## 2024-06-19 NOTE — PROGRESS NOTES
[facility]: Cancer Care                                                                                          Patient requesting refill of Omeprazole, script sent to Dr. Ruelas.   Encouraged patient to re-establish care with primary care provider.     Signature:  Claudine Wu RN

## 2024-07-25 ENCOUNTER — ANCILLARY PROCEDURE (OUTPATIENT)
Dept: CARDIOLOGY | Facility: CLINIC | Age: 74
End: 2024-07-25
Attending: INTERNAL MEDICINE
Payer: MEDICARE

## 2024-07-25 DIAGNOSIS — I44.2 ATRIOVENTRICULAR BLOCK, COMPLETE (H): ICD-10-CM

## 2024-07-25 DIAGNOSIS — Z95.0 PACEMAKER: ICD-10-CM

## 2024-07-25 DIAGNOSIS — Z01.818 PREOP EXAMINATION: ICD-10-CM

## 2024-07-25 PROCEDURE — 93280 PM DEVICE PROGR EVAL DUAL: CPT | Performed by: INTERNAL MEDICINE

## 2024-07-26 LAB
MDC_IDC_LEAD_CONNECTION_STATUS: NORMAL
MDC_IDC_LEAD_CONNECTION_STATUS: NORMAL
MDC_IDC_LEAD_IMPLANT_DT: NORMAL
MDC_IDC_LEAD_IMPLANT_DT: NORMAL
MDC_IDC_LEAD_LOCATION: NORMAL
MDC_IDC_LEAD_LOCATION: NORMAL
MDC_IDC_LEAD_LOCATION_DETAIL_1: NORMAL
MDC_IDC_LEAD_LOCATION_DETAIL_1: NORMAL
MDC_IDC_LEAD_MFG: NORMAL
MDC_IDC_LEAD_MFG: NORMAL
MDC_IDC_LEAD_MODEL: NORMAL
MDC_IDC_LEAD_MODEL: NORMAL
MDC_IDC_LEAD_POLARITY_TYPE: NORMAL
MDC_IDC_LEAD_POLARITY_TYPE: NORMAL
MDC_IDC_LEAD_SERIAL: NORMAL
MDC_IDC_LEAD_SERIAL: NORMAL
MDC_IDC_MSMT_BATTERY_REMAINING_LONGEVITY: 58 MO
MDC_IDC_MSMT_BATTERY_STATUS: NORMAL
MDC_IDC_MSMT_BATTERY_VOLTAGE: 2.98 V
MDC_IDC_MSMT_LEADCHNL_RA_IMPEDANCE_VALUE: 375 OHM
MDC_IDC_MSMT_LEADCHNL_RA_PACING_THRESHOLD_AMPLITUDE: 0.62 V
MDC_IDC_MSMT_LEADCHNL_RA_PACING_THRESHOLD_PULSEWIDTH: 0.4 MS
MDC_IDC_MSMT_LEADCHNL_RA_SENSING_INTR_AMPL: 3.6 MV
MDC_IDC_MSMT_LEADCHNL_RV_IMPEDANCE_VALUE: 462.5 OHM
MDC_IDC_MSMT_LEADCHNL_RV_PACING_THRESHOLD_AMPLITUDE: 0.75 V
MDC_IDC_MSMT_LEADCHNL_RV_PACING_THRESHOLD_PULSEWIDTH: 0.4 MS
MDC_IDC_PG_IMPLANT_DTM: NORMAL
MDC_IDC_PG_MFG: NORMAL
MDC_IDC_PG_MODEL: NORMAL
MDC_IDC_PG_SERIAL: NORMAL
MDC_IDC_PG_TYPE: NORMAL
MDC_IDC_SESS_CLINIC_NAME: NORMAL
MDC_IDC_SESS_DTM: NORMAL
MDC_IDC_SESS_TYPE: NORMAL
MDC_IDC_SET_BRADY_AT_MODE_SWITCH_MODE: NORMAL
MDC_IDC_SET_BRADY_AT_MODE_SWITCH_RATE: 160 {BEATS}/MIN
MDC_IDC_SET_BRADY_HYSTRATE: NORMAL
MDC_IDC_SET_BRADY_LOWRATE: 60 {BEATS}/MIN
MDC_IDC_SET_BRADY_MAX_SENSOR_RATE: 120 {BEATS}/MIN
MDC_IDC_SET_BRADY_MAX_TRACKING_RATE: 120 {BEATS}/MIN
MDC_IDC_SET_BRADY_MODE: NORMAL
MDC_IDC_SET_BRADY_NIGHT_RATE: NORMAL
MDC_IDC_SET_BRADY_PAV_DELAY_LOW: 200 MS
MDC_IDC_SET_BRADY_SAV_DELAY_LOW: 200 MS
MDC_IDC_SET_LEADCHNL_RA_PACING_AMPLITUDE: 1.62
MDC_IDC_SET_LEADCHNL_RA_PACING_CAPTURE_MODE: NORMAL
MDC_IDC_SET_LEADCHNL_RA_PACING_POLARITY: NORMAL
MDC_IDC_SET_LEADCHNL_RA_PACING_PULSEWIDTH: 0.4 MS
MDC_IDC_SET_LEADCHNL_RA_SENSING_ADAPTATION_MODE: NORMAL
MDC_IDC_SET_LEADCHNL_RA_SENSING_POLARITY: NORMAL
MDC_IDC_SET_LEADCHNL_RV_PACING_AMPLITUDE: 1 V
MDC_IDC_SET_LEADCHNL_RV_PACING_CAPTURE_MODE: NORMAL
MDC_IDC_SET_LEADCHNL_RV_PACING_POLARITY: NORMAL
MDC_IDC_SET_LEADCHNL_RV_PACING_PULSEWIDTH: 0.4 MS
MDC_IDC_SET_LEADCHNL_RV_SENSING_POLARITY: NORMAL
MDC_IDC_SET_LEADCHNL_RV_SENSING_SENSITIVITY: 2.5 MV
MDC_IDC_STAT_AT_MODE_SW_COUNT: 0
MDC_IDC_STAT_BRADY_RA_PERCENT_PACED: 2.3 %
MDC_IDC_STAT_BRADY_RV_PERCENT_PACED: 99.92 %

## 2024-10-29 ENCOUNTER — ANCILLARY PROCEDURE (OUTPATIENT)
Dept: CARDIOLOGY | Facility: CLINIC | Age: 74
End: 2024-10-29
Attending: INTERNAL MEDICINE
Payer: MEDICARE

## 2024-10-29 DIAGNOSIS — Z95.0 CARDIAC PACEMAKER IN SITU: ICD-10-CM

## 2024-10-29 PROCEDURE — 93296 REM INTERROG EVL PM/IDS: CPT

## 2024-10-29 PROCEDURE — 93294 REM INTERROG EVL PM/LDLS PM: CPT | Performed by: INTERNAL MEDICINE

## 2024-10-30 LAB
MDC_IDC_LEAD_CONNECTION_STATUS: NORMAL
MDC_IDC_LEAD_CONNECTION_STATUS: NORMAL
MDC_IDC_LEAD_IMPLANT_DT: NORMAL
MDC_IDC_LEAD_IMPLANT_DT: NORMAL
MDC_IDC_LEAD_LOCATION: NORMAL
MDC_IDC_LEAD_LOCATION: NORMAL
MDC_IDC_LEAD_LOCATION_DETAIL_1: NORMAL
MDC_IDC_LEAD_LOCATION_DETAIL_1: NORMAL
MDC_IDC_LEAD_MFG: NORMAL
MDC_IDC_LEAD_MFG: NORMAL
MDC_IDC_LEAD_MODEL: NORMAL
MDC_IDC_LEAD_MODEL: NORMAL
MDC_IDC_LEAD_POLARITY_TYPE: NORMAL
MDC_IDC_LEAD_POLARITY_TYPE: NORMAL
MDC_IDC_LEAD_SERIAL: NORMAL
MDC_IDC_LEAD_SERIAL: NORMAL
MDC_IDC_MSMT_BATTERY_DTM: NORMAL
MDC_IDC_MSMT_BATTERY_REMAINING_LONGEVITY: 54 MO
MDC_IDC_MSMT_BATTERY_REMAINING_PERCENTAGE: 46 %
MDC_IDC_MSMT_BATTERY_RRT_TRIGGER: NORMAL
MDC_IDC_MSMT_BATTERY_STATUS: NORMAL
MDC_IDC_MSMT_BATTERY_VOLTAGE: 2.98 V
MDC_IDC_MSMT_LEADCHNL_RA_IMPEDANCE_VALUE: 350 OHM
MDC_IDC_MSMT_LEADCHNL_RA_LEAD_CHANNEL_STATUS: NORMAL
MDC_IDC_MSMT_LEADCHNL_RA_PACING_THRESHOLD_AMPLITUDE: 0.75 V
MDC_IDC_MSMT_LEADCHNL_RA_PACING_THRESHOLD_PULSEWIDTH: 0.4 MS
MDC_IDC_MSMT_LEADCHNL_RA_SENSING_INTR_AMPL: 3.1 MV
MDC_IDC_MSMT_LEADCHNL_RV_IMPEDANCE_VALUE: 450 OHM
MDC_IDC_MSMT_LEADCHNL_RV_LEAD_CHANNEL_STATUS: NORMAL
MDC_IDC_MSMT_LEADCHNL_RV_PACING_THRESHOLD_AMPLITUDE: 0.75 V
MDC_IDC_MSMT_LEADCHNL_RV_PACING_THRESHOLD_PULSEWIDTH: 0.4 MS
MDC_IDC_MSMT_LEADCHNL_RV_SENSING_INTR_AMPL: 12 MV
MDC_IDC_PG_IMPLANT_DTM: NORMAL
MDC_IDC_PG_MFG: NORMAL
MDC_IDC_PG_MODEL: NORMAL
MDC_IDC_PG_SERIAL: NORMAL
MDC_IDC_PG_TYPE: NORMAL
MDC_IDC_SESS_CLINIC_NAME: NORMAL
MDC_IDC_SESS_DTM: NORMAL
MDC_IDC_SESS_REPROGRAMMED: NO
MDC_IDC_SESS_TYPE: NORMAL
MDC_IDC_SET_BRADY_AT_MODE_SWITCH_MODE: NORMAL
MDC_IDC_SET_BRADY_AT_MODE_SWITCH_RATE: 160 {BEATS}/MIN
MDC_IDC_SET_BRADY_LOWRATE: 60 {BEATS}/MIN
MDC_IDC_SET_BRADY_MAX_SENSOR_RATE: 120 {BEATS}/MIN
MDC_IDC_SET_BRADY_MAX_TRACKING_RATE: 120 {BEATS}/MIN
MDC_IDC_SET_BRADY_MODE: NORMAL
MDC_IDC_SET_BRADY_PAV_DELAY_LOW: 200 MS
MDC_IDC_SET_BRADY_SAV_DELAY_LOW: 200 MS
MDC_IDC_SET_LEADCHNL_RA_PACING_AMPLITUDE: 1.75 V
MDC_IDC_SET_LEADCHNL_RA_PACING_ANODE_ELECTRODE_1: NORMAL
MDC_IDC_SET_LEADCHNL_RA_PACING_ANODE_LOCATION_1: NORMAL
MDC_IDC_SET_LEADCHNL_RA_PACING_CAPTURE_MODE: NORMAL
MDC_IDC_SET_LEADCHNL_RA_PACING_CATHODE_ELECTRODE_1: NORMAL
MDC_IDC_SET_LEADCHNL_RA_PACING_CATHODE_LOCATION_1: NORMAL
MDC_IDC_SET_LEADCHNL_RA_PACING_POLARITY: NORMAL
MDC_IDC_SET_LEADCHNL_RA_PACING_PULSEWIDTH: 0.4 MS
MDC_IDC_SET_LEADCHNL_RA_SENSING_ADAPTATION_MODE: NORMAL
MDC_IDC_SET_LEADCHNL_RA_SENSING_ANODE_ELECTRODE_1: NORMAL
MDC_IDC_SET_LEADCHNL_RA_SENSING_ANODE_LOCATION_1: NORMAL
MDC_IDC_SET_LEADCHNL_RA_SENSING_CATHODE_ELECTRODE_1: NORMAL
MDC_IDC_SET_LEADCHNL_RA_SENSING_CATHODE_LOCATION_1: NORMAL
MDC_IDC_SET_LEADCHNL_RA_SENSING_POLARITY: NORMAL
MDC_IDC_SET_LEADCHNL_RA_SENSING_SENSITIVITY: 0.75 MV
MDC_IDC_SET_LEADCHNL_RV_PACING_AMPLITUDE: 1 V
MDC_IDC_SET_LEADCHNL_RV_PACING_ANODE_ELECTRODE_1: NORMAL
MDC_IDC_SET_LEADCHNL_RV_PACING_ANODE_LOCATION_1: NORMAL
MDC_IDC_SET_LEADCHNL_RV_PACING_CAPTURE_MODE: NORMAL
MDC_IDC_SET_LEADCHNL_RV_PACING_CATHODE_ELECTRODE_1: NORMAL
MDC_IDC_SET_LEADCHNL_RV_PACING_CATHODE_LOCATION_1: NORMAL
MDC_IDC_SET_LEADCHNL_RV_PACING_POLARITY: NORMAL
MDC_IDC_SET_LEADCHNL_RV_PACING_PULSEWIDTH: 0.4 MS
MDC_IDC_SET_LEADCHNL_RV_SENSING_ADAPTATION_MODE: NORMAL
MDC_IDC_SET_LEADCHNL_RV_SENSING_ANODE_ELECTRODE_1: NORMAL
MDC_IDC_SET_LEADCHNL_RV_SENSING_ANODE_LOCATION_1: NORMAL
MDC_IDC_SET_LEADCHNL_RV_SENSING_CATHODE_ELECTRODE_1: NORMAL
MDC_IDC_SET_LEADCHNL_RV_SENSING_CATHODE_LOCATION_1: NORMAL
MDC_IDC_SET_LEADCHNL_RV_SENSING_POLARITY: NORMAL
MDC_IDC_SET_LEADCHNL_RV_SENSING_SENSITIVITY: 2.5 MV
MDC_IDC_STAT_AT_BURDEN_PERCENT: 0 %
MDC_IDC_STAT_AT_DTM_END: NORMAL
MDC_IDC_STAT_AT_DTM_START: NORMAL
MDC_IDC_STAT_AT_MODE_SW_COUNT: 0
MDC_IDC_STAT_AT_MODE_SW_COUNT_PER_DAY: 0
MDC_IDC_STAT_AT_MODE_SW_PERCENT_TIME: 0 %
MDC_IDC_STAT_BRADY_AP_VP_PERCENT: 1.6 %
MDC_IDC_STAT_BRADY_AP_VS_PERCENT: 1 %
MDC_IDC_STAT_BRADY_AS_VP_PERCENT: 98 %
MDC_IDC_STAT_BRADY_AS_VS_PERCENT: 1 %
MDC_IDC_STAT_BRADY_DTM_END: NORMAL
MDC_IDC_STAT_BRADY_DTM_START: NORMAL
MDC_IDC_STAT_BRADY_RA_PERCENT_PACED: 1.6 %
MDC_IDC_STAT_BRADY_RV_PERCENT_PACED: 99 %
MDC_IDC_STAT_CRT_DTM_END: NORMAL
MDC_IDC_STAT_CRT_DTM_START: NORMAL
MDC_IDC_STAT_HEART_RATE_ATRIAL_MAX: 330 {BEATS}/MIN
MDC_IDC_STAT_HEART_RATE_ATRIAL_MEAN: 91 {BEATS}/MIN
MDC_IDC_STAT_HEART_RATE_ATRIAL_MIN: 50 {BEATS}/MIN
MDC_IDC_STAT_HEART_RATE_DTM_END: NORMAL
MDC_IDC_STAT_HEART_RATE_DTM_START: NORMAL
MDC_IDC_STAT_HEART_RATE_VENTRICULAR_MAX: 230 {BEATS}/MIN
MDC_IDC_STAT_HEART_RATE_VENTRICULAR_MEAN: 91 {BEATS}/MIN
MDC_IDC_STAT_HEART_RATE_VENTRICULAR_MIN: 50 {BEATS}/MIN

## 2024-11-01 ENCOUNTER — PATIENT OUTREACH (OUTPATIENT)
Dept: ONCOLOGY | Facility: CLINIC | Age: 74
End: 2024-11-01
Payer: MEDICARE

## 2024-11-01 DIAGNOSIS — C83.38 DIFFUSE LARGE B-CELL LYMPHOMA OF LYMPH NODES OF MULTIPLE REGIONS (H): ICD-10-CM

## 2024-11-01 DIAGNOSIS — T50.8X5D ALLERGIC REACTION TO CONTRAST MATERIAL, SUBSEQUENT ENCOUNTER: ICD-10-CM

## 2024-11-01 RX ORDER — METHYLPREDNISOLONE 32 MG/1
TABLET ORAL
Qty: 2 TABLET | Refills: 3 | Status: SHIPPED | OUTPATIENT
Start: 2024-11-01

## 2024-11-01 NOTE — PROGRESS NOTES
[facility]: Cancer Care                                                                                          Writer called Shahid to discuss methylprednisolone prescription prior to CT scan scheduled for 11/14.   Discussed appointment instructions.   Shahid denies other questions or concerns.     Signature:  Claudine Wu RN

## 2024-11-13 ENCOUNTER — PATIENT OUTREACH (OUTPATIENT)
Dept: ONCOLOGY | Facility: CLINIC | Age: 74
End: 2024-11-13
Payer: MEDICARE

## 2024-11-13 DIAGNOSIS — C83.38 DIFFUSE LARGE B-CELL LYMPHOMA OF LYMPH NODES OF MULTIPLE REGIONS (H): Primary | ICD-10-CM

## 2024-11-14 ENCOUNTER — ONCOLOGY VISIT (OUTPATIENT)
Dept: ONCOLOGY | Facility: CLINIC | Age: 74
End: 2024-11-14
Attending: INTERNAL MEDICINE
Payer: MEDICARE

## 2024-11-14 ENCOUNTER — LAB (OUTPATIENT)
Dept: LAB | Facility: CLINIC | Age: 74
End: 2024-11-14
Attending: INTERNAL MEDICINE
Payer: MEDICARE

## 2024-11-14 ENCOUNTER — ANCILLARY PROCEDURE (OUTPATIENT)
Dept: CT IMAGING | Facility: CLINIC | Age: 74
End: 2024-11-14
Attending: INTERNAL MEDICINE
Payer: MEDICARE

## 2024-11-14 VITALS
OXYGEN SATURATION: 96 % | WEIGHT: 139.6 LBS | TEMPERATURE: 98.8 F | RESPIRATION RATE: 16 BRPM | BODY MASS INDEX: 21.86 KG/M2 | DIASTOLIC BLOOD PRESSURE: 65 MMHG | HEART RATE: 107 BPM | SYSTOLIC BLOOD PRESSURE: 106 MMHG

## 2024-11-14 DIAGNOSIS — R11.0 NAUSEA: ICD-10-CM

## 2024-11-14 DIAGNOSIS — C85.10 HIGH GRADE B-CELL LYMPHOMA (H): ICD-10-CM

## 2024-11-14 DIAGNOSIS — J39.8 TRACHEAL NODULE: ICD-10-CM

## 2024-11-14 DIAGNOSIS — C83.38 DIFFUSE LARGE B-CELL LYMPHOMA OF LYMPH NODES OF MULTIPLE REGIONS (H): ICD-10-CM

## 2024-11-14 DIAGNOSIS — C83.38 DIFFUSE LARGE B-CELL LYMPHOMA OF LYMPH NODES OF MULTIPLE REGIONS (H): Primary | ICD-10-CM

## 2024-11-14 LAB
ALBUMIN SERPL BCG-MCNC: 4.4 G/DL (ref 3.5–5.2)
ALP SERPL-CCNC: 98 U/L (ref 40–150)
ALT SERPL W P-5'-P-CCNC: 49 U/L (ref 0–70)
ANION GAP SERPL CALCULATED.3IONS-SCNC: 10 MMOL/L (ref 7–15)
AST SERPL W P-5'-P-CCNC: 39 U/L (ref 0–45)
BASOPHILS # BLD AUTO: 0 10E3/UL (ref 0–0.2)
BASOPHILS NFR BLD AUTO: 0 %
BILIRUB SERPL-MCNC: 0.3 MG/DL
BUN SERPL-MCNC: 28.9 MG/DL (ref 8–23)
CALCIUM SERPL-MCNC: 9.6 MG/DL (ref 8.8–10.4)
CHLORIDE SERPL-SCNC: 105 MMOL/L (ref 98–107)
CREAT BLD-MCNC: 1 MG/DL (ref 0.7–1.3)
CREAT SERPL-MCNC: 0.88 MG/DL (ref 0.67–1.17)
EGFRCR SERPLBLD CKD-EPI 2021: 90 ML/MIN/1.73M2
EGFRCR SERPLBLD CKD-EPI 2021: >60 ML/MIN/1.73M2
EOSINOPHIL # BLD AUTO: 0 10E3/UL (ref 0–0.7)
EOSINOPHIL NFR BLD AUTO: 0 %
ERYTHROCYTE [DISTWIDTH] IN BLOOD BY AUTOMATED COUNT: 13.2 % (ref 10–15)
GLUCOSE SERPL-MCNC: 155 MG/DL (ref 70–99)
HCO3 SERPL-SCNC: 24 MMOL/L (ref 22–29)
HCT VFR BLD AUTO: 39.3 % (ref 40–53)
HGB BLD-MCNC: 13 G/DL (ref 13.3–17.7)
IMM GRANULOCYTES # BLD: 0 10E3/UL
IMM GRANULOCYTES NFR BLD: 0 %
LDH SERPL L TO P-CCNC: 214 U/L (ref 0–250)
LYMPHOCYTES # BLD AUTO: 0.4 10E3/UL (ref 0.8–5.3)
LYMPHOCYTES NFR BLD AUTO: 4 %
MCH RBC QN AUTO: 31.7 PG (ref 26.5–33)
MCHC RBC AUTO-ENTMCNC: 33.1 G/DL (ref 31.5–36.5)
MCV RBC AUTO: 96 FL (ref 78–100)
MONOCYTES # BLD AUTO: 0.3 10E3/UL (ref 0–1.3)
MONOCYTES NFR BLD AUTO: 3 %
NEUTROPHILS # BLD AUTO: 9.1 10E3/UL (ref 1.6–8.3)
NEUTROPHILS NFR BLD AUTO: 93 %
NRBC # BLD AUTO: 0 10E3/UL
NRBC BLD AUTO-RTO: 0 /100
PLATELET # BLD AUTO: 157 10E3/UL (ref 150–450)
POTASSIUM SERPL-SCNC: 4.7 MMOL/L (ref 3.4–5.3)
PROT SERPL-MCNC: 6.1 G/DL (ref 6.4–8.3)
RBC # BLD AUTO: 4.1 10E6/UL (ref 4.4–5.9)
SODIUM SERPL-SCNC: 139 MMOL/L (ref 135–145)
WBC # BLD AUTO: 9.9 10E3/UL (ref 4–11)

## 2024-11-14 PROCEDURE — G0008 ADMIN INFLUENZA VIRUS VAC: HCPCS | Performed by: INTERNAL MEDICINE

## 2024-11-14 PROCEDURE — 85004 AUTOMATED DIFF WBC COUNT: CPT

## 2024-11-14 PROCEDURE — G1010 CDSM STANSON: HCPCS | Performed by: RADIOLOGY

## 2024-11-14 PROCEDURE — 250N000011 HC RX IP 250 OP 636: Performed by: INTERNAL MEDICINE

## 2024-11-14 PROCEDURE — 83615 LACTATE (LD) (LDH) ENZYME: CPT

## 2024-11-14 PROCEDURE — 82247 BILIRUBIN TOTAL: CPT

## 2024-11-14 PROCEDURE — 36415 COLL VENOUS BLD VENIPUNCTURE: CPT

## 2024-11-14 PROCEDURE — 36592 COLLECT BLOOD FROM PICC: CPT

## 2024-11-14 PROCEDURE — 74177 CT ABD & PELVIS W/CONTRAST: CPT | Mod: MG | Performed by: STUDENT IN AN ORGANIZED HEALTH CARE EDUCATION/TRAINING PROGRAM

## 2024-11-14 PROCEDURE — G0463 HOSPITAL OUTPT CLINIC VISIT: HCPCS | Performed by: INTERNAL MEDICINE

## 2024-11-14 PROCEDURE — 91320 SARSCV2 VAC 30MCG TRS-SUC IM: CPT | Performed by: INTERNAL MEDICINE

## 2024-11-14 PROCEDURE — 85014 HEMATOCRIT: CPT

## 2024-11-14 PROCEDURE — 70491 CT SOFT TISSUE NECK W/DYE: CPT | Mod: MG | Performed by: RADIOLOGY

## 2024-11-14 PROCEDURE — 82947 ASSAY GLUCOSE BLOOD QUANT: CPT

## 2024-11-14 PROCEDURE — 80053 COMPREHEN METABOLIC PANEL: CPT | Performed by: PATHOLOGY

## 2024-11-14 PROCEDURE — 82435 ASSAY OF BLOOD CHLORIDE: CPT

## 2024-11-14 PROCEDURE — 99215 OFFICE O/P EST HI 40 MIN: CPT | Performed by: INTERNAL MEDICINE

## 2024-11-14 PROCEDURE — 71260 CT THORAX DX C+: CPT | Mod: MG | Performed by: STUDENT IN AN ORGANIZED HEALTH CARE EDUCATION/TRAINING PROGRAM

## 2024-11-14 PROCEDURE — 90480 ADMN SARSCOV2 VAC 1/ONLY CMP: CPT | Performed by: INTERNAL MEDICINE

## 2024-11-14 PROCEDURE — 90662 IIV NO PRSV INCREASED AG IM: CPT | Performed by: INTERNAL MEDICINE

## 2024-11-14 PROCEDURE — G1010 CDSM STANSON: HCPCS | Performed by: STUDENT IN AN ORGANIZED HEALTH CARE EDUCATION/TRAINING PROGRAM

## 2024-11-14 RX ORDER — DIPHENHYDRAMINE HCL 25 MG
50 CAPSULE ORAL
Status: DISCONTINUED | OUTPATIENT
Start: 2024-11-14 | End: 2024-11-16 | Stop reason: HOSPADM

## 2024-11-14 RX ORDER — METOCLOPRAMIDE 5 MG/1
5 TABLET ORAL EVERY 8 HOURS PRN
Qty: 30 TABLET | Refills: 0 | Status: SHIPPED | OUTPATIENT
Start: 2024-11-14

## 2024-11-14 RX ORDER — IOPAMIDOL 755 MG/ML
88 INJECTION, SOLUTION INTRAVASCULAR ONCE
Status: COMPLETED | OUTPATIENT
Start: 2024-11-14 | End: 2024-11-14

## 2024-11-14 RX ORDER — EPINEPHRINE 1 MG/ML
0.3 INJECTION, SOLUTION INTRAMUSCULAR; SUBCUTANEOUS EVERY 5 MIN PRN
Status: DISCONTINUED | OUTPATIENT
Start: 2024-11-14 | End: 2024-11-16 | Stop reason: HOSPADM

## 2024-11-14 RX ORDER — DIPHENHYDRAMINE HYDROCHLORIDE 50 MG/ML
50 INJECTION INTRAMUSCULAR; INTRAVENOUS
Status: DISCONTINUED | OUTPATIENT
Start: 2024-11-14 | End: 2024-11-16 | Stop reason: HOSPADM

## 2024-11-14 RX ADMIN — IOPAMIDOL 88 ML: 755 INJECTION, SOLUTION INTRAVASCULAR at 10:55

## 2024-11-14 RX ADMIN — INFLUENZA A VIRUS A/VICTORIA/4897/2022 IVR-238 (H1N1) ANTIGEN (FORMALDEHYDE INACTIVATED), INFLUENZA A VIRUS A/CALIFORNIA/122/2022 SAN-022 (H3N2) ANTIGEN (FORMALDEHYDE INACTIVATED), AND INFLUENZA B VIRUS B/MICHIGAN/01/2021 ANTIGEN (FORMALDEHYDE INACTIVATED) 0.5 ML: 60; 60; 60 INJECTION, SUSPENSION INTRAMUSCULAR at 13:59

## 2024-11-14 RX ADMIN — COVID-19 VACCINE, MRNA 30 MCG: 0.04 INJECTION, SUSPENSION INTRAMUSCULAR at 13:58

## 2024-11-14 ASSESSMENT — PAIN SCALES - GENERAL: PAINLEVEL_OUTOF10: NO PAIN (0)

## 2024-11-14 NOTE — NURSING NOTE
Flu and COVID vaccine administered into LEFT deltoid without incident.  Patient instructed to stay in clinic for 15 min observation.

## 2024-11-14 NOTE — PROGRESS NOTES
[facility]  Date of visit: Nov 14, 2024    Reason for Visit: Follow up triple-hit DLBCL    ONCOLOGIC SUMMARY:  Diagnosis:  Low-grade kappa-restricted plasmacytoid B-cell lymphoma dx 3/2004, presenting with thoracic paraspinal mass. Bone marrow hypercellular with 70-80% involvement by small kappa-restricted lymphocytes which were positive for CD10, CD19, and CD20 and negative for CD5 and CD23.   Transformation to high-grade B-cell lymphoma 12/2022 (versus new primary), presenting with B symptoms, sternal mass, and SOB/chest pain. Biopsy of sternal mass showed large B-cell lymphoma with aggressive features (GCB-subtype), Ki67 %, FISH positive for MYC (non-IgH partner), BCL2, and BCL6 rearrangements. Stage IV with extensive lymphomatous involvement to the R pleura w/avid effusions, mediastinal and pericardial regions, right anterolateral chest wall, adenopathy above and below the diaphragm, liver, spleen and multiple sites in the skeleton. CSF flow negative.     Treatment history:  For low-grade lymphoma:  2005: Fludarabine-based chemo x 6 cycles and XRT to spine (details unclear)    For high-grade lymphoma:  12/20/22: Cycle 1 R-CHOP with Neulasta support (with a few days of steroid prephase prior)  1/11/23: Cycle 2 Switched to DA-R-EPOCH with triple IT chemo for CNS ppx after FISH returned showing triple-hit disease. PET after 2 cycles shows very good NV.   2/2/23: Cycle 3 DA-R EPOCH with triple IT chemo for CNS ppx  2/23/23: Cycle 4 DA-R-EPOCH with triple IT chemo for CNS ppx. PET after 4 cycles shows CRu.   3/17/23: Cycle 5 DA-R-EPOCH with triple IT chemo for CNS ppx  4/6/23: Cycle 6 DA-R-EPOCH. EOT PET shows CR!    INTERVAL HISTORY:  Shahid is here today for follow-up. Doing pretty well overall. Still has occasional nausea, using Zofran sparingly. Neuropathy in feet (up to mid-foot) is stable. Otherwise no fevers, night sweats, recent infections, lumps/bumps, chest pain, SOB,  diarrhea, or bleeding. Very active with walking daily and swimming 2-3x/week. [place]     ROS: 10 point ROS neg other than the symptoms noted above in the HPI.    Current Outpatient Medications   Medication Sig Dispense Refill    methylPREDNISolone (MEDROL) 32 MG tablet Take one tablet 12 hours prior to and one tablet 2 hours prior to the procedure with IV contrast 2 tablet 3    metoclopramide (REGLAN) 5 MG tablet Take 1 tablet (5 mg) by mouth every 8 hours as needed (nausea). 30 tablet 0    ascorbic acid (VITAMIN C) 500 MG tablet Take 500 mg by mouth every morning (Patient not taking: Reported on 11/14/2024)      baclofen (LIORESAL) 10 MG tablet Take 0.5-1 tablets (5-10 mg) by mouth 3 times daily as needed for other (hiccups) (Patient not taking: Reported on 11/14/2024) 30 tablet 0    calcipotriene (DOVONOX) 0.005 % external cream Mix efudex + calcipotriene equally. Apply BID x 4-10 days. Stop when skin gets red. (Patient not taking: Reported on 11/14/2024) 60 g 1    fluorouracil (EFUDEX) 5 % external cream Mix equally with calcipotriene cream. Apply BID x 4-10 days. Stop when skin gets red. (Patient not taking: Reported on 11/14/2024) 40 g 0    Multiple Vitamins-Minerals (MULTIVITAL PO) Take 1 tablet by mouth every morning (Patient not taking: Reported on 11/14/2024)      omeprazole (PRILOSEC) 40 MG DR capsule Take 1 capsule (40 mg) by mouth daily (Patient not taking: Reported on 11/14/2024) 90 capsule 4    ondansetron (ZOFRAN) 8 MG tablet Take 1 tablet (8 mg) by mouth every 8 hours as needed for nausea (Patient not taking: Reported on 11/14/2024) 60 tablet 3    polyethylene glycol (MIRALAX) 17 GM/Dose powder Take 17 g by mouth daily as needed for constipation (Patient not taking: Reported on 11/14/2024) 510 g 4    prochlorperazine (COMPAZINE) 10 MG tablet Take 1 tablet (10 mg) by mouth every 6 hours as needed for nausea or vomiting (Patient not taking: Reported on  11/14/2024) 30 tablet 3    senna-docusate (SENNA S) 8.6-50 MG tablet Take 1 tablet by mouth 2 times daily as needed for constipation (Patient not taking: Reported on 11/14/2024) 60 tablet 4       Allergies   Allergen Reactions    Ampicillin [Ampicillin Sodium]     Bactrim [Sulfamethoxazole W/Trimethoprim]     Contrast Dye      CT contrast (IV) allergy - need oral Methylpred as premed for scans    Ampicillin Rash    Morphine Nausea     Physical Exam:  /65   Pulse 107   Temp 98.8  F (37.1  C) (Oral)   Resp 16   Wt 63.3 kg (139 lb 9.6 oz)   SpO2 96%   BMI 21.86 kg/m       ECOG PS: 0    General: Awake, alert, in no acute distress.   HEENT: Normocephalic, atraumatic. No scleral icterus.   Lymph: No cervical, supraclavicular, or axillary LAD appreciated.   CV: Regular rate and rhythm. No murmurs, rubs, or gallops appreciated.  Resp: Good inspiratory effort, lungs clear to auscultation bilaterally.  GI: Abdomen soft, nontender, nondistended.   Ext: No peripheral edema bilaterally.  Neuro: CN II-XII grossly intact. No focal deficits appreciated.  Skin: No rash, unusual bruising or prominent lesions.  Psych: Pleasant, normal affect.     Labs:   I personally reviewed the following labs:    Most Recent 3 CBC's:  Recent Labs   Lab Test 11/14/24  1026 05/23/24  1128 01/11/24  1318   WBC 9.9 8.2 3.8*   HGB 13.0* 13.0* 12.3*   MCV 96 94 93    155 152     Most Recent 3 BMP's:  Recent Labs   Lab Test 11/14/24  1054 11/14/24  1026 05/23/24  1157 05/23/24  1128 01/11/24  1318   NA  --  139  --  140 139   POTASSIUM  --  4.7  --  4.8 5.0   CHLORIDE  --  105  --  105 105   CO2  --  24 -- 24 25   BUN  --  28.9*  --  30.4* 27.7*   CR 1.0 0.88 1.0 0.95 0.95   ANIONGAP  --  10  --  11 9   JOGRE  --  9.6  --  9.5 9.2   GLC  --  155*  --  182* 97     Most Recent 2 LFT's:  Recent Labs   Lab Test 11/14/24  1026 05/23/24  1128   AST 39 25   ALT 49 24   ALKPHOS 98 101   BILITOTAL 0.3 0.3     11/14/24 CT CAP:  IMPRESSION:  1.  No  convincing evidence of recurrent lymphoma.  2.  Stable nodular thickening of the trachea, indeterminate.  3.  Continued decrease in size of small hypodense splenic lesion.    ASSESSMENT/PLAN:    #H/o low-grade kappa restricted plasmacytoid B-cell lymphoma 3/2004   #Transformation to DLBCL 12/2022  H/o low-grade kappa restricted plasmacytoid B-cell lymphoma 3/2004 treated with x6 cycles of fludarabine based chemo and XRT to spine, then has been on surveillance since 2005. He presented in 11/2022 with progressing B symptoms, sternal mass, and SOB/CP. PET 12/9/22 showed extensive lymphomatous involvement to the R pleura w/avid effusions, mediastinal and pericardial regions, right anterolateral chest wall, adenopathy above and below the diaphragm, liver, spleen and multiple sites in the skeleton, concerning for transformation from his low-grade lymphoma. 12/15/22 biopsy of sternal mass showed large B-cell lymphoma with aggressive features (GCB-subtype), Ki67 %. 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  - S/p C1 R-CHOP in the hospital 12/20/22. Tolerated well overall.  - Switched to DA-R-EPOCH starting Cycle 2 (1/11/23) after FISH returned showing triple-hit disease. Staging LP negative, gave triple IT chemo once per cycle for CNS ppx for a total of 4 doses.   - PET after 2 cycles showed NM with significant resolution of most FDG activity/disease other than small area of right paramediastinal pleural thickening (SUVmax 5) and resolution of right pleural effusion. Concurrent Clonoseq was negative. PET after 4 cycles showed further improvement with right paramediastinal pleura SUV down to 3.3, also could potentially be artifact since it is near pacer wires.  - Completed Cycle 6 R-EPOCH 4/6/23. Tolerated chemo well overall, stayed at dose level 1 due to thrombocytopenia.  - EOT PET-CT 4/28/23 (with ketogenic diet for 3 days prior to suppress cardiac activity) showed CR!  -  6-month surveillance CT 11/2/23 showed ongoing remission. Concurrent Clonoseq also negative. Removed port 12/15/23.   - 1 year surveillance CT 5/23/24 shows ongoing remission.   - 1.5 year surveillance CT 11/14/24 with ongoing remission. Stable indeterminate tracheal thickening. CT neck read still pending.   - Continue surveillance with H&P/labs every 3-6 mo for 2 years, every 6 mo for year 3, then yearly for years 4-5. Imaging every 6 months for first 2 years only (next and last CT 5/2025).   - We previously discussed his increased risk of recurrence given transformed and triple-hit disease. If he relapses, would likely consider CAR-T therapy.      #Hypogammaglobulinemia   #COVID-19 Infection 1/2023, resolved  #Acute maxillary sinusitis 5/2023, resolved   on 12/15/22 and 316 on 4/6/23.   - Given persistently low COVID cycle threshold, hypogammaglobulinemia, and risk for further immunosuppression with chemo, gave IVIG on 1/15/23 and 4/10/23.  - Monitor for recurrent infections.    #Non-melanoma skin cancers  History of many SCC's in the past in 2011, 2014, 2022 (very large lesion on right posterior shoulder).   - Saw Dermatology 8/8/23, biopsy from R lateral neck and R posterior shoulder both showed SCC. S/p Mohs on 11/8/23.   - Should continue at least annual follow-up with Dermatology.     #Nausea  Intermittent. Unlikely to be related to chemo this far out.  - Continue PRN Zofran and compazine. Adding PRN Reglan also in case there is a component of gastroparesis as stomach is quite distended on CT.    #PPx  - Vaccines: up to date on pneumonia shot. Give flu/Covid shots today. Also recommend Shingrix series and RSV vaccine (not able to get at a clinic per his insurance) - he will look into getting at a local pharmacy.       PLAN:  - Flu and COVID shots today  - RTC in 6 months with CT (mid-late May)    Total of 40 minutes on patient visit, reviewing records, interpreting test results, placing orders, and  documentation on the day of service.    Domitila Ruelas MD  Attending Physician, [facility]      **ADDENDUM 11/19**  Final CT neck read does suggest slight enlargement in right tracheal nodule since 11/2023, currently 9 x 13 mm. Will refer to Interventional Pulm for bronch/possible biopsy. Called Shahid to update him with final results and referral. He notes some ongoing dry cough/trouble clearing throat ever since he had COVID in 2023.

## 2024-11-14 NOTE — NURSING NOTE
Chief Complaint   Patient presents with    Blood Draw     Vitals, blood drawn and PIV placed by LPN. Pt checked into appt.      MAYRA Feliz LPN

## 2024-11-14 NOTE — NURSING NOTE
"Oncology Rooming Note    November 14, 2024 12:58 PM   Shahid Davis is a 74 year old male who presents for:    Chief Complaint   Patient presents with    Blood Draw     Vitals, blood drawn and PIV placed by LPN. Pt checked into appt.     Oncology Clinic Visit     High grade B-cell lymphoma     Initial Vitals: /65   Pulse 107   Temp 98.8  F (37.1  C) (Oral)   Resp 16   Wt 63.3 kg (139 lb 9.6 oz)   SpO2 96%   BMI 21.86 kg/m   Estimated body mass index is 21.86 kg/m  as calculated from the following:    Height as of 12/15/23: 1.702 m (5' 7\").    Weight as of this encounter: 63.3 kg (139 lb 9.6 oz). Body surface area is 1.73 meters squared.  No Pain (0) Comment: Data Unavailable   No LMP for male patient.  Allergies reviewed: Yes  Medications reviewed: Yes    Medications: Medication refills not needed today.  Pharmacy name entered into [employer]:    [id number] [place], [place] - [address] [id number] - [place], [place] - [address] AT [facility] [address]    Frailty Screening:   Is the patient here for a new oncology consult visit in cancer care? 2. No      Clinical concerns: Pt reports no new concerns today.      Nettie Souza, EMT     "

## 2024-11-14 NOTE — DISCHARGE INSTRUCTIONS

## 2024-11-14 NOTE — LETTER
11/14/2024      Shahid Davis   [facility]  [place] [address]      Dear Colleague,    Thank you for referring your patient, Shahid Davis, to the [facility]. Please see a copy of my visit note below.    [facility]  Date of visit: Nov 14, 2024    Reason for Visit: Follow up triple-hit DLBCL    ONCOLOGIC SUMMARY:  Diagnosis:  Low-grade kappa-restricted plasmacytoid B-cell lymphoma dx 3/2004, presenting with thoracic paraspinal mass. Bone marrow hypercellular with 70-80% involvement by small kappa-restricted lymphocytes which were positive for CD10, CD19, and CD20 and negative for CD5 and CD23.   Transformation to high-grade B-cell lymphoma 12/2022 (versus new primary), presenting with B symptoms, sternal mass, and SOB/chest pain. Biopsy of sternal mass showed large B-cell lymphoma with aggressive features (GCB-subtype), Ki67 %, FISH positive for MYC (non-IgH partner), BCL2, and BCL6 rearrangements. Stage IV with extensive lymphomatous involvement to the R pleura w/avid effusions, mediastinal and pericardial regions, right anterolateral chest wall, adenopathy above and below the diaphragm, liver, spleen and multiple sites in the skeleton. CSF flow negative.     Treatment history:  For low-grade lymphoma:  2005: Fludarabine-based chemo x 6 cycles and XRT to spine (details unclear)    For high-grade lymphoma:  12/20/22: Cycle 1 R-CHOP with Neulasta support (with a few days of steroid prephase prior)  1/11/23: Cycle 2 Switched to DA-R-EPOCH with triple IT chemo for CNS ppx after FISH returned showing triple-hit disease. PET after 2 cycles shows very good LA.   2/2/23: Cycle 3 DA-R EPOCH with triple IT chemo for CNS ppx  2/23/23: Cycle 4 DA-R-EPOCH with triple IT chemo for CNS ppx. PET after 4 cycles shows CRu.   3/17/23: Cycle 5 DA-R-EPOCH with triple IT chemo for CNS ppx  4/6/23: Cycle 6 DA-R-EPOCH. EOT PET shows CR!    INTERVAL  HISTORY:  Shahid is here today for follow-up. Doing pretty well overall. Still has occasional nausea, using Zofran sparingly. Neuropathy in feet (up to mid-foot) is stable. Otherwise no fevers, night sweats, recent infections, lumps/bumps, chest pain, SOB, diarrhea, or bleeding. Very active with walking daily and swimming 2-3x/week. [place]     ROS: 10 point ROS neg other than the symptoms noted above in the HPI.    Current Outpatient Medications   Medication Sig Dispense Refill     methylPREDNISolone (MEDROL) 32 MG tablet Take one tablet 12 hours prior to and one tablet 2 hours prior to the procedure with IV contrast 2 tablet 3     metoclopramide (REGLAN) 5 MG tablet Take 1 tablet (5 mg) by mouth every 8 hours as needed (nausea). 30 tablet 0     ascorbic acid (VITAMIN C) 500 MG tablet Take 500 mg by mouth every morning (Patient not taking: Reported on 11/14/2024)       baclofen (LIORESAL) 10 MG tablet Take 0.5-1 tablets (5-10 mg) by mouth 3 times daily as needed for other (hiccups) (Patient not taking: Reported on 11/14/2024) 30 tablet 0     calcipotriene (DOVONOX) 0.005 % external cream Mix efudex + calcipotriene equally. Apply BID x 4-10 days. Stop when skin gets red. (Patient not taking: Reported on 11/14/2024) 60 g 1     fluorouracil (EFUDEX) 5 % external cream Mix equally with calcipotriene cream. Apply BID x 4-10 days. Stop when skin gets red. (Patient not taking: Reported on 11/14/2024) 40 g 0     Multiple Vitamins-Minerals (MULTIVITAL PO) Take 1 tablet by mouth every morning (Patient not taking: Reported on 11/14/2024)       omeprazole (PRILOSEC) 40 MG DR capsule Take 1 capsule (40 mg) by mouth daily (Patient not taking: Reported on 11/14/2024) 90 capsule 4     ondansetron (ZOFRAN) 8 MG tablet Take 1 tablet (8 mg) by mouth every 8 hours as needed for nausea (Patient not taking: Reported on 11/14/2024) 60 tablet 3     polyethylene glycol (MIRALAX) 17 GM/Dose  powder Take 17 g by mouth daily as needed for constipation (Patient not taking: Reported on 11/14/2024) 510 g 4     prochlorperazine (COMPAZINE) 10 MG tablet Take 1 tablet (10 mg) by mouth every 6 hours as needed for nausea or vomiting (Patient not taking: Reported on 11/14/2024) 30 tablet 3     senna-docusate (SENNA S) 8.6-50 MG tablet Take 1 tablet by mouth 2 times daily as needed for constipation (Patient not taking: Reported on 11/14/2024) 60 tablet 4       Allergies   Allergen Reactions     Ampicillin [Ampicillin Sodium]      Bactrim [Sulfamethoxazole W/Trimethoprim]      Contrast Dye      CT contrast (IV) allergy - need oral Methylpred as premed for scans     Ampicillin Rash     Morphine Nausea     Physical Exam:  /65   Pulse 107   Temp 98.8  F (37.1  C) (Oral)   Resp 16   Wt 63.3 kg (139 lb 9.6 oz)   SpO2 96%   BMI 21.86 kg/m       ECOG PS: 0    General: Awake, alert, in no acute distress.   HEENT: Normocephalic, atraumatic. No scleral icterus.   Lymph: No cervical, supraclavicular, or axillary LAD appreciated.   CV: Regular rate and rhythm. No murmurs, rubs, or gallops appreciated.  Resp: Good inspiratory effort, lungs clear to auscultation bilaterally.  GI: Abdomen soft, nontender, nondistended.   Ext: No peripheral edema bilaterally.  Neuro: CN II-XII grossly intact. No focal deficits appreciated.  Skin: No rash, unusual bruising or prominent lesions.  Psych: Pleasant, normal affect.     Labs:   I personally reviewed the following labs:    Most Recent 3 CBC's:  Recent Labs   Lab Test 11/14/24  1026 05/23/24  1128 01/11/24  1318   WBC 9.9 8.2 3.8*   HGB 13.0* 13.0* 12.3*   MCV 96 94 93    155 152     Most Recent 3 BMP's:  Recent Labs   Lab Test 11/14/24  1054 11/14/24  1026 05/23/24  1157 05/23/24  1128 01/11/24  1318   NA  --  139  --  140 139   POTASSIUM  --  4.7  --  4.8 5.0   CHLORIDE  --  105  --  105 105   CO2  --  24  --  24 25   BUN  --  28.9*  --  30.4* 27.7*   CR 1.0 0.88 1.0  0.95 0.95   ANIONGAP  --  10  --  11 9   JORGE  --  9.6  --  9.5 9.2   GLC  --  155*  --  182* 97     Most Recent 2 LFT's:  Recent Labs   Lab Test 11/14/24  1026 05/23/24  1128   AST 39 25   ALT 49 24   ALKPHOS 98 101   BILITOTAL 0.3 0.3     11/14/24 CT CAP:  IMPRESSION:  1.  No convincing evidence of recurrent lymphoma.  2.  Stable nodular thickening of the trachea, indeterminate.  3.  Continued decrease in size of small hypodense splenic lesion.    ASSESSMENT/PLAN:    #H/o low-grade kappa restricted plasmacytoid B-cell lymphoma 3/2004   #Transformation to DLBCL 12/2022  H/o low-grade kappa restricted plasmacytoid B-cell lymphoma 3/2004 treated with x6 cycles of fludarabine based chemo and XRT to spine, then has been on surveillance since 2005. He presented in 11/2022 with progressing B symptoms, sternal mass, and SOB/CP. PET 12/9/22 showed extensive lymphomatous involvement to the R pleura w/avid effusions, mediastinal and pericardial regions, right anterolateral chest wall, adenopathy above and below the diaphragm, liver, spleen and multiple sites in the skeleton, concerning for transformation from his low-grade lymphoma. 12/15/22 biopsy of sternal mass showed large B-cell lymphoma with aggressive features (GCB-subtype), Ki67 %. 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  - S/p C1 R-CHOP in the hospital 12/20/22. Tolerated well overall.  - Switched to DA-R-EPOCH starting Cycle 2 (1/11/23) after FISH returned showing triple-hit disease. Staging LP negative, gave triple IT chemo once per cycle for CNS ppx for a total of 4 doses.   - PET after 2 cycles showed OK with significant resolution of most FDG activity/disease other than small area of right paramediastinal pleural thickening (SUVmax 5) and resolution of right pleural effusion. Concurrent Clonoseq was negative. PET after 4 cycles showed further improvement with right paramediastinal pleura SUV down to 3.3, also  could potentially be artifact since it is near pacer wires.  - Completed Cycle 6 R-EPOCH 4/6/23. Tolerated chemo well overall, stayed at dose level 1 due to thrombocytopenia.  - EOT PET-CT 4/28/23 (with ketogenic diet for 3 days prior to suppress cardiac activity) showed CR!  - 6-month surveillance CT 11/2/23 showed ongoing remission. Concurrent Clonoseq also negative. Removed port 12/15/23.   - 1 year surveillance CT 5/23/24 shows ongoing remission.   - 1.5 year surveillance CT 11/14/24 with ongoing remission. Stable indeterminate tracheal thickening. CT neck read still pending.   - Continue surveillance with H&P/labs every 3-6 mo for 2 years, every 6 mo for year 3, then yearly for years 4-5. Imaging every 6 months for first 2 years only (next and last CT 5/2025).   - We previously discussed his increased risk of recurrence given transformed and triple-hit disease. If he relapses, would likely consider CAR-T therapy.      #Hypogammaglobulinemia   #COVID-19 Infection 1/2023, resolved  #Acute maxillary sinusitis 5/2023, resolved   on 12/15/22 and 316 on 4/6/23.   - Given persistently low COVID cycle threshold, hypogammaglobulinemia, and risk for further immunosuppression with chemo, gave IVIG on 1/15/23 and 4/10/23.  - Monitor for recurrent infections.    #Non-melanoma skin cancers  History of many SCC's in the past in 2011, 2014, 2022 (very large lesion on right posterior shoulder).   - Saw Dermatology 8/8/23, biopsy from R lateral neck and R posterior shoulder both showed SCC. S/p Mohs on 11/8/23.   - Should continue at least annual follow-up with Dermatology.     #Nausea  Intermittent. Unlikely to be related to chemo this far out.  - Continue PRN Zofran and compazine. Adding PRN Reglan also in case there is a component of gastroparesis as stomach is quite distended on CT.    #PPx  - Vaccines: up to date on pneumonia shot. Give flu/Covid shots today. Also recommend Shingrix series and RSV vaccine (not able  to get at a clinic per his insurance) - he will look into getting at a local pharmacy.       PLAN:  - Flu and COVID shots today  - RTC in 6 months with CT (mid-late May)    Total of 40 minutes on patient visit, reviewing records, interpreting test results, placing orders, and documentation on the day of service.    Domitila Ruelas MD  Attending Physician, [facility]      Again, thank you for allowing me to participate in the care of your patient.        Sincerely,        Domitila Rueals MD

## 2024-11-18 LAB — RADIOLOGIST FLAGS: NORMAL

## 2024-11-19 ENCOUNTER — VIRTUAL VISIT (OUTPATIENT)
Dept: PULMONOLOGY | Facility: CLINIC | Age: 74
End: 2024-11-19
Attending: INTERNAL MEDICINE
Payer: MEDICARE

## 2024-11-19 ENCOUNTER — NURSE TRIAGE (OUTPATIENT)
Dept: ONCOLOGY | Facility: CLINIC | Age: 74
End: 2024-11-19
Payer: MEDICARE

## 2024-11-19 ENCOUNTER — PATIENT OUTREACH (OUTPATIENT)
Dept: ONCOLOGY | Facility: CLINIC | Age: 74
End: 2024-11-19
Payer: MEDICARE

## 2024-11-19 ENCOUNTER — PRE VISIT (OUTPATIENT)
Dept: PULMONOLOGY | Facility: CLINIC | Age: 74
End: 2024-11-19
Payer: MEDICARE

## 2024-11-19 VITALS — BODY MASS INDEX: 21.97 KG/M2 | WEIGHT: 140 LBS | HEIGHT: 67 IN

## 2024-11-19 DIAGNOSIS — C83.38 DIFFUSE LARGE B-CELL LYMPHOMA OF LYMPH NODES OF MULTIPLE REGIONS (H): ICD-10-CM

## 2024-11-19 DIAGNOSIS — J39.8 TRACHEAL NODULE: Primary | ICD-10-CM

## 2024-11-19 DIAGNOSIS — J39.8 TRACHEAL NODULE: ICD-10-CM

## 2024-11-19 DIAGNOSIS — J39.8 TRACHEAL MASS: Primary | ICD-10-CM

## 2024-11-19 DIAGNOSIS — R91.8 PULMONARY NODULES: ICD-10-CM

## 2024-11-19 PROCEDURE — 99442 PR PHYSICIAN TELEPHONE EVALUATION 11-20 MIN: CPT | Mod: 93 | Performed by: INTERNAL MEDICINE

## 2024-11-19 ASSESSMENT — PAIN SCALES - GENERAL: PAINLEVEL_OUTOF10: NO PAIN (0)

## 2024-11-19 NOTE — TELEPHONE ENCOUNTER
Incidental finding on 11/14 CT soft tissue neck:    2. Enhancing soft tissue density in the right posterolateral trachea  near the level of the pradip. This has been showing slow growth over  time dating back to 11/2/2023. Recommend referral to interventional  pulmonology for further evaluation.    [Consider Follow Up: Slowly growing mass along the right  posterolateral trochlea wall. Biopsy is recommended (if not done  yet).]    Message routed to Care Team.

## 2024-11-19 NOTE — LETTER
11/19/2024       RE: Shahid Davis   [facility]  [place] [address]     Dear Colleague,    Thank you for referring your patient, Shahid Davis, to the [facility] at [facility]. Please see a copy of my visit note below.        [facility]  [address] [facility] [address]  Phone: [phone]  Fax: [phone]    Interventional Pulmonary Clinic Note    November 19, 2024        Chief complaint/Reason for Clinic Visit:  Shahid Davis is a 74 year old male seen for   Chief Complaint   Patient presents with    Consult       Referred by or PCP: Haleigh Montero    Assessment and Plan:  Tracheal thickening. Has increased in time and minimal PET avidity. Oncologic history is below. Sent by his oncologist for further evaluation    Planned procedure: Flexible bronchoscopy, endobronchial forceps biopsies and debulking    History of Present Illness:  73 y/o man with hx of B cell lymphoma treated with XRT and chemotherapy- see below. Referred to my clinic for further evaluation of tracheal thickening.        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 He presented in 11/2022 with progressing B symptoms, sternal mass, and SOB/CP. PET 12/9/22 showed extensive lymphomatous involvement to the R pleura w/avid effusions, mediastinal and pericardial regions, right anterolateral chest wall, adenopathy above and below the diaphragm, liver, spleen and multiple sites  in the skeleton, concerning for transformation from his low-grade lymphoma. 12/15/22 biopsy of sternal mass showed large B-cell lymphoma with aggressive features (GCB-subtype), Ki67 %. 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  - S/p C1 R-CHOP in the hospital 12/20/22. Tolerated well overall.  - Switched to DA-R-EPOCH starting Cycle 2 (1/11/23) after FISH returned showing triple-hit disease. Staging LP negative, gave triple IT chemo once per cycle for CNS ppx for a total of 4 doses.   - PET after 2 cycles showed DC with significant resolution of most FDG activity/disease other than small area of right paramediastinal pleural thickening (SUVmax 5) and resolution of right pleural effusion. Concurrent Clonoseq was negative. PET after 4 cycles showed further improvement with right paramediastinal pleura SUV down to 3.3, also could potentially be artifact since it is near pacer wires.  - Completed Cycle 6 R-EPOCH 4/6/23. Tolerated chemo well overall, stayed at dose level 1 due to thrombocytopenia.  - EOT PET-CT 4/28/23 (with ketogenic diet for 3 days prior to suppress cardiac activity) showed CR!  - 6-month surveillance CT 11/2/23 showed ongoing remission. Concurrent Clonoseq also negative. Removed port 12/15/23.   - 1 year surveillance CT 5/23/24 shows ongoing remission.   - 1.5 year surveillance CT 11/14/24 with ongoing remission. Stable indeterminate tracheal thickening. CT neck read still pending.   - Continue surveillance with H&P/labs every 3-6 mo for 2 years, every 6 mo for year 3, then yearly for years 4-5. Imaging every 6 months for first 2 years only (next and last CT 5/2025).   - We previously discussed his increased risk of recurrence given transformed and triple-hit disease. If he relapses, would likely consider CAR-T therapy.        Nov 2024        CT chest 2019        Allergies   Allergen Reactions    Ampicillin [Ampicillin Sodium]     Bactrim  [Sulfamethoxazole W/Trimethoprim]     Contrast Dye      CT contrast (IV) allergy - need oral Methylpred as premed for scans    Ampicillin Rash    Morphine Nausea        Past Medical History:   Diagnosis Date    Cardiac pacemaker in situ     Cardiac pacemaker in situ     2020    Lymphoma (H)     Squamous cell carcinoma         Past Surgical History:   Procedure Laterality Date    DAVINCI HERNIORRHAPHY INGUINAL Left 07/09/2021    Procedure: HERNIORRHAPHY, INGUINAL, ROBOT-ASSISTED, Left, Mesh;  Surgeon: Shamar Tyler MD;  Location: [facility] OR    IR CHEST PORT PLACEMENT > 5 YRS OF AGE  1/5/2023    IR PORT REMOVAL RIGHT  12/15/2023    IR SOFT TISSUE BIOPSY  12/15/2022    MOHS MICROGRAPHIC PROCEDURE      NO HISTORY OF SURGERY      REMOVE PORT VASCULAR ACCESS Right 12/15/2023    Procedure: Remove port vascular access right;  Surgeon: Matthew Harrison PA-C;  Location: [facility] OR        Social History     Socioeconomic History    Marital status: Single     Spouse name: Not on file    Number of children: Not on file    Years of education: Not on file    Highest education level: Not on file   Occupational History    Not on file   Tobacco Use    Smoking status: Former     Passive exposure: Past    Smokeless tobacco: Never    Tobacco comments:     Quit at age 19   Substance and Sexual Activity    Alcohol use: Yes     Alcohol/week: 11.7 standard drinks of alcohol     Types: 14 drink(s) per week    Drug use: Not on file    Sexual activity: Not on file   Other Topics Concern    Parent/sibling w/ CABG, MI or angioplasty before 65F 55M? Not Asked   Social History Narrative    Not on file     Social Drivers of Health     Financial Resource Strain: Low Risk  (5/17/2021)    Received from [facility], [facility]    Overall Financial Resource Strain (CARDIA)     Difficulty of Paying Living Expenses: Not very hard   Food Insecurity: No Food  Insecurity (5/17/2021)    Received from [facility], [facility]    Hunger Vital Sign     Worried About Running Out of Food in the Last Year: Never true     Ran Out of Food in the Last Year: Never true   Transportation Needs: No Transportation Needs (5/17/2021)    Received from [facility], [facility]    PRAPARE - Transportation     Lack of Transportation (Medical): No     Lack of Transportation (Non-Medical): No   Physical Activity: Sufficiently Active (5/17/2021)    Received from [facility], [facility]    Exercise Vital Sign     Days of Exercise per Week: 3 days     Minutes of Exercise per Session: 60 min   Stress: No Stress Concern Present (5/17/2021)    Received from [facility], [facility] [employer] [place] [employer] - Occupational Stress Questionnaire     Feeling of Stress : Not at all   Social Connections: Unknown (5/17/2021)    Received from [facility], [facility]    Social Connection and Isolation Panel [NHANES]     Frequency of Communication with Friends and Family: Three times a week     Frequency of Social Gatherings with Friends and Family: Not on file     Attends Sikhism Services: Not on file     Active Member of Clubs or Organizations: Not on file     Attends Club or Organization Meetings: Not on file     Marital Status: Not on file   Interpersonal Safety: Not on file   Housing Stability: Low Risk  (5/17/2021)    Received from [facility], [facility]    Housing Stability  Vital Sign     Unable to Pay for Housing in the Last Year: No     Number of Places Lived in the Last Year: 1     Unstable Housing in the Last Year: No        Family History   Problem Relation Age of Onset    Cancer Mother         Blood    Cancer Father         Lung    Cancer Maternal Grandmother         Breast    Cancer Paternal Grandfather         Hodgkins        Immunization History   Administered Date(s) Administered    COVID-19 12+ (Pfizer) 11/02/2023, 11/14/2024    COVID-19 Bivalent 18+ (Moderna) 11/28/2022    COVID-19 MONOVALENT 12+ (Pfizer) 02/25/2021, 03/18/2021, 09/17/2021    COVID-19 Monovalent 12+ (Pfizer 2022) 05/31/2022    Flu, Unspecified 12/09/2004, 10/26/2006, 01/22/2009, 10/10/2013, 12/18/2015    Influenza (High Dose) Trivalent,PF (Fluzone) 11/02/2016, 11/04/2017, 10/30/2018, 09/23/2019, 11/28/2022, 11/14/2024    Influenza (IIV3) PF 12/09/2004, 10/26/2006, 01/22/2009, 11/29/2010, 11/14/2011, 10/10/2013, 09/23/2019    Influenza Vaccine 65+ (Fluzone HD) 08/31/2020, 11/28/2022, 11/02/2023    Influenza Vaccine, 6+MO IM (QUADRIVALENT W/PRESERVATIVES) 12/18/2015    Pneumo Conj 13-V (2010&after) 09/23/2019    Pneumococcal 23 valent 12/07/2020       Current Outpatient Medications   Medication Sig Dispense Refill    ascorbic acid (VITAMIN C) 500 MG tablet Take 500 mg by mouth every morning (Patient not taking: Reported on 11/14/2024)      baclofen (LIORESAL) 10 MG tablet Take 0.5-1 tablets (5-10 mg) by mouth 3 times daily as needed for other (hiccups) (Patient not taking: Reported on 11/14/2024) 30 tablet 0    calcipotriene (DOVONOX) 0.005 % external cream Mix efudex + calcipotriene equally. Apply BID x 4-10 days. Stop when skin gets red. (Patient not taking: Reported on 11/14/2024) 60 g 1    fluorouracil (EFUDEX) 5 % external cream Mix equally with calcipotriene cream. Apply BID x 4-10 days. Stop when skin gets red. (Patient not taking: Reported on 11/14/2024) 40 g 0    methylPREDNISolone (MEDROL) 32 MG  tablet Take one tablet 12 hours prior to and one tablet 2 hours prior to the procedure with IV contrast 2 tablet 3    metoclopramide (REGLAN) 5 MG tablet Take 1 tablet (5 mg) by mouth every 8 hours as needed (nausea). 30 tablet 0    Multiple Vitamins-Minerals (MULTIVITAL PO) Take 1 tablet by mouth every morning (Patient not taking: Reported on 11/14/2024)      omeprazole (PRILOSEC) 40 MG DR capsule Take 1 capsule (40 mg) by mouth daily (Patient not taking: Reported on 11/14/2024) 90 capsule 4    ondansetron (ZOFRAN) 8 MG tablet Take 1 tablet (8 mg) by mouth every 8 hours as needed for nausea (Patient not taking: Reported on 11/14/2024) 60 tablet 3    polyethylene glycol (MIRALAX) 17 GM/Dose powder Take 17 g by mouth daily as needed for constipation (Patient not taking: Reported on 11/14/2024) 510 g 4    prochlorperazine (COMPAZINE) 10 MG tablet Take 1 tablet (10 mg) by mouth every 6 hours as needed for nausea or vomiting (Patient not taking: Reported on 11/14/2024) 30 tablet 3    senna-docusate (SENNA S) 8.6-50 MG tablet Take 1 tablet by mouth 2 times daily as needed for constipation (Patient not taking: Reported on 11/14/2024) 60 tablet 4     No current facility-administered medications for this visit.        Review of Systems:  I have done 10 points of review systems and all negative except for those mentioned in HPI    Physical examination:  Constitutional: Oriented, not in distress  Vitals: unavailable  Eyes: No icterus, nystagmus, pupils isocoric  Head and neck: normal posture and movements  Respiratory: Normal tidal breathing, no shortness of breath, no audible wheezing or stridor over the phone or video visit  Musculoskeletal: Normal muscle mass, no deformity on hands/fingers  Integumentary:  No rash on visible skin areas on video visit  Neurological: Alert, orientedx3, no motor deficits  Psychiatric:  Mood and affect are appropriate with insight into his/her medical condition    Data:  Lab Results    Component Value Date    WBC 9.9 11/14/2024    WBC 3.0 07/01/2021     Lab Results   Component Value Date    RBC 4.10 11/14/2024    RBC 3.95 07/01/2021     Lab Results   Component Value Date    HGB 13.0 11/14/2024    HGB 12.7 07/01/2021     Lab Results   Component Value Date    HCT 39.3 11/14/2024    HCT 36.9 07/01/2021     Lab Results   Component Value Date    MCV 96 11/14/2024    MCV 93 07/01/2021     Lab Results   Component Value Date    MCH 31.7 11/14/2024    MCH 32.2 07/01/2021     Lab Results   Component Value Date    MCHC 33.1 11/14/2024    MCHC 34.4 07/01/2021     Lab Results   Component Value Date    RDW 13.2 11/14/2024    RDW 12.5 07/01/2021     Lab Results   Component Value Date     11/14/2024    PLT 91 07/01/2021       Lab Results   Component Value Date     11/14/2024     07/01/2021      Lab Results   Component Value Date    POTASSIUM 4.7 11/14/2024    POTASSIUM 4.3 06/02/2022    POTASSIUM 4.9 07/01/2021     Lab Results   Component Value Date    CHLORIDE 105 11/14/2024    CHLORIDE 106 02/08/2023    CHLORIDE 108 07/01/2021     Lab Results   Component Value Date    JORGE 9.6 11/14/2024    JORGE 9.2 07/01/2021     Lab Results   Component Value Date    CO2 24 11/14/2024    CO2 27 06/02/2022    CO2 27 07/01/2021     Lab Results   Component Value Date    BUN 28.9 11/14/2024    BUN 35 06/02/2022    BUN 29 07/01/2021     Lab Results   Component Value Date    CR 1.0 11/14/2024    CR 0.88 11/14/2024    CR 0.92 07/01/2021     Lab Results   Component Value Date     11/14/2024     04/28/2023       Again, thank you for allowing me to participate in the care of your patient.      Sincerely,    Gerald Long MD

## 2024-11-19 NOTE — TELEPHONE ENCOUNTER
RECORDS STATUS - ALL OTHER DIAGNOSIS      RECORDS RECEIVED FROM: [facility] - Internal records   DATE RECEIVED: 11/19

## 2024-11-19 NOTE — PROGRESS NOTES
Virtual Visit Details    Type of service:  Phone Visit (12 min)  Patient could not attend the video.    Originating Location (pt. Location): Home    Distant Location (provider location):  On-site  Platform used for Video Visit: [facility]  [address] [address]  Phone: [phone]  Fax: [phone]    Interventional Pulmonary Clinic Note    November 19, 2024        Chief complaint/Reason for Clinic Visit:  Shahid Davis is a 74 year old male seen for   Chief Complaint   Patient presents with    Consult       Referred by or PCP: Haleigh Montero    Assessment and Plan:  Tracheal thickening. Has increased in time and minimal PET avidity. Oncologic history is below. Sent by his oncologist for further evaluation    Planned procedure: Flexible bronchoscopy, endobronchial forceps biopsies and debulking    History of Present Illness:  75 y/o man with hx of B cell lymphoma treated with XRT and chemotherapy- see below. Referred to my clinic for further evaluation of tracheal thickening.        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 He presented in 11/2022 with progressing B symptoms, sternal mass, and SOB/CP. PET 12/9/22 showed extensive lymphomatous involvement to the R pleura w/avid effusions, mediastinal and pericardial regions, right anterolateral chest wall, adenopathy above and below the diaphragm, liver, spleen and multiple sites in the skeleton, concerning for transformation from his low-grade  lymphoma. 12/15/22 biopsy of sternal mass showed large B-cell lymphoma with aggressive features (GCB-subtype), Ki67 %. 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  - S/p C1 R-CHOP in the hospital 12/20/22. Tolerated well overall.  - Switched to DA-R-EPOCH starting Cycle 2 (1/11/23) after FISH returned showing triple-hit disease. Staging LP negative, gave triple IT chemo once per cycle for CNS ppx for a total of 4 doses.   - PET after 2 cycles showed VT with significant resolution of most FDG activity/disease other than small area of right paramediastinal pleural thickening (SUVmax 5) and resolution of right pleural effusion. Concurrent Clonoseq was negative. PET after 4 cycles showed further improvement with right paramediastinal pleura SUV down to 3.3, also could potentially be artifact since it is near pacer wires.  - Completed Cycle 6 R-EPOCH 4/6/23. Tolerated chemo well overall, stayed at dose level 1 due to thrombocytopenia.  - EOT PET-CT 4/28/23 (with ketogenic diet for 3 days prior to suppress cardiac activity) showed CR!  - 6-month surveillance CT 11/2/23 showed ongoing remission. Concurrent Clonoseq also negative. Removed port 12/15/23.   - 1 year surveillance CT 5/23/24 shows ongoing remission.   - 1.5 year surveillance CT 11/14/24 with ongoing remission. Stable indeterminate tracheal thickening. CT neck read still pending.   - Continue surveillance with H&P/labs every 3-6 mo for 2 years, every 6 mo for year 3, then yearly for years 4-5. Imaging every 6 months for first 2 years only (next and last CT 5/2025).   - We previously discussed his increased risk of recurrence given transformed and triple-hit disease. If he relapses, would likely consider CAR-T therapy.        Nov 2024        CT chest 2019        Allergies   Allergen Reactions    Ampicillin [Ampicillin Sodium]     Bactrim [Sulfamethoxazole W/Trimethoprim]     Contrast Dye      CT contrast (IV)  allergy - need oral Methylpred as premed for scans    Ampicillin Rash    Morphine Nausea        Past Medical History:   Diagnosis Date    Cardiac pacemaker in situ     Cardiac pacemaker in situ     2020    Lymphoma (H)     Squamous cell carcinoma         Past Surgical History:   Procedure Laterality Date    DAVINCI HERNIORRHAPHY INGUINAL Left 07/09/2021    Procedure: HERNIORRHAPHY, INGUINAL, ROBOT-ASSISTED, Left, Mesh;  Surgeon: Shamar Tyler MD;  Location: [facility] OR    IR CHEST PORT PLACEMENT > 5 YRS OF AGE  1/5/2023    IR PORT REMOVAL RIGHT  12/15/2023    IR SOFT TISSUE BIOPSY  12/15/2022    MOHS MICROGRAPHIC PROCEDURE      NO HISTORY OF SURGERY      REMOVE PORT VASCULAR ACCESS Right 12/15/2023    Procedure: Remove port vascular access right;  Surgeon: Matthew Harrison PA-C;  Location: [facility] OR        Social History     Socioeconomic History    Marital status: Single     Spouse name: Not on file    Number of children: Not on file    Years of education: Not on file    Highest education level: Not on file   Occupational History    Not on file   Tobacco Use    Smoking status: Former     Passive exposure: Past    Smokeless tobacco: Never    Tobacco comments:     Quit at age 19   Substance and Sexual Activity    Alcohol use: Yes     Alcohol/week: 11.7 standard drinks of alcohol     Types: 14 drink(s) per week    Drug use: Not on file    Sexual activity: Not on file   Other Topics Concern    Parent/sibling w/ CABG, MI or angioplasty before 65F 55M? Not Asked   Social History Narrative    Not on file     Social Drivers of Health     Financial Resource Strain: Low Risk  (5/17/2021)    Received from [facility], [facility]    Overall Financial Resource Strain (CARDIA)     Difficulty of Paying Living Expenses: Not very hard   Food Insecurity: No Food Insecurity (5/17/2021)    [facility], [facility]    Hunger Vital Sign     Worried About Running Out of Food in the Last Year: Never true     Ran Out of Food in the Last Year: Never true   Transportation Needs: No Transportation Needs (5/17/2021)    [facility], [facility]    PRAPARE - Transportation     Lack of Transportation (Medical): No     Lack of Transportation (Non-Medical): No   Physical Activity: Sufficiently Active (5/17/2021)    [facility], [facility]    Exercise Vital Sign     Days of Exercise per Week: 3 days     Minutes of Exercise per Session: 60 min   Stress: No Stress Concern Present (5/17/2021)    [facility] from [facility], [facility]    [employer] - Occupational Stress Questionnaire     Feeling of Stress : Not at all   Social Connections: Unknown (5/17/2021)    [facility] from [facility], [facility]    Social Connection and Isolation Panel [NHANES]     Frequency of Communication with Friends and Family: Three times a week     Frequency of Social Gatherings with Friends and Family: Not on file     Attends Scientologist Services: Not on file     Active Member of Clubs or Organizations: Not on file     Attends Club or Organization Meetings: Not on file     Marital Status: Not on file   Interpersonal Safety: Not on file   Housing Stability: Low Risk  (5/17/2021)    [facility], [facility]    Housing Stability Vital Sign     Unable to Pay for Housing in the Last Year: No     Number of  Places Lived in the Last Year: 1     Unstable Housing in the Last Year: No        Family History   Problem Relation Age of Onset    Cancer Mother         Blood    Cancer Father         Lung    Cancer Maternal Grandmother         Breast    Cancer Paternal Grandfather         Hodgkins        Immunization History   Administered Date(s) Administered    COVID-19 12+ (Pfizer) 11/02/2023, 11/14/2024    COVID-19 Bivalent 18+ (Moderna) 11/28/2022    COVID-19 MONOVALENT 12+ (Pfizer) 02/25/2021, 03/18/2021, 09/17/2021    COVID-19 Monovalent 12+ (Pfizer 2022) 05/31/2022    Flu, Unspecified 12/09/2004, 10/26/2006, 01/22/2009, 10/10/2013, 12/18/2015    Influenza (High Dose) Trivalent,PF (Fluzone) 11/02/2016, 11/04/2017, 10/30/2018, 09/23/2019, 11/28/2022, 11/14/2024    Influenza (IIV3) PF 12/09/2004, 10/26/2006, 01/22/2009, 11/29/2010, 11/14/2011, 10/10/2013, 09/23/2019    Influenza Vaccine 65+ (Fluzone HD) 08/31/2020, 11/28/2022, 11/02/2023    Influenza Vaccine, 6+MO IM (QUADRIVALENT W/PRESERVATIVES) 12/18/2015    Pneumo Conj 13-V (2010&after) 09/23/2019    Pneumococcal 23 valent 12/07/2020       Current Outpatient Medications   Medication Sig Dispense Refill    ascorbic acid (VITAMIN C) 500 MG tablet Take 500 mg by mouth every morning (Patient not taking: Reported on 11/14/2024)      baclofen (LIORESAL) 10 MG tablet Take 0.5-1 tablets (5-10 mg) by mouth 3 times daily as needed for other (hiccups) (Patient not taking: Reported on 11/14/2024) 30 tablet 0    calcipotriene (DOVONOX) 0.005 % external cream Mix efudex + calcipotriene equally. Apply BID x 4-10 days. Stop when skin gets red. (Patient not taking: Reported on 11/14/2024) 60 g 1    fluorouracil (EFUDEX) 5 % external cream Mix equally with calcipotriene cream. Apply BID x 4-10 days. Stop when skin gets red. (Patient not taking: Reported on 11/14/2024) 40 g 0    methylPREDNISolone (MEDROL) 32 MG tablet Take one tablet 12 hours prior to and one tablet 2 hours prior to the  procedure with IV contrast 2 tablet 3    metoclopramide (REGLAN) 5 MG tablet Take 1 tablet (5 mg) by mouth every 8 hours as needed (nausea). 30 tablet 0    Multiple Vitamins-Minerals (MULTIVITAL PO) Take 1 tablet by mouth every morning (Patient not taking: Reported on 11/14/2024)      omeprazole (PRILOSEC) 40 MG DR capsule Take 1 capsule (40 mg) by mouth daily (Patient not taking: Reported on 11/14/2024) 90 capsule 4    ondansetron (ZOFRAN) 8 MG tablet Take 1 tablet (8 mg) by mouth every 8 hours as needed for nausea (Patient not taking: Reported on 11/14/2024) 60 tablet 3    polyethylene glycol (MIRALAX) 17 GM/Dose powder Take 17 g by mouth daily as needed for constipation (Patient not taking: Reported on 11/14/2024) 510 g 4    prochlorperazine (COMPAZINE) 10 MG tablet Take 1 tablet (10 mg) by mouth every 6 hours as needed for nausea or vomiting (Patient not taking: Reported on 11/14/2024) 30 tablet 3    senna-docusate (SENNA S) 8.6-50 MG tablet Take 1 tablet by mouth 2 times daily as needed for constipation (Patient not taking: Reported on 11/14/2024) 60 tablet 4     No current facility-administered medications for this visit.        Review of Systems:  I have done 10 points of review systems and all negative except for those mentioned in HPI    Physical examination:  Constitutional: Oriented, not in distress  Vitals: unavailable  Eyes: No icterus, nystagmus, pupils isocoric  Head and neck: normal posture and movements  Respiratory: Normal tidal breathing, no shortness of breath, no audible wheezing or stridor over the phone or video visit  Musculoskeletal: Normal muscle mass, no deformity on hands/fingers  Integumentary:  No rash on visible skin areas on video visit  Neurological: Alert, orientedx3, no motor deficits  Psychiatric:  Mood and affect are appropriate with insight into his/her medical condition    Data:  Lab Results   Component Value Date    WBC 9.9 11/14/2024    WBC 3.0 07/01/2021     Lab Results    Component Value Date    RBC 4.10 11/14/2024    RBC 3.95 07/01/2021     Lab Results   Component Value Date    HGB 13.0 11/14/2024    HGB 12.7 07/01/2021     Lab Results   Component Value Date    HCT 39.3 11/14/2024    HCT 36.9 07/01/2021     Lab Results   Component Value Date    MCV 96 11/14/2024    MCV 93 07/01/2021     Lab Results   Component Value Date    MCH 31.7 11/14/2024    MCH 32.2 07/01/2021     Lab Results   Component Value Date    MCHC 33.1 11/14/2024    MCHC 34.4 07/01/2021     Lab Results   Component Value Date    RDW 13.2 11/14/2024    RDW 12.5 07/01/2021     Lab Results   Component Value Date     11/14/2024    PLT 91 07/01/2021       Lab Results   Component Value Date     11/14/2024     07/01/2021      Lab Results   Component Value Date    POTASSIUM 4.7 11/14/2024    POTASSIUM 4.3 06/02/2022    POTASSIUM 4.9 07/01/2021     Lab Results   Component Value Date    CHLORIDE 105 11/14/2024    CHLORIDE 106 02/08/2023    CHLORIDE 108 07/01/2021     Lab Results   Component Value Date    JORGE 9.6 11/14/2024    JORGE 9.2 07/01/2021     Lab Results   Component Value Date    CO2 24 11/14/2024    CO2 27 06/02/2022    CO2 27 07/01/2021     Lab Results   Component Value Date    BUN 28.9 11/14/2024    BUN 35 06/02/2022    BUN 29 07/01/2021     Lab Results   Component Value Date    CR 1.0 11/14/2024    CR 0.88 11/14/2024    CR 0.92 07/01/2021     Lab Results   Component Value Date     11/14/2024     04/28/2023         YI Long MD

## 2024-11-19 NOTE — NURSING NOTE
Current patient location: Patient declined to provide     Is the patient currently in the state of [place]? NO    Visit mode:VIDEO    If the visit is dropped, the patient can be reconnected by:VIDEO VISIT: Text to cell phone:   Telephone Information:   Mobile [phone]   Mobile [phone]       Will anyone else be joining the visit? NO  (If patient encounters technical issues they should call [phone]    Are changes needed to the allergy or medication list? No    Are refills needed on medications prescribed by this physician? NO    Rooming Documentation:  Questionnaire(s) not done per department protocol    Reason for visit: Consult    Gisella BRYANT

## 2024-11-19 NOTE — PROGRESS NOTES
New IP (Interventional Pulmonology) referral rec'd.  Chart reviewed.       New Patient: Interventional Pulmonary (Lung nodule) Nurse Navigator Note    Referring provider: [facility]itila [facility]    Referred to (specialty): Interventional Pulmonary (Lung nodule)    Requested provider (if applicable): n/a    Date Referral Received: 11/19/2024    Evaluation for:      CT with 9 x 13 mm soft tissue nodule in right posterolateral trachea near the level of the pradip, slowly enlarging over last year. Hx of DLBCL. Referring for bronch/possible biopsy       Clinical History (per Nurse review of records provided):    **BOOK MARKED**    EXAMINATION: CT CHEST/ABDOMEN/PELVIS W CONTRAST, 11/14/2024 11:19 AM     TECHNIQUE: Helical CT images from the thoracic inlet through the  symphysis pubis were obtained with intravenous contrast. Contrast  dose: Isovue 370 88cc     COMPARISON: CT 5/23/2024     HISTORY: Hx of transformed DLBCL s/p chemo, 1.5 year surveillance  scan; High grade B-cell lymphoma (H)     FINDINGS:     Chest:     Heart/ Mediastinum: Heart is within normal limits. No evidence of  central pulmonary embolism. No bulky lymphadenopathy.  Esophagus  appears normal. Left chest wall pacemaker.     Lungs/pleura: Unchanged nodular tracheal wall thickening just above  the pradip (series 4 image 113). No focal mass or consolidation.   Stable 3 mm subpleural nodule in the left upper lobe on series 5 image  78. No suspicious nodules. No pneumothorax or pleural effusion.      Chest wall/axilla: No bulky lymphadenopathy..     Abdomen and Pelvis:     Liver: Unremarkable. No suspicious masses. No hepatic steatosis.      Gallbladder/biliary tree: No gallstones. No biliary dilatation.     Spleen: Decreased size of a 0.8 cm hypodensity in the spleen,  previously 1.3 cm.     Pancreas: Unremarkable. No mass or ductal dilatation.     Adrenal glands: Unremarkable.     Kidneys:  Unremarkable. No hydronephrosis.     Bowel: Unremarkable. No obstruction.     Retroperitoneum: Vasculature is grossly unremarkable..  No bulky  lymphadenopathy.     Pelvis: Urinary bladder is nondistended limiting evaluation.  Prostate  appears enlarged.     Bones: Moderate multilevel degenerative changes of the spine. No  suspicious lesions. There is sclerosis of the left femoral head.  Degenerative changes of both hips. Stable sclerosis of the posterior  right ischium, around the right acetabulum, and posterior left iliac  bone.     Soft Tissues: Unremarkable.                                                                      IMPRESSION:     1.  No convincing evidence of recurrent lymphoma.  2.  Stable nodular thickening of the trachea, indeterminate.  3.  Continued decrease in size of small hypodense splenic lesion.     PARESH DOUGLAS DO     Records Location: [facility]     Records Needed: none    Additional testing needed prior to consult: PFT's

## 2024-11-21 ENCOUNTER — TELEPHONE (OUTPATIENT)
Dept: PULMONOLOGY | Facility: CLINIC | Age: 74
End: 2024-11-21
Payer: MEDICARE

## 2024-11-21 NOTE — TELEPHONE ENCOUNTER
Writer attempted to contact patient to schedule IP procedure. Left message on unidentified voicemail with callback number [phone].    Minh Nichols on 11/21/2024 at 12:55 PM

## 2024-11-24 LAB
ABC CLONOSEQ B-CELL TRACKING (MRD) RESULT: NORMAL
ABC DOMINANT SEQUENCES (B-CELL): 1
Lab: 0

## 2024-12-12 NOTE — OR NURSING
"RE: Missing H&P for surgery on 12/16  Received: Today  Dee Jimenez MD Thalhuber, Emily M, RN; Marie Humphrey, RN; Minh Nichols          Previous Messages       ----- Message -----  From: Soraida Garcia RN  Sent: 12/12/2024   2:51 PM CST  To: Minh Nichols; Dee Jimenez MD; *  Subject: RE: Missing H&P for surgery on 12/16            Barbara - can you do DOS?  ----- Message -----  From: Marie Humphrey, GURPREET  Sent: 12/12/2024   2:49 PM CST  To: Minh Nichols; Soraida Garcia RN; *  Subject: Missing H&P for surgery on 12/16                Hello,    Pt is scheduled for BRONCHOSCOPY, FLEXIBLE, endobronchial biopsy and tumor debulking, by Dr. Long on 12/16/24.    Note in chart on 11/19 says \" Patient informed that an H&P is needed.\"    I don't see an H&P in pt's chart within 30 days of the procedure. I left a message for pt to call back with information, but if he does not return the call of have a current H&P, can Dr. Long do an H&P on DOS?    PAS Notification: Your patient is scheduled for surgery in 2 days. Critical chart components are missing. If the required components are not completed in EPIC by noon the day prior to surgery your case may be rescheduled. Thank you in advance for completing the record and improving [facility]'s quality of care.    Missing Components:H&P within 30 days    Kind regards,    Pre-Anesthesia Screening Department [facility]  [phone] phone  [phone] fax  "

## 2024-12-12 NOTE — OR NURSING
Called [phone] and left VM message for pt that a pre-op H&P is required within 30 days. Request made for pt to call  to discuss H&P.

## 2024-12-15 ENCOUNTER — ANESTHESIA EVENT (OUTPATIENT)
Dept: SURGERY | Facility: CLINIC | Age: 74
End: 2024-12-15
Payer: MEDICARE

## 2024-12-15 NOTE — ANESTHESIA PREPROCEDURE EVALUATION
Anesthesia Pre-Procedure Evaluation    Patient: Shahid Davis   MRN: [id number] : 1950        Procedure : Procedure(s):  BRONCHOSCOPY, FLEXIBLE, endobronchial biopsy and tumor debulking          Past Medical History:   Diagnosis Date     Cardiac pacemaker in situ      Cardiac pacemaker in situ          Lymphoma (H)      Squamous cell carcinoma       Past Surgical History:   Procedure Laterality Date     DAVINCI HERNIORRHAPHY INGUINAL Left 2021    Procedure: HERNIORRHAPHY, INGUINAL, ROBOT-ASSISTED, Left, Mesh;  Surgeon: Shamar Tyler MD;  Location: [facility] OR     IR CHEST PORT PLACEMENT > 5 YRS OF AGE  2023     IR PORT REMOVAL RIGHT  12/15/2023     IR SOFT TISSUE BIOPSY  12/15/2022     MOHS MICROGRAPHIC PROCEDURE       NO HISTORY OF SURGERY       REMOVE PORT VASCULAR ACCESS Right 12/15/2023    Procedure: Remove port vascular access right;  Surgeon: Matthew Harrison PA-C;  Location: [facility] OR      Allergies   Allergen Reactions     Ampicillin [Ampicillin Sodium]      Bactrim [Sulfamethoxazole W/Trimethoprim]      Contrast Dye      CT contrast (IV) allergy - need oral Methylpred as premed for scans     Ampicillin Rash     Morphine Nausea      Social History     Tobacco Use     Smoking status: Former     Passive exposure: Past     Smokeless tobacco: Never     Tobacco comments:     Quit at age 19   Substance Use Topics     Alcohol use: Yes     Alcohol/week: 11.7 standard drinks of alcohol     Types: 14 drink(s) per week      Wt Readings from Last 1 Encounters:   24 63.5 kg (140 lb)        Anesthesia Evaluation   Pt has had prior anesthetic. Type: General.        ROS/MED HX  ENT/Pulmonary:       Neurologic:       Cardiovascular:     (+)  - -   -  - -           PARRISH.   pacemaker, Reason placed: CHB. type: DDDR,  - Patient is dependent on pacemaker.                 (-) ICD   METS/Exercise Tolerance:     Hematologic:       Musculoskeletal:       GI/Hepatic:       Renal/Genitourinary:        Endo:       Psychiatric/Substance Use:       Infectious Disease:       Malignancy:   (+) Malignancy, History of Lymphoma/Leukemia.    Other:               OUTSIDE LABS:  CBC:   Lab Results   Component Value Date    WBC 9.9 11/14/2024    WBC 8.2 05/23/2024    HGB 13.0 (L) 11/14/2024    HGB 13.0 (L) 05/23/2024    HCT 39.3 (L) 11/14/2024    HCT 39.1 (L) 05/23/2024     11/14/2024     05/23/2024     BMP:   Lab Results   Component Value Date     11/14/2024     05/23/2024    POTASSIUM 4.7 11/14/2024    POTASSIUM 4.8 05/23/2024    CHLORIDE 105 11/14/2024    CHLORIDE 105 05/23/2024    CO2 24 11/14/2024    CO2 24 05/23/2024    BUN 28.9 (H) 11/14/2024    BUN 30.4 (H) 05/23/2024    CR 1.0 11/14/2024    CR 0.88 11/14/2024     (H) 11/14/2024     (H) 05/23/2024     COAGS:   Lab Results   Component Value Date    PTT 47 (H) 02/02/2023    INR 1.08 04/11/2023    FIBR 198 04/11/2023     POC:   Lab Results   Component Value Date    BGM 94 02/22/2012     HEPATIC:   Lab Results   Component Value Date    ALBUMIN 4.4 11/14/2024    PROTTOTAL 6.1 (L) 11/14/2024    ALT 49 11/14/2024    AST 39 11/14/2024    ALKPHOS 98 11/14/2024    BILITOTAL 0.3 11/14/2024     OTHER:   Lab Results   Component Value Date    LACT 1.7 12/15/2022    JORGE 9.6 11/14/2024    PHOS 3.8 04/11/2023    MAG 2.1 04/11/2023    LIPASE 43 12/14/2022    TSH 1.61 02/16/2012       Anesthesia Plan    ASA Status:  3    NPO Status:  NPO Appropriate    Anesthesia Type: General.     - Airway: ETT   Induction: Intravenous, Propofol.   Maintenance: Balanced.   Techniques and Equipment:     - Airway: Video-Laryngoscope     - Lines/Monitors: BIS     Consents            Postoperative Care    Pain management: Multi-modal analgesia.   PONV prophylaxis: Dexamethasone or Solumedrol, Ondansetron (or other 5HT-3), Background Propofol Infusion     Comments:               Sascha Brenner MD    I have reviewed the pertinent notes and labs in the chart from the  past 30 days and (re)examined the patient.  Any updates or changes from those notes are reflected in this note.                              # Pacemaker present

## 2024-12-16 ENCOUNTER — ANESTHESIA (OUTPATIENT)
Dept: SURGERY | Facility: CLINIC | Age: 74
End: 2024-12-16
Payer: MEDICARE

## 2024-12-16 ENCOUNTER — HOSPITAL ENCOUNTER (OUTPATIENT)
Facility: CLINIC | Age: 74
Discharge: HOME | End: 2024-12-16
Attending: INTERNAL MEDICINE | Admitting: INTERNAL MEDICINE
Payer: MEDICARE

## 2024-12-16 VITALS
OXYGEN SATURATION: 100 % | RESPIRATION RATE: 16 BRPM | WEIGHT: 138.89 LBS | DIASTOLIC BLOOD PRESSURE: 73 MMHG | BODY MASS INDEX: 21.8 KG/M2 | TEMPERATURE: 97.6 F | HEART RATE: 92 BPM | HEIGHT: 67 IN | SYSTOLIC BLOOD PRESSURE: 118 MMHG

## 2024-12-16 DIAGNOSIS — Z85.72 HISTORY OF LYMPHOMA: Primary | ICD-10-CM

## 2024-12-16 PROCEDURE — 31641 BRONCHOSCOPY TREAT BLOCKAGE: CPT | Mod: GC | Performed by: INTERNAL MEDICINE

## 2024-12-16 PROCEDURE — 88173 CYTOPATH EVAL FNA REPORT: CPT | Mod: TC | Performed by: INTERNAL MEDICINE

## 2024-12-16 PROCEDURE — 272N000001 HC OR GENERAL SUPPLY STERILE: Performed by: INTERNAL MEDICINE

## 2024-12-16 PROCEDURE — 360N000083 HC SURGERY LEVEL 3 W/ FLUORO, PER MIN: Performed by: INTERNAL MEDICINE

## 2024-12-16 PROCEDURE — 88342 IMHCHEM/IMCYTCHM 1ST ANTB: CPT | Mod: 26 | Performed by: PATHOLOGY

## 2024-12-16 PROCEDURE — 88172 CYTP DX EVAL FNA 1ST EA SITE: CPT | Mod: 26 | Performed by: PATHOLOGY

## 2024-12-16 PROCEDURE — 710N000012 HC RECOVERY PHASE 2, PER MINUTE: Performed by: INTERNAL MEDICINE

## 2024-12-16 PROCEDURE — 710N000010 HC RECOVERY PHASE 1, LEVEL 2, PER MIN: Performed by: INTERNAL MEDICINE

## 2024-12-16 PROCEDURE — 370N000017 HC ANESTHESIA TECHNICAL FEE, PER MIN: Performed by: INTERNAL MEDICINE

## 2024-12-16 PROCEDURE — 250N000011 HC RX IP 250 OP 636: Performed by: NURSE ANESTHETIST, CERTIFIED REGISTERED

## 2024-12-16 PROCEDURE — 88341 IMHCHEM/IMCYTCHM EA ADD ANTB: CPT | Mod: 26 | Performed by: PATHOLOGY

## 2024-12-16 PROCEDURE — 250N000009 HC RX 250: Performed by: NURSE ANESTHETIST, CERTIFIED REGISTERED

## 2024-12-16 PROCEDURE — 258N000003 HC RX IP 258 OP 636: Performed by: NURSE ANESTHETIST, CERTIFIED REGISTERED

## 2024-12-16 PROCEDURE — 999N000141 HC STATISTIC PRE-PROCEDURE NURSING ASSESSMENT: Performed by: INTERNAL MEDICINE

## 2024-12-16 PROCEDURE — 88305 TISSUE EXAM BY PATHOLOGIST: CPT | Mod: 26 | Performed by: PATHOLOGY

## 2024-12-16 PROCEDURE — 88173 CYTOPATH EVAL FNA REPORT: CPT | Mod: 26 | Performed by: PATHOLOGY

## 2024-12-16 RX ORDER — SODIUM CHLORIDE, SODIUM LACTATE, POTASSIUM CHLORIDE, CALCIUM CHLORIDE 600; 310; 30; 20 MG/100ML; MG/100ML; MG/100ML; MG/100ML
INJECTION, SOLUTION INTRAVENOUS CONTINUOUS PRN
Status: DISCONTINUED | OUTPATIENT
Start: 2024-12-16 | End: 2024-12-16

## 2024-12-16 RX ORDER — SODIUM CHLORIDE, SODIUM LACTATE, POTASSIUM CHLORIDE, CALCIUM CHLORIDE 600; 310; 30; 20 MG/100ML; MG/100ML; MG/100ML; MG/100ML
INJECTION, SOLUTION INTRAVENOUS CONTINUOUS
Status: DISCONTINUED | OUTPATIENT
Start: 2024-12-16 | End: 2024-12-16 | Stop reason: HOSPADM

## 2024-12-16 RX ORDER — PROPOFOL 10 MG/ML
INJECTION, EMULSION INTRAVENOUS PRN
Status: DISCONTINUED | OUTPATIENT
Start: 2024-12-16 | End: 2024-12-16

## 2024-12-16 RX ORDER — ONDANSETRON 2 MG/ML
4 INJECTION INTRAMUSCULAR; INTRAVENOUS EVERY 30 MIN PRN
Status: DISCONTINUED | OUTPATIENT
Start: 2024-12-16 | End: 2024-12-16 | Stop reason: HOSPADM

## 2024-12-16 RX ORDER — LIDOCAINE HYDROCHLORIDE 20 MG/ML
INJECTION, SOLUTION INFILTRATION; PERINEURAL PRN
Status: DISCONTINUED | OUTPATIENT
Start: 2024-12-16 | End: 2024-12-16

## 2024-12-16 RX ORDER — NALOXONE HYDROCHLORIDE 0.4 MG/ML
0.1 INJECTION, SOLUTION INTRAMUSCULAR; INTRAVENOUS; SUBCUTANEOUS
Status: CANCELLED | OUTPATIENT
Start: 2024-12-16

## 2024-12-16 RX ORDER — PROPOFOL 10 MG/ML
INJECTION, EMULSION INTRAVENOUS CONTINUOUS PRN
Status: DISCONTINUED | OUTPATIENT
Start: 2024-12-16 | End: 2024-12-16

## 2024-12-16 RX ORDER — ONDANSETRON 2 MG/ML
INJECTION INTRAMUSCULAR; INTRAVENOUS PRN
Status: DISCONTINUED | OUTPATIENT
Start: 2024-12-16 | End: 2024-12-16

## 2024-12-16 RX ORDER — FENTANYL CITRATE 50 UG/ML
25 INJECTION, SOLUTION INTRAMUSCULAR; INTRAVENOUS EVERY 5 MIN PRN
Status: DISCONTINUED | OUTPATIENT
Start: 2024-12-16 | End: 2024-12-16 | Stop reason: HOSPADM

## 2024-12-16 RX ORDER — ONDANSETRON 2 MG/ML
4 INJECTION INTRAMUSCULAR; INTRAVENOUS EVERY 30 MIN PRN
Status: CANCELLED | OUTPATIENT
Start: 2024-12-16

## 2024-12-16 RX ORDER — HYDROMORPHONE HCL IN WATER/PF 6 MG/30 ML
0.2 PATIENT CONTROLLED ANALGESIA SYRINGE INTRAVENOUS EVERY 5 MIN PRN
Status: DISCONTINUED | OUTPATIENT
Start: 2024-12-16 | End: 2024-12-16 | Stop reason: HOSPADM

## 2024-12-16 RX ORDER — NALOXONE HYDROCHLORIDE 0.4 MG/ML
0.1 INJECTION, SOLUTION INTRAMUSCULAR; INTRAVENOUS; SUBCUTANEOUS
Status: DISCONTINUED | OUTPATIENT
Start: 2024-12-16 | End: 2024-12-16 | Stop reason: HOSPADM

## 2024-12-16 RX ORDER — OXYCODONE HYDROCHLORIDE 5 MG/1
5 TABLET ORAL
Status: CANCELLED | OUTPATIENT
Start: 2024-12-16

## 2024-12-16 RX ORDER — IPRATROPIUM BROMIDE AND ALBUTEROL SULFATE 2.5; .5 MG/3ML; MG/3ML
3 SOLUTION RESPIRATORY (INHALATION)
Status: CANCELLED | OUTPATIENT
Start: 2024-12-16

## 2024-12-16 RX ORDER — ONDANSETRON 4 MG/1
4 TABLET, ORALLY DISINTEGRATING ORAL EVERY 30 MIN PRN
Status: DISCONTINUED | OUTPATIENT
Start: 2024-12-16 | End: 2024-12-16 | Stop reason: HOSPADM

## 2024-12-16 RX ORDER — ONDANSETRON 4 MG/1
4 TABLET, ORALLY DISINTEGRATING ORAL EVERY 30 MIN PRN
Status: CANCELLED | OUTPATIENT
Start: 2024-12-16

## 2024-12-16 RX ORDER — FENTANYL CITRATE 50 UG/ML
INJECTION, SOLUTION INTRAMUSCULAR; INTRAVENOUS PRN
Status: DISCONTINUED | OUTPATIENT
Start: 2024-12-16 | End: 2024-12-16

## 2024-12-16 RX ADMIN — ONDANSETRON 4 MG: 2 INJECTION INTRAMUSCULAR; INTRAVENOUS at 11:15

## 2024-12-16 RX ADMIN — SODIUM CHLORIDE, POTASSIUM CHLORIDE, SODIUM LACTATE AND CALCIUM CHLORIDE: 600; 310; 30; 20 INJECTION, SOLUTION INTRAVENOUS at 10:50

## 2024-12-16 RX ADMIN — LIDOCAINE HYDROCHLORIDE 100 MG: 20 INJECTION, SOLUTION INFILTRATION; PERINEURAL at 11:02

## 2024-12-16 RX ADMIN — PROPOFOL 140 MG: 10 INJECTION, EMULSION INTRAVENOUS at 11:02

## 2024-12-16 RX ADMIN — Medication 50 MG: at 11:03

## 2024-12-16 RX ADMIN — PROPOFOL 150 MCG/KG/MIN: 10 INJECTION, EMULSION INTRAVENOUS at 11:03

## 2024-12-16 RX ADMIN — Medication 200 MG: at 11:35

## 2024-12-16 RX ADMIN — FENTANYL CITRATE 50 MCG: 50 INJECTION INTRAMUSCULAR; INTRAVENOUS at 11:02

## 2024-12-16 ASSESSMENT — ACTIVITIES OF DAILY LIVING (ADL)
ADLS_ACUITY_SCORE: 46

## 2024-12-16 NOTE — OR NURSING
Time of notification: 9:28 AM  Provider notified: Dr. Long  Patient status: No H&P in EMR.   Orders received: No response, no new orders.    Bre Aviles RN on 12/16/2024 at 9:36 AM

## 2024-12-16 NOTE — ANESTHESIA CARE TRANSFER NOTE
Patient: Shahid Davis    Procedure: Procedure(s):  BRONCHOSCOPY, FLEXIBLE, endobronchial biopsy and tumor debulking       Diagnosis: Tracheal mass [J39.8]  Diagnosis Additional Information: No value filed.    Anesthesia Type:   General     Note:    Oropharynx: oropharynx clear of all foreign objects  Level of Consciousness: drowsy  Oxygen Supplementation: face mask  Level of Supplemental Oxygen (L/min / FiO2): 8  Independent Airway: airway patency satisfactory and stable  Dentition: dentition unchanged  Vital Signs Stable: post-procedure vital signs reviewed and stable  Report to RN Given: handoff report given  Patient transferred to: PACU    Handoff Report: Identifed the Patient, Identified the Reponsible Provider, Reviewed the pertinent medical history, Discussed the surgical course, Reviewed Intra-OP anesthesia mangement and issues during anesthesia, Set expectations for post-procedure period and Allowed opportunity for questions and acknowledgement of understanding      Vitals:  Vitals Value Taken Time   /75 12/16/24 1153   Temp 35.8    Pulse 87 12/16/24 1157   Resp 11 12/16/24 1157   SpO2 100 % 12/16/24 1157   Vitals shown include unfiled device data.    Electronically Signed By: Lisbet Fernandez CRNA, APRN CRNA  December 16, 2024  11:58 AM

## 2024-12-16 NOTE — ANESTHESIA POSTPROCEDURE EVALUATION
Patient: Shahid Davis    Procedure: Procedure(s):  BRONCHOSCOPY, FLEXIBLE, endobronchial biopsy and tumor debulking       Anesthesia Type:  General    Note:  Disposition: Outpatient   Postop Pain Control: Uneventful            Sign Out: Well controlled pain   PONV: No   Neuro/Psych: Uneventful            Sign Out: Acceptable/Baseline neuro status   Airway/Respiratory: Uneventful            Sign Out: Acceptable/Baseline resp. status   CV/Hemodynamics: Uneventful            Sign Out: Acceptable CV status; No obvious hypovolemia; No obvious fluid overload   Other NRE: NONE   DID A NON-ROUTINE EVENT OCCUR? No           Last vitals:  Vitals Value Taken Time   /72 12/16/24 1230   Temp 36.7  C (98.1  F) 12/16/24 1152   Pulse 89 12/16/24 1234   Resp 11 12/16/24 1234   SpO2 99 % 12/16/24 1234   Vitals shown include unfiled device data.    Electronically Signed By: Tanya Jordan MD  December 16, 2024  12:35 PM

## 2024-12-16 NOTE — OR NURSING
Device RN paged:    PtRicardo Davis in 3C pre-op bay 23: anesthesia pre-op eval indicates PPM; please advise. Thanks! 3C RN Bre Aviles, [phone]    Bre Aviles RN on 12/16/2024 at 9:40 AM

## 2024-12-16 NOTE — DISCHARGE INSTRUCTIONS
Post Bronchoscopy Patient Instructions:    December 16, 2024  Shahid Davis    Your procedure completed (bronchoscopy with tracheal biopsy and cryo therapy) without any immediate complications.  You may cough up scant amount of blood for the next 12-24 hours. If you have excessive cough with blood, chest pain, shortness of breath, please report to the closest emergency room.    You may experience low grade (less than 100.5 F) fever next 24 hours, if so, you can take Tylenol. If the fever persists more than 24 hours, please contact to our office or your primary care provider.    Our office will call you with the results of samples taken during the procedure. Please note that you may get a result notification through  My Chart  before us calling you, as the Laboratories are instructed to release the results as soon as they are available to the patients and providers at the same time. Please allow your us 24-48 hours call you to discuss the results.    You may resume your diet as it was prior to procedure.    You may resume your medications after the procedure unless you are instructed to do differently.     Please follow instructions from the nursing staff upon discharge in terms of activity. In general, you should avoid any attention or motor skill requiring activities (e.g., driving or operating any motorized vehicle) for 24 hours as you might be still under the effect of sedation medications. Please make sure an adult to accompany you next 24 hours.     Should you have any question, please do not hesitate to call our office Soraida Garcia [phone].     Please take a few moments to evaluate the care you received by me on this link: [email or web address]  Interventional Pulmonary Fellow    Contacting your Doctor -   To contact a doctor, call Dr. Long's clinic  at [phone]  or:  [phone] and ask for the resident on call for Pulmonology  (answered 24 hours a day)    Emergency Department:  [facility]: [phone]  [facility]: [phone] 911 if you are in need of immediate or emergent help

## 2024-12-16 NOTE — PROCEDURES
INTERVENTIONAL PULMONOLOGY       Procedure(s):    A flexible bronchoscopy  Airway exam  Endobronchial biopsies (1 sites)  Tissue/tumor debulking (Cryotherapy     Indication:  lower tracheal endobronchial lesion    Attending of Record:  Lida Long MD    Interventional Pulmonary Fellow   Dee Jimenez    Trainees Present:   None     Medications:    General Anesthesia - See anesthesia flowsheet for details    Sedation Time:   Per Anesthesia Care Provider    Time Out:  Performed    The patient's medical record has been reviewed.  The indication for the procedure was reviewed.  The necessary history and physical examination was performed and reviewed.  The risks, benefits and alternatives of the procedure were discussed with the the patient in detail and he had the opportunity to ask questions.  I discussed in particular the potential complications including risks of minor or life-threatening bleeding and/or infection, respiratory failure, vocal cord trauma / paralysis, pneumothorax, and discomfort. Sedation risks were also discussed including abnormal heart rhythms, low blood pressure, and respiratory failure. All questions were answered to the best of my ability.  Verbal and written informed consent was obtained.  The proposed procedure and the patient's identification were verified prior to the procedure by the physician and the nurse.    The patient was assessed for the adequacy for the procedure and to receive medications.   Mental Status:  Alert and oriented x 3  Airway examination:  Class I (Complete visualization of soft palate)  Pulmonary:  Non labored respirations  CV:  Regular rate  ASA Grade:  (II)  Mild systemic disease    After clinical evaluation and reviewing the indication, risks, alternatives and benefits of the procedure the patient was deemed to be in satisfactory condition to undergo the procedure.      Immediately before administration of medications the patient was re-assessed for  adequacy to receive sedatives including the heart rate, respiratory rate, mental status, oxygen saturation, blood pressure and adequacy of pulmonary ventilation. These same parameters were continuously monitored throughout the procedure.    A Tuberculosis risk assessment was performed:  The patient has no known RISK of Tuberculosis    The procedure was performed in a negative airflow room: The patient could not be moved to a negative airflow room because of needed OR for the procedure    Maneuvers / Procedure:      Airway Examination: A complete airway examination was performed from the distal trachea to the subsegmental level in each lobe of both lungs.  Pertinent findings include lower tracheal lesions as shwon in the picture.         Endobronchial biopsies: One Site - The bronchoscope was inserted.  Topical epinephrine was not administered.  The Kuo needle were introduced and endobronchial biopsies were obtained from lower tracheal lesion.  A total of 5 biopsies were obtained.    Tumor/Tissue Debulking: We did freeze thaw cycles to the the lower tracheal lesion airway was accessed and tumor/tissue debulking was performed using the 2.4mm ERBE cryoprobe. Hemostasis and patency of the airway was achieved.     Any disposable equipment was visually inspected and deemed to be intact immediately post procedure.      Relevant Pictures  Tracheal lesion before and after the cryotherapy                       Assessment and Recommendations:     Successful completion of FB with tracheal lesion Kuo needle FNA and cryotherapy freeze-thaw tissue devitalization.  Ok to discharge once medically stable.   Follow up 2 months with bronchoscopy in the OR. If diagnosis is malignant  then he will need two more freeze thaw cryotherapy sessions.           Dee Jimenez  Interventional pulmonary fellow  Pager: [phone]

## 2024-12-16 NOTE — OR NURSING
Device Nurse paged on patient arrival to PACU. Patient doing well at handoff from OR.    Device Nurse called back, They do not need to see in recovery.

## 2024-12-16 NOTE — ANESTHESIA PROCEDURE NOTES
Airway       Patient location during procedure: OR       Procedure Start/Stop Times: 12/16/2024 11:06 AM  Staff -        CRNA: Lisbet Fernandez APRN CRNA       Performed By: CRNA  Consent for Airway        Urgency: elective  Indications and Patient Condition       Indications for airway management: elieser-procedural       Induction type:intravenous       Mask difficulty assessment: 1 - vent by mask    Final Airway Details       Final airway type: endotracheal airway       Successful airway: ETT - single  Endotracheal Airway Details        ETT size (mm): 8.5       Cuffed: yes       Successful intubation technique: video laryngoscopy       VL Blade Size: Glidescope 3       Grade View of Cords: 1       Adjucts: stylet       Position: Right       Measured from: lips       Secured at (cm): 23       Bite block used: None    Post intubation assessment        Placement verified by: capnometry, equal breath sounds and chest rise        Number of attempts at approach: 1       Number of other approaches attempted: 0       Secured with: other (comment) (Tube Tamer)       Ease of procedure: easy       Dentition: Unchanged    Medication(s) Administered   Medication Administration Time: 12/16/2024 11:06 AM

## 2024-12-17 LAB
INTERPRETATION SERPL IEP-IMP: NORMAL
LAB DIRECTOR COMMENTS: NORMAL
LAB DIRECTOR DISCLAIMER: NORMAL
LAB DIRECTOR INTERPRETATION: NORMAL
LAB DIRECTOR METHODOLOGY: NORMAL
LAB DIRECTOR RESULTS: NORMAL
LAB TEST RESULTS REPORTED IN RPTPERIOD: NORMAL
SEQUENCING METHOD PNL BLD/T: NORMAL
SPECIMEN TYPE: NORMAL
SPECIMEN TYPE: NORMAL

## 2024-12-17 PROCEDURE — 81455 SO/HL 51/>GSAP DNA/DNA&RNA: CPT | Performed by: INTERNAL MEDICINE

## 2024-12-18 ENCOUNTER — CARE COORDINATION (OUTPATIENT)
Dept: PULMONOLOGY | Facility: CLINIC | Age: 74
End: 2024-12-18
Payer: MEDICARE

## 2024-12-18 PROCEDURE — G0452 MOLECULAR PATHOLOGY INTERPR: HCPCS | Mod: 26 | Performed by: PATHOLOGY

## 2024-12-18 NOTE — PROGRESS NOTES
Results forwarded to Dr. Ruelas (oncologist) - requesting her team update patient on results/next steps.       Fine Needle Aspirate Trachea: [id number]  Order: [id number]  Status: Final result       Visible to patient: No (scheduled for 12/25/2024 10:50 AM)    1 Result Note      Component  Ref Range & Units    Final Diagnosis   Specimen A                 Interpretation:                  Positive for malignancy  Consistent with adenoid cystic carcinoma (see comment)                  Adequacy:                 Satisfactory for evaluation

## 2025-01-02 DIAGNOSIS — C83.38 DIFFUSE LARGE B-CELL LYMPHOMA OF LYMPH NODES OF MULTIPLE REGIONS (H): Primary | ICD-10-CM

## 2025-01-03 DIAGNOSIS — C33 ADENOID CYSTIC CARCINOMA OF TRACHEA (H): ICD-10-CM

## 2025-01-03 DIAGNOSIS — C83.38 DIFFUSE LARGE B-CELL LYMPHOMA OF LYMPH NODES OF MULTIPLE REGIONS (H): Primary | ICD-10-CM

## 2025-01-07 ENCOUNTER — TUMOR CONFERENCE (OUTPATIENT)
Dept: ONCOLOGY | Facility: CLINIC | Age: 75
End: 2025-01-07
Payer: MEDICARE

## 2025-01-07 DIAGNOSIS — C33 ADENOID CYSTIC CARCINOMA OF TRACHEA (H): Primary | ICD-10-CM

## 2025-01-07 NOTE — PROGRESS NOTES
Called Shahid to update him with thoracic tumor board recommendations for thoracic surgery referral first for his newly diagnosed adenoid cystic carcinoma of the trachea. If not a candidate for resection, would then likely recommend Rad-Onc referral/definitive radiation. Med-Onc referral is also in process and he has a PET scheduled for tomorrow.     Of note, [place] but these plans are flexible depending on what treatment he needs and how urgently.

## 2025-01-08 ENCOUNTER — HOSPITAL ENCOUNTER (OUTPATIENT)
Dept: PET IMAGING | Facility: HOSPITAL | Age: 75
Discharge: HOME | End: 2025-01-08
Attending: INTERNAL MEDICINE
Payer: MEDICARE

## 2025-01-08 DIAGNOSIS — C83.38 DIFFUSE LARGE B-CELL LYMPHOMA OF LYMPH NODES OF MULTIPLE REGIONS (H): ICD-10-CM

## 2025-01-08 DIAGNOSIS — C33 ADENOID CYSTIC CARCINOMA OF TRACHEA (H): ICD-10-CM

## 2025-01-08 LAB — GLUCOSE BLDC GLUCOMTR-MCNC: 89 MG/DL (ref 70–99)

## 2025-01-08 PROCEDURE — 82962 GLUCOSE BLOOD TEST: CPT

## 2025-01-08 PROCEDURE — 343N000001 HC RX 343 MED OP 636: Performed by: INTERNAL MEDICINE

## 2025-01-08 PROCEDURE — A9552 F18 FDG: HCPCS | Performed by: INTERNAL MEDICINE

## 2025-01-08 PROCEDURE — 78816 PET IMAGE W/CT FULL BODY: CPT | Mod: PS

## 2025-01-08 RX ORDER — FLUDEOXYGLUCOSE F 18 200 MCI/ML
9-18 INJECTION, SOLUTION INTRAVENOUS ONCE
Status: COMPLETED | OUTPATIENT
Start: 2025-01-08 | End: 2025-01-08

## 2025-01-08 RX ADMIN — FLUDEOXYGLUCOSE F 18 10.16 MILLICURIE: 200 INJECTION, SOLUTION INTRAVENOUS at 11:36

## 2025-01-13 NOTE — PROGRESS NOTES
THORACIC SURGERY - NEW PATIENT OFFICE VISIT      Dear Dr. Winston RUELAS saw Shahid Davis at Dr. Ruelas s request in consultation for the evaluation and treatment of a adenoid cystic carcinoma of the trachea.    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 This was incidentally found on a surveillance scan for high-grade B-cell lymphoma (status post chemotherapy December 2022). Mr. Davis underwent endobronchial tissue biopsy and debulking on 12/16/24.    ECOG performance status  0- Fully active, without restriction                                   Previsit Tests:    Path (12/16/24): adenoid cystic carcinoma with positive CK7 and p40    CT (11/14/24): Unchanged nodular tracheal wall thickening just above the pradip. No focal mass or consolidation. Stable 3 mm subpleural nodule in the left upper lobe. No suspicious nodules. No pneumothorax or pleural effusion.        PET (1/8/25): Mildly FDG avid soft tissue nodule in the distal trachea measuring 0.8 x 0.6 cm (Max SUV 2.5) suspicious for biopsy-proven adenoid cystic carcinoma without metastasis.       EBUS (12/16/24): 5 biopsies obtained from distal tracheal lesion, tissue debulking performed with cryoprobe               Covid vaccination status: Vaccinated      PMH    Past Medical History:   Diagnosis Date    Cardiac pacemaker in situ     Cardiac pacemaker in situ     2020    Lymphoma (H)     Squamous cell carcinoma         PSH    Past Surgical History:   Procedure Laterality Date    BRONCHOSCOPY FLEXIBLE N/A 12/16/2024    Procedure: BRONCHOSCOPY, FLEXIBLE, endobronchial biopsy and tumor debulking;  Surgeon: Gerald Long MD;  Location: [facility] OR    DAVINCI HERNIORRHAPHY INGUINAL Left 07/09/2021    Procedure: HERNIORRHAPHY, INGUINAL, ROBOT-ASSISTED, Left, Mesh;  Surgeon: Shamar Tyler MD;  Location: [facility] OR    IR CHEST PORT PLACEMENT > 5 YRS OF AGE  1/5/2023    IR PORT REMOVAL RIGHT  12/15/2023     IR SOFT TISSUE BIOPSY  12/15/2022    MOHS MICROGRAPHIC PROCEDURE      NO HISTORY OF SURGERY      REMOVE PORT VASCULAR ACCESS Right 12/15/2023    Procedure: Remove port vascular access right;  Surgeon: Matthew Harrison PA-C;  [place] [facility] [place]        [facility]:    Current Outpatient Medications   Medication Sig Dispense Refill    ascorbic acid (VITAMIN C) 500 MG tablet Take 500 mg by mouth every morning      Multiple Vitamins-Minerals (MULTIVITAL PO) Take 1 tablet by mouth every morning.      omeprazole (PRILOSEC) 40 MG DR capsule Take 1 capsule (40 mg) by mouth daily 90 capsule 4    baclofen (LIORESAL) 10 MG tablet Take 0.5-1 tablets (5-10 mg) by mouth 3 times daily as needed for other (hiccups) (Patient not taking: Reported on 1/14/2025) 30 tablet 0    calcipotriene (DOVONOX) 0.005 % external cream Mix efudex + calcipotriene equally. Apply BID x 4-10 days. Stop when skin gets red. (Patient not taking: Reported on 1/14/2025) 60 g 1    fluorouracil (EFUDEX) 5 % external cream Mix equally with calcipotriene cream. Apply BID x 4-10 days. Stop when skin gets red. (Patient not taking: Reported on 1/14/2025) 40 g 0    methylPREDNISolone (MEDROL) 32 MG tablet Take one tablet 12 hours prior to and one tablet 2 hours prior to the procedure with IV contrast (Patient not taking: Reported on 1/14/2025) 2 tablet 3    metoclopramide (REGLAN) 5 MG tablet Take 1 tablet (5 mg) by mouth every 8 hours as needed (nausea). (Patient not taking: Reported on 1/14/2025) 30 tablet 0    ondansetron (ZOFRAN) 8 MG tablet Take 1 tablet (8 mg) by mouth every 8 hours as needed for nausea (Patient not taking: Reported on 1/14/2025) 60 tablet 3    polyethylene glycol (MIRALAX) 17 GM/Dose powder Take 17 g by mouth daily as needed for constipation (Patient not taking: Reported on 1/14/2025) 510 g 4    prochlorperazine (COMPAZINE) 10 MG tablet Take 1 tablet (10 mg) by mouth every 6 hours as needed for nausea or vomiting (Patient not taking:  "Reported on 1/14/2025) 30 tablet 3    senna-docusate (SENNA S) 8.6-50 MG tablet Take 1 tablet by mouth 2 times daily as needed for constipation (Patient not taking: Reported on 1/14/2025) 60 tablet 4     No current facility-administered medications for this visit.     Facility-Administered Medications Ordered in Other Visits   Medication Dose Route Frequency Provider Last Rate Last Admin    [START ON 1/15/2025] albuterol (PROVENTIL) neb solution 2.5 mg  2.5 mg Nebulization Once Alva Ozuna APRN CNS           Allergies       Allergies   Allergen Reactions    Ampicillin [Ampicillin Sodium]     Bactrim [Sulfamethoxazole W/Trimethoprim]     Contrast Dye      CT contrast (IV) allergy - need oral Methylpred as premed for scans    Ampicillin Rash    Morphine Nausea         ETOH: yes, 14 drinks/week  TOBACCO: Never smoker  OTHER DRUGS: none      Physical examination  Vitals:  /86 (BP Location: Right arm, Patient Position: Sitting, Cuff Size: Adult Regular)   Pulse 106   Temp 98.5  F (36.9  C) (Oral)   Resp 16   Ht 1.67 m (5' 5.75\")   Wt 62.7 kg (138 lb 4.8 oz)   SpO2 99%   BMI 22.49 kg/m    Physical Exam  HENT:      Head: Normocephalic and atraumatic.   Cardiovascular:      Rate and Rhythm: Normal rate and regular rhythm.      Comments: To palpation  Pulmonary:      Effort: Pulmonary effort is normal.   Neurological:      Mental Status: He is alert and oriented to person, place, and time.      Sensory: Sensory deficit present.      Comments: Sensory loss in bilateral feet             From a personal perspective, Mr. Davis lives at home with his wife. He creates stained glass as a profession.    IMPRESSION   No diagnosis found.    Shahid Davis is a 74 year old man with a distal tracheal adenoid cystic carcinoma. He is a marginal surgical candidate based on the complete heart block.      PLAN  I spent 45 min on the date of the encounter in chart review, patient visit, review of tests, " documentation and/or discussion with other providers about the issues documented above. I reviewed the plan as follows:    I will discuss a possible surgical plan at a multidisciplinary conference.     Procedure planned: Right thoracoscopy, possible thoracotomy, tracheal resection, possible  cardiopulmonary bypass.    Necessary Preop Tests & Appointments: Preoperative assessment clinic    Regional Anesthesia Plan: Intercostal nerve block, possible intercostal nerve cryoablation    Anticoagulation Plan: Lovenox      All questions were answered and Shahid Davis and present family were in agreement with the plan.    I appreciate the opportunity to participate in the care of your patient and will keep you updated.      Sincerely,        Darío Potts MD

## 2025-01-14 ENCOUNTER — ONCOLOGY VISIT (OUTPATIENT)
Dept: SURGERY | Facility: CLINIC | Age: 75
End: 2025-01-14
Attending: INTERNAL MEDICINE
Payer: MEDICARE

## 2025-01-14 VITALS
OXYGEN SATURATION: 99 % | RESPIRATION RATE: 16 BRPM | TEMPERATURE: 98.5 F | WEIGHT: 138.3 LBS | BODY MASS INDEX: 22.23 KG/M2 | HEART RATE: 106 BPM | HEIGHT: 66 IN | SYSTOLIC BLOOD PRESSURE: 128 MMHG | DIASTOLIC BLOOD PRESSURE: 86 MMHG

## 2025-01-14 DIAGNOSIS — C33 ADENOID CYSTIC CARCINOMA OF TRACHEA (H): ICD-10-CM

## 2025-01-14 PROCEDURE — G0463 HOSPITAL OUTPT CLINIC VISIT: HCPCS | Performed by: THORACIC SURGERY (CARDIOTHORACIC VASCULAR SURGERY)

## 2025-01-14 RX ORDER — ALBUTEROL SULFATE 0.83 MG/ML
2.5 SOLUTION RESPIRATORY (INHALATION) ONCE
Status: COMPLETED | OUTPATIENT
Start: 2025-01-15 | End: 2025-01-15

## 2025-01-14 ASSESSMENT — PAIN SCALES - GENERAL: PAINLEVEL_OUTOF10: NO PAIN (0)

## 2025-01-14 NOTE — LETTER
1/14/2025      Shahid Davis   [facility] [address]      Dear Colleague,    Thank you for referring your patient, Shahid Davis, to the [facility]. Please see a copy of my visit note below.    THORACIC SURGERY - NEW PATIENT OFFICE VISIT      Dear Dr. Montero    I saw Shahid Davis at Dr. Ruelas s request in consultation for the evaluation and treatment of a adenoid cystic carcinoma of the trachea.    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 This was incidentally found on a surveillance scan for high-grade B-cell lymphoma (status post chemotherapy December 2022). Mr. Davis underwent endobronchial tissue biopsy and debulking on 12/16/24.    ECOG performance status  0- Fully active, without restriction                                   Previsit Tests:    Path (12/16/24): adenoid cystic carcinoma with positive CK7 and p40    CT (11/14/24): Unchanged nodular tracheal wall thickening just above the pradip. No focal mass or consolidation. Stable 3 mm subpleural nodule in the left upper lobe. No suspicious nodules. No pneumothorax or pleural effusion.        PET (1/8/25): Mildly FDG avid soft tissue nodule in the distal trachea measuring 0.8 x 0.6 cm (Max SUV 2.5) suspicious for biopsy-proven adenoid cystic carcinoma without metastasis.       EBUS (12/16/24): 5 biopsies obtained from distal tracheal lesion, tissue debulking performed with cryoprobe               Covid vaccination status: Vaccinated      PMH    Past Medical History:   Diagnosis Date     Cardiac pacemaker in situ      Cardiac pacemaker in situ     2020     Lymphoma (H)      Squamous cell carcinoma         PSH    Past Surgical History:   Procedure Laterality Date     BRONCHOSCOPY FLEXIBLE N/A 12/16/2024    Procedure: BRONCHOSCOPY, FLEXIBLE, endobronchial biopsy and tumor debulking;  Surgeon: Gerald Long MD;  Location:  OR      DAVINCI HERNIORRHAPHY INGUINAL Left 07/09/2021    Procedure: HERNIORRHAPHY, INGUINAL, ROBOT-ASSISTED, Left, Mesh;  Surgeon: Shamar Tyler MD;  Location: [facility] OR     IR CHEST PORT PLACEMENT > 5 YRS OF AGE  1/5/2023     IR PORT REMOVAL RIGHT  12/15/2023     IR SOFT TISSUE BIOPSY  12/15/2022     MOHS MICROGRAPHIC PROCEDURE       NO HISTORY OF SURGERY       REMOVE PORT VASCULAR ACCESS Right 12/15/2023    Procedure: Remove port vascular access right;  Surgeon: Matthew Harrison PA-C;  [place] [facility] [place]        Meds:    Current Outpatient Medications   Medication Sig Dispense Refill     ascorbic acid (VITAMIN C) 500 MG tablet Take 500 mg by mouth every morning       Multiple Vitamins-Minerals (MULTIVITAL PO) Take 1 tablet by mouth every morning.       omeprazole (PRILOSEC) 40 MG DR capsule Take 1 capsule (40 mg) by mouth daily 90 capsule 4     baclofen (LIORESAL) 10 MG tablet Take 0.5-1 tablets (5-10 mg) by mouth 3 times daily as needed for other (hiccups) (Patient not taking: Reported on 1/14/2025) 30 tablet 0     calcipotriene (DOVONOX) 0.005 % external cream Mix efudex + calcipotriene equally. Apply BID x 4-10 days. Stop when skin gets red. (Patient not taking: Reported on 1/14/2025) 60 g 1     fluorouracil (EFUDEX) 5 % external cream Mix equally with calcipotriene cream. Apply BID x 4-10 days. Stop when skin gets red. (Patient not taking: Reported on 1/14/2025) 40 g 0     methylPREDNISolone (MEDROL) 32 MG tablet Take one tablet 12 hours prior to and one tablet 2 hours prior to the procedure with IV contrast (Patient not taking: Reported on 1/14/2025) 2 tablet 3     metoclopramide (REGLAN) 5 MG tablet Take 1 tablet (5 mg) by mouth every 8 hours as needed (nausea). (Patient not taking: Reported on 1/14/2025) 30 tablet 0     ondansetron (ZOFRAN) 8 MG tablet Take 1 tablet (8 mg) by mouth every 8 hours as needed for nausea (Patient not taking: Reported on 1/14/2025) 60 tablet 3     polyethylene glycol (MIRALAX)  "17 GM/Dose powder Take 17 g by mouth daily as needed for constipation (Patient not taking: Reported on 1/14/2025) 510 g 4     prochlorperazine (COMPAZINE) 10 MG tablet Take 1 tablet (10 mg) by mouth every 6 hours as needed for nausea or vomiting (Patient not taking: Reported on 1/14/2025) 30 tablet 3     senna-docusate (SENNA S) 8.6-50 MG tablet Take 1 tablet by mouth 2 times daily as needed for constipation (Patient not taking: Reported on 1/14/2025) 60 tablet 4     No current facility-administered medications for this visit.     Facility-Administered Medications Ordered in Other Visits   Medication Dose Route Frequency Provider Last Rate Last Admin     [START ON 1/15/2025] albuterol (PROVENTIL) neb solution 2.5 mg  2.5 mg Nebulization Once Alva Ozuna, FARSHAD CNS           Allergies       Allergies   Allergen Reactions     Ampicillin [Ampicillin Sodium]      Bactrim [Sulfamethoxazole W/Trimethoprim]      Contrast Dye      CT contrast (IV) allergy - need oral Methylpred as premed for scans     Ampicillin Rash     Morphine Nausea         ETOH: yes, 14 drinks/week  TOBACCO: Never smoker  OTHER DRUGS: none      Physical examination  Vitals:  /86 (BP Location: Right arm, Patient Position: Sitting, Cuff Size: Adult Regular)   Pulse 106   Temp 98.5  F (36.9  C) (Oral)   Resp 16   Ht 1.67 m (5' 5.75\")   Wt 62.7 kg (138 lb 4.8 oz)   SpO2 99%   BMI 22.49 kg/m    Physical Exam  HENT:      Head: Normocephalic and atraumatic.   Cardiovascular:      Rate and Rhythm: Normal rate and regular rhythm.      Comments: To palpation  Pulmonary:      Effort: Pulmonary effort is normal.   Neurological:      Mental Status: He is alert and oriented to person, place, and time.      Sensory: Sensory deficit present.      Comments: Sensory loss in bilateral feet             From a personal perspective, Mr. Davis lives at home with his wife. He creates stained glass as a profession.    IMPRESSION   No diagnosis " found.    Shahid Davis is a 74 year old man with a distal tracheal adenoid cystic carcinoma. He is a marginal surgical candidate based on the complete heart block.      PLAN  I spent 45 min on the date of the encounter in chart review, patient visit, review of tests, documentation and/or discussion with other providers about the issues documented above. I reviewed the plan as follows:    I will discuss a possible surgical plan at a multidisciplinary conference.     Procedure planned: Right thoracoscopy, possible thoracotomy, tracheal resection, possible  cardiopulmonary bypass.    Necessary Preop Tests & Appointments: Preoperative assessment clinic    Regional Anesthesia Plan: Intercostal nerve block, possible intercostal nerve cryoablation    Anticoagulation Plan: Lovenox      All questions were answered and Shahid Davis and present family were in agreement with the plan.    I appreciate the opportunity to participate in the care of your patient and will keep you updated.      Sincerely,        Darío Potts MD       Again, thank you for allowing me to participate in the care of your patient.        Sincerely,        Darío Potts MD    Electronically signed

## 2025-01-14 NOTE — NURSING NOTE
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    Weight as of this encounter: 62.7 kg (138 lb 4.8 oz). Body surface area is 1.71 meters squared.  No Pain (0) Comment: Data Unavailable   No LMP for male patient.  Allergies reviewed: Yes  Medications reviewed: Yes    Medications: Medication refills not needed today.  Pharmacy name entered into [employer]:    [id number] [place] [address] [id number] [address] [facility]    Frailty Screening:   Is the patient here for a new oncology consult visit in cancer care? 1. Yes. Over the past month, have you experienced difficulty or required a caregiver to assist with:   1. Balance, walking or general mobility (including any falls)? NO  2. Completion of self-care tasks such as bathing, dressing, toileting, grooming/hygiene?  NO  3. Concentration or memory that affects your daily life?  NO       Clinical concerns: none      Jackie Serrano             "

## 2025-01-15 ENCOUNTER — HOSPITAL ENCOUNTER (OUTPATIENT)
Dept: RESPIRATORY THERAPY | Facility: CLINIC | Age: 75
Discharge: HOME | End: 2025-01-15
Attending: THORACIC SURGERY (CARDIOTHORACIC VASCULAR SURGERY)
Payer: MEDICARE

## 2025-01-15 ENCOUNTER — HOSPITAL ENCOUNTER (OUTPATIENT)
Facility: CLINIC | Age: 75
End: 2025-01-15
Attending: INTERNAL MEDICINE | Admitting: INTERNAL MEDICINE
Payer: MEDICARE

## 2025-01-15 DIAGNOSIS — C33 ADENOID CYSTIC CARCINOMA OF TRACHEA (H): Primary | ICD-10-CM

## 2025-01-15 LAB
DLCOCOR-%PRED-PRE: 99 %
DLCOCOR-PRE: 22.76 ML/MIN/MMHG
DLCOUNC-%PRED-PRE: 94 %
DLCOUNC-PRE: 21.73 ML/MIN/MMHG
DLCOUNC-PRED: 22.93 ML/MIN/MMHG
ERV-%PRED-PRE: 108 %
ERV-PRE: 1.33 L
ERV-PRED: 1.23 L
EXPTIME-PRE: 9.91 SEC
FEF2575-%PRED-POST: 128 %
FEF2575-%PRED-PRE: 110 %
FEF2575-POST: 2.5 L/SEC
FEF2575-PRE: 2.16 L/SEC
FEF2575-PRED: 1.95 L/SEC
FEFMAX-%PRED-PRE: 145 %
FEFMAX-PRE: 10.34 L/SEC
FEFMAX-PRED: 7.13 L/SEC
FEV1-%PRED-PRE: 137 %
FEV1-PRE: 3.53 L
FEV1FEV6-PRE: 75 %
FEV1FEV6-PRED: 77 %
FEV1FVC-PRE: 71 %
FEV1FVC-PRED: 77 %
FEV1SVC-PRE: 72 %
FEV1SVC-PRED: 69 %
FIFMAX-PRE: 7.01 L/SEC
FRCPLETH-%PRED-PRE: 124 %
FRCPLETH-PRE: 4.28 L
FRCPLETH-PRED: 3.42 L
FVC-%PRED-PRE: 148 %
FVC-PRE: 4.97 L
FVC-PRED: 3.36 L
HGB BLD-MCNC: 13.1 G/DL (ref 13.3–17.7)
IC-%PRED-PRE: 146 %
IC-PRE: 3.59 L
IC-PRED: 2.46 L
RVPLETH-%PRED-PRE: 110 %
RVPLETH-PRE: 2.94 L
RVPLETH-PRED: 2.66 L
TLCPLETH-%PRED-PRE: 120 %
TLCPLETH-PRE: 7.87 L
TLCPLETH-PRED: 6.52 L
VA-%PRED-PRE: 124 %
VA-PRE: 7.15 L
VC-%PRED-PRE: 131 %
VC-PRE: 4.92 L
VC-PRED: 3.73 L

## 2025-01-15 PROCEDURE — 94060 EVALUATION OF WHEEZING: CPT

## 2025-01-15 PROCEDURE — 36415 COLL VENOUS BLD VENIPUNCTURE: CPT | Performed by: CLINICAL NURSE SPECIALIST

## 2025-01-15 PROCEDURE — 94726 PLETHYSMOGRAPHY LUNG VOLUMES: CPT

## 2025-01-15 PROCEDURE — 85018 HEMOGLOBIN: CPT | Performed by: CLINICAL NURSE SPECIALIST

## 2025-01-15 PROCEDURE — 999N000157 HC STATISTIC RCP TIME EA 10 MIN

## 2025-01-15 PROCEDURE — 250N000009 HC RX 250: Performed by: CLINICAL NURSE SPECIALIST

## 2025-01-15 PROCEDURE — 94729 DIFFUSING CAPACITY: CPT

## 2025-01-15 RX ADMIN — ALBUTEROL SULFATE 2.5 MG: 2.5 SOLUTION RESPIRATORY (INHALATION) at 09:00

## 2025-01-15 NOTE — PROGRESS NOTES
RESPIRATORY CARE NOTE    Complete Pulmonary Function Test completed by patient.  Good patient effort and cooperation. Albuterol 2.5 mg neb given for bronchodilation.  The results of this test meet the ATS standards for acceptability and repeatability. PT returned to baseline and left in no distress.    Jennifer Candelaria, RT  1/15/2025

## 2025-01-16 LAB — SPECIMEN STATUS: NORMAL

## 2025-01-30 ENCOUNTER — ANCILLARY PROCEDURE (OUTPATIENT)
Dept: CARDIOLOGY | Facility: CLINIC | Age: 75
End: 2025-01-30
Attending: INTERNAL MEDICINE
Payer: MEDICARE

## 2025-01-30 ENCOUNTER — ONCOLOGY VISIT (OUTPATIENT)
Dept: ONCOLOGY | Facility: CLINIC | Age: 75
End: 2025-01-30
Attending: INTERNAL MEDICINE
Payer: MEDICARE

## 2025-01-30 VITALS
BODY MASS INDEX: 22.18 KG/M2 | OXYGEN SATURATION: 98 % | RESPIRATION RATE: 16 BRPM | HEIGHT: 66 IN | TEMPERATURE: 99.1 F | WEIGHT: 138 LBS | HEART RATE: 55 BPM | SYSTOLIC BLOOD PRESSURE: 130 MMHG | DIASTOLIC BLOOD PRESSURE: 74 MMHG

## 2025-01-30 DIAGNOSIS — Z95.0 CARDIAC PACEMAKER IN SITU: ICD-10-CM

## 2025-01-30 DIAGNOSIS — C33 ADENOID CYSTIC CARCINOMA OF TRACHEA (H): ICD-10-CM

## 2025-01-30 PROCEDURE — 99417 PROLNG OP E/M EACH 15 MIN: CPT | Performed by: INTERNAL MEDICINE

## 2025-01-30 PROCEDURE — 99215 OFFICE O/P EST HI 40 MIN: CPT | Performed by: INTERNAL MEDICINE

## 2025-01-30 PROCEDURE — G0463 HOSPITAL OUTPT CLINIC VISIT: HCPCS | Performed by: INTERNAL MEDICINE

## 2025-01-30 ASSESSMENT — PAIN SCALES - GENERAL: PAINLEVEL_OUTOF10: NO PAIN (0)

## 2025-01-30 NOTE — PROGRESS NOTES
[facility]  [address] [address]  Phone: [phone]  Fax: [phone]    PATIENT NAME: Shahid Davis  MRN # [id number]   DATE OF VISIT: January 30, 2025  YOB: 1950     CANCER TYPE: Adenoid cystic carcinoma trachea   STAGE:     CANCER TYPE #2: DLBCL  STAGE:     ECOG PS: 0    PD-L1:  NGS: [facility] - see scanned.   NGS: Internal panel - RANULFO, TMB 5.115, ARID1A E49fs (lof) - not on [facility]    ASSESSMENT AND PLAN  Adenoid cystic carcinoma trachea: reviewed course to date, nature of the disease. Discussed that most of these cancers occur in salivary glands and we extrapolate to non-head/neck sites. Given their rarity, they're difficult to study. Reviewed [facility] report showing the pathognomonic MYBL1-NFIB fusion. No other actionable mutations; AR/HER-2 negative by IHC, etc. Reviewed treatment options broadly. Generally, surgery is the preferred approach if feasible. If not, these tumors tend to be radiosensitive. Discussed role of systemic therapy, lack of FDA-approved options but that there are standard options. However, systemic therapy is not curative, whereas surgery and/or radiation could be. Discussed at tumor board a couple of weeks ago. Dr. Potts thinks he can resect with reasonable risk profile. Not scheduled yet - needs to coordinate with cardiac surgeon due to the need for bypass. Reached out to Dr. Potts's RNCCs today to ask for them to provide an update to Shahid. I think given his inquisitive nature, and because he likes to know all of the options before embarking on a course, it's reasonable for him to get some input from Radiation Oncology colleagues. Also would be a good idea if in case more multifocal disease or something else is seen at the time of surgery, etc. Will leave follow up with me open. Welcome to ask any questions as they arise.     DLBCL: Sees Dr. Ruelas. About 2 years out from end of therapy, PASQUALE.       80 minutes spent  by me on the date of the encounter doing chart review, review of outside records, review of test results, interpretation of tests, patient visit, documentation, orders, discussion with other provider(s)    Jeannette Head MD  Associate Professor of Medicine  Hematology, Oncology and Transplantation    DANIELLE Key is a 73 yo male who presents today to establish care  Doing ok  Breathing fine, feels ok  Anxious because he hasn't heard anything about surgery yet  Usually goes to [place] over the winter. 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 Have been doing this for many years    CANCER SUMMARY  11/14/24 CT CAP.   12/16/24 EBUS, endobronchial bx and debulking (Dr. Long). Path: ACC  1/8/25  PET. 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 Ruelas, adapted from her note)  For low-grade lymphoma:  2005: Fludarabine-based chemo x 6 cycles and XRT to spine (details unclear. Dr. Holman)     For high-grade lymphoma:  12/20/22: Cycle 1 R-CHOP with Neulasta support (with a few days of steroid prephase prior)  1/11/23: Cycle 2 Switched to DA-R-EPOCH with triple IT chemo for CNS ppx after FISH returned showing triple-hit disease. PET after 2 cycles shows very good OK.   2/2/23: Cycle 3 DA-R EPOCH with triple IT chemo for CNS ppx  2/23/23: Cycle 4 DA-R-EPOCH with triple IT chemo for CNS ppx. PET after 4 cycles shows CRu.   3/17/23: Cycle 5 DA-R-EPOCH with triple IT chemo for CNS ppx  4/6/23: Cycle 6 DA-R-EPOCH. EOT PET shows CR!  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 Both commercial and residential. .    FAMILY HISTORY:   Family History   Problem Relation Age of Onset    Cancer Mother         Blood    Cancer Father         Lung    Cancer Maternal Grandmother         Breast    Cancer Paternal Grandfather         Hodgkins      PHYSICAL EXAM  /74   Pulse 55   Temp 99.1  F (37.3  C) (Oral)   Resp 16   Ht 1.67 m (5' 5.75\")   Wt 62.6 kg (138 lb)   SpO2 98%   BMI 22.44 kg/m    GEN: NAD  HEENT: EOMI, no icterus, injection or pallor  EXT: no edema  NEURO: alert    LABORATORY AND IMAGING STUDIES    Labs 11/14/24 were independently reviewed and interpreted by me  CBC pd, CMP, LDH all acceptable and unremarkable    PET Oncology Whole Body  Narrative: EXAM: PET ONCOLOGY WHOLE BODY  LOCATION: [facility]  DATE: 1/8/2025    INDICATION: Initial treatment planning and staging for malignant neoplasm of trachea. Adenoid cystic carcinoma of trachea. History of diffuse large B-cell lymphoma   COMPARISON: FDG PET/CT dated 12/9/2022 and CT of the neck, chest, abdomen, and pelvis dated 11/14/2024  CONTRAST: None  TECHNIQUE: Serum glucose level 89 mg/dL. One hour post intravenous administration of 10.2 mCi F-18 FDG, PET imaging was performed from the skull vertex to feet, utilizing attenuation correction with concurrent axial CT and PET/CT image fusion. Dose   reduction techniques were used.    PET/CT FINDINGS: Mildly FDG avid soft tissue nodule in the distal trachea measuring 0.8 x 0.6 cm (Max SUV 2.5) suspicious for biopsy-proven adenoid cystic carcinoma without metastasis.    Degenerative shoulder and hip synovitis. Diffuse esophagitis. Mildly FDG avid right greater than left hilar lymph " nodes with in situ mineralization typical of granulomatous inflammatory change.    CT FINDINGS: No acute intracranial abnormality. Left chest implantable cardiac device with lead tips terminating in the right atrium and right ventricle. Sigmoid diverticulosis. Pelvic phleboliths. Moderate prostamegaly. Multilevel degenerative changes   of the spine. The lower extremities are unremarkable  Impression: IMPRESSION:    Findings suspicious for biopsy-proven distal tracheal adenoid cystic carcinoma without metastasis    PET/CT was personally reviewed and interpreted by me    Path report from tracheal debulking, [facility] and NGS reports reviewed.  Multiple notes reviewed - Dr. Ruelas, Dr. Long, procedure note, Dr. Potts

## 2025-01-30 NOTE — LETTER
1/30/2025      Shahid Davis   [facility]  [place] [address]      Dear Colleague,    Thank you for referring your patient, Shahid Davis, to the [facility]. Please see a copy of my visit note below.        [facility]  [address] [address]  Phone: [phone]  Fax: [phone]    PATIENT NAME: Shahid Davis  MRN # [id number]   DATE OF VISIT: January 30, 2025  YOB: 1950     CANCER TYPE: Adenoid cystic carcinoma trachea   STAGE:     CANCER TYPE #2: DLBCL  STAGE:     ECOG PS: 0    PD-L1:  NGS: [facility]is - see scanned.   NGS: Internal panel - RANULFO, TMB 5.115, ARID1A E49fs (lof) - not on Caris    ASSESSMENT AND PLAN  Adenoid cystic carcinoma trachea: reviewed course to date, nature of the disease. Discussed that most of these cancers occur in salivary glands and we extrapolate to non-head/neck sites. Given their rarity, they're difficult to study. Reviewed [facility] report showing the pathognomonic MYBL1-NFIB fusion. No other actionable mutations; AR/HER-2 negative by IHC, etc. Reviewed treatment options broadly. Generally, surgery is the preferred approach if feasible. If not, these tumors tend to be radiosensitive. Discussed role of systemic therapy, lack of FDA-approved options but that there are standard options. However, systemic therapy is not curative, whereas surgery and/or radiation could be. Discussed at tumor board a couple of weeks ago. Dr. Potts thinks he can resect with reasonable risk profile. Not scheduled yet - needs to coordinate with cardiac surgeon due to the need for bypass. Reached out to Dr. Potts's RNCCs today to ask for them to provide an update to Shahid. I think given his inquisitive nature, and because he likes to know all of the options before embarking on a course, it's reasonable for him to get some input from Radiation Oncology colleagues. Also would be a good idea if in case  more multifocal disease or something else is seen at the time of surgery, etc. Will leave follow up with me open. Welcome to ask any questions as they arise.     DLBCL: Sees Dr. Ruelas. About 2 years out from end of therapy, [place]QU[place].       80 minutes spent by me on the date of the encounter doing chart review, review of outside records, review of test results, interpretation of tests, patient visit, documentation, orders, discussion with other provider(s)    Jeannette Head MD  Associate Professor of Medicine  Hematology, Oncology and Transplantation    SUBJECTIVE  Shahid is a 75 yo male who presents today to establish care  Doing ok  Breathing fine, feels ok  Anxious because he hasn't heard anything about surgery yet  Usually goes to [place] over the winter. 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 Have been doing this for many years    CANCER SUMMARY  11/14/24 CT CAP.   12/16/24 EBUS, endobronchial bx and debulking (Dr. Long). Path: ACC  1/8/25  PET. 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 Ruelas, adapted from her note)  For low-grade lymphoma:  2005: Fludarabine-based chemo x 6 cycles and XRT to spine (details unclear. Dr. Holman)     For high-grade lymphoma:  12/20/22: Cycle 1 R-CHOP with Neulasta support (with a few days of steroid prephase prior)  1/11/23: Cycle 2 Switched to DA-R-EPOCH with triple IT chemo for CNS ppx after FISH returned showing triple-hit disease. PET after 2 cycles shows very good OK.   2/2/23: Cycle 3 DA-R EPOCH with triple IT chemo for CNS ppx  2/23/23: Cycle 4 DA-R-EPOCH with triple IT chemo for CNS ppx. PET after 4 cycles shows CRu.   3/17/23: Cycle 5 DA-R-EPOCH with triple IT chemo for CNS ppx  4/6/23: Cycle 6 DA-R-EPOCH. EOT PET shows CR!  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 Both commercial and residential. .    FAMILY HISTORY:   Family History   Problem Relation Age of Onset     Cancer Mother         Blood     Cancer Father         Lung     Cancer Maternal Grandmother         Breast     Cancer Paternal Grandfather         Hodgkins      PHYSICAL EXAM  /74   Pulse 55   Temp 99.1  F (37.3  C) (Oral)   Resp 16   Ht 1.67 m (5' 5.75\")   Wt 62.6 kg (138 lb)   SpO2 98%   BMI 22.44 kg/m    GEN: NAD  HEENT: EOMI, no icterus, injection or pallor  EXT: no edema  NEURO: alert    LABORATORY AND IMAGING STUDIES    Labs 11/14/24 were independently reviewed and interpreted by me  CBC pd, CMP, LDH all acceptable and unremarkable    PET Oncology Whole Body  Narrative: EXAM: PET ONCOLOGY WHOLE BODY  LOCATION: [facility]  DATE: 1/8/2025    INDICATION: Initial treatment planning and staging for malignant neoplasm of trachea. Adenoid cystic carcinoma of trachea. History of diffuse large B-cell lymphoma   COMPARISON: FDG PET/CT dated 12/9/2022 and CT of the neck, chest, abdomen, and pelvis dated 11/14/2024  CONTRAST: None  TECHNIQUE: Serum glucose level 89 mg/dL. One hour post intravenous administration of 10.2 mCi F-18 FDG, PET imaging was performed from the skull vertex to feet, utilizing attenuation correction with concurrent axial CT and PET/CT image fusion. Dose   reduction techniques were used.    PET/CT FINDINGS: Mildly FDG avid soft tissue " nodule in the distal trachea measuring 0.8 x 0.6 cm (Max SUV 2.5) suspicious for biopsy-proven adenoid cystic carcinoma without metastasis.    Degenerative shoulder and hip synovitis. Diffuse esophagitis. Mildly FDG avid right greater than left hilar lymph nodes with in situ mineralization typical of granulomatous inflammatory change.    CT FINDINGS: No acute intracranial abnormality. Left chest implantable cardiac device with lead tips terminating in the right atrium and right ventricle. Sigmoid diverticulosis. Pelvic phleboliths. Moderate prostamegaly. Multilevel degenerative changes   of the spine. The lower extremities are unremarkable  Impression: IMPRESSION:    Findings suspicious for biopsy-proven distal tracheal adenoid cystic carcinoma without metastasis    PET/CT was personally reviewed and interpreted by me    Path report from tracheal debulking, Caris and NGS reports reviewed.  Multiple notes reviewed - Dr. Ruelas, Dr. Long, procedure note, Dr. Potts                Again, thank you for allowing me to participate in the care of your patient.        Sincerely,        Jeannette Head MD    Electronically signed

## 2025-01-30 NOTE — NURSING NOTE
"Oncology Rooming Note    January 30, 2025 12:50 PM   Shahid Davis is a 74 year old male who presents for:    Chief Complaint   Patient presents with    Oncology Clinic Visit     Adenoid cystic carcinoma of trachea     Initial Vitals: /74   Pulse 55   Temp 99.1  F (37.3  C) (Oral)   Resp 16   Ht 1.67 m (5' 5.75\")   Wt 62.6 kg (138 lb)   SpO2 98%   BMI 22.44 kg/m   Estimated body mass index is 22.44 kg/m  as calculated from the following:    Height as of this encounter: 1.67 m (5' 5.75\").    Weight as of this encounter: 62.6 kg (138 lb). Body surface area is 1.7 meters squared.  No Pain (0) Comment: Data Unavailable   No LMP for male patient.  Allergies reviewed: Yes  Medications reviewed: Yes    Medications: Medication refills not needed today.  Pharmacy name entered into [employer]:    [id number] [address] [place] [address] [facility] ST[facility][id number] [address] AT [facility]    Frailty Screening:   Is the patient here for a new oncology consult visit in cancer care? 1. Yes. Over the past month, have you experienced difficulty or required a caregiver to assist with:   1. Balance, walking or general mobility (including any falls)? NO  2. Completion of self-care tasks such as bathing, dressing, toileting, grooming/hygiene?  NO  3. Concentration or memory that affects your daily life?  NO       Clinical concerns: New patient        Torie Art              "

## 2025-01-31 ENCOUNTER — PATIENT OUTREACH (OUTPATIENT)
Dept: SURGERY | Facility: CLINIC | Age: 75
End: 2025-01-31
Payer: MEDICARE

## 2025-02-02 ENCOUNTER — PREP FOR PROCEDURE (OUTPATIENT)
Dept: SURGERY | Facility: CLINIC | Age: 75
End: 2025-02-02
Payer: MEDICARE

## 2025-02-02 DIAGNOSIS — C33 TRACHEAL CANCER (H): Primary | ICD-10-CM

## 2025-02-02 RX ORDER — ENOXAPARIN SODIUM 100 MG/ML
40 INJECTION SUBCUTANEOUS
OUTPATIENT
Start: 2025-02-02

## 2025-02-03 NOTE — TELEPHONE ENCOUNTER
RECORDS STATUS - ALL OTHER DIAGNOSIS      Action    Action Taken 2/3/25  Spoke w/ pt - pt advised they were not quite sure where radiation treatments were conducted, advised it was not @ the [facility]. Pt [place] [facility] advised that they did not feel this was at [facility].    Writer advised that regardless of the location it is highly likely that a Release of Authorization would be needed. Pt advised they do not have access to a computer/printer & that a public library would not be a reasonable option.     Pt wanted to know if they could sign an RIZWAN in clinic. Writer advised they would need to reach out to provider/clinic to get their input, as well as advise clinical team of barriers.  8:47 AM    Pt called back & advised they had radiation @ [facility]. Writer advised pt to still samuel put date/time signature & the rest would be handled by writer once RIZWAN is received.    Per appt notes it appears pt is okay to sign RIZWAN @ appt.   11:59 AM    2/5/25  RIZWAN Received, radiation records/DICOM requested from [facility]    2/6/25  Radiation records received via email, no mention of DICOM/PDF.     Records received emailed to  Email, CC Meme Garcia sen to HIM for STAT upload.    Call to [facility] Medical Records - advised they would contact processor to check to see if DICOM/PDF still exist.  3:27 PM      RECORDS RECEIVED FROM: [facility]   DATE RECEIVED:    NOTES STATUS DETAILS   OFFICE NOTE from referring provider     OFFICE NOTE from medical oncologist     DISCHARGE SUMMARY from hospital     DISCHARGE REPORT from the ER     OPERATIVE REPORT     MEDICATION LIST     CLINICAL TRIAL TREATMENTS TO DATE     LABS     PATHOLOGY REPORTS     ANYTHING RELATED TO DIAGNOSIS     PATHOLOGY FEDEX TRACKING   TRACKING #:   GENONOMIC TESTING     TYPE:     IMAGING (NEED IMAGES & REPORT)     CT SCANS     MRI     XRAYS     ULTRASOUND     PET     IMAGE DISC FEDEX  TRACKING   TRACKING #:

## 2025-02-03 NOTE — PROGRESS NOTES
I called Shahid to update him on the next steps. Dr. Potts spoke to Dr. Navarrete who is a cardiac surgeon and the plan is to move forward with a tracheal resection and use ECMO during surgery. [facility] mancini OR  is aware and will be in contact with Dr. Lockhart      Update: let Shahid know that Dr. Head recommends that he keep the appointment with radiation oncology to get all the information available. He is uncertain what to do. They are supposed to have bad weather tomorrow and he is unable to do a virtual visit due to their computer situation. I gave Shahid the phone number to radiation oncology so he can call them and figure out what to do. He appreciated the call and had no further questions.

## 2025-02-03 NOTE — PROGRESS NOTES
Shahid called wanting an update about surgery. I told him Dr. Potts is needing to talk to cardiac surgery. Shahid would like a plan as he has had to cancel plans and usually travels to [place] during the winter. A message was sent to Dr. Potts and I will give Rosa call once I have an update.

## 2025-02-04 NOTE — PROGRESS NOTES
Radiation Oncology Consultation Note    Name: Shahid Davis MRN: [id number]   : 1950   Date of Service: 2025 Referring: Dr. 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  Oncologic History:     2024, CT CAP:  - Nodular thickening along the tracheal wall just above the pradip at 6-9:00.  8 mm thickness.  No LAD    2024, CT neck:  - Unchanged appearance of left level 1B lymph node.  1.6 x 0.9 cm.  - Enhancing soft tissue nodules in the right posterior lateral trachea near the pradip.  0.9 x 1.3 cm.    2024, CT CAP:  - Nodular tracheal wall thickening just above the pradip (4/113)    2024, medical oncology follow-up:  - Patient reports ongoing dry cough/trouble clearing his throat since recovering from COVID in   - Referral to interventional pulmonology for bronc/possible biopsy placed    2024, IR consult:  - Plan for flexible bronchoscopy and endobronchial biopsy    2024: Patient undergoes flexible bronchoscopy, endobronchial biopsies  - Pathology: tracheal neck mass FNA: Adenoid cystic carcinoma.  CK7 and p40 (+).  - Caris testing demonstrates pathognomonic MYBL1-NFIB fusion       2025, thoracic tumor board:  - Consensus: Referred to thoracic surgery first.  If patient is not a surgical candidate, recommend definitive radiotherapy    2025, PET/CT:  - FDG avid soft tissue nodule in distal trachea.  0.8 x 0.6 cm.  SUV max 2.5.      2025, thoracic surgery consult:  - Patient is a marginal surgical  candidate based on history of complete heart block  - Surgical plan would include a right thoracoscopy, possible thoracotomy, tracheal resection, and a possible cardiopulmonary bypass    1/30/2025, medical oncology follow-up:  - Thoracic surgery believes that the tumor could be resected with reasonable risk profile  - That there is value in receiving input from radiation oncology for any definitive radiotherapy      Chemotherapy History: As above  Radiation History: Yes.  Unknown dose (as of 2/5/2025)   Pregnant: N/A  Autoimmune History: No  Inflammatory Bowel Disease History: No  Claustrophobia: No  Implanted Cardiac Devices: YES   -Make: [employer] 2272 Assurity MRI    -Model: 2088TC Tendril STS    -Serial Number: [id number]     Subjective:  On interview, the patient reports no symptoms related to the tumor found in his distal trachea.  He reported that he received prior radiotherapy over the course of 20 days to his mediastinum at [facility] between 3/11/2004 - 4/7/2004.  The patient provided a business card of the radiotherapy center where he received his prior consolidative radiotherapy for DLBCL:              The patient also confirmed that he provided his prior pacemaker card.  The patient lives in [place] [facility] [place], approximately 25 miles from [facility] and that he drove approximately 3 hours to come to [facility] for consult.  The patient also confirmed that he does not use computers at home and does not have [email or web address] set up on his phone.  From a symptom standpoint, the patient is asymptomatic and still continues to regularly exercise.  The patient expressed some concern over the invasiveness of the planned surgical resection but also understands the necessity.  The patient is still very physically active, reported that he is able to swim half mile worth of laps in the pool.    Review of Systems   A 10-point review of systems was performed. Pertinent findings are noted in the HPI.  Physical Exam    ECOG Status:0-1    Vitals: /74   Pulse 80   Wt 62.6 kg (138 lb)   BMI 22.44 kg/m    Gen: Alert, in NAD  Head: NC/AT  Eyes: PERRL, EOMI, sclera anicteric  Ears: No external auricular lesions  Nose/sinus: No rhinorrhea or epistaxis  Neck: Full ROM, supple, no palpable adenopathy  Pulm: No wheezing, stridor or respiratory distress.  All lung fields clear to auscultation.  CV: Extremities are warm and well-perfused, no cyanosis, no pedal edema  Musculoskeletal: Normal bulk and tone  Skin: Normal color and turgor. No rashes or lesions.  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    Typically, the standard of care for adenoid cystic carcinoma is surgical resection followed by adjuvant radiotherapy depending on pathologic findings.  In unresectable cases, definitive radiotherapy +/- concurrent chemotherapy would be considered. This patient is currently planned for resection with Dr. Potts.    Should postoperative radiotherapy be indicated, due to the patient's prior mediastinal radiotherapy we discussed the need for a highly conformal radiotherapeutic plan.  For this reason, should postoperative radiotherapy be indicated after resection, we feel that this patient is an appropriate candidate for proton radiotherapy. The patient also lives closer to [facility], which is the nearest proton therapy center. We would be happy to help facilitate a referral to [place]  radiation oncology if needed.    The risks and benefits of radiotherapy were discussed in detail with the patient.  The patient expressed understanding.      Pertinent radiological and pathologic data was personally reviewed and discussed with the patient.     The patient understood the plan, was in agreement, and expressed gratitude for their care.    We instructed the patient to call with any questions or concerns in the interim.      Patient was seen and discussed with my attending physician, Dr. Santos.    Khanh Lawrence MD, MS PGY4  Radiation Oncology  Department of Radiation Oncology  [facility]  Phone: [phone]    Physician Attestation:  I saw the patient in conjunction with the resident physician, Dr. Lawrence, and have edited the note above to reflect my opinion and examination. Imaging and pathology were reviewed as above. I spent 60 minutes in consultation with the patient, including face-to-face time and same day care coordination and pathology/imaging review.    Efrain Santos MD    Associate Residency   Department of Radiation Oncology  [facility]

## 2025-02-05 ENCOUNTER — PRE VISIT (OUTPATIENT)
Dept: RADIATION ONCOLOGY | Facility: CLINIC | Age: 75
End: 2025-02-05
Payer: MEDICARE

## 2025-02-05 ENCOUNTER — OFFICE VISIT (OUTPATIENT)
Dept: RADIATION ONCOLOGY | Facility: CLINIC | Age: 75
End: 2025-02-05
Attending: INTERNAL MEDICINE
Payer: MEDICARE

## 2025-02-05 VITALS
WEIGHT: 138 LBS | HEART RATE: 80 BPM | BODY MASS INDEX: 22.44 KG/M2 | DIASTOLIC BLOOD PRESSURE: 74 MMHG | SYSTOLIC BLOOD PRESSURE: 124 MMHG

## 2025-02-05 DIAGNOSIS — C33 ADENOID CYSTIC CARCINOMA OF TRACHEA (H): Primary | ICD-10-CM

## 2025-02-05 PROCEDURE — G0463 HOSPITAL OUTPT CLINIC VISIT: HCPCS | Performed by: STUDENT IN AN ORGANIZED HEALTH CARE EDUCATION/TRAINING PROGRAM

## 2025-02-05 NOTE — PROGRESS NOTES
"Oncology Rooming Note    February 5, 2025 12:52 PM   Shahid Davis is a 74 year old male who presents for:    Chief Complaint   Patient presents with    Oncology Clinic Visit     Consult     Initial Vitals: There were no vitals taken for this visit. Estimated body mass index is 22.44 kg/m  as calculated from the following:    Height as of 1/30/25: 1.67 m (5' 5.75\").    Weight as of 1/30/25: 62.6 kg (138 lb). There is no height or weight on file to calculate BSA.  Data Unavailable Comment: Data Unavailable   No LMP for male patient.  Allergies reviewed: Yes  Medications reviewed: Yes    Medications: Medication refills not needed today.  Pharmacy name entered into [employer]:    [id number] [place], [place] - [address] [facility] #[id number] - [place], [place] - [address] AT [facility] & [address]    Frailty Screening:   Is the patient here for a new oncology consult visit in cancer care? 2. No      Clinical concerns: Consult       Taylor Cochran RN      Considerations for radiation treatment   Pregnancy status: Male   Implanted Cardiac Devices: Yes, card given to physics   Any previous radiation therapy: Yes        "

## 2025-02-05 NOTE — LETTER
2025      Shahid Davis   [facility]  [place] [address]      Dear Colleague,    Thank you for referring your patient, Shahid Davis, to the [facility] RADIATION ONCOLOGY. Please see a copy of my visit note below.    Radiation Oncology Consultation Note    Name: Shahid Davis MRN: [id number]   : 1950   Date of Service: 2025 Referring: Dr. 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  Oncologic History:     2024, CT CAP:  - Nodular thickening along the tracheal wall just above the pradip at 6-9:00.  8 mm thickness.  No LAD    2024, CT neck:  - Unchanged appearance of left level 1B lymph node.  1.6 x 0.9 cm.  - Enhancing soft tissue nodules in the right posterior lateral trachea near the pradip.  0.9 x 1.3 cm.    2024, CT CAP:  - Nodular tracheal wall thickening just above the pradip (4/113)    2024, medical oncology follow-up:  - Patient reports ongoing dry cough/trouble clearing his throat since recovering from COVID in   - Referral to interventional pulmonology for bronc/possible biopsy placed    2024, IR consult:  - Plan for flexible bronchoscopy and endobronchial biopsy    2024: Patient undergoes flexible bronchoscopy, endobronchial biopsies  - Pathology: tracheal neck mass FNA: Adenoid cystic carcinoma.  CK7 and p40 (+).  - Caris testing demonstrates pathognomonic MYBL1-NFIB fusion       2025, thoracic tumor board:  - Consensus: Referred to thoracic surgery first.  If  patient is not a surgical candidate, recommend definitive radiotherapy    1/8/2025, PET/CT:  - FDG avid soft tissue nodule in distal trachea.  0.8 x 0.6 cm.  SUV max 2.5.      1/14/2025, thoracic surgery consult:  - Patient is a marginal surgical candidate based on history of complete heart block  - Surgical plan would include a right thoracoscopy, possible thoracotomy, tracheal resection, and a possible cardiopulmonary bypass    1/30/2025, medical oncology follow-up:  - Thoracic surgery believes that the tumor could be resected with reasonable risk profile  - That there is value in receiving input from radiation oncology for any definitive radiotherapy      Chemotherapy History: As above  Radiation History: Yes.  Unknown dose (as of 2/5/2025)   Pregnant: N/A  Autoimmune History: No  Inflammatory Bowel Disease History: No  Claustrophobia: No  Implanted Cardiac Devices: YES   -Make: [employer] 2272 Assurity MRI    -Model: 2088TC Tendril [facility]    -Serial Number: [id number]     Subjective:  On interview, the patient reports no symptoms related to the tumor found in his distal trachea.  He reported that he received prior radiotherapy over the course of 20 days to his mediastinum at [facility] between 3/11/2004 - 4/7/2004.  The patient provided a business card of the radiotherapy center where he received his prior consolidative radiotherapy for DLBCL:              The patient also confirmed that he provided his prior pacemaker card.  The patient lives in [facility] [place], approximately [place] miles from [facility] and that he drove approximately 3 hours to come to [facility] for consult.  The patient also confirmed that he does not use computers at home and does not have MyChart set up on his phone.  From a symptom standpoint, the patient is asymptomatic and still continues to regularly exercise.  The patient expressed some concern over the invasiveness of the planned surgical resection but also understands the  necessity.  The patient is still very physically active, reported that he is able to swim half mile worth of laps in the pool.    Review of Systems   A 10-point review of systems was performed. Pertinent findings are noted in the HPI.  Physical Exam   ECOG Status:0-1    Vitals: /74   Pulse 80   Wt 62.6 kg (138 lb)   BMI 22.44 kg/m    Gen: Alert, in NAD  Head: NC/AT  Eyes: PERRL, EOMI, sclera anicteric  Ears: No external auricular lesions  Nose/sinus: No rhinorrhea or epistaxis  Neck: Full ROM, supple, no palpable adenopathy  Pulm: No wheezing, stridor or respiratory distress.  All lung fields clear to auscultation.  CV: Extremities are warm and well-perfused, no cyanosis, no pedal edema  Musculoskeletal: Normal bulk and tone  Skin: Normal color and turgor. No rashes or lesions.  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    Typically, the standard of care for adenoid cystic carcinoma is surgical resection followed by adjuvant radiotherapy depending on pathologic findings.  In unresectable cases, definitive radiotherapy +/- concurrent chemotherapy would be considered. This patient is currently planned for resection with Dr. Potts.    Should postoperative radiotherapy be indicated, due to the patient's prior mediastinal radiotherapy we discussed the need for a highly conformal radiotherapeutic plan.  For this reason, should postoperative radiotherapy be  "indicated after resection, we feel that this patient is an appropriate candidate for proton radiotherapy. The patient also lives closer to [facility], which is the nearest proton therapy center. We would be happy to help facilitate a referral to [place] radiation oncology if needed.    The risks and benefits of radiotherapy were discussed in detail with the patient.  The patient expressed understanding.      Pertinent radiological and pathologic data was personally reviewed and discussed with the patient.     The patient understood the plan, was in agreement, and expressed gratitude for their care.    We instructed the patient to call with any questions or concerns in the interim.      Patient was seen and discussed with my attending physician, Dr. Santos.    Khanh Lawrence MD, MS PGY4  Radiation Oncology  Department of Radiation Oncology  [facility]  Phone: [phone]    Physician Attestation:  I saw the patient in conjunction with the resident physician, Dr. Lawrence, and have edited the note above to reflect my opinion and examination. Imaging and pathology were reviewed as above. I spent 60 minutes in consultation with the patient, including face-to-face time and same day care coordination and pathology/imaging review.    Efrain Santos MD    Associate Residency   Department of Radiation Oncology  [facility]                Oncology Rooming Note    February 5, 2025 12:52 PM   Shahid Davis is a 74 year old male who presents for:    Chief Complaint   Patient presents with     Oncology Clinic Visit     Consult     Initial Vitals: There were no vitals taken for this visit. Estimated body mass index is 22.44 kg/m  as calculated from the following:    Height as of 1/30/25: 1.67 m (5' 5.75\").    Weight as of 1/30/25: 62.6 kg (138 lb). There is no height or weight on file to calculate BSA.  Data Unavailable Comment: Data " Unavailable   No LMP for male patient.  Allergies reviewed: Yes  Medications reviewed: Yes    Medications: Medication refills not needed today.  Pharmacy name entered into [employer]:    [id number] - [place] [place] - [address] [facility] #[id number] - [place], [place] - [address] AT [facility] & [employer]    Frailty Screening:   Is the patient here for a new oncology consult visit in cancer care? 2. No      Clinical concerns: Consult       Taylor Cochran RN      Considerations for radiation treatment   Pregnancy status: Male   Implanted Cardiac Devices: Yes, card given to physics   Any previous radiation therapy: Yes          Again, thank you for allowing me to participate in the care of your patient.        Sincerely,        Efrain Santos MD    Electronically signed

## 2025-02-06 ENCOUNTER — TELEPHONE (OUTPATIENT)
Dept: ONCOLOGY | Facility: CLINIC | Age: 75
End: 2025-02-06
Payer: MEDICARE

## 2025-02-06 NOTE — TELEPHONE ENCOUNTER
Spoke with patient to schedule procedure with Roosevelt Suarez   Procedure was scheduled on 2/25 at [facility] OR  Patient will have H&P with PAC    Informed pt of surgery details:  Date:    Tuesday, February 25, 2024  Arrival Time:  6:00 am    Pt is aware surgery start time may change, and someone will reach out with the new arrival time, if so.    Patient is aware a COVID-19 test is needed before their procedure ONLY IF symptomatic.   (Patient is aware Thoracic is no longer requiring COVID-19 test)       Patient is aware a / is needed day of surgery.   Surgery Letter was sent via [employer]     Patient has my direct contact information for any further questions.

## 2025-02-11 DIAGNOSIS — C33 TRACHEAL CANCER (H): Primary | ICD-10-CM

## 2025-02-11 NOTE — TELEPHONE ENCOUNTER
FUTURE VISIT INFORMATION        SURGERY INFORMATION:  Date: 25  Location: uu or  Surgeon:  Darío Potts MD Andrade, Rafael Santiago, MD Shaffer, Fidel Sarah MD   Anesthesia Type:  general  Procedure: Tracheal resection, right thoracotomy cardiopulmonary bypass      RECORDS REQUESTED FROM:         Primary Care Provider: [facility]     Most recent EKG+ Tracin22     Most recent ECHO: 23     Most recent PFT's: 1/15/25

## 2025-02-13 ENCOUNTER — PATIENT OUTREACH (OUTPATIENT)
Dept: CARE COORDINATION | Facility: CLINIC | Age: 75
End: 2025-02-13
Payer: MEDICARE

## 2025-02-13 NOTE — PROGRESS NOTES
Social Work - Intervention  [facility]  Data/Intervention:  Dx adenoid cystic carcinoma of the trachea   Patient Name: Shahid Davis Goes By: Shahid PERAZA/Age: 1950 (74 year old)     Visit Type: telephone    Collaborated With:    -Referral sent to [facility]      Psychosocial Information/Concerns:  Need lodging prior to surgery scheduled for 25     Intervention/Education/Resources Provided:  [facility]      Assessment/Plan:  Referral sent to [facility]. No further support needed via patient at this time.     Provided patient/family with contact information and availability.    SIENA Koehler, [facility]   Adult Oncology - [place]  [phone]  Onsite [place] on    *Please note does not work on .   Support Groups at [facility]: Social Work Services for Cancer Patients ([email or web address])

## 2025-02-18 LAB
ABO + RH BLD: NORMAL
BLD GP AB SCN SERPL QL: NEGATIVE
SPECIMEN EXP DATE BLD: NORMAL

## 2025-02-19 ENCOUNTER — ANESTHESIA EVENT (OUTPATIENT)
Dept: SURGERY | Facility: CLINIC | Age: 75
End: 2025-02-19
Payer: MEDICARE

## 2025-02-19 ENCOUNTER — PRE VISIT (OUTPATIENT)
Dept: SURGERY | Facility: CLINIC | Age: 75
End: 2025-02-19

## 2025-02-19 ENCOUNTER — LAB (OUTPATIENT)
Dept: LAB | Facility: CLINIC | Age: 75
End: 2025-02-19
Payer: MEDICARE

## 2025-02-19 ENCOUNTER — OFFICE VISIT (OUTPATIENT)
Dept: SURGERY | Facility: CLINIC | Age: 75
End: 2025-02-19
Payer: MEDICARE

## 2025-02-19 VITALS
OXYGEN SATURATION: 98 % | BODY MASS INDEX: 22.34 KG/M2 | HEIGHT: 66 IN | WEIGHT: 139 LBS | HEART RATE: 99 BPM | SYSTOLIC BLOOD PRESSURE: 121 MMHG | TEMPERATURE: 98.2 F | DIASTOLIC BLOOD PRESSURE: 81 MMHG

## 2025-02-19 DIAGNOSIS — Z01.818 PREOP EXAMINATION: ICD-10-CM

## 2025-02-19 DIAGNOSIS — C33 TRACHEAL CANCER (H): ICD-10-CM

## 2025-02-19 DIAGNOSIS — Z01.818 PREOP EXAMINATION: Primary | ICD-10-CM

## 2025-02-19 LAB
ANION GAP SERPL CALCULATED.3IONS-SCNC: 8 MMOL/L (ref 7–15)
BUN SERPL-MCNC: 29.1 MG/DL (ref 8–23)
CALCIUM SERPL-MCNC: 9.4 MG/DL (ref 8.8–10.4)
CHLORIDE SERPL-SCNC: 104 MMOL/L (ref 98–107)
CREAT SERPL-MCNC: 0.94 MG/DL (ref 0.67–1.17)
EGFRCR SERPLBLD CKD-EPI 2021: 85 ML/MIN/1.73M2
ERYTHROCYTE [DISTWIDTH] IN BLOOD BY AUTOMATED COUNT: 12.5 % (ref 10–15)
GLUCOSE SERPL-MCNC: 100 MG/DL (ref 70–99)
HCO3 SERPL-SCNC: 28 MMOL/L (ref 22–29)
HCT VFR BLD AUTO: 40.5 % (ref 40–53)
HGB BLD-MCNC: 13.5 G/DL (ref 13.3–17.7)
MCH RBC QN AUTO: 31.9 PG (ref 26.5–33)
MCHC RBC AUTO-ENTMCNC: 33.3 G/DL (ref 31.5–36.5)
MCV RBC AUTO: 96 FL (ref 78–100)
MDC_IDC_EPISODE_DTM: NORMAL
MDC_IDC_EPISODE_ID: NORMAL
MDC_IDC_EPISODE_TYPE: NORMAL
MDC_IDC_LEAD_CONNECTION_STATUS: NORMAL
MDC_IDC_LEAD_CONNECTION_STATUS: NORMAL
MDC_IDC_LEAD_IMPLANT_DT: NORMAL
MDC_IDC_LEAD_IMPLANT_DT: NORMAL
MDC_IDC_LEAD_LOCATION: NORMAL
MDC_IDC_LEAD_LOCATION: NORMAL
MDC_IDC_LEAD_LOCATION_DETAIL_1: NORMAL
MDC_IDC_LEAD_LOCATION_DETAIL_1: NORMAL
MDC_IDC_LEAD_MFG: NORMAL
MDC_IDC_LEAD_MFG: NORMAL
MDC_IDC_LEAD_MODEL: NORMAL
MDC_IDC_LEAD_MODEL: NORMAL
MDC_IDC_LEAD_POLARITY_TYPE: NORMAL
MDC_IDC_LEAD_POLARITY_TYPE: NORMAL
MDC_IDC_LEAD_SERIAL: NORMAL
MDC_IDC_LEAD_SERIAL: NORMAL
MDC_IDC_MSMT_BATTERY_DTM: NORMAL
MDC_IDC_MSMT_BATTERY_REMAINING_LONGEVITY: 50 MO
MDC_IDC_MSMT_BATTERY_REMAINING_PERCENTAGE: 43 %
MDC_IDC_MSMT_BATTERY_RRT_TRIGGER: NORMAL
MDC_IDC_MSMT_BATTERY_STATUS: NORMAL
MDC_IDC_MSMT_BATTERY_VOLTAGE: 2.98 V
MDC_IDC_MSMT_LEADCHNL_RA_IMPEDANCE_VALUE: 380 OHM
MDC_IDC_MSMT_LEADCHNL_RA_LEAD_CHANNEL_STATUS: NORMAL
MDC_IDC_MSMT_LEADCHNL_RA_PACING_THRESHOLD_AMPLITUDE: 0.75 V
MDC_IDC_MSMT_LEADCHNL_RA_PACING_THRESHOLD_PULSEWIDTH: 0.4 MS
MDC_IDC_MSMT_LEADCHNL_RA_SENSING_INTR_AMPL: 3.6 MV
MDC_IDC_MSMT_LEADCHNL_RV_IMPEDANCE_VALUE: 460 OHM
MDC_IDC_MSMT_LEADCHNL_RV_LEAD_CHANNEL_STATUS: NORMAL
MDC_IDC_MSMT_LEADCHNL_RV_PACING_THRESHOLD_AMPLITUDE: 0.62 V
MDC_IDC_MSMT_LEADCHNL_RV_PACING_THRESHOLD_PULSEWIDTH: 0.4 MS
MDC_IDC_MSMT_LEADCHNL_RV_SENSING_INTR_AMPL: 12 MV
MDC_IDC_PG_IMPLANT_DTM: NORMAL
MDC_IDC_PG_MFG: NORMAL
MDC_IDC_PG_MODEL: NORMAL
MDC_IDC_PG_SERIAL: NORMAL
MDC_IDC_PG_TYPE: NORMAL
MDC_IDC_SESS_CLINIC_NAME: NORMAL
MDC_IDC_SESS_DTM: NORMAL
MDC_IDC_SESS_REPROGRAMMED: NO
MDC_IDC_SESS_TYPE: NORMAL
MDC_IDC_SET_BRADY_AT_MODE_SWITCH_MODE: NORMAL
MDC_IDC_SET_BRADY_AT_MODE_SWITCH_RATE: 160 {BEATS}/MIN
MDC_IDC_SET_BRADY_LOWRATE: 60 {BEATS}/MIN
MDC_IDC_SET_BRADY_MAX_SENSOR_RATE: 120 {BEATS}/MIN
MDC_IDC_SET_BRADY_MAX_TRACKING_RATE: 120 {BEATS}/MIN
MDC_IDC_SET_BRADY_MODE: NORMAL
MDC_IDC_SET_BRADY_PAV_DELAY_LOW: 200 MS
MDC_IDC_SET_BRADY_SAV_DELAY_LOW: 200 MS
MDC_IDC_SET_LEADCHNL_RA_PACING_AMPLITUDE: 1.75 V
MDC_IDC_SET_LEADCHNL_RA_PACING_ANODE_ELECTRODE_1: NORMAL
MDC_IDC_SET_LEADCHNL_RA_PACING_ANODE_LOCATION_1: NORMAL
MDC_IDC_SET_LEADCHNL_RA_PACING_CAPTURE_MODE: NORMAL
MDC_IDC_SET_LEADCHNL_RA_PACING_CATHODE_ELECTRODE_1: NORMAL
MDC_IDC_SET_LEADCHNL_RA_PACING_CATHODE_LOCATION_1: NORMAL
MDC_IDC_SET_LEADCHNL_RA_PACING_POLARITY: NORMAL
MDC_IDC_SET_LEADCHNL_RA_PACING_PULSEWIDTH: 0.4 MS
MDC_IDC_SET_LEADCHNL_RA_SENSING_ADAPTATION_MODE: NORMAL
MDC_IDC_SET_LEADCHNL_RA_SENSING_ANODE_ELECTRODE_1: NORMAL
MDC_IDC_SET_LEADCHNL_RA_SENSING_ANODE_LOCATION_1: NORMAL
MDC_IDC_SET_LEADCHNL_RA_SENSING_CATHODE_ELECTRODE_1: NORMAL
MDC_IDC_SET_LEADCHNL_RA_SENSING_CATHODE_LOCATION_1: NORMAL
MDC_IDC_SET_LEADCHNL_RA_SENSING_POLARITY: NORMAL
MDC_IDC_SET_LEADCHNL_RA_SENSING_SENSITIVITY: 0.75 MV
MDC_IDC_SET_LEADCHNL_RV_PACING_AMPLITUDE: 0.88
MDC_IDC_SET_LEADCHNL_RV_PACING_ANODE_ELECTRODE_1: NORMAL
MDC_IDC_SET_LEADCHNL_RV_PACING_ANODE_LOCATION_1: NORMAL
MDC_IDC_SET_LEADCHNL_RV_PACING_CAPTURE_MODE: NORMAL
MDC_IDC_SET_LEADCHNL_RV_PACING_CATHODE_ELECTRODE_1: NORMAL
MDC_IDC_SET_LEADCHNL_RV_PACING_CATHODE_LOCATION_1: NORMAL
MDC_IDC_SET_LEADCHNL_RV_PACING_POLARITY: NORMAL
MDC_IDC_SET_LEADCHNL_RV_PACING_PULSEWIDTH: 0.4 MS
MDC_IDC_SET_LEADCHNL_RV_SENSING_ADAPTATION_MODE: NORMAL
MDC_IDC_SET_LEADCHNL_RV_SENSING_ANODE_ELECTRODE_1: NORMAL
MDC_IDC_SET_LEADCHNL_RV_SENSING_ANODE_LOCATION_1: NORMAL
MDC_IDC_SET_LEADCHNL_RV_SENSING_CATHODE_ELECTRODE_1: NORMAL
MDC_IDC_SET_LEADCHNL_RV_SENSING_CATHODE_LOCATION_1: NORMAL
MDC_IDC_SET_LEADCHNL_RV_SENSING_POLARITY: NORMAL
MDC_IDC_SET_LEADCHNL_RV_SENSING_SENSITIVITY: 2.5 MV
MDC_IDC_STAT_AT_BURDEN_PERCENT: 0 %
MDC_IDC_STAT_AT_DTM_END: NORMAL
MDC_IDC_STAT_AT_DTM_START: NORMAL
MDC_IDC_STAT_AT_MODE_SW_COUNT: 0
MDC_IDC_STAT_AT_MODE_SW_COUNT_PER_DAY: 0
MDC_IDC_STAT_AT_MODE_SW_PERCENT_TIME: 0 %
MDC_IDC_STAT_BRADY_AP_VP_PERCENT: 2 %
MDC_IDC_STAT_BRADY_AP_VS_PERCENT: 1 %
MDC_IDC_STAT_BRADY_AS_VP_PERCENT: 98 %
MDC_IDC_STAT_BRADY_AS_VS_PERCENT: 1 %
MDC_IDC_STAT_BRADY_DTM_END: NORMAL
MDC_IDC_STAT_BRADY_DTM_START: NORMAL
MDC_IDC_STAT_BRADY_RA_PERCENT_PACED: 1.9 %
MDC_IDC_STAT_BRADY_RV_PERCENT_PACED: 99 %
MDC_IDC_STAT_CRT_DTM_END: NORMAL
MDC_IDC_STAT_CRT_DTM_START: NORMAL
MDC_IDC_STAT_EPISODE_RECENT_COUNT: 0
MDC_IDC_STAT_EPISODE_RECENT_COUNT_DTM_END: NORMAL
MDC_IDC_STAT_EPISODE_RECENT_COUNT_DTM_START: NORMAL
MDC_IDC_STAT_EPISODE_TYPE: NORMAL
MDC_IDC_STAT_EPISODE_VENDOR_TYPE: NORMAL
MDC_IDC_STAT_EPISODE_VENDOR_TYPE: NORMAL
MDC_IDC_STAT_HEART_RATE_ATRIAL_MAX: 220 {BEATS}/MIN
MDC_IDC_STAT_HEART_RATE_ATRIAL_MEAN: 93 {BEATS}/MIN
MDC_IDC_STAT_HEART_RATE_ATRIAL_MIN: 50 {BEATS}/MIN
MDC_IDC_STAT_HEART_RATE_DTM_END: NORMAL
MDC_IDC_STAT_HEART_RATE_DTM_START: NORMAL
MDC_IDC_STAT_HEART_RATE_VENTRICULAR_MAX: 210 {BEATS}/MIN
MDC_IDC_STAT_HEART_RATE_VENTRICULAR_MEAN: 93 {BEATS}/MIN
MDC_IDC_STAT_HEART_RATE_VENTRICULAR_MIN: 50 {BEATS}/MIN
PLATELET # BLD AUTO: 152 10E3/UL (ref 150–450)
POTASSIUM SERPL-SCNC: 4.5 MMOL/L (ref 3.4–5.3)
RBC # BLD AUTO: 4.23 10E6/UL (ref 4.4–5.9)
SODIUM SERPL-SCNC: 140 MMOL/L (ref 135–145)
WBC # BLD AUTO: 5.9 10E3/UL (ref 4–11)

## 2025-02-19 PROCEDURE — 86850 RBC ANTIBODY SCREEN: CPT

## 2025-02-19 PROCEDURE — 36415 COLL VENOUS BLD VENIPUNCTURE: CPT | Performed by: PATHOLOGY

## 2025-02-19 PROCEDURE — 86900 BLOOD TYPING SEROLOGIC ABO: CPT

## 2025-02-19 PROCEDURE — 85027 COMPLETE CBC AUTOMATED: CPT | Performed by: PATHOLOGY

## 2025-02-19 PROCEDURE — 80048 BASIC METABOLIC PNL TOTAL CA: CPT | Performed by: PATHOLOGY

## 2025-02-19 ASSESSMENT — LIFESTYLE VARIABLES: TOBACCO_USE: 1

## 2025-02-19 ASSESSMENT — PAIN SCALES - GENERAL: PAINLEVEL_OUTOF10: NO PAIN (0)

## 2025-02-19 ASSESSMENT — ENCOUNTER SYMPTOMS: SEIZURES: 0

## 2025-02-19 NOTE — PATIENT INSTRUCTIONS
Preparing for Your Surgery      Name:  Shahid Davis   MRN:  [id number]   :  1950   Today's Date:  2025       Arriving for surgery:  Surgery date:  25  Arrival time:  5:30 am    Please come to:     [facility]    [address] [place]  [address]     The [facility] ([facility] [place] Patient/Visitor Ramp is at [address]. Patients and visitors who self-park will receive the reduced hospital parking rate. If the Patient /Visitor Ramp is full, please follow the signs to the [employer] car park located at the main hospital entrance.       parking is available (24 hours/ 7 days a week)      Discounted parking pass options are available for patients and visitors. They can be purchased at the [employer] desk at the [facility] hospital entrance.     -  Stop at the security desk and they will direct surgery patients to Registration and the Surgery Check in and Family Lounge. [phone]        - If you need directions, a wheelchair or an escort please stop at the Information/security desk in the lobby.     What can I eat or drink?  -  You may eat and drink normally up to 8 hours prior to arrival time. (Until 9:30 pm 25)  -  You may have clear liquids until 2 hours prior to arrival time. (Until 3:30 am)    Examples of clear liquids:  Water  Clear broth  Juices (apple, white grape, white cranberry  and cider) without pulp  Noncarbonated, powder based beverages  (lemonade and Agusto-Aid)  Sodas (Sprite, 7-Up, ginger ale and seltzer)  Coffee or tea (without milk or cream)  Gatorade    -  No Alcohol or cannabis products for at least 24 hours before surgery.     Which medicines can I take?    Hold Multivitamins for 7 days before surgery. Take the last Multivitamin on 25, and then hold until surgery.  Hold Supplements for 7 days before surgery.  Hold Ibuprofen (Advil, Motrin) for 1 day before  surgery--unless otherwise directed by surgeon.  Hold Naproxen (Aleve) for 4 days before surgery.  Acetaminophen (Tylenol) is okay to take if needed.    -  DO NOT take these medications the day of surgery:  Vitamin C (Ascorbic Acid)      -  PLEASE TAKE these medications the day of surgery:  Omeprazole (Prilosec)  Baclofen (Lioresal) if needed  Acetaminophen (Tylenol) if needed    How do I prepare myself?  - Please take 2 showers (one the night prior to surgery and one the morning of surgery) using Scrubcare or Hibiclens soap.    You may use your own shampoo and conditioner. No other hair products.     Use this soap only from the neck to your toes. Avoid genital area      Leave the soap on your skin for one minute--then rinse thoroughly.    - Please remove all jewelry and body piercings.  - No lotions, deodorants or fragrance.  - No makeup or fingernail polish.   - Bring your ID and insurance card.    -For patients being admitted to the [facility]  Family members are to take the patient belongings with them and place them in the lockers provided in the Family Lounge.  Please limit the items you bring to 1 bag as the lockers are small.      -If you use a CPAP machine, please bring the CPAP machine, tubing, and mask to hospital.    -If you have a Deep Brain Stimulator, Spinal Cord Stimulator, or any Neuro Stimulator device---you must bring the remote control to the hospital.      ALL PATIENTS GOING HOME THE SAME DAY OF SURGERY ARE REQUIRED TO HAVE A RESPONSIBLE ADULT TO DRIVE AND BE IN ATTENDANCE WITH THEM FOR 24 HOURS FOLLOWING SURGERY.    Covid testing policy as of 12/06/2022  Your surgeon will notify and schedule you for a COVID test if one is needed before surgery--please direct any questions or COVID symptoms to your surgeon      Questions or Concerns:    - For any questions regarding the day of surgery or your hospital stay, please contact the Pre Admission Nursing Office at [phone].       - If you  have health changes between today and your surgery, please call your surgeon.       - For questions after surgery, please call your surgeons office.           Current Visitor Guidelines    2 adult visitors for adult patients in the pre op area    If additional visitors come (beyond a patient care attendant or a group home caregiver), the additional visitors will be asked to wait in the main lobby of the hospital    Visiting hours: 8 a.m. to 8:30 p.m.    Patients confirmed or suspected to have symptoms of COVID 19 or flu:     No visitors allowed for adult patients.   Children (under age 18) can have 1 named visitor.     People who are sick or showing symptoms of COVID 19 or flu:    Are not allowed to visit patients--we can only make exceptions in special situations.       Please follow these guidelines for your visit:          Please maintain social distance          Masking is optional--however at times you may be asked to wear a mask for the safety of yourself and others     Clean your hands with alcohol hand . Do this when you arrive at and leave the building and patient room,    And again after you touch your mask or anything in the room.     Go directly to and from the room you are visiting.     Stay in the patient s room during your visit. Limit going to other places in the hospital as much as possible     Leave bags and jackets at home or in the car.     For everyone s health, please don t come and go during your visit. That includes for smoking   during your visit.

## 2025-02-19 NOTE — H&P
Pre-Operative H & P     CC:  Preoperative exam to assess for increased cardiopulmonary risk while undergoing surgery and anesthesia.    Date of Encounter: 2/19/2025  Primary Care Physician:  Haleigh Montero     Reason for visit:   Encounter Diagnoses   Name Primary?    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 This was incidentally found on a surveillance scan for high-grade B-cell lymphoma (status post chemotherapy December 2022). Mr. Davis underwent endobronchial tissue biopsy and debulking on 12/16/24.  He continues to follow with medical oncology.    The patient's imaging was reviewed by Dr. Potts with CT revealing an unchanged nodule or tracheal thickening just above the pradip.  Stable 3 mm subpleural nodule upper lobe.  His PET scan revealed mildly FDG avid soft tissue nodule in the distal trachea measuring 0.8 X 0.6 cm suspicious for biopsy-proven adenoid cystic carcinoma without metastasis.    He has been counseled for above surgical procedure.    His history is otherwise significant for complete heart block in 2020, status post permanent pacemaker implantation.  He has been followed by Dr. Fox, last seen on 7/13/2023.    He is treated for GERD, and had repair of pyloric stenosis as infant.     Hx of abnormal  bleeding or anti-platelet use: Denies    Past Medical History  Past Medical History:   Diagnosis Date    Cardiac pacemaker in situ     2020    GERD (gastroesophageal reflux disease)     Lymphoma (H)     Pyloric stenosis, congenital (H)     Squamous cell carcinoma     Tracheal cancer (H)        Past Surgical History  Past Surgical History:   Procedure Laterality Date    BRONCHOSCOPY FLEXIBLE N/A 12/16/2024    Procedure: BRONCHOSCOPY, FLEXIBLE, endobronchial biopsy and tumor debulking;  Surgeon: Gerald Long MD;  Location: [facility] OR    DAVINCI HERNIORRHAPHY INGUINAL Left 07/09/2021    Procedure: HERNIORRHAPHY, INGUINAL, ROBOT-ASSISTED, Left, Mesh;  Surgeon: Shamar Tyler MD;  Location: [facility] OR    IR CHEST PORT PLACEMENT > 5 YRS OF AGE  01/05/2023    IR PORT REMOVAL RIGHT  12/15/2023    IR SOFT TISSUE BIOPSY  12/15/2022    MOHS MICROGRAPHIC PROCEDURE      REMOVE PORT VASCULAR ACCESS Right 12/15/2023    Procedure: Remove port vascular access right;  Surgeon: Matthew Harrison PA-C;  Location: [facility] OR    Repair pyloric stenosis      ~ six months of age       Prior to Admission Medications  Current Outpatient Medications   Medication Sig Dispense Refill    ascorbic acid (VITAMIN C) 500 MG tablet Take 500 mg by mouth every morning      baclofen (LIORESAL) 10 MG tablet Take 0.5-1 tablets (5-10 mg) by mouth 3 times daily as needed for other (hiccups) 30 tablet 0    Multiple Vitamins-Minerals (MULTIVITAL PO) Take 1 tablet by mouth every morning.      omeprazole (PRILOSEC) 40 MG DR capsule Take 1 capsule (40 mg) by mouth daily (Patient taking differently: Take 40 mg by mouth every morning.) 90 capsule 4    ondansetron (ZOFRAN) 8 MG tablet Take 1 tablet (8 mg) by mouth every 8 hours as needed for nausea (Patient not taking: Reported on 2/19/2025) 60 tablet 3    polyethylene glycol (MIRALAX) 17 GM/Dose powder Take 17 g by mouth daily as needed for constipation (Patient not taking: Reported on 2/19/2025) 510 g 4     prochlorperazine (COMPAZINE) 10 MG tablet Take 1 tablet (10 mg) by mouth every 6 hours as needed for nausea or vomiting (Patient not taking: Reported on 2/19/2025) 30 tablet 3    senna-docusate (SENNA S) 8.6-50 MG tablet Take 1 tablet by mouth 2 times daily as needed for constipation (Patient not taking: Reported on 1/14/2025) 60 tablet 4       Allergies  Allergies   Allergen Reactions    Ampicillin [Ampicillin Sodium]     Bactrim [Sulfamethoxazole W/Trimethoprim]     Contrast Dye      CT contrast (IV) allergy - need oral Methylpred as premed for scans    Ampicillin Rash    Morphine Nausea       Social History  Social History     Socioeconomic History    Marital status: Single     Spouse name: Not on file    Number of children: Not on file    Years of education: Not on file    Highest education level: Not on file   Occupational History    Not on file   Tobacco Use    Smoking status: Former     Passive exposure: Past    Smokeless tobacco: Never    Tobacco comments:     Quit at age 19   Substance and Sexual Activity    Alcohol use: Yes     Alcohol/week: 11.7 standard drinks of alcohol     Types: 14 drink(s) per week     Comment: 1-2 beers a night    Drug use: Never    Sexual activity: Not on file   Other Topics Concern    Parent/sibling w/ CABG, MI or angioplasty before 65F 55M? Not Asked   Social History Narrative    Not on file     Social Drivers of Health     Financial Resource Strain: Low Risk  (5/17/2021)    Received from [facility], [facility]    Overall Financial Resource Strain (CARDIA)     Difficulty of Paying Living Expenses: Not very hard   Food Insecurity: No Food Insecurity (5/17/2021)    Received from [facility], [facility]    Hunger Vital Sign     Worried About Running Out of Food in the Last Year: Never true     Ran Out of  Food in the Last Year: Never true   Transportation Needs: No Transportation Needs (5/17/2021)    Received from [facility], [facility]    PRAPARE - Transportation     Lack of Transportation (Medical): No     Lack of Transportation (Non-Medical): No   Physical Activity: Sufficiently Active (5/17/2021)    Received from [facility], [facility]    Exercise Vital Sign     Days of Exercise per Week: 3 days     Minutes of Exercise per Session: 60 min   Stress: No Stress Concern Present (5/17/2021)    Received from [facility], [facility]    [place] [facility] [employer] - Occupational Stress Questionnaire     Feeling of Stress : Not at all   Social Connections: Unknown (5/17/2021)    Received from [facility], [facility]    Social Connection and Isolation Panel [NHANES]     Frequency of Communication with Friends and Family: Three times a week     Frequency of Social Gatherings with Friends and Family: Not on file     Attends Christianity Services: Not on file     Active Member of Clubs or Organizations: Not on file     Attends Club or Organization Meetings: Not on file     Marital Status: Not on file   Interpersonal Safety: Low Risk  (12/16/2024)    Interpersonal Safety     Do you feel physically and emotionally safe where you currently live?: Yes     Within the past 12 months, have you been hit, slapped, kicked or otherwise physically hurt by someone?: No     Within the past 12 months, have you been humiliated or emotionally abused in other ways by your partner or ex-partner?: No   Housing Stability: Low Risk  (5/17/2021)    [facility] from [facility], [facility]    Housing Stability Vital Sign     Unable to Pay for Housing in the Last Year: No     Number of Places Lived in the Last Year: 1     Unstable Housing in the Last Year: No       Family History  Family History   Problem Relation Age of Onset    Cancer Mother         Blood    Cancer Father         Lung    Cancer Maternal Grandmother         Breast    Cancer Paternal Grandfather         Hodgkins    Anesthesia Reaction No family hx of     Bleeding Disorder No family hx of     Clotting Disorder No family hx of        Review of Systems  The complete review of systems is negative other than noted in the HPI or here.   Anesthesia Evaluation   Pt has had prior anesthetic. Type: General and MAC.    No history of anesthetic complications       ROS/MED HX  ENT/Pulmonary: Comment: History of chest radiation for lymphoma years ago    (+)                tobacco use, Past use,                    (-) recent URI   Neurologic:    (-) no seizures and no CVA   Cardiovascular: Comment: No coronary calcifications on CT screening 2021      (+)  - -   -  - -              pacemaker, Reason placed: CHB. type: [employer], settings: DDD , - Patient is dependent on pacemaker.               Previous cardiac testing   Echo: Date: 7/13/2023 Results:    Stress Test:  Date: Results:    ECG Reviewed:  Date: 2022 Results:  Atrial-sensed ventricular-paced rhythm with prolonged AV conduction   Abnormal ECG   When compared with ECG of 12-NOV-2020 14:12    Cath:  Date: Results:   (-) hypertension, taking anticoagulants/antiplatelets and PARRISH   METS/Exercise Tolerance: >4 METS Comment: Swimming 2 X weekly for 1/2 mile without exertional symptoms    Walked up stairs 5 floors in [facility] building   Hematologic:    (-) history of blood clots and history of blood transfusion   Musculoskeletal:  - neg musculoskeletal ROS     GI/Hepatic:     (+) GERD, Asymptomatic on medication,       "            Renal/Genitourinary:  - neg Renal ROS     Endo:  - neg endo ROS     Psychiatric/Substance Use:    (-) psychiatric history   Infectious Disease:  - neg infectious disease ROS     Malignancy: Comment: History of chest radiation years ago for first lymphoma diagnosis involving spine tumor  (+) Malignancy, History of Lymphoma/Leukemia.Lymph CA Remission status post Chemo and Radiation.      Other:  - neg other ROS          /81 (BP Location: Right arm, Patient Position: Sitting, Cuff Size: Adult Regular)   Pulse 99   Temp 98.2  F (36.8  C) (Oral)   Ht 1.67 m (5' 5.75\")   Wt 63 kg (139 lb)   SpO2 98%   BMI 22.61 kg/m      Physical Exam   Constitutional: Awake, alert, cooperative, no apparent distress, and appears stated age.  Eyes: Pupils equal, round and reactive to light, extra ocular muscles intact, sclera clear, conjunctiva normal.  HENT: Normocephalic, oral pharynx with moist mucus membranes, good dentition. No goiter appreciated.   Respiratory: Clear to auscultation bilaterally, no crackles or wheezing.  No cough or obvious dyspnea  Cardiovascular: Regular rate and rhythm, normal S1 and S2, and no murmur noted.  Carotids +2, no bruits. No edema. Palpable pulses to radial  DP and PT arteries.   GI: Normal bowel sounds, soft, non-distended, non-tender, no masses palpated, no hepatosplenomegaly.  Surgical scars: Well-healed  Lymph/Hematologic: No cervical lymphadenopathy and no supraclavicular lymphadenopathy.  Genitourinary: Deferred  Skin: Warm and dry.  No rashes at anticipated surgical site. Left upper chest pacemaker site.  Musculoskeletal: Full ROM of neck. There is no redness, warmth, or swelling of the joints. Gross motor strength is normal.    Neurologic: Awake, alert, oriented to name, place and time. Cranial nerves II-XII are grossly intact. Gait is normal.   Neuropsychiatric: Calm, cooperative. Normal affect.     Prior Labs/Diagnostic Studies   All labs and imaging personally " reviewed     Device check: 1/30/25  Device: [employer] 2272 Assurity MRI  Normal Device Function  Mode: DDDR  bpm  AP: 1.9%  : >99%  Presenting EGM: AS- @ 95 bpm  Lead Trends Appear Stable.  Estimated battery longevity to RRT = 4.2 years. Battery Voltage = 2.98 V  Atrial Arrhythmia: None  Ventricular Arrhythmia: 14 Consecutive PVC episodes recorded.  Pt Notified of Transmission Results: EPIC results letter mailed.  Plan: Return to clinic 5/30/2025 as scheduled.    Echocardiogram 7/13/2023  Interpretation Summary  Left ventricular function is normal.The ejection fraction is 55-60%. There is  mild hyookinesis of the apical septal and apical inferior segments.  Global right ventricular function is normal. The right ventricle is normal  size.  No significant valvular abnormalities.  The estimated PA systolic pressure is 24 mmHg.  IVC diameter <2.1 cm collapsing >50% with sniff suggests a normal RA pressure  of 3 mmHg.  This study was compared with the study from 12/15/2022. The wall motion  abnormalities appear in some views from the prior study, but they are better  delineated in today's evaluation due to the use of contrast.    2022 EKG: Atrial-sensed ventricular-paced rhythm with prolonged AV conduction   Abnormal ECG   When compared with ECG of 12-NOV-2020 14:12,     CT Coronary calcium screening 5/24/21    IMPRESSIONS:  1.  No coronary calcifications.  2.  The total Agatston calcium score is 0 placing the patient in the  lowest percentile when compared to age and gender matched control  group.    CT CAP 11/14/24     1.  No convincing evidence of recurrent lymphoma.  2.  Stable nodular thickening of the trachea, indeterminate.  3.  Continued decrease in size of small hypodense splenic lesion.        Latest Ref Rng & Units 1/15/2025     8:36 AM   PFT   FVC L 4.97    FEV1 L 3.53    FVC% % 148    FEV1% % 137          The patient's records and results personally reviewed by this provider.     Outside  records reviewed from: Care Everywhere    LAB/DIAGNOSTIC STUDIES TODAY:    Lab Results   Component Value Date    WBC 5.9 02/19/2025    WBC 3.0 07/01/2021     Lab Results   Component Value Date    RBC 4.23 02/19/2025    RBC 3.95 07/01/2021     Lab Results   Component Value Date    HGB 13.5 02/19/2025    HGB 12.7 07/01/2021     Lab Results   Component Value Date    HCT 40.5 02/19/2025    HCT 36.9 07/01/2021     Lab Results   Component Value Date    MCV 96 02/19/2025    MCV 93 07/01/2021     Lab Results   Component Value Date    MCH 31.9 02/19/2025    MCH 32.2 07/01/2021     Lab Results   Component Value Date    MCHC 33.3 02/19/2025    MCHC 34.4 07/01/2021     Lab Results   Component Value Date    RDW 12.5 02/19/2025    RDW 12.5 07/01/2021     Lab Results   Component Value Date     02/19/2025    PLT 91 07/01/2021     Last Comprehensive Metabolic Panel:  Lab Results   Component Value Date     02/19/2025    POTASSIUM 4.5 02/19/2025    CHLORIDE 104 02/19/2025    CO2 28 02/19/2025    ANIONGAP 8 02/19/2025     (H) 02/19/2025    BUN 29.1 (H) 02/19/2025    CR 0.94 02/19/2025    GFRESTIMATED 85 02/19/2025    JORGE 9.4 02/19/2025     Type and screen    Assessment    Shahid Davis is a 74 year old male seen as a PAC referral for risk assessment and optimization for anesthesia.    Plan/Recommendations  Pt will be optimized for the proposed procedure.  See below for details on the assessment, risk, and preoperative recommendations    NEUROLOGY  - No history of TIA, CVA or seizure    -Post Op delirium risk factors:  High co-morbid index    ENT  - No current airway concerns.  Will need to be reassessed day of surgery.  Mallampati: I  TM: > 3    Recent anesthesia records available    CARDIAC  Normal BP range.  No cardiac meds.  History of complete heart block in 2020 status post placement of permanent pacemaker.  Ventricular pacing 99%.  Patient denies chest pain, irregular heart rate, or dyspnea on exertion.  He  "has good exercise tolerance walking up 5 floors of stairs in the [facility] building today.  He also swims 1/2 mile twice weekly without difficulty.  - METS (Metabolic Equivalents)>4    RCRI: 0.9% risk of serious cardiac events    PULMONARY  Tracheal mass.  Denies pain  Previous history of chest radiation with first diagnosis of lymphoma years ago  Denies asthma, cough or use of inhaler    Low risk for SASHA  - Tobacco History    History   Smoking Status    Former   Smokeless Tobacco    Never       GI: GERD controlled with omeprazole and may take on day of surgery  PONV Low Risk  Total Score: 1           1 AN PONV: Patient is not a current smoker        /RENAL  - Baseline Creatinine  0.94    ENDOCRINE    - BMI: Estimated body mass index is 22.61 kg/m  as calculated from the following:    Height as of this encounter: 1.67 m (5' 5.75\").    Weight as of this encounter: 63 kg (139 lb).  Healthy Weight (BMI 18.5-24.9)  - No history of Diabetes Mellitus    HEME: VTE risk 3%  Denies personal or family history of blood clots  Denies personal or family history of bleeding disorder  Denies history of blood transfusion   Type and screen drawn today    MSK  Patient is NOT Frail             Total Score: 0      ACCESS: Difficult IV access    Different anesthesia methods/types have been discussed with the patient, but they are aware that the final plan will be decided by the assigned anesthesia provider on the date of service.  Patient was discussed with Dr Cook    The patient is optimized for their procedure. AVS with information on surgery time/arrival time, meds and NPO status given by nursing staff. No further diagnostic testing indicated.      On the day of service:     Prep time: 14 minutes  Visit time: 16 minutes  Documentation time: 13 minutes  ------------------------------------------  Total time: 43 minutes      FARSHAD Washburn CNS  Preoperative Assessment Center  [facility]  Clinic and Surgery " [place]  Phone: [phone]  Fax: [phone]

## 2025-02-21 ENCOUNTER — PATIENT OUTREACH (OUTPATIENT)
Dept: SURGERY | Facility: CLINIC | Age: 75
End: 2025-02-21
Payer: MEDICARE

## 2025-02-21 NOTE — PROGRESS NOTES
Called patient to let him know Dr. Potts would like him to stop drinking alcohol 1 week prior to surgery. Left a voicemail with call back information.

## 2025-02-27 ENCOUNTER — PATIENT OUTREACH (OUTPATIENT)
Dept: SURGERY | Facility: CLINIC | Age: 75
End: 2025-02-27
Payer: MEDICARE

## 2025-02-27 ASSESSMENT — LIFESTYLE VARIABLES: TOBACCO_USE: 1

## 2025-02-27 ASSESSMENT — ENCOUNTER SYMPTOMS: SEIZURES: 0

## 2025-02-27 NOTE — PROGRESS NOTES
Left a voicemail for patient and his significant other Reshma. Needing an update on patient's drinking status.    Update: spoke to Shahid and he hasn't had a lite beer in over 1 week. He had listened to my voicemail but didn't call back to confirm he received it. Shahid and his wife are leaving shortly to stay at [facility] for patient's surgery tomorrow morning.

## 2025-02-27 NOTE — ANESTHESIA PREPROCEDURE EVALUATION
Anesthesia Pre-Procedure Evaluation    Patient: Shahid Davis   MRN: [id number] : 1950        Procedure : Procedure(s):  Tracheal resection,  right thoracotomy  cardiopulmonary bypass          Past Medical History:   Diagnosis Date     Cardiac pacemaker in situ          GERD (gastroesophageal reflux disease)      Lymphoma (H)      Pyloric stenosis, congenital (H)      Squamous cell carcinoma      Tracheal cancer (H)       Past Surgical History:   Procedure Laterality Date     BRONCHOSCOPY FLEXIBLE N/A 2024    Procedure: BRONCHOSCOPY, FLEXIBLE, endobronchial biopsy and tumor debulking;  Surgeon: Gerald Long MD;  Location: [facility] OR     DAVINCI HERNIORRHAPHY INGUINAL Left 2021    Procedure: HERNIORRHAPHY, INGUINAL, ROBOT-ASSISTED, Left, Mesh;  Surgeon: Shamar Tyler MD;  Location: [facility] OR     IR CHEST PORT PLACEMENT > 5 YRS OF AGE  2023     IR PORT REMOVAL RIGHT  12/15/2023     IR SOFT TISSUE BIOPSY  12/15/2022     MOHS MICROGRAPHIC PROCEDURE       REMOVE PORT VASCULAR ACCESS Right 12/15/2023    Procedure: Remove port vascular access right;  Surgeon: Matthew Harrison PA-C;  Location: [facility] OR     Repair pyloric stenosis      ~ six months of age      Allergies   Allergen Reactions     Ampicillin [Ampicillin Sodium]      Bactrim [Sulfamethoxazole W/Trimethoprim]      Contrast Dye      CT contrast (IV) allergy - need oral Methylpred as premed for scans     Ampicillin Rash     Morphine Nausea      Social History     Tobacco Use     Smoking status: Former     Passive exposure: Past     Smokeless tobacco: Never     Tobacco comments:     Quit at age 19   Substance Use Topics     Alcohol use: Yes     Alcohol/week: 11.7 standard drinks of alcohol     Types: 14 drink(s) per week     Comment: 1-2 beers a night      Wt Readings from Last 1 Encounters:   25 63 kg (139 lb)        Shahid Davis is a 74-year-old male with a complex oncologic history, including low-grade  kappa-restricted plasmacytoid B-cell lymphoma diagnosed in 2004, which transformed into 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 He underwent R-CHOP followed by DA-R-EPOCH with intrathecal chemotherapy and consolidative radiotherapy, achieving complete remission as of April 2023. 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 Imaging findings include stable nodular tracheal wall thickening just above the pradip, with a 0.8 x 0.6 cm mildly FDG-avid lesion on PET/CT, and a stable 3 mm subpleural nodule in the left upper lobe. His echocardiogram from July 2023 showed a normal left ventricular ejection fraction of 55-60% with mild hypokinesis of the apical septal and apical inferior segments. His case was discussed at the thoracic tumor board, and he is currently planned for surgical resection with Dr. Potts. He has a history of complete heart block with a permanent pacemaker in place. Shahid remains asymptomatic, physically active, and engaged in his care decisions. He has a history of multiple non-melanoma skin cancers treated with Mohs surgery, persistent hypogammaglobulinemia requiring IVIG, and resolved COVID-19 and sinusitis. He continues regular oncology surveillance with Dr. Ruelas for lymphoma remission and dermatology follow-up for skin cancer monitoring.    Anesthesia Evaluation   Pt has had prior anesthetic. Type: General and MAC.    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 2272 Assurity MRI  Normal Device Function  Mode: DDDR  bpm  AP: 1.9%  : >99%  Presenting EGM: AS- @ 95 bpm  Lead Trends Appear Stable.  Estimated battery longevity to RRT = 4.2 years. Battery Voltage = 2.98 V  Atrial Arrhythmia: None  Ventricular Arrhythmia: 14 Consecutive PVC episodes recorded.        (+)  - -   -  - -              pacemaker, Reason placed: CHB. type: [employer], settings: DDD , - Patient is dependent on pacemaker.               Previous cardiac testing   Echo: Date: 7/13/2023 Results:  Interpretation Summary  Left ventricular function is normal.The ejection fraction is 55-60%. There is  mild hyookinesis of the apical septal and apical inferior segments.  Global right ventricular function is normal. The right ventricle is normal  size.  No significant valvular abnormalities.  The estimated PA systolic pressure is 24 mmHg.  IVC diameter <2.1 cm collapsing >50% with sniff suggests a normal RA pressure  of 3 mmHg.  This study was compared with the study from 12/15/2022. The wall motion  abnormalities appear in some views from the prior study, but they are better  delineated in today's evaluation due to the use of contrast.    Stress Test:  Date: Results:    ECG Reviewed:  Date: 2022 Results:  Atrial-sensed ventricular-paced rhythm with prolonged AV conduction   Abnormal ECG   When compared with ECG of 12-NOV-2020 14:12    Cath:  Date: Results:   (-) hypertension, taking anticoagulants/antiplatelets and PARRISH   METS/Exercise Tolerance: >4 METS Comment: Swimming 2 X weekly for 1/2 mile without exertional symptoms    Walked up stairs 5 floors in [facility] building   Hematologic:    (-) history of blood clots and history of blood transfusion   Musculoskeletal:  - neg musculoskeletal ROS     GI/Hepatic:     (+) GERD, Asymptomatic on medication,                  Renal/Genitourinary:  - neg Renal ROS     Endo:  - neg endo ROS     Psychiatric/Substance Use:    (-)  psychiatric history   Infectious Disease:  - neg infectious disease ROS     Malignancy: Comment: History of chest radiation years ago for first lymphoma diagnosis involving spine tumor    newly diagnosed 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      Other:  - neg other ROS          Physical Exam    Airway        Mallampati: II       Respiratory Devices and Support         Dental       (+) Modest Abnormalities - crowns, retainers, 1 or 2 missing teeth      Cardiovascular   cardiovascular exam normal          Pulmonary   pulmonary exam normal            OUTSIDE LABS:  CBC:   Lab Results   Component Value Date    WBC 5.9 02/19/2025    WBC 9.9 11/14/2024    HGB 13.5 02/19/2025    HGB 13.1 (L) 01/15/2025    HCT 40.5 02/19/2025    HCT 39.3 (L) 11/14/2024     02/19/2025     11/14/2024     BMP:   Lab Results   Component Value Date     02/19/2025     11/14/2024    POTASSIUM 4.5 02/19/2025    POTASSIUM 4.7 11/14/2024    CHLORIDE 104 02/19/2025    CHLORIDE 105 11/14/2024    CO2 28 02/19/2025    CO2 24 11/14/2024    BUN 29.1 (H) 02/19/2025    BUN 28.9 (H) 11/14/2024    CR 0.94 02/19/2025    CR 1.0 11/14/2024     (H) 02/19/2025    GLC 89 01/08/2025     COAGS:   Lab Results   Component Value Date    PTT 47 (H) 02/02/2023    INR 1.08 04/11/2023    FIBR 198 04/11/2023     POC:   Lab Results   Component Value Date    BGM 94 02/22/2012     HEPATIC:   Lab Results   Component Value Date    ALBUMIN 4.4 11/14/2024    PROTTOTAL 6.1 (L) 11/14/2024    ALT 49 11/14/2024    AST 39 11/14/2024    ALKPHOS 98 11/14/2024    BILITOTAL 0.3 11/14/2024     OTHER:   Lab Results   Component Value Date    LACT 1.7 12/15/2022    JORGE 9.4 02/19/2025    PHOS 3.8 04/11/2023    MAG 2.1 04/11/2023    LIPASE 43 12/14/2022    TSH 1.61 02/16/2012       Anesthesia Plan    ASA Status:  4        Anesthesia Type: General.     - Airway: ETT   Induction: Intravenous, Propofol.   Maintenance: TIVA.   Techniques and Equipment:     - Airway: Fiberoptic Bronchoscope, Video-Laryngoscope     - Lines/Monitors: 2nd IV, Arterial Line, Central Line, BIS, NIRS     - Blood: T&C, Blood in Room, Cell Saver     - Drips/Meds: Norepi, Epinephrine, Vasopressin     Consents            Postoperative Care    Pain management: Multi-modal analgesia.   PONV prophylaxis: Background Propofol Infusion     Comments:    Other Comments: GETA with TIVA (propofol and rocuronium infusions). ETT with bronchial blocker. Standard ASA monitors plus post induction a-line. A-line in the left radial and pulse ox on the right hand. Standard IV induction. x2 large bore Ivs. MAC line. Have his pacemaker placed in an asynchronous mode, have external pacing pads as backup.            Garcia Cote MD    I have reviewed the pertinent notes and labs in the chart from the past 30 days and (re)examined the patient.  Any updates or changes from those notes are reflected in this note.    Clinically Significant Risk Factors Present on Admission                                  # Pacemaker present

## 2025-02-28 ENCOUNTER — ANCILLARY PROCEDURE (OUTPATIENT)
Dept: CARDIOLOGY | Facility: CLINIC | Age: 75
DRG: 166 | End: 2025-02-28
Attending: INTERNAL MEDICINE
Payer: MEDICARE

## 2025-02-28 ENCOUNTER — ANESTHESIA (OUTPATIENT)
Dept: SURGERY | Facility: CLINIC | Age: 75
End: 2025-02-28
Payer: MEDICARE

## 2025-02-28 ENCOUNTER — APPOINTMENT (OUTPATIENT)
Dept: GENERAL RADIOLOGY | Facility: CLINIC | Age: 75
DRG: 166 | End: 2025-02-28
Attending: SURGERY
Payer: MEDICARE

## 2025-02-28 ENCOUNTER — HOSPITAL ENCOUNTER (INPATIENT)
Facility: CLINIC | Age: 75
DRG: 166 | End: 2025-02-28
Attending: THORACIC SURGERY (CARDIOTHORACIC VASCULAR SURGERY) | Admitting: THORACIC SURGERY (CARDIOTHORACIC VASCULAR SURGERY)
Payer: MEDICARE

## 2025-02-28 DIAGNOSIS — Z45.02 FITTING AND ADJUSTMENT OF AUTOMATIC IMPLANTABLE CARDIOVERTER-DEFIBRILLATOR: ICD-10-CM

## 2025-02-28 DIAGNOSIS — C33 TRACHEAL CANCER (H): Primary | ICD-10-CM

## 2025-02-28 DIAGNOSIS — T45.1X5S ADVERSE EFFECT OF ANTINEOPLASTIC AND IMMUNOSUPPRESSIVE DRUGS, SEQUELA: ICD-10-CM

## 2025-02-28 LAB
ALBUMIN SERPL BCG-MCNC: 3.5 G/DL (ref 3.5–5.2)
ALLEN'S TEST: ABNORMAL
ALP SERPL-CCNC: 72 U/L (ref 40–150)
ALT SERPL W P-5'-P-CCNC: 74 U/L (ref 0–70)
ANION GAP SERPL CALCULATED.3IONS-SCNC: 18 MMOL/L (ref 7–15)
APTT PPP: 30 SECONDS (ref 22–38)
AST SERPL W P-5'-P-CCNC: 112 U/L (ref 0–45)
BASE EXCESS BLDA CALC-SCNC: -0.5 MMOL/L (ref -3–3)
BASE EXCESS BLDA CALC-SCNC: -0.6 MMOL/L (ref -3–3)
BASE EXCESS BLDA CALC-SCNC: -0.7 MMOL/L (ref -3–3)
BASE EXCESS BLDA CALC-SCNC: -0.7 MMOL/L (ref -3–3)
BASE EXCESS BLDA CALC-SCNC: -0.8 MMOL/L (ref -3–3)
BASE EXCESS BLDA CALC-SCNC: -4 MMOL/L (ref -3–3)
BASE EXCESS BLDA CALC-SCNC: -5.7 MMOL/L (ref -3–3)
BASE EXCESS BLDA CALC-SCNC: -6.7 MMOL/L (ref -3–3)
BASE EXCESS BLDA CALC-SCNC: 0 MMOL/L (ref -3–3)
BASE EXCESS BLDA CALC-SCNC: 0 MMOL/L (ref -3–3)
BASE EXCESS BLDA CALC-SCNC: 0.4 MMOL/L (ref -3–3)
BASE EXCESS BLDA CALC-SCNC: 1.3 MMOL/L (ref -3–3)
BASE EXCESS BLDA CALC-SCNC: 1.4 MMOL/L (ref -3–3)
BASE EXCESS BLDA CALC-SCNC: 1.8 MMOL/L (ref -3–3)
BASE EXCESS BLDV CALC-SCNC: -0.1 MMOL/L (ref -3–3)
BASE EXCESS BLDV CALC-SCNC: 1.3 MMOL/L (ref -3–3)
BILIRUB SERPL-MCNC: 0.7 MG/DL
BLD PROD TYP BPU: NORMAL
BLD PROD TYP BPU: NORMAL
BLOOD COMPONENT TYPE: NORMAL
BLOOD COMPONENT TYPE: NORMAL
BUN SERPL-MCNC: 26.3 MG/DL (ref 8–23)
CA-I BLD-MCNC: 3.9 MG/DL (ref 4.4–5.2)
CA-I BLD-MCNC: 3.9 MG/DL (ref 4.4–5.2)
CA-I BLD-MCNC: 4.1 MG/DL (ref 4.4–5.2)
CA-I BLD-MCNC: 4.2 MG/DL (ref 4.4–5.2)
CA-I BLD-MCNC: 4.3 MG/DL (ref 4.4–5.2)
CA-I BLD-MCNC: 4.3 MG/DL (ref 4.4–5.2)
CA-I BLD-MCNC: 4.6 MG/DL (ref 4.4–5.2)
CA-I BLD-MCNC: 4.7 MG/DL (ref 4.4–5.2)
CA-I BLD-MCNC: 4.8 MG/DL (ref 4.4–5.2)
CA-I BLD-MCNC: 4.8 MG/DL (ref 4.4–5.2)
CALCIUM SERPL-MCNC: 9.3 MG/DL (ref 8.8–10.4)
CHLORIDE SERPL-SCNC: 105 MMOL/L (ref 98–107)
CLOT INIT KAOL IND TO POST HEP NEUT TRTO: 1 {RATIO}
CLOT INIT KAOL IND TO POST HEP NEUT TRTO: 1 {RATIO}
CLOT INIT KAOLIN IND BLD US: 101 SEC (ref 113–166)
CLOT INIT KAOLIN IND BLD US: 110 SEC (ref 113–166)
CLOT INIT KAOLIN IND P HEP NEUT BLD US: 115 SEC (ref 103–153)
CLOT INIT KAOLIN IND P HEP NEUT BLD US: 98 SEC (ref 103–153)
CLOT STIFF PLT CONT BLD CALC: 13.2 HPA (ref 11.9–29.8)
CLOT STIFF PLT CONT BLD CALC: 14.2 HPA (ref 11.9–29.8)
CLOT STIFF TF IND P HEP NEUT BLD US: 14.7 HPA (ref 13–33.2)
CLOT STIFF TF IND P HEP NEUT BLD US: 16 HPA (ref 13–33.2)
CLOT STIFF TF IND+IIB-IIIA INH P HEP NEU: 1.5 HPA (ref 1–3.7)
CLOT STIFF TF IND+IIB-IIIA INH P HEP NEU: 1.8 HPA (ref 1–3.7)
CODING SYSTEM: NORMAL
CODING SYSTEM: NORMAL
CREAT SERPL-MCNC: 0.98 MG/DL (ref 0.67–1.17)
CROSSMATCH: NORMAL
CROSSMATCH: NORMAL
EGFRCR SERPLBLD CKD-EPI 2021: 80 ML/MIN/1.73M2
ERYTHROCYTE [DISTWIDTH] IN BLOOD BY AUTOMATED COUNT: 12.7 % (ref 10–15)
FIBRINOGEN PPP-MCNC: 153 MG/DL (ref 170–510)
FIBRINOGEN PPP-MCNC: 211 MG/DL (ref 170–510)
GLUCOSE BLD-MCNC: 134 MG/DL (ref 70–99)
GLUCOSE BLD-MCNC: 135 MG/DL (ref 70–99)
GLUCOSE BLD-MCNC: 136 MG/DL (ref 70–99)
GLUCOSE BLD-MCNC: 137 MG/DL (ref 70–99)
GLUCOSE BLD-MCNC: 137 MG/DL (ref 70–99)
GLUCOSE BLD-MCNC: 138 MG/DL (ref 70–99)
GLUCOSE BLD-MCNC: 138 MG/DL (ref 70–99)
GLUCOSE BLD-MCNC: 139 MG/DL (ref 70–99)
GLUCOSE BLD-MCNC: 142 MG/DL (ref 70–99)
GLUCOSE BLD-MCNC: 143 MG/DL (ref 70–99)
GLUCOSE BLD-MCNC: 148 MG/DL (ref 70–99)
GLUCOSE BLD-MCNC: 150 MG/DL (ref 70–99)
GLUCOSE BLD-MCNC: 159 MG/DL (ref 70–99)
GLUCOSE BLD-MCNC: 186 MG/DL (ref 70–99)
GLUCOSE BLD-MCNC: 98 MG/DL (ref 70–99)
GLUCOSE BLDC GLUCOMTR-MCNC: 187 MG/DL (ref 70–99)
GLUCOSE BLDC GLUCOMTR-MCNC: 218 MG/DL (ref 70–99)
GLUCOSE BLDC GLUCOMTR-MCNC: 90 MG/DL (ref 70–99)
GLUCOSE SERPL-MCNC: 201 MG/DL (ref 70–99)
HCO3 BLD-SCNC: 20 MMOL/L (ref 21–28)
HCO3 BLDA-SCNC: 22 MMOL/L (ref 21–28)
HCO3 BLDA-SCNC: 23 MMOL/L (ref 21–28)
HCO3 BLDA-SCNC: 25 MMOL/L (ref 21–28)
HCO3 BLDA-SCNC: 26 MMOL/L (ref 21–28)
HCO3 BLDA-SCNC: 27 MMOL/L (ref 21–28)
HCO3 BLDA-SCNC: 27 MMOL/L (ref 21–28)
HCO3 BLDV-SCNC: 26 MMOL/L (ref 21–28)
HCO3 BLDV-SCNC: 28 MMOL/L (ref 21–28)
HCO3 SERPL-SCNC: 19 MMOL/L (ref 22–29)
HCT VFR BLD AUTO: 35.7 % (ref 40–53)
HGB BLD-MCNC: 10 G/DL (ref 13.3–17.7)
HGB BLD-MCNC: 10.1 G/DL (ref 13.3–17.7)
HGB BLD-MCNC: 10.1 G/DL (ref 13.3–17.7)
HGB BLD-MCNC: 10.2 G/DL (ref 13.3–17.7)
HGB BLD-MCNC: 10.3 G/DL (ref 13.3–17.7)
HGB BLD-MCNC: 10.4 G/DL (ref 13.3–17.7)
HGB BLD-MCNC: 10.5 G/DL (ref 13.3–17.7)
HGB BLD-MCNC: 10.5 G/DL (ref 13.3–17.7)
HGB BLD-MCNC: 12 G/DL (ref 13.3–17.7)
HGB BLD-MCNC: 12.9 G/DL (ref 13.3–17.7)
HGB BLD-MCNC: 13 G/DL (ref 13.3–17.7)
HGB BLD-MCNC: 13.1 G/DL (ref 13.3–17.7)
HGB BLD-MCNC: 9.5 G/DL (ref 13.3–17.7)
HGB BLD-MCNC: 9.9 G/DL (ref 13.3–17.7)
INR PPP: 1.31 (ref 0.85–1.15)
INR PPP: 1.77 (ref 0.85–1.15)
ISSUE DATE AND TIME: NORMAL
ISSUE DATE AND TIME: NORMAL
LACTATE BLD-SCNC: 0.5 MMOL/L (ref 0.7–2)
LACTATE BLD-SCNC: 0.7 MMOL/L (ref 0.7–2)
LACTATE BLD-SCNC: 0.9 MMOL/L (ref 0.7–2)
LACTATE BLD-SCNC: 0.9 MMOL/L (ref 0.7–2)
LACTATE BLD-SCNC: 1 MMOL/L (ref 0.7–2)
LACTATE BLD-SCNC: 1.1 MMOL/L (ref 0.7–2)
LACTATE BLD-SCNC: 1.1 MMOL/L (ref 0.7–2)
LACTATE BLD-SCNC: 1.2 MMOL/L (ref 0.7–2)
LACTATE BLD-SCNC: 1.3 MMOL/L (ref 0.7–2)
LACTATE BLD-SCNC: 1.4 MMOL/L (ref 0.7–2)
LACTATE BLD-SCNC: 3.2 MMOL/L (ref 0.7–2)
LACTATE BLD-SCNC: 3.2 MMOL/L (ref 0.7–2)
LACTATE SERPL-SCNC: 5 MMOL/L (ref 0.7–2)
LACTATE SERPL-SCNC: 7 MMOL/L (ref 0.7–2)
MCH RBC QN AUTO: 32.3 PG (ref 26.5–33)
MCHC RBC AUTO-ENTMCNC: 33.6 G/DL (ref 31.5–36.5)
MCV RBC AUTO: 96 FL (ref 78–100)
MDC_IDC_LEAD_CONNECTION_STATUS: NORMAL
MDC_IDC_LEAD_CONNECTION_STATUS: NORMAL
MDC_IDC_LEAD_IMPLANT_DT: NORMAL
MDC_IDC_LEAD_IMPLANT_DT: NORMAL
MDC_IDC_LEAD_LOCATION: NORMAL
MDC_IDC_LEAD_LOCATION: NORMAL
MDC_IDC_LEAD_LOCATION_DETAIL_1: NORMAL
MDC_IDC_LEAD_LOCATION_DETAIL_1: NORMAL
MDC_IDC_LEAD_MFG: NORMAL
MDC_IDC_LEAD_MFG: NORMAL
MDC_IDC_LEAD_MODEL: NORMAL
MDC_IDC_LEAD_MODEL: NORMAL
MDC_IDC_LEAD_POLARITY_TYPE: NORMAL
MDC_IDC_LEAD_POLARITY_TYPE: NORMAL
MDC_IDC_LEAD_SERIAL: NORMAL
MDC_IDC_LEAD_SERIAL: NORMAL
MDC_IDC_MSMT_BATTERY_REMAINING_LONGEVITY: 42 MO
MDC_IDC_MSMT_BATTERY_STATUS: NORMAL
MDC_IDC_MSMT_BATTERY_VOLTAGE: 2.98 V
MDC_IDC_MSMT_LEADCHNL_RA_IMPEDANCE_VALUE: 375 OHM
MDC_IDC_MSMT_LEADCHNL_RA_PACING_THRESHOLD_AMPLITUDE: 0.75 V
MDC_IDC_MSMT_LEADCHNL_RA_PACING_THRESHOLD_AMPLITUDE: 0.75 V
MDC_IDC_MSMT_LEADCHNL_RA_PACING_THRESHOLD_PULSEWIDTH: 0.4 MS
MDC_IDC_MSMT_LEADCHNL_RA_PACING_THRESHOLD_PULSEWIDTH: 0.4 MS
MDC_IDC_MSMT_LEADCHNL_RA_SENSING_INTR_AMPL: 4.1 MV
MDC_IDC_MSMT_LEADCHNL_RV_IMPEDANCE_VALUE: 462.5 OHM
MDC_IDC_MSMT_LEADCHNL_RV_PACING_THRESHOLD_AMPLITUDE: 1 V
MDC_IDC_MSMT_LEADCHNL_RV_PACING_THRESHOLD_AMPLITUDE: 1 V
MDC_IDC_MSMT_LEADCHNL_RV_PACING_THRESHOLD_PULSEWIDTH: 0.4 MS
MDC_IDC_MSMT_LEADCHNL_RV_PACING_THRESHOLD_PULSEWIDTH: 0.4 MS
MDC_IDC_PG_IMPLANT_DTM: NORMAL
MDC_IDC_PG_MFG: NORMAL
MDC_IDC_PG_MODEL: NORMAL
MDC_IDC_PG_SERIAL: NORMAL
MDC_IDC_PG_TYPE: NORMAL
MDC_IDC_SESS_CLINIC_NAME: NORMAL
MDC_IDC_SESS_DTM: NORMAL
MDC_IDC_SESS_TYPE: NORMAL
MDC_IDC_SET_BRADY_LOWRATE: 80 {BEATS}/MIN
MDC_IDC_SET_BRADY_MODE: NORMAL
MDC_IDC_SET_BRADY_NIGHT_RATE: NORMAL
MDC_IDC_SET_BRADY_PAV_DELAY_LOW: 200 MS
MDC_IDC_SET_LEADCHNL_RA_PACING_AMPLITUDE: 2.5 V
MDC_IDC_SET_LEADCHNL_RA_PACING_POLARITY: NORMAL
MDC_IDC_SET_LEADCHNL_RA_PACING_PULSEWIDTH: 0.4 MS
MDC_IDC_SET_LEADCHNL_RA_SENSING_POLARITY: NORMAL
MDC_IDC_SET_LEADCHNL_RV_PACING_AMPLITUDE: 2.5 V
MDC_IDC_SET_LEADCHNL_RV_PACING_POLARITY: NORMAL
MDC_IDC_SET_LEADCHNL_RV_PACING_PULSEWIDTH: 0.4 MS
MDC_IDC_SET_LEADCHNL_RV_SENSING_POLARITY: NORMAL
MDC_IDC_STAT_AT_BURDEN_PERCENT: 0 %
MDC_IDC_STAT_AT_MODE_SW_COUNT: 0
MDC_IDC_STAT_BRADY_RA_PERCENT_PACED: 2.1 %
MDC_IDC_STAT_BRADY_RV_PERCENT_PACED: 99.17 %
MDC_IDC_STAT_EPISODE_RECENT_COUNT: 0
MDC_IDC_STAT_EPISODE_TYPE: NORMAL
MDC_IDC_STAT_EPISODE_VENDOR_TYPE: NORMAL
MDC_IDC_STAT_EPISODE_VENDOR_TYPE: NORMAL
O2/TOTAL GAS SETTING VFR VENT: 100 %
O2/TOTAL GAS SETTING VFR VENT: 40 %
O2/TOTAL GAS SETTING VFR VENT: 70 %
O2/TOTAL GAS SETTING VFR VENT: 75 %
O2/TOTAL GAS SETTING VFR VENT: 80 %
O2/TOTAL GAS SETTING VFR VENT: 80 %
OXYHGB MFR BLDA: 92 % (ref 92–100)
OXYHGB MFR BLDA: 96 % (ref 92–100)
OXYHGB MFR BLDA: 98 % (ref 92–100)
OXYHGB MFR BLDV: 73 % (ref 70–75)
OXYHGB MFR BLDV: 80 % (ref 70–75)
PATH REPORT.COMMENTS IMP SPEC: NORMAL
PATH REPORT.INTRAOP OBS SPEC DOC: NORMAL
PCO2 BLD: 41 MM HG (ref 35–45)
PCO2 BLDA: 36 MM HG (ref 35–45)
PCO2 BLDA: 38 MM HG (ref 35–45)
PCO2 BLDA: 41 MM HG (ref 35–45)
PCO2 BLDA: 41 MM HG (ref 35–45)
PCO2 BLDA: 44 MM HG (ref 35–45)
PCO2 BLDA: 44 MM HG (ref 35–45)
PCO2 BLDA: 46 MM HG (ref 35–45)
PCO2 BLDA: 47 MM HG (ref 35–45)
PCO2 BLDA: 47 MM HG (ref 35–45)
PCO2 BLDA: 48 MM HG (ref 35–45)
PCO2 BLDA: 50 MM HG (ref 35–45)
PCO2 BLDA: 54 MM HG (ref 35–45)
PCO2 BLDA: 55 MM HG (ref 35–45)
PCO2 BLDV: 49 MM HG (ref 40–50)
PCO2 BLDV: 51 MM HG (ref 40–50)
PH BLD: 7.29 [PH] (ref 7.35–7.45)
PH BLDA: 7.22 [PH] (ref 7.35–7.45)
PH BLDA: 7.27 [PH] (ref 7.35–7.45)
PH BLDA: 7.29 [PH] (ref 7.35–7.45)
PH BLDA: 7.34 [PH] (ref 7.35–7.45)
PH BLDA: 7.34 [PH] (ref 7.35–7.45)
PH BLDA: 7.35 [PH] (ref 7.35–7.45)
PH BLDA: 7.35 [PH] (ref 7.35–7.45)
PH BLDA: 7.36 [PH] (ref 7.35–7.45)
PH BLDA: 7.38 [PH] (ref 7.35–7.45)
PH BLDA: 7.38 [PH] (ref 7.35–7.45)
PH BLDA: 7.42 [PH] (ref 7.35–7.45)
PH BLDA: 7.43 [PH] (ref 7.35–7.45)
PH BLDA: 7.46 [PH] (ref 7.35–7.45)
PH BLDV: 7.34 [PH] (ref 7.32–7.43)
PH BLDV: 7.34 [PH] (ref 7.32–7.43)
PLATELET # BLD AUTO: 133 10E3/UL (ref 150–450)
PLATELET # BLD AUTO: 150 10E3/UL (ref 150–450)
PO2 BLD: 212 MM HG (ref 80–105)
PO2 BLDA: 107 MM HG (ref 80–105)
PO2 BLDA: 247 MM HG (ref 80–105)
PO2 BLDA: 267 MM HG (ref 80–105)
PO2 BLDA: 279 MM HG (ref 80–105)
PO2 BLDA: 296 MM HG (ref 80–105)
PO2 BLDA: 316 MM HG (ref 80–105)
PO2 BLDA: 318 MM HG (ref 80–105)
PO2 BLDA: 324 MM HG (ref 80–105)
PO2 BLDA: 327 MM HG (ref 80–105)
PO2 BLDA: 348 MM HG (ref 80–105)
PO2 BLDA: 428 MM HG (ref 80–105)
PO2 BLDA: 436 MM HG (ref 80–105)
PO2 BLDA: 74 MM HG (ref 80–105)
PO2 BLDV: 42 MM HG (ref 25–47)
PO2 BLDV: 48 MM HG (ref 25–47)
POTASSIUM BLD-SCNC: 3.6 MMOL/L (ref 3.4–5.3)
POTASSIUM BLD-SCNC: 3.7 MMOL/L (ref 3.4–5.3)
POTASSIUM BLD-SCNC: 3.9 MMOL/L (ref 3.4–5.3)
POTASSIUM BLD-SCNC: 4 MMOL/L (ref 3.4–5.3)
POTASSIUM BLD-SCNC: 4.1 MMOL/L (ref 3.4–5.3)
POTASSIUM BLD-SCNC: 4.2 MMOL/L (ref 3.4–5.3)
POTASSIUM BLD-SCNC: 4.3 MMOL/L (ref 3.4–5.3)
POTASSIUM SERPL-SCNC: 4.2 MMOL/L (ref 3.4–5.3)
PROT SERPL-MCNC: 4.7 G/DL (ref 6.4–8.3)
RBC # BLD AUTO: 3.71 10E6/UL (ref 4.4–5.9)
SAO2 % BLDA: 100 % (ref 95–96)
SAO2 % BLDA: 95 % (ref 95–96)
SAO2 % BLDA: 98 % (ref 95–96)
SAO2 % BLDA: 99.8 % (ref 95–96)
SAO2 % BLDV: 75 % (ref 70–75)
SAO2 % BLDV: 82 % (ref 70–75)
SODIUM BLD-SCNC: 140 MMOL/L (ref 135–145)
SODIUM BLD-SCNC: 141 MMOL/L (ref 135–145)
SODIUM BLD-SCNC: 142 MMOL/L (ref 135–145)
SODIUM SERPL-SCNC: 142 MMOL/L (ref 135–145)
UNIT ABO/RH: NORMAL
UNIT ABO/RH: NORMAL
UNIT NUMBER: NORMAL
UNIT NUMBER: NORMAL
UNIT STATUS: NORMAL
UNIT STATUS: NORMAL
UNIT TYPE ISBT: 6200
UNIT TYPE ISBT: 6200
WBC # BLD AUTO: 27.4 10E3/UL (ref 4–11)

## 2025-02-28 PROCEDURE — 88360 TUMOR IMMUNOHISTOCHEM/MANUAL: CPT | Mod: 26 | Performed by: PATHOLOGY

## 2025-02-28 PROCEDURE — 85384 FIBRINOGEN ACTIVITY: CPT

## 2025-02-28 PROCEDURE — 33956 ECMO/ECLS INSJ CTR CANNULA: CPT | Mod: GC | Performed by: THORACIC SURGERY (CARDIOTHORACIC VASCULAR SURGERY)

## 2025-02-28 PROCEDURE — 84450 TRANSFERASE (AST) (SGOT): CPT

## 2025-02-28 PROCEDURE — 85610 PROTHROMBIN TIME: CPT

## 2025-02-28 PROCEDURE — 88331 PATH CONSLTJ SURG 1 BLK 1SPC: CPT | Mod: 26 | Performed by: PATHOLOGY

## 2025-02-28 PROCEDURE — 71045 X-RAY EXAM CHEST 1 VIEW: CPT | Mod: 26 | Performed by: RADIOLOGY

## 2025-02-28 PROCEDURE — 85610 PROTHROMBIN TIME: CPT | Performed by: THORACIC SURGERY (CARDIOTHORACIC VASCULAR SURGERY)

## 2025-02-28 PROCEDURE — 250N000012 HC RX MED GY IP 250 OP 636 PS 637

## 2025-02-28 PROCEDURE — 410N000004: Performed by: THORACIC SURGERY (CARDIOTHORACIC VASCULAR SURGERY)

## 2025-02-28 PROCEDURE — 250N000009 HC RX 250: Performed by: STUDENT IN AN ORGANIZED HEALTH CARE EDUCATION/TRAINING PROGRAM

## 2025-02-28 PROCEDURE — 38746 REMOVE THORACIC LYMPH NODES: CPT | Mod: GC | Performed by: THORACIC SURGERY (CARDIOTHORACIC VASCULAR SURGERY)

## 2025-02-28 PROCEDURE — 5A15A2F EXTRACORPOREAL OXYGENATION, MEMBRANE, CENTRAL, INTRAOPERATIVE: ICD-10-PCS | Performed by: THORACIC SURGERY (CARDIOTHORACIC VASCULAR SURGERY)

## 2025-02-28 PROCEDURE — 250N000025 HC SEVOFLURANE, PER MIN: Performed by: THORACIC SURGERY (CARDIOTHORACIC VASCULAR SURGERY)

## 2025-02-28 PROCEDURE — 82310 ASSAY OF CALCIUM: CPT

## 2025-02-28 PROCEDURE — 258N000003 HC RX IP 258 OP 636: Performed by: NURSE ANESTHETIST, CERTIFIED REGISTERED

## 2025-02-28 PROCEDURE — 250N000009 HC RX 250

## 2025-02-28 PROCEDURE — 82805 BLOOD GASES W/O2 SATURATION: CPT

## 2025-02-28 PROCEDURE — 82330 ASSAY OF CALCIUM: CPT

## 2025-02-28 PROCEDURE — 999N000065 XR CHEST PORT 1 VIEW

## 2025-02-28 PROCEDURE — 370N000017 HC ANESTHESIA TECHNICAL FEE, PER MIN: Performed by: THORACIC SURGERY (CARDIOTHORACIC VASCULAR SURGERY)

## 2025-02-28 PROCEDURE — 272N000090 HC PUMP PPP PEDIATRIC PERFUSION: Performed by: THORACIC SURGERY (CARDIOTHORACIC VASCULAR SURGERY)

## 2025-02-28 PROCEDURE — 250N000009 HC RX 250: Performed by: THORACIC SURGERY (CARDIOTHORACIC VASCULAR SURGERY)

## 2025-02-28 PROCEDURE — 85730 THROMBOPLASTIN TIME PARTIAL: CPT | Performed by: THORACIC SURGERY (CARDIOTHORACIC VASCULAR SURGERY)

## 2025-02-28 PROCEDURE — 31786 REMOVE WINDPIPE LESION: CPT | Mod: GC | Performed by: THORACIC SURGERY (CARDIOTHORACIC VASCULAR SURGERY)

## 2025-02-28 PROCEDURE — 85384 FIBRINOGEN ACTIVITY: CPT | Performed by: THORACIC SURGERY (CARDIOTHORACIC VASCULAR SURGERY)

## 2025-02-28 PROCEDURE — 250N000011 HC RX IP 250 OP 636: Performed by: SURGERY

## 2025-02-28 PROCEDURE — 250N000011 HC RX IP 250 OP 636: Performed by: STUDENT IN AN ORGANIZED HEALTH CARE EDUCATION/TRAINING PROGRAM

## 2025-02-28 PROCEDURE — 272N000085 HC PACK CELL SAVER CSP: Performed by: THORACIC SURGERY (CARDIOTHORACIC VASCULAR SURGERY)

## 2025-02-28 PROCEDURE — 999N000253 HC STATISTIC WEANING TRIALS

## 2025-02-28 PROCEDURE — P9016 RBC LEUKOCYTES REDUCED: HCPCS

## 2025-02-28 PROCEDURE — 258N000003 HC RX IP 258 OP 636: Performed by: STUDENT IN AN ORGANIZED HEALTH CARE EDUCATION/TRAINING PROGRAM

## 2025-02-28 PROCEDURE — 07B70ZZ EXCISION OF THORAX LYMPHATIC, OPEN APPROACH: ICD-10-PCS | Performed by: THORACIC SURGERY (CARDIOTHORACIC VASCULAR SURGERY)

## 2025-02-28 PROCEDURE — 360N000079 HC SURGERY LEVEL 6, PER MIN: Performed by: THORACIC SURGERY (CARDIOTHORACIC VASCULAR SURGERY)

## 2025-02-28 PROCEDURE — 85027 COMPLETE CBC AUTOMATED: CPT | Performed by: THORACIC SURGERY (CARDIOTHORACIC VASCULAR SURGERY)

## 2025-02-28 PROCEDURE — 250N000011 HC RX IP 250 OP 636: Performed by: NURSE ANESTHETIST, CERTIFIED REGISTERED

## 2025-02-28 PROCEDURE — 250N000009 HC RX 250: Performed by: SURGERY

## 2025-02-28 PROCEDURE — 82040 ASSAY OF SERUM ALBUMIN: CPT

## 2025-02-28 PROCEDURE — 93286 PERI-PX EVAL PM/LDLS PM IP: CPT

## 2025-02-28 PROCEDURE — 272N000002 HC OR SUPPLY OTHER OPNP: Performed by: THORACIC SURGERY (CARDIOTHORACIC VASCULAR SURGERY)

## 2025-02-28 PROCEDURE — 85049 AUTOMATED PLATELET COUNT: CPT

## 2025-02-28 PROCEDURE — 410N000003 HC PER-PERFUSION 1ST 30 MIN: Performed by: THORACIC SURGERY (CARDIOTHORACIC VASCULAR SURGERY)

## 2025-02-28 PROCEDURE — P9045 ALBUMIN (HUMAN), 5%, 250 ML: HCPCS

## 2025-02-28 PROCEDURE — 88307 TISSUE EXAM BY PATHOLOGIST: CPT | Mod: 26 | Performed by: PATHOLOGY

## 2025-02-28 PROCEDURE — 84132 ASSAY OF SERUM POTASSIUM: CPT

## 2025-02-28 PROCEDURE — 85049 AUTOMATED PLATELET COUNT: CPT | Performed by: THORACIC SURGERY (CARDIOTHORACIC VASCULAR SURGERY)

## 2025-02-28 PROCEDURE — 99291 CRITICAL CARE FIRST HOUR: CPT | Mod: GC | Performed by: ANESTHESIOLOGY

## 2025-02-28 PROCEDURE — 258N000003 HC RX IP 258 OP 636

## 2025-02-28 PROCEDURE — 0BJ08ZZ INSPECTION OF TRACHEOBRONCHIAL TREE, VIA NATURAL OR ARTIFICIAL OPENING ENDOSCOPIC: ICD-10-PCS | Performed by: THORACIC SURGERY (CARDIOTHORACIC VASCULAR SURGERY)

## 2025-02-28 PROCEDURE — 250N000024 HC ISOFLURANE, PER MIN: Performed by: THORACIC SURGERY (CARDIOTHORACIC VASCULAR SURGERY)

## 2025-02-28 PROCEDURE — 999N000141 HC STATISTIC PRE-PROCEDURE NURSING ASSESSMENT: Performed by: THORACIC SURGERY (CARDIOTHORACIC VASCULAR SURGERY)

## 2025-02-28 PROCEDURE — 250N000009 HC RX 250: Performed by: NURSE ANESTHETIST, CERTIFIED REGISTERED

## 2025-02-28 PROCEDURE — 88342 IMHCHEM/IMCYTCHM 1ST ANTB: CPT | Mod: 26 | Performed by: PATHOLOGY

## 2025-02-28 PROCEDURE — 0BB10ZZ EXCISION OF TRACHEA, OPEN APPROACH: ICD-10-PCS | Performed by: THORACIC SURGERY (CARDIOTHORACIC VASCULAR SURGERY)

## 2025-02-28 PROCEDURE — 85396 CLOTTING ASSAY WHOLE BLOOD: CPT

## 2025-02-28 PROCEDURE — 88331 PATH CONSLTJ SURG 1 BLK 1SPC: CPT | Mod: TC | Performed by: THORACIC SURGERY (CARDIOTHORACIC VASCULAR SURGERY)

## 2025-02-28 PROCEDURE — 250N000011 HC RX IP 250 OP 636

## 2025-02-28 PROCEDURE — 88332 PATH CONSLTJ SURG EA ADD BLK: CPT | Mod: 26 | Performed by: PATHOLOGY

## 2025-02-28 PROCEDURE — 33986 ECMO/ECLS RMVL CTR CANNULA: CPT | Mod: GC | Performed by: THORACIC SURGERY (CARDIOTHORACIC VASCULAR SURGERY)

## 2025-02-28 PROCEDURE — 88307 TISSUE EXAM BY PATHOLOGIST: CPT | Mod: TC | Performed by: THORACIC SURGERY (CARDIOTHORACIC VASCULAR SURGERY)

## 2025-02-28 PROCEDURE — 999N000157 HC STATISTIC RCP TIME EA 10 MIN

## 2025-02-28 PROCEDURE — 250N000011 HC RX IP 250 OP 636: Performed by: THORACIC SURGERY (CARDIOTHORACIC VASCULAR SURGERY)

## 2025-02-28 PROCEDURE — 258N000001 HC RX 258

## 2025-02-28 PROCEDURE — 272N000001 HC OR GENERAL SUPPLY STERILE: Performed by: THORACIC SURGERY (CARDIOTHORACIC VASCULAR SURGERY)

## 2025-02-28 PROCEDURE — 94002 VENT MGMT INPAT INIT DAY: CPT

## 2025-02-28 PROCEDURE — 200N000002 HC R&B ICU UMMC

## 2025-02-28 PROCEDURE — 93286 PERI-PX EVAL PM/LDLS PM IP: CPT | Mod: 26 | Performed by: INTERNAL MEDICINE

## 2025-02-28 PROCEDURE — 88305 TISSUE EXAM BY PATHOLOGIST: CPT | Mod: 26 | Performed by: PATHOLOGY

## 2025-02-28 PROCEDURE — 83605 ASSAY OF LACTIC ACID: CPT

## 2025-02-28 RX ORDER — AMOXICILLIN 250 MG
1 CAPSULE ORAL 2 TIMES DAILY
Status: DISCONTINUED | OUTPATIENT
Start: 2025-02-28 | End: 2025-03-03

## 2025-02-28 RX ORDER — SODIUM CHLORIDE, SODIUM GLUCONATE, SODIUM ACETATE, POTASSIUM CHLORIDE AND MAGNESIUM CHLORIDE 526; 502; 368; 37; 30 MG/100ML; MG/100ML; MG/100ML; MG/100ML; MG/100ML
INJECTION, SOLUTION INTRAVENOUS CONTINUOUS PRN
Status: DISCONTINUED | OUTPATIENT
Start: 2025-02-28 | End: 2025-02-28

## 2025-02-28 RX ORDER — DEXMEDETOMIDINE HYDROCHLORIDE 4 UG/ML
.1-1.2 INJECTION, SOLUTION INTRAVENOUS CONTINUOUS
Status: DISCONTINUED | OUTPATIENT
Start: 2025-02-28 | End: 2025-03-02

## 2025-02-28 RX ORDER — PROCHLORPERAZINE MALEATE 5 MG/1
5 TABLET ORAL EVERY 6 HOURS PRN
Status: DISCONTINUED | OUTPATIENT
Start: 2025-02-28 | End: 2025-03-08 | Stop reason: HOSPADM

## 2025-02-28 RX ORDER — ENOXAPARIN SODIUM 100 MG/ML
40 INJECTION SUBCUTANEOUS EVERY 24 HOURS
Status: DISCONTINUED | OUTPATIENT
Start: 2025-03-01 | End: 2025-03-08 | Stop reason: HOSPADM

## 2025-02-28 RX ORDER — IPRATROPIUM BROMIDE AND ALBUTEROL SULFATE 2.5; .5 MG/3ML; MG/3ML
3 SOLUTION RESPIRATORY (INHALATION) ONCE
Status: DISCONTINUED | OUTPATIENT
Start: 2025-02-28 | End: 2025-03-03

## 2025-02-28 RX ORDER — PROPOFOL 10 MG/ML
INJECTION, EMULSION INTRAVENOUS PRN
Status: DISCONTINUED | OUTPATIENT
Start: 2025-02-28 | End: 2025-02-28

## 2025-02-28 RX ORDER — BISACODYL 10 MG
10 SUPPOSITORY, RECTAL RECTAL DAILY PRN
Status: DISCONTINUED | OUTPATIENT
Start: 2025-03-03 | End: 2025-03-08 | Stop reason: HOSPADM

## 2025-02-28 RX ORDER — NALOXONE HYDROCHLORIDE 0.4 MG/ML
0.2 INJECTION, SOLUTION INTRAMUSCULAR; INTRAVENOUS; SUBCUTANEOUS
Status: DISCONTINUED | OUTPATIENT
Start: 2025-02-28 | End: 2025-03-08 | Stop reason: HOSPADM

## 2025-02-28 RX ORDER — NICOTINE POLACRILEX 4 MG
15-30 LOZENGE BUCCAL
Status: DISCONTINUED | OUTPATIENT
Start: 2025-02-28 | End: 2025-03-04

## 2025-02-28 RX ORDER — NOREPINEPHRINE BITARTRATE 0.06 MG/ML
.01-.6 INJECTION, SOLUTION INTRAVENOUS CONTINUOUS
Status: DISCONTINUED | OUTPATIENT
Start: 2025-02-28 | End: 2025-03-02

## 2025-02-28 RX ORDER — PROPOFOL 10 MG/ML
INJECTION, EMULSION INTRAVENOUS CONTINUOUS PRN
Status: DISCONTINUED | OUTPATIENT
Start: 2025-02-28 | End: 2025-02-28

## 2025-02-28 RX ORDER — ONDANSETRON 4 MG/1
4 TABLET, ORALLY DISINTEGRATING ORAL EVERY 6 HOURS PRN
Status: DISCONTINUED | OUTPATIENT
Start: 2025-02-28 | End: 2025-03-08 | Stop reason: HOSPADM

## 2025-02-28 RX ORDER — NALOXONE HYDROCHLORIDE 0.4 MG/ML
0.4 INJECTION, SOLUTION INTRAMUSCULAR; INTRAVENOUS; SUBCUTANEOUS
Status: DISCONTINUED | OUTPATIENT
Start: 2025-02-28 | End: 2025-03-08 | Stop reason: HOSPADM

## 2025-02-28 RX ORDER — FLUMAZENIL 0.1 MG/ML
0.2 INJECTION, SOLUTION INTRAVENOUS
Status: DISCONTINUED | OUTPATIENT
Start: 2025-02-28 | End: 2025-02-28

## 2025-02-28 RX ORDER — ONDANSETRON 2 MG/ML
INJECTION INTRAMUSCULAR; INTRAVENOUS PRN
Status: DISCONTINUED | OUTPATIENT
Start: 2025-02-28 | End: 2025-02-28

## 2025-02-28 RX ORDER — DEXTROSE MONOHYDRATE 25 G/50ML
25-50 INJECTION, SOLUTION INTRAVENOUS
Status: DISCONTINUED | OUTPATIENT
Start: 2025-02-28 | End: 2025-03-04

## 2025-02-28 RX ORDER — SODIUM CHLORIDE, SODIUM LACTATE, POTASSIUM CHLORIDE, CALCIUM CHLORIDE 600; 310; 30; 20 MG/100ML; MG/100ML; MG/100ML; MG/100ML
INJECTION, SOLUTION INTRAVENOUS CONTINUOUS
Status: DISCONTINUED | OUTPATIENT
Start: 2025-02-28 | End: 2025-03-03

## 2025-02-28 RX ORDER — PANTOPRAZOLE SODIUM 40 MG/1
40 TABLET, DELAYED RELEASE ORAL DAILY
Status: DISCONTINUED | OUTPATIENT
Start: 2025-03-01 | End: 2025-03-03

## 2025-02-28 RX ORDER — ONDANSETRON 2 MG/ML
4 INJECTION INTRAMUSCULAR; INTRAVENOUS EVERY 6 HOURS PRN
Status: DISCONTINUED | OUTPATIENT
Start: 2025-02-28 | End: 2025-03-08 | Stop reason: HOSPADM

## 2025-02-28 RX ORDER — PROPOFOL 10 MG/ML
5-75 INJECTION, EMULSION INTRAVENOUS CONTINUOUS
Status: DISCONTINUED | OUTPATIENT
Start: 2025-02-28 | End: 2025-03-01

## 2025-02-28 RX ORDER — LIDOCAINE 40 MG/G
CREAM TOPICAL
Status: DISCONTINUED | OUTPATIENT
Start: 2025-02-28 | End: 2025-03-08 | Stop reason: HOSPADM

## 2025-02-28 RX ORDER — CEFAZOLIN SODIUM/WATER 2 G/20 ML
2 SYRINGE (ML) INTRAVENOUS
Status: COMPLETED | OUTPATIENT
Start: 2025-02-28 | End: 2025-02-28

## 2025-02-28 RX ORDER — CALCIUM CHLORIDE 100 MG/ML
INJECTION INTRAVENOUS; INTRAVENTRICULAR PRN
Status: DISCONTINUED | OUTPATIENT
Start: 2025-02-28 | End: 2025-02-28

## 2025-02-28 RX ORDER — VASOPRESSIN IN 0.9 % NACL 2 UNIT/2ML
SYRINGE (ML) INTRAVENOUS PRN
Status: DISCONTINUED | OUTPATIENT
Start: 2025-02-28 | End: 2025-02-28

## 2025-02-28 RX ORDER — ENOXAPARIN SODIUM 100 MG/ML
40 INJECTION SUBCUTANEOUS
Status: DISCONTINUED | OUTPATIENT
Start: 2025-02-28 | End: 2025-02-28

## 2025-02-28 RX ORDER — FENTANYL CITRATE 50 UG/ML
INJECTION, SOLUTION INTRAMUSCULAR; INTRAVENOUS PRN
Status: DISCONTINUED | OUTPATIENT
Start: 2025-02-28 | End: 2025-02-28

## 2025-02-28 RX ORDER — DEXTROSE MONOHYDRATE, SODIUM CHLORIDE, AND POTASSIUM CHLORIDE 50; 1.49; 4.5 G/1000ML; G/1000ML; G/1000ML
INJECTION, SOLUTION INTRAVENOUS CONTINUOUS
Status: DISCONTINUED | OUTPATIENT
Start: 2025-02-28 | End: 2025-02-28

## 2025-02-28 RX ORDER — SODIUM CHLORIDE, SODIUM LACTATE, POTASSIUM CHLORIDE, CALCIUM CHLORIDE 600; 310; 30; 20 MG/100ML; MG/100ML; MG/100ML; MG/100ML
INJECTION, SOLUTION INTRAVENOUS CONTINUOUS PRN
Status: DISCONTINUED | OUTPATIENT
Start: 2025-02-28 | End: 2025-02-28

## 2025-02-28 RX ORDER — POLYETHYLENE GLYCOL 3350 17 G/17G
17 POWDER, FOR SOLUTION ORAL DAILY
Status: DISCONTINUED | OUTPATIENT
Start: 2025-03-01 | End: 2025-03-03

## 2025-02-28 RX ORDER — CEFAZOLIN SODIUM/WATER 2 G/20 ML
2 SYRINGE (ML) INTRAVENOUS SEE ADMIN INSTRUCTIONS
Status: DISCONTINUED | OUTPATIENT
Start: 2025-02-28 | End: 2025-02-28

## 2025-02-28 RX ORDER — PROTAMINE SULFATE 10 MG/ML
INJECTION, SOLUTION INTRAVENOUS PRN
Status: DISCONTINUED | OUTPATIENT
Start: 2025-02-28 | End: 2025-02-28

## 2025-02-28 RX ORDER — DEXMEDETOMIDINE HYDROCHLORIDE 4 UG/ML
INJECTION, SOLUTION INTRAVENOUS CONTINUOUS PRN
Status: DISCONTINUED | OUTPATIENT
Start: 2025-02-28 | End: 2025-02-28

## 2025-02-28 RX ORDER — LIDOCAINE HYDROCHLORIDE 20 MG/ML
INJECTION, SOLUTION INFILTRATION; PERINEURAL PRN
Status: DISCONTINUED | OUTPATIENT
Start: 2025-02-28 | End: 2025-02-28

## 2025-02-28 RX ORDER — DEXAMETHASONE SODIUM PHOSPHATE 4 MG/ML
INJECTION, SOLUTION INTRA-ARTICULAR; INTRALESIONAL; INTRAMUSCULAR; INTRAVENOUS; SOFT TISSUE PRN
Status: DISCONTINUED | OUTPATIENT
Start: 2025-02-28 | End: 2025-02-28

## 2025-02-28 RX ORDER — FENTANYL CITRATE 50 UG/ML
25-50 INJECTION, SOLUTION INTRAMUSCULAR; INTRAVENOUS
Status: DISCONTINUED | OUTPATIENT
Start: 2025-02-28 | End: 2025-02-28

## 2025-02-28 RX ORDER — METHADONE HYDROCHLORIDE 10 MG/ML
10 INJECTION, SOLUTION INTRAMUSCULAR; INTRAVENOUS; SUBCUTANEOUS ONCE
Status: COMPLETED | OUTPATIENT
Start: 2025-02-28 | End: 2025-02-28

## 2025-02-28 RX ORDER — EPINEPHRINE IN 0.9 % SOD CHLOR 5 MG/250ML
.01-.3 PLASTIC BAG, INJECTION (ML) INTRAVENOUS CONTINUOUS
Status: DISCONTINUED | OUTPATIENT
Start: 2025-02-28 | End: 2025-03-01

## 2025-02-28 RX ORDER — NOREPINEPHRINE BITARTRATE 0.06 MG/ML
INJECTION, SOLUTION INTRAVENOUS CONTINUOUS PRN
Status: DISCONTINUED | OUTPATIENT
Start: 2025-02-28 | End: 2025-02-28

## 2025-02-28 RX ADMIN — MIDAZOLAM 1 MG: 1 INJECTION INTRAMUSCULAR; INTRAVENOUS at 07:52

## 2025-02-28 RX ADMIN — Medication 5 MG: at 08:57

## 2025-02-28 RX ADMIN — AMINOCAPROIC ACID 1 G/HR: 250 INJECTION, SOLUTION INTRAVENOUS at 10:06

## 2025-02-28 RX ADMIN — Medication 0.5 UNITS: at 11:24

## 2025-02-28 RX ADMIN — PHENYLEPHRINE HYDROCHLORIDE 100 MCG: 10 INJECTION INTRAVENOUS at 09:33

## 2025-02-28 RX ADMIN — Medication 50 MG: at 11:16

## 2025-02-28 RX ADMIN — PROTAMINE SULFATE 125 MG: 10 INJECTION, SOLUTION INTRAVENOUS at 16:17

## 2025-02-28 RX ADMIN — PROPOFOL 60 MG: 10 INJECTION, EMULSION INTRAVENOUS at 07:58

## 2025-02-28 RX ADMIN — HEPARIN SODIUM 9000 ML: 1000 INJECTION INTRAVENOUS; SUBCUTANEOUS at 11:08

## 2025-02-28 RX ADMIN — DEXMEDETOMIDINE HYDROCHLORIDE 0.5 MCG/KG/HR: 4 INJECTION, SOLUTION INTRAVENOUS at 17:44

## 2025-02-28 RX ADMIN — Medication 20 MG: at 16:20

## 2025-02-28 RX ADMIN — PHENYLEPHRINE HYDROCHLORIDE 100 MCG: 10 INJECTION INTRAVENOUS at 09:15

## 2025-02-28 RX ADMIN — PHENYLEPHRINE HYDROCHLORIDE 50 MCG: 10 INJECTION INTRAVENOUS at 10:12

## 2025-02-28 RX ADMIN — Medication 1 UNITS: at 16:12

## 2025-02-28 RX ADMIN — LIDOCAINE HYDROCHLORIDE 100 MG: 20 INJECTION, SOLUTION INFILTRATION; PERINEURAL at 07:58

## 2025-02-28 RX ADMIN — FENTANYL CITRATE 50 MCG: 50 INJECTION INTRAMUSCULAR; INTRAVENOUS at 07:58

## 2025-02-28 RX ADMIN — PHENYLEPHRINE HYDROCHLORIDE 100 MCG: 10 INJECTION INTRAVENOUS at 11:05

## 2025-02-28 RX ADMIN — Medication 1 UNITS: at 16:10

## 2025-02-28 RX ADMIN — Medication 20 MG: at 13:05

## 2025-02-28 RX ADMIN — ONDANSETRON 4 MG: 2 INJECTION INTRAMUSCULAR; INTRAVENOUS at 17:20

## 2025-02-28 RX ADMIN — Medication 5 MG: at 09:04

## 2025-02-28 RX ADMIN — Medication 50 MG: at 10:13

## 2025-02-28 RX ADMIN — Medication 2 G: at 09:05

## 2025-02-28 RX ADMIN — Medication 0.5 UNITS: at 11:35

## 2025-02-28 RX ADMIN — DEXTROSE MONOHYDRATE 50 ML: 25 INJECTION, SOLUTION INTRAVENOUS at 23:55

## 2025-02-28 RX ADMIN — DEXAMETHASONE SODIUM PHOSPHATE 10 MG: 4 INJECTION, SOLUTION INTRA-ARTICULAR; INTRALESIONAL; INTRAMUSCULAR; INTRAVENOUS; SOFT TISSUE at 13:52

## 2025-02-28 RX ADMIN — PHENYLEPHRINE HYDROCHLORIDE 100 MCG: 10 INJECTION INTRAVENOUS at 10:47

## 2025-02-28 RX ADMIN — Medication 50 MCG/HR: at 20:00

## 2025-02-28 RX ADMIN — Medication 200 MG: at 17:25

## 2025-02-28 RX ADMIN — CALCIUM CHLORIDE INJECTION 1000 MG: 100 INJECTION, SOLUTION INTRAVENOUS at 17:01

## 2025-02-28 RX ADMIN — PHENYLEPHRINE HYDROCHLORIDE 150 MCG: 10 INJECTION INTRAVENOUS at 10:17

## 2025-02-28 RX ADMIN — Medication 2 G: at 13:05

## 2025-02-28 RX ADMIN — NOREPINEPHRINE BITARTRATE 0.03 MCG/KG/MIN: 0.06 INJECTION, SOLUTION INTRAVENOUS at 09:53

## 2025-02-28 RX ADMIN — DEXMEDETOMIDINE HYDROCHLORIDE 8 MCG: 100 INJECTION, SOLUTION INTRAVENOUS at 17:20

## 2025-02-28 RX ADMIN — PROPOFOL 50 MCG/KG/MIN: 10 INJECTION, EMULSION INTRAVENOUS at 13:53

## 2025-02-28 RX ADMIN — PHENYLEPHRINE HYDROCHLORIDE 100 MCG: 10 INJECTION INTRAVENOUS at 09:09

## 2025-02-28 RX ADMIN — SODIUM CHLORIDE, POTASSIUM CHLORIDE, SODIUM LACTATE AND CALCIUM CHLORIDE: 600; 310; 30; 20 INJECTION, SOLUTION INTRAVENOUS at 08:08

## 2025-02-28 RX ADMIN — PROPOFOL 30 MG: 10 INJECTION, EMULSION INTRAVENOUS at 09:20

## 2025-02-28 RX ADMIN — Medication 2 G: at 17:05

## 2025-02-28 RX ADMIN — SODIUM CHLORIDE, POTASSIUM CHLORIDE, SODIUM LACTATE AND CALCIUM CHLORIDE 500 ML: 600; 310; 30; 20 INJECTION, SOLUTION INTRAVENOUS at 21:52

## 2025-02-28 RX ADMIN — EPINEPHRINE 0.02 MCG/KG/MIN: 1 INJECTION INTRAMUSCULAR; INTRAVENOUS; SUBCUTANEOUS at 16:08

## 2025-02-28 RX ADMIN — Medication 30 MG: at 15:07

## 2025-02-28 RX ADMIN — SODIUM CHLORIDE, SODIUM GLUCONATE, SODIUM ACETATE, POTASSIUM CHLORIDE AND MAGNESIUM CHLORIDE: 526; 502; 368; 37; 30 INJECTION, SOLUTION INTRAVENOUS at 07:38

## 2025-02-28 RX ADMIN — VASOPRESSIN 2 UNITS/HR: 20 INJECTION, SOLUTION INTRAMUSCULAR; SUBCUTANEOUS at 16:12

## 2025-02-28 RX ADMIN — AMINOCAPROIC ACID 1 G/HR: 250 INJECTION, SOLUTION INTRAVENOUS at 14:45

## 2025-02-28 RX ADMIN — PHENYLEPHRINE HYDROCHLORIDE 100 MCG: 10 INJECTION INTRAVENOUS at 10:58

## 2025-02-28 RX ADMIN — Medication 100 MG: at 07:58

## 2025-02-28 RX ADMIN — Medication 5 G: at 09:12

## 2025-02-28 RX ADMIN — INSULIN ASPART 2 UNITS: 100 INJECTION, SOLUTION INTRAVENOUS; SUBCUTANEOUS at 20:22

## 2025-02-28 RX ADMIN — PROPOFOL 50 MCG/KG/MIN: 10 INJECTION, EMULSION INTRAVENOUS at 09:30

## 2025-02-28 RX ADMIN — Medication 1 UNITS: at 11:15

## 2025-02-28 ASSESSMENT — ACTIVITIES OF DAILY LIVING (ADL)
ADLS_ACUITY_SCORE: 29
ADLS_ACUITY_SCORE: 48
ADLS_ACUITY_SCORE: 48
ADLS_ACUITY_SCORE: 44
ADLS_ACUITY_SCORE: 29
ADLS_ACUITY_SCORE: 29

## 2025-02-28 NOTE — LETTER
Shahid Davis MRN# [id number]   YOB: 1950 Age: 74 year old     Date of Admission:  2/28/25  Date of Discharge:  3/8/2025  Admitting Physician:  Darío Potts MD    Primary Care Provider: Haleigh Montero     To Whom it May Concern:            You have been identified as the Primary Care Provider for Shahid Davis, who was recently admitted to the [facility].  Thank you for the referral to our hospital.  It is our goal to provide the highest quality of care for our patients, including planning for seamless continuity of care by providing you with timely, accurate and concise information.  After reviewing the following combined discharge summary and final progress note, please contact us if you have any remaining questions.  The Discharging Physician will be the best informed, with their contact information listed above.  If unable to reach them, or if you have received this letter in error, please call [phone] and someone will try to help you.     Electronically signed

## 2025-02-28 NOTE — ANESTHESIA PROCEDURE NOTES
Central Line/PA Catheter Placement    Pre-Procedure   Staff -        Anesthesiologist:  Vy Wallis MD       Resident/Fellow: Garcia Cote MD       Performed By: resident and with residents       Procedure performed by resident/fellow/CRNA in presence of a teaching physician.         Location: OR       Pre-Anesthestic Checklist: patient identified, IV checked, site marked, risks and benefits discussed, informed consent, monitors and equipment checked, pre-op evaluation and at physician/surgeon's request  Timeout:       Correct Patient: Yes        Correct Procedure: Yes        Correct Site: Yes        Correct Position: Yes        Correct Laterality: Yes   Line Placement:   This line was placed Post Induction    Procedure   Procedure: central line       Laterality: right       Insertion Site: internal jugular.       Patient Position: Trendelenburg  Sterile Prep        All elements of maximal sterile barrier technique followed       Patient Prep/Sterile Barriers: draped, hand hygiene, gloves , hat , mask , draped, gown, sterile gel and probe cover       Skin prep: Chloraprep  Insertion/Injection        Technique: ultrasound guided and Seldinger Technique        1. Ultrasound was used to evaluate the access site.       2. Vein evaluated via ultrasound for patency/adequacy.       3. Using real-time ultrasound the needle/catheter was observed entering the artery/vein.       Introducer Type: 9 Fr, 2-lumen MAC        Type: Introducer  Narrative         Secured by: suture and anchor securement device       Tegaderm and Biopatch dressing used.       Complications: None apparent,        blood aspirated from all lumens,        All lumens flushed: Yes       Verification method: Ultrasound, Placement to be verified post-op and GAURI   Comments:  Routine MAC (multi-lumen access cannula) central venous catheter placement without complications

## 2025-02-28 NOTE — H&P
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    Significant intra-op Events: Centrally cannulated for [facility] ECMO, exposure of atrial pacemaker lead during RA cannulation leading to intermittent loss of capture  EBL: <100cc  Product: 180 cell saver      PLAN:    Neurological:  # Acute postoperative pain   # Hx BLE neuropathy  - Monitor neurological status. Delirium preventions and precautions  - Pain: Fentanyl gtt. Initial plan for epidural, but anesthesia team opted to hold off tonight given heparinization with ECMO  - Sedation plan: Propofol, precedex gtt. Wean sedation as able.   - Holding PTA Baclofen    Pulmonary:   # Former smoker  # Post operative ventilatory support  - VENT: Arrived on PS 5/5. Will send ABG now. Per anesthesia ETT is below cords but right above new anastomosis; if requires re-intubation would need to be done carefully with glidescope.   - Ventilatory bundle. HOB elevation.  - Supplemental oxygen to keep saturation >92% once extubated, wean as able.  - Incentive spirometer every hour while awake once extubated.    # Hx of stable 3 mm subpleural nodule in the left upper lobe  - Follows with Hematology & Oncology  outpatient    HEENT:  # Adenoid cystic carcinoma of the distal trachea s/p endobronchial tissue biopsy and debulking on 12/16/2024 and tracheal resection on 2/28/25.  - Follow final pathology results    Cardiovascular:    # Complete heart block s/p pacemaker implantation 2/2020 ([employer], Mode: DDDR )  Intra-op course complicated by atrial lead displacement and he has lost atrial capture  - Device RN contacted and will come tonight to look at PPM  - Last investigation 1/30/25 showed normal device function    # Shock-distributive, possibly cardiogenic with loss of atrial pacemaker capture and atrial kick  - Goal MAP >65  - Arrived to SICU on 1 vaso, 0.1 levo, 0.06 epi. Wean vasopressors as able while maintaining MAP goal  - Last Echo 7/2023 with EF 55-60%, no significant valvular abnormalities     Gastroenterology/Nutrition:  # Pyloric stenosis s/p repair as an infant (~6 months old)  # Hx of GERD   # Protein calorie deficit malnutrition   - NPO. Will hold off on OGT tonight given new tracheal anastomosis   - PTA PPI  - Bowel regimen: Miralax, senna     Fluid/Electrolytes/Renal:  # Lactic acidosis   - Initial postop LA 5.0, will continue fluid resuscitation and wean epi  - LR at 100cc/hr  - Baseline creatinine ~0.7-0.95. CMP pending.  - Odonnell in place. Will continue to monitor intake and output.      Endocrine:  # Stress hyperglycemia  # No Hx of diabetes mellitus  - Goal to keep BG< 180 for optimal wound healing   - SSI    ID:  # Leukocytosis, reactive to surgery  - No current concern for infection.    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 CBC pending  - Transfuse if hgb <7.0 or signs/symptoms of hypoperfusion.   - Fibrinogen, INR pending    MSK:  # Weakness and deconditioning of critical illness   - Physical and occupational therapy consult     General Cares/Prophylaxis:    DVT Prophylaxis: Enoxaparin (Lovenox) subcutaneous to start POD1  GI Prophylaxis: PPI  Restraints: Restraints for medical healing needed: NO    Lines/ tubes/ drains:  - ETT  - RIJ CVC  - PIV   - Left radial arterial line  - R CT x 2  - R subcutaneous MILY drain  - Odonnell catheter    Disposition:  - SICU.    Patient seen, findings and plan discussed with surgical ICU staff, Dr. Mullen.    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    REVIEW OF SYSTEMS: 10 point ROS neg other than the symptoms noted above in the HPI.    PAST MEDICAL  HISTORY:   Past Medical History:   Diagnosis Date    Cardiac pacemaker in situ     2020    GERD (gastroesophageal reflux disease)     Lymphoma (H)     Pyloric stenosis, congenital (H)     Squamous cell carcinoma     Tracheal cancer (H)        SURGICAL HISTORY:   Past Surgical History:   Procedure Laterality Date    BRONCHOSCOPY FLEXIBLE N/A 12/16/2024    Procedure: BRONCHOSCOPY, FLEXIBLE, endobronchial biopsy and tumor debulking;  Surgeon: Gerald Long MD;  Location: [facility] OR    DAVINCI HERNIORRHAPHY INGUINAL Left 07/09/2021    Procedure: HERNIORRHAPHY, INGUINAL, ROBOT-ASSISTED, Left, Mesh;  Surgeon: Shamar Tyler MD;  Location: [facility] OR    IR CHEST PORT PLACEMENT > 5 YRS OF AGE  01/05/2023    IR PORT REMOVAL RIGHT  12/15/2023    IR SOFT TISSUE BIOPSY  12/15/2022    MOHS MICROGRAPHIC PROCEDURE      REMOVE PORT VASCULAR ACCESS Right 12/15/2023    Procedure: Remove port vascular access right;  Surgeon: Matthew Harrison PA-C;  Location: [facility] OR    Repair pyloric stenosis      ~ six months of age       SOCIAL HISTORY:   Social History     Tobacco Use    Smoking status: Former     Passive exposure: Past    Smokeless tobacco: Never    Tobacco comments:     Quit at age 19   Substance Use Topics    Alcohol use: Yes     Alcohol/week: 11.7 standard drinks of alcohol     Types: 14 drink(s) per week     Comment: 1-2 beers a night    Drug use: Never       FAMILY HISTORY:   Family History   Problem Relation Age of Onset    Cancer Mother         Blood    Cancer Father         Lung    Cancer Maternal Grandmother         Breast    Cancer Paternal Grandfather         Hodgkins    Anesthesia Reaction No family hx of     Bleeding Disorder No family hx of     Clotting Disorder No family hx of      No bleeding/clotting disorders nor problems with anesthesia.    ALLERGIES:   Allergies   Allergen Reactions    Ampicillin [Ampicillin Sodium]     Bactrim [Sulfamethoxazole W/Trimethoprim]      Patient unsure of reaction      Contrast Dye      CT contrast (IV) allergy - need oral Methylpred as premed for scans    Ampicillin Rash    Morphine Nausea     MEDICATIONS:  Current Facility-Administered Medications   Medication Dose Route Frequency Provider Last Rate Last Admin    bupivacaine 0.25%-dexamethason 1 mg-dexmedetomidine 20 mcg injection    PRN Darío Potts MD   30 mL at 02/28/25 1549    ceFAZolin Sodium (ANCEF) injection 2 g  2 g Intravenous See Admin Instructions Darío Potts MD        enoxaparin ANTICOAGULANT (LOVENOX) injection 40 mg  40 mg Subcutaneous Pre-Op/Pre-procedure x 1 dose Darío Potts MD        fentaNYL (PF) (SUBLIMAZE) injection 25-50 mcg  25-50 mcg Intravenous Q2 Min PRN Yolanda Castro MD        flumazenil (ROMAZICON) injection 0.2 mg  0.2 mg Intravenous q1 min prn Yolanda Castro MD        heparin 10,000 units in 1000 mL 0.9% sodium chloride    PRN Darío Potts MD   9,000 mL at 02/28/25 1108    ipratropium - albuterol 0.5 mg/2.5 mg/3 mL (DUONEB) neb solution 3 mL  3 mL Nebulization Once Vy Wallis MD        lactated ringers infusion   Intravenous Continuous Garcia Cote MD        lidocaine (LMX4) cream   Topical Q1H PRN Garcia Cote MD        lidocaine 1 % 0.1-1 mL  0.1-1 mL Other Q1H PRN Garcia Cote MD        midazolam (VERSED) injection 1-2 mg  1-2 mg Intravenous Q4 Min PRN Yolanda Castro MD        naloxone (NARCAN) injection 0.2 mg  0.2 mg Intravenous Q2 Min PRN Yolanda Castro MD        Or    naloxone (NARCAN) injection 0.4 mg  0.4 mg Intravenous Q2 Min PRN Yolanda Castro MD        Or    naloxone (NARCAN) injection 0.2 mg  0.2 mg Intramuscular Q2 Min PRN Yolanda Castro MD        Or    naloxone (NARCAN) injection 0.4 mg  0.4 mg Intramuscular Q2 Min PRN Yolanda Castro MD        sodium chloride (PF) 0.9% PF flush 3 mL  3 mL Intracatheter Q8H Garcia Cote MD        sodium chloride (PF) 0.9% PF flush 3 mL  3 mL Intracatheter q1 min prn Garcia Cote MD          Facility-Administered Medications Ordered in Other Encounters   Medication Dose Route Frequency Provider Last Rate Last Admin    aminocaproic acid (AMICAR) 5 g in sodium chloride 0.9 % 100 mL infusion   Intravenous Continuous PRN Garcia Cote MD 20 mL/hr at 02/28/25 1445 1 g/hr at 02/28/25 1445    aminocaproic acid 5 g in 0.9% NaCl 100 mL bolus SOLN   Intravenous PRN Garcia Cote MD   5 g at 02/28/25 0912    calcium chloride injection   Intravenous PRN Elroy Stephenson APRN CRNA   1,000 mg at 02/28/25 1701    dexAMETHasone (DECADRON) injection   Intravenous PRN Garcia Cote MD   10 mg at 02/28/25 1352    dexmedeTOMIDine (PRECEDEX) 4 mcg/mL bolus   Intravenous Continuous PRN Elroy Stephenson APRN CRNA   8 mcg at 02/28/25 1720    dexmedeTOMIDine (PRECEDEX) 4 mcg/mL in sodium chloride 0.9 % 50 mL infusion   Intravenous Continuous PRN Elroy Stephenson APRN CRNA 7.775 mL/hr at 02/28/25 1744 0.5 mcg/kg/hr at 02/28/25 1744    EPINEPHrine drip 0.02 mg/mL (5 mg/250 mL)   Intravenous Continuous PRN Elroy Stephenson APRN CRNA 11.196 mL/hr at 02/28/25 1618 0.06 mcg/kg/min at 02/28/25 1618    fentaNYL (PF) (SUBLIMAZE) injection   Intravenous PRN Garcia Cote MD   50 mcg at 02/28/25 0758    lactated ringers infusion   Intravenous Continuous PRN Garcia Cote MD 75 mL/hr at 02/28/25 0808 New Bag at 02/28/25 0808    lidocaine 2% injection (MDV)   Intravenous PRN Garcia Cote MD   100 mg at 02/28/25 0758    midazolam (VERSED) injection   Intravenous PRN Garcia Cote MD   1 mg at 02/28/25 0752    norepinephrine (LEVOPHED) 16 mg in  mL infusion MAX CONC CENTRAL LINE   Intravenous Continuous PRN Garcia Cote MD 5.831 mL/hr at 02/28/25 1647 0.1 mcg/kg/min at 02/28/25 1647    ondansetron (ZOFRAN) injection   Intravenous PRN Elroy Stephenson APRN CRNA   4 mg at 02/28/25 1720    phenylephrine (CORONA-SYNEPHRINE) injection   Intravenous Continuous PRN Garcia Cote MD   100 mcg at  02/28/25 1105    Plasma-Lyte A (electrolyte A) BOLUS   Intravenous Continuous PRN Garcia Cote MD   New Bag at 02/28/25 0738    propofol (DIPRIVAN) infusion   Intravenous Continuous PRN Garcia Cote MD 7.464 mL/hr at 02/28/25 1728 20 mcg/kg/min at 02/28/25 1728    propofol (DIPRIVAN) injection 10 mg/mL vial   Intravenous PRN Garcia Cote MD   30 mg at 02/28/25 0920    protamine injection   Intravenous PRN Elroy Stephenson APRN CRNA   125 mg at 02/28/25 1617    rocuronium injection   Intravenous PRN Garcia Cote MD   20 mg at 02/28/25 1620    sugammadex (BRIDION) injection   Intravenous PRN Elroy Stephenson APRN CRNA   200 mg at 02/28/25 1725    vasopressin 1 unit/mL syringe   Intravenous PRN Garcia Cote MD   1 Units at 02/28/25 1612    vasopressin drip 1 unit/mL ADULT   Intravenous Continuous PRN Elroy Stephenson APRN CRNA 1 mL/hr at 02/28/25 1629 1 Units/hr at 02/28/25 1629       PHYSICAL EXAMINATION:  Temp:  [98.5  F (36.9  C)] 98.5  F (36.9  C)  Pulse:  [97] 97  Resp:  [16] 16  BP: (114)/(73) 114/73  SpO2:  [94 %] 94 %    Neuro: Sedated   HEENT: ETT in place. Suture from chin to anterior chest   CV: RRR on monitor, atrial pacer not capturing, v-paced with HR 80s  Respiratory: Mechanically ventilated. Right chest incision covered with clean dressing. R CT x2 with thin sanguinous output, +airleak. Right chest subcutaneous MILY with thin, sanguinous drainage, not holding suction well.   GI: Abdomen soft, non-distended  MSK: No peripheral edema in BLE. Palpable DP pulses.   Odonnell in place with yellow UOP.    LABS: Reviewed.   Arterial Blood Gases   Recent Labs   Lab 02/28/25  1700 02/28/25  1625 02/28/25  1545 02/28/25  1516   PH 7.22* 7.27* 7.35 7.34*   PCO2 54* 50* 46* 47*   PO2 436* 107* 327* 316*   HCO3 22 23 25 25     Complete Blood Count   Recent Labs   Lab 02/28/25  1700 02/28/25  1638 02/28/25  1625 02/28/25  1545 02/28/25  1516   HGB 10.1*  --  9.5* 10.3* 10.2*   PLT  --  133*  --    --   --      Basic Metabolic Panel  Recent Labs   Lab 02/28/25  1700 02/28/25  1625 02/28/25  1545 02/28/25  1516    142 141 142   POTASSIUM 3.7 3.6 4.2 4.1   * 186* 148* 142*     Liver Function Tests  Recent Labs   Lab 02/28/25  1638   INR 1.77*     Pancreatic Enzymes  No lab results found in last 7 days.  Coagulation Profile  Recent Labs   Lab 02/28/25  1638   INR 1.77*   PTT 30       IMAGING:  Recent Results (from the past 24 hours)   Cardiac Device Check - Inpatient   Result Value    Date Time Interrogation Session 20,250,228,081,304    Implantable Pulse Generator  [employer]    Implantable Pulse Generator Model 2272 Assurity MRI    Implantable Pulse Generator Serial Number [id number]    Type Interrogation Session In Clinic    Clinic Name [facility]    Implantable Pulse Generator Type Pacemaker    Implantable Pulse Generator Implant Date 20,200,302    Implantable Lead  [employer]    Implantable Lead Model 2088TC Tendril STS    Implantable Lead Serial Number [id number]    Implantable Lead Implant Date 20,200,302    Implantable Lead Polarity Type Bipolar Lead    Implantable Lead Location Detail 1 UNKNOWN    Implantable Lead Location Right Ventricle    Implantable Lead Connection Status Connected    Implantable Lead  [employer]    Implantable Lead Model 2088TC Tendril STS    Implantable Lead Serial Number [id number]    Implantable Lead Implant Date 20200302    Implantable Lead Polarity Type Bipolar Lead    Implantable Lead Location Detail 1 UNKNOWN    Implantable Lead Location Right Atrium    Implantable Lead Connection Status Connected    Domenic Setting Mode (NBG Code) DOO    Domenci Setting Lower Rate Limit 80    Domenic Setting Night Rate Off    Domenic Setting PAV Delay Low 200    Lead Channel Setting Sensing Polarity Bipolar    Lead Channel Setting Sensing Polarity Bipolar    Lead Channel Setting Pacing Polarity Bipolar    Lead  Channel Setting Pacing Pulse Width 0.4    Lead Channel Setting Pacing Amplitude 2.5    Lead Channel Setting Pacing Polarity Bipolar    Lead Channel Setting Pacing Pulse Width 0.4    Lead Channel Setting Pacing Amplitude 2.5    Lead Channel Impedance Value 375.0    Lead Channel Sensing Intrinsic Amplitude 4.1    Lead Channel Pacing Threshold Amplitude 0.75    Lead Channel Pacing Threshold Pulse Width 0.4    Lead Channel Pacing Threshold Amplitude 0.75    Lead Channel Pacing Threshold Pulse Width 0.4    Lead Channel Impedance Value 462.5    Lead Channel Pacing Threshold Amplitude 1.0    Lead Channel Pacing Threshold Pulse Width 0.4    Lead Channel Pacing Threshold Amplitude 1.0    Lead Channel Pacing Threshold Pulse Width 0.4    Battery Status Unknown    Battery Remaining Longevity 42    Battery Voltage 2.98    Domenic Statistic RA Percent Paced 2.1    Domenic Statistic RV Percent Paced 99.17    Atrial Tachy Statistic AT/AF [place] Percent 0    Atrial Tachy Statistic Number Of Mode Switches 0.0    Episode Statistic Recent Count 0    Episode Statistic Type Category AT/AF    Episode Statistic Vendor Type Category AT/AF    Episode Statistic Recent Count 0    Episode Statistic Type Category SVT    Episode Statistic Recent Count 0    Episode Statistic Type Category VT    Episode Statistic Vendor Type Category Non-sustained VT    Narrative    Patient seen on 3C pre-operatively for evaluation and iterative   programming of their pacemaker per MD orders.    Device: [employer] 2272 Assurity MRI  Normal device function  Mode: DDDR   AP: 2.1%  : >99%  Intrinsic Rhythm: NSR with CHB  Lead Trends Appear Stable: Yes  Estimated battery longevity to RRT = 4.2 years. Battery voltage = 2.98 V.   Atrial Arrhythmia: 0  Ventricular Arrhythmia: 0  2 Consecutive PVCs episodes recorded -   Setting Changes: RA and RV amplitudes increased to 2.5 V. Pacing mode   programmed from DDDR  bpm to DOO 80 bpm per Anesthesia orders.    C  Ángel RN    Dual lead pacemaker    I have reviewed and interpreted the device interrogation, settings,   programming and nurse's summary. The device is functioning within normal   device parameters. I agree with the current findings, assessment and plan.

## 2025-02-28 NOTE — BRIEF OP NOTE
[facility]    Brief Operative Note    Pre-operative diagnosis: Tracheal cancer (H) [C33]  Post-operative diagnosis Same as pre-operative diagnosis    Procedure: Tracheal Resection and Reconstruction with Cryoablation, Right - Neck  Right Thoracotomy, Right - Chest  Bronchoscopy flexible, N/A - Bronchus  Insert Venous/Arterial extracorporal membrane oxygenator and Removal - Chest    Surgeon: Surgeons and Role:  Panel 1:     * Darío Potts MD - Primary     * Gume Camp MD - Assisting     * Mari Pagan MD - Fellow - Assisting  Panel 2:     * Fidel Lockhart MD - Primary     * Colton Randolph PA-C - Assisting     * Justice Waddell MD - Fellow - Assisting  Anesthesia: General   Estimated Blood Loss: Less than 100 ml    Drains: 28 Brazilian straight right pleural chest tube, subcutaneous MILY drain, pericardial louis drain  Specimens:   ID Type Source Tests Collected by Time Destination   1 : 9 R Tissue Lymph Node(s) SURGICAL PATHOLOGY EXAM Darío oPtts MD 2/28/2025 10:07 AM    2 : Tracheal Resection Tissue Trachea SURGICAL PATHOLOGY EXAM Darío Potts MD 2/28/2025 12:03 PM    3 : Tracheal Resection Distal Margin Tissue Trachea SURGICAL PATHOLOGY EXAM Darío Potts MD 2/28/2025 12:06 PM    4 : New proximal tracheal margin Tissue Trachea SURGICAL PATHOLOGY EXAM Darío Potts MD 2/28/2025 12:57 PM    5 : New Distal tracheal margin Tissue Trachea SURGICAL PATHOLOGY EXAM Darío Potts MD 2/28/2025 12:58 PM    6 : Final Proximal Right Cartilage Corner, Stitch su on Distal Margin Tissue Trachea SURGICAL PATHOLOGY EXAM Darío Potts MD 2/28/2025  2:24 PM    7 : Final Proximal Tracheal Margin, Stitch marks on Distal Margin Tissue Trachea SURGICAL PATHOLOGY EXAM Darío Potts MD 2/28/2025  2:32 PM      Findings:   Positive margins of tracheal resections x3 on frozen section .  Complications: Exposure of atrial lead of pacemaker with  right atrial cannulation with intermittent loss of capture .  Implants: * No implants in log *

## 2025-02-28 NOTE — OR NURSING
Nurse contacted device nurse to come change pacemaker setting prior to procedure. Settings changed accordingly.

## 2025-02-28 NOTE — ANESTHESIA PROCEDURE NOTES
Arterial Line Procedure Note    Pre-Procedure   Staff -        Anesthesiologist:  Vy Wallis MD       Resident/Fellow: Garcia Cote MD       Performed By: resident and with residents       Procedure performed by resident/fellow/CRNA in presence of a teaching physician.         Location: OR       Pre-Anesthestic Checklist: patient identified, IV checked, risks and benefits discussed, informed consent, monitors and equipment checked, pre-op evaluation and at physician/surgeon's request  Timeout:       Correct Patient: Yes        Correct Procedure: Yes        Correct Site: Yes        Correct Position: Yes   Line Placement:   This line was placed Post Induction  Procedure   Procedure: arterial line and elective       Diagnosis: Blood Pressure Monitoring and/or Frequent Lab Draws       Laterality: left       Insertion Site: radial.  Sterile Prep        Standard elements of sterile barrier followed       Skin prep: Chloraprep  Insertion/Injection        Technique: Bebeto's test completed, Seldinger Technique and ultrasound guided        1. Ultrasound was used to evaluate the access site.       2. Artery evaluated via ultrasound for patency/adequacy.       3. Using real-time ultrasound the needle/catheter was observed entering the artery/vein.       Catheter Type/Size: 20 G, 12 cm  Narrative         Secured by: suture       Tegaderm dressing used.       Complications: None apparent,        Arterial waveform: Yes        IBP within 10% of NIBP: Yes   Comments:  Routine arterial line placement without complications.

## 2025-02-28 NOTE — ANESTHESIA PROCEDURE NOTES
Airway       Patient location during procedure: OR       Procedure Start/Stop Times: 2/28/2025 8:01 AM  Staff -        Anesthesiologist:  Vy Wallis MD       Resident/Fellow: Garcia Cote MD       Performed By: resident and with residents       Procedure performed by resident/fellow/CRNA in presence of a teaching physician.    Consent for Airway        Urgency: elective  Indications and Patient Condition       Indications for airway management: elieser-procedural       Induction type:intravenous       Mask difficulty assessment: 2 - vent by mask + OA or adjuvant +/- NMBA    Final Airway Details       Final airway type: endotracheal airway       Successful airway: ETT - single  Endotracheal Airway Details        ETT size (mm): 8.0       Cuffed: yes       Successful intubation technique: video laryngoscopy       VL Blade Size: MAC 4       Grade View of Cords: 1       Adjucts: stylet       Position: Right       Measured from: gums/teeth       Secured at (cm): 24       Bite block used: None    Post intubation assessment        Placement verified by: capnometry, equal breath sounds and chest rise        Number of attempts at approach: 1       Number of other approaches attempted: 0       Secured with: commercial tube [email or web address]       Ease of procedure: easy       Dentition: Intact and Unchanged    Medication(s) Administered   Medication Administration Time: 2/28/2025 8:01 AM    Additional Comments       Routine intubation without complications. ETT was left main stemmed by the Thoracic surgeon. Due to right sided thoracotomy

## 2025-03-01 ENCOUNTER — APPOINTMENT (OUTPATIENT)
Dept: GENERAL RADIOLOGY | Facility: CLINIC | Age: 75
DRG: 166 | End: 2025-03-01
Payer: MEDICARE

## 2025-03-01 ENCOUNTER — APPOINTMENT (OUTPATIENT)
Dept: GENERAL RADIOLOGY | Facility: CLINIC | Age: 75
DRG: 166 | End: 2025-03-01
Attending: SURGERY
Payer: MEDICARE

## 2025-03-01 LAB
ALLEN'S TEST: ABNORMAL
ALLEN'S TEST: ABNORMAL
ANION GAP SERPL CALCULATED.3IONS-SCNC: 8 MMOL/L (ref 7–15)
ANION GAP SERPL CALCULATED.3IONS-SCNC: 9 MMOL/L (ref 7–15)
BASE EXCESS BLDA CALC-SCNC: -8.1 MMOL/L (ref -3–3)
BASE EXCESS BLDA CALC-SCNC: 0.1 MMOL/L (ref -3–3)
BASE EXCESS BLDV CALC-SCNC: -4.1 MMOL/L (ref -3–3)
BUN SERPL-MCNC: 23.3 MG/DL (ref 8–23)
BUN SERPL-MCNC: 35.8 MG/DL (ref 8–23)
CALCIUM SERPL-MCNC: 8.1 MG/DL (ref 8.8–10.4)
CALCIUM SERPL-MCNC: 8.4 MG/DL (ref 8.8–10.4)
CHLORIDE SERPL-SCNC: 108 MMOL/L (ref 98–107)
CHLORIDE SERPL-SCNC: 109 MMOL/L (ref 98–107)
CREAT SERPL-MCNC: 0.89 MG/DL (ref 0.67–1.17)
CREAT SERPL-MCNC: 0.97 MG/DL (ref 0.67–1.17)
EGFRCR SERPLBLD CKD-EPI 2021: 81 ML/MIN/1.73M2
EGFRCR SERPLBLD CKD-EPI 2021: 89 ML/MIN/1.73M2
ERYTHROCYTE [DISTWIDTH] IN BLOOD BY AUTOMATED COUNT: 12.4 % (ref 10–15)
ERYTHROCYTE [DISTWIDTH] IN BLOOD BY AUTOMATED COUNT: 12.8 % (ref 10–15)
GLUCOSE BLDC GLUCOMTR-MCNC: 138 MG/DL (ref 70–99)
GLUCOSE BLDC GLUCOMTR-MCNC: 167 MG/DL (ref 70–99)
GLUCOSE BLDC GLUCOMTR-MCNC: 212 MG/DL (ref 70–99)
GLUCOSE BLDC GLUCOMTR-MCNC: 233 MG/DL (ref 70–99)
GLUCOSE BLDC GLUCOMTR-MCNC: 266 MG/DL (ref 70–99)
GLUCOSE BLDC GLUCOMTR-MCNC: 58 MG/DL (ref 70–99)
GLUCOSE BLDC GLUCOMTR-MCNC: 61 MG/DL (ref 70–99)
GLUCOSE SERPL-MCNC: 159 MG/DL (ref 70–99)
GLUCOSE SERPL-MCNC: 247 MG/DL (ref 70–99)
HCO3 BLD-SCNC: 19 MMOL/L (ref 21–28)
HCO3 BLD-SCNC: 25 MMOL/L (ref 21–28)
HCO3 BLDV-SCNC: 24 MMOL/L (ref 21–28)
HCO3 SERPL-SCNC: 25 MMOL/L (ref 22–29)
HCO3 SERPL-SCNC: 25 MMOL/L (ref 22–29)
HCT VFR BLD AUTO: 27.7 % (ref 40–53)
HCT VFR BLD AUTO: 34.1 % (ref 40–53)
HGB BLD-MCNC: 11 G/DL (ref 13.3–17.7)
HGB BLD-MCNC: 9.4 G/DL (ref 13.3–17.7)
LACTATE SERPL-SCNC: 1.3 MMOL/L (ref 0.7–2)
LACTATE SERPL-SCNC: 2.1 MMOL/L (ref 0.7–2)
LACTATE SERPL-SCNC: 2.3 MMOL/L (ref 0.7–2)
LACTATE SERPL-SCNC: 2.5 MMOL/L (ref 0.7–2)
LACTATE SERPL-SCNC: 3.8 MMOL/L (ref 0.7–2)
LACTATE SERPL-SCNC: 5.1 MMOL/L (ref 0.7–2)
LACTATE SERPL-SCNC: 6.1 MMOL/L (ref 0.7–2)
LACTATE SERPL-SCNC: 8.3 MMOL/L (ref 0.7–2)
LACTATE SERPL-SCNC: 8.7 MMOL/L (ref 0.7–2)
MAGNESIUM SERPL-MCNC: 2.1 MG/DL (ref 1.7–2.3)
MCH RBC QN AUTO: 31.7 PG (ref 26.5–33)
MCH RBC QN AUTO: 32.5 PG (ref 26.5–33)
MCHC RBC AUTO-ENTMCNC: 32.3 G/DL (ref 31.5–36.5)
MCHC RBC AUTO-ENTMCNC: 33.9 G/DL (ref 31.5–36.5)
MCV RBC AUTO: 96 FL (ref 78–100)
MCV RBC AUTO: 98 FL (ref 78–100)
O2/TOTAL GAS SETTING VFR VENT: 40 %
OXYHGB MFR BLDA: 98 % (ref 92–100)
OXYHGB MFR BLDA: 98 % (ref 92–100)
OXYHGB MFR BLDV: 86 % (ref 70–75)
PCO2 BLD: 42 MM HG (ref 35–45)
PCO2 BLD: 46 MM HG (ref 35–45)
PCO2 BLDV: 54 MM HG (ref 40–50)
PH BLD: 7.23 [PH] (ref 7.35–7.45)
PH BLD: 7.39 [PH] (ref 7.35–7.45)
PH BLDV: 7.25 [PH] (ref 7.32–7.43)
PHOSPHATE SERPL-MCNC: 2.2 MG/DL (ref 2.5–4.5)
PLATELET # BLD AUTO: 94 10E3/UL (ref 150–450)
PLATELET # BLD AUTO: 97 10E3/UL (ref 150–450)
PO2 BLD: 124 MM HG (ref 80–105)
PO2 BLD: 143 MM HG (ref 80–105)
PO2 BLDV: 57 MM HG (ref 25–47)
POTASSIUM SERPL-SCNC: 4.6 MMOL/L (ref 3.4–5.3)
POTASSIUM SERPL-SCNC: 4.6 MMOL/L (ref 3.4–5.3)
RBC # BLD AUTO: 2.89 10E6/UL (ref 4.4–5.9)
RBC # BLD AUTO: 3.47 10E6/UL (ref 4.4–5.9)
SAO2 % BLDA: 99.2 % (ref 95–96)
SAO2 % BLDA: 99.5 % (ref 95–96)
SAO2 % BLDV: 87.5 % (ref 70–75)
SODIUM SERPL-SCNC: 142 MMOL/L (ref 135–145)
SODIUM SERPL-SCNC: 142 MMOL/L (ref 135–145)
WBC # BLD AUTO: 10.5 10E3/UL (ref 4–11)
WBC # BLD AUTO: 12.9 10E3/UL (ref 4–11)

## 2025-03-01 PROCEDURE — 258N000003 HC RX IP 258 OP 636: Performed by: SURGERY

## 2025-03-01 PROCEDURE — 83605 ASSAY OF LACTIC ACID: CPT

## 2025-03-01 PROCEDURE — 200N000002 HC R&B ICU UMMC

## 2025-03-01 PROCEDURE — 71045 X-RAY EXAM CHEST 1 VIEW: CPT | Mod: 26 | Performed by: RADIOLOGY

## 2025-03-01 PROCEDURE — 250N000009 HC RX 250

## 2025-03-01 PROCEDURE — 250N000011 HC RX IP 250 OP 636: Performed by: SURGERY

## 2025-03-01 PROCEDURE — 999N000065 XR CHEST PORT 1 VIEW

## 2025-03-01 PROCEDURE — 82805 BLOOD GASES W/O2 SATURATION: CPT

## 2025-03-01 PROCEDURE — 999N000253 HC STATISTIC WEANING TRIALS

## 2025-03-01 PROCEDURE — 85048 AUTOMATED LEUKOCYTE COUNT: CPT | Performed by: SURGERY

## 2025-03-01 PROCEDURE — 258N000003 HC RX IP 258 OP 636

## 2025-03-01 PROCEDURE — 999N000259 HC STATISTIC EXTUBATION

## 2025-03-01 PROCEDURE — 0B988ZZ DRAINAGE OF LEFT UPPER LOBE BRONCHUS, VIA NATURAL OR ARTIFICIAL OPENING ENDOSCOPIC: ICD-10-PCS | Performed by: THORACIC SURGERY (CARDIOTHORACIC VASCULAR SURGERY)

## 2025-03-01 PROCEDURE — 82310 ASSAY OF CALCIUM: CPT | Performed by: SURGERY

## 2025-03-01 PROCEDURE — 999N000157 HC STATISTIC RCP TIME EA 10 MIN

## 2025-03-01 PROCEDURE — 99291 CRITICAL CARE FIRST HOUR: CPT | Mod: 24 | Performed by: SURGERY

## 2025-03-01 PROCEDURE — 0B9B8ZZ DRAINAGE OF LEFT LOWER LOBE BRONCHUS, VIA NATURAL OR ARTIFICIAL OPENING ENDOSCOPIC: ICD-10-PCS | Performed by: THORACIC SURGERY (CARDIOTHORACIC VASCULAR SURGERY)

## 2025-03-01 PROCEDURE — 258N000001 HC RX 258

## 2025-03-01 PROCEDURE — 85018 HEMOGLOBIN: CPT | Performed by: SURGERY

## 2025-03-01 PROCEDURE — 82805 BLOOD GASES W/O2 SATURATION: CPT | Performed by: SURGERY

## 2025-03-01 PROCEDURE — 85027 COMPLETE CBC AUTOMATED: CPT

## 2025-03-01 PROCEDURE — 250N000011 HC RX IP 250 OP 636: Performed by: THORACIC SURGERY (CARDIOTHORACIC VASCULAR SURGERY)

## 2025-03-01 PROCEDURE — 84520 ASSAY OF UREA NITROGEN: CPT

## 2025-03-01 PROCEDURE — 80048 BASIC METABOLIC PNL TOTAL CA: CPT | Performed by: SURGERY

## 2025-03-01 PROCEDURE — 71045 X-RAY EXAM CHEST 1 VIEW: CPT

## 2025-03-01 PROCEDURE — 80048 BASIC METABOLIC PNL TOTAL CA: CPT

## 2025-03-01 PROCEDURE — 250N000009 HC RX 250: Performed by: SURGERY

## 2025-03-01 PROCEDURE — 83735 ASSAY OF MAGNESIUM: CPT | Performed by: SURGERY

## 2025-03-01 PROCEDURE — 31622 DX BRONCHOSCOPE/WASH: CPT

## 2025-03-01 PROCEDURE — 250N000011 HC RX IP 250 OP 636: Mod: JZ | Performed by: SURGERY

## 2025-03-01 PROCEDURE — 250N000013 HC RX MED GY IP 250 OP 250 PS 637: Performed by: SURGERY

## 2025-03-01 PROCEDURE — 82565 ASSAY OF CREATININE: CPT | Performed by: SURGERY

## 2025-03-01 PROCEDURE — 250N000011 HC RX IP 250 OP 636

## 2025-03-01 PROCEDURE — 84100 ASSAY OF PHOSPHORUS: CPT | Performed by: SURGERY

## 2025-03-01 PROCEDURE — 94003 VENT MGMT INPAT SUBQ DAY: CPT

## 2025-03-01 RX ORDER — HYDROMORPHONE HYDROCHLORIDE 1 MG/ML
0.5 INJECTION, SOLUTION INTRAMUSCULAR; INTRAVENOUS; SUBCUTANEOUS
Status: DISCONTINUED | OUTPATIENT
Start: 2025-03-01 | End: 2025-03-02

## 2025-03-01 RX ORDER — INDOMETHACIN 25 MG/1
50 CAPSULE ORAL ONCE
Status: COMPLETED | OUTPATIENT
Start: 2025-03-01 | End: 2025-03-01

## 2025-03-01 RX ORDER — METRONIDAZOLE 500 MG/100ML
500 INJECTION, SOLUTION INTRAVENOUS EVERY 8 HOURS
Status: COMPLETED | OUTPATIENT
Start: 2025-03-01 | End: 2025-03-08

## 2025-03-01 RX ORDER — CEFEPIME HYDROCHLORIDE 2 G/1
2 INJECTION, POWDER, FOR SOLUTION INTRAVENOUS EVERY 12 HOURS
Status: DISCONTINUED | OUTPATIENT
Start: 2025-03-01 | End: 2025-03-01

## 2025-03-01 RX ORDER — METRONIDAZOLE 500 MG/100ML
500 INJECTION, SOLUTION INTRAVENOUS EVERY 8 HOURS
Status: DISCONTINUED | OUTPATIENT
Start: 2025-03-01 | End: 2025-03-01

## 2025-03-01 RX ORDER — CEFEPIME HYDROCHLORIDE 2 G/1
2 INJECTION, POWDER, FOR SOLUTION INTRAVENOUS EVERY 12 HOURS
Status: COMPLETED | OUTPATIENT
Start: 2025-03-01 | End: 2025-03-08

## 2025-03-01 RX ORDER — HYDROMORPHONE HCL IN WATER/PF 6 MG/30 ML
0.2 PATIENT CONTROLLED ANALGESIA SYRINGE INTRAVENOUS
Status: DISCONTINUED | OUTPATIENT
Start: 2025-03-01 | End: 2025-03-02

## 2025-03-01 RX ADMIN — SODIUM CHLORIDE, POTASSIUM CHLORIDE, SODIUM LACTATE AND CALCIUM CHLORIDE 500 ML: 600; 310; 30; 20 INJECTION, SOLUTION INTRAVENOUS at 00:17

## 2025-03-01 RX ADMIN — PROCHLORPERAZINE EDISYLATE 5 MG: 5 INJECTION INTRAMUSCULAR; INTRAVENOUS at 22:20

## 2025-03-01 RX ADMIN — SODIUM BICARBONATE 50 MEQ: 84 INJECTION INTRAVENOUS at 01:50

## 2025-03-01 RX ADMIN — INSULIN ASPART 1 UNITS: 100 INJECTION, SOLUTION INTRAVENOUS; SUBCUTANEOUS at 19:57

## 2025-03-01 RX ADMIN — SODIUM CHLORIDE, POTASSIUM CHLORIDE, SODIUM LACTATE AND CALCIUM CHLORIDE 500 ML: 600; 310; 30; 20 INJECTION, SOLUTION INTRAVENOUS at 15:06

## 2025-03-01 RX ADMIN — PANTOPRAZOLE SODIUM 40 MG: 40 INJECTION, POWDER, FOR SOLUTION INTRAVENOUS at 07:44

## 2025-03-01 RX ADMIN — METRONIDAZOLE 500 MG: 500 INJECTION, SOLUTION INTRAVENOUS at 19:01

## 2025-03-01 RX ADMIN — DEXTROSE MONOHYDRATE 50 ML: 25 INJECTION, SOLUTION INTRAVENOUS at 01:50

## 2025-03-01 RX ADMIN — SODIUM CHLORIDE, POTASSIUM CHLORIDE, SODIUM LACTATE AND CALCIUM CHLORIDE 500 ML: 600; 310; 30; 20 INJECTION, SOLUTION INTRAVENOUS at 10:48

## 2025-03-01 RX ADMIN — PROPOFOL 20 MCG/KG/MIN: 10 INJECTION, EMULSION INTRAVENOUS at 00:36

## 2025-03-01 RX ADMIN — SODIUM BICARBONATE 50 MEQ: 84 INJECTION INTRAVENOUS at 00:18

## 2025-03-01 RX ADMIN — HYDROMORPHONE HYDROCHLORIDE 0.2 MG: 0.2 INJECTION, SOLUTION INTRAMUSCULAR; INTRAVENOUS; SUBCUTANEOUS at 19:01

## 2025-03-01 RX ADMIN — SODIUM CHLORIDE, POTASSIUM CHLORIDE, SODIUM LACTATE AND CALCIUM CHLORIDE 500 ML: 600; 310; 30; 20 INJECTION, SOLUTION INTRAVENOUS at 02:15

## 2025-03-01 RX ADMIN — SENNOSIDES AND DOCUSATE SODIUM 1 TABLET: 50; 8.6 TABLET ORAL at 19:55

## 2025-03-01 RX ADMIN — SODIUM PHOSPHATE, MONOBASIC, MONOHYDRATE AND SODIUM PHOSPHATE, DIBASIC, ANHYDROUS 9 MMOL: 276; 142 INJECTION, SOLUTION INTRAVENOUS at 12:56

## 2025-03-01 RX ADMIN — CEFEPIME HYDROCHLORIDE 2 G: 2 INJECTION, POWDER, FOR SOLUTION INTRAVENOUS at 12:28

## 2025-03-01 RX ADMIN — METRONIDAZOLE 500 MG: 500 INJECTION, SOLUTION INTRAVENOUS at 12:36

## 2025-03-01 RX ADMIN — SODIUM CHLORIDE, SODIUM LACTATE, POTASSIUM CHLORIDE, AND CALCIUM CHLORIDE 500 ML: .6; .31; .03; .02 INJECTION, SOLUTION INTRAVENOUS at 22:08

## 2025-03-01 RX ADMIN — DEXMEDETOMIDINE HYDROCHLORIDE 0.5 MCG/KG/HR: 400 INJECTION INTRAVENOUS at 05:22

## 2025-03-01 RX ADMIN — ENOXAPARIN SODIUM 40 MG: 40 INJECTION SUBCUTANEOUS at 12:49

## 2025-03-01 RX ADMIN — ONDANSETRON 4 MG: 2 INJECTION, SOLUTION INTRAMUSCULAR; INTRAVENOUS at 20:08

## 2025-03-01 RX ADMIN — INSULIN ASPART 2 UNITS: 100 INJECTION, SOLUTION INTRAVENOUS; SUBCUTANEOUS at 04:02

## 2025-03-01 RX ADMIN — SODIUM CHLORIDE, POTASSIUM CHLORIDE, SODIUM LACTATE AND CALCIUM CHLORIDE: 600; 310; 30; 20 INJECTION, SOLUTION INTRAVENOUS at 08:19

## 2025-03-01 ASSESSMENT — ACTIVITIES OF DAILY LIVING (ADL)
ADLS_ACUITY_SCORE: 48

## 2025-03-01 NOTE — PROCEDURES
THORACIC SURGERY BEDSIDE PROCEDURE   NAME:  Shahid Davis  MRN:  [id number]  :  1950       Diagnosis: Post-operative day 1 status post tracheal resection.   Indication: Airway inspection and clearance prior to extubation.    Procedure: Flexible bronchoscopy, diagnostic and therapeutic    Specimen: None     Premedication: Pt intubated and sedated prior to procedure  Procedure Meds:  None     Procedure: A timeout was called to review the case and patient information. The patient was positioned semi-Castillo. The bronchoscope was passed into the distal trachea via ET tube without difficulty.  The tracheal anastomosis was inspected and seen to be intact. Bilateral tracheobronchial trees were inspected closely to the level of the subsegmental bronchi.  A significant amount of tenacious blood tinged secretions were found extending from the pradip into the left mainstem bronchus and into the segmental bronchi. The diagnostic flexible bronchoscope was switched for therapeutic flexible bronchoscope to allow for clearance of these secretions. These were lavaged and evacuated without difficulty. The right-sided airways were largely free of secretions, however a small defect was seen along the membranous region of the bronchus intermedius. Discussed with Dr. Camp, and this appears to be a small defect related to the patient's surgical procedure from the day before. Given the small size of the defect, the plan would be to let it heal without further surgical intervention. The procedure was completed and the patient tolerated the procedure well and without complications.    Plan: CXR, proceed with extubation, 7-day course of piperacillin/tazobactam    Dr. Camp was available throughout the entire procedure.      Hung Perez MD  Cardiothoracic Surgery Fellow

## 2025-03-01 NOTE — PROGRESS NOTES
Post-op pt arrived to ICU intubated with an ETT Cuffed 8 mm-Secured at (cm): 24 cm at lips with an ETAD.     Pt was placed on the following vent settings per MD:    Vent Mode: (Continuous positive airway pressure with Pressure Support)  FiO2: 40 %   PEEP (cm H2O): 5 cmH2O    Pressure Support: 5cmH2O      Ambu bag, mask, and valve present at bedside.    Marcel Barakat, RT on 2/28/2025 at 6:41 PM

## 2025-03-01 NOTE — OP NOTE
Preoperative diagnosis:                         Tracheal adenoid cystic carcinoma  Postoperative diagnosis:                       As above    Procedure:   Right thoracotomy, tracheal resection on VA ECMO  Intercostal nerve cryoablation    Anesthesia: General   Surgeon: Darío Potts   Co-surgeons: Gume Camp MD; Fidel Lockhart MD  Resident surgeon: Mari Pagan MD  EBL:  100 mL    Complications: none immediate  Findings:  Anastomosis intact with no evidence of leak     Description of procedure    The patient was brought to the room and placed supine upon the table.  After confirming the patient's identity and verifying the consent, appropriate monitoring devices were placed as well as SCD boots.  General anesthesia was administered and the patient was intubated with a single-lumen endotracheal tube. Intravenous antibiotic was administered. The patient was turned to the left lateral decubitus position and all pressure points were padded. The bed was flexed and the patient was secured to the table.  The right arm was placed on an airplane board.  The right chest was prepped with chlorhexidine and  draped in the standard surgical fashion. Bronchoscopy showed the position of the tumor just above the pradip in the right membranous trachea. An EZ blocker was passed and the right lung was deflated.   We made a muscle-sparing thoracotomy overlying the 5th rib. The  latissimus dorsi muscle was freed up from its anterior edge and mobilized. The serratus anterior was divided in line with its fibers and we entered in the fourth intercostal space. We resected part of the 5th rib and mobilized the intercostal muscle below for use as a muscle flap to cover the anastomosis. The chest cavity was retracted open. The inferior pulmonary ligament was divided and several small level 9R lymph nodes were harvested. The mediastinal pleura was mobilized anteriorly and posteriorly. We then incised the pericardium in a U-shaped  fashion behind and around the inferior pulmonary vein and then anterior to the hilum. The trachea was then mobilized anteriorly and posteriorly. The right and left mainstem bronchi were likewise mobilized in a similar fashion. Dr. Lockhart then joined the operation and placed the patient on VA ECMO, cannulating the aorta and the right atrium. During venous cannulation, the atrial lead of the patient's pacemaker was exposed. Please see Dr. Lockhart's note for further details of this portion as well as the decannulation at the end of the case.   The ventilator was turned off and the EZ blocker removed. The bronchoscope was advanced into the airway and the trachea was incised just above the pradip. The trachea was then divided above the tumor. Frozen section showed malignancy at both margins. We took a cuff of trachea more proximally but even with more mobilization with Dr. Camp's assistance, we did not feel that we could safely take more of the distal airway. Stay sutures had been placed on either side of the trachea previously. The cartilaginous corners were approximated with silk suture and the anastomosis was then fashioned with interrupted 3-0 and 4-0 vicryl sutures. A leak test showed no air leak after two repair stiches were placed. The muscle flap was placed over the anastomosis and tacked in place with 3-0 vicryl. A multiple level intercostal nerve block and intercostal nerve cryoablation was administered. A 28 [place] chest tube was then placed going posteriorly to the apex through the initial camera port site.  This was secured to the skin using 0 silk suture. The ventilator was restarted and the lung was observed to inflate appropriately. Dr. Lockhart then rejoined the case and decannulated the ECMO circuit. Hemostasis was noted. A Kwame drain was placed into the mediastinum and brought out through a stab incision and both tubes were connected to a pleurevac. The lung inflated appropriately. The chest wall  was closed with 5 Ti-cron sutured.  The serratus anterior was reapproximated using 0 Vicryl. A Christiano Cotton drain was placed under the mobilized latissimus muscle and brought out through a stab incision and connected to bulb suction. The remainder of layers were closed with 2-0 Vicryl, 3-0 Vicryl and 4-0 Monocryl. The area was cleaned and dried and mastisol and steri-strips applied. At the end of the case, sponge, needle, and instrument counts were correct.

## 2025-03-01 NOTE — ANESTHESIA POSTPROCEDURE EVALUATION
Patient: Shahid Davis    Procedure: Procedure(s):  Tracheal Resection and Reconstruction with Cryoablation  Right Thoracotomy  Bronchoscopy flexible  Insert Venous/Arterial extracorporal membrane oxygenator and Removal       Anesthesia Type:  General    Note:  Disposition: ICU            ICU Sign Out: Anesthesiologist/ICU physician sign out WAS performed   Postop Pain Control: Uneventful            Sign Out: Well controlled pain   PONV: No   Neuro/Psych: Uneventful            Sign Out: PLANNED postop sedation   Airway/Respiratory: Uneventful            Sign Out: AIRWAY IN SITU/Resp. Support               Airway in situ/Resp. Support: ETT                 Reason: Planned Pre-op   CV/Hemodynamics: Uneventful            Sign Out: Acceptable CV status; No obvious hypovolemia; No obvious fluid overload   Other NRE: NONE   DID A NON-ROUTINE EVENT OCCUR? No    Event details/Postop Comments:  Patient transported from OR to ICU w/ O2, ambu, infusions, emergency medications and emergency airway equipment. VSS throughout transport and handoff.             Last vitals:  Vitals:    02/28/25 0604   BP: 114/73   Pulse: 97   Resp: 16   Temp: 36.9  C (98.5  F)   SpO2: 94%       Electronically Signed By: Isaiah Henry MD  February 28, 2025  6:33 PM

## 2025-03-01 NOTE — PROGRESS NOTES
"Bronchoscopy Risk Assessment Guidelines      A. Patient symptoms to consider when assessing pulmonary TB risk are:    I. Cough greater than 3 weeks; and fever, hemoptysis, pleuritic chest    pain, weight loss greater than 10 lbs, night sweats, fatigue, infiltrates on    upper lobes or superior segments of lower lobes, cavitation on chest    x-ray.   B. Patient risk factors to consider when assessing pulmonary TB risk are:    I. Exposure to known TB case, foreign-born persons (within 5 years of    arrival to [place]), residence in a crowded setting (correctional facility,     long-term care center, etc.), persons with HIV or immunosuppression.    Patients with symptoms and risk factors should generally be considered \"suspect risk\" and bronchoscopies should be performed in airborne precautions.    This patient has NO KNOWN RISK of Tuberculosis (proceed with bronchoscopy)    Specimens sent: yes  Complications: None  Scope used: #[id number] Therapeutic, #52  Attending Physician: Dr. Conrado Willingham, RT on 3/1/2025 at 10:46 AM  "

## 2025-03-01 NOTE — PROGRESS NOTES
Pt extubated to  2L oxymask per MD order. Pt was suctioned via ETT and orally, and cuff leak checked prior to extubation.     Lung sounds are clear all lobes. Pt has a strong cough. No immediate complications occurred.     Patrick Kendall, RT on 3/1/2025 at 2:59 PM.

## 2025-03-01 NOTE — PLAN OF CARE
Goal Outcome Evaluation:      Labs/Protocols: LA 7.0->8.7->6.1->3.8, Bicarb 19 ->25  Neuro: Rass -3, withdrawals to pain, PERRLA   Cardiac: Permanent pacer DDDR , paced @ 80, MAP>65, maintained with LEVO @ 0.2  Respiratory: ETT @ 24 lip, 40%/400/20/5  GI/: NPO, last BM ?, archer in place with 30-100hr output  Skin: Suture chin to chest, 2 cts (air leak, team aware), 1 MILY (leakage, bulb not draining, team aware)  LDAs: CVC w/introducer, 2 RT piv, Lt atline, 2 Cts, 1 MILY and archer  GTTs:  Levo 0.02  Fent 50  Dex 0.3  Prop 20  Diet: NPO  Activity: Bedrest     Overnight gave 2 sticks of Bicarb, 2 sticks of D50 (BG 61 & 58), 1.5L of LR    Plan: Extubate, wean off pressors. Continue to follow plan of care and notify MD of any changes.          Plan of Care Reviewed With: patient    Overall Patient Progress: improvingOverall Patient Progress: improving    Outcome Evaluation: See note

## 2025-03-01 NOTE — ANESTHESIA CARE TRANSFER NOTE
Patient: Shahid Davis    Procedure: Procedure(s):  Tracheal Resection and Reconstruction with Cryoablation  Right Thoracotomy  Bronchoscopy flexible  Insert Venous/Arterial extracorporal membrane oxygenator and Removal       Diagnosis: Tracheal cancer (H) [C33]  Diagnosis Additional Information: No value filed.    Anesthesia Type:   General     Note:    Oropharynx: ventilatory support and endotracheal tube in place  Level of Consciousness: iatrogenic sedation      Independent Airway: airway patency not satisfactory and stable  Dentition: dentition unchanged  Vital Signs Stable: post-procedure vital signs reviewed and stable    Patient transferred to: ICU    ICU Handoff: Call for PAUSE to initiate/utilize ICU HANDOFF, Identified Patient, Identified Responsible Provider, Reviewed the Pertinent Medical History, Discussed Surgical Course, Reviewed Intra-OP Anesthesia Management and Issues during Anesthesia, Set Expectations for Post Procedure Period and Allowed Opportunity for Questions and Acknowledgement of Understanding      Vitals:  Vitals Value Taken Time   BP     Temp     Pulse 80 02/28/25 1835   Resp     SpO2 98 % 02/28/25 1835   Vitals shown include unfiled device data.    Electronically Signed By: FARSHAD Mao CRNA  February 28, 2025  6:35 PM

## 2025-03-01 NOTE — PROGRESS NOTES
THORACIC & FOREGUT SURGERY    S:  No acute overnight events. HR 80, paced on DDD. Hemodynamically stable on NE 0.02-0.03. Intubated and sedated, on precedex 0.3, Fentanyl 50, Propofol 20. Voiding via [place].     O:  Vitals:    03/01/25 1030 03/01/25 1045 03/01/25 1100 03/01/25 1115   BP:       BP Location:       Cuff Size:       Pulse: 80 87 80 80   Resp: 20 21  20   Temp:       TempSrc:       SpO2: 100% 98% 100% 99%   Weight:       Height:           Intubated and sedated. Vent 40% FiO2, PEEP 5  Appears well perfused.  Suture chin to chest.  Soft, NDNT, no rebound, non-peritonitic. Incisions c/d/I with dressings.  Distal extremities warm. No calf tenderness.    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 Now s/p tracheal resection and reconstruction with cryo, VA ECMO on 2/28 by Dr. Potts. Positive margins on frozen biopsy.    - Diet: NPO  - mIVF 50 ml/hr  - wean pressors as able  - Bronch today. Please see separate procedure note.  - OK to extubate from thoracic surgery standpoint.  - Abx: Cefepime/Flagyl (trach perforation)  - DVT ppx: Lovenox  - Rest of cares per SICU team. Appreciate excellent cares.    POSTOP MEDICAL ISSUES: Positive margins, tracheal perforation of R intermediate bronchus.    Clinically Significant Risk Factors          # Hyperchloremia: Highest Cl = 108 mmol/L in last 2 days, will monitor as appropriate      # Hypocalcemia: Lowest Ca = 8.4 mg/dL in last 2 days, will monitor and replace as appropriate      # Coagulation Defect: INR = 1.31 (Ref range: 0.85 - 1.15) and/or PTT = 30 Seconds (Ref range: 22 - 38 Seconds), will monitor for bleeding  # Thrombocytopenia: Lowest platelets = 97 in last 2 days, will monitor for bleeding       # Acute Hypercapnic Respiratory Failure: based on arterial blood gas results.  Continue supplemental oxygen and ventilatory support as indicated.               # Pacemaker present     Fito Lam MD  General Surgery PGY-1  Pager: [phone]    Thoracic and Foregut Surgery  Text page Thoracic Surgery via [facility] Paging/Directory

## 2025-03-01 NOTE — PROGRESS NOTES
SURGICAL ICU PROGRESS NOTE  03/01/2025      CO-MORBIDITIES:   No diagnosis found.    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     Significant intra-op Events: Centrally cannulated for VA ECMO, exposure of atrial pacemaker lead during RA cannulation leading to intermittent loss of capture  EBL: <100 mL  Product: 180 cell saver    Interval Events:  - Switched from PS to full vent settings and not extubated due to being on multiple pressors  - Received bicarbonate with improvement in acidosis on subsequent ABG  - Lactic acid trended down after IV fluids  - Chest tube output slowed overnight    CHANGES TODAY:  - PST, will discuss with Thoracic Surgery prior to extubation  - Bedside swallow eval once extubated, will discuss if SLP is needed with Thoracic Surgery  - Add PO analgesics once extubated  - Will give 1L LR bolus given low UOP this morning  - Electrolyte replacement per protocol    PLAN:    Neurological:  # Acute postoperative pain   # Hx BLE neuropathy  - Monitor neurological status. Delirium preventions and precautions  - Pain: Fentanyl gtt. Initially planned for epidural, but anesthesia team opted to hold off last night given heparinization with ECMO  - Sedation plan: Propofol at 20, precedex gtt at 0.3. Wean sedation as able.   - Holding PTA Baclofen     Pulmonary:   # Former smoker  # Post operative ventilatory support  - VENT: Arrived on PS 5/5, switched to CMV/AC: RR 20, Vt 400,  PEEP 5, FiO2 0.4  - ABG improved overnight: 7.39/42/124/25/0.1/40/98  - Per anesthesia ETT is below cords but right above new anastomosis; if requires re-intubation would need to be done carefully with glidescope.   - Ventilatory bundle. HOB elevation.  - Supplemental oxygen to keep saturation >92% once extubated, wean as able.  - Incentive spirometer every hour while awake once extubated.  - Daily CXR: (2/28) Unchanged perihilar and bibasilar streaky opacities likely representing atelectasis/edema. Stable position of support devices.    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  - Follow final pathology results.    Cardiovascular:    # Complete heart block s/p pacemaker implantation 2/2020 ([employer], Mode: DDDR )  - Intra-op course complicated by atrial lead displacement and he has lost atrial capture.  - Pacemaker interrogated overnight, stable     # Shock-distributive, possibly cardiogenic with loss of atrial pacemaker capture and atrial kick  - Goal MAP >65  - Arrived to SICU on 1 vaso, 0.1 levo, 0.06 epi. Currently on 0.04 Levo  - Most recent CVP 8  - Wean vasopressors as able while maintaining MAP goal  - Last Echo 7/2023 with EF 55-60%, no significant valvular abnormalities     Gastroenterology/Nutrition:  # Pyloric stenosis s/p repair as an infant (~6 months old)  # Hx of GERD   # Protein calorie deficit malnutrition   - NPO. Will hold off on OGT tonight given new tracheal anastomosis   - Protonix 40 mg daily, holding  PTA Omeprazole  - Bowel regimen: Scheduled Miralax and senna, PRN dulcolax and milk of magnesia  - If extubated trial bedside swallow vs NG tube per Thoracic Surgery     Fluid/Electrolytes/Renal:  # Lactic acidosis   - Initial postop LA 5.0, trended down to 2.3 this morning.  - Baseline creatinine ~0.7-0.95. This morning 0.89  - LR at 100cc/hr  - Bolus 500 mL LR bolus due to low UOP this morning per Thoracic Surgery  - Daily BMP  - Odonnell in place. Will continue to monitor intake and output.    # Hypophosphatemia  - Replace per protocol     Endocrine:  # Stress hyperglycemia  # No Hx of diabetes mellitus  - Received 10 mg Dexamethasone intra-op  - Goal to keep BG< 180 for optimal wound healing   - Medium sliding scale insulin  - Hypoglycemia protocol     ID:  # Leukocytosis, reactive to surgery, improving  - WBC 10.5k this morning from 27.5k initially postop  - No current concern for infection.  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  - Transfuse if hgb <7.0 or signs/symptoms of hypoperfusion.   - Fibrinogen 211 (2/28), INR 1.3 (2/28)  - Daily CBC     MSK:  # Weakness and deconditioning of critical illness   - Physical and occupational therapy consult      General Cares/Prophylaxis:    DVT Prophylaxis: Enoxaparin (Lovenox) subcutaneous to start POD1 (3/1)  GI Prophylaxis: PPI  Restraints: Restraints for medical healing needed: NO  Lines/  tubes/ drains:  - ETT  - RIJ CVC  - PIV x2  - Left radial arterial line  - Right chest tubes x 2  - Right subcutaneous MILY drain  - Archer catheter    Disposition:  - Surgical ICU.    Patient seen, findings and plan discussed with surgical ICU staff, Dr. Pretty.    Moses Contreras DO  Orthopaedic Surgery Resident  Pager: [phone]  March 1, 2025, 6:05 AM    Please page me directly with any questions/concerns during regular weekday hours before 5pm. If there is no response, if it is a weekend, or if it is during evening hours, then please page the orthopedic surgery resident on call.     ICU Daily Rounding Checklist     Checklist Response Notes   Can sedation be reduced?  Yes    Can analgesia be reduced? Yes    Is delirium being assessed, addressed and prevented? Yes    Spontaneous awakening trial and/or Spontaneous breathing trial candidate?  Yes    Is the patient at goals for lung protective ventilation? Yes    Head of bed elevation (30 degrees)? Yes    Skin breakdown assessment (prevention) completed? Yes    Is enteral nutrition at goal? No    Bowel program/frequency being addressed? Yes    Total fluid balance goal reviewed?  Yes Target Goals:        [12h]             Net even [24h]   Is blood glucose at goal? No    Can Antibiotics be narrowed or discontinued? NA      Gastric ulcer prophylaxis?  Yes If non-medication induced coagulopathy (INR-1.5 PTT > 2x normal or Plt<50k), mechanical ventilation 48hr, history of GI bleed/ulcer within past year. TBL, SCI, or burn, or if >= 2 minor risk factors (sepsis, ICU stay 1 week, occult GI bleed > 6 days. glucocorticoid therapy, NSAID use, antiplatelet use)   Deep venous thrombosis prophylaxis? Yes    Early mobility candidate and physical therapy consulted? Yes    Is archer catheter needed? Yes    Is central venous/arterial catheter needed? Yes    Has the family been updated? No    Are the patient's goals of care and code status current? Yes       ====================================    SUBJECTIVE:   Admitted to SICU postoperatively. Switched to AC vent setting with improvement in subsequent ABG. Weaned to just Levo this morning. Received 1.5L IVF and bicarbonate. Lactic trended down.     OBJECTIVE:   1. VITAL SIGNS:   Temp:  [98  F (36.7  C)-98.5  F (36.9  C)] 98.5  F (36.9  C)  Pulse:  [80-95] 82  Resp:  [8-22] 20  MAP:  [55 mmHg-98 mmHg] 70 mmHg  Arterial Line BP: ()/(40-70) 101/54  FiO2 (%):  [40 %] 40 %  SpO2:  [97 %-100 %] 99 %  FiO2 (%): 40 %, Resp: 20, Vent Mode: CMV/AC, Resp Rate (Set): 20 breaths/min, Tidal Volume (Set, mL): 400 mL, PEEP (cm H2O): 5 cmH2O, Pressure Support (cm H2O): 5 cmH2O, Resp Rate (Set): 20 breaths/min, Tidal Volume (Set, mL): 400 mL, PEEP (cm H2O): 5 cmH2O    2. INTAKE/ OUTPUT:   I/O last 3 completed shifts:  In: 3383.48 [I.V.:2703.48; Other:180; IV Piggyback:500]  Out: 930 [Urine:680; Chest Tube:250]    3. PHYSICAL EXAMINATION:   General: NAD laying in bed  Neuro: Sedated   HEENT: ETT in place. Suture from chin to anterior chest   CV: RRR on monitor, atrial pacer not capturing, v-paced with HR 80s  Respiratory: Mechanically ventilated. Right chest incision covered with clean dressing. R CT x2 with thin sanguinous output, +airleak. Right chest subcutaneous MILY with thin, sanguinous drainage, not holding suction well.   GI: Abdomen soft, non-distended  : Odonnell in place with yellow UOP.  MSK: No peripheral edema in BLE. Palpable DP pulses.    4. INVESTIGATIONS:   Arterial Blood Gases   Recent Labs   Lab 03/01/25  0255 02/28/25  2347 02/28/25  1839 02/28/25  1700   PH 7.39 7.23* 7.29* 7.22*   PCO2 42 46* 41 54*   PO2 124* 143* 212* 436*   HCO3 25 19* 20* 22     Complete Blood Count   Recent Labs   Lab 03/01/25  0358 02/28/25  1839 02/28/25  1700 02/28/25  1638 02/28/25  1625   WBC 10.5 27.4*  --   --   --    HGB 11.0* 12.0* 10.1*  --  9.5*   PLT 97* 150  --  133*  --      Basic Metabolic Panel  Recent Labs   Lab  03/01/25  0402 03/01/25  0358 03/01/25  0257 03/01/25  0203 02/28/25  1844 02/28/25  1839 02/28/25  1700 02/28/25  1625   NA  --  142  --   --   --  142 141 142   POTASSIUM  --  4.6  --   --   --  4.2 3.7 3.6   CHLORIDE  --  108*  --   --   --  105  --   --    CO2  --  25  --   --   --  19*  --   --    BUN  --  23.3*  --   --   --  26.3*  --   --    CR  --  0.89  --   --   --  0.98  --   --    * 247* 266* 212*   < > 201* 159* 186*    < > = values in this interval not displayed.     Liver Function Tests  Recent Labs   Lab 02/28/25 1839 02/28/25  1638   *  --    ALT 74*  --    ALKPHOS 72  --    BILITOTAL 0.7  --    ALBUMIN 3.5  --    INR 1.31* 1.77*     Pancreatic Enzymes  No lab results found in last 7 days.  Coagulation Profile  Recent Labs   Lab 02/28/25 1839 02/28/25  1638   INR 1.31* 1.77*   PTT  --  30         5. RADIOLOGY:   Recent Results (from the past 24 hours)   Cardiac Device Check - Inpatient   Result Value    Date Time Interrogation Session 20,250,[id number],304    Implantable Pulse Generator  [employer]    Implantable Pulse Generator Model 2272 Assurity MRI    Implantable Pulse Generator Serial Number [id number]    Type Interrogation Session In Clinic    Clinic Name [facility]    Implantable Pulse Generator Type Pacemaker    Implantable Pulse Generator Implant Date 20,200,302    Implantable Lead  [employer] [facility]    Implantable Lead Model 2088TC Tendril STS    Implantable Lead Serial Number [id number]    Implantable Lead Implant Date 20,200,302    Implantable Lead Polarity Type Bipolar Lead    Implantable Lead Location Detail 1 UNKNOWN    Implantable Lead Location Right Ventricle    Implantable Lead Connection Status Connected    Implantable Lead  [facility] [facility]    Implantable Lead Model 2088TC Tendril STS    Implantable Lead Serial Number [id number]    Implantable Lead Implant Date 20200302    Implantable Lead Polarity Type  Bipolar Lead    Implantable Lead Location Detail 1 UNKNOWN    Implantable Lead Location Right Atrium    Implantable Lead Connection Status Connected    Domenic Setting Mode (NBG Code) DOO    Domenic Setting Lower Rate Limit 80    Domenic Setting Night Rate Off    Domenic Setting PAV Delay Low 200    Lead Channel Setting Sensing Polarity Bipolar    Lead Channel Setting Sensing Polarity Bipolar    Lead Channel Setting Pacing Polarity Bipolar    Lead Channel Setting Pacing Pulse Width 0.4    Lead Channel Setting Pacing Amplitude 2.5    Lead Channel Setting Pacing Polarity Bipolar    Lead Channel Setting Pacing Pulse Width 0.4    Lead Channel Setting Pacing Amplitude 2.5    Lead Channel Impedance Value 375.0    Lead Channel Sensing Intrinsic Amplitude 4.1    Lead Channel Pacing Threshold Amplitude 0.75    Lead Channel Pacing Threshold Pulse Width 0.4    Lead Channel Pacing Threshold Amplitude 0.75    Lead Channel Pacing Threshold Pulse Width 0.4    Lead Channel Impedance Value 462.5    Lead Channel Pacing Threshold Amplitude 1.0    Lead Channel Pacing Threshold Pulse Width 0.4    Lead Channel Pacing Threshold Amplitude 1.0    Lead Channel Pacing Threshold Pulse Width 0.4    Battery Status Unknown    Battery Remaining Longevity 42    Battery Voltage 2.98    Domenic Statistic RA Percent Paced 2.1    Domenic Statistic RV Percent Paced 99.17    Atrial Tachy Statistic AT/AF Lydia Percent 0    Atrial Tachy Statistic Number Of Mode Switches 0.0    Episode Statistic Recent Count 0    Episode Statistic Type Category AT/AF    Episode Statistic Vendor Type Category AT/AF    Episode Statistic Recent Count 0    Episode Statistic Type Category SVT    Episode Statistic Recent Count 0    Episode Statistic Type Category VT    Episode Statistic Vendor Type Category Non-sustained VT    Narrative    Patient seen on 3C pre-operatively for evaluation and iterative   programming of their pacemaker per MD orders.    Device: [employer] [facility] [employer] 2002 Assurity  MRI  Normal device function  Mode: DDDR   AP: 2.1%  : >99%  Intrinsic Rhythm: NSR with CHB  Lead Trends Appear Stable: Yes  Estimated battery longevity to RRT = 4.2 years. Battery voltage = 2.98 V.   Atrial Arrhythmia: 0  Ventricular Arrhythmia: 0  2 Consecutive PVCs episodes recorded -   Setting Changes: RA and RV amplitudes increased to 2.5 V. Pacing mode   programmed from DDDR  bpm to DOO 80 bpm per Anesthesia orders.    DESMOND Neal RN    Dual lead pacemaker    I have reviewed and interpreted the device interrogation, settings,   programming and nurse's summary. The device is functioning within normal   device parameters. I agree with the current findings, assessment and plan.     XR Chest Port 1 View    Narrative    Exam: XR CHEST PORT 1 VIEW  2/28/2025 6:54 PM     History:  Post Op thoracic surgery       Comparison:  None    Findings: Single view of the chest. Postop tracheal resection and  reconstruction and cryoablation. Left chest wall implantable cardiac  defibrillator with 2 right-sided leads. Endotracheal tube in the lower  thoracic trachea approximately 3 cm from the pradip. Right IJ central  line with tip in the mid SVC. Right apical and right basilar chest  tubes.    Cardiac mediastinal silhouette is stable. Patchy central and left mid  lung pulmonary opacities. Trace left pleural effusion. No right  pleural effusion. Bilateral small apical pneumothoraces. No acute bony  abnormality.      Impression    Impression:  1. Endotracheal tube 3 cm from the pradip.  2. Right-sided chest tubes and right IJ central venous catheter.  3. Central and left mid lung zone patchy pulmonary opacities, likely  atelectasis and edema. Trace left pleural effusion.    I have personally reviewed the examination and initial interpretation  and I agree with the findings.    AUDREY JI MD         SYSTEM ID:  [id number]       =========================================

## 2025-03-02 ENCOUNTER — APPOINTMENT (OUTPATIENT)
Dept: GENERAL RADIOLOGY | Facility: CLINIC | Age: 75
DRG: 166 | End: 2025-03-02
Attending: SURGERY
Payer: MEDICARE

## 2025-03-02 ENCOUNTER — APPOINTMENT (OUTPATIENT)
Dept: GENERAL RADIOLOGY | Facility: CLINIC | Age: 75
DRG: 166 | End: 2025-03-02
Payer: MEDICARE

## 2025-03-02 ENCOUNTER — APPOINTMENT (OUTPATIENT)
Dept: OCCUPATIONAL THERAPY | Facility: CLINIC | Age: 75
DRG: 166 | End: 2025-03-02
Attending: SURGERY
Payer: MEDICARE

## 2025-03-02 LAB
ANION GAP SERPL CALCULATED.3IONS-SCNC: 8 MMOL/L (ref 7–15)
BUN SERPL-MCNC: 36.2 MG/DL (ref 8–23)
CALCIUM SERPL-MCNC: 8.2 MG/DL (ref 8.8–10.4)
CHLORIDE SERPL-SCNC: 108 MMOL/L (ref 98–107)
CREAT SERPL-MCNC: 0.96 MG/DL (ref 0.67–1.17)
EGFRCR SERPLBLD CKD-EPI 2021: 82 ML/MIN/1.73M2
ERYTHROCYTE [DISTWIDTH] IN BLOOD BY AUTOMATED COUNT: 13 % (ref 10–15)
GLUCOSE BLDC GLUCOMTR-MCNC: 111 MG/DL (ref 70–99)
GLUCOSE BLDC GLUCOMTR-MCNC: 114 MG/DL (ref 70–99)
GLUCOSE BLDC GLUCOMTR-MCNC: 121 MG/DL (ref 70–99)
GLUCOSE BLDC GLUCOMTR-MCNC: 124 MG/DL (ref 70–99)
GLUCOSE BLDC GLUCOMTR-MCNC: 125 MG/DL (ref 70–99)
GLUCOSE BLDC GLUCOMTR-MCNC: 131 MG/DL (ref 70–99)
GLUCOSE BLDC GLUCOMTR-MCNC: 135 MG/DL (ref 70–99)
GLUCOSE BLDC GLUCOMTR-MCNC: 138 MG/DL (ref 70–99)
GLUCOSE BLDC GLUCOMTR-MCNC: 140 MG/DL (ref 70–99)
GLUCOSE SERPL-MCNC: 139 MG/DL (ref 70–99)
HCO3 SERPL-SCNC: 25 MMOL/L (ref 22–29)
HCT VFR BLD AUTO: 28.8 % (ref 40–53)
HGB BLD-MCNC: 9.2 G/DL (ref 13.3–17.7)
LACTATE SERPL-SCNC: 1.3 MMOL/L (ref 0.7–2)
LACTATE SERPL-SCNC: 1.5 MMOL/L (ref 0.7–2)
LACTATE SERPL-SCNC: 1.5 MMOL/L (ref 0.7–2)
MAGNESIUM SERPL-MCNC: 2 MG/DL (ref 1.7–2.3)
MCH RBC QN AUTO: 31.8 PG (ref 26.5–33)
MCHC RBC AUTO-ENTMCNC: 31.9 G/DL (ref 31.5–36.5)
MCV RBC AUTO: 100 FL (ref 78–100)
PHOSPHATE SERPL-MCNC: 3 MG/DL (ref 2.5–4.5)
PLATELET # BLD AUTO: 108 10E3/UL (ref 150–450)
POTASSIUM SERPL-SCNC: 4.9 MMOL/L (ref 3.4–5.3)
RBC # BLD AUTO: 2.89 10E6/UL (ref 4.4–5.9)
SODIUM SERPL-SCNC: 141 MMOL/L (ref 135–145)
WBC # BLD AUTO: 11.4 10E3/UL (ref 4–11)

## 2025-03-02 PROCEDURE — 250N000009 HC RX 250: Performed by: SURGERY

## 2025-03-02 PROCEDURE — 71045 X-RAY EXAM CHEST 1 VIEW: CPT | Mod: 77

## 2025-03-02 PROCEDURE — 80048 BASIC METABOLIC PNL TOTAL CA: CPT

## 2025-03-02 PROCEDURE — 71045 X-RAY EXAM CHEST 1 VIEW: CPT | Mod: 26 | Performed by: RADIOLOGY

## 2025-03-02 PROCEDURE — 71045 X-RAY EXAM CHEST 1 VIEW: CPT

## 2025-03-02 PROCEDURE — 82435 ASSAY OF BLOOD CHLORIDE: CPT

## 2025-03-02 PROCEDURE — 74018 RADEX ABDOMEN 1 VIEW: CPT | Mod: 26 | Performed by: RADIOLOGY

## 2025-03-02 PROCEDURE — 250N000013 HC RX MED GY IP 250 OP 250 PS 637: Performed by: SURGERY

## 2025-03-02 PROCEDURE — 83605 ASSAY OF LACTIC ACID: CPT

## 2025-03-02 PROCEDURE — 250N000011 HC RX IP 250 OP 636: Mod: JZ | Performed by: THORACIC SURGERY (CARDIOTHORACIC VASCULAR SURGERY)

## 2025-03-02 PROCEDURE — 250N000011 HC RX IP 250 OP 636: Performed by: SURGERY

## 2025-03-02 PROCEDURE — 85027 COMPLETE CBC AUTOMATED: CPT

## 2025-03-02 PROCEDURE — 83735 ASSAY OF MAGNESIUM: CPT | Performed by: SURGERY

## 2025-03-02 PROCEDURE — 250N000013 HC RX MED GY IP 250 OP 250 PS 637

## 2025-03-02 PROCEDURE — 999N000065 XR ABDOMEN PORT 1 VIEW

## 2025-03-02 PROCEDURE — 82565 ASSAY OF CREATININE: CPT

## 2025-03-02 PROCEDURE — 99221 1ST HOSP IP/OBS SF/LOW 40: CPT | Performed by: ANESTHESIOLOGY

## 2025-03-02 PROCEDURE — 250N000009 HC RX 250: Performed by: THORACIC SURGERY (CARDIOTHORACIC VASCULAR SURGERY)

## 2025-03-02 PROCEDURE — 258N000003 HC RX IP 258 OP 636

## 2025-03-02 PROCEDURE — 84100 ASSAY OF PHOSPHORUS: CPT | Performed by: SURGERY

## 2025-03-02 PROCEDURE — 99232 SBSQ HOSP IP/OBS MODERATE 35: CPT | Mod: 24 | Performed by: SURGERY

## 2025-03-02 PROCEDURE — 999N000065 XR CHEST PORT 1 VIEW

## 2025-03-02 PROCEDURE — 258N000003 HC RX IP 258 OP 636: Performed by: SURGERY

## 2025-03-02 PROCEDURE — 74018 RADEX ABDOMEN 1 VIEW: CPT

## 2025-03-02 PROCEDURE — 250N000011 HC RX IP 250 OP 636

## 2025-03-02 PROCEDURE — 97530 THERAPEUTIC ACTIVITIES: CPT | Mod: GO

## 2025-03-02 PROCEDURE — 97165 OT EVAL LOW COMPLEX 30 MIN: CPT | Mod: GO

## 2025-03-02 PROCEDURE — 200N000002 HC R&B ICU UMMC

## 2025-03-02 RX ORDER — ACETAMINOPHEN 325 MG/1
975 TABLET ORAL 3 TIMES DAILY
Status: DISCONTINUED | OUTPATIENT
Start: 2025-03-02 | End: 2025-03-02

## 2025-03-02 RX ORDER — FENTANYL CITRATE 50 UG/ML
25 INJECTION, SOLUTION INTRAMUSCULAR; INTRAVENOUS
Status: DISCONTINUED | OUTPATIENT
Start: 2025-03-02 | End: 2025-03-03

## 2025-03-02 RX ORDER — METHOCARBAMOL 100 MG/ML
500 INJECTION, SOLUTION INTRAMUSCULAR; INTRAVENOUS EVERY 8 HOURS PRN
Status: DISCONTINUED | OUTPATIENT
Start: 2025-03-02 | End: 2025-03-04

## 2025-03-02 RX ORDER — FENTANYL CITRATE 50 UG/ML
50 INJECTION, SOLUTION INTRAMUSCULAR; INTRAVENOUS
Status: DISCONTINUED | OUTPATIENT
Start: 2025-03-02 | End: 2025-03-03

## 2025-03-02 RX ORDER — OXYCODONE HYDROCHLORIDE 5 MG/1
5-10 TABLET ORAL EVERY 4 HOURS PRN
Status: DISCONTINUED | OUTPATIENT
Start: 2025-03-02 | End: 2025-03-02

## 2025-03-02 RX ORDER — ACETAMINOPHEN 325 MG/1
975 TABLET ORAL 3 TIMES DAILY
Status: DISCONTINUED | OUTPATIENT
Start: 2025-03-02 | End: 2025-03-03

## 2025-03-02 RX ORDER — FENTANYL CITRATE 50 UG/ML
25 INJECTION, SOLUTION INTRAMUSCULAR; INTRAVENOUS EVERY 4 HOURS PRN
Status: DISCONTINUED | OUTPATIENT
Start: 2025-03-02 | End: 2025-03-02

## 2025-03-02 RX ORDER — OXYCODONE HYDROCHLORIDE 5 MG/1
5-10 TABLET ORAL EVERY 4 HOURS PRN
Status: DISCONTINUED | OUTPATIENT
Start: 2025-03-02 | End: 2025-03-03

## 2025-03-02 RX ORDER — ONDANSETRON 2 MG/ML
4 INJECTION INTRAMUSCULAR; INTRAVENOUS ONCE
Status: COMPLETED | OUTPATIENT
Start: 2025-03-02 | End: 2025-03-02

## 2025-03-02 RX ORDER — METHOCARBAMOL 500 MG/1
500 TABLET, FILM COATED ORAL 4 TIMES DAILY PRN
Status: DISCONTINUED | OUTPATIENT
Start: 2025-03-02 | End: 2025-03-02

## 2025-03-02 RX ORDER — METHOCARBAMOL 500 MG/1
500 TABLET, FILM COATED ORAL 4 TIMES DAILY
Status: DISCONTINUED | OUTPATIENT
Start: 2025-03-02 | End: 2025-03-02

## 2025-03-02 RX ORDER — LIDOCAINE HYDROCHLORIDE 10 MG/ML
5 INJECTION, SOLUTION EPIDURAL; INFILTRATION; INTRACAUDAL; PERINEURAL ONCE
Status: COMPLETED | OUTPATIENT
Start: 2025-03-02 | End: 2025-03-02

## 2025-03-02 RX ORDER — LIDOCAINE HYDROCHLORIDE 10 MG/ML
INJECTION, SOLUTION EPIDURAL; INFILTRATION; INTRACAUDAL; PERINEURAL
Status: COMPLETED
Start: 2025-03-02 | End: 2025-03-02

## 2025-03-02 RX ADMIN — METRONIDAZOLE 500 MG: 500 INJECTION, SOLUTION INTRAVENOUS at 04:12

## 2025-03-02 RX ADMIN — SODIUM CHLORIDE, POTASSIUM CHLORIDE, SODIUM LACTATE AND CALCIUM CHLORIDE: 600; 310; 30; 20 INJECTION, SOLUTION INTRAVENOUS at 21:11

## 2025-03-02 RX ADMIN — CEFEPIME HYDROCHLORIDE 2 G: 2 INJECTION, POWDER, FOR SOLUTION INTRAVENOUS at 12:58

## 2025-03-02 RX ADMIN — ONDANSETRON 4 MG: 2 INJECTION, SOLUTION INTRAMUSCULAR; INTRAVENOUS at 04:11

## 2025-03-02 RX ADMIN — METHOCARBAMOL 500 MG: 500 TABLET ORAL at 08:03

## 2025-03-02 RX ADMIN — OXYCODONE HYDROCHLORIDE 5 MG: 5 TABLET ORAL at 04:11

## 2025-03-02 RX ADMIN — METRONIDAZOLE 500 MG: 500 INJECTION, SOLUTION INTRAVENOUS at 19:58

## 2025-03-02 RX ADMIN — PROCHLORPERAZINE EDISYLATE 5 MG: 5 INJECTION INTRAMUSCULAR; INTRAVENOUS at 06:02

## 2025-03-02 RX ADMIN — FENTANYL CITRATE 25 MCG: 50 INJECTION, SOLUTION INTRAMUSCULAR; INTRAVENOUS at 17:01

## 2025-03-02 RX ADMIN — ONDANSETRON 4 MG: 2 INJECTION, SOLUTION INTRAMUSCULAR; INTRAVENOUS at 09:39

## 2025-03-02 RX ADMIN — PROCHLORPERAZINE EDISYLATE 5 MG: 5 INJECTION INTRAMUSCULAR; INTRAVENOUS at 20:08

## 2025-03-02 RX ADMIN — SENNOSIDES AND DOCUSATE SODIUM 1 TABLET: 50; 8.6 TABLET ORAL at 19:59

## 2025-03-02 RX ADMIN — ONDANSETRON 4 MG: 2 INJECTION, SOLUTION INTRAMUSCULAR; INTRAVENOUS at 08:42

## 2025-03-02 RX ADMIN — BENZOCAINE 2 SPRAY: 220 SPRAY, METERED PERIODONTAL at 15:01

## 2025-03-02 RX ADMIN — PROCHLORPERAZINE EDISYLATE 5 MG: 5 INJECTION INTRAMUSCULAR; INTRAVENOUS at 12:38

## 2025-03-02 RX ADMIN — PANTOPRAZOLE SODIUM 40 MG: 40 INJECTION, POWDER, FOR SOLUTION INTRAVENOUS at 09:39

## 2025-03-02 RX ADMIN — LIDOCAINE HYDROCHLORIDE 2 ML: 10 INJECTION, SOLUTION EPIDURAL; INFILTRATION; INTRACAUDAL; PERINEURAL at 16:21

## 2025-03-02 RX ADMIN — ENOXAPARIN SODIUM 40 MG: 40 INJECTION SUBCUTANEOUS at 12:46

## 2025-03-02 RX ADMIN — OXYCODONE HYDROCHLORIDE 10 MG: 5 TABLET ORAL at 09:53

## 2025-03-02 RX ADMIN — CEFEPIME HYDROCHLORIDE 2 G: 2 INJECTION, POWDER, FOR SOLUTION INTRAVENOUS at 00:09

## 2025-03-02 RX ADMIN — OXYCODONE HYDROCHLORIDE 5 MG: 5 TABLET ORAL at 00:55

## 2025-03-02 RX ADMIN — ACETAMINOPHEN 975 MG: 325 TABLET, FILM COATED ORAL at 19:59

## 2025-03-02 RX ADMIN — ONDANSETRON 4 MG: 2 INJECTION, SOLUTION INTRAMUSCULAR; INTRAVENOUS at 16:58

## 2025-03-02 RX ADMIN — SODIUM CHLORIDE, SODIUM LACTATE, POTASSIUM CHLORIDE, AND CALCIUM CHLORIDE 500 ML: .6; .31; .03; .02 INJECTION, SOLUTION INTRAVENOUS at 09:34

## 2025-03-02 RX ADMIN — METRONIDAZOLE 500 MG: 500 INJECTION, SOLUTION INTRAVENOUS at 11:53

## 2025-03-02 ASSESSMENT — ACTIVITIES OF DAILY LIVING (ADL)
ADLS_ACUITY_SCORE: 40
ADLS_ACUITY_SCORE: 48
ADLS_ACUITY_SCORE: 40
ADLS_ACUITY_SCORE: 48
ADLS_ACUITY_SCORE: 40
ADLS_ACUITY_SCORE: 40

## 2025-03-02 NOTE — PLAN OF CARE
Goal Outcome Evaluation:      Plan of Care Reviewed With: patient    Overall Patient Progress: improvingOverall Patient Progress: improving    Outcome Evaluation: 500 LR Bolus for low UOP. UOP improving. Weening Levo.    Major Shift Events:  low UOP. Provider notified.   Neuro: A&Ox4. Follows commands.   CV: Pacemaker DDDR @ 80. Weaning Levo 0.02, map goal >65.    Resp: Crackles bilateral.   GI: Clear liquid diet. Advance in AM? Complains of nausea.   : Odonnell. Low UOP improving.   Pain: Oxycodone given PRN. Patient states 7/10 pain. Pain goal 5/10.   Skin/Drains: Chest tube x2 to water seal. MILY drain. Dressing reinforced x2 - serosanguinous leakage.    Lines/Drips: PIV x1. Radial A-line. Internal jugular triple lumen. Brown transducer. Levo 0.02.   Plan: Wean Levo. Monitor UOP. Manage nausea and pain.   For vital signs and complete assessments, please see documentation flowsheets.

## 2025-03-02 NOTE — PHARMACY-ADMISSION MEDICATION HISTORY
Pharmacy Intern Admission Medication History    Admission medication history is complete. The information provided in this note is only as accurate as the sources available at the time of the update.    Information Source(s): Patient, Family member, and CareEverywhere/SureScripts via in-person    Pertinent Information:   Upon my visit, both patient and his wife confirmed with me about the home medication, and they also shared with me some additional information:  The supplements are on hold for surgery since the last Friday before he came in, in this case, vitamin C and multivitamins' last dose was on 2/21.    He doesn't really have baclofen and is fine for me to delete it from the list.     Changes made to PTA medication list:  Added: None  Deleted:   Baclofen  Changed: None    Allergies reviewed with patient and updates made in EHR: yes    Medication History Completed By: Sarah Hudson 3/2/2025 5:34 PM    Prior to Admission medications    Medication Sig Last Dose Taking? Auth Provider Long Term End Date   omeprazole (PRILOSEC) 40 MG DR capsule Take 1 capsule (40 mg) by mouth daily  Patient taking differently: Take 40 mg by mouth every morning. 2/28/2025 Morning Yes Domitila Ruelas MD     ondansetron (ZOFRAN) 8 MG tablet Take 1 tablet (8 mg) by mouth every 8 hours as needed for nausea  Yes Leida Barton PA-C     polyethylene glycol (MIRALAX) 17 GM/Dose powder Take 17 g by mouth daily as needed for constipation  Yes Melida Hines APRN CNP     prochlorperazine (COMPAZINE) 10 MG tablet Take 1 tablet (10 mg) by mouth every 6 hours as needed for nausea or vomiting  Yes Domitila Ruelas MD     ascorbic acid (VITAMIN C) 500 MG tablet Take 500 mg by mouth every morning 2/21/2025  Reported, Patient     Multiple Vitamins-Minerals (MULTIVITAL PO) Take 1 tablet by mouth every morning. 2/21/2025  Reported, Patient

## 2025-03-02 NOTE — PLAN OF CARE
Neuro: RASS 0, PERRLA, throat discomfort but denies pain at this time.    CV: Permanent pacer DDDR , paced @ 80, MAP>65. Labile BP. MAP frequently swings. CVP 8-10    PULMONARY:  Extubated to 2L NC    GI: Clear Liquid, last BM ?  Passed bedside swallow    : Archer 10-30 hr   team aware    Skin: Suture chin to chest, 2 cts, 1 MILY    Drains: 2 Cts 20 mL hr, 1 MILY output 30q8, archer     Drips:   Levo 0.04  Epi off  Vas off  Fent off  Dex off  Prop off    Labs:  LA trending down - 7.0 ->8.3-> 6.1-> 3.8 -> 2.1                  Other: Gave 1.5 L of LR    Goal Outcome Evaluation:      Plan of Care Reviewed With: patient, spouse    Overall Patient Progress: improvingOverall Patient Progress: improving    Outcome Evaluation: Pt extubated at 1450 to 2L oxymask. Tolerated well. Able to transition to 2L NC. Pt A/Ox4 talkative and mild anxiety. Pain controlled, switched from Fentanyl drip to IV Dilaudid 0.2. BP labile 1.5L LR given, Levo 0.04 for MAP >65

## 2025-03-02 NOTE — PROGRESS NOTES
03/02/25 1102   Appointment Info   Signing Clinician's Name / Credentials (OT) MAGDIEL Guy   Rehab Comments (OT) no extension at neck (sutured), R thoracotomy   Living Environment   People in Home spouse   Current Living Arrangements house   Home Accessibility stairs to enter home;stairs within home   Number of Stairs, Main Entrance 3   Stair Railings, Main Entrance none   Number of Stairs, Within Home, Primary greater than 10 stairs  (14)   Stair Railings, Within Home, Primary other (see comments)   Transportation Anticipated family or friend will provide   Living Environment Comments pt lives in a house with his partner, Reshma. Pt reports 2-3 MYRNA and 14 stairs up or down from main floor. No bathroom on main level. Pt SO reports partial railing upstairs, narrow staircase can brace UEs on wall if needed.   Self-Care   Usual Activity Tolerance good   Current Activity Tolerance fair   Regular Exercise No   Equipment Currently Used at Home grab bar, tub/shower   Fall history within last six months no   Activity/Exercise/Self-Care Comment pt reports IND with ADLs/IADLs and mobility, has access to a cane but doesnt use.   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   Existing Precautions/Restrictions fall;thoracotomy;other (see comments)  (no neck extension)   Cognitive Status Examination   Orientation Status orientation to person, place and time   Cognitive Status Comments Appears intact   Visual Perception   Visual Impairment/Limitations WFL   Sensory   Sensory Quick Adds sensation intact   Posture   Posture not impaired   Range of Motion Comprehensive   General Range of Motion no range of motion deficits identified   Strength Comprehensive (MMT)   General Manual Muscle Testing (MMT) Assessment no strength deficits identified   Coordination   Upper Extremity Coordination No deficits were identified   Bed Mobility   Bed Mobility supine-sit   Supine-Sit [place] (Bed Mobility) minimum assist (75% patient effort);2 person assist   Transfers   Transfers sit-stand transfer;toilet transfer;shower transfer   Sit-Stand Transfer   Sit-Stand [place] (Transfers) contact guard;2 person assist   Shower Transfer   Type (Shower Transfer) lateral   [place] Level (Shower Transfer) contact guard   Shower Transfer Comments per clinical judgement   Toilet Transfer   Type (Toilet Transfer) sit-stand   [place] Level (Toilet Transfer) minimum assist (75% patient effort)   Toilet Transfer Comments per clinical judgement   Balance   Balance Comments good seated balance, standing balance with CGA   Activities of Daily Living   BADL Assessment/Intervention bathing;lower body dressing;toileting;upper body dressing   Bathing Assessment/Intervention   [place] Level (Bathing) moderate assist (50% patient effort)   Comment, (Bathing) per clinical judgement   Upper Body Dressing Assessment/Training   Comment, (Upper Body Dressing) per clinical judgement   [place] Level (Upper Body Dressing) minimum assist (75% patient effort)   Lower Body Dressing Assessment/Training   Comment, (Lower Body Dressing) per clinical judgement   [place] Level (Lower Body Dressing)  minimum assist (75% patient effort)   Toileting   Comment, (Toileting) per clinical judgement   [place] Level (Toileting) minimum assist (75% patient effort)   Clinical Impression   Criteria for Skilled Therapeutic Interventions Met (OT) Yes, treatment indicated   OT Diagnosis pt is below baseline for ADLs   OT Problem List-Impairments impacting ADL problems related to;activity tolerance impaired;pain;post-surgical precautions   Assessment of Occupational Performance 3-5 Performance Deficits   Identified Performance Deficits dressing, bathing. toileting, home management   Planned Therapy Interventions (OT) ADL retraining;IADL retraining;bed mobility training;strengthening;transfer training;home program guidelines;progressive activity/exercise   Clinical Decision Making Complexity (OT) problem focused assessment/low complexity   Risk & Benefits of therapy have been explained evaluation/treatment results reviewed;care plan/treatment goals reviewed;risks/benefits reviewed;current/potential barriers reviewed;participants voiced agreement with care plan;participants included;patient;spouse/significant other   Clinical Impression Comments pt presents below baseline, limited by post op precautions and pain, decreased activity tolerance. pt will benefit from skilled OT to progress toward PLOF   OT Total Evaluation Time   OT Eval, Low Complexity Minutes (85885) 5   OT Goals   Therapy Frequency (OT) 6 times/week   OT Predicted Duration/Target Date for Goal Attainment 03/21/25   OT Goals Upper Body Dressing;Lower Body Bathing;Toilet Transfer/Toileting;Lower Body Dressing   OT: Upper Body Dressing Independent;within precautions   OT: Lower Body Dressing Independent;within precautions   OT: Lower Body Bathing Modified independent;using adaptive equipment;with precautions   OT: Toilet Transfer/Toileting Independent;toilet transfer;cleaning and garment management;within precautions   Interventions   Interventions Quick Adds  Therapeutic Activity   Therapeutic Activities   Therapeutic Activity Minutes (96854) 28   Symptoms noted during/after treatment fatigue;dizziness;other (see comments)  (nausea)   Treatment Detail/Skilled Intervention Facilitated functional transfers to progress pt activity tolerance. Coord with RN for anti emetics and pain meds for improved tolerance. pt cont to be nauseous though agreeable to trial OOB. Pt educated on thoracotomy precautions and steps for log roll. See above for bed mob. pt sits at EOB, BP monitored with SBP drop from 110 > 91, pt symptomatic. pt dry heaves but no emesis. With prolonged sit, pt BP and dizziness improves. pt STS from EOB with CGA x 2, progresses to stepping in place, then takes 5-6 shuffled steps to nearby chair, CGA x 2 throughout. Pt nausea persists though endorses sitting up feels better than laying in bed. Team arrives with concerns over nausea, plans to place  NG tube. pt completes additional STS with CGA to adjust hips. Pt HR and SPO2 stable throughout.   OT Discharge Planning   OT Plan stair goal if PT defers   OT Discharge Recommendation (DC Rec) home with assist   OT Rationale for DC Rec Anticipate pt will progress quickly to be safe to d/c home with SO A for heavy tasks   OT Brief overview of current status CGA x 2 for pivot   OT Total Distance Amb During Session (feet) 4   Total Session Time   Timed Code Treatment Minutes 28   Total Session Time (sum of timed and untimed services) 33

## 2025-03-02 NOTE — PROGRESS NOTES
SURGICAL ICU PROGRESS NOTE  03/02/2025      CO-MORBIDITIES:   No diagnosis found.    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    Interval Events:  - Extubated yesterday afternoon  - Started on Cefepime/flagyl per Thoracic for right mainstem bronchus perforation.  - Passed bedside swallow, started on clear liquid diet.  - Intermittent nausea overnight after Dilaudid, received Zofran x2 and Compazine x2.  - Weaned supplemental oxygen  - Received 500 mL LR bolus for low MAPs, weaning Levo as able    CHANGES TODAY:  - Repeat CXR this afternoon.  - Will discuss chest tube suction with Thoracic Surgery today.  - Add scheduled Robaxin, will discuss possible paravertebral block with Pain team today.  - Hold Lovenox for possible paravertebral block today.  - Abdominal XR this morning, will discuss NG tube with Thoracic Surgery given distension and ongoing nausea  - Bolus 500 mL LR this morning  - Possible transfer to the floor this afternoon if stable    PLAN:    Neurological:  # Acute postoperative pain   # Hx BLE neuropathy  - Monitor neurological status. Delirium preventions and precautions.  - Pain: Scheduled Tylenol, PRN Oxycodone and Fentanyl, add scheduled Robaxin   - Will discuss possible paravertebral block with Pain team   - Received Tylenol x2 and Dilaudid x1 overnight. Dilaudid discontinued due to nausea.  - Sedation plan: N/a.   - Holding PTA Baclofen     Pulmonary:   # Former smoker  #  Post operative ventilatory support, extubated 3/1  - HOB elevation.  - Supplemental oxygen to keep saturation >92%, wean as able.  - Incentive spirometer every hour while awake.  - Daily CXR: (3/2): Small right apical pneumothorax. Increased left pleural effusion. Increased mixed right perihilar and diffuse left lung hazy opacities. Stable position of support devices.  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  - Preliminary Pathology results:   - Final Proximal Right Cartilage Corner: Positive for carcinoma  - Final Proximal Tracheal Margin: Negative for carcinoma  - Follow-up final results.    Cardiovascular:    # Complete heart block s/p pacemaker implantation 2/2020 ([facility], Mode: DDDR )  - Intra-op course complicated by atrial lead displacement and lost atrial capture.  - Pacemaker interrogated postoperatively, rate 80.  - PVCs overnight with HR 80's-90's.  - Continue to monitor HR.     # Shock-distributive, possibly cardiogenic with loss of atrial pacemaker capture and atrial kick  - Goal MAP >65  - Received 500 mL LR bolus x3 since yesterday afternoon.  - Currently on 0.02 Levo.  - CVP 4-25 since yesterday, 9-22 since midnight, most recently 18.  - Wean vasopressors as able while maintaining MAP goal  - Last Echo 7/2023 with EF 55-60%, no significant valvular abnormalities     Gastroenterology/Nutrition:  # Pyloric stenosis s/p repair as an infant (~6 months old)  # Hx of GERD   #  Protein calorie deficit malnutrition  - CLD per Thoracic Surgery  - Protonix 40 mg daily, holding PTA Omeprazole  - Bowel regimen: Scheduled Miralax and senna, PRN dulcolax and milk of magnesia    # Postoperative nausea  - PRN Zofran and Compazine  - Abdominal x-ray this morning, will discuss placing NG tube with Thoracic Surgery given distension and ongoing nausea     Fluid/Electrolytes/Renal:  # Lactic acidosis, improved  - Initial postop LA 5.0, elevated to 2.5 overnight, received 500 mL LR and trended down to 1.5 this morning.  - Baseline creatinine ~0.7-0.95. This morning 0.96  - mIVF with LR titrated for 75 mL/hr  - Bolus 500 mL LR this morning due to low UOP  - Daily BMP  - Odonnell in place. Will continue to monitor intake and output.    # Hypophosphatemia, improved  - Replace electrolytes per protocol     Endocrine:  # Stress hyperglycemia  # No Hx of diabetes mellitus  - Received 10 mg Dexamethasone intra-op  - Goal to keep BG <180 for optimal wound healing   - Medium sliding scale insulin (received 1u Aspart last night)  - Hypoglycemia protocol     ID:  # Leukocytosis, reactive to surgery, improving  - WBC 11.4k this morning from 12.9 last night and 27.5k initially postop  - Afebrile last 24 hours.  - Continue Cefepime and Flagyl x7 days (ends 3/8) per Thoracic Surgery for right mainstem bronchus defect  - Daily CBC     Cultures: None     Heme:     # Hx of low-grade kappa-restricted plasmacytoid B-cell lymphoma (diagnosed in 3/2004, s/p Fludarabine-based chemo x6 cycles and radiation to spine), transformed into triple hit diffuse large B-cell lymphoma (s/p 1 cycle of R-CHOP followed by 5 cycles of DA-R-EPOCH with triple intrathecal chemotherapy for CNS prophylaxis and consolidative radiotherapy, achieving complete remission as of 4/2023)  # Hx of multiple squamous cell skin cancers s/p Mohs surgery  # Hypogammaglobulinemia requiring IVIG  # Acute blood loss anemia   - Hemoglobin 13.5 preoperatively, 9.2 this  morning.  - Transfuse if hgb <7.0 or signs/symptoms of hypoperfusion.   - Fibrinogen 211 (2/28), INR 1.3 (2/28)  - Daily CBC     MSK:  # Weakness and deconditioning of critical illness   - Physical and occupational therapy consult      General Cares/Prophylaxis:    DVT Prophylaxis: Enoxaparin (Lovenox) subcutaneous started POD1 (3/1) - Will hold today for possible paravertebral pain intervention.  GI Prophylaxis: PPI  Restraints: Restraints for medical healing needed: NO  Lines/ tubes/ drains:  - RIJ CVC  - PIV x2  - Left radial arterial line  - Right chest tubes x 2  - Right subcutaneous MILY drain  - Odonnell catheter    Disposition:  - Surgical ICU. Possible transfer to the floor this afternoon if stable.    Patient seen, findings and plan discussed with surgical ICU staff, Dr. Pretty.    Moses Contreras, DO  Orthopaedic Surgery Resident  Pager: [phone]  March 2, 2025, 6:17 AM    Please page me directly with any questions/concerns during regular weekday hours before 5pm. If there is no response, if it is a weekend, or if it is during evening hours, then please page the orthopedic surgery resident on call.     ICU Daily Rounding Checklist     Checklist Response Notes   Can sedation be reduced?  NA    Can analgesia be reduced? No    Is delirium being assessed, addressed and prevented? Yes    Spontaneous awakening trial and/or Spontaneous breathing trial candidate?  NA    Is the patient at goals for lung protective ventilation? Yes    Head of bed elevation (30 degrees)? Yes    Skin breakdown assessment (prevention) completed? Yes    Is enteral nutrition at goal? No    Bowel program/frequency being addressed? Yes    Total fluid balance goal reviewed?  Yes Bolus 500 mL LR this morning   Is blood glucose at goal? Yes    Can Antibiotics be narrowed or discontinued? No      Gastric ulcer prophylaxis?  Yes If non-medication induced coagulopathy (INR-1.5 PTT > 2x normal or Plt<50k), mechanical ventilation 48hr, history of  GI bleed/ulcer within past year. TBL, SCI, or burn, or if >= 2 minor risk factors (sepsis, ICU stay 1 week, occult GI bleed > 6 days. glucocorticoid therapy, NSAID use, antiplatelet use)   Deep venous thrombosis prophylaxis? Yes    Early mobility candidate and physical therapy consulted? Yes    Is archer catheter needed? Yes    Is central venous/arterial catheter needed? Yes    Has the family been updated? Yes    Are the patient's goals of care and code status current? Yes      ====================================    SUBJECTIVE:   Extubated yesterday afternoon. Started on Cefepime/flagyl per Thoracic for right mainstem bronchus perforation. Passed bedside swallow and started sips of clears. Intermittent nausea overnight, received Zofran x2 and Compazine x2. Received 500 mL LR bolus overnight for hypotension, still requiring some Levo for MAP goal. Weaning oxygen to 2L NC.    OBJECTIVE:   1. VITAL SIGNS:   Temp:  [98  F (36.7  C)-98.4  F (36.9  C)] 98  F (36.7  C)  Pulse:  [80-96] 94  Resp:  [0-24] 18  BP: ()/(57-70) 94/62  MAP:  [55 mmHg-113 mmHg] 71 mmHg  Arterial Line BP: ()/() 98/57  FiO2 (%):  [40 %] 40 %  SpO2:  [89 %-100 %] 95 %  FiO2 (%): 40 %, Resp: 18, Vent Mode: CPAP/PS, Resp Rate (Set): 20 breaths/min, Tidal Volume (Set, mL): 400 mL, PEEP (cm H2O): 5 cmH2O, Pressure Support (cm H2O): 5 cmH2O, Resp Rate (Set): 20 breaths/min, Tidal Volume (Set, mL): 400 mL, PEEP (cm H2O): 5 cmH2O    2. INTAKE/ OUTPUT:   I/O last 3 completed shifts:  In: 3783.19 [P.O.:250; I.V.:1083.19; IV Piggyback:2450]  Out: 1460 [Urine:910; Drains:10; Chest Tube:540]    3. PHYSICAL EXAMINATION:   General: NAD laying in bed  Neuro: A&O, no focal deficits  HEENT: Suture from chin to anterior chest   CV: PVC's, paced with HR 90s currently  Respiratory: 2L NC. Right chest incision covered with clean dressing. R CT x2 with thin sanguinous output, +airleak. Right chest subcutaneous MILY with thin, sanguinous drainage, not  holding suction well.   GI: Abdomen soft, non-distended  : Odonnell in place with yellow UOP.  MSK: No peripheral edema in BLE. Palpable DP pulses.    4. INVESTIGATIONS:   Arterial Blood Gases   Recent Labs   Lab 03/01/25 0255 02/28/25  2347 02/28/25 1839 02/28/25  1700   PH 7.39 7.23* 7.29* 7.22*   PCO2 42 46* 41 54*   PO2 124* 143* 212* 436*   HCO3 25 19* 20* 22     Complete Blood Count   Recent Labs   Lab 03/02/25 0417 03/01/25 2230 03/01/25 0358 02/28/25  1839   WBC 11.4* 12.9* 10.5 27.4*   HGB 9.2* 9.4* 11.0* 12.0*   * 94* 97* 150     Basic Metabolic Panel  Recent Labs   Lab 03/02/25 0423 03/02/25 0417 03/02/25  0002 03/01/25 2230 03/01/25 0402 03/01/25 0358 02/28/25  1844 02/28/25  1839   NA  --  141  --  142  --  142  --  142   POTASSIUM  --  4.9  --  4.6  --  4.6  --  4.2   CHLORIDE  --  108*  --  109*  --  108*  --  105   CO2  --  25  --  25  --  25  --  19*   BUN  --  36.2*  --  35.8*  --  23.3*  --  26.3*   CR  --  0.96  --  0.97  --  0.89  --  0.98   * 139* 131* 159*   < > 247*   < > 201*    < > = values in this interval not displayed.     Liver Function Tests  Recent Labs   Lab 02/28/25 1839 02/28/25  1638   *  --    ALT 74*  --    ALKPHOS 72  --    BILITOTAL 0.7  --    ALBUMIN 3.5  --    INR 1.31* 1.77*     Pancreatic Enzymes  No lab results found in last 7 days.  Coagulation Profile  Recent Labs   Lab 02/28/25 1839 02/28/25  1638   INR 1.31* 1.77*   PTT  --  30         5. RADIOLOGY:   Recent Results (from the past 24 hours)   XR Chest Port 1 View    Narrative    Exam: XR CHEST PORT 1 VIEW, 3/1/2025 6:28 AM    Comparison: 2/28/2025    History: Post Op thoracic surgery    Findings:  Portable AP view of supine chest. Postsurgical changes of tracheal  resection. Endotracheal tube tip is roughly 2.5 cm above the pradip.  Right IJ CVC is in the low SVC. Left chest wall cardiac pacer with  leads in stable position. Right apical and basilar chest tubes.  Trachea is midline.  Cardiomediastinal silhouette is within normal  limits. Unchanged perihilar and bibasilar streaky opacities.. No  pneumothorax or pleural effusion. The visualized upper abdomen is  unremarkable. No acute osseous abnormalities. Trace subcutaneous  emphysema in the right neck.      Impression    Impression:   1. Unchanged perihilar and bibasilar streaky opacities likely  representing atelectasis/edema.  2. Stable position of support devices.    I have personally reviewed the examination and initial interpretation  and I agree with the findings.    AMINATA MONAHAN MD         SYSTEM ID:  [id number]   XR Chest Port 1 View    Narrative    Exam: XR CHEST PORT 1 VIEW, 3/1/2025 6:42 PM    Indication: S/P bronchoscopy and extubation, findings of membranous  defect to R mainstem bronchus    Comparison: Chest x-ray 3/1/2025    Findings: Frontal radiograph of the chest. Right IJ central venous  catheter with the tip at the distal SVC. Left approach cardiac device  with lead projecting into the cardiac mediastinal silhouette.  Right-sided chest tube with the tip positioned towards the right apex,  retracted from prior study. Likely subcutaneous drain overlying the  right lateral thorax.    Trachea is midline. Cardiac silhouette demonstrates a thin walled  lucent line along its left border. Aortic knob calcifications. No  significant pneumothorax. Bilateral streaky perihilar opacities.    Subcutaneous emphysema over the right anterior chest and right  cervical region.      Impression    Impression:   1. Interval extubation. Right apical chest tube slightly retracted.  Other support devices stable.  2. Overall unchanged opacities favoring atelectasis/edema.  3. Subcutaneous emphysema overlying the right chest increased from  prior study. Percutaneous drain in the soft tissues in the right  lateral thorax present.    I have personally reviewed the examination and initial interpretation  and I agree with the findings.    TEVIN AMADO  MD LIZETTE         SYSTEM ID:  [id number]   XR Chest Port 1 View    Impression    RESIDENT PRELIMINARY INTERPRETATION  Impression:   1. Small right apical pneumothorax.  2. Increased left pleural effusion.  3. Stable position of support devices.       =========================================

## 2025-03-02 NOTE — CONSULTS
Pain Service Consultation Note  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 He underwent R-CHOP followed by DA-R-EPOCH with intrathecal chemotherapy and consolidative radiotherapy, achieving complete remission as of April 2023. 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 Imaging findings include stable nodular tracheal wall thickening just above the pradip, with a 0.8 x 0.6 cm mildly FDG-avid lesion on PET/CT, and a stable 3 mm subpleural nodule in the left upper lobe. His echocardiogram from July 2023 showed a normal left ventricular ejection fraction of 55-60% with mild hypokinesis of the apical septal and apical inferior segments. He has a history of complete heart block with a permanent pacemaker in place. Shahid remains asymptomatic, physically active, and engaged in his care decisions. He has a history of multiple non-melanoma skin cancers treated with Mohs surgery, persistent hypogammaglobulinemia requiring IVIG, and resolved COVID-19 and sinusitis. He continues regular oncology surveillance with Dr. Ruelas for lymphoma remission and dermatology follow-up for skin cancer monitoring. He is now s/p tracheal resection on 2/28/25 with Dr. Potts.     Plan:   -Patient declined catheter today.  States that his pain is better  after placement of a G-tube.  Will reassess his pain on 3/3/25 for right sided paravertebral catheter    Thank you for the opportunity to participate in the care of Shahid Davis  Pain Service will continue to follow.    Discussed with attending anesthesiologist  Primary Service Contacted with Recommendations? 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     Past Medical History:  Past Medical History:   Diagnosis Date    Cardiac pacemaker in situ     2020    GERD (gastroesophageal reflux disease)     Lymphoma (H)     Pyloric stenosis, congenital (H)     Squamous cell carcinoma     Tracheal cancer (H)          Family History:    Family History   Problem Relation Age of Onset    Cancer Mother         Blood    Cancer Father         Lung    Cancer Maternal Grandmother         Breast    Cancer Paternal Grandfather         Hodgkins    Anesthesia Reaction No family hx of     Bleeding Disorder No family hx of     Clotting Disorder No family hx of        Social History:  Social History     Tobacco Use    Smoking status: Former     Passive exposure: Past    Smokeless tobacco: Never    Tobacco comments:     Quit at age 19   Substance Use Topics    Alcohol use: Yes     Alcohol/week: 11.7 standard drinks of alcohol     Types: 14 drink(s) per week     Comment: 1-2 beers a night             Review of Systems:  Complete ROS reviewed. Unless otherwise noted,  "all other systems found to be negative.        Laboratory Results:  Recent Labs   Lab Test 03/02/25  0417 03/01/25  0358 02/28/25  1839 02/28/25  1638   INR  --   --  1.31* 1.77*   *   < > 150 133*   PTT  --   --   --  30   BUN 36.2*   < > 26.3*  --     < > = values in this interval not displayed.       Allergies:  Allergies   Allergen Reactions    Ampicillin [Ampicillin Sodium]     Bactrim [Sulfamethoxazole W/Trimethoprim]      Patient unsure of reaction     Contrast Dye      CT contrast (IV) allergy - need oral Methylpred as premed for scans    Ampicillin Rash    Morphine Nausea         Current Pain Related Medications:  Medications related to Pain Management (From now, onward)      Start     Dose/Rate Route Frequency Ordered Stop    03/03/25 0000  bisacodyl (DULCOLAX) suppository 10 mg         10 mg Rectal DAILY PRN 02/28/25 1821 03/02/25 1200  methocarbamol (ROBAXIN) tablet 500 mg         500 mg Oral 4 TIMES DAILY 03/02/25 0911      03/02/25 0855  fentaNYL (PF) (SUBLIMAZE) injection 25 mcg        Placed in \"Or\" Linked Group    25 mcg Intravenous EVERY 2 HOURS PRN 03/02/25 0911      03/02/25 0855  fentaNYL (PF) (SUBLIMAZE) injection 50 mcg        Placed in \"Or\" Linked Group    50 mcg Intravenous EVERY 2 HOURS PRN 03/02/25 0911      03/02/25 0800  acetaminophen (TYLENOL) tablet 975 mg         975 mg Oral 3 TIMES DAILY 03/02/25 0038      03/02/25 0038  oxyCODONE (ROXICODONE) tablet 5-10 mg         5-10 mg Oral EVERY 4 HOURS PRN 03/02/25 0038      03/02/25 0000  magnesium hydroxide (MILK OF MAGNESIA) suspension 30 mL         30 mL Oral DAILY PRN 02/28/25 1821 03/01/25 0800  polyethylene glycol (MIRALAX) Packet 17 g         17 g Oral or Feeding Tube DAILY 02/28/25 1821 02/28/25 2000  senna-docusate (SENOKOT-S/PERICOLACE) 8.6-50 MG per tablet 1 tablet         1 tablet Oral or Feeding Tube 2 TIMES DAILY 02/28/25 1821 02/28/25 0550  lidocaine 1 % 0.1-1 mL         0.1-1 mL Other EVERY 1 HOUR " "PRN 02/28/25 0550      02/28/25 0550  lidocaine (LMX4) cream          Topical EVERY 1 HOUR PRN 02/28/25 0550                Physical Exam:  Vitals: /57   Pulse 92   Temp 36.8  C (98.3  F)   Resp 18   Ht 1.702 m (5' 7\")   Wt 68.8 kg (151 lb 10.8 oz)   SpO2 97%   BMI 23.76 kg/m      Physical Exam:   CONSTITUTIONAL/GENERAL APPEARANCE:  NAD  EYES: EOMI, sclera anicteric, PERRLA  ENT/NECK: atraumatic, lips and oral mucous membranes dry  RESPIRATORY: non-labored breathing. No cough, wheeze  CV: RRR, no audible rubs, gallops, or murmurs  ABDOMEN: Soft, non-tender, non-distended  MUSCULOSKELETAL/BACK/SPINE/EXTREMITIES: Moves all extremities purposefully.  No edema or obvious joint deformities   NEURO: Alert and Oriented x3. Answers questions appropriately  SKIN/VASCULAR EXAM:  No jaundice, no visible rashes or lesions            Acute Inpatient Pain Service [facility]  Hours of pain coverage 24/7   Please PAGE via [employer] - Link to [employer] Here - Search Pain  Page via [employer]- Please Page the Pain Team Via [employer]: \"PAIN MANAGEMENT ACUTE INPATIENT/ [facility]\"      Physician Attestation   I saw and evaluated Shahid Davis.  I agree with above history, review of systems, physical exam and plan. I have reviewed the content of the documentation and have edited it as needed. I have personally performed the services documented here and the documentation accurately represents those services and the decisions I have made.  Belem Crowe MD, PhD    [facility] NEURO SURGICAL ICU  [address] [address]  [phone]  Dept: [phone]                "

## 2025-03-02 NOTE — PROGRESS NOTES
THORACIC & FOREGUT SURGERY    S:  Extubated yesterday, passed bedside swallow and started on CLD. Episode of low MAPs, improved after LR bolus. Pain is well controlled. Voiding via [place].    O:  Vitals:    03/02/25 1430 03/02/25 1500 03/02/25 1530 03/02/25 1600   BP: 124/67 108/64 117/69 111/63   BP Location:    Right arm   Pulse: 93 93 93 92   Resp:       Temp:    98.3  F (36.8  C)   TempSrc:    Oral   SpO2: 94% 95% 94% 94%   Weight:       Height:           Non-labored breathing on RA  Appears well perfused.  Suture chin to chest.  Soft, NDNT, no rebound, non-peritonitic. Incisions c/d/I with dressings.  Distal extremities warm. No calf tenderness.  Chest tube to WS. Intermittent air leak on forced expiration.    CXR: increasing subcutaneous emphysema.       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 Now s/p tracheal resection and reconstruction with cryo, [facility] on [facility]28 by Dr. Potts. Positive margins on frozen biopsy.    - Diet: CLD  - wean pressors as able  - Chest tube to -20 mmHg suction. Posterior chest tube sutured and secured at 16 cm.  - Abx: Cefepime/Flagyl (trach perforation)  - DVT ppx: Lovenox  - Rest of cares per SICU team. Appreciate excellent cares.    POSTOP MEDICAL ISSUES: Positive margins, tracheal perforation of R intermediate bronchus.    Clinically Significant Risk Factors          # Hyperchloremia: Highest Cl = 109 mmol/L in last 2 days, will monitor as appropriate      # Hypocalcemia: Lowest Ca = 8.1 mg/dL in last 2 days, will monitor and replace as appropriate        # Coagulation Defect: INR = 1.31 (Ref range: 0.85 - 1.15) and/or PTT = 30 Seconds (Ref range: 22 - 38 Seconds), will monitor for bleeding  # Thrombocytopenia: Lowest platelets = 94 in last 2 days, will monitor for bleeding       # Acute Hypercapnic Respiratory Failure: based on arterial blood gas results.   Continue supplemental oxygen and ventilatory support as indicated.              # Pacemaker present     Fito Lam MD  General Surgery PGY-1  Pager: [phone]    Thoracic and Foregut Surgery  Text page Thoracic Surgery via [facility] Paging/Directory

## 2025-03-03 ENCOUNTER — APPOINTMENT (OUTPATIENT)
Dept: GENERAL RADIOLOGY | Facility: CLINIC | Age: 75
DRG: 166 | End: 2025-03-03
Attending: SURGERY
Payer: MEDICARE

## 2025-03-03 ENCOUNTER — APPOINTMENT (OUTPATIENT)
Dept: SPEECH THERAPY | Facility: CLINIC | Age: 75
DRG: 166 | End: 2025-03-03
Payer: MEDICARE

## 2025-03-03 LAB
ANION GAP SERPL CALCULATED.3IONS-SCNC: 7 MMOL/L (ref 7–15)
ANION GAP SERPL CALCULATED.3IONS-SCNC: 8 MMOL/L (ref 7–15)
BUN SERPL-MCNC: 35.6 MG/DL (ref 8–23)
BUN SERPL-MCNC: 36.6 MG/DL (ref 8–23)
CA-I BLD-MCNC: 4.6 MG/DL (ref 4.4–5.2)
CALCIUM SERPL-MCNC: 7.9 MG/DL (ref 8.8–10.4)
CALCIUM SERPL-MCNC: 8 MG/DL (ref 8.8–10.4)
CHLORIDE SERPL-SCNC: 106 MMOL/L (ref 98–107)
CHLORIDE SERPL-SCNC: 108 MMOL/L (ref 98–107)
CREAT SERPL-MCNC: 0.9 MG/DL (ref 0.67–1.17)
CREAT SERPL-MCNC: 0.92 MG/DL (ref 0.67–1.17)
EGFRCR SERPLBLD CKD-EPI 2021: 87 ML/MIN/1.73M2
EGFRCR SERPLBLD CKD-EPI 2021: 89 ML/MIN/1.73M2
ERYTHROCYTE [DISTWIDTH] IN BLOOD BY AUTOMATED COUNT: 13.2 % (ref 10–15)
EST. AVERAGE GLUCOSE BLD GHB EST-MCNC: 114 MG/DL
GLUCOSE BLDC GLUCOMTR-MCNC: 104 MG/DL (ref 70–99)
GLUCOSE BLDC GLUCOMTR-MCNC: 105 MG/DL (ref 70–99)
GLUCOSE BLDC GLUCOMTR-MCNC: 106 MG/DL (ref 70–99)
GLUCOSE BLDC GLUCOMTR-MCNC: 109 MG/DL (ref 70–99)
GLUCOSE BLDC GLUCOMTR-MCNC: 111 MG/DL (ref 70–99)
GLUCOSE BLDC GLUCOMTR-MCNC: 114 MG/DL (ref 70–99)
GLUCOSE SERPL-MCNC: 110 MG/DL (ref 70–99)
GLUCOSE SERPL-MCNC: 120 MG/DL (ref 70–99)
HBA1C MFR BLD: 5.6 %
HCO3 SERPL-SCNC: 26 MMOL/L (ref 22–29)
HCO3 SERPL-SCNC: 28 MMOL/L (ref 22–29)
HCT VFR BLD AUTO: 25.5 % (ref 40–53)
HGB BLD-MCNC: 8.4 G/DL (ref 13.3–17.7)
MAGNESIUM SERPL-MCNC: 2.1 MG/DL (ref 1.7–2.3)
MAGNESIUM SERPL-MCNC: 2.1 MG/DL (ref 1.7–2.3)
MCH RBC QN AUTO: 32.4 PG (ref 26.5–33)
MCHC RBC AUTO-ENTMCNC: 32.9 G/DL (ref 31.5–36.5)
MCV RBC AUTO: 99 FL (ref 78–100)
PHOSPHATE SERPL-MCNC: 2.1 MG/DL (ref 2.5–4.5)
PLATELET # BLD AUTO: 71 10E3/UL (ref 150–450)
POTASSIUM SERPL-SCNC: 4 MMOL/L (ref 3.4–5.3)
POTASSIUM SERPL-SCNC: 4.1 MMOL/L (ref 3.4–5.3)
RBC # BLD AUTO: 2.59 10E6/UL (ref 4.4–5.9)
SODIUM SERPL-SCNC: 141 MMOL/L (ref 135–145)
SODIUM SERPL-SCNC: 142 MMOL/L (ref 135–145)
WBC # BLD AUTO: 5.3 10E3/UL (ref 4–11)

## 2025-03-03 PROCEDURE — 250N000011 HC RX IP 250 OP 636: Performed by: THORACIC SURGERY (CARDIOTHORACIC VASCULAR SURGERY)

## 2025-03-03 PROCEDURE — 82374 ASSAY BLOOD CARBON DIOXIDE: CPT

## 2025-03-03 PROCEDURE — 250N000011 HC RX IP 250 OP 636: Performed by: SURGERY

## 2025-03-03 PROCEDURE — 250N000013 HC RX MED GY IP 250 OP 250 PS 637: Performed by: SURGERY

## 2025-03-03 PROCEDURE — 250N000013 HC RX MED GY IP 250 OP 250 PS 637: Performed by: THORACIC SURGERY (CARDIOTHORACIC VASCULAR SURGERY)

## 2025-03-03 PROCEDURE — 83735 ASSAY OF MAGNESIUM: CPT

## 2025-03-03 PROCEDURE — 92610 EVALUATE SWALLOWING FUNCTION: CPT | Mod: GN | Performed by: SPEECH-LANGUAGE PATHOLOGIST

## 2025-03-03 PROCEDURE — 85027 COMPLETE CBC AUTOMATED: CPT

## 2025-03-03 PROCEDURE — 84100 ASSAY OF PHOSPHORUS: CPT

## 2025-03-03 PROCEDURE — 71045 X-RAY EXAM CHEST 1 VIEW: CPT

## 2025-03-03 PROCEDURE — 250N000013 HC RX MED GY IP 250 OP 250 PS 637

## 2025-03-03 PROCEDURE — 999N000248 HC STATISTIC IV INSERT WITH US BY RN

## 2025-03-03 PROCEDURE — 200N000002 HC R&B ICU UMMC

## 2025-03-03 PROCEDURE — 71045 X-RAY EXAM CHEST 1 VIEW: CPT | Mod: 26 | Performed by: RADIOLOGY

## 2025-03-03 PROCEDURE — 82330 ASSAY OF CALCIUM: CPT

## 2025-03-03 PROCEDURE — 83036 HEMOGLOBIN GLYCOSYLATED A1C: CPT

## 2025-03-03 PROCEDURE — 80048 BASIC METABOLIC PNL TOTAL CA: CPT

## 2025-03-03 PROCEDURE — 250N000011 HC RX IP 250 OP 636

## 2025-03-03 PROCEDURE — 83735 ASSAY OF MAGNESIUM: CPT | Performed by: STUDENT IN AN ORGANIZED HEALTH CARE EDUCATION/TRAINING PROGRAM

## 2025-03-03 RX ORDER — FUROSEMIDE 10 MG/ML
40 INJECTION INTRAMUSCULAR; INTRAVENOUS ONCE
Status: COMPLETED | OUTPATIENT
Start: 2025-03-03 | End: 2025-03-03

## 2025-03-03 RX ORDER — AMOXICILLIN 250 MG
2 CAPSULE ORAL 2 TIMES DAILY
Status: DISCONTINUED | OUTPATIENT
Start: 2025-03-03 | End: 2025-03-08 | Stop reason: HOSPADM

## 2025-03-03 RX ORDER — HYDROXYZINE HYDROCHLORIDE 25 MG/1
25 TABLET, FILM COATED ORAL EVERY 8 HOURS PRN
Status: DISCONTINUED | OUTPATIENT
Start: 2025-03-03 | End: 2025-03-08 | Stop reason: HOSPADM

## 2025-03-03 RX ORDER — PANTOPRAZOLE SODIUM 40 MG/1
40 TABLET, DELAYED RELEASE ORAL
Status: DISCONTINUED | OUTPATIENT
Start: 2025-03-03 | End: 2025-03-08 | Stop reason: HOSPADM

## 2025-03-03 RX ORDER — BISACODYL 10 MG
10 SUPPOSITORY, RECTAL RECTAL ONCE
Status: COMPLETED | OUTPATIENT
Start: 2025-03-03 | End: 2025-03-03

## 2025-03-03 RX ORDER — OXYCODONE HYDROCHLORIDE 5 MG/1
5 TABLET ORAL EVERY 4 HOURS PRN
Status: DISCONTINUED | OUTPATIENT
Start: 2025-03-03 | End: 2025-03-08 | Stop reason: HOSPADM

## 2025-03-03 RX ORDER — OXYCODONE HYDROCHLORIDE 10 MG/1
10 TABLET ORAL EVERY 4 HOURS PRN
Status: DISCONTINUED | OUTPATIENT
Start: 2025-03-03 | End: 2025-03-08 | Stop reason: HOSPADM

## 2025-03-03 RX ORDER — ACETAMINOPHEN 325 MG/1
975 TABLET ORAL EVERY 8 HOURS SCHEDULED
Status: DISCONTINUED | OUTPATIENT
Start: 2025-03-03 | End: 2025-03-08 | Stop reason: HOSPADM

## 2025-03-03 RX ORDER — POLYETHYLENE GLYCOL 3350 17 G/17G
17 POWDER, FOR SOLUTION ORAL 2 TIMES DAILY
Status: DISCONTINUED | OUTPATIENT
Start: 2025-03-03 | End: 2025-03-08 | Stop reason: HOSPADM

## 2025-03-03 RX ADMIN — BISACODYL 10 MG: 10 SUPPOSITORY RECTAL at 12:34

## 2025-03-03 RX ADMIN — POTASSIUM & SODIUM PHOSPHATES POWDER PACK 280-160-250 MG 1 PACKET: 280-160-250 PACK at 12:15

## 2025-03-03 RX ADMIN — POLYETHYLENE GLYCOL 3350 17 G: 17 POWDER, FOR SOLUTION ORAL at 08:01

## 2025-03-03 RX ADMIN — ENOXAPARIN SODIUM 40 MG: 40 INJECTION SUBCUTANEOUS at 12:15

## 2025-03-03 RX ADMIN — SENNOSIDES AND DOCUSATE SODIUM 2 TABLET: 50; 8.6 TABLET ORAL at 08:01

## 2025-03-03 RX ADMIN — ONDANSETRON 4 MG: 2 INJECTION, SOLUTION INTRAMUSCULAR; INTRAVENOUS at 20:19

## 2025-03-03 RX ADMIN — CEFEPIME HYDROCHLORIDE 2 G: 2 INJECTION, POWDER, FOR SOLUTION INTRAVENOUS at 23:00

## 2025-03-03 RX ADMIN — METRONIDAZOLE 500 MG: 500 INJECTION, SOLUTION INTRAVENOUS at 04:01

## 2025-03-03 RX ADMIN — POTASSIUM & SODIUM PHOSPHATES POWDER PACK 280-160-250 MG 1 PACKET: 280-160-250 PACK at 08:00

## 2025-03-03 RX ADMIN — POLYETHYLENE GLYCOL 3350 17 G: 17 POWDER, FOR SOLUTION ORAL at 20:19

## 2025-03-03 RX ADMIN — ONDANSETRON 4 MG: 2 INJECTION, SOLUTION INTRAMUSCULAR; INTRAVENOUS at 10:56

## 2025-03-03 RX ADMIN — OXYCODONE HYDROCHLORIDE 5 MG: 5 TABLET ORAL at 00:14

## 2025-03-03 RX ADMIN — PROCHLORPERAZINE EDISYLATE 5 MG: 5 INJECTION INTRAMUSCULAR; INTRAVENOUS at 04:01

## 2025-03-03 RX ADMIN — OXYCODONE HYDROCHLORIDE 5 MG: 5 TABLET ORAL at 08:01

## 2025-03-03 RX ADMIN — METRONIDAZOLE 500 MG: 500 INJECTION, SOLUTION INTRAVENOUS at 20:18

## 2025-03-03 RX ADMIN — ACETAMINOPHEN 975 MG: 325 TABLET, FILM COATED ORAL at 23:00

## 2025-03-03 RX ADMIN — ONDANSETRON 4 MG: 2 INJECTION, SOLUTION INTRAMUSCULAR; INTRAVENOUS at 00:15

## 2025-03-03 RX ADMIN — CEFEPIME HYDROCHLORIDE 2 G: 2 INJECTION, POWDER, FOR SOLUTION INTRAVENOUS at 00:13

## 2025-03-03 RX ADMIN — PANTOPRAZOLE SODIUM 40 MG: 40 TABLET, DELAYED RELEASE ORAL at 15:41

## 2025-03-03 RX ADMIN — CEFEPIME HYDROCHLORIDE 2 G: 2 INJECTION, POWDER, FOR SOLUTION INTRAVENOUS at 10:29

## 2025-03-03 RX ADMIN — HYDROXYZINE HYDROCHLORIDE 25 MG: 25 TABLET, FILM COATED ORAL at 20:19

## 2025-03-03 RX ADMIN — METHOCARBAMOL 500 MG: 100 INJECTION, SOLUTION INTRAMUSCULAR; INTRAVENOUS at 08:01

## 2025-03-03 RX ADMIN — METHOCARBAMOL 500 MG: 100 INJECTION, SOLUTION INTRAMUSCULAR; INTRAVENOUS at 18:10

## 2025-03-03 RX ADMIN — PANTOPRAZOLE SODIUM 40 MG: 40 TABLET, DELAYED RELEASE ORAL at 08:01

## 2025-03-03 RX ADMIN — OXYCODONE HYDROCHLORIDE 5 MG: 5 TABLET ORAL at 04:01

## 2025-03-03 RX ADMIN — ACETAMINOPHEN 975 MG: 325 TABLET, FILM COATED ORAL at 08:00

## 2025-03-03 RX ADMIN — ACETAMINOPHEN 975 MG: 325 TABLET, FILM COATED ORAL at 13:36

## 2025-03-03 RX ADMIN — METRONIDAZOLE 500 MG: 500 INJECTION, SOLUTION INTRAVENOUS at 12:15

## 2025-03-03 RX ADMIN — SENNOSIDES AND DOCUSATE SODIUM 2 TABLET: 50; 8.6 TABLET ORAL at 20:19

## 2025-03-03 RX ADMIN — OXYCODONE HYDROCHLORIDE 10 MG: 10 TABLET ORAL at 20:19

## 2025-03-03 RX ADMIN — FUROSEMIDE 40 MG: 10 INJECTION, SOLUTION INTRAMUSCULAR; INTRAVENOUS at 09:39

## 2025-03-03 RX ADMIN — PROCHLORPERAZINE EDISYLATE 5 MG: 5 INJECTION INTRAMUSCULAR; INTRAVENOUS at 12:13

## 2025-03-03 RX ADMIN — POTASSIUM & SODIUM PHOSPHATES POWDER PACK 280-160-250 MG 1 PACKET: 280-160-250 PACK at 15:41

## 2025-03-03 ASSESSMENT — ACTIVITIES OF DAILY LIVING (ADL)
ADLS_ACUITY_SCORE: 40
ADLS_ACUITY_SCORE: 38
ADLS_ACUITY_SCORE: 40

## 2025-03-03 NOTE — PROGRESS NOTES
THORACIC & FOREGUT SURGERY    S:  No acute events overnight. Off pressors, hemodynamically stable. HR 80s-90s, SR. Pacer on DDD at 80. No SOB on 3L NC. NPO with NGT in place. Passing gas, no BM yet.    O:  Vitals:    03/03/25 0500 03/03/25 0600 03/03/25 0700 03/03/25 0800   BP: 106/62 105/56 115/70    BP Location:       Cuff Size:       Pulse: 90 88 91    Resp: 14 15 11    Temp:    98  F (36.7  C)   TempSrc:    Oral   SpO2: 94% 96% 95%    Weight:       Height:           Non-labored breathing on RA  Appears well perfused.  Suture chin to chest. NG tube in place, ~300 ml gastric output in 24 hrs.  Soft, NDNT, no rebound, non-peritonitic. Incisions c/d/I with dressings.  Distal extremities warm. No calf tenderness.  Chest tube to -20 mmHg suction. Intermittent air leak on forced expiration.    CXR: no pneumo, improved subcutaneous emphysema.      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 Now s/p tracheal resection and reconstruction with cryo, [facility] on 2/28 by Dr. Potts. Positive margins on frozen biopsy.    - Diet: NPO while NGT in place.  - Will try clamping NG today.  - Chest tube to -20 mmHg suction. Posterior chest tube sutured and secured at 16 cm.  - Abx: Cefepime/Flagyl (trach perforation)  - DVT ppx: Lovenox  - Suppository today.  - Rest of cares per SICU team. Appreciate excellent cares.    POSTOP MEDICAL ISSUES: Positive margins, tracheal perforation of R intermediate bronchus.    Clinically Significant Risk Factors          # Hyperchloremia: Highest Cl = 109 mmol/L in last 2 days, will monitor as appropriate              # Thrombocytopenia: Lowest platelets = 71 in last 2 days, will monitor for bleeding       # Acute Hypercapnic Respiratory Failure: based on arterial blood gas results.  Continue supplemental oxygen and ventilatory support as indicated.              # Pacemaker present      Fito Lam MD  General Surgery PGY-1  Pager: [phone]    Thoracic and Foregut Surgery  Text page Thoracic Surgery via [facility] Paging/Directory

## 2025-03-03 NOTE — PROGRESS NOTES
"CLINICAL SWALLOW EVALUATION     03/03/25 1153   Appointment Info   Signing Clinician's Name / Credentials (SLP) [facility]   General Information   Onset of Illness/Injury or Date of Surgery 02/28/25   Referring Physician Cruzito Jon MD   Patient/Family Therapy Goal Statement (SLP) Patient did not state.   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 Subcutaneous emphysema, pneumomediastinum, and small right apical  pneumothorax are similar to slightly improved.  2. Unchanged position of support devices.  3. Persistent small left pleural effusion and basilar opacities.\" Clinical swallow evaluation completed per MD order.   General Observations Patient up in chair upon SLP arrival. Patient reported having a few ice chips earlier today. RN gave patient zofran due to nausea. Note suture from chin to chest. Per RN assessment of clear liquids only at this time.   Type of Evaluation   Type of Evaluation Swallow Evaluation   Oral Motor   Oral Musculature generally intact   Structural Abnormalities none present   Mucosal Quality dry;sticky   Dentition (Oral Motor)   Dentition (Oral Motor) natural dentition   Facial Symmetry (Oral Motor)   Facial Symmetry (Oral Motor) WNL   Lip Function (Oral Motor)   Lip Range of Motion (Oral Motor) WNL   Lip Strength (Oral Motor) WFL   Tongue Function (Oral " Motor)   Tongue ROM (Oral Motor) WNL   Jaw Function (Oral Motor)   Jaw Function (Oral Motor) WNL   Cough/Swallow/Gag Reflex (Oral Motor)   Soft Palate/Velum (Oral Motor) WNL   Volitional Throat Clear/Cough (Oral Motor) reduced strength   Vocal Quality/Secretion Management (Oral Motor)   Vocal Quality (Oral Motor) breathy;hypophonic   Secretion Management (Oral Motor) WNL   General Swallowing Observations   Past History of Dysphagia None per EMR review and patient report.   Respiratory Support nasal cannula  (2L)   Current Diet/Method of Nutritional Intake (General Swallowing Observations, NIS) NPO;nasogastric tube (NG)  (except ice chips and sips)   Swallowing Evaluation Clinical swallow evaluation   Clinical Swallow Evaluation   Feeding Assistance dependent   Clinical Swallow Evaluation Textures Trialed thin liquids   Clinical Swallow Eval: Thin Liquid Texture Trial   Mode of Presentation, Thin Liquids spoon;fed by clinician   Volume of Liquid or Food Presented 4 ice chips; 4 sips water   Oral Phase of Swallow premature pharyngeal entry  (suspected)   Pharyngeal Phase of Swallow coughing/choking;throat clearing   Diagnostic Statement Weak coughing/throat clearing after sips of thin water by spoon or cup.   Swallowing Recommendations   Diet Consistency Recommendations NPO;ice chips only;consider alternative means of nutrition   Supervision Level for Intake 1:1 supervision needed   Medication Administration Recommendations, Swallowing (SLP) via alternate route   Instrumental Assessment Recommendations instrumental evaluation not recommended at this time   Comment, Swallowing Recommendations Patient may benefit from instrumental swallow exam pending progress. SLP to monitor and request order as indicated.   General Therapy Interventions   Planned Therapy Interventions Dysphagia Treatment   Dysphagia treatment Oropharyngeal exercise training;Modified diet education;Instruction of safe swallow strategies   Clinical  Impression   Criteria for Skilled Therapeutic Interventions Met (SLP Eval) Yes, treatment indicated   SLP Diagnosis Dysphagia   Risks & Benefits of therapy have been explained evaluation/treatment results reviewed;care plan/treatment goals reviewed;risks/benefits reviewed;current/potential barriers reviewed;participants voiced agreement with care plan;participants included;patient;spouse/significant other   Clinical Impression Comments Clinical swallow evaluation completed per MD order. Oral mech exam remarkable for dry oral mucosa, reduced cough strength and breathy, hypophonic vocal quality. Patient assessed with ice chips and thin water. Note consistent coughing or throat clearing after sips of thin water by spoon or cup. Note one instance of throat clearing after ice chip. Recommend continue NPO except for ice chips at this time due to aspiration risk. Please complete frequent oral cares. SLP to follow for PO readiness.   SLP Total Evaluation Time   Eval: oral/pharyngeal swallow function, clinical swallow Minutes (33620) 15   SLP Goals   Therapy Frequency (SLP Eval) 5 times/week   SLP Predicted Duration/Target Date for Goal Attainment 04/28/25   SLP Goals Swallow       Interventions   Interventions Quick Adds Swallowing Dysfunction   SLP Discharge Planning   SLP Plan PO readiness   SLP Discharge Recommendation home with outpatient therapy services;Acute Rehab Center-Motivated patient will benefit from intensive, interdisciplinary therapy.  Anticipate will be able to tolerate 3 hours of therapy per day;Transitional Care Facility   SLP Rationale for DC Rec Dysphagia   SLP Brief overview of current status  Recommend continue NPO except for ice chips at this time due to aspiration risk. Please complete frequent oral cares. SLP to follow for PO readiness.   SLP Time and Intention   Total Session Time (sum of timed and untimed services) 15

## 2025-03-03 NOTE — PLAN OF CARE
Patient is A & O x4, neuros unchanged, v-paced with occasional PVCs, HR 80s, VSS, afebrile, on 3L of O2 per NC with sats above 92%, 3 RT to -20 cm suction with + airleak, R chest MILY drain not holding suction well, SS output; apprehensive about potentially being nauseous, PRN zofran and compazine given, no c/o of nausea, NG to LIS, no BM this shift, archer with adequate UOP, c/o R chest incisional pain, tolerable with oxycodone and tylenol, IVF at 50 mL/hour.  Continue with current plan of care and notify MD as needed.    Eloisa Crews RN      Goal Outcome Evaluation:      Plan of Care Reviewed With: patient    Overall Patient Progress: improvingOverall Patient Progress: improving    Outcome Evaluation: UOP improving, remains off levo, resting comfortably

## 2025-03-03 NOTE — PLAN OF CARE
Major Shift Events:  Pt remains alert and oriented x4. Pt moves all extremities. L arterial line dc'd this am. Pt remains hemodynamically stable. Pt with coarse lungs sounds right side. Pt using IS. O2 per NC at 2L. Lasix 40 mg IV guven x1. Pt net negative 1000 ml since MN 3/3/25. Senna tabs, Miralax and Dulcolax supp. Given. No BM. +flatus. Pt up to chair with 1 assist and up to commode x2. Pt given Oxycodone x1 for R chest pain with fair relief. Pt c/o nausea. NGT in place to LIWS. Pt given Zofran and Compazine x1 IV. Pt's wife here and updated.  Plan: Continue plan of care. Call MD with any changes.   For vital signs and complete assessments, please see documentation flowsheets.       Goal Outcome Evaluation:      Plan of Care Reviewed With: patient, spouse    Overall Patient Progress: improvingOverall Patient Progress: improving

## 2025-03-03 NOTE — PROGRESS NOTES
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    Interval Events:  - NAEON. Remains hemodynamically stable off pressors    CHANGES TODAY:  - Ok to stop trending CVP  - Ok for sips of clears today with improved nausea  - Will re-evaluate this pm for possible NG removal  - Discontinue mIVF  - 40 mg IV Lasix diuresis. Monitor response re: need for additional dosing  - Fluid balance goal: Net neg 500 to 1 L  - Repeat bmp @ 2 pm  - Document Hba1c  - No ICU needs at this time. TTF    PLAN:    Neurological:  # Acute postoperative pain   - Pain: Scheduled Tylenol  PRN Oxycodone, Fentanyl, Robaxin. Dilaudid discontinued due to nausea.   - Patient declined paravertebral catheter placement per Pain team, signed off  - Sedation plan: N/a.   - Monitor neurological status. Delirium preventions and precautions.     Pulmonary:   # Former smoker  # Post operative ventilatory support, extubated 3/1  - HOB elevation.  - Supplemental oxygen to keep saturation >92%, currently on 3 L via NC, wean as able.  - Incentive spirometer every hour while awake.  - Daily CXR: (3/3): Similar to slightly improved subcutaneous emphysema, Small right apical pneumothorax, and pneumomediastinum. Unchanged left pleural effusion.   - Chest tube suction to - 20 mmHg suction. Persistent air leak Output: 550 /24 hrs, serosanguinous. Management per thoracic  sx.    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  - Preliminary Pathology results:   - Final Proximal Right Cartilage Corner: Positive for carcinoma  - Final Proximal Tracheal Margin: Negative for carcinoma  - Final pathology pending    Cardiovascular:    # Complete heart block s/p pacemaker implantation 2/2020 ([facility], Mode: DDDR )  - Intra-op course complicated by atrial lead displacement and lost atrial capture.  - Pacemaker interrogated postoperatively, rate 80.  - Continue to monitor HR.     # Shock-distributive, possibly cardiogenic with loss of atrial pacemaker capture and atrial kick  - Goal MAP >65  - Off Levo, maintaining MAPs between 65-89  - CVP 7-27, most recently 17. Ok to stop trending CVP  - Last Echo 7/2023 with EF 55-60%, no significant valvular abnormalities     Gastroenterology/Nutrition:  # Pyloric stenosis s/p repair in infancy (~6 months old)  # Hx of GERD   # Protein calorie deficit malnutrition  - Ok for Sips of clears today with improved nausea  - NG tube to LIS - possible removal this pm  - Increase Protonix to 40 mg twice daily, holding PTA Omeprazole  - Bowel regimen: Scheduled Miralax and senna, PRN milk of magnesia  - Last BM - PTA. Escalate bowel reg. Dulcolax suppository scheduled today per thoracic    # Postoperative nausea  - PRN Zofran and Compazine     Fluid/Electrolytes/Renal:  # Lactic acidosis, improved  - Peaked @ 5.0, trended down to 1.5 on last re-check.  - Baseline creatinine ~0.7-0.95. This morning 0.90  - UO -  719/24 hrs --> 0.4 ml/kg/hr  - Overall volume up, net + 8 L since admit  - Discontinue mIVF  - 40 mg IV Lasix diuresis. Monitor response re: need for additional dosing  - Repeat bmp @ 2 pm  - Fluid balance goal: Net neg 500 to 1 L  - Daily BMP  - Odonnell in place. Will continue to monitor intake and output.    # Hypophosphatemia  - Replace electrolytes per protocol  - iCal 3/3: 4.6     Endocrine:  # Stress hyperglycemia - Improved  # No Hx of diabetes mellitus  - Hba1c (3/3) 5.6  - Received 10 mg Dexamethasone intra-op  - Goal to keep BG <180 for optimal wound healing   - Medium sliding scale insulin (no insulin required over 24 hrs). Continue until enteral nutrition at goal  - Hypoglycemia protocol     ID:  # Leukocytosis - improved  - WBC 5.3 (11.4), 27.5k initially postop  - Afebrile last 24 hours.  - Continue Cefepime and Flagyl x7 days (ends 3/8) per Thoracic Surgery for right mainstem bronchus defect  - Daily CBC, monitor temperature curve     Cultures: None     Heme:     # Hx of low-grade kappa-restricted plasmacytoid B-cell lymphoma (diagnosed in 3/2004, s/p Fludarabine-based chemo x6 cycles and radiation to spine), transformed into triple hit diffuse large B-cell lymphoma (s/p 1 cycle of R-CHOP followed by 5 cycles of DA-R-EPOCH with triple intrathecal chemotherapy for CNS prophylaxis and consolidative radiotherapy, achieving complete remission as of 4/2023)  # Hx of multiple squamous cell skin cancers s/p Mohs surgery  # Hypogammaglobulinemia requiring IVIG  # Acute blood loss anemia   - Hemoglobin 13.5 preoperatively, 8.4 this morning, Likely dilutional, no S&S of acute bleeding.  - Transfuse if hgb <7.0 or signs/symptoms of hypoperfusion.   - Daily CBC     MSK:  # Weakness and deconditioning of critical illness   # Hx BLE neuropathy  - Physical and occupational therapy consult      General Cares/Prophylaxis:    DVT Prophylaxis: Enoxaparin (Lovenox) subcutaneous started POD1 (3/1)   GI Prophylaxis:  PPI  Restraints: Restraints for medical healing needed: NO  Lines/ tubes/ drains:  - RIJ CVC - remove today  - PIV x2  - Left radial arterial line - remove  - Right posterior chest tube x 1  - Pericardial louis drain  - Right subcutaneous MILY drain  - Odonnell catheter    Disposition:  - No ICU needs at this time. TTF    Patient seen, findings and plan discussed with surgical ICU staff, Dr. Mathew.    Brianna Walker MD, PGY-1    ====================================    SUBJECTIVE:   NAEON    OBJECTIVE:   1. VITAL SIGNS:   Temp:  [98  F (36.7  C)-98.3  F (36.8  C)] 98  F (36.7  C)  Pulse:  [82-95] 88  Resp:  [10-25] 15  BP: ()/(53-77) 105/56  MAP:  [65 mmHg-100 mmHg] 73 mmHg  Arterial Line BP: ()/(52-84) 102/59  SpO2:  [91 %-98 %] 96 %  Resp: 15    2. INTAKE/ OUTPUT:   I/O last 3 completed shifts:  In: 5021.75 [P.O.:350; I.V.:3740.75; NG/GT:130; IV Piggyback:801]  Out: 1509 [Urine:609; Emesis/NG output:250; Drains:120; Chest Tube:530]    3. PHYSICAL EXAMINATION:   General: NAD laying in bed  Neuro: A&O, no focal deficits  HEENT: Suture from chin to anterior chest   CV: PVC's, paced with HR 90s currently  Respiratory: 2L NC. Right chest incision covered with clean dressing. R CT with thin serosanguinous output, +airleak. Right chest subcutaneous MILY with thin, sanguinous drainage, not holding suction well.   GI: Abdomen soft, non-distended  : Odonnell in place with yellow UOP.  MSK: No peripheral edema in BLE. Palpable DP pulses.    4. INVESTIGATIONS:   Arterial Blood Gases   Recent Labs   Lab 03/01/25  0255 02/28/25  2347 02/28/25  1839 02/28/25  1700   PH 7.39 7.23* 7.29* 7.22*   PCO2 42 46* 41 54*   PO2 124* 143* 212* 436*   HCO3 25 19* 20* 22     Complete Blood Count   Recent Labs   Lab 03/03/25  0414 03/02/25 0417 03/01/25 2230 03/01/25  0358   WBC 5.3 11.4* 12.9* 10.5   HGB 8.4* 9.2* 9.4* 11.0*   PLT 71* 108* 94* 97*     Basic Metabolic Panel  Recent Labs   Lab 03/03/25 0416 03/03/25  0414  03/03/25  0028 03/02/25 2013 03/02/25  0423 03/02/25  0417 03/02/25  0002 03/01/25  2230 03/01/25  0402 03/01/25  0358   NA  --  141  --   --   --  141  --  142  --  142   POTASSIUM  --  4.1  --   --   --  4.9  --  4.6  --  4.6   CHLORIDE  --  108*  --   --   --  108*  --  109*  --  108*   CO2  --  26  --   --   --  25  --  25  --  25   BUN  --  36.6*  --   --   --  36.2*  --  35.8*  --  23.3*   CR  --  0.90  --   --   --  0.96  --  0.97  --  0.89   * 110* 105* 111*   < > 139*   < > 159*   < > 247*    < > = values in this interval not displayed.     Liver Function Tests  Recent Labs   Lab 02/28/25  1839 02/28/25  1638   *  --    ALT 74*  --    ALKPHOS 72  --    BILITOTAL 0.7  --    ALBUMIN 3.5  --    INR 1.31* 1.77*     Pancreatic Enzymes  No lab results found in last 7 days.  Coagulation Profile  Recent Labs   Lab 02/28/25  1839 02/28/25  1638   INR 1.31* 1.77*   PTT  --  30         5. RADIOLOGY:   Recent Results (from the past 24 hours)   XR Abdomen Port 1 View    Narrative    EXAMINATION:  XR ABDOMEN PORT 1 VIEW 3/2/2025 11:04 AM     COMPARISON: CT PET 1/8/2025.    HISTORY: post op continous nausea c/f ileus vs SBO    TECHNIQUE: Frontal view of the abdomen.    FINDINGS: Odonnell catheter in place. Distended stomach. Mild diffuse  gaseous distention of bowel loops in a non-obstructive pattern.  Moderate colonic stool burden. No pneumatosis or portal venous air.      Impression    IMPRESSION:   1. Mild diffuse gaseous distention of bowel loops in a non-obstructive  pattern. Moderate colonic stool burden.  2. Marked gaseous distention of the stomach.    I have personally reviewed the examination and initial interpretation  and I agree with the findings.    AMINATA MONAHAN MD         SYSTEM ID:  [id number]   XR Abdomen Port 1 View    Narrative    EXAMINATION:  XR ABDOMEN PORT 1 VIEW 3/2/2025     COMPARISON: 3/2/2025.    HISTORY: NG tube placement.    TECHNIQUE: Frontal supine view of the  abdomen.    FINDINGS: Enteric tube tip and sidehole terminate in the stomach.  Non-specific gaseous distention of bowel loops in the left  hemiabdomen. Extensive subcutaneous emphysema in the visualized right  chest and abdominal wall. No definite pneumatosis. Refer to same-day  chest radiograph for intrathoracic details.      Impression    IMPRESSION: Enteric tube tip and sidehole terminate in the stomach.     AMINATA MONAHAN MD         SYSTEM ID:  [id number]   XR Chest Port 1 View    Narrative    EXAM: XR CHEST PORT 1 VIEW 3/2/2025 11:39 AM    DEMOGRAPHICS: 74 years Male    INDICATION: re-eval for subQ emphysema    COMPARISON: Radiograph 3/2/2025    TECHNIQUE: Single portable AP view of the chest.    FINDINGS:   Enteric tube descends below that with tip out of field of view. Right  basilar chest tube in place, retracted from prior study. Drain in the  subcutaneous soft tissues of the right lateral thorax. Right IJ  central venous catheter with tip in the low SVC. Left chest wall  cardiac device with leads in the right atrium and right ventricle.    The cardiomediastinal silhouette is stable. There is new  pneumomediastinum along the left mediastinal and heart border. Small  right apical pneumothorax. Continued left pleural effusion. Stable  left hazy opacities. Continued increase in right chest wall  subcutaneous emphysema.      Impression    IMPRESSION:   1. New pneumomediastinum compared to same day chest x-ray at 2:16 AM.  2. Continued increase in right chest wall subcutaneous emphysema  3. Continued small right apical pneumothorax with chest tube in place.  4. Continued patchy left airspace opacities.    Urgent Result: Pneumomediastinum]    Finding was identified on 3/2/2025 2:25 PM.     FARSHAD Cowan, CNP was contacted by Dr. Fu at  3/2/2025 2:45 PM and verbalized understanding of the urgent finding.     I have personally reviewed the examination and initial interpretation  and I agree  with the findings.    TEVIN BAUER MD         SYSTEM ID:  [id number]   XR Chest Port 1 View    Narrative    EXAM: XR CHEST PORT 1 VIEW 3/2/2025 3:11 PM    DEMOGRAPHICS: 74 years Male    INDICATION: pneumomediastinum s/p thoracic surgery    COMPARISON: Same-day radiograph 11:30 AM    TECHNIQUE: Single portable AP view of the chest.    FINDINGS:   Enteric tube descends below the diaphragm with tip and sidehole  overlying the stomach. Right basilar chest tubes in place. Drain in  the subcutaneous soft tissues. Left chest wall cardiac device with  leads in the right atrium and right ventricle. Right IJ central venous  catheter with tip at the lower SVC.    Cardiomediastinal silhouette is unchanged. Mild pneumomediastinum.  Trace right apical pneumothorax. Unchanged patchy airspace opacities  in the left lower lung. Small left pleural effusion. Continued right  chest wall subcutaneous emphysema.      Impression    IMPRESSION:   1.  Unchanged pneumomediastinum, right apical pneumothorax, and right  chest wall subcutaneous emphysema.  2.  Stable support devices as above.  3.  Unchanged patchy airspace opacities in the left lower lung with  small left pleural effusion.    I have personally reviewed the examination and initial interpretation  and I agree with the findings.    TEVIN BAUER MD         SYSTEM ID:  [id number]   XR Chest Port 1 View    Narrative    EXAM: XR CHEST PORT 1 VIEW 3/2/2025 5:31 PM    DEMOGRAPHICS: 74 years Male    INDICATION: Re-evaluate subcutaneous emphysema and pneumomediastinum  s/p chest tube adjustment    COMPARISON: Same day radiograph at 2:59 PM    TECHNIQUE: Single portable AP view of the chest.    FINDINGS:   Enteric tube descends below the diaphragm with tip and sidehole  overlying the stomach. Right basilar chest tubes in place. Drain in  the subcutaneous soft tissues. Left chest wall cardiac device with  leads in the right atrium and right ventricle. Right IJ central  venous  catheter with tip at the lower SVC.    Cardiomediastinal silhouette is unchanged. Mild pneumomediastinum.  Trace right apical pneumothorax. Unchanged patchy airspace opacities  in the left lower lung. Small left pleural effusion. Continued right  chest wall subcutaneous emphysema.      Impression    IMPRESSION:   1.  Unchanged pneumomediastinum, right chest wall subcutaneous  emphysema, and right apical pneumothorax.  2.  Support devices as above.  3.  Unchanged patchy airspace opacities in left lower lung and small  left effusion.    I have personally reviewed the examination and initial interpretation  and I agree with the findings.    TEVIN BAUER MD         SYSTEM ID:  [id number]       =========================================

## 2025-03-03 NOTE — PROGRESS NOTES
Brief Pain Service Note   Patient reported that his pain is well controlled on his current medication regimen and declined catheter placement at this time for pain control.     Pain service will sign off.     Aliyah Olsen MD on 3/3/2025 at 9:57 AM  Anesthesiology Resident

## 2025-03-03 NOTE — PLAN OF CARE
Major Shift Events: Patient with continuous nausea this AM with no relief from prn antiemetics. Patient had a small emesis while transferrin from bed to chair with therapy. Abdominal xray was ordered and MD placed an NG as a result of the xray. NG now to low intermittent suction. Chest tubes with large amount of drainage around the site throughout the day. MD placed a suture at the chest tube site. Serial chest xrays done today and this evening chest tubes were placed back to suction as a result of the findings. This evening patient much more comfortable in bed with minimal nausea. UOP slightly increased this evening. 500 ml bolus given this AM. Patient meeting MAP on this own this evening. Vasopressors were weaned off around 0800 this AM. Patient's significant other at bedside throughout the day and updated on the plan of care.   Plan: Chest xray ordered for 0400. Remain in ICU overnight.   For vital signs and complete assessments, please see documentation flowsheets.       Problem: Surgery Nonspecified  Goal: Nausea and Vomiting Relief  Outcome: Not Progressing  Intervention: Prevent or Manage Nausea and Vomiting  Recent Flowsheet Documentation  Taken 3/2/2025 1300 by Abida Triana RN  Nausea/Vomiting Interventions:   nasogastric tube to suction   stimuli minimized  Taken 3/2/2025 1000 by Abida Triana RN  Nausea/Vomiting Interventions: (NG tube placed by MD and placed to suction)   other (see comments)   cool cloth applied  Taken 3/2/2025 0800 by Abida Triana RN  Nausea/Vomiting Interventions: (MD notified as prn antiemetic is not yet available)   other (see comments)   sips of clear liquids given      Problem: Surgery Nonspecified  Goal: Optimal Pain Control and Function  Outcome: Progressing

## 2025-03-03 NOTE — CONSULTS
Care Management Initial Consult    General Information  Assessment completed with: Spouse or significant otherReshma ( CHENCHO)  Type of CM/SW Visit: Initial Assessment    Primary Care Provider verified and updated as needed: Yes   Readmission within the last 30 days: no previous admission in last 30 days      Reason for Consult: discharge planning  Advance Care Planning: Advance Care Planning Reviewed: no concerns identified          Communication Assessment  Patient's communication style: spoken language (English or Bilingual)    Hearing Difficulty or Deaf: no   Wear Glasses or Blind: yes    Cognitive  Cognitive/Neuro/Behavioral: WDL  Level of Consciousness: alert  Arousal Level: opens eyes spontaneously  Orientation: oriented x 4  Mood/Behavior: behavior appropriate to situation  Best Language: 0 - No aphasia  Speech: clear, spontaneous, logical    Living Environment:   People in home: significant other  Reshma  Current living Arrangements: house      Able to return to prior arrangements: yes       Family/Social Support:  Care provided by: self, spouse/significant other  Provides care for: pet(s)  Marital Status: Single  Support system: Significant Other       Reshma  Description of Support System: Supportive, Involved    Support Assessment: Adequate family and caregiver support, Adequate social supports    Current Resources:   Patient receiving home care services: No        Community Resources: None  Equipment currently used at home: none  Supplies currently used at home: None    Employment/Financial:  Employment Status: retired        Financial Concerns: none   Referral to Financial Worker: No       Does the patient's insurance plan have a 3 day qualifying hospital stay waiver?  No    Lifestyle & Psychosocial Needs:  Social Drivers of Health     Food Insecurity: Low Risk  (3/2/2025)    Food Insecurity     Within the past 12 months, did you worry that your food would run out before you got money to buy more?: No      Within the past 12 months, did the food you bought just not last and you didn t have money to get more?: No   Depression: Not at risk (7/13/2023)    PHQ-2     PHQ-2 Score: 0   Housing Stability: Low Risk  (3/2/2025)    Housing Stability     Do you have housing? : Yes     Are you worried about losing your housing?: No   Tobacco Use: Medium Risk (2/19/2025)    Patient History     Smoking Tobacco Use: Former     Smokeless Tobacco Use: Never     Passive Exposure: Past   Financial Resource Strain: Low Risk  (3/2/2025)    Financial Resource Strain     Within the past 12 months, have you or your family members you live with been unable to get utilities (heat, electricity) when it was really needed?: No   Alcohol Use: Not At Risk (5/17/2021)    Received from [facility], [facility]    AUDIT-C     Frequency of Alcohol Consumption: 4 or more times a week     Average Number of Drinks: 1 or 2     Frequency of Binge Drinking: Never   Transportation Needs: Low Risk  (3/2/2025)    Transportation Needs     Within the past 12 months, has lack of transportation kept you from medical appointments, getting your medicines, non-medical meetings or appointments, work, or from getting things that you need?: No   Physical Activity: Sufficiently Active (5/17/2021)    Received from [facility], [facility]    Exercise Vital Sign     Days of Exercise per Week: 3 days     Minutes of Exercise per Session: 60 min   Interpersonal Safety: High Risk (2/28/2025)    Interpersonal Safety     Do you feel physically and emotionally safe where you currently live?: No     Within the past 12 months, have you been hit, slapped, kicked or otherwise physically hurt by someone?: No     Within the past 12 months, have you been humiliated or emotionally abused in other ways by your partner or  "ex-partner?: No   Stress: No Stress Concern Present (5/17/2021)    Received from [facility], [facility]    [employer] [place] [employer] - Occupational Stress Questionnaire     Feeling of Stress : Not at all   Social Connections: Unknown (5/17/2021)    Received from [facility], [facility]    Social Connection and Isolation Panel [NHANES]     Frequency of Communication with Friends and Family: Three times a week     Frequency of Social Gatherings with Friends and Family: Not on file     Attends Sikh Services: Not on file     Active Member of Clubs or Organizations: Not on file     Attends Club or Organization Meetings: Not on file     Marital Status: Not on file   Health Literacy: Not on file       Functional Status:  Prior to admission patient needed assistance:   Dependent ADLs:: Independent, Bathing, Dressing, Eating, Grooming, Positioning, Transfers, Toileting  Dependent IADLs:: Cleaning, Independent, Cooking, Laundry, Shopping, Meal Preparation, Medication Management, Money Management, Transportation  Assesssment of Functional Status: Not at  functional baseline    Mental Health Status:  Mental Health Status: No Current Concerns       Chemical Dependency Status:  Chemical Dependency Status: No Current Concerns             Values/Beliefs:  Spiritual, Cultural Beliefs, Sikh Practices, Values that affect care: no               Discussed  Partnership in Safe Discharge Planning  document with patient/family: No    Additional Information:  Per Dr. 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 This writer discussed the Care Management Role, Confirmed demographics and PCP. Patient as no HCD. He does not have any close relatives that live close by. He has some supportive friends.     Patient lives with CHENCHO Justice in common law relationship since 80ths. He resides in multi-level home with couple of steps to the main entrance and 14 stairs to the bedroom/ bathroom.  Patient was completely independent with all ADLS, meals, meds and IADLs.     Patient has no DMEs, not connected with [facility]. He uses a walking stick when he walks for long distances.       Patient is retired and collects Social security income. Family denies any financial, mental health or safety concerns.     Family declined Spiritual care.     Family will provide discharge transportation.     Care Management team will continue to follow for this admission.      Estefania Archer, MSN, MA, CCM, RN  4C/4A/4E ICU Care Coordinator  Phone:[phone]  Available via [employer]     For Weekend & Holiday on call RN Care Coordinator:  [place] [facility] ([phone]) Saturday & Sunday; [phone])  Recognized Holidays   Weekend 4C/4A/4E ICUs /6A Care Coordinator  Available via [employer]     "

## 2025-03-04 ENCOUNTER — APPOINTMENT (OUTPATIENT)
Dept: OCCUPATIONAL THERAPY | Facility: CLINIC | Age: 75
DRG: 166 | End: 2025-03-04
Attending: THORACIC SURGERY (CARDIOTHORACIC VASCULAR SURGERY)
Payer: MEDICARE

## 2025-03-04 ENCOUNTER — APPOINTMENT (OUTPATIENT)
Dept: GENERAL RADIOLOGY | Facility: CLINIC | Age: 75
DRG: 166 | End: 2025-03-04
Attending: SURGERY
Payer: MEDICARE

## 2025-03-04 ENCOUNTER — APPOINTMENT (OUTPATIENT)
Dept: SPEECH THERAPY | Facility: CLINIC | Age: 75
DRG: 166 | End: 2025-03-04
Attending: THORACIC SURGERY (CARDIOTHORACIC VASCULAR SURGERY)
Payer: MEDICARE

## 2025-03-04 LAB
ANION GAP SERPL CALCULATED.3IONS-SCNC: 9 MMOL/L (ref 7–15)
BUN SERPL-MCNC: 36.2 MG/DL (ref 8–23)
CALCIUM SERPL-MCNC: 8 MG/DL (ref 8.8–10.4)
CHLORIDE SERPL-SCNC: 106 MMOL/L (ref 98–107)
CREAT SERPL-MCNC: 0.99 MG/DL (ref 0.67–1.17)
EGFRCR SERPLBLD CKD-EPI 2021: 79 ML/MIN/1.73M2
ERYTHROCYTE [DISTWIDTH] IN BLOOD BY AUTOMATED COUNT: 13 % (ref 10–15)
GLUCOSE BLDC GLUCOMTR-MCNC: 101 MG/DL (ref 70–99)
GLUCOSE BLDC GLUCOMTR-MCNC: 106 MG/DL (ref 70–99)
GLUCOSE BLDC GLUCOMTR-MCNC: 107 MG/DL (ref 70–99)
GLUCOSE BLDC GLUCOMTR-MCNC: 124 MG/DL (ref 70–99)
GLUCOSE SERPL-MCNC: 100 MG/DL (ref 70–99)
HCO3 SERPL-SCNC: 27 MMOL/L (ref 22–29)
HCT VFR BLD AUTO: 25.8 % (ref 40–53)
HGB BLD-MCNC: 8.4 G/DL (ref 13.3–17.7)
MAGNESIUM SERPL-MCNC: 2.3 MG/DL (ref 1.7–2.3)
MCH RBC QN AUTO: 32.9 PG (ref 26.5–33)
MCHC RBC AUTO-ENTMCNC: 32.6 G/DL (ref 31.5–36.5)
MCV RBC AUTO: 101 FL (ref 78–100)
PERFORMING LABORATORY: NORMAL
PHOSPHATE SERPL-MCNC: 2.7 MG/DL (ref 2.5–4.5)
PLATELET # BLD AUTO: 93 10E3/UL (ref 150–450)
POTASSIUM SERPL-SCNC: 4.3 MMOL/L (ref 3.4–5.3)
RBC # BLD AUTO: 2.55 10E6/UL (ref 4.4–5.9)
SODIUM SERPL-SCNC: 142 MMOL/L (ref 135–145)
SPECIMEN STATUS: NORMAL
TEST NAME: NORMAL
WBC # BLD AUTO: 3.2 10E3/UL (ref 4–11)

## 2025-03-04 PROCEDURE — 250N000013 HC RX MED GY IP 250 OP 250 PS 637: Performed by: THORACIC SURGERY (CARDIOTHORACIC VASCULAR SURGERY)

## 2025-03-04 PROCEDURE — 120N000003 HC R&B IMCU UMMC

## 2025-03-04 PROCEDURE — 250N000011 HC RX IP 250 OP 636: Performed by: SURGERY

## 2025-03-04 PROCEDURE — 84100 ASSAY OF PHOSPHORUS: CPT

## 2025-03-04 PROCEDURE — 250N000011 HC RX IP 250 OP 636: Mod: JZ | Performed by: THORACIC SURGERY (CARDIOTHORACIC VASCULAR SURGERY)

## 2025-03-04 PROCEDURE — 85041 AUTOMATED RBC COUNT: CPT

## 2025-03-04 PROCEDURE — 71045 X-RAY EXAM CHEST 1 VIEW: CPT

## 2025-03-04 PROCEDURE — 250N000013 HC RX MED GY IP 250 OP 250 PS 637

## 2025-03-04 PROCEDURE — 71045 X-RAY EXAM CHEST 1 VIEW: CPT | Mod: 26 | Performed by: RADIOLOGY

## 2025-03-04 PROCEDURE — 80051 ELECTROLYTE PANEL: CPT

## 2025-03-04 PROCEDURE — 85018 HEMOGLOBIN: CPT

## 2025-03-04 PROCEDURE — 83735 ASSAY OF MAGNESIUM: CPT

## 2025-03-04 PROCEDURE — 99291 CRITICAL CARE FIRST HOUR: CPT | Mod: 24 | Performed by: SURGERY

## 2025-03-04 PROCEDURE — 92526 ORAL FUNCTION THERAPY: CPT | Mod: GN

## 2025-03-04 PROCEDURE — 97530 THERAPEUTIC ACTIVITIES: CPT | Mod: GO

## 2025-03-04 PROCEDURE — 250N000011 HC RX IP 250 OP 636

## 2025-03-04 PROCEDURE — 80048 BASIC METABOLIC PNL TOTAL CA: CPT

## 2025-03-04 PROCEDURE — 250N000011 HC RX IP 250 OP 636: Performed by: PHYSICIAN ASSISTANT

## 2025-03-04 RX ORDER — FUROSEMIDE 10 MG/ML
20 INJECTION INTRAMUSCULAR; INTRAVENOUS 2 TIMES DAILY
Status: COMPLETED | OUTPATIENT
Start: 2025-03-04 | End: 2025-03-04

## 2025-03-04 RX ORDER — METHOCARBAMOL 500 MG/1
500 TABLET, FILM COATED ORAL EVERY 8 HOURS PRN
Status: DISCONTINUED | OUTPATIENT
Start: 2025-03-04 | End: 2025-03-08 | Stop reason: HOSPADM

## 2025-03-04 RX ADMIN — CEFEPIME HYDROCHLORIDE 2 G: 2 INJECTION, POWDER, FOR SOLUTION INTRAVENOUS at 10:54

## 2025-03-04 RX ADMIN — POLYETHYLENE GLYCOL 3350 17 G: 17 POWDER, FOR SOLUTION ORAL at 08:32

## 2025-03-04 RX ADMIN — FUROSEMIDE 20 MG: 10 INJECTION, SOLUTION INTRAMUSCULAR; INTRAVENOUS at 08:32

## 2025-03-04 RX ADMIN — POTASSIUM & SODIUM PHOSPHATES POWDER PACK 280-160-250 MG 1 PACKET: 280-160-250 PACK at 10:49

## 2025-03-04 RX ADMIN — POTASSIUM & SODIUM PHOSPHATES POWDER PACK 280-160-250 MG 1 PACKET: 280-160-250 PACK at 13:25

## 2025-03-04 RX ADMIN — PANTOPRAZOLE SODIUM 40 MG: 40 TABLET, DELAYED RELEASE ORAL at 15:47

## 2025-03-04 RX ADMIN — OXYCODONE HYDROCHLORIDE 10 MG: 10 TABLET ORAL at 17:21

## 2025-03-04 RX ADMIN — ONDANSETRON 4 MG: 2 INJECTION, SOLUTION INTRAMUSCULAR; INTRAVENOUS at 17:30

## 2025-03-04 RX ADMIN — FUROSEMIDE 20 MG: 10 INJECTION, SOLUTION INTRAMUSCULAR; INTRAVENOUS at 20:32

## 2025-03-04 RX ADMIN — SENNOSIDES AND DOCUSATE SODIUM 2 TABLET: 50; 8.6 TABLET ORAL at 08:32

## 2025-03-04 RX ADMIN — METHOCARBAMOL 500 MG: 100 INJECTION, SOLUTION INTRAMUSCULAR; INTRAVENOUS at 04:48

## 2025-03-04 RX ADMIN — ACETAMINOPHEN 975 MG: 325 TABLET, FILM COATED ORAL at 15:47

## 2025-03-04 RX ADMIN — OXYCODONE HYDROCHLORIDE 10 MG: 10 TABLET ORAL at 10:49

## 2025-03-04 RX ADMIN — OXYCODONE HYDROCHLORIDE 10 MG: 10 TABLET ORAL at 01:23

## 2025-03-04 RX ADMIN — PROCHLORPERAZINE EDISYLATE 5 MG: 5 INJECTION INTRAMUSCULAR; INTRAVENOUS at 01:23

## 2025-03-04 RX ADMIN — ACETAMINOPHEN 975 MG: 325 TABLET, FILM COATED ORAL at 23:30

## 2025-03-04 RX ADMIN — PANTOPRAZOLE SODIUM 40 MG: 40 INJECTION, POWDER, FOR SOLUTION INTRAVENOUS at 08:32

## 2025-03-04 RX ADMIN — METRONIDAZOLE 500 MG: 500 INJECTION, SOLUTION INTRAVENOUS at 04:48

## 2025-03-04 RX ADMIN — OXYCODONE HYDROCHLORIDE 10 MG: 10 TABLET ORAL at 22:32

## 2025-03-04 RX ADMIN — METRONIDAZOLE 500 MG: 500 INJECTION, SOLUTION INTRAVENOUS at 21:00

## 2025-03-04 RX ADMIN — MAGNESIUM HYDROXIDE 30 ML: 400 SUSPENSION ORAL at 10:49

## 2025-03-04 RX ADMIN — ENOXAPARIN SODIUM 40 MG: 40 INJECTION SUBCUTANEOUS at 13:25

## 2025-03-04 RX ADMIN — CEFEPIME HYDROCHLORIDE 2 G: 2 INJECTION, POWDER, FOR SOLUTION INTRAVENOUS at 23:30

## 2025-03-04 RX ADMIN — ACETAMINOPHEN 975 MG: 325 TABLET, FILM COATED ORAL at 08:32

## 2025-03-04 RX ADMIN — HYDROXYZINE HYDROCHLORIDE 25 MG: 25 TABLET, FILM COATED ORAL at 05:33

## 2025-03-04 RX ADMIN — METRONIDAZOLE 500 MG: 500 INJECTION, SOLUTION INTRAVENOUS at 13:25

## 2025-03-04 RX ADMIN — ONDANSETRON 4 MG: 4 TABLET, ORALLY DISINTEGRATING ORAL at 23:30

## 2025-03-04 RX ADMIN — OXYCODONE HYDROCHLORIDE 10 MG: 10 TABLET ORAL at 05:33

## 2025-03-04 RX ADMIN — SENNOSIDES AND DOCUSATE SODIUM 2 TABLET: 50; 8.6 TABLET ORAL at 20:32

## 2025-03-04 RX ADMIN — POLYETHYLENE GLYCOL 3350 17 G: 17 POWDER, FOR SOLUTION ORAL at 20:32

## 2025-03-04 RX ADMIN — ONDANSETRON 4 MG: 2 INJECTION, SOLUTION INTRAMUSCULAR; INTRAVENOUS at 08:44

## 2025-03-04 ASSESSMENT — ACTIVITIES OF DAILY LIVING (ADL)
ADLS_ACUITY_SCORE: 38
ADLS_ACUITY_SCORE: 36
ADLS_ACUITY_SCORE: 38
ADLS_ACUITY_SCORE: 38
ADLS_ACUITY_SCORE: 39
ADLS_ACUITY_SCORE: 38
ADLS_ACUITY_SCORE: 39
ADLS_ACUITY_SCORE: 39
ADLS_ACUITY_SCORE: 38
ADLS_ACUITY_SCORE: 39
ADLS_ACUITY_SCORE: 38
ADLS_ACUITY_SCORE: 39
ADLS_ACUITY_SCORE: 38
ADLS_ACUITY_SCORE: 39
ADLS_ACUITY_SCORE: 38

## 2025-03-04 NOTE — PROGRESS NOTES
"THORACIC & FOREGUT SURGERY    S:  No acute events overnight. Doing well.  NG tube is bothersome.    O:  Vitals:    03/04/25 0955 03/04/25 1001 03/04/25 1003 03/04/25 1010   BP: 111/61 (!) 78/55 98/82 115/66   BP Location:       Cuff Size:       Pulse: 90 100 101 92   Resp: 13 13 24 15   Temp:       TempSrc:       SpO2: (!) 91% 98% (!) 91% 94%   Weight:       Height:           Non-labored breathing on RA  Appears well perfused.  Guardian stitch intact. NG tube in place, ~250 ml gastric output in 24 hrs.  Soft, NDNT, no rebound, non-peritonitic. Incisions c/d/I with dressings.  Distal extremities warm. No calf tenderness.  Chest tube to -20 mmHg suction. Intermittent air leak on forced expiration.    CXR: no pneumo, improved subcutaneous emphysema.      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 Now s/p tracheal resection and reconstruction with cryo, [facility] on 2/28 by Dr. Potts. Positive margins on frozen biopsy.    - Remove NG today  - Regular diet  - Chest tube to water seal    - Abx: Cefepime/Flagyl (trach perforation)  - DVT ppx: Lovenox  - OK for transfer to stepdown today    POSTOP MEDICAL ISSUES: Positive margins, tracheal perforation of R intermediate bronchus.  Emesis requiring NG tube placement.    Clinically Significant Risk Factors          # Hyperchloremia: Highest Cl = 108 mmol/L in last 2 days, will monitor as appropriate              # Thrombocytopenia: Lowest platelets = 71 in last 2 days, will monitor for bleeding       # Acute Hypercapnic Respiratory Failure: based on arterial blood gas results.  Continue supplemental oxygen and ventilatory support as indicated.             # Financial/Environmental Concerns: none   # Pacemaker present     Atul Chaidez PA-C  Thoracic & Foregut Surgery    To page Thoracic Surgery team, click here: [facility]   Choose \"On Call\" tab at top, then use the " "search box for \"Thoracic\", then select the \"THORACIC SURGERY /[facility]\"            "

## 2025-03-04 NOTE — PROGRESS NOTES
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    Interval Events:  - NAEON. Remains hemodynamically stable off pressors. Nausea improved.     CHANGES TODAY:  - NGT and Odonnell removal   - Bowel reg - milk of mag 1x  - SLP eval after NG tube removal, advance diet pending eval   - 20 mg x2 IV Lasix diuresis per thoracic team. Monitor response re: need for additional dosing  - Fluid balance goal: Net neg 500 to 1 L  - No ICU needs at this time. TTF    PLAN:    Neurological:  # Acute postoperative pain   - Pain: Scheduled Tylenol  PRN Oxycodone, Robaxin. Dilaudid discontinued due to nausea.    - Patient declined paravertebral catheter placement per Pain team, signed off  - Sedation plan: N/a.   - Monitor neurological status. Delirium preventions and precautions.     Pulmonary:   # Former smoker  # Post operative ventilatory support, extubated 3/1  - HOB elevation.  - Supplemental oxygen to keep saturation >92%, currently on 1 L via NC, wean as able. No increase in work of breathing or dyspnea although slight increase left pleural effusion and atelectasis on 3/4 CXR   - Incentive spirometer every hour while awake.  - Daily CXR: (3/4): Increased left pleural effusion and left basilar atelectasis, Continued trace right apical pneumothorax and pneumomediastinum, Stable support devices.   - Chest tube suction to - 20 mmHg  suction. Persistent air leak Output: 500 /24 hrs, serosanguinous. Management per thoracic sx.    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  - Preliminary Pathology results:   - Final Proximal Right Cartilage Corner: Positive for carcinoma  - Final Proximal Tracheal Margin: Negative for carcinoma  - Final pathology pending as of 3/4    Cardiovascular:    # Complete heart block s/p pacemaker implantation 2/2020 ([facility] [employer], Mode: DDDR )  - Intra-op course complicated by atrial lead displacement and lost atrial capture.  - Pacemaker interrogated postoperatively, rate 80.  - Continue to monitor HR.     # Shock-distributive, possibly cardiogenic with loss of atrial pacemaker capture and atrial kick  - Goal MAP >65  - Off Levo, maintaining MAPs between 65-89  - CVP 7-27, most recently 17. Stopped trending CVP 3/4  - Last Echo 7/2023 with EF 55-60%, no significant valvular abnormalities     Gastroenterology/Nutrition:  # Pyloric stenosis s/p repair in infancy (~6 months old)  # Hx of GERD   # Protein calorie deficit malnutrition, monitor for  - Advance diet as tolerated per Speech from sips of clears todaywith improved nausea  - NG tube to LIS - removal today 3/4  - Speech eval after NG removal to advance diet. If insufficient oral intake consider RD evaluation of diet to address possible malnutrition  - Protonix 40 mg twice daily, holding PTA Omeprazole  - Bowel regimen: Scheduled Miralax BID and senna2 tabs BID , PRN milk of magnesia  - Last BM - PTA. Passing flatus  but no BM despite suppository 3/3. Increase ambulation with PT/OT (scheduled 3/4), up to chair during day, Escalate bowel reg with one time scheduled milk of magnesium 3/4    # Postoperative nausea  - PRN Zofran and Compazine     Fluid/Electrolytes/Renal:  - Baseline creatinine ~0.7-0.95. This morning 0.99   - UO - 2200/24 hrs --> 1.2 ml/kg/hr 3/4/25  - net negative 1.5L with 40 mg Lasix diuresis 3/4, now net +7L since admission.  - Overall volume up, net + 7L since admit   - 20 mg x2 Lasix diuresis (ordered by thoracic surgery 3/4). Monitor response re: need for additional dosing  - Repeat bmp @ 2 pm when diuresing    - Fluid balance goal: Net neg 500 to 1 L  - Daily BMP  - Odonnell to be removed. Will continue to monitor intake and output.    # Hypophosphatemia, resolved  - continue to monitor   - Replace electrolytes per protocol     Endocrine:  # Stress hyperglycemia - Improved  # No Hx of diabetes mellitus  - Hba1c (3/3) 5.6  - Received 10 mg Dexamethasone intra-op  - Goal to keep BG <180 for optimal wound healing   - will discontinue fingersticks and sliding scale insulin  (no insulin required over 24 hrs). Continue until enteral nutrition at goal  - Hypoglycemia protocol     ID:  # Leukocytosis - improved  - WBC 3.2 (5.3), 27.5k initially postop  - Afebrile last 24 hours.  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  - Transfuse if hgb <7.0 or signs/symptoms of hypoperfusion.   - Daily CBC     MSK:  # Weakness and deconditioning of critical illness   # Hx BLE neuropathy  - Physical and occupational therapy consult      General Cares/Prophylaxis:    DVT Prophylaxis: Enoxaparin (Lovenox) subcutaneous started POD1 (3/1)   GI Prophylaxis: PPI   Restraints: Restraints for medical healing needed: NO  Lines/ tubes/ drains:  - RIJ CVC - remove today    - PIV x2  - Right posterior chest tube x 1  - Pericardial louis drain  - Right subcutaneous MILY drain  - Odonnell catheter - remove today    - NG tube - remove today      Disposition:  - No ICU needs at this time. Transfer to floor      Patient seen, findings and plan discussed with surgical ICU staff, Dr. Mathew.    Soraida Bach MD, PGY-1    ====================================    SUBJECTIVE:   NAEON. Improved nausea with changes in pain medicine dosing.      OBJECTIVE:   1. VITAL SIGNS:   Temp:  [98  F (36.7  C)-98.4  F (36.9  C)] 98  F (36.7  C)  Pulse:  [] 87  Resp:  [10-23] 12  BP: ()/(59-91) 106/66  MAP:  [75 mmHg] 75 mmHg  Arterial Line BP: (78)/(73) 78/73  SpO2:  [90 %-97 %] 92 %  Resp: 12    2. INTAKE/ OUTPUT:   I/O last 3 completed shifts:  In: 1988 [I.V.:988; NG/GT:800; IV Piggyback:200]  Out: 3120 [Urine:2290; Emesis/NG output:200; Drains:70; Chest Tube:560]    3. PHYSICAL EXAMINATION:   General: NAD laying in bed, interactive    Neuro: A&O, no focal deficits  HEENT: Suture from chin to anterior chest. NGT in place   CV: PVC's, paced with HR 90s currently  Respiratory: 2L NC. Right chest incision covered with clean dressing. R CT with thin serosanguinous output, +airleak. Right chest subcutaneous MILY with thin, sanguinous drainage, not holding suction well.   GI: Abdomen soft, non-distended  : Odonnell in  place with yellow UOP.  MSK: No peripheral edema in BLE. Palpable DP pulses.    4. INVESTIGATIONS:   Arterial Blood Gases   Recent Labs   Lab 03/01/25 0255 02/28/25 2347 02/28/25 1839 02/28/25  1700   PH 7.39 7.23* 7.29* 7.22*   PCO2 42 46* 41 54*   PO2 124* 143* 212* 436*   HCO3 25 19* 20* 22     Complete Blood Count   Recent Labs   Lab 03/04/25  0503 03/03/25  0414 03/02/25 0417 03/01/25  2230   WBC 3.2* 5.3 11.4* 12.9*   HGB 8.4* 8.4* 9.2* 9.4*   PLT 93* 71* 108* 94*     Basic Metabolic Panel  Recent Labs   Lab 03/04/25 0505 03/04/25  0503 03/04/25  0130 03/03/25  2038 03/03/25  1539 03/03/25  1340 03/03/25  0416 03/03/25 0414 03/02/25  0423 03/02/25  0417   NA  --  142  --   --   --  142  --  141  --  141   POTASSIUM  --  4.3  --   --   --  4.0  --  4.1  --  4.9   CHLORIDE  --  106  --   --   --  106  --  108*  --  108*   CO2  --  27  --   --   --  28  --  26  --  25   BUN  --  36.2*  --   --   --  35.6*  --  36.6*  --  36.2*   CR  --  0.99  --   --   --  0.92  --  0.90  --  0.96   * 100* 106* 104*   < > 120*   < > 110*   < > 139*    < > = values in this interval not displayed.     Liver Function Tests  Recent Labs   Lab 02/28/25 1839 02/28/25  1638   *  --    ALT 74*  --    ALKPHOS 72  --    BILITOTAL 0.7  --    ALBUMIN 3.5  --    INR 1.31* 1.77*     Pancreatic Enzymes  No lab results found in last 7 days.  Coagulation Profile  Recent Labs   Lab 02/28/25 1839 02/28/25  1638   INR 1.31* 1.77*   PTT  --  30         5. RADIOLOGY:   Recent Results (from the past 24 hours)   XR Chest Port 1 View    Impression    RESIDENT PRELIMINARY INTERPRETATION  Impression:   1. Increased left pleural effusion and left basilar atelectasis.  2. Continued trace right apical pneumothorax and pneumomediastinum.  3. Stable support devices.       =========================================

## 2025-03-04 NOTE — PLAN OF CARE
Patient is A & O x4, neuros unchanged, v-paced with occasional PVCs, HR 80s, VSS, afebrile, on 2L of O2 per NC with sats above 92%, 3 RT to -20 cm suction with + airleak, R chest MILY drain not holding suction well, SS output; PRN zofran and compazine given to prevent nausea, NG to LIS, no BM this shift, archer with adequate UOP, c/o R chest incisional pain, tolerable with oxycodone, robaxin and tylenol.  Continue with current plan of care and notify MD as needed.     Goal Outcome Evaluation:      Plan of Care Reviewed With: patient    Overall Patient Progress: improvingOverall Patient Progress: improving    Outcome Evaluation: anticipating transferring, VSS, on 2L of O2 per NC

## 2025-03-04 NOTE — PROGRESS NOTES
Transfer  Transferred from:   Via:bed  Reason for transfer: Pt appropriate for 6B- improved patient condition  Belongings: Received with pt  Chart: Received with pt  Medications: Meds received from old unit with pt  Code Status verified on armband: yes  2 RN Skin Assessment Completed By: Ananya Lopez  Med rec completed: yes  Suction/Ambu bag/Flowmeter at bedside: yes    Pt status: A&Ox4. On 2L NC. PRN oxycodone given for pain and zofran for mild nausea. CT x2 to water seal. R MILY drain to bulb suction.

## 2025-03-05 ENCOUNTER — APPOINTMENT (OUTPATIENT)
Dept: GENERAL RADIOLOGY | Facility: CLINIC | Age: 75
DRG: 166 | End: 2025-03-05
Attending: SURGERY
Payer: MEDICARE

## 2025-03-05 ENCOUNTER — APPOINTMENT (OUTPATIENT)
Dept: OCCUPATIONAL THERAPY | Facility: CLINIC | Age: 75
DRG: 166 | End: 2025-03-05
Attending: THORACIC SURGERY (CARDIOTHORACIC VASCULAR SURGERY)
Payer: MEDICARE

## 2025-03-05 ENCOUNTER — APPOINTMENT (OUTPATIENT)
Dept: SPEECH THERAPY | Facility: CLINIC | Age: 75
DRG: 166 | End: 2025-03-05
Attending: THORACIC SURGERY (CARDIOTHORACIC VASCULAR SURGERY)
Payer: MEDICARE

## 2025-03-05 LAB
ANION GAP SERPL CALCULATED.3IONS-SCNC: 10 MMOL/L (ref 7–15)
BUN SERPL-MCNC: 32.9 MG/DL (ref 8–23)
CALCIUM SERPL-MCNC: 8.5 MG/DL (ref 8.8–10.4)
CHLORIDE SERPL-SCNC: 103 MMOL/L (ref 98–107)
CREAT SERPL-MCNC: 0.88 MG/DL (ref 0.67–1.17)
EGFRCR SERPLBLD CKD-EPI 2021: 90 ML/MIN/1.73M2
ERYTHROCYTE [DISTWIDTH] IN BLOOD BY AUTOMATED COUNT: 12.9 % (ref 10–15)
GLUCOSE SERPL-MCNC: 117 MG/DL (ref 70–99)
HCO3 SERPL-SCNC: 28 MMOL/L (ref 22–29)
HCT VFR BLD AUTO: 30.8 % (ref 40–53)
HGB BLD-MCNC: 9.8 G/DL (ref 13.3–17.7)
MAGNESIUM SERPL-MCNC: 2.4 MG/DL (ref 1.7–2.3)
MCH RBC QN AUTO: 32.1 PG (ref 26.5–33)
MCHC RBC AUTO-ENTMCNC: 31.8 G/DL (ref 31.5–36.5)
MCV RBC AUTO: 101 FL (ref 78–100)
PHOSPHATE SERPL-MCNC: 2.3 MG/DL (ref 2.5–4.5)
PLATELET # BLD AUTO: 105 10E3/UL (ref 150–450)
POTASSIUM SERPL-SCNC: 3.9 MMOL/L (ref 3.4–5.3)
RBC # BLD AUTO: 3.05 10E6/UL (ref 4.4–5.9)
SCANNED LAB RESULT: NORMAL
SODIUM SERPL-SCNC: 141 MMOL/L (ref 135–145)
TEST NAME: NORMAL
WBC # BLD AUTO: 4.3 10E3/UL (ref 4–11)

## 2025-03-05 PROCEDURE — 85018 HEMOGLOBIN: CPT

## 2025-03-05 PROCEDURE — 97530 THERAPEUTIC ACTIVITIES: CPT | Mod: GO

## 2025-03-05 PROCEDURE — 250N000011 HC RX IP 250 OP 636: Performed by: SURGERY

## 2025-03-05 PROCEDURE — 258N000003 HC RX IP 258 OP 636: Performed by: THORACIC SURGERY (CARDIOTHORACIC VASCULAR SURGERY)

## 2025-03-05 PROCEDURE — 120N000003 HC R&B IMCU UMMC

## 2025-03-05 PROCEDURE — 36415 COLL VENOUS BLD VENIPUNCTURE: CPT

## 2025-03-05 PROCEDURE — 80048 BASIC METABOLIC PNL TOTAL CA: CPT

## 2025-03-05 PROCEDURE — 250N000011 HC RX IP 250 OP 636: Performed by: PHYSICIAN ASSISTANT

## 2025-03-05 PROCEDURE — 250N000009 HC RX 250: Performed by: THORACIC SURGERY (CARDIOTHORACIC VASCULAR SURGERY)

## 2025-03-05 PROCEDURE — 71045 X-RAY EXAM CHEST 1 VIEW: CPT | Mod: 26 | Performed by: RADIOLOGY

## 2025-03-05 PROCEDURE — 83735 ASSAY OF MAGNESIUM: CPT

## 2025-03-05 PROCEDURE — 71045 X-RAY EXAM CHEST 1 VIEW: CPT

## 2025-03-05 PROCEDURE — 82565 ASSAY OF CREATININE: CPT

## 2025-03-05 PROCEDURE — 250N000013 HC RX MED GY IP 250 OP 250 PS 637: Performed by: THORACIC SURGERY (CARDIOTHORACIC VASCULAR SURGERY)

## 2025-03-05 PROCEDURE — 84100 ASSAY OF PHOSPHORUS: CPT

## 2025-03-05 PROCEDURE — 250N000011 HC RX IP 250 OP 636: Performed by: THORACIC SURGERY (CARDIOTHORACIC VASCULAR SURGERY)

## 2025-03-05 PROCEDURE — 82310 ASSAY OF CALCIUM: CPT

## 2025-03-05 PROCEDURE — 250N000013 HC RX MED GY IP 250 OP 250 PS 637

## 2025-03-05 PROCEDURE — 92526 ORAL FUNCTION THERAPY: CPT | Mod: GN

## 2025-03-05 RX ORDER — FUROSEMIDE 10 MG/ML
20 INJECTION INTRAMUSCULAR; INTRAVENOUS 2 TIMES DAILY
Status: COMPLETED | OUTPATIENT
Start: 2025-03-05 | End: 2025-03-05

## 2025-03-05 RX ADMIN — FUROSEMIDE 20 MG: 10 INJECTION, SOLUTION INTRAMUSCULAR; INTRAVENOUS at 09:30

## 2025-03-05 RX ADMIN — CEFEPIME HYDROCHLORIDE 2 G: 2 INJECTION, POWDER, FOR SOLUTION INTRAVENOUS at 11:09

## 2025-03-05 RX ADMIN — OXYCODONE HYDROCHLORIDE 10 MG: 10 TABLET ORAL at 08:47

## 2025-03-05 RX ADMIN — METRONIDAZOLE 500 MG: 500 INJECTION, SOLUTION INTRAVENOUS at 03:46

## 2025-03-05 RX ADMIN — ACETAMINOPHEN 975 MG: 325 TABLET, FILM COATED ORAL at 15:40

## 2025-03-05 RX ADMIN — OXYCODONE HYDROCHLORIDE 5 MG: 5 TABLET ORAL at 04:33

## 2025-03-05 RX ADMIN — SENNOSIDES AND DOCUSATE SODIUM 2 TABLET: 50; 8.6 TABLET ORAL at 19:54

## 2025-03-05 RX ADMIN — ONDANSETRON 4 MG: 2 INJECTION, SOLUTION INTRAMUSCULAR; INTRAVENOUS at 11:10

## 2025-03-05 RX ADMIN — PANTOPRAZOLE SODIUM 40 MG: 40 TABLET, DELAYED RELEASE ORAL at 08:47

## 2025-03-05 RX ADMIN — CEFEPIME HYDROCHLORIDE 2 G: 2 INJECTION, POWDER, FOR SOLUTION INTRAVENOUS at 23:22

## 2025-03-05 RX ADMIN — OXYCODONE HYDROCHLORIDE 5 MG: 5 TABLET ORAL at 23:27

## 2025-03-05 RX ADMIN — OXYCODONE HYDROCHLORIDE 10 MG: 10 TABLET ORAL at 15:41

## 2025-03-05 RX ADMIN — ACETAMINOPHEN 975 MG: 325 TABLET, FILM COATED ORAL at 08:47

## 2025-03-05 RX ADMIN — ONDANSETRON 4 MG: 4 TABLET, ORALLY DISINTEGRATING ORAL at 23:27

## 2025-03-05 RX ADMIN — FUROSEMIDE 20 MG: 10 INJECTION, SOLUTION INTRAMUSCULAR; INTRAVENOUS at 19:53

## 2025-03-05 RX ADMIN — METRONIDAZOLE 500 MG: 500 INJECTION, SOLUTION INTRAVENOUS at 11:14

## 2025-03-05 RX ADMIN — SENNOSIDES AND DOCUSATE SODIUM 2 TABLET: 50; 8.6 TABLET ORAL at 08:48

## 2025-03-05 RX ADMIN — SODIUM PHOSPHATE, MONOBASIC, MONOHYDRATE AND SODIUM PHOSPHATE, DIBASIC ANHYDROUS 9 MMOL: 142; 276 INJECTION, SOLUTION INTRAVENOUS at 15:41

## 2025-03-05 RX ADMIN — POLYETHYLENE GLYCOL 3350 17 G: 17 POWDER, FOR SOLUTION ORAL at 19:54

## 2025-03-05 RX ADMIN — PANTOPRAZOLE SODIUM 40 MG: 40 TABLET, DELAYED RELEASE ORAL at 15:40

## 2025-03-05 RX ADMIN — METRONIDAZOLE 500 MG: 500 INJECTION, SOLUTION INTRAVENOUS at 19:54

## 2025-03-05 RX ADMIN — ENOXAPARIN SODIUM 40 MG: 40 INJECTION SUBCUTANEOUS at 11:12

## 2025-03-05 RX ADMIN — ONDANSETRON 4 MG: 4 TABLET, ORALLY DISINTEGRATING ORAL at 05:45

## 2025-03-05 RX ADMIN — ACETAMINOPHEN 975 MG: 325 TABLET, FILM COATED ORAL at 23:21

## 2025-03-05 ASSESSMENT — ACTIVITIES OF DAILY LIVING (ADL)
ADLS_ACUITY_SCORE: 38
ADLS_ACUITY_SCORE: 39
ADLS_ACUITY_SCORE: 38
ADLS_ACUITY_SCORE: 38
ADLS_ACUITY_SCORE: 39
ADLS_ACUITY_SCORE: 39
ADLS_ACUITY_SCORE: 38
ADLS_ACUITY_SCORE: 36
ADLS_ACUITY_SCORE: 38
ADLS_ACUITY_SCORE: 39
ADLS_ACUITY_SCORE: 39
ADLS_ACUITY_SCORE: 38
ADLS_ACUITY_SCORE: 38
ADLS_ACUITY_SCORE: 39
ADLS_ACUITY_SCORE: 38
ADLS_ACUITY_SCORE: 38
ADLS_ACUITY_SCORE: 39
ADLS_ACUITY_SCORE: 38
ADLS_ACUITY_SCORE: 39
ADLS_ACUITY_SCORE: 38

## 2025-03-05 NOTE — PLAN OF CARE
Neuro: A&Ox4.   Cardiac: SR. V-paced HR 80s; SBPs 110s   Respiratory: Sating >95 on 3L NC; increased from 2L overnight due to frequent desating into upper 80s.  GI/: Adequate urine output. BM X- loose  Diet/appetite: regular diet, little appetite. Tolerating water and medications well.  Activity:  Assist of 1, up to commode.  Pain: Well controlled with PRN oxy 5-10mg given x2 and scheduled Tylenol   Skin: No new deficits noted.  LDA's: MILY drain: not holding suction effectively (provider aware); R chest tubes x2; R PIV infusing TKO; L PIV saline locked    Plan: Continue with POC. Notify primary team with changes.  Goal Outcome Evaluation:      Plan of Care Reviewed With: patient    Overall Patient Progress: improvingOverall Patient Progress: improving    Outcome Evaluation: absence of nausea with PRN antinausea meds, pain well controlled, o2 increased to 3L NC overnight

## 2025-03-05 NOTE — PROGRESS NOTES
"THORACIC & FOREGUT SURGERY    S:  No acute events overnight. Doing well.  Tolerating diet without nausea, bloating, or vomiting.    O:  Vitals:    03/05/25 0056 03/05/25 0111 03/05/25 0444 03/05/25 0700   BP:   114/64 129/69   BP Location:   Left arm Left arm   Cuff Size:   Adult Regular Adult Regular   Pulse:   88 87   Resp:   14 14   Temp:   98.3  F (36.8  C) 97.7  F (36.5  C)   TempSrc:   Oral Axillary   SpO2: 95%  95% 96%   Weight:  67.1 kg (147 lb 14.9 oz)     Height:           Non-labored breathing on RA  Appears well perfused.  Guardian stitch intact   Soft, NDNT, no rebound, non-peritonitic. Incisions c/d/I with dressings.  Distal extremities warm. No calf tenderness.  Chest tube to waterseal.  No airleak with Valsalva    CXR: no significant pneumo, improved subcutaneous emphysema.      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 Now s/p tracheal resection and reconstruction with cryo, [facility] ECMO on 2/28 by Dr. Potts. Positive margins on frozen biopsy.    - Regular diet, additional supplements ordered  - Chest tube to water seal    -Continue diuresis  - Abx: Cefepime/Flagyl (trach perforation)  - DVT ppx: Lovenox  - OK for transfer to stepdown today    POSTOP MEDICAL ISSUES: Positive margins, tracheal perforation of R intermediate bronchus.  Emesis requiring NG tube placement.    Clinically Significant Risk Factors                   # Thrombocytopenia: Lowest platelets = 93 in last 2 days, will monitor for bleeding       # Acute Hypercapnic Respiratory Failure: based on arterial blood gas results.  Continue supplemental oxygen and ventilatory support as indicated.             # Financial/Environmental Concerns: none   # Pacemaker present     Atul Chaidez PA-C  Thoracic & Foregut Surgery    To page Thoracic Surgery team, click here: [facility]   Choose \"On Call\" tab at top, then use the search box for " "\"Thoracic\", then select the \"THORACIC SURGERY /[facility]\"            "

## 2025-03-05 NOTE — PLAN OF CARE
Speech Language Therapy Discharge Summary    Reason for therapy discharge:    All goals and outcomes met, no further needs identified.    Progress towards therapy goal(s). See goals on Care Plan in [facility] electronic health record for goal details.  Goals met    Therapy recommendation(s):    No further therapy is recommended.    Recommend regular diet and thin liquids. Pt should be fully upright and alert for all PO, take small sips/bites, slow pacing, and alternate between consistencies. Swallowing goals met. Will complete orders.

## 2025-03-06 ENCOUNTER — APPOINTMENT (OUTPATIENT)
Dept: OCCUPATIONAL THERAPY | Facility: CLINIC | Age: 75
DRG: 166 | End: 2025-03-06
Attending: THORACIC SURGERY (CARDIOTHORACIC VASCULAR SURGERY)
Payer: MEDICARE

## 2025-03-06 ENCOUNTER — APPOINTMENT (OUTPATIENT)
Dept: GENERAL RADIOLOGY | Facility: CLINIC | Age: 75
DRG: 166 | End: 2025-03-06
Attending: SURGERY
Payer: MEDICARE

## 2025-03-06 ENCOUNTER — APPOINTMENT (OUTPATIENT)
Dept: GENERAL RADIOLOGY | Facility: CLINIC | Age: 75
DRG: 166 | End: 2025-03-06
Attending: PHYSICIAN ASSISTANT
Payer: MEDICARE

## 2025-03-06 VITALS
DIASTOLIC BLOOD PRESSURE: 65 MMHG | RESPIRATION RATE: 16 BRPM | WEIGHT: 147.27 LBS | HEIGHT: 67 IN | TEMPERATURE: 98.7 F | BODY MASS INDEX: 23.11 KG/M2 | HEART RATE: 93 BPM | SYSTOLIC BLOOD PRESSURE: 110 MMHG | OXYGEN SATURATION: 93 %

## 2025-03-06 LAB
ANION GAP SERPL CALCULATED.3IONS-SCNC: 8 MMOL/L (ref 7–15)
BUN SERPL-MCNC: 26.7 MG/DL (ref 8–23)
CALCIUM SERPL-MCNC: 7.9 MG/DL (ref 8.8–10.4)
CHLORIDE SERPL-SCNC: 102 MMOL/L (ref 98–107)
CREAT SERPL-MCNC: 0.85 MG/DL (ref 0.67–1.17)
EGFRCR SERPLBLD CKD-EPI 2021: >90 ML/MIN/1.73M2
ERYTHROCYTE [DISTWIDTH] IN BLOOD BY AUTOMATED COUNT: 12.9 % (ref 10–15)
GLUCOSE SERPL-MCNC: 103 MG/DL (ref 70–99)
HCO3 SERPL-SCNC: 31 MMOL/L (ref 22–29)
HCT VFR BLD AUTO: 29.4 % (ref 40–53)
HGB BLD-MCNC: 9.4 G/DL (ref 13.3–17.7)
MAGNESIUM SERPL-MCNC: 2.2 MG/DL (ref 1.7–2.3)
MCH RBC QN AUTO: 31.2 PG (ref 26.5–33)
MCHC RBC AUTO-ENTMCNC: 32 G/DL (ref 31.5–36.5)
MCV RBC AUTO: 98 FL (ref 78–100)
PHOSPHATE SERPL-MCNC: 2.6 MG/DL (ref 2.5–4.5)
PLATELET # BLD AUTO: 114 10E3/UL (ref 150–450)
POTASSIUM SERPL-SCNC: 3.5 MMOL/L (ref 3.4–5.3)
RBC # BLD AUTO: 3.01 10E6/UL (ref 4.4–5.9)
SODIUM SERPL-SCNC: 141 MMOL/L (ref 135–145)
WBC # BLD AUTO: 4 10E3/UL (ref 4–11)

## 2025-03-06 PROCEDURE — 250N000013 HC RX MED GY IP 250 OP 250 PS 637: Performed by: THORACIC SURGERY (CARDIOTHORACIC VASCULAR SURGERY)

## 2025-03-06 PROCEDURE — 250N000013 HC RX MED GY IP 250 OP 250 PS 637

## 2025-03-06 PROCEDURE — 36415 COLL VENOUS BLD VENIPUNCTURE: CPT

## 2025-03-06 PROCEDURE — 71045 X-RAY EXAM CHEST 1 VIEW: CPT | Mod: 77

## 2025-03-06 PROCEDURE — 250N000011 HC RX IP 250 OP 636: Performed by: SURGERY

## 2025-03-06 PROCEDURE — 82310 ASSAY OF CALCIUM: CPT

## 2025-03-06 PROCEDURE — 80048 BASIC METABOLIC PNL TOTAL CA: CPT

## 2025-03-06 PROCEDURE — 97110 THERAPEUTIC EXERCISES: CPT | Mod: GO

## 2025-03-06 PROCEDURE — 97535 SELF CARE MNGMENT TRAINING: CPT | Mod: GO

## 2025-03-06 PROCEDURE — 83735 ASSAY OF MAGNESIUM: CPT

## 2025-03-06 PROCEDURE — 71045 X-RAY EXAM CHEST 1 VIEW: CPT | Mod: 26 | Performed by: RADIOLOGY

## 2025-03-06 PROCEDURE — 120N000003 HC R&B IMCU UMMC

## 2025-03-06 PROCEDURE — 250N000011 HC RX IP 250 OP 636: Mod: JZ | Performed by: THORACIC SURGERY (CARDIOTHORACIC VASCULAR SURGERY)

## 2025-03-06 PROCEDURE — 71045 X-RAY EXAM CHEST 1 VIEW: CPT

## 2025-03-06 PROCEDURE — 84100 ASSAY OF PHOSPHORUS: CPT

## 2025-03-06 PROCEDURE — 97530 THERAPEUTIC ACTIVITIES: CPT | Mod: GO

## 2025-03-06 PROCEDURE — 85027 COMPLETE CBC AUTOMATED: CPT

## 2025-03-06 PROCEDURE — 82565 ASSAY OF CREATININE: CPT

## 2025-03-06 RX ORDER — POTASSIUM CHLORIDE 750 MG/1
20 TABLET, EXTENDED RELEASE ORAL ONCE
Status: COMPLETED | OUTPATIENT
Start: 2025-03-06 | End: 2025-03-06

## 2025-03-06 RX ADMIN — METRONIDAZOLE 500 MG: 500 INJECTION, SOLUTION INTRAVENOUS at 12:06

## 2025-03-06 RX ADMIN — POLYETHYLENE GLYCOL 3350 17 G: 17 POWDER, FOR SOLUTION ORAL at 09:13

## 2025-03-06 RX ADMIN — ACETAMINOPHEN 975 MG: 325 TABLET, FILM COATED ORAL at 09:13

## 2025-03-06 RX ADMIN — ONDANSETRON 4 MG: 4 TABLET, ORALLY DISINTEGRATING ORAL at 09:14

## 2025-03-06 RX ADMIN — ACETAMINOPHEN 975 MG: 325 TABLET, FILM COATED ORAL at 23:04

## 2025-03-06 RX ADMIN — SENNOSIDES AND DOCUSATE SODIUM 2 TABLET: 50; 8.6 TABLET ORAL at 09:13

## 2025-03-06 RX ADMIN — METHOCARBAMOL 500 MG: 500 TABLET ORAL at 12:42

## 2025-03-06 RX ADMIN — PANTOPRAZOLE SODIUM 40 MG: 40 TABLET, DELAYED RELEASE ORAL at 09:12

## 2025-03-06 RX ADMIN — ENOXAPARIN SODIUM 40 MG: 40 INJECTION SUBCUTANEOUS at 12:06

## 2025-03-06 RX ADMIN — ONDANSETRON 4 MG: 4 TABLET, ORALLY DISINTEGRATING ORAL at 16:14

## 2025-03-06 RX ADMIN — POTASSIUM CHLORIDE 20 MEQ: 750 TABLET, EXTENDED RELEASE ORAL at 09:13

## 2025-03-06 RX ADMIN — METRONIDAZOLE 500 MG: 500 INJECTION, SOLUTION INTRAVENOUS at 03:48

## 2025-03-06 RX ADMIN — OXYCODONE HYDROCHLORIDE 5 MG: 5 TABLET ORAL at 21:40

## 2025-03-06 RX ADMIN — CEFEPIME HYDROCHLORIDE 2 G: 2 INJECTION, POWDER, FOR SOLUTION INTRAVENOUS at 23:00

## 2025-03-06 RX ADMIN — POTASSIUM & SODIUM PHOSPHATES POWDER PACK 280-160-250 MG 1 PACKET: 280-160-250 PACK at 09:13

## 2025-03-06 RX ADMIN — POTASSIUM & SODIUM PHOSPHATES POWDER PACK 280-160-250 MG 1 PACKET: 280-160-250 PACK at 12:14

## 2025-03-06 RX ADMIN — METRONIDAZOLE 500 MG: 500 INJECTION, SOLUTION INTRAVENOUS at 20:11

## 2025-03-06 RX ADMIN — ACETAMINOPHEN 975 MG: 325 TABLET, FILM COATED ORAL at 16:14

## 2025-03-06 RX ADMIN — PANTOPRAZOLE SODIUM 40 MG: 40 TABLET, DELAYED RELEASE ORAL at 16:14

## 2025-03-06 RX ADMIN — CEFEPIME HYDROCHLORIDE 2 G: 2 INJECTION, POWDER, FOR SOLUTION INTRAVENOUS at 11:48

## 2025-03-06 ASSESSMENT — ACTIVITIES OF DAILY LIVING (ADL)
ADLS_ACUITY_SCORE: 36
ADLS_ACUITY_SCORE: 36
ADLS_ACUITY_SCORE: 39
ADLS_ACUITY_SCORE: 36
ADLS_ACUITY_SCORE: 39
ADLS_ACUITY_SCORE: 37
ADLS_ACUITY_SCORE: 39
ADLS_ACUITY_SCORE: 37
ADLS_ACUITY_SCORE: 36
ADLS_ACUITY_SCORE: 36
ADLS_ACUITY_SCORE: 37
ADLS_ACUITY_SCORE: 39
ADLS_ACUITY_SCORE: 35
ADLS_ACUITY_SCORE: 39
ADLS_ACUITY_SCORE: 36
ADLS_ACUITY_SCORE: 39
ADLS_ACUITY_SCORE: 39
ADLS_ACUITY_SCORE: 36
ADLS_ACUITY_SCORE: 41
ADLS_ACUITY_SCORE: 37

## 2025-03-06 NOTE — PLAN OF CARE
Neuro: Patient alert and oriented x4.   Cardiac: SR V-paced HR 80s, BP's stable.   Respiratory: Sating >95 on 3L NC, attempted to wean to 2L but patient satted mid 80's.   GI/: Adequate urine output via urinal. No BM this shift.   Diet/appetite: Regular diet, poor appetite. Zofran given x1.   Activity:  Assist of 1, up to commode.  Pain: Well controlled with PRN oxy 10mg given x2.   Skin: No new deficits noted.   LDA's: MILY drain (doesn't hold suction, team aware), R chest tubes x2 - WS, R PIV - TKO; L PIV - SL.     Goal Outcome Evaluation:  Plan of Care Reviewed With: patient  Overall Patient Progress: improving  Outcome Evaluation: Pain controlled with oxycodone, and patient has denied nausea, zofran given x1.

## 2025-03-06 NOTE — PROGRESS NOTES
STAFF ADDENDUM:  I saw and evaluated Mr. Davis and agree with the resident s findings and plan of care as documented in the resident s note and edited by me, as applicable.        Nataliya Sun MD

## 2025-03-06 NOTE — PROVIDER NOTIFICATION
Time of notification: 4:48 PM  Provider notified: Mari Gleason  Patient status: Fyi pt had been having desat episodes up to 82% during activity and when asleep. Saw the note that he might be ready for discharge tomorrow so just wondering if you want to order home O2 assessment or overnight oximetry? He said he does not have O2 at home.  Orders received:  Continuous pulse ox for now. Will talk to fellow about further studies since the overnight oximetry is with interventional pulmonology and will need to discuss with them. Will address home O2 assessment during rounds magi morning.

## 2025-03-06 NOTE — OP NOTE
Preoperative diagnosis: Adenoid cystic carcinoma of the trachea    Postoperative diagnosis: The same as preoperative diagnosis    Procedure:   Tracheal resection and reconstruction    Surgeon: Gume Camp (I was co-surgeon with Dr. Potts. This was a very challenging case that required 2 staff surgeons despite the presence of a qualified resident.)    Procedure:  I performed the following parts of the procedure:    Mobilization of the airway: I further dissected the airway anteriorly and posteriorly up into the neck and well down along the LEFT mainstem bronchus to the level of its bifurcation to maximize mobilization without compromising blood flow. I then resected an additional cartilage ring from the trachea, since the margin was still positive. At this point we had resected >4 cm and due to the nature of the tumor and the patient's age, we decided not to resect more airway distally, since it would have complicated the repair by resecting the pradip, and we may still not have achieved a negative margin.    I started and completed the anastomosis of the left half of the trachea to the LEFT mainstem bronchus using interrupted 3-0 vicryl sutures. I first placed corner stitches into the cartilage, and then I gradually advanced towards the midline with interrupted stitches that we left untied. Once the circumference of the LEFT mainstem bronchus and the left side of the trachea had all their stitches places, we started tying them. The ends came together well without undue tension.      See Dr. Potts's note for further details.

## 2025-03-06 NOTE — PROGRESS NOTES
THORACIC & FOREGUT SURGERY    S:  No acute events overnight. Doing well.  Tolerating diet without nausea, bloating, or vomiting but reports he has a low appetite.     O:  Vitals:    03/06/25 0353 03/06/25 0400 03/06/25 0827 03/06/25 1147   BP: 99/55  112/62 111/56   BP Location: Left arm  Left arm Left arm   Cuff Size: Adult Regular      Pulse: 82  89 100   Resp: 16  16 16   Temp: 97.8  F (36.6  C)  98.2  F (36.8  C) 98  F (36.7  C)   TempSrc: Oral  Oral Oral   SpO2: 100%  98% 96%   Weight:  66.8 kg (147 lb 4.3 oz)     Height:           Non-labored breathing on RA  Appears well perfused.  Guardian stitch intact   Soft, NDNT, no rebound, non-peritonitic. Incisions c/d/I with dressings.  Distal extremities warm. No calf tenderness.  Chest tube to waterseal.  No airleak with Valsalva    CXR: no significant pneumo, improved subcutaneous emphysema.      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 Now s/p tracheal resection and reconstruction with cryo, [facility] ECMO on 2/28 by Dr. Potts. Positive margins on frozen biopsy.    - Regular diet, additional supplements ordered  - Chest tube to be removed   - Abx: Cefepime/Flagyl (trach perforation)  - DVT ppx: Lovenox  - chin stitch removed today  - ambulate x3 today  - dispo possibly as early as tomorrow    POSTOP MEDICAL ISSUES: Positive margins, tracheal perforation of R intermediate bronchus.  Emesis requiring NG tube placement.    Clinically Significant Risk Factors                   # Thrombocytopenia: Lowest platelets = 105 in last 2 days, will monitor for bleeding       # Acute Hypercapnic Respiratory Failure: based on arterial blood gas results.  Continue supplemental oxygen and ventilatory support as indicated.             # Financial/Environmental Concerns: none   # Pacemaker present       Carlo Dang MD  [place] Resident, General Surgery  Thoracic  "Surgery Service     To page Thoracic Surgery team, click here: [facility]COM   Choose \"On Call\" tab at top, then use the search box for \"Thoracic\", then select the \"THORACIC SURGERY /[facility]\"            "

## 2025-03-06 NOTE — PLAN OF CARE
Neuro: A&Ox4.   Cardiac: SR. V- Paced HR 80-90ss; SBP 90-110s    Respiratory: Sating >95 on 3L.  GI/: Adequate urine output. BM X, loose  Diet/appetite: Tolerating regular diet. Poor appetite.  Activity:  Assist of 1, up to commode.  Pain: Rating 3-5/10 at surgical sites. Well controlled with PRN oxy 5 mg given x1 and scheduled tylenol.   Skin: No new deficits noted.  LDA's: MILY drain.; R chest tubes x2; R PIV infusing TKO; L PIV saline locked    Plan: Continue with POC. Notify primary team with changes.  Goal Outcome Evaluation:      Plan of Care Reviewed With: patient    Overall Patient Progress: improvingOverall Patient Progress: improving    Outcome Evaluation: MILY drain now holding suction well, cough becoming less frequent.

## 2025-03-06 NOTE — PLAN OF CARE
Neuro: A&Ox4.   Cardiac: 100% V-paced. VSS.   Respiratory: Sating >92% on O2 at 2 lpm NC. Increased to 3 lpm during activity. Tried to wean to 1 lpm but pt intermittently desat <90%  GI/: Adequate urine output via urinal/bathroom. BM X2  Diet/appetite: Tolerating regular diet. Eating well.  Activity:  Assist of 1 w/GB and walker, up to chair and in halls.  Pain:  Incisional pain managed by scheduled Tylenol and PRN Robaxin,   Skin: CT site dressing reinforced. Neck suture removed. Cool distal extremities noted, baseline per pt related to previous chemo.  LDA's: L PIV SL. R PIV TKO. MILY drain.    Plan: Continue with POC. Notify primary team with changes.    Goal Outcome Evaluation:      Plan of Care Reviewed With: patient, spouse    Overall Patient Progress: improvingOverall Patient Progress: improving    Outcome Evaluation: CT and neck suture removed. Still on O2 therapy 1-3 lpm, desat at times shanon w/activity. Able to walk around hallway w/gait belt and walker.      Problem: Breathing Pattern Ineffective  Goal: Effective Breathing Pattern  Intervention: Promote Improved Breathing Pattern  Recent Flowsheet Documentation  Taken 3/6/2025 1600 by Mary Gallegos RN  Supportive Measures:   active listening utilized   positive reinforcement provided  Taken 3/6/2025 1200 by Mary Gallegos RN  Supportive Measures:   active listening utilized   positive reinforcement provided  Taken 3/6/2025 0900 by Mary Gallegos RN  Supportive Measures:   active listening utilized   positive reinforcement provided  Head of Bed (HOB) Positioning: HOB at 20-30 degrees

## 2025-03-07 ENCOUNTER — APPOINTMENT (OUTPATIENT)
Dept: GENERAL RADIOLOGY | Facility: CLINIC | Age: 75
DRG: 166 | End: 2025-03-07
Attending: SURGERY
Payer: MEDICARE

## 2025-03-07 ENCOUNTER — APPOINTMENT (OUTPATIENT)
Dept: OCCUPATIONAL THERAPY | Facility: CLINIC | Age: 75
DRG: 166 | End: 2025-03-07
Attending: THORACIC SURGERY (CARDIOTHORACIC VASCULAR SURGERY)
Payer: MEDICARE

## 2025-03-07 LAB
ANION GAP SERPL CALCULATED.3IONS-SCNC: 8 MMOL/L (ref 7–15)
BUN SERPL-MCNC: 24.4 MG/DL (ref 8–23)
CALCIUM SERPL-MCNC: 8.6 MG/DL (ref 8.8–10.4)
CHLORIDE SERPL-SCNC: 100 MMOL/L (ref 98–107)
CREAT SERPL-MCNC: 0.86 MG/DL (ref 0.67–1.17)
EGFRCR SERPLBLD CKD-EPI 2021: 90 ML/MIN/1.73M2
ERYTHROCYTE [DISTWIDTH] IN BLOOD BY AUTOMATED COUNT: 12.9 % (ref 10–15)
GLUCOSE SERPL-MCNC: 110 MG/DL (ref 70–99)
HCO3 SERPL-SCNC: 31 MMOL/L (ref 22–29)
HCT VFR BLD AUTO: 31.5 % (ref 40–53)
HGB BLD-MCNC: 10.1 G/DL (ref 13.3–17.7)
MAGNESIUM SERPL-MCNC: 2.3 MG/DL (ref 1.7–2.3)
MCH RBC QN AUTO: 31.2 PG (ref 26.5–33)
MCHC RBC AUTO-ENTMCNC: 32.1 G/DL (ref 31.5–36.5)
MCV RBC AUTO: 97 FL (ref 78–100)
PHOSPHATE SERPL-MCNC: 2.7 MG/DL (ref 2.5–4.5)
PLATELET # BLD AUTO: 154 10E3/UL (ref 150–450)
POTASSIUM SERPL-SCNC: 3.3 MMOL/L (ref 3.4–5.3)
POTASSIUM SERPL-SCNC: 4.1 MMOL/L (ref 3.4–5.3)
RBC # BLD AUTO: 3.24 10E6/UL (ref 4.4–5.9)
SODIUM SERPL-SCNC: 139 MMOL/L (ref 135–145)
WBC # BLD AUTO: 3.7 10E3/UL (ref 4–11)

## 2025-03-07 PROCEDURE — 71045 X-RAY EXAM CHEST 1 VIEW: CPT

## 2025-03-07 PROCEDURE — 250N000013 HC RX MED GY IP 250 OP 250 PS 637: Performed by: PHYSICIAN ASSISTANT

## 2025-03-07 PROCEDURE — 85027 COMPLETE CBC AUTOMATED: CPT

## 2025-03-07 PROCEDURE — 84132 ASSAY OF SERUM POTASSIUM: CPT | Performed by: THORACIC SURGERY (CARDIOTHORACIC VASCULAR SURGERY)

## 2025-03-07 PROCEDURE — 36415 COLL VENOUS BLD VENIPUNCTURE: CPT

## 2025-03-07 PROCEDURE — 250N000013 HC RX MED GY IP 250 OP 250 PS 637

## 2025-03-07 PROCEDURE — 83735 ASSAY OF MAGNESIUM: CPT

## 2025-03-07 PROCEDURE — 97535 SELF CARE MNGMENT TRAINING: CPT | Mod: GO

## 2025-03-07 PROCEDURE — 36415 COLL VENOUS BLD VENIPUNCTURE: CPT | Performed by: THORACIC SURGERY (CARDIOTHORACIC VASCULAR SURGERY)

## 2025-03-07 PROCEDURE — 84100 ASSAY OF PHOSPHORUS: CPT

## 2025-03-07 PROCEDURE — 97110 THERAPEUTIC EXERCISES: CPT | Mod: GO

## 2025-03-07 PROCEDURE — 80048 BASIC METABOLIC PNL TOTAL CA: CPT

## 2025-03-07 PROCEDURE — 999N000147 HC STATISTIC PT IP EVAL DEFER

## 2025-03-07 PROCEDURE — 71045 X-RAY EXAM CHEST 1 VIEW: CPT | Mod: 26 | Performed by: RADIOLOGY

## 2025-03-07 PROCEDURE — 250N000011 HC RX IP 250 OP 636: Performed by: THORACIC SURGERY (CARDIOTHORACIC VASCULAR SURGERY)

## 2025-03-07 PROCEDURE — 120N000003 HC R&B IMCU UMMC

## 2025-03-07 PROCEDURE — 250N000011 HC RX IP 250 OP 636: Performed by: SURGERY

## 2025-03-07 PROCEDURE — 250N000013 HC RX MED GY IP 250 OP 250 PS 637: Performed by: THORACIC SURGERY (CARDIOTHORACIC VASCULAR SURGERY)

## 2025-03-07 PROCEDURE — 97530 THERAPEUTIC ACTIVITIES: CPT | Mod: GO

## 2025-03-07 RX ORDER — OXYCODONE HYDROCHLORIDE 5 MG/1
5 TABLET ORAL EVERY 4 HOURS PRN
Qty: 50 TABLET | Refills: 0 | Status: SHIPPED | OUTPATIENT
Start: 2025-03-07

## 2025-03-07 RX ORDER — ACETAMINOPHEN 325 MG/1
975 TABLET ORAL EVERY 8 HOURS
COMMUNITY
Start: 2025-03-07

## 2025-03-07 RX ORDER — POTASSIUM CHLORIDE 750 MG/1
40 TABLET, EXTENDED RELEASE ORAL ONCE
Status: DISCONTINUED | OUTPATIENT
Start: 2025-03-07 | End: 2025-03-07

## 2025-03-07 RX ORDER — POTASSIUM CHLORIDE 1.5 G/1.58G
40 POWDER, FOR SOLUTION ORAL ONCE
Status: COMPLETED | OUTPATIENT
Start: 2025-03-07 | End: 2025-03-07

## 2025-03-07 RX ORDER — AMOXICILLIN 250 MG
2 CAPSULE ORAL 2 TIMES DAILY
Qty: 40 TABLET | Refills: 0 | Status: SHIPPED | OUTPATIENT
Start: 2025-03-07

## 2025-03-07 RX ORDER — METHOCARBAMOL 500 MG/1
500 TABLET, FILM COATED ORAL EVERY 8 HOURS PRN
Qty: 20 TABLET | Refills: 0 | Status: SHIPPED | OUTPATIENT
Start: 2025-03-07

## 2025-03-07 RX ADMIN — METRONIDAZOLE 500 MG: 500 INJECTION, SOLUTION INTRAVENOUS at 04:00

## 2025-03-07 RX ADMIN — ENOXAPARIN SODIUM 40 MG: 40 INJECTION SUBCUTANEOUS at 12:28

## 2025-03-07 RX ADMIN — SENNOSIDES AND DOCUSATE SODIUM 2 TABLET: 50; 8.6 TABLET ORAL at 09:37

## 2025-03-07 RX ADMIN — POTASSIUM CHLORIDE 40 MEQ: 1.5 POWDER, FOR SOLUTION ORAL at 07:56

## 2025-03-07 RX ADMIN — SENNOSIDES AND DOCUSATE SODIUM 2 TABLET: 50; 8.6 TABLET ORAL at 19:48

## 2025-03-07 RX ADMIN — PANTOPRAZOLE SODIUM 40 MG: 40 TABLET, DELAYED RELEASE ORAL at 16:54

## 2025-03-07 RX ADMIN — ONDANSETRON 4 MG: 4 TABLET, ORALLY DISINTEGRATING ORAL at 04:23

## 2025-03-07 RX ADMIN — ACETAMINOPHEN 975 MG: 325 TABLET, FILM COATED ORAL at 07:56

## 2025-03-07 RX ADMIN — PANTOPRAZOLE SODIUM 40 MG: 40 TABLET, DELAYED RELEASE ORAL at 07:56

## 2025-03-07 RX ADMIN — ACETAMINOPHEN 975 MG: 325 TABLET, FILM COATED ORAL at 23:56

## 2025-03-07 RX ADMIN — POTASSIUM & SODIUM PHOSPHATES POWDER PACK 280-160-250 MG 1 PACKET: 280-160-250 PACK at 07:56

## 2025-03-07 RX ADMIN — POTASSIUM & SODIUM PHOSPHATES POWDER PACK 280-160-250 MG 1 PACKET: 280-160-250 PACK at 12:29

## 2025-03-07 RX ADMIN — ONDANSETRON 4 MG: 4 TABLET, ORALLY DISINTEGRATING ORAL at 22:52

## 2025-03-07 RX ADMIN — METRONIDAZOLE 500 MG: 500 INJECTION, SOLUTION INTRAVENOUS at 19:48

## 2025-03-07 RX ADMIN — OXYCODONE HYDROCHLORIDE 5 MG: 5 TABLET ORAL at 04:23

## 2025-03-07 RX ADMIN — POTASSIUM CHLORIDE 40 MEQ: 1.5 POWDER, FOR SOLUTION ORAL at 16:54

## 2025-03-07 RX ADMIN — ACETAMINOPHEN 975 MG: 325 TABLET, FILM COATED ORAL at 16:54

## 2025-03-07 RX ADMIN — OXYCODONE HYDROCHLORIDE 5 MG: 5 TABLET ORAL at 22:51

## 2025-03-07 RX ADMIN — CEFEPIME HYDROCHLORIDE 2 G: 2 INJECTION, POWDER, FOR SOLUTION INTRAVENOUS at 12:31

## 2025-03-07 RX ADMIN — CEFEPIME HYDROCHLORIDE 2 G: 2 INJECTION, POWDER, FOR SOLUTION INTRAVENOUS at 23:56

## 2025-03-07 RX ADMIN — METRONIDAZOLE 500 MG: 500 INJECTION, SOLUTION INTRAVENOUS at 13:31

## 2025-03-07 ASSESSMENT — ACTIVITIES OF DAILY LIVING (ADL)
ADLS_ACUITY_SCORE: 41
ADLS_ACUITY_SCORE: 42
ADLS_ACUITY_SCORE: 42
ADLS_ACUITY_SCORE: 39
ADLS_ACUITY_SCORE: 41
ADLS_ACUITY_SCORE: 42
ADLS_ACUITY_SCORE: 39
ADLS_ACUITY_SCORE: 44
ADLS_ACUITY_SCORE: 44
ADLS_ACUITY_SCORE: 41
ADLS_ACUITY_SCORE: 39
ADLS_ACUITY_SCORE: 42
ADLS_ACUITY_SCORE: 41
ADLS_ACUITY_SCORE: 39
ADLS_ACUITY_SCORE: 41
ADLS_ACUITY_SCORE: 39
ADLS_ACUITY_SCORE: 39
ADLS_ACUITY_SCORE: 42
ADLS_ACUITY_SCORE: 41
ADLS_ACUITY_SCORE: 42
ADLS_ACUITY_SCORE: 44

## 2025-03-07 NOTE — PLAN OF CARE
Goal Outcome Evaluation:    Neuro: A&Ox4.   Cardiac: SR. VSS.   Respiratory: Sating 98% on RA.  GI/: Adequate urine output. BM X1  Diet/appetite: poor po intake, Dietician following.   Activity: stand by assist up to chair and in halls.  Pain: At acceptable level on current regimen.   Skin: No new deficits noted.  LDA's: PIV patent and functioning.  Plan: Continue with POC. Notify primary team with changes.

## 2025-03-07 NOTE — PROVIDER NOTIFICATION
"Time of notification: 3:03 PM  Provider notified: Kaylynn Cortez   Patient status: pt refused to get discharged. His ride which is his wife left already and they live far. He also said\" he felt so tired after working with pt this am\"   Temp:  [97.8  F (36.6  C)-98.7  F (37.1  C)] 98.4  F (36.9  C)  Pulse:  [] 93  Resp:  [15-16] 15  BP: ()/(55-65) 96/55  SpO2:  [91 %-99 %] 96 %  Orders received: Will continue to monitor.   "

## 2025-03-07 NOTE — PLAN OF CARE
"Shift: 6130-1524    /65 (BP Location: Left arm, Cuff Size: Adult Regular)   Pulse 87   Temp 98  F (36.7  C) (Oral)   Resp 16   Ht 1.702 m (5' 7\")   Wt 67.2 kg (148 lb 3.2 oz)   SpO2 99%   BMI 23.21 kg/m      Neuro: A&Ox4.Afebrile.  Cardiac: 100% V paced. VSS.   Respiratory: Sating >92% on 2-3L NC.  GI/: Adequate urine output. BM X1  Diet/appetite: Tolerating regular diet.   Activity:  Assist of one, up to the bathroom and in halls.  Pain: At acceptable level on current regimen.   Skin: MILY drain dressing with moderate to copious leakage at site. Dressing changed x1.   Shift updates:  Discoloration around the CT site. Site marked.Provider notified. CXR ordered and reinforced dressing with vaseline gauze.  LDA's:  R chest MILY drain with minimal output  R/L PIV  Plan: Continue with POC. Notify primary team with changes.      Goal Outcome Evaluation:      Plan of Care Reviewed With: patient    Overall Patient Progress: improvingOverall Patient Progress: improving    Outcome Evaluation: VSS. On 2-3L NC overnight. MILY drain with minimal output. Dressing with moderate to copious drainage.      "

## 2025-03-07 NOTE — PROGRESS NOTES
Physical Therapy: Orders received. Chart reviewed and discussed with care team.? Physical Therapy not indicated due to pt moving well with OT, choosing to use wh walker and will obtain one for home. Pt does not have acute PT needs at this time, defer discharge recommendations to OT.? Will complete orders.

## 2025-03-07 NOTE — PROGRESS NOTES
Brief Progress Note  March 7, 2025 4:29 AM      Paged concerning bruising near CT site, as well as leakage. Asked RN to call to XR to get them to take his regularly scheduled CXR and to reinforce site.   All questions or concerns were addressed at this time.     - Continue rest of cares per primary team      Enrique Machuca DO VAN  PGY-1 Resident, General Surgery'

## 2025-03-07 NOTE — DISCHARGE SUMMARY
NAME: Shahid Davis   MRN: [id number]   : 1950     DATE OF ADMISSION: 2025          Preoperative diagnosis:                         Tracheal adenoid cystic carcinoma  Postoperative diagnosis:                       As above     Procedure:   Right thoracotomy, tracheal resection on VA ECMO  Intercostal nerve cryoablation          PATHOLOGY RESULTS: Final pathology pending at time of discharge.     CULTURE RESULTS: None     INTRAOPERATIVE COMPLICATIONS: None     POSTOPERATIVE MEDICAL ISSUES: None     DRAINS/TUBES PRESENT AT DISCHARGE: Subcutaneous Kwame drain for seroma prevention    DATE OF DISCHARGE:  2025     HOSPITAL COURSE: Shahid Davis is a 74 year old male who on 2025  underwent the above-named procedures.  He tolerated the operation well and postoperatively was transferred to the general post-surgical unit.  The remainder of his course was essentially uncomplicated.  Prior to discharge, his pain was controlled well, he was able to perform ADLs and ambulate independently without difficulty, and had full return of bowel and bladder function.  On 25 , he was discharged to home in stable condition with MILY drain in place.    DISCHARGE EXAM:   A&O, NAD  Resp non-labored  Distal extremities warm  MILY drain with small volume serosang fluid in bulb  Incisions c/d/i     DISCHARGE INSTRUCTIONS:  Discharge Procedure Orders   Reason for your hospital stay   Order Comments: surgery     Activity   Order Comments: As in discharge instruction section     Order Specific Question Answer Comments   Is discharge order? Yes      Tubes and Drains   Order Comments: DRAIN INSTRUCTIONS  *Empty the drain contents daily (or more often if necessary if the bulb fills).  Record the drainage volume daily until seen in follow up, which will assist with the decision for removal.  Call the thoracic team regularly (i.e F) at [phone] to keep us informed as to the volume of output.     If the output is  less than 30cc per day for 3 days, we will likely make an appointment for drain removal.     Keep Flexi-track on your tube at all times for additional securement.  There should be a small amount of slack between the Flexi-track and insertion site.  This will help to ensure the tube is not inadvertently removed.     Order Specific Question Answer Comments   If tubes and drains present: Continue at discharge      ADULT [facility] Specialty Follow-up and recommended labs and tests   Order Comments: 1.) Follow up with primary care physician, Haleigh Montero, in 1-2 weeks.  2.) Thoracic surgery follow up;    Follow-up next week in thoracic surgery clinic for removal of the drain that goes underneath her skin.  The thoracic surgery clinic is aware that you are discharging with this drain.    MARCH 24th  RETURN CCSL  1:00 PM  (30 min.)  Darío Potts MD  [facility]      Appointments on [facility] and/or [facility] (with [facility] or [facility] provider or service). Call [phone] if you haven't heard regarding these appointments within 7 days of discharge.     Discharge Instructions   Order Comments: THORACIC SURGERY DISCHARGE INSTRUCTIONS    DIET: Regular diet - as prior to admission     If your plans upon discharge include prolonged periods of sitting (i.e a lengthy car or plane ride), it is highly beneficial to get up and walk at least once per hour to help prevent swelling and blood clots.     You may remove chest tube dressing 48 hours after tube removal and bandage the site at your own discretion thereafter.  Small amounts of leakage are normal for 2-3 days after removal.  Feel free to call with questions.    You may get incision wet 2 days after operation. Do not submerge, soak, or scrub incision or swim until seen in follow-up.    Take incentive spirometer home for continued frequent use    Activity as tolerated, no strenous activity until seen in follow-up, no lifting greater than 20 pounds  "for the next 2 weeks.    Stay hydrated. Take over the counter fiber (metamucil or benefiber) and stool softeners (Miralax, docusate or senna) if becoming constipated.     Call for fever greater than 101.5, chills, increased size of incision, red skin around incision, vision changes, muscle strength changes, sensation changes, shortness of breath, or other concerns.    No driving while taking narcotic pain medication.    Transition to ibuprofen or tylenol/acetaminophen for pain control. Do not take tylenol/acetaminophen and acetaminophen containing narcotic (e.g., percocet or vicodin) at the same time. If you have known ulcer problems, or kidney trouble (elevated creatinine) do not take the ibuprofen.    If you think you will need a refill on your pain medications, you should call the thoracic surgery team at the number below at least 24hrs prior to running out.  It is also more difficult to provide refills Friday afternoon and over the weekend, so call before those times if possible.     In emergencies, call [phone]    For other Questions or Concerns;   A.) During weekday working hours (Monday through Friday 8am to 4:30pm)   call [phone] and ask to speak to a thoracic surgery nurse (RN or LPN).     B.) At nights (after 4:30pm), on weekends, or if urgent call [phone] and   tell the  \"I would like to page job code [phone], the thoracic surgery   fellow on call, please.\"     Diet   Order Comments: Regular diet as tolerated     Order Specific Question Answer Comments   Is discharge order? Yes        DISCHARGE MEDICATIONS:   Discharge Medication List as of 3/8/2025  9:57 AM        START taking these medications    Details   acetaminophen (TYLENOL) 325 MG tablet Take 3 tablets (975 mg) by mouth or Feeding Tube every 8 hours., OTC      methocarbamol (ROBAXIN) 500 MG tablet Take 1 tablet (500 mg) by mouth every 8 hours as needed for muscle spasms., Disp-20 tablet, R-0, E-Prescribe      oxyCODONE " (ROXICODONE) 5 MG tablet Take 1 tablet (5 mg) by mouth every 4 hours as needed for moderate pain. Wean narcotic use as tolerated starting at time of discharge, Disp-50 tablet, R-0, E-Prescribe      senna-docusate (SENOKOT-S/PERICOLACE) 8.6-50 MG tablet Take 2 tablets by mouth or Feeding Tube 2 times daily., Disp-40 tablet, R-0, E-Prescribe      wound support modular (EXPEDITE) LIQD bottle Take 60 mLs by mouth daily for 14 days., Disp-840 mL, R-0, E-Prescribe           CONTINUE these medications which have NOT CHANGED    Details   omeprazole (PRILOSEC) 40 MG DR capsule Take 1 capsule (40 mg) by mouth daily, Disp-90 capsule, R-4, E-Prescribe      ondansetron (ZOFRAN) 8 MG tablet Take 1 tablet (8 mg) by mouth every 8 hours as needed for nausea, Disp-60 tablet, R-3, E-Prescribe      polyethylene glycol (MIRALAX) 17 GM/Dose powder Take 17 g by mouth daily as needed for constipation, Disp-510 g, R-4, E-Prescribe      prochlorperazine (COMPAZINE) 10 MG tablet Take 1 tablet (10 mg) by mouth every 6 hours as needed for nausea or vomiting, Disp-30 tablet, R-3, E-Prescribe      ascorbic acid (VITAMIN C) 500 MG tablet Take 500 mg by mouth every morning, Historical      Multiple Vitamins-Minerals (MULTIVITAL PO) Take 1 tablet by mouth every morning., Historical

## 2025-03-07 NOTE — PROGRESS NOTES
"CLINICAL NUTRITION SERVICES - ASSESSMENT NOTE    Malnutrition Status:    Moderate malnutrition in the context of acute illness or injury    Registered Dietitian Interventions:  Discontinue Expedite, patient dislikes  Ensure Enlive once daily + additional by request PRN  Magic Cup daily at HS  Nutrition education on strategies to optimize PO, preserve lean body mass      REASON FOR ASSESSMENT  LOS    Hx of complete heart block s/p pacemaker placement, pyloric stenosis s/p repair as an infant, GERD, B-cell lymphoma. 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     SUBJECTIVE INFORMATION  Assessed patient in room.    NUTRITION HISTORY  Patient reports current appetite lower than usual but is improving day to day. He has an Ensure and Expedite order in place but strongly dislikes the Expedite so will discontinue. He likes the Ensure and would like to continue receiving this. He is also interested in starting a Magic Cup supplements once daily. He reports no nutrition concerns PTA. He reports having a stable weight PTA. He enjoys being physically active with swimming. Was appreciative of RD visit.     CURRENT NUTRITION ORDERS  Diet: Regular since 3/4  Supplements: Ensure Enlive between meals;Expedite bottle with meals     CURRENT INTAKE/TOLERANCE  50-75% intake per I/O documentation. Sipping on an Ensure during visit.      LABS  Nutrition-relevant labs: Reviewed    MEDICATIONS  Nutrition-relevant medications: Reviewed; Miralax scheduled BID; Senokot scheduled BID    ANTHROPOMETRICS  Height: 170.2 cm (5' 7\")  Admit weight: 62.2 kg (137 lb 2 oz) 2/28/25   Most Recent Weight: 67.2 kg (148 lb 3.2 oz)  IBW: 67.3 kg  % IBW: 92%  BMI (kg/m ): Normal BMI  Weight History: Stable wt hx PTA  Wt Readings from Last 10 Encounters:   03/07/25 67.2 kg " (148 lb 3.2 oz)   02/19/25 63 kg (139 lb)   02/05/25 62.6 kg (138 lb)   01/30/25 62.6 kg (138 lb)   01/14/25 62.7 kg (138 lb 4.8 oz)   12/16/24 63 kg (138 lb 14.2 oz)   11/19/24 63.5 kg (140 lb)   11/14/24 63.3 kg (139 lb 9.6 oz)   05/23/24 62.6 kg (138 lb 1.6 oz)   01/11/24 63.6 kg (140 lb 3.2 oz)       Dosing Weight: 62 kg, based on actual wt/admit wt    ASSESSED NUTRITION NEEDS  Estimated Energy Needs: 1550 - 1860 kcals/day (25 - 30 kcals/kg)  Justification: Maintenance  Estimated Protein Needs: 75 - 95 grams protein/day (1.2 - 1.5 grams of pro/kg)  Justification: Increased needs  Estimated Fluid Needs: (1 mL/kcal)  Justification: Maintenance    SYSTEM FINDINGS    Skin/wounds: WOCN not following for present admit.   GI symptoms: 1-2 BMs daily per I/O (3/4-3/6)    MALNUTRITION  % Intake: < 75% for > 7 days (moderate)  % Weight Loss: None noted  Subcutaneous Fat Loss: None observed (triceps area with some loss but likely attributed to age)  Muscle Loss: None observed (again, suspect any depletion 2/2 age/pt baseline)  Fluid Accumulation/Edema: Mild, 1+  Malnutrition Diagnosis: Moderate malnutrition in the context of acute illness or injury  Malnutrition Present on Admission: Yes per chart review     NUTRITION DIAGNOSIS  Inadequate oral intake related to reduced appetite as evidenced by pt self-report, ONS to optimize intake    INTERVENTIONS  Manage composition of oral intake  Medical food supplement therapy  Nutrition counseling strategies    Goals  Patient to consume % of nutritionally adequate meal trays TID, or the equivalent with supplements/snacks.     Monitoring/Evaluation  Progress toward goals will be monitored and evaluated per policy.    Danny Ward, MS, RDN, LD, CNSC  Available by AppCard or phone   Vocera: M-F (7:00-3:30)  6B Clinical Dietitian   Weekend/Holiday (7:00-3:30) - Weekend Clinical Dietitian   6B RD desk: [phone]       **Clinical Dietitians are no longer available by  pager.

## 2025-03-08 ENCOUNTER — APPOINTMENT (OUTPATIENT)
Dept: OCCUPATIONAL THERAPY | Facility: CLINIC | Age: 75
DRG: 166 | End: 2025-03-08
Attending: THORACIC SURGERY (CARDIOTHORACIC VASCULAR SURGERY)
Payer: MEDICARE

## 2025-03-08 VITALS
WEIGHT: 150.35 LBS | RESPIRATION RATE: 18 BRPM | HEART RATE: 89 BPM | HEIGHT: 67 IN | BODY MASS INDEX: 23.6 KG/M2 | OXYGEN SATURATION: 95 % | DIASTOLIC BLOOD PRESSURE: 66 MMHG | TEMPERATURE: 98.1 F | SYSTOLIC BLOOD PRESSURE: 105 MMHG

## 2025-03-08 LAB
ANION GAP SERPL CALCULATED.3IONS-SCNC: 9 MMOL/L (ref 7–15)
BUN SERPL-MCNC: 24 MG/DL (ref 8–23)
CALCIUM SERPL-MCNC: 8.1 MG/DL (ref 8.8–10.4)
CHLORIDE SERPL-SCNC: 105 MMOL/L (ref 98–107)
CREAT SERPL-MCNC: 0.8 MG/DL (ref 0.67–1.17)
EGFRCR SERPLBLD CKD-EPI 2021: >90 ML/MIN/1.73M2
ERYTHROCYTE [DISTWIDTH] IN BLOOD BY AUTOMATED COUNT: 12.9 % (ref 10–15)
GLUCOSE SERPL-MCNC: 105 MG/DL (ref 70–99)
HCO3 SERPL-SCNC: 25 MMOL/L (ref 22–29)
HCT VFR BLD AUTO: 26 % (ref 40–53)
HGB BLD-MCNC: 8.5 G/DL (ref 13.3–17.7)
MAGNESIUM SERPL-MCNC: 2.2 MG/DL (ref 1.7–2.3)
MCH RBC QN AUTO: 30.9 PG (ref 26.5–33)
MCHC RBC AUTO-ENTMCNC: 32.7 G/DL (ref 31.5–36.5)
MCV RBC AUTO: 95 FL (ref 78–100)
PHOSPHATE SERPL-MCNC: 2.8 MG/DL (ref 2.5–4.5)
PLATELET # BLD AUTO: 114 10E3/UL (ref 150–450)
POTASSIUM SERPL-SCNC: 4.2 MMOL/L (ref 3.4–5.3)
RBC # BLD AUTO: 2.75 10E6/UL (ref 4.4–5.9)
SODIUM SERPL-SCNC: 139 MMOL/L (ref 135–145)
WBC # BLD AUTO: 2 10E3/UL (ref 4–11)

## 2025-03-08 PROCEDURE — 85018 HEMOGLOBIN: CPT

## 2025-03-08 PROCEDURE — 80048 BASIC METABOLIC PNL TOTAL CA: CPT

## 2025-03-08 PROCEDURE — 250N000013 HC RX MED GY IP 250 OP 250 PS 637

## 2025-03-08 PROCEDURE — 83735 ASSAY OF MAGNESIUM: CPT

## 2025-03-08 PROCEDURE — 250N000011 HC RX IP 250 OP 636: Mod: JZ | Performed by: THORACIC SURGERY (CARDIOTHORACIC VASCULAR SURGERY)

## 2025-03-08 PROCEDURE — 84100 ASSAY OF PHOSPHORUS: CPT

## 2025-03-08 PROCEDURE — 250N000013 HC RX MED GY IP 250 OP 250 PS 637: Performed by: THORACIC SURGERY (CARDIOTHORACIC VASCULAR SURGERY)

## 2025-03-08 PROCEDURE — 97530 THERAPEUTIC ACTIVITIES: CPT | Mod: GO

## 2025-03-08 PROCEDURE — 36415 COLL VENOUS BLD VENIPUNCTURE: CPT

## 2025-03-08 RX ORDER — ONDANSETRON 4 MG/1
8 TABLET, FILM COATED ORAL EVERY 8 HOURS PRN
Qty: 12 TABLET | Refills: 0 | Status: SHIPPED | OUTPATIENT
Start: 2025-03-08

## 2025-03-08 RX ADMIN — PANTOPRAZOLE SODIUM 40 MG: 40 TABLET, DELAYED RELEASE ORAL at 08:39

## 2025-03-08 RX ADMIN — METRONIDAZOLE 500 MG: 500 INJECTION, SOLUTION INTRAVENOUS at 04:21

## 2025-03-08 RX ADMIN — ACETAMINOPHEN 975 MG: 325 TABLET, FILM COATED ORAL at 08:39

## 2025-03-08 RX ADMIN — SENNOSIDES AND DOCUSATE SODIUM 2 TABLET: 50; 8.6 TABLET ORAL at 08:39

## 2025-03-08 RX ADMIN — METHOCARBAMOL 500 MG: 500 TABLET ORAL at 08:49

## 2025-03-08 ASSESSMENT — ACTIVITIES OF DAILY LIVING (ADL)
ADLS_ACUITY_SCORE: 39
ADLS_ACUITY_SCORE: 40
ADLS_ACUITY_SCORE: 40
ADLS_ACUITY_SCORE: 39
ADLS_ACUITY_SCORE: 40
ADLS_ACUITY_SCORE: 39
ADLS_ACUITY_SCORE: 40
ADLS_ACUITY_SCORE: 40
ADLS_ACUITY_SCORE: 44
ADLS_ACUITY_SCORE: 40
ADLS_ACUITY_SCORE: 39
ADLS_ACUITY_SCORE: 40
ADLS_ACUITY_SCORE: 40

## 2025-03-08 NOTE — PLAN OF CARE
DISCHARGE                         No discharge date for patient encounter.  ----------------------------------------------------------------------------  Discharged to: Home  Via: private transportation  Accompanied by: Wife  Discharge Instructions: resume regular diet, activity as tolerated. Gave instructions on discharge medications, follow-up appointments, when to call the MD, and aftercare.  Prescriptions: Filled by discharge pharmacy and medication list reviewed & sent with pt.  Follow Up Appointments: arranged; information given  Belongings: All sent with pt  IV: d/c'd  Telemetry: d/c'd  Pt exhibits understanding of above discharge instructions; all questions answered.    Discharge Paperwork: sent home with patient.      Neuro: A&Ox4.   Cardiac: SR. 100% V paced.     Respiratory: Sating >92% on RA.  GI/: Adequate urine output. LBM: 3/7.  Diet/appetite: Tolerating regular diet, fair appetite.  Activity:  Independent, up to chair, bathroom, and in halls.  Pain: At acceptable level on current regimen.  Skin: No new deficits noted. Edema to lower extremities.   LDA's: R MILY w/ moderate serosanguinous drainage.    Goal Outcome Evaluation:     Plan of Care Reviewed With: patient     Overall Patient Progress: ready for discharge.     Outcome Evaluation: VSS on RA. Discharge instructions given

## 2025-03-08 NOTE — PLAN OF CARE
Neuro: A&Ox4.   Cardiac: SR. VSS. 100% V paced.    Respiratory: Sating >92% on RA, occasionally desats to 80s while sleeping, over 92% on NC while asleep.   GI/: Adequate urine output. BM X1.  Diet/appetite: Tolerating regular diet. Eating well.  Activity:  Independent, up to chair, bathroom, and in halls.  Pain: At acceptable level on current regimen.   Skin: No new deficits noted. Edema to lower extremities.   LDA's: R MILY w/ moderate drainage. R PIV saline locked & L PIV TKOd w/ intermittent abx.     Plan: Continue with POC. Notify primary team with changes. Discharge to home today, wife arriving around 11am.       Goal Outcome Evaluation:      Plan of Care Reviewed With: patient    Overall Patient Progress: no changeOverall Patient Progress: no change    Outcome Evaluation: VSS, on 2L overnight. MILY drain w/ minimal output. Dressing w/ moderate to copious drainage. Up ad abe.

## 2025-03-10 ENCOUNTER — PATIENT OUTREACH (OUTPATIENT)
Dept: SURGERY | Facility: CLINIC | Age: 75
End: 2025-03-10
Payer: MEDICARE

## 2025-03-10 ENCOUNTER — PATIENT OUTREACH (OUTPATIENT)
Dept: CARE COORDINATION | Facility: CLINIC | Age: 75
End: 2025-03-10
Payer: MEDICARE

## 2025-03-10 NOTE — OP NOTE
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)    Prevention of chemotherapy-induced neutropenia    DLBCL (diffuse large B cell lymphoma) (H)    High grade B-cell lymphoma (H)    Hypogammaglobulinemia    History of squamous cell carcinoma of skin    Tracheal cancer (H)         POST-OPERATIVE DIAGNOSIS:  1) Tracheal adenoid cystic carcinoma  Patient Active Problem List   Diagnosis    Nodular lymphoma of extranodal and/or solid organ site (H)    Unilateral inguinal hernia without obstruction or gangrene, recurrence not specified    Dyspnea on exertion    Sternal mass    History of lymphoma    SOB (shortness of breath)    Adverse effect of antineoplastic and immunosuppressive drugs, sequela    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  We were asked to evaluate for central VA ECMO to support tracheal resection.  Risks and benefits of the operation were explained to the patient and their  family including, but not limited to, bleeding, infection, stroke and even death.  They understood these risks and agreed to proceed electively.    OPERATIVE REPORT:  The patient was transferred to the operating room and positioned in left lateral decubitus position on the OR table.  General anesthesia was initiated by the anesthesia team.  Endotracheal intubation and central venous access was performed by anesthesia.  The patients neck, chest, abdomen and bilateral lower extremities were clipped, prepped and draped in sterile fashion.  A pre-procedure time-out was performed confirming the correct patient, correct site and correct procedure.    A right thoracotomy incision had been made prior to my arrival.  The patient was heparinized with 300 mg/kg IV heparin.  The ascending aorta and right atrial appendage were cannulated for VA ECMO.  Flows were initiated with good flows.  Please refer to Dr. Potts's dictation for further details of resection and reconstruction.  Once complete, the patient was weaned from ECMO with good hemodynamics and oxygenation.  The cannulas were removed and the pursestrings were tied.  Heparin was reversed with protamine.  Please refer to Dr. Potts's dictation for details regarding closure.    The patient was then transferred from the operating bed to an ICU bed and transferred to the ICU in critical, but stable, condition.    All needle, sponge and instrument counts were correct at the end of the case.    Fidel Lockhart  Cardiothoracic Surgery  Pager: [phone]

## 2025-03-10 NOTE — PROGRESS NOTES
Connected Care Resource Center: [facility]    Background: Transitional Care Management program identified per system criteria and reviewed by [facility] [place] team for possible outreach.    Assessment: Upon chart review, CCR Team member will not proceed with patient outreach related to this episode of Transitional Care Management program due to reason below:    Patient has active communication with a nurse, provider or care team for reason of post-hospital follow up plan.  Outreach call by CCR[facility] team not indicated to minimize duplicative efforts.     Plan: Transitional Care Management episode addressed appropriately per reason noted above.      Sherry Fowler  Community Health Worker  [facility]  [phone]     *Connected Care Resource Team does NOT follow patient ongoing. Referrals are identified based on internal discharge reports and the outreach is to ensure patient has an understanding of their discharge instructions.

## 2025-03-10 NOTE — TELEPHONE ENCOUNTER
Post Op Discharge Call    Surgery: Right thoracotomy, tracheal resection on [facility] ECMO, Intercostal nerve cryoablation    Surgery date: 2/28/2025    Discharge Date:  3/8/2025    Immediate Concerns: None at this time.  Verbalized that the 3 hours drive home was rough, but now that he has been home he has been feeling better.     Pain:  No concerns with pain management.  Patient reports that he stopped taking the oxycodone due to having weird dreams. States is currently taking tylenol and methocarbamol and that is controlling his pain.     Incision:   No concerns, healing well, no redness, drainage or edema reported.    Reports previous Chest tube site has healed. Reviewed steri-strip cares and healing expectations.  Reports that the bruising has started to go down.    Drains:   Drain to thumb print suction.   Kwame drain present. Patient reports that there has only been 15ml output in past 36 hours (since discharging home from hospital on Saturday.   Reports prior to today, there was a lot of draining around the drain site. Had to change the dressing twice a day. States drainage occurred if he was coughing or doing his PT exercises. Has been milking the drain tube. Reports changed dressing this morning and the site has remained clean, no drainage and no output from drain today. Inquiring about scheduling an appointment to have drain removed.     Diet:   Regular diet     Bowels:   Passing gas.   Patient reports he has had bowel movement post op.     Activity:   No difficulty with ADLs reported.   Patient is up independently at home.   Encourage patient to continue to stay active.   Up walking around and doing his PT exercises.     Post op/follow up plans:   Will schedule patient for an in-person clinic visit with Dr Sun on Thursday March 13th at 1200 for drain removal. Reviewed clinic location.    Post op appointment scheduled,confirmed date and time with patient.  Informed patient that a Chest Xray will be needed prior  to his 1-month follow up appointment with Dr Potts that is scheduled for 3/24/2025 at [facility] and someone from the scheduling department will be reaching out to her to assist with scheduling.       Future Appointments   Date Time Provider Department Center   3/24/2025  1:00 PM Darío Potts MD [facility]   5/30/2025 11:30 AM  CV DEVICE 1 UCCVCV [facility]   5/30/2025 12:20 PM UCSCCT1 CCT [facility]   5/30/2025  1:15 PM UC MASONIC LAB DRAW UCONL [facility]   5/30/2025  1:45 PM Domitila Ruelas MD [facility]   9/2/2025 12:00 AM UC ICD REMOTE CVSV [facility]     Patient with no additional questions or concerns at this time.   Patient has our direct number for any questions or concerns that may arise.    Ying Causey RN, BSN  Thoracic Surgery RN Care Coordinator

## 2025-03-11 ENCOUNTER — PATIENT OUTREACH (OUTPATIENT)
Dept: SURGERY | Facility: CLINIC | Age: 75
End: 2025-03-11
Payer: MEDICARE

## 2025-03-11 LAB
PATH REPORT.COMMENTS IMP SPEC: ABNORMAL
PATH REPORT.COMMENTS IMP SPEC: YES
PATH REPORT.FINAL DX SPEC: ABNORMAL
PATH REPORT.GROSS SPEC: ABNORMAL
PATH REPORT.INTRAOP OBS SPEC DOC: ABNORMAL
PATH REPORT.MICROSCOPIC SPEC OTHER STN: ABNORMAL
PATH REPORT.RELEVANT HX SPEC: ABNORMAL
PHOTO IMAGE: ABNORMAL

## 2025-03-11 NOTE — RESULT ENCOUNTER NOTE
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  Sites involved in this diagnosis include: trachea      Darío Potts MD

## 2025-03-11 NOTE — PROGRESS NOTES
"Shahid called and left a voicemail asking for a call back regarding drainage from chest tube site. He did not have drainage until today. He describes the fluid as a yellow clear color. This is normal and common. The color can also become a light red/pink. Shahid states there is almost a gush of fluid when he moves or is being active. I told Shahid this is normal and there will be \"gushes\" of fluid when coughing, walking, turning in bed, etc. He states he has been coughing more lately. He will keep the old chest tube site covered with gauze and has a follow up appointment with Dr. Sun on Thursday 3/13. He will call with any other questions or concerns.  "

## 2025-03-12 DIAGNOSIS — C33 TRACHEAL CANCER (H): Primary | ICD-10-CM

## 2025-03-13 ENCOUNTER — ONCOLOGY VISIT (OUTPATIENT)
Dept: SURGERY | Facility: CLINIC | Age: 75
End: 2025-03-13
Attending: STUDENT IN AN ORGANIZED HEALTH CARE EDUCATION/TRAINING PROGRAM
Payer: MEDICARE

## 2025-03-13 VITALS
HEART RATE: 105 BPM | SYSTOLIC BLOOD PRESSURE: 108 MMHG | RESPIRATION RATE: 16 BRPM | TEMPERATURE: 98.1 F | OXYGEN SATURATION: 97 % | BODY MASS INDEX: 23.16 KG/M2 | WEIGHT: 147.9 LBS | DIASTOLIC BLOOD PRESSURE: 79 MMHG

## 2025-03-13 DIAGNOSIS — C33 ADENOID CYSTIC CARCINOMA OF TRACHEA (H): Primary | ICD-10-CM

## 2025-03-13 PROCEDURE — G0463 HOSPITAL OUTPT CLINIC VISIT: HCPCS | Performed by: STUDENT IN AN ORGANIZED HEALTH CARE EDUCATION/TRAINING PROGRAM

## 2025-03-13 ASSESSMENT — PAIN SCALES - GENERAL: PAINLEVEL_OUTOF10: MODERATE PAIN (5)

## 2025-03-13 NOTE — NURSING NOTE
"Oncology Rooming Note    March 13, 2025 11:50 AM   Shahid Davis is a 74 year old male who presents for:    Chief Complaint   Patient presents with    Oncology Clinic Visit     Nodular lymphoma of extranodal and/or solid organ site, unstaged     Initial Vitals: /79 (BP Location: Right arm, Patient Position: Sitting, Cuff Size: Adult Regular)   Pulse 105   Temp 98.1  F (36.7  C) (Oral)   Resp 16   Wt 67.1 kg (147 lb 14.4 oz)   SpO2 97%   BMI 23.16 kg/m   Estimated body mass index is 23.16 kg/m  as calculated from the following:    Height as of 2/28/25: 1.702 m (5' 7\").    Weight as of this encounter: 67.1 kg (147 lb 14.4 oz). Body surface area is 1.78 meters squared.  Moderate Pain (5) Comment: Data Unavailable   No LMP for male patient.  Allergies reviewed: Yes  Medications reviewed: Yes    Medications: Medication refills not needed today.  Pharmacy name entered into [employer]:    [id number] [place] [facility] [address] [id number] [address] AT [facility] [address]    Frailty Screening:   Is the patient here for a new oncology consult visit in cancer care? 2. No      Clinical concerns: Pt reports no new concerns today.       Nettie Souza, EMT     "

## 2025-03-13 NOTE — LETTER
3/13/2025      Shahid Davis   [facility]  [place] [address]      Dear Colleague,    Thank you for referring your patient, Shahid Davis, to the [facility]. Please see a copy of my visit note below.    THORACIC SURGERY FOLLOW UP VISIT     Dear Haleigh Brice,    I saw Shahid Davis in follow-up today. The clinical summary follows:      PREOP DIAGNOSIS   Adenoid cystic carcinoma of the trachea     PROCEDURE   Right thoracotomy with tracheal resection on VA ECMO     DATE OF PROCEDURE  2/28/2025     HISTOPATHOLOGY   Adenoid cystic carcinoma, positive at proximal margin despite multiple re-excisions     COMPLICATIONS  None      /79 (BP Location: Right arm, Patient Position: Sitting, Cuff Size: Adult Regular)   Pulse 105   Temp 98.1  F (36.7  C) (Oral)   Resp 16   Wt 67.1 kg (147 lb 14.4 oz)   SpO2 97%   BMI 23.16 kg/m      Physical Exam:    Gen- alert and oriented x3, NAD  HEENT- NC/AT, EOMI  Cardiac- RRR  Pulm- normal respiratory effort  Chest - MILY drain in place, incision healing well  Abd- soft, NT/ND, no rebound or guarding  - deferred  Extremities- no peripheral edema  Neuro- gross motor intact  Skin- no obvious rashes, bruises, or cuts      SUBJECTIVE   Shahid Davis is doing well, MILY drain volume very low. Able to get up and about more now that he is at home.     IMPRESSION   No diagnosis found.    Shahid Davis is a 74 year old man s/p right thoracotomy with tracheal resection on VA ecmo on 2/28 for adenoid cystic carcinoma of the trachea.     PLAN  -MILY drain removed in clinic  - Keep follow up with Dr. Potts in clinic to discuss further treatment options      All questions were answered and Shahid Davis and present family were in agreement with the plan.  I appreciate the opportunity to participate in the care of your patient and will keep you updated.        Again, thank you for allowing me to participate in the care of your patient.         Sincerely,        Nataliya Sun MD    Electronically signed

## 2025-03-13 NOTE — PROGRESS NOTES
THORACIC SURGERY FOLLOW UP VISIT     Dear Haleigh Brice,    I saw Shahid Davis in follow-up today. The clinical summary follows:      PREOP DIAGNOSIS   Adenoid cystic carcinoma of the trachea     PROCEDURE   Right thoracotomy with tracheal resection on [facility] ECMO     DATE OF PROCEDURE  2/28/2025     HISTOPATHOLOGY   Adenoid cystic carcinoma, positive at proximal margin despite multiple re-excisions     COMPLICATIONS  None      /79 (BP Location: Right arm, Patient Position: Sitting, Cuff Size: Adult Regular)   Pulse 105   Temp 98.1  F (36.7  C) (Oral)   Resp 16   Wt 67.1 kg (147 lb 14.4 oz)   SpO2 97%   BMI 23.16 kg/m      Physical Exam:    Gen- alert and oriented x3, NAD  HEENT- NC/AT, EOMI  Cardiac- RRR  Pulm- normal respiratory effort  Chest - MILY drain in place, incision healing well  Abd- soft, NT/ND, no rebound or guarding  - deferred  Extremities- no peripheral edema  Neuro- gross motor intact  Skin- no obvious rashes, bruises, or cuts      SUBJECTIVE   Shahid Davis is doing well, MILY drain volume very low. Able to get up and about more now that he is at home.     IMPRESSION   No diagnosis found.    Shahid Davis is a 74 year old man s/p right thoracotomy with tracheal resection on VA ecmo on 2/28 for adenoid cystic carcinoma of the trachea.     PLAN  -MILY drain removed in clinic  - Keep follow up with Dr. Potts in clinic to discuss further treatment options      All questions were answered and Shahid Davis and present family were in agreement with the plan.  I appreciate the opportunity to participate in the care of your patient and will keep you updated.

## 2025-03-18 ENCOUNTER — TUMOR CONFERENCE (OUTPATIENT)
Dept: ONCOLOGY | Facility: CLINIC | Age: 75
End: 2025-03-18
Payer: MEDICARE

## 2025-03-18 ENCOUNTER — TUMOR CONFERENCE (OUTPATIENT)
Dept: SURGERY | Facility: CLINIC | Age: 75
End: 2025-03-18

## 2025-03-18 ENCOUNTER — ANCILLARY PROCEDURE (OUTPATIENT)
Dept: CARDIOLOGY | Facility: CLINIC | Age: 75
End: 2025-03-18
Attending: INTERNAL MEDICINE
Payer: MEDICARE

## 2025-03-18 DIAGNOSIS — Z95.0 PACEMAKER: ICD-10-CM

## 2025-03-18 DIAGNOSIS — C33 ADENOID CYSTIC CARCINOMA OF TRACHEA (H): Primary | ICD-10-CM

## 2025-03-18 PROCEDURE — 99207 CARDIAC DEVICE CHECK - REMOTE: CPT | Performed by: INTERNAL MEDICINE

## 2025-03-18 NOTE — TUMOR CONFERENCE
Thoracic Tumor Conference      Patient Name: Shahid Davis    Reason for conference discussion (brief overview): 74 yr old male, never smoker, w/ a newly diagnosed adenoid cystic carcinoma of the distal trachea. This was incidentally found on a surveillance scan for high-grade B-cell lymphoma (status post chemotherapy December 2022). He has a hx of complete heart block in 2020, s/p permanent pacemaker implantation and follows Dr. Fox. Pt had a right thoracotomy , tracheal resection, on [facility] ECMO on 2/28. He did not have negative margins due to submucosal spread.        Specific Question:  RT +/- chemo?    Pertinent Histology:    Path (2/28/25): A. LYMPH NODES, 9R, EXCISION:  -Three benign lymph nodes (0/3).  -Hyalinized granuloma present in one lymph node.     B.  TRACHEA, EXCISION:  -ADENOID CYSTIC CARCINOMA, low-grade 1, 0.8 cm in linear extent in this part.  -Tumor extends to both proximal and distal margins in this part.  -Perineural invasion is present.  -No lymphovascular invasion identified.  -See comment.     C.  TRACHEA, DISTAL MARGIN, EXCISION:  -ADENOID CYSTIC CARCINOMA, low-grade 1, 1.1 cm in linear extent in this part.  -Tumor extends to both proximal and distal margins in this part.  -Perineural invasion is present.  -No lymphovascular invasion identified.  -See comment.     D. TRACHEA, NEW PROXIMAL MARGIN, EXCISION:  -POSITIVE FOR ADENOID CYSTIC CARCINOMA.     E, TRACHEA, NEW DISTAL MARGIN, EXCISION:  -POSITIVE FOR ADENOID CYSTIC CARCINOMA.     F. TRACHEA, FINAL PROXIMAL RIGHT CARTILAGE CORNER,  EXCISION:  -POSITIVE FOR ADENOID CYSTIC CARCINOMA, at 1 mm from distal corner.     G. TRACHEA, FINAL PROXIMAL TRACHEAL MARGIN, EXCISION:  -POSITIVE FOR ADENOID CYSTIC CARCINOMA extending to proximal margin.  -Distal margin is negative for tumor.  Path (12/16/24): adenoid cystic carcinoma with positive CK7 and p40   Path (2022): high-grade B-cell lymphoma     Referring Physician: Dr. Darío Potts    The  patient's case was presented at the multidisciplinary conference for the above noted reason.  There was a consensus recommendation for the following actions:     Shahid should have radiation, it is more indicated now due to not having negative margins. Radiation oncology stated they would prefer to use proton radiation. There were differing opinions on using chemotherapy, if chemo was used Cisplation weekly is recommended. Patient may want to go to [place] for treatment due to living 2+ hours away from [facility] in [place].          Case Lead:  Connie Ulrich LPN    Interventional Radiology Staff Present: N/A          Documentation / Disclaimer Cancer Tumor Board Note  Cancer tumor board recommendations do not override what is determined to be reasonable care and treatment, which is dependent on the circumstances of a patient's case; the patient's medical, social, and personal concerns; and the clinical judgment of the oncologist [physician].

## 2025-03-19 LAB
MDC_IDC_EPISODE_DTM: NORMAL
MDC_IDC_EPISODE_DURATION: 10 S
MDC_IDC_EPISODE_DURATION: 12 S
MDC_IDC_EPISODE_DURATION: 12 S
MDC_IDC_EPISODE_DURATION: 14 S
MDC_IDC_EPISODE_DURATION: 18 S
MDC_IDC_EPISODE_DURATION: 258 S
MDC_IDC_EPISODE_DURATION: 264 S
MDC_IDC_EPISODE_DURATION: 332 S
MDC_IDC_EPISODE_DURATION: 344 S
MDC_IDC_EPISODE_DURATION: 378 S
MDC_IDC_EPISODE_DURATION: 388 S
MDC_IDC_EPISODE_DURATION: 4 S
MDC_IDC_EPISODE_DURATION: 4 S
MDC_IDC_EPISODE_DURATION: 420 S
MDC_IDC_EPISODE_DURATION: 432 S
MDC_IDC_EPISODE_DURATION: 450 S
MDC_IDC_EPISODE_DURATION: 462 S
MDC_IDC_EPISODE_DURATION: 486 S
MDC_IDC_EPISODE_DURATION: 532 S
MDC_IDC_EPISODE_DURATION: 544 S
MDC_IDC_EPISODE_DURATION: 570 S
MDC_IDC_EPISODE_DURATION: 576 S
MDC_IDC_EPISODE_DURATION: 578 S
MDC_IDC_EPISODE_DURATION: 586 S
MDC_IDC_EPISODE_DURATION: 5924 S
MDC_IDC_EPISODE_DURATION: 6 S
MDC_IDC_EPISODE_DURATION: 6112 S
MDC_IDC_EPISODE_DURATION: 668 S
MDC_IDC_EPISODE_DURATION: 724 S
MDC_IDC_EPISODE_DURATION: 758 S
MDC_IDC_EPISODE_DURATION: 788 S
MDC_IDC_EPISODE_DURATION: NORMAL S
MDC_IDC_EPISODE_ID: NORMAL
MDC_IDC_EPISODE_TYPE: NORMAL
MDC_IDC_LEAD_CONNECTION_STATUS: NORMAL
MDC_IDC_LEAD_CONNECTION_STATUS: NORMAL
MDC_IDC_LEAD_IMPLANT_DT: NORMAL
MDC_IDC_LEAD_IMPLANT_DT: NORMAL
MDC_IDC_LEAD_LOCATION: NORMAL
MDC_IDC_LEAD_LOCATION: NORMAL
MDC_IDC_LEAD_LOCATION_DETAIL_1: NORMAL
MDC_IDC_LEAD_LOCATION_DETAIL_1: NORMAL
MDC_IDC_LEAD_MFG: NORMAL
MDC_IDC_LEAD_MFG: NORMAL
MDC_IDC_LEAD_MODEL: NORMAL
MDC_IDC_LEAD_MODEL: NORMAL
MDC_IDC_LEAD_POLARITY_TYPE: NORMAL
MDC_IDC_LEAD_POLARITY_TYPE: NORMAL
MDC_IDC_LEAD_SERIAL: NORMAL
MDC_IDC_LEAD_SERIAL: NORMAL
MDC_IDC_MSMT_BATTERY_DTM: NORMAL
MDC_IDC_MSMT_BATTERY_REMAINING_LONGEVITY: 24 MO
MDC_IDC_MSMT_BATTERY_REMAINING_PERCENTAGE: 41 %
MDC_IDC_MSMT_BATTERY_RRT_TRIGGER: NORMAL
MDC_IDC_MSMT_BATTERY_STATUS: NORMAL
MDC_IDC_MSMT_BATTERY_VOLTAGE: 2.98 V
MDC_IDC_MSMT_LEADCHNL_RA_IMPEDANCE_VALUE: 330 OHM
MDC_IDC_MSMT_LEADCHNL_RA_LEAD_CHANNEL_STATUS: NORMAL
MDC_IDC_MSMT_LEADCHNL_RA_PACING_THRESHOLD_AMPLITUDE: 1.75 V
MDC_IDC_MSMT_LEADCHNL_RA_PACING_THRESHOLD_PULSEWIDTH: 1 MS
MDC_IDC_MSMT_LEADCHNL_RA_SENSING_INTR_AMPL: 1.2 MV
MDC_IDC_MSMT_LEADCHNL_RV_IMPEDANCE_VALUE: 360 OHM
MDC_IDC_MSMT_LEADCHNL_RV_LEAD_CHANNEL_STATUS: NORMAL
MDC_IDC_MSMT_LEADCHNL_RV_PACING_THRESHOLD_AMPLITUDE: 0.75 V
MDC_IDC_MSMT_LEADCHNL_RV_PACING_THRESHOLD_PULSEWIDTH: 0.4 MS
MDC_IDC_MSMT_LEADCHNL_RV_SENSING_INTR_AMPL: 12 MV
MDC_IDC_PG_IMPLANT_DTM: NORMAL
MDC_IDC_PG_MFG: NORMAL
MDC_IDC_PG_MODEL: NORMAL
MDC_IDC_PG_SERIAL: NORMAL
MDC_IDC_PG_TYPE: NORMAL
MDC_IDC_SESS_CLINIC_NAME: NORMAL
MDC_IDC_SESS_DTM: NORMAL
MDC_IDC_SESS_REPROGRAMMED: NO
MDC_IDC_SESS_TYPE: NORMAL
MDC_IDC_SET_BRADY_AT_MODE_SWITCH_MODE: NORMAL
MDC_IDC_SET_BRADY_AT_MODE_SWITCH_RATE: 160 {BEATS}/MIN
MDC_IDC_SET_BRADY_LOWRATE: 80 {BEATS}/MIN
MDC_IDC_SET_BRADY_MAX_TRACKING_RATE: 120 {BEATS}/MIN
MDC_IDC_SET_BRADY_MODE: NORMAL
MDC_IDC_SET_BRADY_PAV_DELAY_LOW: 200 MS
MDC_IDC_SET_BRADY_SAV_DELAY_LOW: 200 MS
MDC_IDC_SET_LEADCHNL_RA_PACING_AMPLITUDE: 3.5 V
MDC_IDC_SET_LEADCHNL_RA_PACING_ANODE_ELECTRODE_1: NORMAL
MDC_IDC_SET_LEADCHNL_RA_PACING_ANODE_LOCATION_1: NORMAL
MDC_IDC_SET_LEADCHNL_RA_PACING_CAPTURE_MODE: NORMAL
MDC_IDC_SET_LEADCHNL_RA_PACING_CATHODE_ELECTRODE_1: NORMAL
MDC_IDC_SET_LEADCHNL_RA_PACING_CATHODE_LOCATION_1: NORMAL
MDC_IDC_SET_LEADCHNL_RA_PACING_POLARITY: NORMAL
MDC_IDC_SET_LEADCHNL_RA_PACING_PULSEWIDTH: 1 MS
MDC_IDC_SET_LEADCHNL_RA_SENSING_ADAPTATION_MODE: NORMAL
MDC_IDC_SET_LEADCHNL_RA_SENSING_ANODE_ELECTRODE_1: NORMAL
MDC_IDC_SET_LEADCHNL_RA_SENSING_ANODE_LOCATION_1: NORMAL
MDC_IDC_SET_LEADCHNL_RA_SENSING_CATHODE_ELECTRODE_1: NORMAL
MDC_IDC_SET_LEADCHNL_RA_SENSING_CATHODE_LOCATION_1: NORMAL
MDC_IDC_SET_LEADCHNL_RA_SENSING_POLARITY: NORMAL
MDC_IDC_SET_LEADCHNL_RA_SENSING_SENSITIVITY: 0.75 MV
MDC_IDC_SET_LEADCHNL_RV_PACING_AMPLITUDE: 2.5 V
MDC_IDC_SET_LEADCHNL_RV_PACING_ANODE_ELECTRODE_1: NORMAL
MDC_IDC_SET_LEADCHNL_RV_PACING_ANODE_LOCATION_1: NORMAL
MDC_IDC_SET_LEADCHNL_RV_PACING_CAPTURE_MODE: NORMAL
MDC_IDC_SET_LEADCHNL_RV_PACING_CATHODE_ELECTRODE_1: NORMAL
MDC_IDC_SET_LEADCHNL_RV_PACING_CATHODE_LOCATION_1: NORMAL
MDC_IDC_SET_LEADCHNL_RV_PACING_POLARITY: NORMAL
MDC_IDC_SET_LEADCHNL_RV_PACING_PULSEWIDTH: 0.4 MS
MDC_IDC_SET_LEADCHNL_RV_SENSING_ADAPTATION_MODE: NORMAL
MDC_IDC_SET_LEADCHNL_RV_SENSING_ANODE_ELECTRODE_1: NORMAL
MDC_IDC_SET_LEADCHNL_RV_SENSING_ANODE_LOCATION_1: NORMAL
MDC_IDC_SET_LEADCHNL_RV_SENSING_CATHODE_ELECTRODE_1: NORMAL
MDC_IDC_SET_LEADCHNL_RV_SENSING_CATHODE_LOCATION_1: NORMAL
MDC_IDC_SET_LEADCHNL_RV_SENSING_POLARITY: NORMAL
MDC_IDC_SET_LEADCHNL_RV_SENSING_SENSITIVITY: 2.5 MV
MDC_IDC_STAT_AT_BURDEN_PERCENT: 4 %
MDC_IDC_STAT_AT_DTM_END: NORMAL
MDC_IDC_STAT_AT_DTM_START: NORMAL
MDC_IDC_STAT_AT_MODE_SW_COUNT: 33
MDC_IDC_STAT_AT_MODE_SW_COUNT_PER_DAY: 2
MDC_IDC_STAT_AT_MODE_SW_MAX_DURATION: NORMAL S
MDC_IDC_STAT_AT_MODE_SW_PERCENT_TIME: 3 %
MDC_IDC_STAT_BRADY_AP_VP_PERCENT: 9.9 %
MDC_IDC_STAT_BRADY_AP_VS_PERCENT: 1 %
MDC_IDC_STAT_BRADY_AS_VP_PERCENT: 89 %
MDC_IDC_STAT_BRADY_AS_VS_PERCENT: 1 %
MDC_IDC_STAT_BRADY_DTM_END: NORMAL
MDC_IDC_STAT_BRADY_DTM_START: NORMAL
MDC_IDC_STAT_BRADY_RA_PERCENT_PACED: 9.7 %
MDC_IDC_STAT_BRADY_RV_PERCENT_PACED: 99 %
MDC_IDC_STAT_CRT_DTM_END: NORMAL
MDC_IDC_STAT_CRT_DTM_START: NORMAL
MDC_IDC_STAT_HEART_RATE_ATRIAL_MAX: 330 {BEATS}/MIN
MDC_IDC_STAT_HEART_RATE_ATRIAL_MEAN: 106 {BEATS}/MIN
MDC_IDC_STAT_HEART_RATE_ATRIAL_MIN: 50 {BEATS}/MIN
MDC_IDC_STAT_HEART_RATE_DTM_END: NORMAL
MDC_IDC_STAT_HEART_RATE_DTM_START: NORMAL
MDC_IDC_STAT_HEART_RATE_VENTRICULAR_MAX: 220 {BEATS}/MIN
MDC_IDC_STAT_HEART_RATE_VENTRICULAR_MEAN: 97 {BEATS}/MIN
MDC_IDC_STAT_HEART_RATE_VENTRICULAR_MIN: 60 {BEATS}/MIN

## 2025-03-20 ENCOUNTER — PATIENT OUTREACH (OUTPATIENT)
Dept: SURGERY | Facility: CLINIC | Age: 75
End: 2025-03-20
Payer: MEDICARE

## 2025-03-20 NOTE — PROGRESS NOTES
I received a voicemail from Shahid asking what he should do regarding a cold or virus he has been dealing with. Shahid states he has had a runny nose, cold symptoms- coughing, wheezing, no taste, and no fever. He has had these symptoms for over a week and feels he is getting worse. Shahid has also felt woozy/dizzy. He usually gets a stuffy nose during the winter months, he had long haul covid for 6 months in 2023. He has not had any energy, gets out of bed and can't sit in a chair for more than 15 minutes at a time. He is trying to stay hydrated and has taken Tylenol and ibuprofen for pain from surgery on 2/28. He has not taken any Oxycodone since he discharged from the hospital. He took a home covid test a few hours ago and it was negative. I advised he go to urgent care to be evaluated. [place] 45 [place] to get to [place] where the closest urgent care is. He's not sure if he will be able to get out of his driveway today and will try to get to  tomorrow.    He would like to cancel the cxr and appointment with Dr. Potts on Monday as he doesn't think he will be able to make the 8 hr round trip. I have a spot held on 3/31 at 11:15am with [facility] Aguero as Dr. Potts will be out starting 3/26. Patient is aware of the appointment and will wait for a call to reschedule the cxr.

## 2025-03-21 NOTE — PROGRESS NOTES
Called Shahid to let him know if his PCP is closer than urgent care he should see his PCP. I was also following up to let him know that I was able to reschedule the cxr to 10:40am on 3/31 prior to his clinic appointment with HORTENC[facility] Aguero. Left my call back information.

## 2025-03-22 ENCOUNTER — APPOINTMENT (OUTPATIENT)
Dept: GENERAL RADIOLOGY | Facility: CLINIC | Age: 75
DRG: 187 | End: 2025-03-22
Attending: EMERGENCY MEDICINE
Payer: MEDICARE

## 2025-03-22 ENCOUNTER — HOSPITAL ENCOUNTER (INPATIENT)
Facility: CLINIC | Age: 75
LOS: 3 days | Discharge: HOME | DRG: 187 | End: 2025-03-25
Attending: EMERGENCY MEDICINE | Admitting: STUDENT IN AN ORGANIZED HEALTH CARE EDUCATION/TRAINING PROGRAM
Payer: MEDICARE

## 2025-03-22 ENCOUNTER — APPOINTMENT (OUTPATIENT)
Dept: GENERAL RADIOLOGY | Facility: CLINIC | Age: 75
DRG: 187 | End: 2025-03-22
Attending: SURGERY
Payer: MEDICARE

## 2025-03-22 DIAGNOSIS — J90 PLEURAL EFFUSION ON RIGHT: ICD-10-CM

## 2025-03-22 DIAGNOSIS — C33 TRACHEAL CANCER (H): ICD-10-CM

## 2025-03-22 DIAGNOSIS — I48.92 NEW ONSET ATRIAL FLUTTER (H): ICD-10-CM

## 2025-03-22 LAB
ANION GAP SERPL CALCULATED.3IONS-SCNC: 13 MMOL/L (ref 7–15)
ATRIAL RATE - MUSE: 264 BPM
BASOPHILS # BLD AUTO: 0 10E3/UL (ref 0–0.2)
BASOPHILS NFR BLD AUTO: 0 %
BUN SERPL-MCNC: 26.1 MG/DL (ref 8–23)
BURR CELLS BLD QL SMEAR: SLIGHT
CALCIUM SERPL-MCNC: 9.1 MG/DL (ref 8.8–10.4)
CHLORIDE SERPL-SCNC: 102 MMOL/L (ref 98–107)
CREAT SERPL-MCNC: 1.04 MG/DL (ref 0.67–1.17)
DIASTOLIC BLOOD PRESSURE - MUSE: NORMAL MMHG
EGFRCR SERPLBLD CKD-EPI 2021: 75 ML/MIN/1.73M2
EOSINOPHIL # BLD AUTO: 0 10E3/UL (ref 0–0.7)
EOSINOPHIL NFR BLD AUTO: 0 %
ERYTHROCYTE [DISTWIDTH] IN BLOOD BY AUTOMATED COUNT: 13.5 % (ref 10–15)
GLUCOSE SERPL-MCNC: 111 MG/DL (ref 70–99)
HCO3 SERPL-SCNC: 21 MMOL/L (ref 22–29)
HCT VFR BLD AUTO: 31.5 % (ref 40–53)
HGB BLD-MCNC: 9.8 G/DL (ref 13.3–17.7)
IMM GRANULOCYTES # BLD: 0.1 10E3/UL
IMM GRANULOCYTES NFR BLD: 1 %
INR PPP: 1.1 (ref 0.85–1.15)
INTERPRETATION ECG - MUSE: NORMAL
LYMPHOCYTES # BLD AUTO: 0.6 10E3/UL (ref 0.8–5.3)
LYMPHOCYTES NFR BLD AUTO: 4 %
MCH RBC QN AUTO: 29.2 PG (ref 26.5–33)
MCHC RBC AUTO-ENTMCNC: 31.1 G/DL (ref 31.5–36.5)
MCV RBC AUTO: 94 FL (ref 78–100)
MONOCYTES # BLD AUTO: 1.9 10E3/UL (ref 0–1.3)
MONOCYTES NFR BLD AUTO: 12 %
NEUTROPHILS # BLD AUTO: 12.7 10E3/UL (ref 1.6–8.3)
NEUTROPHILS NFR BLD AUTO: 83 %
NRBC # BLD AUTO: 0 10E3/UL
NRBC BLD AUTO-RTO: 0 /100
NT-PROBNP SERPL-MCNC: 4129 PG/ML (ref 0–900)
P AXIS - MUSE: NORMAL DEGREES
PLAT MORPH BLD: ABNORMAL
PLATELET # BLD AUTO: 372 10E3/UL (ref 150–450)
POLYCHROMASIA BLD QL SMEAR: SLIGHT
POTASSIUM SERPL-SCNC: 4.8 MMOL/L (ref 3.4–5.3)
PR INTERVAL - MUSE: NORMAL MS
QRS DURATION - MUSE: 140 MS
QT - MUSE: 440 MS
QTC - MUSE: 507 MS
R AXIS - MUSE: -39 DEGREES
RBC # BLD AUTO: 3.36 10E6/UL (ref 4.4–5.9)
RBC MORPH BLD: ABNORMAL
SODIUM SERPL-SCNC: 136 MMOL/L (ref 135–145)
SYSTOLIC BLOOD PRESSURE - MUSE: NORMAL MMHG
T AXIS - MUSE: 142 DEGREES
TARGETS BLD QL SMEAR: SLIGHT
TROPONIN T SERPL HS-MCNC: 68 NG/L
TROPONIN T SERPL HS-MCNC: 78 NG/L
TSH SERPL DL<=0.005 MIU/L-ACNC: 1.79 UIU/ML (ref 0.3–4.2)
VENTRICULAR RATE- MUSE: 80 BPM
WBC # BLD AUTO: 15.2 10E3/UL (ref 4–11)

## 2025-03-22 PROCEDURE — 99285 EMERGENCY DEPT VISIT HI MDM: CPT | Mod: 25 | Performed by: EMERGENCY MEDICINE

## 2025-03-22 PROCEDURE — 93010 ELECTROCARDIOGRAM REPORT: CPT | Mod: 76 | Performed by: EMERGENCY MEDICINE

## 2025-03-22 PROCEDURE — 84443 ASSAY THYROID STIM HORMONE: CPT | Performed by: EMERGENCY MEDICINE

## 2025-03-22 PROCEDURE — 83880 ASSAY OF NATRIURETIC PEPTIDE: CPT | Performed by: EMERGENCY MEDICINE

## 2025-03-22 PROCEDURE — 120N000002 HC R&B MED SURG/OB UMMC

## 2025-03-22 PROCEDURE — 999N000065 XR CHEST PORT 1 VIEW

## 2025-03-22 PROCEDURE — 36415 COLL VENOUS BLD VENIPUNCTURE: CPT | Performed by: EMERGENCY MEDICINE

## 2025-03-22 PROCEDURE — 93005 ELECTROCARDIOGRAM TRACING: CPT | Performed by: EMERGENCY MEDICINE

## 2025-03-22 PROCEDURE — 250N000013 HC RX MED GY IP 250 OP 250 PS 637

## 2025-03-22 PROCEDURE — 80048 BASIC METABOLIC PNL TOTAL CA: CPT | Performed by: EMERGENCY MEDICINE

## 2025-03-22 PROCEDURE — 99285 EMERGENCY DEPT VISIT HI MDM: CPT | Performed by: EMERGENCY MEDICINE

## 2025-03-22 PROCEDURE — 85025 COMPLETE CBC W/AUTO DIFF WBC: CPT | Performed by: EMERGENCY MEDICINE

## 2025-03-22 PROCEDURE — 71046 X-RAY EXAM CHEST 2 VIEWS: CPT

## 2025-03-22 PROCEDURE — 84484 ASSAY OF TROPONIN QUANT: CPT | Performed by: EMERGENCY MEDICINE

## 2025-03-22 PROCEDURE — 0W9930Z DRAINAGE OF RIGHT PLEURAL CAVITY WITH DRAINAGE DEVICE, PERCUTANEOUS APPROACH: ICD-10-PCS | Performed by: STUDENT IN AN ORGANIZED HEALTH CARE EDUCATION/TRAINING PROGRAM

## 2025-03-22 PROCEDURE — 85610 PROTHROMBIN TIME: CPT | Performed by: EMERGENCY MEDICINE

## 2025-03-22 PROCEDURE — 71046 X-RAY EXAM CHEST 2 VIEWS: CPT | Mod: 26 | Performed by: STUDENT IN AN ORGANIZED HEALTH CARE EDUCATION/TRAINING PROGRAM

## 2025-03-22 PROCEDURE — 71045 X-RAY EXAM CHEST 1 VIEW: CPT | Mod: 26 | Performed by: RADIOLOGY

## 2025-03-22 RX ORDER — ACETAMINOPHEN 650 MG/1
650 SUPPOSITORY RECTAL EVERY 4 HOURS PRN
Status: DISCONTINUED | OUTPATIENT
Start: 2025-03-22 | End: 2025-03-25 | Stop reason: HOSPADM

## 2025-03-22 RX ORDER — AMOXICILLIN 250 MG
1 CAPSULE ORAL 2 TIMES DAILY PRN
Status: DISCONTINUED | OUTPATIENT
Start: 2025-03-22 | End: 2025-03-25 | Stop reason: HOSPADM

## 2025-03-22 RX ORDER — AMOXICILLIN 250 MG
2 CAPSULE ORAL 2 TIMES DAILY PRN
Status: DISCONTINUED | OUTPATIENT
Start: 2025-03-22 | End: 2025-03-25 | Stop reason: HOSPADM

## 2025-03-22 RX ORDER — LIDOCAINE 40 MG/G
CREAM TOPICAL
Status: DISCONTINUED | OUTPATIENT
Start: 2025-03-22 | End: 2025-03-25 | Stop reason: HOSPADM

## 2025-03-22 RX ORDER — LIDOCAINE HYDROCHLORIDE 10 MG/ML
20 INJECTION, SOLUTION EPIDURAL; INFILTRATION; INTRACAUDAL; PERINEURAL ONCE
Status: DISCONTINUED | OUTPATIENT
Start: 2025-03-22 | End: 2025-03-25 | Stop reason: HOSPADM

## 2025-03-22 RX ORDER — ACETAMINOPHEN 325 MG/1
650 TABLET ORAL EVERY 4 HOURS PRN
Status: DISCONTINUED | OUTPATIENT
Start: 2025-03-22 | End: 2025-03-25 | Stop reason: HOSPADM

## 2025-03-22 RX ADMIN — ACETAMINOPHEN 650 MG: 325 TABLET, FILM COATED ORAL at 20:52

## 2025-03-22 ASSESSMENT — ACTIVITIES OF DAILY LIVING (ADL)
ADLS_ACUITY_SCORE: 56

## 2025-03-22 ASSESSMENT — COLUMBIA-SUICIDE SEVERITY RATING SCALE - C-SSRS
2. HAVE YOU ACTUALLY HAD ANY THOUGHTS OF KILLING YOURSELF IN THE PAST MONTH?: NO
6. HAVE YOU EVER DONE ANYTHING, STARTED TO DO ANYTHING, OR PREPARED TO DO ANYTHING TO END YOUR LIFE?: NO
1. IN THE PAST MONTH, HAVE YOU WISHED YOU WERE DEAD OR WISHED YOU COULD GO TO SLEEP AND NOT WAKE UP?: NO

## 2025-03-22 NOTE — PROGRESS NOTES
RN assessed chest tube. No air leaks. Closed water seal system. Drainage appears serosanguineous. Total output as of 1800 is 1200 ml. Pt is VS

## 2025-03-22 NOTE — ED PROVIDER NOTES
[facility] EMERGENCY DEPARTMENT ([facility])    3/22/25       ED PROVIDER NOTE    History     Chief Complaint   Patient presents with    Post-op Problem    Shortness of Breath     The history is provided by the patient and medical records.     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  Patient reports he was discharged following his surgery on 3/8.  Approximately a week later he began developing cold type symptoms with cough, fatigue, and shortness of breath.  He was evaluated at an ED in [place] last night where an XR chest and CT chest PE study revealed a pleural effusion.  He was started on 5-day course of furosemide.  Imaging was negative for pneumonia or PE. COVID and flu swabs were also done which were negative.  He discussed with one of his providers on his care team who suggested he come to this ED for further evaluation.  Here in the ED he endorses continuing shortness of breath.  He denies any fevers.  Of note, his pacemaker was interrogated remotely 4 days ago for an episode of A-fib that lasted about 8 hours.  Patient was unaware of this, had no associated symptoms.  He denies any history of A-fib.  He is not anticoagulated.    Past Medical History  Past Medical History:   Diagnosis Date    Cardiac pacemaker in situ     2020    GERD (gastroesophageal reflux disease)     Lymphoma (H)     Pyloric stenosis, congenital (H)     Squamous cell carcinoma     Tracheal cancer (H)      Past Surgical History:   Procedure Laterality Date    BRONCHOSCOPY FLEXIBLE N/A 12/16/2024    Procedure: BRONCHOSCOPY, FLEXIBLE, endobronchial biopsy and tumor debulking;  Surgeon: Gerald Long MD;  Location: [facility] OR    BRONCHOSCOPY FLEXIBLE AND RIGID  N/A 2/28/2025    Procedure: Bronchoscopy flexible;  Surgeon: Darío Potts MD;  Location: [facility] OR    DAVINCI HERNIORRHAPHY INGUINAL Left 07/09/2021    Procedure: HERNIORRHAPHY, INGUINAL, ROBOT-ASSISTED, Left, Mesh;  Surgeon: Shamar Tyler MD;  Location: [facility] OR    INSERT EXTRACORPORAL MEMBRANE OXYGENATOR  2/28/2025    Procedure: Insert Venous/Arterial extracorporal membrane oxygenator and Removal;  Surgeon: Fidel Lockhart MD;  Location: [facility] OR    IR CHEST PORT PLACEMENT > 5 YRS OF AGE  01/05/2023    IR PORT REMOVAL RIGHT  12/15/2023    IR SOFT TISSUE BIOPSY  12/15/2022    MOHS MICROGRAPHIC PROCEDURE      REMOVE PORT VASCULAR ACCESS Right 12/15/2023    Procedure: Remove port vascular access right;  Surgeon: Matthew Harrison PA-C;  Location: [facility] OR    Repair pyloric stenosis      ~ six months of age    RESECT TRACHEA WITH RECONSTRUCTION Right 2/28/2025    Procedure: Tracheal Resection and Reconstruction with Cryoablation;  Surgeon: Darío Potts MD;  Location: [facility] OR    THORACOTOMY Right 2/28/2025    Procedure: Right Thoracotomy;  Surgeon: Darío Potts MD;  Location: [facility] OR     acetaminophen (TYLENOL) 325 MG tablet  ascorbic acid (VITAMIN C) 500 MG tablet  methocarbamol (ROBAXIN) 500 MG tablet  Multiple Vitamins-Minerals (MULTIVITAL PO)  omeprazole (PRILOSEC) 40 MG DR capsule  ondansetron (ZOFRAN) 4 MG tablet  ondansetron (ZOFRAN) 8 MG tablet  oxyCODONE (ROXICODONE) 5 MG tablet  polyethylene glycol (MIRALAX) 17 GM/Dose powder  prochlorperazine (COMPAZINE) 10 MG tablet  senna-docusate (SENOKOT-S/PERICOLACE) 8.6-50 MG tablet      Allergies   Allergen Reactions    Ampicillin [Ampicillin Sodium]     Bactrim [Sulfamethoxazole W/Trimethoprim]      Patient unsure of reaction     Contrast Dye      CT contrast (IV) allergy - need oral Methylpred as premed for scans    Ampicillin Rash    Morphine Nausea     Family History  Family History   Problem Relation Age of Onset    Cancer Mother         Blood     Cancer Father         Lung    Cancer Maternal Grandmother         Breast    Cancer Paternal Grandfather         Hodgkins    Anesthesia Reaction No family hx of     Bleeding Disorder No family hx of     Clotting Disorder No family hx of      Social History   Social History     Tobacco Use    Smoking status: Former     Passive exposure: Past    Smokeless tobacco: Never    Tobacco comments:     Quit at age 19   Substance Use Topics    Alcohol use: Yes     Alcohol/week: 11.7 standard drinks of alcohol     Types: 14 drink(s) per week     Comment: 1-2 beers a night    Drug use: Never      Past medical history, past surgical history, medications, allergies, family history, and social history were reviewed with the patient. No additional pertinent items.     A medically appropriate review of systems was performed with pertinent positives and negatives noted in the HPI, and all other systems negative.    Physical Exam   BP: 105/66  Pulse: 60  Temp: 98.4  F (36.9  C)  Resp: 19  SpO2: 94 %    Physical Exam  Vitals and nursing note reviewed.   Constitutional:       General: He is not in acute distress.     Appearance: He is not ill-appearing, toxic-appearing or diaphoretic.   HENT:      Head: Normocephalic and atraumatic.   Eyes:      General: No scleral icterus.     Extraocular Movements: Extraocular movements intact.      Conjunctiva/sclera: Conjunctivae normal.   Cardiovascular:      Rate and Rhythm: Normal rate and regular rhythm.      Heart sounds: Normal heart sounds.   Pulmonary:      Effort: Pulmonary effort is normal. No respiratory distress.      Breath sounds: Normal breath sounds. No wheezing, rhonchi or rales.      Comments: Diminished breath sounds in posterior right lung fields.  Chest:      Chest wall: No tenderness.   Abdominal:      General: Abdomen is flat.      Palpations: Abdomen is soft.      Tenderness: There is no abdominal tenderness.   Musculoskeletal:         General: No tenderness. Normal range of  motion.      Cervical back: Normal range of motion and neck supple.   Skin:     General: Skin is warm and dry.      Findings: No rash.      Comments: Postop wounds and right chest wall are clean, dry, intact without erythema, induration, drainage, or fluctuance surrounding and from the wounds.   Neurological:      General: No focal deficit present.      Mental Status: He is alert and oriented to person, place, and time.      Motor: No weakness.      Coordination: Coordination normal.   Psychiatric:         Mood and Affect: Mood normal.         Behavior: Behavior normal.         Thought Content: Thought content normal.         Judgment: Judgment normal.           ED Course, Procedures, & Data      Procedures            EKG Interpretation:      Interpreted by Neida Franco MD  Time reviewed: 1:29 PM  Symptoms at time of EKG: Shortness of breath, chest pain  Rhythm: Atrial flutter, paced  Rate: normal  Axis: normal  Ectopy: none  Conduction: normal  ST Segments/ T Waves: No ST-T wave changes  Q Waves: none  Comparison to prior: Different than old EKG done on July 7, 2022    Clinical Impression: Atrial flutter, no acute ischemia           Results for orders placed or performed during the hospital encounter of 03/22/25   XR Chest 2 Views     Status: None    Narrative    EXAM: XR CHEST 2 VIEWS  3/22/2025 1:58 PM     HISTORY:  chest pain       COMPARISON:  3/7/2025    FINDINGS:     Left chest wall cardiac device with intact distal leads.     Trachea is midline. Cardiomediastinal silhouette and pulmonary  vasculature are within normal limits. No focal airspace opacity.  Small-to-moderate right-sided pleural effusion. Trace left pleural  effusion. No pneumothorax. Mild right basilar streaky pulmonary  opacities.    No acute osseous abnormality. Visualized upper abdomen is  unremarkable.        Impression    IMPRESSION:   1. Small to moderate right and trace left pleural effusions.  2. Mild right basilar streaky opacities,  likely atelectasis.    I have personally reviewed the examination and initial interpretation  and I agree with the findings.    PARESH MEADDO CAROL         SYSTEM ID:  [id number]   Basic metabolic panel     Status: Abnormal   Result Value Ref Range    Sodium 136 135 - 145 mmol/L    Potassium 4.8 3.4 - 5.3 mmol/L    Chloride 102 98 - 107 mmol/L    Carbon Dioxide (CO2) 21 (L) 22 - 29 mmol/L    Anion Gap 13 7 - 15 mmol/L    Urea Nitrogen 26.1 (H) 8.0 - 23.0 mg/dL    Creatinine 1.04 0.67 - 1.17 mg/dL    GFR Estimate 75 >60 mL/min/1.73m2    Calcium 9.1 8.8 - 10.4 mg/dL    Glucose 111 (H) 70 - 99 mg/dL   Troponin T, High Sensitivity     Status: Abnormal   Result Value Ref Range    Troponin T, High Sensitivity 78 (H) <=22 ng/L   Nt probnp inpatient (BNP)     Status: Abnormal   Result Value Ref Range    N terminal Pro BNP Inpatient 4,129 (H) 0 - 900 pg/mL   TSH with free T4 reflex     Status: Normal   Result Value Ref Range    TSH 1.79 0.30 - 4.20 uIU/mL   INR     Status: Normal   Result Value Ref Range    INR 1.10 0.85 - 1.15   [place] Draw     Status: None (In process)    Narrative    The following orders were created for panel order [place] Draw.  Procedure                               Abnormality         Status                     ---------                               -----------         ------                     Extra Red Top Tube[[id number]]                              In process                   Please view results for these tests on the individual orders.   CBC with platelets and differential     Status: Abnormal   Result Value Ref Range    WBC Count 15.2 (H) 4.0 - 11.0 10e3/uL    RBC Count 3.36 (L) 4.40 - 5.90 10e6/uL    Hemoglobin 9.8 (L) 13.3 - 17.7 g/dL    Hematocrit 31.5 (L) 40.0 - 53.0 %    MCV 94 78 - 100 fL    MCH 29.2 26.5 - 33.0 pg    MCHC 31.1 (L) 31.5 - 36.5 g/dL    RDW 13.5 10.0 - 15.0 %    Platelet Count 372 150 - 450 10e3/uL    % Neutrophils 83 %    % Lymphocytes 4 %    % Monocytes 12 %    %  Eosinophils 0 %    % Basophils 0 %    % Immature Granulocytes 1 %    NRBCs per 100 WBC 0 <1 /100    Absolute Neutrophils 12.7 (H) 1.6 - 8.3 10e3/uL    Absolute Lymphocytes 0.6 (L) 0.8 - 5.3 10e3/uL    Absolute Monocytes 1.9 (H) 0.0 - 1.3 10e3/uL    Absolute Eosinophils 0.0 0.0 - 0.7 10e3/uL    Absolute Basophils 0.0 0.0 - 0.2 10e3/uL    Absolute Immature Granulocytes 0.1 <=0.4 10e3/uL    Absolute NRBCs 0.0 10e3/uL   RBC and Platelet Morphology     Status: Abnormal   Result Value Ref Range    RBC Morphology Confirmed RBC Indices     Platelet Assessment  Automated Count Confirmed. Platelet morphology is normal.     Automated Count Confirmed. Platelet morphology is normal.    [place] Cells Slight (A) None Seen    Polychromasia Slight (A) None Seen    Target Cells Slight (A) None Seen   Troponin T, High Sensitivity     Status: Abnormal   Result Value Ref Range    Troponin T, High Sensitivity 68 (H) <=22 ng/L   EKG 12 lead     Status: None   Result Value Ref Range    Systolic Blood Pressure  mmHg    Diastolic Blood Pressure  mmHg    Ventricular Rate 80 BPM    Atrial Rate 264 BPM    CT Interval  ms    QRS Duration 140 ms     ms    QTc 507 ms    P Axis  degrees    R AXIS -39 degrees    T Axis 142 degrees    Interpretation ECG       Unconfirmed report - interpretation of this ECG is computer generated - see medical record for final interpretation  Atrial flutter and Ventricular-paced rhythm  Abnormal ECG    Confirmed by - EMERGENCY ROOM, PHYSICIAN ([id number]),  Claudine Wyatt ([id number]) on 3/22/2025 2:39:00 PM     CBC with platelets differential     Status: Abnormal    Narrative    The following orders were created for panel order CBC with platelets differential.  Procedure                               Abnormality         Status                     ---------                               -----------         ------                     CBC with platelets and ...[[id number]]  Abnormal            Final result                RBC and Platelet Morpho...[[id number]]  Abnormal            Final result                 Please view results for these tests on the individual orders.     Medications   lidocaine (PF) (XYLOCAINE) 1 % injection 20 mL (has no administration in time range)   lidocaine 1 % 0.1-1 mL (has no administration in time range)   lidocaine (LMX4) cream (has no administration in time range)   sodium chloride (PF) 0.9% PF flush 3 mL (has no administration in time range)   sodium chloride (PF) 0.9% PF flush 3 mL (has no administration in time range)   senna-docusate (SENOKOT-S/PERICOLACE) 8.6-50 MG per tablet 1 tablet (has no administration in time range)     Or   senna-docusate (SENOKOT-S/PERICOLACE) 8.6-50 MG per tablet 2 tablet (has no administration in time range)   acetaminophen (TYLENOL) tablet 650 mg (has no administration in time range)     Or   acetaminophen (TYLENOL) Suppository 650 mg (has no administration in time range)     Labs Ordered and Resulted from Time of ED Arrival to Time of ED Departure   BASIC METABOLIC PANEL - Abnormal       Result Value    Sodium 136      Potassium 4.8      Chloride 102      Carbon Dioxide (CO2) 21 (*)     Anion Gap 13      Urea Nitrogen 26.1 (*)     Creatinine 1.04      GFR Estimate 75      Calcium 9.1      Glucose 111 (*)    TROPONIN T, HIGH SENSITIVITY - Abnormal    Troponin T, High Sensitivity 78 (*)    NT PROBNP INPATIENT - Abnormal    N terminal Pro BNP Inpatient 4,129 (*)    CBC WITH PLATELETS AND DIFFERENTIAL - Abnormal    WBC Count 15.2 (*)     RBC Count 3.36 (*)     Hemoglobin 9.8 (*)     Hematocrit 31.5 (*)     MCV 94      MCH 29.2      MCHC 31.1 (*)     RDW 13.5      Platelet Count 372      % Neutrophils 83      % Lymphocytes 4      % Monocytes 12      % Eosinophils 0      % Basophils 0      % Immature Granulocytes 1      NRBCs per 100 WBC 0      Absolute Neutrophils 12.7 (*)     Absolute Lymphocytes 0.6 (*)     Absolute Monocytes 1.9 (*)     Absolute Eosinophils 0.0       Absolute Basophils 0.0      Absolute Immature Granulocytes 0.1      Absolute NRBCs 0.0     RBC AND PLATELET MORPHOLOGY - Abnormal    RBC Morphology Confirmed RBC Indices      Platelet Assessment        Value: Automated Count Confirmed. Platelet morphology is normal.    [place] Cells Slight (*)     Polychromasia Slight (*)     Target Cells Slight (*)    TROPONIN T, HIGH SENSITIVITY - Abnormal    Troponin T, High Sensitivity 68 (*)    TSH WITH FREE T4 REFLEX - Normal    TSH 1.79     INR - Normal    INR 1.10       XR Chest 2 Views   Final Result   IMPRESSION:    1. Small to moderate right and trace left pleural effusions.   2. Mild right basilar streaky opacities, likely atelectasis.      I have personally reviewed the examination and initial interpretation   and I agree with the findings.      PARESH DOUGLAS DO            SYSTEM ID:  [id number]      XR Chest Port 1 View    (Results Pending)          Critical care was not performed.     Medical Decision Making  The patient's presentation was of high complexity (an acute health issue posing potential threat to life or bodily function).    The patient's evaluation involved:  review of external note(s) from 1 sources (see separate area of note for details)  review of 3+ test result(s) ordered prior to this encounter (see separate area of note for details)  strong consideration of a test (see separate area of note for details) that was ultimately deferred  ordering and/or review of 3+ test(s) in this encounter (see separate area of note for details)  independent interpretation of testing performed by another health professional (see separate area of note for details)  discussion of management or test interpretation with another health professional (see separate area of note for details)    The patient's management necessitated high risk (a decision regarding hospitalization).    Assessment & Plan    74-year-old man status post recent thoracic surgery presenting with abnormal  imaging study of his chest done at an outside hospital yesterday.    I will obtain laboratory studies and chest x-ray and consult thoracic surgery since they are familiar with patient's case and the outside facility discussed patient's imaging studies with Dr. Sun.    Laboratory studies returned with leukocytosis of 15,200.  There is anemia with hemoglobin of 9.8 which is at patient's baseline.  INR is normal at 1.1.  TSH is normal at 1.79.  BNP is elevated 44,129.  Troponin is elevated at 78.  EKG showed atrial flutter without any acute ischemia.  Electrolytes show normal creatinine of 1.04.  Delta troponin was lower than the initial one and I do not believe the patient has acute coronary syndrome.  Thoracic surgery evaluated the patient and they will place a chest tube and he will be admitted to their service.  I did speak to the outside hospital and they tried to push patient's imaging from yesterday electronically, however, they were unsuccessful.  Fortunately, thoracic surgery staff did see CT study via cell phone imaging that was provided to them by the physician from an outside hospital.  Cardiology was consulted for a new onset atrial flutter.    I have reviewed the nursing notes. I have reviewed the findings, diagnosis, plan and need for follow up with the patient.    New Prescriptions    No medications on file       Final diagnoses:   Pleural effusion on right   New onset atrial flutter (H)     INatali, am serving as a trained medical scribe to document services personally performed by Neida Franco MD based on the provider's statements to me on March 22, 2025.  This document has been checked and approved by the attending provider.    IJoan Nikola, MD, was physically present and have reviewed and verified the accuracy of this note documented by Natali Mendoza medical scribe.      Neida Franco MD  [facility] EMERGENCY DEPARTMENT  3/22/2025     Neida Franco MD  03/22/25 2029

## 2025-03-22 NOTE — ED TRIAGE NOTES
Arrives ambulatory with shortness of breath and post op pronblem. Reports he has concerns for fluids in his lungs. Recently had surgery 2/28 tumor removal from his trachea.     Triage Assessment (Adult)       Row Name 03/22/25 [id number]          Triage Assessment    Airway WDL WDL        Respiratory WDL    Respiratory WDL X;rhythm/pattern;cough     Rhythm/Pattern, Respiratory shortness of breath     Cough Frequency frequent     Cough Type nonproductive        Cardiac WDL    Cardiac WDL WDL        Peripheral/Neurovascular WDL    Peripheral Neurovascular WDL WDL        Cognitive/Neuro/Behavioral WDL    Cognitive/Neuro/Behavioral WDL WDL

## 2025-03-22 NOTE — H&P
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 Patient recently presented to an ED in [place] with concern for SOB at which point a CT chest revealed a right sided pleural effusion. He was started on a five day course of furosemide. Dr. Sun called who recommended he be transferred to [facility] for further evaluation. Work up in the [facility] ED was significant for CXR revealed a right sided pleural effusion, WBC to 15.2, troponin 78, BNP 4129, and EKG with atrial flutter. Images from outside CT are still pending.     - Right sided chest tube placed in the ED (see separate procedure note) with 1 L serous fluid   - Chest tube to water seal  - Consult EP for further work up of atrial flutter and atrial fibrillation  - Okay for regular diet  - Multimodal pain control     The patient's care was discussed with the thoracic fellow.    DO IRMA Judd [facility]  All communications related to this note should be expressed to resident/JOSE MARTIN on call for this team at the time of your communication. Please be advised that not all members of this team utilize vocera.  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 Patient recently presented to an ED in [place] with concern for SOB at which point a CT chest revealed a right sided pleural effusion. He was started on a five day course of furosemide. Dr. Sun called who recommended he be transferred to [facility] for further evaluation. Work up in the [facility] ED was significant for CXR revealed a right sided pleural effusion, WBC to 15.2, troponin 78, BNP 4129, and EKG with atrial flutter.     Patient seen at bedside. States that he has been SOB and coughing with an associated \"cold.\" His SOB persisted to the point where he presented to the ED in [place]. He was seen in clinic with Dr. Sun recently at which point his MILY drain was removed. States he was also called recently due to his pacemaker being interrogated with concern for him being in atrial fibrillation for 8 hours. Denies any prior history of atrial fibrillation.       Past Medical History    Past Medical History:   Diagnosis Date    Cardiac pacemaker in situ     2020    GERD (gastroesophageal reflux disease)     Lymphoma (H)     Pyloric stenosis, congenital (H)     Squamous cell carcinoma     Tracheal cancer (H)        Past Surgical History   Past Surgical History:   Procedure Laterality Date    BRONCHOSCOPY FLEXIBLE N/A 12/16/2024    Procedure: BRONCHOSCOPY, FLEXIBLE, endobronchial biopsy and tumor debulking;  Surgeon: Gerald Long MD;  Location: [facility] OR    BRONCHOSCOPY FLEXIBLE AND RIGID N/A 2/28/2025    Procedure: Bronchoscopy flexible;  Surgeon: Darío Potts MD;  Location: [facility] OR    DAVINCI HERNIORRHAPHY INGUINAL Left 07/09/2021    Procedure: HERNIORRHAPHY, INGUINAL, ROBOT-ASSISTED, Left, Mesh;  Surgeon: Shamar Tyler MD;  Location: [facility] OR    INSERT EXTRACORPORAL MEMBRANE OXYGENATOR  2/28/2025    Procedure: Insert Venous/Arterial extracorporal membrane oxygenator and Removal;  Surgeon: Fidel Lockhart" MD Tyrel;  Location: [facility] OR    IR CHEST PORT PLACEMENT > 5 YRS OF AGE  01/05/2023    IR PORT REMOVAL RIGHT  12/15/2023    IR SOFT TISSUE BIOPSY  12/15/2022    MOHS MICROGRAPHIC PROCEDURE      REMOVE PORT VASCULAR ACCESS Right 12/15/2023    Procedure: Remove port vascular access right;  Surgeon: Matthew Harrison PA-C;  Location: [facility] OR    Repair pyloric stenosis      ~ six months of age    RESECT TRACHEA WITH RECONSTRUCTION Right 2/28/2025    Procedure: Tracheal Resection and Reconstruction with Cryoablation;  Surgeon: Darío Potts MD;  Location: [facility] OR    THORACOTOMY Right 2/28/2025    Procedure: Right Thoracotomy;  Surgeon: Darío Potts MD;  Location: [facility] OR       Prior to Admission Medications   Prior to Admission Medications   Prescriptions Last Dose Informant Patient Reported? Taking?   Multiple Vitamins-Minerals (MULTIVITAL PO)   Yes No   Sig: Take 1 tablet by mouth every morning.   acetaminophen (TYLENOL) 325 MG tablet   No No   Sig: Take 3 tablets (975 mg) by mouth or Feeding Tube every 8 hours.   ascorbic acid (VITAMIN C) 500 MG tablet   Yes No   Sig: Take 500 mg by mouth every morning   methocarbamol (ROBAXIN) 500 MG tablet   No No   Sig: Take 1 tablet (500 mg) by mouth every 8 hours as needed for muscle spasms.   omeprazole (PRILOSEC) 40 MG DR capsule   No No   Sig: Take 1 capsule (40 mg) by mouth daily   Patient taking differently: Take 40 mg by mouth every morning.   ondansetron (ZOFRAN) 4 MG tablet   No No   Sig: Take 2 tablets (8 mg) by mouth every 8 hours as needed for nausea.   ondansetron (ZOFRAN) 8 MG tablet   No No   Sig: Take 1 tablet (8 mg) by mouth every 8 hours as needed for nausea   oxyCODONE (ROXICODONE) 5 MG tablet   No No   Sig: Take 1 tablet (5 mg) by mouth every 4 hours as needed for moderate pain. Wean narcotic use as tolerated starting at time of discharge   polyethylene glycol (MIRALAX) 17 GM/Dose powder   No No   Sig: Take 17 g by mouth daily as needed for constipation    prochlorperazine (COMPAZINE) 10 MG tablet   No No   Sig: Take 1 tablet (10 mg) by mouth every 6 hours as needed for nausea or vomiting   senna-docusate (SENOKOT-S/PERICOLACE) 8.6-50 MG tablet   No No   Sig: Take 2 tablets by mouth or Feeding Tube 2 times daily.   wound support modular (EXPEDITE) LIQD bottle   No No   Sig: Take 60 mLs by mouth daily for 14 days.      Facility-Administered Medications: None        Review of Systems    The 10 point Review of Systems is negative other than noted in the HPI or here.     Social History   I have reviewed this patient's social history and updated it with pertinent information if needed.  Social History     Tobacco Use    Smoking status: Former     Passive exposure: Past    Smokeless tobacco: Never    Tobacco comments:     Quit at age 19   Substance Use Topics    Alcohol use: Yes     Alcohol/week: 11.7 standard drinks of alcohol     Types: 14 drink(s) per week     Comment: 1-2 beers a night    Drug use: Never         Family History   I have reviewed this patient's family history and updated it with pertinent information if needed.  Family History   Problem Relation Age of Onset    Cancer Mother         Blood    Cancer Father         Lung    Cancer Maternal Grandmother         Breast    Cancer Paternal Grandfather         Hodgkins    Anesthesia Reaction No family hx of     Bleeding Disorder No family hx of     Clotting Disorder No family hx of          Allergies   Allergies   Allergen Reactions    Ampicillin [Ampicillin Sodium]     Bactrim [Sulfamethoxazole W/Trimethoprim]      Patient unsure of reaction     Contrast Dye      CT contrast (IV) allergy - need oral Methylpred as premed for scans    Ampicillin Rash    Morphine Nausea        Physical Exam   Vital Signs: Temp: 97.8  F (36.6  C) Temp src: Oral BP: 124/69 Pulse: 80   Resp: 20 SpO2: 95 % O2 Device: None (Room air)    Weight: 0 lbs 0 ozNo intake or output data in the 24 hours ending 03/22/25 4039     Gen: Alert and  conversational adult male in NAD  Eyes: Nonicteric  ENT: Mucous Membranes Moist  Chest: right sided thoracotomy incision clean, dry, and intact covered with steri strips. Prior chest tube sites without surrounding erythema. Lungs CTAB  CV: RRR by monitor   Abd: soft, non-distended, non-tender to palpation   : No archer present  Extremities: warm and well perfused  Neuro: awake and alert  Psych: Appropriate in conversation         Data     I have personally reviewed the following data over the past 24 hrs:    15.2 (H)  \   9.8 (L)   / 372     136 102 26.1 (H) /  111 (H)   4.8 21 (L) 1.04 \     Trop: 68 (H) BNP: 4,129 (H)     TSH: 1.79 T4: N/A A1C: N/A     INR:  1.10 PTT:  N/A   D-dimer:  N/A Fibrinogen:  N/A       Imaging results reviewed over the past 24 hrs:   Recent Results (from the past 24 hours)   XR Chest 2 Views    Narrative    EXAM: XR CHEST 2 VIEWS  3/22/2025 1:58 PM     HISTORY:  chest pain       COMPARISON:  3/7/2025    FINDINGS:     Left chest wall cardiac device with intact distal leads.     Trachea is midline. Cardiomediastinal silhouette and pulmonary  vasculature are within normal limits. No focal airspace opacity.  Small-to-moderate right-sided pleural effusion. Trace left pleural  effusion. No pneumothorax. Mild right basilar streaky pulmonary  opacities.    No acute osseous abnormality. Visualized upper abdomen is  unremarkable.        Impression    IMPRESSION:   1. Small to moderate right and trace left pleural effusions.  2. Mild right basilar streaky opacities, likely atelectasis.    I have personally reviewed the examination and initial interpretation  and I agree with the findings.    PARESH DOUGLAS DO         SYSTEM ID:  [id number]

## 2025-03-23 ENCOUNTER — APPOINTMENT (OUTPATIENT)
Dept: GENERAL RADIOLOGY | Facility: CLINIC | Age: 75
DRG: 187 | End: 2025-03-23
Payer: MEDICARE

## 2025-03-23 LAB
ANION GAP SERPL CALCULATED.3IONS-SCNC: 10 MMOL/L (ref 7–15)
BUN SERPL-MCNC: 23.9 MG/DL (ref 8–23)
CALCIUM SERPL-MCNC: 8.8 MG/DL (ref 8.8–10.4)
CHLORIDE SERPL-SCNC: 105 MMOL/L (ref 98–107)
CREAT SERPL-MCNC: 1 MG/DL (ref 0.67–1.17)
EGFRCR SERPLBLD CKD-EPI 2021: 78 ML/MIN/1.73M2
ERYTHROCYTE [DISTWIDTH] IN BLOOD BY AUTOMATED COUNT: 13.9 % (ref 10–15)
GLUCOSE SERPL-MCNC: 98 MG/DL (ref 70–99)
HCO3 SERPL-SCNC: 23 MMOL/L (ref 22–29)
HCT VFR BLD AUTO: 28.1 % (ref 40–53)
HGB BLD-MCNC: 8.9 G/DL (ref 13.3–17.7)
MCH RBC QN AUTO: 29.2 PG (ref 26.5–33)
MCHC RBC AUTO-ENTMCNC: 31.7 G/DL (ref 31.5–36.5)
MCV RBC AUTO: 92 FL (ref 78–100)
PLATELET # BLD AUTO: 258 10E3/UL (ref 150–450)
POTASSIUM SERPL-SCNC: 4.8 MMOL/L (ref 3.4–5.3)
RBC # BLD AUTO: 3.05 10E6/UL (ref 4.4–5.9)
SODIUM SERPL-SCNC: 138 MMOL/L (ref 135–145)
WBC # BLD AUTO: 7.7 10E3/UL (ref 4–11)

## 2025-03-23 PROCEDURE — 250N000013 HC RX MED GY IP 250 OP 250 PS 637: Performed by: SURGERY

## 2025-03-23 PROCEDURE — 250N000011 HC RX IP 250 OP 636: Performed by: SURGERY

## 2025-03-23 PROCEDURE — 87040 BLOOD CULTURE FOR BACTERIA: CPT

## 2025-03-23 PROCEDURE — 99222 1ST HOSP IP/OBS MODERATE 55: CPT | Mod: 24 | Performed by: INTERNAL MEDICINE

## 2025-03-23 PROCEDURE — 250N000013 HC RX MED GY IP 250 OP 250 PS 637

## 2025-03-23 PROCEDURE — 36415 COLL VENOUS BLD VENIPUNCTURE: CPT

## 2025-03-23 PROCEDURE — 85027 COMPLETE CBC AUTOMATED: CPT

## 2025-03-23 PROCEDURE — 71045 X-RAY EXAM CHEST 1 VIEW: CPT | Mod: 26 | Performed by: RADIOLOGY

## 2025-03-23 PROCEDURE — 71045 X-RAY EXAM CHEST 1 VIEW: CPT

## 2025-03-23 PROCEDURE — 80048 BASIC METABOLIC PNL TOTAL CA: CPT

## 2025-03-23 PROCEDURE — 120N000002 HC R&B MED SURG/OB UMMC

## 2025-03-23 PROCEDURE — 250N000013 HC RX MED GY IP 250 OP 250 PS 637: Performed by: STUDENT IN AN ORGANIZED HEALTH CARE EDUCATION/TRAINING PROGRAM

## 2025-03-23 RX ORDER — PANTOPRAZOLE SODIUM 40 MG/1
40 TABLET, DELAYED RELEASE ORAL
Status: DISCONTINUED | OUTPATIENT
Start: 2025-03-24 | End: 2025-03-23

## 2025-03-23 RX ORDER — ENOXAPARIN SODIUM 100 MG/ML
40 INJECTION SUBCUTANEOUS EVERY 24 HOURS
Status: DISCONTINUED | OUTPATIENT
Start: 2025-03-23 | End: 2025-03-25 | Stop reason: HOSPADM

## 2025-03-23 RX ORDER — AMIODARONE HYDROCHLORIDE 200 MG/1
400 TABLET ORAL DAILY
Status: DISCONTINUED | OUTPATIENT
Start: 2025-03-23 | End: 2025-03-25 | Stop reason: HOSPADM

## 2025-03-23 RX ORDER — PANTOPRAZOLE SODIUM 40 MG/1
40 TABLET, DELAYED RELEASE ORAL
Status: DISCONTINUED | OUTPATIENT
Start: 2025-03-23 | End: 2025-03-25 | Stop reason: HOSPADM

## 2025-03-23 RX ORDER — METHOCARBAMOL 500 MG/1
500 TABLET, FILM COATED ORAL EVERY 8 HOURS PRN
Status: DISCONTINUED | OUTPATIENT
Start: 2025-03-23 | End: 2025-03-25 | Stop reason: HOSPADM

## 2025-03-23 RX ORDER — ASCORBIC ACID 500 MG
500 TABLET ORAL EVERY MORNING
Status: DISCONTINUED | OUTPATIENT
Start: 2025-03-23 | End: 2025-03-25 | Stop reason: HOSPADM

## 2025-03-23 RX ADMIN — ACETAMINOPHEN 650 MG: 325 TABLET, FILM COATED ORAL at 20:04

## 2025-03-23 RX ADMIN — OXYCODONE HYDROCHLORIDE AND ACETAMINOPHEN 500 MG: 500 TABLET ORAL at 09:17

## 2025-03-23 RX ADMIN — ENOXAPARIN SODIUM 40 MG: 40 INJECTION SUBCUTANEOUS at 12:13

## 2025-03-23 RX ADMIN — SENNOSIDES AND DOCUSATE SODIUM 2 TABLET: 50; 8.6 TABLET ORAL at 10:01

## 2025-03-23 RX ADMIN — ACETAMINOPHEN 650 MG: 325 TABLET, FILM COATED ORAL at 14:10

## 2025-03-23 RX ADMIN — PANTOPRAZOLE SODIUM 40 MG: 40 TABLET, DELAYED RELEASE ORAL at 09:58

## 2025-03-23 RX ADMIN — AMIODARONE HYDROCHLORIDE 400 MG: 200 TABLET ORAL at 12:13

## 2025-03-23 ASSESSMENT — ACTIVITIES OF DAILY LIVING (ADL)
ADLS_ACUITY_SCORE: 56
ADLS_ACUITY_SCORE: 57
ADLS_ACUITY_SCORE: 56

## 2025-03-23 NOTE — PLAN OF CARE
Goal Outcome Evaluation:       /70 (BP Location: Left arm)   Pulse 82   Temp 98.3  F (36.8  C) (Oral)   Resp 18   SpO2 95%     4817-9325    PT noted to be comfortable on this shift, VSS, did c/o mild pain prn tylenol administer. Chest tube in place with no air leaked. Voiding spon, no bm on this shift, no acute events noted, cont POC. X-Ray to be done this morning.

## 2025-03-23 NOTE — PROGRESS NOTES
THORACIC SURGERY PROGRESS NOTE     S: Feeling better with chest tube in, less shortness of breath. Tolerating general diet.      O:  Patient Vitals for the past 24 hrs:   BP Temp Temp src Pulse Resp SpO2 Weight   03/23/25 0902 (!) 145/83 97  F (36.1  C) Oral 115 18 96 % --   03/23/25 0807 -- -- -- -- -- -- 61.2 kg (135 lb)   03/23/25 0626 111/77 97.9  F (36.6  C) Oral 103 20 96 % --   03/22/25 2200 104/70 98.3  F (36.8  C) Oral -- 18 95 % --   03/22/25 1800 131/77 -- -- 82 20 98 % --   03/22/25 1700 121/83 -- -- 80 -- -- --   03/22/25 1540 124/69 97.8  F (36.6  C) Oral 80 20 95 % --   03/22/25 1317 105/66 98.4  F (36.9  C) -- 60 19 94 % --        Physical Exam:    Gen- alert and oriented x3, no acute distress  Cardiac- tachycardic to 1-teens  Pulm- normal respiratory effort, chest tube with 1400 serous output in cannister  Abd- soft, nontender/nondistended no rebound or guarding  - deferred  Extremities- no peripheral edema  Neuro- gross motor intact  Skin- no obvious rashes, bruises, or cuts       A/P: Shahid Davis is a 74 year old male with a history of high-grade B-cell lymphoma s/p chemotherapy, complete heart block s/p permanent pacemaker, and 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     -Continue chest tube to waterseal and monitor output  -Cardiology consult, recs appreciated - amiodarone ordered, will hold off on anticoagulation for now pending chest removal  -Encourage ambulation  -DVT prophylaxis: LNX      Mari Pagan MD  Thoracic Surgery Fellow

## 2025-03-23 NOTE — MEDICATION SCRIBE - ADMISSION MEDICATION HISTORY
Medication Scribe Admission Medication History    Admission medication history is complete. The information provided in this note is only as accurate as the sources available at the time of the update.    Information Source(s): Patient and CareEverywhere/SureScripts via in-person    Pertinent Information: per pt+CE, pt reported taking medications on PTA medication list directed. No DRH.     Changes made to PTA medication list:  Added: None  Deleted: None  Changed: None    Allergies reviewed with patient and updates made in EHR: yes    Medication History Completed By: Torie Fitzgerald 3/23/2025 4:59 AM    PTA Med List   Medication Sig Last Dose/Taking    acetaminophen (TYLENOL) 325 MG tablet Take 3 tablets (975 mg) by mouth or Feeding Tube every 8 hours. 3/21/2025    ascorbic acid (VITAMIN C) 500 MG tablet Take 500 mg by mouth every morning Past Month    methocarbamol (ROBAXIN) 500 MG tablet Take 1 tablet (500 mg) by mouth every 8 hours as needed for muscle spasms. Past Week    Multiple Vitamins-Minerals (MULTIVITAL PO) Take 1 tablet by mouth every morning. 3/22/2025 Morning    omeprazole (PRILOSEC) 40 MG DR capsule Take 1 capsule (40 mg) by mouth daily (Patient taking differently: Take 40 mg by mouth every morning.) 3/22/2025 Morning    ondansetron (ZOFRAN) 8 MG tablet Take 1 tablet (8 mg) by mouth every 8 hours as needed for nausea 3/21/2025    polyethylene glycol (MIRALAX) 17 GM/Dose powder Take 17 g by mouth daily as needed for constipation 3/21/2025    prochlorperazine (COMPAZINE) 10 MG tablet Take 1 tablet (10 mg) by mouth every 6 hours as needed for nausea or vomiting 3/20/2025    senna-docusate (SENOKOT-S/PERICOLACE) 8.6-50 MG tablet Take 2 tablets by mouth or Feeding Tube 2 times daily. 3/22/2025 Morning

## 2025-03-23 NOTE — CONSULTS
[facility]    Cardiology Consult Note - Electrophysiology      Date of Admission:  3/22/2025  Consult Requested by: Thoracic Surgery Team  Reason for Consult: Atrial Fibrillation / Flutter    Assessment & Plan: NAYT   Shahid Davis is a 74 year old male admitted on 3/22/2025. 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 He was admitted for a pleural effusion and found to has post operative atrial fibrillation / flutter.    Impression:  Post Operative Atrial Fibrillation / Flutter  Adenoid cystic carcinoma of the distal trachea requiring right sided thoracotomy and temporary central VA-ECMO (2/28/2025)  Complete Heart Block with Dual Chamber Pacemaker (2020)  Right Sided Pleural Effusion status post chest tube placement last night  High Grade B-Cell Lymphoma  Acute Blood Loss Anemia    --Patient was admitted overnight for a pleural effusion. He had a right sided thoracotomy and temporary central VA-ECMO (2/28/2025) procedure for dissection of his distal trachea. Since his operation he has had both atrial fibrillation (detected by his device) and flutter.  --Device was placed in 2020 for complete heart block, does not appear that he has had any sort of device detected atrial fibrillation or flutter since. Therefore, this is post operative, most likely from irritation of VA-ECMO cannula.    Plan:  Recommend starting on amiodarone 400 mg BID for 1 week. Then drop this to 200 mg daily.  Start eliquis when safe from a surgical standpoint  When it is safe for anticoagulation, will proceed with GAURI / cardioversion.  Follow up in EP JOSE MARTIN clinic in 3 months for consideration of de-escalation of therapies (amiodarone and eliquis)    Staffed with Dr. Ireland.    Bert Collazo MD PhD  Cardiac Electrophysiology Fellow  P: Text Page     EP STAFF NOTE  Patient seen and  examined and management plan personally reviewed with the patient. I agree with the note above by the CV/EP fellow.  Levi Ireland MD [facility]  Cardiology - Electrophysiology  Total time spent on patient visit, reviewing notes, imaging, labs, orders, and completing necessary documentation: 45 minutes.  >50% of visit spent on counseling patient and/or coordination of care.    ______________________________________________________________________    Chief Complaint   I was asked by the thoracic surgery team to evaluate patient for atrial fibrillation / flutter.    History is obtained from the patient, chart review    History of Present Illness   Shahid Davis is a 74 year old male admitted on 3/22/2025. 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 He was admitted for a pleural effusion and found to has post operative atrial fibrillation / flutter.    Patient was admitted overnight for a pleural effusion. He had a right sided thoracotomy and temporary central VA-ECMO (2/28/2025) procedure for dissection of his distal trachea. Since his operation he has had both atrial fibrillation (detected by his device) and flutter. Device was placed in 2020 for complete heart block, does not appear that he has had any sort of device detected atrial fibrillation or flutter since. He is asymptomatic from a heart rhythm standpoint, does endorse some shortness of breath.    Past Medical History    Past Medical History:   Diagnosis Date    Cardiac pacemaker in situ     2020    GERD (gastroesophageal reflux disease)     Lymphoma (H)     Pyloric stenosis, congenital (H)     Squamous cell carcinoma     Tracheal cancer (H)        Past Surgical History   Past Surgical History:   Procedure Laterality Date    BRONCHOSCOPY FLEXIBLE N/A 12/16/2024    Procedure: BRONCHOSCOPY, FLEXIBLE, endobronchial biopsy and tumor debulking;   Surgeon: Gerald Long MD;  Location: [facility]    BRONCHOSCOPY FLEXIBLE AND RIGID N/A 2/28/2025    Procedure: Bronchoscopy flexible;  Surgeon: Darío Potts MD;  Location: [facility]    DAVINCI HERNIORRHAPHY INGUINAL Left 07/09/2021    Procedure: HERNIORRHAPHY, INGUINAL, ROBOT-ASSISTED, Left, Mesh;  Surgeon: Shamar Tyler MD;  Location: [facility]    INSERT EXTRACORPORAL MEMBRANE OXYGENATOR  2/28/2025    Procedure: Insert Venous/Arterial extracorporal membrane oxygenator and Removal;  Surgeon: Fidel Lockhart MD;  Location: [facility]    IR CHEST PORT PLACEMENT > 5 YRS OF AGE  01/05/2023    IR PORT REMOVAL RIGHT  12/15/2023    IR SOFT TISSUE BIOPSY  12/15/2022    MOHS MICROGRAPHIC PROCEDURE      REMOVE PORT VASCULAR ACCESS Right 12/15/2023    Procedure: Remove port vascular access right;  Surgeon: Matthew Harrison PA-C;  Location: [facility] OR    Repair pyloric stenosis      ~ six months of age    RESECT TRACHEA WITH RECONSTRUCTION Right 2/28/2025    Procedure: Tracheal Resection and Reconstruction with Cryoablation;  Surgeon: Darío Potts MD;  Location: [facility]    THORACOTOMY Right 2/28/2025    Procedure: Right Thoracotomy;  Surgeon: Darío Potts MD;  Location: [facility] OR       Medications   I have reviewed this patient's current medications    Physical Exam   Vital Signs: Temp: 97.9  F (36.6  C) Temp src: Oral BP: 111/77 Pulse: 103   Resp: 20 SpO2: 96 % O2 Device: None (Room air)    Weight: 0 lbs 0 oz    General Appearance: Well appearing, no distress  Eyes: NISREEN, EOMI  HEENT: NC, AT. MMM.   Respiratory: CTAB, normal work of breathing  Cardiovascular: Regular Rate and Rhythm, normal S1, S2. No murmurs, rubs, gallops  GI: Soft, non-tender, non-distedned  Lymph/Hematologic: No asymetric swelling, edema. No bruising.  Genitourinary: Deferred  Skin: No rashes, lesions, wounds.  Musculoskeletal: Warm, well perfused  Neurologic: AOx4, CNII-XII intact.  Psychiatric: Euthymic. Mood appropriate.     Medical  Decision Making       60 MINUTES SPENT BY ME on the date of service doing chart review, history, exam, documentation & further activities per the note.      Data     I have personally reviewed the following data over the past 24 hrs:    7.7  \   8.9 (L)   / 258     138 105 23.9 (H) /  98   4.8 23 1.00 \     Trop: 68 (H) BNP: 4,129 (H)     TSH: 1.79 T4: N/A A1C: N/A     INR:  1.10 PTT:  N/A   D-dimer:  N/A Fibrinogen:  N/A       Imaging results reviewed over the past 24 hrs:   Recent Results (from the past 24 hours)   XR Chest 2 Views    Narrative    EXAM: XR CHEST 2 VIEWS  3/22/2025 1:58 PM     HISTORY:  chest pain       COMPARISON:  3/7/2025    FINDINGS:     Left chest wall cardiac device with intact distal leads.     Trachea is midline. Cardiomediastinal silhouette and pulmonary  vasculature are within normal limits. No focal airspace opacity.  Small-to-moderate right-sided pleural effusion. Trace left pleural  effusion. No pneumothorax. Mild right basilar streaky pulmonary  opacities.    No acute osseous abnormality. Visualized upper abdomen is  unremarkable.        Impression    IMPRESSION:   1. Small to moderate right and trace left pleural effusions.  2. Mild right basilar streaky opacities, likely atelectasis.    I have personally reviewed the examination and initial interpretation  and I agree with the findings.    PARESH DOUGLAS DO         SYSTEM ID:  [id number]   XR Chest Port 1 View    Narrative    EXAM: XR CHEST PORT 1 VIEW  LOCATION: [facility]  DATE: 3/22/2025    INDICATION: S p chest tube placement  COMPARISON: 3/23/2025      Impression    IMPRESSION: Right chest tube is in similar position. Small right basilar pneumothorax. Patchy bibasilar atelectasis. Dual lead left chest cardiac device. Heart size is stable. Right lateral chest wall subcutaneous emphysema is similar.   XR Chest Port 1 View    Narrative    EXAM: CHEST SINGLE VIEW PORTABLE  LOCATION:   [facility]  DATE/TIME: 03/23/2025, 6:45 AM CDT    INDICATION: Status post right chest tube placement.  COMPARISON: 03/22/2025 at 1406 hours (images only-the report is not available).      Impression    IMPRESSION:   1.  Interval placement of a right pleural drainage catheter with distal catheter tip projecting over the medial aspect of the mid right hemithorax. There has been complete interval resolution of what was a small right pleural effusion.  2.  No other significant interval change.  3.  No evidence of acute cardiopulmonary disease.   4.  Left anterior chest wall cardiac device with lead tips in the right atrium and ventricle.

## 2025-03-23 NOTE — PROGRESS NOTES
Rn assessed chest tube. No air leaks. Closed water seal system. Drainage appears sersoanguineous. Minimal change in output since 1800

## 2025-03-24 ENCOUNTER — APPOINTMENT (OUTPATIENT)
Dept: GENERAL RADIOLOGY | Facility: CLINIC | Age: 75
DRG: 187 | End: 2025-03-24
Payer: MEDICARE

## 2025-03-24 LAB
ANION GAP SERPL CALCULATED.3IONS-SCNC: 10 MMOL/L (ref 7–15)
BUN SERPL-MCNC: 20.5 MG/DL (ref 8–23)
CALCIUM SERPL-MCNC: 8.7 MG/DL (ref 8.8–10.4)
CHLORIDE SERPL-SCNC: 105 MMOL/L (ref 98–107)
CREAT SERPL-MCNC: 0.9 MG/DL (ref 0.67–1.17)
EGFRCR SERPLBLD CKD-EPI 2021: 89 ML/MIN/1.73M2
ERYTHROCYTE [DISTWIDTH] IN BLOOD BY AUTOMATED COUNT: 13.7 % (ref 10–15)
GLUCOSE SERPL-MCNC: 93 MG/DL (ref 70–99)
HCO3 SERPL-SCNC: 23 MMOL/L (ref 22–29)
HCT VFR BLD AUTO: 26.6 % (ref 40–53)
HGB BLD-MCNC: 8.5 G/DL (ref 13.3–17.7)
MAGNESIUM SERPL-MCNC: 2.2 MG/DL (ref 1.7–2.3)
MCH RBC QN AUTO: 29.6 PG (ref 26.5–33)
MCHC RBC AUTO-ENTMCNC: 32 G/DL (ref 31.5–36.5)
MCV RBC AUTO: 93 FL (ref 78–100)
PHOSPHATE SERPL-MCNC: 3.2 MG/DL (ref 2.5–4.5)
PLATELET # BLD AUTO: 210 10E3/UL (ref 150–450)
POTASSIUM SERPL-SCNC: 4.1 MMOL/L (ref 3.4–5.3)
RBC # BLD AUTO: 2.87 10E6/UL (ref 4.4–5.9)
SODIUM SERPL-SCNC: 138 MMOL/L (ref 135–145)
WBC # BLD AUTO: 4.4 10E3/UL (ref 4–11)

## 2025-03-24 PROCEDURE — 36415 COLL VENOUS BLD VENIPUNCTURE: CPT

## 2025-03-24 PROCEDURE — 250N000013 HC RX MED GY IP 250 OP 250 PS 637: Performed by: SURGERY

## 2025-03-24 PROCEDURE — 71045 X-RAY EXAM CHEST 1 VIEW: CPT | Mod: 26 | Performed by: RADIOLOGY

## 2025-03-24 PROCEDURE — 250N000013 HC RX MED GY IP 250 OP 250 PS 637: Performed by: STUDENT IN AN ORGANIZED HEALTH CARE EDUCATION/TRAINING PROGRAM

## 2025-03-24 PROCEDURE — 250N000013 HC RX MED GY IP 250 OP 250 PS 637

## 2025-03-24 PROCEDURE — 71045 X-RAY EXAM CHEST 1 VIEW: CPT

## 2025-03-24 PROCEDURE — 85027 COMPLETE CBC AUTOMATED: CPT

## 2025-03-24 PROCEDURE — 250N000011 HC RX IP 250 OP 636: Performed by: SURGERY

## 2025-03-24 PROCEDURE — 120N000002 HC R&B MED SURG/OB UMMC

## 2025-03-24 PROCEDURE — 80048 BASIC METABOLIC PNL TOTAL CA: CPT

## 2025-03-24 PROCEDURE — 84100 ASSAY OF PHOSPHORUS: CPT

## 2025-03-24 PROCEDURE — 83735 ASSAY OF MAGNESIUM: CPT

## 2025-03-24 RX ADMIN — OXYCODONE HYDROCHLORIDE AND ACETAMINOPHEN 500 MG: 500 TABLET ORAL at 08:57

## 2025-03-24 RX ADMIN — ENOXAPARIN SODIUM 40 MG: 40 INJECTION SUBCUTANEOUS at 11:01

## 2025-03-24 RX ADMIN — AMIODARONE HYDROCHLORIDE 400 MG: 200 TABLET ORAL at 08:57

## 2025-03-24 RX ADMIN — PANTOPRAZOLE SODIUM 40 MG: 40 TABLET, DELAYED RELEASE ORAL at 08:57

## 2025-03-24 RX ADMIN — SENNOSIDES AND DOCUSATE SODIUM 2 TABLET: 50; 8.6 TABLET ORAL at 11:08

## 2025-03-24 ASSESSMENT — ACTIVITIES OF DAILY LIVING (ADL)
ADLS_ACUITY_SCORE: 35
DIFFICULTY_COMMUNICATING: NO
ADLS_ACUITY_SCORE: 35
ADLS_ACUITY_SCORE: 35
HEARING_DIFFICULTY_OR_DEAF: NO
WALKING_OR_CLIMBING_STAIRS_DIFFICULTY: NO
WEAR_GLASSES_OR_BLIND: YES
DIFFICULTY_EATING/SWALLOWING: NO
ADLS_ACUITY_SCORE: 35
ADLS_ACUITY_SCORE: 58
ADLS_ACUITY_SCORE: 35
DOING_ERRANDS_INDEPENDENTLY_DIFFICULTY: NO
ADLS_ACUITY_SCORE: 35
ADLS_ACUITY_SCORE: 35
TOILETING_ISSUES: NO
CHANGE_IN_FUNCTIONAL_STATUS_SINCE_ONSET_OF_CURRENT_ILLNESS/INJURY: NO
ADLS_ACUITY_SCORE: 35
FALL_HISTORY_WITHIN_LAST_SIX_MONTHS: NO
ADLS_ACUITY_SCORE: 35
CONCENTRATING,_REMEMBERING_OR_MAKING_DECISIONS_DIFFICULTY: NO
ADLS_ACUITY_SCORE: 35
ADLS_ACUITY_SCORE: 35
DRESSING/BATHING_DIFFICULTY: NO
ADLS_ACUITY_SCORE: 35

## 2025-03-24 NOTE — PLAN OF CARE
7PM TO 11PM. Pt is alert and oriented x4, AVSS, afebrile on RA on tele sustaining Paced rhythm. Chest tube in place right side to water seal. Up ind. Call light within reach

## 2025-03-24 NOTE — PLAN OF CARE
Goal Outcome Evaluation:      Plan of Care Reviewed With: patient    Overall Patient Progress: no change    Pt is alert and oriented x4. Has some soreness on his R Chest tube site. Pt denies nausea and tolerating diet well. Voiding well. Last BM 03/23. Up ad abe. Chest tube to water seal. Will continue with poc.

## 2025-03-24 NOTE — PROGRESS NOTES
Admitted/transferred from: ED at 0045  2 RN full   skin assessment completed by Joyce Carver, GURPREET and Joyce BRADFORD RN.  Skin assessment finding: skin intact, no problems except for R Chest tube and old surgical site   Interventions/actions: skin interventions dressing changed daily with chest tube and other  surgical site DINO, L 5th toe nail covered with band aid for protection.      Bedside Emergency Equipment Present:  Suction Regulator: Yes  Suction Canister: Yes  Tubing between Regulator and Canister: Yes  O2 Regulator with Tree: Yes  Ambu Bag: Yes

## 2025-03-24 NOTE — PROGRESS NOTES
"THORACIC SURGERY PROGRESS NOTE     S: Continues to feel that breathing is improved. Tolerating general diet.      O:  Patient Vitals for the past 24 hrs:   BP Temp Temp src Pulse Resp SpO2 Weight   03/24/25 0635 123/84 98.1  F (36.7  C) Oral 98 18 97 % --   03/24/25 0053 118/78 98.1  F (36.7  C) Oral -- 18 -- --   03/23/25 2238 -- 98.4  F (36.9  C) Oral -- 17 -- --   03/23/25 2000 -- 98.6  F (37  C) Oral -- 17 -- --   03/23/25 1954 120/70 98.6  F (37  C) Oral -- 17 -- --   03/23/25 0902 (!) 145/83 97  F (36.1  C) Oral 115 18 96 % --   03/23/25 0807 -- -- -- -- -- -- 61.2 kg (135 lb)        Physical Exam:    Gen- alert and oriented x3, no acute distress  Cardiac-mildly tachycardic    Pulm- normal respiratory effort, chest tube with 350 serous output in cannister  Abd- soft, nontender/nondistended no rebound or guarding  - deferred  Extremities- no peripheral edema  Neuro- gross motor intact  Skin- no obvious rashes, bruises, or cuts    Chest x-ray shows markedly improved pleural effusion       A/P: Shahid Davis is a 74 year old male with a history of high-grade B-cell lymphoma s/p chemotherapy, complete heart block s/p permanent pacemaker, and 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     -Continue chest tube to waterseal and monitor output  -Cardiology consult, recs appreciated - amiodarone ordered, will hold off on anticoagulation for now pending chest tube removal  -Encourage ambulation  -DVT prophylaxis: LNX      Atul Chaidez PA-C  Thoracic & Foregut Surgery    To page Thoracic Surgery team, click here: [facility]   Choose \"On Call\" tab at top, then use the search box for \"Thoracic\", then select the \"THORACIC SURGERY /[facility]\"    "

## 2025-03-25 ENCOUNTER — APPOINTMENT (OUTPATIENT)
Dept: GENERAL RADIOLOGY | Facility: CLINIC | Age: 75
DRG: 187 | End: 2025-03-25
Attending: PHYSICIAN ASSISTANT
Payer: MEDICARE

## 2025-03-25 ENCOUNTER — APPOINTMENT (OUTPATIENT)
Dept: GENERAL RADIOLOGY | Facility: CLINIC | Age: 75
DRG: 187 | End: 2025-03-25
Payer: MEDICARE

## 2025-03-25 VITALS
WEIGHT: 132.9 LBS | SYSTOLIC BLOOD PRESSURE: 125 MMHG | HEART RATE: 89 BPM | DIASTOLIC BLOOD PRESSURE: 75 MMHG | RESPIRATION RATE: 18 BRPM | OXYGEN SATURATION: 94 % | TEMPERATURE: 97.9 F | BODY MASS INDEX: 20.82 KG/M2

## 2025-03-25 LAB
ANION GAP SERPL CALCULATED.3IONS-SCNC: 9 MMOL/L (ref 7–15)
ATRIAL RATE - MUSE: 81 BPM
BUN SERPL-MCNC: 19.4 MG/DL (ref 8–23)
CALCIUM SERPL-MCNC: 8.5 MG/DL (ref 8.8–10.4)
CHLORIDE SERPL-SCNC: 106 MMOL/L (ref 98–107)
CREAT SERPL-MCNC: 0.94 MG/DL (ref 0.67–1.17)
DIASTOLIC BLOOD PRESSURE - MUSE: NORMAL MMHG
EGFRCR SERPLBLD CKD-EPI 2021: 85 ML/MIN/1.73M2
ERYTHROCYTE [DISTWIDTH] IN BLOOD BY AUTOMATED COUNT: 13.6 % (ref 10–15)
GLUCOSE SERPL-MCNC: 93 MG/DL (ref 70–99)
HCO3 SERPL-SCNC: 23 MMOL/L (ref 22–29)
HCT VFR BLD AUTO: 26.4 % (ref 40–53)
HGB BLD-MCNC: 8.4 G/DL (ref 13.3–17.7)
INTERPRETATION ECG - MUSE: NORMAL
MAGNESIUM SERPL-MCNC: 2.2 MG/DL (ref 1.7–2.3)
MCH RBC QN AUTO: 29 PG (ref 26.5–33)
MCHC RBC AUTO-ENTMCNC: 31.8 G/DL (ref 31.5–36.5)
MCV RBC AUTO: 91 FL (ref 78–100)
P AXIS - MUSE: 7 DEGREES
PHOSPHATE SERPL-MCNC: 3.1 MG/DL (ref 2.5–4.5)
PLATELET # BLD AUTO: 207 10E3/UL (ref 150–450)
POTASSIUM SERPL-SCNC: 4.1 MMOL/L (ref 3.4–5.3)
PR INTERVAL - MUSE: 196 MS
QRS DURATION - MUSE: 146 MS
QT - MUSE: 426 MS
QTC - MUSE: 494 MS
R AXIS - MUSE: -54 DEGREES
RBC # BLD AUTO: 2.9 10E6/UL (ref 4.4–5.9)
SODIUM SERPL-SCNC: 138 MMOL/L (ref 135–145)
SYSTOLIC BLOOD PRESSURE - MUSE: NORMAL MMHG
T AXIS - MUSE: 106 DEGREES
VENTRICULAR RATE- MUSE: 81 BPM
WBC # BLD AUTO: 3.7 10E3/UL (ref 4–11)

## 2025-03-25 PROCEDURE — 250N000013 HC RX MED GY IP 250 OP 250 PS 637

## 2025-03-25 PROCEDURE — 85014 HEMATOCRIT: CPT

## 2025-03-25 PROCEDURE — 71045 X-RAY EXAM CHEST 1 VIEW: CPT

## 2025-03-25 PROCEDURE — 80048 BASIC METABOLIC PNL TOTAL CA: CPT

## 2025-03-25 PROCEDURE — 83735 ASSAY OF MAGNESIUM: CPT

## 2025-03-25 PROCEDURE — 71045 X-RAY EXAM CHEST 1 VIEW: CPT | Mod: 77

## 2025-03-25 PROCEDURE — 84132 ASSAY OF SERUM POTASSIUM: CPT

## 2025-03-25 PROCEDURE — 71045 X-RAY EXAM CHEST 1 VIEW: CPT | Mod: 26 | Performed by: RADIOLOGY

## 2025-03-25 PROCEDURE — 93005 ELECTROCARDIOGRAM TRACING: CPT

## 2025-03-25 PROCEDURE — 84100 ASSAY OF PHOSPHORUS: CPT

## 2025-03-25 PROCEDURE — 250N000013 HC RX MED GY IP 250 OP 250 PS 637: Performed by: STUDENT IN AN ORGANIZED HEALTH CARE EDUCATION/TRAINING PROGRAM

## 2025-03-25 PROCEDURE — 99232 SBSQ HOSP IP/OBS MODERATE 35: CPT | Mod: 24 | Performed by: NURSE PRACTITIONER

## 2025-03-25 PROCEDURE — 250N000011 HC RX IP 250 OP 636: Performed by: SURGERY

## 2025-03-25 PROCEDURE — 0WP930Z REMOVAL OF DRAINAGE DEVICE FROM RIGHT PLEURAL CAVITY, PERCUTANEOUS APPROACH: ICD-10-PCS | Performed by: STUDENT IN AN ORGANIZED HEALTH CARE EDUCATION/TRAINING PROGRAM

## 2025-03-25 PROCEDURE — 250N000013 HC RX MED GY IP 250 OP 250 PS 637: Performed by: SURGERY

## 2025-03-25 PROCEDURE — 36415 COLL VENOUS BLD VENIPUNCTURE: CPT

## 2025-03-25 RX ORDER — AMIODARONE HYDROCHLORIDE 400 MG/1
400 TABLET ORAL DAILY
Qty: 56 TABLET | Refills: 0 | Status: SHIPPED | OUTPATIENT
Start: 2025-03-26 | End: 2025-05-21

## 2025-03-25 RX ADMIN — OXYCODONE HYDROCHLORIDE AND ACETAMINOPHEN 500 MG: 500 TABLET ORAL at 07:48

## 2025-03-25 RX ADMIN — ENOXAPARIN SODIUM 40 MG: 40 INJECTION SUBCUTANEOUS at 10:33

## 2025-03-25 RX ADMIN — AMIODARONE HYDROCHLORIDE 400 MG: 200 TABLET ORAL at 07:48

## 2025-03-25 RX ADMIN — PANTOPRAZOLE SODIUM 40 MG: 40 TABLET, DELAYED RELEASE ORAL at 07:48

## 2025-03-25 ASSESSMENT — ACTIVITIES OF DAILY LIVING (ADL)
ADLS_ACUITY_SCORE: 35

## 2025-03-25 NOTE — PROGRESS NOTES
Electrophysiology Consult Service  Follow up Note   Date of Service: 3/25/2025     S: We continue to follow this patient for post operative AF. He has been on amiodarone. ECG today shows AV paced now. VSS. He is ready to discharge per primary team. Renal function and electrolytes stable.   HPI:  Mr. Davis is a 74 year old male who has a medical history significant for CHB s/p PPM 2020, post operative AF/AFL, high grade B-cell lymphoma, adenoid cystic carcinoma or distal trachea s/p thoracotomy and temporary centra VA ECMO 2/28/25, pyloric stenosis, and GERD.    He had a right sided thoracotomy and temporary central VA-ECMO (2/28/2025) procedure for dissection of his distal trachea. Since his operation he has had both atrial fibrillation (detected by his device) and flutter. Device was placed in 2020 for complete heart block, does not appear that he has had any sort of device detected AF/AFL prior.   O:   Vitals: /75 (BP Location: Left arm)   Pulse 89   Temp 97.9  F (36.6  C) (Oral)   Resp 18   Wt 60.3 kg (132 lb 14.4 oz)   SpO2 94%   BMI 20.82 kg/m    A&O, NAD  Resp non-labored  Distal extremities warm    Data:  Labs:  BMP  Recent Labs   Lab 03/25/25  0652 03/24/25  0706 03/23/25  0727 03/22/25  1408    138 138 136   POTASSIUM 4.1 4.1 4.8 4.8   CHLORIDE 106 105 105 102   JORGE 8.5* 8.7* 8.8 9.1   CO2 23 23 23 21*   BUN 19.4 20.5 23.9* 26.1*   CR 0.94 0.90 1.00 1.04   GLC 93 93 98 111*     CBC  Recent Labs   Lab 03/25/25  0652 03/24/25  0706 03/23/25  0727 03/22/25  1408   WBC 3.7* 4.4 7.7 15.2*   RBC 2.90* 2.87* 3.05* 3.36*   HGB 8.4* 8.5* 8.9* 9.8*   HCT 26.4* 26.6* 28.1* 31.5*   MCV 91 93 92 94   MCH 29.0 29.6 29.2 29.2   MCHC 31.8 32.0 31.7 31.1*   RDW 13.6 13.7 13.9 13.5    210 258 372     INR  Recent Labs   Lab 03/22/25  1408   INR 1.10       EKG:       Meds per [facility] EMR:  Current Facility-Administered Medications   Medication Dose Route Frequency Provider Last Rate Last Admin     acetaminophen (TYLENOL) tablet 650 mg  650 mg Oral Q4H PRN Faith Naik, DO   650 mg at 03/23/25 2004    Or    acetaminophen (TYLENOL) Suppository 650 mg  650 mg Rectal Q4H PRN Faith Naik, DO        amiodarone (PACERONE) tablet 400 mg  400 mg Oral Daily Mari Pagan MD   400 mg at 03/25/25 0748    enoxaparin ANTICOAGULANT (LOVENOX) injection 40 mg  40 mg Subcutaneous Q24H Mari Pagan MD   40 mg at 03/25/25 1033    lidocaine (LMX4) cream   Topical Q1H PRN Faith Naik, DO        lidocaine (PF) (XYLOCAINE) 1 % injection 20 mL  20 mL Intradermal Once Neida Franco MD        lidocaine 1 % 0.1-1 mL  0.1-1 mL Other Q1H PRN Faith Naik, DO        methocarbamol (ROBAXIN) tablet 500 mg  500 mg Oral Q8H PRN Faith Naik, DO        pantoprazole (PROTONIX) EC tablet 40 mg  40 mg Oral Nataliya Jones MD   40 mg at 03/25/25 0748    senna-docusate (SENOKOT-S/PERICOLACE) 8.6-50 MG per tablet 1 tablet  1 tablet Oral BID PRN Faith Niak,         Or    senna-docusate (SENOKOT-S/PERICOLACE) 8.6-50 MG per tablet 2 tablet  2 tablet Oral BID PRN Faith Naik, DO   2 tablet at 03/24/25 1108    sodium chloride (PF) 0.9% PF flush 3 mL  3 mL Intracatheter Q8H AMANDA Faith Naik, DO   3 mL at 03/25/25 0631    sodium chloride (PF) 0.9% PF flush 3 mL  3 mL Intracatheter q1 min prn Faith Naik,         vitamin C (ASCORBIC ACID) tablet 500 mg  500 mg Oral QAM Faith Naik, DO   500 mg at 03/25/25 0748     7/13/23 Echo:  Interpretation Summary  Left ventricular function is normal.The ejection fraction is 55-60%. There is  mild hyookinesis of the apical septal and apical inferior segments.  Global right ventricular function is normal. The right ventricle is normal  size.  No significant valvular abnormalities.  The estimated PA systolic pressure is 24 mmHg.  IVC diameter <2.1 cm collapsing >50% with sniff suggests a normal RA pressure  of 3 mmHg.  This study was compared with the study from 12/15/2022. The wall motion  abnormalities  appear in some views from the prior study, but they are better  delineated in today's evaluation due to the use of contrast.    A:   Mr. Davis is a 74 year old male who has a medical history significant for CHB s/p PPM 2020, post operative AF/AFL, high grade B-cell lymphoma, adenoid cystic carcinoma or distal trachea s/p thoracotomy and temporary centra VA ECMO 2/28/25, pyloric stenosis, and GERD.    He had a right sided thoracotomy and temporary central VA-ECMO (2/28/2025) procedure for dissection of his distal trachea. Since his operation he has had both atrial fibrillation (detected by his device) and flutter. Device was placed in 2020 for complete heart block, does not appear that he has had any sort of device detected AF/AFL prior. We continue to follow this patient for post operative AF. He has been on amiodarone. ECG today shows AV paced now. VSS. He is ready to discharge per primary team. Renal function and electrolytes stable.   EP recommendations:   Post Operative Atrial Fibrillation:   1. Stroke Prophylaxis: CHADSVASC ++age 2, corresponding to a 2.2% annual stroke / systemic embolism event rate. Would consider anticoagulation if recurrent AF outside of post operative setting.   2. Rate Control: intrinsically well rate controlled give CHB.   3. Rhythm Control: Cardioversion, Antiarrhythmics and/or ablation are options for rhythm control. He developed post operative AF and was placed on amiodarone. He has now converted to AV paced rhythm. He can continue amiodarone 200 mg daily for 8 weeks and then stop.   4. Risk Factor Management: Statin, BP control, and SASHA evaluation as indicated.   Thank you much for allowing us to participate in the care of this pleasant patient.   FARSHAD More CNP  Electrophysiology Consult Service  Securely message with [employer]   Text page via [employer]     JOSE MARTIN Total time spent on patient visit, reviewing notes, imaging, labs, orders, and completing necessary  documentation: 40 minutes.  >50% of visit spent on counseling patient and/or coordination of care.

## 2025-03-25 NOTE — PLAN OF CARE
Nursing Care Plan Note:    Assumed care 0700 to 2330  /72 (BP Location: Left arm)   Pulse 82   Temp 98.9  F (37.2  C) (Oral)   Resp 16   Wt 60.3 kg (133 lb)   SpO2 97%   BMI 20.83 kg/m      Active/Admitting Problems:  R pleural effusion   Pt rounded on hourly  Wilbur:  alert and oriented x4  Pain:  minimal at chest tube site, pt manages pain with rest, repositioning, and walking   GI/:  Denies nausea. Bowel sounds present. Good urine output clear yellow urine. Pt had BM 3/24.   How Pt Takes Meds:  oral  Cardiac:  tachycardic, pt start on amiodarone   Respiratory:  denies SOB lung sounds clear bilaterally  Skin:  clean dry and intact old Chest tube site on R chest  Lines:  PIV saline locked. Pt has R chest tube to water seal clear yellow output   Activity/mobility: Independent with IV pole    Events:  Pt went on 3 walks this shift. Chest tube dressing changed this morning due to leaking.   Plan:  monitor chest tube     Call light in reach, Pt able to make needs known, Will continue to monitor and follow plan of care.     Goal Outcome Evaluation:      Plan of Care Reviewed With: patient    Overall Patient Progress: improvingOverall Patient Progress: improving    Outcome Evaluation: Pt has minimal pain with chest tube, chest tube site leaking in the morning Dressing was changed. Pt went on several walks this shift

## 2025-03-25 NOTE — PLAN OF CARE
/72 (BP Location: Left arm)   Pulse 82   Temp 98.9  F (37.2  C) (Oral)   Resp 16   Wt 60.3 kg (133 lb)   SpO2 97%   BMI 20.83 kg/m     Time: 5144-5328   Reason for admission: R Pleural effusion   Activity: independent.   Pain: no c/o pain or discomfort.   Neuro: alert and oriented,   Cardiac: WDL  Resp: Patient is on RA, lung sounds RUL and RLL diminished, JUAQUIN and LLL clear.    GI/: on regular diet, voids without difficulties, bowel sounds present x four quadrants.   Lines: PIV, saline locked, R CT to water seal.    Skin: R CT   Labs/Imaging: no lab or imaging this shift.   New changes to shift: no change.  Plan: continue with current plan of cares.

## 2025-03-25 NOTE — PROGRESS NOTES
THORACIC SURGERY PROGRESS NOTE     S: Feels well this morning. Tolerating general diet. Amenable to discharge home if appropriate.     O:  Patient Vitals for the past 24 hrs:   BP Temp Temp src Pulse Resp SpO2 Weight   03/25/25 0921 -- -- -- -- -- -- 60.3 kg (132 lb 14.4 oz)   03/25/25 0624 125/75 97.9  F (36.6  C) Oral 89 18 94 % --   03/24/25 2239 133/72 98.9  F (37.2  C) Oral 82 16 97 % --   03/24/25 1436 123/77 98.2  F (36.8  C) Oral 83 16 95 % --        Physical Exam:    Gen- alert and oriented, no acute distress  Cardiac-normotensive. Normal rate. Regular pulse.  Pulm- normal respiratory effort, chest tube with serous output in cannister  Abd- soft, nontender/nondistended no rebound or guarding  - deferred  Extremities- no peripheral edema  Neuro- gross motor intact  Skin- no obvious rashes, bruises, or cuts    XR Chest Port 1 View  Result Date: 3/25/2025  Impression: 1. No definitive pneumothorax status post right chest tube removal. 2. Similar streaky perihilar and bibasilar opacities, likely atelectasis/edema. 3. Persistent mild subcutaneous emphysema along the right chest wall.       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     - Remove Chest tube today. Will get a follow up XR after.  - Cardiology consult, recs appreciated - amiodarone ordered, will hold off on anticoagulation for now pending chest tube removal. Cardiology paged for discharge recs.  -Encourage ambulation  - Likely discharge today, pending cardiology approval  - DVT prophylaxis: LNX    Addendum: Discussed with EP. Plan for EKG to evaluate if still in afib. If he  is, will plan for anticoagulation and cardioversion this admission. If not, he can discharge today with a 8wk course of PO amiodarone.    Jose Raul Ybarra MD   General Surgery Resident

## 2025-03-25 NOTE — PLAN OF CARE
Pt. discharged at 3:30 to home, was accompanied by his partner, and left with personal belongings. Pt. received complete discharge paperwork and medications as filled by discharge pharmacy. Pt. was given times of last dose for all discharge medications in writing on discharge medication sheets. Discharge teaching included  medication instructions, pain management, activity restrictions, dressing changes, and signs and symptoms of infection. Dressing supplies sent home. Pt. to follow up on 3/31 for a post op appointment and xray. Pt. had no further questions at the time of discharge and no unmet needs were identified.

## 2025-03-25 NOTE — DISCHARGE SUMMARY
NAME: Shahid Davis   MRN: [id number]   : 1950     DATE OF ADMISSION: 3/22/2025     ADMISSION DIAGNOSES: Right pleural effusion, post operative    PROCEDURES PERFORMED: Right pleural tube placement    PATHOLOGY RESULTS: None     CULTURE RESULTS: None     POSTOPERATIVE MEDICAL ISSUES: Afib     Clinically Significant Risk Factors                                  # Financial/Environmental Concerns:     # Pacemaker present         DRAINS/TUBES PRESENT AT DISCHARGE: dressing changes    DATE OF DISCHARGE:  3/25/2025    HOSPITAL COURSE: Shahid Davis is a 74 year old male who on 3/22/2025 presented to ED with SOB and found to have a right sided effusion. He is s/p right thoracotomy with trachel resection on 2025. A recent pacer interrogation also noted an 8hr episode of afib. A pleural tube was placed with resolution of the effusion. The remainder of his course was essentially uncomplicated.  Prior to discharge he was in normal paced rhythm, Cardiology was consulted and recommended 8 wk course of amiodarone. On 3/25/2025, he was discharged home in stable condition.    DISCHARGE EXAM:   A&O, NAD  Resp non-labored  Distal extremities warm    Incisions CDI   Pleural tube site without concern    DISCHARGE INSTRUCTIONS:  Discharge Procedure Orders   Reason for your hospital stay   Order Comments: Effusion     Activity   Order Comments: Your activity upon discharge: activity as tolerated     Order Specific Question Answer Comments   Is discharge order? Yes      ADULT [facility] Specialty Follow-up and recommended labs and tests   Order Comments: 1.) Follow up with primary care physician, Haleigh Montero, in 1-2 weeks.  2.) Follow up with Thoracic Surgery on 3/31/2025 with imaging prior, details below:    3/31/2025:  XR GENERAL XRAY  10:40 AM  (20 min.)  [id number]  [facility] [place]    RETURN CCSL  11:00 AM  (30 min.)  Alva Ozuna APRN [facility]        Appointments on [place] and/or [facility] (with [facility] or [facility] provider or service). Call [phone] if you haven't heard regarding these appointments within 7 days of discharge.     Discharge Instructions   Order Comments: THORACIC SURGERY DISCHARGE INSTRUCTIONS    DIET: Regular diet - as prior to admission     If your plans upon discharge include prolonged periods of sitting (i.e a lengthy car or plane ride), it is highly beneficial to get up and walk at least once per hour to help prevent swelling and blood clots.     You may remove chest tube dressing 48 hours after tube removal and bandage the site at your own discretion thereafter.  Small amounts of leakage are normal for 2-3 days after removal.  Feel free to call with questions.    You may get incision wet 2 days after operation. Do not submerge, soak, or scrub incision or swim until seen in follow-up.    Take incentive spirometer home for continued frequent use    Activity as tolerated, no strenous activity until seen in follow-up, no lifting greater than 20 pounds for the next 2 weeks.    Stay hydrated. Take over the counter fiber (metamucil or benefiber) and stool softeners (Miralax, docusate or senna) if becoming constipated.     Call for fever greater than 101.5, chills, increased size of incision, red skin around incision, vision changes, muscle strength changes, sensation changes, shortness of breath, or other concerns.    No driving while taking narcotic pain medication.    Transition to ibuprofen or tylenol/acetaminophen for pain control. Do not take tylenol/acetaminophen and acetaminophen containing narcotic (e.g., percocet or vicodin) at the same time. If you have known ulcer problems, or kidney trouble (elevated creatinine) do not take the ibuprofen.    If you think you will need a refill on your pain medications, you should call the thoracic surgery team at the number below at least 24hrs prior to running  "out.  It is also more difficult to provide refills Friday afternoon and over the weekend, so call before those times if possible.     In emergencies, call 911    For other Questions or Concerns;   A.) During weekday working hours (Monday through Friday 8am to 4:30pm)   call [phone] ([phone] and ask to speak to a thoracic surgery nurse (RN or LPN).     B.) At nights (after 4:30pm), on weekends, or if urgent call [phone] and   tell the  \"I would like to page job code [phone], the thoracic surgery   fellow on call, please.\"     Diet   Order Comments: Follow this diet upon discharge: Current Diet:Orders Placed This Encounter      Combination Diet Regular Diet Adult     Order Specific Question Answer Comments   Is discharge order? Yes        DISCHARGE MEDICATIONS:   Current Discharge Medication List        START taking these medications    Details   amiodarone (PACERONE) 400 MG tablet Take 1 tablet (400 mg) by mouth daily.  Qty: 56 tablet, Refills: 0    Associated Diagnoses: Tracheal cancer (H)           CONTINUE these medications which have CHANGED    Details   wound support modular (EXPEDITE) LIQD bottle Take 60 mLs by mouth daily.  Qty: 840 mL, Refills: 0    Associated Diagnoses: Tracheal cancer (H)           CONTINUE these medications which have NOT CHANGED    Details   acetaminophen (TYLENOL) 325 MG tablet Take 3 tablets (975 mg) by mouth or Feeding Tube every 8 hours.    Associated Diagnoses: Tracheal cancer (H)      ascorbic acid (VITAMIN C) 500 MG tablet Take 500 mg by mouth every morning      methocarbamol (ROBAXIN) 500 MG tablet Take 1 tablet (500 mg) by mouth every 8 hours as needed for muscle spasms.  Qty: 20 tablet, Refills: 0    Associated Diagnoses: Tracheal cancer (H)      Multiple Vitamins-Minerals (MULTIVITAL PO) Take 1 tablet by mouth every morning.      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  Qty: 40 tablet, Refills: 0    Associated Diagnoses: Tracheal cancer (H)      !! ondansetron (ZOFRAN) 4 MG tablet Take 2 tablets (8 mg) by mouth every 8 hours as needed for nausea.  Qty: 12 tablet, Refills: 0    Associated Diagnoses: Adverse effect of antineoplastic and immunosuppressive drugs, sequela      oxyCODONE (ROXICODONE) 5 MG tablet Take 1 tablet (5 mg) by mouth every 4 hours as needed for moderate pain. Wean narcotic use as tolerated starting at time of discharge  Qty: 50 tablet, Refills: 0    Associated Diagnoses: Tracheal cancer (H)       !! - Potential duplicate medications found. Please discuss with provider.

## 2025-03-26 ENCOUNTER — PATIENT OUTREACH (OUTPATIENT)
Dept: CARE COORDINATION | Facility: CLINIC | Age: 75
End: 2025-03-26
Payer: MEDICARE

## 2025-03-26 ENCOUNTER — PATIENT OUTREACH (OUTPATIENT)
Dept: SURGERY | Facility: CLINIC | Age: 75
End: 2025-03-26
Payer: MEDICARE

## 2025-03-26 ENCOUNTER — TELEPHONE (OUTPATIENT)
Dept: CARDIOLOGY | Facility: CLINIC | Age: 75
End: 2025-03-26
Payer: MEDICARE

## 2025-03-26 DIAGNOSIS — C33 TRACHEAL CANCER (H): ICD-10-CM

## 2025-03-26 RX ORDER — AMIODARONE HYDROCHLORIDE 400 MG/1
400 TABLET ORAL DAILY
Qty: 56 TABLET | Refills: 0 | OUTPATIENT
Start: 2025-03-26

## 2025-03-26 NOTE — TELEPHONE ENCOUNTER
Shahid calls he is unable to get amiodarone. He states that the prescriber is not licensed in [facility] and even though they are sending to [place] his insurance will not pay for the medication because he is on a [place] senior insurance plan.     Is there anyone in thoracic oncology that could help the patient by sending in a new prescription for amiodarone.     Also could not get wound care

## 2025-03-26 NOTE — TELEPHONE ENCOUNTER
Left Voicemail (1st Attempt) for the patient to call back and schedule the following:    Appointment type: rtn cardio   Provider: enrrique   Return date: 03/27/25  Specialty phone number: [phone] opt 1   Additional appointment(s) needed: fasting labs   Additonal Notes: n/a

## 2025-03-26 NOTE — PROGRESS NOTES
Called patient to check in with him since he was discharged yesterday. I also wanted to see if he was able to get Amiodarone filled at his local pharmacy and where he would like to be treated for adenoid cystic carcinoma. Left a voicemail with call back information.

## 2025-03-26 NOTE — PROGRESS NOTES
[facility]    Background: Transitional Care Management program identified per system criteria and reviewed by [facility] team for possible outreach.    Assessment: Upon chart review, CCR Team member will not proceed with patient outreach related to this episode of Transitional Care Management program due to reason below:    Patient has active communication with a nurse, provider or care team for reason of post-hospital follow up plan.  Outreach call by CCR team not indicated to minimize duplicative efforts.     Plan: Transitional Care Management episode addressed appropriately per reason noted above.          RAFAEL Das  [phone]  [facility]

## 2025-03-26 NOTE — TELEPHONE ENCOUNTER
Medication:Amiodarone   Last prescribing provider:Denzel Arellano PA-C, pt called in stating the his local pharmacy will not dispense because insurance is saying that provider who prescribed is NOT licensed to dispense in [place].   Last clinic visit date: ED to admission on 3/22/2025 for PE on right.   Recommendations for requested medication:for atrial fibrillation, medication initiated by thoracic  Any other pertinent information:routed to thoracic care team.

## 2025-03-27 ENCOUNTER — PATIENT OUTREACH (OUTPATIENT)
Dept: SURGERY | Facility: CLINIC | Age: 75
End: 2025-03-27
Payer: MEDICARE

## 2025-03-27 DIAGNOSIS — C33 TRACHEAL CANCER (H): Primary | ICD-10-CM

## 2025-03-27 LAB
BACTERIA BLD CULT: NORMAL
BACTERIA BLD CULT: NORMAL

## 2025-03-27 RX ORDER — AMIODARONE HYDROCHLORIDE 400 MG/1
400 TABLET ORAL DAILY
Qty: 56 TABLET | Refills: 0 | Status: SHIPPED | OUTPATIENT
Start: 2025-03-27 | End: 2025-05-22

## 2025-03-27 NOTE — PROGRESS NOTES
I was able to speak with Shahid. His cell phone all of a sudden stopped working on Saturday and was admitted in the hospital until Tuesday 3/25. He is having a problem with his Amiodarone prescription that was ordered by a PA-C before discharging from the hospital. The PA-C is not covered under his insurance- [employer]. He has gotten other prescriptions from [facility]. Amiodarone was resent to the pharmacy under [facility] and his insurance approved. He will pick his Rx up this afternoon.    I also wanted to confirm with Shahid where he was going to go to receive radiation treatment. He has chosen to go to [facility] since it is closer to him. [facility] had called to set up an appointment but he was too sick to schedule. Shahid plans to call them tomorrow to set up a consult. He appreciated the call and had no further questions.  
18-Feb-2019 11:24

## 2025-03-28 LAB
BACTERIA BLD CULT: NO GROWTH
BACTERIA BLD CULT: NO GROWTH

## 2025-04-04 ENCOUNTER — ANCILLARY PROCEDURE (OUTPATIENT)
Dept: GENERAL RADIOLOGY | Facility: CLINIC | Age: 75
End: 2025-04-04
Attending: CLINICAL NURSE SPECIALIST
Payer: MEDICARE

## 2025-04-04 DIAGNOSIS — C33 TRACHEAL CANCER (H): ICD-10-CM

## 2025-04-04 PROCEDURE — 71046 X-RAY EXAM CHEST 2 VIEWS: CPT | Performed by: STUDENT IN AN ORGANIZED HEALTH CARE EDUCATION/TRAINING PROGRAM

## 2025-04-07 ENCOUNTER — TELEPHONE (OUTPATIENT)
Dept: CARDIOLOGY | Facility: CLINIC | Age: 75
End: 2025-04-07
Payer: MEDICARE

## 2025-04-07 ENCOUNTER — PATIENT OUTREACH (OUTPATIENT)
Dept: SURGERY | Facility: CLINIC | Age: 75
End: 2025-04-07
Payer: MEDICARE

## 2025-04-07 NOTE — PROGRESS NOTES
I spoke to Shahid to let him know I spoke to [place] and cardiology. Shahid stated he spoke to [place] last week and made an appointment with radiation oncology Wednesday this week. I gave him the phone number to call to make an appointment with cardiology. He did not have any other questions or concerns.

## 2025-04-07 NOTE — TELEPHONE ENCOUNTER
Patient Contacted for the patient to call back and schedule the following:    Appointment type:  TALA   Provider: PATY   Return date: 04/14/25  Specialty phone number: [phone] OPT 1   Additional appointment(s) needed: N/A   Additonal Notes: N/A

## 2025-04-11 ENCOUNTER — PATIENT OUTREACH (OUTPATIENT)
Dept: SURGERY | Facility: CLINIC | Age: 75
End: 2025-04-11
Payer: MEDICARE

## 2025-04-11 NOTE — PROGRESS NOTES
Timothy left a voicemail stating he saw radiation oncology at [facility] and [facility] to make sure everything is healing post-operatively. I spoke to timothy to let him know I will reach out to the IP team to see when this could be scheduled and give him a call with an update. He starts radiation on 5/19.    Update: IP is able to schedule a bronchoscopy in the next 2-3 weeks. They will reach out to patient to schedule. I called Timothy and had to leave a voicemail updating him that IP will call him to schedule the bronchoscopy. I left my call back information if he has any further questions.

## 2025-04-14 ENCOUNTER — DOCUMENTATION ONLY (OUTPATIENT)
Dept: PULMONOLOGY | Facility: CLINIC | Age: 75
End: 2025-04-14

## 2025-04-14 ENCOUNTER — TELEPHONE (OUTPATIENT)
Dept: PULMONOLOGY | Facility: CLINIC | Age: 75
End: 2025-04-14

## 2025-04-14 ENCOUNTER — ANCILLARY PROCEDURE (OUTPATIENT)
Dept: CARDIOLOGY | Facility: CLINIC | Age: 75
End: 2025-04-14
Attending: INTERNAL MEDICINE
Payer: MEDICARE

## 2025-04-14 VITALS
DIASTOLIC BLOOD PRESSURE: 69 MMHG | HEART RATE: 80 BPM | BODY MASS INDEX: 21.14 KG/M2 | WEIGHT: 135 LBS | SYSTOLIC BLOOD PRESSURE: 119 MMHG | OXYGEN SATURATION: 100 %

## 2025-04-14 DIAGNOSIS — I48.0 PAF (PAROXYSMAL ATRIAL FIBRILLATION) (H): Primary | ICD-10-CM

## 2025-04-14 DIAGNOSIS — C80.1 ADENOID CYSTIC CARCINOMA (H): ICD-10-CM

## 2025-04-14 DIAGNOSIS — C34.90 MALIGNANT NEOPLASM OF LUNG, UNSPECIFIED LATERALITY, UNSPECIFIED PART OF LUNG (H): Primary | ICD-10-CM

## 2025-04-14 DIAGNOSIS — I44.2 ATRIOVENTRICULAR BLOCK, COMPLETE (H): ICD-10-CM

## 2025-04-14 DIAGNOSIS — Z01.818 PREOP EXAMINATION: ICD-10-CM

## 2025-04-14 DIAGNOSIS — Z95.0 PACEMAKER: ICD-10-CM

## 2025-04-14 LAB
ATRIAL RATE - MUSE: 79 BPM
DIASTOLIC BLOOD PRESSURE - MUSE: NORMAL MMHG
INTERPRETATION ECG - MUSE: NORMAL
P AXIS - MUSE: -4 DEGREES
PR INTERVAL - MUSE: 202 MS
QRS DURATION - MUSE: 150 MS
QT - MUSE: 432 MS
QTC - MUSE: 495 MS
R AXIS - MUSE: -51 DEGREES
SYSTOLIC BLOOD PRESSURE - MUSE: NORMAL MMHG
T AXIS - MUSE: 102 DEGREES
VENTRICULAR RATE- MUSE: 79 BPM

## 2025-04-14 PROCEDURE — 99214 OFFICE O/P EST MOD 30 MIN: CPT | Mod: 24 | Performed by: INTERNAL MEDICINE

## 2025-04-14 PROCEDURE — 3078F DIAST BP <80 MM HG: CPT | Performed by: INTERNAL MEDICINE

## 2025-04-14 PROCEDURE — 93005 ELECTROCARDIOGRAM TRACING: CPT

## 2025-04-14 PROCEDURE — 3074F SYST BP LT 130 MM HG: CPT | Performed by: INTERNAL MEDICINE

## 2025-04-14 PROCEDURE — 1125F AMNT PAIN NOTED PAIN PRSNT: CPT | Performed by: INTERNAL MEDICINE

## 2025-04-14 PROCEDURE — 93280 PM DEVICE PROGR EVAL DUAL: CPT | Performed by: INTERNAL MEDICINE

## 2025-04-14 PROCEDURE — G0463 HOSPITAL OUTPT CLINIC VISIT: HCPCS | Performed by: INTERNAL MEDICINE

## 2025-04-14 ASSESSMENT — PAIN SCALES - GENERAL: PAINLEVEL_OUTOF10: MODERATE PAIN (4)

## 2025-04-14 NOTE — PROGRESS NOTES
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 This was incidentally found on a surveillance scan for high-grade B-cell lymphoma (status post chemotherapy December 2022).  Mr. Davis underwent endobronchial tissue biopsy and debulking on 12/16/24. He had a right sided thoracotomy and temporary central VA-ECMO (2/28/2025) procedure for dissection of his distal trachea.     His cardiac history is notable for CHB s/p PPM 2020. Since his operation he has had both atrial fibrillation (detected by his device) and flutter. He did not have any sort of device detected AF/AFL prior to this recent diagnosis. He is not on anticoagulation. He was recommended amiodarone for 8 weeks. No MI or CHF. Cardiac function is normal. No significant valve disease.     Getting over a cold  Feels dizziness with posture change.   While on amiodarone, he felt wiped out and stopped the medication  Proton radiation scheduled for 4/28/25     Patient reports stable right sided chest pain at the incision site. No orthopnea, paroxysmal nocturnal dyspnea, palpitations or syncope.      Current Outpatient Medications   Medication Sig Dispense Refill    acetaminophen (TYLENOL) 325 MG tablet Take 3 tablets (975 mg) by mouth or Feeding Tube every 8 hours.      ascorbic acid (VITAMIN C) 500 MG tablet Take 500 mg by mouth every morning      Multiple Vitamins-Minerals (MULTIVITAL PO) Take 1 tablet by mouth every morning.      omeprazole (PRILOSEC) 40 MG DR capsule Take 1 capsule (40 mg) by mouth daily 90 capsule 4    amiodarone (PACERONE) 400 MG tablet Take 1 tablet (400 mg) by mouth daily. (Patient not taking: Reported on 4/14/2025) 56 tablet 0    amiodarone (PACERONE) 400 MG tablet Take 1 tablet (400 mg) by mouth daily. (Patient not taking: Reported on 4/14/2025) 56 tablet 0    methocarbamol (ROBAXIN) 500 MG tablet Take 1 tablet (500 mg) by mouth every 8 hours as needed for muscle  spasms. (Patient not taking: Reported on 4/14/2025) 20 tablet 0    ondansetron (ZOFRAN) 4 MG tablet Take 2 tablets (8 mg) by mouth every 8 hours as needed for nausea. (Patient not taking: Reported on 4/14/2025) 12 tablet 0    ondansetron (ZOFRAN) 8 MG tablet Take 1 tablet (8 mg) by mouth every 8 hours as needed for nausea (Patient not taking: Reported on 4/14/2025) 60 tablet 3    oxyCODONE (ROXICODONE) 5 MG tablet Take 1 tablet (5 mg) by mouth every 4 hours as needed for moderate pain. Wean narcotic use as tolerated starting at time of discharge (Patient not taking: Reported on 4/14/2025) 50 tablet 0    polyethylene glycol (MIRALAX) 17 GM/Dose powder Take 17 g by mouth daily as needed for constipation (Patient not taking: Reported on 4/14/2025) 510 g 4    prochlorperazine (COMPAZINE) 10 MG tablet Take 1 tablet (10 mg) by mouth every 6 hours as needed for nausea or vomiting (Patient not taking: Reported on 4/14/2025) 30 tablet 3    senna-docusate (SENOKOT-S/PERICOLACE) 8.6-50 MG tablet Take 2 tablets by mouth or Feeding Tube 2 times daily. (Patient not taking: Reported on 4/14/2025) 40 tablet 0    wound support modular (EXPEDITE) LIQD bottle Take 60 mLs by mouth daily. (Patient not taking: Reported on 4/14/2025) 840 mL 0       Allergies - reviewed     Allergies   Allergen Reactions    Ampicillin [Ampicillin Sodium]     Bactrim [Sulfamethoxazole W/Trimethoprim]      Patient unsure of reaction     Contrast Dye      CT contrast (IV) allergy - need oral Methylpred as premed for scans    Ampicillin Rash    Morphine Nausea       Past history -reviewed  Past Medical History:   Diagnosis Date    Cardiac pacemaker in situ     2020    GERD (gastroesophageal reflux disease)     Lymphoma (H)     Pyloric stenosis, congenital (H)     Squamous cell carcinoma     Tracheal cancer (H)         Social history - reviewed  Social History     Tobacco Use    Smoking status: Former     Passive exposure: Past    Smokeless tobacco: Never     Tobacco comments:     Quit at age 19   Substance Use Topics    Alcohol use: Yes     Alcohol/week: 11.7 standard drinks of alcohol     Types: 14 drink(s) per week     Comment: 1-2 beers a night    Drug use: Never       Family history -reviewed  Family History   Problem Relation Age of Onset    Cancer Mother         Blood    Cancer Father         Lung    Cancer Maternal Grandmother         Breast    Cancer Paternal Grandfather         Hodgkins    Anesthesia Reaction No family hx of     Bleeding Disorder No family hx of     Clotting Disorder No family hx of        ROS: non contributory on the 10-point review of system    Exam:     /69 (BP Location: Right arm, Patient Position: Chair, Cuff Size: Adult Regular)   Pulse 80   Wt 61.2 kg (135 lb)   SpO2 100%   BMI 21.14 kg/m    In general, the patient is in no apparent distress.      HEENT: Sclerae white, not injected.    Neck: No JVD. No thyromegaly  Heart: RRR. Normal S1, S2. No murmur, rub, click, or gallop.    Lungs: reduced breath sounds R side.  No wheezes, rales.   Extremities: No edema.  The pulses are 2+at the radial bilaterally.      I have independently reviewed this patient's relevant laboratory and cardiac data :    Recent Labs   Lab Test 07/13/23  1530 12/02/22  1405   CHOL 186 146   HDL 70 39*   LDL 93  103* 88  88   TRIG 113 94      Recent Labs   Lab Test 02/28/25  1839 11/14/24  1026   * 39     Recent Labs   Lab Test 02/28/25  1839 11/14/24  1026   ALT 74* 49     Recent Labs   Lab Test 03/25/25  0652 03/24/25  0706    138   POTASSIUM 4.1 4.1   CHLORIDE 106 105   CO2 23 23   ANIONGAP 9 10   BUN 19.4 20.5   CR 0.94 0.90     Recent Labs   Lab Test 03/25/25  0652 03/24/25  0706   WBC 3.7* 4.4   RBC 2.90* 2.87*   HGB 8.4* 8.5*   MCV 91 93    210     Recent Labs   Lab Test 03/03/25  0414   A1C 5.6     Recent Labs   Lab Test 03/22/25  1408   TSH 1.79       ECG today - A-V paced at 79    Device interrogation today -   Normal device  function  Mode: DDD  bpm  AP: 44%  : 96%  Intrinsic Rhythm: VS 40 bpm  Lead Trends Appear Stable: Yes  Estimated battery longevity to RRT = 1.3-1.7 years. Battery voltage = 2.93 V.   Atrial Arrhythmia: No new episodes recorded since 3/21/25.  AF [place]: 0% since 3/21/25.  Anticoagulant: None  Ventricular Arrhythmia: 0  Setting Changes: None    Device interrogation 3/18/25  Device: [employer] 2272 Assurity MRI  Normal Device Function  Mode: DDD  bpm  AP: 9.7%  : 99%  Presenting EGM: AS- 95 bpm  Lead Trends Appear Stable.  Estimated battery longevity to RRT = 2 years.  Battery voltage = 2.98 V.   Atrial Arrhythmia: 33 AT/AF episodes detected, new onset.  Longest episode lasting 7 hrs 50 min on 3/17/25. First episode recorded was on 3/8/25, V rates are controlled by the pacemaker.  AF [place]: 4%  Anticoagulant: None  Ventricular Arrhythmia: None    CT chest 11/2024  Mild LAD calcification    Echocardiogram 7/2023  Normal biventricular function. No significant valve dysfunction     Assessment and Plan:  74 year old patient with    Adenoid cystic carcinoma of the distal trachea  Post rght sided thoracotomy and dissection of his distal trachea.   Post operative atrial fibrillation and flutter  Coronary calcification on chest CT    Patient has right sided chest pain. No palpitations, angina or heart failure. he is euvolemic on exam today with a normal heart rate and blood pressure.His biventricular function is normal.     Device interrogation today shows, no further episodes of atrial fibrillation since 3/21/25. No anticoagulation for now. Patient has already stopped amiodarone.      Coronary calcifications, marker atherosclerosis. No MI or CHF. LDL-c 93 (2023). Statin therapy would be reasonable to consider in his case.       May proceed with radiation therapy, no further cardiac testing needed at this point.     Recommendations:   Follow up 6 months or sooner    Lori Zapata MD, MS  Professor  of Medicine  Cardiovascular Medicine

## 2025-04-14 NOTE — LETTER
4/14/2025      RE: Shahid Davis   [facility] [id number]       Dear Colleague,    Thank you for the opportunity to participate in the care of your patient, Shahid Davis, at the [facility]. Please see a copy of my visit note below.    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 This was incidentally found on a surveillance scan for high-grade B-cell lymphoma (status post chemotherapy December 2022).  Mr. Davis underwent endobronchial tissue biopsy and debulking on 12/16/24. He had a right sided thoracotomy and temporary central VA-ECMO (2/28/2025) procedure for dissection of his distal trachea.     His cardiac history is notable for CHB s/p PPM 2020. Since his operation he has had both atrial fibrillation (detected by his device) and flutter. He did not have any sort of device detected AF/AFL prior to this recent diagnosis. He is not on anticoagulation. He was recommended amiodarone for 8 weeks. No MI or CHF. Cardiac function is normal. No significant valve disease.     Getting over a cold  Feels dizziness with posture change.   While on amiodarone, he felt wiped out and stopped the medication  Proton radiation scheduled for 4/28/25     Patient reports stable right sided chest pain at the incision site. No orthopnea, paroxysmal nocturnal dyspnea, palpitations or syncope.      Current Outpatient Medications   Medication Sig Dispense Refill     acetaminophen (TYLENOL) 325 MG tablet Take 3 tablets (975 mg) by mouth or Feeding Tube every 8 hours.       ascorbic acid (VITAMIN C) 500 MG tablet Take 500 mg by mouth every morning       Multiple Vitamins-Minerals (MULTIVITAL PO) Take 1 tablet by mouth every morning.       omeprazole (PRILOSEC) 40 MG DR capsule Take 1 capsule (40 mg) by mouth daily 90 capsule 4     amiodarone (PACERONE) 400  MG tablet Take 1 tablet (400 mg) by mouth daily. (Patient not taking: Reported on 4/14/2025) 56 tablet 0     amiodarone (PACERONE) 400 MG tablet Take 1 tablet (400 mg) by mouth daily. (Patient not taking: Reported on 4/14/2025) 56 tablet 0     methocarbamol (ROBAXIN) 500 MG tablet Take 1 tablet (500 mg) by mouth every 8 hours as needed for muscle spasms. (Patient not taking: Reported on 4/14/2025) 20 tablet 0     ondansetron (ZOFRAN) 4 MG tablet Take 2 tablets (8 mg) by mouth every 8 hours as needed for nausea. (Patient not taking: Reported on 4/14/2025) 12 tablet 0     ondansetron (ZOFRAN) 8 MG tablet Take 1 tablet (8 mg) by mouth every 8 hours as needed for nausea (Patient not taking: Reported on 4/14/2025) 60 tablet 3     oxyCODONE (ROXICODONE) 5 MG tablet Take 1 tablet (5 mg) by mouth every 4 hours as needed for moderate pain. Wean narcotic use as tolerated starting at time of discharge (Patient not taking: Reported on 4/14/2025) 50 tablet 0     polyethylene glycol (MIRALAX) 17 GM/Dose powder Take 17 g by mouth daily as needed for constipation (Patient not taking: Reported on 4/14/2025) 510 g 4     prochlorperazine (COMPAZINE) 10 MG tablet Take 1 tablet (10 mg) by mouth every 6 hours as needed for nausea or vomiting (Patient not taking: Reported on 4/14/2025) 30 tablet 3     senna-docusate (SENOKOT-S/PERICOLACE) 8.6-50 MG tablet Take 2 tablets by mouth or Feeding Tube 2 times daily. (Patient not taking: Reported on 4/14/2025) 40 tablet 0     wound support modular (EXPEDITE) LIQD bottle Take 60 mLs by mouth daily. (Patient not taking: Reported on 4/14/2025) 840 mL 0       Allergies - reviewed     Allergies   Allergen Reactions     Ampicillin [Ampicillin Sodium]      Bactrim [Sulfamethoxazole W/Trimethoprim]      Patient unsure of reaction      Contrast Dye      CT contrast (IV) allergy - need oral Methylpred as premed for scans     Ampicillin Rash     Morphine Nausea       Past history -reviewed  Past Medical  History:   Diagnosis Date     Cardiac pacemaker in situ     2020     GERD (gastroesophageal reflux disease)      Lymphoma (H)      Pyloric stenosis, congenital (H)      Squamous cell carcinoma      Tracheal cancer (H)         Social history - reviewed  Social History     Tobacco Use     Smoking status: Former     Passive exposure: Past     Smokeless tobacco: Never     Tobacco comments:     Quit at age 19   Substance Use Topics     Alcohol use: Yes     Alcohol/week: 11.7 standard drinks of alcohol     Types: 14 drink(s) per week     Comment: 1-2 beers a night     Drug use: Never       Family history -reviewed  Family History   Problem Relation Age of Onset     Cancer Mother         Blood     Cancer Father         Lung     Cancer Maternal Grandmother         Breast     Cancer Paternal Grandfather         Hodgkins     Anesthesia Reaction No family hx of      Bleeding Disorder No family hx of      Clotting Disorder No family hx of        ROS: non contributory on the 10-point review of system    Exam:     /69 (BP Location: Right arm, Patient Position: Chair, Cuff Size: Adult Regular)   Pulse 80   Wt 61.2 kg (135 lb)   SpO2 100%   BMI 21.14 kg/m    In general, the patient is in no apparent distress.      HEENT: Sclerae white, not injected.    Neck: No JVD. No thyromegaly  Heart: RRR. Normal S1, S2. No murmur, rub, click, or gallop.    Lungs: reduced breath sounds R side.  No wheezes, rales.   Extremities: No edema.  The pulses are 2+at the radial bilaterally.      I have independently reviewed this patient's relevant laboratory and cardiac data :    Recent Labs   Lab Test 07/13/23  1530 12/02/22  1405   CHOL 186 146   HDL 70 39*   LDL 93  103* 88  88   TRIG 113 94      Recent Labs   Lab Test 02/28/25  1839 11/14/24  1026   * 39     Recent Labs   Lab Test 02/28/25  1839 11/14/24  1026   ALT 74* 49     Recent Labs   Lab Test 03/25/25  0652 03/24/25  0706    138   POTASSIUM 4.1 4.1   CHLORIDE 106 105    CO2 23 23   ANIONGAP 9 10   BUN 19.4 20.5   CR 0.94 0.90     Recent Labs   Lab Test 03/25/25  0652 03/24/25  0706   WBC 3.7* 4.4   RBC 2.90* 2.87*   HGB 8.4* 8.5*   MCV 91 93    210     Recent Labs   Lab Test 03/03/25  0414   A1C 5.6     Recent Labs   Lab Test 03/22/25  1408   TSH 1.79       ECG today - A-V paced at 79    Device interrogation today -   Normal device function  Mode: DDD  bpm  AP: 44%  : 96%  Intrinsic Rhythm: VS 40 bpm  Lead Trends Appear Stable: Yes  Estimated battery longevity to RRT = 1.3-1.7 years. Battery voltage = 2.93 V.   Atrial Arrhythmia: No new episodes recorded since 3/21/25.  AF [place]: 0% since 3/21/25.  Anticoagulant: None  Ventricular Arrhythmia: 0  Setting Changes: None    Device interrogation 3/18/25  Device: [employer] 2272 Assurity MRI  Normal Device Function  Mode: DDD  bpm  AP: 9.7%  : 99%  Presenting EGM: AS- 95 bpm  Lead Trends Appear Stable.  Estimated battery longevity to RRT = 2 years.  Battery voltage = 2.98 V.   Atrial Arrhythmia: 33 AT/AF episodes detected, new onset.  Longest episode lasting 7 hrs 50 min on 3/17/25. First episode recorded was on 3/8/25, V rates are controlled by the pacemaker.  AF [place]: 4%  Anticoagulant: None  Ventricular Arrhythmia: None    CT chest 11/2024  Mild LAD calcification    Echocardiogram 7/2023  Normal biventricular function. No significant valve dysfunction     Assessment and Plan:  74 year old patient with    Adenoid cystic carcinoma of the distal trachea  Post rght sided thoracotomy and dissection of his distal trachea.   Post operative atrial fibrillation and flutter  Coronary calcification on chest CT    Patient has right sided chest pain. No palpitations, angina or heart failure. he is euvolemic on exam today with a normal heart rate and blood pressure.His biventricular function is normal.     Device interrogation today shows, no further episodes of atrial fibrillation since 3/21/25. No  anticoagulation for now. Patient has already stopped amiodarone.      Coronary calcifications, marker atherosclerosis. No MI or CHF. LDL-c 93 (2023). Statin therapy would be reasonable to consider in his case.       May proceed with radiation therapy, no further cardiac testing needed at this point.     Recommendations:   Follow up 6 months or sooner    Lori Zapata MD, MS  Professor of Medicine  Cardiovascular Medicine    Please do not hesitate to contact me if you have any questions/concerns.     Sincerely,     Lori Zapata MD

## 2025-04-14 NOTE — NURSING NOTE
Interventional Pulmonology: Pre-Flight Planning    Procedure date: April 17th, 2025    Scheduled procedure:  BRONCHOSCOPY, FLEXIBLE, possible rigid, tissue debulking    Location: [facility]    Anesthesia: General     H&P plan: Completed on 4/14 by Cardiology - cleared.    Medication hold(s): no anticoagulation.    CT scheduled: Not needed for this procedure.     Preoperative Instructions: Sent to patient via email per patient request as he does not have mychart access.    Pathology results follow up: n/a

## 2025-04-14 NOTE — PATIENT INSTRUCTIONS
Plan:    Device check today  Stop amiodarone  Let us know if there is any issue with the interrogation at [facility]  Follow up in 6 months      Your Care Team:         Cardiology   Telephone Number     Mamadou Hillman RN [phone]    After business hours: [phone], ask for cardiologist on-call           On-call cardiologist for after hours or on weekends:    [phone], option #4, and ask to speak to the on-call cardiologist.    Cardiovascular Clinic:   [address] [place] [address]. [place] [address]      As always, thank you for trusting us with your health care needs!    If you have further questions, please utilize [employer] to contact us.

## 2025-04-14 NOTE — TELEPHONE ENCOUNTER
Writer contacted patient after rescheduling a different case on 04/17. Patient scheduled for IP procedure with Dr. Hitchcock. H&P completed during cardiology visit 04/14. Packet information sent via email per patient request. Email address in chart verified.    Minh Nichols on 4/14/2025 at 2:34 PM

## 2025-04-14 NOTE — NURSING NOTE
Chief Complaint   Patient presents with    New Patient     Dr. Zapata: NEW Cardiology       Vitals were taken, medications reconciled and EKG performed.    Eulogio Garsia, Clinic Assistant     11:59 AM

## 2025-04-14 NOTE — PATIENT INSTRUCTIONS
It was a pleasure to see you in clinic today.  Please do not hesitate to call with any questions or concerns.  We look forward to seeing you in clinic at your next device check in 1 month.    Charlee Neal, RN, MS, CCRN  Electrophysiology Nurse Clinician  [facility]    During Business Hours Please Call:  [phone]  After Hours Please Call:  [phone] - select option #4 and ask for job code [phone]

## 2025-04-14 NOTE — TELEPHONE ENCOUNTER
Writer contacted patient to schedule IP procedure. Patient declined early morning arrival on 04/17, writer will attempt to move a different patient up to first case so Mr. Davis can have an early afternoon check-in. Writer discussed alternate dates, patient will await news.    Minh Nichols on 4/14/2025 at 1:53 PM

## 2025-04-15 LAB
MDC_IDC_LEAD_CONNECTION_STATUS: NORMAL
MDC_IDC_LEAD_CONNECTION_STATUS: NORMAL
MDC_IDC_LEAD_IMPLANT_DT: NORMAL
MDC_IDC_LEAD_IMPLANT_DT: NORMAL
MDC_IDC_LEAD_LOCATION: NORMAL
MDC_IDC_LEAD_LOCATION: NORMAL
MDC_IDC_LEAD_LOCATION_DETAIL_1: NORMAL
MDC_IDC_LEAD_LOCATION_DETAIL_1: NORMAL
MDC_IDC_LEAD_MFG: NORMAL
MDC_IDC_LEAD_MFG: NORMAL
MDC_IDC_LEAD_MODEL: NORMAL
MDC_IDC_LEAD_MODEL: NORMAL
MDC_IDC_LEAD_POLARITY_TYPE: NORMAL
MDC_IDC_LEAD_POLARITY_TYPE: NORMAL
MDC_IDC_LEAD_SERIAL: NORMAL
MDC_IDC_LEAD_SERIAL: NORMAL
MDC_IDC_MSMT_BATTERY_REMAINING_LONGEVITY: 21 MO
MDC_IDC_MSMT_BATTERY_STATUS: NORMAL
MDC_IDC_MSMT_BATTERY_VOLTAGE: 2.93 V
MDC_IDC_MSMT_LEADCHNL_RA_IMPEDANCE_VALUE: 325 OHM
MDC_IDC_MSMT_LEADCHNL_RA_PACING_THRESHOLD_AMPLITUDE: 1 V
MDC_IDC_MSMT_LEADCHNL_RA_PACING_THRESHOLD_AMPLITUDE: 1 V
MDC_IDC_MSMT_LEADCHNL_RA_PACING_THRESHOLD_PULSEWIDTH: 1 MS
MDC_IDC_MSMT_LEADCHNL_RA_PACING_THRESHOLD_PULSEWIDTH: 1 MS
MDC_IDC_MSMT_LEADCHNL_RA_SENSING_INTR_AMPL: 1 MV
MDC_IDC_MSMT_LEADCHNL_RV_IMPEDANCE_VALUE: 450 OHM
MDC_IDC_MSMT_LEADCHNL_RV_PACING_THRESHOLD_AMPLITUDE: 1 V
MDC_IDC_MSMT_LEADCHNL_RV_PACING_THRESHOLD_AMPLITUDE: 1 V
MDC_IDC_MSMT_LEADCHNL_RV_PACING_THRESHOLD_PULSEWIDTH: 0.4 MS
MDC_IDC_MSMT_LEADCHNL_RV_PACING_THRESHOLD_PULSEWIDTH: 0.4 MS
MDC_IDC_MSMT_LEADCHNL_RV_SENSING_INTR_AMPL: 12 MV
MDC_IDC_PG_IMPLANT_DTM: NORMAL
MDC_IDC_PG_MFG: NORMAL
MDC_IDC_PG_MODEL: NORMAL
MDC_IDC_PG_SERIAL: NORMAL
MDC_IDC_PG_TYPE: NORMAL
MDC_IDC_SESS_CLINIC_NAME: NORMAL
MDC_IDC_SESS_DTM: NORMAL
MDC_IDC_SESS_TYPE: NORMAL
MDC_IDC_SET_BRADY_AT_MODE_SWITCH_MODE: NORMAL
MDC_IDC_SET_BRADY_AT_MODE_SWITCH_RATE: 160 {BEATS}/MIN
MDC_IDC_SET_BRADY_HYSTRATE: NORMAL
MDC_IDC_SET_BRADY_LOWRATE: 80 {BEATS}/MIN
MDC_IDC_SET_BRADY_MAX_TRACKING_RATE: 120 {BEATS}/MIN
MDC_IDC_SET_BRADY_MODE: NORMAL
MDC_IDC_SET_BRADY_NIGHT_RATE: NORMAL
MDC_IDC_SET_BRADY_PAV_DELAY_LOW: 200 MS
MDC_IDC_SET_BRADY_SAV_DELAY_LOW: 200 MS
MDC_IDC_SET_LEADCHNL_RA_PACING_AMPLITUDE: 3.5 V
MDC_IDC_SET_LEADCHNL_RA_PACING_CAPTURE_MODE: NORMAL
MDC_IDC_SET_LEADCHNL_RA_PACING_POLARITY: NORMAL
MDC_IDC_SET_LEADCHNL_RA_PACING_PULSEWIDTH: 1 MS
MDC_IDC_SET_LEADCHNL_RA_SENSING_ADAPTATION_MODE: NORMAL
MDC_IDC_SET_LEADCHNL_RA_SENSING_POLARITY: NORMAL
MDC_IDC_SET_LEADCHNL_RV_PACING_AMPLITUDE: 2.5 V
MDC_IDC_SET_LEADCHNL_RV_PACING_CAPTURE_MODE: NORMAL
MDC_IDC_SET_LEADCHNL_RV_PACING_POLARITY: NORMAL
MDC_IDC_SET_LEADCHNL_RV_PACING_PULSEWIDTH: 0.4 MS
MDC_IDC_SET_LEADCHNL_RV_SENSING_POLARITY: NORMAL
MDC_IDC_SET_LEADCHNL_RV_SENSING_SENSITIVITY: 2.5 MV
MDC_IDC_STAT_AT_BURDEN_PERCENT: 0 %
MDC_IDC_STAT_BRADY_RA_PERCENT_PACED: 44 %
MDC_IDC_STAT_BRADY_RV_PERCENT_PACED: 96 %
MDC_IDC_STAT_EPISODE_RECENT_COUNT: 0
MDC_IDC_STAT_EPISODE_RECENT_COUNT: 0
MDC_IDC_STAT_EPISODE_RECENT_COUNT_DTM_START: NORMAL
MDC_IDC_STAT_EPISODE_RECENT_COUNT_DTM_START: NORMAL
MDC_IDC_STAT_EPISODE_TYPE: NORMAL
MDC_IDC_STAT_EPISODE_TYPE: NORMAL
MDC_IDC_STAT_EPISODE_VENDOR_TYPE: NORMAL
MDC_IDC_STAT_EPISODE_VENDOR_TYPE: NORMAL

## 2025-04-16 ENCOUNTER — ANESTHESIA EVENT (OUTPATIENT)
Dept: SURGERY | Facility: CLINIC | Age: 75
End: 2025-04-16
Payer: MEDICARE

## 2025-04-16 ASSESSMENT — LIFESTYLE VARIABLES: TOBACCO_USE: 1

## 2025-04-16 NOTE — ANESTHESIA PREPROCEDURE EVALUATION
Anesthesia Pre-Procedure Evaluation    Patient: Shahid Davis   MRN: [id number] : 1950        Procedure : Procedure(s):  BRONCHOSCOPY, FLEXIBLE, possible rigid, tissue debulking          Past Medical History:   Diagnosis Date    Cardiac pacemaker in situ         GERD (gastroesophageal reflux disease)     Lymphoma (H)     Pyloric stenosis, congenital (H)     Squamous cell carcinoma     Tracheal cancer (H)       Past Surgical History:   Procedure Laterality Date    BRONCHOSCOPY FLEXIBLE N/A 2024    Procedure: BRONCHOSCOPY, FLEXIBLE, endobronchial biopsy and tumor debulking;  Surgeon: Gerald Long MD;  Location: [facility] OR    BRONCHOSCOPY FLEXIBLE AND RIGID N/A 2025    Procedure: Bronchoscopy flexible;  Surgeon: Darío Potts MD;  Location: [facility] OR    DAVINCI HERNIORRHAPHY INGUINAL Left 2021    Procedure: HERNIORRHAPHY, INGUINAL, ROBOT-ASSISTED, Left, Mesh;  Surgeon: Shamar Tyler MD;  Location: [facility] OR    INSERT EXTRACORPORAL MEMBRANE OXYGENATOR  2025    Procedure: Insert Venous/Arterial extracorporal membrane oxygenator and Removal;  Surgeon: Fidel Lockhart MD;  Location: [facility] OR    IR CHEST PORT PLACEMENT > 5 YRS OF AGE  2023    IR PORT REMOVAL RIGHT  12/15/2023    IR SOFT TISSUE BIOPSY  12/15/2022    MOHS MICROGRAPHIC PROCEDURE      REMOVE PORT VASCULAR ACCESS Right 12/15/2023    Procedure: Remove port vascular access right;  Surgeon: Matthew Harrison PA-C;  Location: [facility] OR    Repair pyloric stenosis      ~ six months of age    RESECT TRACHEA WITH RECONSTRUCTION Right 2025    Procedure: Tracheal Resection and Reconstruction with Cryoablation;  Surgeon: Darío Potts MD;  Location: [facility]    THORACOTOMY Right 2025    Procedure: Right Thoracotomy;  Surgeon: Darío Potts MD;  Location: [facility]      Allergies   Allergen Reactions    Ampicillin [Ampicillin Sodium]     Bactrim [Sulfamethoxazole W/Trimethoprim]      Patient unsure of reaction      Contrast Dye      CT contrast (IV) allergy - need oral Methylpred as premed for scans    Ampicillin Rash    Morphine Nausea      Social History     Tobacco Use    Smoking status: Former     Types: Cigarettes     Passive exposure: Past    Smokeless tobacco: Never    Tobacco comments:     Quit at age 19   Substance Use Topics    Alcohol use: Not Currently     Alcohol/week: 11.7 standard drinks of alcohol     Types: 14 drink(s) per week     Comment: 1-2 beers a night      Wt Readings from Last 1 Encounters:   04/14/25 61.2 kg (135 lb)        Anesthesia Evaluation   Pt has had prior anesthetic. Type: General and MAC.    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 2272 Assurity MRI  Normal device function  Mode: DDD  bpm  AP: 44%  : 96%  Intrinsic Rhythm: VS 40 bpm  Lead Trends Appear Stable: Yes  Estimated battery longevity to RRT = 1.3-1.7 years. Battery voltage = 2.93 V.   Atrial Arrhythmia: No new episodes recorded since 3/21/25.  AF Squire: 0% since 3/21/25.  Anticoagulant: None  Ventricular Arrhythmia: 0  Setting Changes: None      (+)  - -   -  - -              pacemaker, Reason placed: CHB. type: [employer], settings: DDD , - Patient is dependent on pacemaker.               Previous cardiac testing   Echo: Date: 7/13/2023 Results:  Interpretation Summary  Left ventricular function is normal.The ejection fraction is 55-60%. There is  mild hyookinesis of the apical septal and apical inferior segments.  Global right ventricular function is normal. The right ventricle is normal  size.  No significant valvular abnormalities.  The estimated PA systolic pressure is 24 mmHg.  IVC  diameter <2.1 cm collapsing >50% with sniff suggests a normal RA pressure  of 3 mmHg.  This study was compared with the study from 12/15/2022. The wall motion  abnormalities appear in some views from the prior study, but they are better  delineated in today's evaluation due to the use of contrast.    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      Other:            Physical Exam    Airway        Mallampati: I   TM distance: > 3 FB   Neck ROM: full   Mouth opening: > 3 cm    Respiratory Devices and Support         Dental       (+) Minor Abnormalities - some fillings, tiny chips      Cardiovascular   cardiovascular exam normal       Rhythm and rate: regular and normal     Pulmonary   pulmonary exam normal        breath sounds clear to auscultation           OUTSIDE LABS:  CBC:   Lab Results   Component Value Date    WBC 3.7 (L) 03/25/2025    WBC 4.4 03/24/2025    HGB 8.4 (L) 03/25/2025    HGB 8.5 (L) 03/24/2025    HCT 26.4 (L) 03/25/2025    HCT 26.6 (L) 03/24/2025     03/25/2025     03/24/2025     BMP:   Lab Results   Component Value Date     03/25/2025     03/24/2025    POTASSIUM 4.1 03/25/2025    POTASSIUM 4.1 03/24/2025    CHLORIDE 106 03/25/2025    CHLORIDE 105  03/24/2025    CO2 23 03/25/2025    CO2 23 03/24/2025    BUN 19.4 03/25/2025    BUN 20.5 03/24/2025    CR 0.94 03/25/2025    CR 0.90 03/24/2025    GLC 93 03/25/2025    GLC 93 03/24/2025     COAGS:   Lab Results   Component Value Date    PTT 30 02/28/2025    INR 1.10 03/22/2025    FIBR 211 02/28/2025     POC:   Lab Results   Component Value Date    BGM 94 02/22/2012     HEPATIC:   Lab Results   Component Value Date    ALBUMIN 3.5 02/28/2025    PROTTOTAL 4.7 (L) 02/28/2025    ALT 74 (H) 02/28/2025     (H) 02/28/2025    ALKPHOS 72 02/28/2025    BILITOTAL 0.7 02/28/2025     OTHER:   Lab Results   Component Value Date    PH 7.39 03/01/2025    LACT 1.5 03/02/2025    A1C 5.6 03/03/2025    JORGE 8.5 (L) 03/25/2025    PHOS 3.1 03/25/2025    MAG 2.2 03/25/2025    LIPASE 43 12/14/2022    TSH 1.79 03/22/2025       Anesthesia Plan    ASA Status:  3    NPO Status:  NPO Appropriate    Anesthesia Type: General.     - Airway: LMA   Induction: Intravenous, Propofol.   Maintenance: TIVA.   Techniques and Equipment:     - Lines/Monitors: BIS     Consents    Anesthesia Plan(s) and associated risks, benefits, and realistic alternatives discussed. Questions answered and patient/representative(s) expressed understanding.     - Discussed: Risks, Benefits and Alternatives for the PROCEDURE were discussed     - Discussed with:  Patient      - Extended Intubation/Ventilatory Support Discussed: No.      - Patient is DNR/DNI Status: No     Use of blood products discussed: No .     Postoperative Care    Pain management: IV analgesics, Oral pain medications, Multi-modal analgesia.   PONV prophylaxis: Ondansetron (or other 5HT-3), Dexamethasone or Solumedrol, Background Propofol Infusion     Comments:               Obey Brock MD    Clinically Significant Risk Factors Present on Admission                                 # Financial/Environmental Concerns:     # Pacemaker present

## 2025-04-17 ENCOUNTER — HOSPITAL ENCOUNTER (INPATIENT)
Facility: CLINIC | Age: 75
LOS: 1 days | Discharge: HOME | End: 2025-04-17
Attending: INTERNAL MEDICINE | Admitting: INTERNAL MEDICINE
Payer: MEDICARE

## 2025-04-17 ENCOUNTER — ANESTHESIA (OUTPATIENT)
Dept: SURGERY | Facility: CLINIC | Age: 75
End: 2025-04-17
Payer: MEDICARE

## 2025-04-17 VITALS
DIASTOLIC BLOOD PRESSURE: 84 MMHG | SYSTOLIC BLOOD PRESSURE: 126 MMHG | HEART RATE: 85 BPM | RESPIRATION RATE: 14 BRPM | OXYGEN SATURATION: 97 % | WEIGHT: 132.72 LBS | BODY MASS INDEX: 20.83 KG/M2 | HEIGHT: 67 IN | TEMPERATURE: 97.8 F

## 2025-04-17 PROBLEM — J96.91 RESPIRATORY FAILURE WITH HYPOXIA (H): Status: ACTIVE | Noted: 2025-04-17

## 2025-04-17 PROCEDURE — 999N000141 HC STATISTIC PRE-PROCEDURE NURSING ASSESSMENT: Performed by: INTERNAL MEDICINE

## 2025-04-17 PROCEDURE — 370N000017 HC ANESTHESIA TECHNICAL FEE, PER MIN: Performed by: INTERNAL MEDICINE

## 2025-04-17 PROCEDURE — 250N000013 HC RX MED GY IP 250 OP 250 PS 637

## 2025-04-17 PROCEDURE — 258N000003 HC RX IP 258 OP 636

## 2025-04-17 PROCEDURE — 710N000012 HC RECOVERY PHASE 2, PER MINUTE: Performed by: INTERNAL MEDICINE

## 2025-04-17 PROCEDURE — 88305 TISSUE EXAM BY PATHOLOGIST: CPT | Mod: 26 | Performed by: STUDENT IN AN ORGANIZED HEALTH CARE EDUCATION/TRAINING PROGRAM

## 2025-04-17 PROCEDURE — 250N000009 HC RX 250: Performed by: INTERNAL MEDICINE

## 2025-04-17 PROCEDURE — 88305 TISSUE EXAM BY PATHOLOGIST: CPT | Mod: TC | Performed by: INTERNAL MEDICINE

## 2025-04-17 PROCEDURE — 360N000083 HC SURGERY LEVEL 3 W/ FLUORO, PER MIN: Performed by: INTERNAL MEDICINE

## 2025-04-17 PROCEDURE — 250N000011 HC RX IP 250 OP 636: Mod: JZ

## 2025-04-17 PROCEDURE — 710N000010 HC RECOVERY PHASE 1, LEVEL 2, PER MIN: Performed by: INTERNAL MEDICINE

## 2025-04-17 PROCEDURE — 250N000009 HC RX 250

## 2025-04-17 PROCEDURE — 250N000011 HC RX IP 250 OP 636: Performed by: INTERNAL MEDICINE

## 2025-04-17 RX ORDER — HYDROMORPHONE HCL IN WATER/PF 6 MG/30 ML
0.4 PATIENT CONTROLLED ANALGESIA SYRINGE INTRAVENOUS EVERY 5 MIN PRN
Status: DISCONTINUED | OUTPATIENT
Start: 2025-04-17 | End: 2025-04-17 | Stop reason: HOSPADM

## 2025-04-17 RX ORDER — FENTANYL CITRATE 50 UG/ML
INJECTION, SOLUTION INTRAMUSCULAR; INTRAVENOUS PRN
Status: DISCONTINUED | OUTPATIENT
Start: 2025-04-17 | End: 2025-04-17

## 2025-04-17 RX ORDER — LIDOCAINE 40 MG/G
CREAM TOPICAL
Status: DISCONTINUED | OUTPATIENT
Start: 2025-04-17 | End: 2025-04-17 | Stop reason: HOSPADM

## 2025-04-17 RX ORDER — ONDANSETRON 2 MG/ML
4 INJECTION INTRAMUSCULAR; INTRAVENOUS EVERY 30 MIN PRN
Status: DISCONTINUED | OUTPATIENT
Start: 2025-04-17 | End: 2025-04-17 | Stop reason: HOSPADM

## 2025-04-17 RX ORDER — ACETAMINOPHEN 325 MG/1
975 TABLET ORAL ONCE
Status: COMPLETED | OUTPATIENT
Start: 2025-04-17 | End: 2025-04-17

## 2025-04-17 RX ORDER — FENTANYL CITRATE 50 UG/ML
50 INJECTION, SOLUTION INTRAMUSCULAR; INTRAVENOUS EVERY 5 MIN PRN
Status: DISCONTINUED | OUTPATIENT
Start: 2025-04-17 | End: 2025-04-17 | Stop reason: HOSPADM

## 2025-04-17 RX ORDER — LIDOCAINE HYDROCHLORIDE 20 MG/ML
INJECTION, SOLUTION INFILTRATION; PERINEURAL PRN
Status: DISCONTINUED | OUTPATIENT
Start: 2025-04-17 | End: 2025-04-17

## 2025-04-17 RX ORDER — NALOXONE HYDROCHLORIDE 0.4 MG/ML
0.1 INJECTION, SOLUTION INTRAMUSCULAR; INTRAVENOUS; SUBCUTANEOUS
Status: DISCONTINUED | OUTPATIENT
Start: 2025-04-17 | End: 2025-04-17 | Stop reason: HOSPADM

## 2025-04-17 RX ORDER — DEXAMETHASONE SODIUM PHOSPHATE 4 MG/ML
4 INJECTION, SOLUTION INTRA-ARTICULAR; INTRALESIONAL; INTRAMUSCULAR; INTRAVENOUS; SOFT TISSUE
Status: DISCONTINUED | OUTPATIENT
Start: 2025-04-17 | End: 2025-04-17 | Stop reason: HOSPADM

## 2025-04-17 RX ORDER — SODIUM CHLORIDE, SODIUM LACTATE, POTASSIUM CHLORIDE, CALCIUM CHLORIDE 600; 310; 30; 20 MG/100ML; MG/100ML; MG/100ML; MG/100ML
INJECTION, SOLUTION INTRAVENOUS CONTINUOUS
Status: DISCONTINUED | OUTPATIENT
Start: 2025-04-17 | End: 2025-04-17 | Stop reason: HOSPADM

## 2025-04-17 RX ORDER — ONDANSETRON 4 MG/1
4 TABLET, ORALLY DISINTEGRATING ORAL EVERY 30 MIN PRN
Status: DISCONTINUED | OUTPATIENT
Start: 2025-04-17 | End: 2025-04-17 | Stop reason: HOSPADM

## 2025-04-17 RX ORDER — SODIUM CHLORIDE, SODIUM LACTATE, POTASSIUM CHLORIDE, CALCIUM CHLORIDE 600; 310; 30; 20 MG/100ML; MG/100ML; MG/100ML; MG/100ML
INJECTION, SOLUTION INTRAVENOUS CONTINUOUS PRN
Status: DISCONTINUED | OUTPATIENT
Start: 2025-04-17 | End: 2025-04-17

## 2025-04-17 RX ORDER — FENTANYL CITRATE 50 UG/ML
25 INJECTION, SOLUTION INTRAMUSCULAR; INTRAVENOUS EVERY 5 MIN PRN
Status: DISCONTINUED | OUTPATIENT
Start: 2025-04-17 | End: 2025-04-17 | Stop reason: HOSPADM

## 2025-04-17 RX ORDER — OXYCODONE HYDROCHLORIDE 10 MG/1
10 TABLET ORAL
Status: DISCONTINUED | OUTPATIENT
Start: 2025-04-17 | End: 2025-04-17 | Stop reason: HOSPADM

## 2025-04-17 RX ORDER — HYDROMORPHONE HCL IN WATER/PF 6 MG/30 ML
0.2 PATIENT CONTROLLED ANALGESIA SYRINGE INTRAVENOUS EVERY 5 MIN PRN
Status: DISCONTINUED | OUTPATIENT
Start: 2025-04-17 | End: 2025-04-17 | Stop reason: HOSPADM

## 2025-04-17 RX ORDER — PROPOFOL 10 MG/ML
INJECTION, EMULSION INTRAVENOUS CONTINUOUS PRN
Status: DISCONTINUED | OUTPATIENT
Start: 2025-04-17 | End: 2025-04-17

## 2025-04-17 RX ORDER — PROPOFOL 10 MG/ML
INJECTION, EMULSION INTRAVENOUS PRN
Status: DISCONTINUED | OUTPATIENT
Start: 2025-04-17 | End: 2025-04-17

## 2025-04-17 RX ORDER — LIDOCAINE HYDROCHLORIDE 40 MG/ML
SOLUTION TOPICAL PRN
Status: DISCONTINUED | OUTPATIENT
Start: 2025-04-17 | End: 2025-04-17 | Stop reason: HOSPADM

## 2025-04-17 RX ORDER — OXYCODONE HYDROCHLORIDE 5 MG/1
5 TABLET ORAL
Status: DISCONTINUED | OUTPATIENT
Start: 2025-04-17 | End: 2025-04-17 | Stop reason: HOSPADM

## 2025-04-17 RX ADMIN — LIDOCAINE HYDROCHLORIDE 60 MG: 20 INJECTION, SOLUTION INFILTRATION; PERINEURAL at 14:07

## 2025-04-17 RX ADMIN — FENTANYL CITRATE 100 MCG: 50 INJECTION INTRAMUSCULAR; INTRAVENOUS at 14:07

## 2025-04-17 RX ADMIN — PROPOFOL 100 MG: 10 INJECTION, EMULSION INTRAVENOUS at 14:07

## 2025-04-17 RX ADMIN — PROPOFOL 150 MCG/KG/MIN: 10 INJECTION, EMULSION INTRAVENOUS at 14:08

## 2025-04-17 RX ADMIN — SODIUM CHLORIDE, SODIUM LACTATE, POTASSIUM CHLORIDE, AND CALCIUM CHLORIDE: .6; .31; .03; .02 INJECTION, SOLUTION INTRAVENOUS at 14:07

## 2025-04-17 RX ADMIN — ACETAMINOPHEN 975 MG: 325 TABLET, FILM COATED ORAL at 12:56

## 2025-04-17 RX ADMIN — PHENYLEPHRINE HYDROCHLORIDE 100 MCG: 10 INJECTION INTRAVENOUS at 14:13

## 2025-04-17 ASSESSMENT — ACTIVITIES OF DAILY LIVING (ADL)
ADLS_ACUITY_SCORE: 50
ADLS_ACUITY_SCORE: 51
ADLS_ACUITY_SCORE: 51
ADLS_ACUITY_SCORE: 50
ADLS_ACUITY_SCORE: 50

## 2025-04-17 NOTE — ANESTHESIA POSTPROCEDURE EVALUATION
Patient: Shahid Davis    Procedure: Procedure(s):  BRONCHOSCOPY, FLEXIBLE with biopsy       Anesthesia Type:  General    Note:  Disposition: Outpatient   Postop Pain Control: Uneventful            Sign Out: Well controlled pain   PONV: No   Neuro/Psych: Uneventful            Sign Out: Acceptable/Baseline neuro status   Airway/Respiratory: Uneventful            Sign Out: Acceptable/Baseline resp. status   CV/Hemodynamics: Uneventful            Sign Out: Acceptable CV status; No obvious hypovolemia; No obvious fluid overload   Other NRE: NONE   DID A NON-ROUTINE EVENT OCCUR? No           Last vitals:  Vitals Value Taken Time   /84 04/17/25 1500   Temp 36.7  C (98  F) 04/17/25 1440   Pulse 88 04/17/25 1503   Resp 18 04/17/25 1503   SpO2 98 % 04/17/25 1503   Vitals shown include unfiled device data.    Electronically Signed By: Stefano Marie MD  April 17, 2025  3:04 PM

## 2025-04-17 NOTE — PROCEDURES
INTERVENTIONAL PULMONOLOGY       Procedure(s):    A flexible bronchoscopy  Airway exam  Endobronchial biopsies (1 sites)    Indication:  airways exam before radiation therapy for cancer    Attending of Record:  Denzel Valdez MD    Interventional Pulmonary Fellow   Dee Jimenez    Trainees Present:   None     Medications:    General Anesthesia - See anesthesia flowsheet for details    Sedation Time:   Per Anesthesia Care Provider    Time Out:  Performed    The patient's medical record has been reviewed.  The indication for the procedure was reviewed.  The necessary history and physical examination was performed and reviewed.  The risks, benefits and alternatives of the procedure were discussed with the the patient in detail and he had the opportunity to ask questions.  I discussed in particular the potential complications including risks of minor or life-threatening bleeding and/or infection, respiratory failure, vocal cord trauma / paralysis, pneumothorax, and discomfort. Sedation risks were also discussed including abnormal heart rhythms, low blood pressure, and respiratory failure. All questions were answered to the best of my ability.  Verbal and written informed consent was obtained.  The proposed procedure and the patient's identification were verified prior to the procedure by the physician and the nurse.    The patient was assessed for the adequacy for the procedure and to receive medications.   Mental Status:  Alert and oriented x 3  Airway examination:  Class I (Complete visualization of soft palate)  Pulmonary:  Non labored respirations  CV:  Regular rate  ASA Grade:  (II)  Mild systemic disease    After clinical evaluation and reviewing the indication, risks, alternatives and benefits of the procedure the patient was deemed to be in satisfactory condition to undergo the procedure.      Immediately before administration of medications the patient was re-assessed for adequacy to receive  sedatives including the heart rate, respiratory rate, mental status, oxygen saturation, blood pressure and adequacy of pulmonary ventilation. These same parameters were continuously monitored throughout the procedure.    A Tuberculosis risk assessment was performed:  The patient has no known RISK of Tuberculosis    The procedure was performed in a negative airflow room: The patient could not be moved to a negative airflow room because of needed OR for the procedure    Maneuvers / Procedure:      Airway Examination: A complete airway examination was performed from the distal trachea to the subsegmental level in each lobe of both lungs.  Pertinent findings include healed lower tracheal anastomosis area with small amount of granulation tissue vs tumor tissue around it.         Endobronchial biopsies: One Site - The bronchoscope was inserted.  Topical epinephrine was not administered.  The Forceps were introduced and endobronchial biopsies were obtained from Main Pradip.  A total of 4 biopsies were obtained.    Any disposable equipment was visually inspected and deemed to be intact immediately post procedure.      Relevant Pictures          Assessment and Recommendations:     Successful completion of FB with airways exam and main pradip endobronchial biopsy.   Findings: healed lower tracheal anastomosis area with small amount of granulation tissue vs tumor tissue around it.       Ok to discharge once medically stable.   Follow up as needed.           Dee Jimenez  Interventional pulmonary fellow  Pager: [phone]

## 2025-04-17 NOTE — ANESTHESIA PROCEDURE NOTES
Airway       Patient location during procedure: OR  Staff -        Anesthesiologist:  Stefano Marie MD       Resident/Fellow: Obey Brock MD       Performed By: resident and with residents       Procedure performed by resident/fellow/CRNA in presence of a teaching physician.    Consent for Airway        Urgency: elective  Indications and Patient Condition       Indications for airway management: elieser-procedural       Induction type:intravenous       Mask difficulty assessment: 0 - not attempted    Final Airway Details       Final airway type: supraglottic airway    Supraglottic Airway Details        Type: LMA       Brand: I-Gel       LMA size: 4    Post intubation assessment        Placement verified by: capnometry, equal breath sounds and chest rise        Number of attempts at approach: 1       Number of other approaches attempted: 0       Secured with: tape       Ease of procedure: easy       Dentition: Unchanged

## 2025-04-17 NOTE — ANESTHESIA CARE TRANSFER NOTE
Patient: Shahid Davis    Procedure: Procedure(s):  BRONCHOSCOPY, FLEXIBLE with biopsy       Diagnosis: Malignant neoplasm of lung, unspecified laterality, unspecified part of lung (H) [C34.90]  Diagnosis Additional Information: No value filed.    Anesthesia Type:   General     Note:    Oropharynx: oropharynx clear of all foreign objects and spontaneously breathing  Level of Consciousness: awake  Oxygen Supplementation: face mask  Level of Supplemental Oxygen (L/min / FiO2): 4  Independent Airway: airway patency satisfactory and stable  Dentition: dentition unchanged  Vital Signs Stable: post-procedure vital signs reviewed and stable  Report to RN Given: handoff report given  Patient transferred to: PACU    Handoff Report: Identifed the Patient, Identified the Reponsible Provider, Reviewed the pertinent medical history, Discussed the surgical course, Reviewed Intra-OP anesthesia mangement and issues during anesthesia, Set expectations for post-procedure period and Allowed opportunity for questions and acknowledgement of understanding      Vitals:  Vitals Value Taken Time   /66 04/17/25 1436   Temp     Pulse 89 04/17/25 1441   Resp 21 04/17/25 1441   SpO2 99 % 04/17/25 1441   Vitals shown include unfiled device data.    Electronically Signed By: Obey Brock MD  April 17, 2025  2:42 PM

## 2025-04-17 NOTE — DISCHARGE INSTRUCTIONS
Post Bronchoscopy Patient Instructions:    April 17, 2025  Shahid Davis    Your procedure completed (bronchoscopy with endobronchial biopsy) without any immediate complications.  You may cough up scant amount of blood for the next 12-24 hours. If you have excessive cough with blood, chest pain, shortness of breath, please report to the closest emergency room.    You may experience low grade (less than 100.5 F) fever next 24 hours, if so, you can take Tylenol. If the fever persists more than 24 hours, please contact to our office or your primary care provider.    Our office will call you with the results of samples taken during the procedure. Please note that you may get a result notification through  My Chart  before us calling you, as the Laboratories are instructed to release the results as soon as they are available to the patients and providers at the same time. Please allow your us 24-48 hours call you to discuss the results.    You may resume your diet as it was prior to procedure.    You may resume your medications after the procedure unless you are instructed to do differently.     Please follow instructions from the nursing staff upon discharge in terms of activity. In general, you should avoid any attention or motor skill requiring activities (e.g., driving or operating any motorized vehicle) for 24 hours as you might be still under the effect of sedation medications. Please make sure an adult to accompany you next 24 hours.     Should you have any question, please do not hesitate to call our office Soraida Garcia [phone].     Please take a few moments to evaluate the care you received by me on this link: [email or web address]  Interventional Pulmonary Fellow

## 2025-04-18 LAB
PATH REPORT.COMMENTS IMP SPEC: NORMAL
PATH REPORT.COMMENTS IMP SPEC: NORMAL
PATH REPORT.FINAL DX SPEC: NORMAL
PATH REPORT.GROSS SPEC: NORMAL
PATH REPORT.MICROSCOPIC SPEC OTHER STN: NORMAL
PATH REPORT.RELEVANT HX SPEC: NORMAL
PHOTO IMAGE: NORMAL

## 2025-04-19 ENCOUNTER — PATIENT OUTREACH (OUTPATIENT)
Dept: CARE COORDINATION | Facility: CLINIC | Age: 75
End: 2025-04-19
Payer: MEDICARE

## 2025-04-19 NOTE — PROGRESS NOTES
Connected Care Resource Center Contact  [facility]/Voicemail     Clinical Data: Post-Discharge Outreach     Outreach attempted x 2.  Left message on patient's voicemail, providing [facility]'s central phone number of [phone] ([phone]) for questions/concerns and/or to schedule an appt with an [facility] provider, if they do not have a PCP.      Plan:  [facility] will do no further outreaches at this time.       RAFAEL Bey  Connected Care Resource [place], [facility]    *Connected Care Resource Team does NOT follow patient ongoing. Referrals are identified based on internal discharge reports and the outreach is to ensure patient has an understanding of their discharge instructions.

## 2025-04-23 ENCOUNTER — PATIENT OUTREACH (OUTPATIENT)
Dept: ONCOLOGY | Facility: CLINIC | Age: 75
End: 2025-04-23
Payer: MEDICARE

## 2025-04-23 DIAGNOSIS — C83.38 DIFFUSE LARGE B-CELL LYMPHOMA OF LYMPH NODES OF MULTIPLE REGIONS (H): Primary | ICD-10-CM

## 2025-04-23 RX ORDER — METHYLPREDNISOLONE 32 MG/1
32 TABLET ORAL DAILY
Qty: 2 TABLET | Refills: 0 | Status: SHIPPED | OUTPATIENT
Start: 2025-04-23

## 2025-05-21 NOTE — PROGRESS NOTES
[facility]  Date of visit: May 22, 2025    Reason for Visit: Follow up triple-hit DLBCL    ONCOLOGIC SUMMARY:  Diagnosis:  Low-grade kappa-restricted plasmacytoid B-cell lymphoma dx 3/2004, presenting with thoracic paraspinal mass. Bone marrow hypercellular with 70-80% involvement by small kappa-restricted lymphocytes which were positive for CD10, CD19, and CD20 and negative for CD5 and CD23.   Transformation to high-grade B-cell lymphoma 12/2022 (versus new primary), presenting with B symptoms, sternal mass, and SOB/chest pain. Biopsy of sternal mass showed large B-cell lymphoma with aggressive features (GCB-subtype), Ki67 %, FISH positive for MYC (non-IgH partner), BCL2, and BCL6 rearrangements. Stage IV with extensive lymphomatous involvement to the R pleura w/avid effusions, mediastinal and pericardial regions, right anterolateral chest wall, adenopathy above and below the diaphragm, liver, spleen and multiple sites in the skeleton. CSF flow negative.    Treatment history:  For low-grade lymphoma:  2005: Fludarabine-based chemo x 6 cycles and XRT to spine (details unclear)    For high-grade lymphoma:  12/20/22: Cycle 1 R-CHOP with Neulasta support (with a few days of steroid prephase prior)  1/11/23: Cycle 2 Switched to DA-R-EPOCH with triple IT chemo for CNS ppx after FISH returned showing triple-hit disease. PET after 2 cycles shows very good AR.   2/2/23: Cycle 3 DA-R EPOCH with triple IT chemo for CNS ppx  2/23/23: Cycle 4 DA-R-EPOCH with triple IT chemo for CNS ppx. PET after 4 cycles shows CRu.   3/17/23: Cycle 5 DA-R-EPOCH with triple IT chemo for CNS ppx  4/6/23: Cycle 6 DA-R-EPOCH. EOT PET shows CR!    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 Starting adjuvant proton radiotherapy 5/28/25.     INTERVAL HISTORY:  Shahid is here today for follow-up. Finally starting to recover from his tracheal resection surgery in Feb  for the adenoid cystic carcinoma, cough and SOB is improving. He will be starting proton radiation at [facility] soon though. Otherwise no fevers, night sweats, lumps/bumps, N/V, abd pain, diarrhea, or bleeding. Starting to do more activity again like walking and camping.     ROS: 10 point ROS neg other than the symptoms noted above in the HPI.    Current Outpatient Medications   Medication Sig Dispense Refill    acetaminophen (TYLENOL) 325 MG tablet Take 3 tablets (975 mg) by mouth or Feeding Tube every 8 hours.      ascorbic acid (VITAMIN C) 500 MG tablet Take 500 mg by mouth every morning      methocarbamol (ROBAXIN) 500 MG tablet Take 1 tablet (500 mg) by mouth every 8 hours as needed for muscle spasms. 20 tablet 0    methylPREDNISolone (MEDROL) 32 MG tablet Take 1 tablet (32 mg) by mouth daily. 2 tablet 0    Multiple Vitamins-Minerals (MULTIVITAL PO) Take 1 tablet by mouth every morning.      omeprazole (PRILOSEC) 40 MG DR capsule Take 1 capsule (40 mg) by mouth daily 90 capsule 4       Allergies   Allergen Reactions    Contrast Dye Hives     need oral Methylprednisolone as premedication for scans    Ampicillin [Ampicillin Sodium]     Bactrim [Sulfamethoxazole W/Trimethoprim]      Patient unsure of reaction     Hydromorphone      Other Reaction(s): GI intolerance, nausea and comiting    Ampicillin Rash    Morphine Nausea     Physical Exam:  /69 (BP Location: Right arm, Patient Position: Sitting, Cuff Size: Adult Regular)   Pulse 82   Temp 98.7  F (37.1  C) (Oral)   Resp 18   Wt 63.6 kg (140 lb 4.8 oz)   SpO2 98%   BMI 21.97 kg/m       ECOG PS: 0    General: Awake, alert, in no acute distress.   HEENT: Normocephalic, atraumatic. No scleral icterus.   Lymph: No cervical, supraclavicular, or axillary LAD appreciated.   CV: Regular rate and rhythm. No murmurs, rubs, or gallops appreciated.  Resp: Good inspiratory effort, lungs clear to auscultation bilaterally.  GI: Abdomen soft, nontender, nondistended.    Ext: No peripheral edema bilaterally.  Neuro: CN II-XII grossly intact. No focal deficits appreciated.  Skin: No rash, unusual bruising or prominent lesions.  Psych: Pleasant, normal affect.     Labs:   I personally reviewed the following labs:    Most Recent 3 CBC's:  Recent Labs   Lab Test 05/22/25  1418 03/25/25  0652 03/24/25  0706   WBC 6.5 3.7* 4.4   HGB 9.2* 8.4* 8.5*   MCV 85 91 93    207 210     Most Recent 3 BMP's:  Recent Labs   Lab Test 05/22/25  1418 05/22/25  1117 03/25/25  0652 03/24/25  0706     --  138 138   POTASSIUM 4.7  --  4.1 4.1   CHLORIDE 104  --  106 105   CO2 22  --  23 23   BUN 33.3*  --  19.4 20.5   CR 0.88 0.9 0.94 0.90   ANIONGAP 12  --  9 10   JORGE 9.6  --  8.5* 8.7*   *  --  93 93     Most Recent 2 LFT's:  Recent Labs   Lab Test 05/22/25  1418 02/28/25  1839   AST 34 112*   ALT 38 74*   ALKPHOS 112 72   BILITOTAL 0.3 0.7     CT Chest/Abdomen/Pelvis w Contrast (05/22/2025 11:38 AM)   IMPRESSION:  1.  No lymphadenopathy in the chest, abdomen, or pelvis.  2.  Sclerosis of the right acetabulum and ischial tuberosity has not changed. A few other sclerotic areas have not changed.  3.  New small right pleural effusion.  4.  Ill-defined peripheral groundglass opacities in the left upper lobe are nonspecific. Recommend attention on follow-up imaging.    CT Soft Tissue Neck w Contrast (05/22/2025 11:38 AM)   IMPRESSION:   1.  No pathologic lymphadenopathy or the mass to suggest lymphomatous involvement within the field-of-view.    ASSESSMENT/PLAN:    #H/o low-grade kappa restricted plasmacytoid B-cell lymphoma 3/2004   #Transformation to DLBCL 12/2022  H/o low-grade kappa restricted plasmacytoid B-cell lymphoma 3/2004 treated with x6 cycles of fludarabine based chemo and XRT to spine, then has been on surveillance since 2005. He presented in 11/2022 with progressing B symptoms, sternal mass, and SOB/CP. PET 12/9/22 showed extensive lymphomatous involvement to the R pleura  w/avid effusions, mediastinal and pericardial regions, right anterolateral chest wall, adenopathy above and below the diaphragm, liver, spleen and multiple sites in the skeleton, concerning for transformation from his low-grade lymphoma. 12/15/22 biopsy of sternal mass showed large B-cell lymphoma with aggressive features (GCB-subtype), Ki67 %. 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  - S/p C1 R-CHOP in the hospital 12/20/22. Tolerated well overall.  - Switched to DA-R-EPOCH starting Cycle 2 (1/11/23) after FISH returned showing triple-hit disease. Staging LP negative, gave triple IT chemo once per cycle for CNS ppx for a total of 4 doses.   - PET after 2 cycles showed MS with significant resolution of most FDG activity/disease other than small area of right paramediastinal pleural thickening (SUVmax 5) and resolution of right pleural effusion. Concurrent Clonoseq was negative. PET after 4 cycles showed further improvement with right paramediastinal pleura SUV down to 3.3, also could potentially be artifact since it is near pacer wires.  - Completed Cycle 6 R-EPOCH 4/6/23. Tolerated chemo well overall, stayed at dose level 1 due to thrombocytopenia.  - EOT PET-CT 4/28/23 (with ketogenic diet for 3 days prior to suppress cardiac activity) showed CR!  - 6-month surveillance CT 11/2/23 showed ongoing remission. Concurrent Clonoseq also negative. Removed port 12/15/23.   - 1 year surveillance CT 5/23/24 shows ongoing remission.   - 1.5 year surveillance CT 11/14/24 with ongoing remission but tracheal thickening (see below).   - 2 year/last surveillance CT 5/22/25 with ongoing CR, some post surgical changes from his recent tracheal resection.   - Continue surveillance with H&P/labs every 6 mo for year 3, then annually for years 3-5. No further surveillance scans since he has reached 2 years out from treatment (only if develops new sx).   - We previously discussed his  increased risk of recurrence given transformed and triple-hit disease. If he relapses, would likely consider CAR-T therapy.     #Adenoid cystic sarcoma of trachea, [id number]   Dx 12/2024 after tracheal thickening discovered during surveillance scans for lymphoma.  - S/p resection and tracheal reconstruction on 2/28/25 with positive margins, PNI present.   - Thus will be starting adjuvant proton radiation (given hx of prior mediastinal RT) at [facility] 5/28/25.      #Hypogammaglobulinemia   #COVID-19 Infection 1/2023, resolved  #Acute maxillary sinusitis 5/2023, resolved   on 12/15/22 and 316 on 4/6/23.   - Given persistently low COVID cycle threshold, hypogammaglobulinemia, and risk for further immunosuppression with chemo, gave IVIG on 1/15/23 and 4/10/23.  - Monitor for recurrent infections.    #Non-melanoma skin cancers  History of many SCC's in the past in 2011, 2014, 2022 (very large lesion on right posterior shoulder).   - Saw Dermatology 8/8/23, biopsy from R lateral neck and R posterior shoulder both showed SCC. S/p Mohs on 11/8/23.   - Should continue at least annual follow-up with Dermatology.     #PPx  - Vaccines: up to date on flu, COVID, pneumonia shots. Previously recommended Shingrix series and RSV vaccine (not able to get at a clinic per his insurance) - he will look into getting at a local pharmacy.       PLAN:  - RTC in 6 months     Total of 30 minutes on patient visit, reviewing records, interpreting test results, placing orders, and documentation on the day of service.    The longitudinal plan of care for the diagnosis(es)/condition(s) as documented were addressed during this visit. Due to the added complexity in care, I will continue to support Shahid in the subsequent management and with ongoing continuity of care.     Domitila Ruelas MD  Attending Physician, [facility]

## 2025-05-22 ENCOUNTER — ONCOLOGY VISIT (OUTPATIENT)
Dept: ONCOLOGY | Facility: CLINIC | Age: 75
End: 2025-05-22
Attending: INTERNAL MEDICINE
Payer: MEDICARE

## 2025-05-22 ENCOUNTER — APPOINTMENT (OUTPATIENT)
Dept: LAB | Facility: CLINIC | Age: 75
End: 2025-05-22
Attending: INTERNAL MEDICINE
Payer: MEDICARE

## 2025-05-22 VITALS
HEART RATE: 82 BPM | TEMPERATURE: 98.7 F | OXYGEN SATURATION: 98 % | WEIGHT: 140.3 LBS | BODY MASS INDEX: 21.97 KG/M2 | SYSTOLIC BLOOD PRESSURE: 117 MMHG | RESPIRATION RATE: 18 BRPM | DIASTOLIC BLOOD PRESSURE: 69 MMHG

## 2025-05-22 DIAGNOSIS — C83.38 DIFFUSE LARGE B-CELL LYMPHOMA OF LYMPH NODES OF MULTIPLE REGIONS (H): Primary | ICD-10-CM

## 2025-05-22 DIAGNOSIS — C33 TRACHEAL CANCER (H): ICD-10-CM

## 2025-05-22 LAB
ALBUMIN SERPL BCG-MCNC: 4.1 G/DL (ref 3.5–5.2)
ALP SERPL-CCNC: 112 U/L (ref 40–150)
ALT SERPL W P-5'-P-CCNC: 38 U/L (ref 0–70)
ANION GAP SERPL CALCULATED.3IONS-SCNC: 12 MMOL/L (ref 7–15)
AST SERPL W P-5'-P-CCNC: 34 U/L (ref 0–45)
BASOPHILS # BLD AUTO: 0 10E3/UL (ref 0–0.2)
BASOPHILS NFR BLD AUTO: 0 %
BILIRUB SERPL-MCNC: 0.3 MG/DL
BUN SERPL-MCNC: 33.3 MG/DL (ref 8–23)
CALCIUM SERPL-MCNC: 9.6 MG/DL (ref 8.8–10.4)
CHLORIDE SERPL-SCNC: 104 MMOL/L (ref 98–107)
CREAT SERPL-MCNC: 0.88 MG/DL (ref 0.67–1.17)
EGFRCR SERPLBLD CKD-EPI 2021: 90 ML/MIN/1.73M2
EOSINOPHIL # BLD AUTO: 0 10E3/UL (ref 0–0.7)
EOSINOPHIL NFR BLD AUTO: 0 %
ERYTHROCYTE [DISTWIDTH] IN BLOOD BY AUTOMATED COUNT: 15.6 % (ref 10–15)
GLUCOSE SERPL-MCNC: 119 MG/DL (ref 70–99)
HCO3 SERPL-SCNC: 22 MMOL/L (ref 22–29)
HCT VFR BLD AUTO: 29.5 % (ref 40–53)
HGB BLD-MCNC: 9.2 G/DL (ref 13.3–17.7)
IMM GRANULOCYTES # BLD: 0 10E3/UL
IMM GRANULOCYTES NFR BLD: 1 %
LDH SERPL L TO P-CCNC: 208 U/L (ref 0–250)
LYMPHOCYTES # BLD AUTO: 0.4 10E3/UL (ref 0.8–5.3)
LYMPHOCYTES NFR BLD AUTO: 6 %
MCH RBC QN AUTO: 26.4 PG (ref 26.5–33)
MCHC RBC AUTO-ENTMCNC: 31.2 G/DL (ref 31.5–36.5)
MCV RBC AUTO: 85 FL (ref 78–100)
MONOCYTES # BLD AUTO: 0.1 10E3/UL (ref 0–1.3)
MONOCYTES NFR BLD AUTO: 2 %
NEUTROPHILS # BLD AUTO: 5.9 10E3/UL (ref 1.6–8.3)
NEUTROPHILS NFR BLD AUTO: 92 %
NRBC # BLD AUTO: 0 10E3/UL
NRBC BLD AUTO-RTO: 0 /100
PLATELET # BLD AUTO: 182 10E3/UL (ref 150–450)
POTASSIUM SERPL-SCNC: 4.7 MMOL/L (ref 3.4–5.3)
PROT SERPL-MCNC: 6 G/DL (ref 6.4–8.3)
RBC # BLD AUTO: 3.49 10E6/UL (ref 4.4–5.9)
SODIUM SERPL-SCNC: 138 MMOL/L (ref 135–145)
WBC # BLD AUTO: 6.5 10E3/UL (ref 4–11)

## 2025-05-22 PROCEDURE — 85025 COMPLETE CBC W/AUTO DIFF WBC: CPT | Performed by: INTERNAL MEDICINE

## 2025-05-22 PROCEDURE — G0463 HOSPITAL OUTPT CLINIC VISIT: HCPCS | Performed by: INTERNAL MEDICINE

## 2025-05-22 PROCEDURE — 83615 LACTATE (LD) (LDH) ENZYME: CPT | Performed by: INTERNAL MEDICINE

## 2025-05-22 PROCEDURE — 80053 COMPREHEN METABOLIC PANEL: CPT | Performed by: INTERNAL MEDICINE

## 2025-05-22 PROCEDURE — 36415 COLL VENOUS BLD VENIPUNCTURE: CPT | Performed by: INTERNAL MEDICINE

## 2025-05-22 ASSESSMENT — PAIN SCALES - GENERAL: PAINLEVEL_OUTOF10: NO PAIN (0)

## 2025-05-22 NOTE — Clinical Note
5/22/2025      Shahid Davis   [facility]  [place] [address]      Dear Colleague,    Thank you for referring your patient, Shahid Davis, to the [facility]. Please see a copy of my visit note below.    [facility]  Date of visit: May 22, 2025    Reason for Visit: Follow up triple-hit DLBCL    ONCOLOGIC SUMMARY:  Diagnosis:  Low-grade kappa-restricted plasmacytoid B-cell lymphoma dx 3/2004, presenting with thoracic paraspinal mass. Bone marrow hypercellular with 70-80% involvement by small kappa-restricted lymphocytes which were positive for CD10, CD19, and CD20 and negative for CD5 and CD23.   Transformation to high-grade B-cell lymphoma 12/2022 (versus new primary), presenting with B symptoms, sternal mass, and SOB/chest pain. Biopsy of sternal mass showed large B-cell lymphoma with aggressive features (GCB-subtype), Ki67 %, FISH positive for MYC (non-IgH partner), BCL2, and BCL6 rearrangements. Stage IV with extensive lymphomatous involvement to the R pleura w/avid effusions, mediastinal and pericardial regions, right anterolateral chest wall, adenopathy above and below the diaphragm, liver, spleen and multiple sites in the skeleton. CSF flow negative.    Treatment history:  For low-grade lymphoma:  2005: Fludarabine-based chemo x 6 cycles and XRT to spine (details unclear)    For high-grade lymphoma:  12/20/22: Cycle 1 R-CHOP with Neulasta support (with a few days of steroid prephase prior)  1/11/23: Cycle 2 Switched to DA-R-EPOCH with triple IT chemo for CNS ppx after FISH returned showing triple-hit disease. PET after 2 cycles shows very good AK.   2/2/23: Cycle 3 DA-R EPOCH with triple IT chemo for CNS ppx  2/23/23: Cycle 4 DA-R-EPOCH with triple IT chemo for CNS ppx. PET after 4 cycles shows CRu.   3/17/23: Cycle 5 DA-R-EPOCH with triple IT chemo for CNS ppx  4/6/23: Cycle 6 DA-R-EPOCH. EOT PET shows CR!    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 Starting adjuvant proton radiotherapy 5/28/25.     INTERVAL HISTORY:  Shahid is here today for follow-up. Doing pretty well overall. Still has occasional nausea, using Zofran sparingly. Neuropathy in feet (up to mid-foot) is stable. Otherwise no fevers, night sweats, recent infections, lumps/bumps, chest pain, SOB, diarrhea, or bleeding. Very active with walking daily and swimming 2-3x/week. [place]     ROS: 10 point ROS neg other than the symptoms noted above in the HPI.    Current Outpatient Medications   Medication Sig Dispense Refill    acetaminophen (TYLENOL) 325 MG tablet Take 3 tablets (975 mg) by mouth or Feeding Tube every 8 hours.      ascorbic acid (VITAMIN C) 500 MG tablet Take 500 mg by mouth every morning      methocarbamol (ROBAXIN) 500 MG tablet Take 1 tablet (500 mg) by mouth every 8 hours as needed for muscle spasms. 20 tablet 0    methylPREDNISolone (MEDROL) 32 MG tablet Take 1 tablet (32 mg) by mouth daily. 2 tablet 0    Multiple Vitamins-Minerals (MULTIVITAL PO) Take 1 tablet by mouth every morning.      omeprazole (PRILOSEC) 40 MG DR capsule Take 1 capsule (40 mg) by mouth daily 90 capsule 4       Allergies   Allergen Reactions    Contrast Dye Hives     need oral Methylprednisolone as premedication for scans    Ampicillin [Ampicillin Sodium]     Bactrim [Sulfamethoxazole W/Trimethoprim]      Patient unsure of reaction     Hydromorphone      Other Reaction(s): GI intolerance, nausea and comiting    Ampicillin Rash    Morphine Nausea     Physical Exam:  /69 (BP Location: Right arm, Patient Position: Sitting, Cuff Size: Adult Regular)   Pulse 82   Temp 98.7  F (37.1  C) (Oral)   Resp 18   Wt 63.6 kg (140 lb 4.8 oz)   SpO2 98%   BMI 21.97 kg/m       ECOG PS: 0    General: Awake, alert, in no acute distress.   HEENT: Normocephalic, atraumatic. No scleral icterus.    Lymph: No cervical, supraclavicular, or axillary LAD appreciated.   CV: Regular rate and rhythm. No murmurs, rubs, or gallops appreciated.  Resp: Good inspiratory effort, lungs clear to auscultation bilaterally.  GI: Abdomen soft, nontender, nondistended.   Ext: No peripheral edema bilaterally.  Neuro: CN II-XII grossly intact. No focal deficits appreciated.  Skin: No rash, unusual bruising or prominent lesions.  Psych: Pleasant, normal affect.     Labs:   I personally reviewed the following labs:    Most Recent 3 CBC's:  Recent Labs   Lab Test 05/22/25  1418 03/25/25  0652 03/24/25  0706   WBC 6.5 3.7* 4.4   HGB 9.2* 8.4* 8.5*   MCV 85 91 93    207 210     Most Recent 3 BMP's:  Recent Labs   Lab Test 05/22/25  1418 05/22/25  1117 03/25/25  0652 03/24/25  0706     --  138 138   POTASSIUM 4.7  --  4.1 4.1   CHLORIDE 104  --  106 105   CO2 22  --  23 23   BUN 33.3*  --  19.4 20.5   CR 0.88 0.9 0.94 0.90   ANIONGAP 12  --  9 10   JORGE 9.6  --  8.5* 8.7*   *  --  93 93     Most Recent 2 LFT's:  Recent Labs   Lab Test 05/22/25  1418 02/28/25  1839   AST 34 112*   ALT 38 74*   ALKPHOS 112 72   BILITOTAL 0.3 0.7         ASSESSMENT/PLAN:    #H/o low-grade kappa restricted plasmacytoid B-cell lymphoma 3/2004   #Transformation to DLBCL 12/2022  H/o low-grade kappa restricted plasmacytoid B-cell lymphoma 3/2004 treated with x6 cycles of fludarabine based chemo and XRT to spine, then has been on surveillance since 2005. He presented in 11/2022 with progressing B symptoms, sternal mass, and SOB/CP. PET 12/9/22 showed extensive lymphomatous involvement to the R pleura w/avid effusions, mediastinal and pericardial regions, right anterolateral chest wall, adenopathy above and below the diaphragm, liver, spleen and multiple sites in the skeleton, concerning for transformation from his low-grade lymphoma. 12/15/22 biopsy of sternal mass showed large B-cell lymphoma with aggressive features (GCB-subtype), Ki67  %. 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  - S/p C1 R-CHOP in the hospital 12/20/22. Tolerated well overall.  - Switched to DA-R-EPOCH starting Cycle 2 (1/11/23) after FISH returned showing triple-hit disease. Staging LP negative, gave triple IT chemo once per cycle for CNS ppx for a total of 4 doses.   - PET after 2 cycles showed CA with significant resolution of most FDG activity/disease other than small area of right paramediastinal pleural thickening (SUVmax 5) and resolution of right pleural effusion. Concurrent Clonoseq was negative. PET after 4 cycles showed further improvement with right paramediastinal pleura SUV down to 3.3, also could potentially be artifact since it is near pacer wires.  - Completed Cycle 6 R-EPOCH 4/6/23. Tolerated chemo well overall, stayed at dose level 1 due to thrombocytopenia.  - EOT PET-CT 4/28/23 (with ketogenic diet for 3 days prior to suppress cardiac activity) showed CR!  - 6-month surveillance CT 11/2/23 showed ongoing remission. Concurrent Clonoseq also negative. Removed port 12/15/23.   - 1 year surveillance CT 5/23/24 shows ongoing remission.   - 1.5 year surveillance CT 11/14/24 with ongoing remission. Stable indeterminate tracheal thickening. CT neck read still pending.   - Continue surveillance with H&P/labs every 3-6 mo for 2 years, every 6 mo for year 3, then yearly for years 4-5. Imaging every 6 months for first 2 years only (next and last CT 5/2025).   - We previously discussed his increased risk of recurrence given transformed and triple-hit disease. If he relapses, would likely consider CAR-T therapy.      #Hypogammaglobulinemia   #COVID-19 Infection 1/2023, resolved  #Acute maxillary sinusitis 5/2023, resolved   on 12/15/22 and 316 on 4/6/23.   - Given persistently low COVID cycle threshold, hypogammaglobulinemia, and risk for further immunosuppression with chemo, gave IVIG on 1/15/23 and 4/10/23.  -  Monitor for recurrent infections.    #Non-melanoma skin cancers  History of many SCC's in the past in 2011, 2014, 2022 (very large lesion on right posterior shoulder).   - Saw Dermatology 8/8/23, biopsy from R lateral neck and R posterior shoulder both showed SCC. S/p Mohs on 11/8/23.   - Should continue at least annual follow-up with Dermatology.     #Nausea  Intermittent. Unlikely to be related to chemo this far out.  - Continue PRN Zofran and compazine. Adding PRN Reglan also in case there is a component of gastroparesis as stomach is quite distended on CT.    #PPx  - Vaccines: up to date on pneumonia shot. Give flu/Covid shots today. Also recommend Shingrix series and RSV vaccine (not able to get at a clinic per his insurance) - he will look into getting at a local pharmacy.       PLAN:  - Follow up final CT read  - RTC in 6 months if CT normal     Total of 40 minutes on patient visit, reviewing records, interpreting test results, placing orders, and documentation on the day of service.    Domitila Ruelas MD  Attending Physician, [facility]       Again, thank you for allowing me to participate in the care of your patient.        Sincerely,        Domitila Ruelas MD    Electronically signed

## 2025-05-22 NOTE — NURSING NOTE
Chief Complaint   Patient presents with    Blood Draw     Labs drawn via  by RN in lab, vitals taken.        Labs collected from venipuncture by RN. Vitals taken. Pt reports on going SOB that is chronic/unchanged, update sent to team. Checked in for appointment(s).    Faby Shah RN

## 2025-05-22 NOTE — NURSING NOTE
"Oncology Rooming Note    May 22, 2025 2:48 PM   Shahid Davis is a 74 year old male who presents for:    Chief Complaint   Patient presents with    Blood Draw     Labs drawn via  by RN in lab, vitals taken.     Oncology Clinic Visit     Diffuse large B-cell lymphoma of lymph nodes of multiple regions, unstaged     Initial Vitals: /69 (BP Location: Right arm, Patient Position: Sitting, Cuff Size: Adult Regular)   Pulse 82   Temp 98.7  F (37.1  C) (Oral)   Resp 18   Wt 63.6 kg (140 lb 4.8 oz)   SpO2 98%   BMI 21.97 kg/m   Estimated body mass index is 21.97 kg/m  as calculated from the following:    Height as of 4/17/25: 1.702 m (5' 7\").    Weight as of this encounter: 63.6 kg (140 lb 4.8 oz). Body surface area is 1.73 meters squared.  No Pain (0) Comment: Data Unavailable   No LMP for male patient.  Allergies reviewed: Yes  Medications reviewed: Yes    Medications: MEDICATION REFILLS NEEDED TODAY. Provider was notified.  Pharmacy name entered into [facility]:    [id number] [place] [facility] [address] [id number] [address] AT [facility]    Frailty Screening:   Is the patient here for a new oncology consult visit in cancer care? 2. No      Clinical concerns: Pt may need refill on Omeprazole. Pt reports no new concerns today. Dr. Ruelas was notified via message.       Nettie Souza, EMT     "

## 2025-05-27 ENCOUNTER — PATIENT OUTREACH (OUTPATIENT)
Dept: ONCOLOGY | Facility: CLINIC | Age: 75
End: 2025-05-27
Payer: MEDICARE

## 2025-05-27 NOTE — PROGRESS NOTES
[facility]: Cancer Care                                                                                          Writer called Shahid to discuss CT results per Dr. Ruelas. No evidence of lymphoma, some post operative changes noted.   Shahid verbalizes understanding and denies questions. States he starts radiation this week.     Signature:  Claudine Wu RN

## 2025-07-14 ENCOUNTER — ANCILLARY PROCEDURE (OUTPATIENT)
Dept: CARDIOLOGY | Facility: CLINIC | Age: 75
End: 2025-07-14
Attending: INTERNAL MEDICINE
Payer: MEDICARE

## 2025-07-14 DIAGNOSIS — Z95.0 CARDIAC PACEMAKER IN SITU: ICD-10-CM

## 2025-07-14 LAB
MDC_IDC_EPISODE_DTM: NORMAL
MDC_IDC_EPISODE_ID: NORMAL
MDC_IDC_EPISODE_TYPE: NORMAL
MDC_IDC_LEAD_CONNECTION_STATUS: NORMAL
MDC_IDC_LEAD_CONNECTION_STATUS: NORMAL
MDC_IDC_LEAD_IMPLANT_DT: NORMAL
MDC_IDC_LEAD_IMPLANT_DT: NORMAL
MDC_IDC_LEAD_LOCATION: NORMAL
MDC_IDC_LEAD_LOCATION: NORMAL
MDC_IDC_LEAD_LOCATION_DETAIL_1: NORMAL
MDC_IDC_LEAD_LOCATION_DETAIL_1: NORMAL
MDC_IDC_LEAD_MFG: NORMAL
MDC_IDC_LEAD_MFG: NORMAL
MDC_IDC_LEAD_MODEL: NORMAL
MDC_IDC_LEAD_MODEL: NORMAL
MDC_IDC_LEAD_POLARITY_TYPE: NORMAL
MDC_IDC_LEAD_POLARITY_TYPE: NORMAL
MDC_IDC_LEAD_SERIAL: NORMAL
MDC_IDC_LEAD_SERIAL: NORMAL
MDC_IDC_MSMT_BATTERY_DTM: NORMAL
MDC_IDC_MSMT_BATTERY_REMAINING_LONGEVITY: 28 MO
MDC_IDC_MSMT_BATTERY_REMAINING_PERCENTAGE: 34 %
MDC_IDC_MSMT_BATTERY_RRT_TRIGGER: NORMAL
MDC_IDC_MSMT_BATTERY_STATUS: NORMAL
MDC_IDC_MSMT_BATTERY_VOLTAGE: 2.95 V
MDC_IDC_MSMT_LEADCHNL_RA_IMPEDANCE_VALUE: 310 OHM
MDC_IDC_MSMT_LEADCHNL_RA_LEAD_CHANNEL_STATUS: NORMAL
MDC_IDC_MSMT_LEADCHNL_RA_SENSING_INTR_AMPL: 0.7 MV
MDC_IDC_MSMT_LEADCHNL_RV_IMPEDANCE_VALUE: 430 OHM
MDC_IDC_MSMT_LEADCHNL_RV_LEAD_CHANNEL_STATUS: NORMAL
MDC_IDC_MSMT_LEADCHNL_RV_SENSING_INTR_AMPL: 12 MV
MDC_IDC_PG_IMPLANT_DTM: NORMAL
MDC_IDC_PG_MFG: NORMAL
MDC_IDC_PG_MODEL: NORMAL
MDC_IDC_PG_SERIAL: NORMAL
MDC_IDC_PG_TYPE: NORMAL
MDC_IDC_SESS_CLINIC_NAME: NORMAL
MDC_IDC_SESS_DTM: NORMAL
MDC_IDC_SESS_REPROGRAMMED: NO
MDC_IDC_SESS_TYPE: NORMAL
MDC_IDC_SET_BRADY_AT_MODE_SWITCH_MODE: NORMAL
MDC_IDC_SET_BRADY_AT_MODE_SWITCH_RATE: 160 {BEATS}/MIN
MDC_IDC_SET_BRADY_LOWRATE: 80 {BEATS}/MIN
MDC_IDC_SET_BRADY_MAX_SENSOR_RATE: 120 {BEATS}/MIN
MDC_IDC_SET_BRADY_MAX_TRACKING_RATE: 120 {BEATS}/MIN
MDC_IDC_SET_BRADY_MODE: NORMAL
MDC_IDC_SET_BRADY_PAV_DELAY_LOW: 200 MS
MDC_IDC_SET_BRADY_SAV_DELAY_LOW: 200 MS
MDC_IDC_SET_LEADCHNL_RA_PACING_AMPLITUDE: 2 V
MDC_IDC_SET_LEADCHNL_RA_PACING_ANODE_ELECTRODE_1: NORMAL
MDC_IDC_SET_LEADCHNL_RA_PACING_ANODE_LOCATION_1: NORMAL
MDC_IDC_SET_LEADCHNL_RA_PACING_CAPTURE_MODE: NORMAL
MDC_IDC_SET_LEADCHNL_RA_PACING_CATHODE_ELECTRODE_1: NORMAL
MDC_IDC_SET_LEADCHNL_RA_PACING_CATHODE_LOCATION_1: NORMAL
MDC_IDC_SET_LEADCHNL_RA_PACING_POLARITY: NORMAL
MDC_IDC_SET_LEADCHNL_RA_PACING_PULSEWIDTH: 1 MS
MDC_IDC_SET_LEADCHNL_RA_SENSING_ADAPTATION_MODE: NORMAL
MDC_IDC_SET_LEADCHNL_RA_SENSING_ANODE_ELECTRODE_1: NORMAL
MDC_IDC_SET_LEADCHNL_RA_SENSING_ANODE_LOCATION_1: NORMAL
MDC_IDC_SET_LEADCHNL_RA_SENSING_CATHODE_ELECTRODE_1: NORMAL
MDC_IDC_SET_LEADCHNL_RA_SENSING_CATHODE_LOCATION_1: NORMAL
MDC_IDC_SET_LEADCHNL_RA_SENSING_POLARITY: NORMAL
MDC_IDC_SET_LEADCHNL_RA_SENSING_SENSITIVITY: 0.3 MV
MDC_IDC_SET_LEADCHNL_RV_PACING_AMPLITUDE: 2 V
MDC_IDC_SET_LEADCHNL_RV_PACING_ANODE_ELECTRODE_1: NORMAL
MDC_IDC_SET_LEADCHNL_RV_PACING_ANODE_LOCATION_1: NORMAL
MDC_IDC_SET_LEADCHNL_RV_PACING_CAPTURE_MODE: NORMAL
MDC_IDC_SET_LEADCHNL_RV_PACING_CATHODE_ELECTRODE_1: NORMAL
MDC_IDC_SET_LEADCHNL_RV_PACING_CATHODE_LOCATION_1: NORMAL
MDC_IDC_SET_LEADCHNL_RV_PACING_POLARITY: NORMAL
MDC_IDC_SET_LEADCHNL_RV_PACING_PULSEWIDTH: 0.4 MS
MDC_IDC_SET_LEADCHNL_RV_SENSING_ADAPTATION_MODE: NORMAL
MDC_IDC_SET_LEADCHNL_RV_SENSING_ANODE_ELECTRODE_1: NORMAL
MDC_IDC_SET_LEADCHNL_RV_SENSING_ANODE_LOCATION_1: NORMAL
MDC_IDC_SET_LEADCHNL_RV_SENSING_CATHODE_ELECTRODE_1: NORMAL
MDC_IDC_SET_LEADCHNL_RV_SENSING_CATHODE_LOCATION_1: NORMAL
MDC_IDC_SET_LEADCHNL_RV_SENSING_POLARITY: NORMAL
MDC_IDC_SET_LEADCHNL_RV_SENSING_SENSITIVITY: 2.5 MV
MDC_IDC_STAT_AT_BURDEN_PERCENT: 0 %
MDC_IDC_STAT_AT_DTM_END: NORMAL
MDC_IDC_STAT_AT_DTM_START: NORMAL
MDC_IDC_STAT_AT_MODE_SW_COUNT: 0
MDC_IDC_STAT_AT_MODE_SW_COUNT_PER_DAY: 0
MDC_IDC_STAT_AT_MODE_SW_PERCENT_TIME: 0 %
MDC_IDC_STAT_BRADY_AP_VP_PERCENT: 34 %
MDC_IDC_STAT_BRADY_AP_VS_PERCENT: 1.9 %
MDC_IDC_STAT_BRADY_AS_VP_PERCENT: 62 %
MDC_IDC_STAT_BRADY_AS_VS_PERCENT: 2.3 %
MDC_IDC_STAT_BRADY_DTM_END: NORMAL
MDC_IDC_STAT_BRADY_DTM_START: NORMAL
MDC_IDC_STAT_BRADY_RA_PERCENT_PACED: 36 %
MDC_IDC_STAT_BRADY_RV_PERCENT_PACED: 96 %
MDC_IDC_STAT_CRT_DTM_END: NORMAL
MDC_IDC_STAT_CRT_DTM_START: NORMAL
MDC_IDC_STAT_EPISODE_RECENT_COUNT: 0
MDC_IDC_STAT_EPISODE_RECENT_COUNT: 0
MDC_IDC_STAT_EPISODE_RECENT_COUNT_DTM_END: NORMAL
MDC_IDC_STAT_EPISODE_RECENT_COUNT_DTM_END: NORMAL
MDC_IDC_STAT_EPISODE_RECENT_COUNT_DTM_START: NORMAL
MDC_IDC_STAT_EPISODE_RECENT_COUNT_DTM_START: NORMAL
MDC_IDC_STAT_EPISODE_TYPE: NORMAL
MDC_IDC_STAT_EPISODE_TYPE: NORMAL
MDC_IDC_STAT_EPISODE_VENDOR_TYPE: NORMAL
MDC_IDC_STAT_EPISODE_VENDOR_TYPE: NORMAL
MDC_IDC_STAT_HEART_RATE_ATRIAL_MAX: 310 {BEATS}/MIN
MDC_IDC_STAT_HEART_RATE_ATRIAL_MEAN: 92 {BEATS}/MIN
MDC_IDC_STAT_HEART_RATE_ATRIAL_MIN: 80 {BEATS}/MIN
MDC_IDC_STAT_HEART_RATE_DTM_END: NORMAL
MDC_IDC_STAT_HEART_RATE_DTM_START: NORMAL
MDC_IDC_STAT_HEART_RATE_VENTRICULAR_MAX: 240 {BEATS}/MIN
MDC_IDC_STAT_HEART_RATE_VENTRICULAR_MEAN: 92 {BEATS}/MIN
MDC_IDC_STAT_HEART_RATE_VENTRICULAR_MIN: 60 {BEATS}/MIN

## 2025-07-14 PROCEDURE — 93294 REM INTERROG EVL PM/LDLS PM: CPT | Performed by: INTERNAL MEDICINE

## 2025-07-14 PROCEDURE — 93296 REM INTERROG EVL PM/IDS: CPT

## 2025-07-16 ENCOUNTER — TELEPHONE (OUTPATIENT)
Dept: ONCOLOGY | Facility: CLINIC | Age: 75
End: 2025-07-16
Payer: MEDICARE

## 2025-07-16 DIAGNOSIS — D50.9 IRON DEFICIENCY ANEMIA, UNSPECIFIED IRON DEFICIENCY ANEMIA TYPE: Primary | ICD-10-CM

## 2025-07-16 NOTE — TELEPHONE ENCOUNTER
Received message from Dr. Warren at [facility] regarding Shahid's recently diagnosed heart failure/coronary artery disease and iron deficiency anemia. It appears that while receiving his proton radiotherapy at [place], iron levels were checked due to persistent anemia with Hgb ~9 and came back low with ferritin 26, iron 27, TIBC 341, iron sat 8%. He received 1g iron dextran infusion on 6/13/25 and subsequently developed swelling in his abdomen and feet. This led to an echocardiogram that showed newly reduced EF of 46%, severe apical hypokinesis, and severe tricuspid valve regurgitation. Subsequent CT coronaries shows severe stenosis in the mid-RCA and distal left circumflex and moderate stenosis in the mid-LAD. Ideally they can proceed with a coronary angiogram with possible stenting but would like input on his iron deficiency anemia first.     I think most likely the cause of his iron deficiency anemia is surgical blood loss from his extensive tracheal resection/reconstruction surgery on 2/28/25 where estimated blood loss was recorded as 1100 ml total which would result in about 550 mg of iron loss. His hemoglobin prior to the surgery in Feb 2025 was normal at 13.5 and dropped to around 10 following the procedure, so part of the anemia is likely actual blood loss and then part of it is subsequent iron deficiency. However since he has never had an EGD or colonoscopy, I would recommend this to rule out occult GI blood loss (may have esophagitis/gastritis from the radiation, etc). He has already been referred to [facility] but as they may be scheduling a ways out, will refer to GI here as well to see if they can perform faster. Also we will recheck iron studies now that he is >1 month out from the IV iron infusion to ensure that his levels have been adequately repleted (target ferritin >50), this would be reassuring that he is not having ongoing blood/iron loss. If these are normal, I think it would be safe to proceed with  coronary artery stenting.    Discussed these recommendations with Dr. Warren over the phone.     Domitila Ruelas MD  Attending Physician, [facility]

## 2025-07-17 ENCOUNTER — PATIENT OUTREACH (OUTPATIENT)
Dept: ONCOLOGY | Facility: CLINIC | Age: 75
End: 2025-07-17
Payer: MEDICARE

## 2025-07-17 DIAGNOSIS — D50.9 IRON DEFICIENCY ANEMIA, UNSPECIFIED IRON DEFICIENCY ANEMIA TYPE: Primary | ICD-10-CM

## 2025-07-17 NOTE — PROGRESS NOTES
[facility]: Cancer Care                                                                                           Dr. Ruelas placed referral for GI to see when they can get scheduled for a scope. Unsure if this will happen with [facility] or [facility].   Dr. Ruelas would also like patient to have Iron lab drawn, Patient finished radiation today and is leaving [facility].  Patient states he is trying to get into a primary care clinic ([facility]) requested he have his labs checked there if able.     [facility] [place]  Phone [phone]  Lab fax  [phone]          ADDENDUM 07/17/25 3:17 PM  Writer received call from [facility] lab. They confirmed receipt of lab orders and stated they are a walk in lab open Monday-Friday 8-5.   Writer called Shahid and reviewed the above information.       Signature:  Claudine Wu RN

## 2025-07-23 ENCOUNTER — PATIENT OUTREACH (OUTPATIENT)
Dept: ONCOLOGY | Facility: CLINIC | Age: 75
End: 2025-07-23
Payer: MEDICARE

## 2025-07-23 NOTE — PROGRESS NOTES
[facility]: Cancer Care                                                                                          Writer called Shahid to review lab results, that hemoglobin and iron labs look improved from previously.   Shahid stated he saw GI yesterday, and that he is going in tomorrow for a colonoscopy and endoscopy to look for any signs of bleeding. Shahid said if everything looks good he may be able to move forward with cardiology for a possible stent.   Shahid denies any other questions or concerns at this time.     Signature:  Claudine Wu RN

## 2025-09-03 ENCOUNTER — PATIENT OUTREACH (OUTPATIENT)
Dept: ONCOLOGY | Facility: CLINIC | Age: 75
End: 2025-09-03
Payer: MEDICARE

## (undated) DEVICE — ESU GROUND PAD ADULT W/CORD E7507

## (undated) DEVICE — DRSG TEGADERM 8X12" 1629

## (undated) DEVICE — SU PROLENE 4-0 RB-1DA 36" 8557H

## (undated) DEVICE — SU ETHIBOND 5 LRDA 30" B499T

## (undated) DEVICE — CONNECTOR BLAKE DRAIN SGL BCC1

## (undated) DEVICE — PACK CENTRAL LINE INSERTION SAN32CLFCG

## (undated) DEVICE — SOL NACL 0.9% INJ 250ML BAG 2B1322Q

## (undated) DEVICE — CLIP HORIZON MED BLUE 002200

## (undated) DEVICE — ENDO ADPT BRONCH SWIVEL Y A1002

## (undated) DEVICE — SU VICRYL 4-0 RB-1 27" J304

## (undated) DEVICE — DEFIB PRO-PADZ LVP LQD GEL ADULT 8900-2105-01

## (undated) DEVICE — SU VICRYL 3-0 SH 27" J316H

## (undated) DEVICE — Device

## (undated) DEVICE — NDL SUPERD TRANSBRONCHIAL CYTOLOGY WANG W-220-4

## (undated) DEVICE — SU SILK 0 SH 30" K834H

## (undated) DEVICE — DRAIN PENROSE 3/8X18" LATEX 30416-038

## (undated) DEVICE — GLOVE PROTEXIS POWDER FREE SMT 7.5  2D72PT75X

## (undated) DEVICE — GLOVE BIOGEL PI MICRO SZ 6.5 48565

## (undated) DEVICE — NDL INSUFFLATION 13GA 120MM C2201

## (undated) DEVICE — KIT ENDO FIRST STEP DISINFECTANT 200ML W/POUCH EP-4

## (undated) DEVICE — DAVINCI XI DRAPE COLUMN 470341

## (undated) DEVICE — PITCHER STERILE 1000ML  SSK9004A

## (undated) DEVICE — SU VICRYL 0 CT-2 27" J334H

## (undated) DEVICE — SOL WATER IRRIG 1000ML BOTTLE 2F7114

## (undated) DEVICE — LINEN GOWN XLG 5407

## (undated) DEVICE — LUBRICANT INST KIT ENDO-LUBE 220-90

## (undated) DEVICE — ESU PROBE ERBE FLEX 2.4MMX15MR 20402-411

## (undated) DEVICE — LABEL MEDICATION SYSTEM 3303-P

## (undated) DEVICE — VESSEL LOOPS YELLOW MAXI 31145694

## (undated) DEVICE — ENDO VALVE SUCTION BRONCH EVIS MAJ-209

## (undated) DEVICE — SU PROLENE 4-0 SHDA 36" 8521H

## (undated) DEVICE — GLOVE BIOGEL PI MICRO SZ 7.5 48575

## (undated) DEVICE — SU VICRYL 3-0 SH 27" UND J416H

## (undated) DEVICE — DAVINCI HOT SHEARS TIP COVER  400180

## (undated) DEVICE — SUCTION MANIFOLD NEPTUNE 2 SYS 4 PORT 0702-020-000

## (undated) DEVICE — DRAPE MAYO STAND 23X54 8337

## (undated) DEVICE — SU WND CLOSURE VLOC 90 ABS 2-0 UND 9" GS-22 VLOCM2245

## (undated) DEVICE — BLADE CLIPPER DISP 4406

## (undated) DEVICE — TRAY PNEUMOTHORAX G12032 C-UTPTY-1400-WAYNE-112497

## (undated) DEVICE — KNIFE HANDLE W/15 BLADE 371615

## (undated) DEVICE — BONE WAX 2.5GM W31G

## (undated) DEVICE — DAVINCI XI GRASPER PROGRASP 8MM EXT 471093

## (undated) DEVICE — SYR 30ML SLIP TIP W/O NDL 302833

## (undated) DEVICE — DAVINCI XI DRAPE ARM 470015

## (undated) DEVICE — CLIP HORIZON LG ORANGE 004200

## (undated) DEVICE — SYR 30ML LL W/O NDL 302832

## (undated) DEVICE — TUBING SUCTION 10'X3/16" N510

## (undated) DEVICE — GOWN XLG DISP 9545

## (undated) DEVICE — LINEN TOWEL PACK X30 5481

## (undated) DEVICE — ENDO VALVE SUCTION ULTRASOUND BRONCH MAJ-1414

## (undated) DEVICE — SU SILK 0 TIE 6X30" A306H

## (undated) DEVICE — SURGICEL ABSORBABLE HEMOSTAT SNOW 4"X4" 2083

## (undated) DEVICE — SU MONOCRYL 4-0 P-3 18" UND Y494G

## (undated) DEVICE — ESU GROUND PAD ADULT REM W/15' CORD E7507DB

## (undated) DEVICE — ESU ELEC BLADE 2.75" COATED/INSULATED E1455

## (undated) DEVICE — DRAIN JACKSON PRATT 10MM FLAT 4/4 PERF SU130-1311

## (undated) DEVICE — SYR 10ML SLIP TIP W/O NDL 303134

## (undated) DEVICE — LINEN TOWEL PACK X6 WHITE 5487

## (undated) DEVICE — SU ETHIBOND 3-0 BBDA 36" X588H

## (undated) DEVICE — ENDO VALVE BX EVIS MAJ-210

## (undated) DEVICE — SOL NACL 0.9% IRRIG 3000ML BAG 2B7477

## (undated) DEVICE — BLADE KNIFE SURG 11 371111

## (undated) DEVICE — TUBING INSUFFLATION W/FILTER CPC TO LUER 620-030-301

## (undated) DEVICE — BLADE CLIPPER SGL USE 9680

## (undated) DEVICE — SUCTION DRY CHEST DRAIN OASIS 3600-100

## (undated) DEVICE — PREP CHLORAPREP 26ML TINTED ORANGE  260815

## (undated) DEVICE — DRAIN CHEST TUBE 28FR STR 8028

## (undated) DEVICE — SYSTEM CLEARIFY VISUALIZATION 21-345

## (undated) DEVICE — DAVINCI XI SEAL UNIVERSAL 5-8MM 470361

## (undated) DEVICE — LINEN TOWEL PACK X5 5464

## (undated) DEVICE — BLADE KNIFE SURG 15 371115

## (undated) DEVICE — CLIP HORIZON SM RED WIDE SLOT 001201

## (undated) DEVICE — PACK GOWN 3/PK DISP XL SBA32GPFCB

## (undated) DEVICE — DECANTER BAG 2002S

## (undated) DEVICE — SU VICRYL 0 CTX 36" J370H

## (undated) DEVICE — SPONGE TONSIL W/STRING MED 23275-680

## (undated) DEVICE — SU ETHIBOND 2-0 SHDA 30" X563H

## (undated) DEVICE — DRAPE IOBAN INCISE 23X17" 6650EZ

## (undated) DEVICE — WIPES FOLEY CARE SURESTEP PROVON DFC100

## (undated) DEVICE — SOL NACL 0.9% IRRIG 1000ML BOTTLE 2F7124

## (undated) DEVICE — SU MONOCRYL 4-0 PS-2 27" UND Y426H

## (undated) DEVICE — SU VICRYL 4-0 RB-1 27" UND J214H

## (undated) DEVICE — GLOVE PROTEXIS POWDER FREE SMT 8.0  2D72PT80X

## (undated) DEVICE — SU ETHIBOND 0 CT-1 CR 8X18" CX21D

## (undated) DEVICE — ENDO FORCEP ALLIGATOR JAW BIOPSY 2MMX100CM FB-211D

## (undated) DEVICE — ESU ELEC BLADE E-SEP INSULATED NEPTUNE 70MM 0703-070-002

## (undated) DEVICE — DRSG PRIMAPORE 02X3" 7133

## (undated) DEVICE — DRSG TELFA ISLAND 4X14" 7544

## (undated) DEVICE — SUCTION TIP YANKAUER W/O VENT K86

## (undated) DEVICE — STRAP UNIVERSAL POSITIONING 2-PIECE 4X47X76" 91-287

## (undated) DEVICE — ADH LIQUID MASTISOL TOPICAL VIAL 2-3ML 0523-48

## (undated) DEVICE — ESU ELEC BLADE 6" COATED/INSULATED E1455-6

## (undated) DEVICE — NDL COUNTER 20CT 31142493

## (undated) DEVICE — TOURNIQUET VASCULAR KIT 7 1/2" 79012

## (undated) DEVICE — CUP AND LID 2PK 2OZ STERILE  SSK9006A

## (undated) DEVICE — DRSG STERI STRIP 1/2X4" R1547

## (undated) DEVICE — SU VICRYL 2-0 SH 27" UND J417H

## (undated) DEVICE — CATH FOLEY COUDE TIEMAN 16FR 30ML LATEX 0103SI16

## (undated) DEVICE — DAVINCI XI DRIVER NDL LARGE 8MM EXT 471006

## (undated) DEVICE — NDL BLUNT 18GA 1" W/O FILTER 305181

## (undated) DEVICE — SUTURE BOOTS 051003PBX

## (undated) DEVICE — DRSG TELFA 3X8" 1238

## (undated) DEVICE — VESSEL LOOPS BLUE SUPERMAXI 011022PBX

## (undated) DEVICE — SPECIMEN TRAP MUCOUS 40ML LUKI C30200A

## (undated) DEVICE — SU VICRYL 2-0 CT-1 27" UND J259H

## (undated) DEVICE — DRAPE SHEET REV FOLD 3/4 9349

## (undated) DEVICE — SU PLEDGET SOFT TFE 3/8"X3/26"X1/16" PCP40

## (undated) DEVICE — DRAIN JACKSON PRATT RESERVOIR 100ML SU130-1305

## (undated) DEVICE — ANTIFOG SOLUTION W/FOAM PAD 31142527

## (undated) DEVICE — SUPPORTER ATHLETIC LG LATEX 202636

## (undated) DEVICE — DAVINCI XI MONOPOLAR SCISSORS HOT SHEARS 8MM 470179

## (undated) DEVICE — STPL ENDO LINEAR CUT ARTICULATING 45MM ATS45

## (undated) DEVICE — GLOVE BIOGEL PI ULTRATOUCH SZ 7.0 41170

## (undated) DEVICE — SU DERMABOND ADVANCED .7ML DNX12

## (undated) DEVICE — ESU PENCIL SMOKE EVAC W/ROCKER SWITCH 0703-047-000

## (undated) RX ORDER — FENTANYL CITRATE 50 UG/ML
INJECTION, SOLUTION INTRAMUSCULAR; INTRAVENOUS
Status: DISPENSED
Start: 2025-04-17

## (undated) RX ORDER — LIDOCAINE HYDROCHLORIDE 10 MG/ML
INJECTION, SOLUTION EPIDURAL; INFILTRATION; INTRACAUDAL; PERINEURAL
Status: DISPENSED
Start: 2022-12-15

## (undated) RX ORDER — ACETAMINOPHEN 325 MG/1
TABLET ORAL
Status: DISPENSED
Start: 2023-12-15

## (undated) RX ORDER — PROTAMINE SULFATE 10 MG/ML
INJECTION, SOLUTION INTRAVENOUS
Status: DISPENSED

## (undated) RX ORDER — FENTANYL CITRATE 50 UG/ML
INJECTION, SOLUTION INTRAMUSCULAR; INTRAVENOUS
Status: DISPENSED
Start: 2022-12-15

## (undated) RX ORDER — FENTANYL CITRATE 50 UG/ML
INJECTION, SOLUTION INTRAMUSCULAR; INTRAVENOUS
Status: DISPENSED
Start: 2023-01-05

## (undated) RX ORDER — HEPARIN SODIUM 1000 [USP'U]/ML
INJECTION, SOLUTION INTRAVENOUS; SUBCUTANEOUS
Status: DISPENSED

## (undated) RX ORDER — CALCIUM CHLORIDE 100 MG/ML
INJECTION INTRAVENOUS; INTRAVENTRICULAR
Status: DISPENSED

## (undated) RX ORDER — HEPARIN SODIUM 1000 [USP'U]/ML
INJECTION, SOLUTION INTRAVENOUS; SUBCUTANEOUS
Status: DISPENSED
Start: 2025-02-28

## (undated) RX ORDER — ALBUMIN HUMAN 5 %
INTRAVENOUS SOLUTION INTRAVENOUS
Status: DISPENSED

## (undated) RX ORDER — PROPOFOL 10 MG/ML
INJECTION, EMULSION INTRAVENOUS
Status: DISPENSED
Start: 2025-02-28

## (undated) RX ORDER — CLINDAMYCIN PHOSPHATE 900 MG/50ML
INJECTION, SOLUTION INTRAVENOUS
Status: DISPENSED
Start: 2021-07-09

## (undated) RX ORDER — FENTANYL CITRATE-0.9 % NACL/PF 10 MCG/ML
PLASTIC BAG, INJECTION (ML) INTRAVENOUS
Status: DISPENSED

## (undated) RX ORDER — FENTANYL CITRATE 50 UG/ML
INJECTION, SOLUTION INTRAMUSCULAR; INTRAVENOUS
Status: DISPENSED
Start: 2021-07-09

## (undated) RX ORDER — ONDANSETRON 2 MG/ML
INJECTION INTRAMUSCULAR; INTRAVENOUS
Status: DISPENSED
Start: 2021-07-09

## (undated) RX ORDER — FENTANYL CITRATE 50 UG/ML
INJECTION, SOLUTION INTRAMUSCULAR; INTRAVENOUS
Status: DISPENSED
Start: 2025-02-28

## (undated) RX ORDER — PROPOFOL 10 MG/ML
INJECTION, EMULSION INTRAVENOUS
Status: DISPENSED
Start: 2021-07-09

## (undated) RX ORDER — LIDOCAINE HYDROCHLORIDE 10 MG/ML
INJECTION, SOLUTION EPIDURAL; INFILTRATION; INTRACAUDAL; PERINEURAL
Status: DISPENSED
Start: 2023-03-20

## (undated) RX ORDER — LIDOCAINE HYDROCHLORIDE 10 MG/ML
INJECTION, SOLUTION EPIDURAL; INFILTRATION; INTRACAUDAL; PERINEURAL
Status: DISPENSED
Start: 2023-02-24

## (undated) RX ORDER — CLINDAMYCIN PHOSPHATE 900 MG/50ML
INJECTION, SOLUTION INTRAVENOUS
Status: DISPENSED
Start: 2023-01-05

## (undated) RX ORDER — FENTANYL CITRATE 50 UG/ML
INJECTION, SOLUTION INTRAMUSCULAR; INTRAVENOUS
Status: DISPENSED
Start: 2024-12-16

## (undated) RX ORDER — DEXAMETHASONE SODIUM PHOSPHATE 4 MG/ML
INJECTION, SOLUTION INTRA-ARTICULAR; INTRALESIONAL; INTRAMUSCULAR; INTRAVENOUS; SOFT TISSUE
Status: DISPENSED
Start: 2021-07-09

## (undated) RX ORDER — LIDOCAINE HYDROCHLORIDE 10 MG/ML
INJECTION, SOLUTION EPIDURAL; INFILTRATION; INTRACAUDAL; PERINEURAL
Status: DISPENSED
Start: 2025-04-17

## (undated) RX ORDER — LIDOCAINE HYDROCHLORIDE 10 MG/ML
INJECTION, SOLUTION EPIDURAL; INFILTRATION; INTRACAUDAL; PERINEURAL
Status: DISPENSED
Start: 2023-02-06

## (undated) RX ORDER — PROPOFOL 10 MG/ML
INJECTION, EMULSION INTRAVENOUS
Status: DISPENSED
Start: 2024-12-16

## (undated) RX ORDER — ONDANSETRON 2 MG/ML
INJECTION INTRAMUSCULAR; INTRAVENOUS
Status: DISPENSED
Start: 2024-12-16

## (undated) RX ORDER — SODIUM CHLORIDE, SODIUM GLUCONATE, SODIUM ACETATE, POTASSIUM CHLORIDE AND MAGNESIUM CHLORIDE 526; 502; 368; 37; 30 MG/100ML; MG/100ML; MG/100ML; MG/100ML; MG/100ML
INJECTION, SOLUTION INTRAVENOUS
Status: DISPENSED

## (undated) RX ORDER — BUPIVACAINE HYDROCHLORIDE 5 MG/ML
INJECTION, SOLUTION EPIDURAL; INTRACAUDAL
Status: DISPENSED
Start: 2021-07-09

## (undated) RX ORDER — LIDOCAINE HYDROCHLORIDE 10 MG/ML
INJECTION, SOLUTION INFILTRATION; PERINEURAL
Status: DISPENSED
Start: 2023-01-05

## (undated) RX ORDER — ACETAMINOPHEN 325 MG/1
TABLET ORAL
Status: DISPENSED
Start: 2025-04-17

## (undated) RX ORDER — HEPARIN SODIUM (PORCINE) LOCK FLUSH IV SOLN 100 UNIT/ML 100 UNIT/ML
SOLUTION INTRAVENOUS
Status: DISPENSED
Start: 2023-11-02

## (undated) RX ORDER — CALCIUM CHLORIDE 100 MG/ML
INJECTION INTRAVENOUS; INTRAVENTRICULAR
Status: DISPENSED
Start: 2025-02-28

## (undated) RX ORDER — LIDOCAINE HYDROCHLORIDE 20 MG/ML
INJECTION, SOLUTION EPIDURAL; INFILTRATION; INTRACAUDAL; PERINEURAL
Status: DISPENSED
Start: 2021-07-09